# Patient Record
Sex: FEMALE | Race: WHITE | NOT HISPANIC OR LATINO | Employment: OTHER | URBAN - NONMETROPOLITAN AREA
[De-identification: names, ages, dates, MRNs, and addresses within clinical notes are randomized per-mention and may not be internally consistent; named-entity substitution may affect disease eponyms.]

---

## 2017-01-05 ENCOUNTER — COMMUNICATION - GICH (OUTPATIENT)
Dept: FAMILY MEDICINE | Facility: OTHER | Age: 62
End: 2017-01-05

## 2017-01-05 DIAGNOSIS — I25.810 ATHEROSCLEROSIS OF CORONARY ARTERY BYPASS GRAFT WITHOUT ANGINA PECTORIS: ICD-10-CM

## 2017-01-09 ENCOUNTER — COMMUNICATION - GICH (OUTPATIENT)
Dept: FAMILY MEDICINE | Facility: OTHER | Age: 62
End: 2017-01-09

## 2017-01-09 ENCOUNTER — HISTORY (OUTPATIENT)
Dept: FAMILY MEDICINE | Facility: OTHER | Age: 62
End: 2017-01-09

## 2017-01-09 ENCOUNTER — OFFICE VISIT - GICH (OUTPATIENT)
Dept: FAMILY MEDICINE | Facility: OTHER | Age: 62
End: 2017-01-09

## 2017-01-09 DIAGNOSIS — R10.84 GENERALIZED ABDOMINAL PAIN: ICD-10-CM

## 2017-01-09 DIAGNOSIS — Z87.440 PERSONAL HISTORY OF URINARY INFECTION: ICD-10-CM

## 2017-01-09 DIAGNOSIS — R41.3 OTHER AMNESIA: ICD-10-CM

## 2017-01-09 DIAGNOSIS — G43.909 MIGRAINE WITHOUT STATUS MIGRAINOSUS, NOT INTRACTABLE: ICD-10-CM

## 2017-01-09 DIAGNOSIS — Z87.820 PERSONAL HISTORY OF TRAUMATIC BRAIN INJURY: ICD-10-CM

## 2017-01-09 DIAGNOSIS — M54.50 LOW BACK PAIN: ICD-10-CM

## 2017-01-09 DIAGNOSIS — G89.29 OTHER CHRONIC PAIN: ICD-10-CM

## 2017-01-09 DIAGNOSIS — Z79.899 OTHER LONG TERM (CURRENT) DRUG THERAPY: ICD-10-CM

## 2017-01-09 LAB
ABSOLUTE BASOPHILS - HISTORICAL: 0.1 THOU/CU MM
ABSOLUTE EOSINOPHILS - HISTORICAL: 0.1 THOU/CU MM
ABSOLUTE LYMPHOCYTES - HISTORICAL: 2.1 THOU/CU MM (ref 0.9–2.9)
ABSOLUTE MONOCYTES - HISTORICAL: 0.6 THOU/CU MM
ABSOLUTE NEUTROPHILS - HISTORICAL: 5.2 THOU/CU MM (ref 1.7–7)
ANION GAP - HISTORICAL: 9 (ref 5–18)
BASOPHILS # BLD AUTO: 1.1 %
BILIRUB UR QL: NEGATIVE
BUN SERPL-MCNC: 11 MG/DL (ref 7–25)
BUN/CREAT RATIO - HISTORICAL: 12
C-REACTIVE PROTEIN - HISTORICAL: <1 MG/DL
CALCIUM SERPL-MCNC: 9.7 MG/DL (ref 8.6–10.3)
CHLORIDE SERPLBLD-SCNC: 102 MMOL/L (ref 98–107)
CLARITY, URINE: CLEAR CLARITY
CO2 SERPL-SCNC: 26 MMOL/L (ref 21–31)
COLOR UR: YELLOW COLOR
CREAT SERPL-MCNC: 0.92 MG/DL (ref 0.7–1.3)
EOSINOPHIL NFR BLD AUTO: 1.2 %
ERYTHROCYTE [DISTWIDTH] IN BLOOD BY AUTOMATED COUNT: 16.2 % (ref 11.5–15.5)
GFR IF NOT AFRICAN AMERICAN - HISTORICAL: >60 ML/MIN/1.73M2
GLUCOSE SERPL-MCNC: 103 MG/DL (ref 70–105)
GLUCOSE URINE: NEGATIVE MG/DL
HCT VFR BLD AUTO: 40.6 % (ref 33–51)
HEMOGLOBIN: 12.9 G/DL (ref 12–16)
KETONES UR QL: NEGATIVE MG/DL
LEUKOCYTE ESTERASE URINE: ABNORMAL
LYMPHOCYTES NFR BLD AUTO: 25.5 % (ref 20–44)
MCH RBC QN AUTO: 28.4 PG (ref 26–34)
MCHC RBC AUTO-ENTMCNC: 31.7 G/DL (ref 32–36)
MCV RBC AUTO: 89 FL (ref 80–100)
MONOCYTES NFR BLD AUTO: 7.5 %
NEUTROPHILS NFR BLD AUTO: 64.8 % (ref 42–72)
NITRITE UR QL STRIP: NEGATIVE
OCCULT BLOOD,URINE - HISTORICAL: ABNORMAL
PH UR: 6 [PH]
PLATELET # BLD AUTO: 283 THOU/CU MM (ref 140–440)
PMV BLD: 7.1 FL (ref 6.5–11)
POTASSIUM SERPL-SCNC: 4.2 MMOL/L (ref 3.5–5.1)
PROTEIN QUALITATIVE,URINE - HISTORICAL: NEGATIVE MG/DL
RED BLOOD COUNT - HISTORICAL: 4.54 MIL/CU MM (ref 4–5.2)
SODIUM SERPL-SCNC: 137 MMOL/L (ref 133–143)
SP GR UR STRIP: 1.02
UROBILINOGEN,QUALITATIVE - HISTORICAL: NORMAL EU/DL
WHITE BLOOD COUNT - HISTORICAL: 8.1 THOU/CU MM (ref 4.5–11)

## 2017-01-09 ASSESSMENT — PATIENT HEALTH QUESTIONNAIRE - PHQ9: SUM OF ALL RESPONSES TO PHQ QUESTIONS 1-9: 7

## 2017-01-11 LAB — CULTURE - HISTORICAL: NORMAL

## 2017-02-10 ENCOUNTER — HISTORY (OUTPATIENT)
Dept: FAMILY MEDICINE | Facility: OTHER | Age: 62
End: 2017-02-10

## 2017-02-10 ENCOUNTER — OFFICE VISIT - GICH (OUTPATIENT)
Dept: FAMILY MEDICINE | Facility: OTHER | Age: 62
End: 2017-02-10

## 2017-02-10 ENCOUNTER — COMMUNICATION - GICH (OUTPATIENT)
Dept: FAMILY MEDICINE | Facility: OTHER | Age: 62
End: 2017-02-10

## 2017-02-10 DIAGNOSIS — G43.909 MIGRAINE WITHOUT STATUS MIGRAINOSUS, NOT INTRACTABLE: ICD-10-CM

## 2017-02-10 DIAGNOSIS — Z79.899 OTHER LONG TERM (CURRENT) DRUG THERAPY: ICD-10-CM

## 2017-02-10 DIAGNOSIS — M54.50 LOW BACK PAIN: ICD-10-CM

## 2017-02-10 DIAGNOSIS — H92.02 OTALGIA OF LEFT EAR: ICD-10-CM

## 2017-02-10 DIAGNOSIS — G89.29 OTHER CHRONIC PAIN: ICD-10-CM

## 2017-02-13 ENCOUNTER — COMMUNICATION - GICH (OUTPATIENT)
Dept: FAMILY MEDICINE | Facility: OTHER | Age: 62
End: 2017-02-13

## 2017-02-13 DIAGNOSIS — I10 ESSENTIAL (PRIMARY) HYPERTENSION: ICD-10-CM

## 2017-02-14 ENCOUNTER — COMMUNICATION - GICH (OUTPATIENT)
Dept: FAMILY MEDICINE | Facility: OTHER | Age: 62
End: 2017-02-14

## 2017-02-14 ENCOUNTER — AMBULATORY - GICH (OUTPATIENT)
Dept: SCHEDULING | Facility: OTHER | Age: 62
End: 2017-02-14

## 2017-02-14 DIAGNOSIS — G89.29 OTHER CHRONIC PAIN: ICD-10-CM

## 2017-02-14 DIAGNOSIS — M54.50 LOW BACK PAIN: ICD-10-CM

## 2017-02-14 DIAGNOSIS — I10 ESSENTIAL (PRIMARY) HYPERTENSION: ICD-10-CM

## 2017-03-01 ENCOUNTER — HISTORY (OUTPATIENT)
Dept: EMERGENCY MEDICINE | Facility: OTHER | Age: 62
End: 2017-03-01

## 2017-03-05 ENCOUNTER — AMBULATORY - GICH (OUTPATIENT)
Dept: SCHEDULING | Facility: OTHER | Age: 62
End: 2017-03-05

## 2017-03-05 ENCOUNTER — HISTORY (OUTPATIENT)
Dept: EMERGENCY MEDICINE | Facility: OTHER | Age: 62
End: 2017-03-05

## 2017-03-06 ENCOUNTER — AMBULATORY - GICH (OUTPATIENT)
Dept: SCHEDULING | Facility: OTHER | Age: 62
End: 2017-03-06

## 2017-03-07 ENCOUNTER — AMBULATORY - GICH (OUTPATIENT)
Dept: SCHEDULING | Facility: OTHER | Age: 62
End: 2017-03-07

## 2017-03-08 ENCOUNTER — OFFICE VISIT - GICH (OUTPATIENT)
Dept: FAMILY MEDICINE | Facility: OTHER | Age: 62
End: 2017-03-08

## 2017-03-08 ENCOUNTER — HISTORY (OUTPATIENT)
Dept: FAMILY MEDICINE | Facility: OTHER | Age: 62
End: 2017-03-08

## 2017-03-08 DIAGNOSIS — G89.29 OTHER CHRONIC PAIN: ICD-10-CM

## 2017-03-08 DIAGNOSIS — G44.221 CHRONIC TENSION-TYPE HEADACHE, INTRACTABLE: ICD-10-CM

## 2017-03-08 DIAGNOSIS — M79.10 MYALGIA: ICD-10-CM

## 2017-03-08 DIAGNOSIS — Z02.89 ENCOUNTER FOR OTHER ADMINISTRATIVE EXAMINATIONS: ICD-10-CM

## 2017-03-08 DIAGNOSIS — M54.50 LOW BACK PAIN: ICD-10-CM

## 2017-03-08 DIAGNOSIS — G43.909 MIGRAINE WITHOUT STATUS MIGRAINOSUS, NOT INTRACTABLE: ICD-10-CM

## 2017-03-08 DIAGNOSIS — N18.30 CHRONIC KIDNEY DISEASE, STAGE III (MODERATE) (H): ICD-10-CM

## 2017-03-08 DIAGNOSIS — R07.89 OTHER CHEST PAIN: ICD-10-CM

## 2017-03-08 DIAGNOSIS — I10 ESSENTIAL (PRIMARY) HYPERTENSION: ICD-10-CM

## 2017-03-09 ENCOUNTER — HISTORIC RESULTS (OUTPATIENT)
Dept: ADMINISTRATIVE | Age: 62
End: 2017-03-09

## 2017-03-16 ENCOUNTER — HOSPITAL ENCOUNTER (OUTPATIENT)
Dept: PHYSICAL THERAPY | Facility: OTHER | Age: 62
Setting detail: THERAPIES SERIES
End: 2017-03-16
Attending: FAMILY MEDICINE

## 2017-03-16 DIAGNOSIS — G43.909 MIGRAINE WITHOUT STATUS MIGRAINOSUS, NOT INTRACTABLE: ICD-10-CM

## 2017-03-16 DIAGNOSIS — G44.221 CHRONIC TENSION-TYPE HEADACHE, INTRACTABLE: ICD-10-CM

## 2017-03-17 ENCOUNTER — COMMUNICATION - GICH (OUTPATIENT)
Dept: FAMILY MEDICINE | Facility: OTHER | Age: 62
End: 2017-03-17

## 2017-03-17 ENCOUNTER — HISTORY (OUTPATIENT)
Dept: EMERGENCY MEDICINE | Facility: OTHER | Age: 62
End: 2017-03-17

## 2017-03-21 ENCOUNTER — HOSPITAL ENCOUNTER (OUTPATIENT)
Dept: PHYSICAL THERAPY | Facility: OTHER | Age: 62
Setting detail: THERAPIES SERIES
End: 2017-03-21
Attending: FAMILY MEDICINE

## 2017-03-27 ENCOUNTER — HISTORY (OUTPATIENT)
Dept: FAMILY MEDICINE | Facility: OTHER | Age: 62
End: 2017-03-27

## 2017-03-27 ENCOUNTER — AMBULATORY - GICH (OUTPATIENT)
Dept: FAMILY MEDICINE | Facility: OTHER | Age: 62
End: 2017-03-27

## 2017-03-27 DIAGNOSIS — M54.50 LOW BACK PAIN: ICD-10-CM

## 2017-03-27 DIAGNOSIS — G89.29 OTHER CHRONIC PAIN: ICD-10-CM

## 2017-03-27 DIAGNOSIS — I10 ESSENTIAL (PRIMARY) HYPERTENSION: ICD-10-CM

## 2017-03-27 DIAGNOSIS — G43.909 MIGRAINE WITHOUT STATUS MIGRAINOSUS, NOT INTRACTABLE: ICD-10-CM

## 2017-03-27 DIAGNOSIS — I25.810 ATHEROSCLEROSIS OF CORONARY ARTERY BYPASS GRAFT WITHOUT ANGINA PECTORIS: ICD-10-CM

## 2017-03-27 DIAGNOSIS — Z01.810 ENCOUNTER FOR PREPROCEDURAL CARDIOVASCULAR EXAMINATION: ICD-10-CM

## 2017-03-27 DIAGNOSIS — Z02.89 ENCOUNTER FOR OTHER ADMINISTRATIVE EXAMINATIONS: ICD-10-CM

## 2017-03-27 DIAGNOSIS — N18.30 CHRONIC KIDNEY DISEASE, STAGE III (MODERATE) (H): ICD-10-CM

## 2017-03-29 ENCOUNTER — HOSPITAL ENCOUNTER (OUTPATIENT)
Dept: RADIOLOGY | Facility: OTHER | Age: 62
End: 2017-03-29
Attending: FAMILY MEDICINE

## 2017-03-29 DIAGNOSIS — I10 ESSENTIAL (PRIMARY) HYPERTENSION: ICD-10-CM

## 2017-03-29 DIAGNOSIS — N18.30 CHRONIC KIDNEY DISEASE, STAGE III (MODERATE) (H): ICD-10-CM

## 2017-04-05 ENCOUNTER — AMBULATORY - GICH (OUTPATIENT)
Dept: SCHEDULING | Facility: OTHER | Age: 62
End: 2017-04-05

## 2017-04-11 ENCOUNTER — OFFICE VISIT - GICH (OUTPATIENT)
Dept: FAMILY MEDICINE | Facility: OTHER | Age: 62
End: 2017-04-11

## 2017-04-11 ENCOUNTER — HISTORY (OUTPATIENT)
Dept: FAMILY MEDICINE | Facility: OTHER | Age: 62
End: 2017-04-11

## 2017-04-11 DIAGNOSIS — G43.909 MIGRAINE WITHOUT STATUS MIGRAINOSUS, NOT INTRACTABLE: ICD-10-CM

## 2017-04-11 DIAGNOSIS — Z02.89 ENCOUNTER FOR OTHER ADMINISTRATIVE EXAMINATIONS: ICD-10-CM

## 2017-04-11 DIAGNOSIS — G89.29 OTHER CHRONIC PAIN: ICD-10-CM

## 2017-04-11 DIAGNOSIS — M54.50 LOW BACK PAIN: ICD-10-CM

## 2017-04-24 ENCOUNTER — AMBULATORY - GICH (OUTPATIENT)
Dept: FAMILY MEDICINE | Facility: OTHER | Age: 62
End: 2017-04-24

## 2017-04-24 ENCOUNTER — HISTORY (OUTPATIENT)
Dept: FAMILY MEDICINE | Facility: OTHER | Age: 62
End: 2017-04-24

## 2017-04-24 DIAGNOSIS — Z02.89 ENCOUNTER FOR OTHER ADMINISTRATIVE EXAMINATIONS: ICD-10-CM

## 2017-04-24 DIAGNOSIS — G89.29 OTHER CHRONIC PAIN: ICD-10-CM

## 2017-04-24 DIAGNOSIS — I25.810 ATHEROSCLEROSIS OF CORONARY ARTERY BYPASS GRAFT WITHOUT ANGINA PECTORIS: ICD-10-CM

## 2017-04-24 DIAGNOSIS — M54.50 LOW BACK PAIN: ICD-10-CM

## 2017-04-24 DIAGNOSIS — G43.909 MIGRAINE WITHOUT STATUS MIGRAINOSUS, NOT INTRACTABLE: ICD-10-CM

## 2017-04-24 DIAGNOSIS — Z01.810 ENCOUNTER FOR PREPROCEDURAL CARDIOVASCULAR EXAMINATION: ICD-10-CM

## 2017-04-24 DIAGNOSIS — I10 ESSENTIAL (PRIMARY) HYPERTENSION: ICD-10-CM

## 2017-04-24 DIAGNOSIS — E78.5 HYPERLIPIDEMIA: ICD-10-CM

## 2017-04-24 LAB
ANION GAP - HISTORICAL: 8 (ref 5–18)
BUN SERPL-MCNC: 18 MG/DL (ref 7–25)
BUN/CREAT RATIO - HISTORICAL: 16
CALCIUM SERPL-MCNC: 9.9 MG/DL (ref 8.6–10.3)
CHLORIDE SERPLBLD-SCNC: 101 MMOL/L (ref 98–107)
CHOL/HDL RATIO - HISTORICAL: 2.59
CHOLESTEROL TOTAL: 225 MG/DL
CO2 SERPL-SCNC: 25 MMOL/L (ref 21–31)
CREAT SERPL-MCNC: 1.1 MG/DL (ref 0.7–1.3)
ERYTHROCYTE [DISTWIDTH] IN BLOOD BY AUTOMATED COUNT: 12.9 % (ref 11.5–15.5)
GFR IF NOT AFRICAN AMERICAN - HISTORICAL: 50 ML/MIN/1.73M2
GLUCOSE SERPL-MCNC: 89 MG/DL (ref 70–105)
HCT VFR BLD AUTO: 38.1 % (ref 33–51)
HDLC SERPL-MCNC: 87 MG/DL (ref 23–92)
HEMOGLOBIN: 12.8 G/DL (ref 12–16)
LDLC SERPL CALC-MCNC: 99 MG/DL
MCH RBC QN AUTO: 31 PG (ref 26–34)
MCHC RBC AUTO-ENTMCNC: 33.7 G/DL (ref 32–36)
MCV RBC AUTO: 92 FL (ref 80–100)
NON-HDL CHOLESTEROL - HISTORICAL: 138 MG/DL
PATIENT STATUS - HISTORICAL: ABNORMAL
PLATELET # BLD AUTO: 278 THOU/CU MM (ref 140–440)
PMV BLD: 7 FL (ref 6.5–11)
POTASSIUM SERPL-SCNC: 4.5 MMOL/L (ref 3.5–5.1)
RED BLOOD COUNT - HISTORICAL: 4.14 MIL/CU MM (ref 4–5.2)
SODIUM SERPL-SCNC: 134 MMOL/L (ref 133–143)
TRIGL SERPL-MCNC: 196 MG/DL
WHITE BLOOD COUNT - HISTORICAL: 7.5 THOU/CU MM (ref 4.5–11)

## 2017-04-24 ASSESSMENT — ANXIETY QUESTIONNAIRES
7. FEELING AFRAID AS IF SOMETHING AWFUL MIGHT HAPPEN: NOT AT ALL
1. FEELING NERVOUS, ANXIOUS, OR ON EDGE: SEVERAL DAYS
2. NOT BEING ABLE TO STOP OR CONTROL WORRYING: NOT AT ALL
6. BECOMING EASILY ANNOYED OR IRRITABLE: NOT AT ALL
GAD7 TOTAL SCORE: 1
3. WORRYING TOO MUCH ABOUT DIFFERENT THINGS: NOT AT ALL
4. TROUBLE RELAXING: NOT AT ALL
5. BEING SO RESTLESS THAT IT IS HARD TO SIT STILL: NOT AT ALL

## 2017-04-24 ASSESSMENT — PATIENT HEALTH QUESTIONNAIRE - PHQ9: SUM OF ALL RESPONSES TO PHQ QUESTIONS 1-9: 3

## 2017-05-08 ENCOUNTER — HISTORY (OUTPATIENT)
Dept: FAMILY MEDICINE | Facility: OTHER | Age: 62
End: 2017-05-08

## 2017-05-08 ENCOUNTER — OFFICE VISIT - GICH (OUTPATIENT)
Dept: FAMILY MEDICINE | Facility: OTHER | Age: 62
End: 2017-05-08

## 2017-05-08 DIAGNOSIS — G43.909 MIGRAINE WITHOUT STATUS MIGRAINOSUS, NOT INTRACTABLE: ICD-10-CM

## 2017-05-08 DIAGNOSIS — I77.1 STRICTURE OF ARTERY (H): ICD-10-CM

## 2017-05-08 DIAGNOSIS — Z02.89 ENCOUNTER FOR OTHER ADMINISTRATIVE EXAMINATIONS: ICD-10-CM

## 2017-05-08 DIAGNOSIS — G89.29 OTHER CHRONIC PAIN: ICD-10-CM

## 2017-05-08 DIAGNOSIS — M54.50 LOW BACK PAIN: ICD-10-CM

## 2017-05-09 ENCOUNTER — AMBULATORY - GICH (OUTPATIENT)
Dept: SCHEDULING | Facility: OTHER | Age: 62
End: 2017-05-09

## 2017-05-12 ENCOUNTER — AMBULATORY - GICH (OUTPATIENT)
Dept: SCHEDULING | Facility: OTHER | Age: 62
End: 2017-05-12

## 2017-05-23 ENCOUNTER — OFFICE VISIT - GICH (OUTPATIENT)
Dept: FAMILY MEDICINE | Facility: OTHER | Age: 62
End: 2017-05-23

## 2017-05-23 ENCOUNTER — HISTORY (OUTPATIENT)
Dept: FAMILY MEDICINE | Facility: OTHER | Age: 62
End: 2017-05-23

## 2017-05-23 ENCOUNTER — AMBULATORY - GICH (OUTPATIENT)
Dept: SCHEDULING | Facility: OTHER | Age: 62
End: 2017-05-23

## 2017-05-23 DIAGNOSIS — I25.810 ATHEROSCLEROSIS OF CORONARY ARTERY BYPASS GRAFT WITHOUT ANGINA PECTORIS: ICD-10-CM

## 2017-05-23 DIAGNOSIS — M79.10 MYALGIA: ICD-10-CM

## 2017-05-23 DIAGNOSIS — Z02.89 ENCOUNTER FOR OTHER ADMINISTRATIVE EXAMINATIONS: ICD-10-CM

## 2017-05-23 DIAGNOSIS — G44.221 CHRONIC TENSION-TYPE HEADACHE, INTRACTABLE: ICD-10-CM

## 2017-05-23 DIAGNOSIS — G89.29 OTHER CHRONIC PAIN: ICD-10-CM

## 2017-05-23 DIAGNOSIS — E55.9 VITAMIN D DEFICIENCY: ICD-10-CM

## 2017-05-23 DIAGNOSIS — G43.909 MIGRAINE WITHOUT STATUS MIGRAINOSUS, NOT INTRACTABLE: ICD-10-CM

## 2017-05-23 DIAGNOSIS — M54.50 LOW BACK PAIN: ICD-10-CM

## 2017-05-23 ASSESSMENT — PATIENT HEALTH QUESTIONNAIRE - PHQ9: SUM OF ALL RESPONSES TO PHQ QUESTIONS 1-9: 0

## 2017-06-02 ENCOUNTER — COMMUNICATION - GICH (OUTPATIENT)
Dept: FAMILY MEDICINE | Facility: OTHER | Age: 62
End: 2017-06-02

## 2017-06-02 DIAGNOSIS — R11.0 NAUSEA: ICD-10-CM

## 2017-06-20 ENCOUNTER — AMBULATORY - GICH (OUTPATIENT)
Dept: SCHEDULING | Facility: OTHER | Age: 62
End: 2017-06-20

## 2017-06-20 ENCOUNTER — HISTORY (OUTPATIENT)
Dept: FAMILY MEDICINE | Facility: OTHER | Age: 62
End: 2017-06-20

## 2017-06-20 ENCOUNTER — OFFICE VISIT - GICH (OUTPATIENT)
Dept: FAMILY MEDICINE | Facility: OTHER | Age: 62
End: 2017-06-20

## 2017-06-20 DIAGNOSIS — Z02.89 ENCOUNTER FOR OTHER ADMINISTRATIVE EXAMINATIONS: ICD-10-CM

## 2017-06-20 DIAGNOSIS — G89.29 OTHER CHRONIC PAIN: ICD-10-CM

## 2017-06-20 DIAGNOSIS — Z87.19 PERSONAL HISTORY OF OTHER DISEASES OF THE DIGESTIVE SYSTEM (CODE): ICD-10-CM

## 2017-06-20 DIAGNOSIS — N39.0 URINARY TRACT INFECTION: ICD-10-CM

## 2017-06-20 DIAGNOSIS — G43.909 MIGRAINE WITHOUT STATUS MIGRAINOSUS, NOT INTRACTABLE: ICD-10-CM

## 2017-06-20 DIAGNOSIS — M54.50 LOW BACK PAIN: ICD-10-CM

## 2017-06-20 DIAGNOSIS — R30.0 DYSURIA: ICD-10-CM

## 2017-06-20 DIAGNOSIS — I25.810 ATHEROSCLEROSIS OF CORONARY ARTERY BYPASS GRAFT WITHOUT ANGINA PECTORIS: ICD-10-CM

## 2017-06-20 LAB
BACTERIA URINE: ABNORMAL BACTERIA/HPF
BILIRUB UR QL: NEGATIVE
CLARITY, URINE: CLEAR CLARITY
COLOR UR: YELLOW COLOR
EPITHELIAL CELLS: ABNORMAL EPI/HPF
GLUCOSE URINE: NEGATIVE MG/DL
KETONES UR QL: NEGATIVE MG/DL
LEUKOCYTE ESTERASE URINE: ABNORMAL
NITRITE UR QL STRIP: POSITIVE
OCCULT BLOOD,URINE - HISTORICAL: NEGATIVE
PH UR: 5.5 [PH]
PROTEIN QUALITATIVE,URINE - HISTORICAL: NEGATIVE MG/DL
RBC - HISTORICAL: ABNORMAL /HPF
SP GR UR STRIP: 1.01
UROBILINOGEN,QUALITATIVE - HISTORICAL: NORMAL EU/DL
WBC - HISTORICAL: ABNORMAL /HPF

## 2017-06-22 LAB
CULTURE - HISTORICAL: ABNORMAL
CULTURE - HISTORICAL: ABNORMAL
SUSCEPTIBILITY RESULT - HISTORICAL: ABNORMAL

## 2017-07-03 ENCOUNTER — COMMUNICATION - GICH (OUTPATIENT)
Dept: FAMILY MEDICINE | Facility: OTHER | Age: 62
End: 2017-07-03

## 2017-07-03 DIAGNOSIS — I25.810 ATHEROSCLEROSIS OF CORONARY ARTERY BYPASS GRAFT WITHOUT ANGINA PECTORIS: ICD-10-CM

## 2017-07-18 ENCOUNTER — OFFICE VISIT - GICH (OUTPATIENT)
Dept: FAMILY MEDICINE | Facility: OTHER | Age: 62
End: 2017-07-18

## 2017-07-18 ENCOUNTER — AMBULATORY - GICH (OUTPATIENT)
Dept: SCHEDULING | Facility: OTHER | Age: 62
End: 2017-07-18

## 2017-07-18 DIAGNOSIS — G89.29 OTHER CHRONIC PAIN: ICD-10-CM

## 2017-07-18 DIAGNOSIS — M54.50 LOW BACK PAIN: ICD-10-CM

## 2017-07-18 DIAGNOSIS — G43.909 MIGRAINE WITHOUT STATUS MIGRAINOSUS, NOT INTRACTABLE: ICD-10-CM

## 2017-07-18 DIAGNOSIS — I10 ESSENTIAL (PRIMARY) HYPERTENSION: ICD-10-CM

## 2017-07-18 DIAGNOSIS — Z12.31 ENCOUNTER FOR SCREENING MAMMOGRAM FOR MALIGNANT NEOPLASM OF BREAST: ICD-10-CM

## 2017-07-18 DIAGNOSIS — I25.810 ATHEROSCLEROSIS OF CORONARY ARTERY BYPASS GRAFT WITHOUT ANGINA PECTORIS: ICD-10-CM

## 2017-07-18 DIAGNOSIS — Z02.89 ENCOUNTER FOR OTHER ADMINISTRATIVE EXAMINATIONS: ICD-10-CM

## 2017-07-18 ASSESSMENT — ANXIETY QUESTIONNAIRES
GAD7 TOTAL SCORE: 1
5. BEING SO RESTLESS THAT IT IS HARD TO SIT STILL: NOT AT ALL
3. WORRYING TOO MUCH ABOUT DIFFERENT THINGS: NOT AT ALL
1. FEELING NERVOUS, ANXIOUS, OR ON EDGE: NOT AT ALL
4. TROUBLE RELAXING: NOT AT ALL
7. FEELING AFRAID AS IF SOMETHING AWFUL MIGHT HAPPEN: NOT AT ALL
6. BECOMING EASILY ANNOYED OR IRRITABLE: SEVERAL DAYS
2. NOT BEING ABLE TO STOP OR CONTROL WORRYING: NOT AT ALL

## 2017-07-18 ASSESSMENT — PATIENT HEALTH QUESTIONNAIRE - PHQ9: SUM OF ALL RESPONSES TO PHQ QUESTIONS 1-9: 5

## 2017-07-24 ENCOUNTER — HOSPITAL ENCOUNTER (OUTPATIENT)
Dept: RADIOLOGY | Facility: OTHER | Age: 62
End: 2017-07-24
Attending: FAMILY MEDICINE

## 2017-07-24 ENCOUNTER — HISTORY (OUTPATIENT)
Dept: RADIOLOGY | Facility: OTHER | Age: 62
End: 2017-07-24

## 2017-07-24 DIAGNOSIS — Z12.31 ENCOUNTER FOR SCREENING MAMMOGRAM FOR MALIGNANT NEOPLASM OF BREAST: ICD-10-CM

## 2017-07-25 ENCOUNTER — AMBULATORY - GICH (OUTPATIENT)
Dept: RADIOLOGY | Facility: OTHER | Age: 62
End: 2017-07-25

## 2017-07-25 DIAGNOSIS — R92.8 OTHER ABNORMAL AND INCONCLUSIVE FINDINGS ON DIAGNOSTIC IMAGING OF BREAST: ICD-10-CM

## 2017-07-28 ENCOUNTER — HISTORY (OUTPATIENT)
Dept: RADIOLOGY | Facility: OTHER | Age: 62
End: 2017-07-28

## 2017-07-28 ENCOUNTER — COMMUNICATION - GICH (OUTPATIENT)
Dept: FAMILY MEDICINE | Facility: OTHER | Age: 62
End: 2017-07-28

## 2017-07-28 ENCOUNTER — HOSPITAL ENCOUNTER (OUTPATIENT)
Dept: RADIOLOGY | Facility: OTHER | Age: 62
End: 2017-07-28
Attending: FAMILY MEDICINE

## 2017-07-28 DIAGNOSIS — R92.8 OTHER ABNORMAL AND INCONCLUSIVE FINDINGS ON DIAGNOSTIC IMAGING OF BREAST: ICD-10-CM

## 2017-08-10 ENCOUNTER — COMMUNICATION - GICH (OUTPATIENT)
Dept: CARDIOLOGY | Facility: OTHER | Age: 62
End: 2017-08-10

## 2017-08-10 ENCOUNTER — TRANSFERRED RECORDS (OUTPATIENT)
Dept: HEALTH INFORMATION MANAGEMENT | Facility: CLINIC | Age: 62
End: 2017-08-10

## 2017-08-10 ENCOUNTER — HISTORY (OUTPATIENT)
Dept: CARDIOLOGY | Facility: OTHER | Age: 62
End: 2017-08-10

## 2017-08-10 ENCOUNTER — OFFICE VISIT - GICH (OUTPATIENT)
Dept: CARDIOLOGY | Facility: OTHER | Age: 62
End: 2017-08-10

## 2017-08-10 DIAGNOSIS — F41.9 ANXIETY DISORDER: ICD-10-CM

## 2017-08-10 DIAGNOSIS — Z95.1 PRESENCE OF AORTOCORONARY BYPASS GRAFT: ICD-10-CM

## 2017-08-10 DIAGNOSIS — Z95.5 PRESENCE OF CORONARY ANGIOPLASTY IMPLANT AND GRAFT: ICD-10-CM

## 2017-08-10 DIAGNOSIS — Z72.0 TOBACCO USE: ICD-10-CM

## 2017-08-10 DIAGNOSIS — R07.9 CHEST PAIN: ICD-10-CM

## 2017-08-10 DIAGNOSIS — F33.2 MAJOR DEPRESSIVE DISORDER, RECURRENT SEVERE WITHOUT PSYCHOTIC FEATURES (H): ICD-10-CM

## 2017-08-10 DIAGNOSIS — N18.30 CHRONIC KIDNEY DISEASE, STAGE III (MODERATE) (H): ICD-10-CM

## 2017-08-10 DIAGNOSIS — I77.1 STRICTURE OF ARTERY (H): ICD-10-CM

## 2017-08-10 DIAGNOSIS — I10 ESSENTIAL (PRIMARY) HYPERTENSION: ICD-10-CM

## 2017-08-10 DIAGNOSIS — J44.9 CHRONIC OBSTRUCTIVE PULMONARY DISEASE (H): ICD-10-CM

## 2017-08-10 DIAGNOSIS — E78.2 MIXED HYPERLIPIDEMIA: ICD-10-CM

## 2017-08-10 DIAGNOSIS — I25.10 ATHEROSCLEROTIC HEART DISEASE OF NATIVE CORONARY ARTERY WITHOUT ANGINA PECTORIS: ICD-10-CM

## 2017-08-10 ASSESSMENT — ANXIETY QUESTIONNAIRES
6. BECOMING EASILY ANNOYED OR IRRITABLE: SEVERAL DAYS
1. FEELING NERVOUS, ANXIOUS, OR ON EDGE: MORE THAN HALF THE DAYS
2. NOT BEING ABLE TO STOP OR CONTROL WORRYING: NOT AT ALL
5. BEING SO RESTLESS THAT IT IS HARD TO SIT STILL: NOT AT ALL
4. TROUBLE RELAXING: NOT AT ALL
3. WORRYING TOO MUCH ABOUT DIFFERENT THINGS: SEVERAL DAYS
7. FEELING AFRAID AS IF SOMETHING AWFUL MIGHT HAPPEN: NOT AT ALL
GAD7 TOTAL SCORE: 4

## 2017-08-10 ASSESSMENT — PATIENT HEALTH QUESTIONNAIRE - PHQ9: SUM OF ALL RESPONSES TO PHQ QUESTIONS 1-9: 3

## 2017-08-13 ENCOUNTER — COMMUNICATION - GICH (OUTPATIENT)
Dept: FAMILY MEDICINE | Facility: OTHER | Age: 62
End: 2017-08-13

## 2017-08-13 DIAGNOSIS — K27.9 PEPTIC ULCER WITHOUT HEMORRHAGE OR PERFORATION: ICD-10-CM

## 2017-08-16 ENCOUNTER — HISTORY (OUTPATIENT)
Dept: FAMILY MEDICINE | Facility: OTHER | Age: 62
End: 2017-08-16

## 2017-08-16 ENCOUNTER — TRANSFERRED RECORDS (OUTPATIENT)
Dept: HEALTH INFORMATION MANAGEMENT | Facility: CLINIC | Age: 62
End: 2017-08-16

## 2017-08-16 ENCOUNTER — OFFICE VISIT - GICH (OUTPATIENT)
Dept: FAMILY MEDICINE | Facility: OTHER | Age: 62
End: 2017-08-16

## 2017-08-16 DIAGNOSIS — I10 ESSENTIAL (PRIMARY) HYPERTENSION: ICD-10-CM

## 2017-08-16 DIAGNOSIS — G89.29 OTHER CHRONIC PAIN: ICD-10-CM

## 2017-08-16 DIAGNOSIS — Z02.89 ENCOUNTER FOR OTHER ADMINISTRATIVE EXAMINATIONS: ICD-10-CM

## 2017-08-16 DIAGNOSIS — K27.9 PEPTIC ULCER WITHOUT HEMORRHAGE OR PERFORATION: ICD-10-CM

## 2017-08-16 DIAGNOSIS — G43.909 MIGRAINE WITHOUT STATUS MIGRAINOSUS, NOT INTRACTABLE: ICD-10-CM

## 2017-08-16 DIAGNOSIS — I25.810 ATHEROSCLEROSIS OF CORONARY ARTERY BYPASS GRAFT WITHOUT ANGINA PECTORIS: ICD-10-CM

## 2017-08-16 DIAGNOSIS — M54.50 LOW BACK PAIN: ICD-10-CM

## 2017-08-16 ASSESSMENT — PATIENT HEALTH QUESTIONNAIRE - PHQ9: SUM OF ALL RESPONSES TO PHQ QUESTIONS 1-9: 0

## 2017-08-17 ENCOUNTER — COMMUNICATION - GICH (OUTPATIENT)
Dept: CARDIOLOGY | Facility: OTHER | Age: 62
End: 2017-08-17

## 2017-08-17 ENCOUNTER — AMBULATORY - GICH (OUTPATIENT)
Dept: SCHEDULING | Facility: OTHER | Age: 62
End: 2017-08-17

## 2017-08-22 ENCOUNTER — TRANSFERRED RECORDS (OUTPATIENT)
Dept: HEALTH INFORMATION MANAGEMENT | Facility: CLINIC | Age: 62
End: 2017-08-22

## 2017-08-22 ENCOUNTER — AMBULATORY - GICH (OUTPATIENT)
Dept: RADIOLOGY | Facility: OTHER | Age: 62
End: 2017-08-22

## 2017-08-22 DIAGNOSIS — R92.8 OTHER ABNORMAL AND INCONCLUSIVE FINDINGS ON DIAGNOSTIC IMAGING OF BREAST: ICD-10-CM

## 2017-08-29 ENCOUNTER — TRANSFERRED RECORDS (OUTPATIENT)
Dept: HEALTH INFORMATION MANAGEMENT | Facility: CLINIC | Age: 62
End: 2017-08-29

## 2017-09-12 ENCOUNTER — COMMUNICATION - GICH (OUTPATIENT)
Dept: FAMILY MEDICINE | Facility: OTHER | Age: 62
End: 2017-09-12

## 2017-09-12 ENCOUNTER — HISTORY (OUTPATIENT)
Dept: EMERGENCY MEDICINE | Facility: OTHER | Age: 62
End: 2017-09-12

## 2017-09-12 ENCOUNTER — COMMUNICATION - GICH (OUTPATIENT)
Dept: CARDIOLOGY | Facility: OTHER | Age: 62
End: 2017-09-12

## 2017-09-13 ENCOUNTER — OFFICE VISIT - GICH (OUTPATIENT)
Dept: FAMILY MEDICINE | Facility: OTHER | Age: 62
End: 2017-09-13

## 2017-09-13 ENCOUNTER — HISTORY (OUTPATIENT)
Dept: FAMILY MEDICINE | Facility: OTHER | Age: 62
End: 2017-09-13

## 2017-09-13 DIAGNOSIS — I10 ESSENTIAL (PRIMARY) HYPERTENSION: ICD-10-CM

## 2017-09-13 DIAGNOSIS — I25.10 ATHEROSCLEROTIC HEART DISEASE OF NATIVE CORONARY ARTERY WITHOUT ANGINA PECTORIS: ICD-10-CM

## 2017-09-13 DIAGNOSIS — G89.29 OTHER CHRONIC PAIN: ICD-10-CM

## 2017-09-13 DIAGNOSIS — M54.50 LOW BACK PAIN: ICD-10-CM

## 2017-09-13 DIAGNOSIS — G43.909 MIGRAINE WITHOUT STATUS MIGRAINOSUS, NOT INTRACTABLE: ICD-10-CM

## 2017-09-13 DIAGNOSIS — F41.9 ANXIETY DISORDER: ICD-10-CM

## 2017-09-13 DIAGNOSIS — Z02.89 ENCOUNTER FOR OTHER ADMINISTRATIVE EXAMINATIONS: ICD-10-CM

## 2017-09-13 ASSESSMENT — PATIENT HEALTH QUESTIONNAIRE - PHQ9: SUM OF ALL RESPONSES TO PHQ QUESTIONS 1-9: 3

## 2017-09-14 ENCOUNTER — COMMUNICATION - GICH (OUTPATIENT)
Dept: CARDIOLOGY | Facility: OTHER | Age: 62
End: 2017-09-14

## 2017-09-15 ENCOUNTER — APPOINTMENT (OUTPATIENT)
Dept: MEDSURG UNIT | Facility: CLINIC | Age: 62
End: 2017-09-15
Attending: INTERNAL MEDICINE
Payer: COMMERCIAL

## 2017-09-15 ENCOUNTER — HOSPITAL ENCOUNTER (OUTPATIENT)
Facility: CLINIC | Age: 62
Discharge: HOME | End: 2017-09-15
Attending: INTERNAL MEDICINE | Admitting: INTERNAL MEDICINE
Payer: COMMERCIAL

## 2017-09-15 ENCOUNTER — APPOINTMENT (OUTPATIENT)
Dept: CARDIOLOGY | Facility: CLINIC | Age: 62
End: 2017-09-15
Attending: INTERNAL MEDICINE
Payer: COMMERCIAL

## 2017-09-15 ENCOUNTER — AMBULATORY - GICH (OUTPATIENT)
Dept: SCHEDULING | Facility: OTHER | Age: 62
End: 2017-09-15

## 2017-09-15 VITALS
OXYGEN SATURATION: 98 % | SYSTOLIC BLOOD PRESSURE: 157 MMHG | BODY MASS INDEX: 25.71 KG/M2 | WEIGHT: 160 LBS | HEIGHT: 66 IN | TEMPERATURE: 97.7 F | RESPIRATION RATE: 16 BRPM | HEART RATE: 58 BPM | DIASTOLIC BLOOD PRESSURE: 83 MMHG

## 2017-09-15 DIAGNOSIS — I25.10 PRESENCE OF STENT IN CORONARY ARTERY IN PATIENT WITH CORONARY ARTERY DISEASE: ICD-10-CM

## 2017-09-15 DIAGNOSIS — Z95.5 PRESENCE OF STENT IN CORONARY ARTERY IN PATIENT WITH CORONARY ARTERY DISEASE: ICD-10-CM

## 2017-09-15 DIAGNOSIS — Z98.890 STATUS POST CORONARY ANGIOGRAM: Primary | ICD-10-CM

## 2017-09-15 DIAGNOSIS — R07.9 CHEST PAIN, UNSPECIFIED: ICD-10-CM

## 2017-09-15 DIAGNOSIS — I25.810: ICD-10-CM

## 2017-09-15 LAB
ANION GAP SERPL CALCULATED.3IONS-SCNC: 7 MMOL/L (ref 3–14)
BUN SERPL-MCNC: 25 MG/DL (ref 7–30)
CALCIUM SERPL-MCNC: 9 MG/DL (ref 8.5–10.1)
CHLORIDE SERPL-SCNC: 109 MMOL/L (ref 94–109)
CO2 SERPL-SCNC: 24 MMOL/L (ref 20–32)
CREAT SERPL-MCNC: 0.94 MG/DL (ref 0.52–1.04)
ERYTHROCYTE [DISTWIDTH] IN BLOOD BY AUTOMATED COUNT: 15.6 % (ref 10–15)
GFR SERPL CREATININE-BSD FRML MDRD: 60 ML/MIN/1.7M2
GLUCOSE SERPL-MCNC: 101 MG/DL (ref 70–99)
HCT VFR BLD AUTO: 33.5 % (ref 35–47)
HGB BLD-MCNC: 10.7 G/DL (ref 11.7–15.7)
MCH RBC QN AUTO: 28 PG (ref 26.5–33)
MCHC RBC AUTO-ENTMCNC: 31.9 G/DL (ref 31.5–36.5)
MCV RBC AUTO: 88 FL (ref 78–100)
PLATELET # BLD AUTO: 254 10E9/L (ref 150–450)
POTASSIUM SERPL-SCNC: 4 MMOL/L (ref 3.4–5.3)
RBC # BLD AUTO: 3.82 10E12/L (ref 3.8–5.2)
SODIUM SERPL-SCNC: 140 MMOL/L (ref 133–144)
WBC # BLD AUTO: 7.2 10E9/L (ref 4–11)

## 2017-09-15 PROCEDURE — B2151ZZ FLUOROSCOPY OF LEFT HEART USING LOW OSMOLAR CONTRAST: ICD-10-PCS | Performed by: INTERNAL MEDICINE

## 2017-09-15 PROCEDURE — B2111ZZ FLUOROSCOPY OF MULTIPLE CORONARY ARTERIES USING LOW OSMOLAR CONTRAST: ICD-10-PCS | Performed by: INTERNAL MEDICINE

## 2017-09-15 PROCEDURE — 25000132 ZZH RX MED GY IP 250 OP 250 PS 637: Performed by: INTERNAL MEDICINE

## 2017-09-15 PROCEDURE — 27211089 ZZH KIT ACIST INJECTOR CR3

## 2017-09-15 PROCEDURE — 93005 ELECTROCARDIOGRAM TRACING: CPT

## 2017-09-15 PROCEDURE — 99152 MOD SED SAME PHYS/QHP 5/>YRS: CPT | Mod: 59 | Performed by: INTERNAL MEDICINE

## 2017-09-15 PROCEDURE — 40000166 ZZH STATISTIC PP CARE STAGE 1

## 2017-09-15 PROCEDURE — C1769 GUIDE WIRE: HCPCS

## 2017-09-15 PROCEDURE — 27210742 ZZH CATH CR1

## 2017-09-15 PROCEDURE — B2181ZZ FLUOROSCOPY OF LEFT INTERNAL MAMMARY BYPASS GRAFT USING LOW OSMOLAR CONTRAST: ICD-10-PCS | Performed by: INTERNAL MEDICINE

## 2017-09-15 PROCEDURE — 25000128 H RX IP 250 OP 636: Performed by: INTERNAL MEDICINE

## 2017-09-15 PROCEDURE — B2131ZZ FLUOROSCOPY OF MULTIPLE CORONARY ARTERY BYPASS GRAFTS USING LOW OSMOLAR CONTRAST: ICD-10-PCS | Performed by: INTERNAL MEDICINE

## 2017-09-15 PROCEDURE — C1894 INTRO/SHEATH, NON-LASER: HCPCS

## 2017-09-15 PROCEDURE — 40000065 ZZH STATISTIC EKG NON-CHARGEABLE

## 2017-09-15 PROCEDURE — 27210787 ZZH MANIFOLD CR2

## 2017-09-15 PROCEDURE — 93010 ELECTROCARDIOGRAM REPORT: CPT | Performed by: INTERNAL MEDICINE

## 2017-09-15 PROCEDURE — 93459 L HRT ART/GRFT ANGIO: CPT

## 2017-09-15 PROCEDURE — 27210946 ZZH KIT HC TOTES DISP CR8

## 2017-09-15 PROCEDURE — 99152 MOD SED SAME PHYS/QHP 5/>YRS: CPT

## 2017-09-15 PROCEDURE — 80048 BASIC METABOLIC PNL TOTAL CA: CPT | Performed by: INTERNAL MEDICINE

## 2017-09-15 PROCEDURE — 4A023N7 MEASUREMENT OF CARDIAC SAMPLING AND PRESSURE, LEFT HEART, PERCUTANEOUS APPROACH: ICD-10-PCS | Performed by: INTERNAL MEDICINE

## 2017-09-15 PROCEDURE — 93459 L HRT ART/GRFT ANGIO: CPT | Mod: 26 | Performed by: INTERNAL MEDICINE

## 2017-09-15 PROCEDURE — 85027 COMPLETE CBC AUTOMATED: CPT | Performed by: INTERNAL MEDICINE

## 2017-09-15 PROCEDURE — 99153 MOD SED SAME PHYS/QHP EA: CPT

## 2017-09-15 RX ORDER — POTASSIUM CHLORIDE 7.45 MG/ML
10 INJECTION INTRAVENOUS
Status: DISCONTINUED | OUTPATIENT
Start: 2017-09-15 | End: 2017-09-15 | Stop reason: HOSPADM

## 2017-09-15 RX ORDER — OXYCODONE AND ACETAMINOPHEN 10; 325 MG/1; MG/1
2 TABLET ORAL 3 TIMES DAILY
COMMUNITY
End: 2018-02-13

## 2017-09-15 RX ORDER — CLOPIDOGREL BISULFATE 75 MG/1
300-600 TABLET ORAL
Status: DISCONTINUED | OUTPATIENT
Start: 2017-09-15 | End: 2017-09-15 | Stop reason: HOSPADM

## 2017-09-15 RX ORDER — METHYLPREDNISOLONE SODIUM SUCCINATE 125 MG/2ML
125 INJECTION, POWDER, LYOPHILIZED, FOR SOLUTION INTRAMUSCULAR; INTRAVENOUS
Status: DISCONTINUED | OUTPATIENT
Start: 2017-09-15 | End: 2017-09-15 | Stop reason: HOSPADM

## 2017-09-15 RX ORDER — NITROGLYCERIN 0.4 MG/1
0.4 TABLET SUBLINGUAL EVERY 5 MIN PRN
Status: DISCONTINUED | OUTPATIENT
Start: 2017-09-15 | End: 2017-09-15 | Stop reason: HOSPADM

## 2017-09-15 RX ORDER — PRASUGREL 10 MG/1
10-60 TABLET, FILM COATED ORAL
Status: DISCONTINUED | OUTPATIENT
Start: 2017-09-15 | End: 2017-09-15 | Stop reason: HOSPADM

## 2017-09-15 RX ORDER — HYDRALAZINE HYDROCHLORIDE 20 MG/ML
10-20 INJECTION INTRAMUSCULAR; INTRAVENOUS
Status: DISCONTINUED | OUTPATIENT
Start: 2017-09-15 | End: 2017-09-15 | Stop reason: HOSPADM

## 2017-09-15 RX ORDER — IOPAMIDOL 755 MG/ML
145 INJECTION, SOLUTION INTRAVASCULAR ONCE
Status: DISCONTINUED | OUTPATIENT
Start: 2017-09-15 | End: 2017-09-15 | Stop reason: HOSPADM

## 2017-09-15 RX ORDER — PROTAMINE SULFATE 10 MG/ML
25-100 INJECTION, SOLUTION INTRAVENOUS EVERY 5 MIN PRN
Status: DISCONTINUED | OUTPATIENT
Start: 2017-09-15 | End: 2017-09-15 | Stop reason: HOSPADM

## 2017-09-15 RX ORDER — LIDOCAINE 40 MG/G
CREAM TOPICAL
Status: DISCONTINUED | OUTPATIENT
Start: 2017-09-15 | End: 2017-09-15 | Stop reason: HOSPADM

## 2017-09-15 RX ORDER — ADENOSINE 3 MG/ML
12-12000 INJECTION, SOLUTION INTRAVENOUS
Status: DISCONTINUED | OUTPATIENT
Start: 2017-09-15 | End: 2017-09-15 | Stop reason: HOSPADM

## 2017-09-15 RX ORDER — NALOXONE HYDROCHLORIDE 0.4 MG/ML
0.4 INJECTION, SOLUTION INTRAMUSCULAR; INTRAVENOUS; SUBCUTANEOUS EVERY 5 MIN PRN
Status: DISCONTINUED | OUTPATIENT
Start: 2017-09-15 | End: 2017-09-15 | Stop reason: HOSPADM

## 2017-09-15 RX ORDER — CLOPIDOGREL BISULFATE 75 MG/1
75 TABLET ORAL
Status: DISCONTINUED | OUTPATIENT
Start: 2017-09-15 | End: 2017-09-15 | Stop reason: HOSPADM

## 2017-09-15 RX ORDER — SACCHAROMYCES BOULARDII 250 MG
250 CAPSULE ORAL 2 TIMES DAILY
COMMUNITY
End: 2020-06-03

## 2017-09-15 RX ORDER — SODIUM CHLORIDE 9 MG/ML
INJECTION, SOLUTION INTRAVENOUS CONTINUOUS
Status: DISCONTINUED | OUTPATIENT
Start: 2017-09-15 | End: 2017-09-15 | Stop reason: HOSPADM

## 2017-09-15 RX ORDER — DIPHENHYDRAMINE HYDROCHLORIDE 50 MG/ML
25-50 INJECTION INTRAMUSCULAR; INTRAVENOUS
Status: DISCONTINUED | OUTPATIENT
Start: 2017-09-15 | End: 2017-09-15 | Stop reason: HOSPADM

## 2017-09-15 RX ORDER — VERAPAMIL HYDROCHLORIDE 2.5 MG/ML
1-5 INJECTION, SOLUTION INTRAVENOUS
Status: DISCONTINUED | OUTPATIENT
Start: 2017-09-15 | End: 2017-09-15 | Stop reason: HOSPADM

## 2017-09-15 RX ORDER — NICARDIPINE HYDROCHLORIDE 2.5 MG/ML
100 INJECTION INTRAVENOUS
Status: DISCONTINUED | OUTPATIENT
Start: 2017-09-15 | End: 2017-09-15 | Stop reason: HOSPADM

## 2017-09-15 RX ORDER — NITROGLYCERIN 5 MG/ML
100-500 VIAL (ML) INTRAVENOUS
Status: DISCONTINUED | OUTPATIENT
Start: 2017-09-15 | End: 2017-09-15 | Stop reason: HOSPADM

## 2017-09-15 RX ORDER — ENALAPRILAT 1.25 MG/ML
1.25-2.5 INJECTION INTRAVENOUS
Status: DISCONTINUED | OUTPATIENT
Start: 2017-09-15 | End: 2017-09-15 | Stop reason: HOSPADM

## 2017-09-15 RX ORDER — MAGNESIUM HYDROXIDE 1200 MG/15ML
1000 LIQUID ORAL CONTINUOUS PRN
Status: DISCONTINUED | OUTPATIENT
Start: 2017-09-15 | End: 2017-09-15 | Stop reason: HOSPADM

## 2017-09-15 RX ORDER — ARGATROBAN 1 MG/ML
150 INJECTION, SOLUTION INTRAVENOUS
Status: DISCONTINUED | OUTPATIENT
Start: 2017-09-15 | End: 2017-09-15 | Stop reason: HOSPADM

## 2017-09-15 RX ORDER — FLUMAZENIL 0.1 MG/ML
0.2 INJECTION, SOLUTION INTRAVENOUS
Status: DISCONTINUED | OUTPATIENT
Start: 2017-09-15 | End: 2017-09-15 | Stop reason: HOSPADM

## 2017-09-15 RX ORDER — NALOXONE HYDROCHLORIDE 0.4 MG/ML
.1-.4 INJECTION, SOLUTION INTRAMUSCULAR; INTRAVENOUS; SUBCUTANEOUS
Status: DISCONTINUED | OUTPATIENT
Start: 2017-09-15 | End: 2017-09-15 | Stop reason: HOSPADM

## 2017-09-15 RX ORDER — FENTANYL CITRATE 50 UG/ML
25-50 INJECTION, SOLUTION INTRAMUSCULAR; INTRAVENOUS
Status: DISCONTINUED | OUTPATIENT
Start: 2017-09-15 | End: 2017-09-15 | Stop reason: HOSPADM

## 2017-09-15 RX ORDER — NITROGLYCERIN 5 MG/ML
100-200 VIAL (ML) INTRAVENOUS
Status: DISCONTINUED | OUTPATIENT
Start: 2017-09-15 | End: 2017-09-15 | Stop reason: HOSPADM

## 2017-09-15 RX ORDER — FLUTICASONE PROPIONATE 50 MCG
2 SPRAY, SUSPENSION (ML) NASAL DAILY
COMMUNITY
End: 2018-02-13

## 2017-09-15 RX ORDER — PROMETHAZINE HYDROCHLORIDE 25 MG/ML
6.25-25 INJECTION, SOLUTION INTRAMUSCULAR; INTRAVENOUS EVERY 4 HOURS PRN
Status: DISCONTINUED | OUTPATIENT
Start: 2017-09-15 | End: 2017-09-15 | Stop reason: HOSPADM

## 2017-09-15 RX ORDER — NIFEDIPINE 10 MG/1
10 CAPSULE ORAL
Status: DISCONTINUED | OUTPATIENT
Start: 2017-09-15 | End: 2017-09-15 | Stop reason: HOSPADM

## 2017-09-15 RX ORDER — POTASSIUM CHLORIDE 29.8 MG/ML
20 INJECTION INTRAVENOUS
Status: DISCONTINUED | OUTPATIENT
Start: 2017-09-15 | End: 2017-09-15 | Stop reason: HOSPADM

## 2017-09-15 RX ORDER — DOBUTAMINE HYDROCHLORIDE 200 MG/100ML
2-20 INJECTION INTRAVENOUS CONTINUOUS PRN
Status: DISCONTINUED | OUTPATIENT
Start: 2017-09-15 | End: 2017-09-15 | Stop reason: HOSPADM

## 2017-09-15 RX ORDER — ASPIRIN 81 MG/1
81-324 TABLET, CHEWABLE ORAL
Status: DISCONTINUED | OUTPATIENT
Start: 2017-09-15 | End: 2017-09-15 | Stop reason: HOSPADM

## 2017-09-15 RX ORDER — EPTIFIBATIDE 2 MG/ML
2 INJECTION, SOLUTION INTRAVENOUS CONTINUOUS PRN
Status: DISCONTINUED | OUTPATIENT
Start: 2017-09-15 | End: 2017-09-15 | Stop reason: HOSPADM

## 2017-09-15 RX ORDER — METOPROLOL TARTRATE 1 MG/ML
5 INJECTION, SOLUTION INTRAVENOUS EVERY 5 MIN PRN
Status: DISCONTINUED | OUTPATIENT
Start: 2017-09-15 | End: 2017-09-15 | Stop reason: HOSPADM

## 2017-09-15 RX ORDER — LORAZEPAM 2 MG/ML
.5-2 INJECTION INTRAMUSCULAR EVERY 4 HOURS PRN
Status: DISCONTINUED | OUTPATIENT
Start: 2017-09-15 | End: 2017-09-15 | Stop reason: HOSPADM

## 2017-09-15 RX ORDER — FUROSEMIDE 10 MG/ML
20-100 INJECTION INTRAMUSCULAR; INTRAVENOUS
Status: DISCONTINUED | OUTPATIENT
Start: 2017-09-15 | End: 2017-09-15 | Stop reason: HOSPADM

## 2017-09-15 RX ORDER — EPTIFIBATIDE 2 MG/ML
180 INJECTION, SOLUTION INTRAVENOUS EVERY 10 MIN PRN
Status: DISCONTINUED | OUTPATIENT
Start: 2017-09-15 | End: 2017-09-15 | Stop reason: HOSPADM

## 2017-09-15 RX ORDER — HEPARIN SODIUM 1000 [USP'U]/ML
1000-10000 INJECTION, SOLUTION INTRAVENOUS; SUBCUTANEOUS EVERY 5 MIN PRN
Status: DISCONTINUED | OUTPATIENT
Start: 2017-09-15 | End: 2017-09-15 | Stop reason: HOSPADM

## 2017-09-15 RX ORDER — PHENYLEPHRINE HCL IN 0.9% NACL 1 MG/10 ML
20-100 SYRINGE (ML) INTRAVENOUS
Status: DISCONTINUED | OUTPATIENT
Start: 2017-09-15 | End: 2017-09-15 | Stop reason: HOSPADM

## 2017-09-15 RX ORDER — NITROGLYCERIN 20 MG/100ML
.07-2 INJECTION INTRAVENOUS CONTINUOUS PRN
Status: DISCONTINUED | OUTPATIENT
Start: 2017-09-15 | End: 2017-09-15 | Stop reason: HOSPADM

## 2017-09-15 RX ORDER — DEXTROSE MONOHYDRATE 25 G/50ML
12.5-5 INJECTION, SOLUTION INTRAVENOUS EVERY 30 MIN PRN
Status: DISCONTINUED | OUTPATIENT
Start: 2017-09-15 | End: 2017-09-15 | Stop reason: HOSPADM

## 2017-09-15 RX ORDER — SODIUM NITROPRUSSIDE 25 MG/ML
100-200 INJECTION INTRAVENOUS
Status: DISCONTINUED | OUTPATIENT
Start: 2017-09-15 | End: 2017-09-15 | Stop reason: HOSPADM

## 2017-09-15 RX ORDER — DOPAMINE HYDROCHLORIDE 160 MG/100ML
2-20 INJECTION, SOLUTION INTRAVENOUS CONTINUOUS PRN
Status: DISCONTINUED | OUTPATIENT
Start: 2017-09-15 | End: 2017-09-15 | Stop reason: HOSPADM

## 2017-09-15 RX ORDER — LIDOCAINE HYDROCHLORIDE 10 MG/ML
30 INJECTION, SOLUTION EPIDURAL; INFILTRATION; INTRACAUDAL; PERINEURAL
Status: DISCONTINUED | OUTPATIENT
Start: 2017-09-15 | End: 2017-09-15 | Stop reason: HOSPADM

## 2017-09-15 RX ORDER — ACETAMINOPHEN 325 MG/1
325-650 TABLET ORAL EVERY 4 HOURS PRN
Status: DISCONTINUED | OUTPATIENT
Start: 2017-09-15 | End: 2017-09-15 | Stop reason: HOSPADM

## 2017-09-15 RX ORDER — PROTAMINE SULFATE 10 MG/ML
1-5 INJECTION, SOLUTION INTRAVENOUS
Status: DISCONTINUED | OUTPATIENT
Start: 2017-09-15 | End: 2017-09-15 | Stop reason: HOSPADM

## 2017-09-15 RX ORDER — ATROPINE SULFATE 0.1 MG/ML
.5-1 INJECTION INTRAVENOUS
Status: DISCONTINUED | OUTPATIENT
Start: 2017-09-15 | End: 2017-09-15 | Stop reason: HOSPADM

## 2017-09-15 RX ORDER — NALOXONE HYDROCHLORIDE 0.4 MG/ML
.2-.4 INJECTION, SOLUTION INTRAMUSCULAR; INTRAVENOUS; SUBCUTANEOUS
Status: DISCONTINUED | OUTPATIENT
Start: 2017-09-15 | End: 2017-09-15 | Stop reason: HOSPADM

## 2017-09-15 RX ORDER — ARGATROBAN 1 MG/ML
350 INJECTION, SOLUTION INTRAVENOUS
Status: DISCONTINUED | OUTPATIENT
Start: 2017-09-15 | End: 2017-09-15 | Stop reason: HOSPADM

## 2017-09-15 RX ORDER — ASPIRIN 325 MG
325 TABLET ORAL
Status: DISCONTINUED | OUTPATIENT
Start: 2017-09-15 | End: 2017-09-15 | Stop reason: HOSPADM

## 2017-09-15 RX ORDER — ATROPINE SULFATE 0.1 MG/ML
0.5 INJECTION INTRAVENOUS EVERY 5 MIN PRN
Status: DISCONTINUED | OUTPATIENT
Start: 2017-09-15 | End: 2017-09-15 | Stop reason: HOSPADM

## 2017-09-15 RX ORDER — ONDANSETRON 2 MG/ML
4 INJECTION INTRAMUSCULAR; INTRAVENOUS EVERY 4 HOURS PRN
Status: DISCONTINUED | OUTPATIENT
Start: 2017-09-15 | End: 2017-09-15 | Stop reason: HOSPADM

## 2017-09-15 RX ADMIN — SODIUM CHLORIDE: 9 INJECTION, SOLUTION INTRAVENOUS at 11:21

## 2017-09-15 RX ADMIN — ASPIRIN 325 MG ORAL TABLET 325 MG: 325 PILL ORAL at 11:20

## 2017-09-15 RX ADMIN — SODIUM CHLORIDE: 9 INJECTION, SOLUTION INTRAVENOUS at 15:11

## 2017-09-15 RX ADMIN — ACETAMINOPHEN 650 MG: 325 TABLET ORAL at 15:15

## 2017-09-15 NOTE — IP AVS SNAPSHOT
Unit [address] Observation [address] [address]    Phone:  [phone]    Fax:  [phone]                                       After Visit Summary   9/15/2017    Ankita Day    MRN: [id number]           After Visit Summary Signature Page     I have received my discharge instructions, and my questions have been answered. I have discussed any challenges I see with this plan with the nurse or doctor.    ..........................................................................................................................................  Patient/Patient Representative Signature      ..........................................................................................................................................  Patient Representative Print Name and Relationship to Patient    ..................................................               ................................................  Date                                            Time    ..........................................................................................................................................  Reviewed by Signature/Title    ...................................................              ..............................................  Date                                                            Time

## 2017-09-15 NOTE — PLAN OF CARE
"Problem: Discharge Planning  Goal: Discharge Planning (Adult, OB, Behavioral, Peds)  /83  Pulse 58  Temp 97.7  F (36.5  C) (Oral)  Resp 16  Ht 1.676 m (5' 6\")  Wt 72.6 kg (160 lb)  SpO2 98%  BMI 25.82 kg/m2  Pt off bed rest at 1715.  Right groin site CDI. CMS intact.  Pedal pulses normal.  Patient voided.  Is eating dinner.  Will ambulate once done eating.  Plan is to dc home once goals are met.  Cont with POC.      "

## 2017-09-15 NOTE — PROGRESS NOTES
Manual pressure held for 13 minutes to right groin site.  Sheath, size 4 FR,  pulled by CRISTO Holt.  Site CDI, no hematoma.  Stasis achieved at 1502. Off bedrest @ 1702.

## 2017-09-15 NOTE — PROGRESS NOTES
Patient is ready for the procedure. Here for Coronary angiogram.  with her. Await physician to review procedure and sign consent

## 2017-09-15 NOTE — IP AVS SNAPSHOT
MRN:[id number]                      After Visit Summary   9/15/2017    Ankita Day    MRN: [id number]           Thank you!     Thank you for choosing [facility] for your care. Our goal is always to provide you with excellent care. Hearing back from our patients is one way we can continue to improve our services. Please take a few minutes to complete the written survey that you may receive in the mail after you visit with us. Thank you!        Patient Information     Date Of Birth          1955        About your hospital stay     You were admitted on:  September 15, 2017 You last received care in the:  Unit 6D Observation [facility]    You were discharged on:  September 15, 2017       Who to Call     For medical emergencies, please call 911.  For non-urgent questions about your medical care, please call your primary care provider or clinic, [phone]          Attending Provider     Provider Specialty    Paul Gramajo, DO Cardiology    Ames, Cheryl Holliday MD Internal Medicine       Primary Care Provider Office Phone # Fax #    Ramirez Cano [phone]      After Care Instructions     Discharge Instructions - IF on Metformin (Glucophage or Glucovance) or Metformin containing medications       IF on Metformin (Glucophage or Glucovance) or Metformin containing medications , schedule a Basic Metabolic Panel at [facility] or Primary Clinic in 48 - 72 hours post procedure and PRIOR TO resuming the Metformin or Metformin containing medications.  Hold Metformin (Glucophage or Glucovance) or Metformin containing medications until after the Basic Metabolic Panel on the 2nd or 3rd day following the procedure.  May resume after blood draw is complete.                  Pending Results     Date and Time Order Name Status Description    9/15/2017 1038 EKG 12-lead, tracing only Preliminary             Admission Information     Date & Time Provider Department Dept. Phone  "   9/15/2017 Cheryl Ames MD Unit 6D Observation [facility] [place] [phone]      Your Vitals Were     Blood Pressure Pulse Temperature Respirations Height Weight    157/83 58 97.7  F (36.5  C) (Oral) 16 1.676 m (5' 6\") 72.6 kg (160 lb)    Pulse Oximetry BMI (Body Mass Index)                98% 25.82 kg/m2          [email or web address] Information     [employer] lets you send messages to your doctor, view your test results, renew your prescriptions, schedule appointments and more. To sign up, go to [email or web address] . Click on \"Log in\" on the left side of the screen, which will take you to the Welcome page. Then click on \"Sign up Now\" on the right side of the page.     You will be asked to enter the access code listed below, as well as some personal information. Please follow the directions to create your username and password.     Your access code is: [email or web address]  Expires: 2017  6:30 PM     Your access code will  in 90 days. If you need help or a new code, please call your [facility] clinic or [phone].        Care EveryWhere ID     This is your Care EveryWhere ID. This could be used by other organizations to access your [facility] medical records  [id number]        Equal Access to Services     JACKIE SIMPSON AH: Hadii donaldo mcdonaldo Soyuriy, waaxda luqadaha, qaybta kaalmada dunia, kacie fields. So [facility] [phone].    ATENCIÓN: Si habla español, tiene a siddiqui disposición servicios gratuitos de asistencia lingüística. Avel al [phone].    We comply with applicable federal civil rights laws and [place] laws. We do not discriminate on the basis of race, color, national origin, age, disability sex, sexual orientation or gender identity.               Review of your medicines      CONTINUE these medicines which have NOT CHANGED        Dose / Directions    ASPIRIN PO        Dose:  81 mg   Take 81 mg by mouth daily   Refills:  0       BUSPIRONE HCL PO        Dose:  10 mg "   Take 10 mg by mouth 2 times daily   Refills:  0       CLONAZEPAM PO        Dose:  1 mg   Take 1 mg by mouth 4 times daily   Refills:  0       CYCLOBENZAPRINE HCL PO        Dose:  10 mg   Take 10 mg by mouth 3 times daily as needed for muscle spasms   Refills:  0       docusate sodium 50 MG capsule   Commonly known as:  COLACE        Dose:  50 mg   Take 50 mg by mouth daily   Refills:  0       fluticasone 50 MCG/ACT spray   Commonly known as:  FLONASE        Dose:  2 spray   Spray 2 sprays into both nostrils daily   Refills:  0       LISINOPRIL PO        Dose:  40 mg   Take 40 mg by mouth daily   Refills:  0       METOPROLOL TARTRATE PO        Dose:  50 mg   Take 50 mg by mouth 2 times daily   Refills:  0       NITROGLYCERIN SL        Dose:  0.3 mg   Place 0.3 mg under the tongue every 5 minutes as needed for chest pain   Refills:  0       NORVASC PO        Dose:  10 mg   Take 10 mg by mouth daily   Refills:  0       ONDANSETRON PO        Dose:  4 mg   Take 4 mg by mouth every 6 hours as needed for nausea   Refills:  0       oxyCODONE-acetaminophen  MG per tablet   Commonly known as:  PERCOCET        Dose:  2 tablet   Take 2 tablets by mouth 3 times daily   Refills:  0       PANTOPRAZOLE SODIUM PO        Dose:  40 mg   Take 40 mg by mouth 2 times daily (before meals)   Refills:  0       PRAVASTATIN SODIUM PO        Dose:  40 mg   Take 40 mg by mouth every evening   Refills:  0       saccharomyces boulardii 250 MG capsule   Commonly known as:  FLORASTOR        Dose:  250 mg   Take 250 mg by mouth 2 times daily   Refills:  0       TRINTELLIX 5 MG tablet   Generic drug:  vortioxetine        Dose:  5 mg   Take 5 mg by mouth   Refills:  0       VITAMIN D (CHOLECALCIFEROL) PO        Dose:  5000 Units   Take 5,000 Units by mouth daily   Refills:  0                Protect others around you: Learn how to safely use, store and throw away your medicines at www.disposemymeds.org.             Medication List: This is a  list of all your medications and when to take them. Check marks below indicate your daily home schedule. Keep this list as a reference.      Medications           Morning Afternoon Evening Bedtime As Needed    ASPIRIN PO   Take 81 mg by mouth daily   Last time this was given:  325 mg on 9/15/2017 11:20 AM                                BUSPIRONE HCL PO   Take 10 mg by mouth 2 times daily                                CLONAZEPAM PO   Take 1 mg by mouth 4 times daily                                CYCLOBENZAPRINE HCL PO   Take 10 mg by mouth 3 times daily as needed for muscle spasms                                docusate sodium 50 MG capsule   Commonly known as:  COLACE   Take 50 mg by mouth daily                                fluticasone 50 MCG/ACT spray   Commonly known as:  FLONASE   Spray 2 sprays into both nostrils daily                                LISINOPRIL PO   Take 40 mg by mouth daily                                METOPROLOL TARTRATE PO   Take 50 mg by mouth 2 times daily                                NITROGLYCERIN SL   Place 0.3 mg under the tongue every 5 minutes as needed for chest pain                                NORVASC PO   Take 10 mg by mouth daily                                ONDANSETRON PO   Take 4 mg by mouth every 6 hours as needed for nausea                                oxyCODONE-acetaminophen  MG per tablet   Commonly known as:  PERCOCET   Take 2 tablets by mouth 3 times daily                                PANTOPRAZOLE SODIUM PO   Take 40 mg by mouth 2 times daily (before meals)                                PRAVASTATIN SODIUM PO   Take 40 mg by mouth every evening                                saccharomyces boulardii 250 MG capsule   Commonly known as:  FLORASTOR   Take 250 mg by mouth 2 times daily                                TRINTELLIX 5 MG tablet   Take 5 mg by mouth   Generic drug:  vortioxetine                                VITAMIN D (CHOLECALCIFEROL) PO   Take  5,000 Units by mouth daily

## 2017-09-15 NOTE — PROGRESS NOTES
"/83  Pulse 58  Temp 97.7  F (36.5  C) (Oral)  Resp 16  Ht 1.676 m (5' 6\")  Wt 72.6 kg (160 lb)  SpO2 98%  BMI 25.82 kg/m2  Pt right groin site CDI, ate dinner.  Voided.  Ambulated with SBA in hallway.  Patient discharged from unit 6D at 1845.  Went over discharge paperwork and discharge instructions with patient who verbalized understanding.  Patient's IV discontinued.  Patient discharged from unit via wheelchair transport to return home with spouse, to return home via car.    "

## 2017-09-15 NOTE — PROCEDURES
CARDIAC CATH REPORT:     PROCEDURES PERFORMED:   Coronary Angiography  Coronary Bypass Angiography  Left Heart Catheterization    PHYSICIANS:  Attending Physician: Cheryl Ames MD  Interventional Cardiology Fellow: Ashu Madrid MD  Cardiology Fellow: Zac Baxter MD    INDICATION:  Ankita Day is a 62 year old female with known CAD s/p 3v CABG (LIMA-LAD, RAFI-RCA, SVG-OM1), prior PCI/DELONTE (RCA, L subclavian), who has been referred to the cath lab for angiogram by her cardiologist Dr. Gramajo. Patient has been having chest pain and dyspnea, stress test negative for reversible ischemia, but because of her increased pretest probability, was decided to still pursue angiogram.    DESCRIPTION:  1. Consent obtained with discussion of risks.  All questions were answered.  2. Sterile prep and procedure.  3. Location with Sheaths:   Rt Femoral Arterial  4 Fr 25 cm [long]  4. Access: Local anesthetic with lidocaine.  A micropuncture 21 guage needle with ultrasound guidance was used to establish vascular access using a modified Seldinger technique.  5. Diagnostic Catheters:   4 Fr  JR4 and JL 4, 3 DRC, and LCB  6. Guiding Catheters:  None  6. Estimated blood loss: < 50 ml    MEDICATIONS:  The procedure was performed under conscious sedation for 60 minutes from 12:22 to 13:22.  The patient was assessed immediately before the first sedation medication was administered.  Fentanyl 100 mcg or The patient was assessed immediately before the first sedation medication was administered.  Benadryl  50 were administered.  Heart rate, BP, respiration, oxygen saturation and patient responses were monitored throughout the procedure with the assistance of the RN under my supervision.  >> IV UFH 5000 U was administered to achieve anticoagulation.  >> Antiplatelet Therapy:  mg    Procedures:    CORONARY ANGIOGRAM:   1. Both coronary arteries arise from their respective cusps.  2. Dominance: Right  3. The LM has prior stenting  with mild luminal irregularities.  A small ramus intermedius with proximal 40% stenosis is present.  4. LAD: Type 3 [LAD supplies the entire apex].. The LAD is a large vessel and gives rise to septal perforators, medium sized D1 and D2.  The pLAD has a previous stent with  30% in stent restenosis, mLAD has luminal irregularities, and dLAD is a small vessel.  LIMA graft attaches after D2 origin and competitive flow is seen.  D1 is jailed by proximal stent without angiographic evidence of disease.  5. LCX is medium caliber vessel and gives rise to medium sized OM1 and small sized OM2 vessels.  The pLCx has a prior stent with 30% instent restenosis. mLCx and dLCx are small vessels. OM1 has minor luminal irregularities and OM2 is a small vessel.  6. RCA is a large vessel and gives rise to PL branches and supplies PDA. The pRCA has a prior stent widely patent, though 40% stenosis is seen just distal to stent. MidRCA, distal RCA, and PDA have minor luminal irregularities.    CORONARY ARTERY BYPASS ANGIOGRAPHY:  Coronary bypass angiography was performed on LIMA, attached to LAD after D2.  There is brisk flow without angiographic evidence of disease.  Left subclavian artery stent was seen and is widely patent.  R subclavian artery angiography performed, 95% occluded RAFI seen.  R carotid angiography showed no angiographic evidence of disease.  The R subclavian is a tortuous artery.  Prior Saphenous Vein graft 100% occluded at ascending aorta.  Touchdown to OM not seen.    LEFT HEART CATHETERIZATION:  1. LVEDP 22 mmHg.  2. Left ventriculography showed normal anterior, apical and inferior wall motion, LVEF 79% and no evidence of mitral regurgitation.  Left ventriculography was performed with a 4 Fr pigtail catheter.    3. No gradient across the aortic valve on pull back.    Sheath Removal:  The right femoral sheath will be removed after the patient has been transferred to the unit.    Contrast: Isovue, 145 ml     Fluoroscopy  Time: 19 min    COMPLICATIONS:  1. None    SUMMARY:   Three vessel CAD RCA, LAD and LCX   Occluded RAFI and SVG.  Patent LIMA.  No new obstructive Lesions  Normal left heart catheterization    PLAN:   >> ASA 81 mg qd  >> Bedrest per protocol.  >> Continued medical management and lifestyle modification for cardiovascular risk factor optimization.   >> Discharge today per protocol    The attending interventional cardiologist was present and supervised all critical aspects the procedure.    Findings discussed with Dr. Ames and patients primary cardiologist Dr. Gramajo.    See CVIS report for final draft.    Zac Baxter   Cardiology Fellow    Ashu Larsen  Interventional    Cheryl Ames   Cardiology Staff

## 2017-09-18 LAB — INTERPRETATION ECG - MUSE: NORMAL

## 2017-09-28 ENCOUNTER — OFFICE VISIT - GICH (OUTPATIENT)
Dept: CARDIOLOGY | Facility: OTHER | Age: 62
End: 2017-09-28

## 2017-09-28 ENCOUNTER — TRANSFERRED RECORDS (OUTPATIENT)
Dept: HEALTH INFORMATION MANAGEMENT | Facility: CLINIC | Age: 62
End: 2017-09-28

## 2017-09-28 ENCOUNTER — HISTORY (OUTPATIENT)
Dept: CARDIOLOGY | Facility: OTHER | Age: 62
End: 2017-09-28

## 2017-09-28 ENCOUNTER — MEDICAL CORRESPONDENCE (OUTPATIENT)
Dept: CARDIOLOGY | Facility: CLINIC | Age: 62
End: 2017-09-28
Payer: COMMERCIAL

## 2017-09-28 DIAGNOSIS — I10 ESSENTIAL (PRIMARY) HYPERTENSION: ICD-10-CM

## 2017-09-28 DIAGNOSIS — F41.9 ANXIETY DISORDER: ICD-10-CM

## 2017-09-28 DIAGNOSIS — Z72.0 TOBACCO USE: ICD-10-CM

## 2017-09-28 DIAGNOSIS — Z95.1 PRESENCE OF AORTOCORONARY BYPASS GRAFT: ICD-10-CM

## 2017-09-28 DIAGNOSIS — E78.2 MIXED HYPERLIPIDEMIA: ICD-10-CM

## 2017-09-28 DIAGNOSIS — I77.1 STRICTURE OF ARTERY (H): ICD-10-CM

## 2017-09-28 DIAGNOSIS — E78.00 PURE HYPERCHOLESTEROLEMIA: ICD-10-CM

## 2017-09-28 DIAGNOSIS — Z71.6 TOBACCO ABUSE COUNSELING: ICD-10-CM

## 2017-09-28 DIAGNOSIS — G47.31 PRIMARY CENTRAL SLEEP APNEA: ICD-10-CM

## 2017-09-28 DIAGNOSIS — I25.10 ATHEROSCLEROTIC HEART DISEASE OF NATIVE CORONARY ARTERY WITHOUT ANGINA PECTORIS: ICD-10-CM

## 2017-09-28 DIAGNOSIS — R07.89 OTHER CHEST PAIN: ICD-10-CM

## 2017-09-28 DIAGNOSIS — G89.29 OTHER CHRONIC PAIN: ICD-10-CM

## 2017-09-28 PROCEDURE — 99204 OFFICE O/P NEW MOD 45 MIN: CPT | Mod: ZP | Performed by: INTERNAL MEDICINE

## 2017-09-28 ASSESSMENT — PATIENT HEALTH QUESTIONNAIRE - PHQ9: SUM OF ALL RESPONSES TO PHQ QUESTIONS 1-9: 9

## 2017-10-02 ENCOUNTER — AMBULATORY - GICH (OUTPATIENT)
Dept: RADIOLOGY | Facility: OTHER | Age: 62
End: 2017-10-02

## 2017-10-02 ENCOUNTER — HOSPITAL ENCOUNTER (OUTPATIENT)
Dept: RADIOLOGY | Facility: OTHER | Age: 62
End: 2017-10-02
Attending: FAMILY MEDICINE

## 2017-10-02 ENCOUNTER — HISTORY (OUTPATIENT)
Dept: RADIOLOGY | Facility: OTHER | Age: 62
End: 2017-10-02

## 2017-10-02 DIAGNOSIS — R92.8 OTHER ABNORMAL AND INCONCLUSIVE FINDINGS ON DIAGNOSTIC IMAGING OF BREAST: ICD-10-CM

## 2017-10-17 ENCOUNTER — HISTORY (OUTPATIENT)
Dept: FAMILY MEDICINE | Facility: OTHER | Age: 62
End: 2017-10-17

## 2017-10-17 ENCOUNTER — OFFICE VISIT - GICH (OUTPATIENT)
Dept: FAMILY MEDICINE | Facility: OTHER | Age: 62
End: 2017-10-17

## 2017-10-17 DIAGNOSIS — M54.50 LOW BACK PAIN: ICD-10-CM

## 2017-10-17 DIAGNOSIS — G89.29 OTHER CHRONIC PAIN: ICD-10-CM

## 2017-10-17 DIAGNOSIS — Z02.89 ENCOUNTER FOR OTHER ADMINISTRATIVE EXAMINATIONS: ICD-10-CM

## 2017-10-17 DIAGNOSIS — G43.909 MIGRAINE WITHOUT STATUS MIGRAINOSUS, NOT INTRACTABLE: ICD-10-CM

## 2017-10-17 DIAGNOSIS — Z23 ENCOUNTER FOR IMMUNIZATION: ICD-10-CM

## 2017-10-24 ENCOUNTER — HISTORY (OUTPATIENT)
Dept: FAMILY MEDICINE | Facility: OTHER | Age: 62
End: 2017-10-24

## 2017-10-24 ENCOUNTER — OFFICE VISIT - GICH (OUTPATIENT)
Dept: FAMILY MEDICINE | Facility: OTHER | Age: 62
End: 2017-10-24

## 2017-10-24 DIAGNOSIS — R11.0 NAUSEA: ICD-10-CM

## 2017-10-24 DIAGNOSIS — E78.00 PURE HYPERCHOLESTEROLEMIA: ICD-10-CM

## 2017-10-24 DIAGNOSIS — I10 ESSENTIAL (PRIMARY) HYPERTENSION: ICD-10-CM

## 2017-10-24 DIAGNOSIS — D50.9 IRON DEFICIENCY ANEMIA: ICD-10-CM

## 2017-10-24 DIAGNOSIS — R10.84 GENERALIZED ABDOMINAL PAIN: ICD-10-CM

## 2017-10-24 DIAGNOSIS — N18.2 CHRONIC KIDNEY DISEASE, STAGE II (MILD): ICD-10-CM

## 2017-10-24 DIAGNOSIS — E55.9 VITAMIN D DEFICIENCY: ICD-10-CM

## 2017-10-24 LAB
A/G RATIO - HISTORICAL: 1.2 (ref 1–2)
ABSOLUTE BASOPHILS - HISTORICAL: 0.1 THOU/CU MM
ABSOLUTE EOSINOPHILS - HISTORICAL: 0.1 THOU/CU MM
ABSOLUTE IMMATURE GRANULOCYTES(METAS,MYELOS,PROS) - HISTORICAL: 0 THOU/CU MM
ABSOLUTE LYMPHOCYTES - HISTORICAL: 1.8 THOU/CU MM (ref 0.9–2.9)
ABSOLUTE MONOCYTES - HISTORICAL: 0.6 THOU/CU MM
ABSOLUTE NEUTROPHILS - HISTORICAL: 4.3 THOU/CU MM (ref 1.7–7)
ALBUMIN SERPL-MCNC: 3.9 G/DL (ref 3.5–5.7)
ALP SERPL-CCNC: 59 IU/L (ref 34–104)
ALT (SGPT) - HISTORICAL: 12 IU/L (ref 7–52)
ANION GAP - HISTORICAL: 14 (ref 5–18)
AST SERPL-CCNC: 17 IU/L (ref 13–39)
BASOPHILS # BLD AUTO: 0.9 %
BILIRUB SERPL-MCNC: 0.4 MG/DL (ref 0.3–1)
BUN SERPL-MCNC: 14 MG/DL (ref 7–25)
BUN/CREAT RATIO - HISTORICAL: 14
C-REACTIVE PROTEIN - HISTORICAL: <1 MG/DL
CALCIUM SERPL-MCNC: 9.8 MG/DL (ref 8.6–10.3)
CHLORIDE SERPLBLD-SCNC: 102 MMOL/L (ref 98–107)
CHOL/HDL RATIO - HISTORICAL: 3.27
CHOLESTEROL TOTAL: 193 MG/DL
CO2 SERPL-SCNC: 25 MMOL/L (ref 21–31)
CREAT SERPL-MCNC: 1 MG/DL (ref 0.7–1.3)
EOSINOPHIL NFR BLD AUTO: 1.9 %
ERYTHROCYTE [DISTWIDTH] IN BLOOD BY AUTOMATED COUNT: 15.2 % (ref 11.5–15.5)
GFR IF NOT AFRICAN AMERICAN - HISTORICAL: 56 ML/MIN/1.73M2
GLOBULIN - HISTORICAL: 3.2 G/DL (ref 2–3.7)
GLUCOSE SERPL-MCNC: 138 MG/DL (ref 70–105)
HCT VFR BLD AUTO: 35.5 % (ref 33–51)
HDLC SERPL-MCNC: 59 MG/DL (ref 23–92)
HEMOGLOBIN: 11.5 G/DL (ref 12–16)
IMMATURE GRANULOCYTES(METAS,MYELOS,PROS) - HISTORICAL: 0.3 %
IRON BINDING CAP: 534.24 UG/DL (ref 245–400)
IRON SATURATION: 7 % (ref 20–55)
IRON: 35 UG/DL (ref 50–212)
LDLC SERPL CALC-MCNC: 83 MG/DL
LYMPHOCYTES NFR BLD AUTO: 26.2 % (ref 20–44)
MCH RBC QN AUTO: 27.6 PG (ref 26–34)
MCHC RBC AUTO-ENTMCNC: 32.4 G/DL (ref 32–36)
MCV RBC AUTO: 85 FL (ref 80–100)
MONOCYTES NFR BLD AUTO: 8.4 %
NEUTROPHILS NFR BLD AUTO: 62.3 % (ref 42–72)
NON-HDL CHOLESTEROL - HISTORICAL: 134 MG/DL
PLATELET # BLD AUTO: 249 THOU/CU MM (ref 140–440)
PMV BLD: 9.1 FL (ref 6.5–11)
POTASSIUM SERPL-SCNC: 4.4 MMOL/L (ref 3.5–5.1)
PROT SERPL-MCNC: 7.1 G/DL (ref 6.4–8.9)
PROVIDER ORDERDED STATUS - HISTORICAL: ABNORMAL
RED BLOOD COUNT - HISTORICAL: 4.17 MIL/CU MM (ref 4–5.2)
SODIUM SERPL-SCNC: 141 MMOL/L (ref 133–143)
TRIGL SERPL-MCNC: 255 MG/DL
UIBC (UNSATURATED) - HISTORICAL: 499.24 MG/DL
VITAMIN D TOTAL - HISTORICAL: 48.8 NG/ML
WHITE BLOOD COUNT - HISTORICAL: 6.9 THOU/CU MM (ref 4.5–11)

## 2017-10-31 ENCOUNTER — HISTORY (OUTPATIENT)
Dept: FAMILY MEDICINE | Facility: OTHER | Age: 62
End: 2017-10-31

## 2017-10-31 ENCOUNTER — OFFICE VISIT - GICH (OUTPATIENT)
Dept: FAMILY MEDICINE | Facility: OTHER | Age: 62
End: 2017-10-31

## 2017-10-31 DIAGNOSIS — D50.0 IRON DEFICIENCY ANEMIA DUE TO CHRONIC BLOOD LOSS: ICD-10-CM

## 2017-10-31 DIAGNOSIS — G43.909 MIGRAINE WITHOUT STATUS MIGRAINOSUS, NOT INTRACTABLE: ICD-10-CM

## 2017-10-31 DIAGNOSIS — R10.84 GENERALIZED ABDOMINAL PAIN: ICD-10-CM

## 2017-10-31 DIAGNOSIS — M54.50 LOW BACK PAIN: ICD-10-CM

## 2017-10-31 DIAGNOSIS — Z02.89 ENCOUNTER FOR OTHER ADMINISTRATIVE EXAMINATIONS: ICD-10-CM

## 2017-10-31 DIAGNOSIS — K27.9 PEPTIC ULCER WITHOUT HEMORRHAGE OR PERFORATION: ICD-10-CM

## 2017-10-31 DIAGNOSIS — G89.29 OTHER CHRONIC PAIN: ICD-10-CM

## 2017-10-31 DIAGNOSIS — R11.0 NAUSEA: ICD-10-CM

## 2017-10-31 LAB
BACTERIA URINE: NORMAL BACTERIA/HPF
BILIRUB UR QL: NEGATIVE
CLARITY, URINE: CLEAR CLARITY
COLOR UR: YELLOW COLOR
EPITHELIAL CELLS: NORMAL EPI/HPF
GLUCOSE URINE: NEGATIVE MG/DL
KETONES UR QL: NEGATIVE MG/DL
LEUKOCYTE ESTERASE URINE: NEGATIVE
NITRITE UR QL STRIP: NEGATIVE
OCCULT BLOOD,URINE - HISTORICAL: ABNORMAL
PH UR: 6 [PH]
PROTEIN QUALITATIVE,URINE - HISTORICAL: NEGATIVE MG/DL
RBC - HISTORICAL: NORMAL /HPF
SP GR UR STRIP: 1.01
UROBILINOGEN,QUALITATIVE - HISTORICAL: NORMAL EU/DL
WBC - HISTORICAL: NORMAL /HPF

## 2017-11-06 ENCOUNTER — COMMUNICATION - GICH (OUTPATIENT)
Dept: FAMILY MEDICINE | Facility: OTHER | Age: 62
End: 2017-11-06

## 2017-11-06 DIAGNOSIS — F41.1 GENERALIZED ANXIETY DISORDER: ICD-10-CM

## 2017-11-06 LAB
6-MONOACETYL MORPHINE - HISTORICAL: ABNORMAL NG/ML
AMPHETAMINE URINE - HISTORICAL: ABNORMAL NG/ML
BARBITURATE URINE - HISTORICAL: ABNORMAL NG/ML
BENZODIAZEPINE URINE - HISTORICAL: ABNORMAL NG/ML
BUPRENORPHRINE URINE - HISTORICAL: ABNORMAL NG/ML
COCAINE METAB URINE - HISTORICAL: ABNORMAL NG/ML
CREAT UR - HISTORICAL: 49 MG/DL
ETHYLGLUCURONIDE URINE - HISTORICAL: ABNORMAL NG/ML
FENTANYL URINE - HISTORICAL: ABNORMAL NG/ML
MASS SPECTROMETRY URINE - HISTORICAL: ABNORMAL
METHADONE URINE - HISTORICAL: ABNORMAL NG/ML
OPIATES URINE - HISTORICAL: ABNORMAL NG/ML
OXYCODONE URINE - HISTORICAL: ABNORMAL NG/ML
PH URINE - HISTORICAL: 6.5
PROPOXYPHENE URINE - HISTORICAL: ABNORMAL NG/ML
THC 50 URINE - HISTORICAL: ABNORMAL NG/ML
TRAMADOL - HISTORICAL: ABNORMAL NG/ML

## 2017-11-15 ENCOUNTER — HISTORY (OUTPATIENT)
Dept: FAMILY MEDICINE | Facility: OTHER | Age: 62
End: 2017-11-15

## 2017-11-15 ENCOUNTER — OFFICE VISIT - GICH (OUTPATIENT)
Dept: FAMILY MEDICINE | Facility: OTHER | Age: 62
End: 2017-11-15

## 2017-11-15 DIAGNOSIS — G43.909 MIGRAINE WITHOUT STATUS MIGRAINOSUS, NOT INTRACTABLE: ICD-10-CM

## 2017-11-15 DIAGNOSIS — N18.2 CHRONIC KIDNEY DISEASE, STAGE II (MILD): ICD-10-CM

## 2017-11-15 DIAGNOSIS — M54.50 LOW BACK PAIN: ICD-10-CM

## 2017-11-15 DIAGNOSIS — D50.9 IRON DEFICIENCY ANEMIA: ICD-10-CM

## 2017-11-15 DIAGNOSIS — R10.84 GENERALIZED ABDOMINAL PAIN: ICD-10-CM

## 2017-11-15 DIAGNOSIS — G89.29 OTHER CHRONIC PAIN: ICD-10-CM

## 2017-11-15 DIAGNOSIS — Z02.89 ENCOUNTER FOR OTHER ADMINISTRATIVE EXAMINATIONS: ICD-10-CM

## 2017-11-15 LAB
ANION GAP - HISTORICAL: 11 (ref 5–18)
BUN SERPL-MCNC: 25 MG/DL (ref 7–25)
BUN/CREAT RATIO - HISTORICAL: 25
CALCIUM SERPL-MCNC: 9.4 MG/DL (ref 8.6–10.3)
CHLORIDE SERPLBLD-SCNC: 107 MMOL/L (ref 98–107)
CO2 SERPL-SCNC: 22 MMOL/L (ref 21–31)
CREAT SERPL-MCNC: 1 MG/DL (ref 0.7–1.3)
ERYTHROCYTE [DISTWIDTH] IN BLOOD BY AUTOMATED COUNT: 15.3 % (ref 11.5–15.5)
FOLATE: >24.8 NG/ML
GFR IF NOT AFRICAN AMERICAN - HISTORICAL: 56 ML/MIN/1.73M2
GLUCOSE SERPL-MCNC: 85 MG/DL (ref 70–105)
HCT VFR BLD AUTO: 33.4 % (ref 33–51)
HEMOGLOBIN: 11 G/DL (ref 12–16)
IRON BINDING CAP: 512.12 UG/DL (ref 245–400)
IRON SATURATION: 32 % (ref 20–55)
IRON: 166 UG/DL (ref 50–212)
MCH RBC QN AUTO: 28.4 PG (ref 26–34)
MCHC RBC AUTO-ENTMCNC: 32.9 G/DL (ref 32–36)
MCV RBC AUTO: 86 FL (ref 80–100)
PLATELET # BLD AUTO: 233 THOU/CU MM (ref 140–440)
PMV BLD: 9.8 FL (ref 6.5–11)
POTASSIUM SERPL-SCNC: 3.9 MMOL/L (ref 3.5–5.1)
RED BLOOD COUNT - HISTORICAL: 3.87 MIL/CU MM (ref 4–5.2)
SODIUM SERPL-SCNC: 140 MMOL/L (ref 133–143)
UIBC (UNSATURATED) - HISTORICAL: 346.12 MG/DL
VIT B12 SERPL-MCNC: 300 PG/ML (ref 180–914)
WHITE BLOOD COUNT - HISTORICAL: 6.6 THOU/CU MM (ref 4.5–11)

## 2017-11-15 ASSESSMENT — PATIENT HEALTH QUESTIONNAIRE - PHQ9: SUM OF ALL RESPONSES TO PHQ QUESTIONS 1-9: 3

## 2017-11-27 ENCOUNTER — AMBULATORY - GICH (OUTPATIENT)
Dept: SCHEDULING | Facility: OTHER | Age: 62
End: 2017-11-27

## 2017-12-04 ENCOUNTER — COMMUNICATION - GICH (OUTPATIENT)
Dept: FAMILY MEDICINE | Facility: OTHER | Age: 62
End: 2017-12-04

## 2017-12-04 DIAGNOSIS — I10 ESSENTIAL (PRIMARY) HYPERTENSION: ICD-10-CM

## 2017-12-15 ENCOUNTER — COMMUNICATION - GICH (OUTPATIENT)
Dept: FAMILY MEDICINE | Facility: OTHER | Age: 62
End: 2017-12-15

## 2017-12-15 ENCOUNTER — OFFICE VISIT - GICH (OUTPATIENT)
Dept: FAMILY MEDICINE | Facility: OTHER | Age: 62
End: 2017-12-15

## 2017-12-15 ENCOUNTER — HISTORY (OUTPATIENT)
Dept: FAMILY MEDICINE | Facility: OTHER | Age: 62
End: 2017-12-15

## 2017-12-15 DIAGNOSIS — K25.0 ACUTE GASTRIC ULCER WITH HEMORRHAGE: ICD-10-CM

## 2017-12-15 DIAGNOSIS — G43.909 MIGRAINE WITHOUT STATUS MIGRAINOSUS, NOT INTRACTABLE: ICD-10-CM

## 2017-12-15 DIAGNOSIS — I25.10 ATHEROSCLEROTIC HEART DISEASE OF NATIVE CORONARY ARTERY WITHOUT ANGINA PECTORIS: ICD-10-CM

## 2017-12-15 DIAGNOSIS — M54.50 LOW BACK PAIN: ICD-10-CM

## 2017-12-15 DIAGNOSIS — G89.29 OTHER CHRONIC PAIN: ICD-10-CM

## 2017-12-15 DIAGNOSIS — M19.049 PRIMARY OSTEOARTHRITIS OF HAND: ICD-10-CM

## 2017-12-15 DIAGNOSIS — Z02.89 ENCOUNTER FOR OTHER ADMINISTRATIVE EXAMINATIONS: ICD-10-CM

## 2017-12-15 LAB
HEMOGLOBIN: 12.3 G/DL (ref 12–16)
MCV RBC AUTO: 89 FL (ref 80–100)

## 2017-12-16 ENCOUNTER — HEALTH MAINTENANCE LETTER (OUTPATIENT)
Age: 62
End: 2017-12-16

## 2017-12-19 ENCOUNTER — COMMUNICATION - GICH (OUTPATIENT)
Dept: FAMILY MEDICINE | Facility: OTHER | Age: 62
End: 2017-12-19

## 2017-12-19 ENCOUNTER — AMBULATORY - GICH (OUTPATIENT)
Dept: SCHEDULING | Facility: OTHER | Age: 62
End: 2017-12-19

## 2017-12-19 DIAGNOSIS — K27.9 PEPTIC ULCER WITHOUT HEMORRHAGE OR PERFORATION: ICD-10-CM

## 2017-12-19 DIAGNOSIS — K25.0 ACUTE GASTRIC ULCER WITH HEMORRHAGE: ICD-10-CM

## 2017-12-19 DIAGNOSIS — D50.0 IRON DEFICIENCY ANEMIA DUE TO CHRONIC BLOOD LOSS: ICD-10-CM

## 2017-12-19 DIAGNOSIS — R10.84 GENERALIZED ABDOMINAL PAIN: ICD-10-CM

## 2017-12-20 ENCOUNTER — COMMUNICATION - GICH (OUTPATIENT)
Dept: FAMILY MEDICINE | Facility: OTHER | Age: 62
End: 2017-12-20

## 2017-12-20 DIAGNOSIS — K25.0 ACUTE GASTRIC ULCER WITH HEMORRHAGE: ICD-10-CM

## 2017-12-26 ENCOUNTER — COMMUNICATION - GICH (OUTPATIENT)
Dept: FAMILY MEDICINE | Facility: OTHER | Age: 62
End: 2017-12-26

## 2017-12-26 DIAGNOSIS — M19.049 PRIMARY OSTEOARTHRITIS OF HAND: ICD-10-CM

## 2017-12-27 NOTE — PROGRESS NOTES
"Patient Information     Patient Name MRN Sex Ankita Bergeron [id number] Female 1955      Progress Notes by Ramirez Cano MD at 10/31/2017  9:45 AM     Author:  Ramirez Cano MD Service:  (none) Author Type:  Physician     Filed:  2017  2:02 PM Encounter Date:  10/31/2017 Status:  Signed     :  Ramirez Cano MD (Physician)            SUBJECTIVE:  62 y.o. female presents for follow up on abdominal pain, chest pain for a week. Follow up also on chronic chest wall and back pain, CAD.    Still nauseated, but no longer vomiting. Trintellix reduced, but that is not likely the cause for her symptoms. Bentyl seemed to help \"colon spasm\".  Sucralfate seems to be helping. She has a history of gastric ulcer and requires aspirin and Plavix for CAD.  Was taking omeprazole OTC PRN as well as scheduled Protonix BID.  No diarrhea. Noted darker stools, but was not concerned since it seemed different than when she had GI bleeding before.   Zofran helps, but can only get 9 at a time from pharmacy. I sent in Rx for 30.    It was a \"rough week\" due to death of a friend of similar age and then a nephew the next day.     Had more chest pain and thought about going to ED< but decided it must not be cardiac given recent negative testing.     Pleased with current oxycodone dosing of 40 mg daily. This is a reduction from 60 mg previously.    REVIEW OF SYSTEMS:    Constitutional: Negative  Respiratory: Negative  Cardiovascular: Negative  : Wonders if abdominal pain represents UTI. No dysuria, but hx of urinary sepsis.      Current Outpatient Prescriptions       Medication  Sig Dispense Refill     amLODIPine (NORVASC) 10 mg tablet Take 1 tablet by mouth once daily. 90 tablet 4     aspirin (ECOTRIN) 81 mg enteric coated tablet Take 81 mg by mouth once daily with a meal.       busPIRone (BUSPAR) 10 mg tablet TAKE 1 TABLET TWICE A  tablet 3     cholecalciferol (VITAMIN D3) 5,000 unit capsule Take 1 " capsule by mouth once daily.       clonazePAM (KLONOPIN) 1 mg tablet Take 1 tablet by mouth 4 times daily. Becky Olmstead CNP       clopidogrel (PLAVIX) 75 mg tablet TAKE 1 TABLET DAILY 90 tablet 5     cyclobenzaprine (FLEXERIL) 10 mg tablet TAKE 1 TABLET TWICE A DAY IF NEEDED FOR MUSCLE SPASM 60 tablet 0     dicyclomine (BENTYL) 10 mg capsule Take 1 capsule by mouth every 6 hours if needed for Other (Specify). 15 capsule 0     docusate (STOOL SOFTENER) 50 mg capsule Take 1 capsule by mouth once daily.  0     lisinopril (PRINIVIL; ZESTRIL) 40 mg tablet Take 1 tablet by mouth once daily. 90 tablet 4     metoprolol tartrate (LOPRESSOR) 50 mg tablet Take 1 tablet by mouth 2 times daily. 180 tablet 4     nitroglycerin (NITROSTAT) 0.3 mg sublingual tablet Place 1 tablet under the tongue every 5 minutes if needed for Chest Pain. 1 Bottle 2     ondansetron (ZOFRAN, AS HYDROCHLORIDE,) 4 mg tablet Take 1 tablet by mouth every 6 hours if needed for Nausea/Vomiting. 30 tablet 11     oxyCODONE-acetaminophen, 5-325 mg, (PERCOCET) 5-325 mg per tablet Take 2 tablets by mouth 4 times daily  Max acetaminophen dose: 4000mg in 24 hrs. 120 tablet 0     pantoprazole (PROTONIX) 40 mg delayed-release tablet TAKE 1 TABLET TWICE A  tablet 4     Potassium Gluconate 595 mg (99 mg) tablet Take  by mouth.  0     pravastatin (PRAVACHOL) 40 mg tablet Take 1 tablet by mouth at bedtime. 90 tablet 2     Saccharomyces boulardii (FLORASTOR) 250 mg capsule Take 1 capsule by mouth 2 times daily.  0     sucralfate (CARAFATE) 1 gram tablet TAKE 1 TABLET BY MOUTH FOUR TIMES DAILY BEFORE MEALS AND AT BEDTIME FOR 10 DAYS  0     sucralfate (CARAFATE) 1 gram tablet Take 1 tablet by mouth 4 times daily before meals and at bedtime for 10 days. 40 tablet 0     vortioxetine (TRINTELLIX) 10 mg tab Take  by mouth.  0     Allergies as of 10/31/2017 - Mikel as Reviewed 10/31/2017      Allergen  Reaction Noted     Atorvastatin Myalgia 04/30/2013     Tape [adhesive]  "Rash and Erythema 04/02/2013     Tiotropium bromide Rash 09/09/2014     Crestor [rosuvastatin] Myalgia 09/28/2017     Ezetimibe Myalgia 10/09/2009     Latex Rash 08/16/2014     Niacin Flushing 10/09/2009     Other [unlisted allergen (include detail in comments)] Rash and Itching 08/14/2013       OBJECTIVE:  /78  Pulse 72  Temp 97.5  F (36.4  C) (Tympanic)   Ht 1.676 m (5' 6\")  Wt 72.1 kg (159 lb)  BMI 25.66 kg/m2    General Appearance: Alert. No acute distress  Gastrointestinal Exam: Hypoactive BS, soft, tender most in epigastric area and RLQ. No abnormal masses or organomegaly.  Psychiatric: Normal affect    Results for orders placed or performed in visit on 10/31/17      URINALYSIS W REFLEX MICROSCOPIC IF POSITIVE      Result  Value Ref Range    COLOR                     Yellow Yellow Color    CLARITY                   Clear Clear Clarity    SPECIFIC GRAVITY,URINE    1.010 1.010, 1.015, 1.020, 1.025                    PH,URINE                  6.0 6.0, 7.0, 8.0, 5.5, 6.5, 7.5, 8.5                    UROBILINOGEN,QUALITATIVE  Normal Normal EU/dl    PROTEIN, URINE Negative Negative mg/dL    GLUCOSE, URINE Negative Negative mg/dL    KETONES,URINE             Negative Negative mg/dL    BILIRUBIN,URINE           Negative Negative                    OCCULT BLOOD,URINE        Trace (A) Negative                    NITRITE                   Negative Negative                    LEUKOCYTE ESTERASE        Negative Negative                   URINALYSIS MICROSCOPIC      Result  Value Ref Range    RBC None Seen 0-2, None Seen /HPF    WBC None Seen 0-2, 3-5, None Seen /HPF    BACTERIA                  None Seen None Seen, Rare, Occasional, Few Bacteria/HPF    EPITHELIAL CELLS          None Seen None Seen, Few Epi/HPF     *Note: Due to a large number of results and/or encounters for the requested time period, some results have not been displayed. A complete set of results can be found in Results Review.       "   ASSESSMENT/PLAN:    ICD-10-CM   1. Abdominal pain, diffuse R10.84   2. Iron deficiency anemia due to chronic blood loss D50.0   3. HX OF PEPTIC ULCER DISEASE K27.9   4. Chronic bilateral low back pain without sciatica M54.5     G89.29   5. Migraine syndrome G43.909   6. Pain medication agreement signed 2/10/17 Z02.89   7. Nausea R11.0     Pain seems likely from gastritis due to aspirin and Plavix use. Requires both due to CAD. Improved slightly with sucralfate. The Bentyl seems like a symptom relief agent, but nothing required as she should be improving on sucralfate. Given cardiac warnings, need to limit Bentyl use and given limited refill.   Continue with sucralfate for a month. Continue on BID PPI. Should have EGD to assess for ulcer as this will determine treatments and ability to remain on dual antiplatelet therapy. Referral to Dr Bowman.    She declines iron. Will try and eat more iron rich foods or try MTV with iron.    Refilled Zofran again, but suspect that quantity limit is due to insurance.    Wonders about UTI with abdominal discomfort. With hx of urinary sepsis, checked UA. Returned normal.    Refilled oxycodone 5 mg for 8 per day for another 2 weeks. Will complete new controlled substance agreement   Ordered urine drug monitoring, should only show oxycodone and no illicit meds.     F/U 2 weeks

## 2017-12-27 NOTE — PROGRESS NOTES
Patient Information     Patient Name MRN Sex Ankita Bergeron [id number] Female 1955      Progress Notes by Ramirez Cano MD at 2017  2:30 PM     Author:  Ramirez Cano MD Service:  (none) Author Type:  Physician     Filed:  2017  4:49 PM Encounter Date:  2017 Status:  Signed     :  Ramirez Cano MD (Physician)            SUBJECTIVE:  62 y.o. female presents for follow up on chronic chest wall and back pain, CAD, anxiety.    She became upset yesterday about missing angiogram due to a scheduling mistake. This then lead to her going to the ER for chest pain. She was discharged home and then developed further anxiety issues and was trying to hop out of the car her son was driving. She came in and was evaluated by [facility].    Recently working with Becky Olmstead, has reduced Cymbalta, started on Trintellix 5 mg daily. Patient would like to increase the Trintellix. She thinks the med changes played a part in her mood problem.    BP has been elevated. BP at home is around 130-140/70s.on current medications.     Remains on oxycodone 6 per day for chronic pain issues. Did not bring in leftover pills this time. Has been following up monthly given complex medical history and ongoing problems.     REVIEW OF SYSTEMS:    Constitutional: Negative  Respiratory: Negative  Cardiovascular: See above    Current Outpatient Prescriptions       Medication  Sig Dispense Refill     amLODIPine (NORVASC) 10 mg tablet Take 1 tablet by mouth once daily. 30 tablet 11     aspirin (ECOTRIN) 81 mg enteric coated tablet Take 81 mg by mouth once daily with a meal.       busPIRone (BUSPAR) 10 mg tablet TAKE 1 TABLET TWICE A  tablet 3     cholecalciferol (VITAMIN D3) 5,000 unit capsule Take 1 capsule by mouth once daily.       clonazePAM (KLONOPIN) 1 mg tablet Take 1 tablet by mouth 4 times daily. Becky Olmstead CNP       clopidogrel (PLAVIX) 75 mg tablet TAKE 1 TABLET DAILY 90 tablet 5     cyclobenzaprine  (FLEXERIL) 10 mg tablet TAKE 1 TABLET TWICE A DAY IF NEEDED FOR MUSCLE SPASM 60 tablet 0     docusate (STOOL SOFTENER) 50 mg capsule Take 1 capsule by mouth once daily.  0     fluticasone (50 mcg per actuation) nasal solution (FLONASE) Inhale 2 sprays into both nostrils once daily as needed for allergic rhinitis and nasal congestion.       lisinopril (PRINIVIL; ZESTRIL) 40 mg tablet TAKE 1 TABLET DAILY 90 tablet 4     metoprolol tartrate (LOPRESSOR) 50 mg tablet Take 1 tablet by mouth 2 times daily. 180 tablet 4     nitroglycerin (NITROSTAT) 0.3 mg sublingual tablet Place 1 tablet under the tongue every 5 minutes if needed for Chest Pain. 1 Bottle 2     ondansetron (ZOFRAN, AS HYDROCHLORIDE,) 4 mg tablet Take 1 tablet by mouth every 6 hours if needed for Nausea/Vomiting. 40 tablet 11     oxyCODONE-acetaminophen,  mg, (PERCOCET)  mg per tablet Take 2 tablets by mouth 3 times daily  Earliest Fill Date: 8/18/17 180 tablet 0     pantoprazole (PROTONIX) 40 mg delayed-release tablet TAKE 1 TABLET TWICE A  tablet 4     pravastatin (PRAVACHOL) 40 mg tablet Take 1 tablet by mouth at bedtime. 90 tablet 2     Saccharomyces boulardii (FLORASTOR) 250 mg capsule Take 1 capsule by mouth 2 times daily.  0     Allergies as of 09/13/2017 - Mikel as Reviewed 09/13/2017      Allergen  Reaction Noted     Atorvastatin Myalgia 04/30/2013     Tape [adhesive] Rash and Erythema 04/02/2013     Tiotropium bromide Rash 09/09/2014     Ezetimibe Myalgia 10/09/2009     Latex Rash 08/16/2014     Niacin Flushing 10/09/2009     Other [unlisted allergen (include detail in comments)] Rash and Itching 08/14/2013       OBJECTIVE:  /64  Pulse 64  Wt 73 kg (161 lb)  Breastfeeding? No  BMI 25.87 kg/m2    General Appearance: Alert. No acute distress  Psychiatric: Normal affect    ASSESSMENT/PLAN:    ICD-10-CM   1. Chronic anxiety F41.9   2. Atherosclerosis of native coronary artery of native heart, angina presence unspecified  I25.10   3. Pain medication agreement signed 2/10/17 Z02.89   4. Chronic bilateral low back pain without sciatica M54.5     G89.29   5. Migraine syndrome G43.909   6. Essential hypertension I10     Mental health worsened recently. Needs to work with psychiatry on options.     Discussed that rude behavior is a violation of controlled substance agreement and if she is going to act rude or belligerent with nurses in the future, then will no longer be a patient of GI.    Needs angiogram per Dr Gramajo, now scheduled in 2 days.    Refilled lisinopril,, BP better than recent readings. Reports better values at home. May need adjustment, but will defer until after angiogram.     Refilled oxycodone for today as when due for refill she will be in [place] for angiogram. Due on 9/17, which is a Sunday and [facility] is closed. Due on 10/17 for refill. Remains on oxycodone 60 mg daily = 90 morphine milligram equivalents. Reviewed . Last UTox appropriate 3/5/17.     F/U 1 month

## 2017-12-27 NOTE — PROGRESS NOTES
"Patient Information     Patient Name MRN Ankita Lee [id number] Female 1955      Progress Notes by Ramirez Cano MD at 10/17/2017 11:30 AM     Author:  Ramirez Cano MD Service:  (none) Author Type:  Physician     Filed:  10/21/2017  2:24 PM Encounter Date:  10/17/2017 Status:  Signed     :  Ramirez Cano MD (Physician)            SUBJECTIVE:  62 y.o. female presents for follow up on chronic chest wall and back pain, CAD, anxiety.    Had angiogram at [facility] for chest pain. According to visit with Dr Gramajo, it showed \"3 vessel disease involving RCA, LAD, and the circumflex. It was noted that her that her RAFI and saphenous vein graft was 100% occluded but the LIMA to LAD was open. There was no identifiable stentable vessel.\"  Patient feels like she was given a great report by the interventional cardiologist as he reportedly said, \"your heart is better than mine.\" She has no concerns about chest pain at this time. Continues to have chest pain, but believes it to be her chronic chest wall related pain.    Has a note from psychiatrist, Becky Olmstead, about opioid use disorder and wonders if I think she should be on suboxone. I asked her if she has misused her medications. She says, \"only twice.\" By this she meant twice in the past ever taking excessive amounts of medication. Last was when she was getting pegs in for her teeth. Because of concerns for opioids, she decided to stop taking oxycodone and reports flushing the remainder. Wanted to detox and be off the meds entirely rather than have me taper her down. I asked why and it is just how she envisioned a reduction, cannot give me a better reason.   I pointed out my concerns with her impulsivity. She disagrees that it was impulsive, that she talked to her  and told him that she would be crabbier for a couple weeks.     Believes that I want her off opioids. I explained that based on studies, there is no proven long term " benefit of opioids. We discussed how the Suboxone would work. Having more headaches and believes that the oxycodone helped. In the past when she used Tylox that provided the best results.     REVIEW OF SYSTEMS:    Constitutional: Negative  Respiratory: Negative  Cardiovascular: See above    Current Outpatient Prescriptions       Medication  Sig Dispense Refill     amLODIPine (NORVASC) 10 mg tablet Take 1 tablet by mouth once daily. 30 tablet 11     aspirin (ECOTRIN) 81 mg enteric coated tablet Take 81 mg by mouth once daily with a meal.       busPIRone (BUSPAR) 10 mg tablet TAKE 1 TABLET TWICE A  tablet 3     cholecalciferol (VITAMIN D3) 5,000 unit capsule Take 1 capsule by mouth once daily.       clonazePAM (KLONOPIN) 1 mg tablet Take 1 tablet by mouth 4 times daily. Becky Olmstead CNP       clopidogrel (PLAVIX) 75 mg tablet TAKE 1 TABLET DAILY 90 tablet 5     cyclobenzaprine (FLEXERIL) 10 mg tablet TAKE 1 TABLET TWICE A DAY IF NEEDED FOR MUSCLE SPASM 60 tablet 0     docusate (STOOL SOFTENER) 50 mg capsule Take 1 capsule by mouth once daily.  0     lisinopril (PRINIVIL; ZESTRIL) 40 mg tablet Take 1 tablet by mouth once daily. 90 tablet 4     metoprolol tartrate (LOPRESSOR) 50 mg tablet Take 1 tablet by mouth 2 times daily. 180 tablet 4     nitroglycerin (NITROSTAT) 0.3 mg sublingual tablet Place 1 tablet under the tongue every 5 minutes if needed for Chest Pain. 1 Bottle 2     ondansetron (ZOFRAN, AS HYDROCHLORIDE,) 4 mg tablet Take 1 tablet by mouth every 6 hours if needed for Nausea/Vomiting. 40 tablet 11     oxyCODONE-acetaminophen,  mg, (PERCOCET)  mg per tablet Take 2 tablets by mouth 3 times daily  Earliest Fill Date: 9/17/17 180 tablet 0     pantoprazole (PROTONIX) 40 mg delayed-release tablet TAKE 1 TABLET TWICE A  tablet 4     Potassium Gluconate 595 mg (99 mg) tablet Take  by mouth.  0     pravastatin (PRAVACHOL) 40 mg tablet Take 1 tablet by mouth at bedtime. 90 tablet 2      Saccharomyces boulardii (FLORASTOR) 250 mg capsule Take 1 capsule by mouth 2 times daily.  0     vortioxetine (TRINTELLIX) 5 mg tab Take  by mouth.  0     Allergies as of 10/17/2017 - Mikel as Reviewed 10/17/2017      Allergen  Reaction Noted     Atorvastatin Myalgia 04/30/2013     Tape [adhesive] Rash and Erythema 04/02/2013     Tiotropium bromide Rash 09/09/2014     Crestor [rosuvastatin] Myalgia 09/28/2017     Ezetimibe Myalgia 10/09/2009     Latex Rash 08/16/2014     Niacin Flushing 10/09/2009     Other [unlisted allergen (include detail in comments)] Rash and Itching 08/14/2013       OBJECTIVE:  /72  Pulse 60  Temp 98.2  F (36.8  C) (Tympanic)   Wt 71.7 kg (158 lb)  BMI 25.39 kg/m2    General Appearance: Alert. No acute distress  Psychiatric: Normal affect    ASSESSMENT/PLAN:    ICD-10-CM   1. Chronic bilateral low back pain without sciatica M54.5     G89.29   2. Migraine syndrome G43.909   3. Pain medication agreement signed 2/10/17 Z02.89   4. Needs flu shot Z23     We had a long discussion about her opioid use and if she should be considered to have opioid use disorder. She certainly is on the spectrum, but has willingly reduced opioids in the past and has taken herself off them multiple previous times. Function seems worse every time she is off. I am willing to provide, but at a lower dose than oxycodone at 60 mg daily. Discussed how Suboxone works and how much relief it could be expected to provide.    After some discussion we agreed to oxycodone 5/325 up to 8 per day, which would be 40 mg = 60 morphine milligram equivalents daily. If she needs higher quantities, then may benefit from discussion about Suboxone.     Given flu shot.    F/U in 2 weeks to re-evaluate  Greater than 50% of this 28 minute appointment spent on counseling.

## 2017-12-27 NOTE — PROGRESS NOTES
"Patient Information     Patient Name MRN Sex Ankita Bergeron [id number] Female 1955      Progress Notes by Ramirez Cano MD at 10/24/2017  8:15 AM     Author:  Ramirez Cano MD Service:  (none) Author Type:  Physician     Filed:  10/24/2017 10:26 AM Encounter Date:  10/24/2017 Status:  Signed     :  Ramirez Cano MD (Physician)            SUBJECTIVE:  62 y.o. female presents for abdominal pain, chest pain for a week. Was fine when seen on 10/17. The next day she then had some colon spasm. No diarrhea. Normal BM daily. Feels nauseated and vomiting starting yesterday. Despite vomiting is pushing fluids.    Remains on Protonix. Tried Zofran without benefit. Tried TUMS without benefit.  Increased Trintellix last week just before symptoms started.  Has a hx of stomach ulcers. Reports dark stool once, but \"not black or tarry\" like with bleeding in the past. I pointed out that her Hgb has dropped from 12.8 in April to 104 when checked last month in the ED. She says \"my colon spasms when I have bleeding.\"    She told me last week that she went off opioids for 2 weeks and now has been back on the oxycodone for a week. I verified that with her today as well and she confirms, so should not be in opioid withdrawal.    REVIEW OF SYSTEMS:    Constitutional: Negative  Respiratory: Negative  Cardiovascular: Negative      Current Outpatient Prescriptions       Medication  Sig Dispense Refill     amLODIPine (NORVASC) 10 mg tablet Take 1 tablet by mouth once daily. 30 tablet 11     aspirin (ECOTRIN) 81 mg enteric coated tablet Take 81 mg by mouth once daily with a meal.       busPIRone (BUSPAR) 10 mg tablet TAKE 1 TABLET TWICE A  tablet 3     cholecalciferol (VITAMIN D3) 5,000 unit capsule Take 1 capsule by mouth once daily.       clonazePAM (KLONOPIN) 1 mg tablet Take 1 tablet by mouth 4 times daily. Becky Olmstead CNP       clopidogrel (PLAVIX) 75 mg tablet TAKE 1 TABLET DAILY 90 tablet 5     " cyclobenzaprine (FLEXERIL) 10 mg tablet TAKE 1 TABLET TWICE A DAY IF NEEDED FOR MUSCLE SPASM 60 tablet 0     docusate (STOOL SOFTENER) 50 mg capsule Take 1 capsule by mouth once daily.  0     lisinopril (PRINIVIL; ZESTRIL) 40 mg tablet Take 1 tablet by mouth once daily. 90 tablet 4     metoprolol tartrate (LOPRESSOR) 50 mg tablet Take 1 tablet by mouth 2 times daily. 180 tablet 4     nitroglycerin (NITROSTAT) 0.3 mg sublingual tablet Place 1 tablet under the tongue every 5 minutes if needed for Chest Pain. 1 Bottle 2     ondansetron (ZOFRAN, AS HYDROCHLORIDE,) 4 mg tablet Take 1 tablet by mouth every 6 hours if needed for Nausea/Vomiting. 40 tablet 11     oxyCODONE-acetaminophen, 5-325 mg, (PERCOCET) 5-325 mg per tablet Take 2 tablets by mouth 4 times daily  Max acetaminophen dose: 4000mg in 24 hrs. 120 tablet 0     pantoprazole (PROTONIX) 40 mg delayed-release tablet TAKE 1 TABLET TWICE A  tablet 4     Potassium Gluconate 595 mg (99 mg) tablet Take  by mouth.  0     pravastatin (PRAVACHOL) 40 mg tablet Take 1 tablet by mouth at bedtime. 90 tablet 2     Saccharomyces boulardii (FLORASTOR) 250 mg capsule Take 1 capsule by mouth 2 times daily.  0     vortioxetine (TRINTELLIX) 5 mg tab Take  by mouth.  0     Allergies as of 10/24/2017 - Mikel as Reviewed 10/24/2017      Allergen  Reaction Noted     Atorvastatin Myalgia 04/30/2013     Tape [adhesive] Rash and Erythema 04/02/2013     Tiotropium bromide Rash 09/09/2014     Crestor [rosuvastatin] Myalgia 09/28/2017     Ezetimibe Myalgia 10/09/2009     Latex Rash 08/16/2014     Niacin Flushing 10/09/2009     Other [unlisted allergen (include detail in comments)] Rash and Itching 08/14/2013       OBJECTIVE:  /82  Pulse 68  Temp 98.2  F (36.8  C) (Oral)   Wt 72.1 kg (159 lb)  Breastfeeding? No  BMI 25.55 kg/m2    General Appearance: Alert. No acute distress  Gastrointestinal Exam: Hypoactive BS, soft, tender most in epigastric area and RLQ. No abnormal masses  or organomegaly.  Psychiatric: Normal affect    Results for orders placed or performed in visit on 10/24/17      LIPID PANEL      Result  Value Ref Range    CHOLESTEROL,TOTAL 193 <200 mg/dL    TRIGLYCERIDES 255 (H) <150 mg/dL    HDL CHOLESTEROL 59 23 - 92 mg/dL    NON-HDL CHOLESTEROL 134 <145 mg/dl    CHOL/HDL RATIO            3.27 <4.50                    LDL CHOLESTEROL 83 <100 mg/dL    PROVIDER ORDERED STATUS RANDOM    C-REACTIVE PROTEIN      Result  Value Ref Range    C-REACTIVE PROTEIN <1.000 <1.000 mg/dL   COMP METABOLIC PANEL      Result  Value Ref Range    SODIUM 141 133 - 143 mmol/L    POTASSIUM 4.4 3.5 - 5.1 mmol/L    CHLORIDE 102 98 - 107 mmol/L    CO2,TOTAL 25 21 - 31 mmol/L    ANION GAP 14 5 - 18                    GLUCOSE 138 (H) 70 - 105 mg/dL    CALCIUM 9.8 8.6 - 10.3 mg/dL    BUN 14 7 - 25 mg/dL    CREATININE 1.00 0.70 - 1.30 mg/dL    BUN/CREAT RATIO           14                    GFR if African American >60 >60 ml/min/1.73m2    GFR if not  56 (L) >60 ml/min/1.73m2    ALBUMIN 3.9 3.5 - 5.7 g/dL    PROTEIN,TOTAL 7.1 6.4 - 8.9 g/dL    GLOBULIN                  3.2 2.0 - 3.7 g/dL    A/G RATIO 1.2 1.0 - 2.0                    BILIRUBIN,TOTAL 0.4 0.3 - 1.0 mg/dL    ALK PHOSPHATASE 59 34 - 104 IU/L    ALT (SGPT) 12 7 - 52 IU/L    AST (SGOT) 17 13 - 39 IU/L   CBC WITH AUTO DIFFERENTIAL      Result  Value Ref Range    WHITE BLOOD COUNT         6.9 4.5 - 11.0 thou/cu mm    RED BLOOD COUNT           4.17 4.00 - 5.20 mil/cu mm    HEMOGLOBIN                11.5 (L) 12.0 - 16.0 g/dL    HEMATOCRIT                35.5 33.0 - 51.0 %    MCV                       85 80 - 100 fL    MCH                       27.6 26.0 - 34.0 pg    MCHC                      32.4 32.0 - 36.0 g/dL    RDW                       15.2 11.5 - 15.5 %    PLATELET COUNT            249 140 - 440 thou/cu mm    MPV                       9.1 6.5 - 11.0 fL    NEUTROPHILS               62.3 42.0 - 72.0 %    LYMPHOCYTES                26.2 20.0 - 44.0 %    MONOCYTES                 8.4 <12.0 %    EOSINOPHILS               1.9 <8.0 %    BASOPHILS                 0.9 <3.0 %    IMMATURE GRANULOCYTES(METAS,MYELOS,PROS) 0.3 %    ABSOLUTE NEUTROPHILS      4.3 1.7 - 7.0 thou/cu mm    ABSOLUTE LYMPHOCYTES      1.8 0.9 - 2.9 thou/cu mm    ABSOLUTE MONOCYTES        0.6 <0.9 thou/cu mm    ABSOLUTE EOSINOPHILS      0.1 <0.5 thou/cu mm    ABSOLUTE BASOPHILS        0.1 <0.3 thou/cu mm    ABSOLUTE IMMATURE GRANULOCYTES(METAS,MYELOS,PROS) 0.0 <=0.3 thou/cu mm     *Note: Due to a large number of results and/or encounters for the requested time period, some results have not been displayed. A complete set of results can be found in Results Review.         ASSESSMENT/PLAN:    ICD-10-CM   1. Abdominal pain, diffuse R10.84   2. Nausea R11.0   3. Iron deficiency anemia, unspecified iron deficiency anemia type D50.9   4. CKD (chronic kidney disease) stage 2, GFR 60-89 ml/min N18.2   5. Pure hypercholesterolemia E78.00   6. Essential hypertension I10   7. Vitamin D deficiency E55.9     Pain most likely either viral gastritis, some gastritis from aspirin use, or IBS related. Increase in Trentellix could have caused symptoms. Should not be opioid withdrawal if she is being truthful about her dosing.    Reduce Trentellix back to 10 mg daily. Given nausea and vomiting, will use Zofran more frequently. May try dicyclomine sparingly for what she believes are bowel spasms. Cautioned about cardiac effects and need to avoid use of dicyclomine if not helpful.    Hgb increased, but is iron deficient. Likely has some gastric irritation from aspirin use, but needs to remain on aspirin and Plavix due to high risk for cardiac event. Added sucralfate 1 g QID x 10.    Sent in refill of amlodipine. Due for recheck on vit D and returned normal now with supplementation.    F/U next week. Plan to start iron at that time

## 2017-12-27 NOTE — PROGRESS NOTES
Patient Information     Patient Name MRN Ankita Lee [id number] Female 1955      Progress Notes by Ramirez Cano MD at 2017 11:45 AM     Author:  Ramirez Cano MD Service:  (none) Author Type:  Physician     Filed:  2017 10:39 AM Encounter Date:  2017 Status:  Signed     :  Ramirez Cano MD (Physician)            SUBJECTIVE:  62 y.o. female presents for follow up on chronic chest wall and back pain, shoulder pain, CAD, breast lump.    Noted a lump a couple weeks ago. Seemed black and blue, but resolved. The lump is smaller. Has swollen axillary glands per her report, but none on exam and then she says her mother always told her they are swollen. No sweats or chills.     BP elevated. Was running low and so amlodipine reduced. Now has increased. She notes fluctuation in readings lately due to stress and pain. Denies fatigue, CP or SOB like previous cardiac related chest pain. Received injection in shoulder and knee and pain is better.    Becky Olmstead is going to change her from Cymbalta to another medication, so tapering the Cymbalta.    R shoulder arthroscopy with Dr Wilkinson on May 12. Dr Wilkinson provided her oxycodone at night to help with sleep. She says it did not help. Her  recently had surgery and she figured out he was taking her oxycodone at night, but he did not seem to recall doing so and never had issues with taking excess opioids in the past, so she is going to have Dr Wilkinson take him off oxycodone and use T#3 instead.  She has 4 pills of oxycodone left. Was under the impression she was here for follow up 2 days later than last month, but is 2 days early.    UTI symptoms resolved.     REVIEW OF SYSTEMS:    Constitutional: Negative  Respiratory: Negative  Cardiovascular: Negative      Current Outpatient Prescriptions       Medication  Sig Dispense Refill     amLODIPine (NORVASC) 5 mg tablet Take 0.5 tablets by mouth once daily.       aspirin  (ECOTRIN) 81 mg enteric coated tablet Take 81 mg by mouth once daily with a meal.       busPIRone (BUSPAR) 10 mg tablet TAKE 1 TABLET TWICE A  tablet 3     cefuroxime axetil (CEFTIN) 500 mg tablet Take 1 tablet by mouth 2 times daily. 14 tablet 0     cholecalciferol (VITAMIN D3) 5,000 unit capsule Take 1 capsule by mouth once daily.       clonazePAM (KLONOPIN) 1 mg tablet Take 1 tablet by mouth 4 times daily. Becky Olmstead CNP       clopidogrel (PLAVIX) 75 mg tablet TAKE 1 TABLET DAILY 90 tablet 5     cyclobenzaprine (FLEXERIL) 10 mg tablet TAKE 1 TABLET TWICE A DAY IF NEEDED FOR MUSCLE SPASM 60 tablet 0     docusate (STOOL SOFTENER) 50 mg capsule Take 1 capsule by mouth once daily.  0     DULoxetine (CYMBALTA) 30 mg Delayed-release capsule TAKE 1 CAPSULE BY MOUTH DAILY AS DIRECTED FOR TAPER  0     DULoxetine (CYMBALTA) 60 mg Delayed-release capsule TAKE 1 CAPSULE TWICE A  capsule 3     fluticasone (50 mcg per actuation) nasal solution (FLONASE) Inhale 2 sprays into both nostrils once daily as needed for allergic rhinitis and nasal congestion.       lisinopril (PRINIVIL; ZESTRIL) 40 mg tablet TAKE 1 TABLET DAILY 90 tablet 4     metoprolol tartrate (LOPRESSOR) 50 mg tablet Take 1 tablet by mouth 2 times daily. 180 tablet 4     Nitroglycerin (NITROMIST) 400 mcg/spray spra PLACE 1 SPRAY UNDER THE TONGUE EVERY 5 MINUTES IF NEEDED FOR CHEST PAIN 1 g 11     nitroglycerin (NITROSTAT) 0.3 mg sublingual tablet Place 1 tablet under the tongue every 5 minutes if needed for Chest Pain. 1 Bottle 2     ondansetron (ZOFRAN, AS HYDROCHLORIDE,) 4 mg tablet Take 1 tablet by mouth every 6 hours if needed for Nausea/Vomiting. 40 tablet 11     oxyCODONE (ROXICODONE) 5 mg immediate release tablet TAKE 1 TO 2 TABLETS BY MOUTH NIGHTLY AT BEDTIME AS NEEDED  0     oxyCODONE-acetaminophen,  mg, (PERCOCET)  mg per tablet Take 2 tablets by mouth 3 times daily 180 tablet 0     pantoprazole (PROTONIX) 40 mg delayed-release  tablet TAKE 1 TABLET TWICE A  tablet 3     pravastatin (PRAVACHOL) 40 mg tablet Take 1 tablet by mouth at bedtime. 90 tablet 2     Saccharomyces boulardii (FLORASTOR) 250 mg capsule Take 1 capsule by mouth 2 times daily.  0     Allergies as of 07/18/2017 - Mikel as Reviewed 07/18/2017      Allergen  Reaction Noted     Atorvastatin Myalgia 04/30/2013     Tape [adhesive] Rash and Erythema 04/02/2013     Tiotropium bromide Rash 09/09/2014     Ezetimibe Myalgia 10/09/2009     Latex Rash 08/16/2014     Niacin Flushing 10/09/2009     Other [unlisted allergen (include detail in comments)] Rash and Itching 08/14/2013       OBJECTIVE:  /100  Pulse 64  Wt 73 kg (161 lb) Comment: patient stated  BMI 25.87 kg/m2    General Appearance: Alert. No acute distress  Breast: R side 11 oclock, small subcutaneous lump 5 mm in size. Mobile without tenderness.   Lymph: No axillary adenopathy  Psychiatric: Normal affect    ASSESSMENT/PLAN:    ICD-10-CM   1. Chronic bilateral low back pain without sciatica M54.5     G89.29   2. Migraine syndrome G43.909   3. Pain medication agreement signed 2/10/17 Z02.89   4. Atherosclerosis of coronary artery bypass graft of native heart without angina pectoris I25.810   5. Essential hypertension I10   6. Screening mammogram, encounter for Z12.31     She is out of oxycodone early. Plus, received Rx from Dr Wilkinson 1 week ago. She informed me about the oxycodone, so it was not a surprise. Is already on oxycodone 60 mg daily = 90 morphine milligram equivalents. I am not supportive of a higher dose and we discussed this today. Can consider the early refill and receipt of meds from another provider a violation of controlled substance agreement. I will agree to continue oxycodone, but further issues will result in termination of our agreement. Again discussed how opioids alter pain perception and result in more disability. After change in Cymbalta, consider reduction in opioids.  Reviewed .  Last UTox appropriate 3/5/17. Refilled oxycodone for tomorrow. Due next Aug 18.    Watch for diarrhea, due to hx of c diff.     BP remains elevated. She saw cardiology and had elevated readings, but no change to meds. Is uncertain of her dose of amlodipine. Will check on dose and track blood pressure at home. If not under 140/90, then adjust meds.    Ordered screening mammogram, no concerns at this time with lump. Likely bruise, but due for screening mammo.    F/U 1 month   Greater than 50% of this 40 minute appointment spent on counseling.

## 2017-12-27 NOTE — PROGRESS NOTES
Patient Information     Patient Name MRN Sex Ankita Bergeron [id number] Female 1955      Progress Notes by Olamide Barroso R.T. (ARRT) at 2017  2:23 PM     Author:  Olamide Barroso R.T. (ARRT) Service:  (none) Author Type:  (none)     Filed:  2017  2:23 PM Date of Service:  2017  2:23 PM Status:  Signed     :  Olamide Barroso R.T. ([facility]) ([facility] hospital - Registered Radiologic Technologist)            Falls Risk Criteria:    Age 65 and older or under age 4        Sensory deficits    Poor vision    Use of ambulatory aides    Impaired judgment    Unable to walk independently    Meets High Risk criteria for falls:  no

## 2017-12-27 NOTE — PROGRESS NOTES
Patient Information     Patient Name MRN Sex Ankita Bergeron [id number] Female 1955      Progress Notes by Tatiana Cox at 2017 12:31 PM     Author:  Tatiana Cox Service:  (none) Author Type:  (none)     Filed:  2017 12:35 PM Date of Service:  2017 12:31 PM Status:  Signed     :  Tatiana Cox RN came to mammogaphy to do a blood pressure because pt has a hx of heart attack and high blood pressure  Pt wanted the BP taken because she was curious.    It was 170/89 which she said is higher than normal but she has not been taking her meds    She said she does not want to be seen today and she will be contacting her doctor on Monday to discuss the medication  Tatiana Cox ....................  2017   12:35 PM

## 2017-12-28 NOTE — TELEPHONE ENCOUNTER
Patient Information     Patient Name MRN Sex Ankita Bergeron [id number] Female 1955      Telephone Encounter by Cruzito Robbins RN at 2017  4:38 PM     Author:  Cruzito Robbins RN Service:  (none) Author Type:  NURS- Registered Nurse     Filed:  2017  4:42 PM Encounter Date:  2017 Status:  Signed     :  Cruzito Robbins RN (NURS- Registered Nurse)            This is a Refill request from: [facility]  Name of Medication: Buspar  Quantity requested: 180 tabs with 3 refills  Last fill date: 17 for a 90 day supply as per rx request  Due for refill: Yes, as per chart review and per rx request  Last visit with BLAISE GUILLEN was on: 10/31/2017 in [facility]  PCP:  Blaise Guillen MD  Controlled Substance Agreement: N/A   Diagnosis r/t this medication request: Anxiety state    Chart review shows that patient was seen by PCP for medication management on 10/31/17. Rx as requested was not addressed in specific at that office visit, however patient was to follow up in 2 weeks. 2 week follow up is noted to be scheduled with PCP for 11/15/17. Writer will route rx request to PCP for his consideration/approval.     Unable to complete prescription refill per RN Medication Refill Policy.................... Cruzito Robbins RN ....................  2017   4:39 PM

## 2017-12-28 NOTE — TELEPHONE ENCOUNTER
Patient Information     Patient Name MRN Ankita Lee [id number] Female 1955      Telephone Encounter by Kimberly Whyte at 2017  3:17 PM     Author:  Kimberly Whyte Service:  (none) Author Type:  (none)     Filed:  2017  3:21 PM Encounter Date:  2017 Status:  Signed     :  Kimberly Whyte MD discontinued Crestor because patient states Myalgia and started pravastatin.  Dr. Anguiano  Discontinued the simvastatin. Patient states understanding what medication she is to take.  Kimberly Whyte LPN 2017 3:21 PM

## 2017-12-28 NOTE — TELEPHONE ENCOUNTER
Patient Information     Patient Name MRN Ankita Lee [id number] Female 1955      Telephone Encounter by Kimberly Whyte at 2017 12:09 PM     Author:  Kimberly Whyte Service:  (none) Author Type:  (none)     Filed:  2017 12:09 PM Encounter Date:  2017 Status:  Signed     :  Kimberly Whyte            Patient appointment moved to 17.  Kimberly Whyte LPN 2017 12:09 PM

## 2017-12-28 NOTE — TELEPHONE ENCOUNTER
Patient Information     Patient Name MRN Sex Ankita Bergeron [id number] Female 1955      Telephone Encounter by Coty Caro at 8/10/2017  4:46 PM     Author:  Coty Caro Service:  (none) Author Type:  (none)     Filed:  8/10/2017  4:47 PM Encounter Date:  8/10/2017 Status:  Signed     :  Coty Caro            Patient has made the decision to have her angiogram done in [place] at [facility].  She does not want to handle the stress of going to the [facility].  Please arrange for this test to be scheduled at [facility] and contact patient with the details.   Thank you.

## 2017-12-28 NOTE — TELEPHONE ENCOUNTER
"Patient Information     Patient Name MRN Ankita Lee [id number] Female 1955      Telephone Encounter by Kathleen Lima RN at 2017 10:31 AM     Author:  Kathleen Lima RN Service:  (none) Author Type:  NURS- Registered Nurse     Filed:  2017 11:36 AM Encounter Date:  2017 Status:  Signed     :  Kathleen Lima RN (NURS- Registered Nurse)            Pt calling very upset about her appointment at the [facility] for her angiogram.  She stated the  called her this morning and asked why she wasn't there for her appointment she told them it was the  not today.  She had confirmed this with our schedulers the other day.  Pt was swearing at me and said \"thank you for ruining my life\".  She stated it was my fault for setting up the appointment wrong and \"why can't you just admit you're wrong\".  Informed the pt as I looked back in her chart that I made a confirmation call to Magda at the [facility] to confirm 17 at 0900 angiogram.  This call was placed on 10:02 AM on 17. She stated \"I knew it was the , but you all said the .\"  She also stated that the  was her appointment date with Dr. Gramajo and we were going to reschedule that so she could have her angio on that day instead. After checking future appointments her  appt with Dr. Gramajo was cancelled by her and rescheduled to the . Pt continued to yell and swear and stated that I belonged in [place] because \"they are nothing but a bunch of f___- ups, goodbye\" and then hung up.    Kathleen Lima RN 2017 10:52 AM    Called the [facility] and spoke with Eli, as Magda is out today.  She stated the pt was scheduled for today and the mix-up was \"probably just an honest mistake\".  She couldn't see where the pt had been rescheduled, but she is going to call the nurses and then reach out to the pt to reschedule her.  She stated she will call me back with the new date and time " provided the pt is willing to reschedule.   Kathleen Lima RN 9/12/2017 11:09 AM    Eli called back and stated the pt was rescheduled for 9/15/17 at 1030.  She stated the pt was emotional but for the most nice and wanted to be rescheduled for the soonest possible appointment.    Kathleen Lima RN 9/12/2017 11:29 AM

## 2017-12-28 NOTE — PROGRESS NOTES
Patient Information     Patient Name MRN Sex Ankita Bergeron [id number] Female 1955      Progress Notes by Paul Gramajo DO at 8/10/2017 12:45 PM     Author:  Paul Gramajo DO Service:  (none) Author Type:  PHYS- Osteopathic     Filed:  8/10/2017  5:40 PM Encounter Date:  8/10/2017 Status:  Signed     :  Paul Gramajo DO (PHYS- Osteopathic)            [facility]   CARDIOLOGY CONSULT    Ankita Day    Ramirez Cano MD    Chief Complaint     Patient presents with       Establish Care      cardiology     Consult          HPI:  Mrs. Day is a 62 -year-old female who is being seen by cardiology as she was previously seen by Dr. Anguiano for coronary artery disease with reported history of 17 stents placement and 3 heart attacks. In addition, she has a history of obstructive sleep apnea, anxiety, chronic ischemic heart disease, COPD, history of a gastric ulcer, ongoing tobacco abuse, hyperlipidemia, hypertension, history of CABG 2003×3, and history of left subclavian steal involving the LIMA.    She has a significant cardiac history in which she reports having had 17 stents placed previously and 3 myocardial infarctions. She had CABG in  with triple bypass with LIMA to LAD, RAFI to RCA, and saphenous vein graft to the circumflex. She's also been treated for left subclavian steal syndrome which involve the LIMA.     She is here complaining of chest discomfort. She has been having chest discomfort for the last year. She describes 2 different types of chest discomfort. One appears to be musculoskeletal/chest wall in nature. This discomfort is reproducible with deep breathing and palpation. She has a second chest discomfort which seems to be exacerbated by activity. She describes walking up a flight of stairs from her basement to do laundry and she will get a tightness in her chest lasting up to a couple hours. This will make her stop and rest and meditate. She  will take 1 sublingual nitroglycerin but this does not relieve her symptoms. With it, she becomes sweaty, nauseous, and dizzy. She also gets diaphoretic at random times. Her  has noticed her being wet/sweaty and has asked her if she just took a shower because she looked so wet. She previously had these symptoms when she had her left subclavian artery stented. She has a long history of smoking described as considerable tobacco usage. She had quit up until the spring of 2017. She currently smokes a third of a pack a day. She has a difficult time recalling her symptoms that she has had in the past which resulted in stenting but feels that her symptoms now are similar to the symptoms she's had been.     Her most recent catheterization was on 3/30/13. Her left main was okay, her LAD was 85-90% stenosed just before the LIMA insertion, the circumflex was diffusely diseased at 60-70% with what sounds like an ostial lesion at 70-75%, the second obtuse marginal 70% stenosis, the native RCA vessel was patent with open stents but the RAFI was 100% occluded, the LIMA was okay, but the saphenous vein graft to the OM was 100% occluded. The left subclavian artery had a 75-80% stenosis and was stented.    She had a lipid panel on 4/24/17 which showed total cholesterol of 225, triglycerides 196, HDL of 87, and a LDL of 99.    She had a Cardiolite stress test performed on 11/17/16 that showed a tiny fixed defect to the apex but there is no evidence for reversible ischemia. The wall motion was normal. Ejection fraction was 62%.    She is currently on Norvasc 5 mg daily, aspirin 81 mg daily, Plavix 75 mg daily, lisinopril 40 mg daily, sublingual nitroglycerin as needed chest pain, pravastatin 40 mg at bedtime and metoprolol 50 mg.    IMAGING RESULTS:  Study date: November 17, 2016     MYOCARDIAL PERFUSION IMAGING REPORT  REST/STRESS SINGLE ISOTOPE GATED SPECT IMAGING   USING MULTIPLE LINE ARRAY ATTENUATION CORRECTION WITH  GADOLINIUM     CLINICAL HISTORY:   61 y.o. female with chest pain and known coronary artery disease.  HEIGHT: 5 feet 6 inches       WEIGHT: 156 pounds  CARDIAC RISK FACTORS:  hypertension, hypercholesterolemia, remote tobacco use and family history of heart disease.  CARDIAC HISTORY: MI; CAB 2003; PCI 2002, 2007, 2008, 2012.  Caffeine/methylxanthine use within 12 hours of stress: No  Patient experiencing chest pain/discomfort at baseline? Yes       IMPRESSION:  1. Adequate pharmacologic stress test with regadenoson (Lexiscan) and low level exercise.  2. The pharmacological stress ECG was normal.  3. Myocardial perfusion was normal. There was a tiny focal fixed defect anterior apex with no reversibility. This likely is artifact as it moves well.  4. Overall left ventricular systolic function was normal without wall motion abnormalities.  The post-stress LVEF was calculated to be 62%.    5. Left ventricular cavity size was normal ( ml).  6. Compared to the prior study from 6/30/2015, the current study is unchanged.    Procedure: US VENOUS UPPER EXTREMITY LEFT     HISTORY:  ARM PAIN/PROBLEM; . Left lower arm pain     TECHNIQUE: Ultrasound of the left upper extremity was performed.     COMPARISON: None.     FINDINGS:     The internal jugular vein has normal flow and compressibility. There is normal flow in the subclavian vein. The axillary, brachial, cephalic, basilic, radial, and ulnar veins are all patent with normal antegrade flow and full compressibility. No DVT.     IMPRESSION: No DVT in the left upper extremity.       Electronically Signed By: Kathleen Ruiz M.D. on 3/17/2017 12:31 PM    PROCEDURE:  CT CHEST PE STUDY.     HISTORY:   Chest pain; .     TECHNIQUE: Contrast enhanced helical CT pulmonary angiography was performed. Sagittal and coronal reformatted images were reviewed.     COMPARISON:  Chest CT 03/10/2015     FINDINGS:       There is adequate opacification of the pulmonary arteries. No filling  defects to suggest pulmonary embolism.     The heart size is normal.  Coronary artery calcifications are present. No lymphadenopathy in the chest. The thoracic aorta is normal in size and course.      Small lung nodules are stable. The lungs are otherwise clear.     Images of the upper abdomen are unremarkable.     No suspicious osseous lesion is identified.     IMPRESSION:  No evidence of pulmonary embolism.         PAST MEDICAL HISTORY:  Past Medical History:     Diagnosis  Date     Acute coronary syndrome (HC) 06/15/2007    with PTCA and stenting of 90% osteo circumflex lesion and patent LAD graft, patent left main stent.      Acute MI, initial, chest pain with positive nz 3/30/2013     Anterior chest wall pain 10/13/2009    chronic      Back pain, chronic 05/31/2011     Carpal tunnel syndrome, bilateral      Central sleep apnea 10/14/2013     Chest pain 12/29/2014     Chronic anxiety      Chronic depression     Severe, hx of suicide attempt/hospitalization      Chronic ischemic heart disease, unspecified 06/27/2012     Chronic pain syndrome 12/01/2010    chest wall, back      Chronic right shoulder pain      Clostridium difficile colitis 8/19/2016     Colitis 06/15/2007    history of      COLITIS     Microcytic       Constipation 11/29/2011     COPD (chronic obstructive pulmonary disease) (HC) 06/15/2007    low DLCO, normal spirometry      Cor athrscl-uns vessel     -angio revealed 3 vessel dz  -4 stents placed; 2 overlapping stents placed in mLAD, 1 stent pRCA, 1 stent pCirc 1 s 9/02 -non-ST elevation MI 1/03  -angio revealed restenosis of Circ.    -PTCA and brachytherapy of pCirc -repeat angio 2/03 -no intervension -CABG x3 12/03 - Dr Sinclair  -LIMA-LAD, RAFI-RCA, SVG-OM -PCI 7/04 stent to L -CTangio 9/05   -patentent LIMA-LAD. RAFI-RCA occluded; RCA ostio/proximal stent widely patent. SVG-OM occluded; OM marginal branch is widely patent.  Non-STEMI--6/15/07--DELONTE to LCX 5/2008: Angiogram: No hemodynamically  significant lesions that were not bypassed by the LIMA -12 PCI of ostial LAD, Left Main, ostial Cx.  Patent NABILA to LAD, occluded RAFI to RCA and SVG to OM        E. coli sepsis (HC) 16    [facility]      Gastric ulcer with hemorrhage 10/2003     Gastric ulcer, chronic 10/03/2011    hx of GI bleed ()      History of tobacco abuse      Hx of coronary angioplasty 2002    with triple stenting      Hx of coronary angioplasty 01/10/2003    repeat angioplasty      Hx of diseases of blood and blood-forming organs      HX OF PEPTIC ULCER DISEASE 6/15/2007     Hyperlipemia      Hypertension      Intermittent claudication (HC)      Knee pain 2011     Left subclavian artery stenosis causing coronary steal through LIMA graft 3/31/2013    S/p prox left SCA stent 2013       Lumbar disc disease      Migraine syndrome      Myalgia and myositis 10/14/2013     Neck pain, chronic      Nodular degeneration of cornea 2011     Normal cardiac stress test 10/2004    Cardiolite (, ,  and )      Normal stress echocardiogram 2010    dobutamine, negative      Opioid abuse     history of      Osteoarthritis      Pain medication agreement signed 2012     Panic attack      Postsurgical aortocoronary bypass status 2003     Postsurgical percutaneous transluminal coronary angioplasty status      Vitamin D deficiency 2013       FAMILY HISTORY:  Family History       Problem   Relation Age of Onset     Heart Disease  Father      Heart condition/Significant for atherosclerotic cardiovascular disease, but non premature.       Cancer-colon  Father       of colon cancer        Cancer  Father      mets to liver, secondary to colon cancer       Heart Disease  Mother      Cancer  Sister      multiple myeloma       Other  Son      gallstones       Cancer  Other      Multiple Myeloma       Heart Disease  Other      Ischemic Heart Disease       Cancer-colon  Other      Malignant  neoplasms         PAST SURGICAL HISTORY:  Past Surgical History:      Procedure  Laterality Date     ANGIOPLASTY  9/12/02    with triple stenting        APPENDECTOMY       COLONOSCOPY  8/8/16    normal, Dr Bowman       COLONOSCOPY SCREENING  2011    Dr Bowman benign polyps       COLONOSCOPY SCREENING  2011    benign polyps, Dr. Bowman       CORONARY ARTERY BYPASS GRAFT  12/13/02    Triple bypass, left internal mammary  to LAD, right internal mammary to right coronary artery, saphenous to obtuse marginal of the left circumflex.       EMBOLECTOMY  04/02/2013    brachial artery pseudoaneurysm after stenting       ENDOSCOPY GI  2003    Upper GI endoscopy.        ESOPHAGOGASTRODUODENOSCOPY  2011    EGD Dr Bowman with pyloric ulcer       ESOPHAGOGASTRODUODENOSCOPY  2011    pyloric ulcer, Dr. Bowman       ESOPHAGOGASTRODUODENOSCOPY  8/8/16    mild gastritis, Dr Bowman       FEMUR KNEE SURGERY      x3, right knee       FEMUR KNEE SURGERY  2000    left knee  ligament surgery       HYSTERECTOMY  age 21     KNEE ARTHROSCOPY      left       LAP CHOLECYSTECTOMY  2006     PTCA  1/10/2003     PTCA  09/20/2012    DELONTE in LAD and left main       PTCA  4/1/2013    L subclavian stenosis       SALPINGO-OOPHORECTOMY  age 27    Bilateral salpingo-oophorectomy       SHOULDER ARTHROSCOPY      right       TONSIL AND ADENOIDECTOMY  childhood     UGI ENDOSCOP  2003    Upper GI endoscopy.        UPPER ARM/ELBOW SURGERY  baby    birth malachi removed from right arm         SOCIAL HISTORY:  Social History     Social History        Marital status:       Spouse name: N/A     Number of children:  N/A     Years of education:  N/A     Social History Main Topics          Smoking status:   Current Some Day Smoker      Packs/day:  0.25      Years:  35.00      Types:  Cigarettes      Last attempt to quit:  2/15/2017      Smokeless tobacco:   Never Used      Alcohol use   0.0 oz/week     0 Standard drinks or equivalent per week        Comment: rare        "Drug use:   No      Sexual activity:   Not Asked      Other Topics   Concern     Caffeine Concern  No     Exercise  Yes     gets on bike when she has time       Service  No     Blood Transfusions  No     Occupational Exposure  No     Hobby Hazards  No     Sleep Concern  No     Stress Concern  Yes     \"depressed due to [place], lost mom at [place]\"  12/8/15      Weight Concern  Yes     asking about diet pills 12/8/15      Special Diet  No     Back Care  No     Bike Helmet  No     Seat Belt  Yes     Social History Narrative     ,  Steven.  Currently not working outside the home. Lives three-mile self [place] met with . Tobacco abuse, quit 2001, restarted. Quit 2012 and has since quit, no alcohol.       CURRENT MEDICATIONS:  Current Outpatient Prescriptions on File Prior to Visit       Medication  Sig Dispense Refill     aspirin (ECOTRIN) 81 mg enteric coated tablet Take 81 mg by mouth once daily with a meal.       busPIRone (BUSPAR) 10 mg tablet TAKE 1 TABLET TWICE A  tablet 3     cefuroxime axetil (CEFTIN) 500 mg tablet Take 1 tablet by mouth 2 times daily. 14 tablet 0     cholecalciferol (VITAMIN D3) 5,000 unit capsule Take 1 capsule by mouth once daily.       clonazePAM (KLONOPIN) 1 mg tablet Take 1 tablet by mouth 4 times daily. Becky Olmstead, TRAY       clopidogrel (PLAVIX) 75 mg tablet TAKE 1 TABLET DAILY 90 tablet 5     cyclobenzaprine (FLEXERIL) 10 mg tablet TAKE 1 TABLET TWICE A DAY IF NEEDED FOR MUSCLE SPASM 60 tablet 0     docusate (STOOL SOFTENER) 50 mg capsule Take 1 capsule by mouth once daily.  0     fluticasone (50 mcg per actuation) nasal solution (FLONASE) Inhale 2 sprays into both nostrils once daily as needed for allergic rhinitis and nasal congestion.       lisinopril (PRINIVIL; ZESTRIL) 40 mg tablet TAKE 1 TABLET DAILY 90 tablet 4     metoprolol tartrate (LOPRESSOR) 50 mg tablet Take 1 tablet by mouth 2 times daily. 180 tablet 4     Nitroglycerin (NITROMIST) 400 " mcg/spray spra PLACE 1 SPRAY UNDER THE TONGUE EVERY 5 MINUTES IF NEEDED FOR CHEST PAIN 1 g 11     nitroglycerin (NITROSTAT) 0.3 mg sublingual tablet Place 1 tablet under the tongue every 5 minutes if needed for Chest Pain. 1 Bottle 2     ondansetron (ZOFRAN, AS HYDROCHLORIDE,) 4 mg tablet Take 1 tablet by mouth every 6 hours if needed for Nausea/Vomiting. 40 tablet 11     oxyCODONE-acetaminophen,  mg, (PERCOCET)  mg per tablet Take 2 tablets by mouth 3 times daily  Earliest Fill Date: 7/19/17 180 tablet 0     pantoprazole (PROTONIX) 40 mg delayed-release tablet TAKE 1 TABLET TWICE A  tablet 3     pravastatin (PRAVACHOL) 40 mg tablet Take 1 tablet by mouth at bedtime. 90 tablet 2     Saccharomyces boulardii (FLORASTOR) 250 mg capsule Take 1 capsule by mouth 2 times daily.  0     No current facility-administered medications on file prior to visit.        ALLERGIES:  Allergies      Allergen   Reactions     Atorvastatin  Myalgia     Tape [Adhesive]  Rash and Erythema     Silk and plastic gloves (long term attachment of adhesives)      Tiotropium Bromide  Rash     Ezetimibe  Myalgia     Latex  Rash     Niacin  Flushing             Other [Unlisted Allergen (Include Detail In Comments)]  Rash and Itching     Metals and plastic          ROS:  CONSTITUTIONAL:  No weight loss, fever, chills, weakness but she admits to fatigue.  HEENT:  Eyes:  No visual changes. Ears, Nose, Throat:  No hearing loss, congestion or difficulty swallowing.   CARDIOVASCULAR:  She has ongoing chest pain with chest pressure and chest discomfort. She describes 2 different types of pain. One that is constant and one that is exertional. No palpitations or lower extremity edema.  RESPIRATORY:  She has some shortness of breath, dyspnea upon exertion, cough or sputum production.  GASTROINTESTINAL: No abdominal pain. No anorexia, nausea, vomiting or diarrhea.   NEUROLOGICAL:  No headache, lightheadedness, dizziness, syncope, ataxia or  weakness.  HEMATOLOGIC:  No anemia, bleeding or bruising.  PSYCHIATRIC:  No history of depression or anxiety.  ENDOCRINOLOGIC:  No reports of sweating, cold or heat intolerance. No polyuria or polydipsia.  SKIN:  No abnormal rashes or itching.      PHYSICAL EXAM:  /82  Pulse 72  Wt 72.8 kg (160 lb 6.4 oz)  BMI 25.78 kg/m2  GENERAL: The patient is a well-developed, well-nourished, in no apparent distress. Alert and oriented x3.  HEENT: Head is normocephalic and atraumatic. Eyes are symmetrical with normal visual tracking. Nares appeared normal without nasal drainage. Mucous membranes are moist.   NECK: Supple.  HEART: Regular rate and rhythm, S1S2 present without murmur, rub or gallop.  LUNGS: Respirations regular and unlabored. Clear to auscultation.  GI: Abdomen is soft and nondistended.  Normoactive bowel sounds throughout.   EXTREMITIES: No peripheral edema present. Dorsalis pedis and posterior tibialis pulses present.   MUSCULOSKELETAL: No joint swelling.  NEUROLOGIC: Alert and oriented X3. No focal neurologic deficits.   SKIN: No jaundice. No rashes or visible skin lesions present.    LAB RESULTS:  Office Visit on 06/20/2017        Component  Date Value Ref Range Status     COLOR                     06/20/2017 Yellow  Yellow Color Final     CLARITY                   06/20/2017 Clear  Clear Clarity Final     SPECIFIC GRAVITY,URINE    06/20/2017 1.010  1.010, 1.015, 1.020, 1.025                 Final     PH,URINE                  06/20/2017 5.5  6.0, 7.0, 8.0, 5.5, 6.5, 7.5, 8.5                 Final     UROBILINOGEN,QUALITATIVE  06/20/2017 Normal  Normal EU/dl Final     PROTEIN, URINE 06/20/2017 Negative  Negative mg/dL Final     GLUCOSE, URINE 06/20/2017 Negative  Negative mg/dL Final     KETONES,URINE             06/20/2017 Negative  Negative mg/dL Final     BILIRUBIN,URINE           06/20/2017 Negative  Negative                 Final     OCCULT BLOOD,URINE        06/20/2017 Negative  Negative                  Final     NITRITE                   06/20/2017 Positive* Negative                 Final     LEUKOCYTE ESTERASE        06/20/2017 Moderate* Negative                 Final     RBC 06/20/2017 None Seen  0-2, None Seen /HPF Final     WBC 06/20/2017 6-10* 0-2, 3-5, None Seen /HPF Final     BACTERIA                  06/20/2017 Many* None Seen, Rare, Occasional, Few Bacteria/HPF Final     EPITHELIAL CELLS          06/20/2017 None Seen  None Seen, Few Epi/HPF Final     CULTURE 06/20/2017 RESULT*  Final     CULTURE 06/20/2017 >100,000 CFU/mL Klebsiella pneumoniae   Final   Preop Visit on 04/24/2017        Component  Date Value Ref Range Status     CHOLESTEROL,TOTAL 04/24/2017 225* <200 mg/dL Final     TRIGLYCERIDES 04/24/2017 196* <150 mg/dL Final     HDL CHOLESTEROL 04/24/2017 87  23 - 92 mg/dL Final     NON-HDL CHOLESTEROL 04/24/2017 138  <145 mg/dl Final     CHOL/HDL RATIO            04/24/2017 2.59  <4.50                 Final     LDL CHOLESTEROL 04/24/2017 99  <100 mg/dL Final     PATIENT STATUS            04/24/2017 FASTING                  Final     SODIUM 04/24/2017 134  133 - 143 mmol/L Final     POTASSIUM 04/24/2017 4.5  3.5 - 5.1 mmol/L Final     CHLORIDE 04/24/2017 101  98 - 107 mmol/L Final     CO2,TOTAL 04/24/2017 25  21 - 31 mmol/L Final     ANION GAP 04/24/2017 8  5 - 18                 Final     GLUCOSE 04/24/2017 89  70 - 105 mg/dL Final     CALCIUM 04/24/2017 9.9  8.6 - 10.3 mg/dL Final     BUN 04/24/2017 18  7 - 25 mg/dL Final     CREATININE 04/24/2017 1.10  0.70 - 1.30 mg/dL Final     BUN/CREAT RATIO           04/24/2017 16                  Final     GFR if  04/24/2017 >60  >60 ml/min/1.73m2 Final     GFR if not  04/24/2017 50* >60 ml/min/1.73m2 Final     WHITE BLOOD COUNT         04/24/2017 7.5  4.5 - 11.0 thou/cu mm Final     RED BLOOD COUNT           04/24/2017 4.14  4.00 - 5.20 mil/cu mm Final     HEMOGLOBIN                04/24/2017 12.8  12.0 - 16.0 g/dL  Final     HEMATOCRIT                04/24/2017 38.1  33.0 - 51.0 % Final     MCV                       04/24/2017 92  80 - 100 fL Final     MCH                       04/24/2017 31.0  26.0 - 34.0 pg Final     MCHC                      04/24/2017 33.7  32.0 - 36.0 g/dL Final     RDW                       04/24/2017 12.9  11.5 - 15.5 % Final     PLATELET COUNT            04/24/2017 278  140 - 440 thou/cu mm Final     MPV                       04/24/2017 7.0  6.5 - 11.0 fL Final   Admission on 03/09/2017, Discharged on 03/09/2017        Component  Date Value Ref Range Status     PRO-BNP RHH 03/09/2017 630* 5 - 125 pg/mL Final     SODIUM 03/09/2017 143  137 - 145 mmol/L Final     POTASSIUM 03/09/2017 4.2  3.5 - 5.1 mmol/L Final     CHLORIDE 03/09/2017 102  98 - 107 mmol/L Final     CO2,TOTAL 03/09/2017 29  22 - 30 mmol/L Final     ANION GAP 03/09/2017 11.4  8 - 16 meq/L Final     GLUCOSE 03/09/2017 101  74 - 106 mg/dL Final     CALCIUM 03/09/2017 9.4  8.4 - 10.2 mg/dL Final     BUN 03/09/2017 14  7 - 17 mg/dL Final     CREATININE 03/09/2017 0.86  0.52 - 1.04 mg/dL Final     BUN/CREAT RATIO 03/09/2017 16   Final     GFR if  03/09/2017 >60  >60 ml/min/1.73m2 Final     GFR if not  03/09/2017 >60  >60 ml/min/1.73m2 Final     ALBUMIN 03/09/2017 4.5  3.5 - 5.0 g/dL Final     PROTEIN,TOTAL 03/09/2017 7.0  6.3 - 8.2 g/dL Final     GLOBULIN                  03/09/2017 2.5  2.0 - 4.0 g/dL Final     A/G RATIO 03/09/2017 1.8                  Final     BILIRUBIN,TOTAL 03/09/2017 0.7  0.2 - 1.3 mg/dL Final     ALK PHOSPHATASE  03/09/2017 56  38 - 126 U/L Final     ALT (SGPT) 03/09/2017 23  9 - 52 U/L Final     AST (SGOT) 03/09/2017 23  14 - 36 U/L Final     C-REACTIVE PROTEIN 03/09/2017 0.6  <1.0 mg/dL Final     APTT 03/09/2017 27.1  23.7 - 39.1 sec Final     AMYLASE 03/09/2017 41  30 - 110 U/L Final     LIPASE 03/09/2017 54  <100 U/L Final     TROPONIN I 03/09/2017 <0.012  <=0.034 ng/mL Final     INR  03/09/2017 0.9* 1.0 - 1.5 Final     PROTIME 03/09/2017 12.5  12.2 - 14.7 sec Final     T4,FREE 03/09/2017 1.03  0.78 - 2.19 ng/dL Final     TSH 03/09/2017 2.29  0.47 - 4.68 uIU/mL Final     WHITE BLOOD COUNT         03/09/2017 6.0  4.5 - 11.0 thou/cu mm Final     RED BLOOD COUNT           03/09/2017 3.66* 4.00 - 5.20 mil/cu mm Final     HEMOGLOBIN                03/09/2017 11.0* 12.0 - 16.0 g/dL Final     HEMATOCRIT                03/09/2017 33.2  33.0 - 51.0 % Final     MCV                       03/09/2017 91  80 - 100 fL Final     MCH                       03/09/2017 30.1  26.0 - 34.0 pg Final     MCHC                      03/09/2017 33.1  32.0 - 36.0 g/dL Final     RDW                       03/09/2017 13.3  11.5 - 15.5 % Final     PLATELET COUNT            03/09/2017 260  140 - 440 thou/cu mm Final     NRBC                      03/09/2017 0.0  % Final     IMMATURE PLATELET FRACTION 03/09/2017 2.5  0.9 - 11.2 % Final     NEUTROPHILS               03/09/2017 44.2  42.0 - 72.0 % Final     LYMPHOCYTES               03/09/2017 41.1  20.0 - 44.0 % Final     MONOCYTES                 03/09/2017 10.3  <12.0 % Final     EOSINOPHILS               03/09/2017 3.0  <8.0 % Final     BASOPHILS                 03/09/2017 1.2  <3.0 % Final     IMMATURE GRANULOCYTES(METAS,MYELOS* 03/09/2017 0.2  % Final     ABSOLUTE NEUTROPHILS      03/09/2017 2.7  1.7 - 7.0 thou/cu mm Final     ABSOLUTE LYMPHOCYTES      03/09/2017 2.5  0.9 - 2.9 thou/cu mm Final     ABSOLUTE MONOCYTES        03/09/2017 0.6  <0.9 thou/cu mm Final     ABSOLUTE EOSINOPHILS      03/09/2017 0.2  <0.5 thou/cu mm Final     ABSOLUTE BASOPHILS        03/09/2017 0.1  <0.3 thou/cu mm Final     ABSOLUTE IMMATURE GRANULOCYTES(MET* 03/09/2017 0.0  <=0.3 thou/cu mm Final     ABS NRBC 03/09/2017 0.0  thou /cu mm Final   Admission on 03/05/2017, Discharged on 03/05/2017        Component  Date Value Ref Range Status     APTT 03/05/2017 30  26 - 39 sec Final     SODIUM 03/05/2017  139  133 - 143 mmol/L Final     POTASSIUM 03/05/2017 3.1* 3.5 - 5.1 mmol/L Final     CHLORIDE 03/05/2017 101  98 - 107 mmol/L Final     CO2,TOTAL 03/05/2017 25  21 - 31 mmol/L Final     ANION GAP 03/05/2017 13  5 - 18                 Final     GLUCOSE 03/05/2017 120* 70 - 105 mg/dL Final     CALCIUM 03/05/2017 9.7  8.6 - 10.3 mg/dL Final     BUN 03/05/2017 16  7 - 25 mg/dL Final     CREATININE 03/05/2017 1.28  0.70 - 1.30 mg/dL Final     BUN/CREAT RATIO           03/05/2017 13                  Final     GFR if  03/05/2017 51* >60 ml/min/1.73m2 Final     GFR if not  03/05/2017 42* >60 ml/min/1.73m2 Final     ALBUMIN 03/05/2017 4.3  3.5 - 5.7 g/dL Final     PROTEIN,TOTAL 03/05/2017 7.1  6.4 - 8.9 g/dL Final     GLOBULIN                  03/05/2017 2.8  2.0 - 3.7 g/dL Final     A/G RATIO 03/05/2017 1.5  1.0 - 2.0                 Final     BILIRUBIN,TOTAL 03/05/2017 0.4  0.3 - 1.0 mg/dL Final     ALK PHOSPHATASE 03/05/2017 53  34 - 104 IU/L Final     ALT (SGPT) 03/05/2017 12  7 - 52 IU/L Final     AST (SGOT) 03/05/2017 17  13 - 39 IU/L Final     INR 03/05/2017 1.0  <1.3 Final     PROTIME 03/05/2017 10.6* 11.9 - 15.2 sec Final     TROPONIN I 03/05/2017 <0.030  <0.034 ng/mL Final     WHITE BLOOD COUNT         03/05/2017 8.8  4.5 - 11.0 thou/cu mm Final     RED BLOOD COUNT           03/05/2017 4.08  4.00 - 5.20 mil/cu mm Final     HEMOGLOBIN                03/05/2017 12.6  12.0 - 16.0 g/dL Final     HEMATOCRIT                03/05/2017 36.0  33.0 - 51.0 % Final     MCV                       03/05/2017 88  80 - 100 fL Final     MCH                       03/05/2017 30.9  26.0 - 34.0 pg Final     MCHC                      03/05/2017 35.0  32.0 - 36.0 g/dL Final     RDW                       03/05/2017 13.3  11.5 - 15.5 % Final     PLATELET COUNT            03/05/2017 245  140 - 440 thou/cu mm Final     MPV                       03/05/2017 6.3* 6.5 - 11.0 fL Final     NEUTROPHILS                03/05/2017 61.6  42.0 - 72.0 % Final     LYMPHOCYTES               03/05/2017 29.3  20.0 - 44.0 % Final     MONOCYTES                 03/05/2017 6.2  <12.0 % Final     EOSINOPHILS               03/05/2017 1.8  <8.0 % Final     BASOPHILS                 03/05/2017 1.1  <3.0 % Final     ABSOLUTE NEUTROPHILS      03/05/2017 5.4  1.7 - 7.0 thou/cu mm Final     ABSOLUTE LYMPHOCYTES      03/05/2017 2.6  0.9 - 2.9 thou/cu mm Final     ABSOLUTE MONOCYTES        03/05/2017 0.5  <0.9 thou/cu mm Final     ABSOLUTE EOSINOPHILS      03/05/2017 0.2  <0.5 thou/cu mm Final     ABSOLUTE BASOPHILS        03/05/2017 0.1  <0.3 thou/cu mm Final     COLOR                     03/05/2017 Yellow  Yellow Color Final     CLARITY                   03/05/2017 Clear  Clear Clarity Final     SPECIFIC GRAVITY,URINE    03/05/2017 1.010  1.010, 1.015, 1.020, 1.025                 Final     PH,URINE                  03/05/2017 6.0  6.0, 7.0, 8.0, 5.5, 6.5, 7.5, 8.5                 Final     UROBILINOGEN,QUALITATIVE  03/05/2017 Normal  Normal EU/dl Final     PROTEIN, URINE 03/05/2017 Negative  Negative mg/dL Final     GLUCOSE, URINE 03/05/2017 Negative  Negative mg/dL Final     KETONES,URINE             03/05/2017 Negative  Negative mg/dL Final     BILIRUBIN,URINE           03/05/2017 Negative  Negative                 Final     OCCULT BLOOD,URINE        03/05/2017 Negative  Negative                 Final     NITRITE                   03/05/2017 Negative  Negative                 Final     LEUKOCYTE ESTERASE        03/05/2017 Negative  Negative                 Final     THC METABOLITES,QUAL      03/05/2017 Not Detected  Not Detected Final     PCP,QUAL                  03/05/2017 Not Detected  Not Detected Final     COCAINE,QUAL              03/05/2017 Not Detected  Not Detected Final     METHAMPHETAMINE, QUALITATIVE 03/05/2017 Not Detected  Not Detected Final     OPIATES,QUAL              03/05/2017 Presumptive Positive-Unconfirmed Result* Not  Detected Final     AMPHETAMINE, QUALITATIVE 03/05/2017 Not Detected  Not Detected Final     BENZODIAZEPINES,QUAL      03/05/2017 Not Detected  Not Detected Final     TRICYCLICS,QUAL           03/05/2017 Not Detected  Not Detected Final     METHADONE, QUALITATIVE 03/05/2017 Not Detected  Not Detected Final     BARBITURATES,QUAL         03/05/2017 Not Detected  Not Detected Final     OXYCODONE, QUALITATIVE 03/05/2017 Presumptive Positive-Unconfirmed Result* Not Detected Final     BUPRENORPHINE, QUALITATIVE 03/05/2017 Not Detected  Not Detected Final     PROPOXYPHENE,QUAL 03/05/2017 Not Detected  Not Detected Final     TROPONIN I 03/05/2017 <0.030  <0.034 ng/mL Final   Admission on 03/01/2017, Discharged on 03/01/2017        Component  Date Value Ref Range Status     APTT 03/01/2017 30  26 - 39 sec Final     SODIUM 03/01/2017 139  133 - 143 mmol/L Final     POTASSIUM 03/01/2017 3.5  3.5 - 5.1 mmol/L Final     CHLORIDE 03/01/2017 107  98 - 107 mmol/L Final     CO2,TOTAL 03/01/2017 24  21 - 31 mmol/L Final     ANION GAP 03/01/2017 8  5 - 18                 Final     GLUCOSE 03/01/2017 85  70 - 105 mg/dL Final     CALCIUM 03/01/2017 9.4  8.6 - 10.3 mg/dL Final     BUN 03/01/2017 19  7 - 25 mg/dL Final     CREATININE 03/01/2017 1.04  0.70 - 1.30 mg/dL Final     BUN/CREAT RATIO           03/01/2017 18                  Final     GFR if  03/01/2017 >60  >60 ml/min/1.73m2 Final     GFR if not  03/01/2017 54* >60 ml/min/1.73m2 Final     ALBUMIN 03/01/2017 4.1  3.5 - 5.7 g/dL Final     PROTEIN,TOTAL 03/01/2017 6.8  6.4 - 8.9 g/dL Final     GLOBULIN                  03/01/2017 2.7  2.0 - 3.7 g/dL Final     A/G RATIO 03/01/2017 1.5  1.0 - 2.0                 Final     BILIRUBIN,TOTAL 03/01/2017 0.4  0.3 - 1.0 mg/dL Final     ALK PHOSPHATASE 03/01/2017 47  34 - 104 IU/L Final     ALT (SGPT) 03/01/2017 10  7 - 52 IU/L Final     AST (SGOT) 03/01/2017 17  13 - 39 IU/L Final     INR 03/01/2017 1.0  <1.3  Final     PROTIME 03/01/2017 10.6* 11.9 - 15.2 sec Final     TROPONIN I 03/01/2017 <0.030  <0.034 ng/mL Final     WHITE BLOOD COUNT         03/01/2017 6.5  4.5 - 11.0 thou/cu mm Final     RED BLOOD COUNT           03/01/2017 3.75* 4.00 - 5.20 mil/cu mm Final     HEMOGLOBIN                03/01/2017 11.2* 12.0 - 16.0 g/dL Final     HEMATOCRIT                03/01/2017 33.5  33.0 - 51.0 % Final     MCV                       03/01/2017 89  80 - 100 fL Final     MCH                       03/01/2017 29.9  26.0 - 34.0 pg Final     MCHC                      03/01/2017 33.6  32.0 - 36.0 g/dL Final     RDW                       03/01/2017 13.8  11.5 - 15.5 % Final     PLATELET COUNT            03/01/2017 240  140 - 440 thou/cu mm Final     MPV                       03/01/2017 6.2* 6.5 - 11.0 fL Final     NEUTROPHILS               03/01/2017 49.3  42.0 - 72.0 % Final     LYMPHOCYTES               03/01/2017 39.2  20.0 - 44.0 % Final     MONOCYTES                 03/01/2017 7.0  <12.0 % Final     EOSINOPHILS               03/01/2017 2.8  <8.0 % Final     BASOPHILS                 03/01/2017 1.6  <3.0 % Final     ABSOLUTE NEUTROPHILS      03/01/2017 3.2  1.7 - 7.0 thou/cu mm Final     ABSOLUTE LYMPHOCYTES      03/01/2017 2.6  0.9 - 2.9 thou/cu mm Final     ABSOLUTE MONOCYTES        03/01/2017 0.5  <0.9 thou/cu mm Final     ABSOLUTE EOSINOPHILS      03/01/2017 0.2  <0.5 thou/cu mm Final     ABSOLUTE BASOPHILS        03/01/2017 0.1  <0.3 thou/cu mm Final     TROPONIN I 03/01/2017 <0.030  <0.034 ng/mL Final         ASSESSMENT:  Mrs. Day is a 62 -year-old female who is being seen by cardiology as she was previously seen by Dr. Anguiano for coronary artery disease with reported history of 17 stents placement and 3 heart attacks. In addition, she has a history of obstructive sleep apnea, anxiety, chronic ischemic heart disease, COPD, history of a gastric ulcer, ongoing tobacco abuse, hyperlipidemia, hypertension, history of CABG  2/12/2003×3, and history of left subclavian steal involving the LIMA.      PLAN:  1. Presence of stent in coronary artery in patient with coronary artery disease.  As noted, she has 2 different pains. One appears to be musculoskeletal and constant which is reproducible with palpation and deep breathing.She has an additional pain that is exertional related. She describes it as a pressure to her chest lasting up to a few hours. This pain will radiate to her jaw. She'll become sweaty, nauseous, and dizzy. She  will take one nitroglycerin without significant relief. She does have risk factors of hypertension, hyperlipidemia, and ongoing tobacco abuse. She has significant coronary artery disease. It is recommended she had an angiogram at the [facility]. However, she is uncertain if her insurance covers the [facility]. She was thinking of going to [facility] as she thinks her insurance covers there. She will check with her insurance to see which one is covered and get back to us to schedule angiogram.    2. S/P CABG x 3.  This was completed on 2/12/2003    3. Essential hypertension.  Her pressure is uncontrolled. Her blood pressure today is 154/82. She says her blood pressure at home ranges from 150s to 160s. She's currently on Norvasc 5 mg daily, lisinopril 40 mg daily, and metoprolol 50 mg. It was suggested she increase her Norvasc from 5 mg at 10 mg. She is to continue to check her blood pressure at home.      - amlodipine (NORVASC) 10 mg tablet; Take 1 tablet by mouth once daily.  Dispense: 30 tablet; Refill: 11    4. Left subclavian artery stenosis causing coronary steal through LIMA graft.  She will have an angiogram as noted above. She reports having similar symptoms now in which she feels are comparable to when she had stenting to her left subclavian.      - CVL CORONARY ANGIOGRAM POSS PCI; Standing    5. Mixed hyperlipidemia.  She's currently on pravastatin 40 mg. She is uncertain if she is  taking this. She was to go home and check and see if she is taking this medication. If she is not, it is recommended she be on it or on another statin. If she is on 40 mg of pravastatin, the dosage should be increased to 80 mg. She has had problems with statin intolerance in the past.    6. Chronic obstructive pulmonary disease, unspecified COPD type (HC).  To be cared for by her primary.    7. CKD (chronic kidney disease) stage 3, GFR 30-59 ml/min.  stable    8. Tobacco abuse.  She was encouraged to quit smoking. She was given information on how to quit smoking.    - CVL CORONARY ANGIOGRAM POSS PCI; Standing    9. Chronic anxiety.  He has significant anxiety at baseline. This will be cared for by her primary.    10. Severe episode of recurrent major depressive disorder, without psychotic features (HC).  To be cared for by her primary care physician.    11. Chest pain, unspecified type.  She has 2 types. One seems to be her anginal equivalent. She will have an angiogram as noted.    - CVL CORONARY ANGIOGRAM POSS PCI; Standing        Thank you for allowing me to participate in the care of your patient. Please do not hesitate to contact me if you have any questions.     Paul Gramajo, DO

## 2017-12-28 NOTE — TELEPHONE ENCOUNTER
Patient Information     Patient Name MRN Sex Ankita Bergeron [id number] Female 1955      Telephone Encounter by Татьяна Bonilla at 2017 10:27 AM     Author:  Татьяна Bonilla Service:  (none) Author Type:  (none)     Filed:  2017 10:32 AM Encounter Date:  2017 Status:  Signed     :  Татьяна Bonilla            Patient called to apologize  For her actions and words the other day towards Kathleen.  Message given to Kathleen.   Татьяна Bonilla LPN ....................  2017   10:31 AM

## 2017-12-28 NOTE — PROGRESS NOTES
Patient Information     Patient Name MRN Sex Ankita Bergeron [id number] Female 1955      Progress Notes by Tatiana Cox at 2017 12:20 PM     Author:  Tatiana Cox Service:  (none) Author Type:  (none)     Filed:  2017 12:20 PM Date of Service:  2017 12:20 PM Status:  Signed     :  Tatiana Cox            Falls Risk Criteria:    Age 65 and older or under age 4        Sensory deficits    Poor vision    Use of ambulatory aides    Impaired judgment    Unable to walk independently    Meets High Risk criteria for falls:  Yes                 1.  Do you have dizziness or vertigo?    no                    2.  Do you need help standing or walking?   no                 3.  Have you fallen within the last 6 months?    yes           4.  Has the patient been fasting?      no       If any risks are marked Yes, the following interventions are utilized:    Do not leave patient unattended     Assist patient in the dressing room and bathroom    Have ambulatory aides available throughout procedure    Involve patient s family if available

## 2017-12-28 NOTE — TELEPHONE ENCOUNTER
Patient Information     Patient Name MRN Sex Ankita Bergeron [id number] Female 1955      Telephone Encounter by Paul Gramajo DO at 8/10/2017  6:43 PM     Author:  Paul Gramajo DO Service:  (none) Author Type:  PHYS- Osteopathic     Filed:  8/10/2017  6:44 PM Encounter Date:  8/10/2017 Status:  Signed     :  Paul Gramajo DO (PHYS- Osteopathic)            Ok, thanks! Can you please forward this information to my nurse, Kathleen to get wet up? Thanks!    Lenny Gramajo

## 2017-12-28 NOTE — TELEPHONE ENCOUNTER
Patient Information     Patient Name MRN Ankita Lee [id number] Female 1955      Telephone Encounter by Angie Shane at 2017 11:50 AM     Author:  nAgie Shane Service:  (none) Author Type:  (none)     Filed:  2017 11:58 AM Encounter Date:  2017 Status:  Signed     :  Angie Shane            Patient stopped in and expressed her frustration when she was told by [facility] that [facility] had cancelled her Simvastatin and Rosuvastatin. Patient is requesting a call back to discuss if this is really the case. She stated that she will be stopping in Monday to speak with his nurse.

## 2017-12-28 NOTE — PROGRESS NOTES
Patient Information     Patient Name MRN Sex Ankita Bergeron [id number] Female 1955      Progress Notes by Paul Gramajo DO at 2017  9:45 AM     Author:  Paul Gramajo DO Service:  (none) Author Type:  PHYS- Osteopathic     Filed:  2017  1:58 PM Encounter Date:  2017 Status:  Signed     :  Paul Gramajo DO (PHYS- Osteopathic)            CARDIOLOGY PROGRESS NOTE   SUBJECTIVE:   Mrs. Day is a 62 -year-old female who is being seen by cardiology as she was previously seen by Dr. Anguiano for coronary artery disease with reported history of 17 stents placement and 3 heart attacks. In addition, she has a history of obstructive sleep apnea, anxiety, chronic ischemic heart disease, COPD, history of a gastric ulcer, ongoing tobacco abuse, hyperlipidemia, hypertension, history of CABG 2003×3, and history of left subclavian steal involving the LIMA. She is here largely to follow-up on an angiogram was completed on 17.    Her angiogram showed 3 vessel disease involving RCA, LAD, and the circumflex. It was noted that her that her RAFI and saphenous vein graft was 100% occluded but the LIMA to LAD was open. There was no identifiable stentable vessel.    She continues to complain of ongoing symptoms in which she originally presented on 8/10/17. She continues described musculoskeletal pain to her sternal region. However, she does describe shortness of breath, intrascapular pain, and jaw pain. She's been under considerable moderate anxiety and has been seeing a therapist with improvement. Her symptoms seemed to improve with ibuprofen. She has stop taking nitroglycerin secondary to headaches. She is taking her medications as prescribed. She has been on Crestor and Lipitor previously but has not been able tolerate this secondary to muscle aches. She has since quit smoking 17. She continues with memory loss but had a CT scan was normal. She was told through the  "[facility] that she may have \"shrinkage\" of her intracerebral arteries which may be causing memory decline.    She has a significant cardiac history in which she reports having had 17 stents placed previously and 3 myocardial infarctions. She had CABG in 2003 with triple bypass with LIMA to LAD, RAFI to RCA, and saphenous vein graft to the circumflex. She's also been treated for left subclavian steal syndrome which involved the LIMA.     She is here complaining of chest discomfort. She has been having chest discomfort for the last year. She describes 2 different types of chest discomfort. One appears to be musculoskeletal/chest wall in nature. This discomfort is reproducible with deep breathing and palpation. She has a second chest discomfort which seems to be exacerbated by activity. She describes walking up a flight of stairs from her basement to do laundry and she will get a tightness in her chest lasting up to a couple hours. This will make her stop and rest and meditate. She will take 1 sublingual nitroglycerin but this does not relieve her symptoms. With it, she becomes sweaty, nauseous, and dizzy. She also gets diaphoretic at random times. Her  has noticed her being wet/sweaty and has asked her if she just took a shower because she looked so wet. She previously had these symptoms when she had her left subclavian artery stented. She has a long history of smoking described as considerable tobacco usage. She had quit up until the spring of 2017. She currently smokes a third of a pack a day. She has a difficult time recalling her symptoms that she has had in the past which resulted in stenting but feels that her symptoms now are similar to the symptoms she's had been.      Her most recent catheterization was on 3/30/13. Her left main was okay, her LAD was 85-90% stenosed just before the LIMA insertion, the circumflex was diffusely diseased at 60-70% with what sounds like an ostial lesion at " 70-75%, the second obtuse marginal 70% stenosis, the native RCA vessel was patent with open stents but the RAFI was 100% occluded, the LIMA was okay, but the saphenous vein graft to the OM was 100% occluded. The left subclavian artery had a 75-80% stenosis and was stented.     She had a lipid panel on 4/24/17 which showed total cholesterol of 225, triglycerides 196, HDL of 87, and a LDL of 99.     She had a Cardiolite stress test performed on 11/17/16 that showed a tiny fixed defect to the apex but there is no evidence for reversible ischemia. The wall motion was normal. Ejection fraction was 62%.     She is currently on Norvasc 5 mg daily, aspirin 81 mg daily, Plavix 75 mg daily, lisinopril 40 mg daily, sublingual nitroglycerin as needed chest pain, pravastatin 40 mg at bedtime and metoprolol 50 mg.       OBJECTIVE:  /64  Pulse 64  Resp 20  GENERAL: Comfortable, no distress    RESPIRATORY: Clear to auscultation bilaterally   CARDIOVASCULAR: Heart: Regular rate and rhythm.  PMI non-displaced.  Normal S1 and S2.  No gallops or other extra sounds. No murmurs.    No other pertinent exam.  ADDITIONAL COMMENTS:  I reviewed the patient's medications (see below):    I reviewed the patient's pertinent clinical laboratory studies (see below):    I reviewed the patient's pertinent imaging studies:      ASSESSMENT:   1. Coronary artery disease with history of CABG ×3 here for follow-up on a cardiac catheterization completed on 9/11/17.  2. Obstructive sleep apnea.  3. History of tobacco abuse quitting 8/23/17.  4. Left-sided subclavian steal syndrome.  5. Claudication.  6. Hypertension.  7. Hyperlipidemia.  8. COPD.  9. Anxiety.  10. Chest wall pain.      PLAN:   1. She is here to follow-up on her catheter which was completed on 9/11/17. There was no identifiable lesion in which required a stent.  2. She will have a repeat lipid panel that is fasting since starting pravastatin. She has not been able tolerate  pravastatin 80 mg due to muscle aches and is currently on 40 mg daily.  3. She will be seen in 1 year follow-up.       Paul DOWNEYUNA Gramajo  Recent Labs       09/12/17   1710   SODIUM  140   POTASSIUM  3.2 L   BUN  22   CREATININE  1.01   GLUCOSE  85       Recent Labs       09/12/17   1710   HGB  10.4 L   WBC  6.7   PLT  206     No results for input(s): GLUCOSEMETER in the last 720 hours.    Current Outpatient Prescriptions       Medication  Sig Dispense Refill     amLODIPine (NORVASC) 10 mg tablet Take 1 tablet by mouth once daily. 30 tablet 11     aspirin (ECOTRIN) 81 mg enteric coated tablet Take 81 mg by mouth once daily with a meal.       biotin 5,000 mcg TbDi Take 1 tablet by mouth.  0     busPIRone (BUSPAR) 10 mg tablet TAKE 1 TABLET TWICE A  tablet 3     cholecalciferol (VITAMIN D3) 5,000 unit capsule Take 1 capsule by mouth once daily.       clonazePAM (KLONOPIN) 1 mg tablet Take 1 tablet by mouth 4 times daily. Becky Olmstead, TRAY       clopidogrel (PLAVIX) 75 mg tablet TAKE 1 TABLET DAILY 90 tablet 5     cyclobenzaprine (FLEXERIL) 10 mg tablet TAKE 1 TABLET TWICE A DAY IF NEEDED FOR MUSCLE SPASM 60 tablet 0     docusate (STOOL SOFTENER) 50 mg capsule Take 1 capsule by mouth once daily.  0     fluticasone (50 mcg per actuation) nasal solution (FLONASE) Inhale 2 sprays into both nostrils once daily as needed for allergic rhinitis and nasal congestion.       glucosam-chondroitin-diet cb25 116-100 mg cap Take  by mouth.  0     lisinopril (PRINIVIL; ZESTRIL) 40 mg tablet Take 1 tablet by mouth once daily. 90 tablet 4     metoprolol tartrate (LOPRESSOR) 50 mg tablet Take 1 tablet by mouth 2 times daily. 180 tablet 4     multivitamins-minerals-lutein (MULTIVITAMIN 50 PLUS) tab tablet Take 1 tablet by mouth once daily.  0     nitroglycerin (NITROSTAT) 0.3 mg sublingual tablet Place 1 tablet under the tongue every 5 minutes if needed for Chest Pain. 1 Bottle 2     ondansetron (ZOFRAN, AS HYDROCHLORIDE,) 4 mg  tablet Take 1 tablet by mouth every 6 hours if needed for Nausea/Vomiting. 40 tablet 11     oxyCODONE-acetaminophen,  mg, (PERCOCET)  mg per tablet Take 2 tablets by mouth 3 times daily  Earliest Fill Date: 9/17/17 180 tablet 0     pantoprazole (PROTONIX) 40 mg delayed-release tablet TAKE 1 TABLET TWICE A  tablet 4     Potassium Gluconate 595 mg (99 mg) tablet Take  by mouth.  0     pravastatin (PRAVACHOL) 40 mg tablet Take 1 tablet by mouth at bedtime. 90 tablet 2     Saccharomyces boulardii (FLORASTOR) 250 mg capsule Take 1 capsule by mouth 2 times daily.  0     vortioxetine (TRINTELLIX) 5 mg tab Take  by mouth.  0     No current facility-administered medications for this visit.      Medications have been reviewed by me and are current to the best of my knowledge and ability.

## 2017-12-28 NOTE — PROGRESS NOTES
"Patient Information     Patient Name MRN Sex Ankita Bergeron [id number] Female 1955      Progress Notes by Ramirez Cano MD at 2017 10:00 AM     Author:  Ramirez Cano MD Service:  (none) Author Type:  Physician     Filed:  2017  2:05 PM Encounter Date:  2017 Status:  Signed     :  Ramirez Cano MD (Physician)            SUBJECTIVE:  62 y.o. female presents for dysuria, back pain, shoulder pain.     Notes burning urination at the end of voiding, \"bladder pain\" and some low back pain for over 3 weeks.  Similar to past UTI. Had UTI with sepsis in 2016, transferred to [facility].   No nausea or confusion like when she was more severely ill. Back pain more in low lumbar region. Denies diffuse myalgias.  After treatment of sepsis,, developed c diff in 2016.. Was on budesonide at that time for possible lymphocytic colitis, but is no longer taking.    R shoulder arthroscopy with Dr Wilkinson on May 12. Had labral tear, rotator cuff tear and some subacromial decompression performed.   Injured shoulder a couple weeks ago trying to swat at a fly. She took more oxycodone than prescribed. Took 8 pills daily because she had to take some pills at night with shoulder injury.  Saw Dr Wilkinson today. He examined shoulder and feels that patient did not injure shoulder further. Recommends Flexeril at night. She can resume typical dosing of oxycodone for chronic pain issues of chronic low back pain and chest wall pain.    BP elevated. Was running low and so amlodipine reduced. She attributes elevation to pain. Denies CP.    REVIEW OF SYSTEMS:    Constitutional: Negative  Respiratory: Negative  Cardiovascular: Negative      Current Outpatient Prescriptions       Medication  Sig Dispense Refill     amLODIPine (NORVASC) 5 mg tablet Take 0.5 tablets by mouth once daily.       aspirin (ECOTRIN) 81 mg enteric coated tablet Take 81 mg by mouth once daily with a meal.       busPIRone " (BUSPAR) 10 mg tablet TAKE 1 TABLET TWICE A  tablet 3     cholecalciferol (VITAMIN D3) 5,000 unit capsule Take 1 capsule by mouth once daily.       clonazePAM (KLONOPIN) 1 mg tablet Take 1 tablet by mouth 4 times daily. Becky Olmstead CNP       clopidogrel (PLAVIX) 75 mg tablet TAKE 1 TABLET DAILY 90 tablet 5     cyclobenzaprine (FLEXERIL) 10 mg tablet TAKE 1 TABLET TWICE A DAY IF NEEDED FOR MUSCLE SPASM 60 tablet 0     docusate (STOOL SOFTENER) 50 mg capsule Take 1 capsule by mouth once daily.  0     DULoxetine (CYMBALTA) 60 mg Delayed-release capsule TAKE 1 CAPSULE TWICE A  capsule 3     fluticasone (50 mcg per actuation) nasal solution (FLONASE) Inhale 2 sprays into both nostrils once daily as needed for allergic rhinitis and nasal congestion.       lisinopril (PRINIVIL; ZESTRIL) 40 mg tablet TAKE 1 TABLET DAILY 90 tablet 4     metoprolol tartrate (LOPRESSOR) 50 mg tablet Take 1 tablet by mouth 2 times daily. 180 tablet 4     Nitroglycerin (NITROMIST) 400 mcg/spray spra PLACE 1 SPRAY UNDER THE TONGUE EVERY 5 MINUTES IF NEEDED FOR CHEST PAIN 1 g 11     nitroglycerin (NITROSTAT) 0.3 mg sublingual tablet Place 1 tablet under the tongue every 5 minutes if needed for Chest Pain. 1 Bottle 2     ondansetron (ZOFRAN, AS HYDROCHLORIDE,) 4 mg tablet Take 1 tablet by mouth every 6 hours if needed for Nausea/Vomiting. 40 tablet 11     oxyCODONE-acetaminophen,  mg, (PERCOCET)  mg per tablet Take 2 tablets by mouth 3 times daily 180 tablet 0     pantoprazole (PROTONIX) 40 mg delayed-release tablet TAKE 1 TABLET TWICE A  tablet 3     pravastatin (PRAVACHOL) 40 mg tablet Take 1 tablet by mouth at bedtime. 30 tablet 11     Saccharomyces boulardii (FLORASTOR) 250 mg capsule Take 1 capsule by mouth 2 times daily.  0     Allergies as of 06/20/2017 - Mikel as Reviewed 06/02/2017      Allergen  Reaction Noted     Atorvastatin Myalgia 04/30/2013     Tape [adhesive] Rash and Erythema 04/02/2013      Tiotropium bromide Rash 09/09/2014     Ezetimibe Myalgia 10/09/2009     Latex Rash 08/16/2014     Niacin Flushing 10/09/2009     Other [unlisted allergen (include detail in comments)] Rash and Itching 08/14/2013       OBJECTIVE:  /90  Pulse 56  Temp 98.3  F (36.8  C) (Tympanic)   Wt 74.8 kg (165 lb)  Breastfeeding? No  BMI 26.52 kg/m2    General Appearance: Alert. No acute distress  Chest/Respiratory Exam: Clear to auscultation bilaterally  Cardiovascular Exam: Regular rate and rhythm. S1, S2, no murmur, gallop, or rubs.  Musculoskeletal: Lower lumbar pain. No CVA tenderness  Abdomen: No epigastric tenderness. Mild lower abdominal tenderness.  Extremities: 2+ pedal pulses.  No lower extremity edema.      ASSESSMENT/PLAN:    ICD-10-CM   1. Acute UTI N39.0   2. Dysuria R30.0   3. Chronic bilateral low back pain without sciatica M54.5     G89.29   4. Migraine syndrome G43.909   5. Pain medication agreement signed 2/10/17 Z02.89     UA with positive nitrite. Previously culture with similar UA grew out Klebsiella. Had E coli resistant to Bactrim previously. Treat with cefuroxime BID x 7 d given duration of symptoms.     Watch for diarrhea, due to hx of c diff.     Agree to refill chronic oxycodone dose of 60 mg daily = 90 morphine milligram equivalents. Reviewed . Last fill 5/23. Agree to fill 2 d early today. Last UTox appropriate 3/5/17.    BP markedly elevated today. Previously that led to some anginal symptoms that improved with amlodipine. She believes pain is causing BP elevation, hopes getting home and taking oxycodone will help. If any concerning anginal symptoms, then needs to return for medical attention.  No change to amlodipine today given low BP recently on higher dose. Has cardiology appt next week.     F/U 1 month

## 2017-12-28 NOTE — TELEPHONE ENCOUNTER
Patient Information     Patient Name MRN Ankita Lee [id number] Female 1955      Telephone Encounter by Radha Urena at 2017  9:43 AM     Author:  Radha Urena Service:  (none) Author Type:  (none)     Filed:  2017  9:48 AM Encounter Date:  2017 Status:  Signed     :  Radha Urena            Patient called with concerns regarding the testing she has scheduled in [place] on 2017.  She has a stress test scheduled but is worried about getting it done.  She said that both AMINABB and PBI did not think any further testing needed to be done but she wants to know what's going on with her.  CARMEN discussed with her having an angiogram done and she would like to move forward with that at the [facility].  [place] refused to do it.  She also mentioned having an artery US that can be done here verses [place].  She is still symptomatic and would like to discuss what should be done next.  DWBB and SRINIVASA are gone until next week.  Patient is aware of this and is okay to wait until the  when SRINIVASA is back in clinic to discuss.  Radha Urena ....................  2017   9:48 AM

## 2017-12-28 NOTE — PATIENT INSTRUCTIONS
Patient Information     Patient Name MRN Sex Ankita Bergeron [id number] Female 1955      Patient Instructions by Ramirez Cano MD at 10/24/2017  8:15 AM     Author:  Ramirez Cano MD Service:  (none) Author Type:  Physician     Filed:  10/24/2017  9:31 AM Encounter Date:  10/24/2017 Status:  Signed     :  Ramirez Cano MD (Physician)            Reduce the Trentellix to 10 mg daily  Start sucralfate 10 mg four times daily  Avoid any anti-inflammatories, alcohol, spicy foods. Stay on aspirin  Use the dicyclomine sparingly for colon spasm  Zofran for nausea

## 2017-12-28 NOTE — TELEPHONE ENCOUNTER
Patient Information     Patient Name MRN Sex Ankita Bergeron [id number] Female 1955      Telephone Encounter by Cruzito Robbins RN at 2017 10:55 AM     Author:  Cruzito Robbins RN Service:  (none) Author Type:  NURS- Registered Nurse     Filed:  2017 10:56 AM Encounter Date:  2017 Status:  Signed     :  Cruzito Robbins RN (NURS- Registered Nurse)            After documentation as noted below, and prior to filling rx as noted in previous documentation, writer notes that PCP has filled rx as requested. Writer will close encounter at this time.    Prescribing Provider: Ramirez Guillen MD                Order Date: 2017  Ordered by: RAMIREZ GUILLEN  Medication:pantoprazole (PROTONIX) 40 mg delayed-release tablet  Prescription #:[id number]    Qty:180 tablet  Ref:4  Start:2017   End:              Route:Oral                  TOY:No   Class:eRx    Sig:TAKE 1 TABLET TWICE A DAY    Pharmacy:[facility] - [address]      Cruzito Robbins RN ....................  2017   10:55 AM

## 2017-12-28 NOTE — TELEPHONE ENCOUNTER
Patient Information     Patient Name MRN Sex Ankita Bergeron [id number] Female 1955      Telephone Encounter by Cruzito Robbins RN at 2017 11:22 AM     Author:  Cruzito Robbins RN Service:  (none) Author Type:  NURS- Registered Nurse     Filed:  2017 11:29 AM Encounter Date:  2017 Status:  Signed     :  Cruzito Robbins RN (NURS- Registered Nurse)            This is a Refill request from: [facility]  Name of Medication: Zofran  Quantity requested: 40 tabs with 11 refills  Last fill date: 16 as per rx request  Due for refill: Yes, as per chart review and per rx request  Last visit with BLAISE GUILLEN was on: 2017 in [facility]  PCP:  Blaise Guillen MD  Controlled Substance Agreement:  N/A to this rx  Diagnosis r/t this medication request: Nausea    Chart review shows that patient was most recently seen on 17 by PCP. Diagnosis associated with zofran is nausea, but patient also with diagnosis of migraines and was seen for that diagnosis on 17. Writer will route rx request to PCP for his consideration/approval as requested rx is not on rx refill protocol.      Unable to complete prescription refill per RN Medication Refill Policy.................... Cruzito Robbins RN ....................  2017   11:23 AM

## 2017-12-28 NOTE — TELEPHONE ENCOUNTER
Patient Information     Patient Name MRN Sex Ankita Bergeron [id number] Female 1955      Telephone Encounter by Kathleen Lima RN at 2017  9:35 AM     Author:  Kathleen Lima RN Service:  (none) Author Type:  NURS- Registered Nurse     Filed:  2017  9:37 AM Encounter Date:  8/10/2017 Status:  Signed     :  Kathleen Lima RN (NURS- Registered Nurse)            Called pt and let her know the order for her angio has been placed.  Everything will be faxed to [facility] and they will call her to set up that appointment.  Encouraged her to call back if she hasn't heard from them within a week.  She verbalized understanding.    Kathleen Lima RN 2017 9:37 AM

## 2017-12-28 NOTE — PATIENT INSTRUCTIONS
Patient Information     Patient Name MRN Sex Ankita Bergeron [id number] Female 1955      Patient Instructions by Kathleen Lima RN at 8/10/2017 12:45 PM     Author:  Kathleen Lima RN Service:  (none) Author Type:  NURS- Registered Nurse     Filed:  8/10/2017  1:55 PM Encounter Date:  8/10/2017 Status:  Signed     :  Kathleen Lima RN (NURS- Registered Nurse)            1.  Increase Norvasc from 5 mg to 10 mg    2.  Stop smoking!    3.  [facility] [place] [facility] [place].  Call us after speaking with insurance    4.  Please follow-up with cardiology in 1 month

## 2017-12-28 NOTE — PROGRESS NOTES
Patient Information     Patient Name MRN Ankita Lee [id number] Female 1955      Progress Notes by Ramirez Cano MD at 2017 10:00 AM     Author:  Ramirez Cano MD Service:  (none) Author Type:  Physician     Filed:  2017  3:51 PM Encounter Date:  2017 Status:  Signed     :  Ramirez Cano MD (Physician)            SUBJECTIVE:  62 y.o. female presents for follow up on chronic chest wall and back pain, CAD, breast lump.    Breast lump required further imaging and appears benign, but needs follow up in 3 months. Whole breast hurts. She does not appear concerned.    BP is 150-160s/80 at home. This is despite increasing amlodipine to 10 mg daily. Is happy with this BP. Thinks stress may be part of the problem. I had to spend some time convincing her that this elevated BP can cause another heart attack. She had anginal symptoms earlier this year and was transferred to [facility] for cardiology care. Amlodipine was started at that time and angiogram was avoided.    Thinks she had a heart attack last week, but did not want to drive in the hospital because her  just had ortho surgery. She just took nitro and rested and the pain resolved.  Does not sound like a heart attack since previous episodes have caused significant hypotension and fatigue.  Resumed smoking and then she quit smoking last week after seeing Dr Gramajo.    Tapered off Cymbalta. Feels better physically off Cymbalta, but worse mentally. Is going to start a new medication through Becky Olmstead NP.    Remains on oxycodone 6 per day for chronic pain issues. Brought in 16 leftover pills which will get her until refill due in 2 d.    REVIEW OF SYSTEMS:    Constitutional: Negative  Respiratory: Negative  Cardiovascular: See above    Current Outpatient Prescriptions       Medication  Sig Dispense Refill     amLODIPine (NORVASC) 10 mg tablet Take 1 tablet by mouth once daily. 30 tablet 11     aspirin (ECOTRIN)  81 mg enteric coated tablet Take 81 mg by mouth once daily with a meal.       busPIRone (BUSPAR) 10 mg tablet TAKE 1 TABLET TWICE A  tablet 3     cefuroxime axetil (CEFTIN) 500 mg tablet Take 1 tablet by mouth 2 times daily. 14 tablet 0     cholecalciferol (VITAMIN D3) 5,000 unit capsule Take 1 capsule by mouth once daily.       clonazePAM (KLONOPIN) 1 mg tablet Take 1 tablet by mouth 4 times daily. Becky Olmstead CNP       clopidogrel (PLAVIX) 75 mg tablet TAKE 1 TABLET DAILY 90 tablet 5     cyclobenzaprine (FLEXERIL) 10 mg tablet TAKE 1 TABLET TWICE A DAY IF NEEDED FOR MUSCLE SPASM 60 tablet 0     docusate (STOOL SOFTENER) 50 mg capsule Take 1 capsule by mouth once daily.  0     fluticasone (50 mcg per actuation) nasal solution (FLONASE) Inhale 2 sprays into both nostrils once daily as needed for allergic rhinitis and nasal congestion.       lisinopril (PRINIVIL; ZESTRIL) 40 mg tablet TAKE 1 TABLET DAILY 90 tablet 4     metoprolol tartrate (LOPRESSOR) 50 mg tablet Take 1 tablet by mouth 2 times daily. 180 tablet 4     Nitroglycerin (NITROMIST) 400 mcg/spray spra PLACE 1 SPRAY UNDER THE TONGUE EVERY 5 MINUTES IF NEEDED FOR CHEST PAIN 1 g 11     nitroglycerin (NITROSTAT) 0.3 mg sublingual tablet Place 1 tablet under the tongue every 5 minutes if needed for Chest Pain. 1 Bottle 2     ondansetron (ZOFRAN, AS HYDROCHLORIDE,) 4 mg tablet Take 1 tablet by mouth every 6 hours if needed for Nausea/Vomiting. 40 tablet 11     oxyCODONE-acetaminophen,  mg, (PERCOCET)  mg per tablet Take 2 tablets by mouth 3 times daily  Earliest Fill Date: 7/19/17 180 tablet 0     pantoprazole (PROTONIX) 40 mg delayed-release tablet TAKE 1 TABLET TWICE A  tablet 3     pravastatin (PRAVACHOL) 40 mg tablet Take 1 tablet by mouth at bedtime. 90 tablet 2     Saccharomyces boulardii (FLORASTOR) 250 mg capsule Take 1 capsule by mouth 2 times daily.  0     Allergies as of 08/16/2017 - Mikel as Reviewed 08/16/2017      Allergen   "Reaction Noted     Atorvastatin Myalgia 04/30/2013     Tape [adhesive] Rash and Erythema 04/02/2013     Tiotropium bromide Rash 09/09/2014     Ezetimibe Myalgia 10/09/2009     Latex Rash 08/16/2014     Niacin Flushing 10/09/2009     Other [unlisted allergen (include detail in comments)] Rash and Itching 08/14/2013       OBJECTIVE:  /88  Pulse 80  Ht 1.68 m (5' 6.14\")  Wt 71.6 kg (157 lb 12.8 oz)  Breastfeeding? No  BMI 25.36 kg/m2    General Appearance: Alert. No acute distress  Psychiatric: Normal affect    ASSESSMENT/PLAN:    ICD-10-CM   1. Atherosclerosis of coronary artery bypass graft of native heart without angina pectoris I25.810   2. Essential hypertension I10   3. Chronic bilateral low back pain without sciatica M54.5     G89.29   4. Migraine syndrome G43.909   5. Pain medication agreement signed 2/10/17 Z02.89   6. HX OF PEPTIC ULCER DISEASE K27.9     Long discussion about my concerns over her BP. She required hospitalization for elevated BP causing anginal symptoms earlier this year. I do not think the recent chest pain this weekend was truly a heart attack and reassured about that. She is going to have an angiogram tomorrow as arranged by Dr Gramajo.    Rechecked BP as she rushed here and BP is close to goal. She desires no changes and is going to look into meditation or yoga.     Refilled pantoprazole to [facility] and then to mail order    Refilled oxycodone for today, still has 2 d and so is due 9/17 for refill. Using oxycodone 60 mg daily = 90 morphine milligram equivalents. Reviewed . Last UTox appropriate 3/5/17.     F/U 1 month   Greater than 50% of this 32 minute appointment spent on counseling.         "

## 2017-12-28 NOTE — TELEPHONE ENCOUNTER
Patient Information     Patient Name MRN Sex Ankita Bergeron [id number] Female 1955      Telephone Encounter by Kathleen Lima RN at 2017 10:02 AM     Author:  Kathleen Lima RN Service:  (none) Author Type:  NURS- Registered Nurse     Filed:  2017 10:42 AM Encounter Date:  2017 Status:  Signed     :  Kathleen Lima RN (NURS- Registered Nurse)            Angiogram scheduled for  at 0900.  Information given over the phone, questions answered, and informational packet mailed out to her after verifying address.  She asked about stopping her Plavix.  Informed her I didn't think she needed to stop this medication but would double check for her.  Informed her I would put that information in the packet I am mailing to her.  Pt encouraged to call back with any other concerns or questions.  Magda at the  of  was called and appointment confirmed along with confirmation of not needing to stop Plavix.    Kathleen Lima RN 2017 10:11 AM           yes

## 2017-12-28 NOTE — TELEPHONE ENCOUNTER
Patient Information     Patient Name MRN Sex Ankita Bergeron [id number] Female 1955      Telephone Encounter by Estela Marroquin at 2017 11:51 AM     Author:  Estela Marroquin Service:  (none) Author Type:  (none)     Filed:  2017 11:52 AM Encounter Date:  2017 Status:  Signed     :  Estela Marroquin            Please call patient to reschedule current appointment (9/15/17.) They would like work in for medication management prior to 9/15/17. Thanks.

## 2017-12-28 NOTE — PROGRESS NOTES
Patient Information     Patient Name MRN Sex Ankita Bergeron [id number] Female 1955      Progress Notes by Ramirez Cano MD at 11/15/2017 10:00 AM     Author:  Ramirez Cano MD Service:  (none) Author Type:  Physician     Filed:  2017  4:25 PM Encounter Date:  11/15/2017 Status:  Signed     :  Ramirez Cano MD (Physician)            SUBJECTIVE:  62 y.o. female presents for follow up on abdominal pain likely from NSAID induced gastritis, CAD, and chronic chest wall and back pain.    Feels better mentally, but not physically.    Hgb dropped from 12.8 to 10.4 and since improved 11.5. Noted darker stools, but was not concerned since it seemed different than when she had GI bleeding before. Was taking omeprazole OTC PRN as well as scheduled Protonix BID. Sucralfate seems to have help. She has a history of gastric ulcer and requires aspirin and Plavix for CAD.  Started iron pills.  Still has attacks of pain like a colon spasm, but less frequently.  Having EGD with Dr Bowman on .    Satisfied with current dosing of oxycodone. She wonders about using Lyrica again. Tried this in  and seemed to cause sedation, but also had urosepsis and c diff that summer. She thinks now with a lower opioid dose that she could tolerate, but was on 45 mg of oxycodone then, now 40 mg. Was up to 60 mg of oxycodone several months ago, reduced in Sept.    REVIEW OF SYSTEMS:    Constitutional: Negative  Respiratory: Negative  Cardiovascular: Negative  Psychiatric: Following with Becky Olmstead for anxiety    Past Medical History:     Diagnosis  Date     Acute coronary syndrome (HC) 06/15/2007    with PTCA and stenting of 90% osteo circumflex lesion and patent LAD graft, patent left main stent.      Acute MI, initial, chest pain with positive nz 3/30/2013     Anterior chest wall pain 10/13/2009    chronic      Back pain, chronic 2011     Carpal tunnel syndrome, bilateral      Central sleep apnea  10/14/2013     Chest pain 12/29/2014     Chronic anxiety      Chronic depression     Severe, hx of suicide attempt/hospitalization      Chronic ischemic heart disease, unspecified 06/27/2012     Chronic pain syndrome 12/01/2010    chest wall, back      Chronic right shoulder pain      Clostridium difficile colitis 8/19/2016     Colitis 06/15/2007    history of      COLITIS     Microcytic       Constipation 11/29/2011     COPD (chronic obstructive pulmonary disease) (HC) 06/15/2007    low DLCO, normal spirometry      Cor athrscl-uns vessel     -angio revealed 3 vessel dz  -4 stents placed; 2 overlapping stents placed in mLAD, 1 stent pRCA, 1 stent pCirc 1 s 9/02 -non-ST elevation MI 1/03  -angio revealed restenosis of Circ.    -PTCA and brachytherapy of pCirc -repeat angio 2/03 -no intervension -CABG x3 12/03 - Dr Sinclair  -LIMA-LAD, RAFI-RCA, SVG-OM -PCI 7/04 stent to L -CTangio 9/05   -patentent LIMA-LAD. RAFI-RCA occluded; RCA ostio/proximal stent widely patent. SVG-OM occluded; OM marginal branch is widely patent.  Non-STEMI--6/15/07--DELONTE to LCX 5/2008: Angiogram: No hemodynamically significant lesions that were not bypassed by the LIMA -9/20/12 PCI of ostial LAD, Left Main, ostial Cx.  Patent NABILA to LAD, occluded RAFI to RCA and SVG to OM        E. coli sepsis (HC) 7/14/16    [facility]      Gastric ulcer with hemorrhage 10/2003     Gastric ulcer, chronic 10/03/2011    hx of GI bleed (2003)      History of tobacco abuse      Hx of coronary angioplasty 09/12/2002    with triple stenting      Hx of coronary angioplasty 01/10/2003    repeat angioplasty      Hx of diseases of blood and blood-forming organs      HX OF PEPTIC ULCER DISEASE 6/15/2007     Hyperlipemia      Hypertension      Intermittent claudication (HC)      Knee pain 05/31/2011     Left subclavian artery stenosis causing coronary steal through LIMA graft 3/31/2013    S/p prox left SCA stent 4/1/2013       Lumbar disc disease      Migraine syndrome       Myalgia and myositis 10/14/2013     Neck pain, chronic      Nodular degeneration of cornea 09/26/2011     Normal cardiac stress test 10/2004    Cardiolite (2004, 2005, 2006 and 2011)      Normal stress echocardiogram 03/2010    dobutamine, negative      Opioid abuse     history of      Osteoarthritis      Pain medication agreement signed 05/22/2012 1/28/2014     Panic attack      Postsurgical aortocoronary bypass status 2/12/2003     Postsurgical percutaneous transluminal coronary angioplasty status      Vitamin D deficiency 5/6/2013      Past Surgical History:      Procedure  Laterality Date     ANGIOPLASTY  9/12/02    with triple stenting        APPENDECTOMY       COLONOSCOPY  8/8/16    normal, Dr Bowman       COLONOSCOPY SCREENING  2011    Dr Bowman benign polyps       COLONOSCOPY SCREENING  2011    benign polyps, Dr. Bowman       CORONARY ARTERY BYPASS GRAFT  12/13/02    Triple bypass, left internal mammary  to LAD, right internal mammary to right coronary artery, saphenous to obtuse marginal of the left circumflex.       EMBOLECTOMY  04/02/2013    brachial artery pseudoaneurysm after stenting       ENDOSCOPY GI  2003    Upper GI endoscopy.        ESOPHAGOGASTRODUODENOSCOPY  2011    EGD Dr Bowman with pyloric ulcer       ESOPHAGOGASTRODUODENOSCOPY  2011    pyloric ulcer, Dr. Bowman       ESOPHAGOGASTRODUODENOSCOPY  8/8/16    mild gastritis, Dr Bowman       FEMUR KNEE SURGERY      x3, right knee       FEMUR KNEE SURGERY  2000    left knee  ligament surgery       HYSTERECTOMY  age 21     KNEE ARTHROSCOPY      left       LAP CHOLECYSTECTOMY  2006     PTCA  1/10/2003     PTCA  09/20/2012    DELONTE in LAD and left main       PTCA  4/1/2013    L subclavian stenosis       SALPINGO-OOPHORECTOMY  age 27    Bilateral salpingo-oophorectomy       SHOULDER ARTHROSCOPY      right       TONSIL AND ADENOIDECTOMY  childhood     UGI ENDOSCOP  2003    Upper GI endoscopy.        UPPER ARM/ELBOW SURGERY  baby    birth malachi removed from right  arm          Current Outpatient Prescriptions       Medication  Sig Dispense Refill     amLODIPine (NORVASC) 10 mg tablet Take 1 tablet by mouth once daily. 90 tablet 4     aspirin (ECOTRIN) 81 mg enteric coated tablet Take 81 mg by mouth once daily with a meal.       busPIRone (BUSPAR) 10 mg tablet TAKE 1 TABLET TWICE A  tablet 3     cholecalciferol (VITAMIN D3) 5,000 unit capsule Take 1 capsule by mouth once daily.       clonazePAM (KLONOPIN) 1 mg tablet Take 1 tablet by mouth 4 times daily. Becky Olmstead CNP       clopidogrel (PLAVIX) 75 mg tablet TAKE 1 TABLET DAILY 90 tablet 5     cyclobenzaprine (FLEXERIL) 10 mg tablet TAKE 1 TABLET TWICE A DAY IF NEEDED FOR MUSCLE SPASM 60 tablet 0     dicyclomine (BENTYL) 10 mg capsule Take 1 capsule by mouth every 6 hours if needed for Other (Specify). 10 capsule 0     docusate (STOOL SOFTENER) 50 mg capsule Take 1 capsule by mouth once daily.  0     lisinopril (PRINIVIL; ZESTRIL) 40 mg tablet Take 1 tablet by mouth once daily. 90 tablet 4     metoprolol tartrate (LOPRESSOR) 50 mg tablet Take 1 tablet by mouth 2 times daily. 180 tablet 4     nitroglycerin (NITROSTAT) 0.3 mg sublingual tablet Place 1 tablet under the tongue every 5 minutes if needed for Chest Pain. 1 Bottle 2     ondansetron (ZOFRAN, AS HYDROCHLORIDE,) 4 mg tablet Take 1 tablet by mouth every 6 hours if needed for Nausea/Vomiting. 30 tablet 11     oxyCODONE-acetaminophen, 5-325 mg, (PERCOCET) 5-325 mg per tablet Take 2 tablets by mouth 4 times daily  Max acetaminophen dose: 4000mg in 24 hrs. 120 tablet 0     pantoprazole (PROTONIX) 40 mg delayed-release tablet TAKE 1 TABLET TWICE A  tablet 4     Potassium Gluconate 595 mg (99 mg) tablet Take  by mouth.  0     pravastatin (PRAVACHOL) 40 mg tablet Take 1 tablet by mouth at bedtime. 90 tablet 2     Saccharomyces boulardii (FLORASTOR) 250 mg capsule Take 1 capsule by mouth 2 times daily.  0     sucralfate (CARAFATE) 1 gram tablet TAKE 1 TABLET BY  MOUTH FOUR TIMES DAILY BEFORE MEALS AND AT BEDTIME  0     sucralfate (CARAFATE) 1 gram tablet Take 1 tablet by mouth 4 times daily before meals and at bedtime. 120 tablet 0     vortioxetine (TRINTELLIX) 10 mg tab Take  by mouth.  0     Allergies as of 11/15/2017 - Mikel as Reviewed 11/15/2017      Allergen  Reaction Noted     Atorvastatin Myalgia 04/30/2013     Tape [adhesive] Rash and Erythema 04/02/2013     Tiotropium bromide Rash 09/09/2014     Crestor [rosuvastatin] Myalgia 09/28/2017     Ezetimibe Myalgia 10/09/2009     Latex Rash 08/16/2014     Niacin Flushing 10/09/2009     Other [unlisted allergen (include detail in comments)] Rash and Itching 08/14/2013       OBJECTIVE:  /84  Pulse 60  Wt 73.9 kg (163 lb) Comment: per patient   BMI 26.31 kg/m2    General Appearance: Alert. No acute distress  Psychiatric: Normal affect    Results for orders placed or performed in visit on 11/15/17      CBC W PLT NO DIFF      Result  Value Ref Range    WHITE BLOOD COUNT         6.6 4.5 - 11.0 thou/cu mm    RED BLOOD COUNT           3.87 (L) 4.00 - 5.20 mil/cu mm    HEMOGLOBIN                11.0 (L) 12.0 - 16.0 g/dL    HEMATOCRIT                33.4 33.0 - 51.0 %    MCV                       86 80 - 100 fL    MCH                       28.4 26.0 - 34.0 pg    MCHC                      32.9 32.0 - 36.0 g/dL    RDW                       15.3 11.5 - 15.5 %    PLATELET COUNT            233 140 - 440 thou/cu mm    MPV                       9.8 6.5 - 11.0 fL   IRON PLUS IRON BINDING CAP      Result  Value Ref Range    IRON 166 50 - 212 ug/dL    UIBC (UNSATURATED)  346.12 mg/dL    IRON BINDING CAPACITY, CALCULATED  512.12 (H) 245.00 - 400.00 ug/dL    IRON, % SATURATION  32 20 - 55 %   BASIC METABOLIC PANEL      Result  Value Ref Range    SODIUM 140 133 - 143 mmol/L    POTASSIUM 3.9 3.5 - 5.1 mmol/L    CHLORIDE 107 98 - 107 mmol/L    CO2,TOTAL 22 21 - 31 mmol/L    ANION GAP 11 5 - 18                    GLUCOSE 85 70 - 105 mg/dL     CALCIUM 9.4 8.6 - 10.3 mg/dL    BUN 25 7 - 25 mg/dL    CREATININE 1.00 0.70 - 1.30 mg/dL    BUN/CREAT RATIO           25                    GFR if African American >60 >60 ml/min/1.73m2    GFR if not  56 (L) >60 ml/min/1.73m2   FOLIC ACID      Result  Value Ref Range    FOLIC ACID >24.8 >=5.2 ng/mL   VITAMIN B12      Result  Value Ref Range    VITAMIN B12 300 180 - 914 pg/mL     *Note: Due to a large number of results and/or encounters for the requested time period, some results have not been displayed. A complete set of results can be found in Results Review.         ASSESSMENT/PLAN:    ICD-10-CM   1. Iron deficiency anemia, unspecified iron deficiency anemia type D50.9   2. Chronic bilateral low back pain without sciatica M54.5     G89.29   3. Migraine syndrome G43.909   4. Pain medication agreement signed 2/10/17 Z02.89   5. Abdominal pain, diffuse R10.84   6. CKD (chronic kidney disease) stage 2, GFR 60-89 ml/min N18.2     Hgb dropped despite treatment of presumed gastritis and despite adequate folate, B12, and iron stores. Will get peripheral smear and retic next draw, consider sending to heme/onc.    Most likely abdominal pain from gastritis due to aspirin and Plavix use. Requires both due to CAD. Continue on BID PPI and sucralfate. Should have EGD to assess for ulcer as this will determine treatments and ability to remain on dual antiplatelet therapy. This is scheduled with Dr Bowman. Refilled Bentyl for sparing use.      Refilled oxycodone for a month, 5 mg tabs at 8 per day = #240. Needs new controlled substance agreement. UDM appropriate 10/31/17.  She would like to try Lyrica again. Cautioned about sedation that seemed to ensue. Try just 25 mg QHS.    F/U in a few weeks - after EGD

## 2017-12-28 NOTE — TELEPHONE ENCOUNTER
Patient Information     Patient Name MRN Sex Ankita Bergeron [id number] Female 1955      Telephone Encounter by Ninoska Richards RN at 7/3/2017 12:02 PM     Author:  Ninoska Richards RN Service:  (none) Author Type:  NURS- Registered Nurse     Filed:  7/3/2017 12:05 PM Encounter Date:  7/3/2017 Status:  Signed     :  Ninoska Richards RN (NURS- Registered Nurse)            Statins    Office visit in the past 12 months.    Last visit with BLAISE GUILLEN was on: 2017 in [employer] [facility]  Next visit with BLAISE GUILLEN is on: 2017 in [place] [facility]  Next visit with Family Practice is on: 2017 in [facility]    Lab testing requirements:  Lipids annually.  Repeat lipids 6-8 weeks after dosage or drug change.    Last Lipids:  Chol: 225    2017  T    2017  HDL:   87    2017  LDL:  99    2017  LDL DIRECT:  No results found in past 5 years    .    Concommitant use of fibrates and statins-If it is an addition to the medication list, review note and/or discuss with provider.  If already on medication list, refill.    Max refills 12 months from last office visit.        Rx was sent to [facility] on 17 for #30 with 11 refills. Called patient and she states using [facility] now but initial RX to [facility] to make sure patient could tolerate dose. [facility] per her request. Prescription refilled per RN Medication Refill Policy.................... NINOSKA RICHARDS RN ....................  7/3/2017   12:04 PM

## 2017-12-28 NOTE — TELEPHONE ENCOUNTER
Patient Information     Patient Name MRN Sex Ankita Bergeron [id number] Female 1955      Telephone Encounter by Cruzito Robbins RN at 2017 10:48 AM     Author:  Cruzito Robbins RN Service:  (none) Author Type:  NURS- Registered Nurse     Filed:  2017 10:56 AM Encounter Date:  2017 Status:  Signed     :  Cruzito Robbins RN (NURS- Registered Nurse)            Proton Pump Inhibitors    Office visit in the past 12 months or per provider note.    Last visit with Ramirez Cano MD was on: 17  Next visit with Ramirez Cano MD is on: No future appointment listed with this provider  Next visit with Family Practice is on: No future appointment listed in this department    Max refill for 12 months from last office visit or per provider note.    Chart review shows that patient was seen for medication management today, 17 by PCP. Office visit note is pending at this time. However, patient has been seen on a frequent basis recently, almost every month for medication management with PCP. Protonix remains, and is included in office visit notes at most recent appointments with PCP, with no changes noted. Writer will refill rx as requested at this time for a year supply. Will route encounter to PCP as an FYI at this time in case changes are made from appointment with PCP today.    Prescription refilled per RN Medication Refill Policy.................... Cruzito Robbins RN ....................  2017   10:54 AM

## 2017-12-29 NOTE — PATIENT INSTRUCTIONS
Patient Information     Patient Name MRN Sex Ankita Bergeron [id number] Female 1955      Patient Instructions by Ramirez Cano MD at 2017 10:00 AM     Author:  Ramirez Cano MD  Service:  (none) Author Type:  Physician     Filed:  2017 10:55 AM  Encounter Date:  2017 Status:  Addendum     :  Ramirez Cano MD (Physician)        Related Notes: Original Note by Ramirez Cano MD (Physician) filed at 2017 10:38 AM            Meditation is a good idea or gentle yoga. Can check with [facility] or call the Center ([phone])  BP much improved with less stress    Refilled oxycodone, next due . Follow up by then.

## 2017-12-29 NOTE — PATIENT INSTRUCTIONS
Patient Information     Patient Name MRN Sex Ankita Bergeron [id number] Female 1955      Patient Instructions by Ramirez Cano MD at 11/15/2017 10:00 AM     Author:  Ramirez Cano MD Service:  (none) Author Type:  Physician     Filed:  11/15/2017 10:37 AM Encounter Date:  11/15/2017 Status:  Signed     :  Ramirez Cano MD (Physician)            Continue oxycodone same dosing of 8 pills per day  Try Lyrica 25 mg just in the evening or before bed    Keep with the stomach medications and iron  Follow up after the stomach scope

## 2017-12-29 NOTE — PATIENT INSTRUCTIONS
Patient Information     Patient Name MRN Sex Ankita Bergeron [id number] Female 1955      Patient Instructions by Ramirez Cano MD at 2017 11:45 AM     Author:  Ramirez Cano MD  Service:  (none) Author Type:  Physician     Filed:  2017 12:54 PM  Encounter Date:  2017 Status:  Addendum     :  Ramirez Cano MD (Physician)        Related Notes: Original Note by Ramirez Cano MD (Physician) filed at 2017 12:37 PM            Check blood pressure at home. Should be under 140/90, ideal is 130/80.   See what dose of amlodipine you are taking. I think you are taking 5 mg daily right now. If taking 2.5 mg, let me know.    Refilled oxycodone for tomorrow. Must last until . No early refill. Keep them safe  Bring in extra pills    Find out the medication from Becky Olmstead

## 2017-12-29 NOTE — PATIENT INSTRUCTIONS
Patient Information     Patient Name MRN Sex Ankita Bergeron [id number] Female 1955      Patient Instructions by Ramirez Cano MD at 10/17/2017 11:30 AM     Author:  Ramirez Cano MD Service:  (none) Author Type:  Physician     Filed:  10/17/2017 11:41 AM Encounter Date:  10/17/2017 Status:  Signed     :  Ramirez Cano MD (Physician)            Reduced oxycodone to 5 mg taking up to 8 per day  Follow up in 2 weeks

## 2017-12-29 NOTE — PATIENT INSTRUCTIONS
"Patient Information     Patient Name MRN Sex Ankita Bergeron [id number] Female 1955      Patient Instructions by Ramirez Cano MD at 10/31/2017  9:45 AM     Author:  Ramirez Cano MD  Service:  (none) Author Type:  Physician     Filed:  10/31/2017 10:38 AM  Encounter Date:  10/31/2017 Status:  Addendum     :  Ramirez Cano MD (Physician)        Related Notes: Original Note by Ramirez Cano MD (Physician) filed at 10/31/2017 10:37 AM            Should have an EGD (stomach scope) with Dr Bowman  Hold the Plavix for 3 days prior to the scope  Avoid the sucralfate for at least 1 day prior to the scope. Continue on the sucralfate.  Stay on aspirin even through the procedure  Stay on Protonix twice daily    May continue with oxycodone  Use the Bentyl really sparingly  Follow up in 2 weeks      Selected Food Sources of Iron        Food Milligrams  (mg) per  serving Percent  DV*   Breakfast cereals, fortified with 100% of the DV for iron, 1 serving 18 100   Oysters, eastern, cooked with moist heat, 3 ounces 8 44   White beans, canned, 1 cup 8 44   Chocolate, dark, 45%-69% cacao solids, 3 ounces 7 39   Beef liver, pan fried, 3 ounces 5 28   Lentils, boiled and drained,   cup 3 17   Spinach, boiled and drained,   cup 3 17   Tofu, firm,   cup 3 17   Kidney beans, canned,   cup 2 11   Sardines, Atlantic, canned in oil, drained solids with bone, 3 ounces 2 11   Chickpeas, boiled and drained,   cup 2 11   Tomatoes, canned, stewed,   cup 2 11   Beef, braised bottom round, trimmed to /8\" fat, 3 ounces 2 11   Potato, baked, flesh and skin, 1 medium potato 2 11   Cashew nuts, oil roasted, 1 ounce (18 nuts) 2 11               "

## 2017-12-29 NOTE — PATIENT INSTRUCTIONS
Patient Information     Patient Name MRN Sex Ankita Bergeron [id number] Female 1955      Patient Instructions by Ramirez Cano MD at 2017 10:00 AM     Author:  Ramirez Cano MD Service:  (none) Author Type:  Physician     Filed:  2017 11:10 AM Encounter Date:  2017 Status:  Signed     :  Ramirez Cano MD (Physician)            Refilled oxycodone  Take Ceftin twice daily for 1 week for bladder infection but may be close to a kidney infection. Return for fever, confusion, feeling worse  See cardiologist as scheduled  Follow up in a month

## 2017-12-30 NOTE — NURSING NOTE
Patient Information     Patient Name MRN Sex Ankita Bergeron [id number] Female 1955      Nursing Note by Татьяна Bonilla at 2017  9:45 AM     Author:  Татьяна Bonilla Service:  (none) Author Type:  (none)     Filed:  2017 10:11 AM Encounter Date:  2017 Status:  Signed     :  Татьяна Bonilla            Patient comes in for follow up after angiogram.  Татьяна Bonilla LPN ....................  2017   10:11 AM

## 2017-12-30 NOTE — NURSING NOTE
Patient Information     Patient Name MRN Anikta Lee [id number] Female 1955      Nursing Note by Lana Zheng at 11/15/2017 10:00 AM     Author:  Lana Zheng Service:  (none) Author Type:  (none)     Filed:  11/15/2017 10:16 AM Encounter Date:  11/15/2017 Status:  Signed     :  Lana Zheng            Patient presents in the clinic for a follow up appointment. Patient also is needing medication refills.  Lana Zheng LPN............................ 11/15/2017 10:05 AM

## 2017-12-30 NOTE — NURSING NOTE
Patient Information     Patient Name MRN Ankita Lee [id number] Female 1955      Nursing Note by Kimberly Whyte at 10/17/2017 11:30 AM     Author:  Kimberly Whyte Service:  (none) Author Type:  (none)     Filed:  10/17/2017 11:10 AM Encounter Date:  10/17/2017 Status:  Signed     :  Kimberly Whyte            Ankita Day is a 62 y.o. female presenting for follow up on her medications.  Kimberly Whyte LPN 10/17/2017 11:04 AM

## 2017-12-30 NOTE — NURSING NOTE
Patient Information     Patient Name MRN Sex Ankita Bergeron [id number] Female 1955      Nursing Note by Kristine Koroma at 2017 11:45 AM     Author:  Kristine Koroma Service:  (none) Author Type:  (none)     Filed:  2017 12:04 PM Encounter Date:  2017 Status:  Signed     :  Kristine Koroma            Patient is here today for a one month F/U. She also would like to have a breast lump checked in her right breast, patient is also c/o chest pain which she rates a 5/10, she is going to take a nitro now, patient stated her Oxycodone her  has been taking and he gets whacked out on it. She also stated when they leave here she is going to give the 's department the Oxycodone and her Cymbalta.  Kristine Koroma LPN......................2017 11:44 AM

## 2017-12-30 NOTE — NURSING NOTE
Patient Information     Patient Name MRN Ankita Lee [id number] Female 1955      Nursing Note by Kimberly Whyte at 2017  2:30 PM     Author:  Kimberly Whyte Service:  (none) Author Type:  (none)     Filed:  2017  2:57 PM Encounter Date:  2017 Status:  Signed     :  Kimberly Whyte            Ankita Day is a 62 y.o. female presenting for her medication managemenet.  Kimberly Whyte LPN 2017 2:23 PM

## 2017-12-30 NOTE — NURSING NOTE
Patient Information     Patient Name MRN Ankita Lee [id number] Female 1955      Nursing Note by Kely Ashraf at 10/31/2017  9:45 AM     Author:  Kely Ashraf Service:  (none) Author Type:  (none)     Filed:  10/31/2017 10:17 AM Encounter Date:  10/31/2017 Status:  Signed     :  Kely Ashraf            Patient presents to the clinic for a med check on oxycodone, bentyl and vortioxetine. She is requesting more ondansetron as she says she was not prescribed enough previously.  Kely MICHELE, LIEN.......10/31/2017..9:57 AM

## 2017-12-30 NOTE — NURSING NOTE
Patient Information     Patient Name MRN Ankita Lee [id number] Female 1955      Nursing Note by Kimberly Whyte at 2017 10:00 AM     Author:  Kimberly Whyte Service:  (none) Author Type:  (none)     Filed:  2017 10:22 AM Encounter Date:  2017 Status:  Signed     :  Kimberly Whyte            Ankita Day is a 62 y.o. female presenting for a follow up of her medications and blood pressure.  Kimberly Whyte LPN 2017 10:08 AM

## 2017-12-30 NOTE — NURSING NOTE
Patient Information     Patient Name MRN Ankita Lee [id number] Female 1955      Nursing Note by Kimberly Whyte at 2017 10:00 AM     Author:  Kimberly Whyte Service:  (none) Author Type:  (none)     Filed:  2017 10:49 AM Encounter Date:  2017 Status:  Signed     :  Kimberly Whyte            Ankita Day is a 62 y.o. female presenting with dysuria. Urine analysis initiated per verbal order that was read back and verified. Kimberly Whyte LPN 2017 10:30 AM   Kimberly Whyte LPN 2017 10:30 AM

## 2017-12-30 NOTE — NURSING NOTE
Patient Information     Patient Name MRN Sex Ankita Bergeron [id number] Female 1955      Nursing Note by Morelia Park at 8/10/2017 12:45 PM     Author:  Morelia Park Service:  (none) Author Type:  (none)     Filed:  8/10/2017  1:18 PM Encounter Date:  8/10/2017 Status:  Signed     :  Morelia Park            Patient is here to establish care with cardiology and consult for on going heart issues.  Morelia Park LPN .............8/10/2017  12:56 PM

## 2017-12-30 NOTE — NURSING NOTE
Patient Information     Patient Name MRN Ankita Lee [id number] Female 1955      Nursing Note by Kimberly Whyte at 10/24/2017  8:15 AM     Author:  Kimberly Whyte Service:  (none) Author Type:  (none)     Filed:  10/24/2017  8:30 AM Encounter Date:  10/24/2017 Status:  Signed     :  Kimberly Whyte            Ankita Day is a 62 y.o. female presenting with side effects of lowering her dose of her percocet.  Kimberly Whyte LPN 10/24/2017 8:20 AM

## 2018-01-02 ENCOUNTER — COMMUNICATION - GICH (OUTPATIENT)
Dept: FAMILY MEDICINE | Facility: OTHER | Age: 63
End: 2018-01-02

## 2018-01-02 DIAGNOSIS — I25.810 ATHEROSCLEROSIS OF CORONARY ARTERY BYPASS GRAFT WITHOUT ANGINA PECTORIS: ICD-10-CM

## 2018-01-02 NOTE — PATIENT INSTRUCTIONS
Patient Information     Patient Name MRN Sex Ankita Bergeron [id number] Female 1955      Patient Instructions by Ramirez Cano MD at 2017 10:30 AM     Author:  Ramirez Cano MD Service:  (none) Author Type:  Physician     Filed:  2017 11:16 AM Encounter Date:  2017 Status:  Signed     :  Ramirez Cano MD (Physician)            Refilled oxycodone  We can let you know about lab  If all normal, then will consider a medication for colon spasm  Referral to neuropsych in [place] for memory testing

## 2018-01-02 NOTE — TELEPHONE ENCOUNTER
Patient Information     Patient Name MRN Ankita Lee [id number] Female 1955      Telephone Encounter by July Johnston RN at 2017  9:39 AM     Author:  July Johnston RN Service:  (none) Author Type:  (none)     Filed:  2017  9:48 AM Encounter Date:  2017 Status:  Signed     :  July Johnston RN (NURS- Registered Nurse)            Statins    Office visit in the past 12 months.    Last visit with BLAISE GUILLEN was on: 2016 in [facility]  Next visit with BLAISE GUILLEN is on: 2017 in [facility]  Next visit with Family Practice is on: 2017 in [facility]      Last Lipids:  Chol: 171    2014  T    2014  HDL:   33    2014  LDL:  71    2014  LDL DIRECT:  No results found in past 5 years    .      Max refills 12 months from last office visit.      Medication last discussed with Dr Anguiano at appointment in 2016. Due for discussion of medication in 2017, if not sooner.    Prescription refilled per RN Medication Refill Policy.................... July Johnston ....................  2017   9:40 AM

## 2018-01-02 NOTE — NURSING NOTE
Patient Information     Patient Name MRN Ankita Lee [id number] Female 1955      Nursing Note by Kimberly Whyte at 2017 10:30 AM     Author:  Kimberly Whyte Service:  (none) Author Type:  (none)     Filed:  2017 10:55 AM Encounter Date:  2017 Status:  Signed     :  Kimberly Whyte            Ankita Day is a 62 y.o. female presenting with chest wall pain and stomach pain for the last 3 days.  Kimberly Whyte LPN 2017 10:47 AM

## 2018-01-03 ENCOUNTER — OFFICE VISIT - GICH (OUTPATIENT)
Dept: FAMILY MEDICINE | Facility: OTHER | Age: 63
End: 2018-01-03

## 2018-01-03 ENCOUNTER — AMBULATORY - GICH (OUTPATIENT)
Dept: SCHEDULING | Facility: OTHER | Age: 63
End: 2018-01-03

## 2018-01-03 ENCOUNTER — HISTORY (OUTPATIENT)
Dept: FAMILY MEDICINE | Facility: OTHER | Age: 63
End: 2018-01-03

## 2018-01-03 DIAGNOSIS — N18.2 CHRONIC KIDNEY DISEASE, STAGE II (MILD): ICD-10-CM

## 2018-01-03 DIAGNOSIS — I25.10 ATHEROSCLEROTIC HEART DISEASE OF NATIVE CORONARY ARTERY WITHOUT ANGINA PECTORIS: ICD-10-CM

## 2018-01-03 DIAGNOSIS — I77.1 STRICTURE OF ARTERY (H): ICD-10-CM

## 2018-01-03 DIAGNOSIS — K25.0 ACUTE GASTRIC ULCER WITH HEMORRHAGE: ICD-10-CM

## 2018-01-03 DIAGNOSIS — G47.31 PRIMARY CENTRAL SLEEP APNEA: ICD-10-CM

## 2018-01-03 DIAGNOSIS — D50.0 IRON DEFICIENCY ANEMIA DUE TO CHRONIC BLOOD LOSS: ICD-10-CM

## 2018-01-03 DIAGNOSIS — Z01.818 ENCOUNTER FOR OTHER PREPROCEDURAL EXAMINATION: ICD-10-CM

## 2018-01-03 NOTE — PROGRESS NOTES
"Patient Information     Patient Name MRN Sex Ankita Bergeron [id number] Female 1955      Progress Notes by Ramirez Cano MD at 2017 10:30 AM     Author:  Ramirez Cano MD Service:  (none) Author Type:  Physician     Filed:  2017 10:20 PM Encounter Date:  2017 Status:  Signed     :  Ramirez Cano MD (Physician)            SUBJECTIVE:  62 y.o. female here for follow up on chronic pain, chest wall and back along with headache and abdominal pain.    Feels nauseated and has abdominal pain for a few days. Does not feel well. Pain epigastric. \"Colon starts hurting immediately when I eat.\" No vomiting or diarrhea.  Taking docusate and having regular BM.  Had C diff in 2016. Resolved with vancomycin. Took some Florastor and Culturelle and stools formed back to normal. Not taking budesonide, which was given the past for microscopic colitis.    Developed urinary sepsis in 2016 and hospitalized at [facility].    Still has chronic headache. Insurance will not cover Botox for migraine.   Never saw sleep medicine as recommended. Neurology felt like central sleep apnea could explain headaches.    Insurance would not cover diclofenac gel.    Arthritis in R hand, R shoulder, hips, knees. Also pain in neck, lumbar region, chest pain and headache. Reports better pain relief with oxycodone at 60 mg daily. Has not tolerated Lyrica or gabapentin. Continues on Cymbalta.    REVIEW OF SYSTEMS:    Constitutional: malaise  Respiratory: Negative    Cardiac: Negative    Current Outpatient Prescriptions       Medication  Sig Dispense Refill     aspirin (ECOTRIN) 81 mg enteric coated tablet Take 81 mg by mouth once daily with a meal.       busPIRone (BUSPAR) 10 mg tablet TAKE 1 TABLET TWICE A  tablet 3     cholecalciferol (VITAMIN D) 1,000 unit capsule Take 1,000 Units by mouth 2 times daily.       clonazePAM (KLONOPIN) 1 mg tablet Take 1 tablet by mouth 4 times daily. Becky Olmstead, TRAY "       clopidogrel (PLAVIX) 75 mg tablet TAKE 1 TABLET DAILY 90 tablet 5     cyclobenzaprine (FLEXERIL) 10 mg tablet TAKE 1 TABLET TWICE A DAY IF NEEDED FOR MUSCLE SPASM 180 tablet 2     diclofenac 1 % topical (VOLTAREN) gel Apply 1 g topically to affected area(s) 2 times daily. 100 g 11     docusate (STOOL SOFTENER) 50 mg capsule Take 1 capsule by mouth once daily.  0     DULoxetine (CYMBALTA) 60 mg Delayed-release capsule TAKE 1 CAPSULE TWICE A  capsule 3     fluticasone (50 mcg per actuation) nasal solution (FLONASE) Inhale 2 sprays into both nostrils once daily as needed for allergic rhinitis and nasal congestion.       lisinopril (PRINIVIL; ZESTRIL) 40 mg tablet Take 1 tablet by mouth once daily. 30 tablet 1     metoprolol tartrate (LOPRESSOR) 50 mg tablet Take 1 tablet by mouth 2 times daily. 180 tablet 4     Nitroglycerin (NITROMIST) 400 mcg/spray spra PLACE 1 SPRAY UNDER THE TONGUE EVERY 5 MINUTES IF NEEDED FOR CHEST PAIN 1 g 11     ondansetron (ZOFRAN, AS HYDROCHLORIDE,) 4 mg tablet Take 1 tablet by mouth every 6 hours if needed for Nausea/Vomiting. 40 tablet 11     oxyCODONE-acetaminophen,  mg, (PERCOCET)  mg per tablet Take 2 tablets by mouth 3 times daily  Earliest Fill Date: 12/12/16 180 tablet 0     pantoprazole (PROTONIX) 40 mg delayed-release tablet TAKE 1 TABLET TWICE A  tablet 3     Saccharomyces boulardii (FLORASTOR) 250 mg capsule Take 1 capsule by mouth 2 times daily.  0     simvastatin (ZOCOR) 40 mg tablet TAKE 1 TABLET AT BEDTIME 90 tablet 1     Allergies as of 01/09/2017 - Mikel as Reviewed 01/09/2017      Allergen  Reaction Noted     Atorvastatin Myalgia 04/30/2013     Tape [adhesive] Rash and Erythema 04/02/2013     Tiotropium bromide Rash 09/09/2014     Ezetimibe Myalgia 10/09/2009     Latex Rash 08/16/2014     Niacin Flushing 10/09/2009     Other [unlisted allergen (include detail in comments)] Rash and Itching 08/14/2013       OBJECTIVE:  Visit Vitals       BP  144/77     Pulse 63     Wt 69.4 kg (153 lb)     Breastfeeding No     BMI 24.69 kg/m2       General Appearance: Alert. No acute distress  Chest/Respiratory Exam: Clear to auscultation bilaterally  Cardiovascular Exam: Regular rate and rhythm. S1, S2, no murmur, gallop, or rubs.  Gastrointestinal Exam: Soft, nontender, no abnormal masses or organomegaly.  Extremities: 2+ pedal pulses.  No lower extremity edema.    Results for orders placed or performed in visit on 01/09/17      C-REACTIVE PROTEIN      Result  Value Ref Range    C-REACTIVE PROTEIN <1.000 <1.000 mg/dL   BASIC METABOLIC PANEL      Result  Value Ref Range    SODIUM 137 133 - 143 mmol/L    POTASSIUM 4.2 3.5 - 5.1 mmol/L    CHLORIDE 102 98 - 107 mmol/L    CO2,TOTAL 26 21 - 31 mmol/L    ANION GAP 9 5 - 18                    GLUCOSE 103 70 - 105 mg/dL    CALCIUM 9.7 8.6 - 10.3 mg/dL    BUN 11 7 - 25 mg/dL    CREATININE 0.92 0.70 - 1.30 mg/dL    BUN/CREAT RATIO           12                    GFR if African American >60 >60 ml/min/1.73m2    GFR if not African American >60 >60 ml/min/1.73m2   CBC WITH AUTO DIFFERENTIAL      Result  Value Ref Range    WHITE BLOOD COUNT         8.1 4.5 - 11.0 thou/cu mm    RED BLOOD COUNT           4.54 4.00 - 5.20 mil/cu mm    HEMOGLOBIN                12.9 12.0 - 16.0 g/dL    HEMATOCRIT                40.6 33.0 - 51.0 %    MCV                       89 80 - 100 fL    MCH                       28.4 26.0 - 34.0 pg    MCHC                      31.7 (L) 32.0 - 36.0 g/dL    RDW                       16.2 (H) 11.5 - 15.5 %    PLATELET COUNT            283 140 - 440 thou/cu mm    MPV                       7.1 6.5 - 11.0 fL    NEUTROPHILS               64.8 42.0 - 72.0 %    LYMPHOCYTES               25.5 20.0 - 44.0 %    MONOCYTES                 7.5 <12.0 %    EOSINOPHILS               1.2 <8.0 %    BASOPHILS                 1.1 <3.0 %    ABSOLUTE NEUTROPHILS      5.2 1.7 - 7.0 thou/cu mm    ABSOLUTE LYMPHOCYTES      2.1 0.9 - 2.9  thou/cu mm    ABSOLUTE MONOCYTES        0.6 <0.9 thou/cu mm    ABSOLUTE EOSINOPHILS      0.1 <0.5 thou/cu mm    ABSOLUTE BASOPHILS        0.1 <0.3 thou/cu mm   UA DIP ONLY GRH GIH NON AUTOMATED      Result  Value Ref Range    COLOR                     Yellow Yellow Color    CLARITY                   Clear Clear Clarity    SPECIFIC GRAVITY,URINE    1.020 1.010, 1.015, 1.020, 1.025                    PH,URINE                  6.0 6.0, 7.0, 8.0, 5.5, 6.5, 7.5, 8.5                    UROBILINOGEN,QUALITATIVE  Normal Normal EU/dl    PROTEIN, URINE Negative Negative mg/dL    GLUCOSE, URINE Negative Negative mg/dL    KETONES,URINE             Negative Negative mg/dL    BILIRUBIN,URINE           Negative Negative                    OCCULT BLOOD,URINE        Trace (A) Negative                    NITRITE                   Negative Negative                    LEUKOCYTE ESTERASE        Moderate (A) Negative                     *Note: Due to a large number of results and/or encounters for the requested time period, some results have not been displayed. A complete set of results can be found in Results Review.           ASSESSMENT/PLAN:    ICD-10-CM   1. Generalized abdominal pain R10.84   2. History of recurrent UTIs Z87.440   3. Chronic bilateral low back pain without sciatica M54.5     G89.29   4. Pain medication agreement signed 1/18/16 Z79.899   5. Migraine syndrome G43.909   6. History of traumatic brain injury Z87.820   7. Memory difficulties R41.3     Notes significant abdominal pain, but does not want any workup for it. I pointed out that she had sepsis several months ago and she decided that lab would be a good idea. Obtained normal CBC, CRP. Unlikely to have sepsis or infection. UA appears normal. Just monitor symptoms. Can try dicyclomine for colon spasm.    Agree to refill on oxycodone at 60 mg daily = 90 morphine milligram equivalents. Eventually she will have surgery on shoulder and knee. Then plan to reduce  opioids back to previous level of 45 mg oxycodone daily. Given 1 mo of oxycodone, next due on 2/11. Continues on Cymbalta as adjunct.    Still worried about memory. Neurology considered neuropsych testing due to her hx of TBI. We discussed further and she would like testing to see what kind of issues are discovered. Opioid use may be causing problems or could be due to previous TBI or other etiology. Referral placed.     F/U 1 month

## 2018-01-03 NOTE — TELEPHONE ENCOUNTER
Patient Information     Patient Name MRN Ankita Lee [id number] Female 1955      Telephone Encounter by Kimberly Whyte at 2/10/2017  2:42 PM     Author:  Kimberly Whyte Service:  (none) Author Type:  (none)     Filed:  2/10/2017  2:49 PM Encounter Date:  2/10/2017 Status:  Signed     :  Kimberly Whyte            Patient states that she has had trouble on and off left with her left ear for the past 2 years. It lasts for 3 or 4 days. When she lays on her left side, she reports that the pain is unbearable. She was unable to get the ear drops at [facility]. They do not make them anymore.  She states that by the time she got to [facility] today, the ear pain was so bad, it shot from the left ear, down her neck and under arm pit.   Patient states that she really thinks that there is something wrong with her ear and that she should see Dr. Hartman again.  Kimberly Whyte LPN 2/10/2017 2:48 PM

## 2018-01-03 NOTE — NURSING NOTE
Patient Information     Patient Name MRN Sex Ankita Bergeron [id number] Female 1955      Nursing Note by Missy Bians at 2017 10:30 AM     Author:  Missy Bains Service:  (none) Author Type:  (none)     Filed:  2017 12:21 PM Encounter Date:  2017 Status:  Signed     :  Missy Bains            Per Dr Cano, [place] given verbal approval to fill the prescription early (today rather than 17), but it will remain on track for next month.  Missy Bains [facility])  ..................2017   12:20 PM

## 2018-01-03 NOTE — PATIENT INSTRUCTIONS
Patient Information     Patient Name MRN Sex Ankita Bergeron [id number] Female 1955      Patient Instructions by Ramirez Cano MD at 3/8/2017 10:45 AM     Author:  Ramirez Cano MD  Service:  (none) Author Type:  Physician     Filed:  3/8/2017 12:30 PM  Encounter Date:  3/8/2017 Status:  Addendum     :  Ramirez Cano MD (Physician)        Related Notes: Original Note by Ramirez Cano MD (Physician) filed at 3/8/2017 12:25 PM            Ordered an ultrasound to check kidney flow to make sure this is not a reason for having higher blood pressure     Need to discuss with Dr Anguiano three things: #1 is if you should take atorvastatin or rosuvastatin as these are stronger than the simvastatin for preventing heart blockages. #2 is need for a stress test again, just had one in November. #3 is about suitability for surgery. Do we just improve blood pressure and then you can have surgery or is checking back with cardiology necessary?    Given trigger injections to neck and upper back.   Referral to PT for headache

## 2018-01-03 NOTE — TELEPHONE ENCOUNTER
Patient Information     Patient Name MRN Sex Ankita Bergeron [id number] Female 1955      Telephone Encounter by Cruzito Robbins RN at 2017  9:19 AM     Author:  Cruzito Robbins RN Service:  (none) Author Type:  NURS- Registered Nurse     Filed:  2017 10:09 AM Encounter Date:  2017 Status:  Signed     :  Cruzito Robbins RN (NURS- Registered Nurse)            This is a Refill request from: Patient  Name of Medication: Lisinopril  Quantity requested: 30 tabs  Last fill date: Unknown, last fill date in chart of 10/28/16 for 30 tabs with 1 refill  Due for refill: Yes  Last visit with BLAISE GUILLEN was on: 02/10/2017 in [facility]  PCP:  Blaise Guillen MD  Controlled Substance Agreement:  N/A   Diagnosis r/t this medication request: HTN    Name of Medication: Flexeril  Quantity requested: 60 tabs  Last fill date: Unknown, last filled in chart on 2015  Due for refill: Unknown  Diagnosis r/t this medication request: Unknown    Last office visit with PCP to address HTN and use of lisinopril was on 10/28/2016. Lisinopril was increased at that time by PCP. Patient has been seen by PCP since that office visit, with recheck labs also completed as requested. However, writer unable to find that rx was to be continued in chart. Patient is now requesting additional refills be sent to [facility] for limited supply as unable to get rx from [facility] in time. Patient is also requesting a limited refill of her flexeril be sent to [facility]. Will pend rx requests to PCP for his approval.     Unable to complete prescription refill per RN Medication Refill Policy.................... Cruzito Robbins RN ....................  2017   9:57 AM

## 2018-01-03 NOTE — PATIENT INSTRUCTIONS
Patient Information     Patient Name MRN Sex Ankita Bergeron [id number] Female 1955      Patient Instructions by Ramirez Cano MD at 2/10/2017 11:00 AM     Author:  Ramirez Cano MD  Service:  (none) Author Type:  Physician     Filed:  2/10/2017 11:37 AM  Encounter Date:  2/10/2017 Status:  Addendum     :  Ramirez Cano MD (Physician)        Related Notes: Original Note by Ramirez Cano MD (Physician) filed at 2/10/2017 11:27 AM            Use Auralgan to the left ear a few times daily to help numb  Refilled oxycodone, good until 3/13  Follow up after neuropsych testing for likely preop in a month

## 2018-01-03 NOTE — NURSING NOTE
Patient Information     Patient Name MRN Ankita Lee [id number] Female 1955      Nursing Note by Kimberly Whyte at 2/10/2017 11:00 AM     Author:  Kimberly Whyte Service:  (none) Author Type:  (none)     Filed:  2/10/2017 11:10 AM Encounter Date:  2/10/2017 Status:  Signed     :  Kimberly Whyte            Ankita Day is a 62 y.o. female presenting for a follow up on her chest pains. Also complains of left ear pain.  Kimberly Whyte LPN 2/10/2017 11:01 AM

## 2018-01-03 NOTE — TELEPHONE ENCOUNTER
Patient Information     Patient Name MRN Sex Ankita Bergeron [id number] Female 1955      Telephone Encounter by Kimberly Whyte at 2/10/2017  3:05 PM     Author:  Kimberly Whyte Service:  (none) Author Type:  (none)     Filed:  2/10/2017  3:05 PM Encounter Date:  2/10/2017 Status:  Signed     :  Kimberly Whyte            Patient notified after name and date of birth were verified.  Kimberly Whyte LPN 2/10/2017 3:05 PM

## 2018-01-03 NOTE — NURSING NOTE
Patient Information     Patient Name MRN Ankita Lee [id number] Female 1955      Nursing Note by Kimberly Whyte at 3/8/2017 10:45 AM     Author:  Kimberly Whyte Service:  (none) Author Type:  (none)     Filed:  3/8/2017 11:35 AM Encounter Date:  3/8/2017 Status:  Signed     :  Kimberly Whyte            Ankita Day is a 62 y.o. female presenting for a follow up of her discharge form [facility] for hypertensive urgency and atypical chest pain.  Kimberly Whyte LPN 3/8/2017 11:24 AM

## 2018-01-03 NOTE — TELEPHONE ENCOUNTER
"Patient Information     Patient Name MRN Sex Ankita Bergeron [id number] Female 1955      Telephone Encounter by Kathleen Lima RN at 3/17/2017  9:11 AM     Author:  Kathleen Lima RN Service:  (none) Author Type:  NURS- Registered Nurse     Filed:  3/17/2017  9:28 AM Encounter Date:  3/17/2017 Status:  Signed     :  Kathleen Lima RN (NURS- Registered Nurse)            Pain is in the veins not the elbow. On the  she saw Dr. Anguiano in [place]. She recently got out of hospital for heart problems. Went to ER in [place], had IV put in left wrist - had pain for 3 days after that. Pain keeps moving up her arm.  Can feel vein where the pain goes up her arm, can also feel two small lumps in the vein.  Has significant heart history of 17 stents, triple bypass, and 7 heart attacks.  She also had a blood infection last summer, \"almost \"  Had diarhea last week when she was supposed to come in for an ultrasound on her kidneys for blood clots, and didn't reschedule.  No history of blood clots that she can remember.  Recommended she come in today to be evaluated.  PCP out of office today, but she is willing to see another provider.  Transferred to scheduling to make appointment.  Kathleen Lima RN 3/17/2017 9:22 AM            "

## 2018-01-03 NOTE — PROGRESS NOTES
Patient Information     Patient Name MRN Ankita Lee [id number] Female 1955      Progress Notes by Ramirez Cano MD at 2/10/2017 11:00 AM     Author:  Ramirez Cano MD Service:  (none) Author Type:  Physician     Filed:  2017  4:57 PM Encounter Date:  2/10/2017 Status:  Signed     :  Ramirez Cano MD (Physician)            SUBJECTIVE:  62 y.o. female here for follow up on chronic pain, chest wall and back along with memory difficulties, headache and abdominal pain.    More tinnitus bilaterally. She ordered a hearing aid off TV. Helped on R ear, but not on L. Hurts now in the R ear for a few days. Reports a lump in canal for a few days.    Has neuropsych testing in [place] scheduled for next week.     Still has chronic headache. Insurance will not cover Botox for migraine.   Never saw sleep medicine as recommended. Neurology felt like central sleep apnea could explain headaches.    Abdominal pain improved. Dicyclomine caused her difficulty in voiding.     Feels that her function is better on higher dose of oxycodone. We had a debate about reduction as I want to reduce her dose when some of her joint issues are addressed through ortho surgery, but she made a comment that her chest wall pain would not allow a reduction. She thought I wanted to reduce now. After some discussion we both agree to reduction after joint surgeries.    REVIEW OF SYSTEMS:    Constitutional: malaise  Respiratory: Negative    Cardiac: Negative    Current Outpatient Prescriptions       Medication  Sig Dispense Refill     aspirin (ECOTRIN) 81 mg enteric coated tablet Take 81 mg by mouth once daily with a meal.       busPIRone (BUSPAR) 10 mg tablet TAKE 1 TABLET TWICE A  tablet 3     cholecalciferol (VITAMIN D) 1,000 unit capsule Take 1,000 Units by mouth 2 times daily.       clonazePAM (KLONOPIN) 1 mg tablet Take 1 tablet by mouth 4 times daily. Becky Olmstead, TRAY       clopidogrel (PLAVIX) 75 mg  tablet TAKE 1 TABLET DAILY 90 tablet 5     cyclobenzaprine (FLEXERIL) 10 mg tablet TAKE 1 TABLET TWICE A DAY IF NEEDED FOR MUSCLE SPASM 180 tablet 2     diclofenac 1 % topical (VOLTAREN) gel Apply 1 g topically to affected area(s) 2 times daily. 100 g 11     dicyclomine (BENTYL) 10 mg capsule Take 1 capsule by mouth every 6 hours if needed for Other (Specify) (colon spasms). 30 capsule 0     docusate (STOOL SOFTENER) 50 mg capsule Take 1 capsule by mouth once daily.  0     DULoxetine (CYMBALTA) 60 mg Delayed-release capsule TAKE 1 CAPSULE TWICE A  capsule 3     fluticasone (50 mcg per actuation) nasal solution (FLONASE) Inhale 2 sprays into both nostrils once daily as needed for allergic rhinitis and nasal congestion.       lisinopril (PRINIVIL; ZESTRIL) 40 mg tablet Take 1 tablet by mouth once daily. 30 tablet 1     metoprolol tartrate (LOPRESSOR) 50 mg tablet Take 1 tablet by mouth 2 times daily. 180 tablet 4     Nitroglycerin (NITROMIST) 400 mcg/spray spra PLACE 1 SPRAY UNDER THE TONGUE EVERY 5 MINUTES IF NEEDED FOR CHEST PAIN 1 g 11     ondansetron (ZOFRAN, AS HYDROCHLORIDE,) 4 mg tablet Take 1 tablet by mouth every 6 hours if needed for Nausea/Vomiting. 40 tablet 11     oxyCODONE-acetaminophen,  mg, (PERCOCET)  mg per tablet Take 2 tablets by mouth 3 times daily  Earliest Fill Date: 1/11/17 180 tablet 0     pantoprazole (PROTONIX) 40 mg delayed-release tablet TAKE 1 TABLET TWICE A  tablet 3     Saccharomyces boulardii (FLORASTOR) 250 mg capsule Take 1 capsule by mouth 2 times daily.  0     simvastatin (ZOCOR) 40 mg tablet TAKE 1 TABLET AT BEDTIME 90 tablet 1     Allergies as of 02/10/2017 - Mikel as Reviewed 02/10/2017      Allergen  Reaction Noted     Atorvastatin Myalgia 04/30/2013     Tape [adhesive] Rash and Erythema 04/02/2013     Tiotropium bromide Rash 09/09/2014     Ezetimibe Myalgia 10/09/2009     Latex Rash 08/16/2014     Niacin Flushing 10/09/2009     Other [unlisted allergen  (include detail in comments)] Rash and Itching 08/14/2013       OBJECTIVE:  Visit Vitals       /88     Pulse 60     Wt 72.1 kg (159 lb)     Breastfeeding No     BMI 25.66 kg/m2       General Appearance: Alert. No acute distress  Ears: Normal TM and canal on R. L canal with a small lump in mid canal without erythema. She is tender to palpation of outer ear. Nothing really appears to be wrong.  Psychiatric: Normal affect.      ASSESSMENT/PLAN:    ICD-10-CM   1. Chronic bilateral low back pain without sciatica M54.5     G89.29   2. Migraine syndrome G43.909   3. Pain medication agreement signed 2/10/17 Z79.899   4. Left ear pain H92.02       Arthritis in R hand, R shoulder, hips, knees. Also pain in neck, lumbar region, chest pain and headache. Reports better pain relief with oxycodone at 60 mg daily. Has not tolerated Lyrica or gabapentin. Continues on Cymbalta.  DIRE 15. Continue oxycodone 60 mg daily x 1 mo. Due for refill no 2/11, may obtain today. Next refill due 3/13. Completed new controlled substance agreement     Ear appears normal except for small lump in canal that typically is benign. Try Auralgan for ear. If not helping, see ENT.    F/U in 1 mo  Greater than 50% of this 25 minute appointment spent on counseling.
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
## 2018-01-03 NOTE — TELEPHONE ENCOUNTER
Patient Information     Patient Name MRN Sex Ankita Bergeron [id number] Female 1955      Telephone Encounter by Ramirez Cano MD at 2/10/2017  2:56 PM     Author:  Ramirez Cano MD Service:  (none) Author Type:  Physician     Filed:  2/10/2017  2:56 PM Encounter Date:  2/10/2017 Status:  Signed     :  Ramirez Cano MD (Physician)            OK, I put in a referral

## 2018-01-03 NOTE — TELEPHONE ENCOUNTER
Patient Information     Patient Name MRN Sex Ankita Bergeron [id number] Female 1955      Telephone Encounter by Cruzito Robbins RN at 2017 10:10 AM     Author:  Cruzito Robbins RN Service:  (none) Author Type:  NURS- Registered Nurse     Filed:  2017 10:19 AM Encounter Date:  2017 Status:  Signed     :  Cruzito Robbins RN (NURS- Registered Nurse)            This is a Refill request from: [facility]  Name of Medication: Lisinopril  Quantity requested: 90 tabs with 2 refills  Last fill date: 10/28/2016  Due for refill: Yes, as per rx request  Last visit with BLAISE GUILLEN was on: 02/10/2017 in [facility]  PCP:  Blaise Guillen MD  Controlled Substance Agreement:  N/A   Diagnosis r/t this medication request: HTN    Writer pended temporary rx request to PCP today with regards to lisinopril. [facility] is looking for a continuation of medication. Will pend rx request to PCP for his consideration.     Unable to complete prescription refill per RN Medication Refill Policy.................... Cruzito Robbins RN ....................  2017   10:14 AM

## 2018-01-03 NOTE — PROGRESS NOTES
Patient Information     Patient Name MRN Sex Ankita Bergeron [id number] Female 1955      Progress Notes by Ramirez Cano MD at 3/8/2017 10:45 AM     Author:  Ramirez Cano MD Service:  (none) Author Type:  Physician     Filed:  3/12/2017  3:00 PM Encounter Date:  3/8/2017 Status:  Signed     :  Ramirez Cano MD (Physician)            SUBJECTIVE:  62 y.o. female with CAD s/p CABG and multiple stents here for follow up on chest pain.   Presented to ED on 3/1 and had neg troponin, normal EKG, and high BP. Improved on nitro drip and discharged home since trop neg.  Presented a second time to ED on 3/5 with EKG changes. BP again elevated. Started on nitro drip.   Transferred to [facility]. Ruled out for MI with negative troponin. Sent to ICU for labetalol drip, but did not require as BP improved.  ECHO repeated with normal LV function and some mild diastolic dysfunction, unchanged from last year.   Amlodipine 5 mg daily started. She is tolerating.  [facility] cardiology recommended follow up with Dr Anguiano to discuss stress testing. She has an appointment tomorrow. Just had a recent stress test in November that was normal.    Still has dull central chest pressure to L shoulder. Worse with exertion. L arm is numb. No further tingling in her fingers.  This pain is different than chest wall pain.  Left lateral neck pain. R neck pain from R shoulder pain.    Also having more headaches. Similar in character, just feel worse than usual. Saw neurology. Botox offered. Did not qualify for Botox through insurance.    Chronic low back pain and chest wall pain. Arthritis in R hand, R shoulder, hips, knees. Reporting better relief with oxycodone at 60 mg daily than lower doses. Has not tolerated Lyrica or gabapentin. Continues on Cymbalta.    REVIEW OF SYSTEMS:    Constitutional: malaise  GI: Negative        Current Outpatient Prescriptions       Medication  Sig Dispense Refill     amLODIPine (NORVASC)  5 mg tablet Take 1 tablet by mouth once daily.       aspirin (ECOTRIN) 81 mg enteric coated tablet Take 81 mg by mouth once daily with a meal.       busPIRone (BUSPAR) 10 mg tablet TAKE 1 TABLET TWICE A  tablet 3     cholecalciferol (VITAMIN D) 1,000 unit capsule Take 1,000 Units by mouth 2 times daily.       clonazePAM (KLONOPIN) 1 mg tablet Take 1 tablet by mouth 4 times daily. Becky Olmstead CNP       clopidogrel (PLAVIX) 75 mg tablet TAKE 1 TABLET DAILY 90 tablet 5     cyclobenzaprine (FLEXERIL) 10 mg tablet TAKE 1 TABLET TWICE A DAY IF NEEDED FOR MUSCLE SPASM 60 tablet 0     diclofenac 1 % topical (VOLTAREN) gel Apply 1 g topically to affected area(s) 2 times daily. 100 g 11     docusate (STOOL SOFTENER) 50 mg capsule Take 1 capsule by mouth once daily.  0     DULoxetine (CYMBALTA) 60 mg Delayed-release capsule TAKE 1 CAPSULE TWICE A  capsule 3     fluticasone (50 mcg per actuation) nasal solution (FLONASE) Inhale 2 sprays into both nostrils once daily as needed for allergic rhinitis and nasal congestion.       lisinopril (PRINIVIL; ZESTRIL) 40 mg tablet TAKE 1 TABLET DAILY 90 tablet 4     metoprolol tartrate (LOPRESSOR) 50 mg tablet Take 1 tablet by mouth 2 times daily. 180 tablet 4     Nitroglycerin (NITROMIST) 400 mcg/spray spra PLACE 1 SPRAY UNDER THE TONGUE EVERY 5 MINUTES IF NEEDED FOR CHEST PAIN 1 g 11     ondansetron (ZOFRAN, AS HYDROCHLORIDE,) 4 mg tablet Take 1 tablet by mouth every 6 hours if needed for Nausea/Vomiting. 40 tablet 11     oxyCODONE-acetaminophen,  mg, (PERCOCET)  mg per tablet Take 2 tablets by mouth 3 times daily  Earliest Fill Date: 2/10/17 180 tablet 0     pantoprazole (PROTONIX) 40 mg delayed-release tablet TAKE 1 TABLET TWICE A  tablet 3     Saccharomyces boulardii (FLORASTOR) 250 mg capsule Take 1 capsule by mouth 2 times daily.  0     simvastatin (ZOCOR) 40 mg tablet TAKE 1 TABLET AT BEDTIME 90 tablet 1     Allergies as of 03/08/2017 - Mikel as  Reviewed 03/05/2017      Allergen  Reaction Noted     Atorvastatin Myalgia 04/30/2013     Tape [adhesive] Rash and Erythema 04/02/2013     Tiotropium bromide Rash 09/09/2014     Ezetimibe Myalgia 10/09/2009     Latex Rash 08/16/2014     Niacin Flushing 10/09/2009     Other [unlisted allergen (include detail in comments)] Rash and Itching 08/14/2013       OBJECTIVE:  Visit Vitals       /84     Pulse 60     Temp 97.6  F (36.4  C) (Tympanic)     Wt 72.6 kg (160 lb)     Breastfeeding No     BMI 25.82 kg/m2       General Appearance: uncomfortable  Head Exam: Tender at bilateral base of occiput  Neck Exam: Tender along posterior neck muscles  Chest/Respiratory Exam: Tender to palpation over anterior chest wall, which does not reproduce the pain she notes above, but is consistent with chronic chest wall pain. Clear to auscultation.  Cardiovascular Exam: Regular rate and rhythm. S1, S2, no murmur, click, gallop, or rubs.  Musculoskeletal Exam: Tender in upper back over levator scapula, trapezius regions      ASSESSMENT/PLAN:    ICD-10-CM   1. Atypical chest pain R07.89   2. Essential hypertension I10   3. CKD (chronic kidney disease) stage 3, GFR 30-59 ml/min N18.3   4. Pain medication agreement signed 2/10/17 Z79.899   5. Chronic bilateral low back pain without sciatica M54.5     G89.29   6. Chronic tension-type headache, intractable G44.221   7. Migraine syndrome G43.909   8. Myalgia M79.1     Chest pain with exertion is concerning, but other features are not concerning. Has real disease, but also chest wall pain and anxiety, so difficult to determine when further workup needed. She will see Dr Anguiano as scheduled tomorrow. See patient instructions for questions to be addressed.    BP elevated, this may certainly be causing some angina. Has only been on amlodipine a few days. Continue same dose. Has CAD and perhaps atherosclerosis of renal arteries is leading to new worsening of BP. Ordered renal doppler ultrasound  to assess for stenosis.    Refilled oxycodone 60 mg daily x 1 mo. Many areas of pain. Reviewed . Next refill due 3/13, but may refill today on 3/8 since she is in town. Should not be due until 4/12.    Tenderness in occiput, neck, upper back contributing to headache.   Discussed risks and benefits of trigger injection. Patient elected to proceed and signed consent.  PROCEDURE: Sites of maximal tenderness were cleansed with alcohol.  Trigger point injection(s) with 1 mL of 1% plain lidocaine performed at 5 sites - bilateral occiput, midline at C4-5 and in bilateral trapezius at levator scapula. This was well tolerated, and followed by pain improvement  Recommend PT for chronic headaches that appear to be both tension and migraine.    F/U 1 month(s)  11:37 AM

## 2018-01-04 NOTE — NURSING NOTE
Patient Information     Patient Name MRN Ankita Lee [id number] Female 1955      Nursing Note by Kimberly Whyte at 2017 11:45 AM     Author:  Kimberly Whyte Service:  (none) Author Type:  (none)     Filed:  2017 12:43 PM Encounter Date:  2017 Status:  Signed     :  Kimberly Whyte            Ankita Day is a 62 y.o. female presenting for follow up. She did not have her shoulder surgery at [facility] because she was too high risk. She has it rescheduled for 17 at [facility].  Kimberly Whyte LPN 2017 12:01 PM

## 2018-01-04 NOTE — PATIENT INSTRUCTIONS
Patient Information     Patient Name MRN Sex nAkita Bergeron [id number] Female 1955      Patient Instructions by Ramirez Cano MD at 2017 10:30 AM     Author:  Ramirez Cano MD Service:  (none) Author Type:  Physician     Filed:  2017 11:22 AM Encounter Date:  2017 Status:  Signed     :  Ramirez Cano MD (Physician)            Plan oxycodone 8 per day after surgery  Follow up on

## 2018-01-04 NOTE — H&P
Patient Information     Patient Name MRN Sex Ankita Bergeron [id number] Female 1955      H&P by Ramirez Cano MD at 3/27/2017 10:30 AM     Author:  Ramirez Cano MD Service:  (none) Author Type:  Physician     Filed:  3/28/2017 10:13 AM Encounter Date:  3/27/2017 Status:  Signed     :  Ramirez Cano MD (Physician)            PREOPERATIVE CLEARANCE  Date of Exam: 3/27/2017    Nursing Notes:   Kimberly Whyte  3/27/2017 10:42 AM  Signed  This patient presents today for a Preoperative exam for this procedure: Right shoulder arthroscopy, rotator cuff repair, and decompression.   Date of Surgery:    Surgeon:  Minh  Facility:  [facility]  Fax:    Kimberly MAULIK Whyte 3/27/2017 10:29 AM       HPI:  62 y.o. female with multiple medical problems including CAD s/p CABG and multiple stents, htn, chronic pain, and central sleep apnea here for preoperative optimization at request of Dr Wilkinson prior to shoulder arthroscopy.    She has been in repetitively for chest pain. Had some EKG changes and was sent to [facility]. Ruled out for MI with negative troponin. Sent to ICU for labetalol drip, but did not require as BP improved. ECHO repeated with normal LV function and some mild diastolic dysfunction, unchanged from last year. Amlodipine 5 mg daily started.   Improved with less chest pain since lowering blood pressure.    She saw cardiology and Dr Anguiano felt that the the elevated BP could be the contributing factor to minor ischemia and EKG changes. Wanted to give blood pressure time to improve before any further testing. She had a stress test in 2016 without evidence of inducible ischemia. Dr Anguiano feels she is able to have surgery.     Is not using any treatment for sleep apnea, none of the masks could be tolerated.    She is not actively counseling for anxiety. Seeing psychiatry and on chronic benzos.    Had neuropsych testing for memory issues. Due for results on  April 5.    Problem List:   Patient Active Problem List     Diagnosis  Code     Postsurgical aortocoronary bypass status Z95.1     Osteoarthrosis, unspecified whether generalized or localized, unspecified site M19.90     COPD (chronic obstructive pulmonary disease) (HC) J44.9     HX OF PEPTIC ULCER DISEASE K27.9     Chronic pain syndrome G89.4     HYPERLIPIDEMIA E78.5     Essential hypertension I10     Migraine syndrome G43.909     DEPRESSION, CHRONIC, SEVERE F33.2     Bilateral low back pain without sciatica M54.5     NEOPLASM, MALIGNANT, COLON, FAMILY HX Z80.0     NODULAR DEGENERATION OF CORNEA H18.459     Slow transit constipation K59.01     PULMONARY FUNCTION TESTS, ABNORMAL R94.2     GASTRIC ULCER, CHRONIC K25.4     Coronary atherosclerosis I25.10     Chronic anxiety F41.9     PANIC ATTACK F41.0     Collagenous colitis / Microcytic Colitis K52.831     DISC DISEASE, LUMBAR M51.37     DEGENERATIVE JOINT DISEASE, CERVICAL SPINE M47.9     Pain in joint of right shoulder M25.511     Facet arthropathy (HC) M12.88     Left subclavian artery stenosis causing coronary steal through LIMA graft I77.1     Vitamin D deficiency E55.9     Myofascial pain M79.1     Central sleep apnea G47.31     Pain medication agreement signed 2/10/17 Z79.899     Subclavian artery stenosis, left- S/P 7 x 200 mm stent 4/1/2013 for treatment of angina (LIMA graft) I77.1     Chest wall pain, chronic R07.89, G89.29     CKD (chronic kidney disease) stage 3, GFR 30-59 ml/min N18.3     Chest pain of uncertain etiology R07.89     Iron deficiency anemia D50.9     Pyuria N39.0     Clostridium difficile colitis A04.7     Peripheral polyneuropathy (HC) G62.9      Histories:  Past Medical History      Diagnosis   Date     Acute coronary syndrome (HC)  06/15/2007     with PTCA and stenting of 90% osteo circumflex lesion and patent LAD graft, patent left main stent.      Acute MI, initial, chest pain with positive nz  3/30/2013     Anterior chest wall pain   10/13/2009     chronic      Back pain, chronic  05/31/2011     Carpal tunnel syndrome, bilateral       Central sleep apnea  10/14/2013     Chest pain  12/29/2014     Chronic anxiety       Chronic depression       Severe, hx of suicide attempt/hospitalization      Chronic ischemic heart disease, unspecified  06/27/2012     Chronic pain syndrome  12/01/2010     chest wall, back      Chronic right shoulder pain       Clostridium difficile colitis  8/19/2016     Colitis  06/15/2007     history of      COLITIS       Microcytic       Constipation  11/29/2011     COPD (chronic obstructive pulmonary disease) (HC)  06/15/2007     low DLCO, normal spirometry      Cor athrscl-uns vessel       -angio revealed 3 vessel dz  -4 stents placed; 2 overlapping stents placed in mLAD, 1 stent pRCA, 1 stent pCirc 1 s 9/02 -non-ST elevation MI 1/03  -angio revealed restenosis of Circ.    -PTCA and brachytherapy of pCirc -repeat angio 2/03 -no intervension -CABG x3 12/03 - Dr Sinclair  -LIMA-LAD, RAFI-RCA, SVG-OM -PCI 7/04 stent to L -CTangio 9/05   -patentent LIMA-LAD. RAFI-RCA occluded; RCA ostio/proximal stent widely patent. SVG-OM occluded; OM marginal branch is widely patent.  Non-STEMI--6/15/07--DELONTE to LCX 5/2008: Angiogram: No hemodynamically significant lesions that were not bypassed by the LIMA -9/20/12 PCI of ostial LAD, Left Main, ostial Cx.  Patent NABILA to LAD, occluded RAFI to RCA and SVG to OM        E. coli sepsis (HC)  7/14/16     [facility]      Gastric ulcer with hemorrhage  10/2003     Gastric ulcer, chronic  10/03/2011     hx of GI bleed (2003)      History of tobacco abuse       Hx of coronary angioplasty  09/12/2002     with triple stenting      Hx of coronary angioplasty  01/10/2003     repeat angioplasty      Hx of diseases of blood and blood-forming organs       HX OF PEPTIC ULCER DISEASE  6/15/2007     Hyperlipemia       Hypertension       Intermittent claudication (HC)       Knee pain  05/31/2011     Left subclavian  artery stenosis causing coronary steal through LIMA graft  3/31/2013     S/p prox left SCA stent 4/1/2013       Lumbar disc disease       Migraine syndrome       Myalgia and myositis  10/14/2013     Neck pain, chronic       Nodular degeneration of cornea  09/26/2011     Normal cardiac stress test  10/2004     Cardiolite (2004, 2005, 2006 and 2011)      Normal stress echocardiogram  03/2010     dobutamine, negative      Opioid abuse       history of      Osteoarthritis       Pain medication agreement signed 05/22/2012 1/28/2014     Panic attack       Postsurgical aortocoronary bypass status  2/12/2003     Postsurgical percutaneous transluminal coronary angioplasty status       Vitamin D deficiency  5/6/2013      Past Surgical History       Procedure   Laterality Date     Coronary artery bypass graft   12/13/02     Triple bypass, left internal mammary  to LAD, right internal mammary to right coronary artery, saphenous to obtuse marginal of the left circumflex.       Angioplasty   9/12/02     with triple stenting        Ptca   1/10/2003     Appendectomy        Shoulder arthroscopy        right       Knee arthroscopy        left       Ugi endoscop   2003     Upper GI endoscopy.        Esophagogastroduodenoscopy   2011     EGD Dr Bowman with pyloric ulcer       Colonoscopy screening   2011     Dr Bowman benign polyps       Lap cholecystectomy   2006     Tonsil and adenoidectomy   childhood     Upper arm/elbow surgery   baby     birth malachi removed from right arm       Hysterectomy   age 21     Salpingo-oophorectomy   age 27     Bilateral salpingo-oophorectomy       Femur knee surgery        x3, right knee       Femur knee surgery   2000     left knee  ligament surgery       Ptca   09/20/2012     DELONTE in LAD and left main       Embolectomy   04/02/2013     brachial artery pseudoaneurysm after stenting       Endoscopy gi   2003     Upper GI endoscopy.        Esophagogastroduodenoscopy   2011     pyloric ulcer, Dr. Bowman        "Colonoscopy screening        benign polyps, Dr. Bowman       Ptca   2013     L subclavian stenosis       Esophagogastroduodenoscopy   15     mild gastritis, Dr Bowman       Colonoscopy   16     normal, Dr Bowman       Social History     Social History        Marital status:       Spouse name: N/A     Number of children:  N/A     Years of education:  N/A     Occupational History      Not on file.     Social History Main Topics         Smoking status:   Former Smoker     Years:  35.00     Types:  Cigarettes     Quit date:  2/15/2017     Smokeless tobacco:   Never Used     Alcohol use   No      Comment: beer on occasion      Drug use:   No     Sexual activity:   Not on file     Other Topics   Concern     Caffeine Concern  No     Exercise  Yes     gets on bike when she has time       Service  No     Blood Transfusions  No     Occupational Exposure  No     Hobby Hazards  No     Sleep Concern  No     Stress Concern  Yes     \"depressed due to Erin, lost mom at [place]\"  12/8/15      Weight Concern  Yes     asking about diet pills 12/8/15      Special Diet  No     Back Care  No     Bike Helmet  No     Seat Belt  Yes     Social History Narrative     ,  Steven.  Currently not working outside the home. Lives three-mile self [place] met with . Tobacco abuse, quit , restarted. Quit  and has since quit, no alcohol.     Family History       Problem   Relation Age of Onset     Heart Disease  Father      Heart condition/Significant for atherosclerotic cardiovascular disease, but non premature.       Cancer-colon  Father       of colon cancer        Cancer  Father      mets to liver, secondary to colon cancer       Heart Disease  Mother      Cancer  Sister      multiple myeloma       Other  Son      gallstones       Cancer  Other      Multiple Myeloma       Heart Disease  Other      Ischemic Heart Disease       Cancer-colon  Other      Malignant neoplasms      "   Allergies: Atorvastatin; Tape [adhesive]; Tiotropium bromide; Ezetimibe; Latex; Niacin; and Other [unlisted allergen (include detail in comments)]   Latex Allergy: yes    Current Medications:  Current Outpatient Rx       Medication  Sig Dispense Refill     amLODIPine (NORVASC) 5 mg tablet Take 1 tablet by mouth once daily.       aspirin (ECOTRIN) 81 mg enteric coated tablet Take 81 mg by mouth once daily with a meal.       busPIRone (BUSPAR) 10 mg tablet TAKE 1 TABLET TWICE A  tablet 3     cholecalciferol (VITAMIN D) 1,000 unit capsule Take 1,000 Units by mouth 2 times daily.       clonazePAM (KLONOPIN) 1 mg tablet Take 1 tablet by mouth 4 times daily. Becky Olmstead CNP       clopidogrel (PLAVIX) 75 mg tablet TAKE 1 TABLET DAILY 90 tablet 5     cyclobenzaprine (FLEXERIL) 10 mg tablet TAKE 1 TABLET TWICE A DAY IF NEEDED FOR MUSCLE SPASM 60 tablet 0     diclofenac 1 % topical (VOLTAREN) gel Apply 1 g topically to affected area(s) 2 times daily. 100 g 11     docusate (STOOL SOFTENER) 50 mg capsule Take 1 capsule by mouth once daily.  0     DULoxetine (CYMBALTA) 60 mg Delayed-release capsule TAKE 1 CAPSULE TWICE A  capsule 3     fluticasone (50 mcg per actuation) nasal solution (FLONASE) Inhale 2 sprays into both nostrils once daily as needed for allergic rhinitis and nasal congestion.       lisinopril (PRINIVIL; ZESTRIL) 40 mg tablet TAKE 1 TABLET DAILY 90 tablet 4     metoprolol tartrate (LOPRESSOR) 50 mg tablet Take 1 tablet by mouth 2 times daily. 180 tablet 4     Nitroglycerin (NITROMIST) 400 mcg/spray spra PLACE 1 SPRAY UNDER THE TONGUE EVERY 5 MINUTES IF NEEDED FOR CHEST PAIN 1 g 11     nitroglycerin (NITROSTAT) 0.3 mg sublingual tablet Place 1 tablet under the tongue every 5 minutes if needed for Chest Pain. 1 Bottle 2     ondansetron (ZOFRAN, AS HYDROCHLORIDE,) 4 mg tablet Take 1 tablet by mouth every 6 hours if needed for Nausea/Vomiting. 40 tablet 11     oxyCODONE-acetaminophen,  mg,  "(PERCOCET)  mg per tablet Take 2 tablets by mouth 3 times daily  Earliest Fill Date: 3/8/17 180 tablet 0     pantoprazole (PROTONIX) 40 mg delayed-release tablet TAKE 1 TABLET TWICE A  tablet 3     rosuvastatin (CRESTOR) 20 mg tablet Take 1 tablet by mouth at bedtime. 30 tablet 11     Saccharomyces boulardii (FLORASTOR) 250 mg capsule Take 1 capsule by mouth 2 times daily.  0     Medications have been reviewed by me and are current to the best of my knowledge and ability.    Recent use of: aspirin and benzodiazepines    HABITS:   Social History       Substance Use Topics         Smoking status:   Former Smoker     Years:  35.00     Types:  Cigarettes     Quit date:  2/15/2017     Smokeless tobacco:   Never Used     Alcohol use   No      Comment: beer on occasion    Tetanus up to date yes, 2016    Proposed anesthesia: General  Anesthesia Complications: None  History of abnormal bleeding : None  History of Blood Transfusions: No    Health Care Directive or Living Will: yes    REVIEW OF SYSTEMS:  General: Denies general constitutional problems  Eyes: Denies problems  Ears/Nose/Throat: Denies problems  Cardiovascular: see above  Respiratory: Denies problems  Gastrointestinal: Denies problems  Genitourinary: Denies problems  Neurologic: Denies problems  Psychiatric: stable anxiety      EXAM:   Visit Vitals       /79     Pulse 56     Ht 1.676 m (5' 6\")     Wt 71.2 kg (157 lb)     Breastfeeding No     BMI 25.34 kg/m2    Body mass index is 25.34 kg/(m^2).    General Appearance: Pleasant, alert, appropriate appearance for age. No acute distress  Head: Sinuses tender frontal, nontender maxillary.   Ear Exam: Normal TM's bilaterally.   OroPharynx Exam: Dental hygiene adequate. Normal buccal mucosa. Normal pharynx. Mallampati 3/4.  Neck Exam: Supple, no masses or nodes.  Chest/Respiratory Exam: Normal chest wall and respirations. Clear to auscultation.  Cardiovascular Exam: Regular rate and rhythm. S1, S2, no " murmur, click, gallop, or rubs.  Extremities: 2 + pedal pulses.  No lower extremity edema.  Neurologic Exam: Nonfocal; symmetric DTRs, normal gross motor movement, tone, and coordination. No tremor.      DIAGNOSTICS:   1. EKG: EKG FINDINGS - appears normal, NSR on 3/9/17 in [place]  2. CXR: not indicated  3. Labs: from recent ED visit  Results for orders placed or performed during the hospital encounter of 03/09/17      COMP METABOLIC PANEL      Result  Value Ref Range    SODIUM 143 137 - 145 mmol/L    POTASSIUM 4.2 3.5 - 5.1 mmol/L    CHLORIDE 102 98 - 107 mmol/L    CO2,TOTAL 29 22 - 30 mmol/L    ANION GAP 11.4 8 - 16 meq/L    GLUCOSE 101 74 - 106 mg/dL    CALCIUM 9.4 8.4 - 10.2 mg/dL    BUN 14 7 - 17 mg/dL    CREATININE 0.86 0.52 - 1.04 mg/dL    BUN/CREAT RATIO 16     GFR if African American >60 >60 ml/min/1.73m2    GFR if not African American >60 >60 ml/min/1.73m2    ALBUMIN 4.5 3.5 - 5.0 g/dL    PROTEIN,TOTAL 7.0 6.3 - 8.2 g/dL    GLOBULIN                  2.5 2.0 - 4.0 g/dL    A/G RATIO 1.8                    BILIRUBIN,TOTAL 0.7 0.2 - 1.3 mg/dL    ALK PHOSPHATASE  56 38 - 126 U/L    ALT (SGPT) 23 9 - 52 U/L    AST (SGOT) 23 14 - 36 U/L   C-REACTIVE PROTEIN      Result  Value Ref Range    C-REACTIVE PROTEIN 0.6 <1.0 mg/dL   CBC WITH AUTO DIFFERENTIAL      Result  Value Ref Range    WHITE BLOOD COUNT         6.0 4.5 - 11.0 thou/cu mm    RED BLOOD COUNT           3.66 (L) 4.00 - 5.20 mil/cu mm    HEMOGLOBIN                11.0 (L) 12.0 - 16.0 g/dL    HEMATOCRIT                33.2 33.0 - 51.0 %    MCV                       91 80 - 100 fL    MCH                       30.1 26.0 - 34.0 pg    MCHC                      33.1 32.0 - 36.0 g/dL    RDW                       13.3 11.5 - 15.5 %    PLATELET COUNT            260 140 - 440 thou/cu mm    NRBC                      0.0 %    IMMATURE PLATELET FRACTION 2.5 0.9 - 11.2 %    NEUTROPHILS               44.2 42.0 - 72.0 %    LYMPHOCYTES               41.1 20.0 - 44.0 %     MONOCYTES                 10.3 <12.0 %    EOSINOPHILS               3.0 <8.0 %    BASOPHILS                 1.2 <3.0 %    IMMATURE GRANULOCYTES(METAS,MYELOS,PROS) 0.2 %    ABSOLUTE NEUTROPHILS      2.7 1.7 - 7.0 thou/cu mm    ABSOLUTE LYMPHOCYTES      2.5 0.9 - 2.9 thou/cu mm    ABSOLUTE MONOCYTES        0.6 <0.9 thou/cu mm    ABSOLUTE EOSINOPHILS      0.2 <0.5 thou/cu mm    ABSOLUTE BASOPHILS        0.1 <0.3 thou/cu mm    ABSOLUTE IMMATURE GRANULOCYTES(METAS,MYELOS,PROS) 0.0 <=0.3 thou/cu mm    ABS NRBC 0.0 thou /cu mm     *Note: Due to a large number of results and/or encounters for the requested time period, some results have not been displayed. A complete set of results can be found in Results Review.           IMPRESSION:   (Z01.810) Preop cardiovascular exam  (primary encounter diagnosis)  (I10) Essential hypertension  (I25.810) Atherosclerosis of coronary artery bypass graft of native heart without angina pectoris  (M54.5,  G89.29) Chronic bilateral low back pain without sciatica  (G43.909) Migraine syndrome  (Z79.899) Pain medication agreement signed 2/10/17  (N18.3) CKD (chronic kidney disease) stage 3, GFR 30-59 ml/min     For above listed surgery and anesthesia:   Patient is moderate  risk for perioperative complications with CAD history and difficulty in assessment for when she truly requires attention for chest pain.     RECOMMENDATIONS: proceed without further diagnostic evaluation. Continue aspirin daily even through procedure.   Stop Plavix 5 d prior and resume after    We discussed her frequent ED visits with normal troponin. However, with elevated BP, she had EKG changes. Dr Anguiano notes she is a difficult case with real disease, but anxiety and chest wall pain with frequent false concerning chest discomfort. I recommend she consider counseling to reduce stress, blood pressure and frequency of evaluation. She will consider.    Still recommend renal ultrasound to assess for stenosis with recent  increase in BP. Take BP medications AM of surgery.    Has oxycodone until surgery date, but often post-op medications from ortho due not account for chronic pain requirements. I provided a refill on oxycodone for 4/4 that she can use. Currently on 60 mg oxycodone daily. May take up to 80 mg daily after procedure. Follow up with me the next week to address pain management.     Greater than 50% of this 42 minute appointment spent on counseling.   Electronically Signed by: Ramirez Cano MD  3/27/2017

## 2018-01-04 NOTE — NURSING NOTE
Patient Information     Patient Name MRN Sex Ankita Bergeron [id number] Female 1955      Nursing Note by Kimberly Whyte at 2017 10:30 AM     Author:  Kimberly Whyte Service:  (none) Author Type:  (none)     Filed:  2017 10:39 AM Encounter Date:  2017 Status:  Signed     :  Kimberly Whyte            This patient presents today for a Preoperative exam for this procedure: right shoulder repair   Date of Surgery: 17   Surgeon:  Minh  Facility:  [facility]  Fax:    Kimberly Whyte LPN 2017 10:31 AM

## 2018-01-04 NOTE — PATIENT INSTRUCTIONS
Patient Information     Patient Name MRN Sex Ankita Bergeron [id number] Female 1955      Patient Instructions by Ramirez Cano MD at 3/27/2017 10:30 AM     Author:  Ramirez Cano MD  Service:  (none) Author Type:  Physician     Filed:  3/27/2017 11:21 AM  Encounter Date:  3/27/2017 Status:  Addendum     :  Ramirez Cano MD (Physician)        Related Notes: Original Note by Ramirez Cano MD (Physician) filed at 3/27/2017 11:19 AM            Reschedule kidney ultrasound   Given oxycodone for pain after surgery. May use up to 8 per day.   Follow up  or     Continue on aspirin even through surgery  Stop Plavix 5 days prior  OK to take blood pressure medication morning of surgery with a sip of water

## 2018-01-04 NOTE — PROGRESS NOTES
Patient Information     Patient Name MRN Sex Ankita Bergeron [id number] Female 1955      Progress Notes by Ramirez Cano MD at 2017 11:45 AM     Author:  Ramirez Cano MD Service:  (none) Author Type:  Physician     Filed:  2017  2:24 PM Encounter Date:  2017 Status:  Signed     :  Ramirez Cano MD (Physician)            SUBJECTIVE:  62 y.o. female with chronic low back pain and chest wall pain here for refill on oxycodone. She had shoulder arthroscopy on  cancelled, will have to have performed in a hospital. This is planned on . Has 8 pills left and denies any at home or in another container. Should have enough meds until . At first she denies taking more medication, says that she has been keeping on the correct dosing, but seems uncertain. I pointed out that typically she has not been refilling early, but in review of  received Rx , 2/10, 3/8 and . So escalating in dosing.     Reviewed renal ultrasound showing no renal artery stenosis.    Reviewed neuropsych eval showing no cognitive deficits. She reports poor memory, but this is more likely anxiety or depression, chronic pain, or untreated L[place]ON.    No other change in status.       Current Outpatient Prescriptions       Medication  Sig Dispense Refill     amLODIPine (NORVASC) 5 mg tablet Take 1 tablet by mouth once daily. 90 tablet 4     aspirin (ECOTRIN) 81 mg enteric coated tablet Take 81 mg by mouth once daily with a meal.       busPIRone (BUSPAR) 10 mg tablet TAKE 1 TABLET TWICE A  tablet 3     cholecalciferol (VITAMIN D) 1,000 unit capsule Take 1,000 Units by mouth 2 times daily.       clonazePAM (KLONOPIN) 1 mg tablet Take 1 tablet by mouth 4 times daily. Becky Olmstead, TRAY       clopidogrel (PLAVIX) 75 mg tablet TAKE 1 TABLET DAILY 90 tablet 5     cyclobenzaprine (FLEXERIL) 10 mg tablet TAKE 1 TABLET TWICE A DAY IF NEEDED FOR MUSCLE SPASM 60 tablet 0     diclofenac 1 % topical  (VOLTAREN) gel Apply 1 g topically to affected area(s) 2 times daily. 100 g 11     docusate (STOOL SOFTENER) 50 mg capsule Take 1 capsule by mouth once daily.  0     DULoxetine (CYMBALTA) 60 mg Delayed-release capsule TAKE 1 CAPSULE TWICE A  capsule 3     fluticasone (50 mcg per actuation) nasal solution (FLONASE) Inhale 2 sprays into both nostrils once daily as needed for allergic rhinitis and nasal congestion.       lisinopril (PRINIVIL; ZESTRIL) 40 mg tablet TAKE 1 TABLET DAILY 90 tablet 4     metoprolol tartrate (LOPRESSOR) 50 mg tablet Take 1 tablet by mouth 2 times daily. 180 tablet 4     Nitroglycerin (NITROMIST) 400 mcg/spray spra PLACE 1 SPRAY UNDER THE TONGUE EVERY 5 MINUTES IF NEEDED FOR CHEST PAIN 1 g 11     nitroglycerin (NITROSTAT) 0.3 mg sublingual tablet Place 1 tablet under the tongue every 5 minutes if needed for Chest Pain. 1 Bottle 2     ondansetron (ZOFRAN, AS HYDROCHLORIDE,) 4 mg tablet Take 1 tablet by mouth every 6 hours if needed for Nausea/Vomiting. 40 tablet 11     oxyCODONE-acetaminophen,  mg, (PERCOCET)  mg per tablet Take 2 tablets by mouth 3 times daily  Earliest Fill Date: 4/11/17 90 tablet 0     pantoprazole (PROTONIX) 40 mg delayed-release tablet TAKE 1 TABLET TWICE A  tablet 3     rosuvastatin (CRESTOR) 20 mg tablet Take 1 tablet by mouth at bedtime. 30 tablet 11     Saccharomyces boulardii (FLORASTOR) 250 mg capsule Take 1 capsule by mouth 2 times daily.  0     Allergies as of 04/11/2017 - Mikel as Reviewed 04/11/2017      Allergen  Reaction Noted     Atorvastatin Myalgia 04/30/2013     Tape [adhesive] Rash and Erythema 04/02/2013     Tiotropium bromide Rash 09/09/2014     Ezetimibe Myalgia 10/09/2009     Latex Rash 08/16/2014     Niacin Flushing 10/09/2009     Other [unlisted allergen (include detail in comments)] Rash and Itching 08/14/2013       OBJECTIVE:  BP 90/70  Pulse 60  Wt 71.2 kg (157 lb)  Breastfeeding? No  BMI 25.34 kg/m2    Psychiatric  Exam: Alert and oriented, appropriate affect.      ASSESSMENT/PLAN:    ICD-10-CM   1. Chronic bilateral low back pain without sciatica M54.5     G89.29   2. Migraine syndrome G43.909   3. Pain medication agreement signed 2/10/17 Z79.899     Refilled oxycodone. Agree to refill today, but has to keep on track. Historically she has not taken excess, but self-escalating recently. Follow up after surgery to discuss further dosing. Given #90 for 2 weeks. Will have enough until after surgery time.     Still clear for surgery.    Recommend psych cares for memory issues. Discussed this before and she wants to wait until after shoulder surgery.     F/U 2 week(s)  Greater than 50% of this 25 minute appointment spent on counseling.

## 2018-01-04 NOTE — H&P
Patient Information     Patient Name MRN Sex Ankita Bergeron [id number] Female 1955      H&P by Ramirez Cano MD at 2017 10:30 AM     Author:  Ramirez Cano MD Service:  (none) Author Type:  Physician     Filed:  2017  7:36 AM Encounter Date:  2017 Status:  Signed     :  Ramirez Cano MD (Physician)            PREOPERATIVE CLEARANCE  Date of Exam: 2017    Nursing Notes:   Pato Kimberly  2017 10:39 AM  Signed  This patient presents today for a Preoperative exam for this procedure: right shoulder repair   Date of Surgery: 17   Surgeon:  Minh  Facility:  [facility]  Fax:    Kimberlyflorian Whyte LPN 2017 10:31 AM         HPI:  62 y.o. female with multiple medical problems including CAD s/p CABG and multiple stents, htn, chronic pain, and central sleep apnea here for preoperative optimization at request of Dr Wilkinson prior to shoulder arthroscopy.    Was hospitalized at [facility] 3/5 to 3/7 for chest pain and EKG changes. BP was high, started on amlodipine 5 mg and BP improved. Less chest pain now.   She saw cardiology and Dr Anguiano felt that the the elevated BP could be the contributing factor to minor ischemia and EKG changes. Wanted to give blood pressure time to improve before any further testing. She had a stress test in 2016 without evidence of inducible ischemia. Dr Anguiano feels she is able to have surgery.     Feels dizzy and nauseated on standing now and will notice a low BP at times.    Started on Crestor and having leg pain.     I provided increased oxycodone for surgery that was to occur in April, but then she had to reschedule surgery and rescheduled again because she forgot to take Plavix out of pill boxes. Is back on baseline oxycodone at 6 per day.     Is not using any treatment for sleep apnea, none of the masks could be tolerated.    She is not actively counseling for anxiety. Seeing psychiatry and on chronic benzos.    Problem List:    Patient Active Problem List     Diagnosis  Code     Postsurgical aortocoronary bypass status Z95.1     Osteoarthrosis, unspecified whether generalized or localized, unspecified site M19.90     COPD (chronic obstructive pulmonary disease) (HC) J44.9     HX OF PEPTIC ULCER DISEASE K27.9     Chronic pain syndrome G89.4     Mixed hyperlipidemia E78.2     Essential hypertension I10     Migraine syndrome G43.909     DEPRESSION, CHRONIC, SEVERE F33.2     Bilateral low back pain without sciatica M54.5     NEOPLASM, MALIGNANT, COLON, FAMILY HX Z80.0     NODULAR DEGENERATION OF CORNEA H18.459     Slow transit constipation K59.01     PULMONARY FUNCTION TESTS, ABNORMAL R94.2     GASTRIC ULCER, CHRONIC K25.4     Coronary atherosclerosis I25.10     Chronic anxiety F41.9     PANIC ATTACK F41.0     Collagenous colitis / Microcytic Colitis K52.831     DISC DISEASE, LUMBAR M51.37     DEGENERATIVE JOINT DISEASE, CERVICAL SPINE M47.9     Pain in joint of right shoulder M25.511     Facet arthropathy M12.88     Left subclavian artery stenosis causing coronary steal through LIMA graft I77.1     Vitamin D deficiency E55.9     Myofascial pain M79.1     Central sleep apnea G47.31     Pain medication agreement signed 2/10/17 Z02.89     Subclavian artery stenosis, left- S/P 7 x 200 mm stent 4/1/2013 for treatment of angina (LIMA graft) I77.1     Chest wall pain, chronic R07.89, G89.29     CKD (chronic kidney disease) stage 3, GFR 30-59 ml/min N18.3     Chest pain of uncertain etiology R07.89     Iron deficiency anemia D50.9     Pyuria N39.0     Clostridium difficile colitis A04.7     Peripheral polyneuropathy (HC) G62.9      Histories:  Past Medical History:     Diagnosis  Date     Acute coronary syndrome (HC) 06/15/2007    with PTCA and stenting of 90% osteo circumflex lesion and patent LAD graft, patent left main stent.      Acute MI, initial, chest pain with positive nz 3/30/2013     Anterior chest wall pain 10/13/2009    chronic       Back pain, chronic 05/31/2011     Carpal tunnel syndrome, bilateral      Central sleep apnea 10/14/2013     Chest pain 12/29/2014     Chronic anxiety      Chronic depression     Severe, hx of suicide attempt/hospitalization      Chronic ischemic heart disease, unspecified 06/27/2012     Chronic pain syndrome 12/01/2010    chest wall, back      Chronic right shoulder pain      Clostridium difficile colitis 8/19/2016     Colitis 06/15/2007    history of      COLITIS     Microcytic       Constipation 11/29/2011     COPD (chronic obstructive pulmonary disease) (HC) 06/15/2007    low DLCO, normal spirometry      Cor athrscl-uns vessel     -angio revealed 3 vessel dz  -4 stents placed; 2 overlapping stents placed in mLAD, 1 stent pRCA, 1 stent pCirc 1 s 9/02 -non-ST elevation MI 1/03  -angio revealed restenosis of Circ.    -PTCA and brachytherapy of pCirc -repeat angio 2/03 -no intervension -CABG x3 12/03 - Dr Sinclair  -LIMA-LAD, RAFI-RCA, SVG-OM -PCI 7/04 stent to L -CTangio 9/05   -patentent LIMA-LAD. RAFI-RCA occluded; RCA ostio/proximal stent widely patent. SVG-OM occluded; OM marginal branch is widely patent.  Non-STEMI--6/15/07--DELONTE to LCX 5/2008: Angiogram: No hemodynamically significant lesions that were not bypassed by the LIMA -9/20/12 PCI of ostial LAD, Left Main, ostial Cx.  Patent NABILA to LAD, occluded RAFI to RCA and SVG to OM        E. coli sepsis (HC) 7/14/16    [facility]      Gastric ulcer with hemorrhage 10/2003     Gastric ulcer, chronic 10/03/2011    hx of GI bleed (2003)      History of tobacco abuse      Hx of coronary angioplasty 09/12/2002    with triple stenting      Hx of coronary angioplasty 01/10/2003    repeat angioplasty      Hx of diseases of blood and blood-forming organs      HX OF PEPTIC ULCER DISEASE 6/15/2007     Hyperlipemia      Hypertension      Intermittent claudication (HC)      Knee pain 05/31/2011     Left subclavian artery stenosis causing coronary steal through LIMA graft  3/31/2013    S/p prox left SCA stent 4/1/2013       Lumbar disc disease      Migraine syndrome      Myalgia and myositis 10/14/2013     Neck pain, chronic      Nodular degeneration of cornea 09/26/2011     Normal cardiac stress test 10/2004    Cardiolite (2004, 2005, 2006 and 2011)      Normal stress echocardiogram 03/2010    dobutamine, negative      Opioid abuse     history of      Osteoarthritis      Pain medication agreement signed 05/22/2012 1/28/2014     Panic attack      Postsurgical aortocoronary bypass status 2/12/2003     Postsurgical percutaneous transluminal coronary angioplasty status      Vitamin D deficiency 5/6/2013      Past Surgical History:      Procedure  Laterality Date     ANGIOPLASTY  9/12/02    with triple stenting        APPENDECTOMY       COLONOSCOPY  8/8/16    normal, Dr Bowman       COLONOSCOPY SCREENING  2011    Dr Bowman benign polyps       COLONOSCOPY SCREENING  2011    benign polyps, Dr. Bowman       CORONARY ARTERY BYPASS GRAFT  12/13/02    Triple bypass, left internal mammary  to LAD, right internal mammary to right coronary artery, saphenous to obtuse marginal of the left circumflex.       EMBOLECTOMY  04/02/2013    brachial artery pseudoaneurysm after stenting       ENDOSCOPY GI  2003    Upper GI endoscopy.        ESOPHAGOGASTRODUODENOSCOPY  2011    EGD Dr Bowman with pyloric ulcer       ESOPHAGOGASTRODUODENOSCOPY  2011    pyloric ulcer, Dr. Bowman       ESOPHAGOGASTRODUODENOSCOPY  8/8/16    mild gastritis, Dr Bowman       FEMUR KNEE SURGERY      x3, right knee       FEMUR KNEE SURGERY  2000    left knee  ligament surgery       HYSTERECTOMY  age 21     KNEE ARTHROSCOPY      left       LAP CHOLECYSTECTOMY  2006     PTCA  1/10/2003     PTCA  09/20/2012    DELONTE in LAD and left main       PTCA  4/1/2013    L subclavian stenosis       SALPINGO-OOPHORECTOMY  age 27    Bilateral salpingo-oophorectomy       SHOULDER ARTHROSCOPY      right       TONSIL AND ADENOIDECTOMY  childhood     UGI  "ENDOSCOP      Upper GI endoscopy.        UPPER ARM/ELBOW SURGERY  baby    birth malachi removed from right arm       Social History     Social History        Marital status:       Spouse name: N/A     Number of children:  N/A     Years of education:  N/A     Occupational History      Not on file.     Social History Main Topics         Smoking status:   Former Smoker     Years:  35.00     Types:  Cigarettes     Quit date:  2/15/2017     Smokeless tobacco:   Never Used     Alcohol use   No      Comment: beer on occasion      Drug use:   No     Sexual activity:   Not on file     Other Topics   Concern     Caffeine Concern  No     Exercise  Yes     gets on bike when she has time       Service  No     Blood Transfusions  No     Occupational Exposure  No     Hobby Hazards  No     Sleep Concern  No     Stress Concern  Yes     \"depressed due to Erin, lost mom at [place]\"  12/8/15      Weight Concern  Yes     asking about diet pills 12/8/15      Special Diet  No     Back Care  No     Bike Helmet  No     Seat Belt  Yes     Social History Narrative     ,  Steven.  Currently not working outside the home. Lives three-mile self [place]i met with . Tobacco abuse, quit , restarted. Quit  and has since quit, no alcohol.     Family History       Problem   Relation Age of Onset     Heart Disease  Father      Heart condition/Significant for atherosclerotic cardiovascular disease, but non premature.       Cancer-colon  Father       of colon cancer        Cancer  Father      mets to liver, secondary to colon cancer       Heart Disease  Mother      Cancer  Sister      multiple myeloma       Other  Son      gallstones       Cancer  Other      Multiple Myeloma       Heart Disease  Other      Ischemic Heart Disease       Cancer-colon  Other      Malignant neoplasms        Allergies: Atorvastatin; Tape [adhesive]; Tiotropium bromide; Ezetimibe; Latex; Niacin; and Other [unlisted allergen " (include detail in comments)]   Latex Allergy: yes    Current Medications:  Current Outpatient Rx       Medication  Sig Dispense Refill     amLODIPine (NORVASC) 5 mg tablet Take 1 tablet by mouth once daily.       aspirin (ECOTRIN) 81 mg enteric coated tablet Take 81 mg by mouth once daily with a meal.       busPIRone (BUSPAR) 10 mg tablet TAKE 1 TABLET TWICE A  tablet 3     cholecalciferol (VITAMIN D) 1,000 unit capsule Take 1,000 Units by mouth 2 times daily.       clonazePAM (KLONOPIN) 1 mg tablet Take 1 tablet by mouth 4 times daily. Becky Olmstead CNP       clopidogrel (PLAVIX) 75 mg tablet TAKE 1 TABLET DAILY 90 tablet 5     cyclobenzaprine (FLEXERIL) 10 mg tablet TAKE 1 TABLET TWICE A DAY IF NEEDED FOR MUSCLE SPASM 60 tablet 0     diclofenac 1 % topical (VOLTAREN) gel Apply 1 g topically to affected area(s) 2 times daily. 100 g 11     docusate (STOOL SOFTENER) 50 mg capsule Take 1 capsule by mouth once daily.  0     DULoxetine (CYMBALTA) 60 mg Delayed-release capsule TAKE 1 CAPSULE TWICE A  capsule 3     fluticasone (50 mcg per actuation) nasal solution (FLONASE) Inhale 2 sprays into both nostrils once daily as needed for allergic rhinitis and nasal congestion.       lisinopril (PRINIVIL; ZESTRIL) 40 mg tablet TAKE 1 TABLET DAILY 90 tablet 4     metoprolol tartrate (LOPRESSOR) 50 mg tablet Take 1 tablet by mouth 2 times daily. 180 tablet 4     Nitroglycerin (NITROMIST) 400 mcg/spray spra PLACE 1 SPRAY UNDER THE TONGUE EVERY 5 MINUTES IF NEEDED FOR CHEST PAIN 1 g 11     nitroglycerin (NITROSTAT) 0.3 mg sublingual tablet Place 1 tablet under the tongue every 5 minutes if needed for Chest Pain. 1 Bottle 2     ondansetron (ZOFRAN, AS HYDROCHLORIDE,) 4 mg tablet Take 1 tablet by mouth every 6 hours if needed for Nausea/Vomiting. 40 tablet 11     oxyCODONE-acetaminophen,  mg, (PERCOCET)  mg per tablet Take 2 tablets by mouth 3 times daily  Earliest Fill Date: 3/8/17 180 tablet 0      "pantoprazole (PROTONIX) 40 mg delayed-release tablet TAKE 1 TABLET TWICE A  tablet 3     rosuvastatin (CRESTOR) 20 mg tablet Take 1 tablet by mouth at bedtime. 30 tablet 11     Saccharomyces boulardii (FLORASTOR) 250 mg capsule Take 1 capsule by mouth 2 times daily.  0     Medications have been reviewed by me and are current to the best of my knowledge and ability.    Recent use of: aspirin and benzodiazepines    HABITS:   Social History       Substance Use Topics         Smoking status:   Former Smoker     Years:  35.00     Types:  Cigarettes     Quit date:  2/15/2017     Smokeless tobacco:   Never Used     Alcohol use   No      Comment: beer on occasion    Tetanus up to date yes, 2016    Proposed anesthesia: General  Anesthesia Complications: None  History of abnormal bleeding : None  History of Blood Transfusions: No    Health Care Directive or Living Will: yes    REVIEW OF SYSTEMS:  General: Denies general constitutional problems  Eyes: Denies problems  Ears/Nose/Throat: Denies problems  Cardiovascular: see above  Respiratory: Denies problems  Gastrointestinal: Denies problems  Genitourinary: Denies problems  Neurologic: Denies problems  Psychiatric: stable anxiety      EXAM:   /67  Pulse 58  Ht 1.68 m (5' 6.14\")  Wt 73.9 kg (163 lb)  Breastfeeding? No  BMI 26.2 kg/m2 Body mass index is 26.2 kg/(m^2).    General Appearance: Pleasant, alert, appropriate appearance for age. No acute distress  Head: Sinuses tender frontal, nontender maxillary.   Ear Exam: Normal TM's bilaterally.   OroPharynx Exam: Dental hygiene adequate. Normal buccal mucosa. Normal pharynx. Mallampati 3/4.  Neck Exam: Supple, no masses or nodes.  Chest/Respiratory Exam: Normal chest wall and respirations. Clear to auscultation.  Cardiovascular Exam: Regular rate and rhythm. S1, S2, no murmur, click, gallop, or rubs.  Extremities: 2 + pedal pulses.  No lower extremity edema.  Neurologic Exam: Nonfocal; symmetric DTRs, normal " gross motor movement, tone, and coordination. No tremor.      DIAGNOSTICS:   1. EKG: EKG FINDINGS - appears normal, NSR on 3/9/17 in [place]  2. CXR: not indicated  3. Labs:   Results for orders placed or performed in visit on 04/24/17      LIPID PANEL      Result  Value Ref Range    CHOLESTEROL,TOTAL 225 (H) <200 mg/dL    TRIGLYCERIDES 196 (H) <150 mg/dL    HDL CHOLESTEROL 87 23 - 92 mg/dL    NON-HDL CHOLESTEROL 138 <145 mg/dl    CHOL/HDL RATIO            2.59 <4.50                    LDL CHOLESTEROL 99 <100 mg/dL    PATIENT STATUS            FASTING                   BASIC METABOLIC PANEL      Result  Value Ref Range    SODIUM 134 133 - 143 mmol/L    POTASSIUM 4.5 3.5 - 5.1 mmol/L    CHLORIDE 101 98 - 107 mmol/L    CO2,TOTAL 25 21 - 31 mmol/L    ANION GAP 8 5 - 18                    GLUCOSE 89 70 - 105 mg/dL    CALCIUM 9.9 8.6 - 10.3 mg/dL    BUN 18 7 - 25 mg/dL    CREATININE 1.10 0.70 - 1.30 mg/dL    BUN/CREAT RATIO           16                    GFR if African American >60 >60 ml/min/1.73m2    GFR if not African American 50 (L) >60 ml/min/1.73m2   CBC W PLT NO DIFF      Result  Value Ref Range    WHITE BLOOD COUNT         7.5 4.5 - 11.0 thou/cu mm    RED BLOOD COUNT           4.14 4.00 - 5.20 mil/cu mm    HEMOGLOBIN                12.8 12.0 - 16.0 g/dL    HEMATOCRIT                38.1 33.0 - 51.0 %    MCV                       92 80 - 100 fL    MCH                       31.0 26.0 - 34.0 pg    MCHC                      33.7 32.0 - 36.0 g/dL    RDW                       12.9 11.5 - 15.5 %    PLATELET COUNT            278 140 - 440 thou/cu mm    MPV                       7.0 6.5 - 11.0 fL     *Note: Due to a large number of results and/or encounters for the requested time period, some results have not been displayed. A complete set of results can be found in Results Review.          IMPRESSION:   (Z01.810) Preop cardiovascular exam  (primary encounter diagnosis)  (I25.810) Atherosclerosis of coronary artery bypass  graft of native heart without angina pectoris  (I10) Essential hypertension  (E78.5) Hyperlipidemia, unspecified hyperlipidemia type  (M54.5,  G89.29) Chronic bilateral low back pain without sciatica  (G43.909) Migraine syndrome  (Z02.89) Pain medication agreement signed 2/10/17     For above listed surgery and anesthesia:   Patient is moderate  risk for perioperative complications with CAD history and difficulty in assessment for when she truly requires attention for chest pain.     RECOMMENDATIONS:   proceed without further diagnostic evaluation. Cardiology feels she is cleared for surgery given repeatedly normal stress tests.   Continue aspirin daily even through procedure.   Stop Plavix 5 d prior and resume after    Unable to tolerate Crestor at 20 mg. Unable to tolerate Lipitor in the past. She decided that she could tolerate the leg pain, but prefers to lower dose to 10 mg to see if that causes less myalgias. Will reduce Crestor to 10 mg daily.    Low BP on amlodipine. Off amlodipine had marked elevations to over 160. Reduce amlodipine to 2.5 mg daily to see if this is a good balance. She should be on lisinopril, beta blocker likely has value as well. May need cardiology consult again to help determine to proper balance.     Refilled oxycodone for 6 per day through surgery. She may increase to 80 mg daily after surgery. Will follow up May 15 to address pain meds post-op.    Greater than 50% of this 40 minute appointment spent on counseling.   Ramirez Cano MD
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
## 2018-01-04 NOTE — NURSING NOTE
Patient Information     Patient Name MRN Sex Ankita Bergeron [id number] Female 1955      Nursing Note by Kimberly Whyte at 3/27/2017 10:30 AM     Author:  Kimberly Whyte Service:  (none) Author Type:  (none)     Filed:  3/27/2017 10:42 AM Encounter Date:  3/27/2017 Status:  Signed     :  Kimberly Whyte            This patient presents today for a Preoperative exam for this procedure: Right shoulder arthroscopy, rotator cuff repair, and decompression.   Date of Surgery:    Surgeon:  Minh  Facility:  [facility]  Fax:    Kimberly Whyte LPN 3/27/2017 10:29 AM

## 2018-01-04 NOTE — NURSING NOTE
Patient Information     Patient Name MRN Ankita Lee [id number] Female 1955      Nursing Note by Kimberly Whyte at 2017 10:30 AM     Author:  Kimberly Whyte Service:  (none) Author Type:  (none)     Filed:  2017 11:07 AM Encounter Date:  2017 Status:  Signed     :  Kimberly Whyte            Ankita Day is a 62 y.o. female presenting for follow up on her medication.  Kimberly Whyte LPN 2017 10:58 AM

## 2018-01-04 NOTE — PATIENT INSTRUCTIONS
Patient Information     Patient Name MRN Sex Ankita Bergeron [id number] Female 1955      Patient Instructions by Ramirez Cano MD at 2017 10:30 AM     Author:  Ramirez Cano MD Service:  (none) Author Type:  Physician     Filed:  2017 11:17 AM Encounter Date:  2017 Status:  Signed     :  Ramirez Cano MD (Physician)            Continue on aspirin even through surgery  Stop Plavix 5 days prior  OK to take blood pressure medication morning of surgery with a sip of water    Reduce amlodipine to 2.5 mg daily (1/2 pill).   Reduce Crestor (rousuvatatin) to 10 mg daily (1/2 pill)    Refilled oxycodone for 6 per day until surgery  Follow up on May 15

## 2018-01-05 NOTE — PATIENT INSTRUCTIONS
Patient Information     Patient Name MRN Sex Ankita Bergeron [id number] Female 1955      Patient Instructions by Ramirez Cano MD at 2017 10:45 AM     Author:  Ramirez Cano MD  Service:  (none) Author Type:  Physician     Filed:  2017 11:25 AM  Encounter Date:  2017 Status:  Addendum     :  Ramirez Cano MD (Physician)        Related Notes: Original Note by Ramirez Cano MD (Physician) filed at 2017 11:21 AM            Stay off Crestor  Start pravastatin instead for cholesterol and heart attack prevention  Given trigger injections to help muscle pain  Refilled oxycodone for 1 month  Hold metoprolol tonight to help raise blood pressure, otherwise stay on the same pills

## 2018-01-05 NOTE — PROGRESS NOTES
Patient Information     Patient Name MRN Sex Ankita Bergeron [id number] Female 1955      Progress Notes by Ramirez Cano MD at 2017 10:45 AM     Author:  Ramirez Cano MD Service:  (none) Author Type:  Physician     Filed:  2017  9:36 AM Encounter Date:  2017 Status:  Signed     :  Ramirez Cano MD (Physician)            SUBJECTIVE:  62 y.o. female here for follow up on R shoulder arthroscopy with Dr Wilkinson on May 12. Had labral tear, rotator cuff tear and some subacromial decompression performed. Took oxycodone 20 mg every 4 hours. Then ran out of pills from Dr Wilkinson and starting prescription that I provided and was taking 2 pills QID = 8 per day. Now able to go back to 6 per day, which is her recent baseline. Has #20 pills left.     Feels fatigued and weak today. Blames it on low BP. She started on amlodipine for elevated BP leading to hospitalization at [facility] for possible ACS. ECHO stable, started on amlodipine and chest pain improved. Her BP was still elevated, then came down prior to surgery.    Not tolerating Crestor. Wants to stop. Reduced from 20 to 10 mg dose and still has too many muscle aches. Is on vitamin D 5000 units daily, hx of deficiency.    Requesting trigger injections in upper back. Last performed in March. Has pain in muscles leading to more headaches. After last injections she did not have to take pain meds that day.     REVIEW OF SYSTEMS:    No numbness or tingling in upper or lower extremities.      Current Outpatient Prescriptions       Medication  Sig Dispense Refill     amLODIPine (NORVASC) 5 mg tablet Take 0.5 tablets by mouth once daily.       aspirin (ECOTRIN) 81 mg enteric coated tablet Take 81 mg by mouth once daily with a meal.       busPIRone (BUSPAR) 10 mg tablet TAKE 1 TABLET TWICE A  tablet 3     cholecalciferol (VITAMIN D) 1,000 unit capsule Take 1,000 Units by mouth 2 times daily.       clonazePAM (KLONOPIN) 1 mg  tablet Take 1 tablet by mouth 4 times daily. Becky Olmstead CNP       clopidogrel (PLAVIX) 75 mg tablet TAKE 1 TABLET DAILY 90 tablet 5     cyclobenzaprine (FLEXERIL) 10 mg tablet TAKE 1 TABLET TWICE A DAY IF NEEDED FOR MUSCLE SPASM 60 tablet 0     diclofenac 1 % topical (VOLTAREN) gel Apply 1 g topically to affected area(s) 2 times daily. 100 g 11     docusate (STOOL SOFTENER) 50 mg capsule Take 1 capsule by mouth once daily.  0     DULoxetine (CYMBALTA) 60 mg Delayed-release capsule TAKE 1 CAPSULE TWICE A  capsule 3     fluticasone (50 mcg per actuation) nasal solution (FLONASE) Inhale 2 sprays into both nostrils once daily as needed for allergic rhinitis and nasal congestion.       lisinopril (PRINIVIL; ZESTRIL) 40 mg tablet TAKE 1 TABLET DAILY 90 tablet 4     metoprolol tartrate (LOPRESSOR) 50 mg tablet Take 1 tablet by mouth 2 times daily. 180 tablet 4     Nitroglycerin (NITROMIST) 400 mcg/spray spra PLACE 1 SPRAY UNDER THE TONGUE EVERY 5 MINUTES IF NEEDED FOR CHEST PAIN 1 g 11     nitroglycerin (NITROSTAT) 0.3 mg sublingual tablet Place 1 tablet under the tongue every 5 minutes if needed for Chest Pain. 1 Bottle 2     ondansetron (ZOFRAN, AS HYDROCHLORIDE,) 4 mg tablet Take 1 tablet by mouth every 6 hours if needed for Nausea/Vomiting. 40 tablet 11     oxyCODONE-acetaminophen,  mg, (PERCOCET)  mg per tablet Take 2 tablets by mouth 4 times daily  Earliest Fill Date: 5/13/17 80 tablet 0     pantoprazole (PROTONIX) 40 mg delayed-release tablet TAKE 1 TABLET TWICE A  tablet 3     rosuvastatin (CRESTOR) 10 mg tablet Take 1 tablet by mouth at bedtime. 90 tablet 4     Saccharomyces boulardii (FLORASTOR) 250 mg capsule Take 1 capsule by mouth 2 times daily.  0     Allergies as of 05/23/2017 - Mikel as Reviewed 05/23/2017      Allergen  Reaction Noted     Atorvastatin Myalgia 04/30/2013     Tape [adhesive] Rash and Erythema 04/02/2013     Tiotropium bromide Rash 09/09/2014     Ezetimibe Myalgia  10/09/2009     Latex Rash 08/16/2014     Niacin Flushing 10/09/2009     Other [unlisted allergen (include detail in comments)] Rash and Itching 08/14/2013       OBJECTIVE:  /64  Pulse 64  Wt 74.4 kg (164 lb)  Breastfeeding? No  BMI 26.36 kg/m2    General Appearance: Pleasant, alert, appropriate appearance for age. No acute distress  Musculoskeletal Exam: Tender in occiput, neck, and upper back. Most tender in bilateral occiput and over levator scapulae.  Psychiatric Exam: Alert and oriented, appropriate affect.      ASSESSMENT/PLAN:    ICD-10-CM   1. Chronic bilateral low back pain without sciatica M54.5     G89.29   2. Migraine syndrome G43.909   3. Pain medication agreement signed 2/10/17 Z02.89   4. Myalgia M79.1   5. Chronic tension-type headache, intractable G44.221   6. Atherosclerosis of coronary artery bypass graft of native heart without angina pectoris I25.810   7. Vitamin D deficiency E55.9     Agree to refill back on chronic oxycodone dose of 60 mg daily = 90 morphine milligram equivalents. Reviewed  and received meds from Dr Wilkinson as noted. Last UTox appropriate 3/5/17.    Tenderness in occiput, neck, upper back contributing to headache. Discussed risks and benefits of trigger injection. Patient elected to proceed and signed consent.  PROCEDURE: Sites of maximal tenderness were cleansed with alcohol.  Mixed 20 mg triamcinolone and 3 ml of 1% plain lidocaine Trigger point injection(s) with 1 mL of the mix performed at 4 sites - bilateral occiput and  bilateral trapezius at levator scapula. This was well tolerated, and followed by pain improvement  Consider PT, she declines at this time.    Stopped Crestor, not tolerating. Simvastatin contraindicated with amlodipine. Use pravastatin instead, 40 mg daily. If tolerating next month, then increase to 80 mg.     May need to decrease BP meds. Amlodipine helped with possible vasospasm. Should decrease metoprolol if pressures remain low.    F/U 1 mo

## 2018-01-05 NOTE — PROGRESS NOTES
Patient Information     Patient Name MRN Sex Ankita Bergeron [id number] Female 1955      Progress Notes by Ramirez Cano MD at 2017 10:30 AM     Author:  Ramirez Cano MD Service:  (none) Author Type:  Physician     Filed:  2017  2:03 PM Encounter Date:  2017 Status:  Signed     :  Ramirez Cano MD (Physician)            SUBJECTIVE:  62 y.o. female presents for concerns prior to surgery    Wondering about a-fib. Her brother has this. He now required a pacemaker.   She feels fatigued and sweaty sometimes when she cleans house.   Again, has normal stress test with similar symptoms.  I reviewed her EKGs and none with a-fib. She was reassured.    Needs refill on oxycodone since I am out of clinic when follow up was scheduled.      Current Outpatient Prescriptions       Medication  Sig Dispense Refill     amLODIPine (NORVASC) 5 mg tablet Take 0.5 tablets by mouth once daily.       aspirin (ECOTRIN) 81 mg enteric coated tablet Take 81 mg by mouth once daily with a meal.       busPIRone (BUSPAR) 10 mg tablet TAKE 1 TABLET TWICE A  tablet 3     cholecalciferol (VITAMIN D) 1,000 unit capsule Take 1,000 Units by mouth 2 times daily.       clonazePAM (KLONOPIN) 1 mg tablet Take 1 tablet by mouth 4 times daily. Becky Olmstead CNP       clopidogrel (PLAVIX) 75 mg tablet TAKE 1 TABLET DAILY 90 tablet 5     cyclobenzaprine (FLEXERIL) 10 mg tablet TAKE 1 TABLET TWICE A DAY IF NEEDED FOR MUSCLE SPASM 60 tablet 0     diclofenac 1 % topical (VOLTAREN) gel Apply 1 g topically to affected area(s) 2 times daily. 100 g 11     docusate (STOOL SOFTENER) 50 mg capsule Take 1 capsule by mouth once daily.  0     DULoxetine (CYMBALTA) 60 mg Delayed-release capsule TAKE 1 CAPSULE TWICE A  capsule 3     fluticasone (50 mcg per actuation) nasal solution (FLONASE) Inhale 2 sprays into both nostrils once daily as needed for allergic rhinitis and nasal congestion.       lisinopril (PRINIVIL;  ZESTRIL) 40 mg tablet TAKE 1 TABLET DAILY 90 tablet 4     metoprolol tartrate (LOPRESSOR) 50 mg tablet Take 1 tablet by mouth 2 times daily. 180 tablet 4     Nitroglycerin (NITROMIST) 400 mcg/spray spra PLACE 1 SPRAY UNDER THE TONGUE EVERY 5 MINUTES IF NEEDED FOR CHEST PAIN 1 g 11     nitroglycerin (NITROSTAT) 0.3 mg sublingual tablet Place 1 tablet under the tongue every 5 minutes if needed for Chest Pain. 1 Bottle 2     ondansetron (ZOFRAN, AS HYDROCHLORIDE,) 4 mg tablet Take 1 tablet by mouth every 6 hours if needed for Nausea/Vomiting. 40 tablet 11     oxyCODONE-acetaminophen,  mg, (PERCOCET)  mg per tablet Take 2 tablets by mouth 3 times daily  Earliest Fill Date: 4/26/17 120 tablet 0     pantoprazole (PROTONIX) 40 mg delayed-release tablet TAKE 1 TABLET TWICE A  tablet 3     rosuvastatin (CRESTOR) 10 mg tablet Take 1 tablet by mouth at bedtime. 90 tablet 4     Saccharomyces boulardii (FLORASTOR) 250 mg capsule Take 1 capsule by mouth 2 times daily.  0     Allergies as of 05/08/2017 - Mikel as Reviewed 05/08/2017      Allergen  Reaction Noted     Atorvastatin Myalgia 04/30/2013     Tape [adhesive] Rash and Erythema 04/02/2013     Tiotropium bromide Rash 09/09/2014     Ezetimibe Myalgia 10/09/2009     Latex Rash 08/16/2014     Niacin Flushing 10/09/2009     Other [unlisted allergen (include detail in comments)] Rash and Itching 08/14/2013       OBJECTIVE:  /69  Pulse 50  Wt 73.9 kg (163 lb)  Breastfeeding? No  BMI 26.2 kg/m2  2  Psychiatric Exam: Alert and oriented, appropriate affect.    ASSESSMENT/PLAN:    ICD-10-CM   1. Left subclavian artery stenosis causing coronary steal through LIMA graft I77.1   2. Chronic bilateral low back pain without sciatica M54.5     G89.29   3. Migraine syndrome G43.909   4. Pain medication agreement signed 2/10/17 Z02.89     Reassurance about pre-surgical concerns. No change to clearance from preop 4/24. Refilled oxycodone for 5/13 and may use 8 per  day until follow up on 5/23

## 2018-01-05 NOTE — NURSING NOTE
Patient Information     Patient Name MRN Ankita Lee [id number] Female 1955      Nursing Note by Kimberly Whyte at 2017 10:45 AM     Author:  Kimberly Whyte Service:  (none) Author Type:  (none)     Filed:  2017 10:57 AM Encounter Date:  2017 Status:  Signed     :  Kimberly Whyte            Ankita Day is a 62 y.o. female presenting for follow up after her right shoulder surgery.  Kimberly Whyte LPN 2017 10:42 AM

## 2018-01-15 ENCOUNTER — OFFICE VISIT - GICH (OUTPATIENT)
Dept: FAMILY MEDICINE | Facility: OTHER | Age: 63
End: 2018-01-15

## 2018-01-15 ENCOUNTER — HISTORY (OUTPATIENT)
Dept: FAMILY MEDICINE | Facility: OTHER | Age: 63
End: 2018-01-15

## 2018-01-15 DIAGNOSIS — M54.50 LOW BACK PAIN: ICD-10-CM

## 2018-01-15 DIAGNOSIS — Z02.89 ENCOUNTER FOR OTHER ADMINISTRATIVE EXAMINATIONS: ICD-10-CM

## 2018-01-15 DIAGNOSIS — K25.0 ACUTE GASTRIC ULCER WITH HEMORRHAGE: ICD-10-CM

## 2018-01-15 DIAGNOSIS — I25.10 ATHEROSCLEROTIC HEART DISEASE OF NATIVE CORONARY ARTERY WITHOUT ANGINA PECTORIS: ICD-10-CM

## 2018-01-15 DIAGNOSIS — G89.29 OTHER CHRONIC PAIN: ICD-10-CM

## 2018-01-15 DIAGNOSIS — G43.909 MIGRAINE WITHOUT STATUS MIGRAINOSUS, NOT INTRACTABLE: ICD-10-CM

## 2018-01-15 ASSESSMENT — ANXIETY QUESTIONNAIRES
5. BEING SO RESTLESS THAT IT IS HARD TO SIT STILL: NOT AT ALL
4. TROUBLE RELAXING: NOT AT ALL
3. WORRYING TOO MUCH ABOUT DIFFERENT THINGS: NOT AT ALL
1. FEELING NERVOUS, ANXIOUS, OR ON EDGE: NOT AT ALL
6. BECOMING EASILY ANNOYED OR IRRITABLE: NOT AT ALL
GAD7 TOTAL SCORE: 0
7. FEELING AFRAID AS IF SOMETHING AWFUL MIGHT HAPPEN: NOT AT ALL
2. NOT BEING ABLE TO STOP OR CONTROL WORRYING: NOT AT ALL

## 2018-01-15 ASSESSMENT — PATIENT HEALTH QUESTIONNAIRE - PHQ9: SUM OF ALL RESPONSES TO PHQ QUESTIONS 1-9: 2

## 2018-01-22 ENCOUNTER — DOCUMENTATION ONLY (OUTPATIENT)
Dept: FAMILY MEDICINE | Facility: OTHER | Age: 63
End: 2018-01-22

## 2018-01-22 PROBLEM — M47.9 OSTEOARTHRITIS OF SPINE: Status: ACTIVE | Noted: 2018-01-22

## 2018-01-22 PROBLEM — F41.9 CHRONIC ANXIETY: Status: ACTIVE | Noted: 2018-01-22

## 2018-01-22 PROBLEM — I25.10 PRESENCE OF STENT IN CORONARY ARTERY IN PATIENT WITH CORONARY ARTERY DISEASE: Status: ACTIVE | Noted: 2017-08-10

## 2018-01-22 PROBLEM — F41.0 PANIC ATTACK: Status: ACTIVE | Noted: 2018-01-22

## 2018-01-22 PROBLEM — I10 ESSENTIAL HYPERTENSION: Status: ACTIVE | Noted: 2018-01-22

## 2018-01-22 PROBLEM — G43.909 MIGRAINE SYNDROME: Status: ACTIVE | Noted: 2018-01-22

## 2018-01-22 PROBLEM — M51.379 DEGENERATION OF LUMBAR OR LUMBOSACRAL INTERVERTEBRAL DISC: Status: ACTIVE | Noted: 2018-01-22

## 2018-01-22 PROBLEM — E78.2 MIXED HYPERLIPIDEMIA: Status: ACTIVE | Noted: 2018-01-22

## 2018-01-22 PROBLEM — K52.831 COLLAGENOUS COLITIS: Status: ACTIVE | Noted: 2018-01-22

## 2018-01-22 PROBLEM — Z71.6 TOBACCO ABUSE COUNSELING: Status: ACTIVE | Noted: 2017-09-28

## 2018-01-22 PROBLEM — K25.0 ACUTE GASTRIC ULCER WITH HEMORRHAGE: Status: ACTIVE | Noted: 2018-01-03

## 2018-01-22 PROBLEM — Z95.5 PRESENCE OF STENT IN CORONARY ARTERY IN PATIENT WITH CORONARY ARTERY DISEASE: Status: ACTIVE | Noted: 2017-08-10

## 2018-01-22 PROBLEM — M19.90 OSTEOARTHRITIS: Status: ACTIVE | Noted: 2018-01-22

## 2018-01-22 PROBLEM — M25.511 PAIN IN JOINT OF RIGHT SHOULDER: Status: ACTIVE | Noted: 2018-01-22

## 2018-01-22 PROBLEM — E78.00 PURE HYPERCHOLESTEROLEMIA: Status: ACTIVE | Noted: 2017-09-28

## 2018-01-22 PROBLEM — F33.2 MAJOR DEPRESSIVE DISORDER, RECURRENT EPISODE, SEVERE (H): Status: ACTIVE | Noted: 2018-01-22

## 2018-01-22 RX ORDER — ONDANSETRON 4 MG/1
4 TABLET, FILM COATED ORAL EVERY 6 HOURS PRN
COMMUNITY
Start: 2017-10-31 | End: 2019-12-11

## 2018-01-22 RX ORDER — LISINOPRIL 40 MG/1
40 TABLET ORAL DAILY
COMMUNITY
Start: 2017-09-13 | End: 2018-04-02

## 2018-01-22 RX ORDER — PREGABALIN 25 MG/1
1 CAPSULE ORAL 3 TIMES DAILY
COMMUNITY
Start: 2017-12-15 | End: 2018-02-13 | Stop reason: DRUGHIGH

## 2018-01-22 RX ORDER — POTASSIUM GLUCONATE 2 MEQ
TABLET ORAL
COMMUNITY
Start: 2017-09-28 | End: 2019-01-23

## 2018-01-22 RX ORDER — CLOPIDOGREL BISULFATE 75 MG/1
75 TABLET ORAL DAILY
COMMUNITY
Start: 2018-01-04 | End: 2018-11-21

## 2018-01-22 RX ORDER — AMLODIPINE BESYLATE 10 MG/1
10 TABLET ORAL DAILY
COMMUNITY
Start: 2017-10-24 | End: 2018-11-21

## 2018-01-22 RX ORDER — FERROUS SULFATE 325(65) MG
1 TABLET ORAL
COMMUNITY
Start: 2017-11-15 | End: 2018-02-13

## 2018-01-22 RX ORDER — BUSPIRONE HYDROCHLORIDE 10 MG/1
1 TABLET ORAL 2 TIMES DAILY
COMMUNITY
Start: 2017-11-09 | End: 2018-11-05

## 2018-01-22 RX ORDER — PANTOPRAZOLE SODIUM 40 MG/1
40 TABLET, DELAYED RELEASE ORAL 2 TIMES DAILY
COMMUNITY
Start: 2017-08-16 | End: 2018-10-24

## 2018-01-22 RX ORDER — ASPIRIN 81 MG/1
81 TABLET ORAL
COMMUNITY
End: 2020-01-22

## 2018-01-22 RX ORDER — PRAVASTATIN SODIUM 40 MG
40 TABLET ORAL AT BEDTIME
COMMUNITY
Start: 2017-07-03 | End: 2018-02-13

## 2018-01-22 RX ORDER — NITROGLYCERIN 0.3 MG/1
1 TABLET SUBLINGUAL EVERY 5 MIN PRN
COMMUNITY
Start: 2017-03-09 | End: 2018-04-07

## 2018-01-22 RX ORDER — CYCLOBENZAPRINE HCL 10 MG
1 TABLET ORAL 2 TIMES DAILY PRN
COMMUNITY
Start: 2017-12-15 | End: 2018-11-21

## 2018-01-22 RX ORDER — METOPROLOL TARTRATE 50 MG
1 TABLET ORAL 2 TIMES DAILY
COMMUNITY
Start: 2017-12-05 | End: 2018-11-21

## 2018-01-22 RX ORDER — CLONAZEPAM 1 MG/1
1 TABLET ORAL 4 TIMES DAILY
COMMUNITY
Start: 2016-07-11 | End: 2019-02-20

## 2018-01-22 RX ORDER — OXYCODONE AND ACETAMINOPHEN 5; 325 MG/1; MG/1
2 TABLET ORAL 4 TIMES DAILY
COMMUNITY
Start: 2017-12-15 | End: 2018-02-13

## 2018-01-22 RX ORDER — SUCRALFATE 1 G/1
1 TABLET ORAL 4 TIMES DAILY
COMMUNITY
Start: 2017-12-20 | End: 2018-03-29

## 2018-01-26 VITALS
HEIGHT: 66 IN | SYSTOLIC BLOOD PRESSURE: 130 MMHG | TEMPERATURE: 97.5 F | HEART RATE: 72 BPM | WEIGHT: 159 LBS | SYSTOLIC BLOOD PRESSURE: 130 MMHG | HEART RATE: 60 BPM | DIASTOLIC BLOOD PRESSURE: 78 MMHG | WEIGHT: 163 LBS | BODY MASS INDEX: 26.31 KG/M2 | BODY MASS INDEX: 25.55 KG/M2 | DIASTOLIC BLOOD PRESSURE: 84 MMHG

## 2018-01-26 VITALS
WEIGHT: 159 LBS | DIASTOLIC BLOOD PRESSURE: 88 MMHG | SYSTOLIC BLOOD PRESSURE: 162 MMHG | BODY MASS INDEX: 24.69 KG/M2 | BODY MASS INDEX: 25.66 KG/M2 | DIASTOLIC BLOOD PRESSURE: 77 MMHG | WEIGHT: 153 LBS | SYSTOLIC BLOOD PRESSURE: 144 MMHG | HEART RATE: 60 BPM | HEART RATE: 63 BPM

## 2018-01-26 VITALS
SYSTOLIC BLOOD PRESSURE: 100 MMHG | DIASTOLIC BLOOD PRESSURE: 64 MMHG | HEART RATE: 64 BPM | BODY MASS INDEX: 26.36 KG/M2 | SYSTOLIC BLOOD PRESSURE: 144 MMHG | WEIGHT: 161 LBS | WEIGHT: 164 LBS | HEART RATE: 64 BPM | DIASTOLIC BLOOD PRESSURE: 64 MMHG | BODY MASS INDEX: 25.87 KG/M2

## 2018-01-26 VITALS
WEIGHT: 157 LBS | DIASTOLIC BLOOD PRESSURE: 100 MMHG | SYSTOLIC BLOOD PRESSURE: 148 MMHG | BODY MASS INDEX: 25.34 KG/M2 | HEART RATE: 64 BPM | HEART RATE: 60 BPM | BODY MASS INDEX: 25.87 KG/M2 | SYSTOLIC BLOOD PRESSURE: 90 MMHG | WEIGHT: 161 LBS | DIASTOLIC BLOOD PRESSURE: 70 MMHG

## 2018-01-26 VITALS
TEMPERATURE: 98.2 F | HEART RATE: 56 BPM | BODY MASS INDEX: 26.63 KG/M2 | DIASTOLIC BLOOD PRESSURE: 90 MMHG | SYSTOLIC BLOOD PRESSURE: 142 MMHG | DIASTOLIC BLOOD PRESSURE: 82 MMHG | SYSTOLIC BLOOD PRESSURE: 172 MMHG | BODY MASS INDEX: 25.66 KG/M2 | WEIGHT: 159 LBS | TEMPERATURE: 98.3 F | HEART RATE: 68 BPM | WEIGHT: 165 LBS

## 2018-01-26 VITALS
HEIGHT: 66 IN | BODY MASS INDEX: 25.23 KG/M2 | WEIGHT: 163 LBS | WEIGHT: 157 LBS | SYSTOLIC BLOOD PRESSURE: 126 MMHG | BODY MASS INDEX: 26.2 KG/M2 | HEART RATE: 50 BPM | DIASTOLIC BLOOD PRESSURE: 79 MMHG | SYSTOLIC BLOOD PRESSURE: 121 MMHG | HEART RATE: 56 BPM | DIASTOLIC BLOOD PRESSURE: 69 MMHG

## 2018-01-26 VITALS
DIASTOLIC BLOOD PRESSURE: 67 MMHG | HEART RATE: 58 BPM | WEIGHT: 163 LBS | BODY MASS INDEX: 26.2 KG/M2 | SYSTOLIC BLOOD PRESSURE: 109 MMHG | HEIGHT: 66 IN

## 2018-01-26 VITALS
HEART RATE: 60 BPM | WEIGHT: 158 LBS | TEMPERATURE: 97.6 F | BODY MASS INDEX: 25.82 KG/M2 | SYSTOLIC BLOOD PRESSURE: 150 MMHG | HEART RATE: 60 BPM | WEIGHT: 160 LBS | BODY MASS INDEX: 25.5 KG/M2 | DIASTOLIC BLOOD PRESSURE: 72 MMHG | SYSTOLIC BLOOD PRESSURE: 142 MMHG | DIASTOLIC BLOOD PRESSURE: 84 MMHG | TEMPERATURE: 98.2 F

## 2018-01-26 VITALS — RESPIRATION RATE: 20 BRPM | SYSTOLIC BLOOD PRESSURE: 110 MMHG | HEART RATE: 64 BPM | DIASTOLIC BLOOD PRESSURE: 64 MMHG

## 2018-01-26 VITALS
SYSTOLIC BLOOD PRESSURE: 154 MMHG | WEIGHT: 160.4 LBS | BODY MASS INDEX: 25.78 KG/M2 | HEART RATE: 72 BPM | DIASTOLIC BLOOD PRESSURE: 82 MMHG

## 2018-01-26 VITALS
BODY MASS INDEX: 25.36 KG/M2 | WEIGHT: 157.8 LBS | SYSTOLIC BLOOD PRESSURE: 132 MMHG | HEART RATE: 80 BPM | DIASTOLIC BLOOD PRESSURE: 88 MMHG | HEIGHT: 66 IN

## 2018-01-27 ASSESSMENT — PATIENT HEALTH QUESTIONNAIRE - PHQ9: SUM OF ALL RESPONSES TO PHQ QUESTIONS 1-9: 3

## 2018-01-27 ASSESSMENT — ANXIETY QUESTIONNAIRES: GAD7 TOTAL SCORE: 1

## 2018-01-28 ASSESSMENT — PATIENT HEALTH QUESTIONNAIRE - PHQ9
SUM OF ALL RESPONSES TO PHQ QUESTIONS 1-9: 5
SUM OF ALL RESPONSES TO PHQ QUESTIONS 1-9: 3
SUM OF ALL RESPONSES TO PHQ QUESTIONS 1-9: 3
SUM OF ALL RESPONSES TO PHQ QUESTIONS 1-9: 0
SUM OF ALL RESPONSES TO PHQ QUESTIONS 1-9: 0
SUM OF ALL RESPONSES TO PHQ QUESTIONS 1-9: 3
SUM OF ALL RESPONSES TO PHQ QUESTIONS 1-9: 7
SUM OF ALL RESPONSES TO PHQ QUESTIONS 1-9: 9

## 2018-01-28 ASSESSMENT — ANXIETY QUESTIONNAIRES
GAD7 TOTAL SCORE: 1
GAD7 TOTAL SCORE: 4

## 2018-02-02 ENCOUNTER — TRANSFERRED RECORDS (OUTPATIENT)
Dept: HEALTH INFORMATION MANAGEMENT | Facility: OTHER | Age: 63
End: 2018-02-02

## 2018-02-09 ENCOUNTER — DOCUMENTATION ONLY (OUTPATIENT)
Dept: FAMILY MEDICINE | Facility: OTHER | Age: 63
End: 2018-02-09

## 2018-02-09 VITALS
DIASTOLIC BLOOD PRESSURE: 70 MMHG | HEART RATE: 64 BPM | SYSTOLIC BLOOD PRESSURE: 118 MMHG | BODY MASS INDEX: 26.47 KG/M2 | WEIGHT: 164 LBS

## 2018-02-09 VITALS
HEART RATE: 61 BPM | DIASTOLIC BLOOD PRESSURE: 67 MMHG | WEIGHT: 168 LBS | HEIGHT: 66 IN | SYSTOLIC BLOOD PRESSURE: 125 MMHG | BODY MASS INDEX: 27 KG/M2

## 2018-02-09 VITALS
SYSTOLIC BLOOD PRESSURE: 127 MMHG | WEIGHT: 168 LBS | DIASTOLIC BLOOD PRESSURE: 74 MMHG | BODY MASS INDEX: 27.12 KG/M2 | HEART RATE: 73 BPM

## 2018-02-09 RX ORDER — SACCHAROMYCES BOULARDII 250 MG
1 CAPSULE ORAL 2 TIMES DAILY
COMMUNITY
Start: 2016-08-22 | End: 2018-02-13

## 2018-02-10 ASSESSMENT — ANXIETY QUESTIONNAIRES: GAD7 TOTAL SCORE: 0

## 2018-02-10 ASSESSMENT — PATIENT HEALTH QUESTIONNAIRE - PHQ9: SUM OF ALL RESPONSES TO PHQ QUESTIONS 1-9: 2

## 2018-02-12 ENCOUNTER — DOCUMENTATION ONLY (OUTPATIENT)
Dept: FAMILY MEDICINE | Facility: OTHER | Age: 63
End: 2018-02-12

## 2018-02-12 NOTE — TELEPHONE ENCOUNTER
Patient Information     Patient Name MRN Ankita Lee [id number] Female 1955      Telephone Encounter by Kimberly Whyte at 2017 11:28 AM     Author:  Kimberly Whyte Service:  (none) Author Type:  (none)     Filed:  2017 11:28 AM Encounter Date:  2017 Status:  Signed     :  Kimberly Whyte            Patient would like a script for the pills sent over to [facility].  Kimberly Whyte LPN 2017 11:28 AM

## 2018-02-12 NOTE — TELEPHONE ENCOUNTER
Patient Information     Patient Name MRN Sex Ankita Bergeron [id number] Female 1955      Telephone Encounter by Ramirez Cano MD at 2017 10:01 PM     Author:  Ramirez Cano MD Service:  (none) Author Type:  Physician     Filed:  2017 10:01 PM Encounter Date:  2017 Status:  Signed     :  Ramirez Cano MD (Physician)            [facility] pharmacy has sucralfate pills. I can send Rx there if she wants.

## 2018-02-12 NOTE — PROGRESS NOTES
Patient Information     Patient Name MRN Ankita Lee [id number] Female 1955      Progress Notes by Ramirez Cano MD at 12/15/2017 10:30 AM     Author:  Ramirez Cano MD Service:  (none) Author Type:  Physician     Filed:  2017  3:16 PM Encounter Date:  12/15/2017 Status:  Signed     :  Ramirez Cano MD (Physician)            SUBJECTIVE:  62 y.o. female presents for follow up on NSAID induced gastric ulcer, CAD, and chronic chest wall and back pain.    EGD on  with Dr Bowman showed an antral ulcer. He recommended cessation of aspirin and to provide maximal PPI therapy.  Still using Bentyl periodically for colon spasm.  Tells me Mexican food made it worse this weekend, but also notes she had 2 margaritas.    Hgb dropped from 12.8 to 10.4 and was 11.0 last month. She is taking some iron at low dose.    Tried Lyrica and is tolerating. Currently on 25 mg QHS. Wondering about increase in dose.   Oxycodone working at current dosing. She was on 60 mg daily earlier this year.    Notes more pain in her hands. Wondering if she can take anything for it.    REVIEW OF SYSTEMS:    Constitutional: Negative  Respiratory: Negative  Cardiovascular: Negative  Psychiatric: Following with Becky Olmstead for anxiety    Past Medical History:     Diagnosis  Date     Acute coronary syndrome (HC) 06/15/2007    with PTCA and stenting of 90% osteo circumflex lesion and patent LAD graft, patent left main stent.      Acute MI, initial, chest pain with positive nz 3/30/2013     Anterior chest wall pain 10/13/2009    chronic      Back pain, chronic 2011     Carpal tunnel syndrome, bilateral      Central sleep apnea 10/14/2013     Chest pain 2014     Chronic anxiety      Chronic depression     Severe, hx of suicide attempt/hospitalization      Chronic ischemic heart disease, unspecified 2012     Chronic pain syndrome 2010    chest wall, back      Chronic right shoulder pain       Clostridium difficile colitis 8/19/2016     Colitis 06/15/2007    history of      COLITIS     Microcytic       Constipation 11/29/2011     COPD (chronic obstructive pulmonary disease) (HC) 06/15/2007    low DLCO, normal spirometry      Cor athrscl-uns vessel     -angio revealed 3 vessel dz  -4 stents placed; 2 overlapping stents placed in mLAD, 1 stent pRCA, 1 stent pCirc 1 s 9/02 -non-ST elevation MI 1/03  -angio revealed restenosis of Circ.    -PTCA and brachytherapy of pCirc -repeat angio 2/03 -no intervension -CABG x3 12/03 - Dr Sinclair  -LIMA-LAD, RAFI-RCA, SVG-OM -PCI 7/04 stent to L -CTangio 9/05   -patentent LIMA-LAD. RAFI-RCA occluded; RCA ostio/proximal stent widely patent. SVG-OM occluded; OM marginal branch is widely patent.  Non-STEMI--6/15/07--DELONTE to LCX 5/2008: Angiogram: No hemodynamically significant lesions that were not bypassed by the LIMA -9/20/12 PCI of ostial LAD, Left Main, ostial Cx.  Patent NABILA to LAD, occluded RAFI to RCA and SVG to OM        E. coli sepsis (HC) 7/14/16    [facility]      Gastric ulcer with hemorrhage 10/2003     Gastric ulcer, chronic 10/03/2011    hx of GI bleed (2003)      History of tobacco abuse      Hx of coronary angioplasty 09/12/2002    with triple stenting      Hx of coronary angioplasty 01/10/2003    repeat angioplasty      Hx of diseases of blood and blood-forming organs      HX OF PEPTIC ULCER DISEASE 6/15/2007     Hyperlipemia      Hypertension      Intermittent claudication (HC)      Knee pain 05/31/2011     Left subclavian artery stenosis causing coronary steal through LIMA graft 3/31/2013    S/p prox left SCA stent 4/1/2013       Lumbar disc disease      Migraine syndrome      Myalgia and myositis 10/14/2013     Neck pain, chronic      Nodular degeneration of cornea 09/26/2011     Normal cardiac stress test 10/2004    Cardiolite (2004, 2005, 2006 and 2011)      Normal stress echocardiogram 03/2010    dobutamine, negative      Opioid abuse     history of       Osteoarthritis      Pain medication agreement signed 05/22/2012 1/28/2014     Panic attack      Postsurgical aortocoronary bypass status 2/12/2003     Postsurgical percutaneous transluminal coronary angioplasty status      Vitamin D deficiency 5/6/2013      Past Surgical History:      Procedure  Laterality Date     ANGIOPLASTY  9/12/02    with triple stenting        APPENDECTOMY       COLONOSCOPY  8/8/16    normal, Dr Bowman       COLONOSCOPY SCREENING  2011    Dr Bowman benign polyps       COLONOSCOPY SCREENING  2011    benign polyps, Dr. Bowman       CORONARY ARTERY BYPASS GRAFT  12/13/02    Triple bypass, left internal mammary  to LAD, right internal mammary to right coronary artery, saphenous to obtuse marginal of the left circumflex.       EMBOLECTOMY  04/02/2013    brachial artery pseudoaneurysm after stenting       ENDOSCOPY GI  2003    Upper GI endoscopy.        ESOPHAGOGASTRODUODENOSCOPY  2011    EGD Dr Bowman with pyloric ulcer       ESOPHAGOGASTRODUODENOSCOPY  2011    pyloric ulcer, Dr. Bowman       ESOPHAGOGASTRODUODENOSCOPY  8/8/16    mild gastritis, Dr Bowman       FEMUR KNEE SURGERY      x3, right knee       FEMUR KNEE SURGERY  2000    left knee  ligament surgery       HYSTERECTOMY  age 21     KNEE ARTHROSCOPY      left       LAP CHOLECYSTECTOMY  2006     PTCA  1/10/2003     PTCA  09/20/2012    DELONTE in LAD and left main       PTCA  4/1/2013    L subclavian stenosis       SALPINGO-OOPHORECTOMY  age 27    Bilateral salpingo-oophorectomy       SHOULDER ARTHROSCOPY      right       TONSIL AND ADENOIDECTOMY  childhood     UGI ENDOSCOP  2003    Upper GI endoscopy.        UPPER ARM/ELBOW SURGERY  baby    birth malachi removed from right arm          Current Outpatient Prescriptions       Medication  Sig Dispense Refill     amLODIPine (NORVASC) 10 mg tablet Take 1 tablet by mouth once daily. 90 tablet 4     aspirin (ECOTRIN) 81 mg enteric coated tablet Take 81 mg by mouth once daily with a meal.       busPIRone  (BUSPAR) 10 mg tablet TAKE 1 TABLET TWICE A  tablet 3     cholecalciferol (VITAMIN D3) 5,000 unit capsule Take 1 capsule by mouth once daily.       clonazePAM (KLONOPIN) 1 mg tablet Take 1 tablet by mouth 4 times daily. Becky Olmstead CNP       clopidogrel (PLAVIX) 75 mg tablet TAKE 1 TABLET DAILY 90 tablet 5     cyclobenzaprine (FLEXERIL) 10 mg tablet TAKE 1 TABLET TWICE A DAY IF NEEDED FOR MUSCLE SPASM 60 tablet 0     dicyclomine (BENTYL) 10 mg capsule May take once daily PRN for severe abdominal pain 10 capsule 0     docusate (STOOL SOFTENER) 50 mg capsule Take 1 capsule by mouth once daily.  0     ferrous sulfate, 65 mg elemental, (IRON, FERROUS SULFATE,) tablet Take 1 tablet by mouth once daily with a meal.  0     lisinopril (PRINIVIL; ZESTRIL) 40 mg tablet Take 1 tablet by mouth once daily. 90 tablet 4     metoprolol tartrate (LOPRESSOR) 50 mg tablet Take 1 tablet by mouth 2 times daily. 180 tablet 3     nitroglycerin (NITROSTAT) 0.3 mg sublingual tablet Place 1 tablet under the tongue every 5 minutes if needed for Chest Pain. 1 Bottle 2     ondansetron (ZOFRAN, AS HYDROCHLORIDE,) 4 mg tablet Take 1 tablet by mouth every 6 hours if needed for Nausea/Vomiting. 30 tablet 11     oxyCODONE-acetaminophen, 5-325 mg, (PERCOCET) 5-325 mg per tablet Take 2 tablets by mouth 4 times daily  Earliest Fill Date: 11/15/17 Max acetaminophen dose: 4000mg in 24 hrs. 240 tablet 0     pantoprazole (PROTONIX) 40 mg delayed-release tablet TAKE 1 TABLET TWICE A  tablet 4     Potassium Gluconate 595 mg (99 mg) tablet Take  by mouth.  0     pravastatin (PRAVACHOL) 40 mg tablet Take 1 tablet by mouth at bedtime. 90 tablet 2     pregabalin (LYRICA) 25 mg capsule Take 1 capsule by mouth at bedtime. 30 capsule 0     Saccharomyces boulardii (FLORASTOR) 250 mg capsule Take 1 capsule by mouth 2 times daily.  0     sucralfate (CARAFATE) 1 gram tablet TAKE 1 TABLET BY MOUTH FOUR TIMES DAILY BEFORE MEALS AND AT BEDTIME  0      sucralfate (CARAFATE) 1 gram tablet Take 1 tablet by mouth 4 times daily before meals and at bedtime. 120 tablet 0     vortioxetine (TRINTELLIX) 10 mg tab Take  by mouth.  0     Allergies as of 12/15/2017 - Mikel as Reviewed 12/15/2017      Allergen  Reaction Noted     Atorvastatin Myalgia 04/30/2013     Tape [adhesive] Rash and Erythema 04/02/2013     Tiotropium bromide Rash 09/09/2014     Crestor [rosuvastatin] Myalgia 09/28/2017     Ezetimibe Myalgia 10/09/2009     Latex Rash 08/16/2014     Niacin Flushing 10/09/2009     Other [unlisted allergen (include detail in comments)] Rash and Itching 08/14/2013       OBJECTIVE:  /70 (Cuff Site: Right Arm, Cuff Size: Adult Regular)  Pulse 64  Wt 74.4 kg (164 lb)  BMI 26.47 kg/m2    General Appearance: Alert. No acute distress  Psychiatric: Normal affect    Results for orders placed or performed in visit on 12/15/17      HEMOGLOBIN      Result  Value Ref Range    HEMOGLOBIN                12.3 12.0 - 16.0 g/dL    MCV                       89 80 - 100 fL     *Note: Due to a large number of results and/or encounters for the requested time period, some results have not been displayed. A complete set of results can be found in Results Review.         ASSESSMENT/PLAN:    ICD-10-CM   1. Acute gastric ulcer with hemorrhage K25.0   2. Atherosclerosis of native coronary artery of native heart, angina presence unspecified I25.10   3. Chronic bilateral low back pain without sciatica M54.5     G89.29   4. Migraine syndrome G43.909   5. Pain medication agreement signed 2/10/17 Z02.89   6. Hand arthritis M19.049     EGD shows gastric ulcer as expected. Given blood loss and findings, will have her stop aspirin for a month. Continue Plavix given complex CAD history. Continue on sucralfate and BID PPI. Hgb returned normal and iron normal last month, so may stop iron supplement. Avoid alcohol, I was not aware she was drinking any alcohol.    She declines to see cardiology about the  aspirin and Plavix and just agrees with my recommendation to hold for a month. Absolutely no smoking. Avoid Bentyl since it shows risks with CAD    Refilled oxycodone at same dosing of 10 mg QID for 40 mg daily. Used 60 mg previously. Currently on 60 morphine milligram equivalents daily. Stable in use. Refilled 1 mo. Needs new controlled substance agreement. UDM appropriate 10/31/17.    May increase Lyrica gradually to TID use to help with pain.    Try diclofenac for hand arthritis. Unable to use oral NSAIDS due to CAD risk and stomach ulcer.    F/U 1 mo

## 2018-02-12 NOTE — ADDENDUM NOTE
Patient Information     Patient Name MRN Ankita Lee [id number] Female 1955      Addendum Note by Blaise Cano MD at 2017 11:42 AM     Author:  Blaise Cano MD Service:  (none) Author Type:  Physician     Filed:  2017 11:42 AM Encounter Date:  2017 Status:  Signed     :  Blaise Cano MD (Physician)       Addended by: BLAISE CANO on: 2017 11:42 AM        Modules accepted: Orders

## 2018-02-12 NOTE — ADDENDUM NOTE
Patient Information     Patient Name MRN Sex Ankita Bergeron [id number] Female 1955      Addendum Note by Nicolasa Yee at 2017 11:30 AM     Author:  Nicolasa Yee Service:  (none) Author Type:  (none)     Filed:  2017 11:30 AM Encounter Date:  2017 Status:  Signed     :  Nicolasa Yee       Addended by: NICOLASA YEE on: 2017 11:30 AM        Modules accepted: Orders

## 2018-02-12 NOTE — NURSING NOTE
"Patient Information     Patient Name MRN Sex Ankita Bergeron [id number] Female 1955      Nursing Note by Kimberly Whyte at 1/3/2018 12:00 PM     Author:  Kimberly Whyte Service:  (none) Author Type:  (none)     Filed:  1/3/2018 12:14 PM Encounter Date:  1/3/2018 Status:  Signed     :  Kimberly Whyte            Date of Surgery: 1/10/18  Type of Surgery: Upper GI  Surgeon: [facility]:  [place]  Fax:     Fever/Chills or other infectious symptoms in past month: NO  >10lb weight loss in past two months: NO    Health Care Directive/Code status:  YES  Hx of blood transfusions:   (NO)   Td up to date:  16  History of VRE/MRSA:  (NO) Date:     Preoperative Evaluation: Obstructive Sleep Apnea screening    S: Snore -  Do you snore loudly? (louder than talking or loud enough to be heard through closed doors)(NO)  T: Tired - Do you often feel tired, fatigued, or sleepy during the daytime?(YES)  O: Observed - Has anyone ever observed you stop breathing during your sleep?(NO)  P: Pressure - Do you have or are you being treated for high blood pressure?(YES)  B: BMI - BMI greater than 35kg/m2?(NO)  A: Age - Age over 50 years old?(YES)  N: Neck - Neck circumference greater than 40 cm?(NO)  G: Gender - Gender: Male?(NO)    Total number of \"YES\" responses:  3    Scoring: Low risk of LONDON 0-2  At Risk of LONDON: >3 High Risk of LONDON: 5-8    Kimberly Contreras....................  1/3/2018   12:00 PM            "

## 2018-02-12 NOTE — TELEPHONE ENCOUNTER
Patient Information     Patient Name MRN Sex Ankita Bergeron [id number] Female 1955      Telephone Encounter by Kimberly Whyet at 2017 12:33 PM     Author:  Kimberly Whyte Service:  (none) Author Type:  (none)     Filed:  2017 12:34 PM Encounter Date:  2017 Status:  Signed     :  Kimberly Whyte            Patient notified and is happy with this.  Kimberly Whyte LPN 2017 12:33 PM

## 2018-02-12 NOTE — TELEPHONE ENCOUNTER
Patient Information     Patient Name MRN Ankita Lee [id number] Female 1955      Telephone Encounter by Kimberly Whyte at 2017 10:46 AM     Author:  Kimberly Whyte Service:  (none) Author Type:  (none)     Filed:  2017 10:50 AM Encounter Date:  2017 Status:  Signed     :  Kimberly Whyte            States that she can't get the Carafate liquid down and her pharmacy can't get it in pill form. She wants to know if there is any other suggestions on a different medication that would help her ulcer that would not come in a liquid form.  Please advise.  Kimberly Whyte LPN 2017 10:49 AM

## 2018-02-12 NOTE — TELEPHONE ENCOUNTER
Patient Information     Patient Name MRN Ankita Lee [id number] Female 1955      Telephone Encounter by Ramirez Cano MD at 2017 12:25 PM     Author:  Ramirez Cano MD Service:  (none) Author Type:  Physician     Filed:  2017 12:29 PM Encounter Date:  2017 Status:  Signed     :  Ramirez Cano MD (Physician)            Insurance will not approve diclofenac gel, but will cover diclofenac solution. Sent in solution instead, please let her know this should be covered. I'm not sure what it will cost.

## 2018-02-12 NOTE — TELEPHONE ENCOUNTER
Patient Information     Patient Name MRN Sex Ankita Bergeron [id number] Female 1955      Telephone Encounter by Kimberly Whyte at 2017  9:20 AM     Author:  Kimberly Whyte Service:  (none) Author Type:  (none)     Filed:  2017  9:23 AM Encounter Date:  2017 Status:  Signed     :  Kimberly Whyte            Patient notified after name and date of birth were verified.. Patient states that she will have  will come in and  prescription.  Kimberly Whyte LPN 2017 9:23 AM

## 2018-02-12 NOTE — PATIENT INSTRUCTIONS
Patient Information     Patient Name MRN Sex Ankita Bergeron [id number] Female 1955      Patient Instructions by Ramirez Cano MD at 12/15/2017 10:30 AM     Author:  Ramirez Cano MD  Service:  (none) Author Type:  Physician     Filed:  12/15/2017 11:18 AM  Encounter Date:  12/15/2017 Status:  Addendum     :  Ramirez Cano MD (Physician)        Related Notes: Original Note by Ramirez Cano MD (Physician) filed at 12/15/2017 11:04 AM            No alcohol!  Stop aspirin  Stay on Plavix  Continue on sucralfate  Take Protonix twice daily  May increase Lyrica to three times daily  Avoid Bentyl while off the aspirin  Follow up in a month

## 2018-02-12 NOTE — NURSING NOTE
Patient Information     Patient Name MRN Sex Ankita Bergeron [id number] Female 1955      Nursing Note by Angy Garza at 12/15/2017 10:30 AM     Author:  Angy Garza Service:  (none) Author Type:  (none)     Filed:  12/15/2017 10:40 AM Encounter Date:  12/15/2017 Status:  Signed     :  Angy Garza            Patient presents to clinic for 1 month follow up from upper GI. She also needs medication refill  Angy Escobar ....................  12/15/2017   10:35 AM

## 2018-02-12 NOTE — TELEPHONE ENCOUNTER
Patient Information     Patient Name MRN Sex Ankita Bergeron [id number] Female 1955      Telephone Encounter by Cruzito Robbins RN at 2017  2:43 PM     Author:  Cruzito Robbins RN Service:  (none) Author Type:  NURS- Registered Nurse     Filed:  2017  2:55 PM Encounter Date:  2017 Status:  Signed     :  Cruzito Robbins RN (NURS- Registered Nurse)            Beta Blockers     Office visit in the past 12 months or per provider note.    Last visit with BLAISE GUILLEN was on: 11/15/2017 in [facility]  Next visit with BLAISE GUILLEN is on: 12/15/2017 in [facility]  Next visit with Family Practice is on: 12/15/2017 in [facility]    Max refill for 12 months from last office visit or per provider note.    Chart review shows that patient last saw PCP on 11/15/17. Rx as requested was noted in office visit notes on that date. Patient is seeing PCP again on 12/15/17. Writer will refill rx as requested at this time. No changes noted to rx as requested in office visit notes on 11/15/17.     Prescription refilled per RN Medication Refill Policy.................... Cruzito Robbins RN ....................  2017   2:53 PM

## 2018-02-12 NOTE — TELEPHONE ENCOUNTER
Patient Information     Patient Name MRN Sex Ankita Bergeron [id number] Female 1955      Telephone Encounter by Cruzito Robbins RN at 2017 11:29 AM     Author:  Cruzito Robbins RN Service:  (none) Author Type:  NURS- Registered Nurse     Filed:  2017 11:31 AM Encounter Date:  2017 Status:  Signed     :  Cruzito Robbins RN (NURS- Registered Nurse)            Chart review shows that rx as requested in this encounter was filled previously by PCP on 12/15/17 as noted below:    Prescribing Provider: Ramirez Guillen MD                Order Date: 12/15/2017  Ordered by: RAMIREZ GUILLEN  Medication:sucralfate (CARAFATE) 1 gram tablet    Qty:120 tablet  Ref:0  Start:12/15/2017  End:              Route:Oral                  TOY:No   Class:eRx    Sig:Take 1 tablet by mouth 4 times daily before meals and at bedtime.    Pharmacy:[facility] - [place] [id number]    Rx is no longer pending in this encounter. Writer will close this encounter at this time.    Unable to complete prescription refill per RN Medication Refill Policy.................... Cruzito Robbins RN ....................  2017   11:31 AM

## 2018-02-12 NOTE — PROGRESS NOTES
"Patient Information     Patient Name MRN Sex Ankita Bergeron [id number] Female 1955      Progress Notes by Ramirez Cano MD at 1/3/2018 12:00 PM     Author:  Ramirez Cano MD Service:  (none) Author Type:  Physician     Filed:  2018  2:32 PM Encounter Date:  1/3/2018 Status:  Signed     :  Ramirez Cano MD (Physician)            PREOPERATIVE CLEARANCE  Date of Exam: 1/3/2018    Nursing Notes:   Kimberly Whyte  1/3/2018 12:14 PM  Signed  Date of Surgery: 1/10/18  Type of Surgery: Upper GI  Surgeon: [facility]:  [place]  Fax:     Fever/Chills or other infectious symptoms in past month: NO  >10lb weight loss in past two months: NO    Health Care Directive/Code status:  YES  Hx of blood transfusions:   (NO)   Td up to date:  16  History of VRE/MRSA:  (NO) Date:     Preoperative Evaluation: Obstructive Sleep Apnea screening    S: Snore -  Do you snore loudly? (louder than talking or loud enough to be heard through closed doors)(NO)  T: Tired - Do you often feel tired, fatigued, or sleepy during the daytime?(YES)  O: Observed - Has anyone ever observed you stop breathing during your sleep?(NO)  P: Pressure - Do you have or are you being treated for high blood pressure?(YES)  B: BMI - BMI greater than 35kg/m2?(NO)  A: Age - Age over 50 years old?(YES)  N: Neck - Neck circumference greater than 40 cm?(NO)  G: Gender - Gender: Male?(NO)    Total number of \"YES\" responses:  3    Scoring: Low risk of LONDON 0-2  At Risk of LONDON: >3 High Risk of LONDON: 5-8    Kimberly Contreras....................  1/3/2018   12:00 PM          HPI:  63 y.o. Female with CAD here for preoperative optimization prior to follow up EGD for gastric ulcer with some abnormal results on biopsies.    EGD performed 17 with findings of antral ulcer. Overall she is better. Stopped aspirin about Dec 15. Stomach pain has resolved on sucralfate. Has been on sucralfate since 10/24. Continues BID Protonix, that " dose has not changed.  Remains on Plavix.    Last week and last night had some heart racing and she checked BP, was up to 190 systolic. Came down with time. She takes Klonopin QID. Episode last night happened around 8 pm. Took Klonopin at 5 and 11 pm. She does not feel more anxious. Has been able to bring down blood pressure by focusing on relaxing. No chest pain with the episodes. No sweating. Other issues stable. Due for follow up on chronic pain in a couple weeks.     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     Slow transit constipation K59.01     PULMONARY FUNCTION TESTS, ABNORMAL R94.2     GASTRIC ULCER, CHRONIC K25.4     Coronary atherosclerosis I25.10     Chronic anxiety F41.9     PANIC ATTACK F41.0     Collagenous colitis / Microcytic Colitis K52.831     DISC DISEASE, LUMBAR M51.37     DEGENERATIVE JOINT DISEASE, CERVICAL SPINE M47.9     Pain in joint of right shoulder M25.511     Facet arthropathy M12.88     Left subclavian artery stenosis causing coronary steal through LIMA graft I77.1     Vitamin D deficiency E55.9     Myofascial pain M79.1     Central sleep apnea G47.31     Pain medication agreement signed 2/10/17 Z02.89     Subclavian artery stenosis, left- S/P 7 x 200 mm stent 4/1/2013 for treatment of angina (LIMA graft) I77.1     Chest wall pain, chronic R07.89, G89.29     CKD (chronic kidney disease) stage 2, GFR 60-89 ml/min N18.2     Chest  pain of uncertain etiology R07.89     Iron deficiency anemia D50.9     Pyuria N39.0     History of Clostridium difficile [id number]     Peripheral polyneuropathy (HC) G62.9     Presence of stent in coronary artery in patient with coronary artery disease I25.10, Z95.5     S/P CABG x 3 Z95.1     History of tobacco abuse Z87.891     Tobacco abuse counseling Z71.6     Pure hypercholesterolemia E78.00     Acute gastric ulcer with hemorrhage K25.0      Histories:  Past Medical History:     Diagnosis  Date     Acute coronary syndrome (HC) 06/15/2007    with PTCA and stenting of 90% osteo circumflex lesion and patent LAD graft, patent left main stent.      Acute MI, initial, chest pain with positive nz 3/30/2013     Anterior chest wall pain 10/13/2009    chronic      Back pain, chronic 05/31/2011     Carpal tunnel syndrome, bilateral      Central sleep apnea 10/14/2013     Chest pain 12/29/2014     Chronic anxiety      Chronic depression     Severe, hx of suicide attempt/hospitalization      Chronic ischemic heart disease, unspecified 06/27/2012     Chronic pain syndrome 12/01/2010    chest wall, back      Chronic right shoulder pain      Clostridium difficile colitis 8/19/2016     Colitis 06/15/2007    history of      COLITIS     Microcytic       Constipation 11/29/2011     COPD (chronic obstructive pulmonary disease) (HC) 06/15/2007    low DLCO, normal spirometry      Cor athrscl-uns vessel     -angio revealed 3 vessel dz  -4 stents placed; 2 overlapping stents placed in mLAD, 1 stent pRCA, 1 stent pCirc 1 s 9/02 -non-ST elevation MI 1/03  -angio revealed restenosis of Circ.    -PTCA and brachytherapy of pCirc -repeat angio 2/03 -no intervension -CABG x3 12/03 - Dr Sinclair  -LIMA-LAD, RAFI-RCA, SVG-OM -PCI 7/04 stent to L -CTangio 9/05   -patentent LIMA-LAD. RAFI-RCA occluded; RCA ostio/proximal stent widely patent. SVG-OM occluded; OM marginal branch is widely patent.  Non-STEMI--6/15/07--DELONTE to LCX 5/2008: Angiogram: No  hemodynamically significant lesions that were not bypassed by the LIMA -9/20/12 PCI of ostial LAD, Left Main, ostial Cx.  Patent NABILA to LAD, occluded RAFI to RCA and SVG to OM        E. coli sepsis (HC) 7/14/16    [facility]      Gastric ulcer with hemorrhage 10/2003     Gastric ulcer, chronic 10/03/2011    hx of GI bleed (2003)      History of tobacco abuse      Hx of coronary angioplasty 09/12/2002    with triple stenting      Hx of coronary angioplasty 01/10/2003    repeat angioplasty      Hx of diseases of blood and blood-forming organs      HX OF PEPTIC ULCER DISEASE 6/15/2007     Hyperlipemia      Hypertension      Intermittent claudication (HC)      Knee pain 05/31/2011     Left subclavian artery stenosis causing coronary steal through LIMA graft 3/31/2013    S/p prox left SCA stent 4/1/2013       Lumbar disc disease      Migraine syndrome      Myalgia and myositis 10/14/2013     Neck pain, chronic      Nodular degeneration of cornea 09/26/2011     Normal cardiac stress test 10/2004    Cardiolite (2004, 2005, 2006 and 2011)      Normal stress echocardiogram 03/2010    dobutamine, negative      Opioid abuse     history of      Osteoarthritis      Pain medication agreement signed 05/22/2012 1/28/2014     Panic attack      Postsurgical aortocoronary bypass status 2/12/2003     Postsurgical percutaneous transluminal coronary angioplasty status      Vitamin D deficiency 5/6/2013      Past Surgical History:      Procedure  Laterality Date     ANGIOPLASTY  9/12/02    with triple stenting        APPENDECTOMY       COLONOSCOPY  8/8/16    normal, Dr Bowman       COLONOSCOPY SCREENING  2011    Dr Bowman benign polyps       COLONOSCOPY SCREENING  2011    benign polyps, Dr. Bowman       CORONARY ARTERY BYPASS GRAFT  12/13/02    Triple bypass, left internal mammary  to LAD, right internal mammary to right coronary artery, saphenous to obtuse marginal of the left circumflex.       EMBOLECTOMY  04/02/2013    brachial artery  "pseudoaneurysm after stenting       ENDOSCOPY GI  2003    Upper GI endoscopy.        ESOPHAGOGASTRODUODENOSCOPY  2011    EGD Dr Bowman with pyloric ulcer       ESOPHAGOGASTRODUODENOSCOPY  2011    pyloric ulcer, Dr. Bwoman       ESOPHAGOGASTRODUODENOSCOPY  8/8/16    mild gastritis, Dr Bowman       ESOPHAGOGASTRODUODENOSCOPY  11/27/2017    Dr Bowman. Antral ulcer       FEMUR KNEE SURGERY      x3, right knee       FEMUR KNEE SURGERY  2000    left knee  ligament surgery       HYSTERECTOMY  age 21     KNEE ARTHROSCOPY      left       LAP CHOLECYSTECTOMY  2006     PTCA  1/10/2003     PTCA  09/20/2012    DELONTE in LAD and left main       PTCA  4/1/2013    L subclavian stenosis       SALPINGO-OOPHORECTOMY  age 27    Bilateral salpingo-oophorectomy       SHOULDER ARTHROSCOPY      right       TONSIL AND ADENOIDECTOMY  childhood     UGI ENDOSCOP  2003    Upper GI endoscopy.        UPPER ARM/ELBOW SURGERY  baby    birth malachi removed from right arm       Social History     Social History        Marital status:       Spouse name: N/A     Number of children:  N/A     Years of education:  N/A     Occupational History      Not on file.     Social History Main Topics          Smoking status:   Former Smoker      Packs/day:  0.25      Years:  35.00      Types:  Cigarettes      Quit date:  2/15/2017      Smokeless tobacco:   Never Used      Alcohol use   0.0 oz/week     0 Standard drinks or equivalent per week        Comment: rare       Drug use:   No      Sexual activity:   Not on file      Other Topics   Concern     Caffeine Concern  No     Exercise  Yes     gets on bike when she has time       Service  No     Blood Transfusions  No     Occupational Exposure  No     Hobby Hazards  No     Sleep Concern  No     Stress Concern  Yes     \"depressed due to [place], lost mom at [place]\"  12/8/15      Weight Concern  Yes     asking about diet pills 12/8/15      Special Diet  No     Back Care  No     Bike Helmet  No     Seat Belt  " Yes     Social History Narrative     ,  [place].  Currently not working outside the home. Lives [place]mile self [place] met with . Tobacco abuse, quit , restarted. Quit  and has since quit, no alcohol.     Family History       Problem   Relation Age of Onset     Heart Disease  Father      Heart condition/Significant for atherosclerotic cardiovascular disease, but non premature.       Cancer-colon  Father       of colon cancer        Cancer  Father      mets to liver, secondary to colon cancer       Heart Disease  Mother      Cancer  Sister      multiple myeloma       Other  Son      gallstones       Cancer  Other      Multiple Myeloma       Heart Disease  Other      Ischemic Heart Disease       Cancer-colon  Other      Malignant neoplasms        Allergies: Atorvastatin; Tape [adhesive]; Tiotropium bromide; Crestor [rosuvastatin]; Ezetimibe; Latex; Niacin; and Other [unlisted allergen (include detail in comments)]   Latex Allergy: yes    Current Medications:  Current Outpatient Rx       Medication  Sig Dispense Refill     amLODIPine (NORVASC) 10 mg tablet Take 1 tablet by mouth once daily. 90 tablet 4     aspirin (ECOTRIN) 81 mg enteric coated tablet Take 81 mg by mouth once daily with a meal.       busPIRone (BUSPAR) 10 mg tablet TAKE 1 TABLET TWICE A  tablet 3     cholecalciferol (VITAMIN D3) 5,000 unit capsule Take 1 capsule by mouth once daily.       clonazePAM (KLONOPIN) 1 mg tablet Take 1 tablet by mouth 4 times daily. Becky Olmstead CNP       clopidogrel (PLAVIX) 75 mg tablet TAKE 1 TABLET DAILY 90 tablet 5     cyclobenzaprine (FLEXERIL) 10 mg tablet TAKE 1 TABLET TWICE A DAY IF NEEDED FOR MUSCLE SPASM 60 tablet 5     Diclofenac Sodium 1.5 % drop Apply 20 drops to each hand 4 times daily 2 Bottle 11     docusate (STOOL SOFTENER) 50 mg capsule Take 1 capsule by mouth once daily.  0     ferrous sulfate, 65 mg elemental, (IRON, FERROUS SULFATE,) tablet Take 1 tablet by mouth once  daily with a meal.  0     lisinopril (PRINIVIL; ZESTRIL) 40 mg tablet Take 1 tablet by mouth once daily. 90 tablet 4     metoprolol tartrate (LOPRESSOR) 50 mg tablet Take 1 tablet by mouth 2 times daily. 180 tablet 3     nitroglycerin (NITROSTAT) 0.3 mg sublingual tablet Place 1 tablet under the tongue every 5 minutes if needed for Chest Pain. 1 Bottle 2     ondansetron (ZOFRAN, AS HYDROCHLORIDE,) 4 mg tablet Take 1 tablet by mouth every 6 hours if needed for Nausea/Vomiting. 30 tablet 11     oxyCODONE-acetaminophen, 5-325 mg, (PERCOCET) 5-325 mg per tablet Take 2 tablets by mouth 4 times daily  Earliest Fill Date: 12/15/17 Max acetaminophen dose: 4000mg in 24 hrs. 240 tablet 0     pantoprazole (PROTONIX) 40 mg delayed-release tablet TAKE 1 TABLET TWICE A  tablet 4     Potassium Gluconate 595 mg (99 mg) tablet Take  by mouth.  0     pravastatin (PRAVACHOL) 40 mg tablet Take 1 tablet by mouth at bedtime. 90 tablet 2     pregabalin (LYRICA) 25 mg capsule Take 1 capsule by mouth 3 times daily. 90 capsule 1     Saccharomyces boulardii (FLORASTOR) 250 mg capsule Take 1 capsule by mouth 2 times daily.  0     sucralfate (CARAFATE) 1 gram tablet Take 1 tablet by mouth 4 times daily before meals and at bedtime. 120 tablet 0     vortioxetine (TRINTELLIX) 10 mg tab Take  by mouth.  0     Medications have been reviewed by me and are current to the best of my knowledge and ability.    Recent use of: benzodiazepines and Plavix    HABITS:   Social History        Substance Use Topics          Smoking status:   Former Smoker      Packs/day:  0.25      Years:  35.00      Types:  Cigarettes      Quit date:  2/15/2017      Smokeless tobacco:   Never Used      Alcohol use   0.0 oz/week     0 Standard drinks or equivalent per week        Comment: rare       Proposed anesthesia: MAC  Anesthesia Complications: None  History of abnormal bleeding : None  History of Blood Transfusions: No    Health Care Directive or Living Will:  "no    REVIEW OF SYSTEMS:  General: Denies general constitutional problems  Eyes: Denies problems  Ears/Nose/Throat: Denies problems  Cardiovascular: see above  Respiratory: Denies problems  Psychiatric: chronic anxiety      EXAM:   /67 (Cuff Site: Right Arm, Position: Sitting, Cuff Size: Adult Large)  Pulse 61  Ht 1.68 m (5' 6.14\")  Wt 76.2 kg (168 lb)  Breastfeeding? No  BMI 27 kg/m2 Body mass index is 27 kg/(m^2).  General Appearance: Pleasant, alert, appropriate appearance for age. No acute distress  OroPharynx Exam: Dentures. Normal pharynx. Mallampati 2/4.  Neck Exam: Supple, no masses or nodes.  Chest/Respiratory Exam: Normal chest wall and respirations. Clear to auscultation.  Cardiovascular Exam: Regular rate and rhythm. S1, S2, no murmur, click, gallop, or rubs.  Extremities: 2 + pedal pulses.  No lower extremity edema.      DIAGNOSTICS:   1. EKG: EKG FINDINGS - Notes Recorded by Kyler Pozo MD on 9/13/2017 at 10:25 AM  Sinus rhythm with a rate of 82. Frequent PACs. Axis is normal. AK interval normal. QRS shows an incomplete right bundle branch block pattern. Nonspecific ST and T wave abnormalities are present. Compared to a previous tracing dated August 22, 2017, the PACs are new otherwise no significant change.  2. CXR: not indicated  3. Labs:   Results for orders placed or performed in visit on 12/15/17      HEMOGLOBIN      Result  Value Ref Range    HEMOGLOBIN                12.3 12.0 - 16.0 g/dL    MCV                       89 80 - 100 fL     *Note: Due to a large number of results and/or encounters for the requested time period, some results have not been displayed. A complete set of results can be found in Results Review.           IMPRESSION:   (Z01.818) Preop examination  (primary encounter diagnosis)  (K25.0) Acute gastric ulcer with hemorrhage  (D50.0) Iron deficiency anemia due to chronic blood loss  (I25.10) Atherosclerosis of native coronary artery of native heart, angina " presence unspecified  (I77.1) Subclavian artery stenosis, left- S/P 7 x 200 mm stent 4/1/2013 for treatment of angina (LIMA graft)  (G47.31) Central sleep apnea  (N18.2) CKD (chronic kidney disease) stage 2, GFR 60-89 ml/min     For above listed surgery and anesthesia:   Patient is moderate risk for perioperative complications.    RECOMMENDATIONS:   Remain off aspirin until follow up. Stop Plavix 3 d prior to EGD to prevent bleeding with biopsy. High risk for cardiac event off both anti-platelet agents, but also has known bleeding. Resume Plavix right after EGD    Continue sucralfate until follow up. Avoid taking 24 hrs prior to EGD. Remain on BID PPI    Other issues stable. F/U 2 weeks    Electronically Signed by: Ramirez Cano MD  1/3/2018

## 2018-02-13 ENCOUNTER — OFFICE VISIT (OUTPATIENT)
Dept: FAMILY MEDICINE | Facility: OTHER | Age: 63
End: 2018-02-13
Attending: FAMILY MEDICINE
Payer: COMMERCIAL

## 2018-02-13 VITALS
HEART RATE: 72 BPM | DIASTOLIC BLOOD PRESSURE: 78 MMHG | SYSTOLIC BLOOD PRESSURE: 132 MMHG | WEIGHT: 165 LBS | BODY MASS INDEX: 26.52 KG/M2

## 2018-02-13 DIAGNOSIS — R07.89 CHEST WALL PAIN, CHRONIC: ICD-10-CM

## 2018-02-13 DIAGNOSIS — G89.18 PAIN ASSOCIATED WITH SURGICAL PROCEDURE: ICD-10-CM

## 2018-02-13 DIAGNOSIS — G89.4 CHRONIC PAIN DISORDER: Primary | ICD-10-CM

## 2018-02-13 DIAGNOSIS — M54.50 CHRONIC BILATERAL LOW BACK PAIN WITHOUT SCIATICA: ICD-10-CM

## 2018-02-13 DIAGNOSIS — G43.909 MIGRAINE SYNDROME: ICD-10-CM

## 2018-02-13 DIAGNOSIS — Z02.89 PAIN MEDICATION AGREEMENT: ICD-10-CM

## 2018-02-13 DIAGNOSIS — G89.29 CHEST WALL PAIN, CHRONIC: ICD-10-CM

## 2018-02-13 DIAGNOSIS — G89.29 CHRONIC BILATERAL LOW BACK PAIN WITHOUT SCIATICA: ICD-10-CM

## 2018-02-13 PROBLEM — Z71.6 TOBACCO ABUSE COUNSELING: Status: RESOLVED | Noted: 2017-09-28 | Resolved: 2018-02-13

## 2018-02-13 PROBLEM — K25.0 ACUTE GASTRIC ULCER WITH HEMORRHAGE: Status: RESOLVED | Noted: 2018-01-03 | Resolved: 2018-02-13

## 2018-02-13 PROBLEM — M25.511 PAIN IN JOINT OF RIGHT SHOULDER: Status: RESOLVED | Noted: 2018-01-22 | Resolved: 2018-02-13

## 2018-02-13 PROBLEM — M51.379 DEGENERATION OF LUMBAR OR LUMBOSACRAL INTERVERTEBRAL DISC: Status: RESOLVED | Noted: 2018-01-22 | Resolved: 2018-02-13

## 2018-02-13 PROBLEM — E78.00 PURE HYPERCHOLESTEROLEMIA: Status: RESOLVED | Noted: 2017-09-28 | Resolved: 2018-02-13

## 2018-02-13 PROCEDURE — 99214 OFFICE O/P EST MOD 30 MIN: CPT | Performed by: FAMILY MEDICINE

## 2018-02-13 RX ORDER — PREGABALIN 25 MG/1
25 CAPSULE ORAL 2 TIMES DAILY
Qty: 180 CAPSULE | Refills: 1 | Status: SHIPPED | OUTPATIENT
Start: 2018-02-13 | End: 2018-04-24

## 2018-02-13 RX ORDER — OXYCODONE AND ACETAMINOPHEN 5; 325 MG/1; MG/1
2 TABLET ORAL 4 TIMES DAILY
Qty: 18 TABLET | Status: CANCELLED | OUTPATIENT
Start: 2018-02-13

## 2018-02-13 RX ORDER — OXYCODONE AND ACETAMINOPHEN 5; 325 MG/1; MG/1
2 TABLET ORAL 4 TIMES DAILY
Qty: 120 TABLET | Refills: 0 | Status: SHIPPED | OUTPATIENT
Start: 2018-02-28 | End: 2018-03-15

## 2018-02-13 RX ORDER — OXYCODONE AND ACETAMINOPHEN 10; 325 MG/1; MG/1
1-2 TABLET ORAL EVERY 6 HOURS PRN
Qty: 120 TABLET | Refills: 0 | Status: SHIPPED | OUTPATIENT
Start: 2018-02-13 | End: 2019-02-20

## 2018-02-13 RX ORDER — PREGABALIN 25 MG/1
25 CAPSULE ORAL 2 TIMES DAILY
COMMUNITY
Start: 2018-01-15 | End: 2018-02-13

## 2018-02-13 NOTE — PATIENT INSTRUCTIONS
May use 10 mg Percocet for 2 weeks after dental surgery  Then go back to the 5 mg Percocet  Any problems then need an appointment    After dental surgery, may start aspirin enteric coated (EC) 81 mg daily    Follow up in a month

## 2018-02-13 NOTE — PROGRESS NOTES
SUBJECTIVE:  63 year old female presents for follow up on NSAID induced gastric ulcer, CAD, and chronic chest wall and back pain.    EGD on Feb 2 showed a healed ulcer. It sounds like she can resume the aspirin. Remains on Plavix.    Has dental surgery tomorrow and would like to increase the oxycodone dose for the procedure and then decrease when her pain subsides.    Tolerating Lyrica at BID. No side effects. She thought is might cause tremor, but does not seem to do so. Sleeping better on the Lyrica. Not sure if it helps pain.    BP elevated rushing in today. Believes it is controlled at home.        REVIEW OF SYSTEMS:    General: negative  Resp: No shortness of breath, dyspnea on exertion, cough, or hemoptysis  CV: negative    Past Medical History:   Diagnosis Date     Acute ischemic heart disease (H)     06/15/2007,with PTCA and stenting of 90% osteo circumflex lesion and patent LAD graft, patent left main stent.     Acute myocardial infarction     3/30/2013     Anxiety disorder     No Comments Provided     Atherosclerotic heart disease of native coronary artery without angina pectoris     -angio revealed 3 vessel dz  -4 stents placed; 2 overlapping stents placed in mLAD, 1 stent pRCA, 1 stent pCirc 1 s 9/02 -non-ST elevation MI 1/03  -angio revealed restenosis of Circ.    -PTCA and brachytherapy of pCirc -repeat angio 2/03 -no intervension -CABG x3 12/03 - Dr Sinclair  -LIMA-LAD, RAFI-RCA, SVG-OM -PCI 7/04 stent to L -CTangio 9/05   -patentent LIMA-LAD. RAFI-RCA occluded; RCA ostio/p*     Bilateral carpal tunnel syndrome     No Comments Provided     Cervicalgia     No Comments Provided     Chest pain     12/29/2014     Chronic gastric ulcer without hemorrhage or perforation     10/03/2011,hx of GI bleed (2003)     Chronic ischemic heart disease     06/27/2012     Chronic obstructive pulmonary disease (H)     06/15/2007,low DLCO, normal spirometry     Chronic or unspecified gastric ulcer with hemorrhage     10/2003      Chronic pain syndrome     12/01/2010,chest wall, back     Constipation     11/29/2011     Coronary angioplasty status     09/12/2002,with triple stenting     Coronary angioplasty status     01/10/2003,repeat angioplasty     Coronary angioplasty status     No Comments Provided     Dorsalgia     05/31/2011     Encounter for other administrative examinations     1/28/2014     Encounter for screening for cardiovascular disorders     10/2004,Cardiol[facility] (2004, 2005, 2006 and 2011)     Enterocolitis due to Clostridium difficile     8/19/2016     Essential (primary) hypertension     No Comments Provided     Hyperlipidemia     No Comments Provided     Major depressive disorder, single episode     Severe, hx of suicide attempt/hospitalization     Migraine without status migrainosus, not intractable     No Comments Provided     Nodular corneal degeneration     09/26/2011     Noninfective gastroenteritis and colitis     06/15/2007,history of     Noninfective gastroenteritis and colitis     Microcytic     Osteoarthritis     No Comments Provided     Other chest pain     10/13/2009,chronic     Pain in knee     05/31/2011     Pain in right shoulder     No Comments Provided     Panic disorder without agoraphobia     No Comments Provided     Peptic ulcer without hemorrhage or perforation     6/15/2007     Peripheral vascular disease (H)     No Comments Provided     Personal history of diseases of the blood and blood-forming organs and certain disorders involving the immune mechanism (CODE)     No Comments Provided     Personal history of nicotine dependence     No Comments Provided     Personal history of other medical treatment (CODE)     10/14/2013     Presence of aortocoronary bypass graft     2/12/2003     Primary central sleep apnea     10/14/2013     Sepsis due to Escherichia coli (E. coli) (H)     7/14/16,[facility]     Stricture of artery (H)     3/31/2013,S/p prox left SCA stent 4/1/2013     Thoracic, thoracolumbar and  lumbosacral intervertebral disc disorder     No Comments Provided     Uncomplicated opioid abuse     history of     Vitamin D deficiency     5/6/2013       Past Surgical History:   Procedure Laterality Date     ANGIOPLASTY      9/12/02,with triple stenting     APPENDECTOMY OPEN      No Comments Provided     ARTHROSCOPY KNEE      left     ARTHROSCOPY SHOULDER      right     BYPASS GRAFT ARTERY CORONARY      12/13/02,Triple bypass, left internal mammary  to LAD, right internal mammary to right coronary artery, saphenous to obtuse marginal of the left circumflex.     COLONOSCOPY      2011,Dr Bowman benign polyps     COLONOSCOPY      2011,benign polyps, Dr. Bowman     COLONOSCOPY      8/8/16,normal, Dr Bowman     ELBOW SURGERY      baby,birth malachi removed from right arm     EMBOLECTOMY UPPER EXTREMITY  04/02/2013    brachial artery pseudoaneurysm after stenting     ESOPHAGOSCOPY, GASTROSCOPY, DUODENOSCOPY (EGD), COMBINED      2011,EGD Dr Bowman with pyloric ulcer     ESOPHAGOSCOPY, GASTROSCOPY, DUODENOSCOPY (EGD), COMBINED      2011,pyloric ulcer, Dr. Bowman     ESOPHAGOSCOPY, GASTROSCOPY, DUODENOSCOPY (EGD), COMBINED      8/8/16,mild gastritis, Dr Bowman     ESOPHAGOSCOPY, GASTROSCOPY, DUODENOSCOPY (EGD), COMBINED      11/27/2017,Dr Bowman. Antral ulcer     HYSTERECTOMY TOTAL ABDOMINAL      age 21     LAPAROSCOPIC CHOLECYSTECTOMY      2006     OSTEOTOMY FEMUR DISTAL      x3, right knee     OSTEOTOMY FEMUR DISTAL      2000,left knee  ligament surgery     OTHER SURGICAL HISTORY      1/10/2003,,PTCA     OTHER SURGICAL HISTORY      09/20/2012,,PTCA,DELONTE in LAD and left main     OTHER SURGICAL HISTORY      4/1/2013,,PTCA,L subclavian stenosis     SALPINGO-OOPHORECTOMY BILATERAL      age 27,Bilateral salpingo-oophorectomy     TONSILLECTOMY, ADENOIDECTOMY, COMBINED      childhood        Current Outpatient Prescriptions   Medication Sig Dispense Refill     pregabalin (LYRICA) 25 MG capsule Take 25 mg by mouth 2 times  daily       clopidogrel (PLAVIX) 75 MG tablet Take 75 mg by mouth daily       Diclofenac Sodium 1.5 % SOLN Apply 20 drops to each hand 4 times daily       ondansetron (ZOFRAN) 4 MG tablet Take 4 mg by mouth every 6 hours as needed for nausea       Potassium Gluconate (CVS POTASSIUM GLUCONATE) 2 MEQ TABS        sucralfate (CARAFATE) 1 GM tablet Take 1 g by mouth 4 times daily Before meals & at bedtime       amLODIPine (NORVASC) 10 MG tablet Take 10 mg by mouth daily       aspirin EC 81 MG EC tablet Take 81 mg by mouth daily with food       busPIRone (BUSPAR) 10 MG tablet Take 1 tablet by mouth 2 times daily       clonazePAM (KLONOPIN) 1 MG tablet Take 1 tablet by mouth 4 times daily Becky Olmstead CNP       cyclobenzaprine (FLEXERIL) 10 MG tablet 1 tablet 2 times daily as needed for muscle spasms       lisinopril (PRINIVIL/ZESTRIL) 40 MG tablet Take 40 mg by mouth daily       metoprolol tartrate (LOPRESSOR) 50 MG tablet Take 1 tablet by mouth 2 times daily       nitroGLYcerin (NITROSTAT) 0.3 MG sublingual tablet Place 1 tablet under the tongue every 5 minutes as needed for chest pain       oxyCODONE-acetaminophen (PERCOCET) 5-325 MG per tablet Take 2 tablets by mouth 4 times daily Earliest Fill Date: 12/15/17 Max acetaminophen dose: 4000mg in 24 hrs       pantoprazole (PROTONIX) 40 MG EC tablet Take 40 mg by mouth 2 times daily       vortioxetine (TRINTELLIX/BRINTELLIX) 10 MG tablet        PRAVASTATIN SODIUM PO Take 40 mg by mouth every evening       saccharomyces boulardii (FLORASTOR) 250 MG capsule Take 250 mg by mouth 2 times daily       docusate sodium (COLACE) 50 MG capsule Take 50 mg by mouth daily       VITAMIN D, CHOLECALCIFEROL, PO Take 5,000 Units by mouth daily       [DISCONTINUED] pregabalin (LYRICA) 25 MG capsule Take 1 capsule by mouth 3 times daily       [DISCONTINUED] pravastatin (PRAVACHOL) 40 MG tablet Take 40 mg by mouth At Bedtime       [DISCONTINUED] AmLODIPine Besylate (NORVASC PO) Take 10 mg by  mouth daily       [DISCONTINUED] CLONAZEPAM PO Take 1 mg by mouth 4 times daily       [DISCONTINUED] LISINOPRIL PO Take 40 mg by mouth daily       [DISCONTINUED] METOPROLOL TARTRATE PO Take 50 mg by mouth 2 times daily       [DISCONTINUED] NITROGLYCERIN SL Place 0.3 mg under the tongue every 5 minutes as needed for chest pain       Allergies   Allergen Reactions     Atorvastatin Muscle Pain (Myalgia)     Liquid Adhesive Rash     Other reaction(s): Erythema  Silk and plastic gloves (long term attachment of adhesives)     Tiotropium Bromide [Tiotropium] Rash     Ezetimibe Muscle Pain (Myalgia)     Niacin      Other reaction(s): Flushing  flushing     Rosuvastatin      Other reaction(s): Myalgia     Latex Rash     No Clinical Screening - See Comments Rash, Blisters and Itching     Metals and plastic  Metals and plastics       Tape [Adhesive Tape] Rash       OBJECTIVE:  /80 (BP Location: Right leg, Patient Position: Sitting, Cuff Size: Adult Regular)  Pulse 72  Wt 165 lb (74.8 kg)  Breastfeeding? No  BMI 26.52 kg/m2    EXAM:  Psychiatric: mentation appears normal and affect normal/bright      ASSESSMENT/PLAN:    ICD-10-CM    1. Chronic pain disorder G89.4 oxyCODONE-acetaminophen (PERCOCET) 5-325 MG per tablet     pregabalin (LYRICA) 25 MG capsule   2. Chronic bilateral low back pain without sciatica M54.5 oxyCODONE-acetaminophen (PERCOCET) 5-325 MG per tablet    G89.29    3. Chest wall pain, chronic R07.89 oxyCODONE-acetaminophen (PERCOCET) 5-325 MG per tablet    G89.29    4. Migraine syndrome G43.909    5. Pain medication agreement Z02.89    6. Pain associated with surgical procedure G89.18 oxyCODONE-acetaminophen (PERCOCET)  MG per tablet     Having surgery tomorrow. We discussed adjusting opioid dose for post-surgical pain and then dropping back to usual dosing. When she had shoulder surgery used oxycodone at double her usual dose. The dental pegs were similarly painful. Increased oxycodone at 20 mg  QID x 15 days, then needs to return to previous dosing of 10 mg QID.  UDM appropriate 10/31/17. Due for new controlled substance agreement when on stable quantity.    After dental surgery, resume aspirin at 81 mg EC daily. Monitor Hgb and will need to stop ASA if Hgb drops. Avoid irritants like alcohol for life.  F/U 1 month(s)    Greater than 50% of this 25 minute appointment spent on counseling

## 2018-02-13 NOTE — MR AVS SNAPSHOT
"              After Visit Summary   2/13/2018    Ankita Day    MRN: [id number]           Patient Information     Date Of Birth          1955        Visit Information        Provider Department      2/13/2018 11:30 AM Ramirez Cano MD [facility]        Today's Diagnoses     Chronic pain disorder    -  1    Chronic bilateral low back pain without sciatica        Chest wall pain, chronic        Migraine syndrome        Pain medication agreement        Pain associated with surgical procedure          Care Instructions    May use 10 mg Percocet for 2 weeks after dental surgery  Then go back to the 5 mg Percocet  Any problems then need an appointment    After dental surgery, may start aspirin enteric coated (EC) 81 mg daily    Follow up in a month          Follow-ups after your visit        Who to contact     If you have questions or need follow up information about today's clinic visit or your schedule please contact [facility] directly at [phone].  Normal or non-critical lab and imaging results will be communicated to you by MyChart, letter or phone within 4 business days after the clinic has received the results. If you do not hear from us within 7 days, please contact the clinic through [employer] or phone. If you have a critical or abnormal lab result, we will notify you by phone as soon as possible.  Submit refill requests through [employer] or call your pharmacy and they will forward the refill request to us. Please allow 3 business days for your refill to be completed.          Additional Information About Your Visit        MyChart Information     [employer] lets you send messages to your doctor, view your test results, renew your prescriptions, schedule appointments and more. To sign up, go to [email or web address] . Click on \"Log in\" on the left side of the screen, which will take you to the Welcome page. Then click on \"Sign up Now\" on the right side of " the page.     You will be asked to enter the access code listed below, as well as some personal information. Please follow the directions to create your username and password.     Your access code is: [email or web address]  Expires: 2018 12:16 PM     Your access code will  in 90 days. If you need help or a new code, please call your [facility] clinic or [phone].        Care EveryWhere ID     This is your Care EveryWhere ID. This could be used by other organizations to access your [facility] medical records  [id number]        Your Vitals Were     Pulse Breastfeeding? BMI (Body Mass Index)             72 No 26.52 kg/m2          Blood Pressure from Last 3 Encounters:   18 132/78   01/15/18 127/74   18 125/67    Weight from Last 3 Encounters:   18 165 lb (74.8 kg)   01/15/18 168 lb (76.2 kg)   18 168 lb (76.2 kg)              Today, you had the following     No orders found for display         Today's Medication Changes          These changes are accurate as of 18 12:16 PM.  If you have any questions, ask your nurse or doctor.               These medicines have changed or have updated prescriptions.        Dose/Directions    amLODIPine 10 MG tablet   Commonly known as:  NORVASC   This may have changed:  Another medication with the same name was removed. Continue taking this medication, and follow the directions you see here.   Changed by:  Ramirez Cano MD        Dose:  10 mg   Take 10 mg by mouth daily   Refills:  0       busPIRone 10 MG tablet   Commonly known as:  BUSPAR   This may have changed:  Another medication with the same name was removed. Continue taking this medication, and follow the directions you see here.   Changed by:  Ramirez Cano MD        Dose:  1 tablet   Take 1 tablet by mouth 2 times daily   Refills:  0       clonazePAM 1 MG tablet   Commonly known as:  klonoPIN   This may have changed:  Another medication with the same name was removed. Continue taking  this medication, and follow the directions you see here.   Changed by:  Ramirez Cano MD        Dose:  1 tablet   Take 1 tablet by mouth 4 times daily Becky Olmstead CNP   Refills:  0       cyclobenzaprine 10 MG tablet   Commonly known as:  FLEXERIL   This may have changed:  Another medication with the same name was removed. Continue taking this medication, and follow the directions you see here.   Changed by:  Ramirez Cano MD        Dose:  1 tablet   1 tablet 2 times daily as needed for muscle spasms   Refills:  0       docusate sodium 50 MG capsule   Commonly known as:  COLACE   This may have changed:  Another medication with the same name was removed. Continue taking this medication, and follow the directions you see here.   Changed by:  Ramirez Cano MD        Dose:  50 mg   Take 50 mg by mouth daily   Refills:  0       lisinopril 40 MG tablet   Commonly known as:  PRINIVIL/ZESTRIL   This may have changed:  Another medication with the same name was removed. Continue taking this medication, and follow the directions you see here.   Changed by:  Ramirez Cano MD        Dose:  40 mg   Take 40 mg by mouth daily   Refills:  0       metoprolol tartrate 50 MG tablet   Commonly known as:  LOPRESSOR   This may have changed:  Another medication with the same name was removed. Continue taking this medication, and follow the directions you see here.   Changed by:  Ramirez Cano MD        Dose:  1 tablet   Take 1 tablet by mouth 2 times daily   Refills:  0       nitroGLYcerin 0.3 MG sublingual tablet   Commonly known as:  NITROSTAT   This may have changed:  Another medication with the same name was removed. Continue taking this medication, and follow the directions you see here.   Changed by:  Ramirez Cano MD        Dose:  1 tablet   Place 1 tablet under the tongue every 5 minutes as needed for chest pain   Refills:  0       * oxyCODONE-acetaminophen  MG per tablet   Commonly known as:   PERCOCET   This may have changed:    - how much to take  - when to take this  - reasons to take this  - additional instructions   Used for:  Pain associated with surgical procedure   Changed by:  Ramirez Cano MD        Dose:  1-2 tablet   Take 1-2 tablets by mouth every 6 hours as needed for moderate to severe pain maximum 8 tablet(s) per day   Quantity:  120 tablet   Refills:  0       * oxyCODONE-acetaminophen 5-325 MG per tablet   Commonly known as:  PERCOCET   This may have changed:  additional instructions   Used for:  Chronic pain disorder, Chronic bilateral low back pain without sciatica, Chest wall pain, chronic   Changed by:  Ramirez Cano MD        Dose:  2 tablet   Start taking on:  2/28/2018   Take 2 tablets by mouth 4 times daily Max acetaminophen dose: 4000mg in 24 hrs   Quantity:  120 tablet   Refills:  0       pantoprazole 40 MG EC tablet   Commonly known as:  PROTONIX   This may have changed:  Another medication with the same name was removed. Continue taking this medication, and follow the directions you see here.   Changed by:  Ramirez Cano MD        Dose:  40 mg   Take 40 mg by mouth 2 times daily   Refills:  0       PRAVASTATIN SODIUM PO   This may have changed:  Another medication with the same name was removed. Continue taking this medication, and follow the directions you see here.   Changed by:  Ramirez Cano MD        Dose:  40 mg   Take 40 mg by mouth every evening   Refills:  0       pregabalin 25 MG capsule   Commonly known as:  LYRICA   This may have changed:  Another medication with the same name was removed. Continue taking this medication, and follow the directions you see here.   Changed by:  Ramirez Cano MD        Dose:  25 mg   Take 25 mg by mouth 2 times daily   Refills:  0       saccharomyces boulardii 250 MG capsule   Commonly known as:  FLORASTOR   This may have changed:  Another medication with the same name was removed. Continue taking this  medication, and follow the directions you see here.   Changed by:  Ramirez Cano MD        Dose:  250 mg   Take 250 mg by mouth 2 times daily   Refills:  0       VITAMIN D (CHOLECALCIFEROL) PO   This may have changed:  Another medication with the same name was removed. Continue taking this medication, and follow the directions you see here.   Changed by:  Ramirez Cano MD        Dose:  5000 Units   Take 5,000 Units by mouth daily   Refills:  0       vortioxetine 10 MG tablet   Commonly known as:  TRINTELLIX/BRINTELLIX   This may have changed:  Another medication with the same name was removed. Continue taking this medication, and follow the directions you see here.   Changed by:  Ramirez Cano MD        Refills:  0       * Notice:  This list has 2 medication(s) that are the same as other medications prescribed for you. Read the directions carefully, and ask your doctor or other care provider to review them with you.      Stop taking these medicines if you haven't already. Please contact your care team if you have questions.     ASPIRIN PO   Stopped by:  Ramirez Cano MD           ferrous sulfate 325 (65 FE) MG tablet   Commonly known as:  IRON   Stopped by:  Ramirez Cano MD           fluticasone 50 MCG/ACT spray   Commonly known as:  FLONASE   Stopped by:  Ramirez Cano MD           ONDANSETRON PO   Stopped by:  Ramirez Cano MD                Where to get your medicines      Some of these will need a paper prescription and others can be bought over the counter.  Ask your nurse if you have questions.     Bring a paper prescription for each of these medications     oxyCODONE-acetaminophen  MG per tablet    oxyCODONE-acetaminophen 5-325 MG per tablet                Primary Care Provider Office Phone # Fax #    Ramirez Cano [phone] [address] [facility] [address]        Equal Access to Services     JACKIE SIMPSON AH: Rashad lugo  Sowillyali, waaxda luqadaha, qaybta kaalmada dunia, kacie riverc[facility] diamond. So [facility] [phone].    ATENCIÓN: Si akil hubbard, tiene a siddiqui disposición servicios gratuitos de asistencia lingüística. Avel al [phone].    We comply with applicable federal civil rights laws and [place] laws. We do not discriminate on the basis of race, color, national origin, age, disability, sex, sexual orientation, or gender identity.            Thank you!     Thank you for choosing [facility] [place] [facility]  for your care. Our goal is always to provide you with excellent care. Hearing back from our patients is one way we can continue to improve our services. Please take a few minutes to complete the written survey that you may receive in the mail after your visit with us. Thank you!             Your Updated Medication List - Protect others around you: Learn how to safely use, store and throw away your medicines at www.disposemymeds.org.          This list is accurate as of 2/13/18 12:16 PM.  Always use your most recent med list.                   Brand Name Dispense Instructions for use Diagnosis    amLODIPine 10 MG tablet    NORVASC     Take 10 mg by mouth daily        aspirin EC 81 MG EC tablet      Take 81 mg by mouth daily with food        busPIRone 10 MG tablet    BUSPAR     Take 1 tablet by mouth 2 times daily        clonazePAM 1 MG tablet    klonoPIN     Take 1 tablet by mouth 4 times daily Becky Olmstead CNP        clopidogrel 75 MG tablet    PLAVIX     Take 75 mg by mouth daily        CVS POTASSIUM GLUCONATE 2 MEQ Tabs   Generic drug:  Potassium Gluconate           cyclobenzaprine 10 MG tablet    FLEXERIL     1 tablet 2 times daily as needed for muscle spasms        Diclofenac Sodium 1.5 % Soln      Apply 20 drops to each hand 4 times daily        docusate sodium 50 MG capsule    COLACE     Take 50 mg by mouth daily        lisinopril 40 MG tablet    PRINIVIL/ZESTRIL     Take 40 mg by mouth  daily        metoprolol tartrate 50 MG tablet    LOPRESSOR     Take 1 tablet by mouth 2 times daily        nitroGLYcerin 0.3 MG sublingual tablet    NITROSTAT     Place 1 tablet under the tongue every 5 minutes as needed for chest pain        ondansetron 4 MG tablet    ZOFRAN     Take 4 mg by mouth every 6 hours as needed for nausea        * oxyCODONE-acetaminophen  MG per tablet    PERCOCET    120 tablet    Take 1-2 tablets by mouth every 6 hours as needed for moderate to severe pain maximum 8 tablet(s) per day    Pain associated with surgical procedure       * oxyCODONE-acetaminophen 5-325 MG per tablet   Start taking on:  2/28/2018    PERCOCET    120 tablet    Take 2 tablets by mouth 4 times daily Max acetaminophen dose: 4000mg in 24 hrs    Chronic pain disorder, Chronic bilateral low back pain without sciatica, Chest wall pain, chronic       pantoprazole 40 MG EC tablet    PROTONIX     Take 40 mg by mouth 2 times daily        PRAVASTATIN SODIUM PO      Take 40 mg by mouth every evening        pregabalin 25 MG capsule    LYRICA     Take 25 mg by mouth 2 times daily        saccharomyces boulardii 250 MG capsule    FLORASTOR     Take 250 mg by mouth 2 times daily        sucralfate 1 GM tablet    CARAFATE     Take 1 g by mouth 4 times daily Before meals & at bedtime        VITAMIN D (CHOLECALCIFEROL) PO      Take 5,000 Units by mouth daily        vortioxetine 10 MG tablet    TRINTELLIX/BRINTELLIX          * Notice:  This list has 2 medication(s) that are the same as other medications prescribed for you. Read the directions carefully, and ask your doctor or other care provider to review them with you.

## 2018-02-13 NOTE — PROGRESS NOTES
"Patient Information     Patient Name MRN Ankita Lee [id number] Female 1955      Progress Notes by Ramirez Cano MD at 1/15/2018 10:30 AM     Author:  Ramirez Cano MD Service:  (none) Author Type:  Physician     Filed:  1/15/2018 11:27 AM Encounter Date:  1/15/2018 Status:  Signed     :  Ramirez Cano MD (Physician)            SUBJECTIVE:  63 y.o. female presents for follow up on NSAID induced gastric ulcer, CAD, and chronic chest wall and back pain.    She did not do follow up EGD with Dr Bowman due to a death in the family.  Needs follow up due to an EGD on  with Dr Bowman that showed an antral ulcer. She has been off aspirin and remains on Plavix. Using BID PPI.  Avoiding alcohol. Scheduled now for .    Planning dental surgery in a month to repair pegs in her lower jaw.     Diclofenac drops are helping hand pain.    BP controlled. Reports chest pain during [employer] game, but otherwise none. Did not take nitro.     Stopped Lyrica yesterday due to a \"tremor\" feeling or a shaking inside. She was noting benefit with Lyrica 25 mg each night helping sleep and requested dose increase.     REVIEW OF SYSTEMS:    Constitutional: Negative  Respiratory: Negative  Cardiovascular: see above  Psychiatric: Following with Becky Olmstead for anxiety    Past Medical History:     Diagnosis  Date     Acute coronary syndrome (HC) 06/15/2007    with PTCA and stenting of 90% osteo circumflex lesion and patent LAD graft, patent left main stent.      Acute MI, initial, chest pain with positive nz 3/30/2013     Anterior chest wall pain 10/13/2009    chronic      Back pain, chronic 2011     Carpal tunnel syndrome, bilateral      Central sleep apnea 10/14/2013     Chest pain 2014     Chronic anxiety      Chronic depression     Severe, hx of suicide attempt/hospitalization      Chronic ischemic heart disease, unspecified 2012     Chronic pain syndrome 2010    chest wall, " back      Chronic right shoulder pain      Clostridium difficile colitis 8/19/2016     Colitis 06/15/2007    history of      COLITIS     Microcytic       Constipation 11/29/2011     COPD (chronic obstructive pulmonary disease) (HC) 06/15/2007    low DLCO, normal spirometry      Cor athrscl-uns vessel     -angio revealed 3 vessel dz  -4 stents placed; 2 overlapping stents placed in mLAD, 1 stent pRCA, 1 stent pCirc 1 s 9/02 -non-ST elevation MI 1/03  -angio revealed restenosis of Circ.    -PTCA and brachytherapy of pCirc -repeat angio 2/03 -no intervension -CABG x3 12/03 - Dr Sinclair  -LIMA-LAD, RAFI-RCA, SVG-OM -PCI 7/04 stent to L -CTangio 9/05   -patentent LIMA-LAD. RAFI-RCA occluded; RCA ostio/proximal stent widely patent. SVG-OM occluded; OM marginal branch is widely patent.  Non-STEMI--6/15/07--DELONTE to LCX 5/2008: Angiogram: No hemodynamically significant lesions that were not bypassed by the LIMA -9/20/12 PCI of ostial LAD, Left Main, ostial Cx.  Patent NABILA to LAD, occluded RAFI to RCA and SVG to OM        E. coli sepsis (HC) 7/14/16    [facility]      Gastric ulcer with hemorrhage 10/2003     Gastric ulcer, chronic 10/03/2011    hx of GI bleed (2003)      History of tobacco abuse      Hx of coronary angioplasty 09/12/2002    with triple stenting      Hx of coronary angioplasty 01/10/2003    repeat angioplasty      Hx of diseases of blood and blood-forming organs      HX OF PEPTIC ULCER DISEASE 6/15/2007     Hyperlipemia      Hypertension      Intermittent claudication (HC)      Knee pain 05/31/2011     Left subclavian artery stenosis causing coronary steal through LIMA graft 3/31/2013    S/p prox left SCA stent 4/1/2013       Lumbar disc disease      Migraine syndrome      Myalgia and myositis 10/14/2013     Neck pain, chronic      Nodular degeneration of cornea 09/26/2011     Normal cardiac stress test 10/2004    Cardiolite (2004, 2005, 2006 and 2011)      Normal stress echocardiogram 03/2010    dobutamine,  negative      Opioid abuse     history of      Osteoarthritis      Pain medication agreement signed 05/22/2012 1/28/2014     Panic attack      Postsurgical aortocoronary bypass status 2/12/2003     Postsurgical percutaneous transluminal coronary angioplasty status      Vitamin D deficiency 5/6/2013      Past Surgical History:      Procedure  Laterality Date     ANGIOPLASTY  9/12/02    with triple stenting        APPENDECTOMY       COLONOSCOPY  8/8/16    normal, Dr Bowman       COLONOSCOPY SCREENING  2011    Dr Bowman benign polyps       COLONOSCOPY SCREENING  2011    benign polyps, Dr. Bowman       CORONARY ARTERY BYPASS GRAFT  12/13/02    Triple bypass, left internal mammary  to LAD, right internal mammary to right coronary artery, saphenous to obtuse marginal of the left circumflex.       EMBOLECTOMY  04/02/2013    brachial artery pseudoaneurysm after stenting       ENDOSCOPY GI  2003    Upper GI endoscopy.        ESOPHAGOGASTRODUODENOSCOPY  2011    EGD Dr Bowman with pyloric ulcer       ESOPHAGOGASTRODUODENOSCOPY  2011    pyloric ulcer, Dr. Bowman       ESOPHAGOGASTRODUODENOSCOPY  8/8/16    mild gastritis, Dr Bowman       ESOPHAGOGASTRODUODENOSCOPY  11/27/2017    Dr Bowman. Antral ulcer       FEMUR KNEE SURGERY      x3, right knee       FEMUR KNEE SURGERY  2000    left knee  ligament surgery       HYSTERECTOMY  age 21     KNEE ARTHROSCOPY      left       LAP CHOLECYSTECTOMY  2006     PTCA  1/10/2003     PTCA  09/20/2012    DELONTE in LAD and left main       PTCA  4/1/2013    L subclavian stenosis       SALPINGO-OOPHORECTOMY  age 27    Bilateral salpingo-oophorectomy       SHOULDER ARTHROSCOPY      right       TONSIL AND ADENOIDECTOMY  childhood     UGI ENDOSCOP  2003    Upper GI endoscopy.        UPPER ARM/ELBOW SURGERY  baby    birth malachi removed from right arm          Current Outpatient Prescriptions       Medication  Sig Dispense Refill     amLODIPine (NORVASC) 10 mg tablet Take 1 tablet by mouth once daily. 90 tablet 4      aspirin (ECOTRIN) 81 mg enteric coated tablet Take 81 mg by mouth once daily with a meal.       busPIRone (BUSPAR) 10 mg tablet TAKE 1 TABLET TWICE A  tablet 3     cholecalciferol (VITAMIN D3) 5,000 unit capsule Take 1 capsule by mouth once daily.       clonazePAM (KLONOPIN) 1 mg tablet Take 1 tablet by mouth 4 times daily. Becky Olmstead CNP       clopidogrel (PLAVIX) 75 mg tablet Take 1 tablet by mouth once daily. 90 tablet 3     cyclobenzaprine (FLEXERIL) 10 mg tablet TAKE 1 TABLET TWICE A DAY IF NEEDED FOR MUSCLE SPASM 60 tablet 5     Diclofenac Sodium 1.5 % drop Apply 20 drops to each hand 4 times daily 2 Bottle 11     docusate (STOOL SOFTENER) 50 mg capsule Take 1 capsule by mouth once daily.  0     ferrous sulfate, 65 mg elemental, (IRON, FERROUS SULFATE,) tablet Take 1 tablet by mouth once daily with a meal.  0     lisinopril (PRINIVIL; ZESTRIL) 40 mg tablet Take 1 tablet by mouth once daily. 90 tablet 4     metoprolol tartrate (LOPRESSOR) 50 mg tablet Take 1 tablet by mouth 2 times daily. 180 tablet 3     nitroglycerin (NITROSTAT) 0.3 mg sublingual tablet Place 1 tablet under the tongue every 5 minutes if needed for Chest Pain. 1 Bottle 2     ondansetron (ZOFRAN, AS HYDROCHLORIDE,) 4 mg tablet Take 1 tablet by mouth every 6 hours if needed for Nausea/Vomiting. 30 tablet 11     oxyCODONE-acetaminophen, 5-325 mg, (PERCOCET) 5-325 mg per tablet Take 2 tablets by mouth 4 times daily  Earliest Fill Date: 12/15/17 Max acetaminophen dose: 4000mg in 24 hrs. 240 tablet 0     pantoprazole (PROTONIX) 40 mg delayed-release tablet TAKE 1 TABLET TWICE A  tablet 4     Potassium Gluconate 595 mg (99 mg) tablet Take  by mouth.  0     pravastatin (PRAVACHOL) 40 mg tablet Take 1 tablet by mouth at bedtime. 90 tablet 2     Saccharomyces boulardii (FLORASTOR) 250 mg capsule Take 1 capsule by mouth 2 times daily.  0     sucralfate (CARAFATE) 1 gram tablet Take 1 tablet by mouth 4 times daily before meals and at  bedtime. 120 tablet 0     vortioxetine (TRINTELLIX) 10 mg tab Take  by mouth.  0     Allergies as of 01/15/2018 - Mikel as Reviewed 01/15/2018      Allergen  Reaction Noted     Atorvastatin Myalgia 04/30/2013     Tape [adhesive] Rash and Erythema 04/02/2013     Tiotropium bromide Rash 09/09/2014     Crestor [rosuvastatin] Myalgia 09/28/2017     Ezetimibe Myalgia 10/09/2009     Latex Rash 08/16/2014     Niacin Flushing 10/09/2009     Other [unlisted allergen (include detail in comments)] Rash and Itching 08/14/2013       OBJECTIVE:  /74 (Cuff Site: Right Arm, Position: Sitting, Cuff Size: Adult Regular)  Pulse 73  Wt 76.2 kg (168 lb)  Breastfeeding? No  BMI 27 kg/m2    General Appearance: Alert. No acute distress  Psychiatric: Normal affect       ASSESSMENT/PLAN:    ICD-10-CM   1. Chronic bilateral low back pain without sciatica M54.5     G89.29   2. Migraine syndrome G43.909   3. Pain medication agreement signed 2/10/17 Z02.89   4. Acute gastric ulcer with hemorrhage K25.0   5. Atherosclerosis of native coronary artery of native heart, angina presence unspecified I25.10       Refilled oxycodone at same dosing of 10 mg QID for 40 mg daily. Used 60 mg previously. Currently on 60 morphine milligram equivalents daily. Stable in use. Refilled 1 mo. UDM appropriate 10/31/17. Due for new controlled substance agreement.    I believe she can tolerate Lyrica 25 mg BID. Wait for tremor symptoms to subside. Then start 25 mg QHS. Increase to BID if able after 1 week.    EGD with gastric ulcer. Due for follow up EGD. Stay off aspirin. Continue Plavix given complex CAD history. Continue on sucralfate and BID PPI.     F/U 1 mo

## 2018-02-13 NOTE — TELEPHONE ENCOUNTER
Patient Information     Patient Name MRN Sex Ankita Bergeron [id number] Female 1955      Telephone Encounter by Cruzito Robbins RN at 2018  4:17 PM     Author:  Cruzito Robbins RN Service:  (none) Author Type:  NURS- Registered Nurse     Filed:  2018  4:27 PM Encounter Date:  2018 Status:  Signed     :  Cruzito Robbins RN (NURS- Registered Nurse)            Antiplatelet    Office visit in the past 12 months.    Last visit with BLAISE GUILLEN was on: 12/15/2017 in [facility]  Next visit with BLAISE GUILLEN is on: 01/15/2018 in [facility]  Next visit with Family Practice is on: 01/15/2018 in [facility]    Max refills 12 months from last office visit.  Refer to provider if any questions regarding stop/start date.    Chart review shows that patient was seen by PCP for a preop on 1/3/18. Use of plavix was noted in office visit notes on that date. Patient was to resume plavix after EGD completed. Writer will refill rx as requested at this time.    Prescription refilled per RN Medication Refill Policy.................... Cruzito Robbins RN ....................  2018   4:22 PM

## 2018-02-13 NOTE — PATIENT INSTRUCTIONS
Patient Information     Patient Name MRN Sex Ankita Bergeron [id number] Female 1955      Patient Instructions by Ramirez Cano MD at 1/3/2018 12:00 PM     Author:  Ramirez Cano MD  Service:  (none) Author Type:  Physician     Filed:  1/3/2018 12:38 PM  Encounter Date:  1/3/2018 Status:  Addendum     :  Ramirez Cano MD (Physician)        Related Notes: Original Note by Ramirez Cano MD (Physician) filed at 1/3/2018 12:32 PM            Remain off aspirin until follow up appointment  Stop Plavix 3 d prior to scope and resume after the scope  Continue on sucralfate, but avoid 24 hours prior to the scope

## 2018-02-13 NOTE — PATIENT INSTRUCTIONS
Patient Information     Patient Name MRN Sex Ankita Bergeron [id number] Female 1955      Patient Instructions by Ramirez Cano MD at 1/15/2018 10:30 AM     Author:  Ramirez Cano MD Service:  (none) Author Type:  Physician     Filed:  1/15/2018 11:03 AM Encounter Date:  1/15/2018 Status:  Signed     :  Ramirez Cano MD (Physician)            Wait for the tremor feeling to go away and then can start Lyrica 25 mg each night for a week. If tolerating, go to 1 pill twice daily  Follow up in a month

## 2018-02-13 NOTE — NURSING NOTE
Patient Information     Patient Name MRN Ankita Lee [id number] Female 1955      Nursing Note by Kimberly Whyte at 1/15/2018 10:30 AM     Author:  Kimberly Whyte Service:  (none) Author Type:  (none)     Filed:  1/15/2018 10:47 AM Encounter Date:  1/15/2018 Status:  Signed     :  Kimberly Whyte            Ankita Day is a 63 y.o. female presenting for a follow up of  presenting today for medication management.  Kimberly Whyte LPN 1/15/2018 10:28 AM

## 2018-03-11 DIAGNOSIS — I25.810 ATHEROSCLEROSIS OF CORONARY ARTERY BYPASS GRAFT OF NATIVE HEART WITHOUT ANGINA PECTORIS: Primary | ICD-10-CM

## 2018-03-12 NOTE — TELEPHONE ENCOUNTER
This is a[facility]ill request from: [facility]  Name of Medication: Pravastatin  Quantity requested: 90  Last fill date: 12/13/17  Last office visit: 2/13/18  PCP: Ramirez Cano MD  Controlled Substance Agreement: YES  Associated Diagnosis: Atherosclerosis of coronary artery bypass graft of native heart without angina pectoris       Kimberly Whyte LPN  3/12/2018  5:53 PM

## 2018-03-13 RX ORDER — PRAVASTATIN SODIUM 40 MG
TABLET ORAL
Qty: 90 TABLET | Refills: 2 | Status: SHIPPED | OUTPATIENT
Start: 2018-03-13 | End: 2018-08-23

## 2018-03-15 ENCOUNTER — HOSPITAL ENCOUNTER (EMERGENCY)
Facility: OTHER | Age: 63
Discharge: HOME | End: 2018-03-16
Attending: FAMILY MEDICINE | Admitting: FAMILY MEDICINE
Payer: COMMERCIAL

## 2018-03-15 ENCOUNTER — OFFICE VISIT (OUTPATIENT)
Dept: FAMILY MEDICINE | Facility: OTHER | Age: 63
End: 2018-03-15
Attending: PHYSICIAN ASSISTANT
Payer: COMMERCIAL

## 2018-03-15 VITALS
WEIGHT: 164.3 LBS | SYSTOLIC BLOOD PRESSURE: 136 MMHG | HEART RATE: 68 BPM | TEMPERATURE: 96.5 F | BODY MASS INDEX: 26.4 KG/M2 | DIASTOLIC BLOOD PRESSURE: 72 MMHG

## 2018-03-15 DIAGNOSIS — G89.29 CHEST WALL PAIN, CHRONIC: ICD-10-CM

## 2018-03-15 DIAGNOSIS — S16.1XXA STRAIN OF NECK MUSCLE, INITIAL ENCOUNTER: ICD-10-CM
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
## 2018-03-15 DIAGNOSIS — M54.50 CHRONIC BILATERAL LOW BACK PAIN WITHOUT SCIATICA: ICD-10-CM

## 2018-03-15 DIAGNOSIS — R07.89 CHEST WALL PAIN, CHRONIC: ICD-10-CM

## 2018-03-15 DIAGNOSIS — G89.4 CHRONIC PAIN DISORDER: Primary | ICD-10-CM

## 2018-03-15 DIAGNOSIS — G89.29 CHRONIC BILATERAL LOW BACK PAIN WITHOUT SCIATICA: ICD-10-CM

## 2018-03-15 PROCEDURE — 99213 OFFICE O/P EST LOW 20 MIN: CPT | Performed by: PHYSICIAN ASSISTANT

## 2018-03-15 PROCEDURE — 85025 COMPLETE CBC W/AUTO DIFF WBC: CPT | Performed by: FAMILY MEDICINE

## 2018-03-15 PROCEDURE — 36415 COLL VENOUS BLD VENIPUNCTURE: CPT | Performed by: FAMILY MEDICINE

## 2018-03-15 PROCEDURE — 80053 COMPREHEN METABOLIC PANEL: CPT | Performed by: FAMILY MEDICINE

## 2018-03-15 PROCEDURE — 99283 EMERGENCY DEPT VISIT LOW MDM: CPT | Mod: Z6 | Performed by: FAMILY MEDICINE

## 2018-03-15 PROCEDURE — 85610 PROTHROMBIN TIME: CPT | Performed by: FAMILY MEDICINE

## 2018-03-15 PROCEDURE — 99284 EMERGENCY DEPT VISIT MOD MDM: CPT | Mod: 25 | Performed by: FAMILY MEDICINE

## 2018-03-15 RX ORDER — OXYCODONE AND ACETAMINOPHEN 5; 325 MG/1; MG/1
2 TABLET ORAL 4 TIMES DAILY
Qty: 120 TABLET | Refills: 0 | Status: SHIPPED | OUTPATIENT
Start: 2018-03-15 | End: 2018-03-29

## 2018-03-15 NOTE — MR AVS SNAPSHOT
"              After Visit Summary   3/15/2018    Ankita Day    MRN: [id number]           Patient Information     Date Of Birth          1955        Visit Information        Provider Department      3/15/2018 8:00 AM Inga Smith PA-C [facility]        Today's Diagnoses     Chronic pain disorder    -  1    Chronic bilateral low back pain without sciatica        Chest wall pain, chronic          Care Instructions    Refilled percocet quantity #120.   Further refills need to come from Dr. Cano.           Follow-ups after your visit        Who to contact     If you have questions or need follow up information about today's clinic visit or your schedule please contact [facility] directly at [phone].  Normal or non-critical lab and imaging results will be communicated to you by [employer], letter or phone within 4 business days after the clinic has received the results. If you do not hear from us within 7 days, please contact the clinic through [employer] or phone. If you have a critical or abnormal lab result, we will notify you by phone as soon as possible.  Submit refill requests through [employer] or call your pharmacy and they will forward the refill request to us. Please allow 3 business days for your refill to be completed.          Additional Information About Your Visit        MyChart Information     [employer] lets you send messages to your doctor, view your test results, renew your prescriptions, schedule appointments and more. To sign up, go to [email or web address] . Click on \"Log in\" on the left side of the screen, which will take you to the Welcome page. Then click on \"Sign up Now\" on the right side of the page.     You will be asked to enter the access code listed below, as well as some personal information. Please follow the directions to create your username and password.     Your access code is: [email or web address]  Expires: 5/14/2018  1:16 PM     Your " access code will  in 90 days. If you need help or a new code, please call your [place] clinic or [phone].        Care EveryWhere ID     This is your Care EveryWhere ID. This could be used by other organizations to access your [place] medical records  [id number]        Your Vitals Were     Pulse Temperature BMI (Body Mass Index)             68 96.5  F (35.8  C) (Tympanic) 26.4 kg/m2          Blood Pressure from Last 3 Encounters:   03/15/18 136/72   18 132/78   01/15/18 127/74    Weight from Last 3 Encounters:   03/15/18 164 lb 4.8 oz (74.5 kg)   18 165 lb (74.8 kg)   01/15/18 168 lb (76.2 kg)              Today, you had the following     No orders found for display         Where to get your medicines      Some of these will need a paper prescription and others can be bought over the counter.  Ask your nurse if you have questions.     Bring a paper prescription for each of these medications     oxyCODONE-acetaminophen 5-325 MG per tablet          Primary Care Provider Office Phone # Fax #    Ramirez Cano [phone] [address] [facility] [address]        Equal Access to Services     JACKIE SIMPSON : Hadii donaldo mcdonaldo Sowillyali, waaxda luqadaha, qaybta kaalmada adeegyada, kacie fields. So [facility] [phone].    ATENCIÓN: Si habla español, tiene a siddiqui disposición servicios gratuitos de asistencia lingüística. Llame al [phone].    We comply with applicable federal civil rights laws and [place] laws. We do not discriminate on the basis of race, color, national origin, age, disability, sex, sexual orientation, or gender identity.            Thank you!     Thank you for choosing [facility]  for your care. Our goal is always to provide you with excellent care. Hearing back from our patients is one way we can continue to improve our services. Please take a few minutes to complete the written survey that  you may receive in the mail after your visit with us. Thank you!             Your Updated Medication List - Protect others around you: Learn how to safely use, store and throw away your medicines at www.disposemymeds.org.          This list is accurate as of 3/15/18  8:43 AM.  Always use your most recent med list.                   Brand Name Dispense Instructions for use Diagnosis    amLODIPine 10 MG tablet    NORVASC     Take 10 mg by mouth daily        aspirin EC 81 MG EC tablet      Take 81 mg by mouth daily with food        busPIRone 10 MG tablet    BUSPAR     Take 1 tablet by mouth 2 times daily        clonazePAM 1 MG tablet    klonoPIN     Take 1 tablet by mouth 4 times daily Becky Olmstead, CNP        clopidogrel 75 MG tablet    PLAVIX     Take 75 mg by mouth daily        [employer] POTASSIUM GLUCONATE 2 MEQ Tabs   Generic drug:  Potassium Gluconate           cyclobenzaprine 10 MG tablet    FLEXERIL     1 tablet 2 times daily as needed for muscle spasms        Diclofenac Sodium 1.5 % Soln      Apply 20 drops to each hand 4 times daily        docusate sodium 50 MG capsule    COLACE     Take 50 mg by mouth daily        lisinopril 40 MG tablet    PRINIVIL/ZESTRIL     Take 40 mg by mouth daily        metoprolol tartrate 50 MG tablet    LOPRESSOR     Take 1 tablet by mouth 2 times daily        nitroGLYcerin 0.3 MG sublingual tablet    NITROSTAT     Place 1 tablet under the tongue every 5 minutes as needed for chest pain        ondansetron 4 MG tablet    ZOFRAN     Take 4 mg by mouth every 6 hours as needed for nausea        oxyCODONE-acetaminophen 5-325 MG per tablet    PERCOCET    120 tablet    Take 2 tablets by mouth 4 times daily Max acetaminophen dose: 4000mg in 24 hrs    Chronic pain disorder, Chronic bilateral low back pain without sciatica, Chest wall pain, chronic       pantoprazole 40 MG EC tablet    PROTONIX     Take 40 mg by mouth 2 times daily        pravastatin 40 MG tablet    PRAVACHOL    90 tablet    TAKE 1  TABLET AT BEDTIME    Atherosclerosis of coronary artery bypass graft of native heart without angina pectoris       pregabalin 25 MG capsule    LYRICA    180 capsule    Take 1 capsule (25 mg) by mouth 2 times daily    Chronic pain disorder       saccharomyces boulardii 250 MG capsule    FLORASTOR     Take 250 mg by mouth 2 times daily        sucralfate 1 GM tablet    CARAFATE     Take 1 g by mouth 4 times daily Before meals & at bedtime        VITAMIN D (CHOLECALCIFEROL) PO      Take 5,000 Units by mouth daily        vortioxetine 10 MG tablet    TRINTELLIX/BRINTELLIX

## 2018-03-15 NOTE — NURSING NOTE
Patient presents to the clinic for medication management and refills of oxycodone and lyrica.  MAULIK Mendes........................3/15/2018  8:13 AM

## 2018-03-15 NOTE — ED AVS SNAPSHOT
[facility]    [address]    [place] [address]    Phone:  [phone]    Fax:  [phone]                                       Ankita Day   MRN: [id number]    Department:  [facility]   Date of Visit:  3/15/2018           Patient Information     Date Of Birth          1955        Your diagnoses for this visit were:     Fall, initial encounter     Strain of neck muscle, initial encounter        You were seen by Corey Rodriguez MD.      Follow-up Information     Follow up with Ramirez Cano MD.    Specialty:  Family Practice    Why:  As needed    Contact information:    [address]  [place] [address] [phone]          Follow up with [facility].    Specialty:  EMERGENCY MEDICINE    Why:  If symptoms worsen    Contact information:    [address]  [place] [address] [phone]      Future Appointments        Provider Department Dept Phone Center    3/29/2018 11:15 AM Ramirez Cano MD [facility] [phone] [facility]      24 Hour Appointment Hotline       To make an appointment at any [facility], call [phone]). If you don't have a family doctor or clinic, we will help you find one. [place] clinics are conveniently located to serve the needs of you and your family.             Review of your medicines      Our records show that you are taking the medicines listed below. If these are incorrect, please call your family doctor or clinic.        Dose / Directions Last dose taken    ADVIL PO   Dose:  600 mg        Take 600 mg by mouth every 6 hours as needed for moderate pain   Refills:  0        amLODIPine 10 MG tablet   Commonly known as:  NORVASC   Dose:  10 mg        Take 10 mg by mouth daily   Refills:  0        aspirin EC 81 MG EC tablet   Dose:  81 mg        Take 81 mg by mouth daily with food   Refills:  0         busPIRone 10 MG tablet   Commonly known as:  BUSPAR   Dose:  1 tablet        Take 1 tablet by mouth 2 times daily   Refills:  0        clonazePAM 1 MG tablet   Commonly known as:  klonoPIN   Dose:  1 tablet        Take 1 tablet by mouth 4 times daily Becky Olmstead CNP   Refills:  0        clopidogrel 75 MG tablet   Commonly known as:  PLAVIX   Dose:  75 mg        Take 75 mg by mouth daily   Refills:  0        [employer] POTASSIUM GLUCONATE 2 MEQ Tabs   Generic drug:  Potassium Gluconate        Refills:  0        cyclobenzaprine 10 MG tablet   Commonly known as:  FLEXERIL   Dose:  1 tablet        1 tablet 2 times daily as needed for muscle spasms   Refills:  0        Diclofenac Sodium 1.5 % Soln        Apply 20 drops to each hand 4 times daily   Refills:  0        docusate sodium 50 MG capsule   Commonly known as:  COLACE   Dose:  50 mg        Take 50 mg by mouth daily   Refills:  0        lisinopril 40 MG tablet   Commonly known as:  PRINIVIL/ZESTRIL   Dose:  40 mg        Take 40 mg by mouth daily   Refills:  0        metoprolol tartrate 50 MG tablet   Commonly known as:  LOPRESSOR   Dose:  1 tablet        Take 1 tablet by mouth 2 times daily   Refills:  0        nitroGLYcerin 0.3 MG sublingual tablet   Commonly known as:  NITROSTAT   Dose:  1 tablet        Place 1 tablet under the tongue every 5 minutes as needed for chest pain   Refills:  0        ondansetron 4 MG tablet   Commonly known as:  ZOFRAN   Dose:  4 mg        Take 4 mg by mouth every 6 hours as needed for nausea   Refills:  0        oxyCODONE-acetaminophen 5-325 MG per tablet   Commonly known as:  PERCOCET   Dose:  2 tablet   Quantity:  120 tablet        Take 2 tablets by mouth 4 times daily Max acetaminophen dose: 4000mg in 24 hrs   Refills:  0        pantoprazole 40 MG EC tablet   Commonly known as:  PROTONIX   Dose:  40 mg        Take 40 mg by mouth 2 times daily   Refills:  0        pravastatin 40 MG tablet   Commonly known as:  PRAVACHOL   Quantity:  90  "tablet        TAKE 1 TABLET AT BEDTIME   Refills:  2        pregabalin 25 MG capsule   Commonly known as:  LYRICA   Dose:  25 mg   Quantity:  180 capsule        Take 1 capsule (25 mg) by mouth 2 times daily   Refills:  1        saccharomyces boulardii 250 MG capsule   Commonly known as:  FLORASTOR   Dose:  250 mg        Take 250 mg by mouth 2 times daily   Refills:  0        sucralfate 1 GM tablet   Commonly known as:  CARAFATE   Dose:  1 g        Take 1 g by mouth 4 times daily Before meals & at bedtime   Refills:  0        VITAMIN D (CHOLECALCIFEROL) PO   Dose:  5000 Units        Take 5,000 Units by mouth daily   Refills:  0        vortioxetine 10 MG tablet   Commonly known as:  TRINTELLIX/BRINTELLIX        Refills:  0                Procedures and tests performed during your visit     CBC with platelets differential    CT Head w/o Contrast    Comprehensive metabolic panel    INR      Orders Needing Specimen Collection     None      Pending Results     No orders found for last 3 day(s).            Pending Culture Results     No orders found for last 3 day(s).            Thank you for choosing [facility]       Thank you for choosing [facility] for your care. Our goal is always to provide you with excellent care. Hearing back from our patients is one way we can continue to improve our services. Please take a few minutes to complete the written survey that you may receive in the mail after you visit with us. Thank you!        U[employer]ake Information     U[employer]ake lets you send messages to your doctor, view your test results, renew your prescriptions, schedule appointments and more. To sign up, go to [email or web address] . Click on \"Log in\" on the left side of the screen, which will take you to the Welcome page. Then click on \"Sign up Now\" on the right side of the page.     You will be asked to enter the access code listed below, as well as some personal information. Please follow the directions to create your username and " password.     Your access code is: [email or web address]  Expires: 2018  1:16 PM     Your access code will  in 90 days. If you need help or a new code, please call your [facility] clinic or [phone].        Care EveryWhere ID     This is your Care EveryWhere ID. This could be used by other organizations to access your [facility] medical records  [id number]        Equal Access to Services     JACKIE SIMPSON : Rashad mcdonaldo Soyuriy, waaxda luqadaha, qaybta kaalmada adeegyada, kacie regalado . So [facility] [place] [facility] [phone].    ATENCIÓN: Si habla español, tiene a siddiqui disposición servicios gratuitos de asistencia lingüística. Llame al [phone].    We comply with applicable federal civil rights laws and [place] laws. We do not discriminate on the basis of race, color, national origin, age, disability, sex, sexual orientation, or gender identity.            After Visit Summary       This is your record. Keep this with you and show to your community pharmacist(s) and doctor(s) at your next visit.

## 2018-03-15 NOTE — ED AVS SNAPSHOT
[facility]    [address]    [place] [address]    Phone:  [phone]    Fax:  [phone]                                       Ankita Day   MRN: [id number]    Department:  [facility] [place] [facility]   Date of Visit:  3/15/2018           After Visit Summary Signature Page     I have received my discharge instructions, and my questions have been answered. I have discussed any challenges I see with this plan with the nurse or doctor.    ..........................................................................................................................................  Patient/Patient Representative Signature      ..........................................................................................................................................  Patient Representative Print Name and Relationship to Patient    ..................................................               ................................................  Date                                            Time    ..........................................................................................................................................  Reviewed by Signature/Title    ...................................................              ..............................................  Date                                                            Time

## 2018-03-15 NOTE — PROGRESS NOTES
Nursing Notes:   Josie Lloyd LPN  3/15/2018  8:29 AM  Signed  Patient presents to the clinic for medication management and refills of oxycodone and lyrica.  MAULIK Mendes........................3/15/2018  8:13 AM      HPI:    Ankita Day is a 63 year old female who presents for medication management and refills of oxycodone and lyrica.  Patient has history of chronic pain disorder along with chronic bilateral lower back pain without sciatica. History of chronic chest wall pain. Patient currently sees Dr. Cano for pain contract. She is on Percocet (120) and Lyrica (60).  Hx neck pain, back pain, knee pain, colon pain, chest wall pain, arthritis in her hands. Have appt with Dr. Wilkinson - would like arthritis in knees fixed. Appt with Dr. Cano on 3/19/18.   Had mouth surgery. Concerned about pills. Hide pills in sock drawer. Missing 4 pills. Had higher dose after mouth surgery, then reduced down to normal dose. Not sure if accidentally took a double dose after surgery once. Diarrhea and nauseas this morning. Last took at 12 pm yesterday.  Fell yesterday out of the bedroom.  Knee pain. Knees gave out with turning.  Patient [place] spoke with Pola Fowler about her concerns with the fall.  No x-rays completed.  Declines x-rays today.  Abrasion on left knee.      Past Medical History:   Diagnosis Date     Acute ischemic heart disease (H)     06/15/2007,with PTCA and stenting of 90% osteo circumflex lesion and patent LAD graft, patent left main stent.     Acute myocardial infarction     3/30/2013     Anxiety disorder     No Comments Provided     Atherosclerotic heart disease of native coronary artery without angina pectoris     -angio revealed 3 vessel dz  -4 stents placed; 2 overlapping stents placed in mLAD, 1 stent pRCA, 1 stent pCirc 1 s 9/02 -non-ST elevation MI 1/03  -angio revealed restenosis of Circ.    -PTCA and brachytherapy of pCirc -repeat angio 2/03 -no intervension -CABG x3 12/03 - Dr Sinclair   -LIMA-LAD, RAFI-RCA, SVG-OM -PCI 7/04 stent to L -CTangio 9/05   -patentent LIMA-LAD. RAFI-RCA occluded; RCA ostio/p*     Bilateral carpal tunnel syndrome     No Comments Provided     Cervicalgia     No Comments Provided     Chest pain     12/29/2014     Chronic gastric ulcer without hemorrhage or perforation     10/03/2011,hx of GI bleed (2003)     Chronic ischemic heart disease     06/27/2012     Chronic obstructive pulmonary disease (H)     06/15/2007,low DLCO, normal spirometry     Chronic or unspecified gastric ulcer with hemorrhage     10/2003     Chronic pain syndrome     12/01/2010,chest wall, back     Constipation     11/29/2011     Coronary angioplasty status     09/12/2002,with triple stenting     Coronary angioplasty status     01/10/2003,repeat angioplasty     Coronary angioplasty status     No Comments Provided     Dorsalgia     05/31/2011     Encounter for other administrative examinations     1/28/2014     Encounter for screening for cardiovascular disorders     10/2004,[facility] (2004, 2005, 2006 and 2011)     Enterocolitis due to Clostridium difficile     8/19/2016     Essential (primary) hypertension     No Comments Provided     Hyperlipidemia     No Comments Provided     Major depressive disorder, single episode     Severe, hx of suicide attempt/hospitalization     Migraine without status migrainosus, not intractable     No Comments Provided     Nodular corneal degeneration     09/26/2011     Noninfective gastroenteritis and colitis     06/15/2007,history of     Noninfective gastroenteritis and colitis     Microcytic     Osteoarthritis     No Comments Provided     Other chest pain     10/13/2009,chronic     Pain in knee     05/31/2011     Pain in right shoulder     No Comments Provided     Panic disorder without agoraphobia     No Comments Provided     Peptic ulcer without hemorrhage or perforation     6/15/2007     Peripheral vascular disease (H)     No Comments Provided     Personal history of  diseases of the blood and blood-forming organs and certain disorders involving the immune mechanism (CODE)     No Comments Provided     Personal history of nicotine dependence     No Comments Provided     Personal history of other medical treatment (CODE)     10/14/2013     Presence of aortocoronary bypass graft     2/12/2003     Primary central sleep apnea     10/14/2013     Sepsis due to Escherichia coli (E. coli) (H)     7/14/16,[facility]     Stricture of artery (H)     3/31/2013,S/p prox left SCA stent 4/1/2013     Thoracic, thoracolumbar and lumbosacral intervertebral disc disorder     No Comments Provided     Uncomplicated opioid abuse     history of     Vitamin D deficiency     5/6/2013       Past Surgical History:   Procedure Laterality Date     ANGIOPLASTY      9/12/02,with triple stenting     APPENDECTOMY OPEN      No Comments Provided     ARTHROSCOPY KNEE      left     ARTHROSCOPY SHOULDER      right     BYPASS GRAFT ARTERY CORONARY      12/13/02,Triple bypass, left internal mammary  to LAD, right internal mammary to right coronary artery, saphenous to obtuse marginal of the left circumflex.     COLONOSCOPY      2011,Dr Bowman benign polyps     COLONOSCOPY      2011,benign polyps, Dr. Bowman     COLONOSCOPY      8/8/16,normal, Dr Bowman     ELBOW SURGERY      baby,birth malachi removed from right arm     EMBOLECTOMY UPPER EXTREMITY  04/02/2013    brachial artery pseudoaneurysm after stenting     ESOPHAGOSCOPY, GASTROSCOPY, DUODENOSCOPY (EGD), COMBINED      2011,EGD Dr Bowman with pyloric ulcer     ESOPHAGOSCOPY, GASTROSCOPY, DUODENOSCOPY (EGD), COMBINED      2011,pyloric ulcer, Dr. Bowman     ESOPHAGOSCOPY, GASTROSCOPY, DUODENOSCOPY (EGD), COMBINED      8/8/16,mild gastritis, Dr Bowman     ESOPHAGOSCOPY, GASTROSCOPY, DUODENOSCOPY (EGD), COMBINED      11/27/2017,Dr Bowman. Antral ulcer     ESOPHAGOSCOPY, GASTROSCOPY, DUODENOSCOPY (EGD), COMBINED  02/02/2018    Dr Bowman, healed ulcer     HYSTERECTOMY TOTAL ABDOMINAL       age 21     LAPAROSCOPIC CHOLECYSTECTOMY      2006     OSTEOTOMY FEMUR DISTAL      x3, right knee     OSTEOTOMY FEMUR DISTAL      2000,left knee  ligament surgery     OTHER SURGICAL HISTORY      1/10/2003,,PTCA     OTHER SURGICAL HISTORY      2012,,PTCA,DELONTE in LAD and left main     OTHER SURGICAL HISTORY      2013,,PTCA,L subclavian stenosis     SALPINGO-OOPHORECTOMY BILATERAL      age 27,Bilateral salpingo-oophorectomy     TONSILLECTOMY, ADENOIDECTOMY, COMBINED      childhood       Family History   Problem Relation Age of Onset     HEART DISEASE Father      Heart Disease,Heart condition/Significant for atherosclerotic cardiovascular disease, but non premature.     Colon Cancer Father      Cancer-colon, of colon cancer     CANCER Father      Cancer,mets to liver, secondary to colon cancer     HEART DISEASE Mother      Heart Disease     CANCER Other      Cancer,Multiple Myeloma     HEART DISEASE Other      Heart Disease,Ischemic Heart Disease     Colon Cancer Other      Cancer-colon,Malignant neoplasms     CANCER Sister      Cancer,multiple myeloma     Other - See Comments Son      gallstones       Social History     Social History     Marital status:      Spouse name: N/A     Number of children: N/A     Years of education: N/A     Occupational History     Not on file.     Social History Main Topics     Smoking status: Former Smoker     Packs/day: 0.25     Years: 35.00     Types: Cigarettes     Quit date: 2/15/2017     Smokeless tobacco: Never Used     Alcohol use 0.0 oz/week      Comment: Alcoholic Drinks/day: rare     Drug use: No      Comment: Drug use: No     Sexual activity: Not on file     Other Topics Concern     Not on file     Social History Narrative    ,  Steven.  Currently not working outside the home. Lives [place] met with . Tobacco abuse, quit , restarted. Quit  and has since quit, no alcohol.       Current Outpatient  Prescriptions   Medication Sig Dispense Refill     oxyCODONE-acetaminophen (PERCOCET) 5-325 MG per tablet Take 2 tablets by mouth 4 times daily Max acetaminophen dose: 4000mg in 24 hrs 120 tablet 0     pravastatin (PRAVACHOL) 40 MG tablet TAKE 1 TABLET AT BEDTIME 90 tablet 2     pregabalin (LYRICA) 25 MG capsule Take 1 capsule (25 mg) by mouth 2 times daily 180 capsule 1     clopidogrel (PLAVIX) 75 MG tablet Take 75 mg by mouth daily       Diclofenac Sodium 1.5 % SOLN Apply 20 drops to each hand 4 times daily       ondansetron (ZOFRAN) 4 MG tablet Take 4 mg by mouth every 6 hours as needed for nausea       Potassium Gluconate (CVS POTASSIUM GLUCONATE) 2 MEQ TABS        sucralfate (CARAFATE) 1 GM tablet Take 1 g by mouth 4 times daily Before meals & at bedtime       amLODIPine (NORVASC) 10 MG tablet Take 10 mg by mouth daily       aspirin EC 81 MG EC tablet Take 81 mg by mouth daily with food       busPIRone (BUSPAR) 10 MG tablet Take 1 tablet by mouth 2 times daily       clonazePAM (KLONOPIN) 1 MG tablet Take 1 tablet by mouth 4 times daily Becky Olmstead CNP       cyclobenzaprine (FLEXERIL) 10 MG tablet 1 tablet 2 times daily as needed for muscle spasms       lisinopril (PRINIVIL/ZESTRIL) 40 MG tablet Take 40 mg by mouth daily       metoprolol tartrate (LOPRESSOR) 50 MG tablet Take 1 tablet by mouth 2 times daily       nitroGLYcerin (NITROSTAT) 0.3 MG sublingual tablet Place 1 tablet under the tongue every 5 minutes as needed for chest pain       pantoprazole (PROTONIX) 40 MG EC tablet Take 40 mg by mouth 2 times daily       vortioxetine (TRINTELLIX/BRINTELLIX) 10 MG tablet        saccharomyces boulardii (FLORASTOR) 250 MG capsule Take 250 mg by mouth 2 times daily       docusate sodium (COLACE) 50 MG capsule Take 50 mg by mouth daily       VITAMIN D, CHOLECALCIFEROL, PO Take 5,000 Units by mouth daily         Allergies   Allergen Reactions     Atorvastatin Muscle Pain (Myalgia)     Liquid Adhesive Rash     Other  reaction(s): Erythema  Silk and plastic gloves (long term attachment of adhesives)     Tiotropium Bromide [Tiotropium] Rash     Ezetimibe Muscle Pain (Myalgia)     Niacin      Other reaction(s): Flushing  flushing     Rosuvastatin      Other reaction(s): Myalgia     Latex Rash     No Clinical Screening - See Comments Rash, Blisters and Itching     Metals and plastic  Metals and plastics       Tape [Adhesive Tape] Rash       REVIEW OF SYSTEMS:  Refer to HPI.    EXAM:   Vitals:    /72 (BP Location: Right arm, Patient Position: Sitting, Cuff Size: Adult Regular)  Pulse 68  Temp 96.5  F (35.8  C) (Tympanic)  Wt 164 lb 4.8 oz (74.5 kg)  BMI 26.4 kg/m2    GeneralAppearance: Pleasant, alert, appropriate appearance for age. No acute distress  Chest/Respiratory Exam: Normal chest wall and respirations. Clear to auscultation.  Cardiovascular Exam: Regular rate and rhythm. S1, S2, no murmur, click, gallop, or rubs.  Musculoskeletal Exam: Back is straight and non-tender, full ROM of upper and lower extremities.  Bilateral anterior knee pain with palpation.  Knee pain with flexion and extension.  Skin: no rash or abnormalities  Neurologic Exam: Nonfocal, normal gross motor, tone coordination and no tremor.  Psychiatric Exam: Alert and oriented -appropriate affect.    PHQ Depression Screen  PHQ-9 SCORE 9/28/2017 11/15/2017 1/15/2018   Total Score 9 3 2       ASSESSMENT AND PLAN:    1. Chronic pain disorder    2. Chronic bilateral low back pain without sciatica    3. Chest wall pain, chronic        Patient is already up-to-date on refills with her Lyrica.  Gave patient oxycodone/acetaminophen 5/325 mg quantity 120.  Patient was given a one time refill of the narcotic medication to use prior to having a medication management appointment with the primary care provider.   Patient was given the side effect profile and the safety concerns with using a narcotic prescription.   Patient should not share the narcotic medication  with other people.   UTD on urine drug test.     website was reviewed and printed.   Narcotic contract last signed 2/10/2017. Needs updated contract with PCP.       Inga mSith..................3/15/2018 8:20 AM

## 2018-03-16 ENCOUNTER — APPOINTMENT (OUTPATIENT)
Dept: CT IMAGING | Facility: OTHER | Age: 63
End: 2018-03-16
Attending: FAMILY MEDICINE
Payer: COMMERCIAL

## 2018-03-16 VITALS
DIASTOLIC BLOOD PRESSURE: 66 MMHG | HEART RATE: 65 BPM | RESPIRATION RATE: 18 BRPM | OXYGEN SATURATION: 95 % | WEIGHT: 163 LBS | HEIGHT: 66 IN | SYSTOLIC BLOOD PRESSURE: 127 MMHG | BODY MASS INDEX: 26.2 KG/M2

## 2018-03-16 LAB
ALBUMIN SERPL-MCNC: 4.2 G/DL (ref 3.5–5.7)
ALP SERPL-CCNC: 75 U/L (ref 34–104)
ALT SERPL W P-5'-P-CCNC: 12 U/L (ref 7–52)
ANION GAP SERPL CALCULATED.3IONS-SCNC: 11 MMOL/L (ref 3–14)
AST SERPL W P-5'-P-CCNC: 21 U/L (ref 13–39)
BASOPHILS # BLD AUTO: 0.1 10E9/L (ref 0–0.2)
BASOPHILS NFR BLD AUTO: 0.9 %
BILIRUB SERPL-MCNC: 0.3 MG/DL (ref 0.3–1)
BUN SERPL-MCNC: 21 MG/DL (ref 7–25)
CALCIUM SERPL-MCNC: 9.3 MG/DL (ref 8.6–10.3)
CHLORIDE SERPL-SCNC: 106 MMOL/L (ref 98–107)
CO2 SERPL-SCNC: 23 MMOL/L (ref 21–31)
CREAT SERPL-MCNC: 1.53 MG/DL (ref 0.6–1.2)
DIFFERENTIAL METHOD BLD: ABNORMAL
EOSINOPHIL # BLD AUTO: 0.3 10E9/L (ref 0–0.7)
EOSINOPHIL NFR BLD AUTO: 3.6 %
ERYTHROCYTE [DISTWIDTH] IN BLOOD BY AUTOMATED COUNT: 14.1 % (ref 10–15)
GFR SERPL CREATININE-BSD FRML MDRD: 34 ML/MIN/1.7M2
GLUCOSE SERPL-MCNC: 113 MG/DL (ref 70–105)
HCT VFR BLD AUTO: 34.2 % (ref 35–47)
HGB BLD-MCNC: 11.6 G/DL (ref 11.7–15.7)
IMM GRANULOCYTES # BLD: 0 10E9/L (ref 0–0.4)
IMM GRANULOCYTES NFR BLD: 0.1 %
INR PPP: 0.94 (ref 0–1.3)
LYMPHOCYTES # BLD AUTO: 2.6 10E9/L (ref 0.8–5.3)
LYMPHOCYTES NFR BLD AUTO: 34.4 %
MCH RBC QN AUTO: 29.3 PG (ref 26.5–33)
MCHC RBC AUTO-ENTMCNC: 33.9 G/DL (ref 31.5–36.5)
MCV RBC AUTO: 86 FL (ref 78–100)
MONOCYTES # BLD AUTO: 0.6 10E9/L (ref 0–1.3)
MONOCYTES NFR BLD AUTO: 8.4 %
NEUTROPHILS # BLD AUTO: 4 10E9/L (ref 1.6–8.3)
NEUTROPHILS NFR BLD AUTO: 52.6 %
PLATELET # BLD AUTO: 233 10E9/L (ref 150–450)
POTASSIUM SERPL-SCNC: 3.5 MMOL/L (ref 3.5–5.1)
PROT SERPL-MCNC: 6.8 G/DL (ref 6.4–8.9)
RBC # BLD AUTO: 3.96 10E12/L (ref 3.8–5.2)
SODIUM SERPL-SCNC: 140 MMOL/L (ref 134–144)
WBC # BLD AUTO: 7.5 10E9/L (ref 4–11)

## 2018-03-16 PROCEDURE — 70450 CT HEAD/BRAIN W/O DYE: CPT | Mod: TC

## 2018-03-16 ASSESSMENT — ENCOUNTER SYMPTOMS
POLYPHAGIA: 0
HEADACHES: 0
ADENOPATHY: 0
CHILLS: 0
FEVER: 0
DIFFICULTY URINATING: 0
LIGHT-HEADEDNESS: 0
CHEST TIGHTNESS: 0
DYSURIA: 0
PHOTOPHOBIA: 0
POLYDIPSIA: 0

## 2018-03-16 NOTE — ED NOTES
Pt reports she fell Wednesday after her left knee gave out, pt fell and she beileves she hit her head because she has a bump on her forehead, denies LOC. Pt was seen in clinic this am but was not x-ray'd because pt declined. Pt is on Plavix, reports headache, ringing in ears, blurry vision in left eye.

## 2018-03-16 NOTE — ED PROVIDER NOTES
History   No chief complaint on file.    HPI  Ankita Day is a 63 year old female who resents the emergency department a day after a fall.  She fell the other day ambulating in her bedroom, she felt her knees first and then she fell and hit her head on the bed frame.  She was seen in clinic today.  She is out of her usual pain meds.  She has history of multiple concussions.  She is on blood thinners.  She has neck and headache now.  The pain in her neck is not the midline it is more on the sides, her neck feels a little stiff.    Problem List:    Patient Active Problem List    Diagnosis Date Noted     Chronic anxiety 01/22/2018     Priority: Medium     Collagenous colitis 01/22/2018     Priority: Medium     Overview:   Possible Dx 2007       Osteoarthritis of spine 01/22/2018     Priority: Medium     Major depressive disorder, recurrent episode, severe (H) 01/22/2018     Priority: Medium     Essential hypertension 01/22/2018     Priority: Medium     Mixed hyperlipidemia 01/22/2018     Priority: Medium     Osteoarthritis 01/22/2018     Priority: Medium     Panic attack 01/22/2018     Priority: Medium     Status post coronary angiogram 09/15/2017     Priority: Medium     History of tobacco abuse 08/10/2017     Priority: Medium     Presence of stent in coronary artery in patient with coronary artery disease 08/10/2017     Priority: Medium     S/P CABG x 3 08/10/2017     Priority: Medium     Noncompliance with CPAP treatment 10/21/2016     Priority: Medium     Intractable chronic common migraine without aura 10/21/2016     Priority: Medium     H/O multiple concussions 10/21/2016     Priority: Medium     History of Clostridium difficile 08/19/2016     Priority: Medium     Iron deficiency anemia 07/26/2016     Priority: Medium     CKD (chronic kidney disease) stage 2, GFR 60-89 ml/min 12/09/2015     Priority: Medium     Chest wall pain, chronic 11/04/2015     Priority: Medium     Pain medication agreement  01/28/2014     Priority: Medium     Overview:        Central sleep apnea 10/14/2013     Priority: Medium     Myofascial pain 10/14/2013     Priority: Medium     Vitamin D deficiency 05/06/2013     Priority: Medium     Subclavian artery stenosis, left (H) 03/31/2013     Priority: Medium     Overview:   S/p prox left SCA stent 4/1/2013       Facet arthropathy 01/02/2013     Priority: Medium     Nonspecific abnormal results of pulmonary system function study 12/21/2011     Priority: Medium     Slow transit constipation 11/29/2011     Priority: Medium     Gastric ulcer with hemorrhage 10/03/2011     Priority: Medium     Family history of malignant neoplasm of gastrointestinal tract 09/26/2011     Priority: Medium     Nodular degeneration of cornea 09/26/2011     Priority: Medium     Bilateral low back pain without sciatica 05/31/2011     Priority: Medium     Chronic pain disorder 12/01/2010     Priority: Medium     COPD (chronic obstructive pulmonary disease) (H) 06/15/2007     Priority: Medium     Overview:   Low DLCO, normal spirometry on 12/15/11       Peptic ulcer 06/15/2007     Priority: Medium     Postsurgical aortocoronary bypass status 02/12/2003     Priority: Medium     Coronary atherosclerosis 09/12/2002     Priority: Medium     Overview:   Multiple Angigrams prior to 2007. Also:  6/5/2007: Angiogram: TAXUS DELONTE to ostial circumflux, instent restenosis  5/23/2008: Left Main 30% diseased, LAD stents patent, Left circ stent patent, Chronic occluded right internal mammary artery graft to distal RCA, native RCA has 30% stenosis; NO intevention  9/19/2012 CT Angiogram: Patent LIMA to LAD, patent LAD stents, moderate proximal circumflex disease, patent RCA , occluded right internal mammary artery graft to distal RCA.  9/20/2012: Angiogram: Stent to Left Main, ostial LAD, and PTCA of ostial left circumflex          Past Medical History:    Past Medical History:   Diagnosis Date     Acute ischemic heart disease (H)       Acute myocardial infarction      Anxiety disorder      Atherosclerotic heart disease of native coronary artery without angina pectoris      Bilateral carpal tunnel syndrome      Cervicalgia      Chest pain      Chronic gastric ulcer without hemorrhage or perforation      Chronic ischemic heart disease      Chronic obstructive pulmonary disease (H)      Chronic or unspecified gastric ulcer with hemorrhage      Chronic pain syndrome      Constipation      Coronary angioplasty status      Coronary angioplasty status      Coronary angioplasty status      Dorsalgia      Encounter for other administrative examinations      Encounter for screening for cardiovascular disorders      Enterocolitis due to Clostridium difficile      Essential (primary) hypertension      Hyperlipidemia      Major depressive disorder, single episode      Migraine without status migrainosus, not intractable      Nodular corneal degeneration      Noninfective gastroenteritis and colitis      Noninfective gastroenteritis and colitis      Osteoarthritis      Other chest pain      Pain in knee      Pain in right shoulder      Panic disorder without agoraphobia      Peptic ulcer without hemorrhage or perforation      Peripheral vascular disease (H)      Personal history of diseases of the blood and blood-forming organs and certain disorders involving the immune mechanism (CODE)      Personal history of nicotine dependence      Personal history of other medical treatment (CODE)      Presence of aortocoronary bypass graft      Primary central sleep apnea      Sepsis due to Escherichia coli (E. coli) (H)      Stricture of artery (H)      Thoracic, thoracolumbar and lumbosacral intervertebral disc disorder      Uncomplicated opioid abuse      Vitamin D deficiency        Past Surgical History:    Past Surgical History:   Procedure Laterality Date     ANGIOPLASTY      9/12/02,with triple stenting     APPENDECTOMY OPEN      No Comments Provided      ARTHROSCOPY KNEE      left     ARTHROSCOPY SHOULDER      right     BYPASS GRAFT ARTERY CORONARY      02,Triple bypass, left internal mammary  to LAD, right internal mammary to right coronary artery, saphenous to obtuse marginal of the left circumflex.     COLONOSCOPY      ,Dr Bowman benign polyps     COLONOSCOPY      ,benign polyps, Dr. Bowman     COLONOSCOPY      16,normal, Dr Bowman     ELBOW SURGERY      baby,birth malachi removed from right arm     EMBOLECTOMY UPPER EXTREMITY  2013    brachial artery pseudoaneurysm after stenting     ESOPHAGOSCOPY, GASTROSCOPY, DUODENOSCOPY (EGD), COMBINED      ,EGD Dr Bowman with pyloric ulcer     ESOPHAGOSCOPY, GASTROSCOPY, DUODENOSCOPY (EGD), COMBINED      ,pyloric ulcer, Dr. Bowman     ESOPHAGOSCOPY, GASTROSCOPY, DUODENOSCOPY (EGD), COMBINED      16,mild gastritis, Dr Bowman     ESOPHAGOSCOPY, GASTROSCOPY, DUODENOSCOPY (EGD), COMBINED      2017,Dr Bowman. Antral ulcer     ESOPHAGOSCOPY, GASTROSCOPY, DUODENOSCOPY (EGD), COMBINED  2018    Dr Bowman, healed ulcer     HYSTERECTOMY TOTAL ABDOMINAL      age 21     LAPAROSCOPIC CHOLECYSTECTOMY      2006     OSTEOTOMY FEMUR DISTAL      x3, right knee     OSTEOTOMY FEMUR DISTAL      2000,left knee  ligament surgery     OTHER SURGICAL HISTORY      1/10/2003,,PTCA     OTHER SURGICAL HISTORY      2012,,PTCA,DELONTE in LAD and left main     OTHER SURGICAL HISTORY      2013,,PTCA,L subclavian stenosis     SALPINGO-OOPHORECTOMY BILATERAL      age 27,Bilateral salpingo-oophorectomy     TONSILLECTOMY, ADENOIDECTOMY, COMBINED      childhood       Family History:    Family History   Problem Relation Age of Onset     HEART DISEASE Father      Heart Disease,Heart condition/Significant for atherosclerotic cardiovascular disease, but non premature.     Colon Cancer Father      Cancer-colon, of colon cancer     CANCER Father      Cancer,mets to liver, secondary to colon cancer     HEART  DISEASE Mother      Heart Disease     CANCER Other      Cancer,Multiple Myeloma     HEART DISEASE Other      Heart Disease,Ischemic Heart Disease     Colon Cancer Other      Cancer-colon,Malignant neoplasms     CANCER Sister      Cancer,multiple myeloma     Other - See Comments Son      gallstones       Social History:  Marital Status:   [2]  Social History   Substance Use Topics     Smoking status: Former Smoker     Packs/day: 0.25     Years: 35.00     Types: Cigarettes     Quit date: 2/15/2017     Smokeless tobacco: Never Used     Alcohol use 0.0 oz/week      Comment: Alcoholic Drinks/day: rare        Medications:      oxyCODONE-acetaminophen (PERCOCET) 5-325 MG per tablet   Ibuprofen (ADVIL PO)   pravastatin (PRAVACHOL) 40 MG tablet   pregabalin (LYRICA) 25 MG capsule   clopidogrel (PLAVIX) 75 MG tablet   amLODIPine (NORVASC) 10 MG tablet   aspirin EC 81 MG EC tablet   busPIRone (BUSPAR) 10 MG tablet   clonazePAM (KLONOPIN) 1 MG tablet   cyclobenzaprine (FLEXERIL) 10 MG tablet   metoprolol tartrate (LOPRESSOR) 50 MG tablet   nitroGLYcerin (NITROSTAT) 0.3 MG sublingual tablet   pantoprazole (PROTONIX) 40 MG EC tablet   vortioxetine (TRINTELLIX/BRINTELLIX) 10 MG tablet   docusate sodium (COLACE) 50 MG capsule   VITAMIN D, CHOLECALCIFEROL, PO   Diclofenac Sodium 1.5 % SOLN   ondansetron (ZOFRAN) 4 MG tablet   Potassium Gluconate ([employer]S POTASSIUM GLUCONATE) 2 MEQ TABS   sucralfate (CARAFATE) 1 GM tablet   lisinopril (PRINIVIL/ZESTRIL) 40 MG tablet   saccharomyces boulardii (FLORASTOR) 250 MG capsule         Review of Systems   Constitutional: Negative for chills and fever.   HENT: Negative for congestion.    Eyes: Negative for photophobia.   Respiratory: Negative for chest tightness.    Cardiovascular: Negative for chest pain.   Endocrine: Negative for polydipsia, polyphagia and polyuria.   Genitourinary: Negative for difficulty urinating and dysuria.   Neurological: Negative for light-headedness and headaches.  "  Hematological: Negative for adenopathy.       Physical Exam   BP: 150/79  Pulse: 65  Resp: 18  Height: 167.6 cm (5' 6\")  Weight: 73.9 kg (163 lb)  SpO2: 99 %      Physical Exam   Neck: Muscular tenderness present. No spinous process tenderness present. Normal range of motion present.   Cardiovascular: Normal rate.    No murmur heard.  Pulmonary/Chest: Effort normal. No respiratory distress. She has no wheezes. She has no rales.   Abdominal: Soft. She exhibits no distension. There is no tenderness.   Nursing note and vitals reviewed.      ED Course     ED Course     Procedures            Results for orders placed or performed during the hospital encounter of 03/15/18 (from the past 24 hour(s))   CBC with platelets differential   Result Value Ref Range    WBC 7.5 4.0 - 11.0 10e9/L    RBC Count 3.96 3.8 - 5.2 10e12/L    Hemoglobin 11.6 (L) 11.7 - 15.7 g/dL    Hematocrit 34.2 (L) 35.0 - 47.0 %    MCV 86 78 - 100 fl    MCH 29.3 26.5 - 33.0 pg    MCHC 33.9 31.5 - 36.5 g/dL    RDW 14.1 10.0 - 15.0 %    Platelet Count 233 150 - 450 10e9/L    Diff Method Automated Method     % Neutrophils 52.6 %    % Lymphocytes 34.4 %    % Monocytes 8.4 %    % Eosinophils 3.6 %    % Basophils 0.9 %    % Immature Granulocytes 0.1 %    Absolute Neutrophil 4.0 1.6 - 8.3 10e9/L    Absolute Lymphocytes 2.6 0.8 - 5.3 10e9/L    Absolute Monocytes 0.6 0.0 - 1.3 10e9/L    Absolute Eosinophils 0.3 0.0 - 0.7 10e9/L    Absolute Basophils 0.1 0.0 - 0.2 10e9/L    Abs Immature Granulocytes 0.0 0 - 0.4 10e9/L   INR   Result Value Ref Range    INR 0.94 0 - 1.3   Comprehensive metabolic panel   Result Value Ref Range    Sodium 140 134 - 144 mmol/L    Potassium 3.5 3.5 - 5.1 mmol/L    Chloride 106 98 - 107 mmol/L    Carbon Dioxide 23 21 - 31 mmol/L    Anion Gap 11 3 - 14 mmol/L    Glucose 113 (H) 70 - 105 mg/dL    Urea Nitrogen 21 7 - 25 mg/dL    Creatinine 1.53 (H) 0.60 - 1.20 mg/dL    GFR Estimate 34 (L) >60 mL/min/1.7m2    GFR Estimate If Black 41 (L) " >60 mL/min/1.7m2    Calcium 9.3 8.6 - 10.3 mg/dL    Bilirubin Total 0.3 0.3 - 1.0 mg/dL    Albumin 4.2 3.5 - 5.7 g/dL    Protein Total 6.8 6.4 - 8.9 g/dL    Alkaline Phosphatase 75 34 - 104 U/L    ALT 12 7 - 52 U/L    AST 21 13 - 39 U/L   CT Head w/o Contrast    Narrative    EXAM:    CT Head Without Intravenous Contrast    EXAM DATE/TIME:    3/16/2018 12:35 AM    CLINICAL HISTORY:    63 years old, female; Injury or trauma; Fall; Initial encounter; Blunt trauma   (contusions or hematomas); Without loss of consciousness; Injury date: 3-15-18;   Additional info: Fall HA    TECHNIQUE:    Axial computed tomography images of the head/brain without intravenous   contrast.  All CT scans at this facility use one or more dose reduction   techniques, viz.: automated exposure control; ma/kV adjustment per patient size   (including targeted exams where dose is matched to indication; i.e. head); or   iterative reconstruction technique.    Coronal and sagittal reformatted images were created and reviewed.    COMPARISON:    No relevant prior studies available.    FINDINGS:    Brain:  Mild volume loss and white matter disease are identified. There is no   acute infarct or edema.  No hemorrhage.    Ventricles:  Unremarkable.  No ventriculomegaly.    Bones/joints:  Unremarkable.  No acute fracture.    Soft tissues:  Unremarkable.    Sinuses:  Unremarkable as visualized.  No acute sinusitis.    Mastoid air cells:  Unremarkable as visualized.  No mastoid effusion.      Impression    IMPRESSION:         No acute findings.    THIS DOCUMENT HAS BEEN ELECTRONICALLY SIGNED BY FLORIAN SALINAS MD       Medications - No data to display    Assessments & Plan (with Medical Decision Making)       New Prescriptions    No medications on file       Final diagnoses:   Fall, initial encounter   Strain of neck muscle, initial encounter     Reassuring head CT, recommend Motrin and Tylenol and rest for home.  Patient verbalized understanding of plan.  Return  to the emergency department worsening symptoms.  3/15/2018   [facility] [place]     Corey Rodriguez MD  03/16/18 0628

## 2018-03-29 ENCOUNTER — TELEPHONE (OUTPATIENT)
Dept: FAMILY MEDICINE | Facility: OTHER | Age: 63
End: 2018-03-29

## 2018-03-29 ENCOUNTER — OFFICE VISIT (OUTPATIENT)
Dept: FAMILY MEDICINE | Facility: OTHER | Age: 63
End: 2018-03-29
Attending: FAMILY MEDICINE
Payer: COMMERCIAL

## 2018-03-29 VITALS — DIASTOLIC BLOOD PRESSURE: 80 MMHG | SYSTOLIC BLOOD PRESSURE: 110 MMHG | HEART RATE: 72 BPM

## 2018-03-29 DIAGNOSIS — G89.4 CHRONIC PAIN DISORDER: Primary | ICD-10-CM

## 2018-03-29 DIAGNOSIS — J34.89 SINUS PRESSURE: ICD-10-CM

## 2018-03-29 DIAGNOSIS — D50.0 IRON DEFICIENCY ANEMIA DUE TO CHRONIC BLOOD LOSS: ICD-10-CM

## 2018-03-29 DIAGNOSIS — M54.50 CHRONIC BILATERAL LOW BACK PAIN WITHOUT SCIATICA: ICD-10-CM

## 2018-03-29 DIAGNOSIS — R07.89 CHEST WALL PAIN, CHRONIC: ICD-10-CM

## 2018-03-29 DIAGNOSIS — G89.29 CHRONIC BILATERAL LOW BACK PAIN WITHOUT SCIATICA: ICD-10-CM

## 2018-03-29 DIAGNOSIS — J01.00 ACUTE NON-RECURRENT MAXILLARY SINUSITIS: ICD-10-CM

## 2018-03-29 DIAGNOSIS — G89.29 CHEST WALL PAIN, CHRONIC: ICD-10-CM

## 2018-03-29 LAB
ERYTHROCYTE [DISTWIDTH] IN BLOOD BY AUTOMATED COUNT: 13.8 % (ref 10–15)
HCT VFR BLD AUTO: 37 % (ref 35–47)
HGB BLD-MCNC: 12.8 G/DL (ref 11.7–15.7)
MCH RBC QN AUTO: 29.5 PG (ref 26.5–33)
MCHC RBC AUTO-ENTMCNC: 34.6 G/DL (ref 31.5–36.5)
MCV RBC AUTO: 85 FL (ref 78–100)
PLATELET # BLD AUTO: 247 10E9/L (ref 150–450)
RBC # BLD AUTO: 4.34 10E12/L (ref 3.8–5.2)
WBC # BLD AUTO: 7.5 10E9/L (ref 4–11)

## 2018-03-29 PROCEDURE — 85027 COMPLETE CBC AUTOMATED: CPT | Performed by: FAMILY MEDICINE

## 2018-03-29 PROCEDURE — 36415 COLL VENOUS BLD VENIPUNCTURE: CPT | Performed by: FAMILY MEDICINE

## 2018-03-29 PROCEDURE — 99215 OFFICE O/P EST HI 40 MIN: CPT | Performed by: FAMILY MEDICINE

## 2018-03-29 RX ORDER — OXYCODONE AND ACETAMINOPHEN 5; 325 MG/1; MG/1
2 TABLET ORAL 4 TIMES DAILY
Qty: 240 TABLET | Refills: 0 | Status: SHIPPED | OUTPATIENT
Start: 2018-03-29 | End: 2018-04-24

## 2018-03-29 RX ORDER — FLUTICASONE PROPIONATE 50 MCG
2 SPRAY, SUSPENSION (ML) NASAL DAILY
Qty: 1 BOTTLE | Refills: 0 | Status: SHIPPED | OUTPATIENT
Start: 2018-03-29 | End: 2018-04-24

## 2018-03-29 RX ORDER — PREGABALIN 25 MG/1
25 CAPSULE ORAL 2 TIMES DAILY
Qty: 180 CAPSULE | Refills: 1 | Status: CANCELLED | OUTPATIENT
Start: 2018-03-29

## 2018-03-29 ASSESSMENT — PAIN SCALES - GENERAL: PAINLEVEL: MODERATE PAIN (5)

## 2018-03-29 NOTE — PATIENT INSTRUCTIONS
Wean off Lyrica by taking nightly for a week, then stop  Continue same oxycodone for now    Checking blood count and we can let you know results    Fluticasone spray to nostrils daily for a couple weeks to clear sinus infection    Follow up in a month

## 2018-03-29 NOTE — MR AVS SNAPSHOT
"              After Visit Summary   3/29/2018    Ankita Day    MRN: [id number]           Patient Information     Date Of Birth          1955        Visit Information        Provider Department      3/29/2018 11:15 AM Ramirez Cano MD [facility]        Today's Diagnoses     Chronic pain disorder    -  1    Chronic bilateral low back pain without sciatica        Chest wall pain, chronic        Iron deficiency anemia due to chronic blood loss        Acute non-recurrent maxillary sinusitis          Care Instructions    Wean off Lyrica by taking nightly for a week, then stop  Continue same oxycodone for now    Checking blood count and we can let you know results    Fluticasone spray to nostrils daily for a couple weeks to clear sinus infection    Follow up in a month          Follow-ups after your visit        Who to contact     If you have questions or need follow up information about today's clinic visit or your schedule please contact [facility] directly at [phone].  Normal or non-critical lab and imaging results will be communicated to you by [employer], letter or phone within 4 business days after the clinic has received the results. If you do not hear from us within 7 days, please contact the clinic through [email or web address] or phone. If you have a critical or abnormal lab result, we will notify you by phone as soon as possible.  Submit refill requests through [employer] or call your pharmacy and they will forward the refill request to us. Please allow 3 business days for your refill to be completed.          Additional Information About Your Visit        [employer] Information     [employer] lets you send messages to your doctor, view your test results, renew your prescriptions, schedule appointments and more. To sign up, go to [email or web address] . Click on \"Log in\" on the left side of the screen, which will take you to the Welcome page. Then click on \"Sign up Now\" " on the right side of the page.     You will be asked to enter the access code listed below, as well as some personal information. Please follow the directions to create your username and password.     Your access code is: [email or web address]  Expires: 2018  1:16 PM     Your access code will  in 90 days. If you need help or a new code, please call your [facility] clinic or [phone].        Care EveryWhere ID     This is your Care EveryWhere ID. This could be used by other organizations to access your [facility] medical records  [id number]        Your Vitals Were     Pulse Breastfeeding?                72 No           Blood Pressure from Last 3 Encounters:   18 110/80   18 127/66   03/15/18 136/72    Weight from Last 3 Encounters:   03/15/18 163 lb (73.9 kg)   03/15/18 164 lb 4.8 oz (74.5 kg)   18 165 lb (74.8 kg)              We Performed the Following     CBC with platelets          Today's Medication Changes          These changes are accurate as of 3/29/18 12:03 PM.  If you have any questions, ask your nurse or doctor.               Start taking these medicines.        Dose/Directions    fluticasone 50 MCG/ACT spray   Commonly known as:  FLONASE   Used for:  Acute non-recurrent maxillary sinusitis        Dose:  2 spray   Spray 2 sprays into both nostrils daily   Quantity:  1 Bottle   Refills:  0            Where to get your medicines      These medications were sent to [facility] - [place], [place] - [address] [place], [place]  [address], [facility] [address]     Phone:  [phone]     fluticasone 50 MCG/ACT spray         Some of these will need a paper prescription and others can be bought over the counter.  Ask your nurse if you have questions.     Bring a paper prescription for each of these medications     oxyCODONE-acetaminophen 5-325 MG per tablet                Primary Care Provider Office Phone # Fax #    Ramirez Cano [phone]        [address] [facility] [address]        Equal Access to Services     JACKIE TATIANA : Hadii donaldo [place] Sowillyali, wasocoda luqadaha, qaybta kamartyda jackierufinofelix, kacie davisbradnadeem fields. So [facility] [phone].    ATENCIÓN: Si habla español, tiene a siddiqui disposición servicios gratuitos de asistencia lingüística. [facility] [phone].    We comply with applicable federal civil rights laws and [place] laws. We do not discriminate on the basis of race, color, national origin, age, disability, sex, sexual orientation, or gender identity.            Thank you!     Thank you for choosing [facility]  for your care. Our goal is always to provide you with excellent care. Hearing back from our patients is one way we can continue to improve our services. Please take a few minutes to complete the written survey that you may receive in the mail after your visit with us. Thank you!             Your Updated Medication List - Protect others around you: Learn how to safely use, store and throw away your medicines at www.disposemymeds.org.          This list is accurate as of 3/29/18 12:03 PM.  Always use your most recent med list.                   Brand Name Dispense Instructions for use Diagnosis    ADVIL PO      Take 600 mg by mouth every 6 hours as needed for moderate pain        amLODIPine 10 MG tablet    NORVASC     Take 10 mg by mouth daily        aspirin EC 81 MG EC tablet      Take 81 mg by mouth daily with food        busPIRone 10 MG tablet    BUSPAR     Take 1 tablet by mouth 2 times daily        clonazePAM 1 MG tablet    klonoPIN     Take 1 tablet by mouth 4 times daily Becky Olmstead CNP        clopidogrel 75 MG tablet    PLAVIX     Take 75 mg by mouth daily        CVS POTASSIUM GLUCONATE 2 MEQ Tabs   Generic drug:  Potassium Gluconate           cyclobenzaprine 10 MG tablet    FLEXERIL     1 tablet 2 times daily as needed for muscle spasms        Diclofenac Sodium 1.5 % Soln       Apply 20 drops to each hand 4 times daily        docusate sodium 50 MG capsule    COLACE     Take 50 mg by mouth daily        fluticasone 50 MCG/ACT spray    FLONASE    1 Bottle    Spray 2 sprays into both nostrils daily    Acute non-recurrent maxillary sinusitis       lisinopril 40 MG tablet    PRINIVIL/ZESTRIL     Take 40 mg by mouth daily        metoprolol tartrate 50 MG tablet    LOPRESSOR     Take 1 tablet by mouth 2 times daily        nitroGLYcerin 0.3 MG sublingual tablet    NITROSTAT     Place 1 tablet under the tongue every 5 minutes as needed for chest pain        ondansetron 4 MG tablet    ZOFRAN     Take 4 mg by mouth every 6 hours as needed for nausea        oxyCODONE-acetaminophen 5-325 MG per tablet    PERCOCET    240 tablet    Take 2 tablets by mouth 4 times daily Max acetaminophen dose: 4000mg in 24 hrs    Chronic pain disorder, Chronic bilateral low back pain without sciatica, Chest wall pain, chronic       pantoprazole 40 MG EC tablet    PROTONIX     Take 40 mg by mouth 2 times daily        pravastatin 40 MG tablet    PRAVACHOL    90 tablet    TAKE 1 TABLET AT BEDTIME    Atherosclerosis of coronary artery bypass graft of native heart without angina pectoris       pregabalin 25 MG capsule    LYRICA    180 capsule    Take 1 capsule (25 mg) by mouth 2 times daily    Chronic pain disorder       saccharomyces boulardii 250 MG capsule    FLORASTOR     Take 250 mg by mouth 2 times daily        VITAMIN D (CHOLECALCIFEROL) PO      Take 5,000 Units by mouth daily        vortioxetine 10 MG tablet    TRINTELLIX/BRINTELLIX

## 2018-03-29 NOTE — PROGRESS NOTES
"SUBJECTIVE:  63 year old female presents to follow up on chronic pain in chest wall and back and follow up on NSAID induced gastric ulcer.    She was a late to appointment with me on March 13.  According to her side of the story she was given an appointment at 11:45, but in our system it shows 11:15.  When she showed up she was asked to reschedule since she was 30 minutes late.  At that point according to the  she became upset and sounded very frustrated.  She made a statement along the lines of, \"I will kill him.\".  It sounded like she meant me because she cannot be seen.  We did get her an appointment 2 days later with nausea who refilled her medications for 2 weeks.  Patient denies making any kind of threatening statements.  She has been very frustrated on the phone in the past and was very rude to a cardiology nurse.  She admits to this and we discussed it in the past.  She did call back to apologize to the nurse.  At the time we discussed that any further rude behavior would lead to cessation of opioid therapy.  Today she denies any inappropriate behavior and says she just waited in the lobby in order to find out when she could be seen that she is going to run out of her opioids.    In February she had planned dental work and was given an increase oxycodone.  Since that time she was able to decrease her dosage as we discussed and is currently back to her chronic baseline use.  Today she states that she discussed medical marijuana with her psychiatrist, Becky Olmstead CNP.  They are hoping that medical marijuana will help with her PTSD and anxiety along with chronic pain.  Patient is wondering if I am willing to continue providing oxycodone while she pursues medical marijuana treatment.  She does not intend to remain on clonazepam and oxycodone along with medical marijuana.    Taking Lyrica twice daily. Not sure of any benefit or side effects.  Previously she felt it caused jitteriness, but she believes " this was due to another cause.    Resumed aspirin, enteric coated. EGD on Feb 2 showed a healed ulcer. Notes some nausea for a while.  While in the ER on March 15 after a fall, her hemoglobin was down to 11.6.  Previously when she was anemic she had some abdominal cramping, which is not currently present.    She also believes that she has a sinus infection for the past couple weeks.  Reports pressure in the area of both maxillary sinuses.  Denies much nasal discharge, but does have nasal congestion.  No fever.    REVIEW OF SYSTEMS:    Constitutional: negative  Respiratory: negative  Cardiovascular: negative    Current Outpatient Prescriptions   Medication Sig Dispense Refill     oxyCODONE-acetaminophen (PERCOCET) 5-325 MG per tablet Take 2 tablets by mouth 4 times daily Max acetaminophen dose: 4000mg in 24 hrs 120 tablet 0     Ibuprofen (ADVIL PO) Take 600 mg by mouth every 6 hours as needed for moderate pain       pravastatin (PRAVACHOL) 40 MG tablet TAKE 1 TABLET AT BEDTIME 90 tablet 2     pregabalin (LYRICA) 25 MG capsule Take 1 capsule (25 mg) by mouth 2 times daily 180 capsule 1     clopidogrel (PLAVIX) 75 MG tablet Take 75 mg by mouth daily       Diclofenac Sodium 1.5 % SOLN Apply 20 drops to each hand 4 times daily       ondansetron (ZOFRAN) 4 MG tablet Take 4 mg by mouth every 6 hours as needed for nausea       Potassium Gluconate ([employer] POTASSIUM GLUCONATE) 2 MEQ TABS        amLODIPine (NORVASC) 10 MG tablet Take 10 mg by mouth daily       aspirin EC 81 MG EC tablet Take 81 mg by mouth daily with food       busPIRone (BUSPAR) 10 MG tablet Take 1 tablet by mouth 2 times daily       clonazePAM (KLONOPIN) 1 MG tablet Take 1 tablet by mouth 4 times daily Becky Olmstead CNP       cyclobenzaprine (FLEXERIL) 10 MG tablet 1 tablet 2 times daily as needed for muscle spasms       lisinopril (PRINIVIL/ZESTRIL) 40 MG tablet Take 40 mg by mouth daily       metoprolol tartrate (LOPRESSOR) 50 MG tablet Take 1 tablet by mouth 2  times daily       nitroGLYcerin (NITROSTAT) 0.3 MG sublingual tablet Place 1 tablet under the tongue every 5 minutes as needed for chest pain       pantoprazole (PROTONIX) 40 MG EC tablet Take 40 mg by mouth 2 times daily       vortioxetine (TRINTELLIX/BRINTELLIX) 10 MG tablet        saccharomyces boulardii (FLORASTOR) 250 MG capsule Take 250 mg by mouth 2 times daily       docusate sodium (COLACE) 50 MG capsule Take 50 mg by mouth daily       VITAMIN D, CHOLECALCIFEROL, PO Take 5,000 Units by mouth daily       Allergies   Allergen Reactions     Atorvastatin Muscle Pain (Myalgia)     Liquid Adhesive Rash     Other reaction(s): Erythema  Silk and plastic gloves (long term attachment of adhesives)     Tiotropium Bromide [Tiotropium] Rash     Ezetimibe Muscle Pain (Myalgia)     Niacin      Other reaction(s): Flushing  flushing     Rosuvastatin      Other reaction(s): Myalgia     Latex Rash     No Clinical Screening - See Comments Rash, Blisters and Itching     Metals and plastic  Metals and plastics       Tape [Adhesive Tape] Rash       OBJECTIVE:  /80 (BP Location: Right arm, Patient Position: Sitting, Cuff Size: Adult Large)  Pulse 72  Breastfeeding? No    EXAM:  General Appearance: Pleasant, alert, appropriate appearance for age. No acute distress  Head: minimally tender to palpation over maxillary sinuses.  Ear Exam: Normal TM's bilaterally. Normal auditory canals and external ears.  Nose Exam: Nasal mucosa: clear rhinorrhea. Turbinates: inflamed  OroPharynx Exam:  Normal buccal mucosa. Normal pharynx.  Neck Exam: Supple, no masses or nodes.  Chest/Respiratory Exam: Normal chest wall and respirations. Clear to auscultation.  PSYCH: mentation appears normal and affect normal    ASSESSMENT/PLAN:    ICD-10-CM    1. Chronic pain disorder G89.4 oxyCODONE-acetaminophen (PERCOCET) 5-325 MG per tablet   2. Chronic bilateral low back pain without sciatica M54.5 oxyCODONE-acetaminophen (PERCOCET) 5-325 MG per tablet     G89.29    3. Chest wall pain, chronic R07.89 oxyCODONE-acetaminophen (PERCOCET) 5-325 MG per tablet    G89.29    4. Iron deficiency anemia due to chronic blood loss D50.0 CBC with platelets   5. Acute non-recurrent maxillary sinusitis J01.00 fluticasone (FLONASE) 50 MCG/ACT spray   6. Sinus pressure J34.89      Made a long conversation about appropriate behavior.  She is denying making any threatening statements.  Told her that I could see her making the statements if she is quite frustrated.  She still denies saying what the schedule are reported to me.  She has no history of threatening behavior.  At this point I do not feel she warrants being fired from the clinic.  We did discuss that if she is frustrated in the future and becomes rude with any support staff, then likely we would terminate her relationship with [place]. She was appropriate when seeing IVANNA Pinto.    Refilled oxycodone at same dosing of 10 mg QID for 40 mg daily. Used 60 mg previously. Currently on 60 morphine milligram equivalents daily. Stable in use. Refilled 1 mo. UDM appropriate 10/31/17. Due for new controlled substance agreement depending on if she has any benefit with medical marijuana.  She is uncertain how long it will take to get certified and obtain the marijuana.  Therefore we elected to do follow-up in 1 month.    Lyrica does not seem to have any benefit, but does not have side effects at this time either.  We discussed maintaining dose or increasing.  At this point she would prefer to stop and so she can taper off by taking one a night for 1 week and then stop.    As for her sinuses, we discussed how antibiotics could lead to a return of C. difficile.  She prefers to avoid antibiotics.  Try nasal steroid to each nostril daily and see if this helps with sinus pressure.    F/U 1 month    Greater than 50% of this 40 minute appointment spent on counseling and coordination of care.    This document was prepared using voice  generated software.  While every attempt was made for accuracy, grammatical errors may exist.

## 2018-04-02 DIAGNOSIS — K25.4 CHRONIC GASTRIC ULCER WITH HEMORRHAGE: ICD-10-CM

## 2018-04-02 DIAGNOSIS — I10 ESSENTIAL HYPERTENSION: Primary | ICD-10-CM

## 2018-04-04 RX ORDER — LISINOPRIL 40 MG/1
TABLET ORAL
Qty: 90 TABLET | Refills: 4 | Status: SHIPPED | OUTPATIENT
Start: 2018-04-04 | End: 2019-04-03

## 2018-04-04 RX ORDER — SUCRALFATE 1 G/1
1 TABLET ORAL 4 TIMES DAILY
Qty: 120 TABLET | Refills: 0 | Status: SHIPPED | OUTPATIENT
Start: 2018-04-04 | End: 2018-06-18

## 2018-04-04 RX ORDER — SUCRALFATE 1 G/1
1 TABLET ORAL 4 TIMES DAILY
Qty: 120 TABLET | Refills: 0 | Status: CANCELLED | OUTPATIENT
Start: 2018-04-04

## 2018-04-05 RX ORDER — SUCRALFATE 1 G/1
TABLET ORAL
Qty: 120 TABLET | OUTPATIENT
Start: 2018-04-05

## 2018-04-05 NOTE — TELEPHONE ENCOUNTER
Redundant Refill Request for Carafate refused;    Medication Detail      Disp Refills Start End TOY   sucralfate (CARAFATE) 1 GM tablet 120 tablet 0 4/4/2018  No   Sig: Take 1 tablet (1 g) by mouth 4 times daily Before meals & at bedtime   Class: E-Prescribe   Route: Oral   Order: [id number]   E-Prescribing Status: Receipt confirmed by pharmacy (4/4/2018  4:51 PM CDT)   Printout Tracking   External Result Report   Medication Administration Instructions   Before meals & at bedtime   Pharmacy   [facility], - [place], [place] - [address]     Unable to complete prescription refill per RN Medication Refill Policy. Cruzito Robbins 4/5/2018 9:00 AM

## 2018-04-07 DIAGNOSIS — R07.9 CHEST PAIN OF UNCERTAIN ETIOLOGY: Primary | ICD-10-CM

## 2018-04-09 RX ORDER — NITROGLYCERIN 0.3 MG/1
TABLET SUBLINGUAL
Qty: 25 TABLET | Refills: 2 | Status: SHIPPED | OUTPATIENT
Start: 2018-04-09 | End: 2019-08-28

## 2018-04-09 NOTE — TELEPHONE ENCOUNTER
Chart review shows that patient was seen by PCP most recently on 3/29/18. Rx as requested and as noted below was noted in office visit notes on that date:    nitroGLYcerin (NITROSTAT) 0.3 MG sublingual tablet Place 1 tablet under the tongue every 5 minutes as needed for chest pain     No changes noted to rx as requested in office visit notes on that date. Patient is to follow up in one months time. Writer will refill rx as requested at this time.    Prescription refilled per RN Medication Refill Policy..................Cruzito Robbins 4/9/2018 10:37 AM

## 2018-04-19 ENCOUNTER — OFFICE VISIT (OUTPATIENT)
Dept: FAMILY MEDICINE | Facility: OTHER | Age: 63
End: 2018-04-19
Attending: PHYSICIAN ASSISTANT
Payer: COMMERCIAL

## 2018-04-19 VITALS
TEMPERATURE: 97.4 F | HEIGHT: 66 IN | HEART RATE: 64 BPM | BODY MASS INDEX: 26.52 KG/M2 | DIASTOLIC BLOOD PRESSURE: 82 MMHG | SYSTOLIC BLOOD PRESSURE: 124 MMHG | WEIGHT: 165 LBS

## 2018-04-19 DIAGNOSIS — B37.31 YEAST INFECTION OF THE VAGINA: ICD-10-CM

## 2018-04-19 DIAGNOSIS — J01.00 ACUTE NON-RECURRENT MAXILLARY SINUSITIS: Primary | ICD-10-CM

## 2018-04-19 PROCEDURE — 99213 OFFICE O/P EST LOW 20 MIN: CPT | Performed by: PHYSICIAN ASSISTANT

## 2018-04-19 RX ORDER — AMOXICILLIN 875 MG
875 TABLET ORAL 2 TIMES DAILY
Qty: 20 TABLET | Refills: 0 | Status: SHIPPED | OUTPATIENT
Start: 2018-04-19 | End: 2019-02-20

## 2018-04-19 RX ORDER — FLUCONAZOLE 150 MG/1
150 TABLET ORAL ONCE
Qty: 3 TABLET | Refills: 0 | Status: SHIPPED | OUTPATIENT
Start: 2018-04-19 | End: 2018-04-19

## 2018-04-19 ASSESSMENT — ANXIETY QUESTIONNAIRES
1. FEELING NERVOUS, ANXIOUS, OR ON EDGE: NOT AT ALL
2. NOT BEING ABLE TO STOP OR CONTROL WORRYING: NOT AT ALL
3. WORRYING TOO MUCH ABOUT DIFFERENT THINGS: NOT AT ALL
GAD7 TOTAL SCORE: 0
IF YOU CHECKED OFF ANY PROBLEMS ON THIS QUESTIONNAIRE, HOW DIFFICULT HAVE THESE PROBLEMS MADE IT FOR YOU TO DO YOUR WORK, TAKE CARE OF THINGS AT HOME, OR GET ALONG WITH OTHER PEOPLE: NOT DIFFICULT AT ALL
5. BEING SO RESTLESS THAT IT IS HARD TO SIT STILL: NOT AT ALL
7. FEELING AFRAID AS IF SOMETHING AWFUL MIGHT HAPPEN: NOT AT ALL
6. BECOMING EASILY ANNOYED OR IRRITABLE: NOT AT ALL

## 2018-04-19 ASSESSMENT — PATIENT HEALTH QUESTIONNAIRE - PHQ9: 5. POOR APPETITE OR OVEREATING: NOT AT ALL

## 2018-04-19 ASSESSMENT — PAIN SCALES - GENERAL: PAINLEVEL: MODERATE PAIN (4)

## 2018-04-19 NOTE — PATIENT INSTRUCTIONS
Sinus infection - Antibiotic has been sent to pharmacy. Please take full course of antibiotic even if symptoms have completely resolved. This helps prevent against antibiotic resistance.     May use symptomatic care with tylenol or ibuprofen. May use cough syrup or cough drops. Using a humidifier works well to break up the congestion. You can also sleep propped up on a couple pillows to decrease symptoms at night.     Use a Neti Pot/sinusflush (Alexander Med Sinus Rinse) 3 times daily to irrigate sinuses/mucosal tissue.     Sudafed or mucinex work well for congestion.   If you choose pseudoephedrine, use for only 5-7 days AS DIRECTED. Speak to your pharmacist if you have any concerns about your medications. Mayalso use decongestant nasal spray, but only for 3 days MAXIMUM.    Please take tylenol as needed up to 4 times daily.  Treat symptomatically with warm salt water gargles.  Lozenges, Tylenol, Advil or Aleve as needed. Frequent swallows of cool liquid.  Oatmeal coats the throat and some patients find it soothes the pain.     Monitor for any fevers or chills. Return in 7-10 days if not feeling better. Please call clinic with any questions or concerns. Please take in a lot of fluids and get rest.     You will need to be evaluated if you start to experience:  Fever higher than 102.5 F (39.2 C)   Sudden and severe pain in the face and head   Trouble seeing or seeing double   Trouble thinking clearly   Swelling or redness around 1 or both eyes   Trouble breathing or a stiff neck    * If you are a smoker, try to quit *    Call 9-1-1 or go to the emergency room if you:  Have trouble breathing   Are drooling because you cannot swallow your saliva   Have swelling of the neck or tongue   Cannot move your neck or have trouble opening your mouth      Yeast infection - Patient given fluconazole 150mg.     If you have a vaginal yeast infection, follow these guidelines:   Follow the full treatment prescribed by your healthcare  provider.   After urinating, wipe gently to avoid irritation.   Use unscented soaps.   Avoid using douches and other chemicals, such as bubble bath or hygiene spray in the vaginal area unless recommended by your healthcare provider.   Take a shower instead of a bath. Pat the genital area dry.   Wear cotton underwear to allow ventilation and to keep the area drier.   Avoid sexual intercourse until the infection is gone.     Here are some things you can do to help prevent yeast infection:   Keep naturally moist areas of the body as cool and dry as possible.   Avoid wearing a wet bathing suit or damp clothing for long periods of time.   Avoid using douches, perfumed soaps, feminine sprays, feminine hygiene deodorants, or scented pads or tampons, and don't take bubble baths.   Wear underwear that is all cotton or has a cotton crotch. Pantyhose should also have a cotton crotch. Cotton allows better air circulation than nylon. Change underwear and pantyhose every day.   Avoid wearing tight pantyhose or tight pants.   Eat yogurt every day.   Avoid frequent or prolonged use of oral antibiotics, if possible. Understand that antibiotics are often not needed and their use should be decided by your healthcare provider. When you are given antibiotics by your provider, take them as directed. Do not share antibiotics with others, and do not save antibiotics for another time.   Lose weight if you are overweight. (Yeast tends to grow in the skin folds of overweight people   especially the breasts, belly, and groin.)   If you have diabetes, keep your blood sugar under control.

## 2018-04-19 NOTE — PROGRESS NOTES
Nursing Notes:   Mirta Kelly LPN  4/19/2018 10:58 AM  Signed  Patient presents to clinic for sinus infection with swollen glands for over a month.  Also has a yeast infection that she has been treating for three days with monistat and is starting to help a little bit.  Mirta Kelly LPN ....................  4/19/2018   10:47 AM      HPI:    Ankita Day is a 63 year old female who presents for  sinus infection with swollen glands for over a month.  Nasal spray not helping.  1 month. Wake up with film on eyes. Hx dry eyes. Eye drainage. No runny nose. Pain around eyes with sinuses.  No red eyes.  No ear pain, ST. Swollen glands. Minimal coughing.  Chills. No fevers. Still eating, drinking. No dehydration concerns.     Also has a yeast infection that she has been treating for three days with monistat and is starting to help a little bit. Now using monistat 7. Used 2 other generic meds which did not help. Hx yeast infections. Having burning sensation. No discharge.  No bladder infection symptoms. No dysuria, frequency, hematuria.         Past Medical History:   Diagnosis Date     Acute ischemic heart disease (H)     06/15/2007,with PTCA and stenting of 90% osteo circumflex lesion and patent LAD graft, patent left main stent.     Acute myocardial infarction     3/30/2013     Anxiety disorder     No Comments Provided     Atherosclerotic heart disease of native coronary artery without angina pectoris     -angio revealed 3 vessel dz  -4 stents placed; 2 overlapping stents placed in mLAD, 1 stent pRCA, 1 stent pCirc 1 s 9/02 -non-ST elevation MI 1/03  -angio revealed restenosis of Circ.    -PTCA and brachytherapy of pCirc -repeat angio 2/03 -no intervension -CABG x3 12/03 - Dr Sinclair  -LIMA-LAD, RAFI-RCA, SVG-OM -PCI 7/04 stent to L -CTangio 9/05   -patentent LIMA-LAD. RAFI-RCA occluded; RCA ostio/p*     Bilateral carpal tunnel syndrome     No Comments Provided     Cervicalgia     No Comments Provided      Chest pain     12/29/2014     Chronic gastric ulcer without hemorrhage or perforation     10/03/2011,hx of GI bleed (2003)     Chronic ischemic heart disease     06/27/2012     Chronic obstructive pulmonary disease (H)     06/15/2007,low DLCO, normal spirometry     Chronic or unspecified gastric ulcer with hemorrhage     10/2003     Chronic pain syndrome     12/01/2010,chest wall, back     Constipation     11/29/2011     Coronary angioplasty status     09/12/2002,with triple stenting     Coronary angioplasty status     01/10/2003,repeat angioplasty     Coronary angioplasty status     No Comments Provided     Dorsalgia     05/31/2011     Encounter for other administrative examinations     1/28/2014     Encounter for screening for cardiovascular disorders     10/2004,[facility] (2004, 2005, 2006 and 2011)     Enterocolitis due to Clostridium difficile     8/19/2016     Essential (primary) hypertension     No Comments Provided     Hyperlipidemia     No Comments Provided     Major depressive disorder, single episode     Severe, hx of suicide attempt/hospitalization     Migraine without status migrainosus, not intractable     No Comments Provided     Nodular corneal degeneration     09/26/2011     Noninfective gastroenteritis and colitis     06/15/2007,history of     Noninfective gastroenteritis and colitis     Microcytic     Osteoarthritis     No Comments Provided     Other chest pain     10/13/2009,chronic     Pain in knee     05/31/2011     Pain in right shoulder     No Comments Provided     Panic disorder without agoraphobia     No Comments Provided     Peptic ulcer without hemorrhage or perforation     6/15/2007     Peripheral vascular disease (H)     No Comments Provided     Personal history of diseases of the blood and blood-forming organs and certain disorders involving the immune mechanism (CODE)     No Comments Provided     Personal history of nicotine dependence     No Comments Provided     Personal history of  other medical treatment (CODE)     10/14/2013     Presence of aortocoronary bypass graft     2/12/2003     Primary central sleep apnea     10/14/2013     Sepsis due to Escherichia coli (E. coli) (H)     7/14/16,[facility]     Stricture of artery (H)     3/31/2013,S/p prox left SCA stent 4/1/2013     Thoracic, thoracolumbar and lumbosacral intervertebral disc disorder     No Comments Provided     Uncomplicated opioid abuse     history of     Vitamin D deficiency     5/6/2013       Past Surgical History:   Procedure Laterality Date     ANGIOPLASTY      9/12/02,with triple stenting     APPENDECTOMY OPEN      No Comments Provided     ARTHROSCOPY KNEE      left     ARTHROSCOPY SHOULDER      right     BYPASS GRAFT ARTERY CORONARY      12/13/02,Triple bypass, left internal mammary  to LAD, right internal mammary to right coronary artery, saphenous to obtuse marginal of the left circumflex.     COLONOSCOPY      2011,Dr Bowman benign polyps     COLONOSCOPY      2011,benign polyps, Dr. Bowman     COLONOSCOPY      8/8/16,normal, Dr Bowman     ELBOW SURGERY      baby,birth malachi removed from right arm     EMBOLECTOMY UPPER EXTREMITY  04/02/2013    brachial artery pseudoaneurysm after stenting     ESOPHAGOSCOPY, GASTROSCOPY, DUODENOSCOPY (EGD), COMBINED      2011,EGD Dr Bowman with pyloric ulcer     ESOPHAGOSCOPY, GASTROSCOPY, DUODENOSCOPY (EGD), COMBINED      2011,pyloric ulcer, Dr. Bowman     ESOPHAGOSCOPY, GASTROSCOPY, DUODENOSCOPY (EGD), COMBINED      8/8/16,mild gastritis, Dr Bowman     ESOPHAGOSCOPY, GASTROSCOPY, DUODENOSCOPY (EGD), COMBINED      11/27/2017,Dr Bowman. Antral ulcer     ESOPHAGOSCOPY, GASTROSCOPY, DUODENOSCOPY (EGD), COMBINED  02/02/2018    Dr Bowman, healed ulcer     HYSTERECTOMY TOTAL ABDOMINAL      age 21     LAPAROSCOPIC CHOLECYSTECTOMY      2006     OSTEOTOMY FEMUR DISTAL      x3, right knee     OSTEOTOMY FEMUR DISTAL      2000,left knee  ligament surgery     OTHER SURGICAL HISTORY      1/10/2003,,PTCA      OTHER SURGICAL HISTORY      2012,,PTCA,DELONTE in LAD and left main     OTHER SURGICAL HISTORY      2013,,PTCA,L subclavian stenosis     SALPINGO-OOPHORECTOMY BILATERAL      age 27,Bilateral salpingo-oophorectomy     TONSILLECTOMY, ADENOIDECTOMY, COMBINED      childhood       Family History   Problem Relation Age of Onset     HEART DISEASE Father      Heart Disease,Heart condition/Significant for atherosclerotic cardiovascular disease, but non premature.     Colon Cancer Father      Cancer-colon, of colon cancer     CANCER Father      Cancer,mets to liver, secondary to colon cancer     HEART DISEASE Mother      Heart Disease     CANCER Other      Cancer,Multiple Myeloma     HEART DISEASE Other      Heart Disease,Ischemic Heart Disease     Colon Cancer Other      Cancer-colon,Malignant neoplasms     CANCER Sister      Cancer,multiple myeloma     Other - See Comments Son      gallstones       Social History     Social History     Marital status:      Spouse name: N/A     Number of children: N/A     Years of education: N/A     Occupational History     Not on file.     Social History Main Topics     Smoking status: Former Smoker     Packs/day: 0.25     Years: 35.00     Types: Cigarettes     Quit date: 2/15/2017     Smokeless tobacco: Never Used     Alcohol use 0.0 oz/week      Comment: Alcoholic Drinks/day: rare     Drug use: No      Comment: Drug use: No     Sexual activity: Not on file     Other Topics Concern     Not on file     Social History Narrative    ,  Steven.  Currently not working outside the home. Lives [place] met with . Tobacco abuse, quit , restarted. Quit  and has since quit, no alcohol.       Current Outpatient Prescriptions   Medication Sig Dispense Refill     amLODIPine (NORVASC) 10 MG tablet Take 10 mg by mouth daily       amoxicillin (AMOXIL) 875 MG tablet Take 1 tablet (875 mg) by mouth 2 times daily for 10 days 20 tablet 0      aspirin EC 81 MG EC tablet Take 81 mg by mouth daily with food       busPIRone (BUSPAR) 10 MG tablet Take 1 tablet by mouth 2 times daily       clonazePAM (KLONOPIN) 1 MG tablet Take 1 tablet by mouth 4 times daily Becky Olmstead CNP       clopidogrel (PLAVIX) 75 MG tablet Take 75 mg by mouth daily       cyclobenzaprine (FLEXERIL) 10 MG tablet 1 tablet 2 times daily as needed for muscle spasms       Diclofenac Sodium 1.5 % SOLN Apply 20 drops to each hand 4 times daily       docusate sodium (COLACE) 50 MG capsule Take 50 mg by mouth daily       fluconazole (DIFLUCAN) 150 MG tablet Take 1 tablet (150 mg) by mouth once for 1 dose . Repeat in 1 week as needed 3 tablet 0     fluticasone (FLONASE) 50 MCG/ACT spray Spray 2 sprays into both nostrils daily 1 Bottle 0     Ibuprofen (ADVIL PO) Take 600 mg by mouth every 6 hours as needed for moderate pain       lisinopril (PRINIVIL/ZESTRIL) 40 MG tablet TAKE 1 TABLET DAILY 90 tablet 4     metoprolol tartrate (LOPRESSOR) 50 MG tablet Take 1 tablet by mouth 2 times daily       NITROSTAT 0.3 MG sublingual tablet PLACE 1 TABLET UNDER THE TONGUE EVERY 5 MINUTES AS NEEDED FOR CHEST PAIN 25 tablet 2     ondansetron (ZOFRAN) 4 MG tablet Take 4 mg by mouth every 6 hours as needed for nausea       oxyCODONE-acetaminophen (PERCOCET) 5-325 MG per tablet Take 2 tablets by mouth 4 times daily Max acetaminophen dose: 4000mg in 24 hrs 240 tablet 0     pantoprazole (PROTONIX) 40 MG EC tablet Take 40 mg by mouth 2 times daily       Potassium Gluconate ([employer] POTASSIUM GLUCONATE) 2 MEQ TABS        pravastatin (PRAVACHOL) 40 MG tablet TAKE 1 TABLET AT BEDTIME 90 tablet 2     pregabalin (LYRICA) 25 MG capsule Take 1 capsule (25 mg) by mouth 2 times daily 180 capsule 1     saccharomyces boulardii (FLORASTOR) 250 MG capsule Take 250 mg by mouth 2 times daily       sucralfate (CARAFATE) 1 GM tablet Take 1 tablet (1 g) by mouth 4 times daily Before meals & at bedtime 120 tablet 0     VITAMIN D,  "CHOLECALCIFEROL, PO Take 5,000 Units by mouth daily       vortioxetine (TRINTELLIX/BRINTELLIX) 10 MG tablet          Allergies   Allergen Reactions     Atorvastatin Muscle Pain (Myalgia)     Liquid Adhesive Rash     Other reaction(s): Erythema  Silk and plastic gloves (long term attachment of adhesives)     Tiotropium Bromide [Tiotropium] Rash     Ezetimibe Muscle Pain (Myalgia)     Niacin      Other reaction(s): Flushing  flushing     Rosuvastatin      Other reaction(s): Myalgia     Latex Rash     No Clinical Screening - See Comments Rash, Blisters and Itching     Metals and plastic  Metals and plastics       Tape [Adhesive Tape] Rash       REVIEW OF SYSTEMS:  Refer to HPI.    EXAM:   Vitals:    /82 (BP Location: Right arm, Patient Position: Sitting, Cuff Size: Adult Large)  Pulse 64  Temp 97.4  F (36.3  C) (Tympanic)  Ht 5' 6\" (1.676 m)  Wt 165 lb (74.8 kg)  BMI 26.63 kg/m2    General Appearance: Pleasant, alert, appropriate appearance for age. No acute distress  Ear Exam: Normal TM's bilaterally, grey, translucent, bony landmarks appreciated.   Left/Right TM: Effusion is not present. TM is not bulging. There is no pus appreciated.    Normal auditory canals and external ears. Non-tender.  OroPharynx Exam:  Erythematous posterior pharynx with no exudates. Sinus pain upon palpation of maxillary sinuses.  Chest/Respiratory Exam: Normal chest wall and respirations. Clear to auscultation. No retractions appreciated.  Cardiovascular Exam: Regular rate and rhythm. S1, S2, no murmur, click, gallop, or rubs.  Lymphatic Exam: ACLN.  Skin: no rash or abnormalities  Psychiatric Exam: Alert and oriented - appropriate affect.    PHQ Depression Screen  PHQ-9 SCORE 11/15/2017 1/15/2018 4/19/2018   Total Score 3 2 4         ASSESSMENT AND PLAN:    1. Acute non-recurrent maxillary sinusitis    2. Yeast infection of the vagina        Acute on recurrent maxillary sinus infection: Patient was started on amoxicillin for " treatment.  Return to clinic with change/worsening of symptoms.     Yeast infection of the vagina: Patient will complete the full course of the Monistat 7 over-the-counter.  I also sent a prescription for Diflucan to the pharmacy to be used if symptoms do not fully resolve with the over-the-counter Monistat.  She can also take 1 after antibiotic completion if the antibiotic worsens her symptoms.  Return to clinic with change/worsening of symptoms.     Patient Instructions   Sinus infection - Antibiotic has been sent to pharmacy. Please take full course of antibiotic even if symptoms have completely resolved. This helps prevent against antibiotic resistance.     May use symptomatic care with tylenol or ibuprofen. May use cough syrup or cough drops. Using a humidifier works well to break up the congestion. You can also sleep propped up on a couple pillows to decrease symptoms at night.     Use a Neti Pot/sinusflush (Alexander Med Sinus Rinse) 3 times daily to irrigate sinuses/mucosal tissue.     Sudafed or mucinex work well for congestion.   If you choose pseudoephedrine, use for only 5-7 days AS DIRECTED. Speak to your pharmacist if you have any concerns about your medications. Mayalso use decongestant nasal spray, but only for 3 days MAXIMUM.    Please take tylenol as needed up to 4 times daily.  Treat symptomatically with warm salt water gargles.  Lozenges, Tylenol, Advil or Aleve as needed. Frequent swallows of cool liquid.  Oatmeal coats the throat and some patients find it soothes the pain.     Monitor for any fevers or chills. Return in 7-10 days if not feeling better. Please call clinic with any questions or concerns. Please take in a lot of fluids and get rest.     You will need to be evaluated if you start to experience:  Fever higher than 102.5 F (39.2 C)   Sudden and severe pain in the face and head   Trouble seeing or seeing double   Trouble thinking clearly   Swelling or redness around 1 or both eyes    Trouble breathing or a stiff neck    * If you are a smoker, try to quit *    Call 9-1-1 or go to the emergency room if you:  Have trouble breathing   Are drooling because you cannot swallow your saliva   Have swelling of the neck or tongue   Cannot move your neck or have trouble opening your mouth      Yeast infection - Patient given fluconazole 150mg.     If you have a vaginal yeast infection, follow these guidelines:   Follow the full treatment prescribed by your healthcare provider.   After urinating, wipe gently to avoid irritation.   Use unscented soaps.   Avoid using douches and other chemicals, such as bubble bath or hygiene spray in the vaginal area unless recommended by your healthcare provider.   Take a shower instead of a bath. Pat the genital area dry.   Wear cotton underwear to allow ventilation and to keep the area drier.   Avoid sexual intercourse until the infection is gone.     Here are some things you can do to help prevent yeast infection:   Keep naturally moist areas of the body as cool and dry as possible.   Avoid wearing a wet bathing suit or damp clothing for long periods of time.   Avoid using douches, perfumed soaps, feminine sprays, feminine hygiene deodorants, or scented pads or tampons, and don't take bubble baths.   Wear underwear that is all cotton or has a cotton crotch. Pantyhose should also have a cotton crotch. Cotton allows better air circulation than nylon. Change underwear and pantyhose every day.   Avoid wearing tight pantyhose or tight pants.   Eat yogurt every day.   Avoid frequent or prolonged use of oral antibiotics, if possible. Understand that antibiotics are often not needed and their use should be decided by your healthcare provider. When you are given antibiotics by your provider, take them as directed. Do not share antibiotics with others, and do not save antibiotics for another time.   Lose weight if you are overweight. (Yeast tends to grow in the skin folds of  overweight people - especially the breasts, belly, and groin.)   If you have diabetes, keep your blood sugar under control.          Inga Smith..................4/19/2018 10:57 AM

## 2018-04-19 NOTE — MR AVS SNAPSHOT
After Visit Summary   4/19/2018    Ankita Day    MRN: [id number]           Patient Information     Date Of Birth          1955        Visit Information        Provider Department      4/19/2018 10:45 AM Inga Smith PA-C [facility]        Today's Diagnoses     Acute non-recurrent maxillary sinusitis    -  1    Yeast infection of the vagina          Care Instructions    Sinus infection - Antibiotic has been sent to pharmacy. Please take full course of antibiotic even if symptoms have completely resolved. This helps prevent against antibiotic resistance.     May use symptomatic care with tylenol or ibuprofen. May use cough syrup or cough drops. Using a humidifier works well to break up the congestion. You can also sleep propped up on a couple pillows to decrease symptoms at night.     Use a Neti Pot/sinusflush (Alexander Med Sinus Rinse) 3 times daily to irrigate sinuses/mucosal tissue.     Sudafed or mucinex work well for congestion.   If you choose pseudoephedrine, use for only 5-7 days AS DIRECTED. Speak to your pharmacist if you have any concerns about your medications. Mayalso use decongestant nasal spray, but only for 3 days MAXIMUM.    Please take tylenol as needed up to 4 times daily.  Treat symptomatically with warm salt water gargles.  Lozenges, Tylenol, Advil or Aleve as needed. Frequent swallows of cool liquid.  Oatmeal coats the throat and some patients find it soothes the pain.     Monitor for any fevers or chills. Return in 7-10 days if not feeling better. Please call clinic with any questions or concerns. Please take in a lot of fluids and get rest.     You will need to be evaluated if you start to experience:  Fever higher than 102.5 F (39.2 C)   Sudden and severe pain in the face and head   Trouble seeing or seeing double   Trouble thinking clearly   Swelling or redness around 1 or both eyes   Trouble breathing or a stiff neck    * If you are a smoker,  try to quit *    Call 9-1-1 or go to the emergency room if you:  Have trouble breathing   Are drooling because you cannot swallow your saliva   Have swelling of the neck or tongue   Cannot move your neck or have trouble opening your mouth      Yeast infection - Patient given fluconazole 150mg.     If you have a vaginal yeast infection, follow these guidelines:   Follow the full treatment prescribed by your healthcare provider.   After urinating, wipe gently to avoid irritation.   Use unscented soaps.   Avoid using douches and other chemicals, such as bubble bath or hygiene spray in the vaginal area unless recommended by your healthcare provider.   Take a shower instead of a bath. Pat the genital area dry.   Wear cotton underwear to allow ventilation and to keep the area drier.   Avoid sexual intercourse until the infection is gone.     Here are some things you can do to help prevent yeast infection:   Keep naturally moist areas of the body as cool and dry as possible.   Avoid wearing a wet bathing suit or damp clothing for long periods of time.   Avoid using douches, perfumed soaps, feminine sprays, feminine hygiene deodorants, or scented pads or tampons, and don't take bubble baths.   Wear underwear that is all cotton or has a cotton crotch. Pantyhose should also have a cotton crotch. Cotton allows better air circulation than nylon. Change underwear and pantyhose every day.   Avoid wearing tight pantyhose or tight pants.   Eat yogurt every day.   Avoid frequent or prolonged use of oral antibiotics, if possible. Understand that antibiotics are often not needed and their use should be decided by your healthcare provider. When you are given antibiotics by your provider, take them as directed. Do not share antibiotics with others, and do not save antibiotics for another time.   Lose weight if you are overweight. (Yeast tends to grow in the skin folds of overweight people - especially the breasts, belly, and groin.)    If you have diabetes, keep your blood sugar under control.             Follow-ups after your visit        Your next 10 appointments already scheduled     Apr 24, 2018 11:45 AM CDT   Office Visit with Ramirez Cano MD   [facility] ([facility])    [address]  [place] [id number] [phone]           Bring a current list of meds and any records pertaining to this visit. For Physicals, please bring immunization records and any forms needing to be filled out. Please arrive 10 minutes early to complete paperwork.            May 03, 2018 11:15 AM CDT   Return Visit with Paul Gramajo DO   [facility] ([facility])    [address]  [place] [facility] [place] [id number] [phone]              Who to contact     If you have questions or need follow up information about today's clinic visit or your schedule please contact [facility] directly at [phone].  Normal or non-critical lab and imaging results will be communicated to you by [employer], letter or phone within 4 business days after the clinic has received the results. If you do not hear from us within 7 days, please contact the clinic through [employer] or phone. If you have a critical or abnormal lab result, we will notify you by phone as soon as possible.  Submit refill requests through [employer] or call your pharmacy and they will forward the refill request to us. Please allow 3 business days for your refill to be completed.          Additional Information About Your Visit        [employer] Information     [employer] gives you secure access to your electronic health record. If you see a primary care provider, you can also send messages to your care team and make appointments. If you have questions, please call your primary care clinic.  If you do not have a primary care provider, please call [phone] and they will assist you.       "  Care EveryWhere ID     This is your Care EveryWhere ID. This could be used by other organizations to access your [employer] medical records  [id number]        Your Vitals Were     Pulse Temperature Height BMI (Body Mass Index)          64 97.4  F (36.3  C) (Tympanic) 5' 6\" (1.676 m) 26.63 kg/m2         Blood Pressure from Last 3 Encounters:   04/19/18 124/82   03/29/18 110/80   03/16/18 127/66    Weight from Last 3 Encounters:   04/19/18 165 lb (74.8 kg)   03/15/18 163 lb (73.9 kg)   03/15/18 164 lb 4.8 oz (74.5 kg)              Today, you had the following     No orders found for display         Today's Medication Changes          These changes are accurate as of 4/19/18 11:08 AM.  If you have any questions, ask your nurse or doctor.               Start taking these medicines.        Dose/Directions    amoxicillin 875 MG tablet   Commonly known as:  AMOXIL   Used for:  Acute non-recurrent maxillary sinusitis   Started by:  Inga Smith PA-C        Dose:  875 mg   Take 1 tablet (875 mg) by mouth 2 times daily for 10 days   Quantity:  20 tablet   Refills:  0       fluconazole 150 MG tablet   Commonly known as:  DIFLUCAN   Used for:  Yeast infection of the vagina   Started by:  Inga Smith PA-C        Dose:  150 mg   Take 1 tablet (150 mg) by mouth once for 1 dose . Repeat in 1 week as needed   Quantity:  3 tablet   Refills:  0            Where to get your medicines      These medications were sent to [facility] - [place], [place] - [address]  [address] [address]     Phone:  [phone]     amoxicillin 875 MG tablet    fluconazole 150 MG tablet                Primary Care Provider Office Phone # Fax #    Ramirez Cano [phone] [address] [facility] [address]        Equal Access to Services     [facility] TATIANA AH: Hadii donaldo horton hadasho Soomaali, waaxda luqadaha, qaybta kaalmada raúlegyada, kacie alamo " raúlmuna susanla laShannaaacarson ah. So [facility] [phone].    ATENCIÓN: Si akil hubbard, tiene a siddiqui disposición servicios gratuitos de asistencia lingüística. Avel milan [phone].    We comply with applicable federal civil rights laws and [place] laws. We do not discriminate on the basis of race, color, national origin, age, disability, sex, sexual orientation, or gender identity.            Thank you!     Thank you for choosing [facility]  for your care. Our goal is always to provide you with excellent care. Hearing back from our patients is one way we can continue to improve our services. Please take a few minutes to complete the written survey that you may receive in the mail after your visit with us. Thank you!             Your Updated Medication List - Protect others around you: Learn how to safely use, store and throw away your medicines at www.disposemymeds.org.          This list is accurate as of 4/19/18 11:08 AM.  Always use your most recent med list.                   Brand Name Dispense Instructions for use Diagnosis    ADVIL PO      Take 600 mg by mouth every 6 hours as needed for moderate pain        amLODIPine 10 MG tablet    NORVASC     Take 10 mg by mouth daily        amoxicillin 875 MG tablet    AMOXIL    20 tablet    Take 1 tablet (875 mg) by mouth 2 times daily for 10 days    Acute non-recurrent maxillary sinusitis       aspirin EC 81 MG EC tablet      Take 81 mg by mouth daily with food        busPIRone 10 MG tablet    BUSPAR     Take 1 tablet by mouth 2 times daily        clonazePAM 1 MG tablet    klonoPIN     Take 1 tablet by mouth 4 times daily Becky Olmstead CNP        clopidogrel 75 MG tablet    PLAVIX     Take 75 mg by mouth daily        [employer] POTASSIUM GLUCONATE 2 MEQ Tabs   Generic drug:  Potassium Gluconate           cyclobenzaprine 10 MG tablet    FLEXERIL     1 tablet 2 times daily as needed for muscle spasms        Diclofenac Sodium 1.5 % Soln      Apply 20 drops to each  hand 4 times daily        docusate sodium 50 MG capsule    COLACE     Take 50 mg by mouth daily        fluconazole 150 MG tablet    DIFLUCAN    3 tablet    Take 1 tablet (150 mg) by mouth once for 1 dose . Repeat in 1 week as needed    Yeast infection of the vagina       fluticasone 50 MCG/ACT spray    FLONASE    1 Bottle    Spray 2 sprays into both nostrils daily    Acute non-recurrent maxillary sinusitis       lisinopril 40 MG tablet    PRINIVIL/ZESTRIL    90 tablet    TAKE 1 TABLET DAILY    Essential hypertension       metoprolol tartrate 50 MG tablet    LOPRESSOR     Take 1 tablet by mouth 2 times daily        NITROSTAT 0.3 MG sublingual tablet   Generic drug:  nitroGLYcerin     25 tablet    PLACE 1 TABLET UNDER THE TONGUE EVERY 5 MINUTES AS NEEDED FOR CHEST PAIN    Chest pain of uncertain etiology       ondansetron 4 MG tablet    ZOFRAN     Take 4 mg by mouth every 6 hours as needed for nausea        oxyCODONE-acetaminophen 5-325 MG per tablet    PERCOCET    240 tablet    Take 2 tablets by mouth 4 times daily Max acetaminophen dose: 4000mg in 24 hrs    Chronic pain disorder, Chronic bilateral low back pain without sciatica, Chest wall pain, chronic       pantoprazole 40 MG EC tablet    PROTONIX     Take 40 mg by mouth 2 times daily        pravastatin 40 MG tablet    PRAVACHOL    90 tablet    TAKE 1 TABLET AT BEDTIME    Atherosclerosis 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

## 2018-04-19 NOTE — NURSING NOTE
Patient presents to clinic for sinus infection with swollen glands for over a month.  Also has a yeast infection that she has been treating for three days with monistat and is starting to help a little bit.  Mirta Kelly LPN ....................  4/19/2018   10:47 AM

## 2018-04-20 ASSESSMENT — PATIENT HEALTH QUESTIONNAIRE - PHQ9: SUM OF ALL RESPONSES TO PHQ QUESTIONS 1-9: 4

## 2018-04-20 ASSESSMENT — ANXIETY QUESTIONNAIRES: GAD7 TOTAL SCORE: 0

## 2018-04-24 ENCOUNTER — OFFICE VISIT (OUTPATIENT)
Dept: FAMILY MEDICINE | Facility: OTHER | Age: 63
End: 2018-04-24
Attending: FAMILY MEDICINE
Payer: COMMERCIAL

## 2018-04-24 VITALS — SYSTOLIC BLOOD PRESSURE: 124 MMHG | HEART RATE: 64 BPM | DIASTOLIC BLOOD PRESSURE: 68 MMHG

## 2018-04-24 DIAGNOSIS — G89.29 CHRONIC BILATERAL LOW BACK PAIN WITHOUT SCIATICA: ICD-10-CM

## 2018-04-24 DIAGNOSIS — M54.50 CHRONIC BILATERAL LOW BACK PAIN WITHOUT SCIATICA: ICD-10-CM

## 2018-04-24 DIAGNOSIS — R07.89 CHEST WALL PAIN, CHRONIC: ICD-10-CM

## 2018-04-24 DIAGNOSIS — G89.4 CHRONIC PAIN DISORDER: ICD-10-CM

## 2018-04-24 DIAGNOSIS — G89.29 CHEST WALL PAIN, CHRONIC: ICD-10-CM

## 2018-04-24 PROCEDURE — 99214 OFFICE O/P EST MOD 30 MIN: CPT | Performed by: FAMILY MEDICINE

## 2018-04-24 RX ORDER — OXYCODONE AND ACETAMINOPHEN 5; 325 MG/1; MG/1
2 TABLET ORAL 4 TIMES DAILY
Qty: 240 TABLET | Refills: 0 | Status: SHIPPED | OUTPATIENT
Start: 2018-04-24 | End: 2018-04-24

## 2018-04-24 RX ORDER — OXYCODONE AND ACETAMINOPHEN 5; 325 MG/1; MG/1
2 TABLET ORAL 4 TIMES DAILY
Qty: 240 TABLET | Refills: 0 | Status: SHIPPED | OUTPATIENT
Start: 2018-05-26 | End: 2018-06-25

## 2018-04-24 ASSESSMENT — PAIN SCALES - GENERAL: PAINLEVEL: MODERATE PAIN (4)

## 2018-04-24 NOTE — PROGRESS NOTES
SUBJECTIVE:  63 year old female presents to follow up on chronic pain in chest wall and back, headaches, and follow up on NSAID induced gastric ulcer.    Becky Olmstead CNP certified her for medical cannabis for PTSD. She is waiting to hear about approval.     Believes that anxiety and pain will be helped by the medical cannabis.     Ongoing headaches, uses oxycodone for them. She is worried that headache will not be adequately treated.     Stopped Lyrica and no change in pain.     Received amoxicillin for sinusitis last week and miconazole and fluconazole for yeast infection.     Using diclofenac drops on the knees with mild benefit on knees.    No chest pain or concerning cardiac issues.  She does want to meet with Dr. Gramajo to get his views on medical marijuana.    Tolerating aspirin.  She did have a gastric ulcer on EGD, but it appeared healed in February.  Hemoglobin had returned to normal on last check 1 month ago.    REVIEW OF SYSTEMS:    Constitutional: negative  Respiratory: negative  Cardiovascular: negative    Current Outpatient Prescriptions   Medication Sig Dispense Refill     amLODIPine (NORVASC) 10 MG tablet Take 10 mg by mouth daily       amoxicillin (AMOXIL) 875 MG tablet Take 1 tablet (875 mg) by mouth 2 times daily for 10 days 20 tablet 0     aspirin EC 81 MG EC tablet Take 81 mg by mouth daily with food       busPIRone (BUSPAR) 10 MG tablet Take 1 tablet by mouth 2 times daily       clonazePAM (KLONOPIN) 1 MG tablet Take 1 tablet by mouth 4 times daily Becky Olmstead CNP       clopidogrel (PLAVIX) 75 MG tablet Take 75 mg by mouth daily       cyclobenzaprine (FLEXERIL) 10 MG tablet 1 tablet 2 times daily as needed for muscle spasms       Diclofenac Sodium 1.5 % SOLN Apply 20 drops to each hand 4 times daily       docusate sodium (COLACE) 50 MG capsule Take 50 mg by mouth daily       Ibuprofen (ADVIL PO) Take 600 mg by mouth every 6 hours as needed for moderate pain       lisinopril (PRINIVIL/ZESTRIL) 40  MG tablet TAKE 1 TABLET DAILY 90 tablet 4     metoprolol tartrate (LOPRESSOR) 50 MG tablet Take 1 tablet by mouth 2 times daily       NITROSTAT 0.3 MG sublingual tablet PLACE 1 TABLET UNDER THE TONGUE EVERY 5 MINUTES AS NEEDED FOR CHEST PAIN 25 tablet 2     ondansetron (ZOFRAN) 4 MG tablet Take 4 mg by mouth every 6 hours as needed for nausea       oxyCODONE-acetaminophen (PERCOCET) 5-325 MG per tablet Take 2 tablets by mouth 4 times daily Max acetaminophen dose: 4000mg in 24 hrs 240 tablet 0     pantoprazole (PROTONIX) 40 MG EC tablet Take 40 mg by mouth 2 times daily       Potassium Gluconate ([employer]S POTASSIUM GLUCONATE) 2 MEQ TABS        pravastatin (PRAVACHOL) 40 MG tablet TAKE 1 TABLET AT BEDTIME 90 tablet 2     pregabalin (LYRICA) 25 MG capsule Take 1 capsule (25 mg) by mouth 2 times daily 180 capsule 1     saccharomyces boulardii (FLORASTOR) 250 MG capsule Take 250 mg by mouth 2 times daily       sucralfate (CARAFATE) 1 GM tablet Take 1 tablet (1 g) by mouth 4 times daily Before meals & at bedtime 120 tablet 0     VITAMIN D, CHOLECALCIFEROL, PO Take 5,000 Units by mouth daily       vortioxetine (TRINTELLIX/BRINTELLIX) 10 MG tablet        Allergies   Allergen Reactions     Atorvastatin Muscle Pain (Myalgia)     Liquid Adhesive Rash     Other reaction(s): Erythema  Silk and plastic gloves (long term attachment of adhesives)     Tiotropium Bromide [Tiotropium] Rash     Ezetimibe Muscle Pain (Myalgia)     Niacin      Other reaction(s): Flushing  flushing     Rosuvastatin      Other reaction(s): Myalgia     Latex Rash     No Clinical Screening - See Comments Rash, Blisters and Itching     Metals and plastic  Metals and plastics       Tape [Adhesive Tape] Rash       OBJECTIVE:  /68 (BP Location: Right arm, Patient Position: Sitting, Cuff Size: Adult Large)  Pulse 64  Breastfeeding? No    EXAM:  General Appearance: Pleasant, alert, appropriate appearance for age. No acute distress  PSYCH: mentation appears  normal and affect normal    ASSESSMENT/PLAN:    ICD-10-CM    1. Chronic pain disorder G89.4 oxyCODONE-acetaminophen (PERCOCET) 5-325 MG per tablet   2. Chronic bilateral low back pain without sciatica M54.5 oxyCODONE-acetaminophen (PERCOCET) 5-325 MG per tablet    G89.29    3. Chest wall pain, chronic R07.89 oxyCODONE-acetaminophen (PERCOCET) 5-325 MG per tablet    G89.29      She is pursuing medical cannabis.  This is certainly reasonable.  Please see details from last note dated March 29.  Ideally medical cannabis could take the place of hydrocodone and clonazepam.  She would be much better off if this occurs.    Refilled oxycodone at same dosing of 10 mg QID for 40 mg daily. Used 60 mg previously. Currently on 60 morphine milligram equivalents daily. Stable in use.  UDM appropriate 10/31/17. Due for new controlled substance agreement depending on if she has any benefit with medical marijuana.      Other issues are stable now, so no immediate follow up needed. If worse, she can return sooner.     F/U in 2 months, should have been evaluated for medical cannabis by that time. Should recheck Hgb at that time.     Ramirez Cano M.D.     This document was prepared using voice generated software.  While every attempt was made for accuracy, grammatical errors may exist.

## 2018-04-24 NOTE — MR AVS SNAPSHOT
After Visit Summary   4/24/2018    Ankita Day    MRN: [id number]           Patient Information     Date Of Birth          1955        Visit Information        Provider Department      4/24/2018 11:45 AM Ramirez Cano MD [facility]        Today's Diagnoses     Chronic pain disorder        Chronic bilateral low back pain without sciatica        Chest wall pain, chronic          Care Instructions    Refilled oxycodone for 2 months  Follow up at that time          Follow-ups after your visit        Your next 10 appointments already scheduled     May 03, 2018 11:15 AM CDT   Return Visit with Paul Gramajo DO   [facility] ([facility])    [address]  [facility] [address] [phone]              Who to contact     If you have questions or need follow up information about today's clinic visit or your schedule please contact [facility] directly at [phone].  Normal or non-critical lab and imaging results will be communicated to you by Online-ORhart, letter or phone within 4 business days after the clinic has received the results. If you do not hear from us within 7 days, please contact the clinic through Emu Solution[employer] or phone. If you have a critical or abnormal lab result, we will notify you by phone as soon as possible.  Submit refill requests through ClrTouch or call your pharmacy and they will forward the refill request to us. Please allow 3 business days for your refill to be completed.          Additional Information About Your Visit        Online-ORhart Information     ClrTouch gives you secure access to your electronic health record. If you see a primary care provider, you can also send messages to your care team and make appointments. If you have questions, please call your primary care clinic.  If you do not have a primary care provider, please call [phone] and they will assist you.         Care EveryWhere ID     This is your Care EveryWhere ID. This could be used by other organizations to access your [place] medical records  [id number]        Your Vitals Were     Pulse Breastfeeding?                64 No           Blood Pressure from Last 3 Encounters:   04/24/18 124/68   04/19/18 124/82   03/29/18 110/80    Weight from Last 3 Encounters:   04/19/18 165 lb (74.8 kg)   03/15/18 163 lb (73.9 kg)   03/15/18 164 lb 4.8 oz (74.5 kg)              Today, you had the following     No orders found for display         Today's Medication Changes          These changes are accurate as of 4/24/18  1:48 PM.  If you have any questions, ask your nurse or doctor.               Start taking these medicines.        Dose/Directions    oxyCODONE-acetaminophen 5-325 MG per tablet   Commonly known as:  PERCOCET   Used for:  Chronic pain disorder, Chronic bilateral low back pain without sciatica, Chest wall pain, chronic   Started by:  Ramirez Cano MD        Dose:  2 tablet   Start taking on:  5/26/2018   Take 2 tablets by mouth 4 times daily Max acetaminophen dose: 4000mg in 24 hrs   Quantity:  240 tablet   Refills:  0            Where to get your medicines      Some of these will need a paper prescription and others can be bought over the counter.  Ask your nurse if you have questions.     Bring a paper prescription for each of these medications     oxyCODONE-acetaminophen 5-325 MG per tablet               Information about OPIOIDS     PRESCRIPTION OPIOIDS: WHAT YOU NEED TO KNOW   You have a prescription for an opioid (narcotic) pain medicine. Opioids can cause addiction. If you have a history of chemical dependency of any type, you are at a higher risk of becoming addicted to opioids. Only take this medicine after all other options have been tried. Take it for as short a time and as few doses as possible.     Do not:    Drive. If you drive while taking these medicines, you could be arrested for driving under  the influence (DUI).    Operate heavy machinery    Do any other dangerous activities while taking these medicines.     Drink any alcohol while taking these medicines.      Take with any other medicines that contain acetaminophen. Read all labels carefully. Look for the word  acetaminophen  or  Tylenol.  Ask your pharmacist if you have questions or are unsure.    Store your pills in a secure place, locked if possible. We will not replace any lost or stolen medicine. If you don t finish your medicine, please throw away (dispose) as directed by your pharmacist. The [place] [employer]ution [employer] has more information about safe disposal: https://www.pca.[facility].[place].us/living-green/managing-unwanted-medications    All opioids tend to cause constipation. Drink plenty of water and eat foods that have a lot of fiber, such as fruits, vegetables, prune juice, apple juice and high-fiber cereal. Take a laxative (Miralax, milk of magnesia, Colace, Senna) if you don t move your bowels at least every other day.          Primary Care Provider Office Phone # Fax #    Ramirez Cano [phone] [address] [facility] [address]        Equal Access to Services     [facility]: Hadii aad ku hadasho Soyuriy, waaxda luqadaha, qaybta kaalmada dunia, kacie regalado . So [facility] [phone].    ATENCIÓN: Si habla español, tiene a siddiqui disposición servicios gratuitos de asistencia lingüística. Llame al [phone].    We comply with applicable federal civil rights laws and [place] laws. We do not discriminate on the basis of race, color, national origin, age, disability, sex, sexual orientation, or gender identity.            Thank you!     Thank you for choosing [facility] AND \[facility]\""  for your care. Our goal is always to provide you with excellent care. Hearing back from our patients is one way we can continue to improve our services. Please take a few minutes  to complete the written survey that you may receive in the mail after your visit with us. Thank you!             Your Updated Medication List - Protect others around you: Learn how to safely use, store and throw away your medicines at www.disposemymeds.org.          This list is accurate as of 4/24/18  1:48 PM.  Always use your most recent med list.                   Brand Name Dispense Instructions for use Diagnosis    ADVIL PO      Take 600 mg by mouth every 6 hours as needed for moderate pain        amLODIPine 10 MG tablet    NORVASC     Take 10 mg by mouth daily        amoxicillin 875 MG tablet    AMOXIL    20 tablet    Take 1 tablet (875 mg) by mouth 2 times daily for 10 days    Acute non-recurrent maxillary sinusitis       aspirin EC 81 MG EC tablet      Take 81 mg by mouth daily with food        busPIRone 10 MG tablet    BUSPAR     Take 1 tablet by mouth 2 times daily        clonazePAM 1 MG tablet    klonoPIN     Take 1 tablet by mouth 4 times daily Becky Olmstead CNP        clopidogrel 75 MG tablet    PLAVIX     Take 75 mg by mouth daily        CVS POTASSIUM GLUCONATE 2 MEQ Tabs   Generic drug:  Potassium Gluconate           cyclobenzaprine 10 MG tablet    FLEXERIL     1 tablet 2 times daily as needed for muscle spasms        Diclofenac Sodium 1.5 % Soln      Apply 20 drops to each hand 4 times daily        docusate sodium 50 MG capsule    COLACE     Take 50 mg by mouth daily        lisinopril 40 MG tablet    PRINIVIL/ZESTRIL    90 tablet    TAKE 1 TABLET DAILY    Essential hypertension       metoprolol tartrate 50 MG tablet    LOPRESSOR     Take 1 tablet by mouth 2 times daily        NITROSTAT 0.3 MG sublingual tablet   Generic drug:  nitroGLYcerin     25 tablet    PLACE 1 TABLET UNDER THE TONGUE EVERY 5 MINUTES AS NEEDED FOR CHEST PAIN    Chest pain of uncertain etiology       ondansetron 4 MG tablet    ZOFRAN     Take 4 mg by mouth every 6 hours as needed for nausea        oxyCODONE-acetaminophen 5-325 MG  per tablet   Start taking on:  5/26/2018    PERCOCET    240 tablet    Take 2 tablets by mouth 4 times daily Max acetaminophen dose: 4000mg in 24 hrs    Chronic pain disorder, Chronic bilateral low back pain without sciatica, Chest wall pain, chronic       pantoprazole 40 MG EC tablet    PROTONIX     Take 40 mg by mouth 2 times daily        pravastatin 40 MG tablet    PRAVACHOL    90 tablet    TAKE 1 TABLET AT BEDTIME    Atherosclerosis of coronary artery bypass graft of native heart without angina pectoris       saccharomyces boulardii 250 MG capsule    FLORASTOR     Take 250 mg by mouth 2 times daily        sucralfate 1 GM tablet    CARAFATE    120 tablet    Take 1 tablet (1 g) by mouth 4 times daily Before meals & at bedtime    Chronic gastric ulcer with hemorrhage       VITAMIN D (CHOLECALCIFEROL) PO      Take 5,000 Units by mouth daily        vortioxetine 10 MG tablet    TRINTELLIX/BRINTELLIX

## 2018-04-25 ASSESSMENT — PATIENT HEALTH QUESTIONNAIRE - PHQ9: SUM OF ALL RESPONSES TO PHQ QUESTIONS 1-9: 4

## 2018-05-03 ENCOUNTER — OFFICE VISIT (OUTPATIENT)
Dept: CARDIOLOGY | Facility: OTHER | Age: 63
End: 2018-05-03
Attending: INTERNAL MEDICINE
Payer: COMMERCIAL

## 2018-05-03 VITALS
WEIGHT: 152 LBS | HEIGHT: 66 IN | DIASTOLIC BLOOD PRESSURE: 80 MMHG | HEART RATE: 64 BPM | BODY MASS INDEX: 24.43 KG/M2 | SYSTOLIC BLOOD PRESSURE: 162 MMHG

## 2018-05-03 DIAGNOSIS — M79.18 MYOFASCIAL PAIN: ICD-10-CM

## 2018-05-03 DIAGNOSIS — G47.31 CENTRAL SLEEP APNEA: ICD-10-CM

## 2018-05-03 DIAGNOSIS — Z95.1 S/P CABG X 3: ICD-10-CM

## 2018-05-03 DIAGNOSIS — I77.1 SUBCLAVIAN ARTERY STENOSIS, LEFT (H): ICD-10-CM

## 2018-05-03 DIAGNOSIS — G89.29 CHEST WALL PAIN, CHRONIC: Primary | ICD-10-CM

## 2018-05-03 DIAGNOSIS — G89.29 CHRONIC BILATERAL LOW BACK PAIN WITHOUT SCIATICA: ICD-10-CM

## 2018-05-03 DIAGNOSIS — Z71.6 TOBACCO ABUSE COUNSELING: ICD-10-CM

## 2018-05-03 DIAGNOSIS — R07.89 CHEST WALL PAIN, CHRONIC: Primary | ICD-10-CM

## 2018-05-03 DIAGNOSIS — J44.9 CHRONIC OBSTRUCTIVE PULMONARY DISEASE, UNSPECIFIED COPD TYPE (H): ICD-10-CM

## 2018-05-03 DIAGNOSIS — N18.2 CKD (CHRONIC KIDNEY DISEASE) STAGE 2, GFR 60-89 ML/MIN: ICD-10-CM

## 2018-05-03 DIAGNOSIS — I25.10 PRESENCE OF STENT IN CORONARY ARTERY IN PATIENT WITH CORONARY ARTERY DISEASE: ICD-10-CM

## 2018-05-03 DIAGNOSIS — G43.719 INTRACTABLE CHRONIC COMMON MIGRAINE WITHOUT AURA: ICD-10-CM

## 2018-05-03 DIAGNOSIS — Z95.5 PRESENCE OF STENT IN CORONARY ARTERY IN PATIENT WITH CORONARY ARTERY DISEASE: ICD-10-CM

## 2018-05-03 DIAGNOSIS — I10 ESSENTIAL HYPERTENSION: ICD-10-CM

## 2018-05-03 DIAGNOSIS — Z95.1 POSTSURGICAL AORTOCORONARY BYPASS STATUS: ICD-10-CM

## 2018-05-03 DIAGNOSIS — F33.2 SEVERE EPISODE OF RECURRENT MAJOR DEPRESSIVE DISORDER, WITHOUT PSYCHOTIC FEATURES (H): ICD-10-CM

## 2018-05-03 DIAGNOSIS — Z91.199 NONCOMPLIANCE WITH CPAP TREATMENT: ICD-10-CM

## 2018-05-03 DIAGNOSIS — Z87.820 H/O MULTIPLE CONCUSSIONS: ICD-10-CM

## 2018-05-03 DIAGNOSIS — E78.2 MIXED HYPERLIPIDEMIA: ICD-10-CM

## 2018-05-03 DIAGNOSIS — M54.50 CHRONIC BILATERAL LOW BACK PAIN WITHOUT SCIATICA: ICD-10-CM

## 2018-05-03 DIAGNOSIS — F41.9 CHRONIC ANXIETY: ICD-10-CM

## 2018-05-03 DIAGNOSIS — G89.4 CHRONIC PAIN DISORDER: ICD-10-CM

## 2018-05-03 DIAGNOSIS — Z87.891 HISTORY OF TOBACCO ABUSE: ICD-10-CM

## 2018-05-03 PROCEDURE — 99215 OFFICE O/P EST HI 40 MIN: CPT | Performed by: INTERNAL MEDICINE

## 2018-05-03 ASSESSMENT — ANXIETY QUESTIONNAIRES
2. NOT BEING ABLE TO STOP OR CONTROL WORRYING: NOT AT ALL
7. FEELING AFRAID AS IF SOMETHING AWFUL MIGHT HAPPEN: NOT AT ALL
5. BEING SO RESTLESS THAT IT IS HARD TO SIT STILL: NOT AT ALL
GAD7 TOTAL SCORE: 0
6. BECOMING EASILY ANNOYED OR IRRITABLE: NOT AT ALL
3. WORRYING TOO MUCH ABOUT DIFFERENT THINGS: NOT AT ALL
1. FEELING NERVOUS, ANXIOUS, OR ON EDGE: NOT AT ALL
IF YOU CHECKED OFF ANY PROBLEMS ON THIS QUESTIONNAIRE, HOW DIFFICULT HAVE THESE PROBLEMS MADE IT FOR YOU TO DO YOUR WORK, TAKE CARE OF THINGS AT HOME, OR GET ALONG WITH OTHER PEOPLE: NOT DIFFICULT AT ALL

## 2018-05-03 ASSESSMENT — PAIN SCALES - GENERAL: PAINLEVEL: NO PAIN (0)

## 2018-05-03 ASSESSMENT — PATIENT HEALTH QUESTIONNAIRE - PHQ9: 5. POOR APPETITE OR OVEREATING: NOT AT ALL

## 2018-05-03 NOTE — NURSING NOTE
Pt presents today for follow up.  She would like to discuss causes of stress, heart attacks, and medications.    Kathleen Lima RN............................ 5/3/2018 10:59 AM

## 2018-05-03 NOTE — MR AVS SNAPSHOT
After Visit Summary   5/3/2018    Ankita Day    MRN: [id number]           Patient Information     Date Of Birth          1955        Visit Information        Provider Department      5/3/2018 11:15 AM Paul Gramajo,  [facility]        Today's Diagnoses     Chest wall pain, chronic    -  1    Chronic bilateral low back pain without sciatica        Myofascial pain        Intractable chronic common migraine without aura        Chronic obstructive pulmonary disease, unspecified COPD type (H)        Mixed hyperlipidemia        Essential hypertension        Subclavian artery stenosis, left (H)        Postsurgical aortocoronary bypass status        Presence of stent in coronary artery in patient with coronary artery disease        CKD (chronic kidney disease) stage 2, GFR 60-89 ml/min        Severe episode of recurrent major depressive disorder, without psychotic features (H)        Noncompliance with CPAP treatment        Central sleep apnea        Chronic anxiety        Chronic pain disorder        History of tobacco abuse        S/P CABG x 3        H/O multiple concussions        Tobacco abuse counseling           Follow-ups after your visit        Follow-up notes from your care team     Return in about 1 year (around 5/3/2019) for Routine Visit.      Who to contact     If you have questions or need follow up information about today's clinic visit or your schedule please contact [facility] [place] AND \[facility]\"" directly at [phone].  Normal or non-critical lab and imaging results will be communicated to you by MyChart, letter or phone within 4 business days after the clinic has received the results. If you do not hear from us within 7 days, please contact the clinic through MyChart or phone. If you have a critical or abnormal lab result, we will notify you by phone as soon as possible.  Submit refill requests through Ciafo or call your pharmacy and they will  "forward the refill request to us. Please allow 3 business days for your refill to be completed.          Additional Information About Your Visit        Videon Centralhart Information     [employer] gives you secure access to your electronic health record. If you see a primary care provider, you can also send messages to your care team and make appointments. If you have questions, please call your primary care clinic.  If you do not have a primary care provider, please call [phone] and they will assist you.        Care EveryWhere ID     This is your Care EveryWhere ID. This could be used by other organizations to access your [place] medical records  [id number]        Your Vitals Were     Pulse Height BMI (Body Mass Index)             64 1.676 m (5' 6\") 24.53 kg/m2          Blood Pressure from Last 3 Encounters:   05/03/18 162/80   04/24/18 124/68   04/19/18 124/82    Weight from Last 3 Encounters:   05/03/18 68.9 kg (152 lb)   04/19/18 74.8 kg (165 lb)   03/15/18 73.9 kg (163 lb)              Today, you had the following     No orders found for display       Primary Care Provider Office Phone # Fax #    Ramirez Cano [phone] [address] [facility] [address]        Equal Access to Services     [facility]: Hadii aad ku hadasho Soomaali, waaxda luqadaha, qaybta kaalmada adeegyada, kacie albertoin hayagatan jackie regalado . So [facility] [phone].    ATENCIÓN: Si habla español, tiene a siddiqui disposición servicios gratuitos de asistencia lingüística. Llame al [phone].    We comply with applicable federal civil rights laws and [place] laws. We do not discriminate on the basis of race, color, national origin, age, disability, sex, sexual orientation, or gender identity.            Thank you!     Thank you for choosing [facility] AND [facility]  for your care. Our goal is always to provide you with excellent care. Hearing back from our patients is one way we can continue to " improve our services. Please take a few minutes to complete the written survey that you may receive in the mail after your visit with us. Thank you!             Your Updated Medication List - Protect others around you: Learn how to safely use, store and throw away your medicines at www.disposemymeds.org.          This list is accurate as of 5/3/18 12:47 PM.  Always use your most recent med list.                   Brand Name Dispense Instructions for use Diagnosis    ADVIL PO      Take 600 mg by mouth every 6 hours as needed for moderate pain        amLODIPine 10 MG tablet    NORVASC     Take 10 mg by mouth daily        aspirin EC 81 MG EC tablet      Take 81 mg by mouth daily with food        busPIRone 10 MG tablet    BUSPAR     Take 1 tablet by mouth 2 times daily        clonazePAM 1 MG tablet    klonoPIN     Take 1 tablet by mouth 4 times daily Becky TRAY Olmstead        clopidogrel 75 MG tablet    PLAVIX     Take 75 mg by mouth daily        CVS POTASSIUM GLUCONATE 2 MEQ Tabs   Generic drug:  Potassium Gluconate           cyclobenzaprine 10 MG tablet    FLEXERIL     1 tablet 2 times daily as needed for muscle spasms        Diclofenac Sodium 1.5 % Soln      Apply 20 drops to each hand 4 times daily        docusate sodium 50 MG capsule    COLACE     Take 50 mg by mouth daily        lisinopril 40 MG tablet    PRINIVIL/ZESTRIL    90 tablet    TAKE 1 TABLET DAILY    Essential hypertension       metoprolol tartrate 50 MG tablet    LOPRESSOR     Take 1 tablet by mouth 2 times daily        NITROSTAT 0.3 MG sublingual tablet   Generic drug:  nitroGLYcerin     25 tablet    PLACE 1 TABLET UNDER THE TONGUE EVERY 5 MINUTES AS NEEDED FOR CHEST PAIN    Chest pain of uncertain etiology       ondansetron 4 MG tablet    ZOFRAN     Take 4 mg by mouth every 6 hours as needed for nausea        oxyCODONE-acetaminophen 5-325 MG per tablet   Start taking on:  5/26/2018    PERCOCET    240 tablet    Take 2 tablets by mouth 4 times daily Max  acetaminophen dose: 4000mg in 24 hrs    Chronic pain disorder, Chronic bilateral low back pain without sciatica, Chest wall pain, chronic       pantoprazole 40 MG EC tablet    PROTONIX     Take 40 mg by mouth 2 times daily        pravastatin 40 MG tablet    PRAVACHOL    90 tablet    TAKE 1 TABLET AT BEDTIME    Atherosclerosis of coronary artery bypass graft of native heart without angina pectoris       saccharomyces boulardii 250 MG capsule    FLORASTOR     Take 250 mg by mouth 2 times daily        sucralfate 1 GM tablet    CARAFATE    120 tablet    Take 1 tablet (1 g) by mouth 4 times daily Before meals & at bedtime    Chronic gastric ulcer with hemorrhage       VITAMIN D (CHOLECALCIFEROL) PO      Take 5,000 Units by mouth daily        vortioxetine 10 MG tablet    TRINTELLIX/BRINTELLIX

## 2018-05-03 NOTE — PROGRESS NOTES
Cardiology Progress Note     Assessment & Plan   Ankita Day is a 63 year old female who is being seen in follow-up to visit from 9/20/17.  She was having chest pain and ultimately went on to have a cardiac catheterization 9/15/17 which was described as having stable disease.  She is here as she has chronic pain issues with chest wall pain, back pain, headaches, and has been on narcotics and NSAIDs with a gastric ulcer.  She has been seeing a therapist who diagnosed her with PTSD and has suggested cannabis in place of NSAIDs and narcotics.  She is also under a lot of stress and anxiety with family dynamics.  She is looking for insight into usage of cannabis medically for multiple ailments.    Impression:   1. Coronary artery disease with history of CABG ×3 here for follow-up on a cardiac catheterization completed on 9/11/17 with findings of stable disease.  2. Obstructive sleep apnea.  3. History of tobacco abuse quitting 8/23/17.  4. Left-sided subclavian steal syndrome status post stenting with patency as noted on 9/15/17.  5. Claudication.  6. Hypertension.  7. Hyperlipidemia.  8. COPD.  9. Anxiety.  10. Chest wall pain.  11.  Chronic kidney disease stage III.  12.  Depression.  13.  History of alcohol abuse.  14.  History of inconsistent marijuana usage in her teen years with rare usage presently.    Plan:  1.  She was requesting my opinion on medical marijuana.  As explained below, at this point I think its usage would be in select situations where its usage may be beneficial.  I cannot advocate its usage at this point until it is legalized federally.  2.  There is an increased risk for heart disease, stroke, A. fib, and vasculitis with its usage in which I would not be comfortable with its usage as she does have a history of heart disease.  3.  We did review some medical literature about risks with marijuana usage.  4.  She will be seen in the future as planned.        Paul Gramajo    Interval  History   Kacy is a 63-year-old female who has been requested to be seen as she is considering using medical marijuana.    She was previously a patient of Dr. Singh and reportedly has a significant history of coronary artery disease with CABG on 2/12/2003 ×3, multiple stent placement report at 16, and 3 previous heart attacks.  She has a significant history of tobacco abuse, sleep apnea, substantial anxiety/depression, chronic ischemic heart disease, COPD, hypokalemia, hypertension and a history of a left subclavian steal syndrome involving the LIMA status post stent placement.    She describes considerable stress.  She apparently has a good relationship with her  but not her 's family.  They live just a short distance from his parents.  However, the do not approve of her and most of his family has cut communication with her.  She feels that they rejecting of her and have cut communication.  Frequently, this has hurt her.  As of now, she has pulled away from his family and is refusing to communicate or interact with him.  She has significant anxiety/depression.      She describes various amounts of pain.  She is requesting to get off narcotics and NSAIDs. She is slowly tapering off of these medications.  She does have a history of alcohol abuse in which she self medicates as her dad and her mother had a very cyndi relationship growing up with abuse.  She started drinking at age 11 and drank into her 20s.  She has used marijuana in the past.    She states she has been diagnosed with PTSD.  Her therapist has recommended medical marijuana.  I believe she has applied to use it.  However, she is getting multiple opinions regarding its usage.  Most of the response has been positive.    Today, we reviewed up-to-date which describes many different studies on marijuana usage.  They describe cardiac concerns with marijuana usage.  To summarize briefly, the risk of myocardial infarction goes up, stroke is  increased, atrial fibrillation risk increases, and arthritis involving the limbs also increases.  Often, this is consumed by younger age groups which can result in increased risk of bipolar, schizophrenia, and anti-social personality, OCD, and ADHD.   Those that consume marijuana are at a higher risk for failing grades, nonparticipation, dislikes school use tobacco/cigarettes, and alcohol.  Those at risk for abuse are of lower income, tend to be unmarried, higher risk for legal issues, and of color.  And it goes on from there.    Based on her substantial heart history, and potential side effects to her heart, I cannot advocate is usage.  But the ultimate decision was left with her.    Physical Exam       BP: 162/80 Pulse: 64            Vitals:    05/03/18 1100   Weight: 68.9 kg (152 lb)     Vital Signs with Ranges  Pulse:  [64] 64  BP: (162)/(80) 162/80  ROS is negative except that which was note in the HPI.    Arterial Sheath  (Active)   Number of days:230       Right Groin Interventional Procedure Access (Active)   Number of days:230       Constitutional: awake, alert, cooperative, no apparent distress, and appears stated age  Eyes: Lids and lashes normal, pupils equal, sclera clear, conjunctiva normal  ENT: Normocephalic, without obvious abnormality, atraumatic.  Respiratory: No increased work of breathing, good air exchange, clear to auscultation bilaterally, no crackles or wheezing  Cardiovascular: Normal apical impulse, regular rate and rhythm, normal S1 and S2, no S3 or S4, and no murmur noted  GI: No scars, normal bowel sounds, soft.  Musculoskeletal: no lower extremity pitting edema present  Neurologic: Awake, alert, oriented to name, place and time.   Neuropsychiatric: General: normal, calm and normal eye contact    Medications         Data   No results found for this or any previous visit (from the past 24 hour(s)).

## 2018-05-04 ASSESSMENT — ANXIETY QUESTIONNAIRES: GAD7 TOTAL SCORE: 0

## 2018-05-04 ASSESSMENT — PATIENT HEALTH QUESTIONNAIRE - PHQ9: SUM OF ALL RESPONSES TO PHQ QUESTIONS 1-9: 5

## 2018-05-14 ENCOUNTER — TELEPHONE (OUTPATIENT)
Dept: FAMILY MEDICINE | Facility: OTHER | Age: 63
End: 2018-05-14

## 2018-05-14 DIAGNOSIS — H04.123 DRY EYES: Primary | ICD-10-CM

## 2018-05-14 NOTE — TELEPHONE ENCOUNTER
Received pa request for restatsis multiuse eye drops. I dop not show anything under medication list. Please advise.   Nisha Martel on 5/14/2018 at 1:57 PM

## 2018-06-04 RX ORDER — CYCLOSPORINE 0.5 MG/ML
1 EMULSION OPHTHALMIC 2 TIMES DAILY
Qty: 5.5 ML | Refills: 6 | Status: SHIPPED | OUTPATIENT
Start: 2018-06-04 | End: 2018-06-14

## 2018-06-04 NOTE — TELEPHONE ENCOUNTER
Writer received a fax from [facility] in relation to patient's restasis. Per fax:    Patient needs MD to send this instead of under eye doctor in order for her insurance to cover it. *HIGH PRIORITY*    PCP is out of the office the remainder of the week, and patient needs a refill of Rx as requested. Writer will loren up and route Rx request to PCP's teamlet for his consideration/approval in the absence of PCP.    Unable to complete prescription refill per RN Medication Refill Policy. Cruzito Robbins 6/4/2018 8:43 AM

## 2018-06-14 ENCOUNTER — OFFICE VISIT (OUTPATIENT)
Dept: FAMILY MEDICINE | Facility: OTHER | Age: 63
End: 2018-06-14
Attending: PHYSICIAN ASSISTANT
Payer: COMMERCIAL

## 2018-06-14 VITALS
HEART RATE: 68 BPM | WEIGHT: 165 LBS | TEMPERATURE: 98.3 F | SYSTOLIC BLOOD PRESSURE: 130 MMHG | DIASTOLIC BLOOD PRESSURE: 92 MMHG | BODY MASS INDEX: 26.63 KG/M2

## 2018-06-14 DIAGNOSIS — R10.84 ABDOMINAL PAIN, GENERALIZED: ICD-10-CM

## 2018-06-14 DIAGNOSIS — H04.123 DRY EYES: ICD-10-CM

## 2018-06-14 DIAGNOSIS — K59.01 SLOW TRANSIT CONSTIPATION: Primary | ICD-10-CM

## 2018-06-14 DIAGNOSIS — I25.810 ATHEROSCLEROSIS OF CORONARY ARTERY BYPASS GRAFT OF NATIVE HEART WITHOUT ANGINA PECTORIS: ICD-10-CM

## 2018-06-14 DIAGNOSIS — R07.9 CHEST PAIN, UNSPECIFIED TYPE: ICD-10-CM

## 2018-06-14 LAB
ALBUMIN UR-MCNC: NEGATIVE MG/DL
APPEARANCE UR: CLEAR
BILIRUB UR QL STRIP: NEGATIVE
COLOR UR AUTO: YELLOW
GLUCOSE UR STRIP-MCNC: NEGATIVE MG/DL
HGB UR QL STRIP: NEGATIVE
KETONES UR STRIP-MCNC: NEGATIVE MG/DL
LEUKOCYTE ESTERASE UR QL STRIP: ABNORMAL
NITRATE UR QL: NEGATIVE
NON-SQ EPI CELLS #/AREA URNS LPF: NORMAL /LPF
PH UR STRIP: 5.5 PH (ref 5–7)
RBC #/AREA URNS AUTO: NORMAL /HPF
SOURCE: ABNORMAL
SP GR UR STRIP: <1.005 (ref 1–1.03)
UROBILINOGEN UR STRIP-ACNC: 0.2 EU/DL (ref 0.2–1)
WBC #/AREA URNS AUTO: NORMAL /HPF

## 2018-06-14 PROCEDURE — 87086 URINE CULTURE/COLONY COUNT: CPT | Performed by: PHYSICIAN ASSISTANT

## 2018-06-14 PROCEDURE — 81001 URINALYSIS AUTO W/SCOPE: CPT | Performed by: PHYSICIAN ASSISTANT

## 2018-06-14 PROCEDURE — 99214 OFFICE O/P EST MOD 30 MIN: CPT | Performed by: PHYSICIAN ASSISTANT

## 2018-06-14 RX ORDER — CYCLOSPORINE 0.5 MG/ML
1 EMULSION OPHTHALMIC 2 TIMES DAILY
Qty: 5.5 ML | Refills: 6 | Status: SHIPPED | OUTPATIENT
Start: 2018-06-14 | End: 2018-09-25

## 2018-06-14 NOTE — PROGRESS NOTES
Nursing Notes:   Jesi Ma LPN  6/14/2018  1:29 PM  Signed  Patient presents to clinic with back pain and lower abdominal pain.  Leanne Francesca MENCHACAN ...... 6/14/2018 1:11 PM        HPI:    Ankita Day is a 63 year old female who presents for back pain and lower abdominal pain.  Patient has history of chronic pain disorder.  Concerned that she has a bladder infection that has spread into the kidneys.  Hurts after emptying the bladder, in the middle of the night with urination, and in the morning.  Had an accident a few weeks ago when bending in the bathroom - caused back pain.  She has history of having bladder infection that spread to her bloodstream a few years ago.  No hematuria. Pain after emptying bladder.  She would like to rule out bladder infection concerns.  No fevers, chills, nausea, vomiting, pain of her kidneys.  No cough or cold symptoms.  No vaginal symptoms.  No STD concerns.  No abnormal vaginal bleeding.  History of constipation in the past.  Had a bowel movement in the last day.    She needs to have her Restasis eyedrops resent to the pharmacy.  This was sent previously however pharmacy did not receive the prescription.    Having chest pain where she has had to take nitro in the past.  She feels like her heart is skipping a beat.  Racing heart at times.  No pain now.  Chest pressure for a second a few days ago. Last saw Dr. Gramajo on 5/3/2018. Then started medical marijuana a week after seeing him.  Chest pain symptoms started a week after starting medical marijuana. Stopped marijuana use for 5 days.  Last had symptoms 3 days ago. Would like to meet with cardiology. Declines testing today.  Currently not having chest pain, palpitations, problems breathing.  No wheezing or rattling.  Not short of breath with exertion.  No arm pain or jaw pain.  No headaches.  She previously met with Dr. Gramajo in regards to the effects of marijuana on her heart.  At this time she feels like the  marijuana was giving her more side effects so she would not like to take in the future.  The marijuana caused her to feel tired.  She felt like she did not care to do activities of daily living while on the marijuana.  Patient has a large cardiac history in the past.    Past Medical History:   Diagnosis Date     Acute ischemic heart disease (H)     06/15/2007,with PTCA and stenting of 90% osteo circumflex lesion and patent LAD graft, patent left main stent.     Acute myocardial infarction     3/30/2013     Anxiety disorder     No Comments Provided     Atherosclerotic heart disease of native coronary artery without angina pectoris     -angio revealed 3 vessel dz  -4 stents placed; 2 overlapping stents placed in mLAD, 1 stent pRCA, 1 stent pCirc 1 s 9/02 -non-ST elevation MI 1/03  -angio revealed restenosis of Circ.    -PTCA and brachytherapy of pCirc -repeat angio 2/03 -no intervension -CABG x3 12/03 - Dr Sinclair  -LIMA-LAD, RAFI-RCA, SVG-OM -PCI 7/04 stent to L -CTangio 9/05   -patentent LIMA-LAD. RAFI-RCA occluded; RCA ostio/p*     Bilateral carpal tunnel syndrome     No Comments Provided     Cervicalgia     No Comments Provided     Chest pain     12/29/2014     Chronic gastric ulcer without hemorrhage or perforation     10/03/2011,hx of GI bleed (2003)     Chronic ischemic heart disease     06/27/2012     Chronic obstructive pulmonary disease (H)     06/15/2007,low DLCO, normal spirometry     Chronic or unspecified gastric ulcer with hemorrhage     10/2003     Chronic pain syndrome     12/01/2010,chest wall, back     Constipation     11/29/2011     Coronary angioplasty status     09/12/2002,with triple stenting     Coronary angioplasty status     01/10/2003,repeat angioplasty     Coronary angioplasty status     No Comments Provided     Dorsalgia     05/31/2011     Encounter for other administrative examinations     1/28/2014     Encounter for screening for cardiovascular disorders     10/2004,[facility] (2004, 2005,  2006 and 2011)     Enterocolitis due to Clostridium difficile     8/19/2016     Essential (primary) hypertension     No Comments Provided     Hyperlipidemia     No Comments Provided     Major depressive disorder, single episode     Severe, hx of suicide attempt/hospitalization     Migraine without status migrainosus, not intractable     No Comments Provided     Nodular corneal degeneration     09/26/2011     Noninfective gastroenteritis and colitis     06/15/2007,history of     Noninfective gastroenteritis and colitis     Microcytic     Osteoarthritis     No Comments Provided     Other chest pain     10/13/2009,chronic     Pain in knee     05/31/2011     Pain in right shoulder     No Comments Provided     Panic disorder without agoraphobia     No Comments Provided     Peptic ulcer without hemorrhage or perforation     6/15/2007     Peripheral vascular disease (H)     No Comments Provided     Personal history of diseases of the blood and blood-forming organs and certain disorders involving the immune mechanism (CODE)     No Comments Provided     Personal history of nicotine dependence     No Comments Provided     Personal history of other medical treatment (CODE)     10/14/2013     Presence of aortocoronary bypass graft     2/12/2003     Primary central sleep apnea     10/14/2013     Sepsis due to Escherichia coli (E. coli) (H)     7/14/16,[facility]     Stricture of artery (H)     3/31/2013,S/p prox left SCA stent 4/1/2013     Thoracic, thoracolumbar and lumbosacral intervertebral disc disorder     No Comments Provided     Uncomplicated opioid abuse     history of     Vitamin D deficiency     5/6/2013       Past Surgical History:   Procedure Laterality Date     ANGIOPLASTY      9/12/02,with triple stenting     APPENDECTOMY OPEN      No Comments Provided     ARTHROSCOPY KNEE      left     ARTHROSCOPY SHOULDER      right     BYPASS GRAFT ARTERY CORONARY      12/13/02,Triple bypass, left internal mammary  to LAD, right  internal mammary to right coronary artery, saphenous to obtuse marginal of the left circumflex.     COLONOSCOPY      ,Dr Bowman benign polyps     COLONOSCOPY      ,benign polyps, Dr. Bowman     COLONOSCOPY      16,normal, Dr Bowman     ELBOW SURGERY      baby,birth malachi removed from right arm     EMBOLECTOMY UPPER EXTREMITY  2013    brachial artery pseudoaneurysm after stenting     ESOPHAGOSCOPY, GASTROSCOPY, DUODENOSCOPY (EGD), COMBINED      ,EGD Dr Bowman with pyloric ulcer     ESOPHAGOSCOPY, GASTROSCOPY, DUODENOSCOPY (EGD), COMBINED      ,pyloric ulcer, Dr. Bowman     ESOPHAGOSCOPY, GASTROSCOPY, DUODENOSCOPY (EGD), COMBINED      16,mild gastritis, Dr Bowman     ESOPHAGOSCOPY, GASTROSCOPY, DUODENOSCOPY (EGD), COMBINED      2017,Dr Bowman. Antral ulcer     ESOPHAGOSCOPY, GASTROSCOPY, DUODENOSCOPY (EGD), COMBINED  2018    Dr Bowman, healed ulcer     HYSTERECTOMY TOTAL ABDOMINAL      age 21     LAPAROSCOPIC CHOLECYSTECTOMY           OSTEOTOMY FEMUR DISTAL      x3, right knee     OSTEOTOMY FEMUR DISTAL      ,left knee  ligament surgery     OTHER SURGICAL HISTORY      1/10/2003,,PTCA     OTHER SURGICAL HISTORY      2012,,PTCA,DELONTE in LAD and left main     OTHER SURGICAL HISTORY      2013,,PTCA,L subclavian stenosis     SALPINGO-OOPHORECTOMY BILATERAL      age 27,Bilateral salpingo-oophorectomy     TONSILLECTOMY, ADENOIDECTOMY, COMBINED      childhood       Family History   Problem Relation Age of Onset     HEART DISEASE Father      Heart Disease,Heart condition/Significant for atherosclerotic cardiovascular disease, but non premature.     Colon Cancer Father      Cancer-colon, of colon cancer     CANCER Father      Cancer,mets to liver, secondary to colon cancer     HEART DISEASE Mother      Heart Disease     CANCER Other      Cancer,Multiple Myeloma     HEART DISEASE Other      Heart Disease,Ischemic Heart Disease     Colon Cancer Other       Cancer-colon,Malignant neoplasms     CANCER Sister      Cancer,multiple myeloma     Other - See Comments Son      gallstones       Social History     Social History     Marital status:      Spouse name: N/A     Number of children: N/A     Years of education: N/A     Occupational History     Not on file.     Social History Main Topics     Smoking status: Former Smoker     Packs/day: 0.25     Years: 35.00     Types: Cigarettes     Quit date: 2/15/2017     Smokeless tobacco: Never Used     Alcohol use 0.0 oz/week      Comment: Alcoholic Drinks/day: rare     Drug use: No      Comment: Drug use: No     Sexual activity: Not on file     Other Topics Concern     Not on file     Social History Narrative    ,  Steven.  Currently not working outside the home. Lives [place] self [place] met with . Tobacco abuse, quit 2001, restarted. Quit 2012 and has since quit, no alcohol.       Current Outpatient Prescriptions   Medication Sig Dispense Refill     amLODIPine (NORVASC) 10 MG tablet Take 10 mg by mouth daily       aspirin EC 81 MG EC tablet Take 81 mg by mouth daily with food       busPIRone (BUSPAR) 10 MG tablet Take 1 tablet by mouth 2 times daily       clonazePAM (KLONOPIN) 1 MG tablet Take 1 tablet by mouth 4 times daily Becky Olmstead CNP       clopidogrel (PLAVIX) 75 MG tablet Take 75 mg by mouth daily       cyclobenzaprine (FLEXERIL) 10 MG tablet 1 tablet 2 times daily as needed for muscle spasms       cycloSPORINE (RESTASIS MULTIDOSE) 0.05 % ophthalmic emulsion Place 1 drop into both eyes 2 times daily 5.5 mL 6     Diclofenac Sodium 1.5 % SOLN Apply 20 drops to each hand 4 times daily       docusate sodium (COLACE) 50 MG capsule Take 50 mg by mouth daily       Ibuprofen (ADVIL PO) Take 600 mg by mouth every 6 hours as needed for moderate pain       lisinopril (PRINIVIL/ZESTRIL) 40 MG tablet TAKE 1 TABLET DAILY 90 tablet 4     metoprolol tartrate (LOPRESSOR) 50 MG tablet Take 1 tablet by mouth 2  times daily       NITROSTAT 0.3 MG sublingual tablet PLACE 1 TABLET UNDER THE TONGUE EVERY 5 MINUTES AS NEEDED FOR CHEST PAIN 25 tablet 2     ondansetron (ZOFRAN) 4 MG tablet Take 4 mg by mouth every 6 hours as needed for nausea       oxyCODONE-acetaminophen (PERCOCET) 5-325 MG per tablet Take 2 tablets by mouth 4 times daily Max acetaminophen dose: 4000mg in 24 hrs 240 tablet 0     pantoprazole (PROTONIX) 40 MG EC tablet Take 40 mg by mouth 2 times daily       Potassium Gluconate ([employer] POTASSIUM GLUCONATE) 2 MEQ TABS        pravastatin (PRAVACHOL) 40 MG tablet TAKE 1 TABLET AT BEDTIME 90 tablet 2     saccharomyces boulardii (FLORASTOR) 250 MG capsule Take 250 mg by mouth 2 times daily       sucralfate (CARAFATE) 1 GM tablet Take 1 tablet (1 g) by mouth 4 times daily Before meals & at bedtime 120 tablet 0     VITAMIN D, CHOLECALCIFEROL, PO Take 5,000 Units by mouth daily       vortioxetine (TRINTELLIX/BRINTELLIX) 10 MG tablet          Allergies   Allergen Reactions     Atorvastatin Muscle Pain (Myalgia)     Liquid Adhesive Rash     Other reaction(s): Erythema  Silk and plastic gloves (long term attachment of adhesives)     Tiotropium Bromide [Tiotropium] Rash     Ezetimibe Muscle Pain (Myalgia)     Niacin      Other reaction(s): Flushing  flushing     Rosuvastatin Muscle Pain (Myalgia)     Other reaction(s): Myalgia     Latex Rash     No Clinical Screening - See Comments Rash, Blisters and Itching     Metals and plastic  Metals and plastics       Tape [Adhesive Tape] Rash       REVIEW OF SYSTEMS:  Refer to HPI.    EXAM:   Vitals:    BP (!) 130/92 (BP Location: Right arm, Patient Position: Sitting, Cuff Size: Adult Regular)  Pulse 68  Temp 98.3  F (36.8  C)  Wt 165 lb (74.8 kg)  BMI 26.63 kg/m2    General Appearance: Pleasant, alert, appropriate appearance for age. No acute distress  Chest/Respiratory Exam: Normal chest wall and respirations. Clear to auscultation.  Cardiovascular Exam: Regular rate and rhythm.  S1, S2, no murmur, click, gallop, or rubs.  Gastrointestinal Exam: Soft, no masses or organomegaly. Normal BS x 4.  Mild lower abdominal pain with palpation.  No rebound tenderness or guarding appreciated.  No CVA tenderness to palpation.  Skin: no rash or abnormalities  Psychiatric Exam: Alert and oriented - appropriate affect.    PHQ Depression Screen  PHQ-9 SCORE 4/19/2018 4/24/2018 5/3/2018   Total Score 4 4 5       Labs:  Results for orders placed or performed in visit on 06/14/18   Urinalysis w Reflex Microscopic If Positive   Result Value Ref Range    Color Urine Yellow     Appearance Urine Clear     Glucose Urine Negative NEG^Negative mg/dL    Bilirubin Urine Negative NEG^Negative    Ketones Urine Negative NEG^Negative mg/dL    Specific Gravity Urine <1.005 1.003 - 1.035    Blood Urine Negative NEG^Negative    pH Urine 5.5 5.0 - 7.0 pH    Protein Albumin Urine Negative NEG^Negative mg/dL    Urobilinogen Urine 0.2 0.2 - 1.0 EU/dL    Nitrite Urine Negative NEG^Negative    Leukocyte Esterase Urine Trace (A) NEG^Negative    Source Midstream Urine    Urine Microscopic   Result Value Ref Range    WBC Urine 0 - 5 OTO5^0 - 5 /HPF    RBC Urine O - 2 OTO2^O - 2 /HPF    Squamous Epithelial /LPF Urine Few FEW^Few /LPF       ASSESSMENT AND PLAN:    1. Abdominal pain, generalized    2. Dry eyes    3. Chest pain, unspecified type    4. Atherosclerosis of coronary artery bypass graft of native heart without angina pectoris        Abdominal pain:  Patient had an unremarkable urinalysis.  Urine culture is pending.  No acute urinary tract infection concerns at this time.  Symptoms likely due to acute constipation.  Continue taking her stool softeners.  Take twice daily in combination with MiraLAX daily as needed.  Constipation - Encouraged increase of water and apple, pear and prune juice to decrease symptoms and discomfort.  Use age appropriate over the counter medications such as stool softeners or miralax for constipation  relief.  Encouraged soft stools. Return to clinic with change/worsening of symptoms.   Gave warning signs and symptoms.    Sent a prescription for Restasis to the pharmacy.    Chest pain:  Patient declined cardiac evaluation and testing today.  Gave warning signs and symptoms.  Patient has already discontinued the marijuana as this may have been exacerbating her symptoms.  Encouraged to hold off on further use until she meets with cardiology.  Patient would prefer to follow-up with cardiology.  Referred to be seen as soon as possible by cardiology.  Gave warning signs and symptoms.  Return to clinic or emergency room if she develops persistent chest pain, palpitations, problems breathing, chest pressure, headaches, arm pain, jaw pain, nausea, vomiting, new or worsening symptoms.  Patient already has a prescription for nitroglycerin.  Encouraged to use as needed.    Greater than 25 minutes were spent in counseling and coordination of care.     Patient Instructions   Constipation - Encouraged increase of water and apple, pear and prune juice to decrease symptoms and discomfort.  Use age appropriate over the counter medications such as stool softeners or miralax for constipation relief.  Encouraged soft stools. Return to clinic with change/worsening of symptoms.       Constipation (Adult)  Constipation means that you have bowel movements that are less frequent than usual. Stools often become very hard and difficult to pass.  Constipation is very common. At some point in life it affects almost everyone. Since everyone's bowel habits are different, what is constipation to one person may not be to another. Your healthcare provider may do tests to diagnose constipation. It depends on what he or she finds when evaluating you.    Symptoms of constipation include:    Abdominal pain    Bloating    Vomiting    Painful bowel movements    Itching, swelling, bleeding, or pain around the anus  Causes  Constipation can have many  causes. These include:    Diet low in fiber    Too much dairy    Not drinking enough liquids    Lack of exercise or physical activity. This is especially true for older adults.    Changes in lifestyle or daily routine, including pregnancy, aging, work, and travel    Frequent use or misuse of laxatives    Ignoring the urge to have a bowel movement or delaying it until later    Medicines, such as certain prescription pain medicines, iron supplements, antacids, certain antidepressants, and calcium supplements    Diseases like irritable bowel syndrome, bowel obstructions, stroke, diabetes, thyroid disease, Parkinson disease, hemorrhoids, and colon cancer  Complications  Potential complications of constipation can include:    Hemorrhoids    Rectal bleeding from hemorrhoids or anal fissures (skin tears)    Hernias    Dependency on laxatives    Chronic constipation    Fecal impaction    Bowel obstruction or perforation  Home care  All treatment should be done after talking with your healthcare provider. This is especially true if you have another medical problems, are taking prescription medicines, or are an older adult. Treatment most often involves lifestyle changes. You may also need medicines. Your healthcare provider will tell you which will work best for you. Follow the advice below to help avoid this problem in the future.  Lifestyle changes  These lifestyle changes can help prevent constipation:    Diet. Eat a high-fiber diet, with fresh fruit and vegetables, and reduce dairy intake, meats, and processed foods    Fluids. It's important to get enough fluids each day. Drink plenty of water when you eat more fiber. If you are on diet that limits the amount of fluid you can have, talk about this with your healthcare provider.    Regular exercise. Check with your healthcare provider first.  Medicines  Take any medicines as directed. Some laxatives are safe to use only every now and then. Others can be taken on a regular  basis. Talk with your doctor or pharmacist if you have questions.  Prescription pain medicines can cause constipation. If you are taking this kind of medicine, ask your healthcare provider if you should also take a stool softener.  Medicines you may take to treat constipation include:    Fiber supplements    Stool softeners    Laxatives    Enemas    Rectal suppositories  Follow-up care  Follow up with your healthcare provider if symptoms don't get better in the next few days. You may need to have more tests or see a specialist.  Call 911  Call 911 if any of these occur:    Trouble breathing    Stiff, rigid abdomen that is severely painful to touch    Confusion    Fainting or loss of consciousness    Rapid heart rate    Chest pain  When to seek medical advice  Call your healthcare provider right away if any of these occur:    Fever of 100.4 F (38 C) or higher, or as directed by your healthcare provider    Failure to resume normal bowel movements    Pain in your abdomen or back gets worse    Nausea or vomiting    Swelling in your abdomen    Blood in the stool    Black, tarry stool    Involuntary weight loss    Weakness  Date Last Reviewed: 12/30/2015 2000-2017 [employer]. [address] [place] [address]. All rights reserved. This information is not intended as a substitute for professional medical care. Always follow your healthcare professional's instructions.        Treating Constipation    Constipation is a common and often uncomfortable problem. Constipation means you have bowel movements fewer than 3 times per week, or strain to pass hard, dry stool. It can last a short time. Or it can be a problem that never seems to go away. The good news is that it can often be treated and controlled.  Eat more fiber  One of the best ways to help treat constipation is to increase your fiber intake. You can do this either through diet or by using fiber supplements. Fiber (in whole grains, fruits, and  vegetables) adds bulk and absorbs water to soften the stool. This helps the stool pass through the colon more easily. When you increase your fiber intake, do it slowly to avoid side effects such as bloating. Also increase the amount of water that you drink. Eating more of the following foods can add fiber to your diet.    High-fiber cereals    Whole grains, bran, and brown rice    Vegetables such as carrots, broccoli, and greens    Fresh fruits (especially apples, pears, and dried fruits like raisins and apricots)    Nuts and legumes (especially beans such as lentils, kidney beans, and lima beans)  Get physically active  Exercise helps improve the working of your colon which helps ease constipation. Try to get some physical activity every day. If you haven t been active for a while, talk to your healthcare provider before starting again.  Laxatives  Your healthcare provider may suggest an over-the-counter product to help ease your constipation. He or she may suggest the use of bulk-forming agents or laxatives. The use of laxatives, if used as directed, is common and safe. Follow directions carefully when using them. See your healthcare provider for new-onset constipation, or long-term constipation, to rule out other causes such as medicines or thyroid disease.  Date Last Reviewed: 7/1/2016 2000-2017 [employer]. [address] [place] [address], [place] [address]. All rights reserved. This information is not intended as a substitute for professional medical care. Always follow your healthcare professional's instructions.           Inga Smith PA-C..................6/14/2018 1:24 PM

## 2018-06-14 NOTE — NURSING NOTE
Patient presents to clinic with back pain and lower abdominal pain.  Leanne Ma LPN ...... 6/14/2018 1:11 PM

## 2018-06-14 NOTE — MR AVS SNAPSHOT
After Visit Summary   6/14/2018    Ankita Day    MRN: [id number]           Patient Information     Date Of Birth          1955        Visit Information        Provider Department      6/14/2018 1:00 PM Inga Smith PA-C [facility]        Today's Diagnoses     Abdominal pain, generalized    -  1    Dry eyes        Chest pain, unspecified type        Atherosclerosis of coronary artery bypass graft of native heart without angina pectoris          Care Instructions    Constipation - Encouraged increase of water and apple, pear and prune juice to decrease symptoms and discomfort.  Use age appropriate over the counter medications such as stool softeners or miralax for constipation relief.  Encouraged soft stools. Return to clinic with change/worsening of symptoms.       Constipation (Adult)  Constipation means that you have bowel movements that are less frequent than usual. Stools often become very hard and difficult to pass.  Constipation is very common. At some point in life it affects almost everyone. Since everyone's bowel habits are different, what is constipation to one person may not be to another. Your healthcare provider may do tests to diagnose constipation. It depends on what he or she finds when evaluating you.    Symptoms of constipation include:    Abdominal pain    Bloating    Vomiting    Painful bowel movements    Itching, swelling, bleeding, or pain around the anus  Causes  Constipation can have many causes. These include:    Diet low in fiber    Too much dairy    Not drinking enough liquids    Lack of exercise or physical activity. This is especially true for older adults.    Changes in lifestyle or daily routine, including pregnancy, aging, work, and travel    Frequent use or misuse of laxatives    Ignoring the urge to have a bowel movement or delaying it until later    Medicines, such as certain prescription pain medicines, iron supplements,  antacids, certain antidepressants, and calcium supplements    Diseases like irritable bowel syndrome, bowel obstructions, stroke, diabetes, thyroid disease, Parkinson disease, hemorrhoids, and colon cancer  Complications  Potential complications of constipation can include:    Hemorrhoids    Rectal bleeding from hemorrhoids or anal fissures (skin tears)    Hernias    Dependency on laxatives    Chronic constipation    Fecal impaction    Bowel obstruction or perforation  Home care  All treatment should be done after talking with your healthcare provider. This is especially true if you have another medical problems, are taking prescription medicines, or are an older adult. Treatment most often involves lifestyle changes. You may also need medicines. Your healthcare provider will tell you which will work best for you. Follow the advice below to help avoid this problem in the future.  Lifestyle changes  These lifestyle changes can help prevent constipation:    Diet. Eat a high-fiber diet, with fresh fruit and vegetables, and reduce dairy intake, meats, and processed foods    Fluids. It's important to get enough fluids each day. Drink plenty of water when you eat more fiber. If you are on diet that limits the amount of fluid you can have, talk about this with your healthcare provider.    Regular exercise. Check with your healthcare provider first.  Medicines  Take any medicines as directed. Some laxatives are safe to use only every now and then. Others can be taken on a regular basis. Talk with your doctor or pharmacist if you have questions.  Prescription pain medicines can cause constipation. If you are taking this kind of medicine, ask your healthcare provider if you should also take a stool softener.  Medicines you may take to treat constipation include:    Fiber supplements    Stool softeners    Laxatives    Enemas    Rectal suppositories  Follow-up care  Follow up with your healthcare provider if symptoms don't get  better in the next few days. You may need to have more tests or see a specialist.  Call 911  Call 911 if any of these occur:    Trouble breathing    Stiff, rigid abdomen that is severely painful to touch    Confusion    Fainting or loss of consciousness    Rapid heart rate    Chest pain  When to seek medical advice  Call your healthcare provider right away if any of these occur:    Fever of 100.4 F (38 C) or higher, or as directed by your healthcare provider    Failure to resume normal bowel movements    Pain in your abdomen or back gets worse    Nausea or vomiting    Swelling in your abdomen    Blood in the stool    Black, tarry stool    Involuntary weight loss    Weakness  Date Last Reviewed: 12/30/2015 2000-2017 [employer]. [address] [place] [address], [place] [address]. All rights reserved. This information is not intended as a substitute for professional medical care. Always follow your healthcare professional's instructions.        Treating Constipation    Constipation is a common and often uncomfortable problem. Constipation means you have bowel movements fewer than 3 times per week, or strain to pass hard, dry stool. It can last a short time. Or it can be a problem that never seems to go away. The good news is that it can often be treated and controlled.  Eat more fiber  One of the best ways to help treat constipation is to increase your fiber intake. You can do this either through diet or by using fiber supplements. Fiber (in whole grains, fruits, and vegetables) adds bulk and absorbs water to soften the stool. This helps the stool pass through the colon more easily. When you increase your fiber intake, do it slowly to avoid side effects such as bloating. Also increase the amount of water that you drink. Eating more of the following foods can add fiber to your diet.    High-fiber cereals    Whole grains, bran, and brown rice    Vegetables such as carrots, broccoli, and greens    Fresh fruits  (especially apples, pears, and dried fruits like raisins and apricots)    Nuts and legumes (especially beans such as lentils, kidney beans, and lima beans)  Get physically active  Exercise helps improve the working of your colon which helps ease constipation. Try to get some physical activity every day. If you haven t been active for a while, talk to your healthcare provider before starting again.  Laxatives  Your healthcare provider may suggest an over-the-counter product to help ease your constipation. He or she may suggest the use of bulk-forming agents or laxatives. The use of laxatives, if used as directed, is common and safe. Follow directions carefully when using them. See your healthcare provider for new-onset constipation, or long-term constipation, to rule out other causes such as medicines or thyroid disease.  Date Last Reviewed: 7/1/2016 2000-2017 [employer]. [address] [place] [address]. All rights reserved. This information is not intended as a substitute for professional medical care. Always follow your healthcare professional's instructions.                Follow-ups after your visit        Additional Services     CARDIOLOGY EVAL ADULT REFERRAL       Preferred location:  [facility]  [phone] [email or web address]    Juanita    Please be aware that coverage of these services is subject to the terms and limitations of your health insurance plan.  Call member services at your health plan with any benefit or coverage questions.      Please bring the following to your appointment:  Any x-rays, CTs or MRIs which have been performed. Contact the facility where they were done to arrange for  prior to your scheduled appointment.    List of current medications  This referral request   Any documents/labs given to you for this referral                  Follow-up notes from your care team     Return if symptoms worsen  or fail to improve.      Your next 10 appointments already scheduled     Jun 25, 2018 10:30 AM CDT   Office Visit with Ramirez Cano MD   [facility]    [address]  [place] [address] [phone]           Bring a current list of meds and any records pertaining to this visit. For Physicals, please bring immunization records and any forms needing to be filled out. Please arrive 10 minutes early to complete paperwork.              Who to contact     If you have questions or need follow up information about today's clinic visit or your schedule please contact [facility] directly at [phone].  Normal or non-critical lab and imaging results will be communicated to you by [employer], letter or phone within 4 business days after the clinic has received the results. If you do not hear from us within 7 days, please contact the clinic through Socialbombt or phone. If you have a critical or abnormal lab result, we will notify you by phone as soon as possible.  Submit refill requests through Hotelicopter or call your pharmacy and they will forward the refill request to us. Please allow 3 business days for your refill to be completed.          Additional Information About Your Visit        [employer] Information     [employer] gives you secure access to your electronic health record. If you see a primary care provider, you can also send messages to your care team and make appointments. If you have questions, please call your primary care clinic.  If you do not have a primary care provider, please call [phone] and they will assist you.        Care EveryWhere ID     This is your Care EveryWhere ID. This could be used by other organizations to access your [place] medical records  [id number]        Your Vitals Were     Pulse Temperature BMI (Body Mass Index)             68 98.3  F (36.8  C) 26.63 kg/m2          Blood Pressure from Last 3 Encounters:   06/14/18  (!) 130/92   05/03/18 162/80   04/24/18 124/68    Weight from Last 3 Encounters:   06/14/18 165 lb (74.8 kg)   05/03/18 152 lb (68.9 kg)   04/19/18 165 lb (74.8 kg)              We Performed the Following     CARDIOLOGY EVAL ADULT REFERRAL     Urinalysis w Reflex Microscopic If Positive     Urine Microscopic          Where to get your medicines      These medications were sent to [facility] - [place], [place] - [address]  [address], [facility] [address]     Phone:  [phone]     cycloSPORINE 0.05 % ophthalmic emulsion          Primary Care Provider Office Phone # Fax #    Ramirez Cano [phone] [address]  [facility] [address]        Equal Access to Services     [facility] FRANCISCO : Hadii aad osl hadasho Soomaali, waaxda luqadaha, qaybta kaalmada dunia, kacie regalado . So [facility] [phone].    ATENCIÓN: Si habla español, tiene a siddiqui disposición servicios gratuitos de asistencia lingüística. Jose Robertoame al [phone].    We comply with applicable federal civil rights laws and [place] laws. We do not discriminate on the basis of race, color, national origin, age, disability, sex, sexual orientation, or gender identity.            Thank you!     Thank you for choosing [facility]  for your care. Our goal is always to provide you with excellent care. Hearing back from our patients is one way we can continue to improve our services. Please take a few minutes to complete the written survey that you may receive in the mail after your visit with us. Thank you!             Your Updated Medication List - Protect others around you: Learn how to safely use, store and throw away your medicines at www.disposemymeds.org.          This list is accurate as of 6/14/18  1:46 PM.  Always use your most recent med list.                   Brand Name Dispense Instructions for use Diagnosis    ADVIL PO      Take  600 mg by mouth every 6 hours as needed for moderate pain        amLODIPine 10 MG tablet    NORVASC     Take 10 mg by mouth daily        aspirin 81 MG EC tablet      Take 81 mg by mouth daily with food        busPIRone 10 MG tablet    BUSPAR     Take 1 tablet by mouth 2 times daily        clonazePAM 1 MG tablet    klonoPIN     Take 1 tablet by mouth 4 times daily Becky Olmstead CNP        clopidogrel 75 MG tablet    PLAVIX     Take 75 mg by mouth daily        CVS POTASSIUM GLUCONATE 2 MEQ Tabs   Generic drug:  Potassium Gluconate           cyclobenzaprine 10 MG tablet    FLEXERIL     1 tablet 2 times daily as needed for muscle spasms        cycloSPORINE 0.05 % ophthalmic emulsion    RESTASIS MULTIDOSE    5.5 mL    Place 1 drop into both eyes 2 times daily    Dry eyes       Diclofenac Sodium 1.5 % Soln      Apply 20 drops to each hand 4 times daily        docusate sodium 50 MG capsule    COLACE     Take 50 mg by mouth daily        lisinopril 40 MG tablet    PRINIVIL/ZESTRIL    90 tablet    TAKE 1 TABLET DAILY    Essential hypertension       metoprolol tartrate 50 MG tablet    LOPRESSOR     Take 1 tablet by mouth 2 times daily        NITROSTAT 0.3 MG sublingual tablet   Generic drug:  nitroGLYcerin     25 tablet    PLACE 1 TABLET UNDER THE TONGUE EVERY 5 MINUTES AS NEEDED FOR CHEST PAIN    Chest pain of uncertain etiology       ondansetron 4 MG tablet    ZOFRAN     Take 4 mg by mouth every 6 hours as needed for nausea        oxyCODONE-acetaminophen 5-325 MG per tablet    PERCOCET    240 tablet    Take 2 tablets by mouth 4 times daily Max acetaminophen dose: 4000mg in 24 hrs    Chronic pain disorder, Chronic bilateral low back pain without sciatica, Chest wall pain, chronic       pantoprazole 40 MG EC tablet    PROTONIX     Take 40 mg by mouth 2 times daily        pravastatin 40 MG tablet    PRAVACHOL    90 tablet    TAKE 1 TABLET AT BEDTIME    Atherosclerosis of coronary artery bypass graft of native heart without angina  pectoris       saccharomyces boulardii 250 MG capsule    FLORASTOR     Take 250 mg by mouth 2 times daily        sucralfate 1 GM tablet    CARAFATE    120 tablet    Take 1 tablet (1 g) by mouth 4 times daily Before meals & at bedtime    Chronic gastric ulcer with hemorrhage       VITAMIN D (CHOLECALCIFEROL) PO      Take 5,000 Units by mouth daily        vortioxetine 10 MG tablet    TRINTELLIX/BRINTELLIX

## 2018-06-16 LAB
BACTERIA SPEC CULT: NO GROWTH
SPECIMEN SOURCE: NORMAL

## 2018-06-18 ENCOUNTER — OFFICE VISIT (OUTPATIENT)
Dept: CARDIOLOGY | Facility: OTHER | Age: 63
End: 2018-06-18
Attending: PHYSICIAN ASSISTANT
Payer: COMMERCIAL

## 2018-06-18 VITALS
WEIGHT: 165 LBS | SYSTOLIC BLOOD PRESSURE: 142 MMHG | BODY MASS INDEX: 26.52 KG/M2 | HEART RATE: 80 BPM | DIASTOLIC BLOOD PRESSURE: 68 MMHG | HEIGHT: 66 IN

## 2018-06-18 DIAGNOSIS — I77.1 SUBCLAVIAN ARTERY STENOSIS, LEFT (H): ICD-10-CM

## 2018-06-18 DIAGNOSIS — I25.9 CHRONIC ISCHEMIC HEART DISEASE: ICD-10-CM

## 2018-06-18 DIAGNOSIS — R42 EPISODIC LIGHTHEADEDNESS: ICD-10-CM

## 2018-06-18 DIAGNOSIS — Z95.1 S/P CABG X 3: ICD-10-CM

## 2018-06-18 DIAGNOSIS — I10 ESSENTIAL HYPERTENSION: ICD-10-CM

## 2018-06-18 DIAGNOSIS — R00.2 INTERMITTENT PALPITATIONS: Primary | ICD-10-CM

## 2018-06-18 DIAGNOSIS — E78.2 MIXED HYPERLIPIDEMIA: ICD-10-CM

## 2018-06-18 DIAGNOSIS — I25.810 ATHEROSCLEROSIS OF CORONARY ARTERY BYPASS GRAFT OF NATIVE HEART WITHOUT ANGINA PECTORIS: ICD-10-CM

## 2018-06-18 LAB
ANION GAP SERPL CALCULATED.3IONS-SCNC: 9 MMOL/L (ref 3–14)
BUN SERPL-MCNC: 16 MG/DL (ref 7–25)
CALCIUM SERPL-MCNC: 9.9 MG/DL (ref 8.6–10.3)
CHLORIDE SERPL-SCNC: 107 MMOL/L (ref 98–107)
CO2 SERPL-SCNC: 25 MMOL/L (ref 21–31)
CREAT SERPL-MCNC: 0.96 MG/DL (ref 0.6–1.2)
ERYTHROCYTE [DISTWIDTH] IN BLOOD BY AUTOMATED COUNT: 13.9 % (ref 10–15)
GFR SERPL CREATININE-BSD FRML MDRD: 59 ML/MIN/1.7M2
GLUCOSE SERPL-MCNC: 90 MG/DL (ref 70–105)
HCT VFR BLD AUTO: 37.8 % (ref 35–47)
HGB BLD-MCNC: 12.9 G/DL (ref 11.7–15.7)
MAGNESIUM SERPL-MCNC: 2.1 MG/DL (ref 1.9–2.7)
MCH RBC QN AUTO: 29.7 PG (ref 26.5–33)
MCHC RBC AUTO-ENTMCNC: 34.1 G/DL (ref 31.5–36.5)
MCV RBC AUTO: 87 FL (ref 78–100)
PLATELET # BLD AUTO: 268 10E9/L (ref 150–450)
POTASSIUM SERPL-SCNC: 3.9 MMOL/L (ref 3.5–5.1)
RBC # BLD AUTO: 4.35 10E12/L (ref 3.8–5.2)
SODIUM SERPL-SCNC: 141 MMOL/L (ref 134–144)
TSH SERPL DL<=0.05 MIU/L-ACNC: 1.16 IU/ML (ref 0.34–5.6)
WBC # BLD AUTO: 7 10E9/L (ref 4–11)

## 2018-06-18 PROCEDURE — 84443 ASSAY THYROID STIM HORMONE: CPT | Performed by: NURSE PRACTITIONER

## 2018-06-18 PROCEDURE — 99213 OFFICE O/P EST LOW 20 MIN: CPT | Performed by: NURSE PRACTITIONER

## 2018-06-18 PROCEDURE — 36415 COLL VENOUS BLD VENIPUNCTURE: CPT | Performed by: NURSE PRACTITIONER

## 2018-06-18 PROCEDURE — 83735 ASSAY OF MAGNESIUM: CPT | Performed by: NURSE PRACTITIONER

## 2018-06-18 PROCEDURE — 85027 COMPLETE CBC AUTOMATED: CPT | Performed by: NURSE PRACTITIONER

## 2018-06-18 PROCEDURE — 80048 BASIC METABOLIC PNL TOTAL CA: CPT | Performed by: NURSE PRACTITIONER

## 2018-06-18 ASSESSMENT — PAIN SCALES - GENERAL: PAINLEVEL: SEVERE PAIN (7)

## 2018-06-18 NOTE — NURSING NOTE
Pt presents today for follow up for unspecified chest pain.  She states this pain is in the middle of her chest that comes and goes.  Her races as well - this comes and goes as well, but the episodes are getting longer.  She states she feels it is stress.  She is under a lot of family stress right now.  She states her BP and pulse have been elevated.  She also has shortness of breath with chest pain.  Has had 1 episode where she needed to take 3 Nitro.  The pain went away.  She didn't seek medical attention at this time.  Kathleen Lima RN............................ 6/18/2018 9:42 AM

## 2018-06-18 NOTE — MR AVS SNAPSHOT
After Visit Summary   6/18/2018    Ankita Day    MRN: [id number]           Patient Information     Date Of Birth          1955        Visit Information        Provider Department      6/18/2018 9:45 AM Juanita Wang APRN CNP [facility]        Today's Diagnoses     Intermittent palpitations    -  1    Atherosclerosis of coronary artery bypass graft of native heart without angina pectoris        Mixed hyperlipidemia        Subclavian artery stenosis, left (H)        Essential hypertension        S/P CABG x 3        Episodic lightheadedness        Chronic ischemic heart disease           Follow-ups after your visit        Follow-up notes from your care team     Return in about 2 weeks (around 7/2/2018).      Your next 10 appointments already scheduled     Jun 25, 2018 10:30 AM CDT   Office Visit with Ramirez Cano MD   [facility]    [address] [facility] [id number] [phone]           Bring a current list of meds and any records pertaining to this visit. For Physicals, please bring immunization records and any forms needing to be filled out. Please arrive 10 minutes early to complete paperwork.              Future tests that were ordered for you today     Open Future Orders        Priority Expected Expires Ordered    Echocardiogram Complete Routine 6/19/2018 6/18/2019 6/18/2018    Holter Monitor 48 hour - Adult Routine 6/19/2018 8/2/2018 6/18/2018            Who to contact     If you have questions or need follow up information about today's clinic visit or your schedule please contact [facility] directly at [phone].  Normal or non-critical lab and imaging results will be communicated to you by MyChart, letter or phone within 4 business days after the clinic has received the results. If you do not hear from us within 7 days, please contact the clinic through MyChart or phone. If you  "have a critical or abnormal lab result, we will notify you by phone as soon as possible.  Submit refill requests through X[employer]ion or call your pharmacy and they will forward the refill request to us. Please allow 3 business days for your refill to be completed.          Additional Information About Your Visit        "[employer]hart Information     [employer] gives you secure access to your electronic health record. If you see a primary care provider, you can also send messages to your care team and make appointments. If you have questions, please call your primary care clinic.  If you do not have a primary care provider, please call [phone] and they will assist you.        Care EveryWhere ID     This is your Care EveryWhere ID. This could be used by other organizations to access your [place] medical records  [id number]        Your Vitals Were     Pulse Height BMI (Body Mass Index)             80 1.676 m (5' 6\") 26.63 kg/m2          Blood Pressure from Last 3 Encounters:   06/18/18 142/68   06/14/18 (!) 130/92   05/03/18 162/80    Weight from Last 3 Encounters:   06/18/18 74.8 kg (165 lb)   06/14/18 74.8 kg (165 lb)   05/03/18 68.9 kg (152 lb)              We Performed the Following     Basic metabolic panel     CBC with platelets     Magnesium     Thyrotropin (TSH) GH          Today's Medication Changes          These changes are accurate as of 6/18/18 11:01 AM.  If you have any questions, ask your nurse or doctor.               Stop taking these medicines if you haven't already. Please contact your care team if you have questions.     sucralfate 1 GM tablet   Commonly known as:  CARAFATE   Stopped by:  Juanita Wang APRN CNP                    Primary Care Provider Office Phone # Fax #    Ramirez Cano [phone] [address] [facility] [address]        Equal Access to Services     [facility] AH: Rashad Pierce, christina hair, korita jose roberto duque, " kacie causeycarson thompson'aan ah. So [facility] [phone].    ATENCIÓN: Si akil hubbard, tiene a siddiqui disposición servicios gratuitos de asistencia lingüística. Aevl milan [phone].    We comply with applicable federal civil rights laws and [place] laws. We do not discriminate on the basis of race, color, national origin, age, disability, sex, sexual orientation, or gender identity.            Thank you!     Thank you for choosing [facility]  for your care. Our goal is always to provide you with excellent care. Hearing back from our patients is one way we can continue to improve our services. Please take a few minutes to complete the written survey that you may receive in the mail after your visit with us. Thank you!             Your Updated Medication List - Protect others around you: Learn how to safely use, store and throw away your medicines at www.disposemymeds.org.          This list is accurate as of 6/18/18 11:01 AM.  Always use your most recent med list.                   Brand Name Dispense Instructions for use Diagnosis    ADVIL PO      Take 600 mg by mouth every 6 hours as needed for moderate pain        amLODIPine 10 MG tablet    NORVASC     Take 10 mg by mouth daily        aspirin 81 MG EC tablet      Take 81 mg by mouth daily with food        busPIRone 10 MG tablet    BUSPAR     Take 1 tablet by mouth 2 times daily        clonazePAM 1 MG tablet    klonoPIN     Take 1 tablet by mouth 4 times daily Becky Olmstead CNP        clopidogrel 75 MG tablet    PLAVIX     Take 75 mg by mouth daily        CVS POTASSIUM GLUCONATE 2 MEQ Tabs   Generic drug:  Potassium Gluconate           cyclobenzaprine 10 MG tablet    FLEXERIL     1 tablet 2 times daily as needed for muscle spasms        cycloSPORINE 0.05 % ophthalmic emulsion    RESTASIS MULTIDOSE    5.5 mL    Place 1 drop into both eyes 2 times daily    Dry eyes       Diclofenac Sodium 1.5 % Soln      Apply 20 drops to each hand 4  times daily        docusate sodium 50 MG capsule    COLACE     Take 50 mg by mouth daily        lisinopril 40 MG tablet    PRINIVIL/ZESTRIL    90 tablet    TAKE 1 TABLET DAILY    Essential hypertension       metoprolol tartrate 50 MG tablet    LOPRESSOR     Take 1 tablet by mouth 2 times daily        NITROSTAT 0.3 MG sublingual tablet   Generic drug:  nitroGLYcerin     25 tablet    PLACE 1 TABLET UNDER THE TONGUE EVERY 5 MINUTES AS NEEDED FOR CHEST PAIN    Chest pain of uncertain etiology       ondansetron 4 MG tablet    ZOFRAN     Take 4 mg by mouth every 6 hours as needed for nausea        oxyCODONE-acetaminophen 5-325 MG per tablet    PERCOCET    240 tablet    Take 2 tablets by mouth 4 times daily Max acetaminophen dose: 4000mg in 24 hrs    Chronic pain disorder, Chronic bilateral low back pain without sciatica, Chest wall pain, chronic       pantoprazole 40 MG EC tablet    PROTONIX     Take 40 mg by mouth 2 times daily        pravastatin 40 MG tablet    PRAVACHOL    90 tablet    TAKE 1 TABLET AT BEDTIME    Atherosclerosis of coronary artery bypass graft of native heart without angina pectoris       saccharomyces boulardii 250 MG capsule    FLORASTOR     Take 250 mg by mouth 2 times daily        VITAMIN D (CHOLECALCIFEROL) PO      Take 5,000 Units by mouth daily        vortioxetine 10 MG tablet    TRINTELLIX/BRINTELLIX

## 2018-06-18 NOTE — PROGRESS NOTES
"[facility] HEART [facility]   CARDIOLOGY PROGRESS NOTE    Ankita Day   [id number] [address] [address]      Ramirez Cano     Chief Complaint   Patient presents with     Follow Up For     unspecified chest pain      Assessment & Plan     Ankita Day is a 63 year old female who is being seen in follow-up with complaints of heavy palpitations \"like my heart is going to beat out of my chest\", episodic lightheadedness and occasion chest pressure not related to exertional activity. Symptoms have been present for past 4-6 week. Patient admits that her BP has also be elevated with this. Initially felt symptoms related to increased family stressors over the past few weeks and chronic pain. She was last seen by cardiology on 5/3/18 to review results of angiogram. She was having chest pain and ultimately went on to have a cardiac catheterization 9/15/17 which was described as having stable disease.      Impression:   1. Coronary artery disease with history of CABG ×3, cardiac catheterization completed on 9/11/17 with findings of stable disease.  2. Obstructive sleep apnea.  3. History of tobacco abuse quitting 8/23/17, admits to occasional smoking now.  4. Left-sided subclavian steal syndrome status post stenting with patency as noted on 9/15/17.  5. Claudication.  6. Hypertension.  7. Hyperlipidemia.  8. COPD.  9. Anxiety.  10. Intermittent palpitations  11.  Chronic kidney disease stage III.  12. episodic lightheadedness     Plan:  1.  With reported new palpitations along with chest pressure CORTEZ and episodic lightheadedness, further evaluation of heart function with TTE recommended along with Holter monitor. Extensive ischemic heart disease history.  2.  Tobacco cessation  3. Follow-up to review results of TTE and cardiac monitoring  4. Again reviewed results of recent angiogram with findings of stable disease, no new lesions or obstructing disease.  5. Symptoms likely multifactorial with increased stressors " "and chronic pain.     Interval History      Kacy is a 63-year-old female who has been referred for follow-up with complaints of heavy palpitations \"like my heart is going to beat out of my chest\", episodic lightheadedness and occasion chest pressure not related to exertional activity. Symptoms have been present for past 4-6 week. Patient admits that her BP has also be elevated with this. Initially felt symptoms related to increased family stressors over the past few weeks and chronic pain. She was last seen by cardiology on 5/3/18 to review results of angiogram. She was having chest pain and ultimately went on to have a cardiac catheterization 9/15/17 which was described as having stable disease.      She was previously a patient of Dr. Anguiano's and reportedly has a significant history of coronary artery disease with CABG on 2/12/2003 ×3, multiple stent placement report at 16, and 3 previous heart attacks.  She has a significant history of tobacco abuse, sleep apnea, substantial anxiety/depression, chronic ischemic heart disease, COPD, hypokalemia, hypertension and a history of a left subclavian steal syndrome involving the LIMA status post stent placement.     She describes considerable stress.  She apparently has a good relationship with her  but not her 's family.  They live just a short distance from his parents.  However, the do not approve of her and most of his family has cut communication with her.  She feels that they rejecting of her and have cut communication.  Frequently, this has hurt her.  As of now, she has pulled away from his family and is refusing to communicate or interact with him.  She has significant anxiety/depression.               PAST MEDICAL HISTORY:   Past Medical History:   Diagnosis Date     Acute ischemic heart disease (H)     06/15/2007,with PTCA and stenting of 90% osteo circumflex lesion and patent LAD graft, patent left main stent.     Acute myocardial infarction  "    3/30/2013     Anxiety disorder     No Comments Provided     Atherosclerotic heart disease of native coronary artery without angina pectoris     -angio revealed 3 vessel dz  -4 stents placed; 2 overlapping stents placed in mLAD, 1 stent pRCA, 1 stent pCirc 1 s 9/02 -non-ST elevation MI 1/03  -angio revealed restenosis of Circ.    -PTCA and brachytherapy of pCirc -repeat angio 2/03 -no intervension -CABG x3 12/03 - Dr Sinclair  -LIMA-LAD, RAFI-RCA, SVG-OM -PCI 7/04 stent to L -CTangio 9/05   -patentent LIMA-LAD. RAFI-RCA occluded; RCA ostio/p*     Bilateral carpal tunnel syndrome     No Comments Provided     Cervicalgia     No Comments Provided     Chest pain     12/29/2014     Chronic gastric ulcer without hemorrhage or perforation     10/03/2011,hx of GI bleed (2003)     Chronic ischemic heart disease     06/27/2012     Chronic obstructive pulmonary disease (H)     06/15/2007,low DLCO, normal spirometry     Chronic or unspecified gastric ulcer with hemorrhage     10/2003     Chronic pain syndrome     12/01/2010,chest wall, back     Constipation     11/29/2011     Coronary angioplasty status     09/12/2002,with triple stenting     Coronary angioplasty status     01/10/2003,repeat angioplasty     Coronary angioplasty status     No Comments Provided     Dorsalgia     05/31/2011     Encounter for other administrative examinations     1/28/2014     Encounter for screening for cardiovascular disorders     10/2004,Cardiolite (2004, 2005, 2006 and 2011)     Enterocolitis due to Clostridium difficile     8/19/2016     Essential (primary) hypertension     No Comments Provided     Hyperlipidemia     No Comments Provided     Major depressive disorder, single episode     Severe, hx of suicide attempt/hospitalization     Migraine without status migrainosus, not intractable     No Comments Provided     Nodular corneal degeneration     09/26/2011     Noninfective gastroenteritis and colitis     06/15/2007,history of      Noninfective gastroenteritis and colitis     Microcytic     Osteoarthritis     No Comments Provided     Other chest pain     10/13/2009,chronic     Pain in knee     2011     Pain in right shoulder     No Comments Provided     Panic disorder without agoraphobia     No Comments Provided     Peptic ulcer without hemorrhage or perforation     6/15/2007     Peripheral vascular disease (H)     No Comments Provided     Personal history of diseases of the blood and blood-forming organs and certain disorders involving the immune mechanism (CODE)     No Comments Provided     Personal history of nicotine dependence     No Comments Provided     Personal history of other medical treatment (CODE)     10/14/2013     Presence of aortocoronary bypass graft     2003     Primary central sleep apnea     10/14/2013     Sepsis due to Escherichia coli (E. coli) (H)     16,[facility]     Stricture of artery (H)     3/31/2013,S/p prox left SCA stent 2013     Thoracic, thoracolumbar and lumbosacral intervertebral disc disorder     No Comments Provided     Uncomplicated opioid abuse     history of     Vitamin D deficiency     2013          FAMILY HISTORY:   Family History   Problem Relation Age of Onset     HEART DISEASE Father      Heart Disease,Heart condition/Significant for atherosclerotic cardiovascular disease, but non premature.     Colon Cancer Father      Cancer-colon, of colon cancer     CANCER Father      Cancer,mets to liver, secondary to colon cancer     HEART DISEASE Mother      Heart Disease     CANCER Other      Cancer,Multiple Myeloma     HEART DISEASE Other      Heart Disease,Ischemic Heart Disease     Colon Cancer Other      Cancer-colon,Malignant neoplasms     CANCER Sister      Cancer,multiple myeloma     Other - See Comments Son      gallstones          PAST SURGICAL HISTORY:   Past Surgical History:   Procedure Laterality Date     ANGIOPLASTY      02,with triple stenting     APPENDECTOMY  OPEN      No Comments Provided     ARTHROSCOPY KNEE      left     ARTHROSCOPY SHOULDER      right     BYPASS GRAFT ARTERY CORONARY      12/13/02,Triple bypass, left internal mammary  to LAD, right internal mammary to right coronary artery, saphenous to obtuse marginal of the left circumflex.     COLONOSCOPY      2011,Dr Bowman benign polyps     COLONOSCOPY      2011,benign polyps, Dr. Bowman     COLONOSCOPY      8/8/16,normal, Dr Bowman     ELBOW SURGERY      baby,birth malachi removed from right arm     EMBOLECTOMY UPPER EXTREMITY  04/02/2013    brachial artery pseudoaneurysm after stenting     ESOPHAGOSCOPY, GASTROSCOPY, DUODENOSCOPY (EGD), COMBINED      2011,EGD Dr Bowman with pyloric ulcer     ESOPHAGOSCOPY, GASTROSCOPY, DUODENOSCOPY (EGD), COMBINED      2011,pyloric ulcer, Dr. Bowman     ESOPHAGOSCOPY, GASTROSCOPY, DUODENOSCOPY (EGD), COMBINED      8/8/16,mild gastritis, Dr Bowman     ESOPHAGOSCOPY, GASTROSCOPY, DUODENOSCOPY (EGD), COMBINED      11/27/2017,Dr Bowman. Antral ulcer     ESOPHAGOSCOPY, GASTROSCOPY, DUODENOSCOPY (EGD), COMBINED  02/02/2018    Dr Bowman, healed ulcer     HYSTERECTOMY TOTAL ABDOMINAL      age 21     LAPAROSCOPIC CHOLECYSTECTOMY      2006     OSTEOTOMY FEMUR DISTAL      x3, right knee     OSTEOTOMY FEMUR DISTAL      2000,left knee  ligament surgery     OTHER SURGICAL HISTORY      1/10/2003,,PTCA     OTHER SURGICAL HISTORY      09/20/2012,,PTCA,DELONTE in LAD and left main     OTHER SURGICAL HISTORY      4/1/2013,,PTCA,L subclavian stenosis     SALPINGO-OOPHORECTOMY BILATERAL      age 27,Bilateral salpingo-oophorectomy     TONSILLECTOMY, ADENOIDECTOMY, COMBINED      childhood          SOCIAL HISTORY:   Social History     Social History     Marital status:      Spouse name: N/A     Number of children: N/A     Years of education: N/A     Social History Main Topics     Smoking status: Former Smoker     Packs/day: 0.25     Years: 35.00     Types: Cigarettes     Quit date: 2/15/2017      Smokeless tobacco: Never Used     Alcohol use 0.0 oz/week      Comment: Alcoholic Drinks/day: rare     Drug use: No      Comment: Drug use: No     Sexual activity: Not Asked     Other Topics Concern     None     Social History Narrative    ,  Steven.  Currently not working outside the home. Lives [place] met with . Tobacco abuse, quit 2001, restarted. Quit 2012 and has since quit, no alcohol.          CURRENT MEDICATIONS:   Current Outpatient Prescriptions   Medication     amLODIPine (NORVASC) 10 MG tablet     aspirin EC 81 MG EC tablet     busPIRone (BUSPAR) 10 MG tablet     clonazePAM (KLONOPIN) 1 MG tablet     clopidogrel (PLAVIX) 75 MG tablet     cyclobenzaprine (FLEXERIL) 10 MG tablet     Diclofenac Sodium 1.5 % SOLN     docusate sodium (COLACE) 50 MG capsule     Ibuprofen (ADVIL PO)     lisinopril (PRINIVIL/ZESTRIL) 40 MG tablet     metoprolol tartrate (LOPRESSOR) 50 MG tablet     NITROSTAT 0.3 MG sublingual tablet     ondansetron (ZOFRAN) 4 MG tablet     oxyCODONE-acetaminophen (PERCOCET) 5-325 MG per tablet     pantoprazole (PROTONIX) 40 MG EC tablet     Potassium Gluconate ([employer] POTASSIUM GLUCONATE) 2 MEQ TABS     pravastatin (PRAVACHOL) 40 MG tablet     saccharomyces boulardii (FLORASTOR) 250 MG capsule     VITAMIN D, CHOLECALCIFEROL, PO     vortioxetine (TRINTELLIX/BRINTELLIX) 10 MG tablet     cycloSPORINE (RESTASIS MULTIDOSE) 0.05 % ophthalmic emulsion     No current facility-administered medications for this visit.           ALLERGIES:   Allergies   Allergen Reactions     Atorvastatin Muscle Pain (Myalgia)     Liquid Adhesive Rash     Other reaction(s): Erythema  Silk and plastic gloves (long term attachment of adhesives)     Tiotropium Bromide [Tiotropium] Rash     Ezetimibe Muscle Pain (Myalgia)     Niacin      Other reaction(s): Flushing  flushing     Rosuvastatin Muscle Pain (Myalgia)     Other reaction(s): Myalgia     Latex Rash     No Clinical Screening - See  "Comments Rash, Blisters and Itching     Metals and plastic  Metals and plastics       Tape [Adhesive Tape] Rash          ROS:   CONSTITUTIONAL: No weight changes, fever, chills, weakness or fatigue.   CARDIOVASCULAR: Positive for intermittent chest pressure associated to palpitations, not related to exertional activity. Positive for intermittent palpitations. No lower extremity edema.   RESPIRATORY: Mild dyspnea upon exertion, no reported cough or sputum production  NEUROLOGICAL: Positive for episodic lightheadedness without syncope, ataxia or weakness.   HEMATOLOGIC: No anemia, bleeding or bruising.   PSYCHIATRIC: Positive for history of depression or anxiety.   ENDOCRINOLOGIC: No reports of sweating, cold or heat intolerance.   SKIN: No reported abnormal rashes or itching.       PHYSICAL EXAM:   /68 (BP Location: Right arm, Patient Position: Sitting, Cuff Size: Adult Large)  Pulse 80  Ht 1.676 m (5' 6\")  Wt 74.8 kg (165 lb)  BMI 26.63 kg/m2  GENERAL: The patient is a well-developed, well-nourished, in no apparent distress. Alert and oriented x3.   HEENT: Head is normocephalic and atraumatic. Eyes are symmetrical with normal visual tracking. Nares appeared normal without nasal drainage. Mucous membranes are moist.   NECK: Supple. No visible JVD.  HEART: Regular rate and rhythm, S1S2 present without murmur, rub or gallop.   LUNGS: Respirations regular and unlabored. Clear to auscultation.   GI: Abdomen is nondistended.   EXTREMITIES: No peripheral edema present.    NEUROLOGIC: Alert and oriented X3. No focal neurologic deficits.   SKIN: No jaundice. No rashes or visible skin lesions present.     LAB RESULTS:   Office Visit on 06/14/2018   Component Date Value Ref Range Status     Color Urine 06/14/2018 Yellow   Final     Appearance Urine 06/14/2018 Clear   Final     Glucose Urine 06/14/2018 Negative  NEG^Negative mg/dL Final     Bilirubin Urine 06/14/2018 Negative  NEG^Negative Final     Ketones Urine " 06/14/2018 Negative  NEG^Negative mg/dL Final     Specific Gravity Urine 06/14/2018 <1.005  1.003 - 1.035 Final     Blood Urine 06/14/2018 Negative  NEG^Negative Final     pH Urine 06/14/2018 5.5  5.0 - 7.0 pH Final     Protein Albumin Urine 06/14/2018 Negative  NEG^Negative mg/dL Final     Urobilinogen Urine 06/14/2018 0.2  0.2 - 1.0 EU/dL Final     Nitrite Urine 06/14/2018 Negative  NEG^Negative Final     Leukocyte Esterase Urine 06/14/2018 Trace* NEG^Negative Final     Source 06/14/2018 Midstream Urine   Final     WBC Urine 06/14/2018 0 - 5  OTO5^0 - 5 /HPF Final     RBC Urine 06/14/2018 O - 2  OTO2^O - 2 /HPF Final     Squamous Epithelial /LPF Urine 06/14/2018 Few  FEW^Few /LPF Final     Specimen Description 06/14/2018 Unspecified Urine   Final     Culture Micro 06/14/2018 No growth   Final   Office Visit on 03/29/2018   Component Date Value Ref Range Status     WBC 03/29/2018 7.5  4.0 - 11.0 10e9/L Final     RBC Count 03/29/2018 4.34  3.8 - 5.2 10e12/L Final     Hemoglobin 03/29/2018 12.8  11.7 - 15.7 g/dL Final     Hematocrit 03/29/2018 37.0  35.0 - 47.0 % Final     MCV 03/29/2018 85  78 - 100 fl Final     MCH 03/29/2018 29.5  26.5 - 33.0 pg Final     MCHC 03/29/2018 34.6  31.5 - 36.5 g/dL Final     RDW 03/29/2018 13.8  10.0 - 15.0 % Final     Platelet Count 03/29/2018 247  150 - 450 10e9/L Final        Juanita Wang

## 2018-06-19 ENCOUNTER — TELEPHONE (OUTPATIENT)
Dept: CARDIOLOGY | Facility: OTHER | Age: 63
End: 2018-06-19

## 2018-06-19 NOTE — TELEPHONE ENCOUNTER
See result note for labs from 6/18/18  Kathleen Lima RN............................ 6/19/2018 9:51 AM

## 2018-06-25 ENCOUNTER — OFFICE VISIT (OUTPATIENT)
Dept: FAMILY MEDICINE | Facility: OTHER | Age: 63
End: 2018-06-25
Attending: FAMILY MEDICINE
Payer: COMMERCIAL

## 2018-06-25 VITALS
BODY MASS INDEX: 26.63 KG/M2 | HEART RATE: 74 BPM | DIASTOLIC BLOOD PRESSURE: 85 MMHG | WEIGHT: 165 LBS | RESPIRATION RATE: 18 BRPM | SYSTOLIC BLOOD PRESSURE: 145 MMHG | OXYGEN SATURATION: 98 %

## 2018-06-25 DIAGNOSIS — G89.29 CHRONIC BILATERAL LOW BACK PAIN WITHOUT SCIATICA: ICD-10-CM

## 2018-06-25 DIAGNOSIS — G89.4 CHRONIC PAIN DISORDER: ICD-10-CM

## 2018-06-25 DIAGNOSIS — R07.89 CHEST WALL PAIN, CHRONIC: ICD-10-CM

## 2018-06-25 DIAGNOSIS — G89.29 CHEST WALL PAIN, CHRONIC: ICD-10-CM

## 2018-06-25 DIAGNOSIS — M54.50 CHRONIC BILATERAL LOW BACK PAIN WITHOUT SCIATICA: ICD-10-CM

## 2018-06-25 PROCEDURE — 99214 OFFICE O/P EST MOD 30 MIN: CPT | Performed by: FAMILY MEDICINE

## 2018-06-25 RX ORDER — GABAPENTIN 400 MG/1
400 CAPSULE ORAL 3 TIMES DAILY
Qty: 90 CAPSULE | Refills: 1 | Status: SHIPPED | OUTPATIENT
Start: 2018-06-25 | End: 2018-07-25

## 2018-06-25 RX ORDER — OXYCODONE AND ACETAMINOPHEN 5; 325 MG/1; MG/1
2 TABLET ORAL 4 TIMES DAILY
Qty: 240 TABLET | Refills: 0 | Status: SHIPPED | OUTPATIENT
Start: 2018-06-25 | End: 2018-07-25

## 2018-06-25 ASSESSMENT — ANXIETY QUESTIONNAIRES
6. BECOMING EASILY ANNOYED OR IRRITABLE: SEVERAL DAYS
2. NOT BEING ABLE TO STOP OR CONTROL WORRYING: SEVERAL DAYS
7. FEELING AFRAID AS IF SOMETHING AWFUL MIGHT HAPPEN: NOT AT ALL
1. FEELING NERVOUS, ANXIOUS, OR ON EDGE: SEVERAL DAYS
5. BEING SO RESTLESS THAT IT IS HARD TO SIT STILL: SEVERAL DAYS
IF YOU CHECKED OFF ANY PROBLEMS ON THIS QUESTIONNAIRE, HOW DIFFICULT HAVE THESE PROBLEMS MADE IT FOR YOU TO DO YOUR WORK, TAKE CARE OF THINGS AT HOME, OR GET ALONG WITH OTHER PEOPLE: SOMEWHAT DIFFICULT
3. WORRYING TOO MUCH ABOUT DIFFERENT THINGS: SEVERAL DAYS
GAD7 TOTAL SCORE: 6

## 2018-06-25 ASSESSMENT — PAIN SCALES - GENERAL: PAINLEVEL: EXTREME PAIN (8)

## 2018-06-25 ASSESSMENT — PATIENT HEALTH QUESTIONNAIRE - PHQ9: 5. POOR APPETITE OR OVEREATING: SEVERAL DAYS

## 2018-06-25 NOTE — MR AVS SNAPSHOT
After Visit Summary   6/25/2018    Ankita Day    MRN: [id number]           Patient Information     Date Of Birth          1955        Visit Information        Provider Department      6/25/2018 10:30 AM Ramirez Cano MD [facility]        Today's Diagnoses     Chronic pain disorder        Chronic bilateral low back pain without sciatica        Chest wall pain, chronic          Care Instructions    Start gabapentin 400 mg nightly for 3 days, then 400 mg twice daily for 3 days, then 400 mg three times daily until follow up appointment   Refilled oxycodone  Follow up in a month           Follow-ups after your visit        Your next 10 appointments already scheduled     Jul 09, 2018  1:30 PM CDT   Ech Complete with [address] ([facility])    [address]  [place] [address] [phone]           1. Please bring or wear a comfortable two-piece outfit. 2. You may eat, drink and take your normal medicines. 3. For any questions that cannot be answered, please contact the ordering physician            Jul 09, 2018  2:30 PM CDT   Holter Monitor with  HOLTER MONITORS,  RESPIRATORY THERAPY [facility] ([facility])    [address]  [place] [address] [phone]              Who to contact     If you have questions or need follow up information about today's clinic visit or your schedule please contact [facility] directly at [phone].  Normal or non-critical lab and imaging results will be communicated to you by MyChart, letter or phone within 4 business days after the clinic has received the results. If you do not hear from us within 7 days, please contact the clinic through [employer] or phone. If you have a critical or abnormal lab result, we will notify you by phone as soon as possible.  Submit refill requests through [employer] or  call your pharmacy and they will forward the refill request to us. Please allow 3 business days for your refill to be completed.          Additional Information About Your Visit        rubberithart Information     [employer] gives you secure access to your electronic health record. If you see a primary care provider, you can also send messages to your care team and make appointments. If you have questions, please call your primary care clinic.  If you do not have a primary care provider, please call [phone] and they will assist you.        Care EveryWhere ID     This is your Care EveryWhere ID. This could be used by other organizations to access your [place] medical records  [id number]        Your Vitals Were     Pulse Respirations Pulse Oximetry Breastfeeding? BMI (Body Mass Index)       74 18 98% No 26.63 kg/m2        Blood Pressure from Last 3 Encounters:   06/25/18 145/85   06/18/18 142/68   06/14/18 (!) 130/92    Weight from Last 3 Encounters:   06/25/18 165 lb (74.8 kg)   06/18/18 165 lb (74.8 kg)   06/14/18 165 lb (74.8 kg)              Today, you had the following     No orders found for display         Today's Medication Changes          These changes are accurate as of 6/25/18 11:21 AM.  If you have any questions, ask your nurse or doctor.               Start taking these medicines.        Dose/Directions    gabapentin 400 MG capsule   Commonly known as:  NEURONTIN   Used for:  Chest wall pain, chronic, Chronic bilateral low back pain without sciatica, Chronic pain disorder   Started by:  Ramirez Cano MD        Dose:  400 mg   Take 1 capsule (400 mg) by mouth 3 times daily   Quantity:  90 capsule   Refills:  1            Where to get your medicines      These medications were sent to [facility] - [place], [place] - [address]  [address] [facility] [address]     Phone:  [phone]     gabapentin 400 MG capsule         Some of these will need a paper  prescription and others can be bought over the counter.  Ask your nurse if you have questions.     Bring a paper prescription for each of these medications     oxyCODONE-acetaminophen 5-325 MG per tablet               Information about OPIOIDS     PRESCRIPTION OPIOIDS: WHAT YOU NEED TO KNOW   We gave you an opioid (narcotic) pain medicine. It is important to manage your pain, but opioids are not always the best choice. You should first try all the other options your care team gave you. Take this medicine for as short a time (and as few doses) as possible.     These medicines have risks:    DO NOT drive when on new or higher doses of pain medicine. These medicines can affect your alertness and reaction times, and you could be arrested for driving under the influence (DUI). If you need to use opioids long-term, talk to your care team about driving.    DO NOT operate heave machinery    DO NOT do any other dangerous activities while taking these medicines.     DO NOT drink any alcohol while taking these medicines.      If the opioid prescribed includes acetaminophen, DO NOT take with any other medicines that contain acetaminophen. Read all labels carefully. Look for the word  acetaminophen  or  Tylenol.  Ask your pharmacist if you have questions or are unsure.    You can get addicted to pain medicines, especially if you have a history of addiction (chemical, alcohol or substance dependence). Talk to your care team about ways to reduce this risk.    Store your pills in a secure place, locked if possible. We will not replace any lost or stolen medicine. If you don t finish your medicine, please throw away (dispose) as directed by your pharmacist. The [place] Pollution Control Agency has more information about safe disposal: https://www.pca.[facility].[place]/living-green/managing-unwanted-medications.     All opioids tend to cause constipation. Drink plenty of water and eat foods that have a lot of fiber, such as fruits,  vegetables, prune juice, apple juice and high-fiber cereal. Take a laxative (Miralax, milk of magnesia, Colace, Senna) if you don t move your bowels at least every other day.          Primary Care Provider Office Phone # Fax #    Ramirez Cano [phone] [address]  [place] [address]        Equal Access to Services     [facility]: Hadii aad ku hadasho Soomaali, waaxda luqadaha, qaybta kaalmada adeegyada, waxay idiin hayaan adeeg kharash la'aan ah. So [facility] [phone].    ATENCIÓN: Si akil hubbard, tiene a siddiqui disposición servicios gratuitos de asistencia lingüística. Llame al [phone].    We comply with applicable federal civil rights laws and [place] laws. We do not discriminate on the basis of race, color, national origin, age, disability, sex, sexual orientation, or gender identity.            Thank you!     Thank you for choosing [facility]  for your care. Our goal is always to provide you with excellent care. Hearing back from our patients is one way we can continue to improve our services. Please take a few minutes to complete the written survey that you may receive in the mail after your visit with us. Thank you!             Your Updated Medication List - Protect others around you: Learn how to safely use, store and throw away your medicines at www.disposemymeds.org.          This list is accurate as of 6/25/18 11:21 AM.  Always use your most recent med list.                   Brand Name Dispense Instructions for use Diagnosis    ADVIL PO      Take 600 mg by mouth every 6 hours as needed for moderate pain        amLODIPine 10 MG tablet    NORVASC     Take 10 mg by mouth daily        aspirin 81 MG EC tablet      Take 81 mg by mouth daily with food        busPIRone 10 MG tablet    BUSPAR     Take 1 tablet by mouth 2 times daily        clonazePAM 1 MG tablet    klonoPIN     Take 1 tablet by mouth 4 times daily Becky Olmstead CNP        clopidogrel 75  MG tablet    PLAVIX     Take 75 mg by mouth daily        CVS POTASSIUM GLUCONATE 2 MEQ Tabs   Generic drug:  Potassium Gluconate           cyclobenzaprine 10 MG tablet    FLEXERIL     1 tablet 2 times daily as needed for muscle spasms        cycloSPORINE 0.05 % ophthalmic emulsion    RESTASIS MULTIDOSE    5.5 mL    Place 1 drop into both eyes 2 times daily    Dry eyes       Diclofenac Sodium 1.5 % Soln      Apply 20 drops to each hand 4 times daily        docusate sodium 50 MG capsule    COLACE     Take 50 mg by mouth daily        gabapentin 400 MG capsule    NEURONTIN    90 capsule    Take 1 capsule (400 mg) by mouth 3 times daily    Chest wall pain, chronic, Chronic bilateral low back pain without sciatica, Chronic pain disorder       lisinopril 40 MG tablet    PRINIVIL/ZESTRIL    90 tablet    TAKE 1 TABLET DAILY    Essential hypertension       metoprolol tartrate 50 MG tablet    LOPRESSOR     Take 1 tablet by mouth 2 times daily        NITROSTAT 0.3 MG sublingual tablet   Generic drug:  nitroGLYcerin     25 tablet    PLACE 1 TABLET UNDER THE TONGUE EVERY 5 MINUTES AS NEEDED FOR CHEST PAIN    Chest pain of uncertain etiology       ondansetron 4 MG tablet    ZOFRAN     Take 4 mg by mouth every 6 hours as needed for nausea        oxyCODONE-acetaminophen 5-325 MG per tablet    PERCOCET    240 tablet    Take 2 tablets by mouth 4 times daily Max acetaminophen dose: 4000mg in 24 hrs    Chronic pain disorder, Chronic bilateral low back pain without sciatica, Chest wall pain, chronic       pantoprazole 40 MG EC tablet    PROTONIX     Take 40 mg by mouth 2 times daily        pravastatin 40 MG tablet    PRAVACHOL    90 tablet    TAKE 1 TABLET AT BEDTIME    Atherosclerosis of coronary artery bypass graft of native heart without angina pectoris       saccharomyces boulardii 250 MG capsule    FLORASTOR     Take 250 mg by mouth 2 times daily        VITAMIN D (CHOLECALCIFEROL) PO      Take 5,000 Units by mouth daily         vortioxetine 10 MG tablet    TRINTELLIX/BRINTELLIX

## 2018-06-25 NOTE — NURSING NOTE
Pt presents to clinic today for low back pain x 3 weeks and ongoing SOB and tightness in chest x 3 months. Pt denies any chest pain.  Jamilah Berger

## 2018-06-25 NOTE — PATIENT INSTRUCTIONS
Start gabapentin 400 mg nightly for 3 days, then 400 mg twice daily for 3 days, then 400 mg three times daily until follow up appointment   Refilled oxycodone  Follow up in a month

## 2018-06-25 NOTE — PROGRESS NOTES
"SUBJECTIVE:  63 year old female presents to follow up on chronic pain in chest wall and back, headaches    Becky Olmstead CNP certified her for medical cannabis for PTSD. She tried it and had too many side effects. Either felt \"hyper or down.\" It did not help pain.  Has tried Lyrica without benefit and it may have contributed to some side effects. Gabapentin caused \"spacy sensation.\" Used to take Cymbalta, but Trintellix is better.    Still has daily headaches. Tried Topamax previously and does not recall benefit.  Did lose weight.  She saw neurology who proposed Botox injections, but her insurance would not cover.    She had NSAID induced gastritis, which improved. Back on aspirin and clopidogrel. Was going to check hemoglobin at this follow up appointment, but had checked last week with cardiology and is normal.    REVIEW OF SYSTEMS:    Constitutional: negative  Respiratory: negative  Cardiovascular: negative    Current Outpatient Prescriptions   Medication Sig Dispense Refill     amLODIPine (NORVASC) 10 MG tablet Take 10 mg by mouth daily       aspirin EC 81 MG EC tablet Take 81 mg by mouth daily with food       busPIRone (BUSPAR) 10 MG tablet Take 1 tablet by mouth 2 times daily       clonazePAM (KLONOPIN) 1 MG tablet Take 1 tablet by mouth 4 times daily Becky Olmstead CNP       clopidogrel (PLAVIX) 75 MG tablet Take 75 mg by mouth daily       cyclobenzaprine (FLEXERIL) 10 MG tablet 1 tablet 2 times daily as needed for muscle spasms       Diclofenac Sodium 1.5 % SOLN Apply 20 drops to each hand 4 times daily       docusate sodium (COLACE) 50 MG capsule Take 50 mg by mouth daily       Ibuprofen (ADVIL PO) Take 600 mg by mouth every 6 hours as needed for moderate pain       lisinopril (PRINIVIL/ZESTRIL) 40 MG tablet TAKE 1 TABLET DAILY 90 tablet 4     metoprolol tartrate (LOPRESSOR) 50 MG tablet Take 1 tablet by mouth 2 times daily       NITROSTAT 0.3 MG sublingual tablet PLACE 1 TABLET UNDER THE TONGUE EVERY 5 MINUTES " AS NEEDED FOR CHEST PAIN 25 tablet 2     ondansetron (ZOFRAN) 4 MG tablet Take 4 mg by mouth every 6 hours as needed for nausea       oxyCODONE-acetaminophen (PERCOCET) 5-325 MG per tablet Take 2 tablets by mouth 4 times daily Max acetaminophen dose: 4000mg in 24 hrs 240 tablet 0     pantoprazole (PROTONIX) 40 MG EC tablet Take 40 mg by mouth 2 times daily       Potassium Gluconate (CVS POTASSIUM GLUCONATE) 2 MEQ TABS        pravastatin (PRAVACHOL) 40 MG tablet TAKE 1 TABLET AT BEDTIME 90 tablet 2     saccharomyces boulardii (FLORASTOR) 250 MG capsule Take 250 mg by mouth 2 times daily       VITAMIN D, CHOLECALCIFEROL, PO Take 5,000 Units by mouth daily       vortioxetine (TRINTELLIX/BRINTELLIX) 10 MG tablet        cycloSPORINE (RESTASIS MULTIDOSE) 0.05 % ophthalmic emulsion Place 1 drop into both eyes 2 times daily (Patient not taking: Reported on 6/25/2018) 5.5 mL 6     Allergies   Allergen Reactions     Atorvastatin Muscle Pain (Myalgia)     Liquid Adhesive Rash     Other reaction(s): Erythema  Silk and plastic gloves (long term attachment of adhesives)     Tiotropium Bromide [Tiotropium] Rash     Ezetimibe Muscle Pain (Myalgia)     Niacin      Other reaction(s): Flushing  flushing     Rosuvastatin Muscle Pain (Myalgia)     Other reaction(s): Myalgia     Latex Rash     No Clinical Screening - See Comments Rash, Blisters and Itching     Metals and plastic  Metals and plastics       Tape [Adhesive Tape] Rash       OBJECTIVE:  /85 (BP Location: Right arm, Patient Position: Chair, Cuff Size: Adult Regular)  Pulse 74  Resp 18  Wt 165 lb (74.8 kg)  SpO2 98%  Breastfeeding? No  BMI 26.63 kg/m2    EXAM:  General Appearance: Pleasant, alert, appropriate appearance for age. No acute distress  PSYCH: mentation appears normal and affect normal    ASSESSMENT/PLAN:    ICD-10-CM    1. Chronic pain disorder G89.4 oxyCODONE-acetaminophen (PERCOCET) 5-325 MG per tablet   2. Chronic bilateral low back pain without  sciatica M54.5 oxyCODONE-acetaminophen (PERCOCET) 5-325 MG per tablet    G89.29    3. Chest wall pain, chronic R07.89 oxyCODONE-acetaminophen (PERCOCET) 5-325 MG per tablet    G89.29      She has tried cessation of opioids, but had reduced function and could not even perform housework.  She has been on higher doses of opioids and had sedative and confused side effects.  Unfortunately medical cannabis did not help her pain and caused side effects.  It would appear that she should continue on the same dosing of oxycodone.  We discussed trying Lyrica or gabapentin again.  Looking back, she was able to tolerate gabapentin until the dose was increased to 1200 mg 3 times daily.  She should be able to take a lower dose with some benefit and hopefully no side effects.  Start on gabapentin working up from 400 mg nightly up to 3 times daily.    Refilled oxycodone at same dosing of 10 mg QID for 40 mg daily. 60 morphine milligram equivalents daily. UDM appropriate 10/31/17.    Follow-up in 1 month.    Greater than 50% of this 25 minute appointment spent on counseling     Ramirez Cano M.D.     This document was prepared using voice generated software.  While every attempt was made for accuracy, grammatical errors may exist.

## 2018-06-26 ASSESSMENT — PATIENT HEALTH QUESTIONNAIRE - PHQ9: SUM OF ALL RESPONSES TO PHQ QUESTIONS 1-9: 9

## 2018-06-26 ASSESSMENT — ANXIETY QUESTIONNAIRES: GAD7 TOTAL SCORE: 6

## 2018-07-09 ENCOUNTER — HOSPITAL ENCOUNTER (OUTPATIENT)
Dept: CARDIOLOGY | Facility: OTHER | Age: 63
Discharge: HOME | End: 2018-07-09
Attending: NURSE PRACTITIONER | Admitting: NURSE PRACTITIONER
Payer: COMMERCIAL

## 2018-07-09 DIAGNOSIS — I25.9 CHRONIC ISCHEMIC HEART DISEASE: ICD-10-CM

## 2018-07-09 DIAGNOSIS — R00.2 INTERMITTENT PALPITATIONS: ICD-10-CM

## 2018-07-09 DIAGNOSIS — I10 ESSENTIAL HYPERTENSION: ICD-10-CM

## 2018-07-09 PROCEDURE — 93306 TTE W/DOPPLER COMPLETE: CPT

## 2018-07-09 PROCEDURE — 93306 TTE W/DOPPLER COMPLETE: CPT | Mod: 26 | Performed by: INTERNAL MEDICINE

## 2018-07-23 NOTE — PROGRESS NOTES
Patient Information     Patient Name  Ankita Day MRN  [id number] Sex  Female   1955      Letter by Ramirez Cano MD at      Author:  Ramirez Cano MD Service:  (none) Author Type:  (none)    Filed:   Encounter Date:  11/15/2017 Status:  (Other)           Ankita Day  [address]  [facility] [address]          November 15, 2017    Dear Ms. Day:    Your labs are included below. Unfortunately the hemoglobin dropped a little bit. It does not seem like low iron or B12 are the cause. You may not be making new blood very efficiently. Let's see what the stomach scope shows and decide what to do next. Keep with the plan developed today and follow up after the stomach scope.    Please contact me if you have any questions.    Sincerely,      Ramirez Cano MD  Reviewed and electronically signed by provider.    Results for orders placed or performed in visit on 11/15/17      CBC W PLT NO DIFF      Result  Value Ref Range    WHITE BLOOD COUNT         6.6 4.5 - 11.0 thou/cu mm    RED BLOOD COUNT           3.87 (L) 4.00 - 5.20 mil/cu mm    HEMOGLOBIN                11.0 (L) 12.0 - 16.0 g/dL    HEMATOCRIT                33.4 33.0 - 51.0 %    MCV                       86 80 - 100 fL    MCH                       28.4 26.0 - 34.0 pg    MCHC                      32.9 32.0 - 36.0 g/dL    RDW                       15.3 11.5 - 15.5 %    PLATELET COUNT            233 140 - 440 thou/cu mm    MPV                       9.8 6.5 - 11.0 fL   IRON PLUS IRON BINDING CAP      Result  Value Ref Range    IRON 166 50 - 212 ug/dL    UIBC (UNSATURATED)  346.12 mg/dL    IRON BINDING CAPACITY, CALCULATED  512.12 (H) 245.00 - 400.00 ug/dL    IRON, % SATURATION  32 20 - 55 %   BASIC METABOLIC PANEL      Result  Value Ref Range    SODIUM 140 133 - 143 mmol/L    POTASSIUM 3.9 3.5 - 5.1 mmol/L    CHLORIDE 107 98 - 107 mmol/L    CO2,TOTAL 22 21 - 31 mmol/L    ANION GAP 11 5 - 18                    GLUCOSE 85 70 - 105 mg/dL     CALCIUM 9.4 8.6 - 10.3 mg/dL    BUN 25 7 - 25 mg/dL    CREATININE 1.00 0.70 - 1.30 mg/dL    BUN/CREAT RATIO           25                    GFR if African American >60 >60 ml/min/1.73m2    GFR if not  56 (L) >60 ml/min/1.73m2   FOLIC ACID      Result  Value Ref Range    FOLIC ACID >24.8 >=5.2 ng/mL   VITAMIN B12      Result  Value Ref Range    VITAMIN B12 300 180 - 914 pg/mL     *Note: Due to a large number of results and/or encounters for the requested time period, some results have not been displayed. A complete set of results can be found in Results Review.

## 2018-07-23 NOTE — PROGRESS NOTES
Patient Information     Patient Name  Ankita Day MRN  [id number] Sex  Female   1955      Letter by Lyssa Saldana MD at      Author:  Lyssa Saldana MD Service:  (none) Author Type:  (none)    Filed:   Date of Service:   Status:  (Other)       [facility]  [address]  [place] [address]  [phone]         Ankita Day   [address]  [facility] [address]      October 3, 2017  Date of Breast Imaging: 10/02/2017 10:29 AM    Dear Ms. Day:    Your recent breast imaging examination showed a finding that requires additional imaging studies for a complete evaluation. Most findings are benign (not cancer). Please call [phone] to schedule an appointment for these tests if you have not already done so.    A report of your results was sent to your health care provider(s).    Your images will become part of your medical file here at [facility] and will be available for your continuing care. You are responsible for informing any new health care provider or breast imaging facility of the date and location of this examination.    Although mammography is the most accurate method for early detection, not all cancers are found through mammography. If you notice any new changes in your breast(s) please inform your health care provider without delay.    Thank you for choosing [facility] to participate in your healthcare needs.       [facility] Recommendations for Early Breast Cancer Detection   in Women without Symptoms  When to start having mammograms to screen for breast cancer, and how often to have them, is a personal decision. It should be based on your preferences, your values and your risk for developing breast cancer. [facility] recommends that you and your health care provider together determine when mammograms are right for you.    [facility] recommends the following  guidelines for women who have an average risk for breast cancer, based on American Cancer Society guidelines:    Age 40 to 44: Mammograms are optional.     Age 45 to 54: Have a mammogram every year.    Age 55 and older: Have a mammogram every year, or transition to having one every 2 years. Continue to have mammograms as long as your health is good.    If you have a higher than average risk for breast cancer, your health care provider may recommend a different schedule.

## 2018-07-23 NOTE — PROGRESS NOTES
Patient Information     Patient Name  Ankita Day MRN  [id number] Sex  Female   1955      Letter by Ramirez Cano MD at      Author:  Ramirez Cano MD Service:  (none) Author Type:  (none)    Filed:   Encounter Date:  2017 Status:  (Other)           Ankita Day  [address]  [facility] [address]          2017    To Whom It May Concern:    Ankita Day was seen today. She has chronic pain and would not do well sitting in court for jury duty. Also, I have concerns about her memory and she will undergo memory testing later this year when it can be scheduled. Please excuse her from jury duty for all of 2017. The ability to serve can be reconsidered for 2018.    Please contact me if you have any questions.    Sincerely,      Ramirez Cano MD

## 2018-07-23 NOTE — PROGRESS NOTES
Patient Information     Patient Name  Ankita Day MRN  [id number] Sex  Female   1955      Letter by Ben Whyte at      Author:  Ben Whyte Service:  (none) Author Type:  (none)    Filed:   Date of Service:   Status:  (Other)       [facility]  [address]  [place] [address]  [phone]         Ankita Day   [address]  [facility] [address]      2017  Date of Breast Imagin2017 12:18 PM    Dear Ms. Day:    Your recent breast imaging examination showed a finding that requires additional imaging studies for a complete evaluation. Most findings are benign (not cancer). Please call [phone] to schedule an appointment for these tests if you have not already done so.    A report of your results was sent to your health care provider(s).    Your images will become part of your medical file here at [facility] and will be available for your continuing care. You are responsible for informing any new health care provider or breast imaging facility of the date and location of this examination.    Although mammography is the most accurate method for early detection, not all cancers are found through mammography. If you notice any new changes in your breast(s) please inform your health care provider without delay.    Thank you for choosing [facility] to participate in your healthcare needs.       [facility] Recommendations for Early Breast Cancer Detection   in Women without Symptoms  When to start having mammograms to screen for breast cancer, and how often to have them, is a personal decision. It should be based on your preferences, your values and your risk for developing breast cancer. [facility] recommends that you and your health care provider together determine when mammograms are right for you.    [facility] recommends the following guidelines for  women who have an average risk for breast cancer, based on American Cancer Society guidelines:    Age 40 to 44: Mammograms are optional.     Age 45 to 54: Have a mammogram every year.    Age 55 and older: Have a mammogram every year, or transition to having one every 2 years. Continue to have mammograms as long as your health is good.    If you have a higher than average risk for breast cancer, your health care provider may recommend a different schedule.

## 2018-07-23 NOTE — PROGRESS NOTES
Patient Information     Patient Name  Ankita Day MRN  [id number] Sex  Female   1955      Letter by Ben Whyte at      Author:  Ben Whyte Service:  (none) Author Type:  (none)    Filed:   Date of Service:   Status:  (Other)       [facility]  [address]  [place] [address]  [phone]         Ankita Day   [address]  [facility] [address]      2017  Date of Breast Imagin2017  2:39 PM    Dear Ms. Day:    Your recent breast imaging examination showed a finding that requires additional imaging studies for a complete evaluation. Most findings are benign (not cancer). Please call [phone] to schedule an appointment for these tests if you have not already done so.    A report of your results was sent to your health care provider(s).    Your images will become part of your medical file here at [facility] and will be available for your continuing care. You are responsible for informing any new health care provider or breast imaging facility of the date and location of this examination.    Although mammography is the most accurate method for early detection, not all cancers are found through mammography. If you notice any new changes in your breast(s) please inform your health care provider without delay.    Thank you for choosing [facility] to participate in your healthcare needs.       [facility] Recommendations for Early Breast Cancer Detection   in Women without Symptoms  When to start having mammograms to screen for breast cancer, and how often to have them, is a personal decision. It should be based on your preferences, your values and your risk for developing breast cancer. [facility] recommends that you and your health care provider together determine when mammograms are right for you.    [facility] recommends the following guidelines for  women who have an average risk for breast cancer, based on American Cancer Society guidelines:    Age 40 to 44: Mammograms are optional.     Age 45 to 54: Have a mammogram every year.    Age 55 and older: Have a mammogram every year, or transition to having one every 2 years. Continue to have mammograms as long as your health is good.    If you have a higher than average risk for breast cancer, your health care provider may recommend a different schedule.

## 2018-07-24 NOTE — PROGRESS NOTES
Patient Information     Patient Name  Ankita Day MRN  [id number] Sex  Female   1955      Letter by Ramirez Cano MD at      Author:  Ramirez Cano MD Service:  (none) Author Type:  (none)    Filed:   Encounter Date:  2017 Status:  (Other)           Ankita Day  [address]  [facility] [address]          2017    Dear Ms. Day:    Your labs are included below. Everything looks good. Normal blood counts, electrolytes, and kidney tests. The cholesterol values are fine, not necessarily all that better than in 2014. Your HDL or good cholesterol is a lot higher, which is protective for your heart. I think it is fine to reduce the Crestor down to 10 mg to see what happens with the muscle aches.     Please contact me if you have any questions.    Sincerely,      Ramirez Cano MD  Reviewed and electronically signed by provider.    Results for orders placed or performed in visit on 17      LIPID PANEL      Result  Value Ref Range    CHOLESTEROL,TOTAL 225 (H) <200 mg/dL    TRIGLYCERIDES 196 (H) <150 mg/dL    HDL CHOLESTEROL 87 23 - 92 mg/dL    NON-HDL CHOLESTEROL 138 <145 mg/dl    CHOL/HDL RATIO            2.59 <4.50                    LDL CHOLESTEROL 99 <100 mg/dL    PATIENT STATUS            FASTING                   BASIC METABOLIC PANEL      Result  Value Ref Range    SODIUM 134 133 - 143 mmol/L    POTASSIUM 4.5 3.5 - 5.1 mmol/L    CHLORIDE 101 98 - 107 mmol/L    CO2,TOTAL 25 21 - 31 mmol/L    ANION GAP 8 5 - 18                    GLUCOSE 89 70 - 105 mg/dL    CALCIUM 9.9 8.6 - 10.3 mg/dL    BUN 18 7 - 25 mg/dL    CREATININE 1.10 0.70 - 1.30 mg/dL    BUN/CREAT RATIO           16                    GFR if African American >60 >60 ml/min/1.73m2    GFR if not African American 50 (L) >60 ml/min/1.73m2   CBC W PLT NO DIFF      Result  Value Ref Range    WHITE BLOOD COUNT         7.5 4.5 - 11.0 thou/cu mm    RED BLOOD COUNT           4.14 4.00 - 5.20 mil/cu mm    HEMOGLOBIN                 12.8 12.0 - 16.0 g/dL    HEMATOCRIT                38.1 33.0 - 51.0 %    MCV                       92 80 - 100 fL    MCH                       31.0 26.0 - 34.0 pg    MCHC                      33.7 32.0 - 36.0 g/dL    RDW                       12.9 11.5 - 15.5 %    PLATELET COUNT            278 140 - 440 thou/cu mm    MPV                       7.0 6.5 - 11.0 fL     *Note: Due to a large number of results and/or encounters for the requested time period, some results have not been displayed. A complete set of results can be found in Results Review.

## 2018-07-25 ENCOUNTER — OFFICE VISIT (OUTPATIENT)
Dept: FAMILY MEDICINE | Facility: OTHER | Age: 63
End: 2018-07-25
Attending: FAMILY MEDICINE
Payer: COMMERCIAL

## 2018-07-25 VITALS
HEART RATE: 78 BPM | WEIGHT: 167 LBS | SYSTOLIC BLOOD PRESSURE: 130 MMHG | DIASTOLIC BLOOD PRESSURE: 72 MMHG | BODY MASS INDEX: 26.95 KG/M2 | TEMPERATURE: 98.3 F

## 2018-07-25 DIAGNOSIS — G89.29 CHEST WALL PAIN, CHRONIC: ICD-10-CM

## 2018-07-25 DIAGNOSIS — R07.89 CHEST WALL PAIN, CHRONIC: ICD-10-CM

## 2018-07-25 DIAGNOSIS — F33.2 SEVERE EPISODE OF RECURRENT MAJOR DEPRESSIVE DISORDER, WITHOUT PSYCHOTIC FEATURES (H): ICD-10-CM

## 2018-07-25 DIAGNOSIS — M54.50 CHRONIC BILATERAL LOW BACK PAIN WITHOUT SCIATICA: ICD-10-CM

## 2018-07-25 DIAGNOSIS — G89.29 CHRONIC BILATERAL LOW BACK PAIN WITHOUT SCIATICA: ICD-10-CM

## 2018-07-25 DIAGNOSIS — G89.4 CHRONIC PAIN DISORDER: Primary | ICD-10-CM

## 2018-07-25 PROCEDURE — 99214 OFFICE O/P EST MOD 30 MIN: CPT | Performed by: FAMILY MEDICINE

## 2018-07-25 RX ORDER — OXYCODONE AND ACETAMINOPHEN 5; 325 MG/1; MG/1
2 TABLET ORAL 4 TIMES DAILY
Qty: 240 TABLET | Refills: 0 | Status: SHIPPED | OUTPATIENT
Start: 2018-07-25 | End: 2018-08-23

## 2018-07-25 RX ORDER — DULOXETIN HYDROCHLORIDE 60 MG/1
60 CAPSULE, DELAYED RELEASE ORAL DAILY
Qty: 30 CAPSULE | Refills: 1 | Status: SHIPPED | OUTPATIENT
Start: 2018-07-25 | End: 2018-08-23

## 2018-07-25 RX ORDER — GABAPENTIN 400 MG/1
800 CAPSULE ORAL 3 TIMES DAILY
Qty: 180 CAPSULE | Refills: 1 | Status: SHIPPED | OUTPATIENT
Start: 2018-07-25 | End: 2018-08-23

## 2018-07-25 NOTE — MR AVS SNAPSHOT
After Visit Summary   7/25/2018    Ankita Day    MRN: [id number]           Patient Information     Date Of Birth          1955        Visit Information        Provider Department      7/25/2018 10:00 AM Ramirez Cano MD [facility]        Today's Diagnoses     Chronic pain disorder    -  1    Chronic bilateral low back pain without sciatica        Chest wall pain, chronic        Severe episode of recurrent major depressive disorder, without psychotic features (H)          Care Instructions    Increase gabapentin by 1 pill every few days up to 2 pills three times a day. So for instance take 400 mg, 400 mg and 800 mg at night; then 800, 400, 800; then 800 mg three times daily.    Stop Trintellix and start Cymbalta 60 mg daily    Wait on Topamax    Follow up in a month          Follow-ups after your visit        Who to contact     If you have questions or need follow up information about today's clinic visit or your schedule please contact [facility] directly at [phone].  Normal or non-critical lab and imaging results will be communicated to you by Simplet, letter or phone within 4 business days after the clinic has received the results. If you do not hear from us within 7 days, please contact the clinic through Deep Domain or phone. If you have a critical or abnormal lab result, we will notify you by phone as soon as possible.  Submit refill requests through Deep Domain or call your pharmacy and they will forward the refill request to us. Please allow 3 business days for your refill to be completed.          Additional Information About Your Visit        PageStitchhart Information     [employer] gives you secure access to your electronic health record. If you see a primary care provider, you can also send messages to your care team and make appointments. If you have questions, please call your primary care clinic.  If you do not have a primary care provider,  please call [phone] and they will assist you.        Care EveryWhere ID     This is your Care EveryWhere ID. This could be used by other organizations to access your [facility] medical records  [id number]        Your Vitals Were     Pulse Temperature BMI (Body Mass Index)             78 98.3  F (36.8  C) (Tympanic) 26.95 kg/m2          Blood Pressure from Last 3 Encounters:   07/25/18 130/72   06/25/18 145/85   06/18/18 142/68    Weight from Last 3 Encounters:   07/25/18 167 lb (75.8 kg)   06/25/18 165 lb (74.8 kg)   06/18/18 165 lb (74.8 kg)              Today, you had the following     No orders found for display         Today's Medication Changes          These changes are accurate as of 7/25/18 10:46 AM.  If you have any questions, ask your nurse or doctor.               Start taking these medicines.        Dose/Directions    DULoxetine 60 MG EC capsule   Commonly known as:  CYMBALTA   Used for:  Chronic bilateral low back pain without sciatica, Chest wall pain, chronic, Chronic pain disorder, Severe episode of recurrent major depressive disorder, without psychotic features (H)   Started by:  Ramirez Cano MD        Dose:  60 mg   Take 1 capsule (60 mg) by mouth daily   Quantity:  30 capsule   Refills:  1         These medicines have changed or have updated prescriptions.        Dose/Directions    gabapentin 400 MG capsule   Commonly known as:  NEURONTIN   This may have changed:  how much to take   Used for:  Chest wall pain, chronic, Chronic bilateral low back pain without sciatica, Chronic pain disorder   Changed by:  Ramirez Cano MD        Dose:  800 mg   Take 2 capsules (800 mg) by mouth 3 times daily   Quantity:  180 capsule   Refills:  1         Stop taking these medicines if you haven't already. Please contact your care team if you have questions.     vortioxetine 10 MG tablet   Commonly known as:  TRINTELLIX/BRINTELLIX   Stopped by:  Ramirez Cano MD                Where to get your  medicines      These medications were sent to [facility] - [place], [place] - [address]  [address], [place] [address]     Phone:  [phone]     DULoxetine 60 MG EC capsule    gabapentin 400 MG capsule         Some of these will need a paper prescription and others can be bought over the counter.  Ask your nurse if you have questions.     Bring a paper prescription for each of these medications     oxyCODONE-acetaminophen 5-325 MG per tablet               Information about OPIOIDS     PRESCRIPTION OPIOIDS: WHAT YOU NEED TO KNOW   We gave you an opioid (narcotic) pain medicine. It is important to manage your pain, but opioids are not always the best choice. You should first try all the other options your care team gave you. Take this medicine for as short a time (and as few doses) as possible.     These medicines have risks:    DO NOT drive when on new or higher doses of pain medicine. These medicines can affect your alertness and reaction times, and you could be arrested for driving under the influence (DUI). If you need to use opioids long-term, talk to your care team about driving.    DO NOT operate heave machinery    DO NOT do any other dangerous activities while taking these medicines.     DO NOT drink any alcohol while taking these medicines.      If the opioid prescribed includes acetaminophen, DO NOT take with any other medicines that contain acetaminophen. Read all labels carefully. Look for the word  acetaminophen  or  Tylenol.  Ask your pharmacist if you have questions or are unsure.    You can get addicted to pain medicines, especially if you have a history of addiction (chemical, alcohol or substance dependence). Talk to your care team about ways to reduce this risk.    Store your pills in a secure place, locked if possible. We will not replace any lost or stolen medicine. If you don t finish your medicine, please throw away (dispose) as directed by your  pharmacist. The [place] Pollution Control Agency has more information about safe disposal: https://www.pca.[facility].[place]/living-green/managing-unwanted-medications.     All opioids tend to cause constipation. Drink plenty of water and eat foods that have a lot of fiber, such as fruits, vegetables, prune juice, apple juice and high-fiber cereal. Take a laxative (Miralax, milk of magnesia, Colace, Senna) if you don t move your bowels at least every other day.          Primary Care Provider Office Phone # Fax #    Ramirez Cano [phone] [address] [facility] [address]        Equal Access to Services     JACKIE SIMPSON : Rashad Pierce, christina hair, yuri duque, kacie fields. So [facility] [phone].    ATENCIÓN: Si habla español, tiene a siddiqui disposición servicios gratuitos de asistencia lingüística. Llame al [phone].    We comply with applicable federal civil rights laws and [place] laws. We do not discriminate on the basis of race, color, national origin, age, disability, sex, sexual orientation, or gender identity.            Thank you!     Thank you for choosing [facility]  for your care. Our goal is always to provide you with excellent care. Hearing back from our patients is one way we can continue to improve our services. Please take a few minutes to complete the written survey that you may receive in the mail after your visit with us. Thank you!             Your Updated Medication List - Protect others around you: Learn how to safely use, store and throw away your medicines at www.disposemymeds.org.          This list is accurate as of 7/25/18 10:46 AM.  Always use your most recent med list.                   Brand Name Dispense Instructions for use Diagnosis    ADVIL PO      Take 600 mg by mouth every 6 hours as needed for moderate pain        amLODIPine 10 MG tablet    NORVASC     Take  10 mg by mouth daily        aspirin 81 MG EC tablet      Take 81 mg by mouth daily with food        busPIRone 10 MG tablet    BUSPAR     Take 1 tablet by mouth 2 times daily        clonazePAM 1 MG tablet    klonoPIN     Take 1 tablet by mouth 4 times daily Becky Olmstead CNP        clopidogrel 75 MG tablet    PLAVIX     Take 75 mg by mouth daily        CVS POTASSIUM GLUCONATE 2 MEQ Tabs   Generic drug:  Potassium Gluconate           cyclobenzaprine 10 MG tablet    FLEXERIL     1 tablet 2 times daily as needed for muscle spasms        cycloSPORINE 0.05 % ophthalmic emulsion    RESTASIS MULTIDOSE    5.5 mL    Place 1 drop into both eyes 2 times daily    Dry eyes       Diclofenac Sodium 1.5 % Soln      Apply 20 drops to each hand 4 times daily        docusate sodium 50 MG capsule    COLACE     Take 50 mg by mouth daily        DULoxetine 60 MG EC capsule    CYMBALTA    30 capsule    Take 1 capsule (60 mg) by mouth daily    Chronic bilateral low back pain without sciatica, Chest wall pain, chronic, Chronic pain disorder, Severe episode of recurrent major depressive disorder, without psychotic features (H)       gabapentin 400 MG capsule    NEURONTIN    180 capsule    Take 2 capsules (800 mg) by mouth 3 times daily    Chest wall pain, chronic, Chronic bilateral low back pain without sciatica, Chronic pain disorder       lisinopril 40 MG tablet    PRINIVIL/ZESTRIL    90 tablet    TAKE 1 TABLET DAILY    Essential hypertension       metoprolol tartrate 50 MG tablet    LOPRESSOR     Take 1 tablet by mouth 2 times daily        NITROSTAT 0.3 MG sublingual tablet   Generic drug:  nitroGLYcerin     25 tablet    PLACE 1 TABLET UNDER THE TONGUE EVERY 5 MINUTES AS NEEDED FOR CHEST PAIN    Chest pain of uncertain etiology       ondansetron 4 MG tablet    ZOFRAN     Take 4 mg by mouth every 6 hours as needed for nausea        oxyCODONE-acetaminophen 5-325 MG per tablet    PERCOCET    240 tablet    Take 2 tablets by mouth 4 times daily  Max acetaminophen dose: 4000mg in 24 hrs    Chronic pain disorder, Chronic bilateral low back pain without sciatica, Chest wall pain, chronic       pantoprazole 40 MG EC tablet    PROTONIX     Take 40 mg by mouth 2 times daily        pravastatin 40 MG tablet    PRAVACHOL    90 tablet    TAKE 1 TABLET AT BEDTIME    Atherosclerosis of coronary artery bypass graft of native heart without angina pectoris       saccharomyces boulardii 250 MG capsule    FLORASTOR     Take 250 mg by mouth 2 times daily        VITAMIN D (CHOLECALCIFEROL) PO      Take 5,000 Units by mouth daily

## 2018-07-25 NOTE — PATIENT INSTRUCTIONS
Increase gabapentin by 1 pill every few days up to 2 pills three times a day. So for instance take 400 mg, 400 mg and 800 mg at night; then 800, 400, 800; then 800 mg three times daily.    Stop Trintellix and start Cymbalta 60 mg daily    Wait on Topamax    Follow up in a month

## 2018-07-25 NOTE — NURSING NOTE
Pt presents to clinic today for medication management. Pt also states concerns about headache, and nausea.  Jamilah Berger

## 2018-07-25 NOTE — PROGRESS NOTES
"SUBJECTIVE:  63 year old female presents to follow up on chronic pain in chest wall and back, headaches, depression/anxiety    Becky Olmstead CNP certified her for medical cannabis for PTSD. She tried it and had too many side effects. Either felt \"hyper or down.\" It did not help pain.  Has tried Lyrica without benefit and it may have contributed to some side effects. Gabapentin caused \"spacy sensation.\" Used to take Cymbalta, but Trintellix is better    She is concerned about vision problems from Trintellix. Diagnosed with dry eye at eye doctor. Given Restasis, but cannot afford. Would like to return to Cymbalta to see if that helps her eyes.     Having daily headaches. May relate to her eye problems. She is wondering about using Topamax again. It may have helped previously. Neurology recommended Botox, but insurance would not cover.    Due for refill on oxycodone for chronic chest wall and back pain. Has poor function when she stopped opioids and high doses have caused adverse effects. Doing well at current dosing.    Started gabapentin and is tolerating 400 mg TID currently without benefit or side effects.      REVIEW OF SYSTEMS:    Constitutional: negative  Respiratory: negative  Cardiovascular: negative    Current Outpatient Prescriptions   Medication Sig Dispense Refill     amLODIPine (NORVASC) 10 MG tablet Take 10 mg by mouth daily       aspirin EC 81 MG EC tablet Take 81 mg by mouth daily with food       busPIRone (BUSPAR) 10 MG tablet Take 1 tablet by mouth 2 times daily       clonazePAM (KLONOPIN) 1 MG tablet Take 1 tablet by mouth 4 times daily Becky Olmstead CNP       clopidogrel (PLAVIX) 75 MG tablet Take 75 mg by mouth daily       cyclobenzaprine (FLEXERIL) 10 MG tablet 1 tablet 2 times daily as needed for muscle spasms       cycloSPORINE (RESTASIS MULTIDOSE) 0.05 % ophthalmic emulsion Place 1 drop into both eyes 2 times daily 5.5 mL 6     Diclofenac Sodium 1.5 % SOLN Apply 20 drops to each hand 4 times daily "       docusate sodium (COLACE) 50 MG capsule Take 50 mg by mouth daily       gabapentin (NEURONTIN) 400 MG capsule Take 1 capsule (400 mg) by mouth 3 times daily 90 capsule 1     Ibuprofen (ADVIL PO) Take 600 mg by mouth every 6 hours as needed for moderate pain       lisinopril (PRINIVIL/ZESTRIL) 40 MG tablet TAKE 1 TABLET DAILY 90 tablet 4     metoprolol tartrate (LOPRESSOR) 50 MG tablet Take 1 tablet by mouth 2 times daily       NITROSTAT 0.3 MG sublingual tablet PLACE 1 TABLET UNDER THE TONGUE EVERY 5 MINUTES AS NEEDED FOR CHEST PAIN 25 tablet 2     ondansetron (ZOFRAN) 4 MG tablet Take 4 mg by mouth every 6 hours as needed for nausea       oxyCODONE-acetaminophen (PERCOCET) 5-325 MG per tablet Take 2 tablets by mouth 4 times daily Max acetaminophen dose: 4000mg in 24 hrs 240 tablet 0     pantoprazole (PROTONIX) 40 MG EC tablet Take 40 mg by mouth 2 times daily       Potassium Gluconate (CVS POTASSIUM GLUCONATE) 2 MEQ TABS        pravastatin (PRAVACHOL) 40 MG tablet TAKE 1 TABLET AT BEDTIME 90 tablet 2     saccharomyces boulardii (FLORASTOR) 250 MG capsule Take 250 mg by mouth 2 times daily       VITAMIN D, CHOLECALCIFEROL, PO Take 5,000 Units by mouth daily       vortioxetine (TRINTELLIX/BRINTELLIX) 10 MG tablet        Allergies   Allergen Reactions     Atorvastatin Muscle Pain (Myalgia)     Liquid Adhesive Rash     Other reaction(s): Erythema  Silk and plastic gloves (long term attachment of adhesives)     Tiotropium Bromide [Tiotropium] Rash     Ezetimibe Muscle Pain (Myalgia)     Niacin      Other reaction(s): Flushing  flushing     Rosuvastatin Muscle Pain (Myalgia)     Other reaction(s): Myalgia     Latex Rash     No Clinical Screening - See Comments Rash, Blisters and Itching     Metals and plastic  Metals and plastics       Tape [Adhesive Tape] Rash       OBJECTIVE:  /72 (BP Location: Right arm, Patient Position: Chair, Cuff Size: Adult Regular)  Pulse 78  Temp 98.3  F (36.8  C) (Tympanic)  Wt  167 lb (75.8 kg)  BMI 26.95 kg/m2    EXAM:  General Appearance: Pleasant, alert, appropriate appearance for age. No acute distress  PSYCH: mentation appears normal and affect normal    ASSESSMENT/PLAN:    ICD-10-CM    1. Chronic pain disorder G89.4 oxyCODONE-acetaminophen (PERCOCET) 5-325 MG per tablet   2. Chronic bilateral low back pain without sciatica M54.5 oxyCODONE-acetaminophen (PERCOCET) 5-325 MG per tablet    G89.29    3. Chest wall pain, chronic R07.89 oxyCODONE-acetaminophen (PERCOCET) 5-325 MG per tablet    G89.29      Increase gabapentin from 400 mg  mg TID by gradual increase per patient instructions. Sshe was able to tolerate gabapentin until the dose was increased to 1200 mg 3 times daily in the past.     Refilled oxycodone at same dosing of 10 mg QID for 40 mg daily. 60 morphine milligram equivalents daily. UDM appropriate 10/31/17.    Takes clonazepam for anxiety and has with opioids for years. Aware of risk for overdose with both medications.    Would like to change from Trintellix to Cymbalta. Stop Trintellix and start Cymbalta 60 mg daily, which was her previous Cymbalta dose.    Avoid Topamax for now to since she is changing 2 medications    Follow-up in 1 month.      Ramirez Cano M.D.     This document was prepared using voice generated software.  While every attempt was made for accuracy, grammatical errors may exist.

## 2018-08-23 ENCOUNTER — OFFICE VISIT (OUTPATIENT)
Dept: FAMILY MEDICINE | Facility: OTHER | Age: 63
End: 2018-08-23
Attending: FAMILY MEDICINE
Payer: COMMERCIAL

## 2018-08-23 VITALS
SYSTOLIC BLOOD PRESSURE: 130 MMHG | HEART RATE: 76 BPM | WEIGHT: 166.6 LBS | BODY MASS INDEX: 26.89 KG/M2 | DIASTOLIC BLOOD PRESSURE: 78 MMHG

## 2018-08-23 DIAGNOSIS — R07.89 CHEST WALL PAIN, CHRONIC: Primary | ICD-10-CM

## 2018-08-23 DIAGNOSIS — G89.4 CHRONIC PAIN DISORDER: ICD-10-CM

## 2018-08-23 DIAGNOSIS — M54.50 CHRONIC BILATERAL LOW BACK PAIN WITHOUT SCIATICA: ICD-10-CM

## 2018-08-23 DIAGNOSIS — G89.29 CHRONIC BILATERAL LOW BACK PAIN WITHOUT SCIATICA: ICD-10-CM

## 2018-08-23 DIAGNOSIS — I25.810 ATHEROSCLEROSIS OF CORONARY ARTERY BYPASS GRAFT OF NATIVE HEART WITHOUT ANGINA PECTORIS: ICD-10-CM

## 2018-08-23 DIAGNOSIS — G89.29 CHEST WALL PAIN, CHRONIC: Primary | ICD-10-CM

## 2018-08-23 DIAGNOSIS — K25.4 CHRONIC GASTRIC ULCER WITH HEMORRHAGE: ICD-10-CM

## 2018-08-23 DIAGNOSIS — F33.2 SEVERE EPISODE OF RECURRENT MAJOR DEPRESSIVE DISORDER, WITHOUT PSYCHOTIC FEATURES (H): ICD-10-CM

## 2018-08-23 LAB
ERYTHROCYTE [DISTWIDTH] IN BLOOD BY AUTOMATED COUNT: 12.9 % (ref 10–15)
HCT VFR BLD AUTO: 37.6 % (ref 35–47)
HGB BLD-MCNC: 13 G/DL (ref 11.7–15.7)
MCH RBC QN AUTO: 31 PG (ref 26.5–33)
MCHC RBC AUTO-ENTMCNC: 34.6 G/DL (ref 31.5–36.5)
MCV RBC AUTO: 90 FL (ref 78–100)
PLATELET # BLD AUTO: 208 10E9/L (ref 150–450)
RBC # BLD AUTO: 4.19 10E12/L (ref 3.8–5.2)
WBC # BLD AUTO: 7.1 10E9/L (ref 4–11)

## 2018-08-23 PROCEDURE — 85027 COMPLETE CBC AUTOMATED: CPT | Performed by: FAMILY MEDICINE

## 2018-08-23 PROCEDURE — 99214 OFFICE O/P EST MOD 30 MIN: CPT | Performed by: FAMILY MEDICINE

## 2018-08-23 PROCEDURE — 36415 COLL VENOUS BLD VENIPUNCTURE: CPT | Performed by: FAMILY MEDICINE

## 2018-08-23 RX ORDER — OXYCODONE AND ACETAMINOPHEN 5; 325 MG/1; MG/1
2 TABLET ORAL 4 TIMES DAILY
Qty: 240 TABLET | Refills: 0 | Status: SHIPPED | OUTPATIENT
Start: 2018-08-23 | End: 2018-09-25

## 2018-08-23 RX ORDER — GABAPENTIN 400 MG/1
800 CAPSULE ORAL 3 TIMES DAILY
Qty: 180 CAPSULE | Refills: 1 | Status: CANCELLED | OUTPATIENT
Start: 2018-08-23

## 2018-08-23 RX ORDER — PRAVASTATIN SODIUM 40 MG
40 TABLET ORAL AT BEDTIME
Qty: 30 TABLET | Refills: 1 | Status: SHIPPED | OUTPATIENT
Start: 2018-08-23 | End: 2018-09-25

## 2018-08-23 RX ORDER — DULOXETIN HYDROCHLORIDE 60 MG/1
60 CAPSULE, DELAYED RELEASE ORAL DAILY
Qty: 90 CAPSULE | Refills: 3 | Status: SHIPPED | OUTPATIENT
Start: 2018-08-23 | End: 2019-08-18

## 2018-08-23 RX ORDER — SUCRALFATE 1 G/1
1 TABLET ORAL 4 TIMES DAILY
Qty: 120 TABLET | Refills: 2 | Status: SHIPPED | OUTPATIENT
Start: 2018-08-23 | End: 2019-07-17

## 2018-08-23 RX ORDER — GABAPENTIN 800 MG/1
800 TABLET ORAL 3 TIMES DAILY
Qty: 90 TABLET | Refills: 3 | Status: SHIPPED | OUTPATIENT
Start: 2018-08-23 | End: 2018-11-21

## 2018-08-23 ASSESSMENT — PATIENT HEALTH QUESTIONNAIRE - PHQ9: 5. POOR APPETITE OR OVEREATING: NOT AT ALL

## 2018-08-23 ASSESSMENT — ANXIETY QUESTIONNAIRES
GAD7 TOTAL SCORE: 1
5. BEING SO RESTLESS THAT IT IS HARD TO SIT STILL: NOT AT ALL
2. NOT BEING ABLE TO STOP OR CONTROL WORRYING: NOT AT ALL
7. FEELING AFRAID AS IF SOMETHING AWFUL MIGHT HAPPEN: NOT AT ALL
IF YOU CHECKED OFF ANY PROBLEMS ON THIS QUESTIONNAIRE, HOW DIFFICULT HAVE THESE PROBLEMS MADE IT FOR YOU TO DO YOUR WORK, TAKE CARE OF THINGS AT HOME, OR GET ALONG WITH OTHER PEOPLE: NOT DIFFICULT AT ALL
1. FEELING NERVOUS, ANXIOUS, OR ON EDGE: SEVERAL DAYS
6. BECOMING EASILY ANNOYED OR IRRITABLE: NOT AT ALL
3. WORRYING TOO MUCH ABOUT DIFFERENT THINGS: NOT AT ALL

## 2018-08-23 NOTE — PATIENT INSTRUCTIONS
Stop Excedrin and stop daily aspirin. Basically stop all anti-inflammatories  Start sucralfate 1 g four times daily  Stay on Plavix    Changed gabapentin to 800 mg pills, keep taking 1 three times daily    Blood count results to [facility]

## 2018-08-23 NOTE — PROGRESS NOTES
SUBJECTIVE:  63 year old female presents to follow up on chronic pain in chest wall and back, headaches, depression/anxiety    She stopped taking Advil and is using Excedrin migraine instead. Using 2 pills three times daily.     Has posterior neck pain for 3 weeks. That may be triggering the headaches. Tried Topamax in the past with some possible benefit, but not enough and so saw neurology. Neurology recommend Botox, but insurance would not cover.    Now has stomach pain. Previous gastric ulcer in November 2017. It was healed on EGD with Dr Bowman in Feb 2018 after prolonged treatment with PPI and sucralfate.     Changed from Trentillix back to Cymbalta. It did not change her dry eyes. No change noted in mood or pain. She prefers to stay on Cymbalta.    Taking gabapentin 800 mg three times daily. Cannot tell if it helped pain, but after some thought believes it does help some and would like to stay on it.     Continues on oxycodone for chronic chest wall and back pain. Has poor function when she stopped opioids and high doses have caused adverse effects. Doing well at current dosing.      REVIEW OF SYSTEMS:    Constitutional: negative  Respiratory: negative  Cardiovascular: negative    Current Outpatient Prescriptions   Medication Sig Dispense Refill     amLODIPine (NORVASC) 10 MG tablet Take 10 mg by mouth daily       aspirin EC 81 MG EC tablet Take 81 mg by mouth daily with food       busPIRone (BUSPAR) 10 MG tablet Take 1 tablet by mouth 2 times daily       clonazePAM (KLONOPIN) 1 MG tablet Take 1 tablet by mouth 4 times daily Becky Olmstead CNP       clopidogrel (PLAVIX) 75 MG tablet Take 75 mg by mouth daily       cyclobenzaprine (FLEXERIL) 10 MG tablet 1 tablet 2 times daily as needed for muscle spasms       cycloSPORINE (RESTASIS MULTIDOSE) 0.05 % ophthalmic emulsion Place 1 drop into both eyes 2 times daily 5.5 mL 6     Diclofenac Sodium 1.5 % SOLN Apply 20 drops to each hand 4 times daily       docusate sodium  (COLACE) 50 MG capsule Take 50 mg by mouth daily       DULoxetine (CYMBALTA) 60 MG EC capsule Take 1 capsule (60 mg) by mouth daily 30 capsule 1     gabapentin (NEURONTIN) 400 MG capsule Take 2 capsules (800 mg) by mouth 3 times daily 180 capsule 1     Ibuprofen (ADVIL PO) Take 600 mg by mouth every 6 hours as needed for moderate pain       lisinopril (PRINIVIL/ZESTRIL) 40 MG tablet TAKE 1 TABLET DAILY 90 tablet 4     metoprolol tartrate (LOPRESSOR) 50 MG tablet Take 1 tablet by mouth 2 times daily       NITROSTAT 0.3 MG sublingual tablet PLACE 1 TABLET UNDER THE TONGUE EVERY 5 MINUTES AS NEEDED FOR CHEST PAIN 25 tablet 2     ondansetron (ZOFRAN) 4 MG tablet Take 4 mg by mouth every 6 hours as needed for nausea       oxyCODONE-acetaminophen (PERCOCET) 5-325 MG per tablet Take 2 tablets by mouth 4 times daily Max acetaminophen dose: 4000mg in 24 hrs 240 tablet 0     pantoprazole (PROTONIX) 40 MG EC tablet Take 40 mg by mouth 2 times daily       Potassium Gluconate (CVS POTASSIUM GLUCONATE) 2 MEQ TABS        pravastatin (PRAVACHOL) 40 MG tablet TAKE 1 TABLET AT BEDTIME 90 tablet 2     saccharomyces boulardii (FLORASTOR) 250 MG capsule Take 250 mg by mouth 2 times daily       VITAMIN D, CHOLECALCIFEROL, PO Take 5,000 Units by mouth daily       Allergies   Allergen Reactions     Atorvastatin Muscle Pain (Myalgia)     Liquid Adhesive Rash     Other reaction(s): Erythema  Silk and plastic gloves (long term attachment of adhesives)     Tiotropium Bromide [Tiotropium] Rash     Ezetimibe Muscle Pain (Myalgia)     Niacin      Other reaction(s): Flushing  flushing     Rosuvastatin Muscle Pain (Myalgia)     Other reaction(s): Myalgia     Latex Rash     No Clinical Screening - See Comments Rash, Blisters and Itching     Metals and plastic  Metals and plastics       Tape [Adhesive Tape] Rash       OBJECTIVE:  /78 (BP Location: Right arm, Patient Position: Chair, Cuff Size: Adult Regular)  Pulse 76  Wt 166 lb 9.6 oz (75.6  kg)  BMI 26.89 kg/m2    EXAM:  General Appearance: Pleasant, alert, appropriate appearance for age. No acute distress  Neck: Tender in posterior neck muscles   PSYCH: mentation appears normal and affect normal    ASSESSMENT/PLAN:    ICD-10-CM    1. Chest wall pain, chronic R07.89 oxyCODONE-acetaminophen (PERCOCET) 5-325 MG per tablet    G89.29 DULoxetine (CYMBALTA) 60 MG EC capsule     gabapentin (NEURONTIN) 800 MG tablet   2. Chronic bilateral low back pain without sciatica M54.5 oxyCODONE-acetaminophen (PERCOCET) 5-325 MG per tablet    G89.29 DULoxetine (CYMBALTA) 60 MG EC capsule     gabapentin (NEURONTIN) 800 MG tablet   3. Chronic pain disorder G89.4 oxyCODONE-acetaminophen (PERCOCET) 5-325 MG per tablet     DULoxetine (CYMBALTA) 60 MG EC capsule     gabapentin (NEURONTIN) 800 MG tablet   4. Chronic gastric ulcer with hemorrhage K25.4 CBC W PLT No Diff     sucralfate (CARAFATE) 1 GM tablet     CBC W PLT No Diff   5. 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 Severe episode of recurrent major depressive disorder, without psychotic features (H) F33.2 DULoxetine (CYMBALTA) 60 MG EC capsule     Refilled gabapentin for 800 mg TID. She was not able to tolerate 1200 mg TID in the past, so no increase    Refilled oxycodone at same dosing of 10 mg QID for 40 mg daily. 60 morphine milligram equivalents daily. UDM appropriate 10/31/17.    Takes clonazepam for anxiety and has with opioids for years. Aware of risk for overdose with both medications.    Refilled Cymbalta. It is less expensive than Trintellix and works the same.     Stop all NSAIDS. Taking Excedrin with aspirin and ibuprofen in the past contributed to a gastric ulcer. On Plavix and aspirin for CAD. She can stay on Plavix, but stop daily 81 mg aspirin, stop Excedrin and avoid ibuprofen. Start sucralfate 1 g QID. Obtained hemoglobin to check for anemia from blood  loss. Hemoglobin returned normal. Likely will take sulcralfate 1 to 3 months and then stop if pain resolves.    Follow-up in 1 month.    Ramirez Cano M.D.     This document was prepared using voice generated software.  While every attempt was made for accuracy, grammatical errors may exist.

## 2018-08-23 NOTE — MR AVS SNAPSHOT
After Visit Summary   8/23/2018    Ankita Day    MRN: [id number]           Patient Information     Date Of Birth          1955        Visit Information        Provider Department      8/23/2018 11:15 AM Ramirez Cano MD [facility]        Today's Diagnoses     Chest wall pain, chronic    -  1    Chronic bilateral low back pain without sciatica        Chronic pain disorder        Chronic gastric ulcer with hemorrhage        Atherosclerosis of coronary artery bypass graft of native heart without angina pectoris        Severe episode of recurrent major depressive disorder, without psychotic features (H)          Care Instructions    Stop Excedrin and stop daily aspirin. Basically stop all anti-inflammatories  Start sucralfate 1 g four times daily  Stay on Plavix    Changed gabapentin to 800 mg pills, keep taking 1 three times daily    Blood count results to [place]          Follow-ups after your visit        Your next 10 appointments already scheduled     Sep 25, 2018 11:15 AM CDT   Office Visit with Ramirez Cano MD   [facility]    [address]  [place] [id number] [phone]           Bring a current list of meds and any records pertaining to this visit. For Physicals, please bring immunization records and any forms needing to be filled out. Please arrive 10 minutes early to complete paperwork.              Who to contact     If you have questions or need follow up information about today's clinic visit or your schedule please contact [facility] directly at [phone].  Normal or non-critical lab and imaging results will be communicated to you by MyChart, letter or phone within 4 business days after the clinic has received the results. If you do not hear from us within 7 days, please contact the clinic through [employer] or phone. If you have a critical or abnormal lab  result, we will notify you by phone as soon as possible.  Submit refill requests through [employer] or call your pharmacy and they will forward the refill request to us. Please allow 3 business days for your refill to be completed.          Additional Information About Your Visit        Ac[employer]aint[employer] Information     [employer] gives you secure access to your electronic health record. If you see a primary care provider, you can also send messages to your care team and make appointments. If you have questions, please call your primary care clinic.  If you do not have a primary care provider, please call [phone] and they will assist you.        Care EveryWhere ID     This is your Care EveryWhere ID. This could be used by other organizations to access your [facility] medical records  [id number]        Your Vitals Were     Pulse BMI (Body Mass Index)                76 26.89 kg/m2           Blood Pressure from Last 3 Encounters:   08/23/18 130/78   07/25/18 130/72   06/25/18 145/85    Weight from Last 3 Encounters:   08/23/18 166 lb 9.6 oz (75.6 kg)   07/25/18 167 lb (75.8 kg)   06/25/18 165 lb (74.8 kg)              We Performed the Following     CBC W PLT No Diff          Today's Medication Changes          These changes are accurate as of 8/23/18 11:59 PM.  If you have any questions, ask your nurse or doctor.               Start taking these medicines.        Dose/Directions    gabapentin 800 MG tablet   Commonly known as:  NEURONTIN   Used for:  Chest wall pain, chronic, Chronic bilateral low back pain without sciatica, Chronic pain disorder   Replaces:  gabapentin 400 MG capsule   Started by:  Ramirez Cano MD        Dose:  800 mg   Take 1 tablet (800 mg) by mouth 3 times daily   Quantity:  90 tablet   Refills:  3       sucralfate 1 GM tablet   Commonly known as:  CARAFATE   Used for:  Chronic gastric ulcer with hemorrhage   Started by:  Ramirez Cano MD        Dose:  1 g   Take 1 tablet (1 g) by mouth 4 times  daily Before meals & at bedtime   Quantity:  120 tablet   Refills:  2         These medicines have changed or have updated prescriptions.        Dose/Directions    pravastatin 40 MG tablet   Commonly known as:  PRAVACHOL   This may have changed:  See the new instructions.   Used for:  Atherosclerosis of coronary artery bypass graft of native heart without angina pectoris   Changed by:  Ramirez Cano MD        Dose:  40 mg   Take 1 tablet (40 mg) by mouth At Bedtime   Quantity:  30 tablet   Refills:  1         Stop taking these medicines if you haven't already. Please contact your care team if you have questions.     gabapentin 400 MG capsule   Commonly known as:  NEURONTIN   Replaced by:  gabapentin 800 MG tablet   Stopped by:  Ramirez Cano MD                Where to get your medicines      These medications were sent to [facility] [address] [address]     Phone:  [phone]     DULoxetine 60 MG EC capsule    gabapentin 800 MG tablet         These medications were sent to [id number] - [place], [place] - [address]  [address] [facility] [address]     Phone:  [phone]     pravastatin 40 MG tablet    sucralfate 1 GM tablet         Some of these will need a paper prescription and others can be bought over the counter.  Ask your nurse if you have questions.     Bring a paper prescription for each of these medications     oxyCODONE-acetaminophen 5-325 MG per tablet               Information about OPIOIDS     PRESCRIPTION OPIOIDS: WHAT YOU NEED TO KNOW   We gave you an opioid (narcotic) pain medicine. It is important to manage your pain, but opioids are not always the best choice. You should first try all the other options your care team gave you. Take this medicine for as short a time (and as few doses) as possible.    Some activities can increase your pain, such as bandage changes  or therapy sessions. It may help to take your pain medicine 30 to 60 minutes before these activities. Reduce your stress by getting enough sleep, working on hobbies you enjoy and practicing relaxation or meditation. Talk to your care team about ways to manage your pain beyond prescription opioids.    These medicines have risks:    DO NOT drive when on new or higher doses of pain medicine. These medicines can affect your alertness and reaction times, and you could be arrested for driving under the influence (DUI). If you need to use opioids long-term, talk to your care team about driving.    DO NOT operate heavy machinery    DO NOT do any other dangerous activities while taking these medicines.    DO NOT drink any alcohol while taking these medicines.     If the opioid prescribed includes acetaminophen, DO NOT take with any other medicines that contain acetaminophen. Read all labels carefully. Look for the word  acetaminophen  or  Tylenol.  Ask your pharmacist if you have questions or are unsure.    You can get addicted to pain medicines, especially if you have a history of addiction (chemical, alcohol or substance dependence). Talk to your care team about ways to reduce this risk.    All opioids tend to cause constipation. Drink plenty of water and eat foods that have a lot of fiber, such as fruits, vegetables, prune juice, apple juice and high-fiber cereal. Take a laxative (Miralax, milk of magnesia, Colace, Senna) if you don t move your bowels at least every other day. Other side effects include upset stomach, sleepiness, dizziness, throwing up, tolerance (needing more of the medicine to have the same effect), physical dependence and slowed breathing.    Store your pills in a secure place, locked if possible. We will not replace any lost or stolen medicine. If you don t finish your medicine, please throw away (dispose) as directed by your pharmacist. The [place] [employer] has more information  about safe disposal: [email or web address]         Primary Care Provider Office Phone # Fax #    Ramirez Cano [phone] [address]  [place] [address]        Equal Access to Services     REINALDOURIEL TATIANA : Hadii aad ku hadasho Soomaali, waaxda luqadaha, qaybta kaalmada adeegyada, waxemir yen hayagatan ademuna davisbradnadeem regalado . So [facility] [phone].    ATENCIÓN: Si habla español, tiene a siddiqui disposición servicios gratuitos de asistencia lingüística. Llame al [phone].    We comply with applicable federal civil rights laws and [place] laws. We do not discriminate on the basis of race, color, national origin, age, disability, sex, sexual orientation, or gender identity.            Thank you!     Thank you for choosing [facility] AND [facility]  for your care. Our goal is always to provide you with excellent care. Hearing back from our patients is one way we can continue to improve our services. Please take a few minutes to complete the written survey that you may receive in the mail after your visit with us. Thank you!             Your Updated Medication List - Protect others around you: Learn how to safely use, store and throw away your medicines at www.disposemymeds.org.          This list is accurate as of 8/23/18 11:59 PM.  Always use your most recent med list.                   Brand Name Dispense Instructions for use Diagnosis    ADVIL PO      Take 600 mg by mouth every 6 hours as needed for moderate pain        amLODIPine 10 MG tablet    NORVASC     Take 10 mg by mouth daily        aspirin 81 MG EC tablet      Take 81 mg by mouth daily with food        busPIRone 10 MG tablet    BUSPAR     Take 1 tablet by mouth 2 times daily        clonazePAM 1 MG tablet    klonoPIN     Take 1 tablet by mouth 4 times daily Becky Olmstead CNP        clopidogrel 75 MG tablet    PLAVIX     Take 75 mg by mouth daily        CVS POTASSIUM  GLUCONATE 2 MEQ Tabs   Generic drug:  Potassium Gluconate           cyclobenzaprine 10 MG tablet    FLEXERIL     1 tablet 2 times daily as needed for muscle spasms        cycloSPORINE 0.05 % ophthalmic emulsion    RESTASIS MULTIDOSE    5.5 mL    Place 1 drop into both eyes 2 times daily    Dry eyes       Diclofenac Sodium 1.5 % Soln      Apply 20 drops to each hand 4 times daily        docusate sodium 50 MG capsule    COLACE     Take 50 mg by mouth daily        DULoxetine 60 MG EC capsule    CYMBALTA    90 capsule    Take 1 capsule (60 mg) by mouth daily    Chronic bilateral low back pain without sciatica, Chest wall pain, chronic, Chronic pain disorder, Severe episode of recurrent major depressive disorder, without psychotic features (H)       gabapentin 800 MG tablet    NEURONTIN    90 tablet    Take 1 tablet (800 mg) by mouth 3 times daily    Chest wall pain, chronic, Chronic bilateral low back pain without sciatica, Chronic pain disorder       lisinopril 40 MG tablet    PRINIVIL/ZESTRIL    90 tablet    TAKE 1 TABLET DAILY    Essential hypertension       metoprolol tartrate 50 MG tablet    LOPRESSOR     Take 1 tablet by mouth 2 times daily        NITROSTAT 0.3 MG sublingual tablet   Generic drug:  nitroGLYcerin     25 tablet    PLACE 1 TABLET UNDER THE TONGUE EVERY 5 MINUTES AS NEEDED FOR CHEST PAIN    Chest pain of uncertain etiology       ondansetron 4 MG tablet    ZOFRAN     Take 4 mg by mouth every 6 hours as needed for nausea        oxyCODONE-acetaminophen 5-325 MG per tablet    PERCOCET    240 tablet    Take 2 tablets by mouth 4 times daily Max acetaminophen dose: 4000mg in 24 hrs    Chronic pain disorder, Chronic bilateral low back pain without sciatica, Chest wall pain, chronic       pantoprazole 40 MG EC tablet    PROTONIX     Take 40 mg by mouth 2 times daily        pravastatin 40 MG tablet    PRAVACHOL    30 tablet    Take 1 tablet (40 mg) by mouth At Bedtime    Atherosclerosis of coronary artery  bypass graft of native heart without angina pectoris       saccharomyces boulardii 250 MG capsule    FLORASTOR     Take 250 mg by mouth 2 times daily        sucralfate 1 GM tablet    CARAFATE    120 tablet    Take 1 tablet (1 g) by mouth 4 times daily Before meals & at bedtime    Chronic gastric ulcer with hemorrhage       VITAMIN D (CHOLECALCIFEROL) PO      Take 5,000 Units by mouth daily

## 2018-08-23 NOTE — NURSING NOTE
"Chief Complaint   Patient presents with     Pain       Initial /78 (BP Location: Right arm, Patient Position: Chair, Cuff Size: Adult Regular)  Pulse 76  Wt 166 lb 9.6 oz (75.6 kg)  BMI 26.89 kg/m2 Estimated body mass index is 26.89 kg/(m^2) as calculated from the following:    Height as of 6/18/18: 5' 6\" (1.676 m).    Weight as of this encounter: 166 lb 9.6 oz (75.6 kg).  Medication Reconciliation: complete    Jamilah Berger LPN  "

## 2018-08-24 ASSESSMENT — ANXIETY QUESTIONNAIRES: GAD7 TOTAL SCORE: 1

## 2018-08-24 ASSESSMENT — PATIENT HEALTH QUESTIONNAIRE - PHQ9: SUM OF ALL RESPONSES TO PHQ QUESTIONS 1-9: 3

## 2018-09-25 ENCOUNTER — OFFICE VISIT (OUTPATIENT)
Dept: FAMILY MEDICINE | Facility: OTHER | Age: 63
End: 2018-09-25
Attending: FAMILY MEDICINE
Payer: COMMERCIAL

## 2018-09-25 VITALS
TEMPERATURE: 98 F | SYSTOLIC BLOOD PRESSURE: 132 MMHG | HEART RATE: 78 BPM | DIASTOLIC BLOOD PRESSURE: 78 MMHG | RESPIRATION RATE: 16 BRPM

## 2018-09-25 DIAGNOSIS — Z23 FLU VACCINE NEED: ICD-10-CM

## 2018-09-25 DIAGNOSIS — M54.50 CHRONIC BILATERAL LOW BACK PAIN WITHOUT SCIATICA: ICD-10-CM

## 2018-09-25 DIAGNOSIS — G89.29 CHEST WALL PAIN, CHRONIC: ICD-10-CM

## 2018-09-25 DIAGNOSIS — G89.4 CHRONIC PAIN DISORDER: Primary | ICD-10-CM

## 2018-09-25 DIAGNOSIS — R07.89 CHEST WALL PAIN, CHRONIC: ICD-10-CM

## 2018-09-25 DIAGNOSIS — G89.29 CHRONIC BILATERAL LOW BACK PAIN WITHOUT SCIATICA: ICD-10-CM

## 2018-09-25 DIAGNOSIS — I25.810 ATHEROSCLEROSIS OF CORONARY ARTERY BYPASS GRAFT OF NATIVE HEART WITHOUT ANGINA PECTORIS: ICD-10-CM

## 2018-09-25 PROCEDURE — 90471 IMMUNIZATION ADMIN: CPT | Performed by: FAMILY MEDICINE

## 2018-09-25 PROCEDURE — 90686 IIV4 VACC NO PRSV 0.5 ML IM: CPT | Performed by: FAMILY MEDICINE

## 2018-09-25 PROCEDURE — 99214 OFFICE O/P EST MOD 30 MIN: CPT | Mod: 25 | Performed by: FAMILY MEDICINE

## 2018-09-25 RX ORDER — PRAVASTATIN SODIUM 40 MG
40 TABLET ORAL AT BEDTIME
Qty: 90 TABLET | Refills: 3 | Status: SHIPPED | OUTPATIENT
Start: 2018-09-25 | End: 2019-01-23

## 2018-09-25 RX ORDER — OXYCODONE AND ACETAMINOPHEN 5; 325 MG/1; MG/1
2 TABLET ORAL 4 TIMES DAILY
Qty: 240 TABLET | Refills: 0 | Status: SHIPPED | OUTPATIENT
Start: 2018-10-25 | End: 2018-11-21

## 2018-09-25 RX ORDER — OXYCODONE AND ACETAMINOPHEN 5; 325 MG/1; MG/1
2 TABLET ORAL 4 TIMES DAILY
Qty: 240 TABLET | Refills: 0 | Status: SHIPPED | OUTPATIENT
Start: 2018-09-25 | End: 2018-09-25

## 2018-09-25 RX ORDER — PRAVASTATIN SODIUM 40 MG
40 TABLET ORAL AT BEDTIME
Qty: 90 TABLET | Refills: 3 | Status: SHIPPED | OUTPATIENT
Start: 2018-09-25 | End: 2018-09-25

## 2018-09-25 ASSESSMENT — ANXIETY QUESTIONNAIRES
6. BECOMING EASILY ANNOYED OR IRRITABLE: NOT AT ALL
7. FEELING AFRAID AS IF SOMETHING AWFUL MIGHT HAPPEN: NOT AT ALL
2. NOT BEING ABLE TO STOP OR CONTROL WORRYING: NOT AT ALL
5. BEING SO RESTLESS THAT IT IS HARD TO SIT STILL: NOT AT ALL
GAD7 TOTAL SCORE: 2
3. WORRYING TOO MUCH ABOUT DIFFERENT THINGS: NOT AT ALL
1. FEELING NERVOUS, ANXIOUS, OR ON EDGE: SEVERAL DAYS
IF YOU CHECKED OFF ANY PROBLEMS ON THIS QUESTIONNAIRE, HOW DIFFICULT HAVE THESE PROBLEMS MADE IT FOR YOU TO DO YOUR WORK, TAKE CARE OF THINGS AT HOME, OR GET ALONG WITH OTHER PEOPLE: SOMEWHAT DIFFICULT

## 2018-09-25 ASSESSMENT — PATIENT HEALTH QUESTIONNAIRE - PHQ9: 5. POOR APPETITE OR OVEREATING: SEVERAL DAYS

## 2018-09-25 ASSESSMENT — PAIN SCALES - GENERAL: PAINLEVEL: SEVERE PAIN (7)

## 2018-09-25 NOTE — NURSING NOTE
"Chief Complaint   Patient presents with     Medication Management       Initial /78 (BP Location: Right arm, Patient Position: Chair, Cuff Size: Adult Large)  Pulse 78  Temp 98  F (36.7  C) (Tympanic)  Resp 16 Estimated body mass index is 26.89 kg/(m^2) as calculated from the following:    Height as of 6/18/18: 5' 6\" (1.676 m).    Weight as of 8/23/18: 166 lb 9.6 oz (75.6 kg).  Medication Reconciliation: complete    Jamilah Berger LPN  "

## 2018-09-25 NOTE — PATIENT INSTRUCTIONS
Resume aspirin  Continue Plavix  Continue sucralfate for a month, then stop  Follow-up in a couple months

## 2018-09-25 NOTE — PROGRESS NOTES
SUBJECTIVE:  63 year old female presents to follow up on chronic pain in chest wall and back, headaches, history of gastric ulcer and recent stomach pain, previous anemia.    Is not taking any NSAIDS. Will drink alcohol twice yearly and was cautioned that no alcohol ever would be best. Stomach pain resolved on sucralfate. Still on PPI. Previous gastric ulcer in November 2017. It was healed on EGD with Dr Bowman in Feb 2018 after prolonged treatment with PPI and sucralfate.     Known CAD with history of CABG and stenting of bypass graft. Aspirin stopped when gastritis suspected. Still on Plavix. Doing well overall. Tolerating current medications.    Headaches are under a little better control. Still more frequently than desired. Consulted with neurology and Botox recommended, but insurance would not cover.    Continues on oxycodone for chronic chest wall and back pain. Has poor function when she stopped opioids and high doses have caused adverse effects. Doing well at current dosing.      REVIEW OF SYSTEMS:    Constitutional: negative  Respiratory: negative  Cardiovascular: negative    Current Outpatient Prescriptions   Medication Sig Dispense Refill     amLODIPine (NORVASC) 10 MG tablet Take 10 mg by mouth daily       aspirin EC 81 MG EC tablet Take 81 mg by mouth daily with food       busPIRone (BUSPAR) 10 MG tablet Take 1 tablet by mouth 2 times daily       clonazePAM (KLONOPIN) 1 MG tablet Take 1 tablet by mouth 4 times daily Becky Olmstead CNP       clopidogrel (PLAVIX) 75 MG tablet Take 75 mg by mouth daily       cyclobenzaprine (FLEXERIL) 10 MG tablet 1 tablet 2 times daily as needed for muscle spasms       Diclofenac Sodium 1.5 % SOLN Apply 20 drops to each hand 4 times daily       docusate sodium (COLACE) 50 MG capsule Take 50 mg by mouth daily       DULoxetine (CYMBALTA) 60 MG EC capsule Take 1 capsule (60 mg) by mouth daily 90 capsule 3     gabapentin (NEURONTIN) 800 MG tablet Take 1 tablet (800 mg) by mouth 3  times daily 90 tablet 3     Ibuprofen (ADVIL PO) Take 600 mg by mouth every 6 hours as needed for moderate pain       lisinopril (PRINIVIL/ZESTRIL) 40 MG tablet TAKE 1 TABLET DAILY 90 tablet 4     metoprolol tartrate (LOPRESSOR) 50 MG tablet Take 1 tablet by mouth 2 times daily       NITROSTAT 0.3 MG sublingual tablet PLACE 1 TABLET UNDER THE TONGUE EVERY 5 MINUTES AS NEEDED FOR CHEST PAIN 25 tablet 2     ondansetron (ZOFRAN) 4 MG tablet Take 4 mg by mouth every 6 hours as needed for nausea       oxyCODONE-acetaminophen (PERCOCET) 5-325 MG per tablet Take 2 tablets by mouth 4 times daily Max acetaminophen dose: 4000mg in 24 hrs 240 tablet 0     pantoprazole (PROTONIX) 40 MG EC tablet Take 40 mg by mouth 2 times daily       Potassium Gluconate (CVS POTASSIUM GLUCONATE) 2 MEQ TABS        pravastatin (PRAVACHOL) 40 MG tablet Take 1 tablet (40 mg) by mouth At Bedtime 30 tablet 1     saccharomyces boulardii (FLORASTOR) 250 MG capsule Take 250 mg by mouth 2 times daily       sucralfate (CARAFATE) 1 GM tablet Take 1 tablet (1 g) by mouth 4 times daily Before meals & at bedtime 120 tablet 2     VITAMIN D, CHOLECALCIFEROL, PO Take 5,000 Units by mouth daily       Allergies   Allergen Reactions     Atorvastatin Muscle Pain (Myalgia)     Liquid Adhesive Rash     Other reaction(s): Erythema  Silk and plastic gloves (long term attachment of adhesives)     Tiotropium Bromide [Tiotropium] Rash     Ezetimibe Muscle Pain (Myalgia)     Niacin      Other reaction(s): Flushing  flushing     Rosuvastatin Muscle Pain (Myalgia)     Other reaction(s): Myalgia     Latex Rash     No Clinical Screening - See Comments Rash, Blisters and Itching     Metals and plastic  Metals and plastics       Tape [Adhesive Tape] Rash       OBJECTIVE:  /78 (BP Location: Right arm, Patient Position: Chair, Cuff Size: Adult Large)  Pulse 78  Temp 98  F (36.7  C) (Tympanic)  Resp 16    EXAM:  General Appearance: Pleasant, alert, appropriate appearance  for age. No acute distress  PSYCH: mentation appears normal and affect normal    ASSESSMENT/PLAN:    ICD-10-CM    1. Chronic pain disorder G89.4 oxyCODONE-acetaminophen (PERCOCET) 5-325 MG per tablet     DISCONTINUED: oxyCODONE-acetaminophen (PERCOCET) 5-325 MG per tablet   2. Chronic bilateral low back pain without sciatica M54.5 oxyCODONE-acetaminophen (PERCOCET) 5-325 MG per tablet    G89.29 DISCONTINUED: oxyCODONE-acetaminophen (PERCOCET) 5-325 MG per tablet   3. Chest wall pain, chronic R07.89 oxyCODONE-acetaminophen (PERCOCET) 5-325 MG per tablet    G89.29 DISCONTINUED: oxyCODONE-acetaminophen (PERCOCET) 5-325 MG per tablet   4. Atherosclerosis of coronary artery bypass graft of native heart without angina pectoris I25.810 pravastatin (PRAVACHOL) 40 MG tablet     DISCONTINUED: pravastatin (PRAVACHOL) 40 MG tablet   5. Flu vaccine need Z23 HC FLU VAC PRESRV FREE QUAD SPLIT VIR 3+YRS IM     Gastritis seems to be improving with sucralfate. Take another 1 month of sucralfate. Stay on omeprazole. No hemoglobin check today since it was normal last appointment. Can resume aspirin and keep on Plavix. Plan to recheck on status and hemoglobin in 2 months.    Refilled oxycodone at same dosing of 10 mg QID for 40 mg daily. 60 morphine milligram equivalents daily. UDM appropriate 10/31/17. Given 2 month supply. Due for updated urine drug monitoring.    Takes clonazepam from psychiatry for anxiety and has with opioids for years. Aware of risk for overdose with both medications.    Given flu vaccine     Follow-up in 2 months.    Ramirez Cano M.D.     This document was prepared using voice generated software.  While every attempt was made for accuracy, grammatical errors may exist.

## 2018-09-25 NOTE — MR AVS SNAPSHOT
After Visit Summary   9/25/2018    Ankita Day    MRN: [id number]           Patient Information     Date Of Birth          1955        Visit Information        Provider Department      9/25/2018 11:15 AM Ramirez Cano MD [facility]        Today's Diagnoses     Chronic pain disorder    -  1    Chronic bilateral low back pain without sciatica        Chest wall pain, chronic        Atherosclerosis of coronary artery bypass graft of native heart without angina pectoris        Flu vaccine need          Care Instructions    Resume aspirin  Continue Plavix  Continue sucralfate for a month, then stop  Follow-up in a couple months            Follow-ups after your visit        Who to contact     If you have questions or need follow up information about today's clinic visit or your schedule please contact [facility] directly at [phone].  Normal or non-critical lab and imaging results will be communicated to you by "[employer]hart, letter or phone within 4 business days after the clinic has received the results. If you do not hear from us within 7 days, please contact the clinic through "[employer]hart or phone. If you have a critical or abnormal lab result, we will notify you by phone as soon as possible.  Submit refill requests through [employer] or call your pharmacy and they will forward the refill request to us. Please allow 3 business days for your refill to be completed.          Additional Information About Your Visit        MyChart Information     [employer] gives you secure access to your electronic health record. If you see a primary care provider, you can also send messages to your care team and make appointments. If you have questions, please call your primary care clinic.  If you do not have a primary care provider, please call [phone] and they will assist you.        Care EveryWhere ID     This is your Care EveryWhere ID. This could be used by other  organizations to access your [place] medical records  [id number]        Your Vitals Were     Pulse Temperature Respirations             78 98  F (36.7  C) (Tympanic) 16          Blood Pressure from Last 3 Encounters:   09/25/18 132/78   08/23/18 130/78   07/25/18 130/72    Weight from Last 3 Encounters:   08/23/18 166 lb 9.6 oz (75.6 kg)   07/25/18 167 lb (75.8 kg)   06/25/18 165 lb (74.8 kg)              We Performed the Following     HC FLU VAC PRESRV FREE QUAD SPLIT VIR 3+YRS IM          Today's Medication Changes          These changes are accurate as of 9/25/18 11:48 AM.  If you have any questions, ask your nurse or doctor.               Start taking these medicines.        Dose/Directions    oxyCODONE-acetaminophen 5-325 MG per tablet   Commonly known as:  PERCOCET   Used for:  Chronic pain disorder, Chronic bilateral low back pain without sciatica, Chest wall pain, chronic   Started by:  Ramirez Cano MD        Dose:  2 tablet   Start taking on:  10/25/2018   Take 2 tablets by mouth 4 times daily Max acetaminophen dose: 4000mg in 24 hrs   Quantity:  240 tablet   Refills:  0         Stop taking these medicines if you haven't already. Please contact your care team if you have questions.     ADVIL PO   Stopped by:  Ramirez Cano MD           cycloSPORINE 0.05 % ophthalmic emulsion   Commonly known as:  RESTASIS MULTIDOSE   Stopped by:  Ramirez Cano MD                Where to get your medicines      These medications were sent to [id number] - [place], [place] - [address]  [address], [facility] [address]     Phone:  [phone]     pravastatin 40 MG tablet         Some of these will need a paper prescription and others can be bought over the counter.  Ask your nurse if you have questions.     Bring a paper prescription for each of these medications     oxyCODONE-acetaminophen 5-325 MG per tablet               Information about OPIOIDS     PRESCRIPTION  OPIOIDS: WHAT YOU NEED TO KNOW   We gave you an opioid (narcotic) pain medicine. It is important to manage your pain, but opioids are not always the best choice. You should first try all the other options your care team gave you. Take this medicine for as short a time (and as few doses) as possible.    Some activities can increase your pain, such as bandage changes or therapy sessions. It may help to take your pain medicine 30 to 60 minutes before these activities. Reduce your stress by getting enough sleep, working on hobbies you enjoy and practicing relaxation or meditation. Talk to your care team about ways to manage your pain beyond prescription opioids.    These medicines have risks:    DO NOT drive when on new or higher doses of pain medicine. These medicines can affect your alertness and reaction times, and you could be arrested for driving under the influence (DUI). If you need to use opioids long-term, talk to your care team about driving.    DO NOT operate heavy machinery    DO NOT do any other dangerous activities while taking these medicines.    DO NOT drink any alcohol while taking these medicines.     If the opioid prescribed includes acetaminophen, DO NOT take with any other medicines that contain acetaminophen. Read all labels carefully. Look for the word  acetaminophen  or  Tylenol.  Ask your pharmacist if you have questions or are unsure.    You can get addicted to pain medicines, especially if you have a history of addiction (chemical, alcohol or substance dependence). Talk to your care team about ways to reduce this risk.    All opioids tend to cause constipation. Drink plenty of water and eat foods that have a lot of fiber, such as fruits, vegetables, prune juice, apple juice and high-fiber cereal. Take a laxative (Miralax, milk of magnesia, Colace, Senna) if you don t move your bowels at least every other day. Other side effects include upset stomach, sleepiness, dizziness, throwing up,  tolerance (needing more of the medicine to have the same effect), physical dependence and slowed breathing.    Store your pills in a secure place, locked if possible. We will not replace any lost or stolen medicine. If you don t finish your medicine, please throw away (dispose) as directed by your pharmacist. The [place] [employer]ution [employer] has more information about safe disposal: [email or web address]://[email or web address].[facility].[place].us/living-green/[email or web address]-unwanted-medications         Primary Care Provider Office Phone # Fax #    Ramirez Cano [phone]       [address] [facility] [address]        Equal Access to Services     [facility]: Hadii donaldo horton hadamelia Soyuriy, waaxda luquinn, qaybta kaalmada dunia, kacie regalado . So [facility] [phone].    ATENCIÓN: Si habla español, tiene a siddiqui disposición servicios gratuitos de asistencia lingüística. Llame al [phone].    We comply with applicable federal civil rights laws and [place] laws. We do not discriminate on the basis of race, color, national origin, age, disability, sex, sexual orientation, or gender identity.            Thank you!     Thank you for choosing [facility] AND [facility] [place]  for your care. Our goal is always to provide you with excellent care. Hearing back from our patients is one way we can continue to improve our services. Please take a few minutes to complete the written survey that you may receive in the mail after your visit with us. Thank you!             Your Updated Medication List - Protect others around you: Learn how to safely use, store and throw away your medicines at www.disposemymeds.org.          This list is accurate as of 9/25/18 11:48 AM.  Always use your most recent med list.                   Brand Name Dispense Instructions for use Diagnosis    amLODIPine 10 MG tablet    NORVASC     Take 10 mg by mouth daily        aspirin 81 MG EC tablet      Take 81 mg by mouth daily  with food        busPIRone 10 MG tablet    BUSPAR     Take 1 tablet by mouth 2 times daily        clonazePAM 1 MG tablet    klonoPIN     Take 1 tablet by mouth 4 times daily Becky Olmstead CNP        clopidogrel 75 MG tablet    PLAVIX     Take 75 mg by mouth daily        CVS POTASSIUM GLUCONATE 2 MEQ Tabs   Generic drug:  Potassium Gluconate           cyclobenzaprine 10 MG tablet    FLEXERIL     1 tablet 2 times daily as needed for muscle spasms        Diclofenac Sodium 1.5 % Soln      Apply 20 drops to each hand 4 times daily        docusate sodium 50 MG capsule    COLACE     Take 50 mg by mouth daily        DULoxetine 60 MG EC capsule    CYMBALTA    90 capsule    Take 1 capsule (60 mg) by mouth daily    Chronic bilateral low back pain without sciatica, Chest wall pain, chronic, Chronic pain disorder, Severe episode of recurrent major depressive disorder, without psychotic features (H)       gabapentin 800 MG tablet    NEURONTIN    90 tablet    Take 1 tablet (800 mg) by mouth 3 times daily    Chest wall pain, chronic, Chronic bilateral low back pain without sciatica, Chronic pain disorder       lisinopril 40 MG tablet    PRINIVIL/ZESTRIL    90 tablet    TAKE 1 TABLET DAILY    Essential hypertension       metoprolol tartrate 50 MG tablet    LOPRESSOR     Take 1 tablet by mouth 2 times daily        NITROSTAT 0.3 MG sublingual tablet   Generic drug:  nitroGLYcerin     25 tablet    PLACE 1 TABLET UNDER THE TONGUE EVERY 5 MINUTES AS NEEDED FOR CHEST PAIN    Chest pain of uncertain etiology       ondansetron 4 MG tablet    ZOFRAN     Take 4 mg by mouth every 6 hours as needed for nausea        oxyCODONE-acetaminophen 5-325 MG per tablet   Start taking on:  10/25/2018    PERCOCET    240 tablet    Take 2 tablets by mouth 4 times daily Max acetaminophen dose: 4000mg in 24 hrs    Chronic pain disorder, Chronic bilateral low back pain without sciatica, Chest wall pain, chronic       pantoprazole 40 MG EC tablet    PROTONIX      Take 40 mg by mouth 2 times daily        pravastatin 40 MG tablet    PRAVACHOL    90 tablet    Take 1 tablet (40 mg) by mouth At Bedtime    Atherosclerosis of coronary artery bypass graft of native heart without angina pectoris       saccharomyces boulardii 250 MG capsule    FLORASTOR     Take 250 mg by mouth 2 times daily        sucralfate 1 GM tablet    CARAFATE    120 tablet    Take 1 tablet (1 g) by mouth 4 times daily Before meals & at bedtime    Chronic gastric ulcer with hemorrhage       VITAMIN D (CHOLECALCIFEROL) PO      Take 5,000 Units by mouth daily

## 2018-09-26 ASSESSMENT — PATIENT HEALTH QUESTIONNAIRE - PHQ9: SUM OF ALL RESPONSES TO PHQ QUESTIONS 1-9: 0

## 2018-09-26 ASSESSMENT — ANXIETY QUESTIONNAIRES: GAD7 TOTAL SCORE: 2

## 2018-10-24 DIAGNOSIS — K27.9 PEPTIC ULCER: Primary | ICD-10-CM

## 2018-10-26 RX ORDER — PANTOPRAZOLE SODIUM 40 MG/1
TABLET, DELAYED RELEASE ORAL
Qty: 180 TABLET | Refills: 0 | Status: SHIPPED | OUTPATIENT
Start: 2018-10-26 | End: 2019-01-21

## 2018-10-26 NOTE — TELEPHONE ENCOUNTER
LOV with PCP was on 9/25/18. Use of Rx as requested is noted in office visit notes on that date with no changes and patient is to follow up in 2 months. Patient is doing so on 11/21/18. Writer will refill Rx as requested.    Prescription refilled per RN Medication Refill Policy..................Cruzito Robbins 10/26/2018 9:06 AM

## 2018-11-05 DIAGNOSIS — F41.1 ANXIETY STATE: Primary | ICD-10-CM

## 2018-11-07 RX ORDER — BUSPIRONE HYDROCHLORIDE 10 MG/1
TABLET ORAL
Qty: 180 TABLET | Refills: 2 | Status: SHIPPED | OUTPATIENT
Start: 2018-11-07 | End: 2019-08-04

## 2018-11-07 NOTE — TELEPHONE ENCOUNTER
LOV with PCP was on 9/25/18. Rx as requested was noted in office visit notes on that date with no changes and patient is to follow up in 2 months time. Is following up with PCP on 11/21/18. Writer will refill Rx as requested at this time.    Prescription refilled per RN Medication Refill Policy..................Cruzito Robbins 11/7/2018 4:03 PM

## 2018-11-21 ENCOUNTER — OFFICE VISIT (OUTPATIENT)
Dept: FAMILY MEDICINE | Facility: OTHER | Age: 63
End: 2018-11-21
Attending: FAMILY MEDICINE
Payer: COMMERCIAL

## 2018-11-21 VITALS
BODY MASS INDEX: 27.28 KG/M2 | DIASTOLIC BLOOD PRESSURE: 66 MMHG | WEIGHT: 169 LBS | SYSTOLIC BLOOD PRESSURE: 122 MMHG | RESPIRATION RATE: 18 BRPM | HEART RATE: 78 BPM

## 2018-11-21 DIAGNOSIS — R07.89 CHEST WALL PAIN, CHRONIC: Primary | ICD-10-CM

## 2018-11-21 DIAGNOSIS — G89.29 CHRONIC BILATERAL LOW BACK PAIN WITHOUT SCIATICA: ICD-10-CM

## 2018-11-21 DIAGNOSIS — I25.10 PRESENCE OF STENT IN CORONARY ARTERY IN PATIENT WITH CORONARY ARTERY DISEASE: ICD-10-CM

## 2018-11-21 DIAGNOSIS — G89.4 CHRONIC PAIN DISORDER: ICD-10-CM

## 2018-11-21 DIAGNOSIS — G89.29 CHEST WALL PAIN, CHRONIC: Primary | ICD-10-CM

## 2018-11-21 DIAGNOSIS — M15.0 PRIMARY OSTEOARTHRITIS INVOLVING MULTIPLE JOINTS: ICD-10-CM

## 2018-11-21 DIAGNOSIS — Z95.5 PRESENCE OF STENT IN CORONARY ARTERY IN PATIENT WITH CORONARY ARTERY DISEASE: ICD-10-CM

## 2018-11-21 DIAGNOSIS — D50.0 IRON DEFICIENCY ANEMIA DUE TO CHRONIC BLOOD LOSS: ICD-10-CM

## 2018-11-21 DIAGNOSIS — I10 ESSENTIAL HYPERTENSION: ICD-10-CM

## 2018-11-21 DIAGNOSIS — M54.50 CHRONIC BILATERAL LOW BACK PAIN WITHOUT SCIATICA: ICD-10-CM

## 2018-11-21 DIAGNOSIS — F41.9 CHRONIC ANXIETY: ICD-10-CM

## 2018-11-21 LAB
ANION GAP SERPL CALCULATED.3IONS-SCNC: 8 MMOL/L (ref 3–14)
BUN SERPL-MCNC: 22 MG/DL (ref 7–25)
CALCIUM SERPL-MCNC: 9.4 MG/DL (ref 8.6–10.3)
CHLORIDE SERPL-SCNC: 107 MMOL/L (ref 98–107)
CHOLEST SERPL-MCNC: 209 MG/DL
CO2 SERPL-SCNC: 25 MMOL/L (ref 21–31)
CREAT SERPL-MCNC: 1.07 MG/DL (ref 0.6–1.2)
ERYTHROCYTE [DISTWIDTH] IN BLOOD BY AUTOMATED COUNT: 13.5 % (ref 10–15)
GFR SERPL CREATININE-BSD FRML MDRD: 52 ML/MIN/1.7M2
GLUCOSE SERPL-MCNC: 87 MG/DL (ref 70–105)
HCT VFR BLD AUTO: 35.6 % (ref 35–47)
HDLC SERPL-MCNC: 64 MG/DL (ref 23–92)
HGB BLD-MCNC: 12.2 G/DL (ref 11.7–15.7)
LDLC SERPL CALC-MCNC: 121 MG/DL
MCH RBC QN AUTO: 31.2 PG (ref 26.5–33)
MCHC RBC AUTO-ENTMCNC: 34.3 G/DL (ref 31.5–36.5)
MCV RBC AUTO: 91 FL (ref 78–100)
NONHDLC SERPL-MCNC: 145 MG/DL
PLATELET # BLD AUTO: 263 10E9/L (ref 150–450)
POTASSIUM SERPL-SCNC: 4.4 MMOL/L (ref 3.5–5.1)
RBC # BLD AUTO: 3.91 10E12/L (ref 3.8–5.2)
SODIUM SERPL-SCNC: 140 MMOL/L (ref 134–144)
TRIGL SERPL-MCNC: 118 MG/DL
WBC # BLD AUTO: 6.9 10E9/L (ref 4–11)

## 2018-11-21 PROCEDURE — 93000 ELECTROCARDIOGRAM COMPLETE: CPT | Performed by: INTERNAL MEDICINE

## 2018-11-21 PROCEDURE — 80061 LIPID PANEL: CPT | Performed by: FAMILY MEDICINE

## 2018-11-21 PROCEDURE — 85027 COMPLETE CBC AUTOMATED: CPT | Performed by: FAMILY MEDICINE

## 2018-11-21 PROCEDURE — 99214 OFFICE O/P EST MOD 30 MIN: CPT | Mod: 25 | Performed by: FAMILY MEDICINE

## 2018-11-21 PROCEDURE — 80048 BASIC METABOLIC PNL TOTAL CA: CPT | Performed by: FAMILY MEDICINE

## 2018-11-21 PROCEDURE — 36415 COLL VENOUS BLD VENIPUNCTURE: CPT | Performed by: FAMILY MEDICINE

## 2018-11-21 RX ORDER — METOPROLOL TARTRATE 50 MG
50 TABLET ORAL 2 TIMES DAILY
Qty: 180 TABLET | Refills: 3 | Status: SHIPPED | OUTPATIENT
Start: 2018-11-21 | End: 2019-11-13

## 2018-11-21 RX ORDER — GABAPENTIN 600 MG/1
600 TABLET ORAL 3 TIMES DAILY
Qty: 90 TABLET | Refills: 2 | Status: SHIPPED | OUTPATIENT
Start: 2018-11-21 | End: 2019-01-23

## 2018-11-21 RX ORDER — CYCLOBENZAPRINE HCL 10 MG
10 TABLET ORAL 2 TIMES DAILY PRN
Qty: 42 TABLET | Refills: 3 | Status: SHIPPED | OUTPATIENT
Start: 2018-11-21 | End: 2019-11-13

## 2018-11-21 RX ORDER — AMLODIPINE BESYLATE 10 MG/1
10 TABLET ORAL DAILY
Qty: 90 TABLET | Refills: 3 | Status: SHIPPED | OUTPATIENT
Start: 2018-11-21 | End: 2019-11-13

## 2018-11-21 RX ORDER — OXYCODONE AND ACETAMINOPHEN 5; 325 MG/1; MG/1
2 TABLET ORAL 4 TIMES DAILY
Qty: 240 TABLET | Refills: 0 | Status: SHIPPED | OUTPATIENT
Start: 2018-12-23 | End: 2019-01-23

## 2018-11-21 RX ORDER — OXYCODONE AND ACETAMINOPHEN 5; 325 MG/1; MG/1
2 TABLET ORAL 4 TIMES DAILY
Qty: 240 TABLET | Refills: 0 | Status: SHIPPED | OUTPATIENT
Start: 2018-11-23 | End: 2018-11-21

## 2018-11-21 RX ORDER — CLOPIDOGREL BISULFATE 75 MG/1
75 TABLET ORAL DAILY
Qty: 90 TABLET | Refills: 3 | Status: SHIPPED | OUTPATIENT
Start: 2018-11-21 | End: 2019-11-13

## 2018-11-21 ASSESSMENT — PAIN SCALES - GENERAL: PAINLEVEL: MODERATE PAIN (5)

## 2018-11-21 ASSESSMENT — ANXIETY QUESTIONNAIRES
5. BEING SO RESTLESS THAT IT IS HARD TO SIT STILL: NOT AT ALL
2. NOT BEING ABLE TO STOP OR CONTROL WORRYING: NOT AT ALL
3. WORRYING TOO MUCH ABOUT DIFFERENT THINGS: NOT AT ALL
7. FEELING AFRAID AS IF SOMETHING AWFUL MIGHT HAPPEN: NOT AT ALL
GAD7 TOTAL SCORE: 3
1. FEELING NERVOUS, ANXIOUS, OR ON EDGE: NEARLY EVERY DAY
IF YOU CHECKED OFF ANY PROBLEMS ON THIS QUESTIONNAIRE, HOW DIFFICULT HAVE THESE PROBLEMS MADE IT FOR YOU TO DO YOUR WORK, TAKE CARE OF THINGS AT HOME, OR GET ALONG WITH OTHER PEOPLE: VERY DIFFICULT
6. BECOMING EASILY ANNOYED OR IRRITABLE: NOT AT ALL

## 2018-11-21 ASSESSMENT — PATIENT HEALTH QUESTIONNAIRE - PHQ9
SUM OF ALL RESPONSES TO PHQ QUESTIONS 1-9: 4
5. POOR APPETITE OR OVEREATING: NOT AT ALL

## 2018-11-21 NOTE — PROGRESS NOTES
SUBJECTIVE:  63 year old female presents to follow up on chronic pain in chest wall and back, headaches, history of gastric ulcer and recent stomach pain, previous anemia.    A few days ago noted chest pain. Improved with nitroglycerin. Keeps having some intermittent chest pain.   Known CAD with history of CABG and stenting of bypass graft. She believes that stress about seeing in-laws for Thanksgiving is the problem. Currently on Plavix, but not on aspirin due to concerns about a gastric ulcer.    Still worried about ulcer. Previous gastric ulcer in November 2017. It was healed on EGD with Dr Bowman in Feb 2018 after prolonged treatment with PPI and sucralfate.  Remains on sucralfate.     Seeing psychiatry for anxiety. Has been seeing Becky Olmstead NP for cares. Patient tried Trentillix and it caused blurry vision. Currently on clonazepam TID. She used to take QID. Anxiety worse, but tolerable on the lower clonazepam. She wonders if I will prescribe the clonazepam instead of psychiatry.     Gabapentin seems to cause some forgetfulness. She has not noticed benefit on gabapentin.     Continues on oxycodone for chronic chest wall and back pain. Has poor function when she stopped opioids and high doses have caused adverse effects. Doing well at current dosing.      REVIEW OF SYSTEMS:    Constitutional: negative  Respiratory: negative  Cardiovascular: negative    Current Outpatient Prescriptions   Medication Sig Dispense Refill     amLODIPine (NORVASC) 10 MG tablet Take 10 mg by mouth daily       aspirin EC 81 MG EC tablet Take 81 mg by mouth daily with food       busPIRone (BUSPAR) 10 MG tablet TAKE 1 TABLET TWICE A  tablet 2     clonazePAM (KLONOPIN) 1 MG tablet Take 1 tablet by mouth 4 times daily Becky Olmstead CNP       clopidogrel (PLAVIX) 75 MG tablet Take 75 mg by mouth daily       cyclobenzaprine (FLEXERIL) 10 MG tablet 1 tablet 2 times daily as needed for muscle spasms       Diclofenac Sodium 1.5 % SOLN Apply  20 drops to each hand 4 times daily       docusate sodium (COLACE) 50 MG capsule Take 50 mg by mouth daily       DULoxetine (CYMBALTA) 60 MG EC capsule Take 1 capsule (60 mg) by mouth daily 90 capsule 3     gabapentin (NEURONTIN) 800 MG tablet Take 1 tablet (800 mg) by mouth 3 times daily 90 tablet 3     lisinopril (PRINIVIL/ZESTRIL) 40 MG tablet TAKE 1 TABLET DAILY 90 tablet 4     metoprolol tartrate (LOPRESSOR) 50 MG tablet Take 1 tablet by mouth 2 times daily       NITROSTAT 0.3 MG sublingual tablet PLACE 1 TABLET UNDER THE TONGUE EVERY 5 MINUTES AS NEEDED FOR CHEST PAIN 25 tablet 2     ondansetron (ZOFRAN) 4 MG tablet Take 4 mg by mouth every 6 hours as needed for nausea       oxyCODONE-acetaminophen (PERCOCET) 5-325 MG per tablet Take 2 tablets by mouth 4 times daily Max acetaminophen dose: 4000mg in 24 hrs 240 tablet 0     pantoprazole (PROTONIX) 40 MG EC tablet TAKE 1 TABLET TWICE A  tablet 0     Potassium Gluconate (CVS POTASSIUM GLUCONATE) 2 MEQ TABS        pravastatin (PRAVACHOL) 40 MG tablet Take 1 tablet (40 mg) by mouth At Bedtime 90 tablet 3     saccharomyces boulardii (FLORASTOR) 250 MG capsule Take 250 mg by mouth 2 times daily       sucralfate (CARAFATE) 1 GM tablet Take 1 tablet (1 g) by mouth 4 times daily Before meals & at bedtime 120 tablet 2     VITAMIN D, CHOLECALCIFEROL, PO Take 5,000 Units by mouth daily       Allergies   Allergen Reactions     Atorvastatin Muscle Pain (Myalgia)     Liquid Adhesive Rash     Other reaction(s): Erythema  Silk and plastic gloves (long term attachment of adhesives)     Tiotropium Bromide [Tiotropium] Rash     Ezetimibe Muscle Pain (Myalgia)     Niacin      Other reaction(s): Flushing  flushing     Rosuvastatin Muscle Pain (Myalgia)     Other reaction(s): Myalgia     Latex Rash     No Clinical Screening - See Comments Rash, Blisters and Itching     Metals and plastic  Metals and plastics       Tape [Adhesive Tape] Rash       OBJECTIVE:  /66 (BP  Location: Right arm, Patient Position: Chair, Cuff Size: Adult Regular)  Pulse 78  Resp 18  Wt 169 lb (76.7 kg)  BMI 27.28 kg/m2    EXAM:  General Appearance: Pleasant, alert, appropriate appearance for age. No acute distress  Chest/Respiratory Exam: Clear to auscultation bilaterally  Cardiovascular Exam: Regular rate and rhythm. S1, S2, no murmur, gallop, or rubs.  Extremities: 2+ pedal pulses.  No lower extremity edema.  PSYCH: mentation appears normal and affect normal    EKG shows sinus bradycardia    ASSESSMENT/PLAN:    ICD-10-CM    1. Chest wall pain, chronic R07.89 gabapentin (NEURONTIN) 600 MG tablet    G89.29 oxyCODONE-acetaminophen (PERCOCET) 5-325 MG tablet     EKG [facility] READ     DISCONTINUED: oxyCODONE-acetaminophen (PERCOCET) 5-325 MG tablet   2. Chronic bilateral low back pain without sciatica M54.5 gabapentin (NEURONTIN) 600 MG tablet    G89.29 cyclobenzaprine (FLEXERIL) 10 MG tablet     oxyCODONE-acetaminophen (PERCOCET) 5-325 MG tablet     DISCONTINUED: oxyCODONE-acetaminophen (PERCOCET) 5-325 MG tablet   3. Chronic pain disorder G89.4 gabapentin (NEURONTIN) 600 MG tablet     oxyCODONE-acetaminophen (PERCOCET) 5-325 MG tablet     DISCONTINUED: oxyCODONE-acetaminophen (PERCOCET) 5-325 MG tablet   4. Iron deficiency anemia due to chronic blood loss D50.0 CBC W PLT No Diff     CBC W PLT No Diff   5. Essential hypertension I10 metoprolol tartrate (LOPRESSOR) 50 MG tablet     amLODIPine (NORVASC) 10 MG tablet     CBC W PLT No Diff     Basic Metabolic Panel     CBC W PLT No Diff     Basic Metabolic Panel   6. Chronic anxiety F41.9    7. Primary osteoarthritis involving multiple joints M15.0 Diclofenac Sodium 1.5 % SOLN   8. Presence of stent in coronary artery in patient with coronary artery disease I25.10 Lipid Panel    Z95.5 clopidogrel (PLAVIX) 75 MG tablet     Lipid Panel     EKG [facility] READ       Refilled oxycodone at same dosing of 10 mg QID for 40 mg daily. 60 morphine  milligram equivalents daily. UDM appropriate 10/31/17. Given 2 month supply. Due for updated urine drug monitoring.    Takes clonazepam from psychiatry for anxiety and has with opioids for years. Aware of risk for overdose with both medications. She has now reduced clonazepam and opioids from historic levels.  Discussed with aging that she will need to slowly reduce opioids and benzos with time as her body will not metabolize them properly.    She is not seen any benefit with the gabapentin.  It could potentially contribute to side effects like difficulties with memory or confusion.  Therefore reduce gabapentin from 800 mg 3 times daily down to 600 mg 3 times daily.  Will consider further reduction at next appointment.    Continue on pantoprazole for previous stomach ulcer.  Continue avoiding NSAIDs.    Refilled chronic medications including diclofenac solution, amlodipine, metoprolol, Flexeril    Follow-up in 2 months.    Ramirez Cano M.D.     This document was prepared using voice generated software.  While every attempt was made for accuracy, grammatical errors may exist.

## 2018-11-21 NOTE — MR AVS SNAPSHOT
After Visit Summary   11/21/2018    Ankita Day    MRN: [id number]           Patient Information     Date Of Birth          1955        Visit Information        Provider Department      11/21/2018 11:15 AM Ramirez Cano MD [facility]        Today's Diagnoses     Chest wall pain, chronic    -  1    Chronic bilateral low back pain without sciatica        Chronic pain disorder        Iron deficiency anemia due to chronic blood loss        Essential hypertension        Chronic anxiety        Primary osteoarthritis involving multiple joints        Presence of stent in coronary artery in patient with coronary artery disease          Care Instructions    Refilled oxycodone  In the long run we should gradually reduce clonazepam, stick with three times daily for now  Reduce gabapentin from 800 mg to 600 mg three times daily  Continue pantoprazole  Follow-up in a couple months            Follow-ups after your visit        Future tests that were ordered for you today     Open Future Orders        Priority Expected Expires Ordered    CBC W PLT No Diff Routine  11/21/2019 11/21/2018    Basic Metabolic Panel Routine  11/21/2019 11/21/2018    Lipid Panel Routine  11/21/2019 11/21/2018            Who to contact     If you have questions or need follow up information about today's clinic visit or your schedule please contact [facility] directly at [phone].  Normal or non-critical lab and imaging results will be communicated to you by MyChart, letter or phone within 4 business days after the clinic has received the results. If you do not hear from us within 7 days, please contact the clinic through MyChart or phone. If you have a critical or abnormal lab result, we will notify you by phone as soon as possible.  Submit refill requests through [employer] or call your pharmacy and they will forward the refill request to us. Please allow 3 business days for your  refill to be completed.          Additional Information About Your Visit        Hi[employer] Information     payever gives you secure access to your electronic health record. If you see a primary care provider, you can also send messages to your care team and make appointments. If you have questions, please call your primary care clinic.  If you do not have a primary care provider, please call [phone] and they will assist you.        Care EveryWhere ID     This is your Care EveryWhere ID. This could be used by other organizations to access your [facility] medical records  [id number]        Your Vitals Were     Pulse Respirations BMI (Body Mass Index)             78 18 27.28 kg/m2          Blood Pressure from Last 3 Encounters:   11/21/18 122/66   09/25/18 132/78   08/23/18 130/78    Weight from Last 3 Encounters:   11/21/18 169 lb (76.7 kg)   08/23/18 166 lb 9.6 oz (75.6 kg)   07/25/18 167 lb (75.8 kg)                 Today's Medication Changes          These changes are accurate as of 11/21/18 11:48 AM.  If you have any questions, ask your nurse or doctor.               Start taking these medicines.        Dose/Directions    oxyCODONE-acetaminophen 5-325 MG tablet   Commonly known as:  PERCOCET   Used for:  Chronic pain disorder, Chronic bilateral low back pain without sciatica, Chest wall pain, chronic   Started by:  Ramirez Cano MD        Dose:  2 tablet   Start taking on:  12/23/2018   Take 2 tablets by mouth 4 times daily Max acetaminophen dose: 4000mg in 24 hrs   Quantity:  240 tablet   Refills:  0         These medicines have changed or have updated prescriptions.        Dose/Directions    cyclobenzaprine 10 MG tablet   Commonly known as:  FLEXERIL   This may have changed:  how to take this   Used for:  Chronic bilateral low back pain without sciatica   Changed by:  Ramirez Cano MD        Dose:  10 mg   Take 1 tablet (10 mg) by mouth 2 times daily as needed for muscle spasms   Quantity:  42 tablet    Refills:  3       Diclofenac Sodium 1.5 % Soln   This may have changed:  See the new instructions.   Used for:  Primary osteoarthritis involving multiple joints   Changed by:  Ramirez Cano MD        Apply 20 drops to each hand or 40 drops to each knee up to 4 times daily as needed for arthritis pain   Quantity:  450 mL   Refills:  3       gabapentin 600 MG tablet   Commonly known as:  NEURONTIN   This may have changed:    - medication strength  - how much to take   Used for:  Chest wall pain, chronic, Chronic bilateral low back pain without sciatica, Chronic pain disorder   Changed by:  Ramirez Cano MD        Dose:  600 mg   Take 1 tablet (600 mg) by mouth 3 times daily   Quantity:  90 tablet   Refills:  2            Where to get your medicines      These medications were sent to [facility] [address] [address]     Phone:  [phone]     amLODIPine 10 MG tablet    clopidogrel 75 MG tablet    metoprolol tartrate 50 MG tablet         These medications were sent to [id number] - [place], [place] - [address]  [address] [address]     Phone:  [phone]     cyclobenzaprine 10 MG tablet    Diclofenac Sodium 1.5 % Soln    gabapentin 600 MG tablet         Some of these will need a paper prescription and others can be bought over the counter.  Ask your nurse if you have questions.     Bring a paper prescription for each of these medications     oxyCODONE-acetaminophen 5-325 MG tablet               Information about OPIOIDS     PRESCRIPTION OPIOIDS: WHAT YOU NEED TO KNOW   We gave you an opioid (narcotic) pain medicine. It is important to manage your pain, but opioids are not always the best choice. You should first try all the other options your care team gave you. Take this medicine for as short a time (and as few doses) as possible.    Some activities can increase your  pain, such as bandage changes or therapy sessions. It may help to take your pain medicine 30 to 60 minutes before these activities. Reduce your stress by getting enough sleep, working on hobbies you enjoy and practicing relaxation or meditation. Talk to your care team about ways to manage your pain beyond prescription opioids.    These medicines have risks:    DO NOT drive when on new or higher doses of pain medicine. These medicines can affect your alertness and reaction times, and you could be arrested for driving under the influence (DUI). If you need to use opioids long-term, talk to your care team about driving.    DO NOT operate heavy machinery    DO NOT do any other dangerous activities while taking these medicines.    DO NOT drink any alcohol while taking these medicines.     If the opioid prescribed includes acetaminophen, DO NOT take with any other medicines that contain acetaminophen. Read all labels carefully. Look for the word  acetaminophen  or  Tylenol.  Ask your pharmacist if you have questions or are unsure.    You can get addicted to pain medicines, especially if you have a history of addiction (chemical, alcohol or substance dependence). Talk to your care team about ways to reduce this risk.    All opioids tend to cause constipation. Drink plenty of water and eat foods that have a lot of fiber, such as fruits, vegetables, prune juice, apple juice and high-fiber cereal. Take a laxative (Miralax, milk of magnesia, Colace, Senna) if you don t move your bowels at least every other day. Other side effects include upset stomach, sleepiness, dizziness, throwing up, tolerance (needing more of the medicine to have the same effect), physical dependence and slowed breathing.    Store your pills in a secure place, locked if possible. We will not replace any lost or stolen medicine. If you don t finish your medicine, please throw away (dispose) as directed by your pharmacist. The [place] [employer] has more information about safe disposal: [email or web address]         Primary Care Provider Office Phone # Fax #    Ramirez Cano [phone] [address]  [place] [address]        Equal Access to Services     REINALDOURIEL TATIANA : Hadii aad ku hadasho Soomaali, waaxda luqadaha, qaybta kaalmada adeegyada, waxay idiin hayaan adeeg khbradsh laShannaaan . So [facility] [phone].    ATENCIÓN: Si habla español, tiene a siddiqui disposición servicios gratuitos de asistencia lingüística. Llame al [phone].    We comply with applicable federal civil rights laws and [place] laws. We do not discriminate on the basis of race, color, national origin, age, disability, sex, sexual orientation, or gender identity.            Thank you!     Thank you for choosing [facility] AND [facility]  for your care. Our goal is always to provide you with excellent care. Hearing back from our patients is one way we can continue to improve our services. Please take a few minutes to complete the written survey that you may receive in the mail after your visit with us. Thank you!             Your Updated Medication List - Protect others around you: Learn how to safely use, store and throw away your medicines at www.disposemymeds.org.          This list is accurate as of 11/21/18 11:48 AM.  Always use your most recent med list.                   Brand Name Dispense Instructions for use Diagnosis    amLODIPine 10 MG tablet    NORVASC    90 tablet    Take 1 tablet (10 mg) by mouth daily    Essential hypertension       aspirin 81 MG EC tablet      Take 81 mg by mouth daily with food        busPIRone 10 MG tablet    BUSPAR    180 tablet    TAKE 1 TABLET TWICE A DAY    Anxiety state       clonazePAM 1 MG tablet    klonoPIN     Take 1 tablet by mouth 4 times daily Becky Olmstead CNP        clopidogrel 75 MG tablet    PLAVIX    90 tablet    Take 1 tablet (75 mg) by  mouth daily    Presence of stent in coronary artery in patient with coronary artery disease       [employer] POTASSIUM GLUCONATE 2 MEQ Tabs   Generic drug:  Potassium Gluconate           cyclobenzaprine 10 MG tablet    FLEXERIL    42 tablet    Take 1 tablet (10 mg) by mouth 2 times daily as needed for muscle spasms    Chronic bilateral low back pain without sciatica       Diclofenac Sodium 1.5 % Soln     450 mL    Apply 20 drops to each hand or 40 drops to each knee up to 4 times daily as needed for arthritis pain    Primary osteoarthritis involving multiple joints       docusate sodium 50 MG capsule    COLACE     Take 50 mg by mouth daily        DULoxetine 60 MG EC capsule    CYMBALTA    90 capsule    Take 1 capsule (60 mg) by mouth daily    Chronic bilateral low back pain without sciatica, Chest wall pain, chronic, Chronic pain disorder, Severe episode of recurrent major depressive disorder, without psychotic features (H)       gabapentin 600 MG tablet    NEURONTIN    90 tablet    Take 1 tablet (600 mg) by mouth 3 times daily    Chest wall pain, chronic, Chronic bilateral low back pain without sciatica, Chronic pain disorder       lisinopril 40 MG tablet    PRINIVIL/ZESTRIL    90 tablet    TAKE 1 TABLET DAILY    Essential hypertension       metoprolol tartrate 50 MG tablet    LOPRESSOR    180 tablet    Take 1 tablet (50 mg) by mouth 2 times daily    Essential hypertension       NITROSTAT 0.3 MG sublingual tablet   Generic drug:  nitroGLYcerin     25 tablet    PLACE 1 TABLET UNDER THE TONGUE EVERY 5 MINUTES AS NEEDED FOR CHEST PAIN    Chest pain of uncertain etiology       ondansetron 4 MG tablet    ZOFRAN     Take 4 mg by mouth every 6 hours as needed for nausea        oxyCODONE-acetaminophen 5-325 MG tablet   Start taking on:  12/23/2018    PERCOCET    240 tablet    Take 2 tablets by mouth 4 times daily Max acetaminophen dose: 4000mg in 24 hrs    Chronic pain disorder, Chronic bilateral low back pain without sciatica,  Chest wall pain, chronic       pantoprazole 40 MG EC tablet    PROTONIX    180 tablet    TAKE 1 TABLET TWICE A DAY    Peptic ulcer       pravastatin 40 MG tablet    PRAVACHOL    90 tablet    Take 1 tablet (40 mg) by mouth At Bedtime    Atherosclerosis of coronary artery bypass graft of native heart without angina pectoris       saccharomyces boulardii 250 MG capsule    FLORASTOR     Take 250 mg by mouth 2 times daily        sucralfate 1 GM tablet    CARAFATE    120 tablet    Take 1 tablet (1 g) by mouth 4 times daily Before meals & at bedtime    Chronic gastric ulcer with hemorrhage       VITAMIN D (CHOLECALCIFEROL) PO      Take 5,000 Units by mouth daily

## 2018-11-21 NOTE — NURSING NOTE
Pt presents to clinic today for medication management and chest wall pain x 5 days. Pt states she took a nitroglycerin last on 11/19.  Jamilah Berger

## 2018-11-21 NOTE — PATIENT INSTRUCTIONS
Refilled oxycodone  In the long run we should gradually reduce clonazepam, stick with three times daily for now  Reduce gabapentin from 800 mg to 600 mg three times daily  Continue pantoprazole  Follow-up in a couple months

## 2018-11-22 ASSESSMENT — ANXIETY QUESTIONNAIRES: GAD7 TOTAL SCORE: 3

## 2019-01-04 ENCOUNTER — OFFICE VISIT (OUTPATIENT)
Dept: FAMILY MEDICINE | Facility: OTHER | Age: 64
End: 2019-01-04
Attending: FAMILY MEDICINE
Payer: MEDICARE

## 2019-01-04 VITALS
DIASTOLIC BLOOD PRESSURE: 62 MMHG | TEMPERATURE: 97.6 F | HEART RATE: 80 BPM | SYSTOLIC BLOOD PRESSURE: 114 MMHG | RESPIRATION RATE: 18 BRPM

## 2019-01-04 DIAGNOSIS — G89.29 CHRONIC BILATERAL LOW BACK PAIN WITHOUT SCIATICA: Primary | ICD-10-CM

## 2019-01-04 DIAGNOSIS — M54.50 CHRONIC BILATERAL LOW BACK PAIN WITHOUT SCIATICA: Primary | ICD-10-CM

## 2019-01-04 DIAGNOSIS — M54.10 ACUTE LOW BACK PAIN WITH RADICULAR SYMPTOMS, DURATION LESS THAN 6 WEEKS: ICD-10-CM

## 2019-01-04 PROCEDURE — G0463 HOSPITAL OUTPT CLINIC VISIT: HCPCS | Performed by: FAMILY MEDICINE

## 2019-01-04 PROCEDURE — 99213 OFFICE O/P EST LOW 20 MIN: CPT | Performed by: FAMILY MEDICINE

## 2019-01-04 ASSESSMENT — PAIN SCALES - GENERAL: PAINLEVEL: EXTREME PAIN (8)

## 2019-01-04 ASSESSMENT — PATIENT HEALTH QUESTIONNAIRE - PHQ9: SUM OF ALL RESPONSES TO PHQ QUESTIONS 1-9: 6

## 2019-01-04 NOTE — PATIENT INSTRUCTIONS
Try diclofenac patch on back twice daily  Referral to PT at [facility]  If not improving, then we will get and MRI  Let me know if you want to try something different than Flexeril if it does not help

## 2019-01-04 NOTE — NURSING NOTE
Pt presents to clinic today for ongoing back pain x 1 month. Pt states this episode started when she was helping her friend move.      Medication Reconciliation: complete  Jamilah Berger LPN

## 2019-01-04 NOTE — PROGRESS NOTES
"Nursing Notes:   Jamilah Berger, LPN  1/4/2019 10:39 AM  Signed  Pt presents to clinic today for ongoing back pain x 1 month. Pt states this episode started when she was helping her friend move.      Medication Reconciliation: complete  Jamilah Berger LPN      SUBJECTIVE:  64 year old female with chronic pain on opioids presents for worse low back pain for a couple weeks. Radiates to hips. \"Both legs feel heavy.\" Does \"not seem like sciatica\" to her. Entire legs are sore. Tingling intermittently in toes. Sitting or laying helps. Moving around makes her worse.   Started after helping move a shelf before Christmas. Had aggravated back in the summer, but was dealing with it.    Using Flexeril, but not really helping.     REVIEW OF SYSTEMS:    Pertinent items are noted in HPI.    Current Outpatient Medications   Medication Sig Dispense Refill     amLODIPine (NORVASC) 10 MG tablet Take 1 tablet (10 mg) by mouth daily 90 tablet 3     aspirin EC 81 MG EC tablet Take 81 mg by mouth daily with food       busPIRone (BUSPAR) 10 MG tablet TAKE 1 TABLET TWICE A  tablet 2     clonazePAM (KLONOPIN) 1 MG tablet Take 1 tablet by mouth 4 times daily Becky Olmstead, CNP       clopidogrel (PLAVIX) 75 MG tablet Take 1 tablet (75 mg) by mouth daily 90 tablet 3     cyclobenzaprine (FLEXERIL) 10 MG tablet Take 1 tablet (10 mg) by mouth 2 times daily as needed for muscle spasms 42 tablet 3     Diclofenac Sodium 1.5 % SOLN Apply 20 drops to each hand or 40 drops to each knee up to 4 times daily as needed for arthritis pain 450 mL 3     docusate sodium (COLACE) 50 MG capsule Take 50 mg by mouth daily       DULoxetine (CYMBALTA) 60 MG EC capsule Take 1 capsule (60 mg) by mouth daily 90 capsule 3     gabapentin (NEURONTIN) 600 MG tablet Take 1 tablet (600 mg) by mouth 3 times daily 90 tablet 2     lisinopril (PRINIVIL/ZESTRIL) 40 MG tablet TAKE 1 TABLET DAILY 90 tablet 4     metoprolol tartrate (LOPRESSOR) 50 MG tablet Take 1 tablet (50 " mg) by mouth 2 times daily 180 tablet 3     NITROSTAT 0.3 MG sublingual tablet PLACE 1 TABLET UNDER THE TONGUE EVERY 5 MINUTES AS NEEDED FOR CHEST PAIN 25 tablet 2     ondansetron (ZOFRAN) 4 MG tablet Take 4 mg by mouth every 6 hours as needed for nausea       oxyCODONE-acetaminophen (PERCOCET) 5-325 MG tablet Take 2 tablets by mouth 4 times daily Max acetaminophen dose: 4000mg in 24 hrs 240 tablet 0     pantoprazole (PROTONIX) 40 MG EC tablet TAKE 1 TABLET TWICE A  tablet 0     Potassium Gluconate ([employer]S POTASSIUM GLUCONATE) 2 MEQ TABS        pravastatin (PRAVACHOL) 40 MG tablet Take 1 tablet (40 mg) by mouth At Bedtime 90 tablet 3     saccharomyces boulardii (FLORASTOR) 250 MG capsule Take 250 mg by mouth 2 times daily       sucralfate (CARAFATE) 1 GM tablet Take 1 tablet (1 g) by mouth 4 times daily Before meals & at bedtime 120 tablet 2     VITAMIN D, CHOLECALCIFEROL, PO Take 5,000 Units by mouth daily       Allergies   Allergen Reactions     Atorvastatin Muscle Pain (Myalgia)     Liquid Adhesive Rash     Other reaction(s): Erythema  Silk and plastic gloves (long term attachment of adhesives)     Tiotropium Bromide [Tiotropium] Rash     Ezetimibe Muscle Pain (Myalgia)     Niacin      Other reaction(s): Flushing  flushing     Rosuvastatin Muscle Pain (Myalgia)     Other reaction(s): Myalgia     Latex Rash     No Clinical Screening - See Comments Rash, Blisters and Itching     Metals and plastic  Metals and plastics       Tape [Adhesive Tape] Rash       OBJECTIVE:  /62 (BP Location: Right arm, Patient Position: Chair, Cuff Size: Adult Large)   Pulse 80   Temp 97.6  F (36.4  C) (Tympanic)   Resp 18     EXAM:  General Appearance: Pleasant, alert, appropriate appearance for age. No acute distress  Musculoskeletal Exam: Lumbar Spine: tenderness over lumbar paraspinous muscles  Neurologic Exam: reflexes 2+, strength symmetric lower extremities; SLR negative bilaterally  Psychiatric: Normal affect and  mentation      ASSESSMENT/PLAN:    ICD-10-CM    1. Chronic bilateral low back pain without sciatica M54.5 PHYSICAL THERAPY REFERRAL    G89.29    2. Acute low back pain with radicular symptoms, duration less than 6 weeks M54.10 PHYSICAL THERAPY REFERRAL     diclofenac (FLECTOR) 1.3 % patch       She is requesting an MRI. We discussed how MRI changes after treatment like PT. Recommend PT and if not improving, then obtain MRI. She agrees with this plan. Would entertain epidural injection if not able to tolerate PT. Referral to [facility].   Continues on baseline opioids. Discussed trying alternate muscle relaxer, but she would like to stick with Flexeril for now. Could use tizanidine instead if she desires.   Try diclofenac patch on back while waiting to start PT  F/U in 3 weeks    Ramirez Cano MD    This document was prepared using a combination of typing and voice generated software.  While every attempt was made for accuracy, spelling and grammatical errors may exist.

## 2019-01-09 ENCOUNTER — TRANSFERRED RECORDS (OUTPATIENT)
Dept: HEALTH INFORMATION MANAGEMENT | Facility: OTHER | Age: 64
End: 2019-01-09

## 2019-01-23 ENCOUNTER — OFFICE VISIT (OUTPATIENT)
Dept: FAMILY MEDICINE | Facility: OTHER | Age: 64
End: 2019-01-23
Attending: FAMILY MEDICINE
Payer: MEDICARE

## 2019-01-23 VITALS — HEART RATE: 76 BPM | SYSTOLIC BLOOD PRESSURE: 114 MMHG | RESPIRATION RATE: 16 BRPM | DIASTOLIC BLOOD PRESSURE: 62 MMHG

## 2019-01-23 DIAGNOSIS — G89.29 CHEST WALL PAIN, CHRONIC: ICD-10-CM

## 2019-01-23 DIAGNOSIS — M54.41 ACUTE BILATERAL LOW BACK PAIN WITH RIGHT-SIDED SCIATICA: Primary | ICD-10-CM

## 2019-01-23 DIAGNOSIS — J44.9 CHRONIC OBSTRUCTIVE PULMONARY DISEASE, UNSPECIFIED COPD TYPE (H): ICD-10-CM

## 2019-01-23 DIAGNOSIS — G89.29 CHRONIC BILATERAL LOW BACK PAIN WITHOUT SCIATICA: ICD-10-CM

## 2019-01-23 DIAGNOSIS — I25.810 ATHEROSCLEROSIS OF CORONARY ARTERY BYPASS GRAFT OF NATIVE HEART WITHOUT ANGINA PECTORIS: ICD-10-CM

## 2019-01-23 DIAGNOSIS — G89.4 CHRONIC PAIN DISORDER: ICD-10-CM

## 2019-01-23 DIAGNOSIS — R07.89 CHEST WALL PAIN, CHRONIC: ICD-10-CM

## 2019-01-23 DIAGNOSIS — M54.50 CHRONIC BILATERAL LOW BACK PAIN WITHOUT SCIATICA: ICD-10-CM

## 2019-01-23 PROCEDURE — G0463 HOSPITAL OUTPT CLINIC VISIT: HCPCS

## 2019-01-23 PROCEDURE — 99213 OFFICE O/P EST LOW 20 MIN: CPT | Performed by: FAMILY MEDICINE

## 2019-01-23 RX ORDER — OXYCODONE AND ACETAMINOPHEN 5; 325 MG/1; MG/1
2 TABLET ORAL 4 TIMES DAILY
Qty: 240 TABLET | Refills: 0 | Status: SHIPPED | OUTPATIENT
Start: 2019-01-23 | End: 2019-02-20

## 2019-01-23 RX ORDER — ALBUTEROL SULFATE 90 UG/1
2 AEROSOL, METERED RESPIRATORY (INHALATION) EVERY 6 HOURS
Qty: 2 INHALER | Refills: 3 | Status: SHIPPED | OUTPATIENT
Start: 2019-01-23 | End: 2020-01-14

## 2019-01-23 RX ORDER — GABAPENTIN 600 MG/1
600 TABLET ORAL 3 TIMES DAILY
Qty: 270 TABLET | Refills: 2 | Status: SHIPPED | OUTPATIENT
Start: 2019-01-23 | End: 2019-11-13

## 2019-01-23 RX ORDER — PRAVASTATIN SODIUM 40 MG
40 TABLET ORAL AT BEDTIME
Qty: 90 TABLET | Refills: 3 | Status: SHIPPED | OUTPATIENT
Start: 2019-01-23 | End: 2019-12-23

## 2019-01-23 ASSESSMENT — PAIN SCALES - GENERAL: PAINLEVEL: SEVERE PAIN (6)

## 2019-01-23 NOTE — PATIENT INSTRUCTIONS
Refilled oxycodone  Ordered MRI of the low back  Continue PT  Follow-up by 1 month    Refilled other medications to [facility]

## 2019-01-23 NOTE — NURSING NOTE
Pt presents to clinic today for medication management.      Medication Reconciliation: complete  Jamilah Berger LPN

## 2019-01-23 NOTE — PROGRESS NOTES
Nursing Notes:   Jamilah Berger LPN  1/23/2019 10:23 AM  Signed  Pt presents to clinic today for medication management.      Medication Reconciliation: complete  Jamilah Berger LPN      SUBJECTIVE:  64 year old female with chronic pain on opioids presents for worse low back pain for over a month.     Referred to physical therapy. Doing exercises helps, but they do not last. Despite PT has some progression in symptoms. Back pain radiates to right leg. Tingling in right toes.    She is frustrated because she likes cleaning and the pain is limiting her ability to clean.    Gabapentin at 800 mg caused forgetfulness. Now on 600 mg and memory is no longer really an issue.  Feels that the gabapentin does provide some benefit.     Remains on chronic oxycodone for chronic low back pain, chronic chest wall pain.  When she has stopped opioids, her function has decreased.  On higher amounts, she has side effects.  Current dosing has helped maintain function until recent back injury.    Diclofenac patches were not covered.  Needs refills on some other medications.    REVIEW OF SYSTEMS:    Pertinent items are noted in HPI.    Current Outpatient Medications   Medication Sig Dispense Refill     amLODIPine (NORVASC) 10 MG tablet Take 1 tablet (10 mg) by mouth daily 90 tablet 3     aspirin EC 81 MG EC tablet Take 81 mg by mouth daily with food       busPIRone (BUSPAR) 10 MG tablet TAKE 1 TABLET TWICE A  tablet 2     clonazePAM (KLONOPIN) 1 MG tablet Take 1 tablet by mouth 4 times daily Becky Olmstead CNP       clopidogrel (PLAVIX) 75 MG tablet Take 1 tablet (75 mg) by mouth daily 90 tablet 3     cyclobenzaprine (FLEXERIL) 10 MG tablet Take 1 tablet (10 mg) by mouth 2 times daily as needed for muscle spasms 42 tablet 3     diclofenac (FLECTOR) 1.3 % patch Place 1 patch onto the skin 2 times daily 20 patch 3     Diclofenac Sodium 1.5 % SOLN Apply 20 drops to each hand or 40 drops to each knee up to 4 times daily as needed for  arthritis pain 450 mL 3     docusate sodium (COLACE) 50 MG capsule Take 50 mg by mouth daily       DULoxetine (CYMBALTA) 60 MG EC capsule Take 1 capsule (60 mg) by mouth daily 90 capsule 3     gabapentin (NEURONTIN) 600 MG tablet Take 1 tablet (600 mg) by mouth 3 times daily 90 tablet 2     lisinopril (PRINIVIL/ZESTRIL) 40 MG tablet TAKE 1 TABLET DAILY 90 tablet 4     metoprolol tartrate (LOPRESSOR) 50 MG tablet Take 1 tablet (50 mg) by mouth 2 times daily 180 tablet 3     NITROSTAT 0.3 MG sublingual tablet PLACE 1 TABLET UNDER THE TONGUE EVERY 5 MINUTES AS NEEDED FOR CHEST PAIN 25 tablet 2     ondansetron (ZOFRAN) 4 MG tablet Take 4 mg by mouth every 6 hours as needed for nausea       oxyCODONE-acetaminophen (PERCOCET) 5-325 MG tablet Take 2 tablets by mouth 4 times daily Max acetaminophen dose: 4000mg in 24 hrs 240 tablet 0     pantoprazole (PROTONIX) 40 MG EC tablet TAKE 1 TABLET TWICE A  tablet 3     pravastatin (PRAVACHOL) 40 MG tablet Take 1 tablet (40 mg) by mouth At Bedtime 90 tablet 3     saccharomyces boulardii (FLORASTOR) 250 MG capsule Take 250 mg by mouth 2 times daily       sucralfate (CARAFATE) 1 GM tablet Take 1 tablet (1 g) by mouth 4 times daily Before meals & at bedtime 120 tablet 2     VITAMIN D, CHOLECALCIFEROL, PO Take 5,000 Units by mouth daily       Allergies   Allergen Reactions     Atorvastatin Muscle Pain (Myalgia)     Liquid Adhesive Rash     Other reaction(s): Erythema  Silk and plastic gloves (long term attachment of adhesives)     Tiotropium Bromide [Tiotropium] Rash     Ezetimibe Muscle Pain (Myalgia)     Niacin      Other reaction(s): Flushing  flushing     Rosuvastatin Muscle Pain (Myalgia)     Other reaction(s): Myalgia     Latex Rash     No Clinical Screening - See Comments Rash, Blisters and Itching     Metals and plastic  Metals and plastics       Tape [Adhesive Tape] Rash       OBJECTIVE:  /62 (BP Location: Right arm, Patient Position: Chair, Cuff Size: Adult  Large)   Pulse 76   Resp 16     EXAM:  General Appearance: Pleasant, alert, appropriate appearance for age. No acute distress  Musculoskeletal Exam: Lumbar Spine: tenderness over lumbar paraspinous muscles, More tenderness over bilateral SI joints.  Neurologic Exam: reflexes 2+, strength symmetric lower extremities; SLR negative bilaterally  Psychiatric: Normal affect and mentation      ASSESSMENT/PLAN:    ICD-10-CM    1. Acute bilateral low back pain with right-sided sciatica M54.41 MR Lumbar Spine w/o Contrast   2. Chronic pain disorder G89.4 oxyCODONE-acetaminophen (PERCOCET) 5-325 MG tablet     gabapentin (NEURONTIN) 600 MG tablet   3. Chronic bilateral low back pain without sciatica M54.5 oxyCODONE-acetaminophen (PERCOCET) 5-325 MG tablet    G89.29 gabapentin (NEURONTIN) 600 MG tablet   4. Chest wall pain, chronic R07.89 oxyCODONE-acetaminophen (PERCOCET) 5-325 MG tablet    G89.29 gabapentin (NEURONTIN) 600 MG tablet   5. 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 Chronic obstructive pulmonary disease, unspecified COPD type (H) J44.9 albuterol (PROAIR HFA/PROVENTIL HFA/VENTOLIN HFA) 108 (90 Base) MCG/ACT inhaler     Has back pain with sciatica despite starting PT.  Ordered MRI for further assessment.  She will continue with physical therapy and we can discuss results.  Potentially she may benefit from a back injection.    Refilled oxycodone at same dosing.  See above for details.  Refilled gabapentin as she feels current dose is helping pain, but not causing side effects.    Refilled pravastatin and albuterol    F/U by 1 month    Ramirez Cano MD    This document was prepared using a combination of typing and voice generated software.  While every attempt was made for accuracy, spelling and grammatical errors may exist.

## 2019-01-25 ENCOUNTER — HOSPITAL ENCOUNTER (OUTPATIENT)
Dept: MRI IMAGING | Facility: OTHER | Age: 64
Discharge: HOME | End: 2019-01-25
Attending: FAMILY MEDICINE | Admitting: FAMILY MEDICINE
Payer: MEDICARE

## 2019-01-25 DIAGNOSIS — M54.41 ACUTE BILATERAL LOW BACK PAIN WITH RIGHT-SIDED SCIATICA: ICD-10-CM

## 2019-01-25 PROCEDURE — 72148 MRI LUMBAR SPINE W/O DYE: CPT

## 2019-02-17 ENCOUNTER — TRANSFERRED RECORDS (OUTPATIENT)
Dept: HEALTH INFORMATION MANAGEMENT | Facility: OTHER | Age: 64
End: 2019-02-17

## 2019-02-20 ENCOUNTER — OFFICE VISIT (OUTPATIENT)
Dept: FAMILY MEDICINE | Facility: OTHER | Age: 64
End: 2019-02-20
Attending: FAMILY MEDICINE
Payer: MEDICARE

## 2019-02-20 VITALS
TEMPERATURE: 97.2 F | SYSTOLIC BLOOD PRESSURE: 102 MMHG | HEART RATE: 58 BPM | DIASTOLIC BLOOD PRESSURE: 63 MMHG | WEIGHT: 179 LBS | BODY MASS INDEX: 28.89 KG/M2 | RESPIRATION RATE: 16 BRPM

## 2019-02-20 DIAGNOSIS — F41.9 CHRONIC ANXIETY: ICD-10-CM

## 2019-02-20 DIAGNOSIS — G89.29 CHEST WALL PAIN, CHRONIC: ICD-10-CM

## 2019-02-20 DIAGNOSIS — M54.50 CHRONIC BILATERAL LOW BACK PAIN WITHOUT SCIATICA: ICD-10-CM

## 2019-02-20 DIAGNOSIS — M54.16 LUMBAR BACK PAIN WITH RADICULOPATHY AFFECTING RIGHT LOWER EXTREMITY: ICD-10-CM

## 2019-02-20 DIAGNOSIS — R07.89 CHEST WALL PAIN, CHRONIC: ICD-10-CM

## 2019-02-20 DIAGNOSIS — G89.4 CHRONIC PAIN DISORDER: Primary | ICD-10-CM

## 2019-02-20 DIAGNOSIS — M47.816 LUMBAR FACET ARTHROPATHY: ICD-10-CM

## 2019-02-20 DIAGNOSIS — G89.29 CHRONIC BILATERAL LOW BACK PAIN WITHOUT SCIATICA: ICD-10-CM

## 2019-02-20 PROBLEM — M47.9 OSTEOARTHRITIS OF SPINE: Status: RESOLVED | Noted: 2018-01-22 | Resolved: 2019-02-20

## 2019-02-20 PROCEDURE — 99214 OFFICE O/P EST MOD 30 MIN: CPT | Performed by: FAMILY MEDICINE

## 2019-02-20 PROCEDURE — G0463 HOSPITAL OUTPT CLINIC VISIT: HCPCS

## 2019-02-20 RX ORDER — OXYCODONE AND ACETAMINOPHEN 5; 325 MG/1; MG/1
2 TABLET ORAL 4 TIMES DAILY
Qty: 240 TABLET | Refills: 0 | Status: SHIPPED | OUTPATIENT
Start: 2019-03-24 | End: 2019-04-03

## 2019-02-20 RX ORDER — OXYCODONE AND ACETAMINOPHEN 5; 325 MG/1; MG/1
2 TABLET ORAL 4 TIMES DAILY
Qty: 240 TABLET | Refills: 0 | Status: SHIPPED | OUTPATIENT
Start: 2019-02-20 | End: 2019-02-20

## 2019-02-20 RX ORDER — CLONAZEPAM 1 MG/1
1 TABLET ORAL 3 TIMES DAILY PRN
Qty: 90 TABLET | Refills: 1 | Status: SHIPPED | OUTPATIENT
Start: 2019-02-20 | End: 2019-04-03

## 2019-02-20 ASSESSMENT — PATIENT HEALTH QUESTIONNAIRE - PHQ9: SUM OF ALL RESPONSES TO PHQ QUESTIONS 1-9: 4

## 2019-02-20 ASSESSMENT — PAIN SCALES - GENERAL: PAINLEVEL: SEVERE PAIN (6)

## 2019-02-20 NOTE — PROGRESS NOTES
Nursing Notes:   Jamilah Berger LPN  2/20/2019 11:43 AM  Signed  Pt presents to clinic today for medication management.      Medication Reconciliation: complete  Jamilah Berger LPN      SUBJECTIVE:  64 year old female with chronic pain on opioids presents for worse low back pain for a couple months.     Tried PT, but has not seen benefit. MRI showed facet degeneration at L4-5 and L5-S1. She is interested in injections.   Back pain radiates to right leg. Tingling in right toes intermittently.  Remains on oxycodone 10 mg QID for chronic pain. Used to be on methadone, but stopped as she often seemed excessively drugged. Has tried stopping opioids, but her function decreased.    Was seeing Becky Olmstead NP for psychiatric cares, but now has to do so via telemedicine. Malina used to take clonazepam 1 mg QID when seeing Dr Ochoa, but has lowered dose to TID. She believes that an even lower dose is possible. We have discussed harms with concurrent benzodiazepine and opioid use numerous times.     REVIEW OF SYSTEMS:    Pertinent items are noted in HPI.    Current Outpatient Medications   Medication Sig Dispense Refill     albuterol (PROAIR HFA/PROVENTIL HFA/VENTOLIN HFA) 108 (90 Base) MCG/ACT inhaler Inhale 2 puffs into the lungs every 6 hours 2 Inhaler 3     amLODIPine (NORVASC) 10 MG tablet Take 1 tablet (10 mg) by mouth daily 90 tablet 3     aspirin EC 81 MG EC tablet Take 81 mg by mouth daily with food       busPIRone (BUSPAR) 10 MG tablet TAKE 1 TABLET TWICE A  tablet 2     clonazePAM (KLONOPIN) 1 MG tablet Take 1 tablet by mouth 4 times daily Becky Olmstead CNP       clopidogrel (PLAVIX) 75 MG tablet Take 1 tablet (75 mg) by mouth daily 90 tablet 3     cyclobenzaprine (FLEXERIL) 10 MG tablet Take 1 tablet (10 mg) by mouth 2 times daily as needed for muscle spasms 42 tablet 3     diclofenac (FLECTOR) 1.3 % patch Place 1 patch onto the skin 2 times daily 20 patch 3     Diclofenac Sodium 1.5 % SOLN Apply 20 drops  to each hand or 40 drops to each knee up to 4 times daily as needed for arthritis pain 450 mL 3     docusate sodium (COLACE) 50 MG capsule Take 50 mg by mouth daily       DULoxetine (CYMBALTA) 60 MG EC capsule Take 1 capsule (60 mg) by mouth daily 90 capsule 3     gabapentin (NEURONTIN) 600 MG tablet Take 1 tablet (600 mg) by mouth 3 times daily 270 tablet 2     lisinopril (PRINIVIL/ZESTRIL) 40 MG tablet TAKE 1 TABLET DAILY 90 tablet 4     metoprolol tartrate (LOPRESSOR) 50 MG tablet Take 1 tablet (50 mg) by mouth 2 times daily 180 tablet 3     NITROSTAT 0.3 MG sublingual tablet PLACE 1 TABLET UNDER THE TONGUE EVERY 5 MINUTES AS NEEDED FOR CHEST PAIN 25 tablet 2     ondansetron (ZOFRAN) 4 MG tablet Take 4 mg by mouth every 6 hours as needed for nausea       oxyCODONE-acetaminophen (PERCOCET) 5-325 MG tablet Take 2 tablets by mouth 4 times daily Max acetaminophen dose: 4000mg in 24 hrs 240 tablet 0     pantoprazole (PROTONIX) 40 MG EC tablet TAKE 1 TABLET TWICE A  tablet 3     pravastatin (PRAVACHOL) 40 MG tablet Take 1 tablet (40 mg) by mouth At Bedtime 90 tablet 3     saccharomyces boulardii (FLORASTOR) 250 MG capsule Take 250 mg by mouth 2 times daily       sucralfate (CARAFATE) 1 GM tablet Take 1 tablet (1 g) by mouth 4 times daily Before meals & at bedtime 120 tablet 2     VITAMIN D, CHOLECALCIFEROL, PO Take 5,000 Units by mouth daily       Allergies   Allergen Reactions     Atorvastatin Muscle Pain (Myalgia)     Liquid Adhesive Rash     Other reaction(s): Erythema  Silk and plastic gloves (long term attachment of adhesives)     Tiotropium Bromide [Tiotropium] Rash     Ezetimibe Muscle Pain (Myalgia)     Niacin      Other reaction(s): Flushing  flushing     Rosuvastatin Muscle Pain (Myalgia)     Other reaction(s): Myalgia     Latex Rash     No Clinical Screening - See Comments Rash, Blisters and Itching     Metals and plastic  Metals and plastics       Tape [Adhesive Tape] Rash       OBJECTIVE:  /63  (BP Location: Right arm, Patient Position: Chair, Cuff Size: Adult Large)   Pulse 58   Temp 97.2  F (36.2  C) (Tympanic)   Resp 16   Wt 81.2 kg (179 lb)   BMI 28.89 kg/m      EXAM:  General Appearance: Pleasant, alert, appropriate appearance for age. No acute distress  Musculoskeletal Exam: Lumbar Spine: tenderness over lumbar paraspinous muscles more than SI Joints.  Neurologic Exam: reflexes 2+, strength symmetric lower extremities; SLR negative bilaterally  Psychiatric: Normal affect and mentation    Results for orders placed or performed during the hospital encounter of 01/25/19   MR Lumbar Spine w/o Contrast    Narrative    MR LUMBAR SPINE W/O CONTRAST    HISTORY: Back pain, > 6wks conservative tx, persistent sx;  Radiculopathy, > 6wks conservative tx, persistent sx; Acute bilateral  low back pain with right-sided sciatica     TECHNIQUE: Sagittal T1, T2 and STIR as well as axial T2 images of the  lumbar spine were obtained.    COMPARISON: 12/11/2012.    FINDINGS:      Trace degenerative anterolisthesis of L4 on L5, basis of facet  degeneration is redemonstrated.  The sagittal lordosis is otherwise  preserved. The vertebral body heights are preserved.  The marrow  signal is unremarkable. Minimal diffuse disc desiccation and height  loss is again present.    The distal cord and conus medullaris have a normal caliber and  morphology.  The conus terminates at L1-2.  No abnormal cord signal is  seen in the distal cord or conus.  There are no masses in the spinal  canal or paravertebral soft tissues.    At L1-2 and L2-3, the central spinal canal and neural foramina are  patent.    At L3-4, there is a minimal symmetric disc bulge with mild facet and  ligamentum flavum hypertrophy. Spinal and foraminal narrowing is  minimal.    At L4-5, there is slight anterolisthesis with uncovering of the disc,  a mild symmetric disc bulge and moderate to severe facet degenerative  hypertrophy. Spinal stenosis is mild to  moderate. Foraminal stenoses  are mild.    At L5-S1, there is a minimal symmetric disc bulge with moderate facet  hypertrophy. The central spinal canal and neural foramina are patent.      Impression    IMPRESSION:    Focal facet degenerative changes at L4-5 and L5-S1, similar to the  prior study from 2012.    CARRIE OLVERA MD        ASSESSMENT/PLAN:    ICD-10-CM    1. Chronic pain disorder G89.4 oxyCODONE-acetaminophen (PERCOCET) 5-325 MG tablet     DISCONTINUED: oxyCODONE-acetaminophen (PERCOCET) 5-325 MG tablet   2. Chronic bilateral low back pain without sciatica M54.5 oxyCODONE-acetaminophen (PERCOCET) 5-325 MG tablet    G89.29 DISCONTINUED: oxyCODONE-acetaminophen (PERCOCET) 5-325 MG tablet   3. Chest wall pain, chronic R07.89 oxyCODONE-acetaminophen (PERCOCET) 5-325 MG tablet    G89.29 DISCONTINUED: oxyCODONE-acetaminophen (PERCOCET) 5-325 MG tablet   4. Lumbar facet arthropathy M47.816 XR Lumbar Epidural Injection Incl Imaging   5. Lumbar back pain with radiculopathy affecting right lower extremity M54.16 XR Lumbar Epidural Injection Incl Imaging   6. Chronic anxiety F41.9 clonazePAM (KLONOPIN) 1 MG tablet     Has back pain with sciatica despite starting PT.  MRI unchanged, but has some facet arthritis that could be impacting nerves causing radicular symptoms. Potentially a steroid injection will reduce inflammation sufficiently to help improve pain and radicular symptoms. Ordered lumbar epidural. If this does not help, then consider facet injections. May stop Plavix prior to injection, but stay on aspirin given cardiac stents.    Unable to use NSAIDS to help pain or inflammation due to history of GI bleed from ulceration with aspirin plus NSAIDS as well as significant cardiac history precluding NSAID use.     Refilled oxycodone for 2 months.  reviewed. She is early for refill this month, but would like to  in town now and then will refill when due in March back on schedule.     Agree to  provide clonazepam at this time. If she is not able to slowly taper then she may need to see psychiatry again. With aging and risks of concurrent benozs and opioids, she is safer with a reduction in either medication. She is willing to taper clonazepam. Reduce from current 1 mg TID to about 2.5 pills on average daily for a couple months, then consider further reduction to 2 mg total daily.    Follow up in 2 months    Greater than 50% of this 25 minute appointment spent on counseling     Ramirez Cano MD    This document was prepared using a combination of typing and voice generated software.  While every attempt was made for accuracy, spelling and grammatical errors may exist.

## 2019-02-20 NOTE — PATIENT INSTRUCTIONS
Ordered back injection. Radiology will call to schedule  Likely stop Plavix for 5 days prior to injection and take aspirin 81 mg daily instead. Then switch back to Plavix after the shot    Refilled clonazepam. Try and use 2 1/2 pills daily. Cut in half and take less in daytime. Even taking 1/2 a pill most of the time and 3 pills some days is okay.     Follow-up in 2 months

## 2019-02-26 ENCOUNTER — HOSPITAL ENCOUNTER (OUTPATIENT)
Dept: GENERAL RADIOLOGY | Facility: OTHER | Age: 64
Discharge: HOME | End: 2019-02-26
Attending: FAMILY MEDICINE | Admitting: FAMILY MEDICINE
Payer: MEDICARE

## 2019-02-26 DIAGNOSIS — M47.816 LUMBAR FACET ARTHROPATHY: ICD-10-CM

## 2019-02-26 DIAGNOSIS — M54.16 LUMBAR BACK PAIN WITH RADICULOPATHY AFFECTING RIGHT LOWER EXTREMITY: ICD-10-CM

## 2019-02-26 PROCEDURE — 62323 NJX INTERLAMINAR LMBR/SAC: CPT

## 2019-02-26 PROCEDURE — 25500064 ZZH RX 255 OP 636: Performed by: RADIOLOGY

## 2019-02-26 PROCEDURE — 25000125 ZZHC RX 250: Performed by: RADIOLOGY

## 2019-02-26 RX ORDER — METHYLPREDNISOLONE ACETATE 80 MG/ML
80 INJECTION, SUSPENSION INTRA-ARTICULAR; INTRALESIONAL; INTRAMUSCULAR; SOFT TISSUE ONCE
Status: COMPLETED | OUTPATIENT
Start: 2019-02-26 | End: 2019-02-26

## 2019-02-26 RX ORDER — LIDOCAINE HYDROCHLORIDE 10 MG/ML
2 INJECTION, SOLUTION EPIDURAL; INFILTRATION; INTRACAUDAL; PERINEURAL ONCE
Status: COMPLETED | OUTPATIENT
Start: 2019-02-26 | End: 2019-02-26

## 2019-02-26 RX ADMIN — LIDOCAINE HYDROCHLORIDE 2 ML: 10 INJECTION, SOLUTION EPIDURAL; INFILTRATION; INTRACAUDAL; PERINEURAL at 09:06

## 2019-02-26 RX ADMIN — METHYLPREDNISOLONE ACETATE 80 MG: 80 INJECTION, SUSPENSION INTRA-ARTICULAR; INTRALESIONAL; INTRAMUSCULAR; SOFT TISSUE at 09:06

## 2019-02-26 RX ADMIN — IOHEXOL 2 ML: 240 INJECTION, SOLUTION INTRATHECAL; INTRAVASCULAR; INTRAVENOUS; ORAL at 09:06

## 2019-02-26 RX ADMIN — LIDOCAINE HYDROCHLORIDE 2 ML: 10 INJECTION, SOLUTION INFILTRATION; PERINEURAL at 09:06

## 2019-03-21 DIAGNOSIS — G89.29 CHRONIC BILATERAL LOW BACK PAIN WITHOUT SCIATICA: ICD-10-CM

## 2019-03-21 DIAGNOSIS — G89.4 CHRONIC PAIN DISORDER: ICD-10-CM

## 2019-03-21 DIAGNOSIS — M54.50 CHRONIC BILATERAL LOW BACK PAIN WITHOUT SCIATICA: ICD-10-CM

## 2019-03-21 DIAGNOSIS — R07.89 CHEST WALL PAIN, CHRONIC: ICD-10-CM

## 2019-03-21 DIAGNOSIS — G89.29 CHEST WALL PAIN, CHRONIC: ICD-10-CM

## 2019-03-22 RX ORDER — OXYCODONE AND ACETAMINOPHEN 5; 325 MG/1; MG/1
2 TABLET ORAL 4 TIMES DAILY
Qty: 240 TABLET | Refills: 0 | OUTPATIENT
Start: 2019-03-24

## 2019-03-22 NOTE — TELEPHONE ENCOUNTER
Chart review shows that Rx as requested was last filled as follows:    Outpatient Medication Detail      Disp Refills Start End TOY   oxyCODONE-acetaminophen (PERCOCET) 5-325 MG tablet 240 tablet 0 3/24/2019  No   Sig - Route: Take 2 tablets by mouth 4 times daily Max acetaminophen dose: 4000mg in 24 hrs - Oral   Class: Local Print   Earliest Fill Date: 3/24/2019   Order: [id number]   Prior authorization: Closed - Prior Authorization not required for patient/medication   Outpatient Morphine Equivalent Daily Dose (MEDD)     3/24/19 and after   60 mg MEDD   Order Name Dose Route Frequency Maximum MEDD    oxyCODONE-acetaminophen (PERCOCET) 5-325 MG tablet 2 tablet Oral 4 TIMES DAILY 60 mg MEDD   Total Potential Daily Morphine Equivalence 60 mg MEDD   Calculation Information                Printout Tracking     External Result Report   Medication Administration Instructions     Max acetaminophen dose: 4000mg in 24 hrs   Pharmacy     [id number] - [place], [place] - [address]     And is due for a refill as of 3/24/19. LOV with PCP was on 2/20/19. Per office visit notes on that date, Rx as requested was filled for a 2 month supply at that time and patient is to follow up prior to running out. Writer will refuse Rx request at this time and will make note of above in Rx refusal to pharmacy.    Unable to complete prescription refill per RN Medication Refill Policy. Cruzito Robbins 3/22/2019 2:21 PM

## 2019-03-28 ENCOUNTER — DOCUMENTATION ONLY (OUTPATIENT)
Dept: OTHER | Facility: CLINIC | Age: 64
End: 2019-03-28

## 2019-04-03 ENCOUNTER — OFFICE VISIT (OUTPATIENT)
Dept: FAMILY MEDICINE | Facility: OTHER | Age: 64
End: 2019-04-03
Attending: FAMILY MEDICINE
Payer: MEDICARE

## 2019-04-03 VITALS
SYSTOLIC BLOOD PRESSURE: 113 MMHG | TEMPERATURE: 98.8 F | RESPIRATION RATE: 18 BRPM | DIASTOLIC BLOOD PRESSURE: 59 MMHG | HEART RATE: 76 BPM

## 2019-04-03 DIAGNOSIS — G89.29 CHRONIC BILATERAL LOW BACK PAIN WITHOUT SCIATICA: ICD-10-CM

## 2019-04-03 DIAGNOSIS — G89.4 CHRONIC PAIN DISORDER: ICD-10-CM

## 2019-04-03 DIAGNOSIS — I10 ESSENTIAL HYPERTENSION: ICD-10-CM

## 2019-04-03 DIAGNOSIS — G89.29 CHEST WALL PAIN, CHRONIC: ICD-10-CM

## 2019-04-03 DIAGNOSIS — F41.9 CHRONIC ANXIETY: Primary | ICD-10-CM

## 2019-04-03 DIAGNOSIS — M54.50 CHRONIC BILATERAL LOW BACK PAIN WITHOUT SCIATICA: ICD-10-CM

## 2019-04-03 DIAGNOSIS — R07.89 CHEST WALL PAIN, CHRONIC: ICD-10-CM

## 2019-04-03 PROCEDURE — 99214 OFFICE O/P EST MOD 30 MIN: CPT | Performed by: FAMILY MEDICINE

## 2019-04-03 PROCEDURE — G0463 HOSPITAL OUTPT CLINIC VISIT: HCPCS

## 2019-04-03 RX ORDER — OXYCODONE AND ACETAMINOPHEN 5; 325 MG/1; MG/1
2 TABLET ORAL 4 TIMES DAILY
Qty: 240 TABLET | Refills: 0 | Status: SHIPPED | OUTPATIENT
Start: 2019-04-23 | End: 2019-04-03

## 2019-04-03 RX ORDER — CLONAZEPAM 1 MG/1
1 TABLET ORAL 3 TIMES DAILY PRN
Qty: 225 TABLET | Refills: 0 | Status: SHIPPED | OUTPATIENT
Start: 2019-04-03 | End: 2019-06-26

## 2019-04-03 RX ORDER — OXYCODONE AND ACETAMINOPHEN 5; 325 MG/1; MG/1
2 TABLET ORAL 4 TIMES DAILY
Qty: 240 TABLET | Refills: 0 | Status: SHIPPED | OUTPATIENT
Start: 2019-05-23 | End: 2019-06-19

## 2019-04-03 RX ORDER — LISINOPRIL 40 MG/1
40 TABLET ORAL DAILY
Qty: 90 TABLET | Refills: 4 | Status: SHIPPED | OUTPATIENT
Start: 2019-04-03 | End: 2019-04-23

## 2019-04-03 RX ORDER — CLONAZEPAM 1 MG/1
1 TABLET ORAL 3 TIMES DAILY PRN
Qty: 90 TABLET | Refills: 1 | Status: CANCELLED | OUTPATIENT
Start: 2019-04-03

## 2019-04-03 ASSESSMENT — PAIN SCALES - GENERAL: PAINLEVEL: NO PAIN (0)

## 2019-04-03 NOTE — PROGRESS NOTES
Nursing Notes:   Jamilah Berger, LPN  4/3/2019  4:10 PM  Signed  Pt presents to clinic today for medication management.      Medication Reconciliation: complete  Jamilah Berger LPN      SUBJECTIVE:  64 year old female with chronic pain on opioids presents for follow up on back pain and anxiety.     Worse low back pain for a couple months. Tried PT, but no improvement. MRI showed facet degeneration at L4-5 and L5-S1. She had a lumbar PARISH with good relief. 85% improvement. That is still helping.    Remains on oxycodone 10 mg QID for chronic pain. Used to be on methadone, but stopped as she often seemed excessively drugged. Has tried stopping opioids, but her function decreased.    Was seeing Becky Olmstead NP for psychiatric cares, but now has to do so via telemedicine. Patient does not want to continues cares by telemedicine. I provided her last prescription as she requested I assume the prescription. We agreed to work on a gradual taper. Malina used to take clonazepam 1 mg QID when seeing Dr Ochoa, but had lowered dose to TID. We discussed using clonazepam at 2.5 total pills in a day. Insurance restricted clonazepam to #45 pills per 23 days. She is now only taking 1.5 pills per day since she is going to run out. Starting to feel shaky and much more anxious.     REVIEW OF SYSTEMS:    Pertinent items are noted in HPI.  General: Malaise  Cardiac: No chest pain  Respiratory: No SOB    Current Outpatient Medications   Medication Sig Dispense Refill     albuterol (PROAIR HFA/PROVENTIL HFA/VENTOLIN HFA) 108 (90 Base) MCG/ACT inhaler Inhale 2 puffs into the lungs every 6 hours 2 Inhaler 3     amLODIPine (NORVASC) 10 MG tablet Take 1 tablet (10 mg) by mouth daily 90 tablet 3     aspirin EC 81 MG EC tablet Take 81 mg by mouth daily with food       busPIRone (BUSPAR) 10 MG tablet TAKE 1 TABLET TWICE A  tablet 2     clonazePAM (KLONOPIN) 1 MG tablet Take 1 tablet (1 mg) by mouth 3 times daily as needed for anxiety 90  tablet 1     clopidogrel (PLAVIX) 75 MG tablet Take 1 tablet (75 mg) by mouth daily 90 tablet 3     cyclobenzaprine (FLEXERIL) 10 MG tablet Take 1 tablet (10 mg) by mouth 2 times daily as needed for muscle spasms 42 tablet 3     Diclofenac Sodium 1.5 % SOLN Apply 20 drops to each hand or 40 drops to each knee up to 4 times daily as needed for arthritis pain 450 mL 3     docusate sodium (COLACE) 50 MG capsule Take 50 mg by mouth daily       DULoxetine (CYMBALTA) 60 MG EC capsule Take 1 capsule (60 mg) by mouth daily 90 capsule 3     gabapentin (NEURONTIN) 600 MG tablet Take 1 tablet (600 mg) by mouth 3 times daily 270 tablet 2     lisinopril (PRINIVIL/ZESTRIL) 40 MG tablet TAKE 1 TABLET DAILY 90 tablet 4     metoprolol tartrate (LOPRESSOR) 50 MG tablet Take 1 tablet (50 mg) by mouth 2 times daily 180 tablet 3     NITROSTAT 0.3 MG sublingual tablet PLACE 1 TABLET UNDER THE TONGUE EVERY 5 MINUTES AS NEEDED FOR CHEST PAIN 25 tablet 2     ondansetron (ZOFRAN) 4 MG tablet Take 4 mg by mouth every 6 hours as needed for nausea       oxyCODONE-acetaminophen (PERCOCET) 5-325 MG tablet Take 2 tablets by mouth 4 times daily Max acetaminophen dose: 4000mg in 24 hrs 240 tablet 0     pantoprazole (PROTONIX) 40 MG EC tablet TAKE 1 TABLET TWICE A  tablet 3     pravastatin (PRAVACHOL) 40 MG tablet Take 1 tablet (40 mg) by mouth At Bedtime 90 tablet 3     saccharomyces boulardii (FLORASTOR) 250 MG capsule Take 250 mg by mouth 2 times daily       sucralfate (CARAFATE) 1 GM tablet Take 1 tablet (1 g) by mouth 4 times daily Before meals & at bedtime 120 tablet 2     VITAMIN D, CHOLECALCIFEROL, PO Take 5,000 Units by mouth daily       Allergies   Allergen Reactions     Atorvastatin Muscle Pain (Myalgia)     Liquid Adhesive Rash     Other reaction(s): Erythema  Silk and plastic gloves (long term attachment of adhesives)     Tiotropium Bromide [Tiotropium] Rash     Ezetimibe Muscle Pain (Myalgia)     Niacin      Other reaction(s):  Flushing  flushing     Rosuvastatin Muscle Pain (Myalgia)     Other reaction(s): Myalgia     Latex Rash     No Clinical Screening - See Comments Rash, Blisters and Itching     Metals and plastic  Metals and plastics       Tape [Adhesive Tape] Rash       OBJECTIVE:  /59   Pulse 76   Temp 98.8  F (37.1  C) (Tympanic)   Resp 18     EXAM:  General Appearance: Pleasant, alert, appropriate appearance for age. No acute distress  Neurological: Slightly tremulous  Psychiatric: Anxious affect    Results for orders placed or performed during the hospital encounter of 02/26/19   XR Lumbar Epidural Injection Incl Imaging    Narrative    XR LUMBAR EPIDURAL INJECTION INCL IMAGING    HISTORY: . facet arthritis, right radicular symptoms, on Plavix for  CAD; Lumbar facet arthropathy; Lumbar back pain with radiculopathy  affecting right lower extremity.    COMPARISON: 1/25/2019.    TECHNIQUE:    The risks and benefits of lumbar intralaminar epidural steroid  injection were discussed with the patient, including infection,  bleeding / epidural hematoma, inadvertent intrathecal puncture, spinal  headache, nerve root injury and reaction to contrast agents. The  patient expressed understanding and a willingness to proceed. Written  informed consent was obtained.    The patient was placed prone on the fluoroscopy table. The L4-5  interlaminar space was identified. The overlying skin was marked,  prepped and draped in the standard sterile fashion. Lidocaine was used  to anesthetize the skin entry site. A 20-gauge Toughy needle was  advanced under fluoroscopic observation into a sublaminar position. An  air-release syringe was attached. The needle was advanced under  intermittent fluoroscopic observation until there was loss of  resistance. Subsequent contrast demonstrated free-flowing epidural  contrast. Subsequent administration of anesthetic and steroid resulted  in dispersion of the previously seen epidural contrast. 1  mL  depomedrol 80mg, 2 mL lidocaine 1% mpf, 2 mL lidocaine 1% local, 2 mL  omnipaque 240,The patient tolerated the procedure well. .08 minutes of  fluoro time were used.    Prior to the procedure, the patient had pain 8 out of 10 in severity.  Shortly after, 80% improved in severity.       Impression    IMPRESSION:     Successful fluoroscopic guided interlaminar epidural steroid injection  at L4-5. R1.    CARRIE OLVERA MD        ASSESSMENT/PLAN:    ICD-10-CM    1. Chronic anxiety F41.9 clonazePAM (KLONOPIN) 1 MG tablet   2. Chronic pain disorder G89.4 oxyCODONE-acetaminophen (PERCOCET) 5-325 MG tablet     DISCONTINUED: oxyCODONE-acetaminophen (PERCOCET) 5-325 MG tablet   3. Chronic bilateral low back pain without sciatica M54.5 oxyCODONE-acetaminophen (PERCOCET) 5-325 MG tablet    G89.29 DISCONTINUED: oxyCODONE-acetaminophen (PERCOCET) 5-325 MG tablet   4. Chest wall pain, chronic R07.89 oxyCODONE-acetaminophen (PERCOCET) 5-325 MG tablet    G89.29 DISCONTINUED: oxyCODONE-acetaminophen (PERCOCET) 5-325 MG tablet   5. Essential hypertension I10 lisinopril (PRINIVIL/ZESTRIL) 40 MG tablet     Her main problem today is insufficient clonazepam.  We had agreed to work on a gradual taper down from 3 mg total daily to 2.5 mg daily.  Potentially her insurance authorization  or she only had an authorization through Becky Olmstead and when I provided the prescription, her insurance did not allow the intended amount.  She needs to find out if quantity limit exception is required.  She should not rapidly taper off clonazepam, could have a seizure.  We both agree that she should be taking 2.5 mg of clonazepam total in a day.  She would get 75 pills every 30 days and 225 pills every 90 days.  Sent in a prescription for 225 pills to [facility].  She will let me know if it is not covered.    Reviewed .  She is on schedule with her medication refills.  Refill oxycodone at this time for refill due on .  Also  given a prescription for refill due on May 23.  Should follow-up in 2-1/2 months.    We will continue working down on clonazepam dosing at her follow-up appointment.    Refilled lisinopril      Greater than 50% of this 25 minute appointment spent on counseling     Ramirez Cano MD    This document was prepared using a combination of typing and voice generated software.  While every attempt was made for accuracy, spelling and grammatical errors may exist.

## 2019-04-03 NOTE — PATIENT INSTRUCTIONS
Continue same oxycodone  Clonazepam should be 2.5 pills per day on average for next 3 months, then we may reduce to 2 pills daily

## 2019-04-08 DIAGNOSIS — I10 ESSENTIAL HYPERTENSION: ICD-10-CM

## 2019-04-09 RX ORDER — LISINOPRIL 40 MG/1
40 TABLET ORAL DAILY
Qty: 90 TABLET | OUTPATIENT
Start: 2019-04-09

## 2019-04-09 NOTE — TELEPHONE ENCOUNTER
Redundant Refill Request for Lisinopril refused;    Outpatient Medication Detail      Disp Refills Start End TOY   lisinopril (PRINIVIL/ZESTRIL) 40 MG tablet 90 tablet 4 4/3/2019  No   Sig - Route: Take 1 tablet (40 mg) by mouth daily - Oral   Sent to pharmacy as: lisinopril (PRINIVIL/ZESTRIL) 40 MG tablet   Class: E-Prescribe   Order: [id number]   E-Prescribing Status: Receipt confirmed by pharmacy (4/3/2019  4:24 PM CDT)   Printout Tracking     External Result Report   Pharmacy     [facility] - [place], [place] - [address] [place] [address]     Unable to complete prescription refill per RN Medication Refill Policy. Cruzito Robbins 4/9/2019 3:55 PM

## 2019-04-10 ENCOUNTER — TELEPHONE (OUTPATIENT)
Dept: FAMILY MEDICINE | Facility: OTHER | Age: 64
End: 2019-04-10

## 2019-04-10 NOTE — TELEPHONE ENCOUNTER
Writer returned call to [facility] as requested. Spoke to Adriana CHAIREZ and then [facility] pharmacy tech. Per pharmacy tech, they did not receive Rx as noted below:    Outpatient Medication Detail      Disp Refills Start End TOY   lisinopril (PRINIVIL/ZESTRIL) 40 MG tablet 90 tablet 4 4/3/2019  No   Sig - Route: Take 1 tablet (40 mg) by mouth daily - Oral   Sent to pharmacy as: lisinopril (PRINIVIL/ZESTRIL) 40 MG tablet   Class: E-Prescribe   Order: [id number]   E-Prescribing Status: Receipt confirmed by pharmacy (4/3/2019  4:24 PM CDT)   Printout Tracking     External Result Report   Pharmacy     [facility] - [address]     Writer advised pharmacy tech of Rx as noted above and receipt confirmed by pharmacy. Upon further investigation, pharmacy tech confirms receipt. She will process Rx request at this time. No additional refill is needed. Writer will close this encounter at this time.    Cruzito Robbins RN on 4/10/2019 at 9:19 AM

## 2019-04-22 ENCOUNTER — TELEPHONE (OUTPATIENT)
Dept: FAMILY MEDICINE | Facility: OTHER | Age: 64
End: 2019-04-22

## 2019-04-22 DIAGNOSIS — I10 ESSENTIAL HYPERTENSION: ICD-10-CM

## 2019-04-22 NOTE — TELEPHONE ENCOUNTER
After verifying pts name and date of birth with pt, pt is having her back injection on 4/29 pt is wondering when she should stop her Plavix prior to procedure. [facility] states there was a drug interaction with pts Lisinopril so pharmacy cancelled her RX.  Jamilah Berger

## 2019-04-23 RX ORDER — LISINOPRIL 40 MG/1
40 TABLET ORAL DAILY
Qty: 90 TABLET | Refills: 4 | Status: SHIPPED | OUTPATIENT
Start: 2019-04-23 | End: 2019-12-23

## 2019-04-23 NOTE — TELEPHONE ENCOUNTER
Called and verified patients full name and . Notified patient of the below.     Breana Tracey LPN on 2019 at 3:47 PM

## 2019-04-29 ENCOUNTER — HOSPITAL ENCOUNTER (OUTPATIENT)
Dept: GENERAL RADIOLOGY | Facility: OTHER | Age: 64
Discharge: HOME | End: 2019-04-29
Attending: FAMILY MEDICINE | Admitting: FAMILY MEDICINE
Payer: MEDICARE

## 2019-04-29 DIAGNOSIS — M47.816 LUMBAR FACET ARTHROPATHY: ICD-10-CM

## 2019-04-29 DIAGNOSIS — M54.16 LUMBAR BACK PAIN WITH RADICULOPATHY AFFECTING RIGHT LOWER EXTREMITY: ICD-10-CM

## 2019-04-29 PROCEDURE — 25500064 ZZH RX 255 OP 636: Performed by: RADIOLOGY

## 2019-04-29 PROCEDURE — 25000125 ZZHC RX 250: Performed by: RADIOLOGY

## 2019-04-29 PROCEDURE — 62323 NJX INTERLAMINAR LMBR/SAC: CPT

## 2019-04-29 RX ORDER — METHYLPREDNISOLONE ACETATE 80 MG/ML
80 INJECTION, SUSPENSION INTRA-ARTICULAR; INTRALESIONAL; INTRAMUSCULAR; SOFT TISSUE ONCE
Status: COMPLETED | OUTPATIENT
Start: 2019-04-29 | End: 2019-04-29

## 2019-04-29 RX ORDER — LIDOCAINE HYDROCHLORIDE 10 MG/ML
2 INJECTION, SOLUTION EPIDURAL; INFILTRATION; INTRACAUDAL; PERINEURAL ONCE
Status: COMPLETED | OUTPATIENT
Start: 2019-04-29 | End: 2019-04-29

## 2019-04-29 RX ADMIN — LIDOCAINE HYDROCHLORIDE 2 ML: 10 INJECTION, SOLUTION INFILTRATION; PERINEURAL at 13:16

## 2019-04-29 RX ADMIN — METHYLPREDNISOLONE ACETATE 80 MG: 80 INJECTION, SUSPENSION INTRA-ARTICULAR; INTRALESIONAL; INTRAMUSCULAR; SOFT TISSUE at 13:16

## 2019-04-29 RX ADMIN — Medication 2 ML: at 13:15

## 2019-04-29 RX ADMIN — LIDOCAINE HYDROCHLORIDE 2 ML: 10 INJECTION, SOLUTION EPIDURAL; INFILTRATION; INTRACAUDAL; PERINEURAL at 13:15

## 2019-05-13 ENCOUNTER — HOSPITAL ENCOUNTER (EMERGENCY)
Facility: OTHER | Age: 64
Discharge: HOME | End: 2019-05-13
Attending: PHYSICIAN ASSISTANT | Admitting: PHYSICIAN ASSISTANT
Payer: MEDICARE

## 2019-05-13 ENCOUNTER — HOSPITAL ENCOUNTER (OUTPATIENT)
Dept: GENERAL RADIOLOGY | Facility: OTHER | Age: 64
Discharge: HOME | End: 2019-05-13
Attending: FAMILY MEDICINE | Admitting: FAMILY MEDICINE
Payer: MEDICARE

## 2019-05-13 ENCOUNTER — APPOINTMENT (OUTPATIENT)
Dept: CT IMAGING | Facility: OTHER | Age: 64
End: 2019-05-13
Attending: PHYSICIAN ASSISTANT
Payer: MEDICARE

## 2019-05-13 ENCOUNTER — APPOINTMENT (OUTPATIENT)
Dept: CARDIOLOGY | Facility: OTHER | Age: 64
End: 2019-05-13
Attending: PHYSICIAN ASSISTANT
Payer: MEDICARE

## 2019-05-13 ENCOUNTER — OFFICE VISIT (OUTPATIENT)
Dept: FAMILY MEDICINE | Facility: OTHER | Age: 64
End: 2019-05-13
Attending: FAMILY MEDICINE
Payer: MEDICARE

## 2019-05-13 ENCOUNTER — APPOINTMENT (OUTPATIENT)
Dept: ULTRASOUND IMAGING | Facility: OTHER | Age: 64
End: 2019-05-13
Attending: PHYSICIAN ASSISTANT
Payer: MEDICARE

## 2019-05-13 ENCOUNTER — TELEPHONE (OUTPATIENT)
Dept: FAMILY MEDICINE | Facility: OTHER | Age: 64
End: 2019-05-13

## 2019-05-13 VITALS
WEIGHT: 175 LBS | BODY MASS INDEX: 28.12 KG/M2 | HEART RATE: 92 BPM | HEIGHT: 66 IN | DIASTOLIC BLOOD PRESSURE: 70 MMHG | SYSTOLIC BLOOD PRESSURE: 110 MMHG | RESPIRATION RATE: 25 BRPM | OXYGEN SATURATION: 93 % | TEMPERATURE: 99.1 F

## 2019-05-13 VITALS
HEART RATE: 116 BPM | OXYGEN SATURATION: 97 % | DIASTOLIC BLOOD PRESSURE: 60 MMHG | TEMPERATURE: 102 F | SYSTOLIC BLOOD PRESSURE: 160 MMHG | RESPIRATION RATE: 20 BRPM

## 2019-05-13 DIAGNOSIS — M79.10 MYALGIA: ICD-10-CM

## 2019-05-13 DIAGNOSIS — Z91.89 AT HIGH RISK FOR TICK BORNE ILLNESS: ICD-10-CM

## 2019-05-13 DIAGNOSIS — I25.10 PRESENCE OF STENT IN CORONARY ARTERY IN PATIENT WITH CORONARY ARTERY DISEASE: ICD-10-CM

## 2019-05-13 DIAGNOSIS — R50.81 FEVER IN OTHER DISEASES: ICD-10-CM

## 2019-05-13 DIAGNOSIS — R94.31 ST SEGMENT CHANGES ON ELECTROCARDIOGRAM: ICD-10-CM

## 2019-05-13 DIAGNOSIS — R50.81 FEVER IN OTHER DISEASES: Primary | ICD-10-CM

## 2019-05-13 DIAGNOSIS — R50.9 FEVER: ICD-10-CM

## 2019-05-13 DIAGNOSIS — J98.4 PNEUMONITIS: ICD-10-CM

## 2019-05-13 DIAGNOSIS — B88.2 TICK-BORNE DISEASE: ICD-10-CM

## 2019-05-13 DIAGNOSIS — Z95.5 PRESENCE OF STENT IN CORONARY ARTERY IN PATIENT WITH CORONARY ARTERY DISEASE: ICD-10-CM

## 2019-05-13 LAB
ALBUMIN SERPL-MCNC: 4.3 G/DL (ref 3.5–5.7)
ALBUMIN UR-MCNC: NEGATIVE MG/DL
ALP SERPL-CCNC: 107 U/L (ref 34–104)
ALT SERPL W P-5'-P-CCNC: 37 U/L (ref 7–52)
ANION GAP SERPL CALCULATED.3IONS-SCNC: 11 MMOL/L (ref 3–14)
APPEARANCE UR: CLEAR
AST SERPL W P-5'-P-CCNC: 39 U/L (ref 13–39)
BASOPHILS # BLD AUTO: 0 10E9/L (ref 0–0.2)
BASOPHILS NFR BLD AUTO: 0.2 %
BILIRUB SERPL-MCNC: 1.1 MG/DL (ref 0.3–1)
BILIRUB UR QL STRIP: NEGATIVE
BUN SERPL-MCNC: 23 MG/DL (ref 7–25)
CALCIUM SERPL-MCNC: 9.3 MG/DL (ref 8.6–10.3)
CHLORIDE SERPL-SCNC: 103 MMOL/L (ref 98–107)
CO2 SERPL-SCNC: 23 MMOL/L (ref 21–31)
COLOR UR AUTO: YELLOW
CREAT SERPL-MCNC: 1.07 MG/DL (ref 0.6–1.2)
D DIMER PPP DDU-MCNC: 970 NG/ML D-DU (ref 0–230)
DIFFERENTIAL METHOD BLD: NORMAL
EOSINOPHIL # BLD AUTO: 0 10E9/L (ref 0–0.7)
EOSINOPHIL NFR BLD AUTO: 0.5 %
ERYTHROCYTE [DISTWIDTH] IN BLOOD BY AUTOMATED COUNT: 14 % (ref 10–15)
GFR SERPL CREATININE-BSD FRML MDRD: 51 ML/MIN/{1.73_M2}
GLUCOSE SERPL-MCNC: 117 MG/DL (ref 70–105)
GLUCOSE UR STRIP-MCNC: NEGATIVE MG/DL
HCT VFR BLD AUTO: 35 % (ref 35–47)
HGB BLD-MCNC: 11.8 G/DL (ref 11.7–15.7)
HGB UR QL STRIP: NEGATIVE
IMM GRANULOCYTES # BLD: 0.1 10E9/L (ref 0–0.4)
IMM GRANULOCYTES NFR BLD: 0.6 %
INR PPP: 1.05 (ref 0–1.3)
KETONES UR STRIP-MCNC: NEGATIVE MG/DL
LACTATE SERPL-SCNC: 1.6 MMOL/L (ref 0.5–2.2)
LEUKOCYTE ESTERASE UR QL STRIP: NEGATIVE
LYMPHOCYTES # BLD AUTO: 1.7 10E9/L (ref 0.8–5.3)
LYMPHOCYTES NFR BLD AUTO: 20.9 %
MCH RBC QN AUTO: 30.2 PG (ref 26.5–33)
MCHC RBC AUTO-ENTMCNC: 33.7 G/DL (ref 31.5–36.5)
MCV RBC AUTO: 90 FL (ref 78–100)
MONOCYTES # BLD AUTO: 0.5 10E9/L (ref 0–1.3)
MONOCYTES NFR BLD AUTO: 6.4 %
NEUTROPHILS # BLD AUTO: 5.8 10E9/L (ref 1.6–8.3)
NEUTROPHILS NFR BLD AUTO: 71.4 %
NITRATE UR QL: NEGATIVE
PH UR STRIP: 5.5 PH (ref 5–9)
PLATELET # BLD AUTO: 190 10E9/L (ref 150–450)
PLATELET # BLD EST: NORMAL 10*3/UL
POTASSIUM SERPL-SCNC: 3.4 MMOL/L (ref 3.5–5.1)
PROT SERPL-MCNC: 7.3 G/DL (ref 6.4–8.9)
RBC # BLD AUTO: 3.91 10E12/L (ref 3.8–5.2)
RBC MORPH BLD: NORMAL
SODIUM SERPL-SCNC: 137 MMOL/L (ref 134–144)
SOURCE: NORMAL
SP GR UR STRIP: 1.02 (ref 1–1.03)
TROPONIN I SERPL-MCNC: <0.03 UG/L (ref 0–0.03)
UROBILINOGEN UR STRIP-ACNC: 1 EU/DL (ref 0.2–1)
WBC # BLD AUTO: 8.2 10E9/L (ref 4–11)

## 2019-05-13 PROCEDURE — 99214 OFFICE O/P EST MOD 30 MIN: CPT | Performed by: FAMILY MEDICINE

## 2019-05-13 PROCEDURE — 87798 DETECT AGENT NOS DNA AMP: CPT | Mod: 91 | Performed by: PHYSICIAN ASSISTANT

## 2019-05-13 PROCEDURE — 93306 TTE W/DOPPLER COMPLETE: CPT

## 2019-05-13 PROCEDURE — 96375 TX/PRO/DX INJ NEW DRUG ADDON: CPT | Performed by: PHYSICIAN ASSISTANT

## 2019-05-13 PROCEDURE — 86618 LYME DISEASE ANTIBODY: CPT | Mod: ZL | Performed by: FAMILY MEDICINE

## 2019-05-13 PROCEDURE — 93005 ELECTROCARDIOGRAM TRACING: CPT

## 2019-05-13 PROCEDURE — 86666 EHRLICHIA ANTIBODY: CPT | Mod: ZL | Performed by: FAMILY MEDICINE

## 2019-05-13 PROCEDURE — 85610 PROTHROMBIN TIME: CPT | Performed by: PHYSICIAN ASSISTANT

## 2019-05-13 PROCEDURE — 85025 COMPLETE CBC W/AUTO DIFF WBC: CPT | Mod: ZL | Performed by: FAMILY MEDICINE

## 2019-05-13 PROCEDURE — 85379 FIBRIN DEGRADATION QUANT: CPT | Performed by: PHYSICIAN ASSISTANT

## 2019-05-13 PROCEDURE — G0463 HOSPITAL OUTPT CLINIC VISIT: HCPCS | Mod: 25

## 2019-05-13 PROCEDURE — 80053 COMPREHEN METABOLIC PANEL: CPT | Mod: ZL | Performed by: FAMILY MEDICINE

## 2019-05-13 PROCEDURE — 93306 TTE W/DOPPLER COMPLETE: CPT | Mod: 26 | Performed by: INTERNAL MEDICINE

## 2019-05-13 PROCEDURE — 93010 ELECTROCARDIOGRAM REPORT: CPT | Performed by: INTERNAL MEDICINE

## 2019-05-13 PROCEDURE — 84484 ASSAY OF TROPONIN QUANT: CPT | Mod: ZL | Performed by: FAMILY MEDICINE

## 2019-05-13 PROCEDURE — 96374 THER/PROPH/DIAG INJ IV PUSH: CPT | Mod: XU | Performed by: PHYSICIAN ASSISTANT

## 2019-05-13 PROCEDURE — 81003 URINALYSIS AUTO W/O SCOPE: CPT | Performed by: PHYSICIAN ASSISTANT

## 2019-05-13 PROCEDURE — 25500064 ZZH RX 255 OP 636: Performed by: PHYSICIAN ASSISTANT

## 2019-05-13 PROCEDURE — 25000128 H RX IP 250 OP 636: Performed by: PHYSICIAN ASSISTANT

## 2019-05-13 PROCEDURE — 87040 BLOOD CULTURE FOR BACTERIA: CPT | Mod: 91 | Performed by: PHYSICIAN ASSISTANT

## 2019-05-13 PROCEDURE — 93005 ELECTROCARDIOGRAM TRACING: CPT | Performed by: PHYSICIAN ASSISTANT

## 2019-05-13 PROCEDURE — 83605 ASSAY OF LACTIC ACID: CPT | Performed by: PHYSICIAN ASSISTANT

## 2019-05-13 PROCEDURE — 36415 COLL VENOUS BLD VENIPUNCTURE: CPT | Mod: 91 | Performed by: PHYSICIAN ASSISTANT

## 2019-05-13 PROCEDURE — 84484 ASSAY OF TROPONIN QUANT: CPT | Mod: 91 | Performed by: PHYSICIAN ASSISTANT

## 2019-05-13 PROCEDURE — 25800030 ZZH RX IP 258 OP 636: Performed by: PHYSICIAN ASSISTANT

## 2019-05-13 PROCEDURE — 71046 X-RAY EXAM CHEST 2 VIEWS: CPT

## 2019-05-13 PROCEDURE — 36415 COLL VENOUS BLD VENIPUNCTURE: CPT | Performed by: PHYSICIAN ASSISTANT

## 2019-05-13 PROCEDURE — 25000132 ZZH RX MED GY IP 250 OP 250 PS 637: Mod: GY | Performed by: PHYSICIAN ASSISTANT

## 2019-05-13 PROCEDURE — 93970 EXTREMITY STUDY: CPT

## 2019-05-13 PROCEDURE — 71260 CT THORAX DX C+: CPT

## 2019-05-13 PROCEDURE — 87798 DETECT AGENT NOS DNA AMP: CPT | Performed by: PHYSICIAN ASSISTANT

## 2019-05-13 PROCEDURE — 87040 BLOOD CULTURE FOR BACTERIA: CPT | Mod: ZL | Performed by: FAMILY MEDICINE

## 2019-05-13 PROCEDURE — 99285 EMERGENCY DEPT VISIT HI MDM: CPT | Mod: 25,27 | Performed by: PHYSICIAN ASSISTANT

## 2019-05-13 PROCEDURE — 84484 ASSAY OF TROPONIN QUANT: CPT | Performed by: PHYSICIAN ASSISTANT

## 2019-05-13 PROCEDURE — 99284 EMERGENCY DEPT VISIT MOD MDM: CPT | Mod: Z6 | Performed by: PHYSICIAN ASSISTANT

## 2019-05-13 PROCEDURE — A9270 NON-COVERED ITEM OR SERVICE: HCPCS | Mod: GY | Performed by: PHYSICIAN ASSISTANT

## 2019-05-13 RX ORDER — METHYLPREDNISOLONE SODIUM SUCCINATE 125 MG/2ML
80 INJECTION, POWDER, LYOPHILIZED, FOR SOLUTION INTRAMUSCULAR; INTRAVENOUS ONCE
Status: COMPLETED | OUTPATIENT
Start: 2019-05-13 | End: 2019-05-13

## 2019-05-13 RX ORDER — DOXYCYCLINE 100 MG/1
100 CAPSULE ORAL 2 TIMES DAILY
Qty: 28 CAPSULE | Refills: 0 | Status: SHIPPED | OUTPATIENT
Start: 2019-05-13 | End: 2019-06-19

## 2019-05-13 RX ORDER — DOXYCYCLINE 100 MG/1
100 CAPSULE ORAL ONCE
Status: COMPLETED | OUTPATIENT
Start: 2019-05-13 | End: 2019-05-13

## 2019-05-13 RX ORDER — DIPHENHYDRAMINE HYDROCHLORIDE 50 MG/ML
25 INJECTION INTRAMUSCULAR; INTRAVENOUS ONCE
Status: COMPLETED | OUTPATIENT
Start: 2019-05-13 | End: 2019-05-13

## 2019-05-13 RX ORDER — ACETAMINOPHEN 500 MG
1000 TABLET ORAL ONCE
Status: COMPLETED | OUTPATIENT
Start: 2019-05-13 | End: 2019-05-13

## 2019-05-13 RX ORDER — IODIXANOL 320 MG/ML
100 INJECTION, SOLUTION INTRAVASCULAR ONCE
Status: COMPLETED | OUTPATIENT
Start: 2019-05-13 | End: 2019-05-13

## 2019-05-13 RX ORDER — FENTANYL CITRATE 50 UG/ML
50 INJECTION, SOLUTION INTRAMUSCULAR; INTRAVENOUS EVERY 30 MIN PRN
Status: DISCONTINUED | OUTPATIENT
Start: 2019-05-13 | End: 2019-05-13 | Stop reason: HOSPADM

## 2019-05-13 RX ORDER — RIZATRIPTAN BENZOATE 10 MG/1
10 TABLET, ORALLY DISINTEGRATING ORAL
Status: COMPLETED | OUTPATIENT
Start: 2019-05-13 | End: 2019-05-13

## 2019-05-13 RX ORDER — SODIUM CHLORIDE 9 MG/ML
1000 INJECTION, SOLUTION INTRAVENOUS CONTINUOUS
Status: DISCONTINUED | OUTPATIENT
Start: 2019-05-13 | End: 2019-05-13 | Stop reason: HOSPADM

## 2019-05-13 RX ORDER — LEVOFLOXACIN 750 MG/1
750 TABLET, FILM COATED ORAL ONCE
Status: DISCONTINUED | OUTPATIENT
Start: 2019-05-13 | End: 2019-05-13 | Stop reason: ALTCHOICE

## 2019-05-13 RX ADMIN — SODIUM CHLORIDE 1000 ML: 9 INJECTION, SOLUTION INTRAVENOUS at 14:20

## 2019-05-13 RX ADMIN — ACETAMINOPHEN 1000 MG: 500 TABLET, FILM COATED ORAL at 11:30

## 2019-05-13 RX ADMIN — RIZATRIPTAN BENZOATE 10 MG: 10 TABLET, ORALLY DISINTEGRATING ORAL at 18:09

## 2019-05-13 RX ADMIN — DOXYCYCLINE HYCLATE 100 MG: 100 CAPSULE, GELATIN COATED ORAL at 19:13

## 2019-05-13 RX ADMIN — METHYLPREDNISOLONE SODIUM SUCCINATE 81.25 MG: 125 INJECTION, POWDER, FOR SOLUTION INTRAMUSCULAR; INTRAVENOUS at 18:09

## 2019-05-13 RX ADMIN — FENTANYL CITRATE 50 MCG: 50 INJECTION, SOLUTION INTRAMUSCULAR; INTRAVENOUS at 18:08

## 2019-05-13 RX ADMIN — DIPHENHYDRAMINE HYDROCHLORIDE 25 MG: 50 INJECTION INTRAMUSCULAR; INTRAVENOUS at 18:08

## 2019-05-13 RX ADMIN — SODIUM CHLORIDE 1000 ML: 9 INJECTION, SOLUTION INTRAVENOUS at 12:16

## 2019-05-13 RX ADMIN — IODIXANOL 100 ML: 320 INJECTION, SOLUTION INTRAVASCULAR at 12:25

## 2019-05-13 ASSESSMENT — ENCOUNTER SYMPTOMS
COLOR CHANGE: 0
PALPITATIONS: 0
ABDOMINAL PAIN: 0
SEIZURES: 0
WOUND: 0
TROUBLE SWALLOWING: 0
DIZZINESS: 0
BRUISES/BLEEDS EASILY: 0
PHOTOPHOBIA: 0
DIARRHEA: 0
NECK STIFFNESS: 0
EYE REDNESS: 0
VOMITING: 0
LIGHT-HEADEDNESS: 0
SINUS PRESSURE: 0
SORE THROAT: 0
WEAKNESS: 1
CHEST TIGHTNESS: 0
HEMATURIA: 0
BACK PAIN: 0
EYE PAIN: 0
CONFUSION: 0
ADENOPATHY: 0
DIFFICULTY URINATING: 0
FATIGUE: 1
NAUSEA: 0
ARTHRALGIAS: 0
CHILLS: 1
SHORTNESS OF BREATH: 0
HEADACHES: 1
FEVER: 1
CONSTIPATION: 0
COUGH: 1

## 2019-05-13 ASSESSMENT — PAIN SCALES - GENERAL: PAINLEVEL: EXTREME PAIN (8)

## 2019-05-13 ASSESSMENT — MIFFLIN-ST. JEOR: SCORE: 1355.54

## 2019-05-13 NOTE — PROGRESS NOTES
Patient was brought back to their room, rails were up and call light was within reach.     Handoff procedure information verbally given to Megan GUTIERREZ

## 2019-05-13 NOTE — ED NOTES
Attempted to collect urine sample, patient unable to provide specimen at this time, will use call light when she is able.

## 2019-05-13 NOTE — PROGRESS NOTES
Nursing Notes:   Jamilah Berger LPN  5/13/2019  9:35 AM  Signed  Pt presents to clinic today for headache, fever and joint pain. Pt states she had a tick bite to lower left hip area on 4/28/19.      Medication Reconciliation: complete  Jamilah Berger LPN     SUBJECTIVE:  64 year old female with chronic pain and CAD presents for fever, joint aches. She was bit by a black legged tick on 4/28 on the left hip. Removed easily. She began feeling worse, but thought it was side effects from epidural injection on 4/29.  Has felt poorly for 2 weeks.  She has diffuse myalgias and arthralgias.  She is nauseated, but has still been eating.  Is constipated, no diarrhea.  Today she is shaking, but still drove herself here.    Has a complicated cardiac history with CAD history of CABG x3 with cardiac catheterization 9/11/2017 with findings of stable disease.  She had a left-sided subclavian steal syndrome with stenting and patency noted in September 2017.  Reporting heavy chest pressure over the past couple weeks.  It is much worse in the past couple days.  She finally took some nitro today.    Acknowledges some difficulty breathing. Coughing while eating, but no productive cough.     Has a history of urinary sepsis a couple years ago.  She did not really noticed any urinary symptoms prior to sepsis.  She denies any urinary symptoms today.      REVIEW OF SYSTEMS:    Pertinent items are noted in HPI.    Current Outpatient Medications   Medication Sig Dispense Refill     albuterol (PROAIR HFA/PROVENTIL HFA/VENTOLIN HFA) 108 (90 Base) MCG/ACT inhaler Inhale 2 puffs into the lungs every 6 hours 2 Inhaler 3     amLODIPine (NORVASC) 10 MG tablet Take 1 tablet (10 mg) by mouth daily 90 tablet 3     aspirin EC 81 MG EC tablet Take 81 mg by mouth daily with food       busPIRone (BUSPAR) 10 MG tablet TAKE 1 TABLET TWICE A  tablet 2     clonazePAM (KLONOPIN) 1 MG tablet Take 1 tablet (1 mg) by mouth 3 times daily as needed for  anxiety #75 per 30 days, #225 pills per 90 days 225 tablet 0     clopidogrel (PLAVIX) 75 MG tablet Take 1 tablet (75 mg) by mouth daily 90 tablet 3     cyclobenzaprine (FLEXERIL) 10 MG tablet Take 1 tablet (10 mg) by mouth 2 times daily as needed for muscle spasms 42 tablet 3     Diclofenac Sodium 1.5 % SOLN Apply 20 drops to each hand or 40 drops to each knee up to 4 times daily as needed for arthritis pain 450 mL 3     docusate sodium (COLACE) 50 MG capsule Take 50 mg by mouth daily       DULoxetine (CYMBALTA) 60 MG EC capsule Take 1 capsule (60 mg) by mouth daily 90 capsule 3     gabapentin (NEURONTIN) 600 MG tablet Take 1 tablet (600 mg) by mouth 3 times daily 270 tablet 2     lisinopril (PRINIVIL/ZESTRIL) 40 MG tablet Take 1 tablet (40 mg) by mouth daily 90 tablet 4     metoprolol tartrate (LOPRESSOR) 50 MG tablet Take 1 tablet (50 mg) by mouth 2 times daily 180 tablet 3     NITROSTAT 0.3 MG sublingual tablet PLACE 1 TABLET UNDER THE TONGUE EVERY 5 MINUTES AS NEEDED FOR CHEST PAIN 25 tablet 2     ondansetron (ZOFRAN) 4 MG tablet Take 4 mg by mouth every 6 hours as needed for nausea       [START ON 5/23/2019] oxyCODONE-acetaminophen (PERCOCET) 5-325 MG tablet Take 2 tablets by mouth 4 times daily Max acetaminophen dose: 4000mg in 24 hrs 240 tablet 0     pantoprazole (PROTONIX) 40 MG EC tablet TAKE 1 TABLET TWICE A  tablet 3     pravastatin (PRAVACHOL) 40 MG tablet Take 1 tablet (40 mg) by mouth At Bedtime 90 tablet 3     saccharomyces boulardii (FLORASTOR) 250 MG capsule Take 250 mg by mouth 2 times daily       sucralfate (CARAFATE) 1 GM tablet Take 1 tablet (1 g) by mouth 4 times daily Before meals & at bedtime 120 tablet 2     VITAMIN D, CHOLECALCIFEROL, PO Take 5,000 Units by mouth daily       Allergies   Allergen Reactions     Atorvastatin Muscle Pain (Myalgia)     Liquid Adhesive Rash     Other reaction(s): Erythema  Silk and plastic gloves (long term attachment of adhesives)     Tiotropium Bromide  [Tiotropium] Rash     Ezetimibe Muscle Pain (Myalgia)     Niacin      Other reaction(s): Flushing  flushing     Rosuvastatin Muscle Pain (Myalgia)     Other reaction(s): Myalgia     Latex Rash     No Clinical Screening - See Comments Rash, Blisters and Itching     Metals and plastic  Metals and plastics       Tape [Adhesive Tape] Rash       OBJECTIVE:  /60   Pulse 116   Temp 102  F (38.9  C) (Tympanic)   Resp 20   SpO2 97%     EXAM:  General Appearance: Alert. Appears pale. Shaking.  Chest/Respiratory Exam: Clear to auscultation bilaterally  Cardiovascular Exam: Regular rate and rhythm. S1, S2, no murmur, gallop, or rubs.  Gastrointestinal Exam: Soft, nontender, no abnormal masses or organomegaly.  Extremities: No lower extremity edema.  Psychiatric: Normal affect and mentation      ASSESSMENT/PLAN:    ICD-10-CM    1. Fever in other diseases R50.81 CBC and Differential     Comprehensive Metabolic Panel     Lyme Disease Ab with reflex to WB Serum     Anaplasma phagocytoph antibody IgG IgM     XR Chest 2 Views     Blood Culture     Anaplasma phagocytoph antibody IgG IgM     Lyme Disease Ab with reflex to WB Serum     Comprehensive Metabolic Panel     CBC and Differential     Blood Culture     CANCELED: UA reflex to Microscopic and Culture   2. Tick-borne disease B88.2 CBC and Differential     Comprehensive Metabolic Panel     Lyme Disease Ab with reflex to WB Serum     Anaplasma phagocytoph antibody IgG IgM     Anaplasma phagocytoph antibody IgG IgM     Lyme Disease Ab with reflex to WB Serum     Comprehensive Metabolic Panel     CBC and Differential   3. Presence of stent in coronary artery in patient with coronary artery disease I25.10 Troponin I    Z95.5 EKG 12-LEAD CLINIC READ     Troponin I       She does not look good.  We discussed going to the ER, but since she was already roomed and we could begin work-up, she was kept in clinic.  Sent for labs and x-ray.  EKG then obtained.    EKG shows sinus  tachycardia with numerous ST changes specifically ST depressions V2 to V6 concerning for anterolateral ischemia.  This is changed from her last EKG in November 2018.  With her condition she does not appear safe to return home.  She needs further evaluation, potentially repeat troponins and IV fluids.  Therefore she will be brought over to the ER.  I discussed with Dr. Amaya, who graciously accepted patient for further evaluation.    Ramirez Cano MD    This document was prepared using a combination of typing and voice generated software.  While every attempt was made for accuracy, spelling and grammatical errors may exist.

## 2019-05-13 NOTE — ED PROVIDER NOTES
History   No chief complaint on file.    This is a 64-year-old female who was seen initially in clinic by Dr. Cano.  She apparently had a wood tick bite on 428 and has been feeling miserable ever since.  She denies a classic rash she removed the tick immediately.  Now she is having fevers and body aches as well as joint pains.  Furthermore while she was in clinic she had some noticeable EKG changes..  Has a complicated cardiac history with CAD history of CABG x3 with cardiac catheterization 9/11/2017 with findings of stable disease.  She had a left-sided subclavian steal syndrome with stenting and patency noted in September 2017.  Reporting heavy chest pressure over the past couple weeks..  She reports the worst pain she had was on Friday (2 days ago)  it has improved somewhat with her taking nitro..    EKG shows sinus tachycardia with numerous ST changes specifically ST depressions V2 to V6 concerning for anterolateral ischemia.  This is changed from her last EKG in November 2018.            Allergies:  Allergies   Allergen Reactions     Atorvastatin Muscle Pain (Myalgia)     Liquid Adhesive Rash     Other reaction(s): Erythema  Silk and plastic gloves (long term attachment of adhesives)     Tiotropium Bromide [Tiotropium] Rash     Ezetimibe Muscle Pain (Myalgia)     Niacin      Other reaction(s): Flushing  flushing     Rosuvastatin Muscle Pain (Myalgia)     Other reaction(s): Myalgia     Latex Rash     No Clinical Screening - See Comments Rash, Blisters and Itching     Metals and plastic  Metals and plastics       Tape [Adhesive Tape] Rash       Problem List:    Patient Active Problem List    Diagnosis Date Noted     Chronic anxiety 01/22/2018     Priority: Medium     Collagenous colitis 01/22/2018     Priority: Medium     Overview:   Possible Dx 2007       Major depressive disorder, recurrent episode, severe (H) 01/22/2018     Priority: Medium     Essential hypertension 01/22/2018     Priority: Medium      Mixed hyperlipidemia 01/22/2018     Priority: Medium     Osteoarthritis 01/22/2018     Priority: Medium     Panic attack 01/22/2018     Priority: Medium     Status post coronary angiogram 09/15/2017     Priority: Medium     History of tobacco abuse 08/10/2017     Priority: Medium     Presence of stent in coronary artery in patient with coronary artery disease 08/10/2017     Priority: Medium     S/P CABG x 3 08/10/2017     Priority: Medium     Noncompliance with CPAP treatment 10/21/2016     Priority: Medium     Intractable chronic common migraine without aura 10/21/2016     Priority: Medium     H/O multiple concussions 10/21/2016     Priority: Medium     History of Clostridium difficile 08/19/2016     Priority: Medium     Iron deficiency anemia 07/26/2016     Priority: Medium     CKD (chronic kidney disease) stage 2, GFR 60-89 ml/min 12/09/2015     Priority: Medium     Chest wall pain, chronic 11/04/2015     Priority: Medium     Pain medication agreement 01/28/2014     Priority: Medium     Overview:        Central sleep apnea 10/14/2013     Priority: Medium     Myofascial pain 10/14/2013     Priority: Medium     Vitamin D deficiency 05/06/2013     Priority: Medium     Subclavian artery stenosis, left (H) 03/31/2013     Priority: Medium     Overview:   S/p prox left SCA stent 4/1/2013       Lumbar facet arthropathy 01/02/2013     Priority: Medium     Nonspecific abnormal results of pulmonary system function study 12/21/2011     Priority: Medium     Slow transit constipation 11/29/2011     Priority: Medium     Gastric ulcer with hemorrhage 10/03/2011     Priority: Medium     Family history of malignant neoplasm of gastrointestinal tract 09/26/2011     Priority: Medium     Nodular degeneration of cornea 09/26/2011     Priority: Medium     Bilateral low back pain without sciatica 05/31/2011     Priority: Medium     Chronic pain disorder 12/01/2010     Priority: Medium     COPD (chronic obstructive pulmonary disease) (H)  06/15/2007     Priority: Medium     Overview:   Low DLCO, normal spirometry on 12/15/11       Peptic ulcer 06/15/2007     Priority: Medium     Postsurgical aortocoronary bypass status 02/12/2003     Priority: Medium     Coronary atherosclerosis 09/12/2002     Priority: Medium     Overview:   Multiple Angigrams prior to 2007. Also:  6/5/2007: Angiogram: TAXUS DELONTE to ostial circumflux, instent restenosis  5/23/2008: Left Main 30% diseased, LAD stents patent, Left circ stent patent, Chronic occluded right internal mammary artery graft to distal RCA, native RCA has 30% stenosis; NO intevention  9/19/2012 CT Angiogram: Patent LIMA to LAD, patent LAD stents, moderate proximal circumflex disease, patent RCA , occluded right internal mammary artery graft to distal RCA.  9/20/2012: Angiogram: Stent to Left Main, ostial LAD, and PTCA of ostial left circumflex          Past Medical History:    Past Medical History:   Diagnosis Date     Acute ischemic heart disease (H)      Acute myocardial infarction (H)      Anxiety disorder      Atherosclerotic heart disease of native coronary artery without angina pectoris      Bilateral carpal tunnel syndrome      Cervicalgia      Chest pain      Chronic gastric ulcer without hemorrhage or perforation      Chronic ischemic heart disease      Chronic obstructive pulmonary disease (H)      Chronic or unspecified gastric ulcer with hemorrhage      Chronic pain syndrome      Constipation      Coronary angioplasty status      Coronary angioplasty status      Coronary angioplasty status      Dorsalgia      Encounter for other administrative examinations      Encounter for screening for cardiovascular disorders      Enterocolitis due to Clostridium difficile      Essential (primary) hypertension      Hyperlipidemia      Major depressive disorder, single episode      Migraine without status migrainosus, not intractable      Nodular corneal degeneration      Noninfective gastroenteritis and colitis       Noninfective gastroenteritis and colitis      Osteoarthritis      Other chest pain      Pain in knee      Pain in right shoulder      Panic disorder without agoraphobia      Peptic ulcer without hemorrhage or perforation      Peripheral vascular disease (H)      Personal history of diseases of the blood and blood-forming organs and certain disorders involving the immune mechanism (CODE)      Personal history of nicotine dependence      Personal history of other medical treatment (CODE)      Presence of aortocoronary bypass graft      Primary central sleep apnea      Sepsis due to Escherichia coli (E. coli) (H)      Stricture of artery (H)      Thoracic, thoracolumbar and lumbosacral intervertebral disc disorder      Uncomplicated opioid abuse (H)      Vitamin D deficiency        Past Surgical History:    Past Surgical History:   Procedure Laterality Date     ANGIOPLASTY      9/12/02,with triple stenting     APPENDECTOMY OPEN      No Comments Provided     ARTHROSCOPY KNEE      left     ARTHROSCOPY SHOULDER      right     BYPASS GRAFT ARTERY CORONARY      12/13/02,Triple bypass, left internal mammary  to LAD, right internal mammary to right coronary artery, saphenous to obtuse marginal of the left circumflex.     COLONOSCOPY      2011,Dr Bowman benign polyps     COLONOSCOPY  10/03/2011    2011,benign polyps, Dr. Bowman     COLONOSCOPY  08/08/2016 8/8/16,normal, Dr Bowman     ELBOW SURGERY      baby,birth malachi removed from right arm     EMBOLECTOMY UPPER EXTREMITY  04/02/2013    brachial artery pseudoaneurysm after stenting     ESOPHAGOSCOPY, GASTROSCOPY, DUODENOSCOPY (EGD), COMBINED      2011,EGD Dr Bowman with pyloric ulcer     ESOPHAGOSCOPY, GASTROSCOPY, DUODENOSCOPY (EGD), COMBINED      2011,pyloric ulcer, Dr. Bowman     ESOPHAGOSCOPY, GASTROSCOPY, DUODENOSCOPY (EGD), COMBINED      8/8/16,mild gastritis, Dr Bowman     ESOPHAGOSCOPY, GASTROSCOPY, DUODENOSCOPY (EGD), COMBINED      11/27/2017,Dr Bowman. Antral ulcer      ESOPHAGOSCOPY, GASTROSCOPY, DUODENOSCOPY (EGD), COMBINED  2018    Dr Bowman, healed ulcer     HYSTERECTOMY TOTAL ABDOMINAL      age 21     LAPAROSCOPIC CHOLECYSTECTOMY      2006     OSTEOTOMY FEMUR DISTAL      x3, right knee     OSTEOTOMY FEMUR DISTAL      2000,left knee  ligament surgery     OTHER SURGICAL HISTORY      1/10/2003,,PTCA     OTHER SURGICAL HISTORY      2012,,PTCA,DELONTE in LAD and left main     OTHER SURGICAL HISTORY      2013,,PTCA,L subclavian stenosis     SALPINGO-OOPHORECTOMY BILATERAL      age 27,Bilateral salpingo-oophorectomy     TONSILLECTOMY, ADENOIDECTOMY, COMBINED      childhood       Family History:    Family History   Problem Relation Age of Onset     Heart Disease Father         Heart Disease,Heart condition/Significant for atherosclerotic cardiovascular disease, but non premature.     Colon Cancer Father         Cancer-colon, of colon cancer     Cancer Father         Cancer,mets to liver, secondary to colon cancer     Heart Disease Mother         Heart Disease     Cancer Other         Cancer,Multiple Myeloma     Heart Disease Other         Heart Disease,Ischemic Heart Disease     Colon Cancer Other         Cancer-colon,Malignant neoplasms     Cancer Sister         Cancer,multiple myeloma     Other - See Comments Son         gallstones       Social History:  Marital Status:   [2]  Social History     Tobacco Use     Smoking status: Former Smoker     Packs/day: 0.25     Years: 35.00     Pack years: 8.75     Types: Cigarettes     Last attempt to quit: 2/15/2017     Years since quittin.2     Smokeless tobacco: Never Used   Substance Use Topics     Alcohol use: Yes     Alcohol/week: 0.0 oz     Comment: Alcoholic Drinks/day: rare     Drug use: No     Comment: Drug use: No        Medications:      albuterol (PROAIR HFA/PROVENTIL HFA/VENTOLIN HFA) 108 (90 Base) MCG/ACT inhaler   amLODIPine (NORVASC) 10 MG tablet   aspirin EC 81 MG EC tablet  "  busPIRone (BUSPAR) 10 MG tablet   clonazePAM (KLONOPIN) 1 MG tablet   clopidogrel (PLAVIX) 75 MG tablet   cyclobenzaprine (FLEXERIL) 10 MG tablet   docusate sodium (COLACE) 50 MG capsule   DULoxetine (CYMBALTA) 60 MG EC capsule   gabapentin (NEURONTIN) 600 MG tablet   lisinopril (PRINIVIL/ZESTRIL) 40 MG tablet   metoprolol tartrate (LOPRESSOR) 50 MG tablet   NITROSTAT 0.3 MG sublingual tablet   ondansetron (ZOFRAN) 4 MG tablet   [START ON 5/23/2019] oxyCODONE-acetaminophen (PERCOCET) 5-325 MG tablet   pantoprazole (PROTONIX) 40 MG EC tablet   saccharomyces boulardii (FLORASTOR) 250 MG capsule   VITAMIN D, CHOLECALCIFEROL, PO   Diclofenac Sodium 1.5 % SOLN   pravastatin (PRAVACHOL) 40 MG tablet   sucralfate (CARAFATE) 1 GM tablet         Review of Systems   Constitutional: Positive for chills, fatigue and fever.   HENT: Negative for congestion, sinus pressure, sore throat and trouble swallowing.    Eyes: Negative for photophobia, pain, redness and visual disturbance.   Respiratory: Positive for cough. Negative for chest tightness and shortness of breath.    Cardiovascular: Positive for chest pain. Negative for palpitations and leg swelling.   Gastrointestinal: Negative for abdominal pain, constipation, diarrhea, nausea and vomiting.   Genitourinary: Negative for difficulty urinating and hematuria.   Musculoskeletal: Negative for arthralgias, back pain and neck stiffness.        Bilateral lower leg pain   Skin: Negative for color change, rash and wound.   Neurological: Positive for weakness and headaches. Negative for dizziness, seizures, syncope and light-headedness.        Reports chronic heandaches   Hematological: Negative for adenopathy. Does not bruise/bleed easily.   Psychiatric/Behavioral: Negative for confusion.       Physical Exam   BP: 122/78  Pulse: 117  Temp: 100.4  F (38  C)  Resp: 18  Height: 167.6 cm (5' 6\")  Weight: 79.4 kg (175 lb)  SpO2: 96 %  Vitals:    05/13/19 1515 05/13/19 1530 05/13/19 1809 " 05/13/19 1905   BP:    110/70   Pulse:    92   Resp: 19 25     Temp:   100.5  F (38.1  C) 99.1  F (37.3  C)   TempSrc:   Tympanic Tympanic   SpO2:    93%   Weight:       Height:             Physical Exam   Constitutional: She is oriented to person, place, and time. She appears well-developed and well-nourished. No distress.   HENT:   Head: Normocephalic and atraumatic.   Eyes: Pupils are equal, round, and reactive to light. Conjunctivae are normal. No scleral icterus.   Neck: Neck supple.   Cardiovascular: Normal rate and regular rhythm.   Pulmonary/Chest: Effort normal. No stridor. No respiratory distress. She exhibits no tenderness.   Lung sounds clear but decreased with some chest congestion.  SaO2 is 96% on room air.  She does not appear to be in respiratory distress.   Abdominal: Soft. She exhibits no distension. There is no tenderness.   Musculoskeletal: Normal range of motion. She exhibits tenderness. She exhibits no deformity.   Bilateral lower leg pain on palpation to her calves but negative homans. CMSx4   Lymphadenopathy:     She has no cervical adenopathy.   Neurological: She is alert and oriented to person, place, and time.   Skin: Skin is warm and dry. Capillary refill takes less than 2 seconds. No rash noted. She is not diaphoretic.   Psychiatric: She has a normal mood and affect.       ED Course        Procedures          EKG shows sinus tachycardia with numerous ST changes specifically ST depressions V2 to V6 concerning for anterolateral ischemia.  This is changed from her last EKG in November 2018      PROCEDURE: XR CHEST 2 VW 5/13/2019 10:08 AM     HISTORY: Fever in other diseases     COMPARISONS: 9/12/2017.     TECHNIQUE: 2 views.     FINDINGS: There has been a median sternotomy and CABG. Heart and  pulmonary vasculature are normal. No new infiltrate or effusion is  seen.     No significant bone abnormality is seen. Fixation anchor is seen in  the proximal humerus on the right.                                                                         IMPRESSION: No acute disease.     ENE WALKER MD  Results for DARRIAN JURADO (MRN [id number]) as of 5/13/2019 11:32   Ref. Range 1/25/2019 11:26 2/26/2019 09:05 4/29/2019 13:14 5/13/2019 09:49 5/13/2019 10:08   Sodium Latest Ref Range: 134 - 144 mmol/L    137    Potassium Latest Ref Range: 3.5 - 5.1 mmol/L    3.4 (L)    Chloride Latest Ref Range: 98 - 107 mmol/L    103    Carbon Dioxide Latest Ref Range: 21 - 31 mmol/L    23    Urea Nitrogen Latest Ref Range: 7 - 25 mg/dL    23    Creatinine Latest Ref Range: 0.60 - 1.20 mg/dL    1.07    GFR Estimate Latest Ref Range: >60 mL/min/1.73_m2    51 (L)    GFR Estimate If Black Latest Ref Range: >60 mL/min/1.73_m2    62    Calcium Latest Ref Range: 8.6 - 10.3 mg/dL    9.3    Anion Gap Latest Ref Range: 3 - 14 mmol/L    11    Albumin Latest Ref Range: 3.5 - 5.7 g/dL    4.3    Protein Total Latest Ref Range: 6.4 - 8.9 g/dL    7.3    Bilirubin Total Latest Ref Range: 0.3 - 1.0 mg/dL    1.1 (H)    Alkaline Phosphatase Latest Ref Range: 34 - 104 U/L    107 (H)    ALT Latest Ref Range: 7 - 52 U/L    37    AST Latest Ref Range: 13 - 39 U/L    39      Results for orders placed or performed during the hospital encounter of 05/13/19 (from the past 24 hour(s))   Lactic acid   Result Value Ref Range    Lactic Acid 1.6 0.5 - 2.2 mmol/L   INR   Result Value Ref Range    INR 1.05 0 - 1.3   Troponin I   Result Value Ref Range    Troponin I ES <0.030 0.000 - 0.034 ug/L   D-Dimer (HI,GH)   Result Value Ref Range    D-Dimer ng/mL 970 (H) 0 - 230 ng/ml D-DU   CT Chest Pulmonary Embolism w Contrast    Narrative    CT CHEST PULMONARY EMBOLISM W CONTRAST    HISTORY: 64 years Female Shortness of breath. Patient has history of  coronary artery disease and prior CABG x3. Patient has heavy chest  pressure over the past couple of weeks.    TECHNIQUE: Axial CT imaging of the chest was performed With  intravenous contrast. Coronal and  sagittal reconstructions were  obtained.    COMPARISON: 3/1/2017    FINDINGS:  There is no evidence of pulmonary embolism. There is no evidence of  thoracic aortic aneurysm or dissection.    Patient is status post median sternotomy and CABG. There is a metallic  stent in the proximal left subclavian. The lumen is patent.  There is no mediastinal or hilar or axillary lymphadenopathy.    The lungs are clear. No consolidating airspace opacities are present.  No concerning pulmonary nodules or masses are present         No concerning osseous lesions are identified      Impression    IMPRESSION: No evidence of pulmonary embolism.    CLEMENTE RODRIGUEZ MD   Troponin I   Result Value Ref Range    Troponin I ES <0.030 0.000 - 0.034 ug/L   *UA reflex to Microscopic   Result Value Ref Range    Color Urine Yellow     Appearance Urine Clear     Glucose Urine Negative NEG^Negative mg/dL    Bilirubin Urine Negative NEG^Negative    Ketones Urine Negative NEG^Negative mg/dL    Specific Gravity Urine 1.020 1.000 - 1.030    Blood Urine Negative NEG^Negative    pH Urine 5.5 5.0 - 9.0 pH    Protein Albumin Urine Negative NEG^Negative mg/dL    Urobilinogen Urine 1.0 0.2 - 1.0 EU/dL    Nitrite Urine Negative NEG^Negative    Leukocyte Esterase Urine Negative NEG^Negative    Source Midstream Urine    Echocardiogram Complete    Narrative    [id number]  [id number]  [id number]  [id number]^LUIS A^SURY^A        [facility]  [address] [place] [address]     Name: DARRIAN JURADO  MRN: [id number]  : 1955  Study Date: 2019 03:50 PM  Age: 64 yrs  Gender: Female  Patient Location: [facility]  Reason For Study: Tachycardia  Ordering Physician: SURY GUNN  Performed By: Ryann Solorzano RDCS, RVT     BSA: 1.9 m2  Height: 66 in  Weight: 175 lb  HR: 100  BP: 131/72 mmHg  _____________________________________________________________________________  __        Procedure  Complete Portable Echo Adult. Echocardiogram  with two-dimensional, color and  spectral Doppler performed.  _____________________________________________________________________________  __        Interpretation Summary  Global and regional left ventricular function is normal with an EF of 60-65%.  Mild concentric wall thickening consistent with left ventricular hypertrophy  is present.  Right ventricular function, chamber size, wall motion, and thickness are  normal.  No significant valvular abnormalities were noted.  Previous study not available for comparison.  _____________________________________________________________________________  __        Left Ventricle  Global and regional left ventricular function is normal with an EF of 60-65%.  Left ventricular size is normal. Mild concentric wall thickening consistent  with left ventricular hypertrophy is present. Left ventricular diastolic  function is normal.     Right Ventricle  Right ventricular function, chamber size, wall motion, and thickness are  normal.     Atria  Both atria appear normal. The atrial septum is intact as assessed by color  Doppler .     Mitral Valve  The mitral valve is normal. Trace mitral insufficiency is present.     Aortic Valve  Aortic valve is normal in structure and function. The aortic valve is  tricuspid.        Tricuspid Valve  The tricuspid valve is normal. Trace tricuspid insufficiency is present. The  right ventricular systolic pressure is approximated at 24.5 mmHg plus the  right atrial pressure.     Pulmonic Valve  The pulmonic valve is normal.     Vessels  The aorta root is normal. The thoracic aorta is normal. The pulmonary artery  cannot be assessed. The inferior vena cava was normal in size with preserved  respiratory variability. IVC diameter <2.1 cm collapsing >50% with sniff  suggests a normal RA pressure of 3 mmHg.     Pericardium  No pericardial effusion is present.     Compared to Previous Study  Previous study not available for comparison.      _____________________________________________________________________________  __  MMode/2D Measurements & Calculations  IVSd: 1.3 cm  LVIDd: 4.6 cm  LVIDs: 3.2 cm  LVPWd: 1.1 cm  FS: 29.9 %     LV mass(C)d: 196.4 grams  LV mass(C)dI: 103.9 grams/m2  asc Aorta Diam: 3.4 cm  LVOT diam: 1.8 cm  LVOT area: 2.5 cm2  LA Volume (BP): 32.9 ml  LA Volume Index (BP): 17.4 ml/m2  RWT: 0.47        Doppler Measurements & Calculations  MV E max darin: 57.4 cm/sec  MV A max darin: 73.8 cm/sec  MV E/A: 0.78  MV dec time: 0.19 sec     Ao V2 max: 155.0 cm/sec  Ao max PG: 10.0 mmHg  SARAHY(V,D): 2.3 cm2  LV V1 max P.8 mmHg  LV V1 max: 140.0 cm/sec  TR max darin: 247.3 cm/sec  TR max P.5 mmHg  AV Darin Ratio (DI): 0.90  E/E' av.8  Lateral E/e': 4.5  Medial E/e': 7.0           _____________________________________________________________________________  __           Report approved by: eRd Roche 2019 04:32 PM      US Lower Extremity Venous Duplex Bilateral    Narrative    Exam:US LOWER EXTREMITY VENOUS DUPLEX BILATERAL    History: Bilateral leg pain elevated d-dimer    Comparisons:none    Technique: Venous duplex ultrasonography of the bilateral lower  extremities was performed.     Findings: The common femoral veins, superficial femoral veins and  popliteal veins are fully compressible with spontaneous and  augmentable venous flow. There is some fluid seen in the left knee  consistent with a knee effusion. Note is made that the images were  marked right knee in error           Impression    Impression: No evidence of deep venous thrombosis within the lower  extremities.    KHALIF SWIFT MD       Medications   0.9% sodium chloride BOLUS (0 mLs Intravenous Stopped 19 1352)     Followed by   sodium chloride 0.9% infusion (0 mLs Intravenous Stopped 19 191)   fentaNYL (PF) (SUBLIMAZE) injection 50 mcg (50 mcg Intravenous Given 19 180)   acetaminophen (TYLENOL) tablet 1,000 mg (1,000 mg Oral Given  5/13/19 1130)   iodixanol (VISIPAQUE 320) injection 100 mL (100 mLs Intravenous Given 5/13/19 1225)   diphenhydrAMINE (BENADRYL) injection 25 mg (25 mg Intravenous Given 5/13/19 1808)   methylPREDNISolone sodium succinate (solu-MEDROL) injection 81.25 mg (81.25 mg Intravenous Given 5/13/19 1809)   rizatriptan (MAXALT-MLT) ODT tab 10 mg (10 mg Oral Given 5/13/19 1809)   doxycycline hyclate (VIBRAMYCIN) capsule 100 mg (100 mg Oral Given 5/13/19 1913)       Assessments & Plan (with Medical Decision Making)     I have reviewed the nursing notes.    I have reviewed the findings, diagnosis, plan and need for follow up with the patient.         Medication List      Started    doxycycline hyclate 100 MG capsule  Commonly known as:  VIBRAMYCIN  100 mg, Oral, 2 TIMES DAILY            Final diagnoses:   Pneumonitis   At high risk for tick borne illness   ST segment changes on electrocardiogram   Fever   Myalgia     Febrile temp 100.4.  She was given Tylenol for fever reduction vital signs stable.  Patient with recent tick bite and now some flulike symptoms.  Cough and fever as well as myalgias.  IV established and she was given fluids and fentanyl for pain.  EKG shows sinus tachycardia with numerous ST changes specifically ST depressions V2 to V6 concerning for anterolateral ischemia.  This is changed from her last EKG in November 2018.  Troponin is normal.  D-dimer returns elevated at 970.  INR is 1.05.  She is currently on Plavix.  She reports prior to that she was on aspirin as well.  CBC from lab shows normal white blood cells no left shift.  Blood cultures are pending.  Lactate is normal.  Babesia, Ehrlichia, Anaplasma, and Lyme's tests are pending.  UA is unremarkable.  Her headache continued and she was given Solu-Medrol, Benadryl, and Maxalt.  CT PE study shows There is no evidence of pulmonary embolism. There is no evidence of thoracic aortic aneurysm or dissection.  Patient is status post median sternotomy and CABG.  There is a metallic  stent in the proximal left subclavian. The lumen is patent.  There is no mediastinal or hilar or axillary lymphadenopathy.  The lungs are clear. No consolidating airspace opacities are present.  No concerning pulmonary nodules or masses are present       No concerning osseous lesions are identified.  No acute findings is reassuring given her bilateral leg pain and elevated d-dimer ultrasound bilateral lower extremity shows  5/13/2019.  90-minute d-dimer is normal as well which is reassuring.  However given her EKG changes a cardiac echo was performed which shows Global and regional left ventricular function is normal with an EF of 60-65%.  Mild concentric wall thickening consistent with left ventricular hypertrophy is present.  Right ventricular function, chamber size, wall motion, and thickness are  Normal.  No significant valvular abnormalities were noted.  Previous study not available for comparison.  This is reassuring.  The patient reports she is feeling much better after the above treatment and would like to return home.  This seems reasonable given her recent findings.  It is possible she suffered a silent MI with the above EKG changes.  She remains chest pain-free at this point and serial troponins are negative.  I discussed with patient close follow-up with her primary care provider for further evaluation such as stress test and she will arrange this.  She remains febrile with a cough no obvious signs of consolidation however I do feel she has pneumonitis.  Furthermore recent exposure to tick with high risk for borne illnesses the studies are pending.  We will treat empirically to cover both with doxycycline and she was given her first dose here in the ER.  She will need to follow-up with her primary care provider within 5 days.  Follow-up sooner if there is any other concerns problems or questions  _____________________________________________________________________________  [facility]als[facility] sh[facility] PA-C  05/13/19 [id number]

## 2019-05-13 NOTE — TELEPHONE ENCOUNTER
After verifying pts name and date of birth with pt, pt was put in schedule today @ 3842.  Jamilah Berger

## 2019-05-13 NOTE — PROGRESS NOTES
1.  Has the patient had a previous reaction to IV contrast? n    2.  Does the patient have kidney disease? Yes stage 2, GFR clears for contrast    3.  Is the patient on dialysis? n    If YES to any of these questions, exam will be reviewed with a Radiologist before administering contrast.

## 2019-05-13 NOTE — ED TRIAGE NOTES
Pt here by herself from clinic, pt reports a wood tick bite on 4/28, pt reports that 4 days later pt developed fevers, joint pain and overall not feeling well, pt brought back into ER to be evaluated

## 2019-05-13 NOTE — TELEPHONE ENCOUNTER
Patient would like a work in today for multiple issues.    Virgie Dumont on 5/13/2019 at 7:43 AM

## 2019-05-13 NOTE — ED AVS SNAPSHOT
[facility]  [address]  [place] [address]  Phone:  [phone]  Fax:  [phone]                                    Ankita Day   MRN: [id number]    Department:  [facility]   Date of Visit:  5/13/2019           After Visit Summary Signature Page    I have received my discharge instructions, and my questions have been answered. I have discussed any challenges I see with this plan with the nurse or doctor.    ..........................................................................................................................................  Patient/Patient Representative Signature      ..........................................................................................................................................  Patient Representative Print Name and Relationship to Patient    ..................................................               ................................................  Date                                   Time    ..........................................................................................................................................  Reviewed by Signature/Title    ...................................................              ..............................................  Date                                               Time          22EPIC Rev 08/18

## 2019-05-13 NOTE — NURSING NOTE
Pt presents to clinic today for headache, fever and joint pain. Pt states she had a tick bite to lower left hip area on 4/28/19.      Medication Reconciliation: complete  Jamilah Berger LPN

## 2019-05-14 LAB — B BURGDOR IGG+IGM SER QL: 0.1 (ref 0–0.89)

## 2019-05-17 ENCOUNTER — OFFICE VISIT (OUTPATIENT)
Dept: FAMILY MEDICINE | Facility: OTHER | Age: 64
End: 2019-05-17
Attending: PHYSICIAN ASSISTANT
Payer: MEDICARE

## 2019-05-17 VITALS
OXYGEN SATURATION: 96 % | SYSTOLIC BLOOD PRESSURE: 142 MMHG | DIASTOLIC BLOOD PRESSURE: 70 MMHG | TEMPERATURE: 96.6 F | RESPIRATION RATE: 20 BRPM | HEART RATE: 59 BPM | WEIGHT: 175 LBS | BODY MASS INDEX: 28.25 KG/M2

## 2019-05-17 DIAGNOSIS — A77.49 ANAPLASMOSIS: Primary | ICD-10-CM

## 2019-05-17 DIAGNOSIS — R07.9 CHEST PAIN, UNSPECIFIED TYPE: ICD-10-CM

## 2019-05-17 DIAGNOSIS — R94.31 ACUTE ELECTROCARDIOGRAM CHANGES: ICD-10-CM

## 2019-05-17 DIAGNOSIS — I25.10 PRESENCE OF STENT IN CORONARY ARTERY IN PATIENT WITH CORONARY ARTERY DISEASE: ICD-10-CM

## 2019-05-17 DIAGNOSIS — Z95.5 PRESENCE OF STENT IN CORONARY ARTERY IN PATIENT WITH CORONARY ARTERY DISEASE: ICD-10-CM

## 2019-05-17 LAB
A PHAGOCYTOPH DNA BLD QL NAA+PROBE: DETECTED
A PHAGOCYTOPH IGG TITR SER IF: ABNORMAL {TITER}
A PHAGOCYTOPH IGM TITR SER IF: ABNORMAL {TITER}
B MICROTI DNA SPEC QL NAA+PROBE: NOT DETECTED
BABESIA DNA SPEC QL NAA+PROBE: NOT DETECTED
E CHAFFEENSIS DNA BLD QL NAA+PROBE: NOT DETECTED
E EWINGII DNA SPEC QL NAA+PROBE: NOT DETECTED
EHRLICHIA DNA SPEC QL NAA+PROBE: NOT DETECTED

## 2019-05-17 PROCEDURE — G0463 HOSPITAL OUTPT CLINIC VISIT: HCPCS | Performed by: PHYSICIAN ASSISTANT

## 2019-05-17 PROCEDURE — 99214 OFFICE O/P EST MOD 30 MIN: CPT | Performed by: PHYSICIAN ASSISTANT

## 2019-05-17 ASSESSMENT — PAIN SCALES - GENERAL: PAINLEVEL: EXTREME PAIN (8)

## 2019-05-17 NOTE — PATIENT INSTRUCTIONS
"Continue fluids and rest.  Increase fluids and food with electrolytes - gatorade, chicken broth, chicken noodle soup, saltine crackers.     Encouraged to take tylenol for relief up to 4 times per day.  Encouraged rest.      Monitor for increasing chest pain, palpitations, problems breathing, not keeping food or fluids down, lightheadedness or dizziness.   Return to clinic or ER with change/worsening of symptoms.     Referred to cardiology to be seen in the next week.       Patient Education     Tick Bites  Ticks are small arachnids that feed on the blood of rodents, rabbits, birds, deer, dogs, and people. A tick bite may cause a reaction like a spider bite. You may have redness, itching, and slight swelling at the site. Sometimes you may have no reaction where the tick bit you.  Ticks may gorge themselves for days before you find and remove them. The bites themselves aren't cause for concern. But ticks can carry and pass on illnesses such as Lyme disease and [place] spotted fever. Both diseases begin with a rash and symptoms similar to the flu. In advanced stages, these diseases can be quite serious.     A \"bull's eye\" rash is a common symptom of Lyme disease.   When to go to the emergency room (ER)  Not all ticks carry disease. And a tick must stay attached for at least 24 hours to infect you. If you find a tick, don't panic. Try to carefully remove it with tweezers. Grasp the tick near its head and pull without twisting. If you can't easily dislodge the tick or if you leave the head in your skin, get medical care right away.  What to expect in the ER    The tick or any parts of the tick will be removed and the bite will be cleaned.    To prevent disease, you may be given antibiotics. Both Lyme disease and [place] spotted fever respond quickly to these medicines.    You may be asked to see your healthcare provider for a blood test to check for Lyme disease.  Follow-up care  Some [facility] [place] and [facility] " have services that test ticks for Lyme disease and other diseases. Check with your local officials to see if this service is available in your area.  If you remove a tick yourself, watch for signs of a tick-borne illness. Symptoms may show up within a few days or weeks after a bite. Call your healthcare provider if you notice any of the following:    Rash. The rash may spread outward in a ring from a hard white lump. Or it may move up your arms and legs to your chest.    Chills and fever    Body aches and joint pain    Severe headache  Date Last Reviewed: 12/1/2016 2000-2018 [employer]. [address] [place], [place] [address]. All rights reserved. This information is not intended as a substitute for professional medical care. Always follow your healthcare professional's instructions.           Patient Education     Tick Bite, Antibiotic Treatment    Ticks are small arachnids that feed on the blood of rodents, rabbits, birds, deer, dogs and humans. The bite may cause a local reaction like that of a spider, with a small amount of local redness, itching and slight swelling. Sometimes there is no local reaction.  Most tick bites are harmless. But some ticks carry diseases, such as Lyme disease or [place] spotted fever. These can be passed to people at the time of the bite. Lyme disease is of greatest concern. Right now you have no symptoms of Lyme disease or other serious reaction to the bite. It is important to watch for the warning signs, which could appear weeks to months after the tick bite.  Home care  The following guidelines can help you care for your bite at home:    If itching is a problem, avoid tight clothing and anything that heats up your skin. This includes hot showers or baths and direct sunlight. This often makes the itching worse.    An ice pack will reduce local areas of redness and itching. Make your own ice pack by putting ice cubes in a zip-top plastic bag and wrapping it  in a thin towel. Over-the-counter creams containing benzocaine may help with itching.    You can use an antihistamine with diphenhydramine if your doctor did not give you another antihistamine. This medicine may be used to reduce itching if large areas of the skin are involved. It is available at drugstores and grocery stores. If symptoms continue, talk with your doctor or pharmacist about other over-the counter medicines that may be helpful.    Your doctor may prescribe antibiotics to reduce your risk of getting Lyme disease. It is very important that you take them exactly as directed until they are completely finished.  Follow-up care  Follow up with your healthcare provider, or as advised.  Call 911  Call 911 if any of these occur:    Irregular or rapid heartbeat    Numbness, tingling, or weakness in the arms or legs  When to seek medical advice  Call your healthcare provider right away if any of these occur:  Signs of local infection. Watch for these during the next few days:    Increasing redness around the bite site    Increased pain or swelling    Fever over 100.4 F (38 C), or as directed by your healthcare provider    Fluid draining from the bite area  Signs of tick-related disease. Watch for these over the next few weeks to months:    Circular, red, ring-like rash appears at the bite area within 1 to 3 weeks    Tiredness, fever or chills, nausea or vomiting    Neck pain or stiffness, headache, or confusion    Muscle or bone aches    Joint pain or swelling, especially in the knee    Weakness on one side of the face  Date Last Reviewed: 10/1/2016    3086-9496 [employer] [address] [place] [address], [place] [address]. All rights reserved. This information is not intended as a substitute for professional medical care. Always follow your healthcare professional's instructions.

## 2019-05-17 NOTE — PROGRESS NOTES
Nursing Notes:   Jesi Ma LPN  5/17/2019 11:10 AM  Signed  Chief Complaint   Patient presents with     ER F/U         Medication Reconciliation: complete    Jesi Ma LPN      HPI:    Ankita Day is a 64 year old female who presents for ER follow up.  Patient was diagnosed with anaplasmosis.  Started on doxycycline 100 mg twice daily for 14 days.  Has a complicated cardiac history with CAD history of CABG x3 with cardiac catheterization 9/11/2017 with findings of stable disease.  She had a left-sided subclavian steal syndrome with stenting and patency noted in September 2017.    Initially presented to the emergency room on 5/13/2019.  Patient had a wood tick bite on 4/28.  Feeling miserable since then.  Patient had been having fevers, body aches.  Chest pressure the few weeks prior.  Improved with nitro.  A large work-up was completed in the emergency room.  EKG shows sinus tachycardia with numerous ST changes specifically ST depressions V2 to V6 concerning for anterolateral ischemia.  This is changed from her last EKG in November 2018.  Patient had unremarkable blood work including CBC, BMP, lactic acid, blood cultures, Babesia, INR, troponin, urinalysis.  D-dimer was elevated.  Patient had an unremarkable lower extremity ultrasound, chest x-ray and chest CT.  Patient was initially diagnosed with pneumonitis, at risk for tickborne illness, ST changes on EKG, fever and myalgias.    Patient states that since the emergency room visit she has had a continuing stiff neck, leg aches, chest pain, stomach pain.  Symptoms have been stable.  They are not getting worse.  She has been tired.  No fevers.  Keeping fluids down.  Eating minimally.  Lungs are irritated. Coughing.  No phlegm.  SOB.  Whole head hurts. Cold sore on right lower lip. Earache previously with ST - resolved. Nausea.  No vomiting.  Little constipation however she had a BM this morning.    Taking over-the-counter  medications for pain relief.  Patient is not lightheaded or dizzy.  No ear pain, sore throat, sinus pain or pressure, wheezing, rattling, blood in stool or urine, dysuria, frequency, urgency.  No joint swelling or redness.  No rashes.  Patient has not had to take nitro since leaving the emergency room.  Patient was initially treated in the emergency room with doxycycline to cover for pneumonitis and tickborne illnesses.    Past Medical History:   Diagnosis Date     Acute ischemic heart disease (H)     06/15/2007,with PTCA and stenting of 90% osteo circumflex lesion and patent LAD graft, patent left main stent.     Acute myocardial infarction (H)     3/30/2013     Anxiety disorder     No Comments Provided     Atherosclerotic heart disease of native coronary artery without angina pectoris     -angio revealed 3 vessel dz  -4 stents placed; 2 overlapping stents placed in mLAD, 1 stent pRCA, 1 stent pCirc 1 s 9/02 -non-ST elevation MI 1/03  -angio revealed restenosis of Circ.    -PTCA and brachytherapy of pCirc -repeat angio 2/03 -no intervension -CABG x3 12/03 - Dr Sinclair  -LIMA-LAD, RAFI-RCA, SVG-OM -PCI 7/04 stent to L -CTangio 9/05   -patentent LIMA-LAD. RAFI-RCA occluded; RCA ostio/p*     Bilateral carpal tunnel syndrome     No Comments Provided     Cervicalgia     No Comments Provided     Chest pain     12/29/2014     Chronic gastric ulcer without hemorrhage or perforation     10/03/2011,hx of GI bleed (2003)     Chronic ischemic heart disease     06/27/2012     Chronic obstructive pulmonary disease (H)     06/15/2007,low DLCO, normal spirometry     Chronic or unspecified gastric ulcer with hemorrhage     10/2003     Chronic pain syndrome     12/01/2010,chest wall, back     Constipation     11/29/2011     Coronary angioplasty status     09/12/2002,with triple stenting     Coronary angioplasty status     01/10/2003,repeat angioplasty     Coronary angioplasty status     No Comments Provided     Dorsalgia      05/31/2011     Encounter for other administrative examinations     1/28/2014     Encounter for screening for cardiovascular disorders     10/2004,Cardiol[facility] (2004, 2005, 2006 and 2011)     Enterocolitis due to Clostridium difficile     8/19/2016     Essential (primary) hypertension     No Comments Provided     Hyperlipidemia     No Comments Provided     Major depressive disorder, single episode     Severe, hx of suicide attempt/hospitalization     Migraine without status migrainosus, not intractable     No Comments Provided     Nodular corneal degeneration     09/26/2011     Noninfective gastroenteritis and colitis     06/15/2007,history of     Noninfective gastroenteritis and colitis     Microcytic     Osteoarthritis     No Comments Provided     Other chest pain     10/13/2009,chronic     Pain in knee     05/31/2011     Pain in right shoulder     No Comments Provided     Panic disorder without agoraphobia     No Comments Provided     Peptic ulcer without hemorrhage or perforation     6/15/2007     Peripheral vascular disease (H)     No Comments Provided     Personal history of diseases of the blood and blood-forming organs and certain disorders involving the immune mechanism (CODE)     No Comments Provided     Personal history of nicotine dependence     No Comments Provided     Personal history of other medical treatment (CODE)     10/14/2013     Presence of aortocoronary bypass graft     2/12/2003     Primary central sleep apnea     10/14/2013     Sepsis due to Escherichia coli (E. coli) (H)     7/14/16,[facility]     Stricture of artery (H)     3/31/2013,S/p prox left SCA stent 4/1/2013     Thoracic, thoracolumbar and lumbosacral intervertebral disc disorder     No Comments Provided     Uncomplicated opioid abuse (H)     history of     Vitamin D deficiency     5/6/2013       Past Surgical History:   Procedure Laterality Date     ANGIOPLASTY      9/12/02,with triple stenting     APPENDECTOMY OPEN      No Comments  Provided     ARTHROSCOPY KNEE      left     ARTHROSCOPY SHOULDER      right     BYPASS GRAFT ARTERY CORONARY      02,Triple bypass, left internal mammary  to LAD, right internal mammary to right coronary artery, saphenous to obtuse marginal of the left circumflex.     COLONOSCOPY      ,Dr Bowman benign polyps     COLONOSCOPY  10/03/2011    2011,benign polyps, Dr. Bowman     COLONOSCOPY  2016,normal, Dr Bowman     ELBOW SURGERY      baby,birth malachi removed from right arm     EMBOLECTOMY UPPER EXTREMITY  2013    brachial artery pseudoaneurysm after stenting     ESOPHAGOSCOPY, GASTROSCOPY, DUODENOSCOPY (EGD), COMBINED      ,EGD Dr Bowman with pyloric ulcer     ESOPHAGOSCOPY, GASTROSCOPY, DUODENOSCOPY (EGD), COMBINED      ,pyloric ulcer, Dr. Bowman     ESOPHAGOSCOPY, GASTROSCOPY, DUODENOSCOPY (EGD), COMBINED      16,mild gastritis, Dr Bowman     ESOPHAGOSCOPY, GASTROSCOPY, DUODENOSCOPY (EGD), COMBINED      2017,Dr Bowman. Antral ulcer     ESOPHAGOSCOPY, GASTROSCOPY, DUODENOSCOPY (EGD), COMBINED  2018    Dr Bowman, healed ulcer     HYSTERECTOMY TOTAL ABDOMINAL      age 21     LAPAROSCOPIC CHOLECYSTECTOMY      2006     OSTEOTOMY FEMUR DISTAL      x3, right knee     OSTEOTOMY FEMUR DISTAL      2000,left knee  ligament surgery     OTHER SURGICAL HISTORY      1/10/2003,,PTCA     OTHER SURGICAL HISTORY      2012,,PTCA,DELONTE in LAD and left main     OTHER SURGICAL HISTORY      2013,,PTCA,L subclavian stenosis     SALPINGO-OOPHORECTOMY BILATERAL      age 27,Bilateral salpingo-oophorectomy     TONSILLECTOMY, ADENOIDECTOMY, COMBINED      childhood       Family History   Problem Relation Age of Onset     Heart Disease Father         Heart Disease,Heart condition/Significant for atherosclerotic cardiovascular disease, but non premature.     Colon Cancer Father         Cancer-colon, of colon cancer     Cancer Father         Cancer,mets to liver, secondary to  colon cancer     Heart Disease Mother         Heart Disease     Cancer Other         Cancer,Multiple Myeloma     Heart Disease Other         Heart Disease,Ischemic Heart Disease     Colon Cancer Other         Cancer-colon,Malignant neoplasms     Cancer Sister         Cancer,multiple myeloma     Other - See Comments Son         gallstones       Social History     Tobacco Use     Smoking status: Former Smoker     Packs/day: 0.25     Years: 35.00     Pack years: 8.75     Types: Cigarettes     Last attempt to quit: 2/15/2017     Years since quittin.2     Smokeless tobacco: Never Used   Substance Use Topics     Alcohol use: Yes     Alcohol/week: 0.0 oz     Comment: Alcoholic Drinks/day: rare       Current Outpatient Medications   Medication Sig Dispense Refill     albuterol (PROAIR HFA/PROVENTIL HFA/VENTOLIN HFA) 108 (90 Base) MCG/ACT inhaler Inhale 2 puffs into the lungs every 6 hours 2 Inhaler 3     amLODIPine (NORVASC) 10 MG tablet Take 1 tablet (10 mg) by mouth daily 90 tablet 3     aspirin EC 81 MG EC tablet Take 81 mg by mouth daily with food       busPIRone (BUSPAR) 10 MG tablet TAKE 1 TABLET TWICE A  tablet 2     clonazePAM (KLONOPIN) 1 MG tablet Take 1 tablet (1 mg) by mouth 3 times daily as needed for anxiety #75 per 30 days, #225 pills per 90 days 225 tablet 0     clopidogrel (PLAVIX) 75 MG tablet Take 1 tablet (75 mg) by mouth daily 90 tablet 3     cyclobenzaprine (FLEXERIL) 10 MG tablet Take 1 tablet (10 mg) by mouth 2 times daily as needed for muscle spasms 42 tablet 3     Diclofenac Sodium 1.5 % SOLN Apply 20 drops to each hand or 40 drops to each knee up to 4 times daily as needed for arthritis pain 450 mL 3     docusate sodium (COLACE) 50 MG capsule Take 50 mg by mouth daily       doxycycline hyclate (VIBRAMYCIN) 100 MG capsule Take 1 capsule (100 mg) by mouth 2 times daily for 14 days 28 capsule 0     DULoxetine (CYMBALTA) 60 MG EC capsule Take 1 capsule (60 mg) by mouth daily 90 capsule 3      gabapentin (NEURONTIN) 600 MG tablet Take 1 tablet (600 mg) by mouth 3 times daily 270 tablet 2     lisinopril (PRINIVIL/ZESTRIL) 40 MG tablet Take 1 tablet (40 mg) by mouth daily 90 tablet 4     metoprolol tartrate (LOPRESSOR) 50 MG tablet Take 1 tablet (50 mg) by mouth 2 times daily 180 tablet 3     NITROSTAT 0.3 MG sublingual tablet PLACE 1 TABLET UNDER THE TONGUE EVERY 5 MINUTES AS NEEDED FOR CHEST PAIN 25 tablet 2     ondansetron (ZOFRAN) 4 MG tablet Take 4 mg by mouth every 6 hours as needed for nausea       [START ON 5/23/2019] oxyCODONE-acetaminophen (PERCOCET) 5-325 MG tablet Take 2 tablets by mouth 4 times daily Max acetaminophen dose: 4000mg in 24 hrs 240 tablet 0     pantoprazole (PROTONIX) 40 MG EC tablet TAKE 1 TABLET TWICE A  tablet 3     pravastatin (PRAVACHOL) 40 MG tablet Take 1 tablet (40 mg) by mouth At Bedtime 90 tablet 3     saccharomyces boulardii (FLORASTOR) 250 MG capsule Take 250 mg by mouth 2 times daily       sucralfate (CARAFATE) 1 GM tablet Take 1 tablet (1 g) by mouth 4 times daily Before meals & at bedtime 120 tablet 2     VITAMIN D, CHOLECALCIFEROL, PO Take 5,000 Units by mouth daily         Allergies   Allergen Reactions     Atorvastatin Muscle Pain (Myalgia)     Liquid Adhesive Rash     Other reaction(s): Erythema  Silk and plastic gloves (long term attachment of adhesives)     Tiotropium Bromide [Tiotropium] Rash     Ezetimibe Muscle Pain (Myalgia)     Niacin      Other reaction(s): Flushing  flushing     Rosuvastatin Muscle Pain (Myalgia)     Other reaction(s): Myalgia     Latex Rash     No Clinical Screening - See Comments Rash, Blisters and Itching     Metals and plastic  Metals and plastics       Tape [Adhesive Tape] Rash       REVIEW OF SYSTEMS:  Refer to HPI.    EXAM:   Vitals:    /70 (BP Location: Right arm, Patient Position: Sitting, Cuff Size: Adult Regular)   Pulse 59   Temp 96.6  F (35.9  C)   Resp 20   Wt 79.4 kg (175 lb)   SpO2 96%   BMI 28.25  kg/m      General Appearance: Pleasant, alert, appropriate appearance for age. No acute distress  Head Exam: Normal. Normocephalic, atraumatic.  Eye Exam:  Normal external eye, conjunctiva, lids,cornea. MAYRA.  Ear Exam: Normal TM's bilaterally, normal grey, and translucent. Normal auditory canals and external ears. Non-tender.   Nose Exam: Normal external nose.  OroPharynx Exam: Dental hygiene adequate. Normal buccal mucosa. Normal pharynx.  Neck Exam:  Supple, no masses or nodes.  Chest/Respiratory Exam: Normal chest wall and respirations. Clear to auscultation.  Cardiovascular Exam: Regular rate and rhythm. S1, S2, no murmur, click, gallop, or rubs.  Gastrointestinal Exam: Soft, no masses or organomegaly. Normal BS x 4.  Mildly tender with palpation throughout the lower abdomen.  No rebound tenderness or guarding appreciated.  No CVA tenderness to palpation.  Skin: no rash or abnormalities  Neurologic Exam: Nonfocal, normal gross motor, tone coordination and no tremor.  Psychiatric Exam: Alert and oriented - appropriate affect.    PHQ Depression Screen  PHQ-9 SCORE 11/21/2018 1/4/2019 2/20/2019   PHQ-9 Total Score 4 6 4       ASSESSMENT AND PLAN:      ICD-10-CM    1. Anaplasmosis A77.49 CARDIOLOGY EVAL ADULT REFERRAL   2. Chest pain, unspecified type R07.9 CARDIOLOGY EVAL ADULT REFERRAL   3. Acute electrocardiogram changes R94.31 CARDIOLOGY EVAL ADULT REFERRAL   4. Presence of stent in coronary artery in patient with coronary artery disease I25.10 CARDIOLOGY EVAL ADULT REFERRAL    Z95.5        Anaplasmosis: Continue the full course of antibiotic for treatment.  Encouraged to call or return to clinic if patient develops worsening symptoms after antibiotic completion as she may need a longer course for treatment.    Continue fluids and rest.  Stressed need to increase dietary intake.  Increase fluids and food with electrolytes - gatorade, chicken broth, chicken noodle soup, saltine crackers.   No acute concerns are  "appreciated at this time.  No labs or imaging are warranted at this time.    Encouraged to take tylenol for relief up to 4 times per day.  Encouraged rest.     Monitor for increasing chest pain, palpitations, problems breathing, not keeping food or fluids down, lightheadedness or dizziness.   Return to clinic or ER with change/worsening of symptoms.     Referred to cardiology to be seen in the next week.     Consulted with Dr. Cano.     Greater than 25 minutes were spent in counseling and coordination of care.     Patient Instructions     Continue fluids and rest.  Increase fluids and food with electrolytes - gatorade, chicken broth, chicken noodle soup, saltine crackers.     Encouraged to take tylenol for relief up to 4 times per day.  Encouraged rest.      Monitor for increasing chest pain, palpitations, problems breathing, not keeping food or fluids down, lightheadedness or dizziness.   Return to clinic or ER with change/worsening of symptoms.     Referred to cardiology to be seen in the next week.       Patient Education     Tick Bites  Ticks are small arachnids that feed on the blood of rodents, rabbits, birds, deer, dogs, and people. A tick bite may cause a reaction like a spider bite. You may have redness, itching, and slight swelling at the site. Sometimes you may have no reaction where the tick bit you.  Ticks may gorge themselves for days before you find and remove them. The bites themselves aren't cause for concern. But ticks can carry and pass on illnesses such as Lyme disease and [place] spotted fever. Both diseases begin with a rash and symptoms similar to the flu. In advanced stages, these diseases can be quite serious.     A \"bull's eye\" rash is a common symptom of Lyme disease.   When to go to the emergency room (ER)  Not all ticks carry disease. And a tick must stay attached for at least 24 hours to infect you. If you find a tick, don't panic. Try to carefully remove it with tweezers. " Grasp the tick near its head and pull without twisting. If you can't easily dislodge the tick or if you leave the head in your skin, get medical care right away.  What to expect in the ER    The tick or any parts of the tick will be removed and the bite will be cleaned.    To prevent disease, you may be given antibiotics. Both Lyme disease and [place] spotted fever respond quickly to these medicines.    You may be asked to see your healthcare provider for a blood test to check for Lyme disease.  Follow-up care  Some \[facility] [place] [facility]\"" and [facility] have services that test ticks for Lyme disease and other diseases. Check with your local officials to see if this service is available in your area.  If you remove a tick yourself, watch for signs of a tick-borne illness. Symptoms may show up within a few days or weeks after a bite. Call your healthcare provider if you notice any of the following:    Rash. The rash may spread outward in a ring from a hard white lump. Or it may move up your arms and legs to your chest.    Chills and fever    Body aches and joint pain    Severe headache  Date Last Reviewed: 12/1/2016 2000-2018 [employer]. [address] [place] [address]. All rights reserved. This information is not intended as a substitute for professional medical care. Always follow your healthcare professional's instructions.           Patient Education     Tick Bite, Antibiotic Treatment    Ticks are small arachnids that feed on the blood of rodents, rabbits, birds, deer, dogs and humans. The bite may cause a local reaction like that of a spider, with a small amount of local redness, itching and slight swelling. Sometimes there is no local reaction.  Most tick bites are harmless. But some ticks carry diseases, such as Lyme disease or [place] spotted fever. These can be passed to people at the time of the bite. Lyme disease is of greatest concern. Right now you have no symptoms of Lyme disease  or other serious reaction to the bite. It is important to watch for the warning signs, which could appear weeks to months after the tick bite.  Home care  The following guidelines can help you care for your bite at home:    If itching is a problem, avoid tight clothing and anything that heats up your skin. This includes hot showers or baths and direct sunlight. This often makes the itching worse.    An ice pack will reduce local areas of redness and itching. Make your own ice pack by putting ice cubes in a zip-top plastic bag and wrapping it in a thin towel. Over-the-counter creams containing benzocaine may help with itching.    You can use an antihistamine with diphenhydramine if your doctor did not give you another antihistamine. This medicine may be used to reduce itching if large areas of the skin are involved. It is available at drugstores and grocery stores. If symptoms continue, talk with your doctor or pharmacist about other over-the counter medicines that may be helpful.    Your doctor may prescribe antibiotics to reduce your risk of getting Lyme disease. It is very important that you take them exactly as directed until they are completely finished.  Follow-up care  Follow up with your healthcare provider, or as advised.  Call 911  Call 911 if any of these occur:    Irregular or rapid heartbeat    Numbness, tingling, or weakness in the arms or legs  When to seek medical advice  Call your healthcare provider right away if any of these occur:  Signs of local infection. Watch for these during the next few days:    Increasing redness around the bite site    Increased pain or swelling    Fever over 100.4 F (38 C), or as directed by your healthcare provider    Fluid draining from the bite area  Signs of tick-related disease. Watch for these over the next few weeks to months:    Circular, red, ring-like rash appears at the bite area within 1 to 3 weeks    Tiredness, fever or chills, nausea or vomiting    Neck pain  or stiffness, headache, or confusion    Muscle or bone aches    Joint pain or swelling, especially in the knee    Weakness on one side of the face  Date Last Reviewed: 10/1/2016    2450-8140 [employer]. [address] [place] [address], [place] [address]. All rights reserved. This information is not intended as a substitute for professional medical care. Always follow your healthcare professional's instructions.              Inga Smith PA-C..................5/17/2019 9:17 AM

## 2019-05-17 NOTE — NURSING NOTE
Chief Complaint   Patient presents with     ER F/U         Medication Reconciliation: complete    Jesi Ma LPN

## 2019-05-19 LAB
BACTERIA SPEC CULT: NORMAL
BACTERIA SPEC CULT: NORMAL
SPECIMEN SOURCE: NORMAL
SPECIMEN SOURCE: NORMAL

## 2019-05-20 ENCOUNTER — TELEPHONE (OUTPATIENT)
Dept: FAMILY MEDICINE | Facility: OTHER | Age: 64
End: 2019-05-20

## 2019-05-20 NOTE — TELEPHONE ENCOUNTER
After verifying pts name and date of birth with staff at [facility], Staff is wondering pt has an allergy to the Pravastatin. NOtified pharmacy that pt has been on this medication since January of 2019.  Jamilah Berger

## 2019-05-22 ENCOUNTER — OFFICE VISIT (OUTPATIENT)
Dept: CARDIOLOGY | Facility: OTHER | Age: 64
End: 2019-05-22
Attending: PHYSICIAN ASSISTANT
Payer: MEDICARE

## 2019-05-22 VITALS
BODY MASS INDEX: 27.32 KG/M2 | DIASTOLIC BLOOD PRESSURE: 70 MMHG | HEIGHT: 66 IN | WEIGHT: 170 LBS | RESPIRATION RATE: 16 BRPM | SYSTOLIC BLOOD PRESSURE: 118 MMHG | OXYGEN SATURATION: 96 % | TEMPERATURE: 97.6 F | HEART RATE: 68 BPM

## 2019-05-22 DIAGNOSIS — G89.29 CHEST WALL PAIN, CHRONIC: ICD-10-CM

## 2019-05-22 DIAGNOSIS — I77.1 SUBCLAVIAN ARTERY STENOSIS, LEFT (H): ICD-10-CM

## 2019-05-22 DIAGNOSIS — I25.10 PRESENCE OF STENT IN CORONARY ARTERY IN PATIENT WITH CORONARY ARTERY DISEASE: ICD-10-CM

## 2019-05-22 DIAGNOSIS — Z95.1 HISTORY OF CORONARY ARTERY BYPASS GRAFT X 3: ICD-10-CM

## 2019-05-22 DIAGNOSIS — G47.31 CENTRAL SLEEP APNEA: ICD-10-CM

## 2019-05-22 DIAGNOSIS — R07.89 CHEST WALL PAIN, CHRONIC: ICD-10-CM

## 2019-05-22 DIAGNOSIS — A77.49 ANAPLASMOSIS: ICD-10-CM

## 2019-05-22 DIAGNOSIS — Z95.5 PRESENCE OF STENT IN CORONARY ARTERY IN PATIENT WITH CORONARY ARTERY DISEASE: ICD-10-CM

## 2019-05-22 DIAGNOSIS — E78.2 MIXED HYPERLIPIDEMIA: ICD-10-CM

## 2019-05-22 DIAGNOSIS — J44.9 CHRONIC OBSTRUCTIVE PULMONARY DISEASE, UNSPECIFIED COPD TYPE (H): ICD-10-CM

## 2019-05-22 DIAGNOSIS — I25.810 ATHEROSCLEROSIS OF CORONARY ARTERY BYPASS GRAFT OF NATIVE HEART WITHOUT ANGINA PECTORIS: Primary | ICD-10-CM

## 2019-05-22 PROCEDURE — G0463 HOSPITAL OUTPT CLINIC VISIT: HCPCS | Mod: 25

## 2019-05-22 PROCEDURE — 93010 ELECTROCARDIOGRAM REPORT: CPT | Performed by: INTERNAL MEDICINE

## 2019-05-22 PROCEDURE — 93005 ELECTROCARDIOGRAM TRACING: CPT | Performed by: NURSE PRACTITIONER

## 2019-05-22 PROCEDURE — 99214 OFFICE O/P EST MOD 30 MIN: CPT | Performed by: NURSE PRACTITIONER

## 2019-05-22 PROCEDURE — G0463 HOSPITAL OUTPT CLINIC VISIT: HCPCS

## 2019-05-22 ASSESSMENT — ANXIETY QUESTIONNAIRES
1. FEELING NERVOUS, ANXIOUS, OR ON EDGE: NEARLY EVERY DAY
IF YOU CHECKED OFF ANY PROBLEMS ON THIS QUESTIONNAIRE, HOW DIFFICULT HAVE THESE PROBLEMS MADE IT FOR YOU TO DO YOUR WORK, TAKE CARE OF THINGS AT HOME, OR GET ALONG WITH OTHER PEOPLE: VERY DIFFICULT
GAD7 TOTAL SCORE: 4
7. FEELING AFRAID AS IF SOMETHING AWFUL MIGHT HAPPEN: NOT AT ALL
3. WORRYING TOO MUCH ABOUT DIFFERENT THINGS: NOT AT ALL
6. BECOMING EASILY ANNOYED OR IRRITABLE: SEVERAL DAYS
5. BEING SO RESTLESS THAT IT IS HARD TO SIT STILL: NOT AT ALL
2. NOT BEING ABLE TO STOP OR CONTROL WORRYING: NOT AT ALL

## 2019-05-22 ASSESSMENT — PAIN SCALES - GENERAL: PAINLEVEL: SEVERE PAIN (6)

## 2019-05-22 ASSESSMENT — PATIENT HEALTH QUESTIONNAIRE - PHQ9
5. POOR APPETITE OR OVEREATING: NOT AT ALL
SUM OF ALL RESPONSES TO PHQ QUESTIONS 1-9: 3

## 2019-05-22 ASSESSMENT — MIFFLIN-ST. JEOR: SCORE: 1332.86

## 2019-05-22 NOTE — NURSING NOTE
"Chief Complaint   Patient presents with     Follow Up     chest, weakness, nausea       Initial /70 (BP Location: Right arm, Patient Position: Sitting, Cuff Size: Adult Large)   Pulse 68   Temp 97.6  F (36.4  C) (Tympanic)   Resp 16   Ht 1.676 m (5' 6\")   Wt 77.1 kg (170 lb)   SpO2 96%   BMI 27.44 kg/m   Estimated body mass index is 27.44 kg/m  as calculated from the following:    Height as of this encounter: 1.676 m (5' 6\").    Weight as of this encounter: 77.1 kg (170 lb).  Meds Reconciled: complete  Pt is on Aspirin  Pt is on a Statin  PHQ and/or YAYA reviewed. Pt referred to PCP/MH Provider as appropriate.    Taylor Owusu LPN      "

## 2019-05-22 NOTE — PROGRESS NOTES
[facility]   CARDIOLOGY PROGRESS NOTE    Ankita Day   [address] [address] [id number]      Ramirez Cano     Chief Complaint   Patient presents with     Follow Up     chest, weakness, nausea        HPI:   Ms. Day is a 64 year old patient who presents for cardiology follow-up with recently reported chest wall pain and generalized weakness associated to anaplasmosis.     Patient was last seen by cardiology on 6/18/2018.  She had a coronary angiogram and bypass angiogram on 9/15/2017 which was described as having stable disease. Mild to moderate 3 vessel CAD involving RCA, LAD and LCX with <50% stenosis at the greatest.  Occluded RAFI and SVG.  Patent LIMA.  No new obstructive lesions were identified and normal left heart cath.      She has a history of CABG on 2/12/03 x 3, history of multiple stent placements, patient reported 17 total.  Additionally, she has a history of tobacco abuse, sleep apnea, anxiety/depression, COPD, hypertension and history of left subclavian steal syndrome involving the LIMA status post stent placement.    Recently patient was evaluated in the ED with high fever up to 104  F, body aches, headache, generalized muscle weakness and chest wall aching/discomfort.  She also reported shortness of breath at this time.  She recently noted an attached deer tick approximately 1 week prior to this illness. Her EKG in the ED showed sinus tachycardia with febrile illness and nonspecific ST changes.  She had a negative troponin x 2 over protracted ED stay.  She was noted to have an elevated d-dimer and therefore went on to have a CT chest PE study.  There is no evidence of pulmonary embolism and no evidence of thoracic aortic aneurysm or dissection.  Identified proximal left subclavian stent with lumen patent.  The lungs were clear.  Lower extremity ultrasound did not reveal any DVT within the lower extremities.  Echocardiogram was also performed in the ED with global and  regional LV function normal, LVEF 60 to 65% and normal diastolic function.  Mild LVH.  The RV was normal in size and function.  Both atria appeared normal with atrial septum intact.  No concerning valvular abnormalities and no pericardial effusion.  Following ED discharge, labs resulted showing she was positive for anaplasmosis.  She has been treated with doxycycline and is completing this regimen.    Today patient reports that her chest wall discomfort is nearly resolved.  She reports that this chest wall discomfort felt more muscular with her fever and body aches associated to the anaplasmosis, she did not describe this feeling like an angina pectoris.  She reports her energy is improving, still mild muscle weakness.  She has chronic shortness of breath which had worsened with this illness and now improving to baseline.  She has not reported any lightheadedness or syncope.  No edema.  No palpitations.  She has no other concerns today.    IMAGING RESULTS:   [facility]  [address] [place] [address]     Name: DARRIAN JURADO  MRN: [id number]  : 1955  Study Date: 2019 03:50 PM  Age: 64 yrs  Gender: Female  Patient Location: [facility]  Reason For Study: Tachycardia  Ordering Physician: SURY GUNN  Performed By: Ryann Solorzano RDCS, RVT     BSA: 1.9 m2  Height: 66 in  Weight: 175 lb  HR: 100  BP: 131/72 mmHg  _____________________________________________________________________________  __        Procedure  Complete Portable Echo Adult. Echocardiogram with two-dimensional, color and  spectral Doppler performed.  _____________________________________________________________________________  __        Interpretation Summary  Global and regional left ventricular function is normal with an EF of 60-65%.  Mild concentric wall thickening consistent with left ventricular hypertrophy  is present.  Right ventricular function, chamber size, wall motion, and thickness  are  normal.  No significant valvular abnormalities were noted.  Previous study not available for comparison.  _____________________________________________________________________________  __        Left Ventricle  Global and regional left ventricular function is normal with an EF of 60-65%.  Left ventricular size is normal. Mild concentric wall thickening consistent  with left ventricular hypertrophy is present. Left ventricular diastolic  function is normal.     Right Ventricle  Right ventricular function, chamber size, wall motion, and thickness are  normal.     Atria  Both atria appear normal. The atrial septum is intact as assessed by color  Doppler .     Mitral Valve  The mitral valve is normal. Trace mitral insufficiency is present.     Aortic Valve  Aortic valve is normal in structure and function. The aortic valve is  tricuspid.        Tricuspid Valve  The tricuspid valve is normal. Trace tricuspid insufficiency is present. The  right ventricular systolic pressure is approximated at 24.5 mmHg plus the  right atrial pressure.     Pulmonic Valve  The pulmonic valve is normal.     Vessels  The aorta root is normal. The thoracic aorta is normal. The pulmonary artery  cannot be assessed. The inferior vena cava was normal in size with preserved  respiratory variability. IVC diameter <2.1 cm collapsing >50% with sniff  suggests a normal RA pressure of 3 mmHg.     Pericardium  No pericardial effusion is present.     Compared to Previous Study  Previous study not available for comparison.     _____________________________________________________________________________  __  MMode/2D Measurements & Calculations  IVSd: 1.3 cm  LVIDd: 4.6 cm  LVIDs: 3.2 cm  LVPWd: 1.1 cm  FS: 29.9 %     LV mass(C)d: 196.4 grams  LV mass(C)dI: 103.9 grams/m2  asc Aorta Diam: 3.4 cm  LVOT diam: 1.8 cm  LVOT area: 2.5 cm2  LA Volume (BP): 32.9 ml  LA Volume Index (BP): 17.4 ml/m2  RWT: 0.47        Doppler Measurements & Calculations  MV  E max darin: 57.4 cm/sec  MV A max darin: 73.8 cm/sec  MV E/A: 0.78  MV dec time: 0.19 sec     Ao V2 max: 155.0 cm/sec  Ao max PG: 10.0 mmHg  SARAHY(V,D): 2.3 cm2  LV V1 max P.8 mmHg  LV V1 max: 140.0 cm/sec  TR max darin: 247.3 cm/sec  TR max P.5 mmHg  AV Darin Ratio (DI): 0.90  E/E' av.8  Lateral E/e': 4.5  Medial E/e': 7.0           _____________________________________________________________________________  __           Report approved by: Red Roche 2019 04:32 PM      PAST MEDICAL HISTORY:   Past Medical History:   Diagnosis Date     Acute ischemic heart disease (H)     06/15/2007,with PTCA and stenting of 90% osteo circumflex lesion and patent LAD graft, patent left main stent.     Acute myocardial infarction (H)     3/30/2013     Anxiety disorder     No Comments Provided     Atherosclerotic heart disease of native coronary artery without angina pectoris     -angio revealed 3 vessel dz  -4 stents placed; 2 overlapping stents placed in mLAD, 1 stent pRCA, 1 stent pCirc 1 s  -non-ST elevation MI   -angio revealed restenosis of Circ.    -PTCA and brachytherapy of pCirc -repeat angio  -no intervension -CABG x3  - Dr Sinclair  -LIMA-LAD, RAFI-RCA, SVG-OM -PCI  stent to L -CTangio    -patentent LIMA-LAD. RAFI-RCA occluded; RCA ostio/p*     Bilateral carpal tunnel syndrome     No Comments Provided     Cervicalgia     No Comments Provided     Chest pain     2014     Chronic gastric ulcer without hemorrhage or perforation     10/03/2011,hx of GI bleed ()     Chronic ischemic heart disease     2012     Chronic obstructive pulmonary disease (H)     06/15/2007,low DLCO, normal spirometry     Chronic or unspecified gastric ulcer with hemorrhage     10/2003     Chronic pain syndrome     2010,chest wall, back     Constipation     2011     Coronary angioplasty status     2002,with triple stenting     Coronary angioplasty status     01/10/2003,repeat  angioplasty     Coronary angioplasty status     No Comments Provided     Dorsalgia     05/31/2011     Encounter for other administrative examinations     1/28/2014     Encounter for screening for cardiovascular disorders     10/2004,Cardiolite (2004, 2005, 2006 and 2011)     Enterocolitis due to Clostridium difficile     8/19/2016     Essential (primary) hypertension     No Comments Provided     Hyperlipidemia     No Comments Provided     Major depressive disorder, single episode     Severe, hx of suicide attempt/hospitalization     Migraine without status migrainosus, not intractable     No Comments Provided     Nodular corneal degeneration     09/26/2011     Noninfective gastroenteritis and colitis     06/15/2007,history of     Noninfective gastroenteritis and colitis     Microcytic     Osteoarthritis     No Comments Provided     Other chest pain     10/13/2009,chronic     Pain in knee     05/31/2011     Pain in right shoulder     No Comments Provided     Panic disorder without agoraphobia     No Comments Provided     Peptic ulcer without hemorrhage or perforation     6/15/2007     Peripheral vascular disease (H)     No Comments Provided     Personal history of diseases of the blood and blood-forming organs and certain disorders involving the immune mechanism (CODE)     No Comments Provided     Personal history of nicotine dependence     No Comments Provided     Personal history of other medical treatment (CODE)     10/14/2013     Presence of aortocoronary bypass graft     2/12/2003     Primary central sleep apnea     10/14/2013     Sepsis due to Escherichia coli (E. coli) (H)     7/14/16,[facility]     Stricture of artery (H)     3/31/2013,S/p prox left SCA stent 4/1/2013     Thoracic, thoracolumbar and lumbosacral intervertebral disc disorder     No Comments Provided     Uncomplicated opioid abuse (H)     history of     Vitamin D deficiency     5/6/2013          FAMILY HISTORY:   Family History   Problem Relation  Age of Onset     Heart Disease Father         Heart Disease,Heart condition/Significant for atherosclerotic cardiovascular disease, but non premature.     Colon Cancer Father         Cancer-colon, of colon cancer     Cancer Father         Cancer,mets to liver, secondary to colon cancer     Heart Disease Mother         Heart Disease     Cancer Other         Cancer,Multiple Myeloma     Heart Disease Other         Heart Disease,Ischemic Heart Disease     Colon Cancer Other         Cancer-colon,Malignant neoplasms     Cancer Sister         Cancer,multiple myeloma     Other - See Comments Son         gallstones          PAST SURGICAL HISTORY:   Past Surgical History:   Procedure Laterality Date     ANGIOPLASTY      02,with triple stenting     APPENDECTOMY OPEN      No Comments Provided     ARTHROSCOPY KNEE      left     ARTHROSCOPY SHOULDER      right     BYPASS GRAFT ARTERY CORONARY      02,Triple bypass, left internal mammary  to LAD, right internal mammary to right coronary artery, saphenous to obtuse marginal of the left circumflex.     COLONOSCOPY      ,Dr Bowman benign polyps     COLONOSCOPY  10/03/2011    2011,benign polyps, Dr. Bowman     COLONOSCOPY  2016,normal, Dr Bowman     ELBOW SURGERY      baby,birth malachi removed from right arm     EMBOLECTOMY UPPER EXTREMITY  2013    brachial artery pseudoaneurysm after stenting     ESOPHAGOSCOPY, GASTROSCOPY, DUODENOSCOPY (EGD), COMBINED      ,EGD Dr Bowman with pyloric ulcer     ESOPHAGOSCOPY, GASTROSCOPY, DUODENOSCOPY (EGD), COMBINED      ,pyloric ulcer, Dr. Bowman     ESOPHAGOSCOPY, GASTROSCOPY, DUODENOSCOPY (EGD), COMBINED      16,mild gastritis, Dr Bowman     ESOPHAGOSCOPY, GASTROSCOPY, DUODENOSCOPY (EGD), COMBINED      2017,Dr Bowman. Antral ulcer     ESOPHAGOSCOPY, GASTROSCOPY, DUODENOSCOPY (EGD), COMBINED  2018    Dr Bowman, healed ulcer     HYSTERECTOMY TOTAL ABDOMINAL      age 21     LAPAROSCOPIC  CHOLECYSTECTOMY      2006     OSTEOTOMY FEMUR DISTAL      x3, right knee     OSTEOTOMY FEMUR DISTAL      2000,left knee  ligament surgery     OTHER SURGICAL HISTORY      1/10/2003,,PTCA     OTHER SURGICAL HISTORY      2012,,PTCA,DELONTE in LAD and left main     OTHER SURGICAL HISTORY      2013,,PTCA,L subclavian stenosis     SALPINGO-OOPHORECTOMY BILATERAL      age 27,Bilateral salpingo-oophorectomy     TONSILLECTOMY, ADENOIDECTOMY, COMBINED      childhood          SOCIAL HISTORY:   Social History     Socioeconomic History     Marital status:      Spouse name: None     Number of children: None     Years of education: None     Highest education level: None   Occupational History     None   Social Needs     Financial resource strain: None     Food insecurity:     Worry: None     Inability: None     Transportation needs:     Medical: None     Non-medical: None   Tobacco Use     Smoking status: Former Smoker     Packs/day: 0.25     Years: 35.00     Pack years: 8.75     Types: Cigarettes     Last attempt to quit: 2/15/2017     Years since quittin.2     Smokeless tobacco: Never Used   Substance and Sexual Activity     Alcohol use: Yes     Alcohol/week: 0.0 oz     Comment: Alcoholic Drinks/day: rare     Drug use: No     Comment: Drug use: No     Sexual activity: Never     Partners: Male   Lifestyle     Physical activity:     Days per week: None     Minutes per session: None     Stress: None   Relationships     Social connections:     Talks on phone: None     Gets together: None     Attends Tenriism service: None     Active member of club or organization: None     Attends meetings of clubs or organizations: None     Relationship status: None     Intimate partner violence:     Fear of current or ex partner: None     Emotionally abused: None     Physically abused: None     Forced sexual activity: None   Other Topics Concern     Parent/sibling w/ CABG, MI or angioplasty before 65F 55M? Not  Asked   Social History Narrative    ,  Steven.  Currently not working outside the home. Lives three-mile self [place] met with . Tobacco abuse, quit 2001, restarted. Quit 2012 and has since quit, no alcohol.          CURRENT MEDICATIONS:   Prior to Admission medications    Medication Sig Start Date End Date Taking? Authorizing Provider   albuterol (PROAIR HFA/PROVENTIL HFA/VENTOLIN HFA) 108 (90 Base) MCG/ACT inhaler Inhale 2 puffs into the lungs every 6 hours 1/23/19 1/23/20 Yes Ramirez Cano MD   amLODIPine (NORVASC) 10 MG tablet Take 1 tablet (10 mg) by mouth daily 11/21/18  Yes Ramirez Cano MD   aspirin EC 81 MG EC tablet Take 81 mg by mouth daily with food   Yes Reported, Patient   busPIRone (BUSPAR) 10 MG tablet TAKE 1 TABLET TWICE A DAY 11/7/18  Yes Ramirez Cano MD   clonazePAM (KLONOPIN) 1 MG tablet Take 1 tablet (1 mg) by mouth 3 times daily as needed for anxiety #75 per 30 days, #225 pills per 90 days 4/3/19  Yes Ramirez Cano MD   clopidogrel (PLAVIX) 75 MG tablet Take 1 tablet (75 mg) by mouth daily 11/21/18  Yes Ramirez Cano MD   cyclobenzaprine (FLEXERIL) 10 MG tablet Take 1 tablet (10 mg) by mouth 2 times daily as needed for muscle spasms 11/21/18  Yes Ramirez Cano MD   Diclofenac Sodium 1.5 % SOLN Apply 20 drops to each hand or 40 drops to each knee up to 4 times daily as needed for arthritis pain 11/21/18  Yes Ramirez Cano MD   docusate sodium (COLACE) 50 MG capsule Take 50 mg by mouth daily   Yes Reported, Patient   doxycycline hyclate (VIBRAMYCIN) 100 MG capsule Take 1 capsule (100 mg) by mouth 2 times daily for 14 days 5/13/19 5/27/19 Yes Mikel Ashraf PA-C   DULoxetine (CYMBALTA) 60 MG EC capsule Take 1 capsule (60 mg) by mouth daily 8/23/18  Yes Ramirez Cano MD   gabapentin (NEURONTIN) 600 MG tablet Take 1 tablet (600 mg) by mouth 3 times daily 1/23/19  Yes Ramirez Cano MD   lisinopril (PRINIVIL/ZESTRIL) 40 MG tablet Take  1 tablet (40 mg) by mouth daily 4/23/19  Yes Ramirez Cano MD   metoprolol tartrate (LOPRESSOR) 50 MG tablet Take 1 tablet (50 mg) by mouth 2 times daily 11/21/18  Yes Ramirez Cano MD   NITROSTAT 0.3 MG sublingual tablet PLACE 1 TABLET UNDER THE TONGUE EVERY 5 MINUTES AS NEEDED FOR CHEST PAIN 4/9/18  Yes Ramirez Cano MD   ondansetron (ZOFRAN) 4 MG tablet Take 4 mg by mouth every 6 hours as needed for nausea 10/31/17  Yes Reported, Patient   oxyCODONE-acetaminophen (PERCOCET) 5-325 MG tablet Take 2 tablets by mouth 4 times daily Max acetaminophen dose: 4000mg in 24 hrs 5/23/19  Yes Ramirez Cano MD   pantoprazole (PROTONIX) 40 MG EC tablet TAKE 1 TABLET TWICE A DAY 1/22/19  Yes Ramirez Cano MD   pravastatin (PRAVACHOL) 40 MG tablet Take 1 tablet (40 mg) by mouth At Bedtime 1/23/19  Yes Ramirez Cano MD   saccharomyces boulardii (FLORASTOR) 250 MG capsule Take 250 mg by mouth 2 times daily   Yes Reported, Patient   sucralfate (CARAFATE) 1 GM tablet Take 1 tablet (1 g) by mouth 4 times daily Before meals & at bedtime 8/23/18  Yes Ramirez Cano MD   VITAMIN D, CHOLECALCIFEROL, PO Take 5,000 Units by mouth daily   Yes Reported, Patient          ALLERGIES:   Allergies   Allergen Reactions     Atorvastatin Muscle Pain (Myalgia)     Liquid Adhesive Rash     Other reaction(s): Erythema  Silk and plastic gloves (long term attachment of adhesives)     Tiotropium Bromide [Tiotropium] Rash     Ezetimibe Muscle Pain (Myalgia)     Niacin      Other reaction(s): Flushing  flushing     Rosuvastatin Muscle Pain (Myalgia)     Other reaction(s): Myalgia     Latex Rash     No Clinical Screening - See Comments Rash, Blisters and Itching     Metals and plastic  Metals and plastics       Tape [Adhesive Tape] Rash          ROS:   CONSTITUTIONAL: Positive for recent fever and chills related to anaplasmosis infection, now resolved.  No changes in weight.  ENT: No visual disturbance, ear ache, epistaxis or  "sore throat.   CARDIOVASCULAR:  chest discomfort nearly resolved.  Describes this as a chest wall pain. No palpitations or lower extremity edema.   RESPIRATORY: Chronic shortness of breath which is improved to baseline.  No cough, wheezing or hemoptysis.   GI: No reported abdominal pain.   : No reported hematuria or dysuria.   NEUROLOGICAL: No headache, lightheadedness, dizziness, syncope, ataxia, paresthesias or weakness.   HEMATOLOGIC: No history of anemia. No bleeding or excessive bruising. No history of blood clots.   MUSCULOSKELETAL: No reported joint pain or swelling, myalgias resolved.    ENDOCRINOLOGIC: No temperature intolerance. No hair or skin changes.  SKIN: No abnormal rashes or sores, no unusual itching.  PSYCHIATRIC: Positive for history of anxiety and depression.      PHYSICAL EXAM:   /70 (BP Location: Right arm, Patient Position: Sitting, Cuff Size: Adult Large)   Pulse 68   Temp 97.6  F (36.4  C) (Tympanic)   Resp 16   Ht 1.676 m (5' 6\")   Wt 77.1 kg (170 lb)   SpO2 96%   BMI 27.44 kg/m    GENERAL: The patient is a well-developed, well-nourished, in no apparent distress.  HEENT: Head is normocephalic and atraumatic. Eyes are symmetrical with normal visual tracking. No icterus, no xanthelasmas. Nares appeared normal without nasal drainage. Mucous membranes are moist, no cyanosis.  NECK: Supple.  CHEST/ LUNGS: Lungs clear to auscultation, no rales, rhonchi or wheezes, no use of accessory muscles, no retractions, respirations unlabored and normal respiratory rate.   CARDIO: Regular rate and rhythm normal with S1 and S2, no S3 or S4 and no murmur, click or rub.   ABD: Abdomen is nondistended.   EXTREMITIES: No edema present.   MUSCULOSKELETAL: No visible joint swelling.   NEUROLOGIC: Alert and oriented X3. Normal speech, gait and affect. No focal neurologic deficits.   SKIN: No jaundice. No rashes or visible skin lesions present. No ecchymosis.       EKG:    Sinus bradycardia, rate 58 " bpm.  Nonspecific T wave abnormality.  EKG has returned back to baseline.    LAB RESULTS:   Office Visit on 05/13/2019   Component Date Value Ref Range Status     Troponin I ES 05/13/2019 <0.030  0.000 - 0.034 ug/L Final     A Phagocytophil IgG 05/13/2019 >1:1,280* <1:80 Final     A Phagocytophil IgM 05/13/2019 > 1:256  <1:16 Final     Lyme Disease Antibodies Serum 05/13/2019 0.10  0.00 - 0.89 Final     Sodium 05/13/2019 137  134 - 144 mmol/L Final     Potassium 05/13/2019 3.4* 3.5 - 5.1 mmol/L Final     Chloride 05/13/2019 103  98 - 107 mmol/L Final     Carbon Dioxide 05/13/2019 23  21 - 31 mmol/L Final     Anion Gap 05/13/2019 11  3 - 14 mmol/L Final     Glucose 05/13/2019 117* 70 - 105 mg/dL Final     Urea Nitrogen 05/13/2019 23  7 - 25 mg/dL Final     Creatinine 05/13/2019 1.07  0.60 - 1.20 mg/dL Final     GFR Estimate 05/13/2019 51* >60 mL/min/[1.73_m2] Final     GFR Estimate If Black 05/13/2019 62  >60 mL/min/[1.73_m2] Final     Calcium 05/13/2019 9.3  8.6 - 10.3 mg/dL Final     Bilirubin Total 05/13/2019 1.1* 0.3 - 1.0 mg/dL Final     Albumin 05/13/2019 4.3  3.5 - 5.7 g/dL Final     Protein Total 05/13/2019 7.3  6.4 - 8.9 g/dL Final     Alkaline Phosphatase 05/13/2019 107* 34 - 104 U/L Final     ALT 05/13/2019 37  7 - 52 U/L Final     AST 05/13/2019 39  13 - 39 U/L Final     WBC 05/13/2019 8.2  4.0 - 11.0 10e9/L Final     RBC Count 05/13/2019 3.91  3.8 - 5.2 10e12/L Final     Hemoglobin 05/13/2019 11.8  11.7 - 15.7 g/dL Final     Hematocrit 05/13/2019 35.0  35.0 - 47.0 % Final     MCV 05/13/2019 90  78 - 100 fl Final     MCH 05/13/2019 30.2  26.5 - 33.0 pg Final     MCHC 05/13/2019 33.7  31.5 - 36.5 g/dL Final     RDW 05/13/2019 14.0  10.0 - 15.0 % Final     Platelet Count 05/13/2019 190  150 - 450 10e9/L Final     Diff Method 05/13/2019 Automated Method   Final     % Neutrophils 05/13/2019 71.4  % Final     % Lymphocytes 05/13/2019 20.9  % Final     % Monocytes 05/13/2019 6.4  % Final     % Eosinophils  05/13/2019 0.5  % Final     % Basophils 05/13/2019 0.2  % Final     % Immature Granulocytes 05/13/2019 0.6  % Final     Absolute Neutrophil 05/13/2019 5.8  1.6 - 8.3 10e9/L Final     Absolute Lymphocytes 05/13/2019 1.7  0.8 - 5.3 10e9/L Final     Absolute Monocytes 05/13/2019 0.5  0.0 - 1.3 10e9/L Final     Absolute Eosinophils 05/13/2019 0.0  0.0 - 0.7 10e9/L Final     Absolute Basophils 05/13/2019 0.0  0.0 - 0.2 10e9/L Final     Abs Immature Granulocytes 05/13/2019 0.1  0 - 0.4 10e9/L Final     Platelet Estimate 05/13/2019 Automated count confirmed.  Platelet morphology is normal.   Final     RBC Morphology 05/13/2019 Consistent with reported results   Final     Specimen Description 05/13/2019 Blood   Final     Culture Micro 05/13/2019 No growth after 6 days   Final          ASSESSMENT:   Ankita Day presents for cardiology follow-up with recently reported chest wall pain and generalized weakness associated to anaplasmosis.   Patient was last seen by cardiology on 6/18/2018.  She had a coronary angiogram and bypass angiogram on 9/15/2017 which was described as having stable disease. Mild to moderate 3 vessel CAD involving RCA, LAD and LCX with <50% stenosis at the greatest.  Occluded RAFI and SVG.  Patent LIMA.  No new obstructive lesions were identified and normal left heart cath.    She has a history of CABG on 2/12/03 x 3, history of multiple stent placements, patient reported 17 total.  Additionally, she has a history of tobacco abuse, sleep apnea, anxiety/depression, COPD, hypertension and history of left subclavian steal syndrome involving the LIMA status post stent placement.    PLAN:  1. Anaplasmosis  Complete doxycycline as ordered.   Reviewed EKG from ED, sinus tachycardia related to illness with nonspecific ST changes and no troponin elevation.   EKG returned to her baseline today.    2. Presence of stent in coronary artery in patient with coronary artery disease  No anginal symptoms today. Chest  wall pain resolved, she relates this to recent anaplasmosis.   She will continue with ASA life long, beta blocker and Pravastatin. She has not tolerated high intensity statin therapy secondary to myalgias.     3. Chest wall pain, chronic  No anginal symptoms today. Chest wall pain resolved, she relates this to recent anaplasmosis.   EKG returned to her baseline today.  As symptoms resolved ans stable EKG, there is no indication for Lexiscan stress testing.  Review coronary angio from 8/2017 with stable mild- moderate 3 vessel disease with <40 % stenosis. LIMA patent.   Reviewed echo in ED, normal heart function with no RWMA. No pericardial effusion or signs of pericarditis with anaplasmosis.     4. Chronic obstructive pulmonary disease, unspecified COPD type (H)  Stable.    5. Mixed hyperlipidemia  Continue on Pravastatin. She has not tolerated high intensity statin therapy secondary to myalgias.     6. Atherosclerosis of coronary artery bypass graft of native heart without angina pectoris  See above.    7. Subclavian artery stenosis, left (H)  See above.    8. Central sleep apnea  Not compliant with CPAP.    9. History of coronary artery bypass graft x 3  See above.     Follow-up with cardiology in 6 months, certainly sooner if needed.     Thank you for allowing me to participate in the care of your patient. Please do not hesitate to contact me if you have any questions.     Juanita Wang

## 2019-05-23 ASSESSMENT — ANXIETY QUESTIONNAIRES: GAD7 TOTAL SCORE: 4

## 2019-05-31 ENCOUNTER — TELEPHONE (OUTPATIENT)
Dept: FAMILY MEDICINE | Facility: OTHER | Age: 64
End: 2019-05-31

## 2019-05-31 NOTE — TELEPHONE ENCOUNTER
After verifying pts name and date of birth with Anthony at [place] [facility]. Anthony was wondering what antibiotic and how long pt was prescribed on 5/13/19. Anthony was provided this information.  Jamilah Berger

## 2019-06-19 ENCOUNTER — HOSPITAL ENCOUNTER (OUTPATIENT)
Dept: GENERAL RADIOLOGY | Facility: OTHER | Age: 64
Discharge: HOME | End: 2019-06-19
Attending: FAMILY MEDICINE | Admitting: FAMILY MEDICINE
Payer: MEDICARE

## 2019-06-19 ENCOUNTER — OFFICE VISIT (OUTPATIENT)
Dept: FAMILY MEDICINE | Facility: OTHER | Age: 64
End: 2019-06-19
Attending: FAMILY MEDICINE
Payer: MEDICARE

## 2019-06-19 VITALS
OXYGEN SATURATION: 97 % | SYSTOLIC BLOOD PRESSURE: 110 MMHG | TEMPERATURE: 97.5 F | RESPIRATION RATE: 20 BRPM | DIASTOLIC BLOOD PRESSURE: 64 MMHG | HEART RATE: 64 BPM

## 2019-06-19 DIAGNOSIS — M25.562 CHRONIC PAIN OF BOTH KNEES: ICD-10-CM

## 2019-06-19 DIAGNOSIS — N18.30 CKD (CHRONIC KIDNEY DISEASE) STAGE 3, GFR 30-59 ML/MIN (H): ICD-10-CM

## 2019-06-19 DIAGNOSIS — R07.89 CHEST WALL PAIN, CHRONIC: ICD-10-CM

## 2019-06-19 DIAGNOSIS — G89.29 CHRONIC BILATERAL LOW BACK PAIN WITHOUT SCIATICA: ICD-10-CM

## 2019-06-19 DIAGNOSIS — G89.29 CHEST WALL PAIN, CHRONIC: ICD-10-CM

## 2019-06-19 DIAGNOSIS — M25.561 CHRONIC PAIN OF BOTH KNEES: ICD-10-CM

## 2019-06-19 DIAGNOSIS — K27.9 PEPTIC ULCER: Primary | ICD-10-CM

## 2019-06-19 DIAGNOSIS — M54.50 CHRONIC BILATERAL LOW BACK PAIN WITHOUT SCIATICA: ICD-10-CM

## 2019-06-19 DIAGNOSIS — G89.29 CHRONIC PAIN OF BOTH KNEES: ICD-10-CM

## 2019-06-19 DIAGNOSIS — G89.4 CHRONIC PAIN DISORDER: ICD-10-CM

## 2019-06-19 PROCEDURE — 99214 OFFICE O/P EST MOD 30 MIN: CPT | Performed by: FAMILY MEDICINE

## 2019-06-19 PROCEDURE — 73564 X-RAY EXAM KNEE 4 OR MORE: CPT | Mod: 50

## 2019-06-19 PROCEDURE — G0463 HOSPITAL OUTPT CLINIC VISIT: HCPCS | Mod: 25

## 2019-06-19 PROCEDURE — G0463 HOSPITAL OUTPT CLINIC VISIT: HCPCS

## 2019-06-19 RX ORDER — OXYCODONE AND ACETAMINOPHEN 5; 325 MG/1; MG/1
2 TABLET ORAL 4 TIMES DAILY
Qty: 240 TABLET | Refills: 0 | Status: SHIPPED | OUTPATIENT
Start: 2019-06-22 | End: 2019-07-17

## 2019-06-19 ASSESSMENT — PATIENT HEALTH QUESTIONNAIRE - PHQ9
5. POOR APPETITE OR OVEREATING: NOT AT ALL
SUM OF ALL RESPONSES TO PHQ QUESTIONS 1-9: 0

## 2019-06-19 ASSESSMENT — ANXIETY QUESTIONNAIRES
7. FEELING AFRAID AS IF SOMETHING AWFUL MIGHT HAPPEN: NOT AT ALL
6. BECOMING EASILY ANNOYED OR IRRITABLE: NOT AT ALL
2. NOT BEING ABLE TO STOP OR CONTROL WORRYING: NOT AT ALL
GAD7 TOTAL SCORE: 2
5. BEING SO RESTLESS THAT IT IS HARD TO SIT STILL: NOT AT ALL
IF YOU CHECKED OFF ANY PROBLEMS ON THIS QUESTIONNAIRE, HOW DIFFICULT HAVE THESE PROBLEMS MADE IT FOR YOU TO DO YOUR WORK, TAKE CARE OF THINGS AT HOME, OR GET ALONG WITH OTHER PEOPLE: NOT DIFFICULT AT ALL
3. WORRYING TOO MUCH ABOUT DIFFERENT THINGS: SEVERAL DAYS
1. FEELING NERVOUS, ANXIOUS, OR ON EDGE: SEVERAL DAYS

## 2019-06-19 NOTE — PATIENT INSTRUCTIONS
Continue trying to limit clonazepam. Taking 1 mg twice daily and taking an extra only as needed sounds good  Refilled oxycodone, but need follow up in a month  Ordered x-ray on knees

## 2019-06-19 NOTE — PROGRESS NOTES
Nursing Notes:   Jamilah Berger LPN  6/19/2019 10:27 AM  Signed  Pt presents to clinic today for medication management.      Medication Reconciliation: complete  Jamilah Berger LPN     SUBJECTIVE:  64 year old female with chronic pain and CAD presents for follow up on multiple issues.    Had fever, joint aches and was positive for anaplasma. ST changes present on initial EKG, so sent to ED, but troponin remained negative. She still does not feel back to her normal self, but significantly better.    Some more chest discomfort lately. Not stabbing, heavy. Lasts 3-4 minutes. Is not sure if it is chronic chest wall pain for which she takes opioids or relates to heart. Just saw cardiology and further testing not deemed necessary. Has a complicated cardiac history with CAD history of CABG x3 with cardiac catheterization 9/11/2017 with findings of stable disease.  She had a left-sided subclavian steal syndrome with stenting and patency noted in September 2017    Tried 's omeprazole and it works better than her pantoprazole. History of GI bleed. Requires long-term PPI.    Would like injections in knees. Has been offered a joint replacement by Dr Wilkinson to her recollection but had deferred surgery as her  was supposed to have ortho surgery first. She does not want surgery. We have no x-rays on file here, so she agrees to start with x-rays.    Used to see psychiatry, but psychiatrist left, so I assumed clonazepam prescription. We agreed to reduce clonazepam slowly. Used to take clonazepam 1 mg QID, but down to TID recently. Since early April, on 2.5 per day. Has even reduced to 1 mg BID and 0.5 mg as needed, so doing better than expected. She wants to see how many extra pills she has when prescription runs out in the next month.       REVIEW OF SYSTEMS:    Pertinent items are noted in HPI.    Current Outpatient Medications   Medication Sig Dispense Refill     albuterol (PROAIR HFA/PROVENTIL HFA/VENTOLIN HFA)  108 (90 Base) MCG/ACT inhaler Inhale 2 puffs into the lungs every 6 hours 2 Inhaler 3     amLODIPine (NORVASC) 10 MG tablet Take 1 tablet (10 mg) by mouth daily 90 tablet 3     aspirin EC 81 MG EC tablet Take 81 mg by mouth daily with food       busPIRone (BUSPAR) 10 MG tablet TAKE 1 TABLET TWICE A  tablet 2     clonazePAM (KLONOPIN) 1 MG tablet Take 1 tablet (1 mg) by mouth 3 times daily as needed for anxiety #75 per 30 days, #225 pills per 90 days 225 tablet 0     clopidogrel (PLAVIX) 75 MG tablet Take 1 tablet (75 mg) by mouth daily 90 tablet 3     cyclobenzaprine (FLEXERIL) 10 MG tablet Take 1 tablet (10 mg) by mouth 2 times daily as needed for muscle spasms 42 tablet 3     Diclofenac Sodium 1.5 % SOLN Apply 20 drops to each hand or 40 drops to each knee up to 4 times daily as needed for arthritis pain 450 mL 3     docusate sodium (COLACE) 50 MG capsule Take 50 mg by mouth daily       DULoxetine (CYMBALTA) 60 MG EC capsule Take 1 capsule (60 mg) by mouth daily 90 capsule 3     gabapentin (NEURONTIN) 600 MG tablet Take 1 tablet (600 mg) by mouth 3 times daily 270 tablet 2     lisinopril (PRINIVIL/ZESTRIL) 40 MG tablet Take 1 tablet (40 mg) by mouth daily 90 tablet 4     metoprolol tartrate (LOPRESSOR) 50 MG tablet Take 1 tablet (50 mg) by mouth 2 times daily 180 tablet 3     NITROSTAT 0.3 MG sublingual tablet PLACE 1 TABLET UNDER THE TONGUE EVERY 5 MINUTES AS NEEDED FOR CHEST PAIN 25 tablet 2     ondansetron (ZOFRAN) 4 MG tablet Take 4 mg by mouth every 6 hours as needed for nausea       oxyCODONE-acetaminophen (PERCOCET) 5-325 MG tablet Take 2 tablets by mouth 4 times daily Max acetaminophen dose: 4000mg in 24 hrs 240 tablet 0     pantoprazole (PROTONIX) 40 MG EC tablet TAKE 1 TABLET TWICE A  tablet 3     pravastatin (PRAVACHOL) 40 MG tablet Take 1 tablet (40 mg) by mouth At Bedtime 90 tablet 3     saccharomyces boulardii (FLORASTOR) 250 MG capsule Take 250 mg by mouth 2 times daily        sucralfate (CARAFATE) 1 GM tablet Take 1 tablet (1 g) by mouth 4 times daily Before meals & at bedtime 120 tablet 2     VITAMIN D, CHOLECALCIFEROL, PO Take 5,000 Units by mouth daily       Allergies   Allergen Reactions     Atorvastatin Muscle Pain (Myalgia)     Liquid Adhesive Rash     Other reaction(s): Erythema  Silk and plastic gloves (long term attachment of adhesives)     Tiotropium Bromide [Tiotropium] Rash     Ezetimibe Muscle Pain (Myalgia)     Niacin      Other reaction(s): Flushing  flushing     Rosuvastatin Muscle Pain (Myalgia)     Other reaction(s): Myalgia     Latex Rash     No Clinical Screening - See Comments Rash, Blisters and Itching     Metals and plastic  Metals and plastics       Tape [Adhesive Tape] Rash       OBJECTIVE:  /64   Pulse 64   Temp 97.5  F (36.4  C) (Tympanic)   Resp 20   SpO2 97%     EXAM:  General Appearance: Alert. Appears pale. Shaking.  Chest/Respiratory Exam: Clear to auscultation bilaterally  Cardiovascular Exam: Regular rate and rhythm. S1, S2, no murmur, gallop, or rubs.  Extremities: No lower extremity edema.  Psychiatric: Normal affect and mentation      ASSESSMENT/PLAN:    ICD-10-CM    1. Peptic ulcer K27.9 omeprazole (PRILOSEC) 20 MG DR capsule   2. Chronic pain disorder G89.4 oxyCODONE-acetaminophen (PERCOCET) 5-325 MG tablet   3. Chronic bilateral low back pain without sciatica M54.5 oxyCODONE-acetaminophen (PERCOCET) 5-325 MG tablet    G89.29    4. Chest wall pain, chronic R07.89 oxyCODONE-acetaminophen (PERCOCET) 5-325 MG tablet    G89.29    5. CKD (chronic kidney disease) stage 3, GFR 30-59 ml/min (H) N18.3    6. Chronic pain of both knees M25.561 XR Knee Standing 2v Bilateral & 2v Bilateral    M25.562     G89.29      Reviewed . Refilled chronic oxycodone dose. Has limited function on lower doses and side effects on higher doses of opioids. Reliable in use. Working to minimize risks by reducing clonazepam at this time before reducing opioids. She can  continue clonazepam 1 mg BID and 0.5 mg as needed.    Given omeprazole 20 mg daily to use instead of pantoprazole. On lifelong PPI due ot history of GI bleed more than once because of aspirin use required for CAD.    Sent for x-rays of knees. She has mild to moderate DJD. I do not see any note recommending knee replacement. Injections are a reasonable consideration. We discussed how some evidence has changed and so injections are not as recommended. PT and knee brace would be good options as well. No NSAIDS due to GI bleed history. Has tried topical diclofenac.    Follow up in a month    Greater than 50% of this 25 minute appointment spent on counseling   Ramirez Cano MD    This document was prepared using a combination of typing and voice generated software.  While every attempt was made for accuracy, spelling and grammatical errors may exist.

## 2019-06-20 ASSESSMENT — ANXIETY QUESTIONNAIRES: GAD7 TOTAL SCORE: 2

## 2019-06-26 ENCOUNTER — TELEPHONE (OUTPATIENT)
Dept: FAMILY MEDICINE | Facility: OTHER | Age: 64
End: 2019-06-26

## 2019-06-26 DIAGNOSIS — F41.9 CHRONIC ANXIETY: ICD-10-CM

## 2019-06-26 RX ORDER — CLONAZEPAM 1 MG/1
1 TABLET ORAL 3 TIMES DAILY PRN
Qty: 225 TABLET | Refills: 1 | Status: SHIPPED | OUTPATIENT
Start: 2019-06-26 | End: 2019-09-18

## 2019-06-26 NOTE — TELEPHONE ENCOUNTER
After verifying pts name and date of birth with pt, pt notified RX was sent to [facility].  Jamilah Berger

## 2019-07-17 ENCOUNTER — OFFICE VISIT (OUTPATIENT)
Dept: FAMILY MEDICINE | Facility: OTHER | Age: 64
End: 2019-07-17
Attending: FAMILY MEDICINE
Payer: MEDICARE

## 2019-07-17 VITALS
RESPIRATION RATE: 16 BRPM | TEMPERATURE: 97.7 F | OXYGEN SATURATION: 96 % | DIASTOLIC BLOOD PRESSURE: 68 MMHG | HEART RATE: 68 BPM | SYSTOLIC BLOOD PRESSURE: 126 MMHG

## 2019-07-17 DIAGNOSIS — R07.89 CHEST WALL PAIN, CHRONIC: ICD-10-CM

## 2019-07-17 DIAGNOSIS — F11.90 CHRONIC, CONTINUOUS USE OF OPIOIDS: ICD-10-CM

## 2019-07-17 DIAGNOSIS — G89.4 CHRONIC PAIN DISORDER: ICD-10-CM

## 2019-07-17 DIAGNOSIS — G89.29 CHRONIC BILATERAL LOW BACK PAIN WITHOUT SCIATICA: ICD-10-CM

## 2019-07-17 DIAGNOSIS — G89.29 CHEST WALL PAIN, CHRONIC: ICD-10-CM

## 2019-07-17 DIAGNOSIS — Z79.891 LONG TERM CURRENT USE OF OPIATE ANALGESIC: ICD-10-CM

## 2019-07-17 DIAGNOSIS — M47.816 LUMBAR FACET ARTHROPATHY: Primary | ICD-10-CM

## 2019-07-17 DIAGNOSIS — M54.50 CHRONIC BILATERAL LOW BACK PAIN WITHOUT SCIATICA: ICD-10-CM

## 2019-07-17 PROCEDURE — 80307 DRUG TEST PRSMV CHEM ANLYZR: CPT | Mod: ZL | Performed by: FAMILY MEDICINE

## 2019-07-17 PROCEDURE — G0463 HOSPITAL OUTPT CLINIC VISIT: HCPCS

## 2019-07-17 PROCEDURE — 99214 OFFICE O/P EST MOD 30 MIN: CPT | Performed by: FAMILY MEDICINE

## 2019-07-17 RX ORDER — OXYCODONE AND ACETAMINOPHEN 5; 325 MG/1; MG/1
2 TABLET ORAL 4 TIMES DAILY
Qty: 240 TABLET | Refills: 0 | Status: SHIPPED | OUTPATIENT
Start: 2019-07-19 | End: 2019-08-19

## 2019-07-17 ASSESSMENT — PAIN SCALES - GENERAL: PAINLEVEL: EXTREME PAIN (8)

## 2019-07-17 NOTE — NURSING NOTE
"Chief Complaint   Patient presents with     Recheck Medication     oxycodone       Initial /68 (BP Location: Right arm, Patient Position: Sitting, Cuff Size: Adult Regular)   Pulse 68   Temp 97.7  F (36.5  C) (Tympanic)   Resp 16   Breastfeeding? No  Estimated body mass index is 27.44 kg/m  as calculated from the following:    Height as of 5/22/19: 1.676 m (5' 6\").    Weight as of 5/22/19: 77.1 kg (170 lb).  Medication Reconciliation: Completed     Josie Lloyd LPN  "

## 2019-07-17 NOTE — PROGRESS NOTES
"Nursing Notes:   Josie Lloyd LPN  7/17/2019 10:21 AM  Signed  Chief Complaint   Patient presents with     Recheck Medication     oxycodone       Initial /68 (BP Location: Right arm, Patient Position: Sitting, Cuff Size: Adult Regular)   Pulse 68   Temp 97.7  F (36.5  C) (Tympanic)   Resp 16   Breastfeeding? No  Estimated body mass index is 27.44 kg/m  as calculated from the following:    Height as of 5/22/19: 1.676 m (5' 6\").    Weight as of 5/22/19: 77.1 kg (170 lb).  Medication Reconciliation: Completed     Josie Lloyd LPN     SUBJECTIVE:  64 year old female with chronic pain and CAD presents for follow up on multiple issues.    Ongoing back pain.  This is been worse for the past several months.  Has right thigh pain to knee. Occurs intermittently. Sharp pain. Some tingling in toes, but no leg paresthesias.  Worse standing, walking, bending. PT did not help much when completed January 2019.  An initial epidural in February 2019 was beneficial, but did not last.  Second epidural in April 2019 was not very helpful.  She feels that a second injection did not last because she ended up having Anaplasma.    Used to see psychiatry, but psychiatrist left, so I assumed clonazepam prescription.  Reduced clonazepam from 3 per day to 2.5 per day. Had extra 14 pills left when due for refill after 3 months. Now taking 1 pill twice daily and an extra 1 pill about weekly as needed.    Now on omeprazole instead of pantoprazole for heartburn.  It is working better. History of GI bleed. Requires long-term PPI.    REVIEW OF SYSTEMS:    Pertinent items are noted in HPI.    Current Outpatient Medications   Medication Sig Dispense Refill     albuterol (PROAIR HFA/PROVENTIL HFA/VENTOLIN HFA) 108 (90 Base) MCG/ACT inhaler Inhale 2 puffs into the lungs every 6 hours 2 Inhaler 3     amLODIPine (NORVASC) 10 MG tablet Take 1 tablet (10 mg) by mouth daily 90 tablet 3     aspirin EC 81 MG EC tablet Take 81 mg by mouth " daily with food       busPIRone (BUSPAR) 10 MG tablet TAKE 1 TABLET TWICE A  tablet 2     clonazePAM (KLONOPIN) 1 MG tablet Take 1 tablet (1 mg) by mouth 3 times daily as needed for anxiety #75 per 30 days, #225 pills per 90 days 225 tablet 1     clopidogrel (PLAVIX) 75 MG tablet Take 1 tablet (75 mg) by mouth daily 90 tablet 3     cyclobenzaprine (FLEXERIL) 10 MG tablet Take 1 tablet (10 mg) by mouth 2 times daily as needed for muscle spasms 42 tablet 3     Diclofenac Sodium 1.5 % SOLN Apply 20 drops to each hand or 40 drops to each knee up to 4 times daily as needed for arthritis pain 450 mL 3     docusate sodium (COLACE) 50 MG capsule Take 50 mg by mouth daily       DULoxetine (CYMBALTA) 60 MG EC capsule Take 1 capsule (60 mg) by mouth daily 90 capsule 3     gabapentin (NEURONTIN) 600 MG tablet Take 1 tablet (600 mg) by mouth 3 times daily 270 tablet 2     lisinopril (PRINIVIL/ZESTRIL) 40 MG tablet Take 1 tablet (40 mg) by mouth daily 90 tablet 4     metoprolol tartrate (LOPRESSOR) 50 MG tablet Take 1 tablet (50 mg) by mouth 2 times daily 180 tablet 3     NITROSTAT 0.3 MG sublingual tablet PLACE 1 TABLET UNDER THE TONGUE EVERY 5 MINUTES AS NEEDED FOR CHEST PAIN 25 tablet 2     omeprazole (PRILOSEC) 20 MG DR capsule Take 1 capsule (20 mg) by mouth daily 90 capsule 3     ondansetron (ZOFRAN) 4 MG tablet Take 4 mg by mouth every 6 hours as needed for nausea       oxyCODONE-acetaminophen (PERCOCET) 5-325 MG tablet Take 2 tablets by mouth 4 times daily Max acetaminophen dose: 4000mg in 24 hrs 240 tablet 0     pravastatin (PRAVACHOL) 40 MG tablet Take 1 tablet (40 mg) by mouth At Bedtime 90 tablet 3     saccharomyces boulardii (FLORASTOR) 250 MG capsule Take 250 mg by mouth 2 times daily       sucralfate (CARAFATE) 1 GM tablet Take 1 tablet (1 g) by mouth 4 times daily Before meals & at bedtime 120 tablet 2     VITAMIN D, CHOLECALCIFEROL, PO Take 5,000 Units by mouth daily       Allergies   Allergen Reactions      Atorvastatin Muscle Pain (Myalgia)     Liquid Adhesive Rash     Other reaction(s): Erythema  Silk and plastic gloves (long term attachment of adhesives)     Tiotropium Bromide [Tiotropium] Rash     Ezetimibe Muscle Pain (Myalgia)     Niacin      Other reaction(s): Flushing  flushing     Rosuvastatin Muscle Pain (Myalgia)     Other reaction(s): Myalgia     Latex Rash     No Clinical Screening - See Comments Rash, Blisters and Itching     Metals and plastic  Metals and plastics       Tape [Adhesive Tape] Rash       OBJECTIVE:  /68 (BP Location: Right arm, Patient Position: Sitting, Cuff Size: Adult Regular)   Pulse 68   Temp 97.7  F (36.5  C) (Tympanic)   Resp 16   SpO2 96%   Breastfeeding? No     EXAM:  General Appearance: Alert. Appears pale. Shaking.  Musculoskeletal Exam: Lumbar Spine: tenderness over lumbar paraspinous muscles  Neurologic Exam: reflexes 2+, strength symmetric lower extremities; SLR negative bilaterally  Extremities: No lower extremity edema.  Psychiatric: Normal affect and mentation      ASSESSMENT/PLAN:    ICD-10-CM    1. Lumbar facet arthropathy M47.816 XR Lumbar Sacral Facet Inj Bilateral   2. Chronic pain disorder G89.4 oxyCODONE-acetaminophen (PERCOCET) 5-325 MG tablet   3. Chronic bilateral low back pain without sciatica M54.5 oxyCODONE-acetaminophen (PERCOCET) 5-325 MG tablet    G89.29    4. Chest wall pain, chronic R07.89 oxyCODONE-acetaminophen (PERCOCET) 5-325 MG tablet    G89.29    5. Chronic, continuous use of opioids F11.90 naloxone (NARCAN) 4 MG/0.1ML nasal spray     Drug  Screen Comprehensive , Urine with Reported Meds ([facility]) (Pain Care Package)   6. Long term current use of opiate analgesic  Z79.891 Drug  Screen Comprehensive , Urine with Reported Meds ([facility]) (Pain Care Package)       We discussed options for the low back pain.  She had some benefit with an epidural, but it did not last.  Based on MRI, most likely her pain is related to facet arthropathy.  This  could cause some radiation of pain, but should not have any paresthesias below the knees.  She mostly has upper leg symptoms and so her symptoms are fitting first facet arthropathy more than radiculopathy.  Recommend trying a facet injection.  If this helps, but does not last then she may want to consider rhizotomy.  Can stop Plavix 5 days prior to injection.    Reviewed . Refilled chronic oxycodone dose. Has limited function on lower doses and side effects on higher doses of opioids. Reliable in use. Working to minimize risks by reducing clonazepam at this time before reducing opioids.  Long-term, she would benefit from reductions in both opioids and benzodiazepines.  We discussed again today how metabolism slows with aging.     But should be using as neededContinue clonazepam 1 mg BID and 0.5 mg as needed, even less frequently.  When due for next clonazepam refill, plan to  provide only at twice daily.    Given Narcan for potential overdose.  She denies any need to use, but it is prescribed regardless.    Refilled same chronic oxycodone 5mg taking 2 pills 4 times daily.  Given 240.  At next appointment, we will get on cycle for 28-day refills.    Follow up in a little over a month    Greater than 50% of this 29 minute appointment spent on counseling   Ramirez Cano MD    This document was prepared using a combination of typing and voice generated software.  While every attempt was made for accuracy, spelling and grammatical errors may exist.

## 2019-07-17 NOTE — PATIENT INSTRUCTIONS
Ordered facet injection  Stop Plavix 5 days prior to injection    Narcan to use for potential overdose with opioids and benzodiazepines    Follow up August 21st if you can slightly stretch the oxycodone

## 2019-07-22 LAB — PAIN DRUG SCR UR W RPTD MEDS: NORMAL

## 2019-07-25 ENCOUNTER — HOSPITAL ENCOUNTER (OUTPATIENT)
Dept: GENERAL RADIOLOGY | Facility: OTHER | Age: 64
Discharge: HOME | End: 2019-07-25
Attending: FAMILY MEDICINE | Admitting: FAMILY MEDICINE
Payer: MEDICARE

## 2019-07-25 DIAGNOSIS — M47.816 LUMBAR FACET ARTHROPATHY: ICD-10-CM

## 2019-07-25 PROCEDURE — 25000128 H RX IP 250 OP 636: Performed by: RADIOLOGY

## 2019-07-25 PROCEDURE — 25000125 ZZHC RX 250: Performed by: RADIOLOGY

## 2019-07-25 PROCEDURE — 64493 INJ PARAVERT F JNT L/S 1 LEV: CPT | Mod: 50

## 2019-07-25 PROCEDURE — 25500064 ZZH RX 255 OP 636: Performed by: RADIOLOGY

## 2019-07-25 RX ORDER — LIDOCAINE HYDROCHLORIDE 10 MG/ML
2 INJECTION, SOLUTION INFILTRATION; PERINEURAL ONCE
Status: COMPLETED | OUTPATIENT
Start: 2019-07-25 | End: 2019-07-25

## 2019-07-25 RX ORDER — TRIAMCINOLONE ACETONIDE 40 MG/ML
80 INJECTION, SUSPENSION INTRA-ARTICULAR; INTRAMUSCULAR ONCE
Status: DISCONTINUED | OUTPATIENT
Start: 2019-07-25 | End: 2019-07-26 | Stop reason: HOSPADM

## 2019-07-25 RX ORDER — BUPIVACAINE HYDROCHLORIDE 5 MG/ML
4 INJECTION, SOLUTION EPIDURAL; INTRACAUDAL ONCE
Status: COMPLETED | OUTPATIENT
Start: 2019-07-25 | End: 2019-07-25

## 2019-07-25 RX ADMIN — IOHEXOL 6 ML: 240 INJECTION, SOLUTION INTRATHECAL; INTRAVASCULAR; INTRAVENOUS; ORAL at 12:39

## 2019-07-25 RX ADMIN — LIDOCAINE HYDROCHLORIDE 6 ML: 10 INJECTION, SOLUTION INFILTRATION; PERINEURAL at 12:39

## 2019-07-25 RX ADMIN — BUPIVACAINE HYDROCHLORIDE 6 ML: 5 INJECTION, SOLUTION EPIDURAL; INTRACAUDAL; PERINEURAL at 12:39

## 2019-08-04 DIAGNOSIS — F41.1 ANXIETY STATE: ICD-10-CM

## 2019-08-05 RX ORDER — BUSPIRONE HYDROCHLORIDE 10 MG/1
TABLET ORAL
Qty: 180 TABLET | Refills: 1 | Status: SHIPPED | OUTPATIENT
Start: 2019-08-05 | End: 2020-01-14

## 2019-08-05 NOTE — TELEPHONE ENCOUNTER
"Requested Prescriptions   Pending Prescriptions Disp Refills     busPIRone (BUSPAR) 10 MG tablet [Pharmacy Med Name: BUSPIRONE HCL TABS 10MG] 180 tablet 2     Sig: TAKE 1 TABLET TWICE A DAY       Atypical Antidepressants Protocol Passed - 8/4/2019 11:24 PM        Passed - Recent (12 mo) or future (30 days) visit within the authorizing provider's specialty     Patient had office visit in the last 12 months or has a visit in the next 30 days with authorizing provider or within the authorizing provider's specialty.  See \"Patient Info\" tab in inbasket, or \"Choose Columns\" in Meds & Orders section of the refill encounter.              Passed - Medication active on med list        Passed - Patient is age 18 or older        Passed - No active pregnancy on record        Passed - No positive pregnancy test in past 12 mos        LOV 7/17/19    Prescription approved per [facility] Ref[facility] Protocol.    "

## 2019-08-07 ENCOUNTER — OFFICE VISIT (OUTPATIENT)
Dept: FAMILY MEDICINE | Facility: OTHER | Age: 64
End: 2019-08-07
Attending: FAMILY MEDICINE
Payer: MEDICARE

## 2019-08-07 VITALS
WEIGHT: 180 LBS | HEIGHT: 66 IN | TEMPERATURE: 98.9 F | HEART RATE: 80 BPM | SYSTOLIC BLOOD PRESSURE: 90 MMHG | DIASTOLIC BLOOD PRESSURE: 59 MMHG | BODY MASS INDEX: 28.93 KG/M2 | RESPIRATION RATE: 20 BRPM | OXYGEN SATURATION: 95 %

## 2019-08-07 DIAGNOSIS — Z01.818 PREOP GENERAL PHYSICAL EXAM: ICD-10-CM

## 2019-08-07 DIAGNOSIS — F41.9 CHRONIC ANXIETY: ICD-10-CM

## 2019-08-07 DIAGNOSIS — J44.9 CHRONIC OBSTRUCTIVE PULMONARY DISEASE, UNSPECIFIED COPD TYPE (H): ICD-10-CM

## 2019-08-07 DIAGNOSIS — N18.2 CKD (CHRONIC KIDNEY DISEASE) STAGE 2, GFR 60-89 ML/MIN: ICD-10-CM

## 2019-08-07 DIAGNOSIS — G47.31 CENTRAL SLEEP APNEA: ICD-10-CM

## 2019-08-07 DIAGNOSIS — Z01.818 PREOPERATIVE TESTING: Primary | ICD-10-CM

## 2019-08-07 DIAGNOSIS — I25.810 ATHEROSCLEROSIS OF CORONARY ARTERY BYPASS GRAFT OF NATIVE HEART WITHOUT ANGINA PECTORIS: ICD-10-CM

## 2019-08-07 PROBLEM — N18.30 CKD (CHRONIC KIDNEY DISEASE) STAGE 3, GFR 30-59 ML/MIN (H): Status: RESOLVED | Noted: 2019-06-19 | Resolved: 2019-08-07

## 2019-08-07 LAB
ALBUMIN SERPL-MCNC: 4.7 G/DL (ref 3.5–5.7)
ALP SERPL-CCNC: 73 U/L (ref 34–104)
ALT SERPL W P-5'-P-CCNC: 13 U/L (ref 7–52)
ANION GAP SERPL CALCULATED.3IONS-SCNC: 9 MMOL/L (ref 3–14)
AST SERPL W P-5'-P-CCNC: 23 U/L (ref 13–39)
BASOPHILS # BLD AUTO: 0.1 10E9/L (ref 0–0.2)
BASOPHILS NFR BLD AUTO: 1.1 %
BILIRUB SERPL-MCNC: 0.3 MG/DL (ref 0.3–1)
BUN SERPL-MCNC: 25 MG/DL (ref 7–25)
CALCIUM SERPL-MCNC: 9.7 MG/DL (ref 8.6–10.3)
CHLORIDE SERPL-SCNC: 107 MMOL/L (ref 98–107)
CO2 SERPL-SCNC: 22 MMOL/L (ref 21–31)
CREAT SERPL-MCNC: 2 MG/DL (ref 0.6–1.2)
DIFFERENTIAL METHOD BLD: NORMAL
EOSINOPHIL # BLD AUTO: 0.2 10E9/L (ref 0–0.7)
EOSINOPHIL NFR BLD AUTO: 2.2 %
ERYTHROCYTE [DISTWIDTH] IN BLOOD BY AUTOMATED COUNT: 13.5 % (ref 10–15)
GFR SERPL CREATININE-BSD FRML MDRD: 25 ML/MIN/{1.73_M2}
GLUCOSE SERPL-MCNC: 117 MG/DL (ref 70–105)
HCT VFR BLD AUTO: 38.5 % (ref 35–47)
HGB BLD-MCNC: 12.7 G/DL (ref 11.7–15.7)
IMM GRANULOCYTES # BLD: 0 10E9/L (ref 0–0.4)
IMM GRANULOCYTES NFR BLD: 0.4 %
INR PPP: 1.03 (ref 0–1.3)
LYMPHOCYTES # BLD AUTO: 3.3 10E9/L (ref 0.8–5.3)
LYMPHOCYTES NFR BLD AUTO: 40 %
MCH RBC QN AUTO: 29.7 PG (ref 26.5–33)
MCHC RBC AUTO-ENTMCNC: 33 G/DL (ref 31.5–36.5)
MCV RBC AUTO: 90 FL (ref 78–100)
MONOCYTES # BLD AUTO: 0.7 10E9/L (ref 0–1.3)
MONOCYTES NFR BLD AUTO: 8.7 %
NEUTROPHILS # BLD AUTO: 3.9 10E9/L (ref 1.6–8.3)
NEUTROPHILS NFR BLD AUTO: 47.6 %
PLATELET # BLD AUTO: 383 10E9/L (ref 150–450)
POTASSIUM SERPL-SCNC: 4.9 MMOL/L (ref 3.5–5.1)
PROT SERPL-MCNC: 7.3 G/DL (ref 6.4–8.9)
RBC # BLD AUTO: 4.28 10E12/L (ref 3.8–5.2)
SODIUM SERPL-SCNC: 138 MMOL/L (ref 134–144)
WBC # BLD AUTO: 8.1 10E9/L (ref 4–11)

## 2019-08-07 PROCEDURE — 80053 COMPREHEN METABOLIC PANEL: CPT | Mod: ZL | Performed by: FAMILY MEDICINE

## 2019-08-07 PROCEDURE — 85610 PROTHROMBIN TIME: CPT | Mod: ZL | Performed by: FAMILY MEDICINE

## 2019-08-07 PROCEDURE — 36415 COLL VENOUS BLD VENIPUNCTURE: CPT | Mod: ZL | Performed by: FAMILY MEDICINE

## 2019-08-07 PROCEDURE — 85025 COMPLETE CBC W/AUTO DIFF WBC: CPT | Mod: ZL | Performed by: FAMILY MEDICINE

## 2019-08-07 PROCEDURE — 99214 OFFICE O/P EST MOD 30 MIN: CPT | Performed by: FAMILY MEDICINE

## 2019-08-07 PROCEDURE — G0463 HOSPITAL OUTPT CLINIC VISIT: HCPCS

## 2019-08-07 ASSESSMENT — ANXIETY QUESTIONNAIRES
GAD7 TOTAL SCORE: 1
1. FEELING NERVOUS, ANXIOUS, OR ON EDGE: NOT AT ALL
IF YOU CHECKED OFF ANY PROBLEMS ON THIS QUESTIONNAIRE, HOW DIFFICULT HAVE THESE PROBLEMS MADE IT FOR YOU TO DO YOUR WORK, TAKE CARE OF THINGS AT HOME, OR GET ALONG WITH OTHER PEOPLE: NOT DIFFICULT AT ALL
2. NOT BEING ABLE TO STOP OR CONTROL WORRYING: NOT AT ALL
5. BEING SO RESTLESS THAT IT IS HARD TO SIT STILL: NOT AT ALL
3. WORRYING TOO MUCH ABOUT DIFFERENT THINGS: NOT AT ALL
6. BECOMING EASILY ANNOYED OR IRRITABLE: SEVERAL DAYS
7. FEELING AFRAID AS IF SOMETHING AWFUL MIGHT HAPPEN: NOT AT ALL

## 2019-08-07 ASSESSMENT — MIFFLIN-ST. JEOR: SCORE: 1378.22

## 2019-08-07 ASSESSMENT — PATIENT HEALTH QUESTIONNAIRE - PHQ9
SUM OF ALL RESPONSES TO PHQ QUESTIONS 1-9: 0
5. POOR APPETITE OR OVEREATING: NOT AT ALL

## 2019-08-07 ASSESSMENT — PAIN SCALES - GENERAL: PAINLEVEL: SEVERE PAIN (7)

## 2019-08-07 NOTE — PROGRESS NOTES
[facility]  [address] [facility] [place]  [facility] [id number] [phone]  Dept: [phone]    PRE-OP EVALUATION:  Today's date: 2019    Ankita Day (: 1955) presents for pre-operative evaluation assessment as requested by Dr. Merida.  She requires evaluation and anesthesia risk assessment prior to rhizotomy.    Proposed Surgery/ Procedure: lumbar rhizotomy  Date of Surgery/ Procedure: 19  Hospital/Surgical Facility: [facility]  Primary Physician: Ramirez Cano    Patient has a Health Care Directive or Living Will:  NO    1. YES - Do you have a history of heart attack, stroke, stent, bypass or surgery on an artery in the head, neck, heart or legs?  2. YES - Do you ever have any pain or discomfort in your chest? Chronic chest wall pain. No recent angina.  3. NO - Do you have a history of  Heart Failure?  4. NO - Are you troubled by shortness of breath when: walking on the level, up a slight hill or at night?  5. NO - Do you currently have a cold, bronchitis or other respiratory infection?  6. NO - Do you have a cough, shortness of breath or wheezing?  7. NO - Do you sometimes get pains in the calves of your legs when you walk?  8. NO - Do you or anyone in your family have previous history of blood clots?  9. NO - Do you or does anyone in your family have a serious bleeding problem such as prolonged bleeding following surgeries or cuts?  10. YES - Have you ever had problems with anemia or been told to take iron pills?  History of anemia  11. NO - Have you had any abnormal blood loss such as black, tarry or bloody stools, or abnormal vaginal bleeding?  12. NO - Have you ever had a blood transfusion?  13. NO - Have you or any of your relatives ever had problems with anesthesia?  14. YES - Do you have sleep apnea, excessive snoring or daytime drowsiness? Known LONDON  15. NO - Do you have any prosthetic heart valves?  16. NO - Do you have prosthetic joints?  17. NO - Is there any chance that you  may be pregnant?      HPI:     HPI related to upcoming procedure: 64 year old female with CAD, mild CKD, central sleep apnea, chronic pain on chronic opioids, chronic anxiety on chronic benzodiazepines here for preoperative evaluation prior to rhizotomy.    Had worse back pain since last fall, so for several months. PT did not help in January 2019. Epidural injections provided good relief, but temporary. Medial branch blocks were successful.     Has a complex heart disease history and has been stable for the past couple years. Did have abnormal EKG with anaplasma in May, but troponin negative and EKG normalized after illness improved.    She has been more active lately.  Feels this is actually helping her pain and conditioning.    Had sleep studies previously showing  central sleep apnea.  She has not been compliant with CPAP treatment.  Seems to be allergic to all the mask options.  The testing for sleep apnea was done when she was on higher doses of opioids.  No repeat testing has been done on her current oxycodone dose.    Maintain on chronic clonazepam at 1 mg 4 times daily for many years.  Has been slowly reducing over the past couple years.  Now down to about 2.5 mg/day.    MEDICAL HISTORY:     Patient Active Problem List    Diagnosis Date Noted     CKD (chronic kidney disease) stage 3, GFR 30-59 ml/min (H) 06/19/2019     Priority: Medium     Chronic anxiety 01/22/2018     Priority: Medium     Collagenous colitis 01/22/2018     Priority: Medium     Overview:   Possible Dx 2007       Major depressive disorder, recurrent episode, severe (H) 01/22/2018     Priority: Medium     Essential hypertension 01/22/2018     Priority: Medium     Mixed hyperlipidemia 01/22/2018     Priority: Medium     Osteoarthritis 01/22/2018     Priority: Medium     Panic attack 01/22/2018     Priority: Medium     Status post coronary angiogram 09/15/2017     Priority: Medium     History of tobacco abuse 08/10/2017     Priority: Medium      Presence of stent in coronary artery in patient with coronary artery disease 08/10/2017     Priority: Medium     History of coronary artery bypass graft x 3 08/10/2017     Priority: Medium     Noncompliance with CPAP treatment 10/21/2016     Priority: Medium     Intractable chronic common migraine without aura 10/21/2016     Priority: Medium     H/O multiple concussions 10/21/2016     Priority: Medium     History of Clostridium difficile 08/19/2016     Priority: Medium     Iron deficiency anemia 07/26/2016     Priority: Medium     CKD (chronic kidney disease) stage 2, GFR 60-89 ml/min 12/09/2015     Priority: Medium     Chest wall pain, chronic 11/04/2015     Priority: Medium     Pain medication agreement 01/28/2014     Priority: Medium     Overview:        Central sleep apnea 10/14/2013     Priority: Medium     Myofascial pain 10/14/2013     Priority: Medium     Vitamin D deficiency 05/06/2013     Priority: Medium     Subclavian artery stenosis, left (H) 03/31/2013     Priority: Medium     Overview:   S/p prox left SCA stent 4/1/2013       Lumbar facet arthropathy 01/02/2013     Priority: Medium     Nonspecific abnormal results of pulmonary system function study 12/21/2011     Priority: Medium     Slow transit constipation 11/29/2011     Priority: Medium     Gastric ulcer with hemorrhage 10/03/2011     Priority: Medium     Family history of malignant neoplasm of gastrointestinal tract 09/26/2011     Priority: Medium     Nodular degeneration of cornea 09/26/2011     Priority: Medium     Bilateral low back pain without sciatica 05/31/2011     Priority: Medium     Chronic pain disorder 12/01/2010     Priority: Medium     COPD (chronic obstructive pulmonary disease) (H) 06/15/2007     Priority: Medium     Overview:   Low DLCO, normal spirometry on 12/15/11       Peptic ulcer 06/15/2007     Priority: Medium     Postsurgical aortocoronary bypass status 02/12/2003     Priority: Medium     Coronary atherosclerosis  09/12/2002     Priority: Medium     Overview:   Multiple Angigrams prior to 2007. Also:  6/5/2007: Angiogram: TAXUS DELONTE to ostial circumflux, instent restenosis  5/23/2008: Left Main 30% diseased, LAD stents patent, Left circ stent patent, Chronic occluded right internal mammary artery graft to distal RCA, native RCA has 30% stenosis; NO intevention  9/19/2012 CT Angiogram: Patent LIMA to LAD, patent LAD stents, moderate proximal circumflex disease, patent RCA , occluded right internal mammary artery graft to distal RCA.  9/20/2012: Angiogram: Stent to Left Main, ostial LAD, and PTCA of ostial left circumflex        Past Medical History:   Diagnosis Date     Acute ischemic heart disease (H)     06/15/2007,with PTCA and stenting of 90% osteo circumflex lesion and patent LAD graft, patent left main stent.     Acute myocardial infarction (H)     3/30/2013     Anxiety disorder     No Comments Provided     Atherosclerotic heart disease of native coronary artery without angina pectoris     -angio revealed 3 vessel dz  -4 stents placed; 2 overlapping stents placed in mLAD, 1 stent pRCA, 1 stent pCirc 1 s 9/02 -non-ST elevation MI 1/03  -angio revealed restenosis of Circ.    -PTCA and brachytherapy of pCirc -repeat angio 2/03 -no intervension -CABG x3 12/03 - Dr Sinclair  -LIMA-LAD, RAFI-RCA, SVG-OM -PCI 7/04 stent to L -CTangio 9/05   -patentent LIMA-LAD. RAFI-RCA occluded; RCA ostio/p*     Bilateral carpal tunnel syndrome     No Comments Provided     Cervicalgia     No Comments Provided     Chest pain     12/29/2014     Chronic gastric ulcer without hemorrhage or perforation     10/03/2011,hx of GI bleed (2003)     Chronic ischemic heart disease     06/27/2012     Chronic obstructive pulmonary disease (H)     06/15/2007,low DLCO, normal spirometry     Chronic or unspecified gastric ulcer with hemorrhage     10/2003     Chronic pain syndrome     12/01/2010,chest wall, back     Constipation     11/29/2011     Coronary  angioplasty status     09/12/2002,with triple stenting     Coronary angioplasty status     01/10/2003,repeat angioplasty     Coronary angioplasty status     No Comments Provided     Dorsalgia     05/31/2011     Encounter for other administrative examinations     1/28/2014     Encounter for screening for cardiovascular disorders     10/2004,Cardiolite (2004, 2005, 2006 and 2011)     Enterocolitis due to Clostridium difficile     8/19/2016     Essential (primary) hypertension     No Comments Provided     Hyperlipidemia     No Comments Provided     Major depressive disorder, single episode     Severe, hx of suicide attempt/hospitalization     Migraine without status migrainosus, not intractable     No Comments Provided     Nodular corneal degeneration     09/26/2011     Noninfective gastroenteritis and colitis     06/15/2007,history of     Noninfective gastroenteritis and colitis     Microcytic     Osteoarthritis     No Comments Provided     Other chest pain     10/13/2009,chronic     Pain in knee     05/31/2011     Pain in right shoulder     No Comments Provided     Panic disorder without agoraphobia     No Comments Provided     Peptic ulcer without hemorrhage or perforation     6/15/2007     Peripheral vascular disease (H)     No Comments Provided     Personal history of diseases of the blood and blood-forming organs and certain disorders involving the immune mechanism (CODE)     No Comments Provided     Personal history of nicotine dependence     No Comments Provided     Personal history of other medical treatment (CODE)     10/14/2013     Presence of aortocoronary bypass graft     2/12/2003     Primary central sleep apnea     10/14/2013     Sepsis due to Escherichia coli (E. coli) (H)     7/14/16,[facility]     Stricture of artery (H)     3/31/2013,S/p prox left SCA stent 4/1/2013     Thoracic, thoracolumbar and lumbosacral intervertebral disc disorder     No Comments Provided     Uncomplicated opioid abuse (H)      history of     Vitamin D deficiency     5/6/2013     Past Surgical History:   Procedure Laterality Date     ANGIOPLASTY      9/12/02,with triple stenting     APPENDECTOMY OPEN      No Comments Provided     ARTHROSCOPY KNEE      left     ARTHROSCOPY SHOULDER      right     BYPASS GRAFT ARTERY CORONARY      12/13/02,Triple bypass, left internal mammary  to LAD, right internal mammary to right coronary artery, saphenous to obtuse marginal of the left circumflex.     COLONOSCOPY      2011,Dr Bowman benign polyps     COLONOSCOPY  10/03/2011    2011,benign polyps, Dr. Bowman     COLONOSCOPY  08/08/2016 8/8/16,normal, Dr Bowman     ELBOW SURGERY      baby,birth malachi removed from right arm     EMBOLECTOMY UPPER EXTREMITY  04/02/2013    brachial artery pseudoaneurysm after stenting     ESOPHAGOSCOPY, GASTROSCOPY, DUODENOSCOPY (EGD), COMBINED      2011,EGD Dr Bowman with pyloric ulcer     ESOPHAGOSCOPY, GASTROSCOPY, DUODENOSCOPY (EGD), COMBINED      2011,pyloric ulcer, Dr. Bowman     ESOPHAGOSCOPY, GASTROSCOPY, DUODENOSCOPY (EGD), COMBINED      8/8/16,mild gastritis, Dr Bowman     ESOPHAGOSCOPY, GASTROSCOPY, DUODENOSCOPY (EGD), COMBINED      11/27/2017,Dr Bowman. Antral ulcer     ESOPHAGOSCOPY, GASTROSCOPY, DUODENOSCOPY (EGD), COMBINED  02/02/2018    Dr Bowman, healed ulcer     HYSTERECTOMY TOTAL ABDOMINAL      age 21     LAPAROSCOPIC CHOLECYSTECTOMY      2006     OSTEOTOMY FEMUR DISTAL      x3, right knee     OSTEOTOMY FEMUR DISTAL      2000,left knee  ligament surgery     OTHER SURGICAL HISTORY      1/10/2003,,PTCA     OTHER SURGICAL HISTORY      09/20/2012,,PTCA,DELONTE in LAD and left main     OTHER SURGICAL HISTORY      4/1/2013,,PTCA,L subclavian stenosis     SALPINGO-OOPHORECTOMY BILATERAL      age 27,Bilateral salpingo-oophorectomy     TONSILLECTOMY, ADENOIDECTOMY, COMBINED      childhood     Current Outpatient Medications   Medication Sig Dispense Refill     albuterol (PROAIR HFA/PROVENTIL HFA/VENTOLIN HFA) 108  (90 Base) MCG/ACT inhaler Inhale 2 puffs into the lungs every 6 hours 2 Inhaler 3     amLODIPine (NORVASC) 10 MG tablet Take 1 tablet (10 mg) by mouth daily 90 tablet 3     aspirin EC 81 MG EC tablet Take 81 mg by mouth daily with food       busPIRone (BUSPAR) 10 MG tablet TAKE 1 TABLET TWICE A  tablet 1     clonazePAM (KLONOPIN) 1 MG tablet Take 1 tablet (1 mg) by mouth 3 times daily as needed for anxiety #75 per 30 days, #225 pills per 90 days 225 tablet 1     clopidogrel (PLAVIX) 75 MG tablet Take 1 tablet (75 mg) by mouth daily 90 tablet 3     cyclobenzaprine (FLEXERIL) 10 MG tablet Take 1 tablet (10 mg) by mouth 2 times daily as needed for muscle spasms 42 tablet 3     Diclofenac Sodium 1.5 % SOLN Apply 20 drops to each hand or 40 drops to each knee up to 4 times daily as needed for arthritis pain 450 mL 3     docusate sodium (COLACE) 50 MG capsule Take 50 mg by mouth daily       DULoxetine (CYMBALTA) 60 MG EC capsule Take 1 capsule (60 mg) by mouth daily 90 capsule 3     gabapentin (NEURONTIN) 600 MG tablet Take 1 tablet (600 mg) by mouth 3 times daily 270 tablet 2     lisinopril (PRINIVIL/ZESTRIL) 40 MG tablet Take 1 tablet (40 mg) by mouth daily 90 tablet 4     metoprolol tartrate (LOPRESSOR) 50 MG tablet Take 1 tablet (50 mg) by mouth 2 times daily 180 tablet 3     naloxone (NARCAN) 4 MG/0.1ML nasal spray Spray into one nostril for opioid reversal if unresponsive. May repeat every 2-3 minutes until patient responsive or EMS arrives 1 each 1     NITROSTAT 0.3 MG sublingual tablet PLACE 1 TABLET UNDER THE TONGUE EVERY 5 MINUTES AS NEEDED FOR CHEST PAIN 25 tablet 2     omeprazole (PRILOSEC) 20 MG DR capsule Take 1 capsule (20 mg) by mouth daily 90 capsule 3     ondansetron (ZOFRAN) 4 MG tablet Take 4 mg by mouth every 6 hours as needed for nausea       oxyCODONE-acetaminophen (PERCOCET) 5-325 MG tablet Take 2 tablets by mouth 4 times daily Max acetaminophen dose: 4000mg in 24 hrs 240 tablet 0      "pravastatin (PRAVACHOL) 40 MG tablet Take 1 tablet (40 mg) by mouth At Bedtime 90 tablet 3     saccharomyces boulardii (FLORASTOR) 250 MG capsule Take 250 mg by mouth 2 times daily       VITAMIN D, CHOLECALCIFEROL, PO Take 5,000 Units by mouth daily       OTC products: None, except as noted above    Allergies   Allergen Reactions     Atorvastatin Muscle Pain (Myalgia)     Liquid Adhesive Rash     Other reaction(s): Erythema  Silk and plastic gloves (long term attachment of adhesives)     Tiotropium Bromide [Tiotropium] Rash     Ezetimibe Muscle Pain (Myalgia)     Niacin      Other reaction(s): Flushing  flushing     Rosuvastatin Muscle Pain (Myalgia)     Other reaction(s): Myalgia     Latex Rash     No Clinical Screening - See Comments Rash, Blisters and Itching     Metals and plastic  Metals and plastics       Tape [Adhesive Tape] Rash      Latex Allergy: YES: Precautions to take: rash to Latex    Social History     Tobacco Use     Smoking status: Former Smoker     Packs/day: 0.25     Years: 35.00     Pack years: 8.75     Types: Cigarettes     Last attempt to quit: 2/15/2017     Years since quittin.4     Smokeless tobacco: Never Used   Substance Use Topics     Alcohol use: Yes     Alcohol/week: 0.0 oz     Comment: Alcoholic Drinks/day: rare     History   Drug Use No     Comment: Drug use: No       REVIEW OF SYSTEMS:   General: Denies general constitutional problems  Eyes: Denies problems  Ears/Nose/Throat: Denies problems  Cardiovascular: Denies problems  Respiratory: Denies problems  Gastrointestinal: GERD controlled  Genitourinary: Denies problems  Skin: Denies problems  Musculoskeletal: chronic chest wall and LBP  Neurologic: chronic migraines  Psychiatric: chronic anxiety      EXAM:   BP 90/59 (BP Location: Right arm, Patient Position: Sitting, Cuff Size: Adult Large)   Pulse 80   Temp 98.9  F (37.2  C) (Oral)   Resp 20   Ht 1.676 m (5' 6\")   Wt 81.6 kg (180 lb)   SpO2 95%   Breastfeeding? No   BMI " 29.05 kg/m    General Appearance: Pleasant, alert, appropriate appearance for age. No acute distress  Ear Exam: Normal TM's bilaterally.   OroPharynx Exam: Dental hygiene adequate. Normal buccal mucosa. Normal pharynx. Mallampati 2/4.  Neck Exam: Supple, no masses or nodes.  Chest/Respiratory Exam: Normal chest wall and respirations. Clear to auscultation.  Cardiovascular Exam: Regular rate and rhythm. S1, S2, no murmur, click, gallop, or rubs.  Extremities: 2 + pedal pulses.  No lower extremity edema.  Neurologic Exam: Nonfocal; symmetric DTRs, normal gross motor movement, tone, and coordination. No tremor.   Psychiatric: Normal affect and mentation      DIAGNOSTICS:   EKG 5/22/19:   Sinus bradycardia with a rate of 58.  Axis is normal.  RI and QRS intervals normal.  Incomplete right bundle branch block pattern.  Minimal nonspecific T wave abnormalities.  Compared to a previous tracing dated May 13, 2019, the previously seen ST and T wave changes have improved, the tachycardia is no longer present.  Kyler Pozo MD    Recent Labs   Lab Test 05/13/19  1125 05/13/19  0949 11/21/18  1151  03/15/18  2350   HGB  --  11.8 12.2   < > 11.6*   PLT  --  190 263   < > 233   INR 1.05  --   --   --  0.94   NA  --  137 140   < > 140   POTASSIUM  --  3.4* 4.4   < > 3.5   CR  --  1.07 1.07   < > 1.53*    < > = values in this interval not displayed.      Results for orders placed or performed in visit on 08/07/19   Comprehensive Metabolic Panel   Result Value Ref Range    Sodium 138 134 - 144 mmol/L    Potassium 4.9 3.5 - 5.1 mmol/L    Chloride 107 98 - 107 mmol/L    Carbon Dioxide 22 21 - 31 mmol/L    Anion Gap 9 3 - 14 mmol/L    Glucose 117 (H) 70 - 105 mg/dL    Urea Nitrogen 25 7 - 25 mg/dL    Creatinine 2.00 (H) 0.60 - 1.20 mg/dL    GFR Estimate 25 (L) >60 mL/min/[1.73_m2]    GFR Estimate If Black 30 (L) >60 mL/min/[1.73_m2]    Calcium 9.7 8.6 - 10.3 mg/dL    Bilirubin Total 0.3 0.3 - 1.0 mg/dL    Albumin 4.7 3.5 - 5.7 g/dL     Protein Total 7.3 6.4 - 8.9 g/dL    Alkaline Phosphatase 73 34 - 104 U/L    ALT 13 7 - 52 U/L    AST 23 13 - 39 U/L   INR   Result Value Ref Range    INR 1.03 0 - 1.3   CBC and Differential   Result Value Ref Range    WBC 8.1 4.0 - 11.0 10e9/L    RBC Count 4.28 3.8 - 5.2 10e12/L    Hemoglobin 12.7 11.7 - 15.7 g/dL    Hematocrit 38.5 35.0 - 47.0 %    MCV 90 78 - 100 fl    MCH 29.7 26.5 - 33.0 pg    MCHC 33.0 31.5 - 36.5 g/dL    RDW 13.5 10.0 - 15.0 %    Platelet Count 383 150 - 450 10e9/L    Diff Method Automated Method     % Neutrophils 47.6 %    % Lymphocytes 40.0 %    % Monocytes 8.7 %    % Eosinophils 2.2 %    % Basophils 1.1 %    % Immature Granulocytes 0.4 %    Absolute Neutrophil 3.9 1.6 - 8.3 10e9/L    Absolute Lymphocytes 3.3 0.8 - 5.3 10e9/L    Absolute Monocytes 0.7 0.0 - 1.3 10e9/L    Absolute Eosinophils 0.2 0.0 - 0.7 10e9/L    Absolute Basophils 0.1 0.0 - 0.2 10e9/L    Abs Immature Granulocytes 0.0 0 - 0.4 10e9/L        IMPRESSION:       ICD-10-CM    1. Preoperative testing Z01.818 CBC and Differential     INR     Comprehensive Metabolic Panel     Comprehensive Metabolic Panel     INR     CBC and Differential   2. Preop general physical exam Z01.818 CBC and Differential     INR     Comprehensive Metabolic Panel     Comprehensive Metabolic Panel     INR     CBC and Differential   3. Chronic obstructive pulmonary disease, unspecified COPD type (H) J44.9    4. Atherosclerosis of coronary artery bypass graft of native heart without angina pectoris I25.810 CBC and Differential     INR     Comprehensive Metabolic Panel     Comprehensive Metabolic Panel     INR     CBC and Differential   5. CKD (chronic kidney disease) stage 2, GFR 60-89 ml/min N18.2 CBC and Differential     INR     Comprehensive Metabolic Panel     Comprehensive Metabolic Panel     INR     CBC and Differential   6. Chronic anxiety F41.9    7. Central sleep apnea G47.31          The proposed surgical procedure is considered LOW  risk.    REVISED CARDIAC RISK INDEX  The patient has the following serious cardiovascular risks for perioperative complications such as (MI, PE, VFib and 3  AV Block):  Coronary Artery Disease (MI, positive stress test, angina, Qs on EKG)  INTERPRETATION: 1 risks: Class II (low risk - 0.9% complication rate)    The patient has the following additional risks for perioperative complications:  No identified additional risks  High tolerance to opioid analgesics due to chronic oxycodone use  Chronic benzodiazepine use        RECOMMENDATIONS:       Cardiovascular Risk  Performs 4 METs exercise without symptoms (Light housework (dusting, washing dishes)) .       Pulmonary Risk  Stable with low DLCO on PFTS in 2014 and no recent breathing issues      Obstructive Sleep Apnea (or suspected sleep apnea)  Hospital staff are advised to monitor for sleep related oxygen desaturations due to suspicion of LONDON      Stop Plavix 5 days prior to procedure, resume after  Continue on aspirin 81 mg daily through procedure given CAD with previous stenting    Blood pressure is on the low side today.  She has not been checking at home.  Recent readings have decreased compared to a few months prior when they are running slightly high.  She should check her blood pressure at home and if often below 100/60 then reduce amlodipine to 5 mg daily.  If blood pressure is low leading to surgery, then avoid amlodipine that morning, otherwise should take.    Take usual medications in the morning with sips of water.    Has follow-up in August 19 for refill on oxycodone.    APPROVAL GIVEN to proceed with proposed procedure, without further diagnostic evaluation       Signed Electronically by: Ramirez Cano MD    Copy of this evaluation report is provided to requesting physician.    [facility] Preop Guidelines    Revised Cardiac Risk Index

## 2019-08-07 NOTE — NURSING NOTE
"Chief Complaint   Patient presents with     Pre-Op Exam     Pt present to clinic today for a pre op exam.  Date of Surgery: 8/14  Type of Surgery: Rhizotomy  Surgeon: Dr. Merida  [facility]:  [facility]  Fax:     Fever/Chills or other infectious symptoms in pastmonth: No  >10lb weight loss in past two months: No    Health Care Directive/Code status:  Yes  Hx of blood transfusions:   No   History of VRE/MRSA:  No     Preoperative Evaluation: Obstructive Sleep Apnea screening    S:Snore -  Do you snore loudly? (louder than talking or loud enough to be heard through closed doors)No  T: Tired - Do you often feel tired, fatigued, or sleepy during the daytime?No  O: Observed - Has anyone ever observed you stop breathing during your sleep?No  P: Pressure - Do you have or are you being treated for high blood pressure?Yes  B: BMI - BMI greater than 35kg/m2?NO  A: Age - Age over 50 years old?Yes  N: Neck - Neck circumference greater than 40 cm?No  G: Gender - Gender: Male?No    Total number of \"YES\" responses:  1    Scoring: Low risk of LONDON 0-2  At Risk of LONDON: >3 High Risk of LONDON: 5-8    Sheri Preston LPN........................8/7/2019  1:14 PM            Initial BP 90/59 (BP Location: Right arm, Patient Position: Sitting, Cuff Size: Adult Large)   Pulse 80   Temp 98.9  F (37.2  C) (Oral)   Resp 20   Ht 1.676 m (5' 6\")   Wt 81.6 kg (180 lb)   SpO2 95%   Breastfeeding? No   BMI 29.05 kg/m   Estimated body mass index is 29.05 kg/m  as calculated from the following:    Height as of this encounter: 1.676 m (5' 6\").    Weight as of this encounter: 81.6 kg (180 lb).  Medication Reconciliation: complete    Sheri Preston LPN  "

## 2019-08-07 NOTE — PATIENT INSTRUCTIONS
Stop Plavix 5 days prior to procedure  Remain on aspirin 81 mg daily    Track blood pressure at home. If often below 100/60, then reduce amlodipine to 5 mg daily (1/2 of current pill)  If blood pressure is kind of low, like below 110/65 leading up to surgery, then skip amlodipine that morning    Follow-up August 19

## 2019-08-08 ENCOUNTER — TELEPHONE (OUTPATIENT)
Dept: GENERAL RADIOLOGY | Facility: OTHER | Age: 64
End: 2019-08-08

## 2019-08-08 ASSESSMENT — ANXIETY QUESTIONNAIRES: GAD7 TOTAL SCORE: 1

## 2019-08-08 NOTE — TELEPHONE ENCOUNTER
Contact made Yes.   Which attempt is this? #1.  Call date: 08/08/2019  Call time: 1042 AM  Procedure verified: Yes  Procedure time verified: Yes  Arrival time: 0830 AM   Facility location verified: Yes  : No  Language: N/A  Previous sedation/analgesia: Yes  Complications of sedation/analgesia?  No  CPAP machine use: No (If yes, bring CPAP to procedure)  Reviewed NPO instructions: Yes  No solids after 1200 AM.  No clear liquids after 0730 AM.  Indication for procedure (name of procedure): Rhizotomy  Instructions given: expectations for procedure, expectations for recovery, NPO, designated .  Patient states an understanding of pre-procedure instructions.  Any anticoagulants or blood thinners? Yes, Aspirin and plavix  If yes, patient was instructed to follow MD orders for stopping anticoagulants or blood thinners.  Recent exposure to any communicable/infectious diseases: no  Are you now or have you ever been in a relationship where you have been abused physically, emotionally, or sexually? No  Preprocedure teaching completed: Yes  Method: verbal per phone.  People present for teaching: Patient  Response to teaching: patient demonstrates understanding of procedure, conscious sedation and plan of care, as well as post-procedure instructions.  Transportation from procedure: Yes:   Any questions or patient concerns? No

## 2019-08-14 ENCOUNTER — HOSPITAL ENCOUNTER (OUTPATIENT)
Dept: GENERAL RADIOLOGY | Facility: OTHER | Age: 64
Discharge: HOME | End: 2019-08-14
Attending: FAMILY MEDICINE | Admitting: FAMILY MEDICINE
Payer: MEDICARE

## 2019-08-14 VITALS
DIASTOLIC BLOOD PRESSURE: 85 MMHG | OXYGEN SATURATION: 97 % | SYSTOLIC BLOOD PRESSURE: 102 MMHG | HEART RATE: 57 BPM | RESPIRATION RATE: 16 BRPM | TEMPERATURE: 98.6 F

## 2019-08-14 DIAGNOSIS — M47.816 LUMBAR FACET ARTHROPATHY: ICD-10-CM

## 2019-08-14 PROCEDURE — 27211425 XR LUMBAR RADIOFREQ ABLATION BILATERAL

## 2019-08-14 PROCEDURE — 25000125 ZZHC RX 250: Performed by: RADIOLOGY

## 2019-08-14 PROCEDURE — 25000128 H RX IP 250 OP 636: Performed by: RADIOLOGY

## 2019-08-14 PROCEDURE — 25800030 ZZH RX IP 258 OP 636: Performed by: RADIOLOGY

## 2019-08-14 RX ORDER — BUPIVACAINE HYDROCHLORIDE 5 MG/ML
9 INJECTION, SOLUTION EPIDURAL; INTRACAUDAL ONCE
Status: COMPLETED | OUTPATIENT
Start: 2019-08-14 | End: 2019-08-14

## 2019-08-14 RX ORDER — DEXTROSE MONOHYDRATE 25 G/50ML
25-50 INJECTION, SOLUTION INTRAVENOUS
Status: CANCELLED | OUTPATIENT
Start: 2019-08-14

## 2019-08-14 RX ORDER — LIDOCAINE 40 MG/G
CREAM TOPICAL
Status: CANCELLED | OUTPATIENT
Start: 2019-08-14

## 2019-08-14 RX ORDER — SODIUM CHLORIDE 9 MG/ML
INJECTION, SOLUTION INTRAVENOUS CONTINUOUS
Status: CANCELLED | OUTPATIENT
Start: 2019-08-14

## 2019-08-14 RX ORDER — KETOROLAC TROMETHAMINE 30 MG/ML
15 INJECTION, SOLUTION INTRAMUSCULAR; INTRAVENOUS ONCE
Status: CANCELLED | OUTPATIENT
Start: 2019-08-14 | End: 2019-08-14

## 2019-08-14 RX ORDER — NICOTINE POLACRILEX 4 MG
15-30 LOZENGE BUCCAL
Status: CANCELLED | OUTPATIENT
Start: 2019-08-14

## 2019-08-14 RX ORDER — FENTANYL CITRATE 50 UG/ML
25-50 INJECTION, SOLUTION INTRAMUSCULAR; INTRAVENOUS EVERY 5 MIN PRN
Status: DISCONTINUED | OUTPATIENT
Start: 2019-08-14 | End: 2019-08-15 | Stop reason: HOSPADM

## 2019-08-14 RX ORDER — NALOXONE HYDROCHLORIDE 0.4 MG/ML
.1-.4 INJECTION, SOLUTION INTRAMUSCULAR; INTRAVENOUS; SUBCUTANEOUS
Status: DISCONTINUED | OUTPATIENT
Start: 2019-08-14 | End: 2019-08-15 | Stop reason: HOSPADM

## 2019-08-14 RX ORDER — KETOROLAC TROMETHAMINE 30 MG/ML
15 INJECTION, SOLUTION INTRAMUSCULAR; INTRAVENOUS
Status: CANCELLED | OUTPATIENT
Start: 2019-08-14

## 2019-08-14 RX ORDER — ONDANSETRON 2 MG/ML
4 INJECTION INTRAMUSCULAR; INTRAVENOUS
Status: CANCELLED | OUTPATIENT
Start: 2019-08-14

## 2019-08-14 RX ORDER — LIDOCAINE HYDROCHLORIDE 10 MG/ML
6 INJECTION, SOLUTION INFILTRATION; PERINEURAL ONCE
Status: COMPLETED | OUTPATIENT
Start: 2019-08-14 | End: 2019-08-14

## 2019-08-14 RX ORDER — SODIUM CHLORIDE 9 MG/ML
INJECTION, SOLUTION INTRAVENOUS ONCE
Status: COMPLETED | OUTPATIENT
Start: 2019-08-14 | End: 2019-08-14

## 2019-08-14 RX ORDER — FLUMAZENIL 0.1 MG/ML
0.2 INJECTION, SOLUTION INTRAVENOUS
Status: DISCONTINUED | OUTPATIENT
Start: 2019-08-14 | End: 2019-08-15 | Stop reason: HOSPADM

## 2019-08-14 RX ORDER — METHYLPREDNISOLONE ACETATE 80 MG/ML
80 INJECTION, SUSPENSION INTRA-ARTICULAR; INTRALESIONAL; INTRAMUSCULAR; SOFT TISSUE ONCE
Status: COMPLETED | OUTPATIENT
Start: 2019-08-14 | End: 2019-08-14

## 2019-08-14 RX ADMIN — LIDOCAINE HYDROCHLORIDE 6 ML: 10 INJECTION, SOLUTION INFILTRATION; PERINEURAL at 10:34

## 2019-08-14 RX ADMIN — MIDAZOLAM 0.5 MG: 1 INJECTION INTRAMUSCULAR; INTRAVENOUS at 09:41

## 2019-08-14 RX ADMIN — MIDAZOLAM 0.5 MG: 1 INJECTION INTRAMUSCULAR; INTRAVENOUS at 09:44

## 2019-08-14 RX ADMIN — BUPIVACAINE HYDROCHLORIDE 9 ML: 5 INJECTION, SOLUTION EPIDURAL; INTRACAUDAL; PERINEURAL at 10:34

## 2019-08-14 RX ADMIN — MIDAZOLAM 0.5 MG: 1 INJECTION INTRAMUSCULAR; INTRAVENOUS at 09:48

## 2019-08-14 RX ADMIN — FENTANYL CITRATE 50 MCG: 50 INJECTION, SOLUTION INTRAMUSCULAR; INTRAVENOUS at 09:48

## 2019-08-14 RX ADMIN — METHYLPREDNISOLONE ACETATE 80 MG: 80 INJECTION, SUSPENSION INTRA-ARTICULAR; INTRALESIONAL; INTRAMUSCULAR; SOFT TISSUE at 10:34

## 2019-08-14 RX ADMIN — SODIUM CHLORIDE: 9 INJECTION, SOLUTION INTRAVENOUS at 08:42

## 2019-08-14 RX ADMIN — FENTANYL CITRATE 50 MCG: 50 INJECTION, SOLUTION INTRAMUSCULAR; INTRAVENOUS at 09:41

## 2019-08-14 RX ADMIN — FENTANYL CITRATE 50 MCG: 50 INJECTION, SOLUTION INTRAMUSCULAR; INTRAVENOUS at 09:44

## 2019-08-14 NOTE — IP AVS SNAPSHOT
[facility] SPECIAL[facility] XRAY  [address]  [place] [address]  Phone:  [phone]                                    After Visit Summary   8/14/2019    Ankita Day    MRN: [id number]           After Visit Summary Signature Page    I have received my discharge instructions, and my questions have been answered. I have discussed any challenges I see with this plan with the nurse or doctor.    ..........................................................................................................................................  Patient/Patient Representative Signature      ..........................................................................................................................................  Patient Representative Print Name and Relationship to Patient    ..................................................               ................................................  Date                                   Time    ..........................................................................................................................................  Reviewed by Signature/Title    ...................................................              ..............................................  Date                                               Time          22EPIC Rev 08/18

## 2019-08-14 NOTE — DISCHARGE INSTRUCTIONS
[facility]   Adult Discharge Orders & Instructions      For 24 hours after surgery:  1. Get plenty of rest.  A responsible adult must stay with you for at least 24 hours after you leave the hospital.   2. You may feel lightheaded.  IF so, sit for a few minutes before standing.  Have someone help you get up.   3. You may have a slight fever. Call the doctor if your fever is over 101 F (38.3 C) (taken under the tongue) or lasts longer than 24 hours.  4. You may have a dry mouth, a sore throat, muscle aches or trouble sleeping.  These should go away after 24 hours.  5. Do not make important or legal decisions.  6.   Do not drive or use heavy equipment.  If you have weakness or tingling, don't drive or use heavy equipment until this feeling goes away.                                                                                                                                                                         To contact a doctor, call    [phone]______________

## 2019-08-14 NOTE — PRE-PROCEDURE
GENERAL PRE-PROCEDURE:   Date/Time:  8/14/2019 9:06 AM    Verbal consent obtained?: Yes    Written consent obtained?: Yes    Risks and benefits: Risks, benefits and alternatives were discussed    Consent given by:  Patient  Patient states understanding of procedure being performed: Yes    Patient's understanding of procedure matches consent: Yes    Procedure consent matches procedure scheduled: Yes    Expected level of sedation:  Moderate  Appropriately NPO:  Yes  ASA Class:  Class 2- mild systemic disease, no acute problems, no functional limitations  Mallampati  :  Grade 1- soft palate, uvula, tonsillar pillars, and posterior pharyngeal wall visible  Lungs:  Lungs clear with good breath sounds bilaterally  Heart:  Normal heart sounds and rate  History & Physical reviewed:  History and physical reviewed and no updates needed  Statement of review:  I have reviewed the lab findings, diagnostic data, medications, and the plan for sedation

## 2019-08-14 NOTE — IP AVS SNAPSHOT
MRN:[id number]                      After Visit Summary   8/14/2019    Ankita Day    MRN: [id number]           Visit Information        Provider Department      8/14/2019  8:30 AM JOSUÉRAD2;  IMAGING NURSE; [facility]           Review of your medicines      UNREVIEWED medicines. Ask your doctor about these medicines       Dose / Directions   albuterol 108 (90 Base) MCG/ACT inhaler  Commonly known as:  PROAIR HFA/PROVENTIL HFA/VENTOLIN HFA  Used for:  Chronic obstructive pulmonary disease, unspecified COPD type (H)      Dose:  2 puff  Inhale 2 puffs into the lungs every 6 hours  Quantity:  2 Inhaler  Refills:  3     amLODIPine 10 MG tablet  Commonly known as:  NORVASC  Used for:  Essential hypertension      Dose:  10 mg  Take 1 tablet (10 mg) by mouth daily  Quantity:  90 tablet  Refills:  3     aspirin 81 MG EC tablet      Dose:  81 mg  Take 81 mg by mouth daily with food  Refills:  0     busPIRone 10 MG tablet  Commonly known as:  BUSPAR  Used for:  Anxiety state      TAKE 1 TABLET TWICE A DAY  Quantity:  180 tablet  Refills:  1     clonazePAM 1 MG tablet  Commonly known as:  klonoPIN  Used for:  Chronic anxiety      Dose:  1 mg  Take 1 tablet (1 mg) by mouth 3 times daily as needed for anxiety #75 per 30 days, #225 pills per 90 days  Quantity:  225 tablet  Refills:  1     clopidogrel 75 MG tablet  Commonly known as:  PLAVIX  Used for:  Presence of stent in coronary artery in patient with coronary artery disease      Dose:  75 mg  Take 1 tablet (75 mg) by mouth daily  Quantity:  90 tablet  Refills:  3     cyclobenzaprine 10 MG tablet  Commonly known as:  FLEXERIL  Used for:  Chronic bilateral low back pain without sciatica      Dose:  10 mg  Take 1 tablet (10 mg) by mouth 2 times daily as needed for muscle spasms  Quantity:  42 tablet  Refills:  3     Diclofenac Sodium 1.5 % Soln  Used for:  Primary osteoarthritis involving multiple joints      Apply 20 drops to each  hand or 40 drops to each knee up to 4 times daily as needed for arthritis pain  Quantity:  450 mL  Refills:  3     docusate sodium 50 MG capsule  Commonly known as:  COLACE      Dose:  50 mg  Take 50 mg by mouth daily  Refills:  0     DULoxetine 60 MG capsule  Commonly known as:  CYMBALTA  Used for:  Chronic bilateral low back pain without sciatica, Chest wall pain, chronic, Chronic pain disorder, Severe episode of recurrent major depressive disorder, without psychotic features (H)      Dose:  60 mg  Take 1 capsule (60 mg) by mouth daily  Quantity:  90 capsule  Refills:  3     gabapentin 600 MG tablet  Commonly known as:  NEURONTIN  Used for:  Chest wall pain, chronic, Chronic bilateral low back pain without sciatica, Chronic pain disorder      Dose:  600 mg  Take 1 tablet (600 mg) by mouth 3 times daily  Quantity:  270 tablet  Refills:  2     lisinopril 40 MG tablet  Commonly known as:  PRINIVIL/ZESTRIL  Used for:  Essential hypertension      Dose:  40 mg  Take 1 tablet (40 mg) by mouth daily  Quantity:  90 tablet  Refills:  4     metoprolol tartrate 50 MG tablet  Commonly known as:  LOPRESSOR  Used for:  Essential hypertension      Dose:  50 mg  Take 1 tablet (50 mg) by mouth 2 times daily  Quantity:  180 tablet  Refills:  3     naloxone 4 MG/0.1ML nasal spray  Commonly known as:  NARCAN  Used for:  Chronic, continuous use of opioids      Spray into one nostril for opioid reversal if unresponsive. May repeat every 2-3 minutes until patient responsive or EMS arrives  Quantity:  1 each  Refills:  1     NITROSTAT 0.3 MG sublingual tablet  Used for:  Chest pain of uncertain etiology  Generic drug:  nitroGLYcerin      PLACE 1 TABLET UNDER THE TONGUE EVERY 5 MINUTES AS NEEDED FOR CHEST PAIN  Quantity:  25 tablet  Refills:  2     omeprazole 20 MG DR capsule  Commonly known as:  priLOSEC  Used for:  Peptic ulcer      Dose:  20 mg  Take 1 capsule (20 mg) by mouth daily  Quantity:  90 capsule  Refills:  3     ondansetron 4  MG tablet  Commonly known as:  ZOFRAN      Dose:  4 mg  Take 4 mg by mouth every 6 hours as needed for nausea  Refills:  0     oxyCODONE-acetaminophen 5-325 MG tablet  Commonly known as:  PERCOCET  Used for:  Chronic pain disorder, Chronic bilateral low back pain without sciatica, Chest wall pain, chronic      Dose:  2 tablet  Take 2 tablets by mouth 4 times daily Max acetaminophen dose: 4000mg in 24 hrs  Quantity:  240 tablet  Refills:  0     pravastatin 40 MG tablet  Commonly known as:  PRAVACHOL  Used for:  Atherosclerosis of coronary artery bypass graft of native heart without angina pectoris      Dose:  40 mg  Take 1 tablet (40 mg) by mouth At Bedtime  Quantity:  90 tablet  Refills:  3     saccharomyces boulardii 250 MG capsule  Commonly known as:  FLORASTOR      Dose:  250 mg  Take 250 mg by mouth 2 times daily  Refills:  0     VITAMIN D (CHOLECALCIFEROL) PO      Dose:  5000 Units  Take 5,000 Units by mouth daily  Refills:  0              Protect others around you: Learn how to safely use, store and throw away your medicines at www.disposemymeds.org.       Follow-ups after your visit       Your next 10 appointments already scheduled    Aug 19, 2019  9:30 AM CDT  [facility] [address] [facility] [phone]   Bring a current list of meds and any records pertaining to this visit.  For Physicals, please bring immunization records and any forms needing to be filled out. Please arrive 10 minutes early to complete paperwork.     Nov 25, 2019 10:15 AM CST  Return Visit with JOSELYN Heller [facility] [place] [facility] ([facility]) [address]  [place] [id number] [phone]         Care Instructions       After Care Instructions     Discharge Instructions      Notify procedure physician:    ~Please contact the office: If you experience signs or symptoms of an  infection: redness, swelling, drainage, fever (over 100.5*F) or if you have any change in your bowel or bladder control, have increased numbness, tingling or weakness in your feet, severe leg pain or arm symptoms. If you cannot reach the office, please proceed to the nearest Emergency Department (ER).    Medication:  ~You may resume taking your pre-injection medications immediately.  If you are taking a blood thinner, you may restart the medication 12 hours after your procedure.    Sedation:   ~If you received sedation during your procedure, you must have a responsible adult drive you home.  ~Avoid driving, operating heavy machinery, or making any major decisions for 24 hours after the procedure.     Diet:  ~Resume your previous diet    Activity  ~You may experience increased discomfort for 24-72 hours after the injection due to the irritation from the placement of the needle.  Limited activity and rest are recommended for this time period.  Avoid heavy lifting, unusual positions, vigorous exercise or other maneuvers which can exacerbate your pain. You maygradually resume regular activities as your discomfort subsides.  Maximum results may not be achieved until 2-6 weeks post procedure.    Care of injection site  ~Shower only (do not submerge body in water today).  It is common to have some pain and discomfort at the procedure site.  Use ice only for the first 24 hours. Apply ice to the area 20 minutes on then 20 minutes off. You may use moist heat or ice after 24 hours (whichever you prefer.)           Further instructions from your care team       [facility] Procedure   Adult Discharge Orders & Instructions      For 24 hours after surgery:  1. Get plenty of rest.  A responsible adult must stay with you for at least 24 hours after you leave the hospital.   2. You may feel lightheaded.  IF so, sit for a few minutes before standing.  Have someone help you get up.   3. You may have a slight fever. Call the  doctor if your fever is over 101 F (38.3 C) (taken under the tongue) or lasts longer than 24 hours.  4. You may have a dry mouth, a sore throat, muscle aches or trouble sleeping.  These should go away after 24 hours.  5. Do not make important or legal decisions.  6.   Do not drive or use heavy equipment.  If you have weakness or tingling, don't drive or use heavy equipment until this feeling goes away.                                                                                                                                                                         To contact a doctor, call    [phone]______________      Additional Information About Your Visit       MyChart Information    [employer] gives you secure access to your electronic health record. If you see a primary care provider, you can also send messages to your care team and make appointments. If you have questions, please call your primary care clinic.  If you do not have a primary care provider, please call [phone] and they will assist you.       Care EveryWhere ID    This is your Care EveryWhere ID. This could be used by other organizations to access your [facility] medical records  [id number]       Your Vitals Were     Blood Pressure   110/52 (BP Location: Left arm)    Pulse   52    Temperature   98.6  F (37  C) (Tympanic)    Respirations   18    Pulse Oximetry   95%          Primary Care Provider Office Phone # Fax #    Ramirez Cano [phone]      Equal Access to Services    [facility]: Hadii donaldo horton hadasho Sowillyali, waaxda luqadaha, qaybta kaalmada dunia, kacie fields. So [facility] [phone].    ATENCIÓN: Si habla español, tiene a siddiqui disposición servicios gratuitos de asistencia lingüística. Llame al [phone].    We comply with applicable federal civil rights laws and [place] laws. We do not discriminate on the basis of race, color, national origin, age, disability, sex,  sexual orientation, or gender identity.           Thank you!    Thank you for choosing [facility] for your care. Our goal is always to provide you with excellent care. Hearing back from our patients is one way we can continue to improve our services. Please take a few minutes to complete the written survey that you may receive in the mail after you visit with us. Thank you!            Medication List      ASK your doctor about these medications          Morning Afternoon Evening Bedtime As Needed    albuterol 108 (90 Base) MCG/ACT inhaler  Also known as:  PROAIR HFA/PROVENTIL HFA/VENTOLIN HFA  INSTRUCTIONS:  Inhale 2 puffs into the lungs every 6 hours                     amLODIPine 10 MG tablet  Also known as:  NORVASC  INSTRUCTIONS:  Take 1 tablet (10 mg) by mouth daily                     aspirin 81 MG EC tablet  INSTRUCTIONS:  Take 81 mg by mouth daily with food                     busPIRone 10 MG tablet  Also known as:  BUSPAR  INSTRUCTIONS:  TAKE 1 TABLET TWICE A DAY                     clonazePAM 1 MG tablet  Also known as:  klonoPIN  INSTRUCTIONS:  Take 1 tablet (1 mg) by mouth 3 times daily as needed for anxiety #75 per 30 days, #225 pills per 90 days                     clopidogrel 75 MG tablet  Also known as:  PLAVIX  INSTRUCTIONS:  Take 1 tablet (75 mg) by mouth daily                     cyclobenzaprine 10 MG tablet  Also known as:  FLEXERIL  INSTRUCTIONS:  Take 1 tablet (10 mg) by mouth 2 times daily as needed for muscle spasms                     Diclofenac Sodium 1.5 % Soln  INSTRUCTIONS:  Apply 20 drops to each hand or 40 drops to each knee up to 4 times daily as needed for arthritis pain                     docusate sodium 50 MG capsule  Also known as:  COLACE  INSTRUCTIONS:  Take 50 mg by mouth daily                     DULoxetine 60 MG capsule  Also known as:  CYMBALTA  INSTRUCTIONS:  Take 1 capsule (60 mg) by mouth daily                     gabapentin 600 MG tablet  Also known as:   NEURONTIN  INSTRUCTIONS:  Take 1 tablet (600 mg) by mouth 3 times daily                     lisinopril 40 MG tablet  Also known as:  PRINIVIL/ZESTRIL  INSTRUCTIONS:  Take 1 tablet (40 mg) by mouth daily                     metoprolol tartrate 50 MG tablet  Also known as:  LOPRESSOR  INSTRUCTIONS:  Take 1 tablet (50 mg) by mouth 2 times daily                     naloxone 4 MG/0.1ML nasal spray  Also known as:  NARCAN  INSTRUCTIONS:  Spray into one nostril for opioid reversal if unresponsive. May repeat every 2-3 minutes until patient responsive or EMS arrives                     NITROSTAT 0.3 MG sublingual tablet  INSTRUCTIONS:  PLACE 1 TABLET UNDER THE TONGUE EVERY 5 MINUTES AS NEEDED FOR CHEST PAIN  Generic drug:  nitroGLYcerin                     omeprazole 20 MG DR capsule  Also known as:  priLOSEC  INSTRUCTIONS:  Take 1 capsule (20 mg) by mouth daily                     ondansetron 4 MG tablet  Also known as:  ZOFRAN  INSTRUCTIONS:  Take 4 mg by mouth every 6 hours as needed for nausea                     oxyCODONE-acetaminophen 5-325 MG tablet  Also known as:  PERCOCET  INSTRUCTIONS:  Take 2 tablets by mouth 4 times daily Max acetaminophen dose: 4000mg in 24 hrs                     pravastatin 40 MG tablet  Also known as:  PRAVACHOL  INSTRUCTIONS:  Take 1 tablet (40 mg) by mouth At Bedtime                     saccharomyces boulardii 250 MG capsule  Also known as:  FLORASTOR  INSTRUCTIONS:  Take 250 mg by mouth 2 times daily                     VITAMIN D (CHOLECALCIFEROL) PO  INSTRUCTIONS:  Take 5,000 Units by mouth daily

## 2019-08-18 DIAGNOSIS — G89.29 CHRONIC BILATERAL LOW BACK PAIN WITHOUT SCIATICA: ICD-10-CM

## 2019-08-18 DIAGNOSIS — G89.4 CHRONIC PAIN DISORDER: ICD-10-CM

## 2019-08-18 DIAGNOSIS — F33.2 SEVERE EPISODE OF RECURRENT MAJOR DEPRESSIVE DISORDER, WITHOUT PSYCHOTIC FEATURES (H): ICD-10-CM

## 2019-08-18 DIAGNOSIS — M54.50 CHRONIC BILATERAL LOW BACK PAIN WITHOUT SCIATICA: ICD-10-CM

## 2019-08-18 DIAGNOSIS — R07.89 CHEST WALL PAIN, CHRONIC: Primary | ICD-10-CM

## 2019-08-18 DIAGNOSIS — G89.29 CHEST WALL PAIN, CHRONIC: Primary | ICD-10-CM

## 2019-08-19 ENCOUNTER — OFFICE VISIT (OUTPATIENT)
Dept: FAMILY MEDICINE | Facility: OTHER | Age: 64
End: 2019-08-19
Attending: FAMILY MEDICINE
Payer: MEDICARE

## 2019-08-19 VITALS
HEART RATE: 64 BPM | OXYGEN SATURATION: 98 % | DIASTOLIC BLOOD PRESSURE: 87 MMHG | SYSTOLIC BLOOD PRESSURE: 133 MMHG | TEMPERATURE: 98.4 F | RESPIRATION RATE: 20 BRPM

## 2019-08-19 DIAGNOSIS — G89.29 CHRONIC BILATERAL LOW BACK PAIN WITHOUT SCIATICA: ICD-10-CM

## 2019-08-19 DIAGNOSIS — G89.4 CHRONIC PAIN DISORDER: ICD-10-CM

## 2019-08-19 DIAGNOSIS — M54.50 CHRONIC BILATERAL LOW BACK PAIN WITHOUT SCIATICA: ICD-10-CM

## 2019-08-19 DIAGNOSIS — N18.2 CKD (CHRONIC KIDNEY DISEASE) STAGE 2, GFR 60-89 ML/MIN: Primary | ICD-10-CM

## 2019-08-19 DIAGNOSIS — R07.89 CHEST WALL PAIN, CHRONIC: ICD-10-CM

## 2019-08-19 DIAGNOSIS — G89.29 CHEST WALL PAIN, CHRONIC: ICD-10-CM

## 2019-08-19 LAB
ANION GAP SERPL CALCULATED.3IONS-SCNC: 11 MMOL/L (ref 3–14)
BUN SERPL-MCNC: 16 MG/DL (ref 7–25)
CALCIUM SERPL-MCNC: 9.9 MG/DL (ref 8.6–10.3)
CHLORIDE SERPL-SCNC: 106 MMOL/L (ref 98–107)
CO2 SERPL-SCNC: 23 MMOL/L (ref 21–31)
CREAT SERPL-MCNC: 1.06 MG/DL (ref 0.6–1.2)
GFR SERPL CREATININE-BSD FRML MDRD: ABNORMAL ML/MIN/{1.73_M2}
GLUCOSE SERPL-MCNC: 108 MG/DL (ref 70–105)
POTASSIUM SERPL-SCNC: 4 MMOL/L (ref 3.5–5.1)
SODIUM SERPL-SCNC: 140 MMOL/L (ref 134–144)

## 2019-08-19 PROCEDURE — G0463 HOSPITAL OUTPT CLINIC VISIT: HCPCS | Performed by: FAMILY MEDICINE

## 2019-08-19 PROCEDURE — 80048 BASIC METABOLIC PNL TOTAL CA: CPT | Mod: ZL | Performed by: FAMILY MEDICINE

## 2019-08-19 PROCEDURE — 99214 OFFICE O/P EST MOD 30 MIN: CPT | Performed by: FAMILY MEDICINE

## 2019-08-19 PROCEDURE — 36415 COLL VENOUS BLD VENIPUNCTURE: CPT | Mod: ZL | Performed by: FAMILY MEDICINE

## 2019-08-19 RX ORDER — OXYCODONE AND ACETAMINOPHEN 5; 325 MG/1; MG/1
2 TABLET ORAL 4 TIMES DAILY
Qty: 224 TABLET | Refills: 0 | Status: SHIPPED | OUTPATIENT
Start: 2019-08-19 | End: 2019-08-19

## 2019-08-19 RX ORDER — OXYCODONE AND ACETAMINOPHEN 5; 325 MG/1; MG/1
2 TABLET ORAL 4 TIMES DAILY
Qty: 240 TABLET | Refills: 0 | Status: SHIPPED | OUTPATIENT
Start: 2019-08-19 | End: 2019-09-18

## 2019-08-19 ASSESSMENT — PAIN SCALES - GENERAL: PAINLEVEL: SEVERE PAIN (6)

## 2019-08-19 NOTE — LETTER
My Depression Action Plan  Name: Ankita Day   Date of Birth 1955  Date: 8/19/2019    My doctor: Ramirez Cano   My clinic: [facility]  [address]  [facility] [address] [phone]          GREEN    ZONE   Good Control    What it looks like:     Things are going generally well. You have normal up s and down s. You may even feel depressed from time to time, but bad moods usually last less than a day.   What you need to do:  1. Continue to care for yourself (see self care plan)  2. Check your depression survival kit and update it as needed  3. Follow your physician s recommendations including any medication.  4. Do not stop taking medication unless you consult with your physician first.           YELLOW         ZONE Getting Worse    What it looks like:     Depression is starting to interfere with your life.     It may be hard to get out of bed; you may be starting to isolate yourself from others.    Symptoms of depression are starting to last most all day and this has happened for several days.     You may have suicidal thoughts but they are not constant.   What you need to do:     1. Call your care team, your response to treatment will improve if you keep your care team informed of your progress. Yellow periods are signs an adjustment may need to be made.     2. Continue your self-care, even if you have to fake it!    3. Talk to someone in your support network    4. Open up your depression survival kit           RED    ZONE Medical Alert - Get Help    What it looks like:     Depression is seriously interfering with your life.     You may experience these or other symptoms: You can t get out of bed most days, can t work or engage in other necessary activities, you have trouble taking care of basic hygiene, or basic responsibilities, thoughts of suicide or death that will not go away, self-injurious behavior.     What you need to do:  1. Call your care team and request  a same-day appointment. If they are not available (weekends or after hours) call your local crisis line, emergency room or 911.            Depression Self Care Plan / Survival Kit    Self-Care for Depression  Here s the deal. Your body and mind are really not as separate as most people think.  What you do and think affects how you feel and how you feel influences what you do and think. This means if you do things that people who feel good do, it will help you feel better.  Sometimes this is all it takes.  There is also a place for medication and therapy depending on how severe your depression is, so be sure to consult with your medical provider and/ or Behavioral Health Consultant if your symptoms are worsening or not improving.     In order to better manage my stress, I will:    Exercise  Get some form of exercise, every day. This will help reduce pain and release endorphins, the  feel good  chemicals in your brain. This is almost as good as taking antidepressants!  This is not the same as joining a gym and then never going! (they count on that by the way ) It can be as simple as just going for a walk or doing some gardening, anything that will get you moving.      Hygiene   Maintain good hygiene (Get out of bed in the morning, Make your bed, Brush your teeth, Take a shower, and Get dressed like you were going to work, even if you are unemployed).  If your clothes don't fit try to get ones that do.    Diet  I will strive to eat foods that are good for me, drink plenty of water, and avoid excessive sugar, caffeine, alcohol, and other mood-altering substances.  Some foods that are helpful in depression are: complex carbohydrates, B vitamins, flaxseed, fish or fish oil, fresh fruits and vegetables.    Psychotherapy  I agree to participate in Individual Therapy (if recommended).    Medication  If prescribed medications, I agree to take them.  Missing doses can result in serious side effects.  I understand that drinking  alcohol, or other illicit drug use, may cause potential side effects.  I will not stop my medication abruptly without first discussing it with my provider.    Staying Connected With Others  I will stay in touch with my friends, family members, and my primary care provider/team.    Use your imagination  Be creative.  We all have a creative side; it doesn t matter if it s oil painting, sand castles, or mud pies! This will also kick up the endorphins.    Witness Beauty  (AKA stop and smell the roses) Take a look outside, even in mid-winter. Notice colors, textures. Watch the squirrels and birds.     Service to others  Be of service to others.  There is always someone else in need.  By helping others we can  get out of ourselves  and remember the really important things.  This also provides opportunities for practicing all the other parts of the program.    Humor  Laugh and be silly!  Adjust your TV habits for less news and crime-drama and more comedy.    Control your stress  Try breathing deep, massage therapy, biofeedback, and meditation. Find time to relax each day.     My support system    Clinic Contact:  Phone number:    Contact 1:  Phone number:    Contact 2:  Phone number:    Anabaptism/:  Phone number:    Therapist:  Phone number:    Local crisis center:    Phone number:    Other community support:  Phone number:

## 2019-08-19 NOTE — PROGRESS NOTES
Nursing Notes:   Jamilah Berger, LPN  8/19/2019  9:12 AM  Signed  Pt presents to clinic today for medication management.      Medication Reconciliation: complete  Jamilah Berger LPN     SUBJECTIVE:  64 year old female with chronic pain and CAD presents for follow up on multiple issues.    Completed rhizotomy August 14. It seems effective thus far.    A good friend passed away recently.  He was a retired doctors who took good care of her when she was younger.  He helped her out when her parents were absentee in her life as they were alcoholics.  With all the stress is not feeling same as usual.  Has noted some chest discomfort, but not like her typical cardiac symptoms.  She felt dizzy this weekend and took several nitroglycerin pills.  Today she feels good.    Used to see psychiatry, but psychiatrist left, so I assumed clonazepam prescription.  Reduced clonazepam from 3 per day to 2.5 per day. Now mainly on clonazepam BID. Took TID this weekend with increase in stress.    Creatinine baseline is around 1.1, but was elevated to 2.0 at her preop a couple weeks ago.  She may have been drinking less fluids.  Otherwise she feels well hydrated.  Blood pressure has been low at times and was low the day of the preop.  She then track blood pressures at home and they have been around 130/80, so she is still taking the same dose of amlodipine.    Struggles to void at times. Not constipated.   Ultrasound a few years ago after elevated creatinine showed medical renal disease.  No hydronephrosis.    REVIEW OF SYSTEMS:    Pertinent items are noted in HPI.    Current Outpatient Medications   Medication Sig Dispense Refill     albuterol (PROAIR HFA/PROVENTIL HFA/VENTOLIN HFA) 108 (90 Base) MCG/ACT inhaler Inhale 2 puffs into the lungs every 6 hours 2 Inhaler 3     amLODIPine (NORVASC) 10 MG tablet Take 1 tablet (10 mg) by mouth daily 90 tablet 3     aspirin EC 81 MG EC tablet Take 81 mg by mouth daily with food       busPIRone  (BUSPAR) 10 MG tablet TAKE 1 TABLET TWICE A  tablet 1     clonazePAM (KLONOPIN) 1 MG tablet Take 1 tablet (1 mg) by mouth 3 times daily as needed for anxiety #75 per 30 days, #225 pills per 90 days 225 tablet 1     clopidogrel (PLAVIX) 75 MG tablet Take 1 tablet (75 mg) by mouth daily 90 tablet 3     cyclobenzaprine (FLEXERIL) 10 MG tablet Take 1 tablet (10 mg) by mouth 2 times daily as needed for muscle spasms 42 tablet 3     Diclofenac Sodium 1.5 % SOLN Apply 20 drops to each hand or 40 drops to each knee up to 4 times daily as needed for arthritis pain 450 mL 3     docusate sodium (COLACE) 50 MG capsule Take 50 mg by mouth daily       DULoxetine (CYMBALTA) 60 MG EC capsule Take 1 capsule (60 mg) by mouth daily 90 capsule 3     gabapentin (NEURONTIN) 600 MG tablet Take 1 tablet (600 mg) by mouth 3 times daily 270 tablet 2     lisinopril (PRINIVIL/ZESTRIL) 40 MG tablet Take 1 tablet (40 mg) by mouth daily 90 tablet 4     metoprolol tartrate (LOPRESSOR) 50 MG tablet Take 1 tablet (50 mg) by mouth 2 times daily 180 tablet 3     naloxone (NARCAN) 4 MG/0.1ML nasal spray Spray into one nostril for opioid reversal if unresponsive. May repeat every 2-3 minutes until patient responsive or EMS arrives 1 each 1     NITROSTAT 0.3 MG sublingual tablet PLACE 1 TABLET UNDER THE TONGUE EVERY 5 MINUTES AS NEEDED FOR CHEST PAIN 25 tablet 2     omeprazole (PRILOSEC) 20 MG DR capsule Take 1 capsule (20 mg) by mouth daily 90 capsule 3     ondansetron (ZOFRAN) 4 MG tablet Take 4 mg by mouth every 6 hours as needed for nausea       oxyCODONE-acetaminophen (PERCOCET) 5-325 MG tablet Take 2 tablets by mouth 4 times daily Max acetaminophen dose: 4000mg in 24 hrs 240 tablet 0     pravastatin (PRAVACHOL) 40 MG tablet Take 1 tablet (40 mg) by mouth At Bedtime 90 tablet 3     saccharomyces boulardii (FLORASTOR) 250 MG capsule Take 250 mg by mouth 2 times daily       VITAMIN D, CHOLECALCIFEROL, PO Take 5,000 Units by mouth daily        Allergies   Allergen Reactions     Atorvastatin Muscle Pain (Myalgia)     Liquid Adhesive Rash     Other reaction(s): Erythema  Silk and plastic gloves (long term attachment of adhesives)     Tiotropium Bromide [Tiotropium] Rash     Ezetimibe Muscle Pain (Myalgia)     Niacin      Other reaction(s): Flushing  flushing     Rosuvastatin Muscle Pain (Myalgia)     Other reaction(s): Myalgia     Latex Rash     No Clinical Screening - See Comments Rash, Blisters and Itching     Metals and plastic  Metals and plastics       Tape [Adhesive Tape] Rash       OBJECTIVE:  /87   Pulse 64   Temp 98.4  F (36.9  C) (Tympanic)   Resp 20   SpO2 98%     EXAM:  General Appearance: Alert. No acute distress  Chest/Respiratory Exam: Clear to auscultation bilaterally  Cardiovascular Exam: Regular rate and rhythm. S1, S2, no murmur, gallop, or rubs.  Extremities: No lower extremity edema.  Psychiatric: Normal affect and mentation    Results for orders placed or performed in visit on 08/19/19   Basic Metabolic Panel   Result Value Ref Range    Sodium 140 134 - 144 mmol/L    Potassium 4.0 3.5 - 5.1 mmol/L    Chloride 106 98 - 107 mmol/L    Carbon Dioxide 23 21 - 31 mmol/L    Anion Gap 11 3 - 14 mmol/L    Glucose 108 (H) 70 - 105 mg/dL    Urea Nitrogen 16 7 - 25 mg/dL    Creatinine 1.06 0.60 - 1.20 mg/dL    GFR Estimate Not Calculated >60 mL/min/[1.73_m2]    GFR Estimate If Black Not Calculated >60 mL/min/[1.73_m2]    Calcium 9.9 8.6 - 10.3 mg/dL        ASSESSMENT/PLAN:    ICD-10-CM    1. CKD (chronic kidney disease) stage 2, GFR 60-89 ml/min N18.2 Basic Metabolic Panel     Basic Metabolic Panel   2. Chronic pain disorder G89.4 oxyCODONE-acetaminophen (PERCOCET) 5-325 MG tablet     DISCONTINUED: oxyCODONE-acetaminophen (PERCOCET) 5-325 MG tablet   3. Chronic bilateral low back pain without sciatica M54.5 oxyCODONE-acetaminophen (PERCOCET) 5-325 MG tablet    G89.29 DISCONTINUED: oxyCODONE-acetaminophen (PERCOCET) 5-325 MG tablet    4. Chest wall pain, chronic R07.89 oxyCODONE-acetaminophen (PERCOCET) 5-325 MG tablet    G89.29 DISCONTINUED: oxyCODONE-acetaminophen (PERCOCET) 5-325 MG tablet     Rechecked BMP and creatinine has improved.  Continue same medications.    Early results with rhizotomy are positive.  Maintain current opioids, but we have discussed numerous times in the past indications for reduction.  However, at this time she is working on reducing clonazepam, which I feel is a greater risk to her than current opioid dosing.    Reviewed . Refilled chronic oxycodone dose of 10 mg four times daily with 5 mg pills, 240 pills per 30 days. Has limited function on lower doses and side effects on higher doses of opioids. Reliable in use.   Plan to change to 28 day refill plan next appt    Clonazepam due for refill next month, will try and reduce quantity. Has narcan with concurrent benzo and opioid use.    Greater than 50% of this 25 minute appointment spent on counseling   Ramirez Cano MD    This document was prepared using a combination of typing and voice generated software.  While every attempt was made for accuracy, spelling and grammatical errors may exist.

## 2019-08-19 NOTE — PATIENT INSTRUCTIONS
Refilled oxycodone, follow up in a month  Checking kidney test. If abnormal, then we will get another kidney ultrasound. If normal, no testing

## 2019-08-21 RX ORDER — DULOXETIN HYDROCHLORIDE 60 MG/1
CAPSULE, DELAYED RELEASE ORAL
Qty: 30 CAPSULE | Refills: 0 | Status: SHIPPED | OUTPATIENT
Start: 2019-08-21 | End: 2019-09-18

## 2019-08-21 NOTE — TELEPHONE ENCOUNTER
[facility] sent Rx request for the following:      DULOXETINE HCL DR CAPS 60MG  Sig: TAKE 1 CAPSULE DAILY  Last Prescription Date:   8/23/18  Last Fill Qty/Refills:         90, R-3    Last Office Visit:              8/7/19   Future Office visit:             Next 5 appointments (look out 90 days)    Sep 18, 2019 10:00 AM CDT  [facility] [address] [facility] [address] [phone]        Writer noted previous prescription was for Duloxetine EC caps. Request received for Duloxetine  Called and spoke with Arnel at [facility] retail pharmacy. He confirmed that these are the same medication.    Noted upcoming appointment. Prescription approved per [facility] Refill Protocol for 30 day limited refill, with note to pharmacy. Kathleen Puckett RN .............. 8/21/2019  3:22 PM

## 2019-08-28 DIAGNOSIS — R07.9 CHEST PAIN OF UNCERTAIN ETIOLOGY: Primary | ICD-10-CM

## 2019-08-28 DIAGNOSIS — K27.9 PEPTIC ULCER: ICD-10-CM

## 2019-09-03 NOTE — TELEPHONE ENCOUNTER
[id number] sent Rx request for the following:     omeprazole (PRILOSEC) 20 MG DR capsule  Sig: Take 1 capsule (20 mg) by mouth daily  Last Prescription:  omeprazole (PRILOSEC) 20 MG DR capsule 90 capsule 3 6/19/2019  --   Sig - Route: Take 1 capsule (20 mg) by mouth daily - Oral     [facility] - [place], [place] - [address] [place] [address]     Called and spoke to Patient after verifying last name and date of birth. Pt confirmed she wants the omeprazole to remain at mail order pharmacy. Refill request refused, with note to pharmacy. Unable to complete prescription refill per RN Medication Refill Policy. Kathleen Puckett RN .............. 9/3/2019  4:33 PM

## 2019-09-03 NOTE — TELEPHONE ENCOUNTER
[id number] sent Rx request for the following:     nitroGLYcerin (NITROSTAT) 0.3 MG sublingual tablet  Sig: PLACE 1 TABLET UNDER THE TONGUE EVERY 5 MINUTES AS NEEDED FOR CHEST PAIN  Last Prescription Date:   4/9/18  Last Fill Qty/Refills:         25, R-2 (at [facility])    Last Office Visit:              8/19/19  Future Office visit:             Next 5 appointments (look out 90 days)    Sep 18, 2019 10:00 AM CDT  [facility] Visit with [facility] MD  [facility] [address] [facility] [address]  [phone]   Nov 25, 2019 10:15 AM CST  Return Visit with JOSELYN Heller CNP  [facility]) [address]  [place] [address]  [phone]      Routing refill request to provider for review/approval because:  Nitrates Failed9/3 4:26 PM   Sublingual nitro order needs reviewIf refill exceeds 1 bottle per month, please forward request to provider.      Called and spoke to Patient after verifying last name and date of birth. Pt confirmed that she would like this sent to [facility].    Unable to complete prescription refill per RN Medication Refill Policy. Kathleen Puckett RN .............. 9/3/2019  4:35 PM

## 2019-09-04 RX ORDER — NITROGLYCERIN 0.3 MG/1
TABLET SUBLINGUAL
Qty: 25 TABLET | Refills: 11 | Status: SHIPPED | OUTPATIENT
Start: 2019-09-04 | End: 2020-01-14

## 2019-09-09 DIAGNOSIS — R07.9 CHEST PAIN OF UNCERTAIN ETIOLOGY: ICD-10-CM

## 2019-09-10 RX ORDER — NITROGLYCERIN 0.3 MG/1
TABLET SUBLINGUAL
Qty: 25 TABLET | Refills: 11 | OUTPATIENT
Start: 2019-09-10

## 2019-09-10 NOTE — TELEPHONE ENCOUNTER
[facility] sent Rx request for the following:      nitroGLYcerin (NITROSTAT) 0.3 MG sublingual tablet  Sig: PLACE 1 TABLET UNDER THE TONGUE EVERY 5 MINUTES AS NEEDED FOR CHEST PAIN    Last Prescription:  nitroGLYcerin (NITROSTAT) 0.3 MG sublingual tablet 25 tablet 11 9/4/2019  No   Sig: PLACE 1 TABLET UNDER THE TONGUE EVERY 5 MINUTES AS NEEDED FOR CHEST PAIN   Sent to pharmacy as: nitroGLYcerin (NITROSTAT) 0.3 MG sublingual tablet   Class: E-Prescribe   Order: [id number]   E-Prescribing Status: Receipt confirmed by pharmacy (9/4/2019 12:49 PM CDT)     [id number] - [place], [place] - [address]     Called and spoke to Patient after verifying last name and date of birth. Pt states she prefers that the nitroglycerin go to [facility] (local pharmacy), instead of [facility]. Refill request refused, with note to pharmacy. Unable to complete prescription refill per RN Medication Refill Policy. Kathleen Puckett RN .............. 9/10/2019  2:56 PM

## 2019-09-14 NOTE — TELEPHONE ENCOUNTER
Pt reported that he is leaving upon wife arrival. Writer paged KELI and discharge order was placed prior to call. Writer notified Pt about order. discharge order printed and reviewed with Pt . He verbalized  understanding. Sitting up in chair awaiting wife.   Stop Plavix 5 days prior to injection. Keep on aspirin 81 mg daily.   She should be on lisinopril, I do not see any interactions between her medications using our computer .

## 2019-09-18 ENCOUNTER — OFFICE VISIT (OUTPATIENT)
Dept: FAMILY MEDICINE | Facility: OTHER | Age: 64
End: 2019-09-18
Attending: FAMILY MEDICINE
Payer: MEDICARE

## 2019-09-18 VITALS
TEMPERATURE: 97.7 F | HEART RATE: 70 BPM | RESPIRATION RATE: 20 BRPM | DIASTOLIC BLOOD PRESSURE: 76 MMHG | SYSTOLIC BLOOD PRESSURE: 118 MMHG | OXYGEN SATURATION: 97 %

## 2019-09-18 DIAGNOSIS — G89.29 CHEST WALL PAIN, CHRONIC: ICD-10-CM

## 2019-09-18 DIAGNOSIS — F41.9 CHRONIC ANXIETY: ICD-10-CM

## 2019-09-18 DIAGNOSIS — F33.2 SEVERE EPISODE OF RECURRENT MAJOR DEPRESSIVE DISORDER, WITHOUT PSYCHOTIC FEATURES (H): ICD-10-CM

## 2019-09-18 DIAGNOSIS — Z79.899 CONTROLLED SUBSTANCE AGREEMENT SIGNED: ICD-10-CM

## 2019-09-18 DIAGNOSIS — M54.50 CHRONIC BILATERAL LOW BACK PAIN WITHOUT SCIATICA: ICD-10-CM

## 2019-09-18 DIAGNOSIS — G89.29 CHRONIC BILATERAL LOW BACK PAIN WITHOUT SCIATICA: ICD-10-CM

## 2019-09-18 DIAGNOSIS — R07.89 CHEST WALL PAIN, CHRONIC: ICD-10-CM

## 2019-09-18 DIAGNOSIS — G89.4 CHRONIC PAIN DISORDER: Primary | ICD-10-CM

## 2019-09-18 PROCEDURE — G0463 HOSPITAL OUTPT CLINIC VISIT: HCPCS

## 2019-09-18 PROCEDURE — 99214 OFFICE O/P EST MOD 30 MIN: CPT | Performed by: FAMILY MEDICINE

## 2019-09-18 RX ORDER — DULOXETIN HYDROCHLORIDE 60 MG/1
60 CAPSULE, DELAYED RELEASE ORAL 2 TIMES DAILY
Qty: 180 CAPSULE | Refills: 3 | Status: SHIPPED | OUTPATIENT
Start: 2019-09-18 | End: 2020-01-14

## 2019-09-18 RX ORDER — DULOXETIN HYDROCHLORIDE 60 MG/1
60 CAPSULE, DELAYED RELEASE ORAL 2 TIMES DAILY
Qty: 180 CAPSULE | Refills: 3 | Status: SHIPPED | OUTPATIENT
Start: 2019-09-18 | End: 2019-09-18

## 2019-09-18 RX ORDER — OXYCODONE AND ACETAMINOPHEN 5; 325 MG/1; MG/1
2 TABLET ORAL 4 TIMES DAILY
Qty: 224 TABLET | Refills: 0 | Status: SHIPPED | OUTPATIENT
Start: 2019-09-18 | End: 2019-10-16

## 2019-09-18 RX ORDER — CLONAZEPAM 1 MG/1
1 TABLET ORAL 3 TIMES DAILY PRN
Qty: 210 TABLET | Refills: 1 | Status: SHIPPED | OUTPATIENT
Start: 2019-09-25 | End: 2019-12-11

## 2019-09-18 ASSESSMENT — PATIENT HEALTH QUESTIONNAIRE - PHQ9
5. POOR APPETITE OR OVEREATING: NOT AT ALL
SUM OF ALL RESPONSES TO PHQ QUESTIONS 1-9: 1

## 2019-09-18 ASSESSMENT — ANXIETY QUESTIONNAIRES
5. BEING SO RESTLESS THAT IT IS HARD TO SIT STILL: NOT AT ALL
GAD7 TOTAL SCORE: 0
1. FEELING NERVOUS, ANXIOUS, OR ON EDGE: NOT AT ALL
2. NOT BEING ABLE TO STOP OR CONTROL WORRYING: NOT AT ALL
IF YOU CHECKED OFF ANY PROBLEMS ON THIS QUESTIONNAIRE, HOW DIFFICULT HAVE THESE PROBLEMS MADE IT FOR YOU TO DO YOUR WORK, TAKE CARE OF THINGS AT HOME, OR GET ALONG WITH OTHER PEOPLE: NOT DIFFICULT AT ALL
7. FEELING AFRAID AS IF SOMETHING AWFUL MIGHT HAPPEN: NOT AT ALL
3. WORRYING TOO MUCH ABOUT DIFFERENT THINGS: NOT AT ALL
6. BECOMING EASILY ANNOYED OR IRRITABLE: NOT AT ALL

## 2019-09-18 ASSESSMENT — PAIN SCALES - GENERAL: PAINLEVEL: MODERATE PAIN (5)

## 2019-09-18 NOTE — PATIENT INSTRUCTIONS
Increase Cymbalta to 60 mg twice daily  Reducing clonazepam to 70 max per month, 210 per 3 months  Refilled oxycodone for 28 days  Follow-up in 4 weeks

## 2019-09-18 NOTE — NURSING NOTE
Pt presents to clinic today for medication management.      Medication Reconciliation: complete  Jamliah Berger LPN

## 2019-09-18 NOTE — PROGRESS NOTES
Nursing Notes:   Jamilah Berger LPN  9/18/2019  9:51 AM  Signed  Pt presents to clinic today for medication management.      Medication Reconciliation: complete  Jamilah Berger LPN     SUBJECTIVE:  64 year old female with chronic pain and CAD presents for follow up on multiple issues.    Completed rhizotomy August 14. It seems effective thus far.    Used to see psychiatry, but psychiatrist left, so I assumed clonazepam prescription.  Reduced clonazepam from 3 per day to 2.5 per day. Now mainly on clonazepam BID. She did not count pills prior to appointment, so not sure how many she has left. Due for refill soon.    Still has daily headaches.  Has tried multiple medications including Topamax.  Saw neurology and Botox was recommended, but not approved by her insurance.  Uses chronic oxycodone to treat headaches, which is not ideal.    REVIEW OF SYSTEMS:    General: Denies general constitutional problems  Cardiovascular: Denies problems  Respiratory: Denies problems      Current Outpatient Medications   Medication Sig Dispense Refill     albuterol (PROAIR HFA/PROVENTIL HFA/VENTOLIN HFA) 108 (90 Base) MCG/ACT inhaler Inhale 2 puffs into the lungs every 6 hours 2 Inhaler 3     amLODIPine (NORVASC) 10 MG tablet Take 1 tablet (10 mg) by mouth daily 90 tablet 3     aspirin EC 81 MG EC tablet Take 81 mg by mouth daily with food       busPIRone (BUSPAR) 10 MG tablet TAKE 1 TABLET TWICE A  tablet 1     clonazePAM (KLONOPIN) 1 MG tablet Take 1 tablet (1 mg) by mouth 3 times daily as needed for anxiety #75 per 30 days, #225 pills per 90 days 225 tablet 1     clopidogrel (PLAVIX) 75 MG tablet Take 1 tablet (75 mg) by mouth daily 90 tablet 3     cyclobenzaprine (FLEXERIL) 10 MG tablet Take 1 tablet (10 mg) by mouth 2 times daily as needed for muscle spasms 42 tablet 3     Diclofenac Sodium 1.5 % SOLN Apply 20 drops to each hand or 40 drops to each knee up to 4 times daily as needed for arthritis pain 450 mL 3      docusate sodium (COLACE) 50 MG capsule Take 50 mg by mouth daily       DULoxetine (CYMBALTA) 60 MG capsule TAKE 1 CAPSULE DAILY 30 capsule 0     gabapentin (NEURONTIN) 600 MG tablet Take 1 tablet (600 mg) by mouth 3 times daily 270 tablet 2     lisinopril (PRINIVIL/ZESTRIL) 40 MG tablet Take 1 tablet (40 mg) by mouth daily 90 tablet 4     metoprolol tartrate (LOPRESSOR) 50 MG tablet Take 1 tablet (50 mg) by mouth 2 times daily 180 tablet 3     naloxone (NARCAN) 4 MG/0.1ML nasal spray Spray into one nostril for opioid reversal if unresponsive. May repeat every 2-3 minutes until patient responsive or EMS arrives 1 each 1     nitroGLYcerin (NITROSTAT) 0.3 MG sublingual tablet PLACE 1 TABLET UNDER THE TONGUE EVERY 5 MINUTES AS NEEDED FOR CHEST PAIN 25 tablet 11     omeprazole (PRILOSEC) 20 MG DR capsule Take 1 capsule (20 mg) by mouth daily 90 capsule 3     ondansetron (ZOFRAN) 4 MG tablet Take 4 mg by mouth every 6 hours as needed for nausea       oxyCODONE-acetaminophen (PERCOCET) 5-325 MG tablet Take 2 tablets by mouth 4 times daily Max acetaminophen dose: 4000mg in 24 hrs 240 tablet 0     pravastatin (PRAVACHOL) 40 MG tablet Take 1 tablet (40 mg) by mouth At Bedtime 90 tablet 3     saccharomyces boulardii (FLORASTOR) 250 MG capsule Take 250 mg by mouth 2 times daily       VITAMIN D, CHOLECALCIFEROL, PO Take 5,000 Units by mouth daily       Allergies   Allergen Reactions     Atorvastatin Muscle Pain (Myalgia)     Liquid Adhesive Rash     Other reaction(s): Erythema  Silk and plastic gloves (long term attachment of adhesives)     Tiotropium Bromide [Tiotropium] Rash     Ezetimibe Muscle Pain (Myalgia)     Niacin      Other reaction(s): Flushing  flushing     Rosuvastatin Muscle Pain (Myalgia)     Other reaction(s): Myalgia     Latex Rash     No Clinical Screening - See Comments Rash, Blisters and Itching     Metals and plastic  Metals and plastics       Tape [Adhesive Tape] Rash       OBJECTIVE:  /76   Pulse 70    Temp 97.7  F (36.5  C) (Tympanic)   Resp 20   SpO2 97%     EXAM:  General Appearance: Alert. No acute distress  Chest/Respiratory Exam: Clear to auscultation bilaterally  Cardiovascular Exam: Regular rate and rhythm. S1, S2, no murmur, gallop, or rubs.  Extremities: No lower extremity edema.  Psychiatric: Normal affect and mentation        ASSESSMENT/PLAN:    ICD-10-CM    1. Chronic pain disorder G89.4 oxyCODONE-acetaminophen (PERCOCET) 5-325 MG tablet     DULoxetine (CYMBALTA) 60 MG capsule     DISCONTINUED: DULoxetine (CYMBALTA) 60 MG capsule   2. Chronic bilateral low back pain without sciatica M54.5 oxyCODONE-acetaminophen (PERCOCET) 5-325 MG tablet    G89.29 DULoxetine (CYMBALTA) 60 MG capsule     DISCONTINUED: DULoxetine (CYMBALTA) 60 MG capsule   3. Chest wall pain, chronic R07.89 oxyCODONE-acetaminophen (PERCOCET) 5-325 MG tablet    G89.29 DULoxetine (CYMBALTA) 60 MG capsule     DISCONTINUED: DULoxetine (CYMBALTA) 60 MG capsule   4. Chronic anxiety F41.9 clonazePAM (KLONOPIN) 1 MG tablet     DULoxetine (CYMBALTA) 60 MG capsule     DISCONTINUED: DULoxetine (CYMBALTA) 60 MG capsule   5. Severe episode of recurrent major depressive disorder, without psychotic features (H) F33.2 DULoxetine (CYMBALTA) 60 MG capsule     DISCONTINUED: DULoxetine (CYMBALTA) 60 MG capsule   6. Controlled substance agreement signed 9/18/19 Z79.899 oxyCODONE-acetaminophen (PERCOCET) 5-325 MG tablet     clonazePAM (KLONOPIN) 1 MG tablet     Due for a new controlled substance agreement, which is completed today.  Urine drug monitoring as expected July 17, 2019.   reviewed.  She is due for refills.  Refilled chronic oxycodone dose of 10 mg 4 times daily for 60 morphine milliequivalents daily. Uses 8 of 5 mg pills for 224 over 28 days.  Follow up in 28 days    We discussed potential options for pain as well as her headaches.  She is to be on a higher dose of Cymbalta.  However her psychiatrist changed her to Trintellix.  We changed  back either due to cost or because of side effects.  She does not recall problems with the higher dose of Cymbalta.  Is willing to try increasing Cymbalta to 60 mg twice daily.  Ideally this will help with her anxiety as well as pain.    Refilled clonazepam.  Plan is to reduce from 75 pills/month to 70/month.  She would then be down to 210 every 3 months.  Next time she is due we would reduce by at least 15 pills over the next 3 months.    Has narcan with concurrent benzo and opioid use.    Greater than 50% of this 26 minute appointment spent on counseling   Ramirez Cano MD    This document was prepared using a combination of typing and voice generated software.  While every attempt was made for accuracy, spelling and grammatical errors may exist.

## 2019-09-19 ASSESSMENT — ANXIETY QUESTIONNAIRES: GAD7 TOTAL SCORE: 0

## 2019-10-02 ENCOUNTER — HOSPITAL ENCOUNTER (EMERGENCY)
Facility: OTHER | Age: 64
Discharge: ANOTHER HOSPITAL | End: 2019-10-02
Attending: PHYSICIAN ASSISTANT | Admitting: FAMILY MEDICINE
Payer: MEDICARE

## 2019-10-02 ENCOUNTER — APPOINTMENT (OUTPATIENT)
Dept: GENERAL RADIOLOGY | Facility: OTHER | Age: 64
End: 2019-10-02
Attending: FAMILY MEDICINE
Payer: MEDICARE

## 2019-10-02 VITALS
HEIGHT: 66 IN | RESPIRATION RATE: 13 BRPM | DIASTOLIC BLOOD PRESSURE: 72 MMHG | OXYGEN SATURATION: 94 % | HEART RATE: 70 BPM | TEMPERATURE: 98.1 F | WEIGHT: 180 LBS | SYSTOLIC BLOOD PRESSURE: 108 MMHG | BODY MASS INDEX: 28.93 KG/M2

## 2019-10-02 DIAGNOSIS — R07.9 CHEST PAIN, UNSPECIFIED TYPE: ICD-10-CM

## 2019-10-02 DIAGNOSIS — I20.0 UNSTABLE ANGINA (H): ICD-10-CM

## 2019-10-02 LAB
ALBUMIN SERPL-MCNC: 4.3 G/DL (ref 3.5–5.7)
ALP SERPL-CCNC: 63 U/L (ref 34–104)
ALT SERPL W P-5'-P-CCNC: 7 U/L (ref 7–52)
ANION GAP SERPL CALCULATED.3IONS-SCNC: 13 MMOL/L (ref 3–14)
APTT PPP: 29 SEC (ref 22–37)
AST SERPL W P-5'-P-CCNC: 15 U/L (ref 13–39)
BASOPHILS # BLD AUTO: 0.1 10E9/L (ref 0–0.2)
BASOPHILS NFR BLD AUTO: 1.3 %
BILIRUB SERPL-MCNC: 0.2 MG/DL (ref 0.3–1)
BUN SERPL-MCNC: 11 MG/DL (ref 7–25)
CALCIUM SERPL-MCNC: 9 MG/DL (ref 8.6–10.3)
CHLORIDE SERPL-SCNC: 107 MMOL/L (ref 98–107)
CO2 SERPL-SCNC: 21 MMOL/L (ref 21–31)
CREAT SERPL-MCNC: 0.86 MG/DL (ref 0.6–1.2)
DIFFERENTIAL METHOD BLD: NORMAL
EOSINOPHIL # BLD AUTO: 0.2 10E9/L (ref 0–0.7)
EOSINOPHIL NFR BLD AUTO: 3.9 %
ERYTHROCYTE [DISTWIDTH] IN BLOOD BY AUTOMATED COUNT: 13.9 % (ref 10–15)
GFR SERPL CREATININE-BSD FRML MDRD: 66 ML/MIN/{1.73_M2}
GLUCOSE SERPL-MCNC: 146 MG/DL (ref 70–105)
HCT VFR BLD AUTO: 36.1 % (ref 35–47)
HGB BLD-MCNC: 11.9 G/DL (ref 11.7–15.7)
IMM GRANULOCYTES # BLD: 0 10E9/L (ref 0–0.4)
IMM GRANULOCYTES NFR BLD: 0.2 %
INR PPP: 0.85 (ref 0–1.3)
LYMPHOCYTES # BLD AUTO: 2.5 10E9/L (ref 0.8–5.3)
LYMPHOCYTES NFR BLD AUTO: 44.7 %
MAGNESIUM SERPL-MCNC: 2.1 MG/DL (ref 1.9–2.7)
MCH RBC QN AUTO: 30.1 PG (ref 26.5–33)
MCHC RBC AUTO-ENTMCNC: 33 G/DL (ref 31.5–36.5)
MCV RBC AUTO: 91 FL (ref 78–100)
MONOCYTES # BLD AUTO: 0.5 10E9/L (ref 0–1.3)
MONOCYTES NFR BLD AUTO: 8.2 %
NEUTROPHILS # BLD AUTO: 2.3 10E9/L (ref 1.6–8.3)
NEUTROPHILS NFR BLD AUTO: 41.7 %
NT-PROBNP SERPL-MCNC: 151 PG/ML (ref 0–100)
PLATELET # BLD AUTO: 250 10E9/L (ref 150–450)
POTASSIUM SERPL-SCNC: 3.3 MMOL/L (ref 3.5–5.1)
PROT SERPL-MCNC: 7.1 G/DL (ref 6.4–8.9)
RBC # BLD AUTO: 3.96 10E12/L (ref 3.8–5.2)
SODIUM SERPL-SCNC: 141 MMOL/L (ref 134–144)
TROPONIN I SERPL-MCNC: 3 PG/ML
WBC # BLD AUTO: 5.6 10E9/L (ref 4–11)

## 2019-10-02 PROCEDURE — 85730 THROMBOPLASTIN TIME PARTIAL: CPT | Performed by: FAMILY MEDICINE

## 2019-10-02 PROCEDURE — 96368 THER/DIAG CONCURRENT INF: CPT | Performed by: FAMILY MEDICINE

## 2019-10-02 PROCEDURE — 93010 ELECTROCARDIOGRAM REPORT: CPT | Performed by: INTERNAL MEDICINE

## 2019-10-02 PROCEDURE — 96375 TX/PRO/DX INJ NEW DRUG ADDON: CPT | Performed by: FAMILY MEDICINE

## 2019-10-02 PROCEDURE — 85025 COMPLETE CBC W/AUTO DIFF WBC: CPT | Performed by: FAMILY MEDICINE

## 2019-10-02 PROCEDURE — 96365 THER/PROPH/DIAG IV INF INIT: CPT | Performed by: FAMILY MEDICINE

## 2019-10-02 PROCEDURE — 36415 COLL VENOUS BLD VENIPUNCTURE: CPT | Performed by: FAMILY MEDICINE

## 2019-10-02 PROCEDURE — 83880 ASSAY OF NATRIURETIC PEPTIDE: CPT | Performed by: FAMILY MEDICINE

## 2019-10-02 PROCEDURE — 80053 COMPREHEN METABOLIC PANEL: CPT | Performed by: FAMILY MEDICINE

## 2019-10-02 PROCEDURE — 99291 CRITICAL CARE FIRST HOUR: CPT | Mod: Z6 | Performed by: FAMILY MEDICINE

## 2019-10-02 PROCEDURE — 96366 THER/PROPH/DIAG IV INF ADDON: CPT | Performed by: FAMILY MEDICINE

## 2019-10-02 PROCEDURE — 71045 X-RAY EXAM CHEST 1 VIEW: CPT

## 2019-10-02 PROCEDURE — 85610 PROTHROMBIN TIME: CPT | Performed by: FAMILY MEDICINE

## 2019-10-02 PROCEDURE — 25800030 ZZH RX IP 258 OP 636: Performed by: FAMILY MEDICINE

## 2019-10-02 PROCEDURE — 25000128 H RX IP 250 OP 636: Performed by: FAMILY MEDICINE

## 2019-10-02 PROCEDURE — 84484 ASSAY OF TROPONIN QUANT: CPT | Performed by: FAMILY MEDICINE

## 2019-10-02 PROCEDURE — 99291 CRITICAL CARE FIRST HOUR: CPT | Mod: 25 | Performed by: FAMILY MEDICINE

## 2019-10-02 PROCEDURE — 83735 ASSAY OF MAGNESIUM: CPT | Performed by: FAMILY MEDICINE

## 2019-10-02 PROCEDURE — 25000128 H RX IP 250 OP 636: Performed by: PHYSICIAN ASSISTANT

## 2019-10-02 PROCEDURE — 93005 ELECTROCARDIOGRAM TRACING: CPT | Performed by: FAMILY MEDICINE

## 2019-10-02 RX ORDER — MORPHINE SULFATE 2 MG/ML
2 INJECTION, SOLUTION INTRAMUSCULAR; INTRAVENOUS
Status: COMPLETED | OUTPATIENT
Start: 2019-10-02 | End: 2019-10-02

## 2019-10-02 RX ORDER — ASPIRIN 81 MG/1
324 TABLET, CHEWABLE ORAL ONCE
Status: DISCONTINUED | OUTPATIENT
Start: 2019-10-02 | End: 2019-10-03 | Stop reason: HOSPADM

## 2019-10-02 RX ORDER — HEPARIN SODIUM 5000 [USP'U]/.5ML
60 INJECTION, SOLUTION INTRAVENOUS; SUBCUTANEOUS ONCE
Status: COMPLETED | OUTPATIENT
Start: 2019-10-02 | End: 2019-10-02

## 2019-10-02 RX ORDER — ONDANSETRON 2 MG/ML
4 INJECTION INTRAMUSCULAR; INTRAVENOUS EVERY 30 MIN PRN
Status: DISCONTINUED | OUTPATIENT
Start: 2019-10-02 | End: 2019-10-03 | Stop reason: HOSPADM

## 2019-10-02 RX ORDER — MORPHINE SULFATE 2 MG/ML
2 INJECTION, SOLUTION INTRAMUSCULAR; INTRAVENOUS
Status: DISCONTINUED | OUTPATIENT
Start: 2019-10-02 | End: 2019-10-03 | Stop reason: HOSPADM

## 2019-10-02 RX ORDER — NITROGLYCERIN 10 MG/100ML
.07-2 INJECTION INTRAVENOUS CONTINUOUS
Status: DISCONTINUED | OUTPATIENT
Start: 2019-10-02 | End: 2019-10-03 | Stop reason: HOSPADM

## 2019-10-02 RX ORDER — SODIUM CHLORIDE 9 MG/ML
INJECTION, SOLUTION INTRAVENOUS CONTINUOUS
Status: DISCONTINUED | OUTPATIENT
Start: 2019-10-02 | End: 2019-10-03 | Stop reason: HOSPADM

## 2019-10-02 RX ORDER — ONDANSETRON 2 MG/ML
4 INJECTION INTRAMUSCULAR; INTRAVENOUS ONCE
Status: COMPLETED | OUTPATIENT
Start: 2019-10-02 | End: 2019-10-02

## 2019-10-02 RX ORDER — HEPARIN SODIUM 10000 [USP'U]/100ML
0-3500 INJECTION, SOLUTION INTRAVENOUS CONTINUOUS
Status: DISCONTINUED | OUTPATIENT
Start: 2019-10-02 | End: 2019-10-03 | Stop reason: HOSPADM

## 2019-10-02 RX ORDER — SODIUM CHLORIDE 9 MG/ML
1000 INJECTION, SOLUTION INTRAVENOUS CONTINUOUS
Status: DISCONTINUED | OUTPATIENT
Start: 2019-10-02 | End: 2019-10-03 | Stop reason: HOSPADM

## 2019-10-02 RX ADMIN — SODIUM CHLORIDE 1000 ML: 9 INJECTION, SOLUTION INTRAVENOUS at 20:37

## 2019-10-02 RX ADMIN — MORPHINE SULFATE 2 MG: 2 INJECTION, SOLUTION INTRAMUSCULAR; INTRAVENOUS at 21:32

## 2019-10-02 RX ADMIN — ONDANSETRON HYDROCHLORIDE 4 MG: 2 SOLUTION INTRAMUSCULAR; INTRAVENOUS at 21:56

## 2019-10-02 RX ADMIN — HEPARIN SODIUM 1000 UNITS/HR: 10000 INJECTION, SOLUTION INTRAVENOUS at 21:39

## 2019-10-02 RX ADMIN — NITROGLYCERIN 0.07 MCG/KG/MIN: 10 INJECTION INTRAVENOUS at 20:38

## 2019-10-02 RX ADMIN — HEPARIN SODIUM 4900 UNITS: 10000 INJECTION, SOLUTION INTRAVENOUS; SUBCUTANEOUS at 21:45

## 2019-10-02 RX ADMIN — SODIUM CHLORIDE: 9 INJECTION, SOLUTION INTRAVENOUS at 21:55

## 2019-10-02 ASSESSMENT — ENCOUNTER SYMPTOMS
WEAKNESS: 0
VOMITING: 0
PALPITATIONS: 0
CHILLS: 0
COUGH: 0
LIGHT-HEADEDNESS: 0
NAUSEA: 0
EYE REDNESS: 0
CONFUSION: 0
TROUBLE SWALLOWING: 0
SORE THROAT: 0
SHORTNESS OF BREATH: 1
DIARRHEA: 0
BRUISES/BLEEDS EASILY: 1
NECK PAIN: 0
FATIGUE: 1
NUMBNESS: 1
NECK STIFFNESS: 0
FEVER: 0
ABDOMINAL PAIN: 0
BACK PAIN: 0
HEADACHES: 0
DYSURIA: 0

## 2019-10-02 ASSESSMENT — MIFFLIN-ST. JEOR: SCORE: 1378.22

## 2019-10-03 ENCOUNTER — TRANSFERRED RECORDS (OUTPATIENT)
Dept: HEALTH INFORMATION MANAGEMENT | Facility: OTHER | Age: 64
End: 2019-10-03

## 2019-10-03 NOTE — ED TRIAGE NOTES
"Patient states she is having mid-sternal chest pain 7/10 that has been steady for the past 30-45 minutes.  She has been SOB and the pain feels like \"I'm going out\".  She states she also has left arm numbness and numbness in her feet.  She states it has been intermittent for the past 3 days and relieves on its own.  She took 4 nitroglycerin prior to EMS arriving.  EMS did give her 324mg aspirin.    Patient has significant heart history with 17 stents and a 3xCABG.  "

## 2019-10-03 NOTE — ED NOTES
"Titrating nitroglycerin drip as tolerated.  Patient is still at 6/10, but does state that the \"stabbing pain is gone, now it is just steady 6/10\".  No c/o nausea, but does c/o SOB.  She was dipping to 88-89%, placed on O2 as needed.  She does c/o general headache (which she states she normally gets from the nitroglycerin) rating 4/10.  BP's are low 100's systolically.  Will continue to titrate nitroglycerin as tolerated.   "

## 2019-10-03 NOTE — ED PROVIDER NOTES
History     Chief Complaint   Patient presents with     Chest Pain     HPI  Ankita Day is a 64 year old female who is to the emergency room complaining of Chest pain. Patient reports the pain began 3 days ago and has been occurring intermittently however tonight approximately 45 minutes prior to arrival it became quite severe. The patient's chest pain has been central substernal area. Pain described as sharp, 10 out of 10 for severity at worst and presently 7 out of 10 for severity. The chest pain does not radiate but she does complain of numbness and tingling in the left arm as well as numbness and tingling in both feet. Last episode of pain occurred 45 minutes ago and lasted present. Patient has associated mild shortness of breath. Denies associated fever, chills, URI type symptoms, sore throat, difficulty swallowing, cough, lower extremity pain or swelling, palpitations, abdominal pain, nausea, vomiting, diarrhea or dysuria. Pain is exacerbated by nothing and relieved by nitroglycerin. Patient took nitroglycerin x4 of her own supply and then was given aspirin 325 mg by EMS prior to arrival.  She has a history of similar pain and does have a history of coronary artery disease, status post MI, status post stent placement x7. Patient denies other complaints.    Cardiologist:  At [facility]    Allergies:  Allergies   Allergen Reactions     Atorvastatin Muscle Pain (Myalgia)     Liquid Adhesive Rash     Other reaction(s): Erythema  Silk and plastic gloves (long term attachment of adhesives)     Tiotropium Bromide [Tiotropium] Rash     Ezetimibe Muscle Pain (Myalgia)     Niacin      Other reaction(s): Flushing  flushing     Rosuvastatin Muscle Pain (Myalgia)     Other reaction(s): Myalgia     Latex Rash     No Clinical Screening - See Comments Rash, Blisters and Itching     Metals and plastic  Metals and plastics       Tape [Adhesive Tape] Rash       Problem List:    Patient Active Problem List    Diagnosis  Date Noted     Chronic anxiety 01/22/2018     Priority: Medium     Collagenous colitis 01/22/2018     Priority: Medium     Overview:   Possible Dx 2007       Major depressive disorder, recurrent episode, severe (H) 01/22/2018     Priority: Medium     Essential hypertension 01/22/2018     Priority: Medium     Mixed hyperlipidemia 01/22/2018     Priority: Medium     Osteoarthritis 01/22/2018     Priority: Medium     Panic attack 01/22/2018     Priority: Medium     Status post coronary angiogram 09/15/2017     Priority: Medium     History of tobacco abuse 08/10/2017     Priority: Medium     Presence of stent in coronary artery in patient with coronary artery disease 08/10/2017     Priority: Medium     History of coronary artery bypass graft x 3 08/10/2017     Priority: Medium     Noncompliance with CPAP treatment 10/21/2016     Priority: Medium     Intractable chronic common migraine without aura 10/21/2016     Priority: Medium     H/O multiple concussions 10/21/2016     Priority: Medium     History of Clostridium difficile 08/19/2016     Priority: Medium     Iron deficiency anemia 07/26/2016     Priority: Medium     CKD (chronic kidney disease) stage 2, GFR 60-89 ml/min 12/09/2015     Priority: Medium     Chest wall pain, chronic 11/04/2015     Priority: Medium     Controlled substance agreement signed 9/18/19 01/28/2014     Priority: Medium     Overview:        Central sleep apnea 10/14/2013     Priority: Medium     Myofascial pain 10/14/2013     Priority: Medium     Vitamin D deficiency 05/06/2013     Priority: Medium     Subclavian artery stenosis, left (H) 03/31/2013     Priority: Medium     Overview:   S/p prox left SCA stent 4/1/2013       Lumbar facet arthropathy 01/02/2013     Priority: Medium     Nonspecific abnormal results of pulmonary system function study 12/21/2011     Priority: Medium     Slow transit constipation 11/29/2011     Priority: Medium     Gastric ulcer with hemorrhage 10/03/2011     Priority:  Medium     Family history of malignant neoplasm of gastrointestinal tract 09/26/2011     Priority: Medium     Nodular degeneration of cornea 09/26/2011     Priority: Medium     Bilateral low back pain without sciatica 05/31/2011     Priority: Medium     Chronic pain disorder 12/01/2010     Priority: Medium     COPD (chronic obstructive pulmonary disease) (H) 06/15/2007     Priority: Medium     Overview:   Low DLCO, normal spirometry on 12/15/11       Peptic ulcer 06/15/2007     Priority: Medium     Postsurgical aortocoronary bypass status 02/12/2003     Priority: Medium     Coronary atherosclerosis 09/12/2002     Priority: Medium     Overview:   Multiple Angigrams prior to 2007. Also:  6/5/2007: Angiogram: TAXUS DELONTE to ostial circumflux, instent restenosis  5/23/2008: Left Main 30% diseased, LAD stents patent, Left circ stent patent, Chronic occluded right internal mammary artery graft to distal RCA, native RCA has 30% stenosis; NO intevention  9/19/2012 CT Angiogram: Patent LIMA to LAD, patent LAD stents, moderate proximal circumflex disease, patent RCA , occluded right internal mammary artery graft to distal RCA.  9/20/2012: Angiogram: Stent to Left Main, ostial LAD, and PTCA of ostial left circumflex          Past Medical History:    Past Medical History:   Diagnosis Date     Acute ischemic heart disease (H)      Acute myocardial infarction (H)      Anxiety disorder      Atherosclerotic heart disease of native coronary artery without angina pectoris      Bilateral carpal tunnel syndrome      Cervicalgia      Chest pain      Chronic gastric ulcer without hemorrhage or perforation      Chronic ischemic heart disease      Chronic obstructive pulmonary disease (H)      Chronic or unspecified gastric ulcer with hemorrhage      Chronic pain syndrome      Constipation      Coronary angioplasty status      Coronary angioplasty status      Coronary angioplasty status      Dorsalgia      Encounter for other administrative  examinations      Encounter for screening for cardiovascular disorders      Enterocolitis due to Clostridium difficile      Essential (primary) hypertension      Hyperlipidemia      Major depressive disorder, single episode      Migraine without status migrainosus, not intractable      Nodular corneal degeneration      Noninfective gastroenteritis and colitis      Noninfective gastroenteritis and colitis      Osteoarthritis      Other chest pain      Pain in knee      Pain in right shoulder      Panic disorder without agoraphobia      Peptic ulcer without hemorrhage or perforation      Peripheral vascular disease (H)      Personal history of diseases of the blood and blood-forming organs and certain disorders involving the immune mechanism (CODE)      Personal history of nicotine dependence      Personal history of other medical treatment (CODE)      Presence of aortocoronary bypass graft      Primary central sleep apnea      Sepsis due to Escherichia coli (E. coli) (H)      Stricture of artery (H)      Thoracic, thoracolumbar and lumbosacral intervertebral disc disorder      Uncomplicated opioid abuse (H)      Vitamin D deficiency        Past Surgical History:    Past Surgical History:   Procedure Laterality Date     ANGIOPLASTY      9/12/02,with triple stenting     APPENDECTOMY OPEN      No Comments Provided     ARTHROSCOPY KNEE      left     ARTHROSCOPY SHOULDER      right     BYPASS GRAFT ARTERY CORONARY      12/13/02,Triple bypass, left internal mammary  to LAD, right internal mammary to right coronary artery, saphenous to obtuse marginal of the left circumflex.     COLONOSCOPY      2011,Dr Bowman benign polyps     COLONOSCOPY  10/03/2011    2011,benign polyps, Dr. Bowman     COLONOSCOPY  08/08/2016 8/8/16,normal, Dr Bowman     ELBOW SURGERY      baby,birth malachi removed from right arm     EMBOLECTOMY UPPER EXTREMITY  04/02/2013    brachial artery pseudoaneurysm after stenting     ESOPHAGOSCOPY, GASTROSCOPY,  DUODENOSCOPY (EGD), COMBINED      ,EGD Dr Bowman with pyloric ulcer     ESOPHAGOSCOPY, GASTROSCOPY, DUODENOSCOPY (EGD), COMBINED      ,pyloric ulcer, Dr. Bowman     ESOPHAGOSCOPY, GASTROSCOPY, DUODENOSCOPY (EGD), COMBINED      16,mild gastritis, Dr Bowman     ESOPHAGOSCOPY, GASTROSCOPY, DUODENOSCOPY (EGD), COMBINED      2017,Dr Bowman. Antral ulcer     ESOPHAGOSCOPY, GASTROSCOPY, DUODENOSCOPY (EGD), COMBINED  2018    Dr Bowman, healed ulcer     HYSTERECTOMY TOTAL ABDOMINAL      age 21     LAPAROSCOPIC CHOLECYSTECTOMY      2006     OSTEOTOMY FEMUR DISTAL      x3, right knee     OSTEOTOMY FEMUR DISTAL      2000,left knee  ligament surgery     OTHER SURGICAL HISTORY      1/10/2003,,PTCA     OTHER SURGICAL HISTORY      2012,,PTCA,DELONTE in LAD and left main     OTHER SURGICAL HISTORY      2013,,PTCA,L subclavian stenosis     SALPINGO-OOPHORECTOMY BILATERAL      age 27,Bilateral salpingo-oophorectomy     TONSILLECTOMY, ADENOIDECTOMY, COMBINED      childhood       Family History:    Family History   Problem Relation Age of Onset     Heart Disease Father         Heart Disease,Heart condition/Significant for atherosclerotic cardiovascular disease, but non premature.     Colon Cancer Father         Cancer-colon, of colon cancer     Cancer Father         Cancer,mets to liver, secondary to colon cancer     Heart Disease Mother         Heart Disease     Cancer Other         Cancer,Multiple Myeloma     Heart Disease Other         Heart Disease,Ischemic Heart Disease     Colon Cancer Other         Cancer-colon,Malignant neoplasms     Cancer Sister         Cancer,multiple myeloma     Other - See Comments Son         gallstones       Social History:  Marital Status:   [2]  Social History     Tobacco Use     Smoking status: Former Smoker     Packs/day: 0.25     Years: 35.00     Pack years: 8.75     Types: Cigarettes     Last attempt to quit: 2/15/2017     Years since quittin.6      Smokeless tobacco: Never Used   Substance Use Topics     Alcohol use: Yes     Alcohol/week: 0.0 standard drinks     Comment: Alcoholic Drinks/day: rare     Drug use: No     Comment: Drug use: No        Medications:    albuterol (PROAIR HFA/PROVENTIL HFA/VENTOLIN HFA) 108 (90 Base) MCG/ACT inhaler  amLODIPine (NORVASC) 10 MG tablet  aspirin EC 81 MG EC tablet  busPIRone (BUSPAR) 10 MG tablet  clonazePAM (KLONOPIN) 1 MG tablet  clopidogrel (PLAVIX) 75 MG tablet  cyclobenzaprine (FLEXERIL) 10 MG tablet  docusate sodium (COLACE) 50 MG capsule  DULoxetine (CYMBALTA) 60 MG capsule  gabapentin (NEURONTIN) 600 MG tablet  lisinopril (PRINIVIL/ZESTRIL) 40 MG tablet  metoprolol tartrate (LOPRESSOR) 50 MG tablet  nitroGLYcerin (NITROSTAT) 0.3 MG sublingual tablet  omeprazole (PRILOSEC) 20 MG DR capsule  oxyCODONE-acetaminophen (PERCOCET) 5-325 MG tablet  pravastatin (PRAVACHOL) 40 MG tablet  VITAMIN D, CHOLECALCIFEROL, PO  Diclofenac Sodium 1.5 % SOLN  naloxone (NARCAN) 4 MG/0.1ML nasal spray  ondansetron (ZOFRAN) 4 MG tablet  saccharomyces boulardii (FLORASTOR) 250 MG capsule          Review of Systems   Constitutional: Positive for fatigue. Negative for chills and fever.   HENT: Negative for congestion, sore throat and trouble swallowing.    Eyes: Negative for redness.   Respiratory: Positive for shortness of breath. Negative for cough.    Cardiovascular: Positive for chest pain. Negative for palpitations and leg swelling.   Gastrointestinal: Negative for abdominal pain, diarrhea, nausea and vomiting.   Genitourinary: Negative for dysuria.   Musculoskeletal: Negative for back pain, neck pain and neck stiffness.   Skin: Negative for pallor.   Neurological: Positive for numbness. Negative for weakness, light-headedness and headaches.   Hematological: Bruises/bleeds easily.   Psychiatric/Behavioral: Negative for confusion.       Physical Exam   BP: 133/77  Pulse: 63  Heart Rate: 71  Temp: 98.1  F (36.7  C)  Resp: 20  Height:  "167.6 cm (5' 6\")  Weight: 81.6 kg (180 lb)  SpO2: 95 %      Physical Exam  Vitals signs and nursing note reviewed.   Constitutional:       General: She is not in acute distress.     Appearance: She is well-developed. She is not diaphoretic.   HENT:      Head: Normocephalic and atraumatic.      Right Ear: External ear normal.      Left Ear: External ear normal.      Nose: Nose normal.      Mouth/Throat:      Lips: Pink.      Mouth: Mucous membranes are moist.      Pharynx: Oropharynx is clear. Uvula midline.   Eyes:      General: No scleral icterus.     Conjunctiva/sclera: Conjunctivae normal.      Pupils: Pupils are equal, round, and reactive to light.   Neck:      Musculoskeletal: Normal range of motion and neck supple.      Trachea: Trachea and phonation normal.   Cardiovascular:      Rate and Rhythm: Normal rate and regular rhythm.      Heart sounds: Normal heart sounds. No murmur.   Pulmonary:      Effort: Pulmonary effort is normal.      Breath sounds: Normal breath sounds. No decreased breath sounds, wheezing, rhonchi or rales.   Abdominal:      General: Bowel sounds are normal.      Palpations: Abdomen is soft.      Tenderness: There is no tenderness.   Musculoskeletal: Normal range of motion.         General: No tenderness.      Right lower leg: No edema.      Left lower leg: No edema.   Lymphadenopathy:      Cervical: No cervical adenopathy.   Skin:     General: Skin is warm.      Capillary Refill: Capillary refill takes less than 2 seconds.      Coloration: Skin is not pale.   Neurological:      Mental Status: She is alert and oriented to person, place, and time.   Psychiatric:         Mood and Affect: Mood normal.         Behavior: Behavior normal. Behavior is cooperative.         ED Course          EKG Interpretation:      Interpreted by Edmond Gamez MD, MD  Time reviewed: 2017  Symptoms at time of EKG: chest pain   Rhythm: normal sinus   Rate: normal  Axis: normal  Ectopy: none  Conduction: " normal  ST Segments/ T Waves: No ST-T wave changes  Q Waves: none  Comparison to prior: Unchanged    Clinical Impression: normal EKG    Critical Care time:  was 48 minutes for this patient excluding procedures.  Results for orders placed or performed during the hospital encounter of 10/02/19 (from the past 24 hour(s))   CBC with platelets differential   Result Value Ref Range    WBC 5.6 4.0 - 11.0 10e9/L    RBC Count 3.96 3.8 - 5.2 10e12/L    Hemoglobin 11.9 11.7 - 15.7 g/dL    Hematocrit 36.1 35.0 - 47.0 %    MCV 91 78 - 100 fl    MCH 30.1 26.5 - 33.0 pg    MCHC 33.0 31.5 - 36.5 g/dL    RDW 13.9 10.0 - 15.0 %    Platelet Count 250 150 - 450 10e9/L    Diff Method Automated Method     % Neutrophils 41.7 %    % Lymphocytes 44.7 %    % Monocytes 8.2 %    % Eosinophils 3.9 %    % Basophils 1.3 %    % Immature Granulocytes 0.2 %    Absolute Neutrophil 2.3 1.6 - 8.3 10e9/L    Absolute Lymphocytes 2.5 0.8 - 5.3 10e9/L    Absolute Monocytes 0.5 0.0 - 1.3 10e9/L    Absolute Eosinophils 0.2 0.0 - 0.7 10e9/L    Absolute Basophils 0.1 0.0 - 0.2 10e9/L    Abs Immature Granulocytes 0.0 0 - 0.4 10e9/L   Troponin GH   Result Value Ref Range    Troponin 3.0 <18.0 pg/mL   INR   Result Value Ref Range    INR 0.85 0 - 1.3   Partial thromboplastin time   Result Value Ref Range    PTT 29 22 - 37 sec   Comprehensive metabolic panel   Result Value Ref Range    Sodium 141 134 - 144 mmol/L    Potassium 3.3 (L) 3.5 - 5.1 mmol/L    Chloride 107 98 - 107 mmol/L    Carbon Dioxide 21 21 - 31 mmol/L    Anion Gap 13 3 - 14 mmol/L    Glucose 146 (H) 70 - 105 mg/dL    Urea Nitrogen 11 7 - 25 mg/dL    Creatinine 0.86 0.60 - 1.20 mg/dL    GFR Estimate 66 >60 mL/min/[1.73_m2]    GFR Estimate If Black 80 >60 mL/min/[1.73_m2]    Calcium 9.0 8.6 - 10.3 mg/dL    Bilirubin Total 0.2 (L) 0.3 - 1.0 mg/dL    Albumin 4.3 3.5 - 5.7 g/dL    Protein Total 7.1 6.4 - 8.9 g/dL    Alkaline Phosphatase 63 34 - 104 U/L    ALT 7 7 - 52 U/L    AST 15 13 - 39 U/L    Magnesium   Result Value Ref Range    Magnesium 2.1 1.9 - 2.7 mg/dL   Nt probnp inpatient (BNP)   Result Value Ref Range    N-Terminal Pro BNP Inpatient 151 (H) 0 - 100 pg/mL   XR Chest Port 1 View    Narrative    Procedure:XR CHEST PORT 1 VW    Clinical history:Female, 64 years, pain    Technique: Single view was obtained.    Comparison: 5/13/2019    Findings: The cardiac silhouette is normal. The pulmonary vasculature  is normal.    The lungs demonstrate persistent elevation of the right hemidiaphragm.  There is poor penetration at the left lung base. Cannot exclude a left  basilar/lingular pneumonia. Bony structures are unremarkable.      Impression    Impression:   Limited by poor penetration and soft tissue attenuation. Increased  density at the left lung base silhouettes the left heart border and  suggests a lingular pneumonia.    FLOR MACIEL MD       Medications   sodium chloride 0.9% infusion (has no administration in time range)   nitroGLYcerin 25 mg in D5W 250 mL infusion (0.67 mcg/kg/min × 81.6 kg Intravenous Rate/Dose Change 10/2/19 2134)   aspirin (ASA) chewable tablet 324 mg (has no administration in time range)   0.9% sodium chloride BOLUS (1,000 mLs Intravenous New Bag 10/2/19 2037)     Followed by   sodium chloride 0.9% infusion (has no administration in time range)   morphine (PF) injection 2 mg (has no administration in time range)   ondansetron (ZOFRAN) injection 4 mg (has no administration in time range)   heparin loading dose for LOW INTENSITY TREATMENT * Give BEFORE starting heparin infusion (has no administration in time range)   heparin infusion 25,000 units in D5W 250 mL (has no administration in time range)   ondansetron (ZOFRAN) injection 4 mg (has no administration in time range)   morphine (PF) injection 2 mg (2 mg Intravenous Given 10/2/19 2132)       Assessments & Plan (with Medical Decision Making)     I have reviewed the nursing notes.    EKG was obtained. There is no  evidence of acute ischemia. Troponin was negative.    Chest x-ray was performed. There is no evidence pneumonia or pneumothorax.    WELLS Score is 0. HEART Score is 6.    Lab tests and X-rays were reviewed as above. Results were consistent with chest pain that is consistent with unstable angina.    Patient received nitroglycerin drip. Pain was improved but not completely relieved and she was therefore given 2 mg of morphine IV.  Zofran for nausea prophylaxis.  Her chest pain was resolved.    2135 - the patient is discussed with and very kindly accepted by Dr Hartman at [facility] in [place].    I had a discussion with the patient and the family regarding the symptoms, exam, laboratory, EKG, X ray results, diagnosis, and plan.    I answered all questions to the best of my ability.    The patient voiced understanding of the plan and was agreeable.    New Prescriptions    No medications on file       Final diagnoses:   Chest pain, unspecified type   Unstable angina (H)       10/2/2019   [facility] AND \[facility] [place] [facility]\""     Edmond Gamez MD  10/02/19 8882

## 2019-10-16 ENCOUNTER — OFFICE VISIT (OUTPATIENT)
Dept: FAMILY MEDICINE | Facility: OTHER | Age: 64
End: 2019-10-16
Attending: FAMILY MEDICINE
Payer: MEDICARE

## 2019-10-16 ENCOUNTER — TELEPHONE (OUTPATIENT)
Dept: FAMILY MEDICINE | Facility: OTHER | Age: 64
End: 2019-10-16

## 2019-10-16 VITALS
HEART RATE: 64 BPM | OXYGEN SATURATION: 96 % | TEMPERATURE: 97 F | DIASTOLIC BLOOD PRESSURE: 68 MMHG | RESPIRATION RATE: 18 BRPM | SYSTOLIC BLOOD PRESSURE: 108 MMHG

## 2019-10-16 DIAGNOSIS — G89.4 CHRONIC PAIN DISORDER: Primary | ICD-10-CM

## 2019-10-16 DIAGNOSIS — G89.29 CHRONIC BILATERAL LOW BACK PAIN WITHOUT SCIATICA: ICD-10-CM

## 2019-10-16 DIAGNOSIS — R07.89 CHEST WALL PAIN, CHRONIC: ICD-10-CM

## 2019-10-16 DIAGNOSIS — J18.9 COMMUNITY ACQUIRED PNEUMONIA OF LEFT LOWER LOBE OF LUNG: ICD-10-CM

## 2019-10-16 DIAGNOSIS — Z79.899 CONTROLLED SUBSTANCE AGREEMENT SIGNED: ICD-10-CM

## 2019-10-16 DIAGNOSIS — G89.29 CHEST WALL PAIN, CHRONIC: ICD-10-CM

## 2019-10-16 DIAGNOSIS — I25.810 ATHEROSCLEROSIS OF CORONARY ARTERY BYPASS GRAFT OF NATIVE HEART WITHOUT ANGINA PECTORIS: ICD-10-CM

## 2019-10-16 DIAGNOSIS — M54.50 CHRONIC BILATERAL LOW BACK PAIN WITHOUT SCIATICA: ICD-10-CM

## 2019-10-16 DIAGNOSIS — F41.9 CHRONIC ANXIETY: ICD-10-CM

## 2019-10-16 DIAGNOSIS — F33.2 SEVERE EPISODE OF RECURRENT MAJOR DEPRESSIVE DISORDER, WITHOUT PSYCHOTIC FEATURES (H): ICD-10-CM

## 2019-10-16 PROCEDURE — G0463 HOSPITAL OUTPT CLINIC VISIT: HCPCS

## 2019-10-16 PROCEDURE — 99214 OFFICE O/P EST MOD 30 MIN: CPT | Performed by: FAMILY MEDICINE

## 2019-10-16 RX ORDER — OXYCODONE AND ACETAMINOPHEN 5; 325 MG/1; MG/1
2 TABLET ORAL 4 TIMES DAILY
Qty: 224 TABLET | Refills: 0 | Status: CANCELLED | OUTPATIENT
Start: 2019-10-16

## 2019-10-16 RX ORDER — OXYCODONE AND ACETAMINOPHEN 5; 325 MG/1; MG/1
2 TABLET ORAL 4 TIMES DAILY
Qty: 224 TABLET | Refills: 0 | Status: SHIPPED | OUTPATIENT
Start: 2019-10-16 | End: 2019-11-13

## 2019-10-16 NOTE — NURSING NOTE
Pt presents to clinic today for medication management and back pain x 3 weeks.      Medication Reconciliation: complete  Jamilah Beregr LPN

## 2019-10-16 NOTE — TELEPHONE ENCOUNTER
PBI Pola Castillo from [facility] called and stated that he needs a more specific diagnosis for the back brace. He said we could fax the MRI and x-ray results to [phone] or send a prescription with a more specific diagnosis. Any questions please contact Pola at [facility].   Thanks,   Racheal Saravia on 10/16/2019 at 3:34 PM

## 2019-10-16 NOTE — PROGRESS NOTES
Nursing Notes:   Jamilah Berger LPN  10/16/2019  1:46 PM  Signed  Pt presents to clinic today for medication management and back pain x 3 weeks.      Medication Reconciliation: complete  Jamilah Berger LPN     SUBJECTIVE:  64 year old female with chronic pain and CAD presents for follow up on hospitalization at [facility] Oct 2-3.     No discharge summary available.    She presented with chest pain for a few days. Troponin was negative, but with her heart history was sent to [facility] in case of cardiology need.     CXR showed potential pneumonia. Treated with ceftriaxone and azithromycin. Had an ECHO and it apparently was normal or unchanged. No angiogram performed.     More back pain now. Worse with walking. Better with rest. She thinks it might have hurt it while cleaning.     Increased Cymbalta. Seems to help with anxiety with increase. With a little less stress, seems to have less headaches. Still occurring daily, present when working.   In the past tried Topamax which may have helped some, but stopped. She has been off and on this a couple times.    Used to see psychiatry, but psychiatrist left, so I assumed clonazepam prescription.  Reduced clonazepam from 3 per day to generally 2 per day.    REVIEW OF SYSTEMS:    General: Denies general constitutional problems  Cardiovascular: Denies problems  Respiratory: Denies problems      Current Outpatient Medications   Medication Sig Dispense Refill     albuterol (PROAIR HFA/PROVENTIL HFA/VENTOLIN HFA) 108 (90 Base) MCG/ACT inhaler Inhale 2 puffs into the lungs every 6 hours 2 Inhaler 3     amLODIPine (NORVASC) 10 MG tablet Take 1 tablet (10 mg) by mouth daily 90 tablet 3     aspirin EC 81 MG EC tablet Take 81 mg by mouth daily with food       busPIRone (BUSPAR) 10 MG tablet TAKE 1 TABLET TWICE A  tablet 1     clonazePAM (KLONOPIN) 1 MG tablet Take 1 tablet (1 mg) by mouth 3 times daily as needed for anxiety #70 per 30 days, #210 pills per 90 days 210 tablet 1      clopidogrel (PLAVIX) 75 MG tablet Take 1 tablet (75 mg) by mouth daily 90 tablet 3     cyclobenzaprine (FLEXERIL) 10 MG tablet Take 1 tablet (10 mg) by mouth 2 times daily as needed for muscle spasms 42 tablet 3     Diclofenac Sodium 1.5 % SOLN Apply 20 drops to each hand or 40 drops to each knee up to 4 times daily as needed for arthritis pain 450 mL 3     docusate sodium (COLACE) 50 MG capsule Take 50 mg by mouth daily       DULoxetine (CYMBALTA) 60 MG capsule Take 1 capsule (60 mg) by mouth 2 times daily 180 capsule 3     gabapentin (NEURONTIN) 600 MG tablet Take 1 tablet (600 mg) by mouth 3 times daily 270 tablet 2     lisinopril (PRINIVIL/ZESTRIL) 40 MG tablet Take 1 tablet (40 mg) by mouth daily 90 tablet 4     metoprolol tartrate (LOPRESSOR) 50 MG tablet Take 1 tablet (50 mg) by mouth 2 times daily 180 tablet 3     naloxone (NARCAN) 4 MG/0.1ML nasal spray Spray into one nostril for opioid reversal if unresponsive. May repeat every 2-3 minutes until patient responsive or EMS arrives 1 each 1     nitroGLYcerin (NITROSTAT) 0.3 MG sublingual tablet PLACE 1 TABLET UNDER THE TONGUE EVERY 5 MINUTES AS NEEDED FOR CHEST PAIN 25 tablet 11     omeprazole (PRILOSEC) 20 MG DR capsule Take 1 capsule (20 mg) by mouth daily 90 capsule 3     ondansetron (ZOFRAN) 4 MG tablet Take 4 mg by mouth every 6 hours as needed for nausea       oxyCODONE-acetaminophen (PERCOCET) 5-325 MG tablet Take 2 tablets by mouth 4 times daily #224 for 28 days 224 tablet 0     pravastatin (PRAVACHOL) 40 MG tablet Take 1 tablet (40 mg) by mouth At Bedtime 90 tablet 3     saccharomyces boulardii (FLORASTOR) 250 MG capsule Take 250 mg by mouth 2 times daily       VITAMIN D, CHOLECALCIFEROL, PO Take 5,000 Units by mouth daily       Allergies   Allergen Reactions     Atorvastatin Muscle Pain (Myalgia)     Liquid Adhesive Rash     Other reaction(s): Erythema  Silk and plastic gloves (long term attachment of adhesives)     Tiotropium Bromide  [Tiotropium] Rash     Ezetimibe Muscle Pain (Myalgia)     Niacin      Other reaction(s): Flushing  flushing     Rosuvastatin Muscle Pain (Myalgia)     Other reaction(s): Myalgia     Latex Rash     No Clinical Screening - See Comments Rash, Blisters and Itching     Metals and plastic  Metals and plastics       Tape [Adhesive Tape] Rash       OBJECTIVE:  /68   Pulse 64   Temp 97  F (36.1  C) (Tympanic)   Resp 18   SpO2 96%     EXAM:  General Appearance: Alert. No acute distress  Chest/Respiratory Exam: Clear to auscultation bilaterally  Cardiovascular Exam: Regular rate and rhythm. S1, S2, no murmur, gallop, or rubs.  Extremities: No lower extremity edema.  Psychiatric: Normal affect and mentation    Results for orders placed or performed during the hospital encounter of 10/02/19   XR Chest Port 1 View    Narrative    Procedure:XR CHEST PORT 1 VW    Clinical history:Female, 64 years, pain    Technique: Single view was obtained.    Comparison: 5/13/2019    Findings: The cardiac silhouette is normal. The pulmonary vasculature  is normal.    The lungs demonstrate persistent elevation of the right hemidiaphragm.  There is poor penetration at the left lung base. Cannot exclude a left  basilar/lingular pneumonia. Bony structures are unremarkable.      Impression    Impression:   Limited by poor penetration and soft tissue attenuation. Increased  density at the left lung base silhouettes the left heart border and  suggests a lingular pneumonia.    FLOR MACIEL MD   CBC with platelets differential   Result Value Ref Range    WBC 5.6 4.0 - 11.0 10e9/L    RBC Count 3.96 3.8 - 5.2 10e12/L    Hemoglobin 11.9 11.7 - 15.7 g/dL    Hematocrit 36.1 35.0 - 47.0 %    MCV 91 78 - 100 fl    MCH 30.1 26.5 - 33.0 pg    MCHC 33.0 31.5 - 36.5 g/dL    RDW 13.9 10.0 - 15.0 %    Platelet Count 250 150 - 450 10e9/L    Diff Method Automated Method     % Neutrophils 41.7 %    % Lymphocytes 44.7 %    % Monocytes 8.2 %    % Eosinophils  3.9 %    % Basophils 1.3 %    % Immature Granulocytes 0.2 %    Absolute Neutrophil 2.3 1.6 - 8.3 10e9/L    Absolute Lymphocytes 2.5 0.8 - 5.3 10e9/L    Absolute Monocytes 0.5 0.0 - 1.3 10e9/L    Absolute Eosinophils 0.2 0.0 - 0.7 10e9/L    Absolute Basophils 0.1 0.0 - 0.2 10e9/L    Abs Immature Granulocytes 0.0 0 - 0.4 10e9/L   Troponin GH   Result Value Ref Range    Troponin 3.0 <18.0 pg/mL   INR   Result Value Ref Range    INR 0.85 0 - 1.3   Partial thromboplastin time   Result Value Ref Range    PTT 29 22 - 37 sec   Comprehensive metabolic panel   Result Value Ref Range    Sodium 141 134 - 144 mmol/L    Potassium 3.3 (L) 3.5 - 5.1 mmol/L    Chloride 107 98 - 107 mmol/L    Carbon Dioxide 21 21 - 31 mmol/L    Anion Gap 13 3 - 14 mmol/L    Glucose 146 (H) 70 - 105 mg/dL    Urea Nitrogen 11 7 - 25 mg/dL    Creatinine 0.86 0.60 - 1.20 mg/dL    GFR Estimate 66 >60 mL/min/[1.73_m2]    GFR Estimate If Black 80 >60 mL/min/[1.73_m2]    Calcium 9.0 8.6 - 10.3 mg/dL    Bilirubin Total 0.2 (L) 0.3 - 1.0 mg/dL    Albumin 4.3 3.5 - 5.7 g/dL    Protein Total 7.1 6.4 - 8.9 g/dL    Alkaline Phosphatase 63 34 - 104 U/L    ALT 7 7 - 52 U/L    AST 15 13 - 39 U/L   Magnesium   Result Value Ref Range    Magnesium 2.1 1.9 - 2.7 mg/dL   Nt probnp inpatient (BNP)   Result Value Ref Range    N-Terminal Pro BNP Inpatient 151 (H) 0 - 100 pg/mL   EKG 12-lead, tracing only    Impression    Normal EKG with a rate of 65.  Compared to previous tracing dated May 22, 2019, bradycardia no longer noted.  Nonspecific ST changes have normalized.  Mello Bliss MD        ASSESSMENT/PLAN:    ICD-10-CM    1. Chronic pain disorder G89.4 oxyCODONE-acetaminophen (PERCOCET) 5-325 MG tablet   2. Chronic bilateral low back pain without sciatica M54.5 order for DME    G89.29 oxyCODONE-acetaminophen (PERCOCET) 5-325 MG tablet   3. Chest wall pain, chronic R07.89 oxyCODONE-acetaminophen (PERCOCET) 5-325 MG tablet    G89.29    4. Controlled substance agreement  signed 9/18/19 Z79.899 oxyCODONE-acetaminophen (PERCOCET) 5-325 MG tablet   5. Community acquired pneumonia of left lower lobe of lung (H) J18.1    6. Atherosclerosis of coronary artery bypass graft of native heart without angina pectoris I25.810    7. Severe episode of recurrent major depressive disorder, without psychotic features (H) F33.2    8. Chronic anxiety F41.9      Current controlled substance agreement.  Urine drug monitoring as expected July 17, 2019.   reviewed.  She is due for refills.  Refilled chronic oxycodone dose of 10 mg 4 times daily for 60 morphine milliequivalents daily. Uses 8 of 5 mg pills for 224 over 28 days.  Follow up in 28 days    Used to take methadone and was on excessive opioids. Taking less opioids than current dosing has reduced function.    Working to reduce clonazepam. Down to 2 per day from 3 previously.    Normal lung exam. No symptoms. She wonders about a follow up x-ray as this was arranged for 2 days from now. She had a small lingular pneumonia, no follow up recommended by radiology. Planned to order for a few weeks from now. She declines. Will get x-ray if worse.    Cymbalta working well for anxiety, seemed to help headache some. If still having frequent headaches, then consider Topamax again.    Ramirez Cano MD    This document was prepared using a combination of typing and voice generated software.  While every attempt was made for accuracy, spelling and grammatical errors may exist.

## 2019-10-25 ENCOUNTER — APPOINTMENT (OUTPATIENT)
Dept: GENERAL RADIOLOGY | Facility: OTHER | Age: 64
End: 2019-10-25
Attending: PHYSICIAN ASSISTANT
Payer: MEDICARE

## 2019-10-25 ENCOUNTER — TELEPHONE (OUTPATIENT)
Dept: CARDIOLOGY | Facility: OTHER | Age: 64
End: 2019-10-25

## 2019-10-25 ENCOUNTER — HOSPITAL ENCOUNTER (EMERGENCY)
Facility: OTHER | Age: 64
Discharge: HOME | End: 2019-10-25
Attending: PHYSICIAN ASSISTANT | Admitting: PHYSICIAN ASSISTANT
Payer: MEDICARE

## 2019-10-25 VITALS
DIASTOLIC BLOOD PRESSURE: 90 MMHG | BODY MASS INDEX: 28.93 KG/M2 | HEIGHT: 66 IN | OXYGEN SATURATION: 97 % | WEIGHT: 180 LBS | RESPIRATION RATE: 10 BRPM | SYSTOLIC BLOOD PRESSURE: 133 MMHG | TEMPERATURE: 98 F | HEART RATE: 55 BPM

## 2019-10-25 DIAGNOSIS — K22.4 DIFFUSE ESOPHAGEAL SPASM: ICD-10-CM

## 2019-10-25 DIAGNOSIS — R07.89 ATYPICAL CHEST PAIN: ICD-10-CM

## 2019-10-25 DIAGNOSIS — K21.9 GASTROESOPHAGEAL REFLUX DISEASE: ICD-10-CM

## 2019-10-25 LAB
ALBUMIN SERPL-MCNC: 4.8 G/DL (ref 3.5–5.7)
ALP SERPL-CCNC: 68 U/L (ref 34–104)
ALT SERPL W P-5'-P-CCNC: 9 U/L (ref 7–52)
ANION GAP SERPL CALCULATED.3IONS-SCNC: 10 MMOL/L (ref 3–14)
AST SERPL W P-5'-P-CCNC: 15 U/L (ref 13–39)
BASOPHILS # BLD AUTO: 0.1 10E9/L (ref 0–0.2)
BASOPHILS NFR BLD AUTO: 1 %
BILIRUB SERPL-MCNC: 0.3 MG/DL (ref 0.3–1)
BUN SERPL-MCNC: 18 MG/DL (ref 7–25)
CALCIUM SERPL-MCNC: 9.4 MG/DL (ref 8.6–10.3)
CHLORIDE SERPL-SCNC: 103 MMOL/L (ref 98–107)
CO2 SERPL-SCNC: 25 MMOL/L (ref 21–31)
CREAT SERPL-MCNC: 1.07 MG/DL (ref 0.6–1.2)
D DIMER PPP DDU-MCNC: <200 NG/ML D-DU (ref 0–230)
DIFFERENTIAL METHOD BLD: NORMAL
EOSINOPHIL # BLD AUTO: 0.2 10E9/L (ref 0–0.7)
EOSINOPHIL NFR BLD AUTO: 3.4 %
ERYTHROCYTE [DISTWIDTH] IN BLOOD BY AUTOMATED COUNT: 13.1 % (ref 10–15)
GFR SERPL CREATININE-BSD FRML MDRD: 51 ML/MIN/{1.73_M2}
GLUCOSE SERPL-MCNC: 106 MG/DL (ref 70–105)
HCT VFR BLD AUTO: 35.7 % (ref 35–47)
HGB BLD-MCNC: 11.8 G/DL (ref 11.7–15.7)
IMM GRANULOCYTES # BLD: 0 10E9/L (ref 0–0.4)
IMM GRANULOCYTES NFR BLD: 0.1 %
INR PPP: 0.92 (ref 0–1.3)
LYMPHOCYTES # BLD AUTO: 3 10E9/L (ref 0.8–5.3)
LYMPHOCYTES NFR BLD AUTO: 44.7 %
MAGNESIUM SERPL-MCNC: 2 MG/DL (ref 1.9–2.7)
MCH RBC QN AUTO: 29.6 PG (ref 26.5–33)
MCHC RBC AUTO-ENTMCNC: 33.1 G/DL (ref 31.5–36.5)
MCV RBC AUTO: 90 FL (ref 78–100)
MONOCYTES # BLD AUTO: 0.6 10E9/L (ref 0–1.3)
MONOCYTES NFR BLD AUTO: 8.2 %
NEUTROPHILS # BLD AUTO: 2.9 10E9/L (ref 1.6–8.3)
NEUTROPHILS NFR BLD AUTO: 42.6 %
NT-PROBNP SERPL-MCNC: 133 PG/ML (ref 0–100)
PLATELET # BLD AUTO: 269 10E9/L (ref 150–450)
POTASSIUM SERPL-SCNC: 4 MMOL/L (ref 3.5–5.1)
PROT SERPL-MCNC: 7.7 G/DL (ref 6.4–8.9)
RBC # BLD AUTO: 3.98 10E12/L (ref 3.8–5.2)
SODIUM SERPL-SCNC: 138 MMOL/L (ref 134–144)
TROPONIN I SERPL-MCNC: 2.4 PG/ML
TROPONIN I SERPL-MCNC: <2.3 PG/ML
WBC # BLD AUTO: 6.7 10E9/L (ref 4–11)

## 2019-10-25 PROCEDURE — 84484 ASSAY OF TROPONIN QUANT: CPT | Mod: 91 | Performed by: PHYSICIAN ASSISTANT

## 2019-10-25 PROCEDURE — 25000132 ZZH RX MED GY IP 250 OP 250 PS 637: Mod: GY | Performed by: PHYSICIAN ASSISTANT

## 2019-10-25 PROCEDURE — 25000125 ZZHC RX 250: Performed by: PHYSICIAN ASSISTANT

## 2019-10-25 PROCEDURE — 71045 X-RAY EXAM CHEST 1 VIEW: CPT

## 2019-10-25 PROCEDURE — 96375 TX/PRO/DX INJ NEW DRUG ADDON: CPT | Performed by: PHYSICIAN ASSISTANT

## 2019-10-25 PROCEDURE — 99284 EMERGENCY DEPT VISIT MOD MDM: CPT | Mod: Z6 | Performed by: PHYSICIAN ASSISTANT

## 2019-10-25 PROCEDURE — 36415 COLL VENOUS BLD VENIPUNCTURE: CPT | Performed by: PHYSICIAN ASSISTANT

## 2019-10-25 PROCEDURE — 93010 ELECTROCARDIOGRAM REPORT: CPT | Performed by: INTERNAL MEDICINE

## 2019-10-25 PROCEDURE — 85610 PROTHROMBIN TIME: CPT | Performed by: PHYSICIAN ASSISTANT

## 2019-10-25 PROCEDURE — C9113 INJ PANTOPRAZOLE SODIUM, VIA: HCPCS | Performed by: PHYSICIAN ASSISTANT

## 2019-10-25 PROCEDURE — 25800030 ZZH RX IP 258 OP 636: Performed by: PHYSICIAN ASSISTANT

## 2019-10-25 PROCEDURE — 96374 THER/PROPH/DIAG INJ IV PUSH: CPT | Performed by: PHYSICIAN ASSISTANT

## 2019-10-25 PROCEDURE — 83735 ASSAY OF MAGNESIUM: CPT | Performed by: PHYSICIAN ASSISTANT

## 2019-10-25 PROCEDURE — 25000128 H RX IP 250 OP 636: Performed by: PHYSICIAN ASSISTANT

## 2019-10-25 PROCEDURE — 83880 ASSAY OF NATRIURETIC PEPTIDE: CPT | Mod: GZ | Performed by: PHYSICIAN ASSISTANT

## 2019-10-25 PROCEDURE — 96361 HYDRATE IV INFUSION ADD-ON: CPT | Performed by: PHYSICIAN ASSISTANT

## 2019-10-25 PROCEDURE — 80053 COMPREHEN METABOLIC PANEL: CPT | Performed by: PHYSICIAN ASSISTANT

## 2019-10-25 PROCEDURE — 96376 TX/PRO/DX INJ SAME DRUG ADON: CPT | Performed by: PHYSICIAN ASSISTANT

## 2019-10-25 PROCEDURE — 85379 FIBRIN DEGRADATION QUANT: CPT | Performed by: PHYSICIAN ASSISTANT

## 2019-10-25 PROCEDURE — 93005 ELECTROCARDIOGRAM TRACING: CPT | Performed by: PHYSICIAN ASSISTANT

## 2019-10-25 PROCEDURE — 99285 EMERGENCY DEPT VISIT HI MDM: CPT | Mod: 25 | Performed by: PHYSICIAN ASSISTANT

## 2019-10-25 PROCEDURE — 85025 COMPLETE CBC W/AUTO DIFF WBC: CPT | Performed by: PHYSICIAN ASSISTANT

## 2019-10-25 RX ORDER — ONDANSETRON 2 MG/ML
4 INJECTION INTRAMUSCULAR; INTRAVENOUS EVERY 30 MIN PRN
Status: DISCONTINUED | OUTPATIENT
Start: 2019-10-25 | End: 2019-10-25 | Stop reason: HOSPADM

## 2019-10-25 RX ORDER — ALUMINA, MAGNESIA, AND SIMETHICONE 2400; 2400; 240 MG/30ML; MG/30ML; MG/30ML
15 SUSPENSION ORAL ONCE
Status: COMPLETED | OUTPATIENT
Start: 2019-10-25 | End: 2019-10-25

## 2019-10-25 RX ORDER — MORPHINE SULFATE 2 MG/ML
1-2 INJECTION, SOLUTION INTRAMUSCULAR; INTRAVENOUS
Status: DISCONTINUED | OUTPATIENT
Start: 2019-10-25 | End: 2019-10-25 | Stop reason: HOSPADM

## 2019-10-25 RX ORDER — LIDOCAINE HYDROCHLORIDE 20 MG/ML
15 SOLUTION OROPHARYNGEAL ONCE
Status: COMPLETED | OUTPATIENT
Start: 2019-10-25 | End: 2019-10-25

## 2019-10-25 RX ORDER — SODIUM CHLORIDE, SODIUM LACTATE, POTASSIUM CHLORIDE, CALCIUM CHLORIDE 600; 310; 30; 20 MG/100ML; MG/100ML; MG/100ML; MG/100ML
1000 INJECTION, SOLUTION INTRAVENOUS CONTINUOUS
Status: DISCONTINUED | OUTPATIENT
Start: 2019-10-25 | End: 2019-10-25 | Stop reason: HOSPADM

## 2019-10-25 RX ORDER — ASPIRIN 81 MG/1
324 TABLET, CHEWABLE ORAL ONCE
Status: COMPLETED | OUTPATIENT
Start: 2019-10-25 | End: 2019-10-25

## 2019-10-25 RX ORDER — SUCRALFATE 1 G/1
1 TABLET ORAL ONCE
Status: COMPLETED | OUTPATIENT
Start: 2019-10-25 | End: 2019-10-25

## 2019-10-25 RX ADMIN — MORPHINE SULFATE 2 MG: 2 INJECTION, SOLUTION INTRAMUSCULAR; INTRAVENOUS at 12:16

## 2019-10-25 RX ADMIN — MORPHINE SULFATE 2 MG: 2 INJECTION, SOLUTION INTRAMUSCULAR; INTRAVENOUS at 13:35

## 2019-10-25 RX ADMIN — ALUMINUM HYDROXIDE, MAGNESIUM HYDROXIDE, AND DIMETHICONE 15 ML: 400; 400; 40 SUSPENSION ORAL at 13:34

## 2019-10-25 RX ADMIN — LIDOCAINE HYDROCHLORIDE 15 ML: 20 SOLUTION ORAL; TOPICAL at 13:34

## 2019-10-25 RX ADMIN — ASPIRIN 81 MG 324 MG: 81 TABLET ORAL at 12:16

## 2019-10-25 RX ADMIN — PANTOPRAZOLE SODIUM 40 MG: 40 INJECTION, POWDER, FOR SOLUTION INTRAVENOUS at 13:34

## 2019-10-25 RX ADMIN — SODIUM CHLORIDE, POTASSIUM CHLORIDE, SODIUM LACTATE AND CALCIUM CHLORIDE 1000 ML: 600; 310; 30; 20 INJECTION, SOLUTION INTRAVENOUS at 12:17

## 2019-10-25 RX ADMIN — SODIUM CHLORIDE, POTASSIUM CHLORIDE, SODIUM LACTATE AND CALCIUM CHLORIDE 1000 ML: 600; 310; 30; 20 INJECTION, SOLUTION INTRAVENOUS at 13:34

## 2019-10-25 RX ADMIN — SUCRALFATE 1 G: 1 TABLET ORAL at 13:34

## 2019-10-25 ASSESSMENT — ENCOUNTER SYMPTOMS
CHILLS: 0
BRUISES/BLEEDS EASILY: 0
VOMITING: 0
WOUND: 0
NAUSEA: 0
FEVER: 0
NECK PAIN: 1
CONSTIPATION: 0
ABDOMINAL PAIN: 0
BACK PAIN: 0
CHEST TIGHTNESS: 0
DIARRHEA: 0
COUGH: 0
SHORTNESS OF BREATH: 0
SORE THROAT: 0
CONFUSION: 0
HEMATURIA: 0
ADENOPATHY: 0
HEADACHES: 1

## 2019-10-25 ASSESSMENT — MIFFLIN-ST. JEOR: SCORE: 1378.22

## 2019-10-25 NOTE — ED TRIAGE NOTES
Pt here with family with c/o chest pain, HA, lt arm pain, pt reports that nitroglycerin helps relieve her pain, pt states that this has been going on for a month, pt reports that she was sent down to [facility] last month for the same thing and her heart was fine and was diagnosed with pneumonia, VSS, no acute distress, pt brought back into ER to be evaluated

## 2019-10-25 NOTE — ED AVS SNAPSHOT
[facility]  [address]  [place] [address]  Phone:  [phone]  Fax:  [phone]                                    Ankita Day   MRN: [id number]    Department:  [facility]   Date of Visit:  10/25/2019           After Visit Summary Signature Page    I have received my discharge instructions, and my questions have been answered. I have discussed any challenges I see with this plan with the nurse or doctor.    ..........................................................................................................................................  Patient/Patient Representative Signature      ..........................................................................................................................................  Patient Representative Print Name and Relationship to Patient    ..................................................               ................................................  Date                                   Time    ..........................................................................................................................................  Reviewed by Signature/Title    ...................................................              ..............................................  Date                                               Time          22EPIC Rev 08/18

## 2019-10-25 NOTE — ED PROVIDER NOTES
History     Chief Complaint   Patient presents with     Chest Pain     Headache     This is a 64-year-old female who was seen on 10/2/2019 in the ER due to atypical chest pain.  She was actually transferred to [facility] for further evaluation and at that time she was diagnosed with left lingular pneumonia.  Since then she has continued to have some chest pain on and off.  This seems to be relieved with nitroglycerin.  She seemed to be doing fine however this morning woke up with chest pain and she is taking a total of 6 nitroglycerin, she reports the nitroglycerin helps with the pain however it is temporary and she currently rates her pain as a 6/10.  Denies any nausea.  Denies any cough, sore throat, lightheadedness or dizziness.  No shortness of breath.  She does report she feels a little anxious.  She is here for further evaluation.  She does report this does seem to radiate into her left arm and neck area.  She reports a severe headache which she attributes to the nitro she has taken.             Allergies:  Allergies   Allergen Reactions     Atorvastatin Muscle Pain (Myalgia)     Liquid Adhesive Rash     Other reaction(s): Erythema  Silk and plastic gloves (long term attachment of adhesives)     Tiotropium Bromide [Tiotropium] Rash     Ezetimibe Muscle Pain (Myalgia)     Niacin      Other reaction(s): Flushing  flushing     Rosuvastatin Muscle Pain (Myalgia)     Other reaction(s): Myalgia     Latex Rash     No Clinical Screening - See Comments Rash, Blisters and Itching     Metals and plastic  Metals and plastics       Tape [Adhesive Tape] Rash       Problem List:    Patient Active Problem List    Diagnosis Date Noted     Chronic anxiety 01/22/2018     Priority: Medium     Collagenous colitis 01/22/2018     Priority: Medium     Overview:   Possible Dx 2007       Major depressive disorder, recurrent episode, severe (H) 01/22/2018     Priority: Medium     Essential hypertension 01/22/2018     Priority: Medium      Mixed hyperlipidemia 01/22/2018     Priority: Medium     Osteoarthritis 01/22/2018     Priority: Medium     Panic attack 01/22/2018     Priority: Medium     Status post coronary angiogram 09/15/2017     Priority: Medium     History of tobacco abuse 08/10/2017     Priority: Medium     Presence of stent in coronary artery in patient with coronary artery disease 08/10/2017     Priority: Medium     History of coronary artery bypass graft x 3 08/10/2017     Priority: Medium     Noncompliance with CPAP treatment 10/21/2016     Priority: Medium     Intractable chronic common migraine without aura 10/21/2016     Priority: Medium     H/O multiple concussions 10/21/2016     Priority: Medium     History of Clostridium difficile 08/19/2016     Priority: Medium     Iron deficiency anemia 07/26/2016     Priority: Medium     CKD (chronic kidney disease) stage 2, GFR 60-89 ml/min 12/09/2015     Priority: Medium     Chest wall pain, chronic 11/04/2015     Priority: Medium     Controlled substance agreement signed 9/18/19 01/28/2014     Priority: Medium     Overview:        Central sleep apnea 10/14/2013     Priority: Medium     Myofascial pain 10/14/2013     Priority: Medium     Vitamin D deficiency 05/06/2013     Priority: Medium     Subclavian artery stenosis, left (H) 03/31/2013     Priority: Medium     Overview:   S/p prox left SCA stent 4/1/2013       Lumbar facet arthropathy 01/02/2013     Priority: Medium     Nonspecific abnormal results of pulmonary system function study 12/21/2011     Priority: Medium     Slow transit constipation 11/29/2011     Priority: Medium     Gastric ulcer with hemorrhage 10/03/2011     Priority: Medium     Family history of malignant neoplasm of gastrointestinal tract 09/26/2011     Priority: Medium     Nodular degeneration of cornea 09/26/2011     Priority: Medium     Bilateral low back pain without sciatica 05/31/2011     Priority: Medium     Chronic pain disorder 12/01/2010     Priority:  Medium     COPD (chronic obstructive pulmonary disease) (H) 06/15/2007     Priority: Medium     Overview:   Low DLCO, normal spirometry on 12/15/11       Peptic ulcer 06/15/2007     Priority: Medium     Postsurgical aortocoronary bypass status 02/12/2003     Priority: Medium     Coronary atherosclerosis 09/12/2002     Priority: Medium     Overview:   Multiple Angigrams prior to 2007. Also:  6/5/2007: Angiogram: TAXUS DELONTE to ostial circumflux, instent restenosis  5/23/2008: Left Main 30% diseased, LAD stents patent, Left circ stent patent, Chronic occluded right internal mammary artery graft to distal RCA, native RCA has 30% stenosis; NO intevention  9/19/2012 CT Angiogram: Patent LIMA to LAD, patent LAD stents, moderate proximal circumflex disease, patent RCA , occluded right internal mammary artery graft to distal RCA.  9/20/2012: Angiogram: Stent to Left Main, ostial LAD, and PTCA of ostial left circumflex          Past Medical History:    Past Medical History:   Diagnosis Date     Acute ischemic heart disease (H)      Acute myocardial infarction (H)      Anxiety disorder      Atherosclerotic heart disease of native coronary artery without angina pectoris      Bilateral carpal tunnel syndrome      Cervicalgia      Chest pain      Chronic gastric ulcer without hemorrhage or perforation      Chronic ischemic heart disease      Chronic obstructive pulmonary disease (H)      Chronic or unspecified gastric ulcer with hemorrhage      Chronic pain syndrome      Constipation      Coronary angioplasty status      Coronary angioplasty status      Coronary angioplasty status      Dorsalgia      Encounter for other administrative examinations      Encounter for screening for cardiovascular disorders      Enterocolitis due to Clostridium difficile      Essential (primary) hypertension      Hyperlipidemia      Major depressive disorder, single episode      Migraine without status migrainosus, not intractable      Nodular  corneal degeneration      Noninfective gastroenteritis and colitis      Noninfective gastroenteritis and colitis      Osteoarthritis      Other chest pain      Pain in knee      Pain in right shoulder      Panic disorder without agoraphobia      Peptic ulcer without hemorrhage or perforation      Peripheral vascular disease (H)      Personal history of diseases of the blood and blood-forming organs and certain disorders involving the immune mechanism (CODE)      Personal history of nicotine dependence      Personal history of other medical treatment (CODE)      Presence of aortocoronary bypass graft      Primary central sleep apnea      Sepsis due to Escherichia coli (E. coli) (H)      Stricture of artery (H)      Thoracic, thoracolumbar and lumbosacral intervertebral disc disorder      Uncomplicated opioid abuse (H)      Vitamin D deficiency        Past Surgical History:    Past Surgical History:   Procedure Laterality Date     ANGIOPLASTY      9/12/02,with triple stenting     APPENDECTOMY OPEN      No Comments Provided     ARTHROSCOPY KNEE      left     ARTHROSCOPY SHOULDER      right     BYPASS GRAFT ARTERY CORONARY      12/13/02,Triple bypass, left internal mammary  to LAD, right internal mammary to right coronary artery, saphenous to obtuse marginal of the left circumflex.     COLONOSCOPY      2011,Dr Bowman benign polyps     COLONOSCOPY  10/03/2011    2011,benign polyps, Dr. Bowman     COLONOSCOPY  08/08/2016 8/8/16,normal, Dr Bowman     ELBOW SURGERY      baby,birth malachi removed from right arm     EMBOLECTOMY UPPER EXTREMITY  04/02/2013    brachial artery pseudoaneurysm after stenting     ESOPHAGOSCOPY, GASTROSCOPY, DUODENOSCOPY (EGD), COMBINED      2011,EGD Dr Bowman with pyloric ulcer     ESOPHAGOSCOPY, GASTROSCOPY, DUODENOSCOPY (EGD), COMBINED      2011,pyloric ulcer, Dr. Bowman     ESOPHAGOSCOPY, GASTROSCOPY, DUODENOSCOPY (EGD), COMBINED      8/8/16,mild gastritis, Dr Bowman     ESOPHAGOSCOPY, GASTROSCOPY,  DUODENOSCOPY (EGD), COMBINED      2017,Dr Bowman. Antral ulcer     ESOPHAGOSCOPY, GASTROSCOPY, DUODENOSCOPY (EGD), COMBINED  2018    Dr Bowman, healed ulcer     HYSTERECTOMY TOTAL ABDOMINAL      age 21     LAPAROSCOPIC CHOLECYSTECTOMY      2006     OSTEOTOMY FEMUR DISTAL      x3, right knee     OSTEOTOMY FEMUR DISTAL      2000,left knee  ligament surgery     OTHER SURGICAL HISTORY      1/10/2003,,PTCA     OTHER SURGICAL HISTORY      2012,,PTCA,DELONTE in LAD and left main     OTHER SURGICAL HISTORY      2013,,PTCA,L subclavian stenosis     SALPINGO-OOPHORECTOMY BILATERAL      age 27,Bilateral salpingo-oophorectomy     TONSILLECTOMY, ADENOIDECTOMY, COMBINED      childhood       Family History:    Family History   Problem Relation Age of Onset     Heart Disease Father         Heart Disease,Heart condition/Significant for atherosclerotic cardiovascular disease, but non premature.     Colon Cancer Father         Cancer-colon, of colon cancer     Cancer Father         Cancer,mets to liver, secondary to colon cancer     Heart Disease Mother         Heart Disease     Cancer Other         Cancer,Multiple Myeloma     Heart Disease Other         Heart Disease,Ischemic Heart Disease     Colon Cancer Other         Cancer-colon,Malignant neoplasms     Cancer Sister         Cancer,multiple myeloma     Other - See Comments Son         gallstones       Social History:  Marital Status:   [2]  Social History     Tobacco Use     Smoking status: Former Smoker     Packs/day: 0.25     Years: 35.00     Pack years: 8.75     Types: Cigarettes     Last attempt to quit: 2/15/2017     Years since quittin.6     Smokeless tobacco: Never Used   Substance Use Topics     Alcohol use: Yes     Alcohol/week: 0.0 standard drinks     Comment: Alcoholic Drinks/day: rare     Drug use: No     Comment: Drug use: No        Medications:    albuterol (PROAIR HFA/PROVENTIL HFA/VENTOLIN HFA) 108 (90 Base) MCG/ACT  "inhaler  amLODIPine (NORVASC) 10 MG tablet  aspirin EC 81 MG EC tablet  busPIRone (BUSPAR) 10 MG tablet  clonazePAM (KLONOPIN) 1 MG tablet  clopidogrel (PLAVIX) 75 MG tablet  cyclobenzaprine (FLEXERIL) 10 MG tablet  Diclofenac Sodium 1.5 % SOLN  docusate sodium (COLACE) 50 MG capsule  DULoxetine (CYMBALTA) 60 MG capsule  gabapentin (NEURONTIN) 600 MG tablet  lisinopril (PRINIVIL/ZESTRIL) 40 MG tablet  metoprolol tartrate (LOPRESSOR) 50 MG tablet  naloxone (NARCAN) 4 MG/0.1ML nasal spray  nitroGLYcerin (NITROSTAT) 0.3 MG sublingual tablet  omeprazole (PRILOSEC) 20 MG DR capsule  ondansetron (ZOFRAN) 4 MG tablet  order for DME  oxyCODONE-acetaminophen (PERCOCET) 5-325 MG tablet  pravastatin (PRAVACHOL) 40 MG tablet  saccharomyces boulardii (FLORASTOR) 250 MG capsule  VITAMIN D, CHOLECALCIFEROL, PO          Review of Systems   Constitutional: Negative for chills and fever.   HENT: Negative for congestion and sore throat.    Eyes: Negative for visual disturbance.   Respiratory: Negative for cough, chest tightness and shortness of breath.    Cardiovascular: Positive for chest pain.   Gastrointestinal: Negative for abdominal pain, constipation, diarrhea, nausea and vomiting.   Genitourinary: Negative for hematuria.   Musculoskeletal: Positive for neck pain. Negative for back pain.   Skin: Negative for rash and wound.   Neurological: Positive for headaches. Negative for syncope.   Hematological: Negative for adenopathy. Does not bruise/bleed easily.   Psychiatric/Behavioral: Negative for confusion.       Physical Exam   BP: (!) 146/76  Pulse: 55  Heart Rate: 50  Temp: 98  F (36.7  C)  Resp: 16  Height: 167.6 cm (5' 6\")  Weight: 81.6 kg (180 lb)  SpO2: 97 %      Physical Exam  Constitutional:       General: She is not in acute distress.     Appearance: She is well-developed. She is not diaphoretic.   HENT:      Head: Normocephalic and atraumatic.   Eyes:      General: No scleral icterus.     Conjunctiva/sclera: Conjunctivae " normal.   Neck:      Musculoskeletal: Neck supple.   Cardiovascular:      Rate and Rhythm: Normal rate and regular rhythm.   Pulmonary:      Effort: Pulmonary effort is normal.      Breath sounds: Normal breath sounds.      Comments: Lung sounds are clear.  SaO2 is 97% on room air.  Abdominal:      Palpations: Abdomen is soft.      Tenderness: There is no tenderness.   Musculoskeletal:         General: No deformity.   Lymphadenopathy:      Cervical: No cervical adenopathy.   Skin:     General: Skin is warm and dry.      Findings: No rash.   Neurological:      General: No focal deficit present.      Mental Status: She is alert.   Psychiatric:         Mood and Affect: Mood normal.         Behavior: Behavior normal.         ED Course        Procedures          EKG shows sinus bradycardia with a heart rate of 52.             Results for orders placed or performed during the hospital encounter of 10/25/19 (from the past 24 hour(s))   CBC with platelets differential   Result Value Ref Range    WBC 6.7 4.0 - 11.0 10e9/L    RBC Count 3.98 3.8 - 5.2 10e12/L    Hemoglobin 11.8 11.7 - 15.7 g/dL    Hematocrit 35.7 35.0 - 47.0 %    MCV 90 78 - 100 fl    MCH 29.6 26.5 - 33.0 pg    MCHC 33.1 31.5 - 36.5 g/dL    RDW 13.1 10.0 - 15.0 %    Platelet Count 269 150 - 450 10e9/L    Diff Method Automated Method     % Neutrophils 42.6 %    % Lymphocytes 44.7 %    % Monocytes 8.2 %    % Eosinophils 3.4 %    % Basophils 1.0 %    % Immature Granulocytes 0.1 %    Absolute Neutrophil 2.9 1.6 - 8.3 10e9/L    Absolute Lymphocytes 3.0 0.8 - 5.3 10e9/L    Absolute Monocytes 0.6 0.0 - 1.3 10e9/L    Absolute Eosinophils 0.2 0.0 - 0.7 10e9/L    Absolute Basophils 0.1 0.0 - 0.2 10e9/L    Abs Immature Granulocytes 0.0 0 - 0.4 10e9/L   Troponin GH   Result Value Ref Range    Troponin 2.4 <34.0 pg/mL   INR   Result Value Ref Range    INR 0.92 0 - 1.3   Comprehensive metabolic panel   Result Value Ref Range    Sodium 138 134 - 144 mmol/L    Potassium 4.0  3.5 - 5.1 mmol/L    Chloride 103 98 - 107 mmol/L    Carbon Dioxide 25 21 - 31 mmol/L    Anion Gap 10 3 - 14 mmol/L    Glucose 106 (H) 70 - 105 mg/dL    Urea Nitrogen 18 7 - 25 mg/dL    Creatinine 1.07 0.60 - 1.20 mg/dL    GFR Estimate 51 (L) >60 mL/min/[1.73_m2]    GFR Estimate If Black 62 >60 mL/min/[1.73_m2]    Calcium 9.4 8.6 - 10.3 mg/dL    Bilirubin Total 0.3 0.3 - 1.0 mg/dL    Albumin 4.8 3.5 - 5.7 g/dL    Protein Total 7.7 6.4 - 8.9 g/dL    Alkaline Phosphatase 68 34 - 104 U/L    ALT 9 7 - 52 U/L    AST 15 13 - 39 U/L   Magnesium   Result Value Ref Range    Magnesium 2.0 1.9 - 2.7 mg/dL   D-Dimer (HI,GH)   Result Value Ref Range    D-Dimer ng/mL <200 0 - 230 ng/ml D-DU   Nt probnp inpatient (BNP)   Result Value Ref Range    N-Terminal Pro BNP Inpatient 133 (H) 0 - 100 pg/mL   XR Chest Port 1 View    Narrative    PROCEDURE:  XR CHEST PORT 1 VW    HISTORY:  chest pain.     COMPARISON:  10/2/2019    FINDINGS:   The cardiac silhouette is normal in size. The pulmonary vasculature is  normal.  Scarring in the right midlung is unchanged. No pleural  effusion or pneumothorax. There are postoperative changes from median  sternotomy.      Impression    IMPRESSION:  No acute cardiopulmonary disease.      MAYO MCCABE MD   Troponin GH   Result Value Ref Range    Troponin <2.3 <34.0 pg/mL       Medications   lactated ringers BOLUS 1,000 mL (0 mLs Intravenous Stopped 10/25/19 1337)     Followed by   lactated ringers infusion (0 mLs Intravenous Stopped 10/25/19 1525)   morphine (PF) injection 1-2 mg (2 mg Intravenous Given 10/25/19 1335)   ondansetron (ZOFRAN) injection 4 mg (has no administration in time range)   aspirin (ASA) chewable tablet 324 mg (324 mg Oral Given 10/25/19 1216)   pantoprazole (PROTONIX) 40 mg IV push injection (40 mg Intravenous Given 10/25/19 1334)   alum & mag hydroxide-simethicone (MYLANTA ES/MAALOX  ES) suspension 15 mL (15 mLs Oral Given 10/25/19 0780)     And   lidocaine (XYLOCAINE) 2 %  solution 15 mL (15 mLs Mouth/Throat Given 10/25/19 1334)   sucralfate (CARAFATE) tablet 1 g (1 g Oral Given 10/25/19 1334)       Assessments & Plan (with Medical Decision Making)     I have reviewed the nursing notes.    I have reviewed the findings, diagnosis, plan and need for follow up with the patient.      Discharge Medication List as of 10/25/2019  3:25 PM          Final diagnoses:   Atypical chest pain   Diffuse esophageal spasm   Gastroesophageal reflux disease   Afebrile.  Vital signs stable.  Patient with a strong history of coronary artery disease.  Was seen approximately a month ago for chest pain which showed serial normal troponins and evaluation at [facility] and diagnosed with left lingular pneumonia.  Since that time she has had continued waxing and waning of chest pain.  Today it seemed to be persistent and started this morning.  She had taken 6 nitro without improvement.  IV established she was given fluids aspirin and Zofran as well as morphine initially.  EKG shows sinus bradycardia with a heart rate of 52.  Initial troponin is normal.  D-dimer is normal.  Her BNP is slightly elevated at 133 but well within her normal range.  CBC shows normal white blood cells no left shift.  CMP is unremarkable.  Her magnesium is 2.0.  INR 0.92.  Her chest x-ray shows no signs of continued pneumonia and no acute findings.  Her pain continued and she was given Protonix, Carafate, and a GI cocktail.  This helped with her atypical chest pain and she currently rates her pain as 0 on a 1-10 scale.  90-minute troponin returns normal as well.  This is reassuring.  I feel given the presentation that the most likely this is GERD with some esophageal spasming.  Patient does report she has prescriptions for Carafate and Prilosec but she has not been taking these.  She was encouraged to continue to take these over the weekend.  Close follow-up discussed with the patient given her coronary artery disease.  Close follow-up  was arranged with Inga Smith PA-C on Monday, 10/28/2019 at 8 AM.  Follow-up sooner if there is any other concerns problems or questions.  10/25/2019   [facility]     DaveSanta Fe Indian HospitalMikel shah PA-C  10/25/19 0587

## 2019-10-25 NOTE — TELEPHONE ENCOUNTER
Patient called requesting an appointment with Juanita Wang or Dr. Gramajo.  Neither of them were available.  When asked what she needed the appointment for she stated that she has a history of heart attacks and stent placement.  She was experiencing numbness in her left arm that was gradually getting worse.  Attempted to transfer her to cardiology nurse to discuss but patient refused and stated that she was going to get a hold of a friend and come to the ER.  Radha Urena on 10/25/2019 at 8:55 AM

## 2019-11-01 ENCOUNTER — TELEPHONE (OUTPATIENT)
Dept: FAMILY MEDICINE | Facility: OTHER | Age: 64
End: 2019-11-01

## 2019-11-01 ENCOUNTER — TRANSFERRED RECORDS (OUTPATIENT)
Dept: HEALTH INFORMATION MANAGEMENT | Facility: OTHER | Age: 64
End: 2019-11-01

## 2019-11-01 NOTE — TELEPHONE ENCOUNTER
States he did not receive all pages and would like to have imaging report faxed again.  Fax # [phone]

## 2019-11-03 ENCOUNTER — HEALTH MAINTENANCE LETTER (OUTPATIENT)
Age: 64
End: 2019-11-03

## 2019-11-06 NOTE — TELEPHONE ENCOUNTER
Needs MRI refaxed in order to proceed with the back brace prescription. He got page one but not page two. Refaxed.  Missy Bains CMA([facility])..................11/6/2019   3:20 PM

## 2019-11-13 ENCOUNTER — OFFICE VISIT (OUTPATIENT)
Dept: FAMILY MEDICINE | Facility: OTHER | Age: 64
End: 2019-11-13
Attending: FAMILY MEDICINE
Payer: MEDICARE

## 2019-11-13 ENCOUNTER — TELEPHONE (OUTPATIENT)
Dept: FAMILY MEDICINE | Facility: OTHER | Age: 64
End: 2019-11-13

## 2019-11-13 VITALS
HEART RATE: 60 BPM | OXYGEN SATURATION: 96 % | RESPIRATION RATE: 16 BRPM | TEMPERATURE: 96.7 F | DIASTOLIC BLOOD PRESSURE: 70 MMHG | SYSTOLIC BLOOD PRESSURE: 120 MMHG

## 2019-11-13 DIAGNOSIS — I25.10 PRESENCE OF STENT IN CORONARY ARTERY IN PATIENT WITH CORONARY ARTERY DISEASE: ICD-10-CM

## 2019-11-13 DIAGNOSIS — Z95.5 PRESENCE OF STENT IN CORONARY ARTERY IN PATIENT WITH CORONARY ARTERY DISEASE: ICD-10-CM

## 2019-11-13 DIAGNOSIS — I10 ESSENTIAL HYPERTENSION: ICD-10-CM

## 2019-11-13 DIAGNOSIS — M75.42 ROTATOR CUFF IMPINGEMENT SYNDROME OF LEFT SHOULDER: ICD-10-CM

## 2019-11-13 DIAGNOSIS — G89.29 CHRONIC BILATERAL LOW BACK PAIN WITHOUT SCIATICA: ICD-10-CM

## 2019-11-13 DIAGNOSIS — Z79.899 CONTROLLED SUBSTANCE AGREEMENT SIGNED: ICD-10-CM

## 2019-11-13 DIAGNOSIS — G89.29 CHEST WALL PAIN, CHRONIC: ICD-10-CM

## 2019-11-13 DIAGNOSIS — J44.9 CHRONIC OBSTRUCTIVE PULMONARY DISEASE, UNSPECIFIED COPD TYPE (H): ICD-10-CM

## 2019-11-13 DIAGNOSIS — M54.50 CHRONIC BILATERAL LOW BACK PAIN WITHOUT SCIATICA: ICD-10-CM

## 2019-11-13 DIAGNOSIS — G89.4 CHRONIC PAIN DISORDER: Primary | ICD-10-CM

## 2019-11-13 DIAGNOSIS — R07.89 CHEST WALL PAIN, CHRONIC: ICD-10-CM

## 2019-11-13 DIAGNOSIS — F41.9 CHRONIC ANXIETY: ICD-10-CM

## 2019-11-13 DIAGNOSIS — M15.0 PRIMARY OSTEOARTHRITIS INVOLVING MULTIPLE JOINTS: ICD-10-CM

## 2019-11-13 DIAGNOSIS — F41.1 ANXIETY STATE: ICD-10-CM

## 2019-11-13 PROCEDURE — 99214 OFFICE O/P EST MOD 30 MIN: CPT | Performed by: FAMILY MEDICINE

## 2019-11-13 PROCEDURE — G0463 HOSPITAL OUTPT CLINIC VISIT: HCPCS

## 2019-11-13 RX ORDER — METOPROLOL TARTRATE 50 MG
50 TABLET ORAL 2 TIMES DAILY
Qty: 180 TABLET | Refills: 3 | Status: SHIPPED | OUTPATIENT
Start: 2019-11-13 | End: 2020-01-14

## 2019-11-13 RX ORDER — AMLODIPINE BESYLATE 10 MG/1
10 TABLET ORAL DAILY
Qty: 90 TABLET | Refills: 3 | Status: SHIPPED | OUTPATIENT
Start: 2019-11-13 | End: 2020-01-14

## 2019-11-13 RX ORDER — METOPROLOL TARTRATE 50 MG
50 TABLET ORAL 2 TIMES DAILY
Qty: 180 TABLET | Refills: 3 | Status: SHIPPED | OUTPATIENT
Start: 2019-11-13 | End: 2019-11-13

## 2019-11-13 RX ORDER — GABAPENTIN 600 MG/1
600 TABLET ORAL 3 TIMES DAILY
Qty: 270 TABLET | Refills: 2 | Status: SHIPPED | OUTPATIENT
Start: 2019-11-13 | End: 2019-12-11

## 2019-11-13 RX ORDER — CYCLOBENZAPRINE HCL 10 MG
10 TABLET ORAL 2 TIMES DAILY PRN
Qty: 42 TABLET | Refills: 3 | Status: CANCELLED | OUTPATIENT
Start: 2019-11-13

## 2019-11-13 RX ORDER — CLOPIDOGREL BISULFATE 75 MG/1
75 TABLET ORAL DAILY
Qty: 90 TABLET | Refills: 3 | Status: SHIPPED | OUTPATIENT
Start: 2019-11-13 | End: 2019-11-13

## 2019-11-13 RX ORDER — CLOPIDOGREL BISULFATE 75 MG/1
75 TABLET ORAL DAILY
Qty: 90 TABLET | Refills: 3 | Status: SHIPPED | OUTPATIENT
Start: 2019-11-13 | End: 2020-01-14

## 2019-11-13 RX ORDER — AMLODIPINE BESYLATE 10 MG/1
10 TABLET ORAL DAILY
Qty: 90 TABLET | Refills: 3 | Status: SHIPPED | OUTPATIENT
Start: 2019-11-13 | End: 2019-11-13

## 2019-11-13 RX ORDER — SUCRALFATE 1 G/1
1 TABLET ORAL 4 TIMES DAILY
Status: ON HOLD | COMMUNITY
End: 2020-01-02

## 2019-11-13 RX ORDER — OXYCODONE AND ACETAMINOPHEN 5; 325 MG/1; MG/1
2 TABLET ORAL 4 TIMES DAILY
Qty: 224 TABLET | Refills: 0 | Status: SHIPPED | OUTPATIENT
Start: 2019-11-13 | End: 2019-12-11

## 2019-11-13 RX ORDER — GABAPENTIN 600 MG/1
600 TABLET ORAL 3 TIMES DAILY
Qty: 270 TABLET | Refills: 2 | Status: SHIPPED | OUTPATIENT
Start: 2019-11-13 | End: 2019-11-13

## 2019-11-13 ASSESSMENT — ANXIETY QUESTIONNAIRES
5. BEING SO RESTLESS THAT IT IS HARD TO SIT STILL: NOT AT ALL
1. FEELING NERVOUS, ANXIOUS, OR ON EDGE: NOT AT ALL
GAD7 TOTAL SCORE: 0
3. WORRYING TOO MUCH ABOUT DIFFERENT THINGS: NOT AT ALL
6. BECOMING EASILY ANNOYED OR IRRITABLE: NOT AT ALL
7. FEELING AFRAID AS IF SOMETHING AWFUL MIGHT HAPPEN: NOT AT ALL
2. NOT BEING ABLE TO STOP OR CONTROL WORRYING: NOT AT ALL

## 2019-11-13 ASSESSMENT — PATIENT HEALTH QUESTIONNAIRE - PHQ9
5. POOR APPETITE OR OVEREATING: NOT AT ALL
SUM OF ALL RESPONSES TO PHQ QUESTIONS 1-9: 0

## 2019-11-13 NOTE — PATIENT INSTRUCTIONS
Keep working to slowly reduce clonazepam. Once you can just use 1 mg twice daily, then work to take 1/2 pill in the morning as next reduction.    No other changes at this time    Ordered MRI of the left shoulder with injection    Continue sucralfate and omeprazole. Consider omeprazole increase if needed next month

## 2019-11-13 NOTE — TELEPHONE ENCOUNTER
"States that \"ALL\" of her medications, except her oxycodone, are [facility]. She said that [facility] needs [facility]  "

## 2019-11-14 ASSESSMENT — ANXIETY QUESTIONNAIRES: GAD7 TOTAL SCORE: 0

## 2019-11-25 ENCOUNTER — OFFICE VISIT (OUTPATIENT)
Dept: CARDIOLOGY | Facility: OTHER | Age: 64
End: 2019-11-25
Attending: NURSE PRACTITIONER
Payer: MEDICARE

## 2019-11-25 ENCOUNTER — HOSPITAL ENCOUNTER (OUTPATIENT)
Dept: GENERAL RADIOLOGY | Facility: OTHER | Age: 64
Discharge: HOME | End: 2019-11-25
Attending: NURSE PRACTITIONER | Admitting: NURSE PRACTITIONER
Payer: MEDICARE

## 2019-11-25 VITALS
TEMPERATURE: 98.2 F | BODY MASS INDEX: 29.07 KG/M2 | OXYGEN SATURATION: 94 % | HEART RATE: 56 BPM | SYSTOLIC BLOOD PRESSURE: 120 MMHG | DIASTOLIC BLOOD PRESSURE: 70 MMHG | HEIGHT: 66 IN | RESPIRATION RATE: 24 BRPM

## 2019-11-25 DIAGNOSIS — G89.29 CHRONIC NONINTRACTABLE HEADACHE, UNSPECIFIED HEADACHE TYPE: ICD-10-CM

## 2019-11-25 DIAGNOSIS — J44.9 CHRONIC OBSTRUCTIVE PULMONARY DISEASE, UNSPECIFIED COPD TYPE (H): ICD-10-CM

## 2019-11-25 DIAGNOSIS — I25.10 ASCVD (ARTERIOSCLEROTIC CARDIOVASCULAR DISEASE): Primary | ICD-10-CM

## 2019-11-25 DIAGNOSIS — J06.9 VIRAL UPPER RESPIRATORY TRACT INFECTION: ICD-10-CM

## 2019-11-25 DIAGNOSIS — Z87.891 HISTORY OF TOBACCO ABUSE: ICD-10-CM

## 2019-11-25 DIAGNOSIS — Z87.01 HISTORY OF PNEUMONIA: ICD-10-CM

## 2019-11-25 DIAGNOSIS — Z95.1 HISTORY OF CORONARY ARTERY BYPASS GRAFT X 3: ICD-10-CM

## 2019-11-25 DIAGNOSIS — Z95.5 HISTORY OF CORONARY ARTERY STENT PLACEMENT: ICD-10-CM

## 2019-11-25 DIAGNOSIS — R06.09 DOE (DYSPNEA ON EXERTION): ICD-10-CM

## 2019-11-25 DIAGNOSIS — N18.2 CKD (CHRONIC KIDNEY DISEASE) STAGE 2, GFR 60-89 ML/MIN: ICD-10-CM

## 2019-11-25 DIAGNOSIS — R68.89 ACTIVITY INTOLERANCE: ICD-10-CM

## 2019-11-25 DIAGNOSIS — R41.3 MEMORY CHANGES: ICD-10-CM

## 2019-11-25 DIAGNOSIS — G47.31 CENTRAL SLEEP APNEA: ICD-10-CM

## 2019-11-25 DIAGNOSIS — R07.9 RECURRENT CHEST PAIN: ICD-10-CM

## 2019-11-25 DIAGNOSIS — Z48.812 ENCOUNTER FOR SURGICAL AFTERCARE FOLLOWING SURGERY ON THE CIRCULATORY SYSTEM: ICD-10-CM

## 2019-11-25 DIAGNOSIS — R51.9 CHRONIC NONINTRACTABLE HEADACHE, UNSPECIFIED HEADACHE TYPE: ICD-10-CM

## 2019-11-25 DIAGNOSIS — E78.2 MIXED HYPERLIPIDEMIA: ICD-10-CM

## 2019-11-25 PROCEDURE — 99215 OFFICE O/P EST HI 40 MIN: CPT | Performed by: NURSE PRACTITIONER

## 2019-11-25 PROCEDURE — G0463 HOSPITAL OUTPT CLINIC VISIT: HCPCS

## 2019-11-25 PROCEDURE — G0463 HOSPITAL OUTPT CLINIC VISIT: HCPCS | Mod: 25

## 2019-11-25 PROCEDURE — 71046 X-RAY EXAM CHEST 2 VIEWS: CPT

## 2019-11-25 ASSESSMENT — ANXIETY QUESTIONNAIRES
5. BEING SO RESTLESS THAT IT IS HARD TO SIT STILL: NOT AT ALL
GAD7 TOTAL SCORE: 0
IF YOU CHECKED OFF ANY PROBLEMS ON THIS QUESTIONNAIRE, HOW DIFFICULT HAVE THESE PROBLEMS MADE IT FOR YOU TO DO YOUR WORK, TAKE CARE OF THINGS AT HOME, OR GET ALONG WITH OTHER PEOPLE: NOT DIFFICULT AT ALL
2. NOT BEING ABLE TO STOP OR CONTROL WORRYING: NOT AT ALL
7. FEELING AFRAID AS IF SOMETHING AWFUL MIGHT HAPPEN: NOT AT ALL
3. WORRYING TOO MUCH ABOUT DIFFERENT THINGS: NOT AT ALL
1. FEELING NERVOUS, ANXIOUS, OR ON EDGE: NOT AT ALL
6. BECOMING EASILY ANNOYED OR IRRITABLE: NOT AT ALL

## 2019-11-25 ASSESSMENT — PATIENT HEALTH QUESTIONNAIRE - PHQ9
5. POOR APPETITE OR OVEREATING: NOT AT ALL
SUM OF ALL RESPONSES TO PHQ QUESTIONS 1-9: 0

## 2019-11-25 ASSESSMENT — PAIN SCALES - GENERAL: PAINLEVEL: SEVERE PAIN (6)

## 2019-11-25 NOTE — PROGRESS NOTES
[facility] HEART CARE   CARDIOLOGY PROGRESS NOTE    Ankita Day   [id number] [address] [id number]      Ramirez Cano     Chief Complaint   Patient presents with     Follow Up     6 month follow up        HPI:   Ms. Day is a 64 year old patient who presents for cardiology follow-up to visit on 5/22/19 with recently reported chest wall pain and generalized weakness associated to anaplasmosis.     Since her last visit, patient was seen in the ED on two occasions (10/2/19, 10/25/19). At visit on 10/2/19 she was transferred to [facility] with concern for unstable angina. EKG reviewed with no ischemic changes and no elevation in troponin, it was my understanding that she was transferred to [facility] with concern for ACS given her chest pain and extensive cardiac history.  She was admitted to [facility] with serial trop's negative. EKG stable with no ischemic changes and chest XR did reveal an infiltrate in the left lung base and lingula. She was treated for community acquired pneumonia. Repeat TTE with normal LVEF and no RWMA, no significant valvular disease. Patient returned to ED on 10/25/19 with reports of chest pain, again serial trop's negative and EKG stable with no ischemic changes. Symptoms where improved with GI cocktail and it was suspected symptoms where more appropriately representing gastrointestinal/ GERD.     Patient underwent  coronary angiogram and bypass angiogram on 9/15/2017 which was described as having stable disease. Mild to moderate 3 vessel CAD involving RCA, LAD and LCX with <50% stenosis at the greatest.  Occluded RAFI and SVG.  Patent LIMA.  No new obstructive lesions were identified and normal left heart cath.      She has a history of CABG on 2/12/03 x 3, history of multiple stent placements, patient reported 17 total.  Additionally, she has a history of tobacco abuse, sleep apnea, anxiety/depression, COPD, hypertension and history of left subclavian steal syndrome  "involving the LIMA status post stent placement.    Today patient reports occasional recurrence of chest pain, not as severe as last ED visit on 10/25. She reports \"my breathing has been bad\", describes worsening CORTEZ. She describes activity intolerance and little to no energy. Describes not having energy to make the bed or make dinner. She has not reported any lightheadedness or syncope. No palpitations. No edema. Additionally, reports concern for recurrent pneumonia. Cold symptoms returned this past Thursday, with productive cough and now same similar symptoms to when she was recently diagnosed with pneumonia. Denies any fever or chills with current illness.     PAST MEDICAL HISTORY:   Past Medical History:   Diagnosis Date     Acute ischemic heart disease (H)     06/15/2007,with PTCA and stenting of 90% osteo circumflex lesion and patent LAD graft, patent left main stent.     Acute myocardial infarction (H)     3/30/2013     Anxiety disorder     No Comments Provided     Atherosclerotic heart disease of native coronary artery without angina pectoris     -angio revealed 3 vessel dz  -4 stents placed; 2 overlapping stents placed in mLAD, 1 stent pRCA, 1 stent pCirc 1 s 9/02 -non-ST elevation MI 1/03  -angio revealed restenosis of Circ.    -PTCA and brachytherapy of pCirc -repeat angio 2/03 -no intervension -CABG x3 12/03 - Dr Sinclair  -LIMA-LAD, RAFI-RCA, SVG-OM -PCI 7/04 stent to L -CTangio 9/05   -patentent LIMA-LAD. RAFI-RCA occluded; RCA ostio/p*     Bilateral carpal tunnel syndrome     No Comments Provided     Cervicalgia     No Comments Provided     Chest pain     12/29/2014     Chronic gastric ulcer without hemorrhage or perforation     10/03/2011,hx of GI bleed (2003)     Chronic ischemic heart disease     06/27/2012     Chronic obstructive pulmonary disease (H)     06/15/2007,low DLCO, normal spirometry     Chronic or unspecified gastric ulcer with hemorrhage     10/2003     Chronic pain syndrome     " 12/01/2010,chest wall, back     Constipation     11/29/2011     Coronary angioplasty status     09/12/2002,with triple stenting     Coronary angioplasty status     01/10/2003,repeat angioplasty     Coronary angioplasty status     No Comments Provided     Dorsalgia     05/31/2011     Encounter for other administrative examinations     1/28/2014     Encounter for screening for cardiovascular disorders     10/2004,[facility] (2004, 2005, 2006 and 2011)     Enterocolitis due to Clostridium difficile     8/19/2016     Essential (primary) hypertension     No Comments Provided     Hyperlipidemia     No Comments Provided     Major depressive disorder, single episode     Severe, hx of suicide attempt/hospitalization     Migraine without status migrainosus, not intractable     No Comments Provided     Nodular corneal degeneration     09/26/2011     Noninfective gastroenteritis and colitis     06/15/2007,history of     Noninfective gastroenteritis and colitis     Microcytic     Osteoarthritis     No Comments Provided     Other chest pain     10/13/2009,chronic     Pain in knee     05/31/2011     Pain in right shoulder     No Comments Provided     Panic disorder without agoraphobia     No Comments Provided     Peptic ulcer without hemorrhage or perforation     6/15/2007     Peripheral vascular disease (H)     No Comments Provided     Personal history of diseases of the blood and blood-forming organs and certain disorders involving the immune mechanism (CODE)     No Comments Provided     Personal history of nicotine dependence     No Comments Provided     Personal history of other medical treatment (CODE)     10/14/2013     Presence of aortocoronary bypass graft     2/12/2003     Primary central sleep apnea     10/14/2013     Sepsis due to Escherichia coli (E. coli) (H)     7/14/16,[facility]     Stricture of artery (H)     3/31/2013,S/p prox left SCA stent 4/1/2013     Thoracic, thoracolumbar and lumbosacral intervertebral disc  disorder     No Comments Provided     Uncomplicated opioid abuse (H)     history of     Vitamin D deficiency     2013          FAMILY HISTORY:   Family History   Problem Relation Age of Onset     Heart Disease Father         Heart Disease,Heart condition/Significant for atherosclerotic cardiovascular disease, but non premature.     Colon Cancer Father         Cancer-colon, of colon cancer     Cancer Father         Cancer,mets to liver, secondary to colon cancer     Heart Disease Mother         Heart Disease     Cancer Other         Cancer,Multiple Myeloma     Heart Disease Other         Heart Disease,Ischemic Heart Disease     Colon Cancer Other         Cancer-colon,Malignant neoplasms     Cancer Sister         Cancer,multiple myeloma     Other - See Comments Son         gallstones          PAST SURGICAL HISTORY:   Past Surgical History:   Procedure Laterality Date     ANGIOPLASTY      02,with triple stenting     APPENDECTOMY OPEN      No Comments Provided     ARTHROSCOPY KNEE      left     ARTHROSCOPY SHOULDER Right 2017    labral tear, rotator cuff tear and some subacromial decompression      BYPASS GRAFT ARTERY CORONARY      02,Triple bypass, left internal mammary  to LAD, right internal mammary to right coronary artery, saphenous to obtuse marginal of the left circumflex.     COLONOSCOPY      ,Dr Bowman benign polyps     COLONOSCOPY  10/03/2011    2011,benign polyps, Dr. Bowman     COLONOSCOPY  2016,normal, Dr Bowman     ELBOW SURGERY      baby,birth malachi removed from right arm     EMBOLECTOMY UPPER EXTREMITY  2013    brachial artery pseudoaneurysm after stenting     ESOPHAGOSCOPY, GASTROSCOPY, DUODENOSCOPY (EGD), COMBINED      ,EGD Dr Bowman with pyloric ulcer     ESOPHAGOSCOPY, GASTROSCOPY, DUODENOSCOPY (EGD), COMBINED      ,pyloric ulcer, Dr. Bowman     ESOPHAGOSCOPY, GASTROSCOPY, DUODENOSCOPY (EGD), COMBINED      16,mild gastritis, Dr Bowman      ESOPHAGOSCOPY, GASTROSCOPY, DUODENOSCOPY (EGD), COMBINED      2017,Dr Bowman. Antral ulcer     ESOPHAGOSCOPY, GASTROSCOPY, DUODENOSCOPY (EGD), COMBINED  2018    Dr Bowman, healed ulcer     HYSTERECTOMY TOTAL ABDOMINAL      age 21     LAPAROSCOPIC CHOLECYSTECTOMY      2006     OSTEOTOMY FEMUR DISTAL      x3, right knee     OSTEOTOMY FEMUR DISTAL      2000,left knee  ligament surgery     OTHER SURGICAL HISTORY      1/10/2003,,PTCA     OTHER SURGICAL HISTORY      2012,,PTCA,DELONTE in LAD and left main     OTHER SURGICAL HISTORY      2013,,PTCA,L subclavian stenosis     SALPINGO-OOPHORECTOMY BILATERAL      age 27,Bilateral salpingo-oophorectomy     TONSILLECTOMY, ADENOIDECTOMY, COMBINED      childhood          SOCIAL HISTORY:   Social History     Socioeconomic History     Marital status:      Spouse name: None     Number of children: None     Years of education: None     Highest education level: None   Occupational History     None   Social Needs     Financial resource strain: None     Food insecurity:     Worry: None     Inability: None     Transportation needs:     Medical: None     Non-medical: None   Tobacco Use     Smoking status: Former Smoker     Packs/day: 0.25     Years: 35.00     Pack years: 8.75     Types: Cigarettes     Last attempt to quit: 2/15/2017     Years since quittin.2     Smokeless tobacco: Never Used   Substance and Sexual Activity     Alcohol use: Yes     Alcohol/week: 0.0 oz     Comment: Alcoholic Drinks/day: rare     Drug use: No     Comment: Drug use: No     Sexual activity: Never     Partners: Male   Lifestyle     Physical activity:     Days per week: None     Minutes per session: None     Stress: None   Relationships     Social connections:     Talks on phone: None     Gets together: None     Attends Restorationist service: None     Active member of club or organization: None     Attends meetings of clubs or organizations: None     Relationship status:  None     Intimate partner violence:     Fear of current or ex partner: None     Emotionally abused: None     Physically abused: None     Forced sexual activity: None   Other Topics Concern     Parent/sibling w/ CABG, MI or angioplasty before 65F 55M? Not Asked   Social History Narrative    ,  Steven.  Currently not working outside the home. Lives three-mile self [place] met with . Tobacco abuse, quit 2001, restarted. Quit 2012 and has since quit, no alcohol.          CURRENT MEDICATIONS:   Prior to Admission medications    Medication Sig Start Date End Date Taking? Authorizing Provider   albuterol (PROAIR HFA/PROVENTIL HFA/VENTOLIN HFA) 108 (90 Base) MCG/ACT inhaler Inhale 2 puffs into the lungs every 6 hours 1/23/19 1/23/20 Yes Ramirez Cano MD   amLODIPine (NORVASC) 10 MG tablet Take 1 tablet (10 mg) by mouth daily 11/21/18  Yes Ramirez Cano MD   aspirin EC 81 MG EC tablet Take 81 mg by mouth daily with food   Yes Reported, Patient   busPIRone (BUSPAR) 10 MG tablet TAKE 1 TABLET TWICE A DAY 11/7/18  Yes Ramirez Cano MD   clonazePAM (KLONOPIN) 1 MG tablet Take 1 tablet (1 mg) by mouth 3 times daily as needed for anxiety #75 per 30 days, #225 pills per 90 days 4/3/19  Yes Ramirez Cano MD   clopidogrel (PLAVIX) 75 MG tablet Take 1 tablet (75 mg) by mouth daily 11/21/18  Yes Ramirez Cano MD   cyclobenzaprine (FLEXERIL) 10 MG tablet Take 1 tablet (10 mg) by mouth 2 times daily as needed for muscle spasms 11/21/18  Yes Ramirez Cano MD   Diclofenac Sodium 1.5 % SOLN Apply 20 drops to each hand or 40 drops to each knee up to 4 times daily as needed for arthritis pain 11/21/18  Yes Ramirez Cano MD   docusate sodium (COLACE) 50 MG capsule Take 50 mg by mouth daily   Yes Reported, Patient   doxycycline hyclate (VIBRAMYCIN) 100 MG capsule Take 1 capsule (100 mg) by mouth 2 times daily for 14 days 5/13/19 5/27/19 Yes Mikel Ashraf PA-C   DULoxetine (CYMBALTA) 60  MG EC capsule Take 1 capsule (60 mg) by mouth daily 8/23/18  Yes Ramirez Cano MD   gabapentin (NEURONTIN) 600 MG tablet Take 1 tablet (600 mg) by mouth 3 times daily 1/23/19  Yes Ramirez Cano MD   lisinopril (PRINIVIL/ZESTRIL) 40 MG tablet Take 1 tablet (40 mg) by mouth daily 4/23/19  Yes Ramirez Cano MD   metoprolol tartrate (LOPRESSOR) 50 MG tablet Take 1 tablet (50 mg) by mouth 2 times daily 11/21/18  Yes Ramirez Cano MD   NITROSTAT 0.3 MG sublingual tablet PLACE 1 TABLET UNDER THE TONGUE EVERY 5 MINUTES AS NEEDED FOR CHEST PAIN 4/9/18  Yes Ramirez Cano MD   ondansetron (ZOFRAN) 4 MG tablet Take 4 mg by mouth every 6 hours as needed for nausea 10/31/17  Yes Reported, Patient   oxyCODONE-acetaminophen (PERCOCET) 5-325 MG tablet Take 2 tablets by mouth 4 times daily Max acetaminophen dose: 4000mg in 24 hrs 5/23/19  Yes Ramirez Cano MD   pantoprazole (PROTONIX) 40 MG EC tablet TAKE 1 TABLET TWICE A DAY 1/22/19  Yes Ramirez Cano MD   pravastatin (PRAVACHOL) 40 MG tablet Take 1 tablet (40 mg) by mouth At Bedtime 1/23/19  Yes Ramirez Cano MD   saccharomyces boulardii (FLORASTOR) 250 MG capsule Take 250 mg by mouth 2 times daily   Yes Reported, Patient   sucralfate (CARAFATE) 1 GM tablet Take 1 tablet (1 g) by mouth 4 times daily Before meals & at bedtime 8/23/18  Yes Ramirez Cano MD   VITAMIN D, CHOLECALCIFEROL, PO Take 5,000 Units by mouth daily   Yes Reported, Patient          ALLERGIES:   Allergies   Allergen Reactions     Atorvastatin Muscle Pain (Myalgia)     Liquid Adhesive Rash     Other reaction(s): Erythema  Silk and plastic gloves (long term attachment of adhesives)     Tiotropium Bromide [Tiotropium] Rash     Ezetimibe Muscle Pain (Myalgia)     Niacin      Other reaction(s): Flushing  flushing     Rosuvastatin Muscle Pain (Myalgia)     Other reaction(s): Myalgia     Latex Rash     No Clinical Screening - See Comments Rash, Blisters and Itching     Metals  "and plastic  Metals and plastics       Tape [Adhesive Tape] Rash          ROS:   CONSTITUTIONAL: Positive for recent fever and chills. No changes in weight. Positive for energy loss and activity intolerance.   ENT: No visual disturbance, ear ache, epistaxis or sore throat.   CARDIOVASCULAR: Describes recurrent chest pains. No palpitations or lower extremity edema.   RESPIRATORY: Chronic shortness of breath with worsening exertional dyspnea. Positive for productive cough, no wheezing or hemoptysis.   GI: No reported abdominal pain.   : No reported hematuria or dysuria.   NEUROLOGICAL: Positive for chronic headaches. No lightheadedness, dizziness, syncope, ataxia, paresthesias or weakness.   HEMATOLOGIC: No history of anemia. No bleeding or excessive bruising. No history of blood clots.   MUSCULOSKELETAL: No reported joint pain or swelling, myalgias resolved.    ENDOCRINOLOGIC: No temperature intolerance. No hair or skin changes.  SKIN: No abnormal rashes or sores, no unusual itching.  PSYCHIATRIC: Positive for history of anxiety and depression.      PHYSICAL EXAM:   /70 (BP Location: Right arm, Patient Position: Sitting, Cuff Size: Adult Regular)   Pulse 56   Temp 98.2  F (36.8  C) (Tympanic)   Resp 24   Ht 1.676 m (5' 5.98\")   SpO2 94%   BMI 29.07 kg/m    GENERAL: The patient is a well-developed, well-nourished, in no apparent distress.  HEENT: Head is normocephalic and atraumatic. Eyes are symmetrical with normal visual tracking. No icterus, no xanthelasmas. Nares appeared normal without nasal drainage. Mucous membranes are moist, no cyanosis.  NECK: Supple. Mild carotid bruit on left.  CHEST/ LUNGS: Lungs diminished over bases. No rales, rhonchi or wheezes, no use of accessory muscles, no retractions, respirations unlabored and normal respiratory rate.   CARDIO: Regular rate and rhythm normal with S1 and S2, no S3 or S4 and no murmur, click or rub.   ABD: Abdomen is nondistended.   EXTREMITIES: No LE " edema present.   MUSCULOSKELETAL: No visible joint swelling.   NEUROLOGIC: Alert and oriented X3. Normal speech, gait and affect. No focal neurologic deficits.   SKIN: No jaundice. No rashes or visible skin lesions present. No ecchymosis.       LAB RESULTS:   Office Visit on 05/13/2019   Component Date Value Ref Range Status     Troponin I ES 05/13/2019 <0.030  0.000 - 0.034 ug/L Final     A Phagocytophil IgG 05/13/2019 >1:1,280* <1:80 Final     A Phagocytophil IgM 05/13/2019 > 1:256  <1:16 Final     Lyme Disease Antibodies Serum 05/13/2019 0.10  0.00 - 0.89 Final     Sodium 05/13/2019 137  134 - 144 mmol/L Final     Potassium 05/13/2019 3.4* 3.5 - 5.1 mmol/L Final     Chloride 05/13/2019 103  98 - 107 mmol/L Final     Carbon Dioxide 05/13/2019 23  21 - 31 mmol/L Final     Anion Gap 05/13/2019 11  3 - 14 mmol/L Final     Glucose 05/13/2019 117* 70 - 105 mg/dL Final     Urea Nitrogen 05/13/2019 23  7 - 25 mg/dL Final     Creatinine 05/13/2019 1.07  0.60 - 1.20 mg/dL Final     GFR Estimate 05/13/2019 51* >60 mL/min/[1.73_m2] Final     GFR Estimate If Black 05/13/2019 62  >60 mL/min/[1.73_m2] Final     Calcium 05/13/2019 9.3  8.6 - 10.3 mg/dL Final     Bilirubin Total 05/13/2019 1.1* 0.3 - 1.0 mg/dL Final     Albumin 05/13/2019 4.3  3.5 - 5.7 g/dL Final     Protein Total 05/13/2019 7.3  6.4 - 8.9 g/dL Final     Alkaline Phosphatase 05/13/2019 107* 34 - 104 U/L Final     ALT 05/13/2019 37  7 - 52 U/L Final     AST 05/13/2019 39  13 - 39 U/L Final     WBC 05/13/2019 8.2  4.0 - 11.0 10e9/L Final     RBC Count 05/13/2019 3.91  3.8 - 5.2 10e12/L Final     Hemoglobin 05/13/2019 11.8  11.7 - 15.7 g/dL Final     Hematocrit 05/13/2019 35.0  35.0 - 47.0 % Final     MCV 05/13/2019 90  78 - 100 fl Final     MCH 05/13/2019 30.2  26.5 - 33.0 pg Final     MCHC 05/13/2019 33.7  31.5 - 36.5 g/dL Final     RDW 05/13/2019 14.0  10.0 - 15.0 % Final     Platelet Count 05/13/2019 190  150 - 450 10e9/L Final     Diff Method 05/13/2019  Automated Method   Final     % Neutrophils 05/13/2019 71.4  % Final     % Lymphocytes 05/13/2019 20.9  % Final     % Monocytes 05/13/2019 6.4  % Final     % Eosinophils 05/13/2019 0.5  % Final     % Basophils 05/13/2019 0.2  % Final     % Immature Granulocytes 05/13/2019 0.6  % Final     Absolute Neutrophil 05/13/2019 5.8  1.6 - 8.3 10e9/L Final     Absolute Lymphocytes 05/13/2019 1.7  0.8 - 5.3 10e9/L Final     Absolute Monocytes 05/13/2019 0.5  0.0 - 1.3 10e9/L Final     Absolute Eosinophils 05/13/2019 0.0  0.0 - 0.7 10e9/L Final     Absolute Basophils 05/13/2019 0.0  0.0 - 0.2 10e9/L Final     Abs Immature Granulocytes 05/13/2019 0.1  0 - 0.4 10e9/L Final     Platelet Estimate 05/13/2019 Automated count confirmed.  Platelet morphology is normal.   Final     RBC Morphology 05/13/2019 Consistent with reported results   Final     Specimen Description 05/13/2019 Blood   Final     Culture Micro 05/13/2019 No growth after 6 days   Final          ASSESSMENT:   Ankita Day presents for cardiology follow-up to visit on 5/22/19 with recently reported chest wall pain and generalized weakness associated to anaplasmosis. Since her last visit, patient was seen in the ED on two occasions with recurrent chest pain (10/2/19, 10/25/19), she was found to have left sided pneumonia and suspected GERD on second ED visit, no evidence of ACS.     PLAN:  1. ASCVD (arteriosclerotic cardiovascular disease)  History of 3V CABG (2003) and history of coronary stenting.   Recurrent chest pains, see notes. Also describes progressive CORTEZ and activity intolerance.   Recommended Lexiscan stress test to assess for any myocardial ischemia.  Additional orders for carotid US and abdominal aorta scan given her history of ASCVD.   If results of lexiscan stress test stable with no evidence of reversible ischemia, consider starting ranexa for chronic stable angina. If abnormal myocardial perfusion/ reversible perfusion deficit suggestive of ischemia-  "patient will require referral for repeat coronary angiography.       - NM Lexiscan stress test; Future  - US Carotid Bilateral; Future  - US Aorta Medicare AAA Screening; Future    2. Activity intolerance  See above.   - NM Lexiscan stress test; Future    3. CORTEZ (dyspnea on exertion)  See above.   Likely mulifactorial with chronic COPD.    - NM Lexiscan stress test; Future    4. History of coronary artery stent placement  Continue on ASA and Plavix.  She has been on moderate intensity statin. Consider adjusting to high intensity statin therapy for graft and stent protection.     - NM Lexiscan stress test; Future    5. Recurrent chest pain  - NM Lexiscan stress test; Future    6. Chronic obstructive pulmonary disease, unspecified COPD type (H)  Continued management by PCP.     7. Viral upper respiratory tract infection  Recent left lower lobe pneumonia, given return of symptoms, recommended repeat chest XR.   Suspect likely viral.   No fever or chills.     - X-ray Chest 2 vws*; Future    8. Mixed hyperlipidemia  She has been on moderate intensity statin. Consider adjusting to high intensity statin therapy for graft and stent protection.     9. History of coronary artery bypass graft x 3  See above.   - NM Lexiscan stress test; Future    10. Memory changes  She will follow up with PCP in regards to possible early onset dementia, reports sister with similar symptoms.     - US Carotid Bilateral; Future    11. CKD (chronic kidney disease) stage 2, GFR 60-89 ml/min  Stable.     12. History of tobacco abuse      13. Central sleep apnea  Not complaint with CPAP therapy. Chronic headaches likely secondary to her sleep apnea which has been untreated.   Patient reports \"allergic to CPAP\".    14. Chronic nonintractable headache, unspecified headache type  Follow-up with PCP in regards to this.     15. History of pneumonia  - X-ray Chest 2 vws*; Future    Follow-up with cardiology in 4 weeks to review results of testing, " certainly sooner if needed.     Thank you for allowing me to participate in the care of your patient. Please do not hesitate to contact me if you have any questions.     Juanita Wang

## 2019-11-25 NOTE — NURSING NOTE
"Chief Complaint   Patient presents with     Follow Up     6 month follow up       Initial /70 (BP Location: Right arm, Patient Position: Sitting, Cuff Size: Adult Regular)   Pulse 56   Temp 98.2  F (36.8  C) (Tympanic)   Resp 24   Ht 1.676 m (5' 5.98\")   SpO2 94%   BMI 29.07 kg/m   Estimated body mass index is 29.07 kg/m  as calculated from the following:    Height as of this encounter: 1.676 m (5' 5.98\").    Weight as of 10/25/19: 81.6 kg (180 lb).  Meds Reconciled: complete  Pt is on Aspirin  Pt is on a Statin  PHQ and/or YAYA reviewed. Pt referred to PCP/MH Provider as appropriate.    Taylor Owusu LPN      "

## 2019-11-25 NOTE — PATIENT INSTRUCTIONS
You were seen by  JOSELYN Kilpatrcik CNP      1. Chest x-ray today     2. lexsican stress test - they will call you to set up    3. Carotid ultrasound - they will call you to set up    4. Abdominal Aorta ultrasound - they will call you to set up      You will follow up with [facility] in 4 weeks, sooner if needed.       Please call the cardiology office with problems, questions, or concerns at [phone].    If you experience chest pain, chest pressure, chest tightness, shortness of breath, fainting, lightheadedness, nausea, vomiting, or other concerning symptoms, please report to the Emergency Department or call 911. These symptoms may be emergent, and best treated in the Emergency Department.     Cardiology Nurses  VIOLET Starks, MAULIK WRAY, MAULIK  [facility] ([address] [facility])  [phone]

## 2019-11-26 ASSESSMENT — ANXIETY QUESTIONNAIRES: GAD7 TOTAL SCORE: 0

## 2019-12-04 ENCOUNTER — OFFICE VISIT (OUTPATIENT)
Dept: ORTHOPEDICS | Facility: OTHER | Age: 64
End: 2019-12-04
Attending: ORTHOPAEDIC SURGERY
Payer: MEDICARE

## 2019-12-04 ENCOUNTER — HOSPITAL ENCOUNTER (OUTPATIENT)
Dept: MRI IMAGING | Facility: OTHER | Age: 64
Discharge: HOME | End: 2019-12-04
Attending: FAMILY MEDICINE | Admitting: FAMILY MEDICINE
Payer: MEDICARE

## 2019-12-04 ENCOUNTER — HOSPITAL ENCOUNTER (OUTPATIENT)
Dept: GENERAL RADIOLOGY | Facility: OTHER | Age: 64
End: 2019-12-04
Attending: FAMILY MEDICINE
Payer: MEDICARE

## 2019-12-04 DIAGNOSIS — M75.42 ROTATOR CUFF IMPINGEMENT SYNDROME OF LEFT SHOULDER: ICD-10-CM

## 2019-12-04 DIAGNOSIS — Z00.00 ROUTINE GENERAL MEDICAL EXAMINATION AT A HEALTH CARE FACILITY: Primary | ICD-10-CM

## 2019-12-04 PROCEDURE — 25500064 ZZH RX 255 OP 636: Performed by: RADIOLOGY

## 2019-12-04 PROCEDURE — A9575 INJ GADOTERATE MEGLUMI 0.1ML: HCPCS | Performed by: RADIOLOGY

## 2019-12-04 PROCEDURE — 23350 INJECTION FOR SHOULDER X-RAY: CPT

## 2019-12-04 PROCEDURE — 25000125 ZZHC RX 250: Performed by: RADIOLOGY

## 2019-12-04 PROCEDURE — G0463 HOSPITAL OUTPT CLINIC VISIT: HCPCS | Mod: 25

## 2019-12-04 PROCEDURE — G0463 HOSPITAL OUTPT CLINIC VISIT: HCPCS

## 2019-12-04 PROCEDURE — 73222 MRI JOINT UPR EXTREM W/DYE: CPT | Mod: LT

## 2019-12-04 RX ORDER — LIDOCAINE HYDROCHLORIDE 10 MG/ML
2 INJECTION, SOLUTION EPIDURAL; INFILTRATION; INTRACAUDAL; PERINEURAL ONCE
Status: COMPLETED | OUTPATIENT
Start: 2019-12-04 | End: 2019-12-04

## 2019-12-04 RX ADMIN — LIDOCAINE HYDROCHLORIDE 2 ML: 10 INJECTION, SOLUTION INFILTRATION; PERINEURAL at 10:25

## 2019-12-04 RX ADMIN — IOHEXOL 5 ML: 240 INJECTION, SOLUTION INTRATHECAL; INTRAVASCULAR; INTRAVENOUS; ORAL at 10:24

## 2019-12-04 RX ADMIN — GADOTERATE MEGLUMINE 0.1 ML: 376.9 INJECTION INTRAVENOUS at 12:38

## 2019-12-09 RX ORDER — GADOTERATE MEGLUMINE 376.9 MG/ML
10 INJECTION INTRAVENOUS ONCE
Status: COMPLETED | OUTPATIENT
Start: 2019-12-04 | End: 2019-12-04

## 2019-12-10 NOTE — PROGRESS NOTES
VITALS:   Height:   66  Weight:   180    CHIEF COMPLAINT: Left Shoulder Pain     PROBLEMS:   Patient has no noted problems.    PATIENT REPORTED MEDICATIONS:  SUCRALFATE 1 GM ORAL TABLET (SUCRALFATE) ; Route: ORAL  FLORASTOR 250 MG ORAL CAPSULE (SACCHAROMYCES BOULARDII) ; Route: ORAL  PRAVASTATIN SODIUM 40 MG ORAL TABLET (PRAVASTATIN SODIUM) ; Route: ORAL  OXYCODONE-ACETAMINOPHEN 5-325 MG ORAL TABLET (OXYCODONE-ACETAMINOPHEN) ; Route: ORAL  ONDANSETRON HCL 4 MG ORAL TABLET (ONDANSETRON HCL) ; Route: ORAL  OMEPRAZOLE 20 MG ORAL TABLET DELAYED RELEASE (OMEPRAZOLE) ; Route: ORAL  NITROGLYCERIN 0.3 MG SUBLINGUAL TABLET SUBLINGUAL (NITROGLYCERIN) ; Route: SUBLINGUAL  NALOXONE HCL 4 MG/10ML INJECTION SOLUTION (NALOXONE HCL) ; Route: INJECTION  METOPROLOL TARTRATE 50 MG ORAL TABLET (METOPROLOL TARTRATE) ; Route: ORAL  LISINOPRIL 40 MG ORAL TABLET (LISINOPRIL) ; Route: ORAL  GABAPENTIN 600 MG ORAL TABLET (GABAPENTIN) ; Route: ORAL  DULOXETINE HCL 60 MG ORAL CAPSULE DELAYED RELEASE PARTICLES (DULOXETINE HCL) ; Route: ORAL  DOCUSATE SODIUM 50 MG/5ML ORAL LIQUID (DOCUSATE SODIUM) ; Route: ORAL  DICLOFENAC SODIUM 1.5 % TRANSDERMAL SOLUTION (DICLOFENAC SODIUM) ; Route: TRANSDERMAL  CLOPIDOGREL BISULFATE 75 MG ORAL TABLET (CLOPIDOGREL BISULFATE) ; Route: ORAL  CLONAZEPAM 1 MG ORAL TABLET (CLONAZEPAM) ; Route: ORAL  * CHOLECALCIFEROL 5000 UNITS DAILY   BUSPIRONE HCL 10 MG ORAL TABLET (BUSPIRONE HCL) ; Route: ORAL  ASPIRIN 81 MG ORAL TABLET DELAYED RELEASE (ASPIRIN) ; Route: ORAL  AMLODIPINE BESYLATE 10 MG ORAL TABLET (AMLODIPINE BESYLATE) ; Route: ORAL  ALBUTEROL SULFATE  (90 BASE) MCG/ACT INHALATION AEROSOL SOLUTION (ALBUTEROL SULFATE) ; Route: INHALATION    Medications were reviewed with patient this visit.    PATIENT REPORTED ALLERGIES:  ATORVASTATIN CALCIUM (ATORVASTATIN CALCIUM) (SevereReaction: Myalgia)  * EZETIMIBE (SevereReaction: Myalgia)  NIACIN (NIACIN) (SevereReaction: Flushing)  * TIOTROPIUM BROMIDE  (SevereReaction: Rash)  * ROSUVASTATIN (SevereReaction: Muscle Pain (Myalgia))  * PLASTIC (SevereReaction: Rash, Itching)  * METALS (SevereReaction: Rash, Itching)  ADHESIVE TAPE (SevereReaction: Rash)  * LATEX (SevereReaction: Rash)    Allergies were reviewed during this visit.    RISK FACTORS:  Tobacco use:   former smoker  Alcohol Use:   No    HISTORY OF PRESENT ILLNESS:    REASON FOR EVALUATION:  Left shoulder pain.    HISTORY OF PRESENT ILLNESS:  Malina comes in with regard to her left shoulder.  She has had discomfort here for about the last four to five months.  Had an injury when she ran into a doorframe about that length of time back.  She has had difficulty with overhead activity, reaching out, reaching across.  Pain is with elevation.  Nighttime discomfort that is worse, it is a 9.  She has had prior cuff repairs on the contralateral side in the past, as well.  The patient denies any other concerns at this point in time.  Did have MR arthrogram done today, shows deep partial thickness tear with a small full thickness breach at this time supraspinatus.  No significant glenohumeral joint arthrosis, type II acromion and some AC arthritis otherwise present.      PAST MEDICAL HISTORY:  The patient's health history form dated 12/04/19 was reviewed and signed.  Past medical history, medications, allergies, surgical history, social history, family history, and review of systems noted and scanned into EMR.      PAST MEDICAL HISTORY:    Bleeding Tendency  Hypertension   Heart Attack x5  Arthritis    PAST ORTHOPEDIC SURGICAL HISTORY:    Right Shoulder Arthroscopic Cuff Repair, Decompression 05/12/17    PAST SURGICAL HISTORY:    Triple Bypass  Multiple Stents (17+)    FAMILY HISTORY:    Father:  Heart, Cancer   Mother:  Heart Attack   Brother:  Heart  Sister:  Cancer     SOCIAL HISTORY:     Alcohol Use:  No  Tobacco Use:  Former  Secondhand Smoke:  No  Blood Transfusion:  No  HIV/Hepatitis:  No  IV Drug Use:   "Yes/Long, Long Time Ago    REVIEW OF SYSTEMS:  Joint or Muscle pain: Yes    PHYSICAL EXAMINATION:    On physical exam, the patient's height 5'6\", weight 180 pounds, pulse and blood pressure were not recorded.  She is alert and oriented x3, cooperative and pleasant on my exam, in no acute distress.  Does ambulate with a satisfactory gait pattern.  Affect is appropriate.  Examination of the left shoulder shows full range of motion.  Pain and weakness with abduction, as well as elevation beyond 90.  Weakness with external rotation and strength testing.  Neurovascular examination is otherwise intact.  No instability is otherwise seen.  Spurling testing is negative here, as well.  Radial pulse otherwise 2+ upper extremity.      MRI:  MRI scan arthrogram reviewed.  Does show deep partial thickness cuff tearing with small full thickness breach.      ASSESSMENT:    IMPRESSION:  Left shoulder rotator cuff tearing with underlying impingement.      PLAN:   At this point the best course of action is left shoulder arthroscopy, rotator cuff repair, subacromial decompression.  We will look at outpatient surgical intervention at [facility].  She will get a preop exam with her primary care physician.  Does have an upcoming stress test for the heart here at this time.  We will look at a timeframe in January or February for intervention.  She is in agreement with that plan and is well-acquainted with the postop protocols and the healing process as it pertains to shoulder arthroscopy moving forward.      Dictated by:  Cristhian Wilkinson MD  Copy to:  Ramirez Cano MD    D:  12/04/19  T:  12/10/19    Typed and/or reviewed and corrected by signing  below, and sent to the Physician for final review and signature.      This report was created using voice recording software and computer-generated templates. Although every effort has been made to review for and eliminate errors, some errors may still occur. "         Electronically signed by Jinny PARISI  on 12/10/2019 at 11:27 AM    ________________________________________________________________________

## 2019-12-11 ENCOUNTER — OFFICE VISIT (OUTPATIENT)
Dept: FAMILY MEDICINE | Facility: OTHER | Age: 64
End: 2019-12-11
Attending: FAMILY MEDICINE
Payer: MEDICARE

## 2019-12-11 VITALS
TEMPERATURE: 98.1 F | SYSTOLIC BLOOD PRESSURE: 126 MMHG | DIASTOLIC BLOOD PRESSURE: 70 MMHG | HEART RATE: 70 BPM | RESPIRATION RATE: 22 BRPM

## 2019-12-11 DIAGNOSIS — Z79.899 CONTROLLED SUBSTANCE AGREEMENT SIGNED: ICD-10-CM

## 2019-12-11 DIAGNOSIS — G89.29 CHRONIC BILATERAL LOW BACK PAIN WITHOUT SCIATICA: ICD-10-CM

## 2019-12-11 DIAGNOSIS — R07.89 CHEST WALL PAIN, CHRONIC: ICD-10-CM

## 2019-12-11 DIAGNOSIS — G89.4 CHRONIC PAIN DISORDER: ICD-10-CM

## 2019-12-11 DIAGNOSIS — R51.9 LEFT-SIDED HEADACHE: Primary | ICD-10-CM

## 2019-12-11 DIAGNOSIS — M54.50 CHRONIC BILATERAL LOW BACK PAIN WITHOUT SCIATICA: ICD-10-CM

## 2019-12-11 DIAGNOSIS — F41.9 CHRONIC ANXIETY: ICD-10-CM

## 2019-12-11 DIAGNOSIS — G89.29 CHEST WALL PAIN, CHRONIC: ICD-10-CM

## 2019-12-11 PROCEDURE — 99214 OFFICE O/P EST MOD 30 MIN: CPT | Performed by: FAMILY MEDICINE

## 2019-12-11 PROCEDURE — G0463 HOSPITAL OUTPT CLINIC VISIT: HCPCS

## 2019-12-11 RX ORDER — OXYCODONE AND ACETAMINOPHEN 5; 325 MG/1; MG/1
2 TABLET ORAL 4 TIMES DAILY
Qty: 224 TABLET | Refills: 0 | Status: SHIPPED | OUTPATIENT
Start: 2019-12-11 | End: 2020-01-08 | Stop reason: DRUGHIGH

## 2019-12-11 RX ORDER — METOCLOPRAMIDE 10 MG/1
10 TABLET ORAL EVERY 6 HOURS PRN
Qty: 30 TABLET | Refills: 0 | Status: SHIPPED | OUTPATIENT
Start: 2019-12-11 | End: 2020-01-14

## 2019-12-11 RX ORDER — GABAPENTIN 600 MG/1
600 TABLET ORAL 2 TIMES DAILY
COMMUNITY
Start: 2019-12-11 | End: 2020-01-14

## 2019-12-11 RX ORDER — CLONAZEPAM 1 MG/1
1 TABLET ORAL 2 TIMES DAILY
Qty: 180 TABLET | Refills: 1 | Status: SHIPPED | OUTPATIENT
Start: 2019-12-11 | End: 2020-02-19

## 2019-12-11 ASSESSMENT — PAIN SCALES - GENERAL: PAINLEVEL: SEVERE PAIN (7)

## 2019-12-11 NOTE — PATIENT INSTRUCTIONS
Get ordered heart studies    Reduced clonazepam quantity in the bottle. Should be consistently taking 1 mg twice daily at most. If any extra used, need to come from leftover quantity to keep track    Refilled oxycodone    Try metoclopramide 10 mg pill to help headache. Ideally just once daily    Follow-up in a month

## 2019-12-11 NOTE — PROGRESS NOTES
Nursing Notes:   Jamilah Berger, LPN  12/11/2019  9:42 AM  Signed  Pt presents to clinic today for medication management.      Medication Reconciliation: complete  Jamilah Berger LPN     SUBJECTIVE:  64 year old female with chronic pain and CAD presents for follow up on pain and CAD.    Saw Juanita Wang, HUMBERTO of cardiology and has carotid ultrasound, aortic ultrasound and stress test ordered due to exhaustion, CORTEZ, chest pain. Ranexa may be started if she has chronic stable angina. Continues on aspirin and Plavix.    Dr Wilkinson recommends left shoulder arthroscopy, rotator cuff repair and subacromial decompression for partial supraspinatus tear and AC DJD and hypertrophy causing impingement.    Wearing back brace from [employer]. It offers support to ensure proper posture by pulling shoulders back.     After lumbar rhizotomy in August she was doing much better. Now re-injured back. Both legs now feel weak.  MRI shows facet arthritis L4-5, L5-S1 and mild spinal stenosis.    Has 22 clonazepam left from #210 supply for the past 3 months.    Still having a lot of frequent headaches.  Requesting an increase in oxycodone to help with the headaches.  She would like to take 10 mg pills for the headaches, taking 20 mg in the morning, 20 mg midday, and then 5 mg dosing later in the day.  In total this would be an increase of at least 20 mg of oxycodone for the day.  I pointed out that this would be an increase and we are not going to increase the medication.  There should be other ways to manage the headaches.  Potentially the headaches are worse because of her fatigue and cardiac issues.    Has been on higher quantities of oxycodone in the past as well as methadone.  Higher doses of opioids have led to side effects.  She is also gone off opioids, but then had reduced function.  Current opioid dosing has helped her maintain function.      REVIEW OF SYSTEMS:    General: malaise  Cardiovascular: see  above  Respiratory: see above      Current Outpatient Medications   Medication Sig Dispense Refill     albuterol (PROAIR HFA/PROVENTIL HFA/VENTOLIN HFA) 108 (90 Base) MCG/ACT inhaler Inhale 2 puffs into the lungs every 6 hours 2 Inhaler 3     amLODIPine (NORVASC) 10 MG tablet Take 1 tablet (10 mg) by mouth daily 90 tablet 3     aspirin EC 81 MG EC tablet Take 81 mg by mouth daily with food       busPIRone (BUSPAR) 10 MG tablet TAKE 1 TABLET TWICE A  tablet 1     clonazePAM (KLONOPIN) 1 MG tablet Take 1 tablet (1 mg) by mouth 3 times daily as needed for anxiety #70 per 30 days, #210 pills per 90 days 210 tablet 1     clopidogrel (PLAVIX) 75 MG tablet Take 1 tablet (75 mg) by mouth daily 90 tablet 3     Diclofenac Sodium 1.5 % SOLN Apply 20 drops to each hand or 40 drops to each knee up to 4 times daily as needed for arthritis pain 450 mL 3     docusate sodium (COLACE) 50 MG capsule Take 50 mg by mouth daily       DULoxetine (CYMBALTA) 60 MG capsule Take 1 capsule (60 mg) by mouth 2 times daily 180 capsule 3     gabapentin (NEURONTIN) 600 MG tablet Take 1 tablet (600 mg) by mouth 3 times daily 270 tablet 2     lisinopril (PRINIVIL/ZESTRIL) 40 MG tablet Take 1 tablet (40 mg) by mouth daily 90 tablet 4     metoprolol tartrate (LOPRESSOR) 50 MG tablet Take 1 tablet (50 mg) by mouth 2 times daily 180 tablet 3     naloxone (NARCAN) 4 MG/0.1ML nasal spray Spray into one nostril for opioid reversal if unresponsive. May repeat every 2-3 minutes until patient responsive or EMS arrives 1 each 1     nitroGLYcerin (NITROSTAT) 0.3 MG sublingual tablet PLACE 1 TABLET UNDER THE TONGUE EVERY 5 MINUTES AS NEEDED FOR CHEST PAIN 25 tablet 11     omeprazole (PRILOSEC) 20 MG DR capsule Take 1 capsule (20 mg) by mouth daily 90 capsule 3     ondansetron (ZOFRAN) 4 MG tablet Take 4 mg by mouth every 6 hours as needed for nausea       order for DME Equipment being ordered:  Back brace 1 each 0     oxyCODONE-acetaminophen (PERCOCET)  5-325 MG tablet Take 2 tablets by mouth 4 times daily #224 for 28 days 224 tablet 0     pravastatin (PRAVACHOL) 40 MG tablet Take 1 tablet (40 mg) by mouth At Bedtime 90 tablet 3     saccharomyces boulardii (FLORASTOR) 250 MG capsule Take 250 mg by mouth 2 times daily       sucralfate (CARAFATE) 1 GM tablet Take 1 g by mouth 4 times daily       VITAMIN D, CHOLECALCIFEROL, PO Take 5,000 Units by mouth daily       Allergies   Allergen Reactions     Atorvastatin Muscle Pain (Myalgia)     Liquid Adhesive Rash     Other reaction(s): Erythema  Silk and plastic gloves (long term attachment of adhesives)     Tiotropium Bromide [Tiotropium] Rash     Ezetimibe Muscle Pain (Myalgia)     Niacin      Other reaction(s): Flushing  flushing     Rosuvastatin Muscle Pain (Myalgia)     Other reaction(s): Myalgia     Latex Rash     No Clinical Screening - See Comments Rash, Blisters and Itching     Metals and plastic  Metals and plastics       Tape [Adhesive Tape] Rash       OBJECTIVE:  /70   Pulse 70   Temp 98.1  F (36.7  C) (Oral)   Resp 22     EXAM:  General Appearance: Alert. No acute distress  Chest/Respiratory Exam: Clear to auscultation bilaterally  Cardiovascular Exam: Regular rate and rhythm. S1, S2, no murmur, gallop, or rubs.  Extremities: No lower extremity edema.  Musculoskeletal: Wearing back brace  Psychiatric: Normal affect and mentation      ASSESSMENT/PLAN:    ICD-10-CM    1. Left-sided headache R51 metoclopramide (REGLAN) 10 MG tablet   2. Chronic pain disorder G89.4 oxyCODONE-acetaminophen (PERCOCET) 5-325 MG tablet     gabapentin (NEURONTIN) 600 MG tablet   3. Chronic bilateral low back pain without sciatica M54.5 oxyCODONE-acetaminophen (PERCOCET) 5-325 MG tablet    G89.29 gabapentin (NEURONTIN) 600 MG tablet   4. Chest wall pain, chronic R07.89 oxyCODONE-acetaminophen (PERCOCET) 5-325 MG tablet    G89.29 gabapentin (NEURONTIN) 600 MG tablet   5. Controlled substance agreement signed 9/18/19 Z79.899  oxyCODONE-acetaminophen (PERCOCET) 5-325 MG tablet     clonazePAM (KLONOPIN) 1 MG tablet   6. Chronic anxiety F41.9 clonazePAM (KLONOPIN) 1 MG tablet     No increase planned in oxycodone, need to manage pain with different treatments, not increase in opioids. Refilled chronic oxycodone dose of 5/325 mg taking 2 pills 4 times daily for 60 morphine milliequivalents daily. Uses 224 pills over 28 days. Current opioids balance side effects vs reduced function. Current controlled substance agreement.  Urine drug monitoring as expected July 17, 2019.   reviewed.    Working to reduce clonazepam. Has used more than twice daily, but only about 2.2 pills per day on average.  Refilled clonazepam at 180 pills, which is 1 mg twice daily.  If she requires any extra beyond this will need to use from her existing supply of 22 pills.  We can then more accurately determine her exact dosing.  Continue working to reduce clonazepam preferentially prior to opioid reduction.    We discussed that many of her symptoms could relate to cardiac ischemia.  She needs to complete this testing before looking into other conditions.    Lumbar pain is worse, but at this point no injection should be performed as she would need to stop aspirin and Plavix.  Given potential cardiac issues, this may be enough to contribute to another MI if she were to stop 1 of her antiplatelet agents.  Continue with back brace.    As for headaches, rather than increase opioids another treatment should be tried.  Triptans or other typical migraine treatments should be avoided giving current concerns for myocardial ischemia.  Therefore she can try Reglan 10 mg as needed to see if this helps potential migraine.    Follow-up in a month    Greater than 50% of this 27 minute appointment spent on counseling   Ramirez Cano MD    This document was prepared using a combination of typing and voice generated software.  While every attempt was made for accuracy, spelling and  grammatical errors may exist.

## 2019-12-12 ENCOUNTER — HOSPITAL ENCOUNTER (OUTPATIENT)
Dept: ULTRASOUND IMAGING | Facility: OTHER | Age: 64
End: 2019-12-12
Attending: NURSE PRACTITIONER
Payer: MEDICARE

## 2019-12-12 ENCOUNTER — HOSPITAL ENCOUNTER (OUTPATIENT)
Dept: ULTRASOUND IMAGING | Facility: OTHER | Age: 64
Discharge: HOME | End: 2019-12-12
Attending: NURSE PRACTITIONER | Admitting: NURSE PRACTITIONER
Payer: MEDICARE

## 2019-12-12 DIAGNOSIS — Z48.812 ENCOUNTER FOR SURGICAL AFTERCARE FOLLOWING SURGERY ON THE CIRCULATORY SYSTEM: ICD-10-CM

## 2019-12-12 DIAGNOSIS — R41.3 MEMORY CHANGES: ICD-10-CM

## 2019-12-12 DIAGNOSIS — I25.10 ASCVD (ARTERIOSCLEROTIC CARDIOVASCULAR DISEASE): ICD-10-CM

## 2019-12-12 DIAGNOSIS — Z87.891 HISTORY OF TOBACCO ABUSE: ICD-10-CM

## 2019-12-12 DIAGNOSIS — E78.2 MIXED HYPERLIPIDEMIA: ICD-10-CM

## 2019-12-12 PROCEDURE — 93880 EXTRACRANIAL BILAT STUDY: CPT

## 2019-12-12 PROCEDURE — 76706 US ABDL AORTA SCREEN AAA: CPT

## 2019-12-13 ENCOUNTER — TELEPHONE (OUTPATIENT)
Dept: CARDIAC REHAB | Facility: OTHER | Age: 64
End: 2019-12-13

## 2019-12-13 NOTE — TELEPHONE ENCOUNTER
Called to remind patient of stress test and review instructions. Verified . Instructed in no caffiene, smoking or eating, but to take pills with water. Check in at diagnostics. Patient verbalized understanding.

## 2019-12-16 ENCOUNTER — HOSPITAL ENCOUNTER (OUTPATIENT)
Dept: NUCLEAR MEDICINE | Facility: OTHER | Age: 64
Discharge: HOME | End: 2019-12-16
Attending: NURSE PRACTITIONER | Admitting: NURSE PRACTITIONER
Payer: MEDICARE

## 2019-12-16 ENCOUNTER — HOSPITAL ENCOUNTER (OUTPATIENT)
Dept: NUCLEAR MEDICINE | Facility: OTHER | Age: 64
End: 2019-12-16
Attending: NURSE PRACTITIONER
Payer: MEDICARE

## 2019-12-16 VITALS — SYSTOLIC BLOOD PRESSURE: 162 MMHG | HEART RATE: 60 BPM | DIASTOLIC BLOOD PRESSURE: 80 MMHG

## 2019-12-16 DIAGNOSIS — R07.9 RECURRENT CHEST PAIN: ICD-10-CM

## 2019-12-16 DIAGNOSIS — Z95.1 HISTORY OF CORONARY ARTERY BYPASS GRAFT X 3: ICD-10-CM

## 2019-12-16 DIAGNOSIS — R68.89 ACTIVITY INTOLERANCE: ICD-10-CM

## 2019-12-16 DIAGNOSIS — I25.10 ASCVD (ARTERIOSCLEROTIC CARDIOVASCULAR DISEASE): ICD-10-CM

## 2019-12-16 DIAGNOSIS — E78.2 MIXED HYPERLIPIDEMIA: ICD-10-CM

## 2019-12-16 DIAGNOSIS — Z87.891 HISTORY OF TOBACCO ABUSE: ICD-10-CM

## 2019-12-16 DIAGNOSIS — R06.09 DOE (DYSPNEA ON EXERTION): ICD-10-CM

## 2019-12-16 DIAGNOSIS — Z95.5 HISTORY OF CORONARY ARTERY STENT PLACEMENT: ICD-10-CM

## 2019-12-16 LAB
CV BLOOD PRESSURE: 79 %
CV STRESS MAX HR HE: 64
NUC STRESS EJECTION FRACTION: 72 %
RATE PRESSURE PRODUCT: 8832
STRESS ECHO BASELINE DIASTOLIC HE: 80
STRESS ECHO BASELINE HR: 64
STRESS ECHO BASELINE SYSTOLIC BP: 168
STRESS ECHO CALCULATED PERCENT HR: 41 %
STRESS ECHO LAST STRESS DIASTOLIC BP: 78
STRESS ECHO LAST STRESS SYSTOLIC BP: 138
STRESS ECHO POST ESTIMATED WORKLOAD: 1 METS
STRESS ECHO POST EXERCISE DUR SEC: 55 SEC
STRESS ECHO TARGET HR: 155

## 2019-12-16 PROCEDURE — 25000128 H RX IP 250 OP 636: Performed by: INTERNAL MEDICINE

## 2019-12-16 PROCEDURE — 78452 HT MUSCLE IMAGE SPECT MULT: CPT

## 2019-12-16 PROCEDURE — 93016 CV STRESS TEST SUPVJ ONLY: CPT | Performed by: INTERNAL MEDICINE

## 2019-12-16 PROCEDURE — 34300033 ZZH RX 343: Performed by: NURSE PRACTITIONER

## 2019-12-16 PROCEDURE — A9500 TC99M SESTAMIBI: HCPCS | Performed by: NURSE PRACTITIONER

## 2019-12-16 PROCEDURE — 93018 CV STRESS TEST I&R ONLY: CPT | Performed by: INTERNAL MEDICINE

## 2019-12-16 PROCEDURE — 93017 CV STRESS TEST TRACING ONLY: CPT

## 2019-12-16 RX ORDER — REGADENOSON 0.08 MG/ML
0.4 INJECTION, SOLUTION INTRAVENOUS ONCE
Status: COMPLETED | OUTPATIENT
Start: 2019-12-16 | End: 2019-12-16

## 2019-12-16 RX ORDER — AMINOPHYLLINE 25 MG/ML
50 INJECTION, SOLUTION INTRAVENOUS
Status: DISCONTINUED | OUTPATIENT
Start: 2019-12-16 | End: 2019-12-17 | Stop reason: HOSPADM

## 2019-12-16 RX ADMIN — KIT FOR THE PREPARATION OF TECHNETIUM TC99M SESTAMIBI 7.78 MILLICURIE: 1 INJECTION, POWDER, LYOPHILIZED, FOR SOLUTION PARENTERAL at 08:10

## 2019-12-16 RX ADMIN — KIT FOR THE PREPARATION OF TECHNETIUM TC99M SESTAMIBI 33.2 MILLICURIE: 1 INJECTION, POWDER, LYOPHILIZED, FOR SOLUTION PARENTERAL at 08:10

## 2019-12-16 RX ADMIN — REGADENOSON 0.4 MG: 0.08 INJECTION, SOLUTION INTRAVENOUS at 09:56

## 2019-12-16 NOTE — PROGRESS NOTES
0800 The patient arrived for a Lexiscan Cardiolite stress test.  The procedure, risks, and benefits were discussed with the patient and her ,and the consent was signed.  A saline lock was started,and the Cardiolite was injected by x-ray.  The patient was taken to the waiting area, to await resting images at 0850.  0940The patient returned from x-ray and was prepped for the stress test.   Dr. Hooper arrived, and the patient was administered the Lexiscan per procedure.  The patient felt slight headache and slight dizziness with the injection. These resolved quickly.  She was given coffee and a snack and taken to x-ray in stable condition for stress images.  The saline lock will be removed by x-ray for proper disposal.  The patient was instructed that the ordering MD will call with results in one to two days. She has a follow up appointment with Juanita Wang NP on 12/23/19 at 10:15. Please see the chart for the complete test results.

## 2019-12-23 ENCOUNTER — OFFICE VISIT (OUTPATIENT)
Dept: CARDIOLOGY | Facility: OTHER | Age: 64
End: 2019-12-23
Attending: NURSE PRACTITIONER
Payer: MEDICARE

## 2019-12-23 VITALS
HEIGHT: 66 IN | BODY MASS INDEX: 29.73 KG/M2 | OXYGEN SATURATION: 95 % | SYSTOLIC BLOOD PRESSURE: 100 MMHG | HEART RATE: 68 BPM | TEMPERATURE: 98 F | RESPIRATION RATE: 22 BRPM | DIASTOLIC BLOOD PRESSURE: 62 MMHG | WEIGHT: 185 LBS

## 2019-12-23 DIAGNOSIS — I20.89 CHRONIC STABLE ANGINA (H): ICD-10-CM

## 2019-12-23 DIAGNOSIS — R51.9 RECURRENT HEADACHE: ICD-10-CM

## 2019-12-23 DIAGNOSIS — J44.9 CHRONIC OBSTRUCTIVE PULMONARY DISEASE, UNSPECIFIED COPD TYPE (H): ICD-10-CM

## 2019-12-23 DIAGNOSIS — Z95.5 HISTORY OF CORONARY ARTERY STENT PLACEMENT: ICD-10-CM

## 2019-12-23 DIAGNOSIS — I10 ESSENTIAL HYPERTENSION: ICD-10-CM

## 2019-12-23 DIAGNOSIS — R07.9 RECURRENT CHEST PAIN: ICD-10-CM

## 2019-12-23 DIAGNOSIS — R07.9 CHEST PAIN, UNSPECIFIED TYPE: ICD-10-CM

## 2019-12-23 DIAGNOSIS — E78.2 MIXED HYPERLIPIDEMIA: ICD-10-CM

## 2019-12-23 DIAGNOSIS — R06.09 DOE (DYSPNEA ON EXERTION): ICD-10-CM

## 2019-12-23 DIAGNOSIS — G47.31 CENTRAL SLEEP APNEA: ICD-10-CM

## 2019-12-23 DIAGNOSIS — I25.10 ASCVD (ARTERIOSCLEROTIC CARDIOVASCULAR DISEASE): Primary | ICD-10-CM

## 2019-12-23 DIAGNOSIS — F33.1 MODERATE EPISODE OF RECURRENT MAJOR DEPRESSIVE DISORDER (H): ICD-10-CM

## 2019-12-23 PROCEDURE — G0463 HOSPITAL OUTPT CLINIC VISIT: HCPCS

## 2019-12-23 PROCEDURE — 99214 OFFICE O/P EST MOD 30 MIN: CPT | Performed by: NURSE PRACTITIONER

## 2019-12-23 RX ORDER — ROSUVASTATIN CALCIUM 5 MG/1
5 TABLET, COATED ORAL AT BEDTIME
Qty: 60 TABLET | Refills: 3 | Status: SHIPPED | OUTPATIENT
Start: 2019-12-23 | End: 2020-01-14

## 2019-12-23 RX ORDER — LISINOPRIL 20 MG/1
20 TABLET ORAL DAILY
Qty: 90 TABLET | Refills: 3 | Status: SHIPPED | OUTPATIENT
Start: 2019-12-23 | End: 2020-01-14

## 2019-12-23 RX ORDER — RANOLAZINE 500 MG/1
500 TABLET, EXTENDED RELEASE ORAL 2 TIMES DAILY
Qty: 60 TABLET | Refills: 3 | Status: SHIPPED | OUTPATIENT
Start: 2019-12-23 | End: 2019-12-23

## 2019-12-23 RX ORDER — RANOLAZINE 500 MG/1
500 TABLET, EXTENDED RELEASE ORAL 2 TIMES DAILY
Qty: 60 TABLET | Refills: 3 | Status: SHIPPED | OUTPATIENT
Start: 2019-12-23 | End: 2020-01-14

## 2019-12-23 ASSESSMENT — PATIENT HEALTH QUESTIONNAIRE - PHQ9
5. POOR APPETITE OR OVEREATING: NOT AT ALL
SUM OF ALL RESPONSES TO PHQ QUESTIONS 1-9: 6

## 2019-12-23 ASSESSMENT — ANXIETY QUESTIONNAIRES
7. FEELING AFRAID AS IF SOMETHING AWFUL MIGHT HAPPEN: NOT AT ALL
3. WORRYING TOO MUCH ABOUT DIFFERENT THINGS: NOT AT ALL
5. BEING SO RESTLESS THAT IT IS HARD TO SIT STILL: NOT AT ALL
6. BECOMING EASILY ANNOYED OR IRRITABLE: NOT AT ALL
GAD7 TOTAL SCORE: 1
2. NOT BEING ABLE TO STOP OR CONTROL WORRYING: NOT AT ALL
IF YOU CHECKED OFF ANY PROBLEMS ON THIS QUESTIONNAIRE, HOW DIFFICULT HAVE THESE PROBLEMS MADE IT FOR YOU TO DO YOUR WORK, TAKE CARE OF THINGS AT HOME, OR GET ALONG WITH OTHER PEOPLE: NOT DIFFICULT AT ALL
1. FEELING NERVOUS, ANXIOUS, OR ON EDGE: SEVERAL DAYS

## 2019-12-23 ASSESSMENT — MIFFLIN-ST. JEOR: SCORE: 1400.65

## 2019-12-23 ASSESSMENT — PAIN SCALES - GENERAL: PAINLEVEL: MODERATE PAIN (5)

## 2019-12-23 NOTE — PATIENT INSTRUCTIONS
You were seen by  JOSELYN Kilpatrick CNP       1. Start Ranexa 500mg take twice daily.     2. Will decrease lisinopril from 40mg daily to 20mg daily.    3. Stop Pravachol start Crestor 5mg 1 tablet at bedtime.    4. No other changes.      You will follow up with [facility] in 2 weeks, sooner if needed.       Please call the cardiology office with problems, questions, or concerns at [phone].    If you experience chest pain, chest pressure, chest tightness, shortness of breath, fainting, lightheadedness, nausea, vomiting, or other concerning symptoms, please report to the Emergency Department or call 911. These symptoms may be emergent, and best treated in the Emergency Department.     Cardiology Nurses  VIOLET Starks, MAULIK WRAY, MAULIK  [facility] ([address])  [phone]    l

## 2019-12-23 NOTE — PROGRESS NOTES
[facility] HEART CARE   CARDIOLOGY PROGRESS NOTE    Ankita Day   [id number] [address] [address]      Ramirez Cano     Chief Complaint   Patient presents with     Follow Up     4 week follow up        HPI:   Ms. Day is a 64 year old patient who presents for cardiology follow-up to visit on 5/22/19 with recently reported chest wall pain and generalized weakness associated to anaplasmosis.     Since her last visit, patient was seen in the ED on two occasions (10/2/19, 10/25/19). At visit on 10/2/19 she was transferred to [facility] with concern for unstable angina. EKG reviewed with no ischemic changes and no elevation in troponin, it was my understanding that she was transferred to [facility] with concern for ACS given her chest pain and extensive cardiac history.  She was admitted to [facility] with serial trop's negative. EKG stable with no ischemic changes and chest XR did reveal an infiltrate in the left lung base and lingula. She was treated for community acquired pneumonia. Repeat TTE with normal LVEF and no RWMA, no significant valvular disease. Patient returned to ED on 10/25/19 with reports of chest pain, again serial trop's negative and EKG stable with no ischemic changes. Symptoms where improved with GI cocktail and it was suspected symptoms where more appropriately representing gastrointestinal/ GERD.     Patient underwent  coronary angiogram and bypass angiogram on 9/15/2017 which was described as having stable disease. Mild to moderate 3 vessel CAD involving RCA, LAD and LCX with <50% stenosis at the greatest.  Occluded RAFI and SVG.  Patent LIMA.  No new obstructive lesions were identified and normal left heart cath.      She has a history of CABG on 2/12/03 x 3, history of multiple stent placements, patient reported 17 total.  Additionally, she has a history of tobacco abuse, sleep apnea, anxiety/depression, COPD, hypertension and history of left subclavian steal syndrome  "involving the LIMA status post stent placement.    Today patient reports occasional recurrence of chest pain, not as severe as last ED visit on 10/25. She reports \"my breathing has been bad\", describes worsening CORTEZ. She describes activity intolerance and little to no energy. Describes not having energy to make the bed or make dinner. She has not reported any lightheadedness or syncope. No palpitations. No edema. Additionally, reports concern for recurrent pneumonia. Cold symptoms returned this past Thursday, with productive cough and now same similar symptoms to when she was recently diagnosed with pneumonia. Denies any fever or chills with current illness.     PAST MEDICAL HISTORY:   Past Medical History:   Diagnosis Date     Acute ischemic heart disease (H)     06/15/2007,with PTCA and stenting of 90% osteo circumflex lesion and patent LAD graft, patent left main stent.     Acute myocardial infarction (H)     3/30/2013     Anxiety disorder     No Comments Provided     Atherosclerotic heart disease of native coronary artery without angina pectoris     -angio revealed 3 vessel dz  -4 stents placed; 2 overlapping stents placed in mLAD, 1 stent pRCA, 1 stent pCirc 1 s 9/02 -non-ST elevation MI 1/03  -angio revealed restenosis of Circ.    -PTCA and brachytherapy of pCirc -repeat angio 2/03 -no intervension -CABG x3 12/03 - Dr Sinclair  -LIMA-LAD, RAFI-RCA, SVG-OM -PCI 7/04 stent to L -CTangio 9/05   -patentent LIMA-LAD. RAFI-RCA occluded; RCA ostio/p*     Bilateral carpal tunnel syndrome     No Comments Provided     Cervicalgia     No Comments Provided     Chest pain     12/29/2014     Chronic gastric ulcer without hemorrhage or perforation     10/03/2011,hx of GI bleed (2003)     Chronic ischemic heart disease     06/27/2012     Chronic obstructive pulmonary disease (H)     06/15/2007,low DLCO, normal spirometry     Chronic or unspecified gastric ulcer with hemorrhage     10/2003     Chronic pain syndrome     " 12/01/2010,chest wall, back     Constipation     11/29/2011     Coronary angioplasty status     09/12/2002,with triple stenting     Coronary angioplasty status     01/10/2003,repeat angioplasty     Coronary angioplasty status     No Comments Provided     Dorsalgia     05/31/2011     Encounter for other administrative examinations     1/28/2014     Encounter for screening for cardiovascular disorders     10/2004,[facility]iol[facility] (2004, 2005, 2006 and 2011)     Enterocolitis due to Clostridium difficile     8/19/2016     Essential (primary) hypertension     No Comments Provided     Hyperlipidemia     No Comments Provided     Major depressive disorder, single episode     Severe, hx of suicide attempt/hospitalization     Migraine without status migrainosus, not intractable     No Comments Provided     Nodular corneal degeneration     09/26/2011     Noninfective gastroenteritis and colitis     06/15/2007,history of     Noninfective gastroenteritis and colitis     Microcytic     Osteoarthritis     No Comments Provided     Other chest pain     10/13/2009,chronic     Pain in knee     05/31/2011     Pain in right shoulder     No Comments Provided     Panic disorder without agoraphobia     No Comments Provided     Peptic ulcer without hemorrhage or perforation     6/15/2007     Peripheral vascular disease (H)     No Comments Provided     Personal history of diseases of the blood and blood-forming organs and certain disorders involving the immune mechanism (CODE)     No Comments Provided     Personal history of nicotine dependence     No Comments Provided     Personal history of other medical treatment (CODE)     10/14/2013     Presence of aortocoronary bypass graft     2/12/2003     Primary central sleep apnea     10/14/2013     Sepsis due to Escherichia coli (E. coli) (H)     7/14/16,[facility]     Stricture of artery (H)     3/31/2013,S/p prox left SCA stent 4/1/2013     Thoracic, thoracolumbar and lumbosacral intervertebral disc  disorder     No Comments Provided     Uncomplicated opioid abuse (H)     history of     Vitamin D deficiency     2013          FAMILY HISTORY:   Family History   Problem Relation Age of Onset     Heart Disease Father         Heart Disease,Heart condition/Significant for atherosclerotic cardiovascular disease, but non premature.     Colon Cancer Father         Cancer-colon, of colon cancer     Cancer Father         Cancer,mets to liver, secondary to colon cancer     Heart Disease Mother         Heart Disease     Cancer Other         Cancer,Multiple Myeloma     Heart Disease Other         Heart Disease,Ischemic Heart Disease     Colon Cancer Other         Cancer-colon,Malignant neoplasms     Cancer Sister         Cancer,multiple myeloma     Other - See Comments Son         gallstones          PAST SURGICAL HISTORY:   Past Surgical History:   Procedure Laterality Date     ANGIOPLASTY      02,with triple stenting     APPENDECTOMY OPEN      No Comments Provided     ARTHROSCOPY KNEE      left     ARTHROSCOPY SHOULDER Right 2017    labral tear, rotator cuff tear and some subacromial decompression      BYPASS GRAFT ARTERY CORONARY      02,Triple bypass, left internal mammary  to LAD, right internal mammary to right coronary artery, saphenous to obtuse marginal of the left circumflex.     COLONOSCOPY      ,Dr Bowman benign polyps     COLONOSCOPY  10/03/2011    2011,benign polyps, Dr. Bowman     COLONOSCOPY  2016,normal, Dr Bowman     ELBOW SURGERY      baby,birth malachi removed from right arm     EMBOLECTOMY UPPER EXTREMITY  2013    brachial artery pseudoaneurysm after stenting     ESOPHAGOSCOPY, GASTROSCOPY, DUODENOSCOPY (EGD), COMBINED      ,EGD Dr Bowman with pyloric ulcer     ESOPHAGOSCOPY, GASTROSCOPY, DUODENOSCOPY (EGD), COMBINED      ,pyloric ulcer, Dr. Bowman     ESOPHAGOSCOPY, GASTROSCOPY, DUODENOSCOPY (EGD), COMBINED      16,mild gastritis, Dr Bowman      ESOPHAGOSCOPY, GASTROSCOPY, DUODENOSCOPY (EGD), COMBINED      2017,Dr Bowman. Antral ulcer     ESOPHAGOSCOPY, GASTROSCOPY, DUODENOSCOPY (EGD), COMBINED  2018    Dr Bowman, healed ulcer     HYSTERECTOMY TOTAL ABDOMINAL      age 21     LAPAROSCOPIC CHOLECYSTECTOMY      2006     OSTEOTOMY FEMUR DISTAL      x3, right knee     OSTEOTOMY FEMUR DISTAL      2000,left knee  ligament surgery     OTHER SURGICAL HISTORY      1/10/2003,,PTCA     OTHER SURGICAL HISTORY      2012,,PTCA,DELONTE in LAD and left main     OTHER SURGICAL HISTORY      2013,,PTCA,L subclavian stenosis     SALPINGO-OOPHORECTOMY BILATERAL      age 27,Bilateral salpingo-oophorectomy     TONSILLECTOMY, ADENOIDECTOMY, COMBINED      childhood          SOCIAL HISTORY:   Social History     Socioeconomic History     Marital status:      Spouse name: None     Number of children: None     Years of education: None     Highest education level: None   Occupational History     None   Social Needs     Financial resource strain: None     Food insecurity:     Worry: None     Inability: None     Transportation needs:     Medical: None     Non-medical: None   Tobacco Use     Smoking status: Former Smoker     Packs/day: 0.25     Years: 35.00     Pack years: 8.75     Types: Cigarettes     Last attempt to quit: 2/15/2017     Years since quittin.2     Smokeless tobacco: Never Used   Substance and Sexual Activity     Alcohol use: Yes     Alcohol/week: 0.0 oz     Comment: Alcoholic Drinks/day: rare     Drug use: No     Comment: Drug use: No     Sexual activity: Never     Partners: Male   Lifestyle     Physical activity:     Days per week: None     Minutes per session: None     Stress: None   Relationships     Social connections:     Talks on phone: None     Gets together: None     Attends [employer] service: None     Active member of club or organization: None     Attends meetings of clubs or organizations: None     Relationship status:  None     Intimate partner violence:     Fear of current or ex partner: None     Emotionally abused: None     Physically abused: None     Forced sexual activity: None   Other Topics Concern     Parent/sibling w/ CABG, MI or angioplasty before 65F 55M? Not Asked   Social History Narrative    ,  Steven.  Currently not working outside the home. Lives three-mile self [place] met with . Tobacco abuse, quit 2001, restarted. Quit 2012 and has since quit, no alcohol.          CURRENT MEDICATIONS:   Prior to Admission medications    Medication Sig Start Date End Date Taking? Authorizing Provider   albuterol (PROAIR HFA/PROVENTIL HFA/VENTOLIN HFA) 108 (90 Base) MCG/ACT inhaler Inhale 2 puffs into the lungs every 6 hours 1/23/19 1/23/20 Yes Ramirez Cano MD   amLODIPine (NORVASC) 10 MG tablet Take 1 tablet (10 mg) by mouth daily 11/21/18  Yes Ramirez Cano MD   aspirin EC 81 MG EC tablet Take 81 mg by mouth daily with food   Yes Reported, Patient   busPIRone (BUSPAR) 10 MG tablet TAKE 1 TABLET TWICE A DAY 11/7/18  Yes Ramirez Cano MD   clonazePAM (KLONOPIN) 1 MG tablet Take 1 tablet (1 mg) by mouth 3 times daily as needed for anxiety #75 per 30 days, #225 pills per 90 days 4/3/19  Yes Ramirez Cano MD   clopidogrel (PLAVIX) 75 MG tablet Take 1 tablet (75 mg) by mouth daily 11/21/18  Yes Ramirez Cano MD   cyclobenzaprine (FLEXERIL) 10 MG tablet Take 1 tablet (10 mg) by mouth 2 times daily as needed for muscle spasms 11/21/18  Yes Ramirez Cano MD   Diclofenac Sodium 1.5 % SOLN Apply 20 drops to each hand or 40 drops to each knee up to 4 times daily as needed for arthritis pain 11/21/18  Yes Ramirez Cano MD   docusate sodium (COLACE) 50 MG capsule Take 50 mg by mouth daily   Yes Reported, Patient   doxycycline hyclate (VIBRAMYCIN) 100 MG capsule Take 1 capsule (100 mg) by mouth 2 times daily for 14 days 5/13/19 5/27/19 Yes Mikel Ashraf PA-C   DULoxetine (CYMBALTA) 60  MG EC capsule Take 1 capsule (60 mg) by mouth daily 8/23/18  Yes Ramirez Cano MD   gabapentin (NEURONTIN) 600 MG tablet Take 1 tablet (600 mg) by mouth 3 times daily 1/23/19  Yes Ramirez Cano MD   lisinopril (PRINIVIL/ZESTRIL) 40 MG tablet Take 1 tablet (40 mg) by mouth daily 4/23/19  Yes Ramirez Cano MD   metoprolol tartrate (LOPRESSOR) 50 MG tablet Take 1 tablet (50 mg) by mouth 2 times daily 11/21/18  Yes Ramirez Cano MD   NITROSTAT 0.3 MG sublingual tablet PLACE 1 TABLET UNDER THE TONGUE EVERY 5 MINUTES AS NEEDED FOR CHEST PAIN 4/9/18  Yes Ramirez Cano MD   ondansetron (ZOFRAN) 4 MG tablet Take 4 mg by mouth every 6 hours as needed for nausea 10/31/17  Yes Reported, Patient   oxyCODONE-acetaminophen (PERCOCET) 5-325 MG tablet Take 2 tablets by mouth 4 times daily Max acetaminophen dose: 4000mg in 24 hrs 5/23/19  Yes Ramirez Cano MD   pantoprazole (PROTONIX) 40 MG EC tablet TAKE 1 TABLET TWICE A DAY 1/22/19  Yes Ramirez Cano MD   pravastatin (PRAVACHOL) 40 MG tablet Take 1 tablet (40 mg) by mouth At Bedtime 1/23/19  Yes Ramirez Cano MD   saccharomyces boulardii (FLORASTOR) 250 MG capsule Take 250 mg by mouth 2 times daily   Yes Reported, Patient   sucralfate (CARAFATE) 1 GM tablet Take 1 tablet (1 g) by mouth 4 times daily Before meals & at bedtime 8/23/18  Yes Ramirez Cano MD   VITAMIN D, CHOLECALCIFEROL, PO Take 5,000 Units by mouth daily   Yes Reported, Patient          ALLERGIES:   Allergies   Allergen Reactions     Atorvastatin Muscle Pain (Myalgia)     Liquid Adhesive Rash     Other reaction(s): Erythema  Silk and plastic gloves (long term attachment of adhesives)     Tiotropium Bromide [Tiotropium] Rash     Ezetimibe Muscle Pain (Myalgia)     Niacin      Other reaction(s): Flushing  flushing     Rosuvastatin Muscle Pain (Myalgia)     Other reaction(s): Myalgia     Latex Rash     No Clinical Screening - See Comments Rash, Blisters and Itching     Metals  "and plastic  Metals and plastics       Tape [Adhesive Tape] Rash          ROS:   CONSTITUTIONAL: Positive for recent fever and chills. No changes in weight. Positive for energy loss and activity intolerance.   ENT: No visual disturbance, ear ache, epistaxis or sore throat.   CARDIOVASCULAR: Describes recurrent chest pains. No palpitations or lower extremity edema.   RESPIRATORY: Chronic shortness of breath with worsening exertional dyspnea. Positive for productive cough, no wheezing or hemoptysis.   GI: No reported abdominal pain.   : No reported hematuria or dysuria.   NEUROLOGICAL: Positive for chronic headaches. No lightheadedness, dizziness, syncope, ataxia, paresthesias or weakness.   HEMATOLOGIC: No history of anemia. No bleeding or excessive bruising. No history of blood clots.   MUSCULOSKELETAL: No reported joint pain or swelling, myalgias resolved.    ENDOCRINOLOGIC: No temperature intolerance. No hair or skin changes.  SKIN: No abnormal rashes or sores, no unusual itching.  PSYCHIATRIC: Positive for history of anxiety and depression.      PHYSICAL EXAM:   /62 (BP Location: Right arm, Patient Position: Sitting, Cuff Size: Adult Large)   Pulse 68   Temp 98  F (36.7  C) (Tympanic)   Resp 22   Ht 1.676 m (5' 5.98\")   Wt 83.9 kg (185 lb)   SpO2 95%   BMI 29.87 kg/m    GENERAL: The patient is a well-developed, well-nourished, in no apparent distress.  HEENT: Head is normocephalic and atraumatic. Eyes are symmetrical with normal visual tracking. No icterus, no xanthelasmas. Nares appeared normal without nasal drainage. Mucous membranes are moist, no cyanosis.  NECK: Supple. Mild carotid bruit on left.  CHEST/ LUNGS: Lungs diminished over bases. No rales, rhonchi or wheezes, no use of accessory muscles, no retractions, respirations unlabored and normal respiratory rate.   CARDIO: Regular rate and rhythm normal with S1 and S2, no S3 or S4 and no murmur, click or rub.   ABD: Abdomen is nondistended. "   EXTREMITIES: No LE edema present.   MUSCULOSKELETAL: No visible joint swelling.   NEUROLOGIC: Alert and oriented X3. Normal speech, gait and affect. No focal neurologic deficits.   SKIN: No jaundice. No rashes or visible skin lesions present. No ecchymosis.       LAB RESULTS:   Office Visit on 05/13/2019   Component Date Value Ref Range Status     Troponin I ES 05/13/2019 <0.030  0.000 - 0.034 ug/L Final     A Phagocytophil IgG 05/13/2019 >1:1,280* <1:80 Final     A Phagocytophil IgM 05/13/2019 > 1:256  <1:16 Final     Lyme Disease Antibodies Serum 05/13/2019 0.10  0.00 - 0.89 Final     Sodium 05/13/2019 137  134 - 144 mmol/L Final     Potassium 05/13/2019 3.4* 3.5 - 5.1 mmol/L Final     Chloride 05/13/2019 103  98 - 107 mmol/L Final     Carbon Dioxide 05/13/2019 23  21 - 31 mmol/L Final     Anion Gap 05/13/2019 11  3 - 14 mmol/L Final     Glucose 05/13/2019 117* 70 - 105 mg/dL Final     Urea Nitrogen 05/13/2019 23  7 - 25 mg/dL Final     Creatinine 05/13/2019 1.07  0.60 - 1.20 mg/dL Final     GFR Estimate 05/13/2019 51* >60 mL/min/[1.73_m2] Final     GFR Estimate If Black 05/13/2019 62  >60 mL/min/[1.73_m2] Final     Calcium 05/13/2019 9.3  8.6 - 10.3 mg/dL Final     Bilirubin Total 05/13/2019 1.1* 0.3 - 1.0 mg/dL Final     Albumin 05/13/2019 4.3  3.5 - 5.7 g/dL Final     Protein Total 05/13/2019 7.3  6.4 - 8.9 g/dL Final     Alkaline Phosphatase 05/13/2019 107* 34 - 104 U/L Final     ALT 05/13/2019 37  7 - 52 U/L Final     AST 05/13/2019 39  13 - 39 U/L Final     WBC 05/13/2019 8.2  4.0 - 11.0 10e9/L Final     RBC Count 05/13/2019 3.91  3.8 - 5.2 10e12/L Final     Hemoglobin 05/13/2019 11.8  11.7 - 15.7 g/dL Final     Hematocrit 05/13/2019 35.0  35.0 - 47.0 % Final     MCV 05/13/2019 90  78 - 100 fl Final     MCH 05/13/2019 30.2  26.5 - 33.0 pg Final     MCHC 05/13/2019 33.7  31.5 - 36.5 g/dL Final     RDW 05/13/2019 14.0  10.0 - 15.0 % Final     Platelet Count 05/13/2019 190  150 - 450 10e9/L Final     Diff  Method 05/13/2019 Automated Method   Final     % Neutrophils 05/13/2019 71.4  % Final     % Lymphocytes 05/13/2019 20.9  % Final     % Monocytes 05/13/2019 6.4  % Final     % Eosinophils 05/13/2019 0.5  % Final     % Basophils 05/13/2019 0.2  % Final     % Immature Granulocytes 05/13/2019 0.6  % Final     Absolute Neutrophil 05/13/2019 5.8  1.6 - 8.3 10e9/L Final     Absolute Lymphocytes 05/13/2019 1.7  0.8 - 5.3 10e9/L Final     Absolute Monocytes 05/13/2019 0.5  0.0 - 1.3 10e9/L Final     Absolute Eosinophils 05/13/2019 0.0  0.0 - 0.7 10e9/L Final     Absolute Basophils 05/13/2019 0.0  0.0 - 0.2 10e9/L Final     Abs Immature Granulocytes 05/13/2019 0.1  0 - 0.4 10e9/L Final     Platelet Estimate 05/13/2019 Automated count confirmed.  Platelet morphology is normal.   Final     RBC Morphology 05/13/2019 Consistent with reported results   Final     Specimen Description 05/13/2019 Blood   Final     Culture Micro 05/13/2019 No growth after 6 days   Final          ASSESSMENT:   Ankita Day presents for cardiology follow-up to visit on 5/22/19 with recently reported chest wall pain and generalized weakness associated to anaplasmosis. Since her last visit, patient was seen in the ED on two occasions with recurrent chest pain (10/2/19, 10/25/19), she was found to have left sided pneumonia and suspected GERD on second ED visit, no evidence of ACS.     PLAN:  1. ASCVD (arteriosclerotic cardiovascular disease)  History of 3V CABG (2003) and history of coronary stenting.   Recurrent chest pains, see notes. Also describes progressive CORTEZ and activity intolerance.   Recommended Lexiscan stress test to assess for any myocardial ischemia.  Additional orders for carotid US and abdominal aorta scan given her history of ASCVD.   If results of lexiscan stress test stable with no evidence of reversible ischemia, consider starting ranexa for chronic stable angina. If abnormal myocardial perfusion/ reversible perfusion deficit  "suggestive of ischemia- patient will require referral for repeat coronary angiography.       - NM Lexiscan stress test; Future  - US Carotid Bilateral; Future  - US Aorta Medicare AAA Screening; Future    2. Activity intolerance  See above.   - NM Lexiscan stress test; Future    3. CORTEZ (dyspnea on exertion)  See above.   Likely mulifactorial with chronic COPD.    - NM Lexiscan stress test; Future    4. History of coronary artery stent placement  Continue on ASA and Plavix.  She has been on moderate intensity statin. Consider adjusting to high intensity statin therapy for graft and stent protection.     - NM Lexiscan stress test; Future    5. Recurrent chest pain  - NM Lexiscan stress test; Future    6. Chronic obstructive pulmonary disease, unspecified COPD type (H)  Continued management by PCP.     7. Viral upper respiratory tract infection  Recent left lower lobe pneumonia, given return of symptoms, recommended repeat chest XR.   Suspect likely viral.   No fever or chills.     - X-ray Chest 2 vws*; Future    8. Mixed hyperlipidemia  She has been on moderate intensity statin. Consider adjusting to high intensity statin therapy for graft and stent protection.     9. History of coronary artery bypass graft x 3  See above.   - NM Lexiscan stress test; Future    10. Memory changes  She will follow up with PCP in regards to possible early onset dementia, reports sister with similar symptoms.     - US Carotid Bilateral; Future    11. CKD (chronic kidney disease) stage 2, GFR 60-89 ml/min  Stable.     12. History of tobacco abuse      13. Central sleep apnea  Not complaint with CPAP therapy. Chronic headaches likely secondary to her sleep apnea which has been untreated.   Patient reports \"allergic to CPAP\".    14. Chronic nonintractable headache, unspecified headache type  Follow-up with PCP in regards to this.     15. History of pneumonia  - X-ray Chest 2 vws*; Future    Follow-up with cardiology in 4 weeks to review " results of testing, certainly sooner if needed.     Thank you for allowing me to participate in the care of your patient. Please do not hesitate to contact me if you have any questions.     Juanita Wang

## 2019-12-23 NOTE — NURSING NOTE
"Chief Complaint   Patient presents with     Follow Up     4 week follow up       Initial /62 (BP Location: Right arm, Patient Position: Sitting, Cuff Size: Adult Large)   Pulse 68   Temp 98  F (36.7  C) (Tympanic)   Resp 22   Ht 1.676 m (5' 5.98\")   Wt 83.9 kg (185 lb)   SpO2 95%   BMI 29.87 kg/m   Estimated body mass index is 29.87 kg/m  as calculated from the following:    Height as of this encounter: 1.676 m (5' 5.98\").    Weight as of this encounter: 83.9 kg (185 lb).  Meds Reconciled: complete  Pt is on Aspirin  Pt is on a Statin  PHQ and/or YAYA reviewed. Pt referred to PCP/MH Provider as appropriate.    Taylor Owusu LPN      "

## 2019-12-24 ASSESSMENT — ANXIETY QUESTIONNAIRES: GAD7 TOTAL SCORE: 1

## 2020-01-02 ENCOUNTER — HOSPITAL ENCOUNTER (OUTPATIENT)
Facility: OTHER | Age: 65
Setting detail: OBSERVATION
Discharge: HOME | End: 2020-01-03
Attending: PHYSICIAN ASSISTANT | Admitting: INTERNAL MEDICINE
Payer: MEDICARE

## 2020-01-02 ENCOUNTER — APPOINTMENT (OUTPATIENT)
Dept: GENERAL RADIOLOGY | Facility: OTHER | Age: 65
End: 2020-01-02
Attending: PHYSICIAN ASSISTANT
Payer: MEDICARE

## 2020-01-02 ENCOUNTER — APPOINTMENT (OUTPATIENT)
Dept: CT IMAGING | Facility: OTHER | Age: 65
End: 2020-01-02
Attending: PHYSICIAN ASSISTANT
Payer: MEDICARE

## 2020-01-02 ENCOUNTER — APPOINTMENT (OUTPATIENT)
Dept: ULTRASOUND IMAGING | Facility: OTHER | Age: 65
End: 2020-01-02
Attending: INTERNAL MEDICINE
Payer: MEDICARE

## 2020-01-02 DIAGNOSIS — Z95.5 STENTED CORONARY ARTERY: ICD-10-CM

## 2020-01-02 DIAGNOSIS — Z79.51 LONG TERM CURRENT USE OF INHALED STEROID: ICD-10-CM

## 2020-01-02 DIAGNOSIS — R07.89 ATYPICAL CHEST PAIN: ICD-10-CM

## 2020-01-02 DIAGNOSIS — Z95.1 POSTSURGICAL AORTOCORONARY BYPASS STATUS: ICD-10-CM

## 2020-01-02 DIAGNOSIS — I26.99 ACUTE PULMONARY EMBOLISM WITHOUT ACUTE COR PULMONALE, UNSPECIFIED PULMONARY EMBOLISM TYPE (H): Primary | ICD-10-CM

## 2020-01-02 DIAGNOSIS — I26.99 PULMONARY EMBOLISM (H): ICD-10-CM

## 2020-01-02 DIAGNOSIS — I12.9 HYPERTENSIVE CHRONIC KIDNEY DISEASE WITH STAGE 1 THROUGH STAGE 4 CHRONIC KIDNEY DISEASE, OR UNSPECIFIED CHRONIC KIDNEY DISEASE: ICD-10-CM

## 2020-01-02 DIAGNOSIS — I26.99 PULMONARY EMBOLISM, UNSPECIFIED CHRONICITY, UNSPECIFIED PULMONARY EMBOLISM TYPE, UNSPECIFIED WHETHER ACUTE COR PULMONALE PRESENT (H): ICD-10-CM

## 2020-01-02 DIAGNOSIS — Z87.891 PERSONAL HISTORY OF TOBACCO USE, PRESENTING HAZARDS TO HEALTH: ICD-10-CM

## 2020-01-02 DIAGNOSIS — R06.02 SOB (SHORTNESS OF BREATH): ICD-10-CM

## 2020-01-02 DIAGNOSIS — I25.10 ATHEROSCLEROSIS OF NATIVE CORONARY ARTERY OF NATIVE HEART WITHOUT ANGINA PECTORIS: ICD-10-CM

## 2020-01-02 DIAGNOSIS — N18.2 CHRONIC KIDNEY DISEASE, STAGE II (MILD): ICD-10-CM

## 2020-01-02 DIAGNOSIS — J44.9 CHRONIC OBSTRUCTIVE PULMONARY DISEASE, UNSPECIFIED COPD TYPE (H): ICD-10-CM

## 2020-01-02 DIAGNOSIS — Z79.891 ADMISSION FOR LONG-TERM OPIATE ANALGESIC USE: ICD-10-CM

## 2020-01-02 LAB
ALBUMIN SERPL-MCNC: 4.4 G/DL (ref 3.5–5.7)
ALBUMIN UR-MCNC: NEGATIVE MG/DL
ALP SERPL-CCNC: 56 U/L (ref 34–104)
ALT SERPL W P-5'-P-CCNC: 13 U/L (ref 7–52)
ANION GAP SERPL CALCULATED.3IONS-SCNC: 9 MMOL/L (ref 3–14)
APPEARANCE UR: CLEAR
AST SERPL W P-5'-P-CCNC: 21 U/L (ref 13–39)
BASOPHILS # BLD AUTO: 0.1 10E9/L (ref 0–0.2)
BASOPHILS NFR BLD AUTO: 0.9 %
BILIRUB SERPL-MCNC: 0.3 MG/DL (ref 0.3–1)
BILIRUB UR QL STRIP: NEGATIVE
BUN SERPL-MCNC: 13 MG/DL (ref 7–25)
CALCIUM SERPL-MCNC: 9.4 MG/DL (ref 8.6–10.3)
CHLORIDE SERPL-SCNC: 106 MMOL/L (ref 98–107)
CO2 SERPL-SCNC: 24 MMOL/L (ref 21–31)
COLOR UR AUTO: YELLOW
CREAT SERPL-MCNC: 1.06 MG/DL (ref 0.6–1.2)
D DIMER PPP DDU-MCNC: 730 NG/ML D-DU (ref 0–230)
DIFFERENTIAL METHOD BLD: ABNORMAL
EOSINOPHIL # BLD AUTO: 0.2 10E9/L (ref 0–0.7)
EOSINOPHIL NFR BLD AUTO: 3.2 %
ERYTHROCYTE [DISTWIDTH] IN BLOOD BY AUTOMATED COUNT: 13.4 % (ref 10–15)
GFR SERPL CREATININE-BSD FRML MDRD: 52 ML/MIN/{1.73_M2}
GLUCOSE SERPL-MCNC: 103 MG/DL (ref 70–105)
GLUCOSE UR STRIP-MCNC: NEGATIVE MG/DL
HCT VFR BLD AUTO: 35.1 % (ref 35–47)
HGB BLD-MCNC: 11.6 G/DL (ref 11.7–15.7)
HGB UR QL STRIP: NEGATIVE
IMM GRANULOCYTES # BLD: 0 10E9/L (ref 0–0.4)
IMM GRANULOCYTES NFR BLD: 0.2 %
INR PPP: 0.91 (ref 0–1.3)
KETONES UR STRIP-MCNC: NEGATIVE MG/DL
LACTATE SERPL-SCNC: 0.9 MMOL/L (ref 0.5–2.2)
LEUKOCYTE ESTERASE UR QL STRIP: NEGATIVE
LIPASE SERPL-CCNC: 23 U/L (ref 11–82)
LYMPHOCYTES # BLD AUTO: 2.3 10E9/L (ref 0.8–5.3)
LYMPHOCYTES NFR BLD AUTO: 34.5 %
MCH RBC QN AUTO: 29.6 PG (ref 26.5–33)
MCHC RBC AUTO-ENTMCNC: 33 G/DL (ref 31.5–36.5)
MCV RBC AUTO: 90 FL (ref 78–100)
MONOCYTES # BLD AUTO: 0.6 10E9/L (ref 0–1.3)
MONOCYTES NFR BLD AUTO: 8.7 %
NEUTROPHILS # BLD AUTO: 3.5 10E9/L (ref 1.6–8.3)
NEUTROPHILS NFR BLD AUTO: 52.5 %
NITRATE UR QL: NEGATIVE
NT-PROBNP SERPL-MCNC: 91 PG/ML (ref 0–100)
PH UR STRIP: 6 PH (ref 5–9)
PLATELET # BLD AUTO: 274 10E9/L (ref 150–450)
POTASSIUM SERPL-SCNC: 3.8 MMOL/L (ref 3.5–5.1)
PROT SERPL-MCNC: 6.9 G/DL (ref 6.4–8.9)
RADIOLOGIST FLAGS: ABNORMAL
RBC # BLD AUTO: 3.92 10E12/L (ref 3.8–5.2)
SODIUM SERPL-SCNC: 139 MMOL/L (ref 134–144)
SOURCE: NORMAL
SP GR UR STRIP: 1.01 (ref 1–1.03)
TROPONIN I SERPL-MCNC: 4.8 PG/ML
TROPONIN I SERPL-MCNC: 5.5 PG/ML
UROBILINOGEN UR STRIP-ACNC: 0.2 EU/DL (ref 0.2–1)
WBC # BLD AUTO: 6.6 10E9/L (ref 4–11)

## 2020-01-02 PROCEDURE — 96375 TX/PRO/DX INJ NEW DRUG ADDON: CPT | Mod: XU

## 2020-01-02 PROCEDURE — 93005 ELECTROCARDIOGRAM TRACING: CPT | Performed by: PHYSICIAN ASSISTANT

## 2020-01-02 PROCEDURE — 99285 EMERGENCY DEPT VISIT HI MDM: CPT | Mod: 25 | Performed by: PHYSICIAN ASSISTANT

## 2020-01-02 PROCEDURE — 93010 ELECTROCARDIOGRAM REPORT: CPT | Performed by: INTERNAL MEDICINE

## 2020-01-02 PROCEDURE — 84484 ASSAY OF TROPONIN QUANT: CPT | Performed by: PHYSICIAN ASSISTANT

## 2020-01-02 PROCEDURE — G0378 HOSPITAL OBSERVATION PER HR: HCPCS

## 2020-01-02 PROCEDURE — 83605 ASSAY OF LACTIC ACID: CPT | Performed by: PHYSICIAN ASSISTANT

## 2020-01-02 PROCEDURE — 96374 THER/PROPH/DIAG INJ IV PUSH: CPT | Mod: XU | Performed by: PHYSICIAN ASSISTANT

## 2020-01-02 PROCEDURE — 81003 URINALYSIS AUTO W/O SCOPE: CPT | Performed by: PHYSICIAN ASSISTANT

## 2020-01-02 PROCEDURE — 25000132 ZZH RX MED GY IP 250 OP 250 PS 637: Mod: GY | Performed by: PHYSICIAN ASSISTANT

## 2020-01-02 PROCEDURE — 71275 CT ANGIOGRAPHY CHEST: CPT

## 2020-01-02 PROCEDURE — 96372 THER/PROPH/DIAG INJ SC/IM: CPT

## 2020-01-02 PROCEDURE — 80053 COMPREHEN METABOLIC PANEL: CPT | Performed by: PHYSICIAN ASSISTANT

## 2020-01-02 PROCEDURE — 25000132 ZZH RX MED GY IP 250 OP 250 PS 637: Mod: GY | Performed by: INTERNAL MEDICINE

## 2020-01-02 PROCEDURE — 85025 COMPLETE CBC W/AUTO DIFF WBC: CPT | Performed by: PHYSICIAN ASSISTANT

## 2020-01-02 PROCEDURE — 85379 FIBRIN DEGRADATION QUANT: CPT | Performed by: PHYSICIAN ASSISTANT

## 2020-01-02 PROCEDURE — 96361 HYDRATE IV INFUSION ADD-ON: CPT | Performed by: PHYSICIAN ASSISTANT

## 2020-01-02 PROCEDURE — 99220 ZZC INITIAL OBSERVATION CARE,LEVL III: CPT | Performed by: INTERNAL MEDICINE

## 2020-01-02 PROCEDURE — 71046 X-RAY EXAM CHEST 2 VIEWS: CPT

## 2020-01-02 PROCEDURE — 83880 ASSAY OF NATRIURETIC PEPTIDE: CPT | Performed by: PHYSICIAN ASSISTANT

## 2020-01-02 PROCEDURE — 83690 ASSAY OF LIPASE: CPT | Performed by: PHYSICIAN ASSISTANT

## 2020-01-02 PROCEDURE — 25500064 ZZH RX 255 OP 636: Performed by: PHYSICIAN ASSISTANT

## 2020-01-02 PROCEDURE — 99285 EMERGENCY DEPT VISIT HI MDM: CPT | Mod: Z6 | Performed by: PHYSICIAN ASSISTANT

## 2020-01-02 PROCEDURE — 25800030 ZZH RX IP 258 OP 636: Performed by: PHYSICIAN ASSISTANT

## 2020-01-02 PROCEDURE — 25000128 H RX IP 250 OP 636: Performed by: PHYSICIAN ASSISTANT

## 2020-01-02 PROCEDURE — 93970 EXTREMITY STUDY: CPT

## 2020-01-02 PROCEDURE — 85610 PROTHROMBIN TIME: CPT | Performed by: PHYSICIAN ASSISTANT

## 2020-01-02 PROCEDURE — 36415 COLL VENOUS BLD VENIPUNCTURE: CPT | Performed by: PHYSICIAN ASSISTANT

## 2020-01-02 RX ORDER — METOPROLOL TARTRATE 50 MG
50 TABLET ORAL 2 TIMES DAILY
Status: DISCONTINUED | OUTPATIENT
Start: 2020-01-02 | End: 2020-01-03 | Stop reason: HOSPADM

## 2020-01-02 RX ORDER — OXYCODONE AND ACETAMINOPHEN 5; 325 MG/1; MG/1
1-2 TABLET ORAL EVERY 4 HOURS PRN
Status: DISCONTINUED | OUTPATIENT
Start: 2020-01-02 | End: 2020-01-03 | Stop reason: HOSPADM

## 2020-01-02 RX ORDER — SODIUM CHLORIDE 9 MG/ML
1000 INJECTION, SOLUTION INTRAVENOUS CONTINUOUS
Status: DISCONTINUED | OUTPATIENT
Start: 2020-01-02 | End: 2020-01-03

## 2020-01-02 RX ORDER — RANOLAZINE 500 MG/1
500 TABLET, EXTENDED RELEASE ORAL 2 TIMES DAILY
Status: DISCONTINUED | OUTPATIENT
Start: 2020-01-02 | End: 2020-01-03 | Stop reason: HOSPADM

## 2020-01-02 RX ORDER — LISINOPRIL 20 MG/1
20 TABLET ORAL DAILY
Status: DISCONTINUED | OUTPATIENT
Start: 2020-01-03 | End: 2020-01-03 | Stop reason: HOSPADM

## 2020-01-02 RX ORDER — HYDROMORPHONE HYDROCHLORIDE 1 MG/ML
0.5 INJECTION, SOLUTION INTRAMUSCULAR; INTRAVENOUS; SUBCUTANEOUS ONCE
Status: COMPLETED | OUTPATIENT
Start: 2020-01-02 | End: 2020-01-02

## 2020-01-02 RX ORDER — ONDANSETRON 4 MG/1
4 TABLET, ORALLY DISINTEGRATING ORAL EVERY 6 HOURS PRN
Status: DISCONTINUED | OUTPATIENT
Start: 2020-01-02 | End: 2020-01-03 | Stop reason: HOSPADM

## 2020-01-02 RX ORDER — ASPIRIN 81 MG/1
81 TABLET, CHEWABLE ORAL AT BEDTIME
Status: DISCONTINUED | OUTPATIENT
Start: 2020-01-02 | End: 2020-01-03 | Stop reason: HOSPADM

## 2020-01-02 RX ORDER — IODIXANOL 320 MG/ML
100 INJECTION, SOLUTION INTRAVASCULAR ONCE
Status: COMPLETED | OUTPATIENT
Start: 2020-01-02 | End: 2020-01-02

## 2020-01-02 RX ORDER — DULOXETIN HYDROCHLORIDE 30 MG/1
60 CAPSULE, DELAYED RELEASE ORAL 2 TIMES DAILY
Status: DISCONTINUED | OUTPATIENT
Start: 2020-01-02 | End: 2020-01-03 | Stop reason: HOSPADM

## 2020-01-02 RX ORDER — ASPIRIN 81 MG/1
324 TABLET, CHEWABLE ORAL ONCE
Status: COMPLETED | OUTPATIENT
Start: 2020-01-02 | End: 2020-01-02

## 2020-01-02 RX ORDER — ACETAMINOPHEN 650 MG/1
650 SUPPOSITORY RECTAL EVERY 4 HOURS PRN
Status: DISCONTINUED | OUTPATIENT
Start: 2020-01-02 | End: 2020-01-03 | Stop reason: HOSPADM

## 2020-01-02 RX ORDER — SUCRALFATE 1 G/1
1 TABLET ORAL 4 TIMES DAILY
Status: DISCONTINUED | OUTPATIENT
Start: 2020-01-02 | End: 2020-01-03 | Stop reason: HOSPADM

## 2020-01-02 RX ORDER — NITROGLYCERIN 0.4 MG/1
0.4 TABLET SUBLINGUAL EVERY 5 MIN PRN
Status: DISCONTINUED | OUTPATIENT
Start: 2020-01-02 | End: 2020-01-03 | Stop reason: HOSPADM

## 2020-01-02 RX ORDER — BUSPIRONE HYDROCHLORIDE 10 MG/1
10 TABLET ORAL 2 TIMES DAILY
Status: DISCONTINUED | OUTPATIENT
Start: 2020-01-02 | End: 2020-01-03 | Stop reason: HOSPADM

## 2020-01-02 RX ORDER — NITROGLYCERIN 0.4 MG/1
0.4 TABLET SUBLINGUAL EVERY 5 MIN PRN
Status: DISCONTINUED | OUTPATIENT
Start: 2020-01-02 | End: 2020-01-02

## 2020-01-02 RX ORDER — SUCRALFATE 1 G/1
1 TABLET ORAL 4 TIMES DAILY PRN
COMMUNITY
End: 2020-02-19

## 2020-01-02 RX ORDER — PANTOPRAZOLE SODIUM 40 MG/1
40 TABLET, DELAYED RELEASE ORAL
Status: DISCONTINUED | OUTPATIENT
Start: 2020-01-03 | End: 2020-01-03 | Stop reason: HOSPADM

## 2020-01-02 RX ORDER — CLOPIDOGREL BISULFATE 75 MG/1
75 TABLET ORAL DAILY
Status: DISCONTINUED | OUTPATIENT
Start: 2020-01-03 | End: 2020-01-03 | Stop reason: HOSPADM

## 2020-01-02 RX ORDER — AMLODIPINE BESYLATE 10 MG/1
10 TABLET ORAL DAILY
Status: DISCONTINUED | OUTPATIENT
Start: 2020-01-03 | End: 2020-01-03 | Stop reason: HOSPADM

## 2020-01-02 RX ORDER — ACETAMINOPHEN 325 MG/1
650 TABLET ORAL EVERY 4 HOURS PRN
Status: DISCONTINUED | OUTPATIENT
Start: 2020-01-02 | End: 2020-01-03 | Stop reason: HOSPADM

## 2020-01-02 RX ORDER — NALOXONE HYDROCHLORIDE 0.4 MG/ML
.1-.4 INJECTION, SOLUTION INTRAMUSCULAR; INTRAVENOUS; SUBCUTANEOUS
Status: DISCONTINUED | OUTPATIENT
Start: 2020-01-02 | End: 2020-01-03 | Stop reason: HOSPADM

## 2020-01-02 RX ORDER — GABAPENTIN 600 MG/1
600 TABLET ORAL 2 TIMES DAILY
Status: DISCONTINUED | OUTPATIENT
Start: 2020-01-02 | End: 2020-01-03 | Stop reason: HOSPADM

## 2020-01-02 RX ORDER — METOCLOPRAMIDE 10 MG/1
10 TABLET ORAL EVERY 6 HOURS PRN
Status: DISCONTINUED | OUTPATIENT
Start: 2020-01-02 | End: 2020-01-03 | Stop reason: HOSPADM

## 2020-01-02 RX ORDER — DOCUSATE SODIUM 100 MG/1
100 CAPSULE, LIQUID FILLED ORAL DAILY
Status: DISCONTINUED | OUTPATIENT
Start: 2020-01-02 | End: 2020-01-03 | Stop reason: HOSPADM

## 2020-01-02 RX ORDER — ONDANSETRON 2 MG/ML
4 INJECTION INTRAMUSCULAR; INTRAVENOUS EVERY 6 HOURS PRN
Status: DISCONTINUED | OUTPATIENT
Start: 2020-01-02 | End: 2020-01-03 | Stop reason: HOSPADM

## 2020-01-02 RX ORDER — CLONAZEPAM 1 MG/1
1 TABLET ORAL 2 TIMES DAILY
Status: DISCONTINUED | OUTPATIENT
Start: 2020-01-02 | End: 2020-01-03 | Stop reason: HOSPADM

## 2020-01-02 RX ADMIN — ASPIRIN 81 MG 81 MG: 81 TABLET ORAL at 21:10

## 2020-01-02 RX ADMIN — CLONAZEPAM 1 MG: 1 TABLET ORAL at 21:10

## 2020-01-02 RX ADMIN — ASPIRIN 81 MG 324 MG: 81 TABLET ORAL at 12:18

## 2020-01-02 RX ADMIN — BUSPIRONE HYDROCHLORIDE 10 MG: 10 TABLET ORAL at 21:10

## 2020-01-02 RX ADMIN — IODIXANOL 100 ML: 320 INJECTION, SOLUTION INTRAVASCULAR at 13:13

## 2020-01-02 RX ADMIN — SODIUM CHLORIDE 1000 ML: 9 INJECTION, SOLUTION INTRAVENOUS at 22:11

## 2020-01-02 RX ADMIN — DULOXETINE HYDROCHLORIDE 60 MG: 30 CAPSULE, DELAYED RELEASE ORAL at 21:10

## 2020-01-02 RX ADMIN — ENOXAPARIN SODIUM 80 MG: 80 INJECTION SUBCUTANEOUS at 13:51

## 2020-01-02 RX ADMIN — OXYCODONE AND ACETAMINOPHEN 2 TABLET: 5; 325 TABLET ORAL at 17:15

## 2020-01-02 RX ADMIN — HYDROMORPHONE HYDROCHLORIDE 0.5 MG: 1 INJECTION, SOLUTION INTRAMUSCULAR; INTRAVENOUS; SUBCUTANEOUS at 14:23

## 2020-01-02 RX ADMIN — METOPROLOL TARTRATE 50 MG: 50 TABLET, FILM COATED ORAL at 21:10

## 2020-01-02 RX ADMIN — SODIUM CHLORIDE 1000 ML: 9 INJECTION, SOLUTION INTRAVENOUS at 12:18

## 2020-01-02 RX ADMIN — RANOLAZINE 500 MG: 500 TABLET, FILM COATED, EXTENDED RELEASE ORAL at 21:09

## 2020-01-02 RX ADMIN — SODIUM CHLORIDE 1000 ML: 9 INJECTION, SOLUTION INTRAVENOUS at 13:42

## 2020-01-02 RX ADMIN — GABAPENTIN 600 MG: 600 TABLET, FILM COATED ORAL at 21:10

## 2020-01-02 RX ADMIN — RIVAROXABAN 15 MG: 15 TABLET, FILM COATED ORAL at 21:10

## 2020-01-02 ASSESSMENT — ENCOUNTER SYMPTOMS
NECK PAIN: 0
COLOR CHANGE: 0
TREMORS: 0
SHORTNESS OF BREATH: 1
SPEECH DIFFICULTY: 0
STRIDOR: 0
DIZZINESS: 0
NECK STIFFNESS: 0
TROUBLE SWALLOWING: 0
VOMITING: 0
COUGH: 1
LIGHT-HEADEDNESS: 0
FACIAL SWELLING: 0
CHEST TIGHTNESS: 1
WHEEZING: 0
FATIGUE: 0
SORE THROAT: 0
FREQUENCY: 0
BACK PAIN: 0
ACTIVITY CHANGE: 0
ABDOMINAL PAIN: 0
DYSURIA: 0
DIARRHEA: 0
CONSTIPATION: 0
NAUSEA: 0
HEADACHES: 0
FEVER: 0
FACIAL ASYMMETRY: 0
SEIZURES: 0
RHINORRHEA: 0
WEAKNESS: 0
EYE PAIN: 0
APPETITE CHANGE: 0

## 2020-01-02 ASSESSMENT — MIFFLIN-ST. JEOR
SCORE: 1438.1
SCORE: 1435.38

## 2020-01-02 NOTE — PROGRESS NOTES
Pt VS: Temp: 98.1  F (36.7  C) Temp src: Tympanic BP: (!) 168/88 Pulse: 63 Heart Rate: 66 Resp: 22 SpO2: 97 % O2 Device: None (Room air)    Complaints of 8/10 constant headache pain- was given PRN Percocet, see MAR. Lung sounds clear and heart rhythm regular. Pt verbalized some discomfort with breathing. Bowel sounds active. Dribbling with voiding at times. IV fluids running at 125 mL/hr. SBA in room. Bed alarm active. Breana Banda RN on 1/2/2020 at 5:34 PM

## 2020-01-02 NOTE — ED TRIAGE NOTES
Patient complaining of chest pressure and shortness of breath.  Patient stated that this started last night.  Patient took nitroglycerin Last night and had some pain relief.  Patient stated she has had MI in the past and this feels similar.

## 2020-01-02 NOTE — ED PROVIDER NOTES
History     Chief Complaint   Patient presents with     Chest Pain     Shortness of Breath     This is a 64-year-old female who has a history of some coronary artery disease for which she is currently on Plavix.  She started having some chest pressure and shortness of breath that started last night.  She actually took 1 of her nitros last night and had some pain relief.  Her symptoms persisted today and she is concerned that she may have recurrent pneumonia.  She denies any fever but has had some chills.  Denies any other issues at this time.  She does not appear to be in any respiratory distress.  Denies any lightheadedness or dizziness.  No nausea or vomiting.  No flulike symptoms.            Allergies:  Allergies   Allergen Reactions     Atorvastatin Muscle Pain (Myalgia)     Liquid Adhesive Rash     Other reaction(s): Erythema  Silk and plastic gloves (long term attachment of adhesives)     Tiotropium Bromide [Tiotropium] Rash     Ezetimibe Muscle Pain (Myalgia)     Niacin      Other reaction(s): Flushing  flushing     Rosuvastatin Muscle Pain (Myalgia)     Other reaction(s): Myalgia     Latex Rash     No Clinical Screening - See Comments Rash, Blisters and Itching     Metals and plastic  Metals and plastics       Tape [Adhesive Tape] Rash       Problem List:    Patient Active Problem List    Diagnosis Date Noted     Chronic anxiety 01/22/2018     Priority: Medium     Collagenous colitis 01/22/2018     Priority: Medium     Overview:   Possible Dx 2007       Major depressive disorder, recurrent episode, severe (H) 01/22/2018     Priority: Medium     Essential hypertension 01/22/2018     Priority: Medium     Mixed hyperlipidemia 01/22/2018     Priority: Medium     Osteoarthritis 01/22/2018     Priority: Medium     Panic attack 01/22/2018     Priority: Medium     Status post coronary angiogram 09/15/2017     Priority: Medium     History of tobacco abuse 08/10/2017     Priority: Medium     Presence of stent in  coronary artery in patient with coronary artery disease 08/10/2017     Priority: Medium     History of coronary artery bypass graft x 3 08/10/2017     Priority: Medium     Noncompliance with CPAP treatment 10/21/2016     Priority: Medium     Intractable chronic common migraine without aura 10/21/2016     Priority: Medium     H/O multiple concussions 10/21/2016     Priority: Medium     History of Clostridium difficile 08/19/2016     Priority: Medium     Iron deficiency anemia 07/26/2016     Priority: Medium     CKD (chronic kidney disease) stage 2, GFR 60-89 ml/min 12/09/2015     Priority: Medium     Chest wall pain, chronic 11/04/2015     Priority: Medium     Controlled substance agreement signed 9/18/19 01/28/2014     Priority: Medium     Overview:        Central sleep apnea 10/14/2013     Priority: Medium     Myofascial pain 10/14/2013     Priority: Medium     Vitamin D deficiency 05/06/2013     Priority: Medium     Subclavian artery stenosis, left (H) 03/31/2013     Priority: Medium     Overview:   S/p prox left SCA stent 4/1/2013       Lumbar facet arthropathy 01/02/2013     Priority: Medium     Nonspecific abnormal results of pulmonary system function study 12/21/2011     Priority: Medium     Slow transit constipation 11/29/2011     Priority: Medium     Gastric ulcer with hemorrhage 10/03/2011     Priority: Medium     Family history of malignant neoplasm of gastrointestinal tract 09/26/2011     Priority: Medium     Nodular degeneration of cornea 09/26/2011     Priority: Medium     Bilateral low back pain without sciatica 05/31/2011     Priority: Medium     Chronic pain disorder 12/01/2010     Priority: Medium     COPD (chronic obstructive pulmonary disease) (H) 06/15/2007     Priority: Medium     Overview:   Low DLCO, normal spirometry on 12/15/11       Peptic ulcer 06/15/2007     Priority: Medium     Postsurgical aortocoronary bypass status 02/12/2003     Priority: Medium     Coronary atherosclerosis  09/12/2002     Priority: Medium     Overview:   Multiple Angigrams prior to 2007. Also:  6/5/2007: Angiogram: TAXUS DELONTE to ostial circumflux, instent restenosis  5/23/2008: Left Main 30% diseased, LAD stents patent, Left circ stent patent, Chronic occluded right internal mammary artery graft to distal RCA, native RCA has 30% stenosis; NO intevention  9/19/2012 CT Angiogram: Patent LIMA to LAD, patent LAD stents, moderate proximal circumflex disease, patent RCA , occluded right internal mammary artery graft to distal RCA.  9/20/2012: Angiogram: Stent to Left Main, ostial LAD, and PTCA of ostial left circumflex          Past Medical History:    Past Medical History:   Diagnosis Date     Acute ischemic heart disease (H)      Acute myocardial infarction (H)      Anxiety disorder      Atherosclerotic heart disease of native coronary artery without angina pectoris      Bilateral carpal tunnel syndrome      Cervicalgia      Chest pain      Chronic gastric ulcer without hemorrhage or perforation      Chronic ischemic heart disease      Chronic obstructive pulmonary disease (H)      Chronic or unspecified gastric ulcer with hemorrhage      Chronic pain syndrome      Constipation      Coronary angioplasty status      Coronary angioplasty status      Coronary angioplasty status      Dorsalgia      Encounter for other administrative examinations      Encounter for screening for cardiovascular disorders      Enterocolitis due to Clostridium difficile      Essential (primary) hypertension      Hyperlipidemia      Major depressive disorder, single episode      Migraine without status migrainosus, not intractable      Nodular corneal degeneration      Noninfective gastroenteritis and colitis      Noninfective gastroenteritis and colitis      Osteoarthritis      Other chest pain      Pain in knee      Pain in right shoulder      Panic disorder without agoraphobia      Peptic ulcer without hemorrhage or perforation      Peripheral  vascular disease (H)      Personal history of diseases of the blood and blood-forming organs and certain disorders involving the immune mechanism (CODE)      Personal history of nicotine dependence      Personal history of other medical treatment (CODE)      Presence of aortocoronary bypass graft      Primary central sleep apnea      Sepsis due to Escherichia coli (E. coli) (H)      Stricture of artery (H)      Thoracic, thoracolumbar and lumbosacral intervertebral disc disorder      Uncomplicated opioid abuse (H)      Vitamin D deficiency        Past Surgical History:    Past Surgical History:   Procedure Laterality Date     ANGIOPLASTY      9/12/02,with triple stenting     APPENDECTOMY OPEN      No Comments Provided     ARTHROSCOPY KNEE      left     ARTHROSCOPY SHOULDER Right 05/12/2017    labral tear, rotator cuff tear and some subacromial decompression      BYPASS GRAFT ARTERY CORONARY      12/13/02,Triple bypass, left internal mammary  to LAD, right internal mammary to right coronary artery, saphenous to obtuse marginal of the left circumflex.     COLONOSCOPY      2011,Dr Bowman benign polyps     COLONOSCOPY  10/03/2011    2011,benign polyps, Dr. Bowman     COLONOSCOPY  08/08/2016 8/8/16,normal, Dr Bowman     ELBOW SURGERY      baby,birth malachi removed from right arm     EMBOLECTOMY UPPER EXTREMITY  04/02/2013    brachial artery pseudoaneurysm after stenting     ESOPHAGOSCOPY, GASTROSCOPY, DUODENOSCOPY (EGD), COMBINED      2011,EGD Dr Bowman with pyloric ulcer     ESOPHAGOSCOPY, GASTROSCOPY, DUODENOSCOPY (EGD), COMBINED      2011,pyloric ulcer, Dr. Bowman     ESOPHAGOSCOPY, GASTROSCOPY, DUODENOSCOPY (EGD), COMBINED      8/8/16,mild gastritis, Dr Bowman     ESOPHAGOSCOPY, GASTROSCOPY, DUODENOSCOPY (EGD), COMBINED      11/27/2017,Dr Bowman. Antral ulcer     ESOPHAGOSCOPY, GASTROSCOPY, DUODENOSCOPY (EGD), COMBINED  02/02/2018    Dr Bowman, healed ulcer     HYSTERECTOMY TOTAL ABDOMINAL      age 21     LAPAROSCOPIC  CHOLECYSTECTOMY      2006     OSTEOTOMY FEMUR DISTAL      x3, right knee     OSTEOTOMY FEMUR DISTAL      2000,left knee  ligament surgery     OTHER SURGICAL HISTORY      1/10/2003,,PTCA     OTHER SURGICAL HISTORY      2012,,PTCA,DELONTE in LAD and left main     OTHER SURGICAL HISTORY      2013,,PTCA,L subclavian stenosis     SALPINGO-OOPHORECTOMY BILATERAL      age 27,Bilateral salpingo-oophorectomy     TONSILLECTOMY, ADENOIDECTOMY, COMBINED      childhood       Family History:    Family History   Problem Relation Age of Onset     Heart Disease Father         Heart Disease,Heart condition/Significant for atherosclerotic cardiovascular disease, but non premature.     Colon Cancer Father         Cancer-colon, of colon cancer     Cancer Father         Cancer,mets to liver, secondary to colon cancer     Heart Disease Mother         Heart Disease     Cancer Other         Cancer,Multiple Myeloma     Heart Disease Other         Heart Disease,Ischemic Heart Disease     Colon Cancer Other         Cancer-colon,Malignant neoplasms     Cancer Sister         Cancer,multiple myeloma     Other - See Comments Son         gallstones       Social History:  Marital Status:   [2]  Social History     Tobacco Use     Smoking status: Former Smoker     Packs/day: 0.25     Years: 35.00     Pack years: 8.75     Types: Cigarettes     Last attempt to quit: 2/15/2017     Years since quittin.8     Smokeless tobacco: Never Used   Substance Use Topics     Alcohol use: Yes     Alcohol/week: 0.0 standard drinks     Comment: Alcoholic Drinks/day: rare     Drug use: No     Comment: Drug use: No        Medications:    albuterol (PROAIR HFA/PROVENTIL HFA/VENTOLIN HFA) 108 (90 Base) MCG/ACT inhaler  amLODIPine (NORVASC) 10 MG tablet  aspirin EC 81 MG EC tablet  busPIRone (BUSPAR) 10 MG tablet  clonazePAM (KLONOPIN) 1 MG tablet  clopidogrel (PLAVIX) 75 MG tablet  docusate sodium (COLACE) 50 MG capsule  DULoxetine  "(CYMBALTA) 60 MG capsule  gabapentin (NEURONTIN) 600 MG tablet  lisinopril (PRINIVIL/ZESTRIL) 20 MG tablet  metoprolol tartrate (LOPRESSOR) 50 MG tablet  nitroGLYcerin (NITROSTAT) 0.3 MG sublingual tablet  omeprazole (PRILOSEC) 20 MG DR capsule  ranolazine (RANEXA) 500 MG 12 hr tablet  rosuvastatin (CRESTOR) 5 MG tablet  saccharomyces boulardii (FLORASTOR) 250 MG capsule  sucralfate (CARAFATE) 1 GM tablet  VITAMIN D, CHOLECALCIFEROL, PO  Diclofenac Sodium 1.5 % SOLN  metoclopramide (REGLAN) 10 MG tablet  naloxone (NARCAN) 4 MG/0.1ML nasal spray  order for DME  oxyCODONE-acetaminophen (PERCOCET) 5-325 MG tablet  vortioxetine (TRINTELLIX/BRINTELLIX) 5 MG tablet          Review of Systems   Constitutional: Negative for activity change, appetite change, fatigue and fever.   HENT: Negative for drooling, facial swelling, rhinorrhea, sore throat and trouble swallowing.    Eyes: Negative for pain and visual disturbance.   Respiratory: Positive for cough, chest tightness and shortness of breath. Negative for wheezing and stridor.    Cardiovascular: Positive for chest pain. Negative for leg swelling.   Gastrointestinal: Negative for abdominal pain, constipation, diarrhea, nausea and vomiting.   Genitourinary: Negative for dysuria, frequency and urgency.   Musculoskeletal: Negative for back pain, neck pain and neck stiffness.   Skin: Negative for color change.   Neurological: Negative for dizziness, tremors, seizures, facial asymmetry, speech difficulty, weakness, light-headedness and headaches.       Physical Exam   BP: (!) 149/80  Heart Rate: 67  Temp: 98  F (36.7  C)  Resp: 16  Height: 167.6 cm (5' 6\")  Weight: 87.4 kg (192 lb 9.6 oz)  SpO2: 98 %      Physical Exam  Constitutional:       General: She is not in acute distress.     Appearance: She is not ill-appearing, toxic-appearing or diaphoretic.   HENT:      Head: No raccoon eyes or Jackson's sign.      Jaw: No trismus.      Right Ear: No drainage or tenderness.      Left " Ear: No drainage or tenderness.      Nose: Nose normal.   Eyes:      General: No scleral icterus.     Extraocular Movements: Extraocular movements intact.      Right eye: Normal extraocular motion and no nystagmus.      Left eye: Normal extraocular motion and no nystagmus.      Pupils: Pupils are equal, round, and reactive to light.      Right eye: Pupil is reactive and not sluggish.      Left eye: Pupil is reactive and not sluggish.      Funduscopic exam:     Right eye: No AV nicking, arteriolar narrowing or papilledema. Red reflex present.         Left eye: No AV nicking, arteriolar narrowing or papilledema. Red reflex present.  Neck:      Musculoskeletal: Normal range of motion. Normal range of motion. No neck rigidity, pain with movement, spinous process tenderness or muscular tenderness.      Vascular: No JVD.      Trachea: No tracheal deviation.   Cardiovascular:      Rate and Rhythm: Normal rate and regular rhythm.   Pulmonary:      Effort: Pulmonary effort is normal. No respiratory distress.      Breath sounds: Normal breath sounds. No stridor. No wheezing.      Comments: Lung sounds are clear but decreased.  Her SaO2 is about 91-94% on room air.  She does not appear to be in any respiratory distress.  Abdominal:      General: There is no distension.      Palpations: There is no mass.      Tenderness: There is no abdominal tenderness. There is no right CVA tenderness, left CVA tenderness, guarding or rebound.   Musculoskeletal: Normal range of motion.         General: No tenderness or deformity.   Lymphadenopathy:      Cervical: No cervical adenopathy.      Right cervical: No superficial cervical adenopathy.     Left cervical: No superficial cervical adenopathy.   Skin:     General: Skin is warm and dry.      Capillary Refill: Capillary refill takes less than 2 seconds.   Neurological:      Mental Status: She is alert and oriented to person, place, and time.      GCS: GCS eye subscore is 4. GCS verbal  subscore is 5. GCS motor subscore is 6.      Motor: No tremor or seizure activity.      Coordination: Coordination normal.      Gait: Gait normal.         ED Course     EKG shows normal sinus rhythm with heart rate 62.  Results for orders placed or performed during the hospital encounter of 01/02/20 (from the past 24 hour(s))   CBC with platelets differential   Result Value Ref Range    WBC 6.6 4.0 - 11.0 10e9/L    RBC Count 3.92 3.8 - 5.2 10e12/L    Hemoglobin 11.6 (L) 11.7 - 15.7 g/dL    Hematocrit 35.1 35.0 - 47.0 %    MCV 90 78 - 100 fl    MCH 29.6 26.5 - 33.0 pg    MCHC 33.0 31.5 - 36.5 g/dL    RDW 13.4 10.0 - 15.0 %    Platelet Count 274 150 - 450 10e9/L    Diff Method Automated Method     % Neutrophils 52.5 %    % Lymphocytes 34.5 %    % Monocytes 8.7 %    % Eosinophils 3.2 %    % Basophils 0.9 %    % Immature Granulocytes 0.2 %    Absolute Neutrophil 3.5 1.6 - 8.3 10e9/L    Absolute Lymphocytes 2.3 0.8 - 5.3 10e9/L    Absolute Monocytes 0.6 0.0 - 1.3 10e9/L    Absolute Eosinophils 0.2 0.0 - 0.7 10e9/L    Absolute Basophils 0.1 0.0 - 0.2 10e9/L    Abs Immature Granulocytes 0.0 0 - 0.4 10e9/L   D-Dimer GH   Result Value Ref Range    D-Dimer ng/mL 730 (H) 0 - 230 ng/ml D-DU   INR   Result Value Ref Range    INR 0.91 0 - 1.3   Comprehensive metabolic panel   Result Value Ref Range    Sodium 139 134 - 144 mmol/L    Potassium 3.8 3.5 - 5.1 mmol/L    Chloride 106 98 - 107 mmol/L    Carbon Dioxide 24 21 - 31 mmol/L    Anion Gap 9 3 - 14 mmol/L    Glucose 103 70 - 105 mg/dL    Urea Nitrogen 13 7 - 25 mg/dL    Creatinine 1.06 0.60 - 1.20 mg/dL    GFR Estimate 52 (L) >60 mL/min/[1.73_m2]    GFR Estimate If Black 63 >60 mL/min/[1.73_m2]    Calcium 9.4 8.6 - 10.3 mg/dL    Bilirubin Total 0.3 0.3 - 1.0 mg/dL    Albumin 4.4 3.5 - 5.7 g/dL    Protein Total 6.9 6.4 - 8.9 g/dL    Alkaline Phosphatase 56 34 - 104 U/L    ALT 13 7 - 52 U/L    AST 21 13 - 39 U/L   Lipase   Result Value Ref Range    Lipase 23 11 - 82 U/L    Troponin GH   Result Value Ref Range    Troponin 4.8 <34.0 pg/mL   Lactic acid   Result Value Ref Range    Lactic Acid 0.9 0.5 - 2.2 mmol/L   Nt probnp inpatient (BNP)   Result Value Ref Range    N-Terminal Pro BNP Inpatient 91 0 - 100 pg/mL   XR Chest 2 Views    Narrative    PROCEDURE:  XR CHEST 2 VW    HISTORY: pain, .    COMPARISON:  11/25/19    FINDINGS:  The cardiomediastinal contours are unchanged.  The trachea is midline.  No focal consolidation, effusion or pneumothorax.  Lung volumes are  upper normal.  No suspicious osseous lesion or subdiaphragmatic free air.      Impression    IMPRESSION:      No focal consolidation. Stable chest.    CARRIE OLVERA MD   CT Chest Pulmonary Embolism w Contrast   Result Value Ref Range    Radiologist flags Pulmonary embolism (AA)     Narrative    CT CHEST PULMONARY EMBOLISM W CONTRAST    HISTORY: 64 years Female Shortness of breath    TECHNIQUE: Axial CT imaging of the chest was performed With  intravenous contrast. Coronal and sagittal reconstructions were  obtained.    COMPARISON: 5/13/2019    FINDINGS:  The exam is positive for pulmonary embolism. Segmental and  subsegmental emboli are seen in the right lower lobe right upper lobe  left upper lobe and left lower lobe. No large central emboli are  present at this time.    There is no evidence of thoracic aortic aneurysm or dissection.    Patient status post median sternotomy.  There is no mediastinal or hilar or axillary lymphadenopathy.    The lungs are clear. No consolidating airspace opacities are present.  No concerning pulmonary nodules or masses are present         No concerning osseous lesions are identified      Impression    IMPRESSION: Exam is positive for pulmonary embolism. Small segmental  and subsegmental emboli are present bilaterally. No large central  emboli are present.      [Critical Result: Pulmonary embolism]    Finding was identified on 1/2/2020 1:23 PM.     Dr. Ashraf was contacted by me  on 1/2/2020 1:30 PM and verbalized  understanding of the critical result.     CLEMENTE RODRIGUEZ MD       Medications   0.9% sodium chloride BOLUS (has no administration in time range)     Followed by   sodium chloride 0.9% infusion (has no administration in time range)   aspirin (ASA) chewable tablet 324 mg (has no administration in time range)   nitroGLYcerin (NITROSTAT) sublingual tablet 0.4 mg (has no administration in time range)       Assessments & Plan (with Medical Decision Making)     I have reviewed the nursing notes.    I have reviewed the findings, diagnosis, plan and need for follow up with the patient.       ED to Inpatient Handoff:    Discuss[facility] Dr[facility] Perez at [facility]  Patient accepted for Observation Stay  Pending studies include none  Code Status: Not Addressed           New Prescriptions    No medications on file       Final diagnoses:   Pulmonary embolism (H)   Atypical chest pain   SOB (shortness of breath)     Afebrile.  Vital signs stable.  Patient with increasing chest pain as well as shortness of breath.  Patient feels she may be coming down with pneumonia.  Patient has a strong coronary artery disease history and is currently on Plavix.  She currently rates her pain a 7 on a 1-10 scale.  IV established and she was given fluids aspirin and nitroglycerin initially.  EKG shows normal sinus rhythm.  Troponin is normal.  BNP is normal.  INR is 0.91.  CBC shows normal white blood cells and no left shift.  Lactic acid is normal.  CMP is unremarkable.  Lipase is normal.  Her d-dimer returns elevated at 730.  This is a change from previous in which it was normal.  Chest x-ray shows no acute findings.  Her pain continued and she was given 0.5 mg of Dilaudid.  Given her elevated d-dimer CT PE study was performed and this shows The exam is positive for pulmonary embolism. Segmental and subsegmental emboli are seen in the right lower lobe right upper lobe left upper lobe and left lower lobe. No large  central emboli are  present at this time.  The patient was given 80 mg of Lovenox subcutaneously.  I discussed this case with Dr. Perez since the patient does not feel safe going home.  The patient will be admitted at this time for observation and further medical management.    1/2/2020   [facility] Falls Hospital and ClinicMikel PA-C  01/02/20 142

## 2020-01-02 NOTE — PROGRESS NOTES
"[facility] ADMISSION NOTE    Patient admitted to room 336 at approximately 1431 via wheel chair from emergency room. Patient was accompanied by spouse.     Verbal SBAR report received from VIOLET Toussaint prior to patient arrival.     Patient ambulated to bed with stand-by assist. Patient alert and oriented X 3. Pain is controlled with use of Dilaudid from ER. 0-10 Pain Scale: 7. Admission vital signs: Blood pressure (!) 149/72, pulse 63, temperature 96.9  F (36.1  C), temperature source Tympanic, resp. rate 20, height 1.676 m (5' 6\"), weight 87.6 kg (193 lb 3.2 oz), SpO2 95 %, not currently breastfeeding. Patient and spouse were oriented to plan of care, call light, bed controls, tv, telephone, bathroom and visiting hours.     Risk Assessment    The following safety risks were identified during admission: fall. Yellow risk band applied: YES.     Skin Initial Assessment    This writer admitted this patient and completed a full skin assessment and Doe score in the Adult PCS flowsheet. Appropriate interventions initiated as needed.    Doe Risk Assessment  Sensory Perception: 4-->no impairment  Moisture: 3-->occasionally moist  Activity: 3-->walks occasionally  Mobility: 3-->slightly limited  Nutrition: 3-->adequate  Friction and Shear: 3-->no apparent problem  Doe Score: 19    Education    Patient has a [place] to Observation order: Yes  Observation education completed and documented: Yes      Breana Banda RN    "

## 2020-01-02 NOTE — H&P
[facility]    History and Physical  Hospitalist       Date of Admission:  1/2/2020    Assessment & Plan   Ankita Day is a 64 year old female who presents with shortness of breath and chest pressure.     Active Problems:    Pulmonary embolism (H)    Assessment: Patient presented with SOB x 5 days and chest pressure. CXR negative and CT showed pulmonary embolism bilaterally. No family hx of clots. No hx of PE, but multiple heart attacks and surgical hx of CABG. Troponin was normal at 4.8. D-dimer was elevated at 730. Etiology of PE is either hypercoagulable state, decreased mobility, or obesity and heavy smoking. Patient is a current smoker and is obese, making this the most likely cause. Due to the unprovoked nature, most likely will need lifelong anticoagulation.     Plan: Start Xarelto 15mg BID x 21 days. Then Xarelto 20mg once daily for 6 months. Bilateral lower extremity US today.    Ronnie Perez M.D. 1/2/2020  3:37 PM  [phone]      DVT Prophylaxis: Enoxaparin (Lovenox) SQ  Code Status: Full  Kendy Marie    Primary Care Physician   Ramirez Cano    Chief Complaint   Shortness of breath and chest pressure     History is obtained from the patient and chart review.    History of Present Illness   Ankita Day is a 64 year old female who presents with shortness of breath and chest pressure. The shortness of breath started five days ago and was worse with going up the stairs and walking. She thought it could be a pneumonia but then started having chest pain last night. She tried taking nitroglycerin last night with no improvement. She does have a PMH of multiple heart attacks. She notes her chest pain is a 5/10 currently but was a 7-8/10 last night,     In the ED: patient refused nitroglycerin due to getting a severe HA from it. ED obtained a CXR which was negative and a CT that showed a small segmental and subsegmental emboli bilaterally.   Patient also requested  something for her severe HA, notes tylenol, NSAIDs, and morphine do not work.     Ronnie Perez M.D. 1/2/2020  3:37 PM  [phone]      Past Medical History    I have reviewed this patient's medical history and updated it with pertinent information if needed.   Past Medical History:   Diagnosis Date     Acute ischemic heart disease (H)     06/15/2007,with PTCA and stenting of 90% osteo circumflex lesion and patent LAD graft, patent left main stent.     Acute myocardial infarction (H)     3/30/2013     Anxiety disorder     No Comments Provided     Atherosclerotic heart disease of native coronary artery without angina pectoris     -angio revealed 3 vessel dz  -4 stents placed; 2 overlapping stents placed in mLAD, 1 stent pRCA, 1 stent pCirc 1 s 9/02 -non-ST elevation MI 1/03  -angio revealed restenosis of Circ.    -PTCA and brachytherapy of pCirc -repeat angio 2/03 -no intervension -CABG x3 12/03 - Dr Sinclair  -LIMA-LAD, RAFI-RCA, SVG-OM -PCI 7/04 stent to L -CTangio 9/05   -patentent LIMA-LAD. RAFI-RCA occluded; RCA ostio/p*     Bilateral carpal tunnel syndrome     No Comments Provided     Cervicalgia     No Comments Provided     Chest pain     12/29/2014     Chronic gastric ulcer without hemorrhage or perforation     10/03/2011,hx of GI bleed (2003)     Chronic ischemic heart disease     06/27/2012     Chronic obstructive pulmonary disease (H)     06/15/2007,low DLCO, normal spirometry     Chronic or unspecified gastric ulcer with hemorrhage     10/2003     Chronic pain syndrome     12/01/2010,chest wall, back     Constipation     11/29/2011     Coronary angioplasty status     09/12/2002,with triple stenting     Coronary angioplasty status     01/10/2003,repeat angioplasty     Coronary angioplasty status     No Comments Provided     Dorsalgia     05/31/2011     Encounter for other administrative examinations     1/28/2014     Encounter for screening for cardiovascular disorders     10/2004,Cardiolite (2004,  2005, 2006 and 2011)     Enterocolitis due to Clostridium difficile     8/19/2016     Essential (primary) hypertension     No Comments Provided     Hyperlipidemia     No Comments Provided     Major depressive disorder, single episode     Severe, hx of suicide attempt/hospitalization     Migraine without status migrainosus, not intractable     No Comments Provided     Nodular corneal degeneration     09/26/2011     Noninfective gastroenteritis and colitis     06/15/2007,history of     Noninfective gastroenteritis and colitis     Microcytic     Osteoarthritis     No Comments Provided     Other chest pain     10/13/2009,chronic     Pain in knee     05/31/2011     Pain in right shoulder     No Comments Provided     Panic disorder without agoraphobia     No Comments Provided     Peptic ulcer without hemorrhage or perforation     6/15/2007     Peripheral vascular disease (H)     No Comments Provided     Personal history of diseases of the blood and blood-forming organs and certain disorders involving the immune mechanism (CODE)     No Comments Provided     Personal history of nicotine dependence     No Comments Provided     Personal history of other medical treatment (CODE)     10/14/2013     Presence of aortocoronary bypass graft     2/12/2003     Primary central sleep apnea     10/14/2013     Sepsis due to Escherichia coli (E. coli) (H)     7/14/16,[facility]     Stricture of artery (H)     3/31/2013,S/p prox left SCA stent 4/1/2013     Thoracic, thoracolumbar and lumbosacral intervertebral disc disorder     No Comments Provided     Uncomplicated opioid abuse (H)     history of     Vitamin D deficiency     5/6/2013       Past Surgical History   I have reviewed this patient's surgical history and updated it with pertinent information if needed.  Past Surgical History:   Procedure Laterality Date     ANGIOPLASTY      9/12/02,with triple stenting     APPENDECTOMY OPEN      No Comments Provided     ARTHROSCOPY KNEE      left      ARTHROSCOPY SHOULDER Right 05/12/2017    labral tear, rotator cuff tear and some subacromial decompression      BYPASS GRAFT ARTERY CORONARY      12/13/02,Triple bypass, left internal mammary  to LAD, right internal mammary to right coronary artery, saphenous to obtuse marginal of the left circumflex.     COLONOSCOPY      2011,Dr Bowman benign polyps     COLONOSCOPY  10/03/2011    2011,benign polyps, Dr. Bowman     COLONOSCOPY  08/08/2016 8/8/16,normal, Dr Bowman     ELBOW SURGERY      baby,birth malachi removed from right arm     EMBOLECTOMY UPPER EXTREMITY  04/02/2013    brachial artery pseudoaneurysm after stenting     ESOPHAGOSCOPY, GASTROSCOPY, DUODENOSCOPY (EGD), COMBINED      2011,EGD Dr Bowman with pyloric ulcer     ESOPHAGOSCOPY, GASTROSCOPY, DUODENOSCOPY (EGD), COMBINED      2011,pyloric ulcer, Dr. Bowman     ESOPHAGOSCOPY, GASTROSCOPY, DUODENOSCOPY (EGD), COMBINED      8/8/16,mild gastritis, Dr Bowman     ESOPHAGOSCOPY, GASTROSCOPY, DUODENOSCOPY (EGD), COMBINED      11/27/2017,Dr Bowman. Antral ulcer     ESOPHAGOSCOPY, GASTROSCOPY, DUODENOSCOPY (EGD), COMBINED  02/02/2018    Dr Bowman, healed ulcer     HYSTERECTOMY TOTAL ABDOMINAL      age 21     LAPAROSCOPIC CHOLECYSTECTOMY      2006     OSTEOTOMY FEMUR DISTAL      x3, right knee     OSTEOTOMY FEMUR DISTAL      2000,left knee  ligament surgery     OTHER SURGICAL HISTORY      1/10/2003,,PTCA     OTHER SURGICAL HISTORY      09/20/2012,,PTCA,DELONTE in LAD and left main     OTHER SURGICAL HISTORY      4/1/2013,,PTCA,L subclavian stenosis     SALPINGO-OOPHORECTOMY BILATERAL      age 27,Bilateral salpingo-oophorectomy     TONSILLECTOMY, ADENOIDECTOMY, COMBINED      childhood       Prior to Admission Medications   Prior to Admission Medications   Prescriptions Last Dose Informant Patient Reported? Taking?   DULoxetine (CYMBALTA) 60 MG capsule 1/2/2020 at Unknown time  No Yes   Sig: Take 1 capsule (60 mg) by mouth 2 times daily   Diclofenac Sodium 1.5 %  SOLN Unknown at Unknown time  No No   Sig: Apply 20 drops to each hand or 40 drops to each knee up to 4 times daily as needed for arthritis pain   VITAMIN D, CHOLECALCIFEROL, PO 1/2/2020 at Unknown time Self Yes Yes   Sig: Take 5,000 Units by mouth daily   albuterol (PROAIR HFA/PROVENTIL HFA/VENTOLIN HFA) 108 (90 Base) MCG/ACT inhaler 1/1/2020 at Unknown time  No Yes   Sig: Inhale 2 puffs into the lungs every 6 hours   amLODIPine (NORVASC) 10 MG tablet 1/1/2020 at Unknown time  No Yes   Sig: Take 1 tablet (10 mg) by mouth daily   aspirin EC 81 MG EC tablet 1/1/2020 at Unknown time  Yes Yes   Sig: Take 81 mg by mouth daily with food   busPIRone (BUSPAR) 10 MG tablet 1/2/2020 at Unknown time  No Yes   Sig: TAKE 1 TABLET TWICE A DAY   clonazePAM (KLONOPIN) 1 MG tablet 1/2/2020 at Unknown time  No Yes   Sig: Take 1 tablet (1 mg) by mouth 2 times daily   clopidogrel (PLAVIX) 75 MG tablet 1/2/2020 at Unknown time  No Yes   Sig: Take 1 tablet (75 mg) by mouth daily   docusate sodium (COLACE) 50 MG capsule 1/1/2020 at Unknown time Self Yes Yes   Sig: Take 50 mg by mouth daily   gabapentin (NEURONTIN) 600 MG tablet 1/2/2020 at Unknown time  Yes Yes   Sig: Take 1 tablet (600 mg) by mouth 2 times daily   lisinopril (PRINIVIL/ZESTRIL) 20 MG tablet 1/2/2020 at Unknown time  No Yes   Sig: Take 1 tablet (20 mg) by mouth daily   metoclopramide (REGLAN) 10 MG tablet Unknown at Unknown time  No No   Sig: Take 1 tablet (10 mg) by mouth every 6 hours as needed (headache)   metoprolol tartrate (LOPRESSOR) 50 MG tablet 1/2/2020 at Unknown time  No Yes   Sig: Take 1 tablet (50 mg) by mouth 2 times daily   naloxone (NARCAN) 4 MG/0.1ML nasal spray Unknown at Unknown time  No No   Sig: Spray into one nostril for opioid reversal if unresponsive. May repeat every 2-3 minutes until patient responsive or EMS arrives   nitroGLYcerin (NITROSTAT) 0.3 MG sublingual tablet 1/1/2020 at Unknown time  No Yes   Sig: PLACE 1 TABLET UNDER THE TONGUE EVERY  5 MINUTES AS NEEDED FOR CHEST PAIN   omeprazole (PRILOSEC) 20 MG DR capsule 1/2/2020 at Unknown time  No Yes   Sig: Take 1 capsule (20 mg) by mouth daily   order for DME Unknown at Unknown time  No No   Sig: Equipment being ordered:  Back brace   oxyCODONE-acetaminophen (PERCOCET) 5-325 MG tablet   No No   Sig: Take 2 tablets by mouth 4 times daily #224 for 28 days   ranolazine (RANEXA) 500 MG 12 hr tablet 1/2/2020 at Unknown time  No Yes   Sig: Take 1 tablet (500 mg) by mouth 2 times daily   rosuvastatin (CRESTOR) 5 MG tablet 1/1/2020 at Unknown time  No Yes   Sig: Take 1 tablet (5 mg) by mouth At Bedtime   saccharomyces boulardii (FLORASTOR) 250 MG capsule 1/2/2020 at Unknown time Self Yes Yes   Sig: Take 250 mg by mouth 2 times daily   sucralfate (CARAFATE) 1 GM tablet 1/2/2020 at Unknown time  Yes Yes   Sig: Take 1 g by mouth 4 times daily   vortioxetine (TRINTELLIX/BRINTELLIX) 5 MG tablet   No No   Sig: Take 1 tablet (5 mg) by mouth daily      Facility-Administered Medications: None     Allergies   Allergies   Allergen Reactions     Atorvastatin Muscle Pain (Myalgia)     Liquid Adhesive Rash     Other reaction(s): Erythema  Silk and plastic gloves (long term attachment of adhesives)     Tiotropium Bromide [Tiotropium] Rash     Ezetimibe Muscle Pain (Myalgia)     Niacin      Other reaction(s): Flushing  flushing     Rosuvastatin Muscle Pain (Myalgia)     Other reaction(s): Myalgia     Latex Rash     No Clinical Screening - See Comments Rash, Blisters and Itching     Metals and plastic  Metals and plastics       Tape [Adhesive Tape] Rash       Social History   I have reviewed this patient's social history and updated it with pertinent information if needed. Ankita Day  reports that she has been smoking cigarettes. She has a 8.75 pack-year smoking history. She has never used smokeless tobacco. She reports current alcohol use. She reports that she does not use drugs.    Family History   I have reviewed this  patient's family history and updated it with pertinent information if needed.   Family History   Problem Relation Age of Onset     Heart Disease Father         Heart Disease,Heart condition/Significant for atherosclerotic cardiovascular disease, but non premature.     Colon Cancer Father         Cancer-colon, of colon cancer     Cancer Father         Cancer,mets to liver, secondary to colon cancer     Heart Disease Mother         Heart Disease     Cancer Other         Cancer,Multiple Myeloma     Heart Disease Other         Heart Disease,Ischemic Heart Disease     Colon Cancer Other         Cancer-colon,Malignant neoplasms     Cancer Sister         Cancer,multiple myeloma     Other - See Comments Son         gallstones       Review of Systems     REVIEW OF SYSTEMS:    Constitutional: normal energy and appetite, no recent sick contacts  Eyes: notes recent visual changes.   Ears, nose, mouth, throat, and face: Has chronic headaches, no mouth sores, dysphagia, or odynophagia  Respiratory: shortness of breath and cough. Had pneumonia last month.   Cardiovascular: Chest pain (see HPI), CABG surgery, multiple stents and heart attacks in the past.   Gastrointestinal: no constipation, diarrhea, nausea, vomiting or abdominal pain. Last colonoscopy was four years ago.   Genitourinary: has difficulty urinating, notes chronic kidney problems  Hematologic/lymphatic: no unintentional weight loss or night sweats.  Musculoskeletal: has chronic knee pain and left shoulder pain.    Neurological: no new weakness, tingling, numbness.   Psychiatric: has chronic depression   Endocrine:  not a known diabetic      Physical Exam   Temp: 96.9  F (36.1  C) Temp src: Tympanic BP: (!) 149/72 Pulse: 63 Heart Rate: 67 Resp: 20 SpO2: 95 % O2 Device: None (Room air)    Vital Signs with Ranges  Temp:  [96.9  F (36.1  C)-98  F (36.7  C)] 96.9  F (36.1  C)  Pulse:  [60-68] 63  Heart Rate:  [60-75] 67  Resp:  [12-25] 20  BP: (120-149)/()  149/72  SpO2:  [91 %-98 %] 95 %  193 lbs 3.2 oz    Constitutional: alert, no acute distress  Eyes: PERRLA, ocular muscles intact   HEENT: moist mucous membranes, non-erythematous, normal hearing  Respiratory: clear to ausculation bilaterally, no wheezes, crackles, or rhonchi  Cardiovascular: regular rate and rhythm, no murmurs heard  GI: bowel sounds normal, non-tender, non-distended   Lymph/Hematologic: no peripheral edema  Skin: dry and warm, no rashes   Musculoskeletal: ROM normal, strength 5/5 bilaterally   Neurologic: orientated x3, cranial nerves intact   Psychiatric: mood normal     Ronnie Perez M.D. 1/2/2020  3:38 PM  [phone]      Data   Data reviewed today:  I personally reviewed the chest x-ray image(s) showing no focal consolidation, stable chest and the chest CT image(s) showing  Exam is positive for pulmonary embolism. Small segmental  and subsegmental emboli are present bilaterally. No large central  emboli are present.     Recent Labs   Lab 01/02/20  1224   WBC 6.6   HGB 11.6*   MCV 90      INR 0.91      POTASSIUM 3.8   CHLORIDE 106   CO2 24   BUN 13   CR 1.06   ANIONGAP 9   GM 9.4      ALBUMIN 4.4   PROTTOTAL 6.9   BILITOTAL 0.3   ALKPHOS 56   ALT 13   AST 21   LIPASE 23       Recent Results (from the past 24 hour(s))   XR Chest 2 Views    Narrative    PROCEDURE:  XR CHEST 2 VW    HISTORY: pain, .    COMPARISON:  11/25/19    FINDINGS:  The cardiomediastinal contours are unchanged.  The trachea is midline.  No focal consolidation, effusion or pneumothorax.  Lung volumes are  upper normal.  No suspicious osseous lesion or subdiaphragmatic free air.      Impression    IMPRESSION:      No focal consolidation. Stable chest.    CARRIE OLVERA MD   CT Chest Pulmonary Embolism w Contrast   Result Value    Radiologist flags Pulmonary embolism (AA)    Narrative    CT CHEST PULMONARY EMBOLISM W CONTRAST    HISTORY: 64 years Female Shortness of breath    TECHNIQUE: Axial CT  imaging of the chest was performed With  intravenous contrast. Coronal and sagittal reconstructions were  obtained.    COMPARISON: 5/13/2019    FINDINGS:  The exam is positive for pulmonary embolism. Segmental and  subsegmental emboli are seen in the right lower lobe right upper lobe  left upper lobe and left lower lobe. No large central emboli are  present at this time.    There is no evidence of thoracic aortic aneurysm or dissection.    Patient status post median sternotomy.  There is no mediastinal or hilar or axillary lymphadenopathy.    The lungs are clear. No consolidating airspace opacities are present.  No concerning pulmonary nodules or masses are present         No concerning osseous lesions are identified      Impression    IMPRESSION: Exam is positive for pulmonary embolism. Small segmental  and subsegmental emboli are present bilaterally. No large central  emboli are present.      [Critical Result: Pulmonary embolism]    Finding was identified on 1/2/2020 1:23 PM.     Dr. Ashraf was contacted by me on 1/2/2020 1:30 PM and verbalized  understanding of the critical result.     CLEMENTE RODRIGUEZ MD     Physician Attestation     I, Ronnie Perez, saw and evaluated this patient as part of a shared visit.  I have reviewed and discussed with the med student their history, physical and plan.     I personally reviewed the vital signs, medications, labs and imaging.     My key history or physical exam findings: as noted per addendum above     Key management decisions made by me and noted in the Assessment and Plan.    Ronnie Perez M.D. 1/2/2020  3:38 PM  [phone]

## 2020-01-02 NOTE — ED NOTES
Pt wanting something for her headache, states that tylenol, ibuprofen and MS do NOT work, PA informed.

## 2020-01-03 VITALS
OXYGEN SATURATION: 95 % | HEIGHT: 66 IN | TEMPERATURE: 97.8 F | HEART RATE: 66 BPM | BODY MASS INDEX: 31.31 KG/M2 | RESPIRATION RATE: 18 BRPM | SYSTOLIC BLOOD PRESSURE: 157 MMHG | WEIGHT: 194.8 LBS | DIASTOLIC BLOOD PRESSURE: 74 MMHG

## 2020-01-03 LAB
CREAT SERPL-MCNC: 0.94 MG/DL (ref 0.6–1.2)
GFR SERPL CREATININE-BSD FRML MDRD: 60 ML/MIN/{1.73_M2}

## 2020-01-03 PROCEDURE — 93005 ELECTROCARDIOGRAM TRACING: CPT

## 2020-01-03 PROCEDURE — 93010 ELECTROCARDIOGRAM REPORT: CPT | Performed by: INTERNAL MEDICINE

## 2020-01-03 PROCEDURE — 40000275 ZZH STATISTIC RCP TIME EA 10 MIN

## 2020-01-03 PROCEDURE — 82565 ASSAY OF CREATININE: CPT | Performed by: INTERNAL MEDICINE

## 2020-01-03 PROCEDURE — 36415 COLL VENOUS BLD VENIPUNCTURE: CPT | Performed by: INTERNAL MEDICINE

## 2020-01-03 PROCEDURE — 25000128 H RX IP 250 OP 636: Performed by: INTERNAL MEDICINE

## 2020-01-03 PROCEDURE — 99217 ZZC OBSERVATION CARE DISCHARGE: CPT | Performed by: INTERNAL MEDICINE

## 2020-01-03 PROCEDURE — G0378 HOSPITAL OBSERVATION PER HR: HCPCS

## 2020-01-03 PROCEDURE — 96375 TX/PRO/DX INJ NEW DRUG ADDON: CPT

## 2020-01-03 PROCEDURE — 25000132 ZZH RX MED GY IP 250 OP 250 PS 637: Mod: GY | Performed by: INTERNAL MEDICINE

## 2020-01-03 RX ORDER — IBUPROFEN 200 MG
600 TABLET ORAL EVERY 12 HOURS PRN
COMMUNITY
End: 2020-01-08

## 2020-01-03 RX ORDER — DOCUSATE SODIUM 100 MG/1
100 CAPSULE, LIQUID FILLED ORAL
COMMUNITY
End: 2020-06-03

## 2020-01-03 RX ADMIN — AMLODIPINE BESYLATE 10 MG: 10 TABLET ORAL at 10:58

## 2020-01-03 RX ADMIN — LISINOPRIL 20 MG: 20 TABLET ORAL at 10:58

## 2020-01-03 RX ADMIN — RANOLAZINE 500 MG: 500 TABLET, FILM COATED, EXTENDED RELEASE ORAL at 10:57

## 2020-01-03 RX ADMIN — METOPROLOL TARTRATE 50 MG: 50 TABLET, FILM COATED ORAL at 10:58

## 2020-01-03 RX ADMIN — SUCRALFATE 1 G: 1 TABLET ORAL at 12:02

## 2020-01-03 RX ADMIN — OXYCODONE AND ACETAMINOPHEN 2 TABLET: 5; 325 TABLET ORAL at 05:45

## 2020-01-03 RX ADMIN — BUSPIRONE HYDROCHLORIDE 10 MG: 10 TABLET ORAL at 10:58

## 2020-01-03 RX ADMIN — RIVAROXABAN 15 MG: 15 TABLET, FILM COATED ORAL at 07:45

## 2020-01-03 RX ADMIN — CLOPIDOGREL BISULFATE 75 MG: 75 TABLET, FILM COATED ORAL at 10:58

## 2020-01-03 RX ADMIN — SUCRALFATE 1 G: 1 TABLET ORAL at 03:20

## 2020-01-03 RX ADMIN — DOCUSATE SODIUM 100 MG: 100 CAPSULE, LIQUID FILLED ORAL at 10:59

## 2020-01-03 RX ADMIN — DULOXETINE HYDROCHLORIDE 60 MG: 30 CAPSULE, DELAYED RELEASE ORAL at 10:57

## 2020-01-03 RX ADMIN — ONDANSETRON HYDROCHLORIDE 4 MG: 2 INJECTION, SOLUTION INTRAMUSCULAR; INTRAVENOUS at 05:04

## 2020-01-03 RX ADMIN — OXYCODONE AND ACETAMINOPHEN 2 TABLET: 5; 325 TABLET ORAL at 11:09

## 2020-01-03 RX ADMIN — SUCRALFATE 1 G: 1 TABLET ORAL at 07:43

## 2020-01-03 RX ADMIN — CLONAZEPAM 1 MG: 1 TABLET ORAL at 10:58

## 2020-01-03 RX ADMIN — OXYCODONE AND ACETAMINOPHEN 2 TABLET: 5; 325 TABLET ORAL at 00:17

## 2020-01-03 RX ADMIN — GABAPENTIN 600 MG: 600 TABLET, FILM COATED ORAL at 10:58

## 2020-01-03 RX ADMIN — PANTOPRAZOLE SODIUM 40 MG: 40 TABLET, DELAYED RELEASE ORAL at 07:43

## 2020-01-03 ASSESSMENT — MIFFLIN-ST. JEOR: SCORE: 1440.36

## 2020-01-03 NOTE — DISCHARGE SUMMARY
"[facility]    Discharge Summary  Hospitalist    Date of Admission:  1/2/2020  Date of Discharge:  1/3/2020  Discharging Provider: Ronnie Perez  Date of Service (when I saw the patient): 01/03/20    Discharge Diagnoses   Principal Problem:    Acute pulmonary embolism without acute cor pulmonale (H) (1/2/2020)      History of Present Illness   Ankita Day is an 65 year old female who presented with chest pain.  Per the H&P:  \"Ankita Day is a 64 year old female who presents with shortness of breath and chest pressure. The shortness of breath started five days ago and was worse with going up the stairs and walking. She thought it could be a pneumonia but then started having chest pain last night. She tried taking nitroglycerin last night with no improvement. She does have a PMH of multiple heart attacks. She notes her chest pain is a 5/10 currently but was a 7-8/10 last night,      In the ED: patient refused nitroglycerin due to getting a severe HA from it. ED obtained a CXR which was negative and a CT that showed a small segmental and subsegmental emboli bilaterally.   Patient also requested something for her severe HA, notes tylenol, NSAIDs, and morphine do not work.\"    Hospital Course   Ankita Day was admitted on 1/2/2020.  The following problems were addressed during her hospitalization:    Pulmonary embolism   The patient was given Lovenox in the emergency department and transitioned to Xarelto.  She remained hemodynamically stable throughout her hospitalization.  She was ambulatory in the hallway.  Continued to have intermittent chest pain.  EKG showed no evidence of ischemia.  She was counseled to stop smoking.  She will follow-up with Dr. Cano next week.    Ronnie Perez MD      Code Status   Full Code       Primary Care Physician   Ramirez Cano    Physical Exam   Temp: 97.8  F (36.6  C) Temp src: Tympanic BP: (!) 157/74 Pulse: 66 Heart Rate: 73 Resp: 18 " SpO2: 95 % O2 Device: None (Room air)    Vitals:    01/02/20 1202 01/02/20 1436 01/03/20 0324   Weight: 87.4 kg (192 lb 9.6 oz) 87.6 kg (193 lb 3.2 oz) 88.4 kg (194 lb 12.8 oz)     Vital Signs with Ranges  Temp:  [96.9  F (36.1  C)-98.1  F (36.7  C)] 97.8  F (36.6  C)  Pulse:  [60-68] 66  Heart Rate:  [60-76] 73  Resp:  [12-25] 18  BP: (112-168)/() 157/74  SpO2:  [91 %-97 %] 95 %  I/O last 3 completed shifts:  In: 2136 [P.O.:240; I.V.:1896]  Out: 3350 [Urine:3350]    GENERAL: Comfortable, no apparent distress.  CARDIOVASCULAR: regular rate and rhythm, no murmur. No lower extremity edema   RESPIRATORY: Clear to auscultation bilaterally, no wheezes or crackles.  GI: non-tender, non-distended, normal bowel sounds.   SKIN: warm periphery, no rashes      Discharge Disposition   Discharged to home  Condition at discharge: Stable    Consultations This Hospital Stay   None    Time Spent on this Encounter   I, Ronnie Perez MD, personally saw the patient today and spent greater than 30 minutes discharging this patient.    Discharge Orders      Reason for your hospital stay    Pulmonary embolism     Follow-up and recommended labs and tests    Follow up with Dr. Cano on Wednesday, January 8th at 9:30 am.     Activity    Your activity upon discharge: activity as tolerated     Full Code     Diet    Follow this diet upon discharge: Orders Placed This Encounter      Regular Diet Adult       Discharge Medications   Current Discharge Medication List      START taking these medications    Details   !! rivaroxaban ANTICOAGULANT (XARELTO) 15 MG TABS tablet Take 1 tablet (15 mg) by mouth 2 times daily (with meals)  Qty: 40 tablet, Refills: 0    Comments: Community care  Associated Diagnoses: Acute pulmonary embolism without acute cor pulmonale, unspecified pulmonary embolism type (H)      !! rivaroxaban ANTICOAGULANT (XARELTO) 20 MG TABS tablet Take 1 tablet (20 mg) by mouth daily (with dinner)  Qty: 30 tablet, Refills: 11     Comments: Community care  Associated Diagnoses: Acute pulmonary embolism without acute cor pulmonale, unspecified pulmonary embolism type (H)       !! - Potential duplicate medications found. Please discuss with provider.      CONTINUE these medications which have NOT CHANGED    Details   albuterol (PROAIR HFA/PROVENTIL HFA/VENTOLIN HFA) 108 (90 Base) MCG/ACT inhaler Inhale 2 puffs into the lungs every 6 hours  Qty: 2 Inhaler, Refills: 3    Associated Diagnoses: Chronic obstructive pulmonary disease, unspecified COPD type (H)      amLODIPine (NORVASC) 10 MG tablet Take 1 tablet (10 mg) by mouth daily  Qty: 90 tablet, Refills: 3    Associated Diagnoses: Essential hypertension      aspirin EC 81 MG EC tablet Take 81 mg by mouth daily with food      busPIRone (BUSPAR) 10 MG tablet TAKE 1 TABLET TWICE A DAY  Qty: 180 tablet, Refills: 1    Associated Diagnoses: Anxiety state      clonazePAM (KLONOPIN) 1 MG tablet Take 1 tablet (1 mg) by mouth 2 times daily  Qty: 180 tablet, Refills: 1    Associated Diagnoses: Chronic anxiety; Controlled substance agreement signed      clopidogrel (PLAVIX) 75 MG tablet Take 1 tablet (75 mg) by mouth daily  Qty: 90 tablet, Refills: 3    Associated Diagnoses: Presence of stent in coronary artery in patient with coronary artery disease      Diclofenac Sodium 1.5 % SOLN Apply 20 drops to each hand or 40 drops to each knee up to 4 times daily as needed for arthritis pain  Qty: 450 mL, Refills: 3    Associated Diagnoses: Primary osteoarthritis involving multiple joints      docusate sodium (COLACE) 100 MG capsule Take 100 mg by mouth twice a week      DULoxetine (CYMBALTA) 60 MG capsule Take 1 capsule (60 mg) by mouth 2 times daily  Qty: 180 capsule, Refills: 3    Associated Diagnoses: Chronic bilateral low back pain without sciatica; Chest wall pain, chronic; Chronic pain disorder; Severe episode of recurrent major depressive disorder, without psychotic features (H); Chronic anxiety       gabapentin (NEURONTIN) 600 MG tablet Take 1 tablet (600 mg) by mouth 2 times daily    Associated Diagnoses: Chest wall pain, chronic; Chronic bilateral low back pain without sciatica; Chronic pain disorder      ibuprofen (ADVIL/MOTRIN) 200 MG tablet Take 600 mg by mouth every 12 hours as needed for other (HEADACHE)      lisinopril (PRINIVIL/ZESTRIL) 20 MG tablet Take 1 tablet (20 mg) by mouth daily  Qty: 90 tablet, Refills: 3    Associated Diagnoses: Essential hypertension      Magnesium Salicylate (DOANS PILLS) 325 MG TABS Take 2 tablets by mouth 3 times daily as needed (BACK PAIN) ROBERT'S      metoclopramide (REGLAN) 10 MG tablet Take 1 tablet (10 mg) by mouth every 6 hours as needed (headache)  Qty: 30 tablet, Refills: 0    Associated Diagnoses: Left-sided headache      metoprolol tartrate (LOPRESSOR) 50 MG tablet Take 1 tablet (50 mg) by mouth 2 times daily  Qty: 180 tablet, Refills: 3    Associated Diagnoses: Essential hypertension      nitroGLYcerin (NITROSTAT) 0.3 MG sublingual tablet PLACE 1 TABLET UNDER THE TONGUE EVERY 5 MINUTES AS NEEDED FOR CHEST PAIN  Qty: 25 tablet, Refills: 11    Associated Diagnoses: Chest pain of uncertain etiology      omeprazole (PRILOSEC) 20 MG DR capsule Take 1 capsule (20 mg) by mouth daily  Qty: 90 capsule, Refills: 3    Associated Diagnoses: Peptic ulcer      oxyCODONE-acetaminophen (PERCOCET) 5-325 MG tablet Take 2 tablets by mouth 4 times daily #224 for 28 days  Qty: 224 tablet, Refills: 0    Associated Diagnoses: Chronic pain disorder; Chronic bilateral low back pain without sciatica; Chest wall pain, chronic; Controlled substance agreement signed      ranolazine (RANEXA) 500 MG 12 hr tablet Take 1 tablet (500 mg) by mouth 2 times daily  Qty: 60 tablet, Refills: 3    Associated Diagnoses: Chest pain, unspecified type; Chronic stable angina (H); Recurrent chest pain      rosuvastatin (CRESTOR) 5 MG tablet Take 1 tablet (5 mg) by mouth At Bedtime  Qty: 60 tablet, Refills: 3     Associated Diagnoses: ASCVD (arteriosclerotic cardiovascular disease); History of coronary artery stent placement; Mixed hyperlipidemia      saccharomyces boulardii (FLORASTOR) 250 MG capsule Take 250 mg by mouth 2 times daily      sucralfate (CARAFATE) 1 GM tablet Take 1 g by mouth 4 times daily as needed      VITAMIN D, CHOLECALCIFEROL, PO Take 5,000 Units by mouth daily      vortioxetine (TRINTELLIX/BRINTELLIX) 5 MG tablet Take 1 tablet (5 mg) by mouth daily  Qty: 30 tablet, Refills: 0    Associated Diagnoses: Moderate episode of recurrent major depressive disorder (H)      naloxone (NARCAN) 4 MG/0.1ML nasal spray Spray into one nostril for opioid reversal if unresponsive. May repeat every 2-3 minutes until patient responsive or EMS arrives  Qty: 1 each, Refills: 1    Associated Diagnoses: Chronic, continuous use of opioids      order for DME Equipment being ordered:  Back brace  Qty: 1 each, Refills: 0    Associated Diagnoses: Chronic bilateral low back pain without sciatica           Allergies   Allergies   Allergen Reactions     Atorvastatin Muscle Pain (Myalgia)     Liquid Adhesive Rash     Other reaction(s): Erythema  Silk and plastic gloves (long term attachment of adhesives)     Tiotropium Bromide [Tiotropium] Rash     Ezetimibe Muscle Pain (Myalgia)     Latex Rash     Niacin      Other reaction(s): Flushing     No Clinical Screening - See Comments Itching, Rash and Blisters     Metals and plastics       Rosuvastatin Muscle Pain (Myalgia)     Other reaction(s): Myalgia  Currently taking 5 mg daily 1/2/20 - retrial     Tape [Adhesive Tape] Rash     Data   Most Recent 3 CBC's:  Recent Labs   Lab Test 01/02/20  1224 10/25/19  1216 10/02/19  2031   WBC 6.6 6.7 5.6   HGB 11.6* 11.8 11.9   MCV 90 90 91    269 250      Most Recent 3 BMP's:  Recent Labs   Lab Test 01/03/20  0452 01/02/20  1224 10/25/19  1216 10/02/19  2031   NA  --  139 138 141   POTASSIUM  --  3.8 4.0 3.3*   CHLORIDE  --  106 103 107    CO2  --  24 25 21   BUN  --  13 18 11   CR 0.94 1.06 1.07 0.86   ANIONGAP  --  9 10 13   GM  --  9.4 9.4 9.0   GLC  --  103 106* 146*     Most Recent 2 LFT's:  Recent Labs   Lab Test 01/02/20  1224 10/25/19  1216   AST 21 15   ALT 13 9   ALKPHOS 56 68   BILITOTAL 0.3 0.3     Most Recent INR's and Anticoagulation Dosing History:  Anticoagulation Dose History     Recent Dosing and Labs Latest Ref Rng & Units 3/15/2018 5/13/2019 8/7/2019 10/2/2019 10/25/2019 1/2/2020    INR 0 - 1.3 0.94 1.05 1.03 0.85 0.92 0.91        Most Recent 3 Troponin's:  Recent Labs   Lab Test 05/13/19  1301 05/13/19  1125 05/13/19  0949   TROPI <0.030 <0.030 <0.030     Most Recent Cholesterol Panel:  Recent Labs   Lab Test 11/21/18  1151   CHOL 209*   *   HDL 64   TRIG 118     Most Recent 6 Bacteria Isolates From Any Culture (See EPIC Reports for Culture Details):  Recent Labs   Lab Test 05/13/19  1125 05/13/19  0949 06/14/18  1314   CULT No growth after 6 days No growth after 6 days No growth     Most Recent TSH, T4 and A1c Labs:No lab results found.  Results for orders placed or performed during the hospital encounter of 01/02/20   XR Chest 2 Views    Narrative    PROCEDURE:  XR CHEST 2 VW    HISTORY: pain, .    COMPARISON:  11/25/19    FINDINGS:  The cardiomediastinal contours are unchanged.  The trachea is midline.  No focal consolidation, effusion or pneumothorax.  Lung volumes are  upper normal.  No suspicious osseous lesion or subdiaphragmatic free air.      Impression    IMPRESSION:      No focal consolidation. Stable chest.    CARRIE OLVERA MD   CT Chest Pulmonary Embolism w Contrast     Value    Radiologist flags Pulmonary embolism (AA)    Narrative    CT CHEST PULMONARY EMBOLISM W CONTRAST    HISTORY: 64 years Female Shortness of breath    TECHNIQUE: Axial CT imaging of the chest was performed With  intravenous contrast. Coronal and sagittal reconstructions were  obtained.    COMPARISON: 5/13/2019    FINDINGS:  The  exam is positive for pulmonary embolism. Segmental and  subsegmental emboli are seen in the right lower lobe right upper lobe  left upper lobe and left lower lobe. No large central emboli are  present at this time.    There is no evidence of thoracic aortic aneurysm or dissection.    Patient status post median sternotomy.  There is no mediastinal or hilar or axillary lymphadenopathy.    The lungs are clear. No consolidating airspace opacities are present.  No concerning pulmonary nodules or masses are present         No concerning osseous lesions are identified      Impression    IMPRESSION: Exam is positive for pulmonary embolism. Small segmental  and subsegmental emboli are present bilaterally. No large central  emboli are present.      [Critical Result: Pulmonary embolism]    Finding was identified on 1/2/2020 1:23 PM.     Dr. Ashraf was contacted by me on 1/2/2020 1:30 PM and verbalized  understanding of the critical result.     CLEMENTE RODRIGUEZ MD   US Lower Extremity Venous Duplex Bilateral    Narrative    Exam:US LOWER EXTREMITY VENOUS DUPLEX BILATERAL    History:  64 years Female   : Shortness of breath and diagnosis of  pulmonary embolism.    Comparisons: 5/13/2019    Technique: Venous duplex ultrasonography of the bilateral lower  extremities was performed.     Findings: The common femoral veins, superficial femoral veins and  popliteal veins are fully compressible with spontaneous and  augmentable venous flow.           Impression    Impression: No evidence of deep venous thrombosis within the lower  extremities.    CLEMENTE RODRIGUEZ MD

## 2020-01-03 NOTE — PHARMACY-ADMISSION MEDICATION HISTORY
Pharmacy -- Admission Medication Reconciliation    Prior to admission (PTA) medications were reviewed and the patient's PTA medication list was updated.    Sources Consulted: patient, sure scripts, [place]     The reliability of this Medication Reconciliation is: Reliability: Reliable    The following significant changes were made:  Alirio's back pain tablets ADDED - patient uses up to TID  Advil ADDED PRN headache - uses 600 mg at a time - states has frequent headaches  Sucralfate CHANGED to PRN  Docusate CHANGED to twice weekly on Tuesdays and Fridays per patient    Note: patient has had a few recent medication changes - was just started on Trintellix and has only taken 1 dose prior to admission.  Patient states she has been on this in the past with good results.  Patient states she plans to stop taking her Cymbalta now that she has received the Trintellix.  We discussed possibly needing to taper her duloxetine since she is on such a high dose.  She plans to discuss this with her primary MD at her follow up appointment next week.  Patient also states she has only taken 3 doses of her Ranexa as this was recently prescribed due to ongoing chest pain.  Crestor recently restarted.  It is noted that this is on her allergy list but patient is re-trialing this at a lower dose.  Patient's lisinopril was also decreased from 40 mg to 20 mg at the 12/23 cardiology visit due to low blood pressures (per patient).     In addition, the patient's allergies were reviewed with the patient and updated as follows:   Allergies: Atorvastatin; Liquid adhesive; Tiotropium bromide [tiotropium]; Ezetimibe; Latex; Niacin; No clinical screening - see comments; Rosuvastatin; and Tape [adhesive tape]    The pharmacist has reviewed with the patient that all personal medications should be removed from the building or locked in the belongings safe.  Patient shall only take medications ordered by the physician and administered by the nursing  staff.       Medication barriers identified: sees multiple providers   Medication adherence concerns: none   Understanding of emergency medications: has nitroglycerin, albuterol, naloxone     Marie Mills [facility], 1/3/2020,  8:06 AM

## 2020-01-03 NOTE — PLAN OF CARE
"Patient A&O times 4, LS clear HR Reg BS active. Ambulating to bathroom SBA, voiding well. No complaints of pain. VSS. Will cont to monitor.     /59   Pulse 63   Temp 97.2  F (36.2  C) (Tympanic)   Resp 20   Ht 1.676 m (5' 6\")   Wt 87.6 kg (193 lb 3.2 oz)   SpO2 95%   BMI 31.18 kg/m      Marilin Gibson RN on 1/2/2020 at 9:58 PM    "

## 2020-01-03 NOTE — PROGRESS NOTES
Pt reports pain 7ish/10 - given percocet. Ambulating the halls - reports feeling tired afterwards.

## 2020-01-03 NOTE — PROGRESS NOTES
[facility] DISCHARGE NOTE    Patient discharged to home at 1:26 PM via ambulation. Accompanied by spouse and staff. Discharge instructions reviewed with patient, opportunity offered to ask questions. Prescriptions sent to patients preferred pharmacy. All belongings sent with patient.  All questions answered by nurse.   Brendan Perez RN

## 2020-01-03 NOTE — PLAN OF CARE
"Patient complaining of headache, PRN medication given per mar. Refused 10pm Carafate, is now requesting it this morning, verified with pharmacy ok to give medication at this time. No chest pain this shift, reports breathing is feeling better.     BP (!) 145/77   Pulse 63   Temp 97.3  F (36.3  C) (Tympanic)   Resp 18   Ht 1.676 m (5' 6\")   Wt 88.4 kg (194 lb 12.8 oz)   SpO2 94%   BMI 31.44 kg/m      "

## 2020-01-03 NOTE — PLAN OF CARE
"Pt reports chest discomfort in lower and left side of chest, and jaw discomfort that is intermittent and happens at home at times - preceded by anxiety as reported.  Dr. Perez updated.     LS clear, BS active. A&O x 4.    BP (!) 163/75   Pulse 60   Temp 97.3  F (36.3  C) (Tympanic)   Resp 18   Ht 1.676 m (5' 6\")   Wt 88.4 kg (194 lb 12.8 oz)   SpO2 94%   BMI 31.44 kg/m      "

## 2020-01-03 NOTE — PROGRESS NOTES
SAFETY CHECKLIST  ID Bands and Risk clasps correct and in place (DNR, Fall risk, Allergy, Latex, Limb):  Yes  All Lines Reconciled and labeled correctly: Yes  Whiteboard updated:Yes  Environmental interventions (bed/chair alarm on, call light, side rails, restraints, sitter....): Yes    Marilin Gibson RN on 1/2/2020 at 7:11 PM

## 2020-01-03 NOTE — PROGRESS NOTES
SAFETY CHECKLIST  ID Bands and Risk clasps correct and in place (DNR, Fall risk, Allergy, Latex, Limb):  Yes  All Lines Reconciled and labeled correctly: Yes  Whiteboard updated:Yes  Environmental interventions (bed/chair alarm on, call light, side rails, restraints, sitter....): Yes    Bedside Handoff complete, safety checks verified by writer and are correct.    Report from Schuyler Gaston RN on 1/3/2020 at 7:03 AM

## 2020-01-03 NOTE — PHARMACY - DISCHARGE MEDICATION RECONCILIATION AND EDUCATION
"Pharmacy:  Discharge Counseling and Medication Reconciliation    Ankita Day  [address] [address] [address]  [phone] (home)   65 year old female  PCP: Ramirez Cano    Allergies: Atorvastatin; Liquid adhesive; Tiotropium bromide [tiotropium]; Ezetimibe; Latex; Niacin; No clinical screening - see comments; Rosuvastatin; and Tape [adhesive tape]    Discharge Counseling:    Pharmacist met with patient (and/or family) today to review the medication portion of the After Visit Summary (with an emphasis on NEW medications) and to address patient's questions/concerns.    Summary of Education: met with patient regarding xarelto.  Discussed in length dosing--patient is getting both strengths today, emphasized starting with only 15 mg twice a day for 3 weeks, then starting the other strength.  Patient states she wants a pill box to help keep track.  Talked in length about using this in conjunction with plavix and aspirin  (per md, benefit outweighs risk to continue them) and signs and symptoms of excess bleeding.  Patient states she can tell how thin her blood is by \"what it does when her cat scratches her\".  Patient had no further questions.    Materials Provided:  MedCounselor sheets printed from Clinical Pharmacology on: xarelto    Discharge Medication Reconciliation:    It has been determined that the patient has an adequate supply of medications available or which can be obtained from the patient's preferred pharmacy, which HE/SHE has confirmed as: [facility] for discharge on [facility] care.  Patient states she is not sure of her situation with the new year as far as mail order or retail pharmacy.  This past year, it was mail order.    Thank you for the consult.    Sirisha Ledesma RPH........January 3, 2020 12:30 PM      "

## 2020-01-03 NOTE — PROGRESS NOTES
"Patients IV fluids complete, refused nurse to hang a new bag because she \"was sick of all the beeping.\" Will update MD.   "

## 2020-01-06 ENCOUNTER — HOSPITAL ENCOUNTER (EMERGENCY)
Facility: OTHER | Age: 65
Discharge: HOME | End: 2020-01-06
Attending: FAMILY MEDICINE | Admitting: FAMILY MEDICINE
Payer: MEDICARE

## 2020-01-06 ENCOUNTER — APPOINTMENT (OUTPATIENT)
Dept: GENERAL RADIOLOGY | Facility: OTHER | Age: 65
End: 2020-01-06
Attending: FAMILY MEDICINE
Payer: MEDICARE

## 2020-01-06 ENCOUNTER — NURSE TRIAGE (OUTPATIENT)
Dept: FAMILY MEDICINE | Facility: OTHER | Age: 65
End: 2020-01-06

## 2020-01-06 ENCOUNTER — APPOINTMENT (OUTPATIENT)
Dept: CT IMAGING | Facility: OTHER | Age: 65
End: 2020-01-06
Attending: FAMILY MEDICINE
Payer: MEDICARE

## 2020-01-06 VITALS
DIASTOLIC BLOOD PRESSURE: 92 MMHG | BODY MASS INDEX: 31.18 KG/M2 | WEIGHT: 194 LBS | OXYGEN SATURATION: 97 % | SYSTOLIC BLOOD PRESSURE: 151 MMHG | TEMPERATURE: 98.7 F | RESPIRATION RATE: 14 BRPM | HEIGHT: 66 IN | HEART RATE: 69 BPM

## 2020-01-06 DIAGNOSIS — I26.99 ACUTE PULMONARY EMBOLISM WITHOUT ACUTE COR PULMONALE, UNSPECIFIED PULMONARY EMBOLISM TYPE (H): ICD-10-CM

## 2020-01-06 DIAGNOSIS — R06.02 SHORTNESS OF BREATH: ICD-10-CM

## 2020-01-06 LAB
ANION GAP SERPL CALCULATED.3IONS-SCNC: 10 MMOL/L (ref 3–14)
BASOPHILS # BLD AUTO: 0.1 10E9/L (ref 0–0.2)
BASOPHILS NFR BLD AUTO: 1 %
BUN SERPL-MCNC: 13 MG/DL (ref 7–25)
CALCIUM SERPL-MCNC: 9.3 MG/DL (ref 8.6–10.3)
CHLORIDE SERPL-SCNC: 104 MMOL/L (ref 98–107)
CO2 SERPL-SCNC: 26 MMOL/L (ref 21–31)
CREAT SERPL-MCNC: 1.01 MG/DL (ref 0.6–1.2)
DIFFERENTIAL METHOD BLD: ABNORMAL
EOSINOPHIL # BLD AUTO: 0.2 10E9/L (ref 0–0.7)
EOSINOPHIL NFR BLD AUTO: 2.5 %
ERYTHROCYTE [DISTWIDTH] IN BLOOD BY AUTOMATED COUNT: 13.3 % (ref 10–15)
GFR SERPL CREATININE-BSD FRML MDRD: 55 ML/MIN/{1.73_M2}
GLUCOSE SERPL-MCNC: 155 MG/DL (ref 70–105)
HCO3 BLD-SCNC: 24 MMOL/L (ref 22–28)
HCT VFR BLD AUTO: 34.1 % (ref 35–47)
HGB BLD-MCNC: 11.1 G/DL (ref 11.7–15.7)
IMM GRANULOCYTES # BLD: 0 10E9/L (ref 0–0.4)
IMM GRANULOCYTES NFR BLD: 0.2 %
LACTATE SERPL-SCNC: 1.1 MMOL/L (ref 0.5–2.2)
LYMPHOCYTES # BLD AUTO: 2.2 10E9/L (ref 0.8–5.3)
LYMPHOCYTES NFR BLD AUTO: 34.6 %
MAGNESIUM SERPL-MCNC: 1.8 MG/DL (ref 1.9–2.7)
MCH RBC QN AUTO: 29.2 PG (ref 26.5–33)
MCHC RBC AUTO-ENTMCNC: 32.6 G/DL (ref 31.5–36.5)
MCV RBC AUTO: 90 FL (ref 78–100)
MONOCYTES # BLD AUTO: 0.6 10E9/L (ref 0–1.3)
MONOCYTES NFR BLD AUTO: 9.1 %
NEUTROPHILS # BLD AUTO: 3.3 10E9/L (ref 1.6–8.3)
NEUTROPHILS NFR BLD AUTO: 52.6 %
NT-PROBNP SERPL-MCNC: 141 PG/ML (ref 0–100)
O2/TOTAL GAS SETTING VFR VENT: 0 %
OXYHGB MFR BLD: 94 %
PCO2 BLD: 36 MM HG (ref 35–45)
PH BLD: 7.44 PH (ref 7.35–7.45)
PLATELET # BLD AUTO: 304 10E9/L (ref 150–450)
PO2 BLD: 80 MM HG (ref 83–108)
POTASSIUM SERPL-SCNC: 3.5 MMOL/L (ref 3.5–5.1)
PROCALCITONIN SERPL-MCNC: <0.5 NG/ML
RBC # BLD AUTO: 3.8 10E12/L (ref 3.8–5.2)
SODIUM SERPL-SCNC: 140 MMOL/L (ref 134–144)
TROPONIN I SERPL-MCNC: 2.6 PG/ML
TROPONIN I SERPL-MCNC: 3 PG/ML
WBC # BLD AUTO: 6.3 10E9/L (ref 4–11)

## 2020-01-06 PROCEDURE — 94640 AIRWAY INHALATION TREATMENT: CPT

## 2020-01-06 PROCEDURE — 84484 ASSAY OF TROPONIN QUANT: CPT | Performed by: FAMILY MEDICINE

## 2020-01-06 PROCEDURE — 36600 WITHDRAWAL OF ARTERIAL BLOOD: CPT | Performed by: FAMILY MEDICINE

## 2020-01-06 PROCEDURE — 83735 ASSAY OF MAGNESIUM: CPT | Performed by: FAMILY MEDICINE

## 2020-01-06 PROCEDURE — 93010 ELECTROCARDIOGRAM REPORT: CPT | Performed by: INTERNAL MEDICINE

## 2020-01-06 PROCEDURE — 80048 BASIC METABOLIC PNL TOTAL CA: CPT | Performed by: FAMILY MEDICINE

## 2020-01-06 PROCEDURE — 25500064 ZZH RX 255 OP 636: Performed by: EMERGENCY MEDICINE

## 2020-01-06 PROCEDURE — 83605 ASSAY OF LACTIC ACID: CPT | Performed by: FAMILY MEDICINE

## 2020-01-06 PROCEDURE — 25000125 ZZHC RX 250: Performed by: FAMILY MEDICINE

## 2020-01-06 PROCEDURE — 40000275 ZZH STATISTIC RCP TIME EA 10 MIN

## 2020-01-06 PROCEDURE — 87040 BLOOD CULTURE FOR BACTERIA: CPT | Performed by: FAMILY MEDICINE

## 2020-01-06 PROCEDURE — 83880 ASSAY OF NATRIURETIC PEPTIDE: CPT | Performed by: FAMILY MEDICINE

## 2020-01-06 PROCEDURE — 71045 X-RAY EXAM CHEST 1 VIEW: CPT

## 2020-01-06 PROCEDURE — 93005 ELECTROCARDIOGRAM TRACING: CPT | Performed by: FAMILY MEDICINE

## 2020-01-06 PROCEDURE — 25800030 ZZH RX IP 258 OP 636: Performed by: FAMILY MEDICINE

## 2020-01-06 PROCEDURE — 84145 PROCALCITONIN (PCT): CPT | Performed by: FAMILY MEDICINE

## 2020-01-06 PROCEDURE — 82805 BLOOD GASES W/O2 SATURATION: CPT | Performed by: FAMILY MEDICINE

## 2020-01-06 PROCEDURE — 99284 EMERGENCY DEPT VISIT MOD MDM: CPT | Mod: Z6 | Performed by: FAMILY MEDICINE

## 2020-01-06 PROCEDURE — 25000128 H RX IP 250 OP 636: Performed by: EMERGENCY MEDICINE

## 2020-01-06 PROCEDURE — 99285 EMERGENCY DEPT VISIT HI MDM: CPT | Mod: 25 | Performed by: FAMILY MEDICINE

## 2020-01-06 PROCEDURE — 85025 COMPLETE CBC W/AUTO DIFF WBC: CPT | Performed by: FAMILY MEDICINE

## 2020-01-06 PROCEDURE — 96376 TX/PRO/DX INJ SAME DRUG ADON: CPT | Performed by: FAMILY MEDICINE

## 2020-01-06 PROCEDURE — 36415 COLL VENOUS BLD VENIPUNCTURE: CPT | Performed by: FAMILY MEDICINE

## 2020-01-06 PROCEDURE — 71275 CT ANGIOGRAPHY CHEST: CPT

## 2020-01-06 PROCEDURE — 96374 THER/PROPH/DIAG INJ IV PUSH: CPT | Mod: XU | Performed by: FAMILY MEDICINE

## 2020-01-06 RX ORDER — FENTANYL CITRATE 50 UG/ML
50 INJECTION, SOLUTION INTRAMUSCULAR; INTRAVENOUS ONCE
Status: COMPLETED | OUTPATIENT
Start: 2020-01-06 | End: 2020-01-06

## 2020-01-06 RX ORDER — ALBUTEROL SULFATE 0.83 MG/ML
2.5 SOLUTION RESPIRATORY (INHALATION) ONCE
Status: COMPLETED | OUTPATIENT
Start: 2020-01-06 | End: 2020-01-06

## 2020-01-06 RX ORDER — ALBUTEROL SULFATE 5 MG/ML
2.5 SOLUTION RESPIRATORY (INHALATION) ONCE
Status: DISCONTINUED | OUTPATIENT
Start: 2020-01-06 | End: 2020-01-06 | Stop reason: CLARIF

## 2020-01-06 RX ORDER — IODIXANOL 320 MG/ML
100 INJECTION, SOLUTION INTRAVASCULAR ONCE
Status: COMPLETED | OUTPATIENT
Start: 2020-01-06 | End: 2020-01-06

## 2020-01-06 RX ORDER — SODIUM CHLORIDE 9 MG/ML
INJECTION, SOLUTION INTRAVENOUS CONTINUOUS
Status: DISCONTINUED | OUTPATIENT
Start: 2020-01-06 | End: 2020-01-06 | Stop reason: HOSPADM

## 2020-01-06 RX ADMIN — FENTANYL CITRATE 50 MCG: 50 INJECTION, SOLUTION INTRAMUSCULAR; INTRAVENOUS at 20:42

## 2020-01-06 RX ADMIN — IODIXANOL 100 ML: 320 INJECTION, SOLUTION INTRAVASCULAR at 19:40

## 2020-01-06 RX ADMIN — ALBUTEROL SULFATE 2.5 MG: 2.5 SOLUTION RESPIRATORY (INHALATION) at 18:21

## 2020-01-06 RX ADMIN — SODIUM CHLORIDE: 9 INJECTION, SOLUTION INTRAVENOUS at 19:28

## 2020-01-06 RX ADMIN — FENTANYL CITRATE 50 MCG: 50 INJECTION, SOLUTION INTRAMUSCULAR; INTRAVENOUS at 19:33

## 2020-01-06 ASSESSMENT — ENCOUNTER SYMPTOMS
FEVER: 0
PALPITATIONS: 0
TREMORS: 0
JOINT SWELLING: 0
DIZZINESS: 1
LIGHT-HEADEDNESS: 0
COUGH: 1
ABDOMINAL DISTENTION: 0
HEADACHES: 0
HEMATOLOGIC/LYMPHATIC NEGATIVE: 1
ABDOMINAL PAIN: 1
APNEA: 0
ACTIVITY CHANGE: 1
WEAKNESS: 1
CONSTIPATION: 0
FATIGUE: 1
APPETITE CHANGE: 0
NAUSEA: 0
DIAPHORESIS: 0
SHORTNESS OF BREATH: 1
PSYCHIATRIC NEGATIVE: 1
CHOKING: 0
DIARRHEA: 1
SEIZURES: 0
STRIDOR: 0
CHEST TIGHTNESS: 1
WHEEZING: 0
NUMBNESS: 0
VOMITING: 0
FACIAL ASYMMETRY: 0
SPEECH DIFFICULTY: 0
ANAL BLEEDING: 0
CHILLS: 0
BLOOD IN STOOL: 0
UNEXPECTED WEIGHT CHANGE: 0
RECTAL PAIN: 0

## 2020-01-06 ASSESSMENT — MIFFLIN-ST. JEOR: SCORE: 1436.73

## 2020-01-06 NOTE — ED AVS SNAPSHOT
[facility]  [address]  [place] [address]  Phone:  [phone]  Fax:  [phone]                                    Ankita Day   MRN: [id number]    Department:  [facility]   Date of Visit:  1/6/2020           After Visit Summary Signature Page    I have received my discharge instructions, and my questions have been answered. I have discussed any challenges I see with this plan with the nurse or doctor.    ..........................................................................................................................................  Patient/Patient Representative Signature      ..........................................................................................................................................  Patient Representative Print Name and Relationship to Patient    ..................................................               ................................................  Date                                   Time    ..........................................................................................................................................  Reviewed by Signature/Title    ...................................................              ..............................................  Date                                               Time          22EPIC Rev 08/18

## 2020-01-06 NOTE — TELEPHONE ENCOUNTER
"S-(situation): Cough, Chest pain, SOB    B-(background): Was hospitalized for acute pulmonary embolism without acute cor pulmonale and discharged on 1/3/2020.    A-(assessment): No fever. SOB (was not SOB when discharged from hospital), hurts to breath and did not hurt upon discharge. Noted patient takes extra breaths while trying to complete a sentence.  Patient sounds anxious on the phone.     R-(recommendations): Recommended the patient to go to the ED for re-evaluation. She verbalized understanding and agreeable. Patient reported she is going to wake up her  and have him bring her back in. Amaya Tompkins RN  ....................  1/6/2020   4:44 PM        Reason for Disposition    Any history of prior \"blood clot\" in leg or lungs    Additional Information    Commented on: MODERATE difficulty breathing (e.g., speaks in phrases, SOB even at rest, pulse 100-120) of new onset or worse than normal     Takes a few breaths while trying to complete a sentience.    Protocols used: BREATHING DIFFICULTY-A-OH      "

## 2020-01-06 NOTE — ED TRIAGE NOTES
Patient presents to the ER for SOB.  She just was discharged from the hospital for 4 blood clots bilaterally.  She felt better on discharge but is worstened yesterday.  She is having heaviness in the chest along with a headache.

## 2020-01-07 ENCOUNTER — TELEPHONE (OUTPATIENT)
Dept: CARE COORDINATION | Facility: CLINIC | Age: 65
End: 2020-01-07

## 2020-01-07 NOTE — ED PROVIDER NOTES
"  History     Chief Complaint   Patient presents with     Shortness of Breath     HPI  Ankita Day is a 65 year old female who presents for increasing shortness of breath after recent discharge from hospital after admission for multiple PEs. Patient states felt well initially on hospital discharge but since yesterday has had progressive increase in symptoms again . Patient with increased cough . Not coughing up blood . Has had diarrhea since left the hospital . Low grade temp to 98 per patient who states that her normal temp is 97. Dry cough. Feeling sob all the time  . Having what she says is \" chest wall pain \" daily since her last heart surgery . Has nebulizer at home but did not think to try .  Using inhaler at least twice per day without improvement     Allergies:  Allergies   Allergen Reactions     Atorvastatin Muscle Pain (Myalgia)     Liquid Adhesive Rash     Other reaction(s): Erythema  Silk and plastic gloves (long term attachment of adhesives)     Tiotropium Bromide [Tiotropium] Rash     Ezetimibe Muscle Pain (Myalgia)     Latex Rash     Niacin      Other reaction(s): Flushing     No Clinical Screening - See Comments Itching, Rash and Blisters     Metals and plastics       Rosuvastatin Muscle Pain (Myalgia)     Other reaction(s): Myalgia  Currently taking 5 mg daily 1/2/20 - retrial     Tape [Adhesive Tape] Rash       Problem List:    Patient Active Problem List    Diagnosis Date Noted     Acute pulmonary embolism without acute cor pulmonale (H) 01/02/2020     Priority: Medium     Chronic anxiety 01/22/2018     Priority: Medium     Collagenous colitis 01/22/2018     Priority: Medium     Overview:   Possible Dx 2007       Major depressive disorder, recurrent episode, severe (H) 01/22/2018     Priority: Medium     Essential hypertension 01/22/2018     Priority: Medium     Mixed hyperlipidemia 01/22/2018     Priority: Medium     Osteoarthritis 01/22/2018     Priority: Medium     Panic attack 01/22/2018 "     Priority: Medium     Status post coronary angiogram 09/15/2017     Priority: Medium     History of tobacco abuse 08/10/2017     Priority: Medium     Presence of stent in coronary artery in patient with coronary artery disease 08/10/2017     Priority: Medium     History of coronary artery bypass graft x 3 08/10/2017     Priority: Medium     Noncompliance with CPAP treatment 10/21/2016     Priority: Medium     Intractable chronic common migraine without aura 10/21/2016     Priority: Medium     H/O multiple concussions 10/21/2016     Priority: Medium     History of Clostridium difficile 08/19/2016     Priority: Medium     Iron deficiency anemia 07/26/2016     Priority: Medium     CKD (chronic kidney disease) stage 2, GFR 60-89 ml/min 12/09/2015     Priority: Medium     Chest wall pain, chronic 11/04/2015     Priority: Medium     Controlled substance agreement signed 9/18/19 01/28/2014     Priority: Medium     Overview:        Central sleep apnea 10/14/2013     Priority: Medium     Myofascial pain 10/14/2013     Priority: Medium     Vitamin D deficiency 05/06/2013     Priority: Medium     Subclavian artery stenosis, left (H) 03/31/2013     Priority: Medium     Overview:   S/p prox left SCA stent 4/1/2013       Lumbar facet arthropathy 01/02/2013     Priority: Medium     Nonspecific abnormal results of pulmonary system function study 12/21/2011     Priority: Medium     Slow transit constipation 11/29/2011     Priority: Medium     Gastric ulcer with hemorrhage 10/03/2011     Priority: Medium     Family history of malignant neoplasm of gastrointestinal tract 09/26/2011     Priority: Medium     Nodular degeneration of cornea 09/26/2011     Priority: Medium     Bilateral low back pain without sciatica 05/31/2011     Priority: Medium     Chronic pain disorder 12/01/2010     Priority: Medium     COPD (chronic obstructive pulmonary disease) (H) 06/15/2007     Priority: Medium     Overview:   Low DLCO, normal spirometry on  12/15/11       Peptic ulcer 06/15/2007     Priority: Medium     Postsurgical aortocoronary bypass status 02/12/2003     Priority: Medium     Coronary atherosclerosis 09/12/2002     Priority: Medium     Overview:   Multiple Angigrams prior to 2007. Also:  6/5/2007: Angiogram: TAXUS DELONTE to ostial circumflux, instent restenosis  5/23/2008: Left Main 30% diseased, LAD stents patent, Left circ stent patent, Chronic occluded right internal mammary artery graft to distal RCA, native RCA has 30% stenosis; NO intevention  9/19/2012 CT Angiogram: Patent LIMA to LAD, patent LAD stents, moderate proximal circumflex disease, patent RCA , occluded right internal mammary artery graft to distal RCA.  9/20/2012: Angiogram: Stent to Left Main, ostial LAD, and PTCA of ostial left circumflex          Past Medical History:    Past Medical History:   Diagnosis Date     Acute ischemic heart disease (H)      Acute myocardial infarction (H)      Anxiety disorder      Atherosclerotic heart disease of native coronary artery without angina pectoris      Bilateral carpal tunnel syndrome      Cervicalgia      Chest pain      Chronic gastric ulcer without hemorrhage or perforation      Chronic ischemic heart disease      Chronic obstructive pulmonary disease (H)      Chronic or unspecified gastric ulcer with hemorrhage      Chronic pain syndrome      Constipation      Coronary angioplasty status      Coronary angioplasty status      Coronary angioplasty status      Dorsalgia      Encounter for other administrative examinations      Encounter for screening for cardiovascular disorders      Enterocolitis due to Clostridium difficile      Essential (primary) hypertension      Hyperlipidemia      Major depressive disorder, single episode      Migraine without status migrainosus, not intractable      Nodular corneal degeneration      Noninfective gastroenteritis and colitis      Noninfective gastroenteritis and colitis      Osteoarthritis      Other  chest pain      Pain in knee      Pain in right shoulder      Panic disorder without agoraphobia      Peptic ulcer without hemorrhage or perforation      Peripheral vascular disease (H)      Personal history of diseases of the blood and blood-forming organs and certain disorders involving the immune mechanism (CODE)      Personal history of nicotine dependence      Personal history of other medical treatment (CODE)      Presence of aortocoronary bypass graft      Primary central sleep apnea      Sepsis due to Escherichia coli (E. coli) (H)      Stricture of artery (H)      Thoracic, thoracolumbar and lumbosacral intervertebral disc disorder      Uncomplicated opioid abuse (H)      Vitamin D deficiency        Past Surgical History:    Past Surgical History:   Procedure Laterality Date     ANGIOPLASTY      9/12/02,with triple stenting     APPENDECTOMY OPEN      No Comments Provided     ARTHROSCOPY KNEE      left     ARTHROSCOPY SHOULDER Right 05/12/2017    labral tear, rotator cuff tear and some subacromial decompression      BYPASS GRAFT ARTERY CORONARY      12/13/02,Triple bypass, left internal mammary  to LAD, right internal mammary to right coronary artery, saphenous to obtuse marginal of the left circumflex.     COLONOSCOPY      2011,Dr Bowman benign polyps     COLONOSCOPY  10/03/2011    2011,benign polyps, Dr. Bowman     COLONOSCOPY  08/08/2016 8/8/16,normal, Dr Bowman     ELBOW SURGERY      baby,birth malachi removed from right arm     EMBOLECTOMY UPPER EXTREMITY  04/02/2013    brachial artery pseudoaneurysm after stenting     ESOPHAGOSCOPY, GASTROSCOPY, DUODENOSCOPY (EGD), COMBINED      2011,EGD Dr Bowman with pyloric ulcer     ESOPHAGOSCOPY, GASTROSCOPY, DUODENOSCOPY (EGD), COMBINED      2011,pyloric ulcer, Dr. Bowman     ESOPHAGOSCOPY, GASTROSCOPY, DUODENOSCOPY (EGD), COMBINED      8/8/16,mild gastritis, Dr Bowman     ESOPHAGOSCOPY, GASTROSCOPY, DUODENOSCOPY (EGD), COMBINED      11/27/2017,Dr Bowman. Antral ulcer      ESOPHAGOSCOPY, GASTROSCOPY, DUODENOSCOPY (EGD), COMBINED  2018    Dr Bowman, healed ulcer     HYSTERECTOMY TOTAL ABDOMINAL      age 21     LAPAROSCOPIC CHOLECYSTECTOMY      2006     OSTEOTOMY FEMUR DISTAL      x3, right knee     OSTEOTOMY FEMUR DISTAL      2000,left knee  ligament surgery     OTHER SURGICAL HISTORY      1/10/2003,,PTCA     OTHER SURGICAL HISTORY      2012,,PTCA,DELONTE in LAD and left main     OTHER SURGICAL HISTORY      2013,,PTCA,L subclavian stenosis     SALPINGO-OOPHORECTOMY BILATERAL      age 27,Bilateral salpingo-oophorectomy     TONSILLECTOMY, ADENOIDECTOMY, COMBINED      childhood       Family History:    Family History   Problem Relation Age of Onset     Heart Disease Father         Heart Disease,Heart condition/Significant for atherosclerotic cardiovascular disease, but non premature.     Colon Cancer Father         Cancer-colon, of colon cancer     Cancer Father         Cancer,mets to liver, secondary to colon cancer     Heart Disease Mother         Heart Disease     Cancer Other         Cancer,Multiple Myeloma     Heart Disease Other         Heart Disease,Ischemic Heart Disease     Colon Cancer Other         Cancer-colon,Malignant neoplasms     Cancer Sister         Cancer,multiple myeloma     Other - See Comments Son         gallstones       Social History:  Marital Status:   [2]  Social History     Tobacco Use     Smoking status: Former Smoker     Packs/day: 0.25     Years: 35.00     Pack years: 8.75     Types: Cigarettes     Last attempt to quit: 2/15/2017     Years since quittin.8     Smokeless tobacco: Never Used     Tobacco comment: Smokes 1-2 packs every 6 months for the past 16 years. Used to smoke 1 pack/day before that.    Substance Use Topics     Alcohol use: Yes     Alcohol/week: 0.0 standard drinks     Comment: Alcoholic Drinks/day: rare     Drug use: No     Comment: Drug use: No        Medications:    albuterol (PROAIR  HFA/PROVENTIL HFA/VENTOLIN HFA) 108 (90 Base) MCG/ACT inhaler  amLODIPine (NORVASC) 10 MG tablet  aspirin EC 81 MG EC tablet  busPIRone (BUSPAR) 10 MG tablet  clonazePAM (KLONOPIN) 1 MG tablet  clopidogrel (PLAVIX) 75 MG tablet  DULoxetine (CYMBALTA) 60 MG capsule  gabapentin (NEURONTIN) 600 MG tablet  lisinopril (PRINIVIL/ZESTRIL) 20 MG tablet  Magnesium Salicylate (DOANS PILLS) 325 MG TABS  metoclopramide (REGLAN) 10 MG tablet  metoprolol tartrate (LOPRESSOR) 50 MG tablet  omeprazole (PRILOSEC) 20 MG DR capsule  oxyCODONE-acetaminophen (PERCOCET) 5-325 MG tablet  ranolazine (RANEXA) 500 MG 12 hr tablet  rivaroxaban ANTICOAGULANT (XARELTO) 15 MG TABS tablet  rosuvastatin (CRESTOR) 5 MG tablet  saccharomyces boulardii (FLORASTOR) 250 MG capsule  sucralfate (CARAFATE) 1 GM tablet  vortioxetine (TRINTELLIX/BRINTELLIX) 5 MG tablet  Diclofenac Sodium 1.5 % SOLN  docusate sodium (COLACE) 100 MG capsule  ibuprofen (ADVIL/MOTRIN) 200 MG tablet  naloxone (NARCAN) 4 MG/0.1ML nasal spray  nitroGLYcerin (NITROSTAT) 0.3 MG sublingual tablet  order for DME  [START ON 1/24/2020] rivaroxaban ANTICOAGULANT (XARELTO) 20 MG TABS tablet  VITAMIN D, CHOLECALCIFEROL, PO          Review of Systems   Constitutional: Positive for activity change and fatigue. Negative for appetite change, chills, diaphoresis, fever and unexpected weight change.   HENT: Negative.    Respiratory: Positive for cough, chest tightness and shortness of breath. Negative for apnea, choking, wheezing and stridor.    Cardiovascular: Positive for chest pain. Negative for palpitations and leg swelling.   Gastrointestinal: Positive for abdominal pain and diarrhea. Negative for abdominal distention, anal bleeding, blood in stool, constipation, nausea, rectal pain and vomiting.   Genitourinary: Negative.    Musculoskeletal: Negative for joint swelling.   Neurological: Positive for dizziness and weakness. Negative for tremors, seizures, syncope, facial asymmetry, speech  "difficulty, light-headedness, numbness and headaches.   Hematological: Negative.    Psychiatric/Behavioral: Negative.        Physical Exam   BP: (!) 142/78  Heart Rate: 71  Temp: 98.7  F (37.1  C)  Resp: 20  Height: 167.6 cm (5' 6\")  Weight: 88 kg (194 lb)  SpO2: 94 %      Physical Exam  Vitals signs and nursing note reviewed.   Constitutional:       Appearance: She is ill-appearing.      Interventions: She is not intubated.  HENT:      Head: Normocephalic and atraumatic.   Neck:      Vascular: JVD present.   Cardiovascular:      Rate and Rhythm: Normal rate and regular rhythm.   Pulmonary:      Effort: Pulmonary effort is normal. No tachypnea, bradypnea, accessory muscle usage or respiratory distress. She is not intubated.      Breath sounds: Normal breath sounds. No stridor. No wheezing, rhonchi or rales.   Abdominal:      Palpations: Abdomen is soft. There is no hepatomegaly, splenomegaly or mass.      Tenderness: There is no abdominal tenderness. There is no guarding or rebound.   Musculoskeletal:      Right lower leg: No edema.      Left lower leg: No edema.   Lymphadenopathy:      Cervical: No cervical adenopathy.   Skin:     General: Skin is warm.   Neurological:      Mental Status: She is alert.   Psychiatric:         Mood and Affect: Mood is anxious.         ED Course        Procedures              Patient presents to ER for evaluation of increasing sob following recent hospital discharge for multiple PEs. Patient triaged to exam room. Cardiac monitors placed. EKG obtained EKG showing no acute ST /T changes . Labs and diagnostics ordered. Initial troponin normal . Chest xray and labs not suggestive of pneumonia or significant fluid overload. Patient given albuterol neb times 1 without improvement . Given increased symptoms CTA ordered to assess for new clot or increased clot burden .          Results for orders placed or performed during the hospital encounter of 01/06/20 (from the past 24 hour(s))   CBC " with platelets differential   Result Value Ref Range    WBC 6.3 4.0 - 11.0 10e9/L    RBC Count 3.80 3.8 - 5.2 10e12/L    Hemoglobin 11.1 (L) 11.7 - 15.7 g/dL    Hematocrit 34.1 (L) 35.0 - 47.0 %    MCV 90 78 - 100 fl    MCH 29.2 26.5 - 33.0 pg    MCHC 32.6 31.5 - 36.5 g/dL    RDW 13.3 10.0 - 15.0 %    Platelet Count 304 150 - 450 10e9/L    Diff Method Automated Method     % Neutrophils 52.6 %    % Lymphocytes 34.6 %    % Monocytes 9.1 %    % Eosinophils 2.5 %    % Basophils 1.0 %    % Immature Granulocytes 0.2 %    Absolute Neutrophil 3.3 1.6 - 8.3 10e9/L    Absolute Lymphocytes 2.2 0.8 - 5.3 10e9/L    Absolute Monocytes 0.6 0.0 - 1.3 10e9/L    Absolute Eosinophils 0.2 0.0 - 0.7 10e9/L    Absolute Basophils 0.1 0.0 - 0.2 10e9/L    Abs Immature Granulocytes 0.0 0 - 0.4 10e9/L   Basic metabolic panel   Result Value Ref Range    Sodium 140 134 - 144 mmol/L    Potassium 3.5 3.5 - 5.1 mmol/L    Chloride 104 98 - 107 mmol/L    Carbon Dioxide 26 21 - 31 mmol/L    Anion Gap 10 3 - 14 mmol/L    Glucose 155 (H) 70 - 105 mg/dL    Urea Nitrogen 13 7 - 25 mg/dL    Creatinine 1.01 0.60 - 1.20 mg/dL    GFR Estimate 55 (L) >60 mL/min/[1.73_m2]    GFR Estimate If Black 66 >60 mL/min/[1.73_m2]    Calcium 9.3 8.6 - 10.3 mg/dL   Troponin GH   Result Value Ref Range    Troponin 3.0 <34.0 pg/mL   Blood gas arterial and oxyhgb   Result Value Ref Range    pH Arterial 7.44 7.35 - 7.45 pH    pCO2 Arterial 36 35 - 45 mm Hg    pO2 Arterial 80 (L) 83 - 108 mm Hg    Bicarbonate Arterial 24 22 - 28 mmol/L    FIO2 0     Oxyhemoglobin Arterial 94 (L) >95 %   NT pro BNP   Result Value Ref Range    N-Terminal Pro BNP Inpatient 141 (H) 0 - 100 pg/mL   Magnesium   Result Value Ref Range    Magnesium 1.8 (L) 1.9 - 2.7 mg/dL   Lactic acid   Result Value Ref Range    Lactic Acid 1.1 0.5 - 2.2 mmol/L   Procalcitonin   Result Value Ref Range    Procalcitonin <0.50 ng/ml   XR Chest Port 1 View    Narrative    PROCEDURE:  XR CHEST PORT 1 VW    HISTORY: chest  pain. .    COMPARISON:  1/2/20.    FINDINGS:    The cardiomediastinal contours are stable.  No focal consolidation, effusion or pneumothorax.      Impression    IMPRESSION:  No focal consolidation or infarct.    CARRIE OLVERA MD   CT Chest Pulmonary Embolism w Contrast    Narrative    PROCEDURE:  CT CHEST PULMONARY EMBOLISM W CONTRAST.    HISTORY:  Evaluate for pulmonary embolism.  increased sob , recent PEs    TECHNIQUE:  Initial pre-contrast  and localizer images were  obtained.  Contrast enhanced helical CT pulmonary angiography was then  performed.  Routine transaxial and post-processed (multiplanar and/or  MIP) reformations were obtained.    COMPARISON:  1/2/20    MEDS/CONTRAST: 100 mL visipaque 320    PULMONARY CTA FINDINGS:  This is a diagnostic quality helical CT  pulmonary angiogram. Most of the previously seen emboli are not  identified. A single embolus is still present in a right upper lobe  segmental branch.    OTHER FINDINGS:      Heart size is unchanged. Moderate coronary calcifications are present.  The main pulmonary artery remains borderline enlarged. There is no  pericardial thickening or effusions. Borderline hilar and right  paratracheal lymph nodes are unchanged and nonspecific. The thoracic  aorta and main pulmonary artery are within normal limits in size.     Limited views of the upper abdomen reveal no adrenal mass or acute  process.     The pleura is without thickening or effusions. The central airways are  unremarkable. No focal consolidation or intrapulmonary mass is  identified. A small amount of scarring is present along the minor  fissure. Trace dependent atelectasis is present.    No suspicious osseous lesion is identified.      Impression    IMPRESSION:    Decreasing volume of pulmonary emboli when compared to the recent  prior dated 1/2/2020.     CARRIE OLVERA MD   Troponin GH   Result Value Ref Range    Troponin 2.6 <34.0 pg/mL       Medications   sodium chloride  0.9% infusion ( Intravenous New Bag 1/6/20 1928)   albuterol (PROVENTIL) neb solution 2.5 mg (2.5 mg Nebulization Given 1/6/20 1821)   iodixanol (VISIPAQUE 320) injection 100 mL (100 mLs Intravenous Given 1/6/20 1940)   fentaNYL (PF) (SUBLIMAZE) injection 50 mcg (50 mcg Intravenous Given 1/6/20 1933)       Assessments & Plan (with Medical Decision Making)     I have reviewed the nursing notes.    I have reviewed the findings, diagnosis, plan and need for follow up with the patient.      New Prescriptions    No medications on file       Final diagnoses:   Shortness of breath   Acute pulmonary embolism without acute cor pulmonale, unspecified pulmonary embolism type (H) - resolving on CT       1/6/2020   [facility] Rita Cho MD  01/06/20 1936      Care patient turned over myself at change of shift pending CT scan with results as above.  Size of the pulmonary emboli are decreasing.  I went in to recheck on the patient and she is doing better.   she is reassured that there are no new blood clots and they are getting smaller.  I think she is safe to be discharged home at this time.  Return if worse.     Timothy Siu MD  01/06/20 2028

## 2020-01-07 NOTE — PROGRESS NOTES
1.  Is patient currently taking metformin? no     If NO: Technologist will give the patient normal post CT instructions.     If YES: Technologist will obtain GFR from Lab.    2.  Is GFR is greater than 60? no     If YES: Technologist will give the patient normal post CT instructions.     If NO: Technologist will give the patient METFORMIN post CT instructions.

## 2020-01-07 NOTE — PROGRESS NOTES
Creatinine   Date Value Ref Range Status   01/06/2020 1.01 0.60 - 1.20 mg/dL Final     GFR 55  1/06/2019

## 2020-01-07 NOTE — TELEPHONE ENCOUNTER
"Transitional Care Management Phone Call    Summary of hospitalization:  [facility] discharge summary reviewed    DISCHARGE DIAGNOSIS: Acute pulmonary embolism    DATE OF DISCHARGE: 1-3-20    Diagnostic Tests/Treatments reviewed.  Follow up needed: cardiology    Post Discharge Medication Reconciliation: unable to reconcile discharge medications due to Patient kind of foggy, able to verify recent changes/additions. Patient stated understanding of changes in medications including anticoagulant and heart medications. Not able to review complete list on this call. Recommended she bring pill bottles into office visit.     Medications reviewed by: by myself    Problems taking medications regularly:  Denies problems taking medications, but reports that she took too many oxycodone (\"like 3 instead of 2 at a time\") over [employer]va when she wanted to get cookies made. She has been out for 2 days now, will request refill at follow-up visit tomorrow. Also stated she needs a med cup to take them as her  has found pills on floor later.     Problems adhering to non-medication therapy:  None    Other Healthcare Providers Involved in Patient s Care:         Specialist appointment - cardiologyABDI     Update since discharge: stable. Still feels shortness of breath but improved since returning to ED last night. Given shot of fentanyl in ED. Says pain is not so bad right now, rates pain at 6/10 in chest wall, headache, low back and knees. No diarrhea or nausea currently. Says \"I wouldn't take an oxycodone prescription in ED, did not want to upset my doctor.\"     Plan of care communicated with patient    Just a friendly reminder that you appointment is   Next 5 appointments (look out 90 days)    Jan 08, 2020  9:30 AM CST  Office Visit with Ramirez Cano MD  [facility] [address] [facility] [address]  [phone]   Reymundo 15, 2020  9:15 AM CST  Return " Visit with JOSELYN Heller CNP  [facility] ([facility]) [address]  [place] [address] [phone]      .  We encourage you to keep this appointment.    Please remember to bring all of your pills in their bottles (including any vitamins or over the counter pills) with you to your appointment.   The patient indicates understanding of these issues and agrees with the plan of care.   Yes, plans to follow plan of care and keep the scheduled appointment.    Was the patient contacted within the 2 business days or other approved timeframe?  Yes    Was the Medication reconciliation and management done since the patient was discharged? Yes, needs to be completed at office visit when she brings pill bottles as agreed. Able to cover recent changes only by phone.     Kathleen Mehta RN  1/7/2020 10:06 AM

## 2020-01-07 NOTE — PROGRESS NOTES
1.  Has the patient had a previous reaction to IV contrast? no    2.  Does the patient have kidney disease? yes    3.  Is the patient on dialysis? no    If YES to any of these questions, exam will be reviewed with a Radiologist before administering contrast.

## 2020-01-08 ENCOUNTER — OFFICE VISIT (OUTPATIENT)
Dept: FAMILY MEDICINE | Facility: OTHER | Age: 65
End: 2020-01-08
Attending: FAMILY MEDICINE
Payer: MEDICARE

## 2020-01-08 VITALS
HEART RATE: 74 BPM | OXYGEN SATURATION: 95 % | RESPIRATION RATE: 18 BRPM | TEMPERATURE: 97.6 F | SYSTOLIC BLOOD PRESSURE: 136 MMHG | DIASTOLIC BLOOD PRESSURE: 74 MMHG

## 2020-01-08 DIAGNOSIS — G89.29 CHRONIC BILATERAL LOW BACK PAIN WITHOUT SCIATICA: ICD-10-CM

## 2020-01-08 DIAGNOSIS — I25.810 ATHEROSCLEROSIS OF CORONARY ARTERY BYPASS GRAFT OF NATIVE HEART WITHOUT ANGINA PECTORIS: ICD-10-CM

## 2020-01-08 DIAGNOSIS — R07.89 CHEST WALL PAIN, CHRONIC: ICD-10-CM

## 2020-01-08 DIAGNOSIS — I26.99 BILATERAL PULMONARY EMBOLISM (H): Primary | ICD-10-CM

## 2020-01-08 DIAGNOSIS — F33.1 MODERATE EPISODE OF RECURRENT MAJOR DEPRESSIVE DISORDER (H): ICD-10-CM

## 2020-01-08 DIAGNOSIS — Z79.899 CONTROLLED SUBSTANCE AGREEMENT SIGNED: ICD-10-CM

## 2020-01-08 DIAGNOSIS — M54.50 CHRONIC BILATERAL LOW BACK PAIN WITHOUT SCIATICA: ICD-10-CM

## 2020-01-08 DIAGNOSIS — G89.4 CHRONIC PAIN DISORDER: ICD-10-CM

## 2020-01-08 DIAGNOSIS — G89.29 CHEST WALL PAIN, CHRONIC: ICD-10-CM

## 2020-01-08 LAB
ANION GAP SERPL CALCULATED.3IONS-SCNC: 11 MMOL/L (ref 3–14)
BUN SERPL-MCNC: 10 MG/DL (ref 7–25)
CALCIUM SERPL-MCNC: 9.9 MG/DL (ref 8.6–10.3)
CHLORIDE SERPL-SCNC: 104 MMOL/L (ref 98–107)
CO2 SERPL-SCNC: 25 MMOL/L (ref 21–31)
CREAT SERPL-MCNC: 1.01 MG/DL (ref 0.6–1.2)
ERYTHROCYTE [DISTWIDTH] IN BLOOD BY AUTOMATED COUNT: 13.4 % (ref 10–15)
GFR SERPL CREATININE-BSD FRML MDRD: 55 ML/MIN/{1.73_M2}
GLUCOSE SERPL-MCNC: 142 MG/DL (ref 70–105)
HCT VFR BLD AUTO: 37.8 % (ref 35–47)
HGB BLD-MCNC: 12.1 G/DL (ref 11.7–15.7)
MCH RBC QN AUTO: 29.2 PG (ref 26.5–33)
MCHC RBC AUTO-ENTMCNC: 32 G/DL (ref 31.5–36.5)
MCV RBC AUTO: 91 FL (ref 78–100)
PLATELET # BLD AUTO: 342 10E9/L (ref 150–450)
POTASSIUM SERPL-SCNC: 3.9 MMOL/L (ref 3.5–5.1)
RBC # BLD AUTO: 4.15 10E12/L (ref 3.8–5.2)
SODIUM SERPL-SCNC: 140 MMOL/L (ref 134–144)
WBC # BLD AUTO: 8.3 10E9/L (ref 4–11)

## 2020-01-08 PROCEDURE — G0463 HOSPITAL OUTPT CLINIC VISIT: HCPCS

## 2020-01-08 PROCEDURE — 99496 TRANSJ CARE MGMT HIGH F2F 7D: CPT | Performed by: FAMILY MEDICINE

## 2020-01-08 PROCEDURE — 85027 COMPLETE CBC AUTOMATED: CPT | Mod: ZL | Performed by: FAMILY MEDICINE

## 2020-01-08 PROCEDURE — 80048 BASIC METABOLIC PNL TOTAL CA: CPT | Mod: ZL | Performed by: FAMILY MEDICINE

## 2020-01-08 PROCEDURE — 36415 COLL VENOUS BLD VENIPUNCTURE: CPT | Mod: ZL | Performed by: FAMILY MEDICINE

## 2020-01-08 RX ORDER — OXYCODONE AND ACETAMINOPHEN 10; 325 MG/1; MG/1
1 TABLET ORAL EVERY 4 HOURS PRN
Qty: 90 TABLET | Refills: 0 | Status: SHIPPED | OUTPATIENT
Start: 2020-01-08 | End: 2020-01-22

## 2020-01-08 ASSESSMENT — PAIN SCALES - GENERAL: PAINLEVEL: EXTREME PAIN (8)

## 2020-01-08 NOTE — PATIENT INSTRUCTIONS
Increase Trentillix to 10 mg daily  Stay off Cymbalta    No ibuprofen or naproxen    May use oxycodone 10 mg pills, max 6 per day  Follow-up in 2 weeks

## 2020-01-08 NOTE — PROGRESS NOTES
"Hospitalization Follow-up Visit         [facility]       Hospital Follow-up Visit:    Hospital:  [facility]   Date of Admission: 1/2/2020  Date of Discharge: 1/3/2020  Reason(s) for Admission: Acute PE            Problems taking medications regularly:  None       Post Discharge Medication Reconciliation: discharge medications reconciled and changed, per note/orders (see AVS).       Problems adhering to non-medication therapy:  Smokes \"every 6 months\"       Medications reviewed by: by myself..    Summary of hospitalization:  [facility] discharge summary reviewed  Diagnostic Tests/Treatments reviewed.  Follow up needed: none  Other Healthcare Providers Involved in Patient s Care:         Specialist appointment - cardiology  Update since discharge: stable.   Plan of care communicated with patient     Transitional care call January 7       She presented with shortness of breath and chest pressure for 5 days. CT for PE showed  small segmental and subsegmental emboli bilaterally. Started on Lovenox in ED, then transitioned to Xarelto. No DVT seen on lower extremity ultrasound.     Went back to the ED on 1/6 for chest pain. CT at that time showed decreased clot burden.     Continues on Xarelto along with aspirin and clopidogrel due to complex CAD history.    Reporting more pain in chest and headache.  Has been using more oxycodone to help treat the pain.  She has had chronic chest wall pain for years.  In order to help reduce the amount of falls ED visits for chest wall pain versus  cardiac pain, she has been maintained on opioids.  Previously took methadone, but weaned off.  Most recently on oxycodone 5/325 taking 2 pills 4 times daily for total of 40 mg oxycodone daily.  She is requesting to take oxycodone 10/325 2 pills 4 times daily today.  Informed her that is not an option.    I last appointment with cardiology started Ranexa on 12/23. Also given Trentillix 5 mg daily to take by Juanita Connolly NP.  Patient previously " took Trintellix, but was switched to Cymbalta as she felt the Trintellix may have been affecting her vision.  She did not notice any improvement in the vision and reports that the irritation she had was due to dry eyes.  However, she seems to be doing equally well on the Cymbalta and has been maintained on that since.  For some reason Trintellix was sent in, but not documented in the last note.  Malina has now stopped Cymbalta and has been taking Trintellix instead.             Review of Systems:   As noted above            Physical Exam:     Vitals:    01/08/20 0907   BP: 136/74   Pulse: 74   Resp: 18   Temp: 97.6  F (36.4  C)   TempSrc: Tympanic   SpO2: 95%     There is no height or weight on file to calculate BMI.    General Appearance: Alert. No acute distress  Chest/Respiratory Exam: Clear to auscultation bilaterally  Cardiovascular Exam: Regular rate and rhythm. S1, S2, no murmur, gallop, or rubs.  Extremities: 2+ pedal pulses.  No lower extremity edema.  Psychiatric: Normal affect and mentation             Results:   No labs    Assessment and Plan      Ankita was seen today for hospital f/u and recheck medication.    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  -     CBC W PLT No Diff; Future  -     Basic Metabolic Panel; Future  -     Basic Metabolic Panel  -     CBC W PLT No Diff    Chronic pain disorder  -     oxyCODONE-acetaminophen (PERCOCET)  MG per tablet; Take 1 tablet by mouth every 4 hours as needed for severe pain    Chronic bilateral low back pain without sciatica  -     oxyCODONE-acetaminophen (PERCOCET)  MG per tablet; Take 1 tablet by mouth every 4 hours as needed for severe pain    Chest wall pain, chronic  -     oxyCODONE-acetaminophen  (PERCOCET)  MG per tablet; Take 1 tablet by mouth every 4 hours as needed for severe pain    Controlled substance agreement signed 9/18/19  -     oxyCODONE-acetaminophen (PERCOCET)  MG per tablet; Take 1 tablet by mouth every 4 hours as needed for severe pain    Moderate episode of recurrent major depressive disorder (H)  -     vortioxetine (TRINTELLIX/BRINTELLIX) 10 MG tablet; Take 1 tablet (10 mg) by mouth daily        1.  She is stable from the acute PE standpoint.  Continue with Xarelto  2.  However, is now on Xarelto, aspirin and clopidogrel.  Need cardiology opinion to determine if she could potentially stop either the aspirin or clopidogrel while on Xarelto to reduce bleeding risk.  Has upcoming appointment next week  3.  Using NSAIDs to help with headaches.  Strongly recommend avoiding NSAIDs given the combination of Xarelto, aspirin, Plavix as well as her underlying CAD.  4.  In order to attempt to balance risks of alternate therapies versus harms of opioids, I did agree to a sliding increase in her oxycodone.  She was requesting to double the dose and I explained that this was not advisable and would also place her above max opioid recommendations from the CDC.  She is currently at 40 mg of oxycodone daily.  We will increase to 60 mg daily by taking 10/325 up to 6 times daily.  Given a prescription for 2 weeks.  Over time, plan to wean back to current dosing of 40 mg total daily.  reviewed.  5.  Spent some time discussing benefits of Trintellix versus Cymbalta.  Since she felt the Trintellix may have worked better in the past and was not the cause of her eye irritation, stop Cymbalta and moved to Trintellix.  Has been taking 5 mg daily, may increase to 10 mg daily.  It could potentially be upped to 20 mg total over time.  6. She is on concurrent clonazepam along with opioids.  Is aware of high risk for overdose and adverse effects.  Has been weaning down the clonazepam having previously  taken 4 mg total daily and is down to 2 mg total daily.  We will continue a slow wean.    E&M code to be billed if TCM cannot be: 97560    Type of decision making: High complexity (60294)    Options for treatment and follow-up care were reviewed with the patient  Ankita L Day   engaged in the decision making process and verbalized understanding of the options discussed and agreed with the final plan.    Greater than 50% of this 40 minute appointment spent on counseling     Ramirez Cano MD

## 2020-01-08 NOTE — NURSING NOTE
Pt presents to clinic today for hospital follow up and medication management.      Medication Reconciliation: complete  Jamilah Berger LPN

## 2020-01-14 ENCOUNTER — TELEPHONE (OUTPATIENT)
Dept: FAMILY MEDICINE | Facility: OTHER | Age: 65
End: 2020-01-14

## 2020-01-14 DIAGNOSIS — F41.9 CHRONIC ANXIETY: ICD-10-CM

## 2020-01-14 DIAGNOSIS — I20.89 CHRONIC STABLE ANGINA (H): ICD-10-CM

## 2020-01-14 DIAGNOSIS — G89.29 CHEST WALL PAIN, CHRONIC: ICD-10-CM

## 2020-01-14 DIAGNOSIS — G89.4 CHRONIC PAIN DISORDER: ICD-10-CM

## 2020-01-14 DIAGNOSIS — I25.10 PRESENCE OF STENT IN CORONARY ARTERY IN PATIENT WITH CORONARY ARTERY DISEASE: ICD-10-CM

## 2020-01-14 DIAGNOSIS — R07.9 RECURRENT CHEST PAIN: ICD-10-CM

## 2020-01-14 DIAGNOSIS — E78.2 MIXED HYPERLIPIDEMIA: ICD-10-CM

## 2020-01-14 DIAGNOSIS — F11.90 CHRONIC, CONTINUOUS USE OF OPIOIDS: ICD-10-CM

## 2020-01-14 DIAGNOSIS — I25.10 ASCVD (ARTERIOSCLEROTIC CARDIOVASCULAR DISEASE): ICD-10-CM

## 2020-01-14 DIAGNOSIS — R07.9 CHEST PAIN OF UNCERTAIN ETIOLOGY: ICD-10-CM

## 2020-01-14 DIAGNOSIS — F33.2 SEVERE EPISODE OF RECURRENT MAJOR DEPRESSIVE DISORDER, WITHOUT PSYCHOTIC FEATURES (H): ICD-10-CM

## 2020-01-14 DIAGNOSIS — F41.1 ANXIETY STATE: ICD-10-CM

## 2020-01-14 DIAGNOSIS — G89.29 CHRONIC BILATERAL LOW BACK PAIN WITHOUT SCIATICA: ICD-10-CM

## 2020-01-14 DIAGNOSIS — R07.89 CHEST WALL PAIN, CHRONIC: ICD-10-CM

## 2020-01-14 DIAGNOSIS — F33.1 MODERATE EPISODE OF RECURRENT MAJOR DEPRESSIVE DISORDER (H): ICD-10-CM

## 2020-01-14 DIAGNOSIS — M15.0 PRIMARY OSTEOARTHRITIS INVOLVING MULTIPLE JOINTS: ICD-10-CM

## 2020-01-14 DIAGNOSIS — I10 ESSENTIAL HYPERTENSION: ICD-10-CM

## 2020-01-14 DIAGNOSIS — R07.9 CHEST PAIN, UNSPECIFIED TYPE: ICD-10-CM

## 2020-01-14 DIAGNOSIS — Z79.899 CONTROLLED SUBSTANCE AGREEMENT SIGNED: ICD-10-CM

## 2020-01-14 DIAGNOSIS — M54.50 CHRONIC BILATERAL LOW BACK PAIN WITHOUT SCIATICA: ICD-10-CM

## 2020-01-14 DIAGNOSIS — K27.9 PEPTIC ULCER: ICD-10-CM

## 2020-01-14 DIAGNOSIS — Z95.5 HISTORY OF CORONARY ARTERY STENT PLACEMENT: ICD-10-CM

## 2020-01-14 DIAGNOSIS — I26.99 ACUTE PULMONARY EMBOLISM WITHOUT ACUTE COR PULMONALE, UNSPECIFIED PULMONARY EMBOLISM TYPE (H): ICD-10-CM

## 2020-01-14 DIAGNOSIS — J44.9 CHRONIC OBSTRUCTIVE PULMONARY DISEASE, UNSPECIFIED COPD TYPE (H): ICD-10-CM

## 2020-01-14 DIAGNOSIS — R51.9 LEFT-SIDED HEADACHE: ICD-10-CM

## 2020-01-14 DIAGNOSIS — Z95.5 PRESENCE OF STENT IN CORONARY ARTERY IN PATIENT WITH CORONARY ARTERY DISEASE: ICD-10-CM

## 2020-01-14 RX ORDER — DULOXETIN HYDROCHLORIDE 60 MG/1
60 CAPSULE, DELAYED RELEASE ORAL 2 TIMES DAILY
Qty: 180 CAPSULE | Refills: 3 | Status: SHIPPED | OUTPATIENT
Start: 2020-01-14 | End: 2020-01-22

## 2020-01-14 RX ORDER — RANOLAZINE 500 MG/1
500 TABLET, EXTENDED RELEASE ORAL 2 TIMES DAILY
Qty: 60 TABLET | Refills: 3 | Status: SHIPPED | OUTPATIENT
Start: 2020-01-14 | End: 2020-02-19

## 2020-01-14 RX ORDER — GABAPENTIN 600 MG/1
600 TABLET ORAL 2 TIMES DAILY
Qty: 180 TABLET | Refills: 1 | Status: SHIPPED | OUTPATIENT
Start: 2020-01-14 | End: 2020-01-22

## 2020-01-14 RX ORDER — CLOPIDOGREL BISULFATE 75 MG/1
75 TABLET ORAL DAILY
Qty: 90 TABLET | Refills: 3 | Status: SHIPPED | OUTPATIENT
Start: 2020-01-14 | End: 2021-01-25

## 2020-01-14 RX ORDER — AMLODIPINE BESYLATE 10 MG/1
10 TABLET ORAL DAILY
Qty: 90 TABLET | Refills: 3 | Status: SHIPPED | OUTPATIENT
Start: 2020-01-14 | End: 2021-01-25

## 2020-01-14 RX ORDER — BUSPIRONE HYDROCHLORIDE 10 MG/1
10 TABLET ORAL 2 TIMES DAILY
Qty: 180 TABLET | Refills: 1 | Status: SHIPPED | OUTPATIENT
Start: 2020-01-14 | End: 2020-08-12

## 2020-01-14 RX ORDER — CLONAZEPAM 1 MG/1
1 TABLET ORAL 2 TIMES DAILY
Qty: 180 TABLET | Refills: 1 | Status: CANCELLED | OUTPATIENT
Start: 2020-01-14

## 2020-01-14 RX ORDER — METOPROLOL TARTRATE 50 MG
50 TABLET ORAL 2 TIMES DAILY
Qty: 180 TABLET | Refills: 3 | Status: SHIPPED | OUTPATIENT
Start: 2020-01-14 | End: 2020-05-06 | Stop reason: ALTCHOICE

## 2020-01-14 RX ORDER — NITROGLYCERIN 0.3 MG/1
TABLET SUBLINGUAL
Qty: 25 TABLET | Refills: 11 | Status: SHIPPED | OUTPATIENT
Start: 2020-01-14 | End: 2020-06-03

## 2020-01-14 RX ORDER — ALBUTEROL SULFATE 90 UG/1
2 AEROSOL, METERED RESPIRATORY (INHALATION) EVERY 6 HOURS
Qty: 2 INHALER | Refills: 3 | Status: SHIPPED | OUTPATIENT
Start: 2020-01-14 | End: 2022-04-29

## 2020-01-14 RX ORDER — ROSUVASTATIN CALCIUM 5 MG/1
5 TABLET, COATED ORAL AT BEDTIME
Qty: 60 TABLET | Refills: 3 | Status: SHIPPED | OUTPATIENT
Start: 2020-01-14 | End: 2020-02-05

## 2020-01-14 RX ORDER — LISINOPRIL 20 MG/1
20 TABLET ORAL DAILY
Qty: 90 TABLET | Refills: 3 | Status: SHIPPED | OUTPATIENT
Start: 2020-01-14 | End: 2020-06-03

## 2020-01-14 RX ORDER — METOCLOPRAMIDE 10 MG/1
10 TABLET ORAL EVERY 6 HOURS PRN
Qty: 30 TABLET | Refills: 0 | Status: SHIPPED | OUTPATIENT
Start: 2020-01-14 | End: 2020-04-01

## 2020-01-22 ENCOUNTER — OFFICE VISIT (OUTPATIENT)
Dept: FAMILY MEDICINE | Facility: OTHER | Age: 65
End: 2020-01-22
Attending: FAMILY MEDICINE
Payer: MEDICARE

## 2020-01-22 VITALS
TEMPERATURE: 97 F | RESPIRATION RATE: 15 BRPM | SYSTOLIC BLOOD PRESSURE: 112 MMHG | DIASTOLIC BLOOD PRESSURE: 69 MMHG | OXYGEN SATURATION: 97 % | HEART RATE: 60 BPM

## 2020-01-22 DIAGNOSIS — R07.89 CHEST WALL PAIN, CHRONIC: ICD-10-CM

## 2020-01-22 DIAGNOSIS — M54.50 CHRONIC BILATERAL LOW BACK PAIN WITHOUT SCIATICA: ICD-10-CM

## 2020-01-22 DIAGNOSIS — R11.0 NAUSEA: ICD-10-CM

## 2020-01-22 DIAGNOSIS — G89.29 CHRONIC BILATERAL LOW BACK PAIN WITHOUT SCIATICA: ICD-10-CM

## 2020-01-22 DIAGNOSIS — Z79.899 CONTROLLED SUBSTANCE AGREEMENT SIGNED: ICD-10-CM

## 2020-01-22 DIAGNOSIS — I26.99 ACUTE PULMONARY EMBOLISM WITHOUT ACUTE COR PULMONALE, UNSPECIFIED PULMONARY EMBOLISM TYPE (H): ICD-10-CM

## 2020-01-22 DIAGNOSIS — K27.9 PEPTIC ULCER: ICD-10-CM

## 2020-01-22 DIAGNOSIS — G89.4 CHRONIC PAIN DISORDER: ICD-10-CM

## 2020-01-22 DIAGNOSIS — I25.10 ASCVD (ARTERIOSCLEROTIC CARDIOVASCULAR DISEASE): Primary | ICD-10-CM

## 2020-01-22 DIAGNOSIS — G89.29 CHEST WALL PAIN, CHRONIC: ICD-10-CM

## 2020-01-22 PROCEDURE — G0463 HOSPITAL OUTPT CLINIC VISIT: HCPCS

## 2020-01-22 PROCEDURE — 99214 OFFICE O/P EST MOD 30 MIN: CPT | Performed by: FAMILY MEDICINE

## 2020-01-22 RX ORDER — ONDANSETRON 4 MG/1
4 TABLET, FILM COATED ORAL EVERY 6 HOURS PRN
Qty: 30 TABLET | Refills: 0 | Status: SHIPPED | OUTPATIENT
Start: 2020-01-22 | End: 2020-03-18

## 2020-01-22 RX ORDER — OXYCODONE AND ACETAMINOPHEN 10; 325 MG/1; MG/1
1 TABLET ORAL EVERY 4 HOURS PRN
Qty: 150 TABLET | Refills: 0 | Status: SHIPPED | OUTPATIENT
Start: 2020-01-22 | End: 2020-01-30

## 2020-01-22 ASSESSMENT — PAIN SCALES - GENERAL: PAINLEVEL: MILD PAIN (3)

## 2020-01-22 NOTE — PROGRESS NOTES
Nursing Notes:   Jamilah Berger LPN  1/22/2020  9:35 AM  Signed  Pt presents to clinic today for medication management.      Medication Reconciliation: complete  Jamilah Berger LPN      SUBJECTIVE:  65 year old female presents to follow up on multiple issues:    PE diagnosed Jan 2, 2020. On Xarelto. Also was on aspirin and clopidogrel due to complex CAD history. Did not make cardiology appointment on January 15. She is now taking Plavix and Xarelto, but stopped aspirin as she was worried about bleeding due to some darker stool. No further dark stool.    Stopped duloxetine and changed to Trentillix. We planned increase from 5 to 10 mg, but she is only taking 5 mg still.    Oxycodone increased from previous 5/325 mg taking 8 per day up to 10/325 mg taking 6 per day due to increased pain and headaches. She feels the increase helped. Has not been feeling well at all since last appointment but finally got some good sleep and feels much better today.    Changed pharmacies due to insurance. Going to [facility] today to review medications. May need some medications sent to mail order [facility].    No longer on gabapentin. Mentation improved since stopping.    Not using Reglan for headache. She feels it has not helped, but was using for any kind of headache and not migraines.  Still having frequent headaches. Occurring daily    History of GI bleed from stomach ulcer, seen in 2017. Healed when aspirin stopped and treated with PPI and sucralfate. She is still on sucralfate as needed. On omeprazole 20 mg daily.      REVIEW OF SYSTEMS:    Pertinent items are noted in HPI.    Current Outpatient Medications   Medication Sig Dispense Refill     albuterol (PROAIR HFA/PROVENTIL HFA/VENTOLIN HFA) 108 (90 Base) MCG/ACT inhaler Inhale 2 puffs into the lungs every 6 hours 2 Inhaler 3     amLODIPine (NORVASC) 10 MG tablet Take 1 tablet (10 mg) by mouth daily 90 tablet 3     busPIRone (BUSPAR) 10 MG tablet Take 1 tablet (10 mg) by mouth 2 times  daily 180 tablet 1     clonazePAM (KLONOPIN) 1 MG tablet Take 1 tablet (1 mg) by mouth 2 times daily (Patient taking differently: Take 1 mg by mouth 2 times daily May take additional 1 mg daily as needed for anxiety) 180 tablet 1     clopidogrel (PLAVIX) 75 MG tablet Take 1 tablet (75 mg) by mouth daily 90 tablet 3     Diclofenac Sodium 1.5 % SOLN Apply 20 drops to each hand or 40 drops to each knee up to 4 times daily as needed for arthritis pain 450 mL 3     docusate sodium (COLACE) 100 MG capsule Take 100 mg by mouth twice a week       lisinopril (PRINIVIL/ZESTRIL) 20 MG tablet Take 1 tablet (20 mg) by mouth daily 90 tablet 3     metoprolol tartrate (LOPRESSOR) 50 MG tablet Take 1 tablet (50 mg) by mouth 2 times daily 180 tablet 3     naloxone (NARCAN) 4 MG/0.1ML nasal spray Spray into one nostril for opioid reversal if unresponsive. May repeat every 2-3 minutes until patient responsive or EMS arrives 1 each 1     nitroGLYcerin (NITROSTAT) 0.3 MG sublingual tablet PLACE 1 TABLET UNDER THE TONGUE EVERY 5 MINUTES AS NEEDED FOR CHEST PAIN 25 tablet 11     omeprazole (PRILOSEC) 20 MG DR capsule Take 1 capsule (20 mg) by mouth daily 90 capsule 3     oxyCODONE-acetaminophen (PERCOCET)  MG per tablet Take 1 tablet by mouth every 4 hours as needed for severe pain 90 tablet 0     ranolazine (RANEXA) 500 MG 12 hr tablet Take 1 tablet (500 mg) by mouth 2 times daily 60 tablet 3     [START ON 1/24/2020] rivaroxaban ANTICOAGULANT (XARELTO) 20 MG TABS tablet Take 1 tablet (20 mg) by mouth daily (with dinner) 30 tablet 11     rosuvastatin (CRESTOR) 5 MG tablet Take 1 tablet (5 mg) by mouth At Bedtime 60 tablet 3     sucralfate (CARAFATE) 1 GM tablet Take 1 g by mouth 4 times daily as needed       VITAMIN D, CHOLECALCIFEROL, PO Take 5,000 Units by mouth daily       vortioxetine (TRINTELLIX/BRINTELLIX) 10 MG tablet Take 1 tablet (10 mg) by mouth daily (Patient taking differently: Take 5 mg by mouth daily ) 30 tablet 1      metoclopramide (REGLAN) 10 MG tablet Take 1 tablet (10 mg) by mouth every 6 hours as needed (headache) (Patient not taking: Reported on 1/22/2020) 30 tablet 0     saccharomyces boulardii (FLORASTOR) 250 MG capsule Take 250 mg by mouth 2 times daily       Allergies   Allergen Reactions     Atorvastatin Muscle Pain (Myalgia)     Liquid Adhesive Rash     Other reaction(s): Erythema  Silk and plastic gloves (long term attachment of adhesives)     Tiotropium Bromide [Tiotropium] Rash     Ezetimibe Muscle Pain (Myalgia)     Latex Rash     Niacin      Other reaction(s): Flushing     No Clinical Screening - See Comments Itching, Rash and Blisters     Metals and plastics       Rosuvastatin Muscle Pain (Myalgia)     Other reaction(s): Myalgia  Currently taking 5 mg daily 1/2/20 - retrial     Tape [Adhesive Tape] Rash       OBJECTIVE:  /69   Pulse 60   Temp 97  F (36.1  C) (Tympanic)   Resp 15   SpO2 97%     EXAM:  General Appearance: Alert. No acute distress  Chest/Respiratory Exam: Clear to auscultation bilaterally  Cardiovascular Exam: Regular rate and rhythm. S1, S2, no murmur, gallop, or rubs.  Extremities:  No lower extremity edema.  Psychiatric: Normal affect and mentation        ASSESSMENT/PLAN:    ICD-10-CM    1. ASCVD (arteriosclerotic cardiovascular disease) I25.10 CARDIOLOGY EVAL ADULT REFERRAL   2. Peptic ulcer K27.9 omeprazole (PRILOSEC) 20 MG DR capsule   3. Chronic pain disorder G89.4 oxyCODONE-acetaminophen (PERCOCET)  MG per tablet   4. Chronic bilateral low back pain without sciatica M54.5 oxyCODONE-acetaminophen (PERCOCET)  MG per tablet    G89.29    5. Chest wall pain, chronic R07.89 oxyCODONE-acetaminophen (PERCOCET)  MG per tablet    G89.29    6. Controlled substance agreement signed 9/18/19 Z79.899 oxyCODONE-acetaminophen (PERCOCET)  MG per tablet   7. Acute pulmonary embolism without acute cor pulmonale, unspecified pulmonary embolism type (H) I26.99 CARDIOLOGY EVAL  ADULT REFERRAL   8. Nausea R11.0 ondansetron (ZOFRAN) 4 MG tablet       Discussed getting labs as she had a GI bleed in the past and was on aspirin, Plavix, and Xarelto. However, just checked labs 2 weeks ago and she feels better than previously, so no labs obtained.    Continue on omeprazole, increase to 20 mg BID given darker stools and her concern for repeat GI bleed, but no clear symptoms. Needs to meet with cardiology to determine ideal combination of medications between Xarelto, Plavix, and aspirin, but likely not all 3. Referral placed.    Xarelto likely life-long as PE was not provoked.    Increase Trentillix to 10 mg daily as previously planned    Discussed opioid dosing. She received a temporary increase for pain after PE. Now improving and dose needs to be reduced. She proposes oxycodone 60 mg daily still for 2 more weeks, then reduce to 40 mg daily, which is her baseline.  reviewed. Prescription for #150 pills for 30 days.    Refilled ondansetron for as needed use. Try Reglan for migraines.    Greater than 50% of this 25 minute appointment spent on counseling       Follow-up 1 month    Ramirez Cano MD    This document was prepared using a combination of typing and voice generated software.  While every attempt was made for accuracy, spelling and grammatical errors may exist.

## 2020-01-22 NOTE — PATIENT INSTRUCTIONS
Try Reglan again for severe headache with nausea or light sensitivity. It treats migraines, not regular headache    Increase omeprazole to 20 mg twice daily to protect stomach    See cardiology about aspirin, Plavix and Xarelto. Do you really need all 3?  Referral placed to Dr Gramajo    Refilled oxycodone. Taking max 6 per day for 15 days, then 4 per day for 15 days = 150 pills for 30 days    Follow up in a month

## 2020-01-24 DIAGNOSIS — I25.810 ATHEROSCLEROSIS OF CORONARY ARTERY BYPASS GRAFT OF NATIVE HEART WITHOUT ANGINA PECTORIS: ICD-10-CM

## 2020-01-27 RX ORDER — PRAVASTATIN SODIUM 40 MG
TABLET ORAL
Qty: 90 TABLET | Refills: 4 | OUTPATIENT
Start: 2020-01-27

## 2020-01-27 NOTE — TELEPHONE ENCOUNTER
[facility] sent Rx request for the following:      PRAVASTATIN TABS 40MG   Sig: TAKE 1 TABLET AT BEDTIME  Last Prescription Date:   1/23/19  Last Fill Qty/Refills:         90, R-3 (End: 12/23/19)    Last Office Visit:              1/22/20  Future Office visit:             Feb 19, 2020  9:40 AM CST  SHORT with Ramirez Cano MD  [facility] [address]  [facility] [id number] [phone]        Statins Protocol Failed1/27 10:57 AM   LDL on file in past 12 months    Medication is active on med list     Per chart review, Pravastatin was discontinued from medication list on 12/23/19 in cardiology visit. New prescription was written for Rosuvastatin. Called and spoke to Patient after verifying last name and date of birth. Pt states she is no longer taking the requested medication, and no longer using [facility]. [facility] is using [facility] now. [facility] and [facility] removed from preferred pharmacy list, per Pt request. Refill request refused, with note to pharmacy. Unable to complete prescription refill per RN Medication Refill Policy. Kathleen Puckett RN .............. 1/27/2020  11:08 AM

## 2020-01-29 DIAGNOSIS — G89.29 CHEST WALL PAIN, CHRONIC: ICD-10-CM

## 2020-01-29 DIAGNOSIS — G89.4 CHRONIC PAIN DISORDER: ICD-10-CM

## 2020-01-29 DIAGNOSIS — R07.89 CHEST WALL PAIN, CHRONIC: ICD-10-CM

## 2020-01-29 DIAGNOSIS — Z79.899 CONTROLLED SUBSTANCE AGREEMENT SIGNED: ICD-10-CM

## 2020-01-29 DIAGNOSIS — M54.50 CHRONIC BILATERAL LOW BACK PAIN WITHOUT SCIATICA: ICD-10-CM

## 2020-01-29 DIAGNOSIS — G89.29 CHRONIC BILATERAL LOW BACK PAIN WITHOUT SCIATICA: ICD-10-CM

## 2020-01-29 RX ORDER — OXYCODONE AND ACETAMINOPHEN 10; 325 MG/1; MG/1
1 TABLET ORAL EVERY 4 HOURS PRN
Qty: 90 TABLET | Refills: 0 | OUTPATIENT
Start: 2020-01-29

## 2020-01-29 NOTE — TELEPHONE ENCOUNTER
[facility] sent Rx request for the following:      oxyCODONE-acetaminophen (PERCOCET)  MG per tablet  Sig: Take 1 tablet by mouth every 4 hours as needed for severe pain Taking max 6 per day for 15 days, then 4 per day for 15 days    Last Prescription:  01/22/20 1002 Ramirez Butler MD Reorder from Order: [id number]     oxyCODONE-acetaminophen (PERCOCET)  MG per tablet 150 tablet 0 1/22/2020  No   Sig - Route: Take 1 tablet by mouth every 4 hours as needed for severe pain Taking max 6 per day for 15 days, then 4 per day for 15 days - Oral   Class: E-Prescribe   Earliest Fill Date: 1/22/2020   E-Prescribing Status: Receipt confirmed by pharmacy (1/22/2020 10:02 AM CST)     [facility] [id number] [place] [facility] - [place], [place] - [address]     Per LOV Note (1/22/20), Pt was instructed to follow-up 1 months later; around 2/22/20.    Redundant refill request refused: Too soon.     Unable to complete prescription refill per RN Medication Refill Policy. Kathleen Puckett RN .............. 1/29/2020  1:30 PM

## 2020-01-30 ENCOUNTER — TELEPHONE (OUTPATIENT)
Dept: FAMILY MEDICINE | Facility: OTHER | Age: 65
End: 2020-01-30

## 2020-01-30 DIAGNOSIS — M54.50 CHRONIC BILATERAL LOW BACK PAIN WITHOUT SCIATICA: ICD-10-CM

## 2020-01-30 DIAGNOSIS — G89.4 CHRONIC PAIN DISORDER: ICD-10-CM

## 2020-01-30 DIAGNOSIS — Z79.899 CONTROLLED SUBSTANCE AGREEMENT SIGNED: ICD-10-CM

## 2020-01-30 DIAGNOSIS — G89.29 CHRONIC BILATERAL LOW BACK PAIN WITHOUT SCIATICA: ICD-10-CM

## 2020-01-30 DIAGNOSIS — G89.29 CHEST WALL PAIN, CHRONIC: ICD-10-CM

## 2020-01-30 DIAGNOSIS — R07.89 CHEST WALL PAIN, CHRONIC: ICD-10-CM

## 2020-01-30 RX ORDER — OXYCODONE AND ACETAMINOPHEN 10; 325 MG/1; MG/1
1 TABLET ORAL 4 TIMES DAILY PRN
Qty: 60 TABLET | Refills: 0 | Status: SHIPPED | OUTPATIENT
Start: 2020-02-06 | End: 2020-02-19

## 2020-01-31 DIAGNOSIS — F41.1 ANXIETY STATE: ICD-10-CM

## 2020-02-03 RX ORDER — BUSPIRONE HYDROCHLORIDE 10 MG/1
TABLET ORAL
Qty: 180 TABLET | Refills: 4 | OUTPATIENT
Start: 2020-02-03

## 2020-02-03 NOTE — TELEPHONE ENCOUNTER
[facility] sent Rx request for the following:    BUSPIRONE HCL TABS 10MG  Sig: TAKE 1 TABLET TWICE A DAY    Last Prescription:  01/14/20 1205 Ramirez Butler MD Reorder from Order: [id number]     busPIRone (BUSPAR) 10 MG tablet 180 tablet 1 1/14/2020  No   Sig - Route: Take 1 tablet (10 mg) by mouth 2 times daily - Oral   Sent to pharmacy as: busPIRone (BUSPAR) 10 MG tablet   Class: E-Prescribe   Order: [id number]   E-Prescribing Status: Receipt confirmed by pharmacy (1/14/2020 12:06 PM CST)     [id number] - [place], [place] - [address]     Per refill encounter, dated 1/24/20:  Pt states she is no longer taking the requested medication, and no longer using [facility]. [facility] now.     Refill request refused, with note to pharmacy. Unable to complete prescription refill per RN Medication Refill Policy. Kathleen Puckett RN .............. 2/3/2020  10:34 AM

## 2020-02-05 ENCOUNTER — OFFICE VISIT (OUTPATIENT)
Dept: CARDIOLOGY | Facility: OTHER | Age: 65
End: 2020-02-05
Attending: FAMILY MEDICINE
Payer: MEDICARE

## 2020-02-05 VITALS
HEIGHT: 66 IN | HEART RATE: 60 BPM | DIASTOLIC BLOOD PRESSURE: 58 MMHG | WEIGHT: 185 LBS | SYSTOLIC BLOOD PRESSURE: 90 MMHG | TEMPERATURE: 97 F | BODY MASS INDEX: 29.73 KG/M2 | RESPIRATION RATE: 18 BRPM | OXYGEN SATURATION: 93 %

## 2020-02-05 DIAGNOSIS — I26.99 ACUTE PULMONARY EMBOLISM WITHOUT ACUTE COR PULMONALE, UNSPECIFIED PULMONARY EMBOLISM TYPE (H): ICD-10-CM

## 2020-02-05 DIAGNOSIS — G89.4 CHRONIC PAIN DISORDER: ICD-10-CM

## 2020-02-05 DIAGNOSIS — N18.2 CKD (CHRONIC KIDNEY DISEASE) STAGE 2, GFR 60-89 ML/MIN: ICD-10-CM

## 2020-02-05 DIAGNOSIS — I25.9 CHRONIC ISCHEMIC HEART DISEASE: ICD-10-CM

## 2020-02-05 DIAGNOSIS — E78.2 MIXED HYPERLIPIDEMIA: ICD-10-CM

## 2020-02-05 DIAGNOSIS — R19.5 LOOSE STOOLS: ICD-10-CM

## 2020-02-05 DIAGNOSIS — I20.89 CHRONIC STABLE ANGINA (H): ICD-10-CM

## 2020-02-05 DIAGNOSIS — I25.10 ASCVD (ARTERIOSCLEROTIC CARDIOVASCULAR DISEASE): Primary | ICD-10-CM

## 2020-02-05 DIAGNOSIS — R06.09 DOE (DYSPNEA ON EXERTION): ICD-10-CM

## 2020-02-05 DIAGNOSIS — Z95.5 HISTORY OF CORONARY ARTERY STENT PLACEMENT: ICD-10-CM

## 2020-02-05 LAB
ANION GAP SERPL CALCULATED.3IONS-SCNC: 9 MMOL/L (ref 3–14)
BASOPHILS # BLD AUTO: 0.1 10E9/L (ref 0–0.2)
BASOPHILS NFR BLD AUTO: 0.7 %
BUN SERPL-MCNC: 16 MG/DL (ref 7–25)
CALCIUM SERPL-MCNC: 9.4 MG/DL (ref 8.6–10.3)
CHLORIDE SERPL-SCNC: 101 MMOL/L (ref 98–107)
CO2 SERPL-SCNC: 26 MMOL/L (ref 21–31)
CREAT SERPL-MCNC: 1.21 MG/DL (ref 0.6–1.2)
DIFFERENTIAL METHOD BLD: ABNORMAL
EOSINOPHIL # BLD AUTO: 0.3 10E9/L (ref 0–0.7)
EOSINOPHIL NFR BLD AUTO: 3.5 %
ERYTHROCYTE [DISTWIDTH] IN BLOOD BY AUTOMATED COUNT: 13.5 % (ref 10–15)
GFR SERPL CREATININE-BSD FRML MDRD: 45 ML/MIN/{1.73_M2}
GLUCOSE SERPL-MCNC: 87 MG/DL (ref 70–105)
HCT VFR BLD AUTO: 36.3 % (ref 35–47)
HGB BLD-MCNC: 11.6 G/DL (ref 11.7–15.7)
IMM GRANULOCYTES # BLD: 0 10E9/L (ref 0–0.4)
IMM GRANULOCYTES NFR BLD: 0.2 %
LYMPHOCYTES # BLD AUTO: 1.9 10E9/L (ref 0.8–5.3)
LYMPHOCYTES NFR BLD AUTO: 23 %
MAGNESIUM SERPL-MCNC: 2.1 MG/DL (ref 1.9–2.7)
MCH RBC QN AUTO: 28.7 PG (ref 26.5–33)
MCHC RBC AUTO-ENTMCNC: 32 G/DL (ref 31.5–36.5)
MCV RBC AUTO: 90 FL (ref 78–100)
MONOCYTES # BLD AUTO: 0.6 10E9/L (ref 0–1.3)
MONOCYTES NFR BLD AUTO: 6.7 %
NEUTROPHILS # BLD AUTO: 5.5 10E9/L (ref 1.6–8.3)
NEUTROPHILS NFR BLD AUTO: 65.9 %
PLATELET # BLD AUTO: 331 10E9/L (ref 150–450)
POTASSIUM SERPL-SCNC: 4.5 MMOL/L (ref 3.5–5.1)
RBC # BLD AUTO: 4.04 10E12/L (ref 3.8–5.2)
SODIUM SERPL-SCNC: 136 MMOL/L (ref 134–144)
WBC # BLD AUTO: 8.3 10E9/L (ref 4–11)

## 2020-02-05 PROCEDURE — 99214 OFFICE O/P EST MOD 30 MIN: CPT | Performed by: NURSE PRACTITIONER

## 2020-02-05 PROCEDURE — G0463 HOSPITAL OUTPT CLINIC VISIT: HCPCS

## 2020-02-05 PROCEDURE — 83735 ASSAY OF MAGNESIUM: CPT | Mod: ZL | Performed by: NURSE PRACTITIONER

## 2020-02-05 PROCEDURE — 85025 COMPLETE CBC W/AUTO DIFF WBC: CPT | Mod: ZL | Performed by: NURSE PRACTITIONER

## 2020-02-05 PROCEDURE — 36415 COLL VENOUS BLD VENIPUNCTURE: CPT | Mod: ZL | Performed by: NURSE PRACTITIONER

## 2020-02-05 PROCEDURE — 80048 BASIC METABOLIC PNL TOTAL CA: CPT | Mod: ZL | Performed by: NURSE PRACTITIONER

## 2020-02-05 RX ORDER — ROSUVASTATIN CALCIUM 5 MG/1
10 TABLET, COATED ORAL AT BEDTIME
Qty: 60 TABLET | Refills: 3 | Status: SHIPPED | OUTPATIENT
Start: 2020-02-05 | End: 2020-02-05

## 2020-02-05 RX ORDER — ROSUVASTATIN CALCIUM 10 MG/1
10 TABLET, COATED ORAL AT BEDTIME
Qty: 90 TABLET | Refills: 1 | Status: SHIPPED | OUTPATIENT
Start: 2020-02-05 | End: 2020-02-19

## 2020-02-05 ASSESSMENT — MIFFLIN-ST. JEOR: SCORE: 1395.65

## 2020-02-05 ASSESSMENT — PAIN SCALES - GENERAL: PAINLEVEL: MODERATE PAIN (5)

## 2020-02-05 NOTE — NURSING NOTE
"Chief Complaint   Patient presents with     Follow Up     ASCVD       Initial BP 90/58 (BP Location: Right arm, Patient Position: Sitting, Cuff Size: Adult Large)   Pulse 60   Temp 97  F (36.1  C) (Tympanic)   Resp 18   Ht 1.676 m (5' 5.98\")   Wt 83.9 kg (185 lb)   SpO2 93%   BMI 29.87 kg/m   Estimated body mass index is 29.87 kg/m  as calculated from the following:    Height as of this encounter: 1.676 m (5' 5.98\").    Weight as of this encounter: 83.9 kg (185 lb).  Meds Reconciled: complete  Pt is not on Aspirin  Pt is on a Statin  PHQ and/or YAYA reviewed. Pt referred to PCP/MH Provider as appropriate.    Taylor Owusu LPN      "

## 2020-02-05 NOTE — PATIENT INSTRUCTIONS
You were seen by  JOSELYN Kilpatrick CNP       1. Laboratory blood work has been ordered.  You will be notified by phone call or [facility] message when the results are available.       2. A referral to with Hematology has been placed. You will be contacted by that facility to schedule an appointment.       3. Increase water and salt for today.    4. Increase to rosuvastatin (CRESTOR) 5 MG tablet Take 2 tablets (10 mg) by mouth At Bedtime.  Recheck fasting lab in 6 weeks.         You will follow up with [facility] in 6 months, sooner if needed.       Please call the cardiology office with problems, questions, or concerns at [phone].    If you experience chest pain, chest pressure, chest tightness, shortness of breath, fainting, lightheadedness, nausea, vomiting, or other concerning symptoms, please report to the Emergency Department or call 911. These symptoms may be emergent, and best treated in the Emergency Department.     Cardiology Nurses  VIOLET Starks LPN Amy M., LPN  [facility] ([facility])  [phone]

## 2020-02-05 NOTE — PROGRESS NOTES
"[facility] HEART CARE   CARDIOLOGY PROGRESS NOTE    Ankita Day   [address] [address] [address]      Ramirez Cano     Chief Complaint   Patient presents with     Follow Up     ASCVD        HPI:   Ms. Day is a 65 year old patient who presents for cardiology follow-up to visit on 12/23/19 with history of ischemic heart disease. Patient has a history of hypertension, hyperlipidemia, CAD, history of pulmonary embolism, left subclavian artery stenosis, anxiety, collagenous colitis, depression, osteoarthritis, history of tobacco use, central sleep apnea for which she is not compliant with CPAP use, history of C. difficile, history of multiple concussions, iron deficiency anemia, stage II CKD, myofascial pain, vitamin D deficiency, lumbar facet arthropathy, history of gastric ulcer with hemorrhage, COPD and peptic ulcer disease.    Patient has a known history of ischemic heart disease, she underwent  coronary angiogram and bypass angiogram on 9/15/2017 which was described as having stable disease. Mild to moderate 3 vessel CAD involving RCA, LAD and LCX with <50% stenosis at the greatest.  Occluded RAFI and SVG.  Patent LIMA.  No new obstructive lesions were identified and normal left heart cath.      She has a history of CABG on 2/12/03 x 3, history of multiple stent placements, patient reported 17 total.  Additionally, she has a history of tobacco abuse, sleep apnea, anxiety/depression, COPD, hypertension and history of left subclavian steal syndrome involving the LIMA status post stent placement.    At her last visit patient reported occasional recurrence of chest pain, not as severe as last ED visit on 10/25. She reported \"my breathing has been bad\", describes worsening CORTEZ. She describes activity intolerance and little to no energy. Recommended repeat stress testing. NM lexiscan stress test was performed on 12/16/19 which was negative for inducible myocardial ischemia or infarction.  Left " ventricular function was normal.    Carotid US on 12/12/19 without significant stenosis, mild atherosclerotic disease present.  Abdominal ultrasound on 12/12/2018 with no abdominal aortic aneurysm identified.    Since her last visit patient was seen in the ED with dyspnea in early January. She was identified to have small segmental and subsegmental emboli bilaterally. Repeat imaging on 1/6 with decreasing volume of pulmonary emboli compared to scan on 1/2/2020. She was initially treated with Lovanox in the ED and discharged on Xarelto oral anticoagulation which has been going well for her, no bleeding complication. She also remains on Plavix with her significant ischemic heart disease history.     Patient admits today from a cardiac standpoint she is doing very well. Anginal symptoms significantly improved since starting Ranexa, she is very happy with this. Breathing has improved some since diagnosed with PE. She does have some continued pleuritic chest pains. She does also report 3-4 days of frequent loose stools. No fever or chills. She is not sure if this is viral or flare-up of colitis.     PAST MEDICAL HISTORY:   Past Medical History:   Diagnosis Date     Acute ischemic heart disease (H)     06/15/2007,with PTCA and stenting of 90% osteo circumflex lesion and patent LAD graft, patent left main stent.     Acute myocardial infarction (H)     3/30/2013     Anxiety disorder     No Comments Provided     Atherosclerotic heart disease of native coronary artery without angina pectoris     -angio revealed 3 vessel dz  -4 stents placed; 2 overlapping stents placed in mLAD, 1 stent pRCA, 1 stent pCirc 1 s 9/02 -non-ST elevation MI 1/03  -angio revealed restenosis of Circ.    -PTCA and brachytherapy of pCirc -repeat angio 2/03 -no intervension -CABG x3 12/03 - Dr Sinclair  -LIMA-LAD, RAFI-RCA, SVG-OM -PCI 7/04 stent to L -CTangio 9/05   -patentent LIMA-LAD. RAFI-RCA occluded; RCA ostio/p*     Bilateral carpal tunnel syndrome      No Comments Provided     Cervicalgia     No Comments Provided     Chest pain     12/29/2014     Chronic gastric ulcer without hemorrhage or perforation     10/03/2011,hx of GI bleed (2003)     Chronic ischemic heart disease     06/27/2012     Chronic obstructive pulmonary disease (H)     06/15/2007,low DLCO, normal spirometry     Chronic or unspecified gastric ulcer with hemorrhage     10/2003     Chronic pain syndrome     12/01/2010,chest wall, back     Constipation     11/29/2011     Coronary angioplasty status     09/12/2002,with triple stenting     Coronary angioplasty status     01/10/2003,repeat angioplasty     Coronary angioplasty status     No Comments Provided     Dorsalgia     05/31/2011     Encounter for other administrative examinations     1/28/2014     Encounter for screening for cardiovascular disorders     10/2004,Cardiolite (2004, 2005, 2006 and 2011)     Enterocolitis due to Clostridium difficile     8/19/2016     Essential (primary) hypertension     No Comments Provided     Hyperlipidemia     No Comments Provided     Major depressive disorder, single episode     Severe, hx of suicide attempt/hospitalization     Migraine without status migrainosus, not intractable     No Comments Provided     Nodular corneal degeneration     09/26/2011     Noninfective gastroenteritis and colitis     06/15/2007,history of     Noninfective gastroenteritis and colitis     Microcytic     Osteoarthritis     No Comments Provided     Other chest pain     10/13/2009,chronic     Pain in knee     05/31/2011     Pain in right shoulder     No Comments Provided     Panic disorder without agoraphobia     No Comments Provided     Peptic ulcer without hemorrhage or perforation     6/15/2007     Peripheral vascular disease (H)     No Comments Provided     Personal history of diseases of the blood and blood-forming organs and certain disorders involving the immune mechanism (CODE)     No Comments Provided     Personal history of  nicotine dependence     No Comments Provided     Personal history of other medical treatment (CODE)     10/14/2013     Presence of aortocoronary bypass graft     2003     Primary central sleep apnea     10/14/2013     Sepsis due to Escherichia coli (E. coli) (H)     16,[facility]     Stricture of artery (H)     3/31/2013,S/p prox left SCA stent 2013     Thoracic, thoracolumbar and lumbosacral intervertebral disc disorder     No Comments Provided     Uncomplicated opioid abuse (H)     history of     Vitamin D deficiency     2013          FAMILY HISTORY:   Family History   Problem Relation Age of Onset     Heart Disease Father         Heart Disease,Heart condition/Significant for atherosclerotic cardiovascular disease, but non premature.     Colon Cancer Father         Cancer-colon, of colon cancer     Cancer Father         Cancer,mets to liver, secondary to colon cancer     Heart Disease Mother         Heart Disease     Cancer Other         Cancer,Multiple Myeloma     Heart Disease Other         Heart Disease,Ischemic Heart Disease     Colon Cancer Other         Cancer-colon,Malignant neoplasms     Cancer Sister         Cancer,multiple myeloma     Other - See Comments Son         gallstones          PAST SURGICAL HISTORY:   Past Surgical History:   Procedure Laterality Date     ANGIOPLASTY      02,with triple stenting     APPENDECTOMY OPEN      No Comments Provided     ARTHROSCOPY KNEE      left     ARTHROSCOPY SHOULDER Right 2017    labral tear, rotator cuff tear and some subacromial decompression      BYPASS GRAFT ARTERY CORONARY      02,Triple bypass, left internal mammary  to LAD, right internal mammary to right coronary artery, saphenous to obtuse marginal of the left circumflex.     COLONOSCOPY      ,Dr Bowman benign polyps     COLONOSCOPY  10/03/2011    2011,benign polyps, Dr. Bowman     COLONOSCOPY  2016,normal, Dr Bowman     ELBOW SURGERY      baby,birth  malachi removed from right arm     EMBOLECTOMY UPPER EXTREMITY  2013    brachial artery pseudoaneurysm after stenting     ESOPHAGOSCOPY, GASTROSCOPY, DUODENOSCOPY (EGD), COMBINED      ,EGD Dr Bowman with pyloric ulcer     ESOPHAGOSCOPY, GASTROSCOPY, DUODENOSCOPY (EGD), COMBINED      ,pyloric ulcer, Dr. Bowman     ESOPHAGOSCOPY, GASTROSCOPY, DUODENOSCOPY (EGD), COMBINED      16,mild gastritis, Dr Bowman     ESOPHAGOSCOPY, GASTROSCOPY, DUODENOSCOPY (EGD), COMBINED      2017,Dr Bowman. Antral ulcer     ESOPHAGOSCOPY, GASTROSCOPY, DUODENOSCOPY (EGD), COMBINED  2018    Dr Bowman, healed ulcer     HYSTERECTOMY TOTAL ABDOMINAL      age 21     LAPAROSCOPIC CHOLECYSTECTOMY           OSTEOTOMY FEMUR DISTAL      x3, right knee     OSTEOTOMY FEMUR DISTAL      2000,left knee  ligament surgery     OTHER SURGICAL HISTORY      1/10/2003,,PTCA     OTHER SURGICAL HISTORY      2012,,PTCA,DELONTE in LAD and left main     OTHER SURGICAL HISTORY      2013,,PTCA,L subclavian stenosis     SALPINGO-OOPHORECTOMY BILATERAL      age 27,Bilateral salpingo-oophorectomy     TONSILLECTOMY, ADENOIDECTOMY, COMBINED      childhood          SOCIAL HISTORY:   Social History     Socioeconomic History     Marital status:      Spouse name: None     Number of children: None     Years of education: None     Highest education level: None   Occupational History     None   Social Needs     Financial resource strain: None     Food insecurity:     Worry: None     Inability: None     Transportation needs:     Medical: None     Non-medical: None   Tobacco Use     Smoking status: Former Smoker     Packs/day: 0.25     Years: 35.00     Pack years: 8.75     Types: Cigarettes     Last attempt to quit: 2/15/2017     Years since quittin.2     Smokeless tobacco: Never Used   Substance and Sexual Activity     Alcohol use: Yes     Alcohol/week: 0.0 oz     Comment: Alcoholic Drinks/day: rare     Drug use: No      Comment: Drug use: No     Sexual activity: Never     Partners: Male   Lifestyle     Physical activity:     Days per week: None     Minutes per session: None     Stress: None   Relationships     Social connections:     Talks on phone: None     Gets together: None     Attends Baptism service: None     Active member of club or organization: None     Attends meetings of clubs or organizations: None     Relationship status: None     Intimate partner violence:     Fear of current or ex partner: None     Emotionally abused: None     Physically abused: None     Forced sexual activity: None   Other Topics Concern     Parent/sibling w/ CABG, MI or angioplasty before 65F 55M? Not Asked   Social History Narrative    ,  Steven.  Currently not working outside the home. Lives [place]mile self [place] met with . Tobacco abuse, quit 2001, restarted. Quit 2012 and has since quit, no alcohol.          CURRENT MEDICATIONS:   Prior to Admission medications    Medication Sig Start Date End Date Taking? Authorizing Provider   albuterol (PROAIR HFA/PROVENTIL HFA/VENTOLIN HFA) 108 (90 Base) MCG/ACT inhaler Inhale 2 puffs into the lungs every 6 hours 1/23/19 1/23/20 Yes Ramirez Cano MD   amLODIPine (NORVASC) 10 MG tablet Take 1 tablet (10 mg) by mouth daily 11/21/18  Yes Ramirez Cano MD   aspirin EC 81 MG EC tablet Take 81 mg by mouth daily with food   Yes Reported, Patient   busPIRone (BUSPAR) 10 MG tablet TAKE 1 TABLET TWICE A DAY 11/7/18  Yes Ramirez Cano MD   clonazePAM (KLONOPIN) 1 MG tablet Take 1 tablet (1 mg) by mouth 3 times daily as needed for anxiety #75 per 30 days, #225 pills per 90 days 4/3/19  Yes Ramirez Cano MD   clopidogrel (PLAVIX) 75 MG tablet Take 1 tablet (75 mg) by mouth daily 11/21/18  Yes Ramirez Cano MD   cyclobenzaprine (FLEXERIL) 10 MG tablet Take 1 tablet (10 mg) by mouth 2 times daily as needed for muscle spasms 11/21/18  Yes Ramirez Cano MD   Diclofenac  Sodium 1.5 % SOLN Apply 20 drops to each hand or 40 drops to each knee up to 4 times daily as needed for arthritis pain 11/21/18  Yes Ramirez Cano MD   docusate sodium (COLACE) 50 MG capsule Take 50 mg by mouth daily   Yes Reported, Patient   doxycycline hyclate (VIBRAMYCIN) 100 MG capsule Take 1 capsule (100 mg) by mouth 2 times daily for 14 days 5/13/19 5/27/19 Yes Mikel Ashraf PA-C   DULoxetine (CYMBALTA) 60 MG EC capsule Take 1 capsule (60 mg) by mouth daily 8/23/18  Yes Ramirez Cano MD   gabapentin (NEURONTIN) 600 MG tablet Take 1 tablet (600 mg) by mouth 3 times daily 1/23/19  Yes Ramirez Cano MD   lisinopril (PRINIVIL/ZESTRIL) 40 MG tablet Take 1 tablet (40 mg) by mouth daily 4/23/19  Yes Ramirez Cano MD   metoprolol tartrate (LOPRESSOR) 50 MG tablet Take 1 tablet (50 mg) by mouth 2 times daily 11/21/18  Yes Ramirez Cano MD   NITROSTAT 0.3 MG sublingual tablet PLACE 1 TABLET UNDER THE TONGUE EVERY 5 MINUTES AS NEEDED FOR CHEST PAIN 4/9/18  Yes Ramirez Cano MD   ondansetron (ZOFRAN) 4 MG tablet Take 4 mg by mouth every 6 hours as needed for nausea 10/31/17  Yes Reported, Patient   oxyCODONE-acetaminophen (PERCOCET) 5-325 MG tablet Take 2 tablets by mouth 4 times daily Max acetaminophen dose: 4000mg in 24 hrs 5/23/19  Yes Ramirez Cano MD   pantoprazole (PROTONIX) 40 MG EC tablet TAKE 1 TABLET TWICE A DAY 1/22/19  Yes Ramirez Cano MD   pravastatin (PRAVACHOL) 40 MG tablet Take 1 tablet (40 mg) by mouth At Bedtime 1/23/19  Yes Ramirez Cano MD   saccharomyces boulardii (FLORASTOR) 250 MG capsule Take 250 mg by mouth 2 times daily   Yes Reported, Patient   sucralfate (CARAFATE) 1 GM tablet Take 1 tablet (1 g) by mouth 4 times daily Before meals & at bedtime 8/23/18  Yes Ramirez Cano MD   VITAMIN D, CHOLECALCIFEROL, PO Take 5,000 Units by mouth daily   Yes Reported, Patient          ALLERGIES:   Allergies   Allergen Reactions     Atorvastatin Muscle  "Pain (Myalgia)     Liquid Adhesive Rash     Other reaction(s): Erythema  Silk and plastic gloves (long term attachment of adhesives)     Tiotropium Bromide [Tiotropium] Rash     Ezetimibe Muscle Pain (Myalgia)     Latex Rash     Niacin      Other reaction(s): Flushing     No Clinical Screening - See Comments Itching, Rash and Blisters     Metals and plastics       Rosuvastatin Muscle Pain (Myalgia)     Other reaction(s): Myalgia  Currently taking 5 mg daily 1/2/20 - retrial     Tape [Adhesive Tape] Rash          ROS:   CONSTITUTIONAL: No fever and chills. No changes in weight.   ENT: No visual disturbance, ear ache, epistaxis or sore throat.   CARDIOVASCULAR: No recurrence of chest pains. No palpitations or lower extremity edema.   RESPIRATORY: Chronic shortness of breath with exertional dyspnea. No cough, wheezing or hemoptysis.   GI: No reported abdominal pain. Positive for diarrhea and loose stools.  : No reported hematuria or dysuria.   NEUROLOGICAL: Positive for chronic headaches. No lightheadedness, dizziness, syncope, ataxia, paresthesias or weakness.   HEMATOLOGIC: No history of anemia. No bleeding or excessive bruising. No history of blood clots.   MUSCULOSKELETAL: No reported joint pain or swelling, myalgias resolved.    ENDOCRINOLOGIC: No temperature intolerance. No hair or skin changes.  SKIN: No abnormal rashes or sores, no unusual itching.  PSYCHIATRIC: Positive for history of anxiety and depression.      PHYSICAL EXAM:   BP 90/58 (BP Location: Right arm, Patient Position: Sitting, Cuff Size: Adult Large)   Pulse 60   Temp 97  F (36.1  C) (Tympanic)   Resp 18   Ht 1.676 m (5' 5.98\")   Wt 83.9 kg (185 lb)   SpO2 93%   BMI 29.87 kg/m    GENERAL: The patient is a well-developed, well-nourished, in no apparent distress.  HEENT: Head is normocephalic and atraumatic. Eyes are symmetrical with normal visual tracking. No icterus, no xanthelasmas. Nares appeared normal without nasal drainage. Mucous " membranes are moist, no cyanosis.  NECK: Supple.   CHEST/ LUNGS: Lungs diminished over bases. No rales, rhonchi or wheezes, no use of accessory muscles, no retractions, respirations unlabored and normal respiratory rate.   CARDIO: Regular rate and rhythm normal with S1 and S2, no S3 or S4 and no murmur, click or rub.   ABD: Abdomen is nondistended.   EXTREMITIES: No LE edema present.   MUSCULOSKELETAL: No visible joint swelling.   NEUROLOGIC: Alert and oriented X3. Normal speech, gait and affect. No focal neurologic deficits.   SKIN: No jaundice. No rashes or visible skin lesions present. No ecchymosis.       LAB RESULTS:   Office Visit on 05/13/2019   Component Date Value Ref Range Status     Troponin I ES 05/13/2019 <0.030  0.000 - 0.034 ug/L Final     A Phagocytophil IgG 05/13/2019 >1:1,280* <1:80 Final     A Phagocytophil IgM 05/13/2019 > 1:256  <1:16 Final     Lyme Disease Antibodies Serum 05/13/2019 0.10  0.00 - 0.89 Final     Sodium 05/13/2019 137  134 - 144 mmol/L Final     Potassium 05/13/2019 3.4* 3.5 - 5.1 mmol/L Final     Chloride 05/13/2019 103  98 - 107 mmol/L Final     Carbon Dioxide 05/13/2019 23  21 - 31 mmol/L Final     Anion Gap 05/13/2019 11  3 - 14 mmol/L Final     Glucose 05/13/2019 117* 70 - 105 mg/dL Final     Urea Nitrogen 05/13/2019 23  7 - 25 mg/dL Final     Creatinine 05/13/2019 1.07  0.60 - 1.20 mg/dL Final     GFR Estimate 05/13/2019 51* >60 mL/min/[1.73_m2] Final     GFR Estimate If Black 05/13/2019 62  >60 mL/min/[1.73_m2] Final     Calcium 05/13/2019 9.3  8.6 - 10.3 mg/dL Final     Bilirubin Total 05/13/2019 1.1* 0.3 - 1.0 mg/dL Final     Albumin 05/13/2019 4.3  3.5 - 5.7 g/dL Final     Protein Total 05/13/2019 7.3  6.4 - 8.9 g/dL Final     Alkaline Phosphatase 05/13/2019 107* 34 - 104 U/L Final     ALT 05/13/2019 37  7 - 52 U/L Final     AST 05/13/2019 39  13 - 39 U/L Final     WBC 05/13/2019 8.2  4.0 - 11.0 10e9/L Final     RBC Count 05/13/2019 3.91  3.8 - 5.2 10e12/L Final      Hemoglobin 05/13/2019 11.8  11.7 - 15.7 g/dL Final     Hematocrit 05/13/2019 35.0  35.0 - 47.0 % Final     MCV 05/13/2019 90  78 - 100 fl Final     MCH 05/13/2019 30.2  26.5 - 33.0 pg Final     MCHC 05/13/2019 33.7  31.5 - 36.5 g/dL Final     RDW 05/13/2019 14.0  10.0 - 15.0 % Final     Platelet Count 05/13/2019 190  150 - 450 10e9/L Final     Diff Method 05/13/2019 Automated Method   Final     % Neutrophils 05/13/2019 71.4  % Final     % Lymphocytes 05/13/2019 20.9  % Final     % Monocytes 05/13/2019 6.4  % Final     % Eosinophils 05/13/2019 0.5  % Final     % Basophils 05/13/2019 0.2  % Final     % Immature Granulocytes 05/13/2019 0.6  % Final     Absolute Neutrophil 05/13/2019 5.8  1.6 - 8.3 10e9/L Final     Absolute Lymphocytes 05/13/2019 1.7  0.8 - 5.3 10e9/L Final     Absolute Monocytes 05/13/2019 0.5  0.0 - 1.3 10e9/L Final     Absolute Eosinophils 05/13/2019 0.0  0.0 - 0.7 10e9/L Final     Absolute Basophils 05/13/2019 0.0  0.0 - 0.2 10e9/L Final     Abs Immature Granulocytes 05/13/2019 0.1  0 - 0.4 10e9/L Final     Platelet Estimate 05/13/2019 Automated count confirmed.  Platelet morphology is normal.   Final     RBC Morphology 05/13/2019 Consistent with reported results   Final     Specimen Description 05/13/2019 Blood   Final     Culture Micro 05/13/2019 No growth after 6 days   Final          ASSESSMENT:   Ankita Day presents for cardiology follow-up to visit on 12/23/19 with history of ischemic heart disease. Patient has a history of hypertension, hyperlipidemia, CAD, history of pulmonary embolism, left subclavian artery stenosis, anxiety, collagenous colitis, depression, osteoarthritis, history of tobacco use, central sleep apnea for which she is not compliant with CPAP use, history of C. difficile, history of multiple concussions, iron deficiency anemia, stage II CKD, myofascial pain, vitamin D deficiency, lumbar facet arthropathy, history of gastric ulcer with hemorrhage, COPD and peptic ulcer  disease.  Since her last visit patient was seen in the ED with dyspnea in early January. She was identified to have small segmental and subsegmental emboli bilaterally. Repeat imaging on 1/6 with decreasing volume of pulmonary emboli compared to scan on 1/2/2020. She was initially treated with Lovanox in the ED and discharged on Xarelto oral anticoagulation which has been going well for her, no bleeding complication. She also remains on Plavix with her significant ischemic heart disease history.   Patient admits today from a cardiac standpoint she is doing very well. Anginal symptoms significantly improved since starting Ranexa, she is very happy with this. Breathing has improved some since diagnosed with PE. She does have some continued pleuritic chest pains. She does also report 3-4 days of frequent loose stools. No fever or chills. She is not sure if this is viral or flare-up of colitis.     PLAN:  1. ASCVD (arteriosclerotic cardiovascular disease)  History of 3V CABG (2003) and history of coronary stenting.   Chronic stable angina which has been very well controlled on Ranexa without any recurrence of symptoms.   NM Lexiscan stress test in 2018 with no stress induced ischemia.     2. Acute pulmonary embolism without acute cor pulmonale, unspecified pulmonary embolism type (H)  Newly identified PE's, on Xarelto now.   Referral to hematology.    3. Loose stools  3 days of loose stools suspected viral vs colitis.   Labs today.  Hypotensive today and not symptomatic with this, likely a bit dehydrated.   Follow-up with PCP if not improved.   - CBC with platelets differential; Future  - Basic metabolic panel; Future  - Magnesium  - Basic metabolic panel  - CBC with platelets differential    4. Chronic ischemic heart disease  Stable, see above.     5. CORTEZ (dyspnea on exertion)  Improved some with treatment of PE's.   She will continue on oral anticoagulation.    6. Chronic stable angina (H)  Doing well on Ranexa, she  will continue with this.    7. Mixed hyperlipidemia  Increase Rosuvastatin to 10 mg daily.   - rosuvastatin (CRESTOR) 5 MG tablet; Take 2 tablets (10 mg) by mouth At Bedtime  Dispense: 60 tablet; Refill: 3    8. CKD (chronic kidney disease) stage 2, GFR 60-89 ml/min  Renal function stable.     9. Chronic pain disorder    10. History of coronary artery stent placement  See above.    Follow-up with cardiology in 4 weeks to review results of testing, certainly sooner if needed.     Thank you for allowing me to participate in the care of your patient. Please do not hesitate to contact me if you have any questions.     Juanita Wang

## 2020-02-10 ENCOUNTER — HEALTH MAINTENANCE LETTER (OUTPATIENT)
Age: 65
End: 2020-02-10

## 2020-02-18 DIAGNOSIS — F33.1 MODERATE EPISODE OF RECURRENT MAJOR DEPRESSIVE DISORDER (H): Primary | ICD-10-CM

## 2020-02-18 NOTE — TELEPHONE ENCOUNTER
Please send 90-day supply per insurance.    Last prescription:  01/14/20 1205 Sign Ramirez Cano MD Reorder from Order: [id number]     vortioxetine (TRINTELLIX/BRINTELLIX) 10 MG tablet 30 tablet 1 1/14/2020  No   Sig - Route: Take 1 tablet (10 mg) by mouth daily - Oral   Patient taking differently: Take 5 mg by mouth daily           [facility] - [place], [place] - [address]     Kathleen Puckett RN .............. 2/18/2020  11:37 AM

## 2020-02-19 ENCOUNTER — OFFICE VISIT (OUTPATIENT)
Dept: FAMILY MEDICINE | Facility: OTHER | Age: 65
End: 2020-02-19
Attending: FAMILY MEDICINE
Payer: MEDICARE

## 2020-02-19 VITALS
RESPIRATION RATE: 18 BRPM | OXYGEN SATURATION: 96 % | HEART RATE: 56 BPM | TEMPERATURE: 97.2 F | SYSTOLIC BLOOD PRESSURE: 119 MMHG | DIASTOLIC BLOOD PRESSURE: 96 MMHG

## 2020-02-19 DIAGNOSIS — E78.2 MIXED HYPERLIPIDEMIA: Primary | ICD-10-CM

## 2020-02-19 DIAGNOSIS — R07.9 RECURRENT CHEST PAIN: ICD-10-CM

## 2020-02-19 DIAGNOSIS — R07.89 CHEST WALL PAIN, CHRONIC: ICD-10-CM

## 2020-02-19 DIAGNOSIS — G89.4 CHRONIC PAIN DISORDER: ICD-10-CM

## 2020-02-19 DIAGNOSIS — M54.50 CHRONIC BILATERAL LOW BACK PAIN WITHOUT SCIATICA: ICD-10-CM

## 2020-02-19 DIAGNOSIS — I20.89 CHRONIC STABLE ANGINA (H): ICD-10-CM

## 2020-02-19 DIAGNOSIS — I25.10 ASCVD (ARTERIOSCLEROTIC CARDIOVASCULAR DISEASE): ICD-10-CM

## 2020-02-19 DIAGNOSIS — Z79.899 CONTROLLED SUBSTANCE AGREEMENT SIGNED: ICD-10-CM

## 2020-02-19 DIAGNOSIS — K25.4 CHRONIC GASTRIC ULCER WITH HEMORRHAGE: ICD-10-CM

## 2020-02-19 DIAGNOSIS — G89.29 CHRONIC BILATERAL LOW BACK PAIN WITHOUT SCIATICA: ICD-10-CM

## 2020-02-19 DIAGNOSIS — Z95.5 HISTORY OF CORONARY ARTERY STENT PLACEMENT: ICD-10-CM

## 2020-02-19 DIAGNOSIS — F41.9 CHRONIC ANXIETY: ICD-10-CM

## 2020-02-19 DIAGNOSIS — I26.99 ACUTE PULMONARY EMBOLISM WITHOUT ACUTE COR PULMONALE, UNSPECIFIED PULMONARY EMBOLISM TYPE (H): ICD-10-CM

## 2020-02-19 DIAGNOSIS — E78.2 MIXED HYPERLIPIDEMIA: ICD-10-CM

## 2020-02-19 DIAGNOSIS — F33.1 MODERATE EPISODE OF RECURRENT MAJOR DEPRESSIVE DISORDER (H): Primary | ICD-10-CM

## 2020-02-19 DIAGNOSIS — G89.29 CHEST WALL PAIN, CHRONIC: ICD-10-CM

## 2020-02-19 PROCEDURE — 99214 OFFICE O/P EST MOD 30 MIN: CPT | Performed by: FAMILY MEDICINE

## 2020-02-19 PROCEDURE — G0463 HOSPITAL OUTPT CLINIC VISIT: HCPCS

## 2020-02-19 RX ORDER — RANOLAZINE 500 MG/1
500 TABLET, EXTENDED RELEASE ORAL 2 TIMES DAILY
Qty: 180 TABLET | Refills: 3 | Status: SHIPPED | OUTPATIENT
Start: 2020-02-19 | End: 2020-10-09

## 2020-02-19 RX ORDER — CLONAZEPAM 1 MG/1
1 TABLET ORAL 2 TIMES DAILY
Qty: 180 TABLET | Refills: 0 | Status: SHIPPED | OUTPATIENT
Start: 2020-03-09 | End: 2020-05-13

## 2020-02-19 RX ORDER — OXYCODONE AND ACETAMINOPHEN 10; 325 MG/1; MG/1
1 TABLET ORAL 4 TIMES DAILY PRN
Qty: 120 TABLET | Refills: 0 | Status: SHIPPED | OUTPATIENT
Start: 2020-02-19 | End: 2020-03-18

## 2020-02-19 RX ORDER — SUCRALFATE 1 G/1
1 TABLET ORAL 4 TIMES DAILY PRN
Qty: 360 TABLET | Refills: 0 | Status: SHIPPED | OUTPATIENT
Start: 2020-02-19 | End: 2020-05-18

## 2020-02-19 RX ORDER — ROSUVASTATIN CALCIUM 10 MG/1
10 TABLET, COATED ORAL AT BEDTIME
Qty: 90 TABLET | Refills: 1 | Status: SHIPPED | OUTPATIENT
Start: 2020-02-19 | End: 2020-05-06

## 2020-02-19 NOTE — PROGRESS NOTES
Nursing Notes:   Jamilah Berger LPN  2020  9:33 AM  Sign at exiting of workspace  Pt presents to clinic today for medication management.      Medication Reconciliation: complete  Jamilah Berger LPN      SUBJECTIVE:  65 year old female presents to follow up on multiple issues:    PE diagnosed 2020. On Xarelto. Also was on aspirin and clopidogrel due to complex CAD history. She is taking Plavix and Xarelto, but stopped aspirin as she was worried about bleeding due to some darker stool. No further dark stool since stopping aspirin. Saw cardiology on , referral placed to hematology. She does not have an appointment scheduled yet.     Ranexa has improved chronic stable angina symptoms.    Trentillix increased from 5 to 10 mg. Tolerating. It helps.      Chronically on clonazepam. Used to take 1 g four times daily and has reduced over time. Most recently attempting to use 1 g twice daily. Brought clonazepam with and has about 25 in bottle, 12 at home, and 8 more in medical packs, putting her at using twice daily with 7 extra doses taken. Her mother-in-law  and she took more clonazepam for a while.     Was temporarily on increased oxycodone after PE.  Has now reduced back to chronic amount of 40 mg oxycodone daily.  Previously she was using 5/325 mg pills.  Is now on 10/325 mg pills.  She is requesting to take 2 pills in the morning and then for the rest of the day, which would be a clear dose increase.  We discussed that is not going to occur.    History of GI bleed from stomach ulcer, seen in 2017. Healed when aspirin stopped and treated with PPI and sucralfate. She is still on sucralfate as needed. On omeprazole 20 mg daily.  Hemoglobin recently checked and has declined slightly.    REVIEW OF SYSTEMS:    Pertinent items are noted in HPI.    Current Outpatient Medications   Medication Sig Dispense Refill     albuterol (PROAIR HFA/PROVENTIL HFA/VENTOLIN HFA) 108 (90 Base) MCG/ACT inhaler Inhale 2  puffs into the lungs every 6 hours 2 Inhaler 3     amLODIPine (NORVASC) 10 MG tablet Take 1 tablet (10 mg) by mouth daily 90 tablet 3     busPIRone (BUSPAR) 10 MG tablet Take 1 tablet (10 mg) by mouth 2 times daily 180 tablet 1     clonazePAM (KLONOPIN) 1 MG tablet Take 1 tablet (1 mg) by mouth 2 times daily (Patient taking differently: Take 1 mg by mouth 2 times daily May take additional 1 mg daily as needed for anxiety) 180 tablet 1     clopidogrel (PLAVIX) 75 MG tablet Take 1 tablet (75 mg) by mouth daily 90 tablet 3     Diclofenac Sodium 1.5 % SOLN Apply 20 drops to each hand or 40 drops to each knee up to 4 times daily as needed for arthritis pain 450 mL 3     docusate sodium (COLACE) 100 MG capsule Take 100 mg by mouth twice a week       lisinopril (PRINIVIL/ZESTRIL) 20 MG tablet Take 1 tablet (20 mg) by mouth daily 90 tablet 3     metoclopramide (REGLAN) 10 MG tablet Take 1 tablet (10 mg) by mouth every 6 hours as needed (headache) 30 tablet 0     metoprolol tartrate (LOPRESSOR) 50 MG tablet Take 1 tablet (50 mg) by mouth 2 times daily 180 tablet 3     naloxone (NARCAN) 4 MG/0.1ML nasal spray Spray into one nostril for opioid reversal if unresponsive. May repeat every 2-3 minutes until patient responsive or EMS arrives 1 each 1     nitroGLYcerin (NITROSTAT) 0.3 MG sublingual tablet PLACE 1 TABLET UNDER THE TONGUE EVERY 5 MINUTES AS NEEDED FOR CHEST PAIN 25 tablet 11     omeprazole (PRILOSEC) 20 MG DR capsule Take 1 capsule (20 mg) by mouth 2 times daily 180 capsule 3     ondansetron (ZOFRAN) 4 MG tablet Take 1 tablet (4 mg) by mouth every 6 hours as needed for nausea 30 tablet 0     oxyCODONE-acetaminophen (PERCOCET)  MG per tablet Take 1 tablet by mouth 4 times daily as needed for severe pain 60 tablet 0     ranolazine (RANEXA) 500 MG 12 hr tablet Take 1 tablet (500 mg) by mouth 2 times daily 60 tablet 3     rivaroxaban ANTICOAGULANT (XARELTO) 20 MG TABS tablet Take 1 tablet (20 mg) by mouth daily  (with dinner) 30 tablet 11     rosuvastatin (CRESTOR) 10 MG tablet Take 1 tablet (10 mg) by mouth At Bedtime 90 tablet 1     saccharomyces boulardii (FLORASTOR) 250 MG capsule Take 250 mg by mouth 2 times daily       sucralfate (CARAFATE) 1 GM tablet Take 1 g by mouth 4 times daily as needed       VITAMIN D, CHOLECALCIFEROL, PO Take 5,000 Units by mouth daily       vortioxetine (TRINTELLIX/BRINTELLIX) 10 MG tablet Take 1 tablet (10 mg) by mouth daily 30 tablet 1     Allergies   Allergen Reactions     Atorvastatin Muscle Pain (Myalgia)     Tiotropium Bromide [Tiotropium] Rash     Ezetimibe Muscle Pain (Myalgia)     Latex Rash     Niacin      Other reaction(s): Flushing     No Clinical Screening - See Comments Itching, Rash and Blisters     Metals and plastics       Rosuvastatin Muscle Pain (Myalgia)     Other reaction(s): Myalgia  Currently taking 5 mg daily 1/2/20 - retrial     Tape [Adhesive Tape] Rash       OBJECTIVE:  BP (!) 119/96   Pulse 56   Temp 97.2  F (36.2  C) (Tympanic)   Resp 18   SpO2 96%     EXAM:  General Appearance: Alert. No acute distress  Chest/Respiratory Exam: Clear to auscultation bilaterally  Cardiovascular Exam: Regular rate and rhythm. S1, S2, no murmur, gallop, or rubs.  Extremities:  No lower extremity edema.  Psychiatric: Normal affect and mentation        ASSESSMENT/PLAN:    ICD-10-CM    1. Moderate episode of recurrent major depressive disorder (H) F33.1 vortioxetine 10 MG PO tablet   2. Chronic pain disorder G89.4 oxyCODONE-acetaminophen  MG PO per tablet   3. Chronic bilateral low back pain without sciatica M54.5 oxyCODONE-acetaminophen  MG PO per tablet    G89.29    4. Chest wall pain, chronic R07.89 oxyCODONE-acetaminophen  MG PO per tablet    G89.29    5. Controlled substance agreement signed 9/18/19 Z79.899 oxyCODONE-acetaminophen  MG PO per tablet     clonazePAM 1 MG PO tablet   6. Chronic anxiety F41.9 clonazePAM 1 MG PO tablet   7. ASCVD  (arteriosclerotic cardiovascular disease) I25.10 rosuvastatin 10 MG PO tablet   8. History of coronary artery stent placement Z95.5 rosuvastatin 10 MG PO tablet   9. Mixed hyperlipidemia E78.2 rosuvastatin 10 MG PO tablet   10. Acute pulmonary embolism without acute cor pulmonale, unspecified pulmonary embolism type (H) I26.99 rivaroxaban ANTICOAGULANT 20 MG PO TABS tablet     Oncology/Hematology Adult Referral   11. Chronic stable angina (H) I20.8 ranolazine 500 MG PO 12 hr tablet   12. Recurrent chest pain R07.9 ranolazine 500 MG PO 12 hr tablet   13. Chronic gastric ulcer with hemorrhage K25.4 sucralfate 1 GM PO tablet     Trintellix is helping.  Increase further from 10 up to 15 mg daily.  She apparently has some leftover samples from psychiatry and can utilize 10+5 mg until follow-up.    History of gastritis.  Remains on Plavix and Xarelto.  She was also on aspirin.  Has not stopped the aspirin.  Recommend taking sucralfate 1 g 4 times daily consistently for a month to help heal presumptive gastritis.  Continue with PPI.  If hemoglobin continues to drop, then repeat EGD.    Refilled rosuvastatin and ranolazine.    Referral placed to hematology due to PE.  Main question is duration of anticoagulation.  PE sounds unprovoked, so would favor lifelong anticoagulation.    Refilled clonazepam.  She has been tapering down from 4 mg/day down to 3 mg/day most recently down to about 2 mg/day.  Recently due to life stressors, she ended up taking several extra clonazepam.  By my count she is taken about 7 extra in the past couple months.  Continue with current dosing of 2 mg total daily, taking 1 mg twice daily.    She is aware of the risk for overdose and excess sedation with concurrent benzodiazepine and opioid use.  Again, her benzos and opioids have been reduced over time.  Agree to refill oxycodone for 10/325 mg 4 times daily, which has been recent stable baseline.  Higher amounts of opioids have caused side effects.   When she has gone off in the past she had reduction in function.   reviewed. Given 1 month supply of oxycodone.    Greater than 50% of this 32 minute appointment spent on counseling     Follow-up 1 month    Ramirez Cano MD    This document was prepared using a combination of typing and voice generated software.  While every attempt was made for accuracy, spelling and grammatical errors may exist.

## 2020-02-19 NOTE — PATIENT INSTRUCTIONS
Increase Trentillix to 15 mg daily  Take sucralfate 1 g four times daily for a month  Follow-up in a month    Referral to hematology about pulmonary embolism and duration of anti-coagulation

## 2020-02-20 NOTE — TELEPHONE ENCOUNTER
Noted approval as requested:    02/19/20 1019 Ramirez Butler MD Reorder from Order: [id number]     vortioxetine 10 MG PO tablet 90 tablet 1 2/19/2020  No   Sig - Route: Take 1 tablet (10 mg) by mouth daily - Oral   Sent to pharmacy as: Vortioxetine HBr 10 MG Oral Tablet (TRINTELLIX)   Class: E-Prescribe   Order: [id number]   E-Prescribing Status: Receipt confirmed by pharmacy (2/19/2020 10:19 AM CST)     [facility] [id number] [facility] - [place], [place] - [address]     Kathleen Puckett RN .............. 2/20/2020  10:47 AM

## 2020-02-24 DIAGNOSIS — F41.9 CHRONIC ANXIETY: ICD-10-CM

## 2020-02-24 DIAGNOSIS — Z79.899 CONTROLLED SUBSTANCE AGREEMENT SIGNED: ICD-10-CM

## 2020-02-24 RX ORDER — CLONAZEPAM 1 MG/1
TABLET ORAL
Qty: 180 TABLET | Refills: 0 | OUTPATIENT
Start: 2020-02-24

## 2020-02-24 NOTE — TELEPHONE ENCOUNTER
[facility] sent Rx request for the following:      CLONAZEPAM 1 MG TABLET  Sig: TAKE 1 TABLET BY MOUTH TWICE A DAY     Last Prescription Date:   2/24/2020  Last Fill Qty/Refills:         180, R-0    Last Office Visit:              2/19/2020  Future Office visit:           3/18/2020      Redundant refill request refused: Too soon: There is an order already placed for a refill on 3/9/2020.     Nikos Toure RN, BSN  ....................  2/24/2020   1:02 PM

## 2020-03-18 ENCOUNTER — OFFICE VISIT (OUTPATIENT)
Dept: FAMILY MEDICINE | Facility: OTHER | Age: 65
End: 2020-03-18
Attending: FAMILY MEDICINE
Payer: MEDICARE

## 2020-03-18 VITALS
BODY MASS INDEX: 30.2 KG/M2 | HEART RATE: 62 BPM | DIASTOLIC BLOOD PRESSURE: 76 MMHG | RESPIRATION RATE: 17 BRPM | TEMPERATURE: 96.8 F | OXYGEN SATURATION: 93 % | WEIGHT: 187 LBS | SYSTOLIC BLOOD PRESSURE: 122 MMHG

## 2020-03-18 DIAGNOSIS — G43.719 INTRACTABLE CHRONIC COMMON MIGRAINE WITHOUT AURA: ICD-10-CM

## 2020-03-18 DIAGNOSIS — Z23 NEED FOR VACCINATION FOR PNEUMOCOCCUS: ICD-10-CM

## 2020-03-18 DIAGNOSIS — G89.29 CHEST WALL PAIN, CHRONIC: ICD-10-CM

## 2020-03-18 DIAGNOSIS — G89.29 CHRONIC BILATERAL LOW BACK PAIN WITHOUT SCIATICA: ICD-10-CM

## 2020-03-18 DIAGNOSIS — M54.50 CHRONIC BILATERAL LOW BACK PAIN WITHOUT SCIATICA: ICD-10-CM

## 2020-03-18 DIAGNOSIS — R07.89 CHEST WALL PAIN, CHRONIC: ICD-10-CM

## 2020-03-18 DIAGNOSIS — Z79.899 CONTROLLED SUBSTANCE AGREEMENT SIGNED: ICD-10-CM

## 2020-03-18 DIAGNOSIS — R11.0 NAUSEA: ICD-10-CM

## 2020-03-18 DIAGNOSIS — G89.4 CHRONIC PAIN DISORDER: Primary | ICD-10-CM

## 2020-03-18 DIAGNOSIS — D64.9 ANEMIA, UNSPECIFIED TYPE: ICD-10-CM

## 2020-03-18 DIAGNOSIS — F33.1 MODERATE EPISODE OF RECURRENT MAJOR DEPRESSIVE DISORDER (H): ICD-10-CM

## 2020-03-18 LAB
ERYTHROCYTE [DISTWIDTH] IN BLOOD BY AUTOMATED COUNT: 13.2 % (ref 10–15)
HCT VFR BLD AUTO: 37.6 % (ref 35–47)
HGB BLD-MCNC: 12.2 G/DL (ref 11.7–15.7)
IRON SATN MFR SERPL: 16 % (ref 20–55)
IRON SERPL-MCNC: 84 UG/DL (ref 50–212)
MCH RBC QN AUTO: 29.1 PG (ref 26.5–33)
MCHC RBC AUTO-ENTMCNC: 32.4 G/DL (ref 31.5–36.5)
MCV RBC AUTO: 90 FL (ref 78–100)
PLATELET # BLD AUTO: 296 10E9/L (ref 150–450)
RBC # BLD AUTO: 4.19 10E12/L (ref 3.8–5.2)
TIBC SERPL-MCNC: 515.2 UG/DL (ref 245–400)
UIBC (UNSATURATED): 431.2 MG/DL
VIT B12 SERPL-MCNC: 313 PG/ML (ref 180–914)
WBC # BLD AUTO: 7.8 10E9/L (ref 4–11)

## 2020-03-18 PROCEDURE — G0009 ADMIN PNEUMOCOCCAL VACCINE: HCPCS

## 2020-03-18 PROCEDURE — 90471 IMMUNIZATION ADMIN: CPT

## 2020-03-18 PROCEDURE — 83540 ASSAY OF IRON: CPT | Mod: ZL | Performed by: FAMILY MEDICINE

## 2020-03-18 PROCEDURE — 90670 PCV13 VACCINE IM: CPT

## 2020-03-18 PROCEDURE — G0463 HOSPITAL OUTPT CLINIC VISIT: HCPCS

## 2020-03-18 PROCEDURE — 36415 COLL VENOUS BLD VENIPUNCTURE: CPT | Mod: ZL | Performed by: FAMILY MEDICINE

## 2020-03-18 PROCEDURE — 83550 IRON BINDING TEST: CPT | Mod: ZL | Performed by: FAMILY MEDICINE

## 2020-03-18 PROCEDURE — G0463 HOSPITAL OUTPT CLINIC VISIT: HCPCS | Mod: 25

## 2020-03-18 PROCEDURE — 82607 VITAMIN B-12: CPT | Mod: ZL | Performed by: FAMILY MEDICINE

## 2020-03-18 PROCEDURE — 85027 COMPLETE CBC AUTOMATED: CPT | Mod: ZL | Performed by: FAMILY MEDICINE

## 2020-03-18 PROCEDURE — 99215 OFFICE O/P EST HI 40 MIN: CPT | Performed by: FAMILY MEDICINE

## 2020-03-18 RX ORDER — ONDANSETRON 4 MG/1
4 TABLET, FILM COATED ORAL EVERY 6 HOURS PRN
Qty: 30 TABLET | Refills: 2 | Status: SHIPPED | OUTPATIENT
Start: 2020-03-18 | End: 2020-06-15

## 2020-03-18 RX ORDER — OXYCODONE AND ACETAMINOPHEN 10; 325 MG/1; MG/1
1 TABLET ORAL 4 TIMES DAILY PRN
Qty: 120 TABLET | Refills: 0 | Status: SHIPPED | OUTPATIENT
Start: 2020-04-17 | End: 2020-05-13

## 2020-03-18 RX ORDER — OXYCODONE AND ACETAMINOPHEN 10; 325 MG/1; MG/1
1 TABLET ORAL 4 TIMES DAILY PRN
Qty: 120 TABLET | Refills: 0 | Status: SHIPPED | OUTPATIENT
Start: 2020-03-18 | End: 2020-05-13

## 2020-03-18 RX ORDER — PREGABALIN 50 MG/1
50 CAPSULE ORAL 3 TIMES DAILY
Qty: 90 CAPSULE | Refills: 5 | Status: SHIPPED | OUTPATIENT
Start: 2020-03-18 | End: 2020-05-13

## 2020-03-18 ASSESSMENT — PATIENT HEALTH QUESTIONNAIRE - PHQ9: SUM OF ALL RESPONSES TO PHQ QUESTIONS 1-9: 4

## 2020-03-18 NOTE — PROGRESS NOTES
Nursing Notes:   Jamilah Berger LPN  3/18/2020  9:35 AM  Signed  Pt presents to clinic today for medication management and swelling under bilateral arms.      Medication Reconciliation: complete  Jamilah Berger LPN      SUBJECTIVE:  65 year old female presents to follow up on multiple issues:    Had flu symptoms for 2 weeks, now resolved. No fever. Not SOB.  Felt nauseated the entire time. Used up Zofran. Better now.    Has ongoing headaches since.    Trentillix increased from 5 to 10 to 15 mg daily. Tolerating. It helps mood.     Taking sucralfate 1 g four times daily for gastritis presumed from aspirin, Plavix and Xarelto use. She is no longer on aspirin. Hemoglobin was down to 11.6. We planned recheck this appointment.    She resumed gabapentin as it seemed to help with pain.  Taking 600 mg four times daily.    Seeing Dr Murillo in April for a PE diagnosed Jan 2, 2020. On Xarelto. Also was on aspirin and clopidogrel due to complex CAD history. She is taking Plavix and Xarelto, but stopped aspirin as she was worried about bleeding due to some darker stool. No further dark stool since stopping aspirin. On omeprazole 20 mg daily and four times daily sucralfate.    Ranexa still effective and improving chronic stable angina symptoms.    Chronically on clonazepam. Used to take 1 g four times daily and has reduced over time down to twice daily.    Was temporarily on increased oxycodone after PE.  Has now reduced back to chronic amount of 40 mg oxycodone daily, on 10/325 mg pills.      REVIEW OF SYSTEMS:    Pertinent items are noted in HPI.    Current Outpatient Medications   Medication Sig Dispense Refill     albuterol (PROAIR HFA/PROVENTIL HFA/VENTOLIN HFA) 108 (90 Base) MCG/ACT inhaler Inhale 2 puffs into the lungs every 6 hours 2 Inhaler 3     amLODIPine (NORVASC) 10 MG tablet Take 1 tablet (10 mg) by mouth daily 90 tablet 3     busPIRone (BUSPAR) 10 MG tablet Take 1 tablet (10 mg) by mouth 2 times daily 180  tablet 1     clonazePAM 1 MG PO tablet Take 1 tablet (1 mg) by mouth 2 times daily 180 tablet 0     clopidogrel (PLAVIX) 75 MG tablet Take 1 tablet (75 mg) by mouth daily 90 tablet 3     Diclofenac Sodium 1.5 % SOLN Apply 20 drops to each hand or 40 drops to each knee up to 4 times daily as needed for arthritis pain 450 mL 3     docusate sodium (COLACE) 100 MG capsule Take 100 mg by mouth twice a week       lisinopril (PRINIVIL/ZESTRIL) 20 MG tablet Take 1 tablet (20 mg) by mouth daily 90 tablet 3     metoclopramide (REGLAN) 10 MG tablet Take 1 tablet (10 mg) by mouth every 6 hours as needed (headache) 30 tablet 0     metoprolol tartrate (LOPRESSOR) 50 MG tablet Take 1 tablet (50 mg) by mouth 2 times daily 180 tablet 3     naloxone (NARCAN) 4 MG/0.1ML nasal spray Spray into one nostril for opioid reversal if unresponsive. May repeat every 2-3 minutes until patient responsive or EMS arrives 1 each 1     nitroGLYcerin (NITROSTAT) 0.3 MG sublingual tablet PLACE 1 TABLET UNDER THE TONGUE EVERY 5 MINUTES AS NEEDED FOR CHEST PAIN 25 tablet 11     omeprazole (PRILOSEC) 20 MG DR capsule Take 1 capsule (20 mg) by mouth 2 times daily 180 capsule 3     ondansetron (ZOFRAN) 4 MG tablet Take 1 tablet (4 mg) by mouth every 6 hours as needed for nausea 30 tablet 0     oxyCODONE-acetaminophen  MG PO per tablet Take 1 tablet by mouth 4 times daily as needed for severe pain 120 tablet 0     ranolazine 500 MG PO 12 hr tablet Take 1 tablet (500 mg) by mouth 2 times daily 180 tablet 3     rivaroxaban ANTICOAGULANT 20 MG PO TABS tablet Take 1 tablet (20 mg) by mouth daily (with dinner) 90 tablet 3     rosuvastatin 10 MG PO tablet Take 1 tablet (10 mg) by mouth At Bedtime 90 tablet 1     saccharomyces boulardii (FLORASTOR) 250 MG capsule Take 250 mg by mouth 2 times daily       sucralfate 1 GM PO tablet Take 1 tablet (1 g) by mouth 4 times daily as needed for nausea (or dark stools) 360 tablet 0     VITAMIN D, CHOLECALCIFEROL, PO  Take 5,000 Units by mouth daily       vortioxetine 10 MG PO tablet Take 1 tablet (10 mg) by mouth daily 90 tablet 1     Allergies   Allergen Reactions     Atorvastatin Muscle Pain (Myalgia)     Tiotropium Bromide [Tiotropium] Rash     Ezetimibe Muscle Pain (Myalgia)     Latex Rash     Niacin      Other reaction(s): Flushing     No Clinical Screening - See Comments Itching, Rash and Blisters     Metals and plastics       Tape [Adhesive Tape] Rash       OBJECTIVE:  /76   Pulse 62   Temp 96.8  F (36  C) (Temporal)   Resp 17   Wt 84.8 kg (187 lb)   SpO2 93%   BMI 30.20 kg/m      EXAM:  General Appearance: Alert. No acute distress  Chest/Respiratory Exam: Clear to auscultation bilaterally  Cardiovascular Exam: Regular rate and rhythm. S1, S2, no murmur, gallop, or rubs.  Extremities:  No lower extremity edema.  Psychiatric: Normal affect and mentation    Results for orders placed or performed in visit on 03/18/20   CBC W PLT No Diff     Status: None   Result Value Ref Range    WBC 7.8 4.0 - 11.0 10e9/L    RBC Count 4.19 3.8 - 5.2 10e12/L    Hemoglobin 12.2 11.7 - 15.7 g/dL    Hematocrit 37.6 35.0 - 47.0 %    MCV 90 78 - 100 fl    MCH 29.1 26.5 - 33.0 pg    MCHC 32.4 31.5 - 36.5 g/dL    RDW 13.2 10.0 - 15.0 %    Platelet Count 296 150 - 450 10e9/L   Vitamin B12     Status: None   Result Value Ref Range    Vitamin B12 313 180 - 914 pg/mL   Iron Binding Panel     Status: Abnormal   Result Value Ref Range    Iron 84 50 - 212 ug/dL    UIBC (Unsaturated) 431.20 mg/dL    Iron Binding Capacity 515.20 (H) 245.00 - 400.00 ug/dL    Iron Saturation 16 (L) 20 - 55 %        ASSESSMENT/PLAN:    ICD-10-CM    1. Chronic pain disorder  G89.4 oxyCODONE-acetaminophen (PERCOCET)  MG per tablet     pregabalin (LYRICA) 50 MG capsule     oxyCODONE-acetaminophen (PERCOCET)  MG per tablet   2. Chronic bilateral low back pain without sciatica  M54.5 oxyCODONE-acetaminophen (PERCOCET)  MG per tablet    G89.29  pregabalin (LYRICA) 50 MG capsule     oxyCODONE-acetaminophen (PERCOCET)  MG per tablet   3. Chest wall pain, chronic  R07.89 oxyCODONE-acetaminophen (PERCOCET)  MG per tablet    G89.29 pregabalin (LYRICA) 50 MG capsule     oxyCODONE-acetaminophen (PERCOCET)  MG per tablet   4. Controlled substance agreement signed 9/18/19  Z79.899 oxyCODONE-acetaminophen (PERCOCET)  MG per tablet     oxyCODONE-acetaminophen (PERCOCET)  MG per tablet   5. Nausea  R11.0 ondansetron (ZOFRAN) 4 MG tablet   6. Moderate episode of recurrent major depressive disorder (H)  F33.1 vortioxetine (TRINTELLIX) 20 MG tablet   7. Intractable chronic common migraine without aura  G43.719 pregabalin (LYRICA) 50 MG capsule   8. Anemia, unspecified type  D64.9 Iron Binding Panel     Vitamin B12     CBC W PLT No Diff     CBC W PLT No Diff     Vitamin B12     Iron Binding Panel   9. Need for vaccination for pneumococcus  Z23 GH IMM-  PNEUMOCOCCAL CONJ VACCINE 13 VALENT IM (Prevnar)      reviewed. Agree to refill oxycodone for 10/325 mg 4 times daily, which has been recent stable baseline.  Higher amounts of opioids have caused side effects.  When she has gone off in the past she had reduction in function. She is stable with opioids and due to coronavirus, clinic follow up may be impacted. Given 30 day supply and may obtain post-date prescription for April 17 from pharmacy. Due for refill May 17.    She is aware of the risk for overdose and excess sedation with concurrent benzodiazepine and opioid use. Continues with clonazepam 1 mg BID.    Resumed gabapentin, but may impact her mentation. She tried pregabalin before, but not for long, discontinuing in 2017 when trying medical cannabis. That was not effective, so she resumed opioids. Taper off gabapentin due to side effects. Start pregabalin 50 mg daily and increase to BID and then TID.    Trintellix increase has helped. Increase further from 15 mg to 20 mg daily.       History of gastritis.  Remains on Plavix and Xarelto.  She was also on aspirin.  Has not stopped the aspirin. Hemoglobin has improved to normal since taking sucralfate. She can resume as needed sucralfate. Continue PPI.     B12 level is low, try oral supplement 1000 mcg daily.    Has appointment with heme/onc about PE for duration of Xarelto. PE sounds unprovoked, so would favor lifelong anticoagulation.    Greater than 50% of this 40 minute appointment spent on counseling     Follow-up 2 months    Ramirez Cano MD    This document was prepared using a combination of typing and voice generated software.  While every attempt was made for accuracy, spelling and grammatical errors may exist.

## 2020-03-18 NOTE — NURSING NOTE
Pt presents to clinic today for medication management and swelling under bilateral arms.      Medication Reconciliation: complete  Jamilah Berger LPN

## 2020-03-18 NOTE — PATIENT INSTRUCTIONS
Increase Trentillix from 15 to 20 mg daily    Taper gabapentin to 3 times daily for 2 days, then twice daily for 2 days, then once daily for 2 days, then off.  Once off gabapentin, then start pregabalin 50 mg daily for a few days, then 50 mg twice daily for a few days, then up to 50 mg three times daily    Checking hemoglobin    Refilled oxycodone for 2 months. Must pick it up April 17  Follow-up in May

## 2020-04-01 DIAGNOSIS — R51.9 LEFT-SIDED HEADACHE: Primary | ICD-10-CM

## 2020-04-01 RX ORDER — METOCLOPRAMIDE 10 MG/1
10 TABLET ORAL EVERY 6 HOURS PRN
Qty: 30 TABLET | Refills: 1 | Status: SHIPPED | OUTPATIENT
Start: 2020-04-01 | End: 2020-04-27

## 2020-04-01 NOTE — TELEPHONE ENCOUNTER
[id number] [place] sent Rx request for the following:      metoclopramide (REGLAN) 10 MG tablet      Sig: Take 1 tablet (10 mg) by mouth every 6 hours as needed (headache)    Last Prescription Date:   1/14/20  Last Fill Qty/Refills:         30, R-0    Last Office Visit:              3/18/20  Future Office visit:           None.    Per LOV note, Pt was instructed to return 2 months later; around 5/18/20. Previously given limited refill. Will route to Dr. Cano, for refill consideration. Kathleen Puckett RN .............. 4/1/2020  3:40 PM

## 2020-04-09 ENCOUNTER — ONCOLOGY VISIT (OUTPATIENT)
Dept: ONCOLOGY | Facility: OTHER | Age: 65
End: 2020-04-09
Attending: FAMILY MEDICINE
Payer: MEDICARE

## 2020-04-09 ENCOUNTER — HOSPITAL ENCOUNTER (OUTPATIENT)
Facility: CLINIC | Age: 65
Setting detail: SPECIMEN
Discharge: HOME | End: 2020-04-09
Admitting: INTERNAL MEDICINE
Payer: MEDICARE

## 2020-04-09 VITALS
DIASTOLIC BLOOD PRESSURE: 70 MMHG | BODY MASS INDEX: 29.73 KG/M2 | HEIGHT: 66 IN | OXYGEN SATURATION: 96 % | HEART RATE: 92 BPM | RESPIRATION RATE: 16 BRPM | TEMPERATURE: 97.7 F | SYSTOLIC BLOOD PRESSURE: 130 MMHG | WEIGHT: 185 LBS

## 2020-04-09 DIAGNOSIS — I26.99 ACUTE PULMONARY EMBOLISM WITHOUT ACUTE COR PULMONALE, UNSPECIFIED PULMONARY EMBOLISM TYPE (H): ICD-10-CM

## 2020-04-09 LAB
ALBUMIN SERPL-MCNC: 4.7 G/DL (ref 3.5–5.7)
ALBUMIN UR-MCNC: NEGATIVE MG/DL
ALP SERPL-CCNC: 59 U/L (ref 34–104)
ALT SERPL W P-5'-P-CCNC: 9 U/L (ref 7–52)
ANION GAP SERPL CALCULATED.3IONS-SCNC: 9 MMOL/L (ref 3–14)
APPEARANCE UR: CLEAR
AST SERPL W P-5'-P-CCNC: 13 U/L (ref 13–39)
BASOPHILS # BLD AUTO: 0.1 10E9/L (ref 0–0.2)
BASOPHILS NFR BLD AUTO: 0.8 %
BILIRUB SERPL-MCNC: 0.5 MG/DL (ref 0.3–1)
BILIRUB UR QL STRIP: NEGATIVE
BUN SERPL-MCNC: 20 MG/DL (ref 7–25)
CALCIUM SERPL-MCNC: 9.9 MG/DL (ref 8.6–10.3)
CHLORIDE SERPL-SCNC: 99 MMOL/L (ref 98–107)
CO2 SERPL-SCNC: 26 MMOL/L (ref 21–31)
COLOR UR AUTO: NORMAL
CREAT SERPL-MCNC: 1.09 MG/DL (ref 0.6–1.2)
DIFFERENTIAL METHOD BLD: NORMAL
EOSINOPHIL # BLD AUTO: 0.1 10E9/L (ref 0–0.7)
EOSINOPHIL NFR BLD AUTO: 1.6 %
ERYTHROCYTE [DISTWIDTH] IN BLOOD BY AUTOMATED COUNT: 13.2 % (ref 10–15)
ERYTHROCYTE [SEDIMENTATION RATE] IN BLOOD BY WESTERGREN METHOD: 13 MM/H (ref 1–15)
GFR SERPL CREATININE-BSD FRML MDRD: 50 ML/MIN/{1.73_M2}
GLUCOSE SERPL-MCNC: 99 MG/DL (ref 70–105)
GLUCOSE UR STRIP-MCNC: NEGATIVE MG/DL
HCT VFR BLD AUTO: 37.4 % (ref 35–47)
HGB BLD-MCNC: 12.1 G/DL (ref 11.7–15.7)
HGB UR QL STRIP: NEGATIVE
IMM GRANULOCYTES # BLD: 0 10E9/L (ref 0–0.4)
IMM GRANULOCYTES NFR BLD: 0.3 %
KETONES UR STRIP-MCNC: NEGATIVE MG/DL
LDH SERPL L TO P-CCNC: 170 U/L (ref 140–271)
LEUKOCYTE ESTERASE UR QL STRIP: NEGATIVE
LYMPHOCYTES # BLD AUTO: 2.8 10E9/L (ref 0.8–5.3)
LYMPHOCYTES NFR BLD AUTO: 37.1 %
MCH RBC QN AUTO: 28.4 PG (ref 26.5–33)
MCHC RBC AUTO-ENTMCNC: 32.4 G/DL (ref 31.5–36.5)
MCV RBC AUTO: 88 FL (ref 78–100)
MONOCYTES # BLD AUTO: 0.6 10E9/L (ref 0–1.3)
MONOCYTES NFR BLD AUTO: 7.5 %
NEUTROPHILS # BLD AUTO: 4 10E9/L (ref 1.6–8.3)
NEUTROPHILS NFR BLD AUTO: 52.7 %
NITRATE UR QL: NEGATIVE
PH UR STRIP: 6 PH (ref 5–7)
PLATELET # BLD AUTO: 322 10E9/L (ref 150–450)
POTASSIUM SERPL-SCNC: 4 MMOL/L (ref 3.5–5.1)
PROT SERPL-MCNC: 7.7 G/DL (ref 6.4–8.9)
RBC # BLD AUTO: 4.26 10E12/L (ref 3.8–5.2)
RHEUMATOID FACT SER NEPH-ACNC: <14 IU/ML (ref 0–20)
SODIUM SERPL-SCNC: 134 MMOL/L (ref 134–144)
SOURCE: NORMAL
SP GR UR STRIP: 1.01 (ref 1–1.03)
UROBILINOGEN UR STRIP-MCNC: NORMAL MG/DL (ref 0–2)
WBC # BLD AUTO: 7.6 10E9/L (ref 4–11)

## 2020-04-09 PROCEDURE — 85385 FIBRINOGEN ANTIGEN: CPT | Mod: ZL | Performed by: INTERNAL MEDICINE

## 2020-04-09 PROCEDURE — 83615 LACTATE (LD) (LDH) ENZYME: CPT | Mod: ZL | Performed by: INTERNAL MEDICINE

## 2020-04-09 PROCEDURE — 86147 CARDIOLIPIN ANTIBODY EA IG: CPT | Mod: ZL,91 | Performed by: INTERNAL MEDICINE

## 2020-04-09 PROCEDURE — 86146 BETA-2 GLYCOPROTEIN ANTIBODY: CPT | Mod: ZL | Performed by: INTERNAL MEDICINE

## 2020-04-09 PROCEDURE — 85300 ANTITHROMBIN III ACTIVITY: CPT | Mod: ZL | Performed by: INTERNAL MEDICINE

## 2020-04-09 PROCEDURE — 85305 CLOT INHIBIT PROT S TOTAL: CPT | Mod: ZL | Performed by: INTERNAL MEDICINE

## 2020-04-09 PROCEDURE — 85306 CLOT INHIBIT PROT S FREE: CPT | Mod: ZL | Performed by: INTERNAL MEDICINE

## 2020-04-09 PROCEDURE — 36415 COLL VENOUS BLD VENIPUNCTURE: CPT | Mod: ZL | Performed by: INTERNAL MEDICINE

## 2020-04-09 PROCEDURE — 99205 OFFICE O/P NEW HI 60 MIN: CPT | Performed by: INTERNAL MEDICINE

## 2020-04-09 PROCEDURE — 81291 MTHFR GENE: CPT

## 2020-04-09 PROCEDURE — 85670 THROMBIN TIME PLASMA: CPT | Mod: ZL | Performed by: INTERNAL MEDICINE

## 2020-04-09 PROCEDURE — G0463 HOSPITAL OUTPT CLINIC VISIT: HCPCS

## 2020-04-09 PROCEDURE — 86147 CARDIOLIPIN ANTIBODY EA IG: CPT | Mod: ZL | Performed by: INTERNAL MEDICINE

## 2020-04-09 PROCEDURE — 81240 F2 GENE: CPT

## 2020-04-09 PROCEDURE — 86431 RHEUMATOID FACTOR QUANT: CPT | Mod: ZL | Performed by: INTERNAL MEDICINE

## 2020-04-09 PROCEDURE — 85025 COMPLETE CBC W/AUTO DIFF WBC: CPT | Mod: ZL | Performed by: INTERNAL MEDICINE

## 2020-04-09 PROCEDURE — 81003 URINALYSIS AUTO W/O SCOPE: CPT | Mod: ZL | Performed by: INTERNAL MEDICINE

## 2020-04-09 PROCEDURE — 81241 F5 GENE: CPT

## 2020-04-09 PROCEDURE — 86038 ANTINUCLEAR ANTIBODIES: CPT | Mod: ZL | Performed by: INTERNAL MEDICINE

## 2020-04-09 PROCEDURE — 83090 ASSAY OF HOMOCYSTEINE: CPT | Mod: ZL | Performed by: INTERNAL MEDICINE

## 2020-04-09 PROCEDURE — 85652 RBC SED RATE AUTOMATED: CPT | Mod: ZL | Performed by: INTERNAL MEDICINE

## 2020-04-09 PROCEDURE — 80053 COMPREHEN METABOLIC PANEL: CPT | Mod: ZL | Performed by: INTERNAL MEDICINE

## 2020-04-09 PROCEDURE — 84999 UNLISTED CHEMISTRY PROCEDURE: CPT | Mod: ZL | Performed by: INTERNAL MEDICINE

## 2020-04-09 PROCEDURE — 85302 CLOT INHIBIT PROT C ANTIGEN: CPT | Mod: ZL | Performed by: INTERNAL MEDICINE

## 2020-04-09 ASSESSMENT — MIFFLIN-ST. JEOR: SCORE: 1395.9

## 2020-04-09 ASSESSMENT — PAIN SCALES - GENERAL: PAINLEVEL: SEVERE PAIN (7)

## 2020-04-09 NOTE — NURSING NOTE
"Chief Complaint   Patient presents with     Consult     Acute PE   No current concerns or complaints.     Initial /70   Pulse 92   Temp 97.7  F (36.5  C) (Oral)   Resp 16   Ht 1.676 m (5' 6\")   Wt 83.9 kg (185 lb)   SpO2 96%   BMI 29.86 kg/m   Estimated body mass index is 29.86 kg/m  as calculated from the following:    Height as of this encounter: 1.676 m (5' 6\").    Weight as of this encounter: 83.9 kg (185 lb).  Medication Reconciliation: complete    Mackenzie Max CMA (AAMA)  "

## 2020-04-09 NOTE — PROGRESS NOTES
Visit Date:   2020      HEMATOLOGY/ONCOLOGY CONSULTATION      REASON FOR CONSULTATION:  Pulmonary embolism, rule out hypercoagulable state.      REQUESTING PHYSICIAN:  Ramirez Cano MD      HISTORY OF PRESENT ILLNESS:  Ms. Day is a 65-year-old white female with history of coronary artery disease, chronic pain syndrome, hyperlipidemia, hypertension, whom we were asked to evaluate concerning pulmonary embolism, rule out hypercoagulable state.  Apparently, she had presented to the emergency room on 2020 with complaints of chest pain with associated shortness of breath x5 days.  She took nitroglycerin without improvement.  She underwent CT of the chest which revealed small segmental and subsegmental emboli bilaterally.  She was started on Lovenox in the emergency room and transitioned to Xarelto.  She was counseled to stop smoking as she was a smoker.  She is now referred for evaluation of unprovoked pulmonary embolism.  She denies any antecedent trauma surgery or sedentary state.  She says she has chronic sciatic pain but also currently complains of some right lower quadrant abdominal pain.  She is convinced that this is her bladder.  She says that she has alternating episodes of discoloration of her urine where it turns yellow sometimes and is clear the next day and she is concerned.  No hematuria.  She also complains of shortness of breath.  She does have a smoking history of at least 40 years, smoking a pack per day.  She said she quit with her first heart attack, but apparently she still smokes periodically.  She is on chronic pain meds.  She is currently on Percocet, taking up to 8 per day.  She denies any family history of DVT although she states her mother  suddenly and may have had a pulmonary embolus, not clear at this point.  She denies any leg swelling or personal DVT in the past.      PAST MEDICAL HISTORY:  Significant for chronic stable angina, coronary artery disease, chronic  anxiety, collagenous colitis, major depressive disorder (recurrent episode), essential hypertension, mixed hyperlipidemia, osteoarthritis, panic attacks, history of tobacco abuse, history of coronary artery bypass graft x3, history of stent placement for coronary artery disease, intractable common migraine without aura, history of multiple concussions, history of C. difficile, iron deficiency anemia, chronic kidney disease stage II, chronic chest wall pain, central sleep apnea, myofascial pain, vitamin D deficiency, subclavian artery stenosis, lumbar facet arthropathy, nonspecific abnormal results of pulmonary system function studies, slow transit constipation, gastric ulcer with hemorrhage, family history of colon cancer, nodular degeneration of the cornea, bilateral low back pain with chronic pain disorder, COPD, peptic ulcer.      ALLERGIES:  NO KNOWN DRUG ALLERGIES.      CURRENT MEDICATIONS:   1.  Reglan 10 mg by mouth every 6 hours p.r.n.   2.  Zofran 4 mg q.6 hours p.r.n.   3.  Percocet 10/325, 1 tablet p.o. q.i.d.   4.  Lyrica 50 mg daily.   5.  Trintellix 20 mg daily.   6.  Clonazepam 1 mg twice daily.   7.  Ranolazine 5 mg q.12 hours.   8.  Xarelto 20 mg daily   9.  Rosuvastatin 10 mg daily.   10.  Sucralfate 1 gram p.o. q.i.d.   11.  Prilosec 20 mg b.i.d.   12.  Albuterol 2 puffs every 6 hours.   13.  Norvasc 10 mg daily.   14.  BuSpar 10 mg b.i.d.   15.  Plavix 75 mg daily.   16.  Diclofenac solution 40 drops to each knee q.i.d. p.r.n.   17.  Lisinopril 20 mg daily.   18.  Lopressor 50 mg b.i.d.   19.  Narcan spray into 1 nostril for opioid reversible if unresponsive.     20.  Nitroglycerin 0.3 mg sublingual p.r.n. chest pain.      SOCIAL HISTORY:  She has at least a 40-pack-year smoking history.  She states she is currently not smoking.  Alcohol is negative.  She worked at various [employer] job.  She was most recently worked at [employer].      FAMILY HISTORY:  Significant for mother with question  of pulmonary embolus.  Father with colon cancer.      REVIEW OF SYSTEMS:   CENTRAL NERVOUS SYSTEM:  Significant for chronic migraine headaches.   ENT:  Negative for hearing loss.   RESPIRATORY:  Significant for dyspnea on exertion, no cough or hemoptysis.   CARDIAC:  Negative for chest pain, palpitations, orthopnea, PND, ankle edema.   GASTROINTESTINAL:  Significant for occasional bright red blood per rectum, right lower quadrant abdominal pain.  No constipation or diarrhea.   MUSCULOSKELETAL:  Significant for chronic back pain, chronic chest wall pain.   GENITOURINARY:  Significant for discoloration of urine, question hematuria.  No dysuria, urgency, frequency.   CONSTITUTIONAL:  Negative fevers, night sweats, weight loss.   HEMATOLOGIC:  Negative for easy bruisability, gingival bleeding, epistaxis.      PHYSICAL EXAMINATION:   GENERAL:  She is a well-developed, well-nourished middle-aged white female in no acute distress.   VITAL SIGNS:  Blood pressure 130/70, pulse 92, respirations 16, temperature 97.7.   HEENT:  Atraumatic, normocephalic.  Oropharynx nonerythematous.   NECK:  Supple.   LUNGS:  Clear to auscultation and percussion.   HEART:  Regular rhythm, S1, S2 normal.   ABDOMEN:  Soft.  There is some tenderness over the right lower quadrant, no rebound.   LYMPHATICS:  No cervical or supraclavicular nodes.   EXTREMITIES:  Without edema.   NEUROLOGIC:  Nonfocal.      LABORATORY:  Pending.      IMPRESSION:  Unprovoked pulmonary embolism, rule out hypercoagulable state.  We will proceed with workup including factor V Leiden, prothrombin 2 mutation, antithrombin III, beta-2 glycoprotein antibodies, cardiolipin antibodies, sed rate, rheumatoid factor, ENEDELIA, prothrombin time, homocysteine level, MTHFR DNA mutation, serum protein electrophoresis.  Given her urinary complaints, we will also obtain urinalysis.  We will also rule out occult malignancy given her multiple complaints of shortness of breath, abdominal pain,  will obtain CT neck, chest, abdomen and pelvis.  Otherwise, we will see the patient after the above workup.      Seventy minutes was spent with the patient, greater than half the time was spent in counseling and coordinating of care.         ESTHER ANDREW MD             D: 2020   T: 2020   MT: NISHANT      Name:     DARRIAN JURADO   MRN:      [id number]        Account:      [id number]   :      1955           Visit Date:   2020      Document: [id number]       cc: Ramirez Cano MD

## 2020-04-10 LAB
ANA SER QL IF: NEGATIVE
AT III ACT/NOR PPP CHRO: 111 % (ref 85–135)
B2 GLYCOPROT1 IGG SERPL IA-ACNC: 0.9 U/ML
B2 GLYCOPROT1 IGM SERPL IA-ACNC: <2.9 U/ML
CARDIOLIPIN ANTIBODY IGG: <1.6 GPL-U/ML (ref 0–19.9)
CARDIOLIPIN ANTIBODY IGM: 0.8 MPL-U/ML (ref 0–19.9)
THROMBIN TIME: 15.7 SEC (ref 13–19)

## 2020-04-11 ENCOUNTER — TRANSFERRED RECORDS (OUTPATIENT)
Dept: HEALTH INFORMATION MANAGEMENT | Facility: OTHER | Age: 65
End: 2020-04-11

## 2020-04-11 ENCOUNTER — APPOINTMENT (OUTPATIENT)
Dept: GENERAL RADIOLOGY | Facility: OTHER | Age: 65
End: 2020-04-11
Attending: PHYSICIAN ASSISTANT
Payer: MEDICARE

## 2020-04-11 ENCOUNTER — HOSPITAL ENCOUNTER (EMERGENCY)
Facility: OTHER | Age: 65
Discharge: ANOTHER HOSPITAL | End: 2020-04-11
Attending: PHYSICIAN ASSISTANT | Admitting: PHYSICIAN ASSISTANT
Payer: MEDICARE

## 2020-04-11 VITALS
WEIGHT: 185 LBS | OXYGEN SATURATION: 98 % | BODY MASS INDEX: 29.86 KG/M2 | RESPIRATION RATE: 11 BRPM | DIASTOLIC BLOOD PRESSURE: 84 MMHG | SYSTOLIC BLOOD PRESSURE: 165 MMHG | TEMPERATURE: 97.1 F | HEART RATE: 67 BPM

## 2020-04-11 DIAGNOSIS — I20.0 UNSTABLE ANGINA (H): ICD-10-CM

## 2020-04-11 DIAGNOSIS — I10 HTN (HYPERTENSION): ICD-10-CM

## 2020-04-11 DIAGNOSIS — R07.89 ATYPICAL CHEST PAIN: ICD-10-CM

## 2020-04-11 DIAGNOSIS — I26.99 BILATERAL PULMONARY EMBOLISM (H): ICD-10-CM

## 2020-04-11 LAB
ALBUMIN SERPL-MCNC: 4.1 G/DL (ref 3.5–5.7)
ALP SERPL-CCNC: 54 U/L (ref 34–104)
ALT SERPL W P-5'-P-CCNC: 8 U/L (ref 7–52)
ANION GAP SERPL CALCULATED.3IONS-SCNC: 10 MMOL/L (ref 3–14)
AST SERPL W P-5'-P-CCNC: 13 U/L (ref 13–39)
BASOPHILS # BLD AUTO: 0.1 10E9/L (ref 0–0.2)
BASOPHILS NFR BLD AUTO: 0.9 %
BILIRUB SERPL-MCNC: 0.3 MG/DL (ref 0.3–1)
BUN SERPL-MCNC: 18 MG/DL (ref 7–25)
CALCIUM SERPL-MCNC: 9.1 MG/DL (ref 8.6–10.3)
CHLORIDE SERPL-SCNC: 103 MMOL/L (ref 98–107)
CO2 SERPL-SCNC: 24 MMOL/L (ref 21–31)
CREAT SERPL-MCNC: 1.15 MG/DL (ref 0.6–1.2)
DIFFERENTIAL METHOD BLD: ABNORMAL
EOSINOPHIL # BLD AUTO: 0.1 10E9/L (ref 0–0.7)
EOSINOPHIL NFR BLD AUTO: 1.7 %
ERYTHROCYTE [DISTWIDTH] IN BLOOD BY AUTOMATED COUNT: 13.3 % (ref 10–15)
GFR SERPL CREATININE-BSD FRML MDRD: 47 ML/MIN/{1.73_M2}
GLUCOSE SERPL-MCNC: 116 MG/DL (ref 70–105)
HCT VFR BLD AUTO: 33.3 % (ref 35–47)
HGB BLD-MCNC: 11 G/DL (ref 11.7–15.7)
IMM GRANULOCYTES # BLD: 0 10E9/L (ref 0–0.4)
IMM GRANULOCYTES NFR BLD: 0.1 %
INR PPP: 1.25 (ref 0–1.3)
LACTATE BLD-SCNC: 1.5 MMOL/L (ref 0.7–2)
LYMPHOCYTES # BLD AUTO: 1.9 10E9/L (ref 0.8–5.3)
LYMPHOCYTES NFR BLD AUTO: 27.4 %
MCH RBC QN AUTO: 28.6 PG (ref 26.5–33)
MCHC RBC AUTO-ENTMCNC: 33 G/DL (ref 31.5–36.5)
MCV RBC AUTO: 87 FL (ref 78–100)
MONOCYTES # BLD AUTO: 0.6 10E9/L (ref 0–1.3)
MONOCYTES NFR BLD AUTO: 8.2 %
NEUTROPHILS # BLD AUTO: 4.3 10E9/L (ref 1.6–8.3)
NEUTROPHILS NFR BLD AUTO: 61.7 %
NT-PROBNP SERPL-MCNC: 132 PG/ML (ref 0–100)
PLATELET # BLD AUTO: 291 10E9/L (ref 150–450)
POTASSIUM SERPL-SCNC: 3.9 MMOL/L (ref 3.5–5.1)
PROT C AG ACT/NOR PPP IA: >95 % (ref 63–153)
PROT SERPL-MCNC: 6.9 G/DL (ref 6.4–8.9)
RBC # BLD AUTO: 3.85 10E12/L (ref 3.8–5.2)
SODIUM SERPL-SCNC: 137 MMOL/L (ref 134–144)
TROPONIN I SERPL-MCNC: 2.7 PG/ML
TROPONIN I SERPL-MCNC: 2.7 PG/ML
TSH SERPL DL<=0.05 MIU/L-ACNC: 1.91 IU/ML (ref 0.34–5.6)
WBC # BLD AUTO: 7 10E9/L (ref 4–11)

## 2020-04-11 PROCEDURE — 25800030 ZZH RX IP 258 OP 636: Performed by: PHYSICIAN ASSISTANT

## 2020-04-11 PROCEDURE — 83880 ASSAY OF NATRIURETIC PEPTIDE: CPT | Mod: GZ | Performed by: PHYSICIAN ASSISTANT

## 2020-04-11 PROCEDURE — 80053 COMPREHEN METABOLIC PANEL: CPT | Performed by: PHYSICIAN ASSISTANT

## 2020-04-11 PROCEDURE — 84443 ASSAY THYROID STIM HORMONE: CPT | Performed by: PHYSICIAN ASSISTANT

## 2020-04-11 PROCEDURE — 71046 X-RAY EXAM CHEST 2 VIEWS: CPT

## 2020-04-11 PROCEDURE — 93010 ELECTROCARDIOGRAM REPORT: CPT | Performed by: INTERNAL MEDICINE

## 2020-04-11 PROCEDURE — 25000125 ZZHC RX 250: Performed by: PHYSICIAN ASSISTANT

## 2020-04-11 PROCEDURE — 83605 ASSAY OF LACTIC ACID: CPT | Performed by: PHYSICIAN ASSISTANT

## 2020-04-11 PROCEDURE — 84484 ASSAY OF TROPONIN QUANT: CPT | Performed by: PHYSICIAN ASSISTANT

## 2020-04-11 PROCEDURE — 85025 COMPLETE CBC W/AUTO DIFF WBC: CPT | Performed by: PHYSICIAN ASSISTANT

## 2020-04-11 PROCEDURE — 93005 ELECTROCARDIOGRAM TRACING: CPT | Performed by: PHYSICIAN ASSISTANT

## 2020-04-11 PROCEDURE — 25000128 H RX IP 250 OP 636: Performed by: PHYSICIAN ASSISTANT

## 2020-04-11 PROCEDURE — 99285 EMERGENCY DEPT VISIT HI MDM: CPT | Mod: Z6 | Performed by: PHYSICIAN ASSISTANT

## 2020-04-11 PROCEDURE — 96374 THER/PROPH/DIAG INJ IV PUSH: CPT | Performed by: PHYSICIAN ASSISTANT

## 2020-04-11 PROCEDURE — 36415 COLL VENOUS BLD VENIPUNCTURE: CPT | Performed by: PHYSICIAN ASSISTANT

## 2020-04-11 PROCEDURE — 25000132 ZZH RX MED GY IP 250 OP 250 PS 637: Mod: GY | Performed by: PHYSICIAN ASSISTANT

## 2020-04-11 PROCEDURE — 96375 TX/PRO/DX INJ NEW DRUG ADDON: CPT | Performed by: PHYSICIAN ASSISTANT

## 2020-04-11 PROCEDURE — 99285 EMERGENCY DEPT VISIT HI MDM: CPT | Mod: 25 | Performed by: PHYSICIAN ASSISTANT

## 2020-04-11 PROCEDURE — 85610 PROTHROMBIN TIME: CPT | Performed by: PHYSICIAN ASSISTANT

## 2020-04-11 RX ORDER — LIDOCAINE HYDROCHLORIDE 20 MG/ML
15 SOLUTION OROPHARYNGEAL ONCE
Status: COMPLETED | OUTPATIENT
Start: 2020-04-11 | End: 2020-04-11

## 2020-04-11 RX ORDER — ASPIRIN 81 MG/1
324 TABLET, CHEWABLE ORAL ONCE
Status: COMPLETED | OUTPATIENT
Start: 2020-04-11 | End: 2020-04-11

## 2020-04-11 RX ORDER — NITROGLYCERIN 10 MG/100ML
.07-2 INJECTION INTRAVENOUS CONTINUOUS
Status: DISCONTINUED | OUTPATIENT
Start: 2020-04-11 | End: 2020-04-12 | Stop reason: HOSPADM

## 2020-04-11 RX ORDER — SODIUM CHLORIDE 9 MG/ML
INJECTION, SOLUTION INTRAVENOUS CONTINUOUS
Status: DISCONTINUED | OUTPATIENT
Start: 2020-04-11 | End: 2020-04-12 | Stop reason: HOSPADM

## 2020-04-11 RX ORDER — ALUMINA, MAGNESIA, AND SIMETHICONE 2400; 2400; 240 MG/30ML; MG/30ML; MG/30ML
15 SUSPENSION ORAL ONCE
Status: COMPLETED | OUTPATIENT
Start: 2020-04-11 | End: 2020-04-11

## 2020-04-11 RX ORDER — FENTANYL CITRATE 50 UG/ML
50 INJECTION, SOLUTION INTRAMUSCULAR; INTRAVENOUS EVERY 30 MIN PRN
Status: DISCONTINUED | OUTPATIENT
Start: 2020-04-11 | End: 2020-04-12 | Stop reason: HOSPADM

## 2020-04-11 RX ADMIN — FENTANYL CITRATE 50 MCG: 50 INJECTION, SOLUTION INTRAMUSCULAR; INTRAVENOUS at 21:18

## 2020-04-11 RX ADMIN — FAMOTIDINE 20 MG: 10 INJECTION, SOLUTION INTRAVENOUS at 19:47

## 2020-04-11 RX ADMIN — ASPIRIN 81 MG 324 MG: 81 TABLET ORAL at 21:02

## 2020-04-11 RX ADMIN — ALUMINUM HYDROXIDE, MAGNESIUM HYDROXIDE, AND DIMETHICONE 15 ML: 400; 400; 40 SUSPENSION ORAL at 19:46

## 2020-04-11 RX ADMIN — NITROGLYCERIN 0.07 MCG/KG/MIN: 10 INJECTION INTRAVENOUS at 21:04

## 2020-04-11 RX ADMIN — SODIUM CHLORIDE: 9 INJECTION, SOLUTION INTRAVENOUS at 18:08

## 2020-04-11 RX ADMIN — LIDOCAINE HYDROCHLORIDE 15 ML: 20 SOLUTION ORAL; TOPICAL at 19:46

## 2020-04-11 ASSESSMENT — ENCOUNTER SYMPTOMS
BRUISES/BLEEDS EASILY: 0
SEIZURES: 0
CONFUSION: 0
FREQUENCY: 0
CHILLS: 0
HEMATURIA: 0
APPETITE CHANGE: 0
FATIGUE: 0
BACK PAIN: 0
STRIDOR: 0
DYSURIA: 0
RHINORRHEA: 0
WHEEZING: 0
FACIAL ASYMMETRY: 0
ADENOPATHY: 0
CONSTIPATION: 0
FEVER: 0
LIGHT-HEADEDNESS: 0
TROUBLE SWALLOWING: 0
COLOR CHANGE: 0
DIARRHEA: 0
VOMITING: 0
SORE THROAT: 0
ACTIVITY CHANGE: 0
TREMORS: 0
EYE PAIN: 0
DIZZINESS: 0
WOUND: 0
NECK PAIN: 0
HEADACHES: 0
COUGH: 0
FACIAL SWELLING: 0
CHEST TIGHTNESS: 1
WEAKNESS: 0
NAUSEA: 0
SPEECH DIFFICULTY: 0
ABDOMINAL PAIN: 0
SHORTNESS OF BREATH: 0
NECK STIFFNESS: 0

## 2020-04-11 NOTE — ED PROVIDER NOTES
History     Chief Complaint   Patient presents with     Chest Pain     HPI  Ankita Day is a 65 year old female who is longstanding history of some recurrent atypical chest pain.  Last time she was here was in January and at that time she was diagnosed with a PE.  She is currently on both Plavix and Xarelto.  Today she had sudden onset of right-sided chest pain that radiated to the midsternal area.  She took 3 nitroglycerin of her own with no relief.  This started about 10:00 this morning (8 hours ago).  Denies any nausea or vomiting.  No lightheadedness or dizziness.  No sore throat cough or flulike symptoms.  She is afebrile.  Does not appear to be in distress.    Allergies:  Allergies   Allergen Reactions     Atorvastatin Muscle Pain (Myalgia)     Tiotropium Bromide [Tiotropium] Rash     Ezetimibe Muscle Pain (Myalgia)     Latex Rash     Niacin      Other reaction(s): Flushing     No Clinical Screening - See Comments Itching, Rash and Blisters     Metals and plastics       Tape [Adhesive Tape] Rash       Problem List:    Patient Active Problem List    Diagnosis Date Noted     Chronic stable angina (H) 02/05/2020     Priority: Medium     Chronic ischemic heart disease 02/05/2020     Priority: Medium     Acute pulmonary embolism without acute cor pulmonale (H) 01/02/2020     Priority: Medium     Chronic anxiety 01/22/2018     Priority: Medium     Collagenous colitis 01/22/2018     Priority: Medium     Overview:   Possible Dx 2007       Major depressive disorder, recurrent episode, severe (H) 01/22/2018     Priority: Medium     Essential hypertension 01/22/2018     Priority: Medium     Mixed hyperlipidemia 01/22/2018     Priority: Medium     Osteoarthritis 01/22/2018     Priority: Medium     Panic attack 01/22/2018     Priority: Medium     Status post coronary angiogram 09/15/2017     Priority: Medium     History of tobacco abuse 08/10/2017     Priority: Medium     Presence of stent in coronary artery in  patient with coronary artery disease 08/10/2017     Priority: Medium     History of coronary artery bypass graft x 3 08/10/2017     Priority: Medium     Noncompliance with CPAP treatment 10/21/2016     Priority: Medium     Intractable chronic common migraine without aura 10/21/2016     Priority: Medium     H/O multiple concussions 10/21/2016     Priority: Medium     History of Clostridium difficile 08/19/2016     Priority: Medium     Iron deficiency anemia 07/26/2016     Priority: Medium     CKD (chronic kidney disease) stage 2, GFR 60-89 ml/min 12/09/2015     Priority: Medium     Chest wall pain, chronic 11/04/2015     Priority: Medium     Controlled substance agreement signed 9/18/19 01/28/2014     Priority: Medium     Overview:        Central sleep apnea 10/14/2013     Priority: Medium     Myofascial pain 10/14/2013     Priority: Medium     Vitamin D deficiency 05/06/2013     Priority: Medium     Subclavian artery stenosis, left (H) 03/31/2013     Priority: Medium     Overview:   S/p prox left SCA stent 4/1/2013       Lumbar facet arthropathy 01/02/2013     Priority: Medium     Nonspecific abnormal results of pulmonary system function study 12/21/2011     Priority: Medium     Slow transit constipation 11/29/2011     Priority: Medium     Gastric ulcer with hemorrhage 10/03/2011     Priority: Medium     Family history of malignant neoplasm of gastrointestinal tract 09/26/2011     Priority: Medium     Nodular degeneration of cornea 09/26/2011     Priority: Medium     Bilateral low back pain without sciatica 05/31/2011     Priority: Medium     Chronic pain disorder 12/01/2010     Priority: Medium     COPD (chronic obstructive pulmonary disease) (H) 06/15/2007     Priority: Medium     Overview:   Low DLCO, normal spirometry on 12/15/11       Peptic ulcer 06/15/2007     Priority: Medium     Postsurgical aortocoronary bypass status 02/12/2003     Priority: Medium     Coronary atherosclerosis 09/12/2002     Priority:  Medium     Overview:   Multiple Angigrams prior to 2007. Also:  6/5/2007: Angiogram: TAXUS DELONTE to ostial circumflux, instent restenosis  5/23/2008: Left Main 30% diseased, LAD stents patent, Left circ stent patent, Chronic occluded right internal mammary artery graft to distal RCA, native RCA has 30% stenosis; NO intevention  9/19/2012 CT Angiogram: Patent LIMA to LAD, patent LAD stents, moderate proximal circumflex disease, patent RCA , occluded right internal mammary artery graft to distal RCA.  9/20/2012: Angiogram: Stent to Left Main, ostial LAD, and PTCA of ostial left circumflex          Past Medical History:    Past Medical History:   Diagnosis Date     Acute ischemic heart disease (H)      Acute myocardial infarction (H)      Anxiety disorder      Atherosclerotic heart disease of native coronary artery without angina pectoris      Bilateral carpal tunnel syndrome      Cervicalgia      Chest pain      Chronic gastric ulcer without hemorrhage or perforation      Chronic ischemic heart disease      Chronic obstructive pulmonary disease (H)      Chronic or unspecified gastric ulcer with hemorrhage      Chronic pain syndrome      Constipation      Coronary angioplasty status      Coronary angioplasty status      Coronary angioplasty status      Dorsalgia      Encounter for other administrative examinations      Encounter for screening for cardiovascular disorders      Enterocolitis due to Clostridium difficile      Essential (primary) hypertension      Hyperlipidemia      Major depressive disorder, single episode      Migraine without status migrainosus, not intractable      Nodular corneal degeneration      Noninfective gastroenteritis and colitis      Noninfective gastroenteritis and colitis      Osteoarthritis      Other chest pain      Pain in knee      Pain in right shoulder      Panic disorder without agoraphobia      Peptic ulcer without hemorrhage or perforation      Peripheral vascular disease (H)       Personal history of diseases of the blood and blood-forming organs and certain disorders involving the immune mechanism (CODE)      Personal history of nicotine dependence      Personal history of other medical treatment (CODE)      Presence of aortocoronary bypass graft      Primary central sleep apnea      Sepsis due to Escherichia coli (E. coli) (H)      Stricture of artery (H)      Thoracic, thoracolumbar and lumbosacral intervertebral disc disorder      Uncomplicated opioid abuse (H)      Vitamin D deficiency        Past Surgical History:    Past Surgical History:   Procedure Laterality Date     ANGIOPLASTY      9/12/02,with triple stenting     APPENDECTOMY OPEN      No Comments Provided     ARTHROSCOPY KNEE      left     ARTHROSCOPY SHOULDER Right 05/12/2017    labral tear, rotator cuff tear and some subacromial decompression      BYPASS GRAFT ARTERY CORONARY      12/13/02,Triple bypass, left internal mammary  to LAD, right internal mammary to right coronary artery, saphenous to obtuse marginal of the left circumflex.     COLONOSCOPY      2011,Dr Bowman benign polyps     COLONOSCOPY  10/03/2011    2011,benign polyps, Dr. Bowman     COLONOSCOPY  08/08/2016 8/8/16,normal, Dr Bowman     ELBOW SURGERY      baby,birth malachi removed from right arm     EMBOLECTOMY UPPER EXTREMITY  04/02/2013    brachial artery pseudoaneurysm after stenting     ESOPHAGOSCOPY, GASTROSCOPY, DUODENOSCOPY (EGD), COMBINED      2011,EGD Dr Bowman with pyloric ulcer     ESOPHAGOSCOPY, GASTROSCOPY, DUODENOSCOPY (EGD), COMBINED      2011,pyloric ulcer, Dr. Bowman     ESOPHAGOSCOPY, GASTROSCOPY, DUODENOSCOPY (EGD), COMBINED      8/8/16,mild gastritis, Dr Bowman     ESOPHAGOSCOPY, GASTROSCOPY, DUODENOSCOPY (EGD), COMBINED      11/27/2017,Dr Bowman. Antral ulcer     ESOPHAGOSCOPY, GASTROSCOPY, DUODENOSCOPY (EGD), COMBINED  02/02/2018    Dr Bowman, healed ulcer     HYSTERECTOMY TOTAL ABDOMINAL      age 21     LAPAROSCOPIC CHOLECYSTECTOMY      2006      OSTEOTOMY FEMUR DISTAL      x3, right knee     OSTEOTOMY FEMUR DISTAL      2000,left knee  ligament surgery     OTHER SURGICAL HISTORY      1/10/2003,,PTCA     OTHER SURGICAL HISTORY      2012,,PTCA,DELONTE in LAD and left main     OTHER SURGICAL HISTORY      2013,,PTCA,L subclavian stenosis     SALPINGO-OOPHORECTOMY BILATERAL      age 27,Bilateral salpingo-oophorectomy     TONSILLECTOMY, ADENOIDECTOMY, COMBINED      childhood       Family History:    Family History   Problem Relation Age of Onset     Heart Disease Father         Heart Disease,Heart condition/Significant for atherosclerotic cardiovascular disease, but non premature.     Colon Cancer Father         Cancer-colon, of colon cancer     Cancer Father         Cancer,mets to liver, secondary to colon cancer     Heart Disease Mother         Heart Disease     Cancer Other         Cancer,Multiple Myeloma     Heart Disease Other         Heart Disease,Ischemic Heart Disease     Colon Cancer Other         Cancer-colon,Malignant neoplasms     Cancer Sister         Cancer,multiple myeloma     Other - See Comments Son         gallstones       Social History:  Marital Status:   [2]  Social History     Tobacco Use     Smoking status: Former Smoker     Packs/day: 0.25     Years: 35.00     Pack years: 8.75     Types: Cigarettes     Last attempt to quit: 2/15/2017     Years since quitting: 3.1     Smokeless tobacco: Never Used     Tobacco comment: Smokes 1-2 packs every 6 months for the past 16 years. Used to smoke 1 pack/day before that.    Substance Use Topics     Alcohol use: Not Currently     Alcohol/week: 0.0 standard drinks     Drug use: No        Medications:    albuterol (PROAIR HFA/PROVENTIL HFA/VENTOLIN HFA) 108 (90 Base) MCG/ACT inhaler  amLODIPine (NORVASC) 10 MG tablet  busPIRone (BUSPAR) 10 MG tablet  clonazePAM 1 MG PO tablet  clopidogrel (PLAVIX) 75 MG tablet  Diclofenac Sodium 1.5 % SOLN  docusate sodium (COLACE) 100 MG  capsule  lisinopril (PRINIVIL/ZESTRIL) 20 MG tablet  metoclopramide (REGLAN) 10 MG tablet  metoprolol tartrate (LOPRESSOR) 50 MG tablet  naloxone (NARCAN) 4 MG/0.1ML nasal spray  nitroGLYcerin (NITROSTAT) 0.3 MG sublingual tablet  omeprazole (PRILOSEC) 20 MG DR capsule  ondansetron (ZOFRAN) 4 MG tablet  oxyCODONE-acetaminophen (PERCOCET)  MG per tablet  [START ON 4/17/2020] oxyCODONE-acetaminophen (PERCOCET)  MG per tablet  pregabalin (LYRICA) 50 MG capsule  ranolazine 500 MG PO 12 hr tablet  rivaroxaban ANTICOAGULANT 20 MG PO TABS tablet  rosuvastatin 10 MG PO tablet  saccharomyces boulardii (FLORASTOR) 250 MG capsule  sucralfate 1 GM PO tablet  VITAMIN D, CHOLECALCIFEROL, PO  vortioxetine (TRINTELLIX) 20 MG tablet          Review of Systems   Constitutional: Negative for activity change, appetite change, chills, fatigue and fever.   HENT: Negative for congestion, drooling, facial swelling, rhinorrhea, sore throat and trouble swallowing.    Eyes: Negative for pain and visual disturbance.   Respiratory: Positive for chest tightness. Negative for cough, shortness of breath, wheezing and stridor.    Cardiovascular: Negative for chest pain and leg swelling.   Gastrointestinal: Negative for abdominal pain, constipation, diarrhea, nausea and vomiting.   Genitourinary: Negative for dysuria, frequency, hematuria and urgency.   Musculoskeletal: Negative for back pain, neck pain and neck stiffness.   Skin: Negative for color change, rash and wound.   Neurological: Negative for dizziness, tremors, seizures, syncope, facial asymmetry, speech difficulty, weakness, light-headedness and headaches.   Hematological: Negative for adenopathy. Does not bruise/bleed easily.   Psychiatric/Behavioral: Negative for confusion.       Physical Exam   BP: (!) 156/82  Pulse: 71(placed on cardiac monitor)  Temp: 97.9  F (36.6  C)  Resp: 18  Weight: 83.9 kg (185 lb)  SpO2: 98 %      Physical Exam  Constitutional:       General: She  is not in acute distress.     Appearance: She is not ill-appearing, toxic-appearing or diaphoretic.   HENT:      Head: No raccoon eyes or Jackson's sign.      Jaw: No trismus.      Right Ear: No drainage or tenderness.      Left Ear: No drainage or tenderness.      Nose: Nose normal.   Eyes:      General: No scleral icterus.     Extraocular Movements: Extraocular movements intact.      Right eye: Normal extraocular motion and no nystagmus.      Left eye: Normal extraocular motion and no nystagmus.      Pupils: Pupils are equal, round, and reactive to light.      Right eye: Pupil is reactive and not sluggish.      Left eye: Pupil is reactive and not sluggish.      Funduscopic exam:     Right eye: No AV nicking, arteriolar narrowing or papilledema. Red reflex present.         Left eye: No AV nicking, arteriolar narrowing or papilledema. Red reflex present.  Neck:      Musculoskeletal: Normal range of motion. Normal range of motion. No neck rigidity, pain with movement, spinous process tenderness or muscular tenderness.      Vascular: No JVD.      Trachea: No tracheal deviation.   Cardiovascular:      Rate and Rhythm: Normal rate and regular rhythm.   Pulmonary:      Effort: Pulmonary effort is normal. No respiratory distress.      Breath sounds: Normal breath sounds. No stridor. No wheezing.      Comments: Lung sounds are clear.  SaO2 is 98% on room air.  She is not appear to be in any respiratory distress.  Abdominal:      General: There is no distension.      Palpations: There is no mass.      Tenderness: There is no abdominal tenderness. There is no right CVA tenderness, left CVA tenderness, guarding or rebound.   Musculoskeletal: Normal range of motion.         General: No tenderness or deformity.   Lymphadenopathy:      Cervical: No cervical adenopathy.      Right cervical: No superficial cervical adenopathy.     Left cervical: No superficial cervical adenopathy.   Skin:     General: Skin is warm and dry.       Capillary Refill: Capillary refill takes less than 2 seconds.   Neurological:      Mental Status: She is alert and oriented to person, place, and time.      GCS: GCS eye subscore is 4. GCS verbal subscore is 5. GCS motor subscore is 6.      Motor: No tremor or seizure activity.      Coordination: Coordination normal.      Gait: Gait normal.         ED Course     EKG shows normal sinus rhythm with a heart rate of 63.  This essentially unchanged from previous EKG on 1/6/2020.    Results for orders placed or performed during the hospital encounter of 04/11/20 (from the past 24 hour(s))   Troponin GH (now)   Result Value Ref Range    Troponin 2.7 <34.0 pg/mL   CBC with platelets differential   Result Value Ref Range    WBC 7.0 4.0 - 11.0 10e9/L    RBC Count 3.85 3.8 - 5.2 10e12/L    Hemoglobin 11.0 (L) 11.7 - 15.7 g/dL    Hematocrit 33.3 (L) 35.0 - 47.0 %    MCV 87 78 - 100 fl    MCH 28.6 26.5 - 33.0 pg    MCHC 33.0 31.5 - 36.5 g/dL    RDW 13.3 10.0 - 15.0 %    Platelet Count 291 150 - 450 10e9/L    Diff Method Automated Method     % Neutrophils 61.7 %    % Lymphocytes 27.4 %    % Monocytes 8.2 %    % Eosinophils 1.7 %    % Basophils 0.9 %    % Immature Granulocytes 0.1 %    Absolute Neutrophil 4.3 1.6 - 8.3 10e9/L    Absolute Lymphocytes 1.9 0.8 - 5.3 10e9/L    Absolute Monocytes 0.6 0.0 - 1.3 10e9/L    Absolute Eosinophils 0.1 0.0 - 0.7 10e9/L    Absolute Basophils 0.1 0.0 - 0.2 10e9/L    Abs Immature Granulocytes 0.0 0 - 0.4 10e9/L   INR   Result Value Ref Range    INR 1.25 0 - 1.3   Comprehensive metabolic panel   Result Value Ref Range    Sodium 137 134 - 144 mmol/L    Potassium 3.9 3.5 - 5.1 mmol/L    Chloride 103 98 - 107 mmol/L    Carbon Dioxide 24 21 - 31 mmol/L    Anion Gap 10 3 - 14 mmol/L    Glucose 116 (H) 70 - 105 mg/dL    Urea Nitrogen 18 7 - 25 mg/dL    Creatinine 1.15 0.60 - 1.20 mg/dL    GFR Estimate 47 (L) >60 mL/min/[1.73_m2]    GFR Estimate If Black 57 (L) >60 mL/min/[1.73_m2]    Calcium 9.1 8.6 -  10.3 mg/dL    Bilirubin Total 0.3 0.3 - 1.0 mg/dL    Albumin 4.1 3.5 - 5.7 g/dL    Protein Total 6.9 6.4 - 8.9 g/dL    Alkaline Phosphatase 54 34 - 104 U/L    ALT 8 7 - 52 U/L    AST 13 13 - 39 U/L   Troponin GH   Result Value Ref Range    Troponin 2.7 <34.0 pg/mL   TSH Reflex GH   Result Value Ref Range    TSH Reflex 1.91 0.34 - 5.60 IU/mL   Nt probnp inpatient (BNP)   Result Value Ref Range    N-Terminal Pro BNP Inpatient 132 (H) 0 - 100 pg/mL   Lactic acid whole blood   Result Value Ref Range    Lactic Acid 1.5 0.7 - 2.0 mmol/L   XR Chest 2 Views    Narrative    EXAM:XR CHEST 2 VW     CLINICAL HISTORY: Patient Age  65 years Additional clinical info: pain      COMPARISON: 1/6/2020      TECHNIQUE: 2 views      Impression    IMPRESSION: Sternotomy with mediastinal clips. Strand of fibrosis in  the right midlung and in the left lung base. No infiltrate identified.  Suture anchor in the right humeral head. Chest otherwise normal.    BALBINA LEGER MD       Medications   sodium chloride 0.9% infusion ( Intravenous New Bag 4/11/20 1808)   famotidine (PEPCID) injection 20 mg (20 mg Intravenous Given 4/11/20 1947)   nitroGLYcerin 25 mg in D5W 250 mL infusion (has no administration in time range)   aspirin (ASA) chewable tablet 324 mg (has no administration in time range)   fentaNYL (PF) (SUBLIMAZE) injection 50 mcg (has no administration in time range)   alum & mag hydroxide-simethicone (MAALOX  ES) suspension 15 mL (15 mLs Oral Given 4/11/20 1946)     And   lidocaine (XYLOCAINE) 2 % solution 15 mL (15 mLs Mouth/Throat Given 4/11/20 1946)       Assessments & Plan (with Medical Decision Making)     I have reviewed the nursing notes.    I have reviewed the findings, diagnosis, plan and need for follow up with the patient.      New Prescriptions    No medications on file       Final diagnoses:   Unstable angina (H)   Atypical chest pain   Bilateral pulmonary embolism (H)   HTN (hypertension)     Afebrile.  Vital signs stable  but slightly elevated blood pressures in the 150s over 90s.  Patient complaining of chest pressure and pain that started this morning and continues despite nitroglycerin x3.  Recent diagnosis of bilateral pulmonary embolisms and currently is on Plavix as well as Xarelto.   EKG shows normal sinus rhythm with a heart rate of 63.  This essentially unchanged from previous EKG on 1/6/2020.  IV established and she was given fluids.  Initial troponin was normal.  CBC shows normal white blood cells and no left shift.  Hemoglobin is 11 with hematocrit of 33.3 this is her normal range.  TSH is normal.  Lactic acid is normal.  BNP is slightly elevated at 132 but otherwise unremarkable.  CMP shows a GFR of 47 but this is her normal rate.  Chest x-ray shows a   Sternotomy with mediastinal clips. Strand of fibrosis in the right midlung and in the left lung base. No infiltrate identified.  Suture anchor in the right humeral head. Chest otherwise normal.  Her pain seemed to continue and she was given Pepcid as well as a GI cocktail without relief.  Her 90-minute troponin returns normal as well however she continues to complain of pressure in her chest.  This point she was given treatment 5 mg of aspirin.  She was started on nitroglycerin drip as well.  I discussed this case with Dr. Cason, hospitalist at [facility] and the patient will be transferred via ground ambulance at this time.  The patient continued to complain of some pain and she was started on fentanyl as well for pain relief.      4/11/2020   [facility]     Mikel Ashraf PA-C  04/11/20 [id number]

## 2020-04-11 NOTE — ED TRIAGE NOTES
Pt presents with chest pain and SOB that started at 1000. Pt took 3nitro tabs at home with no relief, Midsternum chest pain. Pt has a Hx of blood clots per her report and has an extensive cardiac Hx. EKG obtained upon arrival. Nola Toney RN .............. 4/11/2020  5:50 PM

## 2020-04-12 ENCOUNTER — TRANSFERRED RECORDS (OUTPATIENT)
Dept: HEALTH INFORMATION MANAGEMENT | Facility: OTHER | Age: 65
End: 2020-04-12

## 2020-04-12 NOTE — ED NOTES
Awoke patient to administered her medications.  Patient states that her pain has not changed.  Still rates it at a 7.  No additional needs at this time.   Ansley Vargas RN on 4/11/2020 at 7:51 PM

## 2020-04-13 ENCOUNTER — TELEPHONE (OUTPATIENT)
Dept: FAMILY MEDICINE | Facility: OTHER | Age: 65
End: 2020-04-13

## 2020-04-13 LAB
FIBRINOGEN AG PPP IA-MCNC: 410 MG/DL (ref 149–353)
INTERPRETATION ECG - MUSE: NORMAL
LOCATION PERFORMED: NORMAL
RESULT: NORMAL
SEND OUTS MISC TEST CODE: 1043
SEND OUTS MISC TEST SPECIMEN: NORMAL
TEST NAME: NORMAL

## 2020-04-13 NOTE — TELEPHONE ENCOUNTER
Дмитрий from [facility] called asked for permission for rx to be filled a couple days early.  Patient doesn't come to town very often and was hoping to pick it up when she was here.    oxyCODONE-acetaminophen (PERCOCET)  MG per tablet.     Please return his call [phone].

## 2020-04-13 NOTE — TELEPHONE ENCOUNTER
Called and spoke to Дмитрий told him that Dr. Phillip said it was ok to fill early.    Lopez Durán LPN .......  4/13/2020  1:47 PM

## 2020-04-14 LAB — COPATH REPORT: NORMAL

## 2020-04-15 LAB — HCYS SERPL-SCNC: 10.2 UMOL/L (ref 4–12)

## 2020-04-16 ENCOUNTER — HOSPITAL ENCOUNTER (OUTPATIENT)
Dept: CT IMAGING | Facility: OTHER | Age: 65
End: 2020-04-16
Attending: INTERNAL MEDICINE
Payer: MEDICARE

## 2020-04-16 ENCOUNTER — OFFICE VISIT (OUTPATIENT)
Dept: FAMILY MEDICINE | Facility: OTHER | Age: 65
End: 2020-04-16
Attending: FAMILY MEDICINE
Payer: MEDICARE

## 2020-04-16 VITALS
SYSTOLIC BLOOD PRESSURE: 136 MMHG | TEMPERATURE: 97.7 F | RESPIRATION RATE: 24 BRPM | WEIGHT: 185 LBS | OXYGEN SATURATION: 95 % | HEART RATE: 62 BPM | DIASTOLIC BLOOD PRESSURE: 78 MMHG | BODY MASS INDEX: 29.86 KG/M2

## 2020-04-16 DIAGNOSIS — S60.221A TRAUMATIC ECCHYMOSIS OF RIGHT HAND, INITIAL ENCOUNTER: ICD-10-CM

## 2020-04-16 DIAGNOSIS — I26.99 ACUTE PULMONARY EMBOLISM WITHOUT ACUTE COR PULMONALE, UNSPECIFIED PULMONARY EMBOLISM TYPE (H): ICD-10-CM
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
## 2020-04-16 DIAGNOSIS — S40.021A CONTUSION OF RIGHT UPPER EXTREMITY, INITIAL ENCOUNTER: Primary | ICD-10-CM

## 2020-04-16 LAB — COPATH REPORT: NORMAL

## 2020-04-16 PROCEDURE — 74177 CT ABD & PELVIS W/CONTRAST: CPT

## 2020-04-16 PROCEDURE — G0463 HOSPITAL OUTPT CLINIC VISIT: HCPCS | Mod: 25

## 2020-04-16 PROCEDURE — 99214 OFFICE O/P EST MOD 30 MIN: CPT | Performed by: FAMILY MEDICINE

## 2020-04-16 PROCEDURE — 70491 CT SOFT TISSUE NECK W/DYE: CPT

## 2020-04-16 PROCEDURE — 71260 CT THORAX DX C+: CPT

## 2020-04-16 PROCEDURE — 25500064 ZZH RX 255 OP 636: Performed by: INTERNAL MEDICINE

## 2020-04-16 PROCEDURE — G0463 HOSPITAL OUTPT CLINIC VISIT: HCPCS

## 2020-04-16 RX ORDER — IODIXANOL 320 MG/ML
100 INJECTION, SOLUTION INTRAVASCULAR ONCE
Status: COMPLETED | OUTPATIENT
Start: 2020-04-16 | End: 2020-04-16

## 2020-04-16 RX ADMIN — IODIXANOL 100 ML: 320 INJECTION, SOLUTION INTRAVASCULAR at 09:38

## 2020-04-16 ASSESSMENT — ENCOUNTER SYMPTOMS
LIGHT-HEADEDNESS: 0
HEADACHES: 0
COUGH: 0
ARTHRALGIAS: 0
FATIGUE: 0
JOINT SWELLING: 0
DIZZINESS: 0
SHORTNESS OF BREATH: 0
BRUISES/BLEEDS EASILY: 1
NECK PAIN: 0
NECK STIFFNESS: 0
BACK PAIN: 0
MYALGIAS: 0
WEAKNESS: 0
FEVER: 0

## 2020-04-16 ASSESSMENT — PAIN SCALES - GENERAL: PAINLEVEL: SEVERE PAIN (7)

## 2020-04-16 NOTE — NURSING NOTE
Patient is here to be check out, states she fell at [employer] this morning about 830/9 am. States she doesn't know how she fell. Hit her right arm, butt, and leg is sore. States had a ct scan today and also noticed bruising on left arm.  Morelia Park LPN .............4/16/2020     9:57 AM      No LMP recorded (lmp unknown). Patient has had a hysterectomy.  Medication Reconciliation: complete    Morelia Park LPN  4/16/2020 10:02 AM

## 2020-04-16 NOTE — PROGRESS NOTES
SUBJECTIVE:   Ankita Day is a 65 year old female who presents to clinic today for the following health issues:  Nursing Notes:   Morelia Park LPN  4/16/2020 10:14 AM  Signed  Patient is here to be check out, states she fell at [employer] this morning about 830/9 am. States she doesn't know how she fell. Hit her right arm, butt, and leg is sore. States had a ct scan today and also noticed bruising on left arm.  Morelia Park LPN .............4/16/2020     9:57 AM      No LMP recorded (lmp unknown). Patient has had a hysterectomy.  Medication Reconciliation: complete    Morelia Park LPN  4/16/2020 10:02 AM      HPI  66 yo female presents after falling at local store.    Walking in [employer] and fell. She is uncertain what caused her to fall. Fell backwards and struck left hand on metal shelf and right posterior forearm.  May have twisted her left knee as well.  She has a long history of knee pain due to osteoarthritis.  Feels anxious today.    Did not feel light headed or unsteady prior to fall. Did not see anything that she slipped on.     Did not hit her head, no LOC.    Was scheduled for CT scan at [facility] and just had those done, decided to walk-in to be seen.    Currently on Xarelto for PE.  Reports no obvious issues with bleeding.    Patient was n.p.o. this morning and had drank 2 bottles of contrast prior to her scans.  This certainly could have contributed to her fall.  She is also on chronic Percocet.        Patient Active Problem List    Diagnosis Date Noted     Chronic stable angina (H) 02/05/2020     Priority: Medium     Chronic ischemic heart disease 02/05/2020     Priority: Medium     Acute pulmonary embolism without acute cor pulmonale (H) 01/02/2020     Priority: Medium     Chronic anxiety 01/22/2018     Priority: Medium     Collagenous colitis 01/22/2018     Priority: Medium     Overview:   Possible Dx 2007       Major depressive disorder, recurrent episode, severe (H) 01/22/2018      Priority: Medium     Essential hypertension 01/22/2018     Priority: Medium     Mixed hyperlipidemia 01/22/2018     Priority: Medium     Osteoarthritis 01/22/2018     Priority: Medium     Panic attack 01/22/2018     Priority: Medium     Status post coronary angiogram 09/15/2017     Priority: Medium     History of tobacco abuse 08/10/2017     Priority: Medium     Presence of stent in coronary artery in patient with coronary artery disease 08/10/2017     Priority: Medium     History of coronary artery bypass graft x 3 08/10/2017     Priority: Medium     Noncompliance with CPAP treatment 10/21/2016     Priority: Medium     Intractable chronic common migraine without aura 10/21/2016     Priority: Medium     H/O multiple concussions 10/21/2016     Priority: Medium     History of Clostridium difficile 08/19/2016     Priority: Medium     Iron deficiency anemia 07/26/2016     Priority: Medium     CKD (chronic kidney disease) stage 2, GFR 60-89 ml/min 12/09/2015     Priority: Medium     Chest wall pain, chronic 11/04/2015     Priority: Medium     Controlled substance agreement signed 9/18/19 01/28/2014     Priority: Medium     Overview:        Central sleep apnea 10/14/2013     Priority: Medium     Myofascial pain 10/14/2013     Priority: Medium     Vitamin D deficiency 05/06/2013     Priority: Medium     Subclavian artery stenosis, left (H) 03/31/2013     Priority: Medium     Overview:   S/p prox left SCA stent 4/1/2013       Lumbar facet arthropathy 01/02/2013     Priority: Medium     Nonspecific abnormal results of pulmonary system function study 12/21/2011     Priority: Medium     Slow transit constipation 11/29/2011     Priority: Medium     Gastric ulcer with hemorrhage 10/03/2011     Priority: Medium     Family history of malignant neoplasm of gastrointestinal tract 09/26/2011     Priority: Medium     Nodular degeneration of cornea 09/26/2011     Priority: Medium     Bilateral low back pain without sciatica 05/31/2011      Priority: Medium     Chronic pain disorder 12/01/2010     Priority: Medium     COPD (chronic obstructive pulmonary disease) (H) 06/15/2007     Priority: Medium     Overview:   Low DLCO, normal spirometry on 12/15/11       Peptic ulcer 06/15/2007     Priority: Medium     Postsurgical aortocoronary bypass status 02/12/2003     Priority: Medium     Coronary atherosclerosis 09/12/2002     Priority: Medium     Overview:   Multiple Angigrams prior to 2007. Also:  6/5/2007: Angiogram: TAXUS DELONTE to ostial circumflux, instent restenosis  5/23/2008: Left Main 30% diseased, LAD stents patent, Left circ stent patent, Chronic occluded right internal mammary artery graft to distal RCA, native RCA has 30% stenosis; NO intevention  9/19/2012 CT Angiogram: Patent LIMA to LAD, patent LAD stents, moderate proximal circumflex disease, patent RCA , occluded right internal mammary artery graft to distal RCA.  9/20/2012: Angiogram: Stent to Left Main, ostial LAD, and PTCA of ostial left circumflex       Past Medical History:   Diagnosis Date     Acute ischemic heart disease (H)     06/15/2007,with PTCA and stenting of 90% osteo circumflex lesion and patent LAD graft, patent left main stent.     Acute myocardial infarction (H)     3/30/2013     Anxiety disorder     No Comments Provided     Atherosclerotic heart disease of native coronary artery without angina pectoris     -angio revealed 3 vessel dz  -4 stents placed; 2 overlapping stents placed in mLAD, 1 stent pRCA, 1 stent pCirc 1 s 9/02 -non-ST elevation MI 1/03  -angio revealed restenosis of Circ.    -PTCA and brachytherapy of pCirc -repeat angio 2/03 -no intervension -CABG x3 12/03 - Dr Sinclair  -LIMA-LAD, RAFI-RCA, SVG-OM -PCI 7/04 stent to L -CTangio 9/05   -patentent LIMA-LAD. RAFI-RCA occluded; RCA ostio/p*     Bilateral carpal tunnel syndrome     No Comments Provided     Cervicalgia     No Comments Provided     Chest pain     12/29/2014     Chronic gastric ulcer without  hemorrhage or perforation     10/03/2011,hx of GI bleed (2003)     Chronic ischemic heart disease     06/27/2012     Chronic obstructive pulmonary disease (H)     06/15/2007,low DLCO, normal spirometry     Chronic or unspecified gastric ulcer with hemorrhage     10/2003     Chronic pain syndrome     12/01/2010,chest wall, back     Constipation     11/29/2011     Coronary angioplasty status     09/12/2002,with triple stenting     Coronary angioplasty status     01/10/2003,repeat angioplasty     Coronary angioplasty status     No Comments Provided     Dorsalgia     05/31/2011     Encounter for other administrative examinations     1/28/2014     Encounter for screening for cardiovascular disorders     10/2004,[facility] (2004, 2005, 2006 and 2011)     Enterocolitis due to Clostridium difficile     8/19/2016     Essential (primary) hypertension     No Comments Provided     Hyperlipidemia     No Comments Provided     Major depressive disorder, single episode     Severe, hx of suicide attempt/hospitalization     Migraine without status migrainosus, not intractable     No Comments Provided     Nodular corneal degeneration     09/26/2011     Noninfective gastroenteritis and colitis     06/15/2007,history of     Noninfective gastroenteritis and colitis     Microcytic     Osteoarthritis     No Comments Provided     Other chest pain     10/13/2009,chronic     Pain in knee     05/31/2011     Pain in right shoulder     No Comments Provided     Panic disorder without agoraphobia     No Comments Provided     Peptic ulcer without hemorrhage or perforation     6/15/2007     Peripheral vascular disease (H)     No Comments Provided     Personal history of diseases of the blood and blood-forming organs and certain disorders involving the immune mechanism (CODE)     No Comments Provided     Personal history of nicotine dependence     No Comments Provided     Personal history of other medical treatment (CODE)     10/14/2013     Presence  of aortocoronary bypass graft     2/12/2003     Primary central sleep apnea     10/14/2013     Sepsis due to Escherichia coli (E. coli) (H)     7/14/16,[facility]     Stricture of artery (H)     3/31/2013,S/p prox left SCA stent 4/1/2013     Thoracic, thoracolumbar and lumbosacral intervertebral disc disorder     No Comments Provided     Uncomplicated opioid abuse (H)     history of     Vitamin D deficiency     5/6/2013      Current Outpatient Medications   Medication Sig Dispense Refill     albuterol (PROAIR HFA/PROVENTIL HFA/VENTOLIN HFA) 108 (90 Base) MCG/ACT inhaler Inhale 2 puffs into the lungs every 6 hours 2 Inhaler 3     amLODIPine (NORVASC) 10 MG tablet Take 1 tablet (10 mg) by mouth daily 90 tablet 3     busPIRone (BUSPAR) 10 MG tablet Take 1 tablet (10 mg) by mouth 2 times daily 180 tablet 1     clonazePAM 1 MG PO tablet Take 1 tablet (1 mg) by mouth 2 times daily 180 tablet 0     clopidogrel (PLAVIX) 75 MG tablet Take 1 tablet (75 mg) by mouth daily 90 tablet 3     docusate sodium (COLACE) 100 MG capsule Take 100 mg by mouth twice a week       lisinopril (PRINIVIL/ZESTRIL) 20 MG tablet Take 1 tablet (20 mg) by mouth daily 90 tablet 3     metoclopramide (REGLAN) 10 MG tablet Take 1 tablet (10 mg) by mouth every 6 hours as needed (headache) 30 tablet 1     metoprolol tartrate (LOPRESSOR) 50 MG tablet Take 1 tablet (50 mg) by mouth 2 times daily 180 tablet 3     naloxone (NARCAN) 4 MG/0.1ML nasal spray Spray into one nostril for opioid reversal if unresponsive. May repeat every 2-3 minutes until patient responsive or EMS arrives 1 each 1     omeprazole (PRILOSEC) 20 MG DR capsule Take 1 capsule (20 mg) by mouth 2 times daily 180 capsule 3     ondansetron (ZOFRAN) 4 MG tablet Take 1 tablet (4 mg) by mouth every 6 hours as needed for nausea 30 tablet 2     oxyCODONE-acetaminophen (PERCOCET)  MG per tablet Take 1 tablet by mouth 4 times daily as needed for severe pain 120 tablet 0     [START ON 4/17/2020]  oxyCODONE-acetaminophen (PERCOCET)  MG per tablet Take 1 tablet by mouth 4 times daily as needed for severe pain 120 tablet 0     pregabalin (LYRICA) 50 MG capsule Take 1 capsule (50 mg) by mouth 3 times daily 90 capsule 5     ranolazine 500 MG PO 12 hr tablet Take 1 tablet (500 mg) by mouth 2 times daily 180 tablet 3     rivaroxaban ANTICOAGULANT 20 MG PO TABS tablet Take 1 tablet (20 mg) by mouth daily (with dinner) 90 tablet 3     rosuvastatin 10 MG PO tablet Take 1 tablet (10 mg) by mouth At Bedtime 90 tablet 1     saccharomyces boulardii (FLORASTOR) 250 MG capsule Take 250 mg by mouth 2 times daily       sucralfate 1 GM PO tablet Take 1 tablet (1 g) by mouth 4 times daily as needed for nausea (or dark stools) 360 tablet 0     VITAMIN D, CHOLECALCIFEROL, PO Take 5,000 Units by mouth daily       vortioxetine (TRINTELLIX) 20 MG tablet Take 1 tablet (20 mg) by mouth daily 90 tablet 1     Diclofenac Sodium 1.5 % SOLN Apply 20 drops to each hand or 40 drops to each knee up to 4 times daily as needed for arthritis pain (Patient not taking: Reported on 4/9/2020) 450 mL 3     nitroGLYcerin (NITROSTAT) 0.3 MG sublingual tablet PLACE 1 TABLET UNDER THE TONGUE EVERY 5 MINUTES AS NEEDED FOR CHEST PAIN (Patient not taking: Reported on 4/9/2020) 25 tablet 11       Review of Systems   Constitutional: Negative for fatigue and fever.   Respiratory: Negative for cough and shortness of breath.    Cardiovascular: Negative for chest pain and peripheral edema.   Musculoskeletal: Negative for arthralgias, back pain, gait problem, joint swelling, myalgias, neck pain and neck stiffness.   Neurological: Negative for dizziness, weakness, light-headedness and headaches.   Hematological: Bruises/bleeds easily.        OBJECTIVE:     /78 (BP Location: Right arm, Patient Position: Sitting, Cuff Size: Adult Large)   Pulse 62   Temp 97.7  F (36.5  C) (Tympanic)   Resp 24   Wt 83.9 kg (185 lb)   LMP  (LMP Unknown)   SpO2 95%    Breastfeeding No   BMI 29.86 kg/m    Body mass index is 29.86 kg/m .  Physical Exam  Constitutional:       General: She is not in acute distress.     Appearance: She is not ill-appearing or diaphoretic.   Cardiovascular:      Rate and Rhythm: Normal rate.      Heart sounds: No murmur.   Pulmonary:      Effort: Pulmonary effort is normal. No respiratory distress.      Breath sounds: No wheezing.   Musculoskeletal:      Comments: Hematoma right posterior forearm 4 x 4 cm  Minimal bruising on left dorsal hand    No other bruising or signs of trauma    ;left knee exam is normal, no effusion/swelling, FROM   Neurological:      General: No focal deficit present.      Mental Status: She is alert and oriented to person, place, and time.      Cranial Nerves: No cranial nerve deficit.      Motor: No weakness.      Gait: Gait normal.             ASSESSMENT/PLAN:           ICD-10-CM    1. Contusion of right upper extremity, initial encounter  S40.021A    2. Fall, initial encounter  W19.XXXA    3. Traumatic ecchymosis of right hand, initial encounter  S60.221A        Recommend ice and rest.  He has to avoid NSAIDs due to concurrent Xarelto use.  We will watch for expansion of the hematoma under right forearm.  Suspect the cause was related to being n.p.o. and drinking contrast.  Did not have any preceding symptoms.  Had a CT scan done today and will be followed up with Dr. hCidi alberts for those results.      Natalia Ellis MD  [facility]

## 2020-04-20 LAB — LAB SCANNED RESULT: NORMAL

## 2020-04-23 ENCOUNTER — VIRTUAL VISIT (OUTPATIENT)
Dept: ONCOLOGY | Facility: OTHER | Age: 65
End: 2020-04-23
Attending: INTERNAL MEDICINE
Payer: MEDICARE

## 2020-04-23 DIAGNOSIS — I26.99 ACUTE PULMONARY EMBOLISM WITHOUT ACUTE COR PULMONALE, UNSPECIFIED PULMONARY EMBOLISM TYPE (H): Primary | ICD-10-CM

## 2020-04-23 DIAGNOSIS — R55 SYNCOPE, UNSPECIFIED SYNCOPE TYPE: ICD-10-CM

## 2020-04-23 PROCEDURE — 99443 ZZC PHYSICIAN TELEPHONE EVALUATION 21-30 MIN: CPT | Performed by: INTERNAL MEDICINE

## 2020-04-23 ASSESSMENT — PAIN SCALES - GENERAL: PAINLEVEL: MODERATE PAIN (4)

## 2020-04-23 NOTE — PROGRESS NOTES
"Ankita Day is a 65 year old female who is being evaluated via a billable telephone visit.      The patient has been notified of following:     \"This telephone visit will be conducted via a call between you and your physician/provider. We have found that certain health care needs can be provided without the need for a physical exam.  This service lets us provide the care you need with a short phone conversation.  If a prescription is necessary we can send it directly to your pharmacy.  If lab work is needed we can place an order for that and you can then stop by our lab to have the test done at a later time.    Telephone visits are billed at different rates depending on your insurance coverage. During this emergency period, for some insurers they may be billed the same as an in-person visit.  Please reach out to your insurance provider with any questions.    If during the course of the call the physician/provider feels a telephone visit is not appropriate, you will not be charged for this service.\"    Patient has given verbal consent for Telephone visit?  Yes    Phone call duration: 25 minutes          "

## 2020-04-23 NOTE — PROGRESS NOTES
Visit Date:   04/23/2020      HEMATOLOGY/ONCOLOGY CLINIC NOTE/TELEPHONE VIRTUAL VISIT      HISTORY OF PRESENT ILLNESS: This is a billable telephone visit performed due to the COVID-19 bio  pandemic.  Mrs. Day is being evaluated for pulmonary embolism, rule out hypercoagulable state.  We had seen the patient in consultation at the request of Dr. Cano on 4/9/2020.  At that time, Ms. Day was a 65-year-old white female with a history of coronary artery disease, chronic pain syndrome, hyperlipidemia, hypertension whom we were asked to evaluate concerning pulmonary embolism, rule out hypercoagulable state.  Apparently, she presented to the Emergency Room on 1/2/2020 with complaints of chest pain and associated shortness of breath x 5 days.  She took nitroglycerin without improvement.  She underwent CT chest which reveals segmental subsegmental emboli bilaterally.  She was started on Lovenox in the Emergency Room and transitioned to Xarelto.  She was counseled to stop smoking as she was a smoker.  She was referred for evaluation of unprovoked pulmonary embolism.  She denied any antecedent trauma, surgery or sedentary state.  She says she has had chronic sciatic pain but also complained of some right lower quadrant abdominal pain.  She is convinced that it was in her bladder.  She did have a smoking history of at least 40 years, smoking a pack per day.  She says she quit with her first heart attack.  Apparently, she still smokes periodically.  She was on chronic pain meds.  She is currently on Percocet, taking up to 8 per day.  She stated there was a family history with her mother dying suddenly, and she thinks it was due to a pulmonary embolus.  When we saw her, we wanted to rule out hypercoagulable state.  We obtained prothrombin 2 mutation, factor V Leiden.  These came back negative.  Antithrombin III was negative.  Antiphospholipid profile was negative.  Sed rate, rheumatoid factor, ENEDELIA was negative.   Homocysteine level was normal.  MTHFR DNA mutation was normal.  We did obtain scans including CT neck, chest, abdomen and pelvis.  These were negative for malignancy.  In the interim, apparently the patient was transferred on  with chest pain to [facility] and was ruled out for MI, but it was questionable whether she would need a coronary angiogram given her prior history of coronary artery disease.  Nonetheless, the patient says she has been having falling spells.  She does not really know why, but she just falls.  She denies any loss of consciousness.  She has fallen at [employer].  She wonders if she needs angiogram.  Otherwise, no other complaints.  Physical examination could not be performed due to COVID-19 pandemic.  Laboratories as above.      IMPRESSION:  Unprovoked pulmonary embolism.  No evidence of hypercoagulable state at this point.  We did not check protein C and S but nonetheless, given the fact it was unprovoked, the fact that there is a family history, and she does have a history of coronary artery disease, would favor keeping her on Xarelto indefinitely.  The patient agreed and wants to proceed.  Otherwise, we will refer to Juanita Wang, concerning her falling spells and whether she needs an angiogram or not.  Otherwise, we will plan on seeing her in 6 months.  Obtain CBC, CMP, LDH and a D-dimer, as well as SPEP.        Twenty-five minutes was spent on this telephone virtual visit.         ESTHER ANDREW MD             D: 2020   T: 2020   MT:       Name:     DARRIAN JURADO   MRN:      [id number]        Account:      [id number]   :      1955           Visit Date:   2020      Document: [id number]       cc: Ramirez Cano MD

## 2020-04-27 DIAGNOSIS — R51.9 LEFT-SIDED HEADACHE: ICD-10-CM

## 2020-04-27 RX ORDER — METOCLOPRAMIDE 10 MG/1
10 TABLET ORAL EVERY 6 HOURS PRN
Qty: 30 TABLET | Refills: 1 | Status: SHIPPED | OUTPATIENT
Start: 2020-04-27 | End: 2020-06-15

## 2020-04-27 NOTE — TELEPHONE ENCOUNTER
[facility] sent Rx request for the following:      METOCLOPRAMIDE 10 MG TABLET   Sig: Take 1 tablet (10 mg) by mouth every 6 hours as needed (headache)       Last Prescription Date:   4/1/2020  Last Fill Qty/Refills:         30, R-1    Last Office Visit:              4/16/2020   Future Office visit:           5/13/2020      Prescription refilled per RN Medication Refill Policy.................... Nikos Toure RN ....................  4/27/2020   2:06 PM

## 2020-05-06 ENCOUNTER — OFFICE VISIT (OUTPATIENT)
Dept: CARDIOLOGY | Facility: OTHER | Age: 65
End: 2020-05-06
Attending: INTERNAL MEDICINE
Payer: MEDICARE

## 2020-05-06 VITALS
OXYGEN SATURATION: 96 % | HEART RATE: 60 BPM | BODY MASS INDEX: 29.86 KG/M2 | DIASTOLIC BLOOD PRESSURE: 80 MMHG | SYSTOLIC BLOOD PRESSURE: 120 MMHG | TEMPERATURE: 98 F | RESPIRATION RATE: 20 BRPM | HEIGHT: 66 IN

## 2020-05-06 DIAGNOSIS — I26.99 ACUTE PULMONARY EMBOLISM WITHOUT ACUTE COR PULMONALE, UNSPECIFIED PULMONARY EMBOLISM TYPE (H): ICD-10-CM

## 2020-05-06 DIAGNOSIS — R29.6 RECURRENT FALLS: ICD-10-CM

## 2020-05-06 DIAGNOSIS — I25.9 CHRONIC ISCHEMIC HEART DISEASE: ICD-10-CM

## 2020-05-06 DIAGNOSIS — N18.2 CKD (CHRONIC KIDNEY DISEASE) STAGE 2, GFR 60-89 ML/MIN: ICD-10-CM

## 2020-05-06 DIAGNOSIS — I10 ESSENTIAL HYPERTENSION: ICD-10-CM

## 2020-05-06 DIAGNOSIS — I20.89 CHRONIC STABLE ANGINA (H): Primary | ICD-10-CM

## 2020-05-06 DIAGNOSIS — Z95.1 HISTORY OF CORONARY ARTERY BYPASS GRAFT X 3: ICD-10-CM

## 2020-05-06 DIAGNOSIS — E78.2 MIXED HYPERLIPIDEMIA: ICD-10-CM

## 2020-05-06 DIAGNOSIS — Z95.5 HISTORY OF CORONARY ARTERY STENT PLACEMENT: ICD-10-CM

## 2020-05-06 DIAGNOSIS — I25.10 ASCVD (ARTERIOSCLEROTIC CARDIOVASCULAR DISEASE): ICD-10-CM

## 2020-05-06 DIAGNOSIS — Z98.890 STATUS POST CORONARY ANGIOGRAM: ICD-10-CM

## 2020-05-06 LAB
ANION GAP SERPL CALCULATED.3IONS-SCNC: 7 MMOL/L (ref 3–14)
BASOPHILS # BLD AUTO: 0.1 10E9/L (ref 0–0.2)
BASOPHILS NFR BLD AUTO: 1.2 %
BUN SERPL-MCNC: 16 MG/DL (ref 7–25)
CALCIUM SERPL-MCNC: 9.3 MG/DL (ref 8.6–10.3)
CHLORIDE SERPL-SCNC: 102 MMOL/L (ref 98–107)
CO2 SERPL-SCNC: 27 MMOL/L (ref 21–31)
CREAT SERPL-MCNC: 1.44 MG/DL (ref 0.6–1.2)
DIFFERENTIAL METHOD BLD: ABNORMAL
EOSINOPHIL # BLD AUTO: 0.2 10E9/L (ref 0–0.7)
EOSINOPHIL NFR BLD AUTO: 2.7 %
ERYTHROCYTE [DISTWIDTH] IN BLOOD BY AUTOMATED COUNT: 13.7 % (ref 10–15)
GFR SERPL CREATININE-BSD FRML MDRD: 36 ML/MIN/{1.73_M2}
GLUCOSE SERPL-MCNC: 145 MG/DL (ref 70–105)
HCT VFR BLD AUTO: 36.1 % (ref 35–47)
HGB BLD-MCNC: 11.5 G/DL (ref 11.7–15.7)
IMM GRANULOCYTES # BLD: 0 10E9/L (ref 0–0.4)
IMM GRANULOCYTES NFR BLD: 0.3 %
LYMPHOCYTES # BLD AUTO: 2.9 10E9/L (ref 0.8–5.3)
LYMPHOCYTES NFR BLD AUTO: 38.7 %
MCH RBC QN AUTO: 28.5 PG (ref 26.5–33)
MCHC RBC AUTO-ENTMCNC: 31.9 G/DL (ref 31.5–36.5)
MCV RBC AUTO: 89 FL (ref 78–100)
MONOCYTES # BLD AUTO: 0.7 10E9/L (ref 0–1.3)
MONOCYTES NFR BLD AUTO: 9.4 %
NEUTROPHILS # BLD AUTO: 3.5 10E9/L (ref 1.6–8.3)
NEUTROPHILS NFR BLD AUTO: 47.7 %
PLATELET # BLD AUTO: 321 10E9/L (ref 150–450)
POTASSIUM SERPL-SCNC: 4.4 MMOL/L (ref 3.5–5.1)
RBC # BLD AUTO: 4.04 10E12/L (ref 3.8–5.2)
SODIUM SERPL-SCNC: 136 MMOL/L (ref 134–144)
WBC # BLD AUTO: 7.4 10E9/L (ref 4–11)

## 2020-05-06 PROCEDURE — 85025 COMPLETE CBC W/AUTO DIFF WBC: CPT | Mod: ZL | Performed by: NURSE PRACTITIONER

## 2020-05-06 PROCEDURE — G0463 HOSPITAL OUTPT CLINIC VISIT: HCPCS

## 2020-05-06 PROCEDURE — 36415 COLL VENOUS BLD VENIPUNCTURE: CPT | Mod: ZL | Performed by: NURSE PRACTITIONER

## 2020-05-06 PROCEDURE — 99215 OFFICE O/P EST HI 40 MIN: CPT | Performed by: NURSE PRACTITIONER

## 2020-05-06 PROCEDURE — 80048 BASIC METABOLIC PNL TOTAL CA: CPT | Mod: ZL | Performed by: NURSE PRACTITIONER

## 2020-05-06 RX ORDER — ROSUVASTATIN CALCIUM 10 MG/1
10 TABLET, COATED ORAL AT BEDTIME
Qty: 90 TABLET | Refills: 3 | Status: SHIPPED | OUTPATIENT
Start: 2020-05-06 | End: 2020-07-01

## 2020-05-06 RX ORDER — METOPROLOL SUCCINATE 25 MG/1
25 TABLET, EXTENDED RELEASE ORAL 2 TIMES DAILY
Qty: 60 TABLET | Refills: 3 | Status: SHIPPED | OUTPATIENT
Start: 2020-05-06 | End: 2020-05-13

## 2020-05-06 ASSESSMENT — ANXIETY QUESTIONNAIRES
IF YOU CHECKED OFF ANY PROBLEMS ON THIS QUESTIONNAIRE, HOW DIFFICULT HAVE THESE PROBLEMS MADE IT FOR YOU TO DO YOUR WORK, TAKE CARE OF THINGS AT HOME, OR GET ALONG WITH OTHER PEOPLE: NOT DIFFICULT AT ALL
5. BEING SO RESTLESS THAT IT IS HARD TO SIT STILL: NOT AT ALL
1. FEELING NERVOUS, ANXIOUS, OR ON EDGE: NOT AT ALL
GAD7 TOTAL SCORE: 0
3. WORRYING TOO MUCH ABOUT DIFFERENT THINGS: NOT AT ALL
7. FEELING AFRAID AS IF SOMETHING AWFUL MIGHT HAPPEN: NOT AT ALL
6. BECOMING EASILY ANNOYED OR IRRITABLE: NOT AT ALL
2. NOT BEING ABLE TO STOP OR CONTROL WORRYING: NOT AT ALL

## 2020-05-06 ASSESSMENT — PAIN SCALES - GENERAL: PAINLEVEL: MODERATE PAIN (4)

## 2020-05-06 ASSESSMENT — PATIENT HEALTH QUESTIONNAIRE - PHQ9
5. POOR APPETITE OR OVEREATING: NOT AT ALL
SUM OF ALL RESPONSES TO PHQ QUESTIONS 1-9: 1

## 2020-05-06 NOTE — PATIENT INSTRUCTIONS
You were seen by  JOSELYN Kilpatrick CNP       1. Laboratory blood work has been ordered.  You will be notified by phone call or [employer] message when the results are available.       2. START metoprolol succinate ER (TOPROL XL) 25 MG 24 hr tablet Take 1 tablet (25 mg) by mouth 2 times daily. Prescription sent to pharmacy.    3. STOP metoprolol tartrate (LOPRESSOR) 50 MG tablet (Discontinued)     4. No other changes at this time.        You will follow up with [facility] in 4 weeks, sooner if needed.       Please call the cardiology office with problems, questions, or concerns at [phone].    If you experience chest pain, chest pressure, chest tightness, shortness of breath, fainting, lightheadedness, nausea, vomiting, or other concerning symptoms, please report to the Emergency Department or call 911. These symptoms may be emergent, and best treated in the Emergency Department.     Cardiology Nurses  VIOLET Starks, MAULIK WRAY, MAULIK  [facility] ([facility])  [phone]

## 2020-05-06 NOTE — NURSING NOTE
"Chief Complaint   Patient presents with     Follow Up     syncope       Initial /80 (BP Location: Right arm, Patient Position: Sitting, Cuff Size: Adult Regular)   Pulse 60   Temp 98  F (36.7  C) (Tympanic)   Resp 20   Ht 1.676 m (5' 6\")   LMP  (LMP Unknown)   SpO2 96%   BMI 29.86 kg/m   Estimated body mass index is 29.86 kg/m  as calculated from the following:    Height as of this encounter: 1.676 m (5' 6\").    Weight as of 4/16/20: 83.9 kg (185 lb).  Meds Reconciled: complete  Pt is not on Aspirin  Pt is on a Statin  PHQ and/or YAYA reviewed. Pt referred to PCP/MH Provider as appropriate.    Taylor Owusu LPN      "

## 2020-05-06 NOTE — PROGRESS NOTES
"[facility] HEART CARE   CARDIOLOGY PROGRESS NOTE    Ankita Day   [id number] [address] [id number]      Ramirez Cano     Chief Complaint   Patient presents with     Follow Up     syncope        HPI:   Ms. Day is a 65 year old patient who presents for cardiology follow-up to visit on 2/5/2020 with history of ischemic heart disease. Patient has a history of hypertension, hyperlipidemia, CAD, history of pulmonary embolism, left subclavian artery stenosis, anxiety, collagenous colitis, depression, osteoarthritis, history of tobacco use, central sleep apnea for which she is not compliant with CPAP use, history of C. difficile, history of multiple concussions, iron deficiency anemia, stage II CKD, myofascial pain, vitamin D deficiency, lumbar facet arthropathy, history of gastric ulcer with hemorrhage, COPD and peptic ulcer disease.    Patient has a known history of ischemic heart disease, she underwent  coronary angiogram and bypass angiogram on 9/15/2017 which was described as having stable disease. Mild to moderate 3 vessel CAD involving RCA, LAD and LCX with <50% stenosis at the greatest.  Occluded RAFI and SVG.  Patent LIMA.  No new obstructive lesions were identified and normal left heart cath.      She has a history of CABG on 2/12/03 x 3, history of multiple stent placements, patient reported 17 total.  Additionally, she has a history of tobacco abuse, sleep apnea, anxiety/depression, COPD, hypertension and history of left subclavian steal syndrome involving the LIMA status post stent placement.    At previous visit patient reported occasional recurrence of chest pain, not as severe as last ED visit on 10/25/19. She reported \"my breathing has been bad\", describes worsening CORTEZ. She described activity intolerance and little to no energy. Recommended repeat stress testing. NM lexiscan stress test was performed on 12/16/19 which was negative for inducible myocardial ischemia or infarction.  Left " ventricular function was normal.    Carotid US on 12/12/19 without significant stenosis, mild atherosclerotic disease present.  Abdominal ultrasound on 12/12/2018 with no abdominal aortic aneurysm identified.    Since her last visit patient was seen in the ED with dyspnea in early January 2020. She was identified to have small segmental and subsegmental emboli bilaterally. Repeat imaging on 1/6 with decreasing volume of pulmonary emboli compared to scan on 1/2/2020. She was initially treated with Lovanox in the ED and discharged on Xarelto oral anticoagulation which has been going well for her, no bleeding complication. She also remains on Plavix with her significant ischemic heart disease history.     At her last visit on 2/5/2020 patient reported doing very well. Anginal symptoms significantly improved since starting Ranexa, she was very happy with this. Breathing has improved some since diagnosed with PE.     INTERVAL HISTORY:  Since her last visit patient reports that chest pain has reoccurred, typical to her past angina. She was seen in the ED on 4/11/2020 with angina, no acute EKG changes or troponin elevation, she was transferred to [facility] due to her significant CAD history for further rule out. She was monitored at [facility] with negative serial troponin's and no EKG changes. She was discharged home on 4/12/2020. Today she reports that chest pain has improved again, no changes in breathing and no increased edema.     Starting later this winter she had a fall with repeat fall again about one month ago. Denies feeling lightheadedness or weakness with falls. No vertigo. No ataxia and no unilateral changes. She denies this being a syncope or near syncope, no LOC. Patient reports that she feels her falls are related to spikes in her BP. Declines wearing cardiac monitor, does not feel related to arrhythmia and no palpitations.     PAST MEDICAL HISTORY:   Past Medical History:   Diagnosis Date     Acute  ischemic heart disease (H)     06/15/2007,with PTCA and stenting of 90% osteo circumflex lesion and patent LAD graft, patent left main stent.     Acute myocardial infarction (H)     3/30/2013     Anxiety disorder     No Comments Provided     Atherosclerotic heart disease of native coronary artery without angina pectoris     -angio revealed 3 vessel dz  -4 stents placed; 2 overlapping stents placed in mLAD, 1 stent pRCA, 1 stent pCirc 1 s 9/02 -non-ST elevation MI 1/03  -angio revealed restenosis of Circ.    -PTCA and brachytherapy of pCirc -repeat angio 2/03 -no intervension -CABG x3 12/03 - Dr Sinclair  -LIMA-LAD, RAFI-RCA, SVG-OM -PCI 7/04 stent to L -CTangio 9/05   -patentent LIMA-LAD. RAFI-RCA occluded; RCA ostio/p*     Bilateral carpal tunnel syndrome     No Comments Provided     Cervicalgia     No Comments Provided     Chest pain     12/29/2014     Chronic gastric ulcer without hemorrhage or perforation     10/03/2011,hx of GI bleed (2003)     Chronic ischemic heart disease     06/27/2012     Chronic obstructive pulmonary disease (H)     06/15/2007,low DLCO, normal spirometry     Chronic or unspecified gastric ulcer with hemorrhage     10/2003     Chronic pain syndrome     12/01/2010,chest wall, back     Constipation     11/29/2011     Coronary angioplasty status     09/12/2002,with triple stenting     Coronary angioplasty status     01/10/2003,repeat angioplasty     Coronary angioplasty status     No Comments Provided     Dorsalgia     05/31/2011     Encounter for other administrative examinations     1/28/2014     Encounter for screening for cardiovascular disorders     10/2004,Cardiolite (2004, 2005, 2006 and 2011)     Enterocolitis due to Clostridium difficile     8/19/2016     Essential (primary) hypertension     No Comments Provided     Hyperlipidemia     No Comments Provided     Major depressive disorder, single episode     Severe, hx of suicide attempt/hospitalization     Migraine without status  migrainosus, not intractable     No Comments Provided     Nodular corneal degeneration     2011     Noninfective gastroenteritis and colitis     06/15/2007,history of     Noninfective gastroenteritis and colitis     Microcytic     Osteoarthritis     No Comments Provided     Other chest pain     10/13/2009,chronic     Pain in knee     2011     Pain in right shoulder     No Comments Provided     Panic disorder without agoraphobia     No Comments Provided     Peptic ulcer without hemorrhage or perforation     6/15/2007     Peripheral vascular disease (H)     No Comments Provided     Personal history of diseases of the blood and blood-forming organs and certain disorders involving the immune mechanism (CODE)     No Comments Provided     Personal history of nicotine dependence     No Comments Provided     Personal history of other medical treatment (CODE)     10/14/2013     Presence of aortocoronary bypass graft     2003     Primary central sleep apnea     10/14/2013     Sepsis due to Escherichia coli (E. coli) (H)     16,[facility]     Stricture of artery (H)     3/31/2013,S/p prox left SCA stent 2013     Thoracic, thoracolumbar and lumbosacral intervertebral disc disorder     No Comments Provided     Uncomplicated opioid abuse (H)     history of     Vitamin D deficiency     2013          FAMILY HISTORY:   Family History   Problem Relation Age of Onset     Heart Disease Father         Heart Disease,Heart condition/Significant for atherosclerotic cardiovascular disease, but non premature.     Colon Cancer Father         Cancer-colon, of colon cancer     Cancer Father         Cancer,mets to liver, secondary to colon cancer     Heart Disease Mother         Heart Disease     Cancer Other         Cancer,Multiple Myeloma     Heart Disease Other         Heart Disease,Ischemic Heart Disease     Colon Cancer Other         Cancer-colon,Malignant neoplasms     Cancer Sister         Cancer,multiple  myeloma     Other - See Comments Son         gallstones          PAST SURGICAL HISTORY:   Past Surgical History:   Procedure Laterality Date     ANGIOPLASTY      9/12/02,with triple stenting     APPENDECTOMY OPEN      No Comments Provided     ARTHROSCOPY KNEE      left     ARTHROSCOPY SHOULDER Right 05/12/2017    labral tear, rotator cuff tear and some subacromial decompression      BYPASS GRAFT ARTERY CORONARY      12/13/02,Triple bypass, left internal mammary  to LAD, right internal mammary to right coronary artery, saphenous to obtuse marginal of the left circumflex.     COLONOSCOPY      2011,Dr Bowman benign polyps     COLONOSCOPY  10/03/2011    2011,benign polyps, Dr. Bowman     COLONOSCOPY  08/08/2016 8/8/16,normal, Dr Bowman     ELBOW SURGERY      baby,birth malachi removed from right arm     EMBOLECTOMY UPPER EXTREMITY  04/02/2013    brachial artery pseudoaneurysm after stenting     ESOPHAGOSCOPY, GASTROSCOPY, DUODENOSCOPY (EGD), COMBINED      2011,EGD Dr Bowman with pyloric ulcer     ESOPHAGOSCOPY, GASTROSCOPY, DUODENOSCOPY (EGD), COMBINED      2011,pyloric ulcer, Dr. Bowman     ESOPHAGOSCOPY, GASTROSCOPY, DUODENOSCOPY (EGD), COMBINED      8/8/16,mild gastritis, Dr Bowman     ESOPHAGOSCOPY, GASTROSCOPY, DUODENOSCOPY (EGD), COMBINED      11/27/2017,Dr Bowman. Antral ulcer     ESOPHAGOSCOPY, GASTROSCOPY, DUODENOSCOPY (EGD), COMBINED  02/02/2018    Dr Bowman, healed ulcer     HYSTERECTOMY TOTAL ABDOMINAL      age 21     LAPAROSCOPIC CHOLECYSTECTOMY      2006     OSTEOTOMY FEMUR DISTAL      x3, right knee     OSTEOTOMY FEMUR DISTAL      2000,left knee  ligament surgery     OTHER SURGICAL HISTORY      1/10/2003,,PTCA     OTHER SURGICAL HISTORY      09/20/2012,,PTCA,DELONTE in LAD and left main     OTHER SURGICAL HISTORY      4/1/2013,,PTCA,L subclavian stenosis     SALPINGO-OOPHORECTOMY BILATERAL      age 27,Bilateral salpingo-oophorectomy     TONSILLECTOMY, ADENOIDECTOMY, COMBINED      childhood           SOCIAL HISTORY:   Social History     Socioeconomic History     Marital status:      Spouse name: None     Number of children: None     Years of education: None     Highest education level: None   Occupational History     None   Social Needs     Financial resource strain: None     Food insecurity:     Worry: None     Inability: None     Transportation needs:     Medical: None     Non-medical: None   Tobacco Use     Smoking status: Former Smoker     Packs/day: 0.25     Years: 35.00     Pack years: 8.75     Types: Cigarettes     Last attempt to quit: 2/15/2017     Years since quittin.2     Smokeless tobacco: Never Used   Substance and Sexual Activity     Alcohol use: Yes     Alcohol/week: 0.0 oz     Comment: Alcoholic Drinks/day: rare     Drug use: No     Comment: Drug use: No     Sexual activity: Never     Partners: Male   Lifestyle     Physical activity:     Days per week: None     Minutes per session: None     Stress: None   Relationships     Social connections:     Talks on phone: None     Gets together: None     Attends Gnosticist service: None     Active member of club or organization: None     Attends meetings of clubs or organizations: None     Relationship status: None     Intimate partner violence:     Fear of current or ex partner: None     Emotionally abused: None     Physically abused: None     Forced sexual activity: None   Other Topics Concern     Parent/sibling w/ CABG, MI or angioplasty before 65F 55M? Not Asked   Social History Narrative    ,  Steven.  Currently not working outside the home. Lives [place]-mile self [place] met with . Tobacco abuse, quit , restarted. Quit  and has since quit, no alcohol.          CURRENT MEDICATIONS:   Prior to Admission medications    Medication Sig Start Date End Date Taking? Authorizing Provider   albuterol (PROAIR HFA/PROVENTIL HFA/VENTOLIN HFA) 108 (90 Base) MCG/ACT inhaler Inhale 2 puffs into the lungs every 6 hours 19  1/23/20 Yes Ramirez Cano MD   amLODIPine (NORVASC) 10 MG tablet Take 1 tablet (10 mg) by mouth daily 11/21/18  Yes Ramirez Cano MD   aspirin EC 81 MG EC tablet Take 81 mg by mouth daily with food   Yes Reported, Patient   busPIRone (BUSPAR) 10 MG tablet TAKE 1 TABLET TWICE A DAY 11/7/18  Yes Ramirez Cano MD   clonazePAM (KLONOPIN) 1 MG tablet Take 1 tablet (1 mg) by mouth 3 times daily as needed for anxiety #75 per 30 days, #225 pills per 90 days 4/3/19  Yes Ramirez Cano MD   clopidogrel (PLAVIX) 75 MG tablet Take 1 tablet (75 mg) by mouth daily 11/21/18  Yes Ramirez Cano MD   cyclobenzaprine (FLEXERIL) 10 MG tablet Take 1 tablet (10 mg) by mouth 2 times daily as needed for muscle spasms 11/21/18  Yes Ramirez Cano MD   Diclofenac Sodium 1.5 % SOLN Apply 20 drops to each hand or 40 drops to each knee up to 4 times daily as needed for arthritis pain 11/21/18  Yes Ramirez Cano MD   docusate sodium (COLACE) 50 MG capsule Take 50 mg by mouth daily   Yes Reported, Patient   doxycycline hyclate (VIBRAMYCIN) 100 MG capsule Take 1 capsule (100 mg) by mouth 2 times daily for 14 days 5/13/19 5/27/19 Yes Mikel Ashraf PA-C   DULoxetine (CYMBALTA) 60 MG EC capsule Take 1 capsule (60 mg) by mouth daily 8/23/18  Yes Ramirez Cano MD   gabapentin (NEURONTIN) 600 MG tablet Take 1 tablet (600 mg) by mouth 3 times daily 1/23/19  Yes Ramirez Cano MD   lisinopril (PRINIVIL/ZESTRIL) 40 MG tablet Take 1 tablet (40 mg) by mouth daily 4/23/19  Yes Ramirez Cano MD   metoprolol tartrate (LOPRESSOR) 50 MG tablet Take 1 tablet (50 mg) by mouth 2 times daily 11/21/18  Yes Ramirez Cano MD   NITROSTAT 0.3 MG sublingual tablet PLACE 1 TABLET UNDER THE TONGUE EVERY 5 MINUTES AS NEEDED FOR CHEST PAIN 4/9/18  Yes Ramirez Cano MD   ondansetron (ZOFRAN) 4 MG tablet Take 4 mg by mouth every 6 hours as needed for nausea 10/31/17  Yes Reported, Patient   oxyCODONE-acetaminophen  (PERCOCET) 5-325 MG tablet Take 2 tablets by mouth 4 times daily Max acetaminophen dose: 4000mg in 24 hrs 5/23/19  Yes Ramirez Cano MD   pantoprazole (PROTONIX) 40 MG EC tablet TAKE 1 TABLET TWICE A DAY 1/22/19  Yes Ramirez Cano MD   pravastatin (PRAVACHOL) 40 MG tablet Take 1 tablet (40 mg) by mouth At Bedtime 1/23/19  Yes Ramirez Cano MD   saccharomyces boulardii (FLORASTOR) 250 MG capsule Take 250 mg by mouth 2 times daily   Yes Reported, Patient   sucralfate (CARAFATE) 1 GM tablet Take 1 tablet (1 g) by mouth 4 times daily Before meals & at bedtime 8/23/18  Yes Ramirez Cano MD   VITAMIN D, CHOLECALCIFEROL, PO Take 5,000 Units by mouth daily   Yes Reported, Patient          ALLERGIES:   Allergies   Allergen Reactions     Atorvastatin Muscle Pain (Myalgia)     Tiotropium Bromide [Tiotropium] Rash     Ezetimibe Muscle Pain (Myalgia)     Latex Rash     Niacin      Other reaction(s): Flushing     No Clinical Screening - See Comments Itching, Rash and Blisters     Metals and plastics       Tape [Adhesive Tape] Rash          ROS:   CONSTITUTIONAL: No fever and chills. No changes in weight.   ENT: No visual disturbance, ear ache, epistaxis or sore throat.   CARDIOVASCULAR: Positive for chronic recurrent angina. No palpitations or increased lower extremity edema.   RESPIRATORY: Chronic shortness of breath with exertional dyspnea. No cough, wheezing or hemoptysis.   GI: No reported abdominal pain. Positive for chronic loose stools.  : No reported hematuria or dysuria. Positive for bilateral flank pain and urinary urgency. Recent UA without UTI.  NEUROLOGICAL: Positive for chronic headaches. No lightheadedness, dizziness, syncope, ataxia, paresthesias or weakness. Positive for recurrent falls.   HEMATOLOGIC: No history of anemia. No bleeding or excessive bruising. Positive for history of PE.  MUSCULOSKELETAL: No reported new joint pain or swelling, myalgias resolved.    ENDOCRINOLOGIC: No  "temperature intolerance. No hair or skin changes.  SKIN: No abnormal rashes or sores, no unusual itching.  PSYCHIATRIC: Positive for history of anxiety and depression.      PHYSICAL EXAM:   /80 (BP Location: Right arm, Patient Position: Sitting, Cuff Size: Adult Regular)   Pulse 60   Temp 98  F (36.7  C) (Tympanic)   Resp 20   Ht 1.676 m (5' 6\")   LMP  (LMP Unknown)   SpO2 96%   BMI 29.86 kg/m    GENERAL: The patient is a well-developed, well-nourished, in no apparent distress.  HEENT: Head is normocephalic and atraumatic. Eyes are symmetrical with normal visual tracking. No icterus, no xanthelasmas. Nares appeared normal without nasal drainage. Mucous membranes are moist, no cyanosis.  NECK: Supple.   CHEST/ LUNGS: Lungs diminished over bases. No rales, rhonchi or wheezes, no use of accessory muscles, no retractions, respirations unlabored and normal respiratory rate.   CARDIO: Regular rate and rhythm normal with S1 and S2, no S3 or S4 and no murmur, click or rub.   ABD: Abdomen is nondistended.   EXTREMITIES: No LE edema present.   MUSCULOSKELETAL: No visible joint swelling.   NEUROLOGIC: Alert and oriented X3. Normal speech, gait and affect. No focal neurologic deficits.   SKIN: No jaundice. No rashes or visible skin lesions present. No ecchymosis.       LAB RESULTS:   Office Visit on 05/13/2019   Component Date Value Ref Range Status     Troponin I ES 05/13/2019 <0.030  0.000 - 0.034 ug/L Final     A Phagocytophil IgG 05/13/2019 >1:1,280* <1:80 Final     A Phagocytophil IgM 05/13/2019 > 1:256  <1:16 Final     Lyme Disease Antibodies Serum 05/13/2019 0.10  0.00 - 0.89 Final     Sodium 05/13/2019 137  134 - 144 mmol/L Final     Potassium 05/13/2019 3.4* 3.5 - 5.1 mmol/L Final     Chloride 05/13/2019 103  98 - 107 mmol/L Final     Carbon Dioxide 05/13/2019 23  21 - 31 mmol/L Final     Anion Gap 05/13/2019 11  3 - 14 mmol/L Final     Glucose 05/13/2019 117* 70 - 105 mg/dL Final     Urea Nitrogen " 05/13/2019 23  7 - 25 mg/dL Final     Creatinine 05/13/2019 1.07  0.60 - 1.20 mg/dL Final     GFR Estimate 05/13/2019 51* >60 mL/min/[1.73_m2] Final     GFR Estimate If Black 05/13/2019 62  >60 mL/min/[1.73_m2] Final     Calcium 05/13/2019 9.3  8.6 - 10.3 mg/dL Final     Bilirubin Total 05/13/2019 1.1* 0.3 - 1.0 mg/dL Final     Albumin 05/13/2019 4.3  3.5 - 5.7 g/dL Final     Protein Total 05/13/2019 7.3  6.4 - 8.9 g/dL Final     Alkaline Phosphatase 05/13/2019 107* 34 - 104 U/L Final     ALT 05/13/2019 37  7 - 52 U/L Final     AST 05/13/2019 39  13 - 39 U/L Final     WBC 05/13/2019 8.2  4.0 - 11.0 10e9/L Final     RBC Count 05/13/2019 3.91  3.8 - 5.2 10e12/L Final     Hemoglobin 05/13/2019 11.8  11.7 - 15.7 g/dL Final     Hematocrit 05/13/2019 35.0  35.0 - 47.0 % Final     MCV 05/13/2019 90  78 - 100 fl Final     MCH 05/13/2019 30.2  26.5 - 33.0 pg Final     MCHC 05/13/2019 33.7  31.5 - 36.5 g/dL Final     RDW 05/13/2019 14.0  10.0 - 15.0 % Final     Platelet Count 05/13/2019 190  150 - 450 10e9/L Final     Diff Method 05/13/2019 Automated Method   Final     % Neutrophils 05/13/2019 71.4  % Final     % Lymphocytes 05/13/2019 20.9  % Final     % Monocytes 05/13/2019 6.4  % Final     % Eosinophils 05/13/2019 0.5  % Final     % Basophils 05/13/2019 0.2  % Final     % Immature Granulocytes 05/13/2019 0.6  % Final     Absolute Neutrophil 05/13/2019 5.8  1.6 - 8.3 10e9/L Final     Absolute Lymphocytes 05/13/2019 1.7  0.8 - 5.3 10e9/L Final     Absolute Monocytes 05/13/2019 0.5  0.0 - 1.3 10e9/L Final     Absolute Eosinophils 05/13/2019 0.0  0.0 - 0.7 10e9/L Final     Absolute Basophils 05/13/2019 0.0  0.0 - 0.2 10e9/L Final     Abs Immature Granulocytes 05/13/2019 0.1  0 - 0.4 10e9/L Final     Platelet Estimate 05/13/2019 Automated count confirmed.  Platelet morphology is normal.   Final     RBC Morphology 05/13/2019 Consistent with reported results   Final     Specimen Description 05/13/2019 Blood   Final     Culture  Micro 05/13/2019 No growth after 6 days   Final          ASSESSMENT:   Ankita Day presents for cardiology follow-up to visit on 2/5/2020 with history of ischemic heart disease and chronic stable angina. Patient has a history of hypertension, hyperlipidemia, CAD, history of pulmonary embolism, left subclavian artery stenosis, anxiety, collagenous colitis, depression, osteoarthritis, history of tobacco use, central sleep apnea for which she is not compliant with CPAP use, history of C. difficile, history of multiple concussions, iron deficiency anemia, stage II CKD, myofascial pain, vitamin D deficiency, lumbar facet arthropathy, history of gastric ulcer with hemorrhage, COPD and peptic ulcer disease.  Patient has a known history of ischemic heart disease, she underwent  coronary angiogram and bypass angiogram on 9/15/2017 which was described as having stable disease.   NM lexiscan stress test was performed on 12/16/19 which was negative for inducible myocardial ischemia or infarction.  Left ventricular function was normal.  Carotid US on 12/12/19 without significant stenosis, mild atherosclerotic disease present.  Abdominal ultrasound on 12/12/2018 with no abdominal aortic aneurysm identified.    Since her last visit patient reports that chest pain has reoccurred, typical to her past angina. She was seen in the ED on 4/11/2020 with angina, no acute EKG changes or troponin elevation, she was transferred to [facility] due to her significant CAD history for further rule out. She was monitored at [facility] with negative serial troponin's and no EKG changes. She was discharged home on 4/12/2020. Today she reports that chest pain has improved again, no changes in breathing and no increased edema.   Starting later this winter she had a fall with repeat fall again about one month ago. Denies feeling lightheadedness or weakness with falls. No vertigo. No ataxia and no unilateral changes. She denies this being a syncope  or near syncope, no LOC. Patient reports that she feels her falls are related to spikes in her BP. Declines wearing cardiac monitor, does not feel related to arrhythmia and no palpitations.     1. Chronic stable angina (H)  2. ASCVD (arteriosclerotic cardiovascular disease)  3. Acute pulmonary embolism without acute cor pulmonale, unspecified pulmonary embolism type (H)  4. Chronic ischemic heart disease  5. CKD (chronic kidney disease) stage 2, GFR 60-89 ml/min  6. History of coronary artery stent placement  7. Essential hypertension  8. History of coronary artery bypass graft x 3  9. Recurrent falls  10. Status post coronary angiogram (2017)    PLAN:  1. History of 3V CABG (2003) and history of coronary stenting. Cor angio stable in 2017, lexiscan 5 months ago without ischemia.   Chronic stable angina for which she will continue on Ranexa 500 mg BID, adjusted beta blocker to Toprol XL 25 mg BID today and continue on Amlodipine 10 mg daily.    2. Recent falls without syncope, weakness, lightheadedness or vertigo. Patient believes secondary to spikes in BP, reports that her mother had this same thing. Does not feel weak prior to fall and no other associated symptoms. Adjustment of beta blocker should help level out her pressure with long acting. She declines cardaic monitoring, does not feel this is related to arrhythmia, no syncope of LOC with falls.     3. Recently identifed PE's in Jan 2020, Xarelto now with continued use of plavix. No complication with anemia or blood loss. She has seen hematology. Repeat CBC today.    4. Continue on Rosuvastatin to 10 mg daily.     5. CKD, reassess renal function today.    6. TTE with preserved LVEF, no RWMA, septum intact. No valvular disease or right heart strain.    Follow-up with cardiology in 4 weeks, certainly sooner if needed.     50 min spent with patient and >50% of visit counseling on above diagnoses and coordination of care with patient.     Thank you for allowing me  to participate in the care of your patient. Please do not hesitate to contact me if you have any questions.     Juanita Wang

## 2020-05-07 ASSESSMENT — ANXIETY QUESTIONNAIRES: GAD7 TOTAL SCORE: 0

## 2020-05-11 ENCOUNTER — TELEPHONE (OUTPATIENT)
Dept: FAMILY MEDICINE | Facility: OTHER | Age: 65
End: 2020-05-11

## 2020-05-11 NOTE — TELEPHONE ENCOUNTER
They are calling to inform [facility] that pt states [facility] told her she did not need to get stress test and angiogram done.  So they are going to cancel appts.

## 2020-05-13 ENCOUNTER — VIRTUAL VISIT (OUTPATIENT)
Dept: FAMILY MEDICINE | Facility: OTHER | Age: 65
End: 2020-05-13
Attending: FAMILY MEDICINE
Payer: MEDICARE

## 2020-05-13 VITALS — HEART RATE: 89 BPM

## 2020-05-13 DIAGNOSIS — G89.4 CHRONIC PAIN DISORDER: Primary | ICD-10-CM

## 2020-05-13 DIAGNOSIS — F41.9 CHRONIC ANXIETY: ICD-10-CM

## 2020-05-13 DIAGNOSIS — N18.2 CKD (CHRONIC KIDNEY DISEASE) STAGE 2, GFR 60-89 ML/MIN: ICD-10-CM

## 2020-05-13 DIAGNOSIS — G89.29 CHEST WALL PAIN, CHRONIC: ICD-10-CM

## 2020-05-13 DIAGNOSIS — D50.0 IRON DEFICIENCY ANEMIA DUE TO CHRONIC BLOOD LOSS: ICD-10-CM

## 2020-05-13 DIAGNOSIS — G89.29 CHRONIC BILATERAL LOW BACK PAIN WITHOUT SCIATICA: ICD-10-CM

## 2020-05-13 DIAGNOSIS — M54.50 CHRONIC BILATERAL LOW BACK PAIN WITHOUT SCIATICA: ICD-10-CM

## 2020-05-13 DIAGNOSIS — R07.89 CHEST WALL PAIN, CHRONIC: ICD-10-CM

## 2020-05-13 DIAGNOSIS — I25.10 ASCVD (ARTERIOSCLEROTIC CARDIOVASCULAR DISEASE): ICD-10-CM

## 2020-05-13 DIAGNOSIS — Z79.899 CONTROLLED SUBSTANCE AGREEMENT SIGNED: ICD-10-CM

## 2020-05-13 DIAGNOSIS — G43.719 INTRACTABLE CHRONIC COMMON MIGRAINE WITHOUT AURA: ICD-10-CM

## 2020-05-13 PROCEDURE — 99443 ZZC PHYSICIAN TELEPHONE EVALUATION 21-30 MIN: CPT | Performed by: FAMILY MEDICINE

## 2020-05-13 RX ORDER — OXYCODONE AND ACETAMINOPHEN 10; 325 MG/1; MG/1
1 TABLET ORAL 4 TIMES DAILY PRN
Qty: 120 TABLET | Refills: 0 | Status: SHIPPED | OUTPATIENT
Start: 2020-05-13 | End: 2020-07-13

## 2020-05-13 RX ORDER — PREGABALIN 75 MG/1
75 CAPSULE ORAL 3 TIMES DAILY
Qty: 90 CAPSULE | Refills: 1 | Status: SHIPPED | OUTPATIENT
Start: 2020-05-13 | End: 2020-07-15

## 2020-05-13 RX ORDER — CLONAZEPAM 1 MG/1
1 TABLET ORAL 2 TIMES DAILY
Qty: 180 TABLET | Refills: 0 | Status: SHIPPED | OUTPATIENT
Start: 2020-05-13 | End: 2020-07-13

## 2020-05-13 RX ORDER — OXYCODONE AND ACETAMINOPHEN 10; 325 MG/1; MG/1
1 TABLET ORAL 4 TIMES DAILY PRN
Qty: 120 TABLET | Refills: 0 | Status: SHIPPED | OUTPATIENT
Start: 2020-06-12 | End: 2020-06-15

## 2020-05-13 ASSESSMENT — ANXIETY QUESTIONNAIRES
5. BEING SO RESTLESS THAT IT IS HARD TO SIT STILL: NOT AT ALL
6. BECOMING EASILY ANNOYED OR IRRITABLE: NOT AT ALL
1. FEELING NERVOUS, ANXIOUS, OR ON EDGE: SEVERAL DAYS
GAD7 TOTAL SCORE: 1
IF YOU CHECKED OFF ANY PROBLEMS ON THIS QUESTIONNAIRE, HOW DIFFICULT HAVE THESE PROBLEMS MADE IT FOR YOU TO DO YOUR WORK, TAKE CARE OF THINGS AT HOME, OR GET ALONG WITH OTHER PEOPLE: SOMEWHAT DIFFICULT
3. WORRYING TOO MUCH ABOUT DIFFERENT THINGS: NOT AT ALL
7. FEELING AFRAID AS IF SOMETHING AWFUL MIGHT HAPPEN: NOT AT ALL
2. NOT BEING ABLE TO STOP OR CONTROL WORRYING: NOT AT ALL

## 2020-05-13 ASSESSMENT — PAIN SCALES - GENERAL: PAINLEVEL: NO PAIN (0)

## 2020-05-13 ASSESSMENT — PATIENT HEALTH QUESTIONNAIRE - PHQ9
SUM OF ALL RESPONSES TO PHQ QUESTIONS 1-9: 3
5. POOR APPETITE OR OVEREATING: NOT AT ALL

## 2020-05-13 NOTE — PROGRESS NOTES
"Ankita Day is a 65 year old female who is being evaluated via a billable telephone visit.      The patient has been notified of following:     \"This telephone visit will be conducted via a call between you and your physician/provider. We have found that certain health care needs can be provided without the need for a physical exam.  This service lets us provide the care you need with a short phone conversation.  If a prescription is necessary we can send it directly to your pharmacy.  If lab work is needed we can place an order for that and you can then stop by our lab to have the test done at a later time.    Telephone visits are billed at different rates depending on your insurance coverage. During this emergency period, for some insurers they may be billed the same as an in-person visit.  Please reach out to your insurance provider with any questions.    If during the course of the call the physician/provider feels a telephone visit is not appropriate, you will not be charged for this service.\"    Patient has given verbal consent for Telephone visit?  Yes    What phone number would you like to be contacted at? [phone]    How would you like to obtain your AVS? Dilciahart    Subjective     Ankita Day is a 65 year old female who presents to clinic today for the following health issues:    HPI    Follow up on chronic pain and anxiety. She has Xarelto for PE and multiple medications for CAD    Increase in Trentellix has worked well    Tried pregabalin 50 mg three times daily with mild benefit. She feels gabapentin works better, but it caused her confusion.    Was taking sucralfate 1 g four times daily for gastritis presumed from aspirin, Plavix and Xarelto use. She is no longer on aspirin. Hemoglobin was down to 11.6 on 2/5/20 and then improved over 12 in March and April. In ED 4/11 for chest pain and hemoglobin 11. Sent to [facility] for unstable angina, but had no further symptoms and troponin was " normal, so she was discharged home. Saw cardiology on 5/6. No medication changes made and no further testing necessary. Patient comfortable with this plan. She often has noncardiac chest pain. Feels well. Hemoglobin rechecked and had improved to 11.5. She notices no melena.     Saw Dr Murillo who recommends lifelong anticoagulation for PE. CT scans showed no cancer.    Had a fall when NPO for scans while at [employer]. Had bruising on arm, but nothing else of concern and has recovered.    Chronically on clonazepam. Used to take 1 g four times daily and has reduced over time down to twice daily. Due for refill.     Was temporarily on increased oxycodone after PE.  Has now reduced back to chronic amount of 40 mg oxycodone daily, on 10/325 mg pills.  When she was off opioids in past saw reduction in function.        Patient Active Problem List   Diagnosis     Status post coronary angiogram     Bilateral low back pain without sciatica     Central sleep apnea     Chest wall pain, chronic     Chronic anxiety     Chronic pain disorder     CKD (chronic kidney disease) stage 2, GFR 60-89 ml/min     Collagenous colitis     COPD (chronic obstructive pulmonary disease) (H)     Coronary atherosclerosis     Major depressive disorder, recurrent episode, severe (H)     Essential hypertension     Lumbar facet arthropathy     Gastric ulcer with hemorrhage     History of Clostridium difficile     History of tobacco abuse     Peptic ulcer     Iron deficiency anemia     Subclavian artery stenosis, left (H)     Mixed hyperlipidemia     Myofascial pain     Family history of malignant neoplasm of gastrointestinal tract     Nodular degeneration of cornea     Osteoarthritis     Controlled substance agreement signed 9/18/19     Panic attack     Postsurgical aortocoronary bypass status     Presence of stent in coronary artery in patient with coronary artery disease     Nonspecific abnormal results of pulmonary system function study     History of  coronary artery bypass graft x 3     Slow transit constipation     Vitamin D deficiency     Noncompliance with CPAP treatment     Intractable chronic common migraine without aura     H/O multiple concussions     Acute pulmonary embolism without acute cor pulmonale (H)     Chronic stable angina (H)     Chronic ischemic heart disease     Past Surgical History:   Procedure Laterality Date     ANGIOPLASTY      9/12/02,with triple stenting     APPENDECTOMY OPEN      No Comments Provided     ARTHROSCOPY KNEE      left     ARTHROSCOPY SHOULDER Right 05/12/2017    labral tear, rotator cuff tear and some subacromial decompression      BYPASS GRAFT ARTERY CORONARY      12/13/02,Triple bypass, left internal mammary  to LAD, right internal mammary to right coronary artery, saphenous to obtuse marginal of the left circumflex.     COLONOSCOPY      2011,Dr Bowman benign polyps     COLONOSCOPY  10/03/2011    2011,benign polyps, Dr. Bowman     COLONOSCOPY  08/08/2016 8/8/16,normal, Dr Bowman     ELBOW SURGERY      baby,birth malachi removed from right arm     EMBOLECTOMY UPPER EXTREMITY  04/02/2013    brachial artery pseudoaneurysm after stenting     ESOPHAGOSCOPY, GASTROSCOPY, DUODENOSCOPY (EGD), COMBINED      2011,EGD Dr Bowman with pyloric ulcer     ESOPHAGOSCOPY, GASTROSCOPY, DUODENOSCOPY (EGD), COMBINED      2011,pyloric ulcer, Dr. Bowman     ESOPHAGOSCOPY, GASTROSCOPY, DUODENOSCOPY (EGD), COMBINED      8/8/16,mild gastritis, Dr Bowman     ESOPHAGOSCOPY, GASTROSCOPY, DUODENOSCOPY (EGD), COMBINED      11/27/2017,Dr Bowman. Antral ulcer     ESOPHAGOSCOPY, GASTROSCOPY, DUODENOSCOPY (EGD), COMBINED  02/02/2018    Dr Bowman, healed ulcer     HYSTERECTOMY TOTAL ABDOMINAL      age 21     LAPAROSCOPIC CHOLECYSTECTOMY      2006     OSTEOTOMY FEMUR DISTAL      x3, right knee     OSTEOTOMY FEMUR DISTAL      2000,left knee  ligament surgery     OTHER SURGICAL HISTORY      1/10/2003,,PTCA     OTHER SURGICAL HISTORY       2012,,PTCA,DELONTE in LAD and left main     OTHER SURGICAL HISTORY      2013,,PTCA,L subclavian stenosis     SALPINGO-OOPHORECTOMY BILATERAL      age 27,Bilateral salpingo-oophorectomy     TONSILLECTOMY, ADENOIDECTOMY, COMBINED      childhood       Social History     Tobacco Use     Smoking status: Former Smoker     Packs/day: 0.25     Years: 35.00     Pack years: 8.75     Types: Cigarettes     Last attempt to quit: 2/15/2017     Years since quitting: 3.2     Smokeless tobacco: Never Used     Tobacco comment: Smokes 1-2 packs every 6 months for the past 16 years. Used to smoke 1 pack/day before that.    Substance Use Topics     Alcohol use: Not Currently     Alcohol/week: 0.0 standard drinks     Family History   Problem Relation Age of Onset     Heart Disease Father         Heart Disease,Heart condition/Significant for atherosclerotic cardiovascular disease, but non premature.     Colon Cancer Father         Cancer-colon, of colon cancer     Cancer Father         Cancer,mets to liver, secondary to colon cancer     Heart Disease Mother         Heart Disease     Cancer Other         Cancer,Multiple Myeloma     Heart Disease Other         Heart Disease,Ischemic Heart Disease     Colon Cancer Other         Cancer-colon,Malignant neoplasms     Cancer Sister         Cancer,multiple myeloma     Other - See Comments Son         gallstones         Current Outpatient Medications   Medication Sig Dispense Refill     albuterol (PROAIR HFA/PROVENTIL HFA/VENTOLIN HFA) 108 (90 Base) MCG/ACT inhaler Inhale 2 puffs into the lungs every 6 hours 2 Inhaler 3     amLODIPine (NORVASC) 10 MG tablet Take 1 tablet (10 mg) by mouth daily 90 tablet 3     busPIRone (BUSPAR) 10 MG tablet Take 1 tablet (10 mg) by mouth 2 times daily 180 tablet 1     clonazePAM 1 MG PO tablet Take 1 tablet (1 mg) by mouth 2 times daily 180 tablet 0     clopidogrel (PLAVIX) 75 MG tablet Take 1 tablet (75 mg) by mouth daily 90 tablet 3      Diclofenac Sodium 1.5 % SOLN Apply 20 drops to each hand or 40 drops to each knee up to 4 times daily as needed for arthritis pain 450 mL 3     docusate sodium (COLACE) 100 MG capsule Take 100 mg by mouth twice a week       lisinopril (PRINIVIL/ZESTRIL) 20 MG tablet Take 1 tablet (20 mg) by mouth daily 90 tablet 3     metoclopramide (REGLAN) 10 MG tablet TAKE 1 TABLET (10 MG) BY MOUTH EVERY 6 HOURS AS NEEDED (HEADACHE) 30 tablet 1     naloxone (NARCAN) 4 MG/0.1ML nasal spray Spray into one nostril for opioid reversal if unresponsive. May repeat every 2-3 minutes until patient responsive or EMS arrives 1 each 1     nitroGLYcerin (NITROSTAT) 0.3 MG sublingual tablet PLACE 1 TABLET UNDER THE TONGUE EVERY 5 MINUTES AS NEEDED FOR CHEST PAIN 25 tablet 11     omeprazole (PRILOSEC) 20 MG DR capsule Take 1 capsule (20 mg) by mouth 2 times daily 180 capsule 3     ondansetron (ZOFRAN) 4 MG tablet Take 1 tablet (4 mg) by mouth every 6 hours as needed for nausea 30 tablet 2     oxyCODONE-acetaminophen (PERCOCET)  MG per tablet Take 1 tablet by mouth 4 times daily as needed for severe pain 120 tablet 0     oxyCODONE-acetaminophen (PERCOCET)  MG per tablet Take 1 tablet by mouth 4 times daily as needed for severe pain 120 tablet 0     pregabalin (LYRICA) 50 MG capsule Take 1 capsule (50 mg) by mouth 3 times daily 90 capsule 5     ranolazine 500 MG PO 12 hr tablet Take 1 tablet (500 mg) by mouth 2 times daily 180 tablet 3     rivaroxaban ANTICOAGULANT 20 MG PO TABS tablet Take 1 tablet (20 mg) by mouth daily (with dinner) 90 tablet 3     rosuvastatin (CRESTOR) 10 MG tablet Take 1 tablet (10 mg) by mouth At Bedtime 90 tablet 3     saccharomyces boulardii (FLORASTOR) 250 MG capsule Take 250 mg by mouth 2 times daily       sucralfate 1 GM PO tablet Take 1 tablet (1 g) by mouth 4 times daily as needed for nausea (or dark stools) 360 tablet 0     VITAMIN D, CHOLECALCIFEROL, PO Take 5,000 Units by mouth daily        vortioxetine (TRINTELLIX) 20 MG tablet Take 1 tablet (20 mg) by mouth daily 90 tablet 1     Allergies   Allergen Reactions     Atorvastatin Muscle Pain (Myalgia)     Tiotropium Bromide [Tiotropium] Rash     Ezetimibe Muscle Pain (Myalgia)     Latex Rash     Niacin      Other reaction(s): Flushing     No Clinical Screening - See Comments Itching, Rash and Blisters     Metals and plastics       Tape [Adhesive Tape] Rash       Reviewed and updated as needed this visit by Provider  Tobacco  Allergies  Meds  Problems  Med Hx  Surg Hx  Fam Hx         Review of Systems   As above       Objective   Reported vitals:  LMP  (LMP Unknown)    PSYCH: Alert and oriented times 3; coherent speech, normal   rate and volume, able to articulate logical thoughts, able   to abstract reason, no tangential thoughts, no hallucinations   or delusions  Her affect is normal  RESP: No cough, no audible wheezing, able to talk in full sentences  Remainder of exam unable to be completed due to telephone visits          Assessment/Plan:    ICD-10-CM    1. Chronic pain disorder  G89.4 oxyCODONE-acetaminophen (PERCOCET)  MG per tablet     oxyCODONE-acetaminophen (PERCOCET)  MG per tablet     pregabalin (LYRICA) 75 MG capsule   2. Chronic bilateral low back pain without sciatica  M54.5 oxyCODONE-acetaminophen (PERCOCET)  MG per tablet    G89.29 oxyCODONE-acetaminophen (PERCOCET)  MG per tablet     pregabalin (LYRICA) 75 MG capsule   3. Chest wall pain, chronic  R07.89 oxyCODONE-acetaminophen (PERCOCET)  MG per tablet    G89.29 oxyCODONE-acetaminophen (PERCOCET)  MG per tablet     pregabalin (LYRICA) 75 MG capsule   4. Controlled substance agreement signed 9/18/19  Z79.899 oxyCODONE-acetaminophen (PERCOCET)  MG per tablet     oxyCODONE-acetaminophen (PERCOCET)  MG per tablet     clonazePAM (KLONOPIN) 1 MG tablet   5. Chronic anxiety  F41.9 clonazePAM (KLONOPIN) 1 MG tablet   6. Intractable chronic  common migraine without aura  G43.719 pregabalin (LYRICA) 75 MG capsule     She is due for refill on clonazepam and oxycodone. Is aware of risk for overdose and excess sedation with concurrent benzodiazepine and opioid use.  reviewed. Refilled clonazepam for 3 months. Given 2 months of oxycodone.  Needs appointment when due for follow up.    Increase pregabalin from 50 to 75 mg TID to help with pain and anxiety. Maintain this dosing until next appointment.    Creatinine is higher on labs with cardiology. She's had elevated creatinine before. Renal ultrasound was normal. Recent CT shows no hydro. Likely relates to medications. Recommend recheck on CBC and BMP when she sees Juanita Wang NP on [address].    Follow up 2 months    Phone call duration:  21 minutes    Ramirez Cano MD

## 2020-05-14 ASSESSMENT — ANXIETY QUESTIONNAIRES: GAD7 TOTAL SCORE: 1

## 2020-05-16 DIAGNOSIS — K25.4 CHRONIC GASTRIC ULCER WITH HEMORRHAGE: ICD-10-CM

## 2020-05-18 RX ORDER — SUCRALFATE 1 G/1
1 TABLET ORAL 4 TIMES DAILY PRN
Qty: 360 TABLET | Refills: 0 | Status: SHIPPED | OUTPATIENT
Start: 2020-05-18 | End: 2021-05-10

## 2020-05-18 NOTE — TELEPHONE ENCOUNTER
[facility] sent Rx request for the following:      SUCRALFATE 1 GM TABLET   Sig: Take 1 tablet (1 g) by mouth 4 times daily as needed for nausea (or dark stools)       Last Prescription Date:   2/19/2020  Last Fill Qty/Refills:         360, R-0    Last Office Visit:              5/13/2020   Future Office visit:           none      Prescription refilled per RN Medication Refill Policy.................... Nikos Toure RN ....................  5/18/2020   4:04 PM

## 2020-06-03 ENCOUNTER — OFFICE VISIT (OUTPATIENT)
Dept: CARDIOLOGY | Facility: OTHER | Age: 65
End: 2020-06-03
Attending: NURSE PRACTITIONER
Payer: MEDICARE

## 2020-06-03 VITALS
HEIGHT: 66 IN | TEMPERATURE: 98.3 F | SYSTOLIC BLOOD PRESSURE: 138 MMHG | DIASTOLIC BLOOD PRESSURE: 80 MMHG | OXYGEN SATURATION: 94 % | HEART RATE: 72 BPM | BODY MASS INDEX: 30.7 KG/M2 | RESPIRATION RATE: 20 BRPM | WEIGHT: 191 LBS

## 2020-06-03 DIAGNOSIS — I10 ESSENTIAL HYPERTENSION: ICD-10-CM

## 2020-06-03 DIAGNOSIS — N18.30 CKD (CHRONIC KIDNEY DISEASE) STAGE 3, GFR 30-59 ML/MIN (H): ICD-10-CM

## 2020-06-03 DIAGNOSIS — R42 EPISODIC LIGHTHEADEDNESS: ICD-10-CM

## 2020-06-03 DIAGNOSIS — I25.10 ASCVD (ARTERIOSCLEROTIC CARDIOVASCULAR DISEASE): ICD-10-CM

## 2020-06-03 DIAGNOSIS — I20.89 CHRONIC STABLE ANGINA (H): Primary | ICD-10-CM

## 2020-06-03 DIAGNOSIS — I95.1 ORTHOSTATIC HYPOTENSION: ICD-10-CM

## 2020-06-03 DIAGNOSIS — J44.9 CHRONIC OBSTRUCTIVE PULMONARY DISEASE, UNSPECIFIED COPD TYPE (H): ICD-10-CM

## 2020-06-03 DIAGNOSIS — Z95.1 HISTORY OF CORONARY ARTERY BYPASS GRAFT X 3: ICD-10-CM

## 2020-06-03 DIAGNOSIS — I26.99 ACUTE PULMONARY EMBOLISM WITHOUT ACUTE COR PULMONALE, UNSPECIFIED PULMONARY EMBOLISM TYPE (H): ICD-10-CM

## 2020-06-03 DIAGNOSIS — D50.9 IRON DEFICIENCY ANEMIA, UNSPECIFIED IRON DEFICIENCY ANEMIA TYPE: ICD-10-CM

## 2020-06-03 DIAGNOSIS — Z98.890 STATUS POST CORONARY ANGIOGRAM: ICD-10-CM

## 2020-06-03 DIAGNOSIS — Z95.5 HISTORY OF CORONARY ARTERY STENT PLACEMENT: ICD-10-CM

## 2020-06-03 LAB
ANION GAP SERPL CALCULATED.3IONS-SCNC: 11 MMOL/L (ref 3–14)
BUN SERPL-MCNC: 22 MG/DL (ref 7–25)
CALCIUM SERPL-MCNC: 9.2 MG/DL (ref 8.6–10.3)
CHLORIDE SERPL-SCNC: 103 MMOL/L (ref 98–107)
CO2 SERPL-SCNC: 23 MMOL/L (ref 21–31)
CREAT SERPL-MCNC: 1.33 MG/DL (ref 0.6–1.2)
ERYTHROCYTE [DISTWIDTH] IN BLOOD BY AUTOMATED COUNT: 13.6 % (ref 10–15)
GFR SERPL CREATININE-BSD FRML MDRD: 40 ML/MIN/{1.73_M2}
GLUCOSE SERPL-MCNC: 92 MG/DL (ref 70–105)
HCT VFR BLD AUTO: 34.8 % (ref 35–47)
HGB BLD-MCNC: 11.2 G/DL (ref 11.7–15.7)
MCH RBC QN AUTO: 27.6 PG (ref 26.5–33)
MCHC RBC AUTO-ENTMCNC: 32.2 G/DL (ref 31.5–36.5)
MCV RBC AUTO: 86 FL (ref 78–100)
PLATELET # BLD AUTO: 286 10E9/L (ref 150–450)
POTASSIUM SERPL-SCNC: 4.4 MMOL/L (ref 3.5–5.1)
RBC # BLD AUTO: 4.06 10E12/L (ref 3.8–5.2)
SODIUM SERPL-SCNC: 137 MMOL/L (ref 134–144)
WBC # BLD AUTO: 6.2 10E9/L (ref 4–11)

## 2020-06-03 PROCEDURE — 80048 BASIC METABOLIC PNL TOTAL CA: CPT | Mod: ZL | Performed by: FAMILY MEDICINE

## 2020-06-03 PROCEDURE — 85027 COMPLETE CBC AUTOMATED: CPT | Mod: ZL | Performed by: FAMILY MEDICINE

## 2020-06-03 PROCEDURE — G0463 HOSPITAL OUTPT CLINIC VISIT: HCPCS

## 2020-06-03 PROCEDURE — 99214 OFFICE O/P EST MOD 30 MIN: CPT | Performed by: NURSE PRACTITIONER

## 2020-06-03 PROCEDURE — 36415 COLL VENOUS BLD VENIPUNCTURE: CPT | Mod: ZL | Performed by: FAMILY MEDICINE

## 2020-06-03 RX ORDER — NITROGLYCERIN 400 UG/1
SPRAY ORAL
Qty: 4.9 G | Refills: 3 | Status: SHIPPED | OUTPATIENT
Start: 2020-06-03 | End: 2020-09-30

## 2020-06-03 RX ORDER — LISINOPRIL 20 MG/1
10 TABLET ORAL DAILY
Qty: 90 TABLET | Refills: 3 | Status: SHIPPED | OUTPATIENT
Start: 2020-06-03 | End: 2021-06-02

## 2020-06-03 RX ORDER — METOPROLOL SUCCINATE 25 MG/1
25 TABLET, EXTENDED RELEASE ORAL 2 TIMES DAILY
Start: 2020-06-03 | End: 2020-06-10

## 2020-06-03 RX ORDER — FERROUS GLUCONATE 324(38)MG
324 TABLET ORAL 2 TIMES DAILY WITH MEALS
Qty: 60 TABLET | Refills: 3 | Status: SHIPPED | OUTPATIENT
Start: 2020-06-03 | End: 2020-10-05

## 2020-06-03 ASSESSMENT — ANXIETY QUESTIONNAIRES
IF YOU CHECKED OFF ANY PROBLEMS ON THIS QUESTIONNAIRE, HOW DIFFICULT HAVE THESE PROBLEMS MADE IT FOR YOU TO DO YOUR WORK, TAKE CARE OF THINGS AT HOME, OR GET ALONG WITH OTHER PEOPLE: NOT DIFFICULT AT ALL
6. BECOMING EASILY ANNOYED OR IRRITABLE: SEVERAL DAYS
3. WORRYING TOO MUCH ABOUT DIFFERENT THINGS: NOT AT ALL
2. NOT BEING ABLE TO STOP OR CONTROL WORRYING: NOT AT ALL
1. FEELING NERVOUS, ANXIOUS, OR ON EDGE: SEVERAL DAYS
5. BEING SO RESTLESS THAT IT IS HARD TO SIT STILL: NOT AT ALL
7. FEELING AFRAID AS IF SOMETHING AWFUL MIGHT HAPPEN: NOT AT ALL
GAD7 TOTAL SCORE: 2

## 2020-06-03 ASSESSMENT — PATIENT HEALTH QUESTIONNAIRE - PHQ9
SUM OF ALL RESPONSES TO PHQ QUESTIONS 1-9: 1
5. POOR APPETITE OR OVEREATING: NOT AT ALL

## 2020-06-03 ASSESSMENT — MIFFLIN-ST. JEOR: SCORE: 1422.87

## 2020-06-03 ASSESSMENT — PAIN SCALES - GENERAL: PAINLEVEL: EXTREME PAIN (8)

## 2020-06-03 NOTE — PROGRESS NOTES
"[facility] HEART CARE   CARDIOLOGY PROGRESS NOTE    Ankita Day   [address] [address] [id number]      Ramirez Cano     Chief Complaint   Patient presents with     Follow Up     1 month follow up        HPI:   Ms. Day is a 65 year old patient who presents for cardiology follow-up to visit on 5/6/2020 with history of ischemic heart disease. Patient has a history of hypertension, hyperlipidemia, CAD, history of pulmonary embolism, left subclavian artery stenosis, anxiety, collagenous colitis, depression, osteoarthritis, history of tobacco use, central sleep apnea for which she is not compliant with CPAP use, history of C. difficile, history of multiple concussions, iron deficiency anemia, stage II CKD, myofascial pain, vitamin D deficiency, lumbar facet arthropathy, history of gastric ulcer with hemorrhage, COPD and peptic ulcer disease.    Patient has a known history of ischemic heart disease, she underwent  coronary angiogram and bypass angiogram on 9/15/2017 which was described as having stable disease. Mild to moderate 3 vessel CAD involving RCA, LAD and LCX with <50% stenosis at the greatest.  Occluded RAFI and SVG.  Patent LIMA.  No new obstructive lesions were identified and normal left heart cath.      She has a history of CABG on 2/12/03 x 3, history of multiple stent placements, patient reported 17 total.  Additionally, she has a history of tobacco abuse, sleep apnea, anxiety/depression, COPD, hypertension and history of left subclavian steal syndrome involving the LIMA status post stent placement.    At previous visit patient reported occasional recurrence of chest pain, not as severe as last ED visit on 10/25/19. She reported \"my breathing has been bad\", describes worsening CORTEZ. She described activity intolerance and little to no energy. Recommended repeat stress testing. NM lexiscan stress test was performed on 12/16/19 which was negative for inducible myocardial ischemia or " infarction.  Left ventricular function was normal.    Carotid US on 12/12/19 without significant stenosis, mild atherosclerotic disease present.  Abdominal ultrasound on 12/12/2018 with no abdominal aortic aneurysm identified.    Patient returned to the ED with dyspnea in early January 2020. She was identified to have small segmental and subsegmental emboli bilaterally. Repeat imaging on 1/6 with decreasing volume of pulmonary emboli compared to scan on 1/2/2020. She was initially treated with Lovanox in the ED and discharged on Xarelto oral anticoagulation which has been going well for her, no bleeding complication. She also remains on Plavix with her significant ischemic heart disease history.     Patient was again seen in the ED on 4/11/2020 with anginal symptoms, symptoms had been well controlled on Ranexa up to this point. No acute EKG changes or troponin elevation, she was transferred to [facility] due to her significant CAD history for further rule out. She was monitored at [facility] with negative serial troponin's and no EKG changes. She was discharged home on 4/12/2020. At her last visit she reported chest pain had improved following discharge, no changes in breathing and no increased edema.      Starting later this winter she had a fall with repeat fall again about one month ago. Denied feeling lightheadedness or weakness with falls. No vertigo. No ataxia and no unilateral changes. She denied this being a syncope or near syncope, no LOC. Patient reported that she felt her falls were related to spikes in her BP. Declined wearing cardiac monitor, did not feel related to arrhythmia and no palpitations.     She has been on medication management of her chronic stable angina which includes Ranexa 500 mg BID and amlodipine 10 mg daily. Today it has been recommended that she adjusted beta blocker to Toprol XL 25 mg BID. This change should also help stabilize her pressures during the day and prevent spikes.      Today patient reports that anginal symptoms have improved with recent adjustment of her beta blocker. She continues to feel lightheaded with standing and at times with walking and standing for extended period of time. No recurrence of falls and no syncope. She is requesting if she can return for back injection with chronic arthritic back pain.    PAST MEDICAL HISTORY:   Past Medical History:   Diagnosis Date     Acute ischemic heart disease (H)     06/15/2007,with PTCA and stenting of 90% osteo circumflex lesion and patent LAD graft, patent left main stent.     Acute myocardial infarction (H)     3/30/2013     Anxiety disorder     No Comments Provided     Atherosclerotic heart disease of native coronary artery without angina pectoris     -angio revealed 3 vessel dz  -4 stents placed; 2 overlapping stents placed in mLAD, 1 stent pRCA, 1 stent pCirc 1 s 9/02 -non-ST elevation MI 1/03  -angio revealed restenosis of Circ.    -PTCA and brachytherapy of pCirc -repeat angio 2/03 -no intervension -CABG x3 12/03 - Dr Sinclair  -LIMA-LAD, RAFI-RCA, SVG-OM -PCI 7/04 stent to L -CTangio 9/05   -patentent LIMA-LAD. RAFI-RCA occluded; RCA ostio/p*     Bilateral carpal tunnel syndrome     No Comments Provided     Cervicalgia     No Comments Provided     Chest pain     12/29/2014     Chronic gastric ulcer without hemorrhage or perforation     10/03/2011,hx of GI bleed (2003)     Chronic ischemic heart disease     06/27/2012     Chronic obstructive pulmonary disease (H)     06/15/2007,low DLCO, normal spirometry     Chronic or unspecified gastric ulcer with hemorrhage     10/2003     Chronic pain syndrome     12/01/2010,chest wall, back     Constipation     11/29/2011     Coronary angioplasty status     09/12/2002,with triple stenting     Coronary angioplasty status     01/10/2003,repeat angioplasty     Coronary angioplasty status     No Comments Provided     Dorsalgia     05/31/2011     Encounter for other administrative  examinations     1/28/2014     Encounter for screening for cardiovascular disorders     10/2004,[facility] (2004, 2005, 2006 and 2011)     Enterocolitis due to Clostridium difficile     8/19/2016     Essential (primary) hypertension     No Comments Provided     Hyperlipidemia     No Comments Provided     Major depressive disorder, single episode     Severe, hx of suicide attempt/hospitalization     Migraine without status migrainosus, not intractable     No Comments Provided     Nodular corneal degeneration     09/26/2011     Noninfective gastroenteritis and colitis     06/15/2007,history of     Noninfective gastroenteritis and colitis     Microcytic     Osteoarthritis     No Comments Provided     Other chest pain     10/13/2009,chronic     Pain in knee     05/31/2011     Pain in right shoulder     No Comments Provided     Panic disorder without agoraphobia     No Comments Provided     Peptic ulcer without hemorrhage or perforation     6/15/2007     Peripheral vascular disease (H)     No Comments Provided     Personal history of diseases of the blood and blood-forming organs and certain disorders involving the immune mechanism (CODE)     No Comments Provided     Personal history of nicotine dependence     No Comments Provided     Personal history of other medical treatment (CODE)     10/14/2013     Presence of aortocoronary bypass graft     2/12/2003     Primary central sleep apnea     10/14/2013     Sepsis due to Escherichia coli (E. coli) (H)     7/14/16,[facility]     Stricture of artery (H)     3/31/2013,S/p prox left SCA stent 4/1/2013     Thoracic, thoracolumbar and lumbosacral intervertebral disc disorder     No Comments Provided     Uncomplicated opioid abuse (H)     history of     Vitamin D deficiency     5/6/2013          FAMILY HISTORY:   Family History   Problem Relation Age of Onset     Heart Disease Father         Heart Disease,Heart condition/Significant for atherosclerotic cardiovascular disease, but  non premature.     Colon Cancer Father         Cancer-colon, of colon cancer     Cancer Father         Cancer,mets to liver, secondary to colon cancer     Heart Disease Mother         Heart Disease     Cancer Other         Cancer,Multiple Myeloma     Heart Disease Other         Heart Disease,Ischemic Heart Disease     Colon Cancer Other         Cancer-colon,Malignant neoplasms     Cancer Sister         Cancer,multiple myeloma     Other - See Comments Son         gallstones          PAST SURGICAL HISTORY:   Past Surgical History:   Procedure Laterality Date     ANGIOPLASTY      02,with triple stenting     APPENDECTOMY OPEN      No Comments Provided     ARTHROSCOPY KNEE      left     ARTHROSCOPY SHOULDER Right 2017    labral tear, rotator cuff tear and some subacromial decompression      BYPASS GRAFT ARTERY CORONARY      02,Triple bypass, left internal mammary  to LAD, right internal mammary to right coronary artery, saphenous to obtuse marginal of the left circumflex.     COLONOSCOPY      ,Dr Bowman benign polyps     COLONOSCOPY  10/03/2011    2011,benign polyps, Dr. Bowman     COLONOSCOPY  2016,normal, Dr Bowman     ELBOW SURGERY      baby,birth malachi removed from right arm     EMBOLECTOMY UPPER EXTREMITY  2013    brachial artery pseudoaneurysm after stenting     ESOPHAGOSCOPY, GASTROSCOPY, DUODENOSCOPY (EGD), COMBINED      ,EGD Dr Bowman with pyloric ulcer     ESOPHAGOSCOPY, GASTROSCOPY, DUODENOSCOPY (EGD), COMBINED      ,pyloric ulcer, Dr. Bowman     ESOPHAGOSCOPY, GASTROSCOPY, DUODENOSCOPY (EGD), COMBINED      16,mild gastritis, Dr Bowman     ESOPHAGOSCOPY, GASTROSCOPY, DUODENOSCOPY (EGD), COMBINED      2017,Dr Bowman. Antral ulcer     ESOPHAGOSCOPY, GASTROSCOPY, DUODENOSCOPY (EGD), COMBINED  2018    Dr Bowman, healed ulcer     HYSTERECTOMY TOTAL ABDOMINAL      age 21     LAPAROSCOPIC CHOLECYSTECTOMY           OSTEOTOMY FEMUR DISTAL      x3, right  knee     OSTEOTOMY FEMUR DISTAL      ,left knee  ligament surgery     OTHER SURGICAL HISTORY      1/10/2003,,PTCA     OTHER SURGICAL HISTORY      2012,,PTCA,DELONTE in LAD and left main     OTHER SURGICAL HISTORY      2013,,PTCA,L subclavian stenosis     SALPINGO-OOPHORECTOMY BILATERAL      age 27,Bilateral salpingo-oophorectomy     TONSILLECTOMY, ADENOIDECTOMY, COMBINED      childhood          SOCIAL HISTORY:   Social History     Socioeconomic History     Marital status:      Spouse name: None     Number of children: None     Years of education: None     Highest education level: None   Occupational History     None   Social Needs     Financial resource strain: None     Food insecurity:     Worry: None     Inability: None     Transportation needs:     Medical: None     Non-medical: None   Tobacco Use     Smoking status: Former Smoker     Packs/day: 0.25     Years: 35.00     Pack years: 8.75     Types: Cigarettes     Last attempt to quit: 2/15/2017     Years since quittin.2     Smokeless tobacco: Never Used   Substance and Sexual Activity     Alcohol use: Yes     Alcohol/week: 0.0 oz     Comment: Alcoholic Drinks/day: rare     Drug use: No     Comment: Drug use: No     Sexual activity: Never     Partners: Male   Lifestyle     Physical activity:     Days per week: None     Minutes per session: None     Stress: None   Relationships     Social connections:     Talks on phone: None     Gets together: None     Attends Islam service: None     Active member of club or organization: None     Attends meetings of clubs or organizations: None     Relationship status: None     Intimate partner violence:     Fear of current or ex partner: None     Emotionally abused: None     Physically abused: None     Forced sexual activity: None   Other Topics Concern     Parent/sibling w/ CABG, MI or angioplasty before 65F 55M? Not Asked   Social History Narrative    ,  Steven.  Currently  not working outside the home. Lives [place] met with . Tobacco abuse, quit 2001, restarted. Quit 2012 and has since quit, no alcohol.          CURRENT MEDICATIONS:   Prior to Admission medications    Medication Sig Start Date End Date Taking? Authorizing Provider   albuterol (PROAIR HFA/PROVENTIL HFA/VENTOLIN HFA) 108 (90 Base) MCG/ACT inhaler Inhale 2 puffs into the lungs every 6 hours 1/23/19 1/23/20 Yes Ramirez Cano MD   amLODIPine (NORVASC) 10 MG tablet Take 1 tablet (10 mg) by mouth daily 11/21/18  Yes Ramirez Cano MD   aspirin EC 81 MG EC tablet Take 81 mg by mouth daily with food   Yes Reported, Patient   busPIRone (BUSPAR) 10 MG tablet TAKE 1 TABLET TWICE A DAY 11/7/18  Yes Ramirez Cano MD   clonazePAM (KLONOPIN) 1 MG tablet Take 1 tablet (1 mg) by mouth 3 times daily as needed for anxiety #75 per 30 days, #225 pills per 90 days 4/3/19  Yes Ramirez Cano MD   clopidogrel (PLAVIX) 75 MG tablet Take 1 tablet (75 mg) by mouth daily 11/21/18  Yes Ramirez Cano MD   cyclobenzaprine (FLEXERIL) 10 MG tablet Take 1 tablet (10 mg) by mouth 2 times daily as needed for muscle spasms 11/21/18  Yes Ramirez Cano MD   Diclofenac Sodium 1.5 % SOLN Apply 20 drops to each hand or 40 drops to each knee up to 4 times daily as needed for arthritis pain 11/21/18  Yes Ramirez Cano MD   docusate sodium (COLACE) 50 MG capsule Take 50 mg by mouth daily   Yes Reported, Patient   doxycycline hyclate (VIBRAMYCIN) 100 MG capsule Take 1 capsule (100 mg) by mouth 2 times daily for 14 days 5/13/19 5/27/19 Yes Mikel Ashraf PA-C   DULoxetine (CYMBALTA) 60 MG EC capsule Take 1 capsule (60 mg) by mouth daily 8/23/18  Yes Ramirez Cano MD   gabapentin (NEURONTIN) 600 MG tablet Take 1 tablet (600 mg) by mouth 3 times daily 1/23/19  Yes Ramirez Cano MD   lisinopril (PRINIVIL/ZESTRIL) 40 MG tablet Take 1 tablet (40 mg) by mouth daily 4/23/19  Yes Ramirez Cano MD    metoprolol tartrate (LOPRESSOR) 50 MG tablet Take 1 tablet (50 mg) by mouth 2 times daily 11/21/18  Yes Ramirez Cano MD   NITROSTAT 0.3 MG sublingual tablet PLACE 1 TABLET UNDER THE TONGUE EVERY 5 MINUTES AS NEEDED FOR CHEST PAIN 4/9/18  Yes Ramirez Cano MD   ondansetron (ZOFRAN) 4 MG tablet Take 4 mg by mouth every 6 hours as needed for nausea 10/31/17  Yes Reported, Patient   oxyCODONE-acetaminophen (PERCOCET) 5-325 MG tablet Take 2 tablets by mouth 4 times daily Max acetaminophen dose: 4000mg in 24 hrs 5/23/19  Yes Ramirez Cano MD   pantoprazole (PROTONIX) 40 MG EC tablet TAKE 1 TABLET TWICE A DAY 1/22/19  Yes Ramirez Cano MD   pravastatin (PRAVACHOL) 40 MG tablet Take 1 tablet (40 mg) by mouth At Bedtime 1/23/19  Yes Ramirez Cano MD   saccharomyces boulardii (FLORASTOR) 250 MG capsule Take 250 mg by mouth 2 times daily   Yes Reported, Patient   sucralfate (CARAFATE) 1 GM tablet Take 1 tablet (1 g) by mouth 4 times daily Before meals & at bedtime 8/23/18  Yes Ramirez Cano MD   VITAMIN D, CHOLECALCIFEROL, PO Take 5,000 Units by mouth daily   Yes Reported, Patient          ALLERGIES:   Allergies   Allergen Reactions     Atorvastatin Muscle Pain (Myalgia)     Tiotropium Bromide [Tiotropium] Rash     Ezetimibe Muscle Pain (Myalgia)     Latex Rash     Niacin      Other reaction(s): Flushing     No Clinical Screening - See Comments Itching, Rash and Blisters     Metals and plastics       Tape [Adhesive Tape] Rash          ROS:   CONSTITUTIONAL: No fever and chills. No changes in weight.   ENT: No visual disturbance, ear ache, epistaxis or sore throat.   CARDIOVASCULAR: Angina improved with recent change in her beta blocker. No palpitations or increased lower extremity edema.   RESPIRATORY: Chronic shortness of breath with exertional dyspnea. No cough, wheezing or hemoptysis.   GI: No reported abdominal pain. Positive for chronic loose stools.  : No reported hematuria or  "dysuria.  NEUROLOGICAL: Positive for chronic headaches. Positive for orthostatic lightheadedness. No vertigo/dizziness, syncope, ataxia, paresthesias or weakness.  HEMATOLOGIC: No history of anemia. No bleeding or excessive bruising. Positive for history of PE.  MUSCULOSKELETAL: No reported new joint pain or swelling, positive for chronic back pain.   ENDOCRINOLOGIC: No temperature intolerance. No hair or skin changes.  SKIN: No abnormal rashes or sores, no unusual itching.  PSYCHIATRIC: Positive for history of anxiety and depression.    PHYSICAL EXAM:   /80 (BP Location: Right arm, Patient Position: Sitting, Cuff Size: Adult Regular)   Pulse 72   Temp 98.3  F (36.8  C) (Tympanic)   Resp 20   Ht 1.676 m (5' 5.98\")   Wt 86.6 kg (191 lb)   LMP  (LMP Unknown)   SpO2 94%   BMI 30.84 kg/m    GENERAL: The patient is a well-developed, well-nourished, in no apparent distress.  HEENT: Head is normocephalic and atraumatic. Eyes are symmetrical with normal visual tracking. No icterus, no xanthelasmas. Nares appeared normal without nasal drainage. Mucous membranes are moist, no cyanosis.  NECK: Supple.   CHEST/ LUNGS: Lungs sounds clear, no rales, rhonchi or wheezes, no use of accessory muscles, no retractions, respirations unlabored and normal respiratory rate.   CARDIO: Regular rate and rhythm normal with S1 and S2, no S3 or S4 and no murmur, click or rub.   ABD: Abdomen is nondistended.   EXTREMITIES: No LE edema present.   MUSCULOSKELETAL: No visible joint swelling.   NEUROLOGIC: Alert and oriented X3. Normal speech, gait and affect. No focal neurologic deficits.   SKIN: No jaundice. No rashes or visible skin lesions present. No ecchymosis.       LAB RESULTS:   Office Visit on 05/13/2019   Component Date Value Ref Range Status     Troponin I ES 05/13/2019 <0.030  0.000 - 0.034 ug/L Final     A Phagocytophil IgG 05/13/2019 >1:1,280* <1:80 Final     A Phagocytophil IgM 05/13/2019 > 1:256  <1:16 Final     Lyme " Disease Antibodies Serum 05/13/2019 0.10  0.00 - 0.89 Final     Sodium 05/13/2019 137  134 - 144 mmol/L Final     Potassium 05/13/2019 3.4* 3.5 - 5.1 mmol/L Final     Chloride 05/13/2019 103  98 - 107 mmol/L Final     Carbon Dioxide 05/13/2019 23  21 - 31 mmol/L Final     Anion Gap 05/13/2019 11  3 - 14 mmol/L Final     Glucose 05/13/2019 117* 70 - 105 mg/dL Final     Urea Nitrogen 05/13/2019 23  7 - 25 mg/dL Final     Creatinine 05/13/2019 1.07  0.60 - 1.20 mg/dL Final     GFR Estimate 05/13/2019 51* >60 mL/min/[1.73_m2] Final     GFR Estimate If Black 05/13/2019 62  >60 mL/min/[1.73_m2] Final     Calcium 05/13/2019 9.3  8.6 - 10.3 mg/dL Final     Bilirubin Total 05/13/2019 1.1* 0.3 - 1.0 mg/dL Final     Albumin 05/13/2019 4.3  3.5 - 5.7 g/dL Final     Protein Total 05/13/2019 7.3  6.4 - 8.9 g/dL Final     Alkaline Phosphatase 05/13/2019 107* 34 - 104 U/L Final     ALT 05/13/2019 37  7 - 52 U/L Final     AST 05/13/2019 39  13 - 39 U/L Final     WBC 05/13/2019 8.2  4.0 - 11.0 10e9/L Final     RBC Count 05/13/2019 3.91  3.8 - 5.2 10e12/L Final     Hemoglobin 05/13/2019 11.8  11.7 - 15.7 g/dL Final     Hematocrit 05/13/2019 35.0  35.0 - 47.0 % Final     MCV 05/13/2019 90  78 - 100 fl Final     MCH 05/13/2019 30.2  26.5 - 33.0 pg Final     MCHC 05/13/2019 33.7  31.5 - 36.5 g/dL Final     RDW 05/13/2019 14.0  10.0 - 15.0 % Final     Platelet Count 05/13/2019 190  150 - 450 10e9/L Final     Diff Method 05/13/2019 Automated Method   Final     % Neutrophils 05/13/2019 71.4  % Final     % Lymphocytes 05/13/2019 20.9  % Final     % Monocytes 05/13/2019 6.4  % Final     % Eosinophils 05/13/2019 0.5  % Final     % Basophils 05/13/2019 0.2  % Final     % Immature Granulocytes 05/13/2019 0.6  % Final     Absolute Neutrophil 05/13/2019 5.8  1.6 - 8.3 10e9/L Final     Absolute Lymphocytes 05/13/2019 1.7  0.8 - 5.3 10e9/L Final     Absolute Monocytes 05/13/2019 0.5  0.0 - 1.3 10e9/L Final     Absolute Eosinophils 05/13/2019 0.0   0.0 - 0.7 10e9/L Final     Absolute Basophils 05/13/2019 0.0  0.0 - 0.2 10e9/L Final     Abs Immature Granulocytes 05/13/2019 0.1  0 - 0.4 10e9/L Final     Platelet Estimate 05/13/2019 Automated count confirmed.  Platelet morphology is normal.   Final     RBC Morphology 05/13/2019 Consistent with reported results   Final     Specimen Description 05/13/2019 Blood   Final     Culture Micro 05/13/2019 No growth after 6 days   Final          ASSESSMENT:   Ankita Day presents for cardiology follow-up to visit on 5/6/2020 with history of ischemic heart disease. Patient has a history of hypertension, hyperlipidemia, CAD, history of pulmonary embolism, left subclavian artery stenosis, anxiety, collagenous colitis, depression, osteoarthritis, history of tobacco use, central sleep apnea for which she is not compliant with CPAP use, history of C. difficile, history of multiple concussions, iron deficiency anemia, stage II CKD, myofascial pain, vitamin D deficiency, lumbar facet arthropathy, history of gastric ulcer with hemorrhage, COPD and peptic ulcer disease.  Patient has a known history of ischemic heart disease, she underwent  coronary angiogram and bypass angiogram on 9/15/2017 which was described as having stable disease.   NM lexiscan stress test was performed on 12/16/19 which was negative for inducible myocardial ischemia or infarction.  Left ventricular function was normal.  Carotid US on 12/12/19 without significant stenosis, mild atherosclerotic disease present.  Abdominal ultrasound on 12/12/2018 with no abdominal aortic aneurysm identified.  She has been on medication management of her chronic stable angina which includes Ranexa 500 mg BID and amlodipine 10 mg daily. Today it has been recommended that she adjusted beta blocker to Toprol XL 25 mg BID. This change should also help stabilize her pressures during the day and prevent spikes in BP.  Today patient reports that anginal symptoms have improved with  recent adjustment of her beta blocker. She continues to feel lightheaded with standing and at times with walking and standing for extended period of time. No recurrence of falls and no syncope. She is requesting if she can return for back injection with chronic arthritic back pain.    1. Chronic stable angina (H)  2. ASCVD (arteriosclerotic cardiovascular disease)  3. History of coronary artery bypass graft x 3  4. CKD (chronic kidney disease) stage 3, GFR 30-59 ml/min (H)  5. Acute pulmonary embolism without acute cor pulmonale, unspecified pulmonary embolism type (H)  6. Chronic obstructive pulmonary disease, unspecified COPD type (H)  7. Essential hypertension  8. Episodic lightheadedness  9. History of coronary artery stent placement  10. Status post coronary angiogram (2017)  11. Iron deficiency anemia, unspecified iron deficiency anemia type  12. Orthostatic hypotension    PLAN:  1. History of 3V CABG (2003) and history of coronary stenting. Cor angio stable in 2017, lexiscan 5 months ago without ischemia.   Chronic stable angina for which she will continue on Ranexa 500 mg BID and continue on Amlodipine 10 mg daily. Recently adjusted beta blocker to Toprol XL 25 mg BID which has significantly helped in control of her angina. No further changes.    2. Orthostatic hypotension, mildly positive orthostatic exam today and symptoms present. It has been recommended that she reduce Lisinopril to 10 mg daily (has been on 20 mg). I would like to hold her beta blocker and amlodipine where it is for management of her angina.    3. Recently identifed PE's in Jan 2020, Xarelto now with continued use of plavix. No complication with anemia or blood loss. She has seen hematology without evidence of cancer.     4. Continue on Rosuvastatin to 10 mg daily.     5. CKD has progressed to stage III. Repeat labs today with slight improvement in her creatinine. By reducing her Lisinopril dose in half this should also improve her  "creatinine. Return to reassess renal function again in 4 weeks with this change.    6. Chronic NITIN, HGB remains low and unchanged. She will start oral iron 325 mg BID with food. I would like to see her take twice daily if she can tolerate. Recheck HGB again in 4 weeks.    7. She will follow-up with her PCP in regards to order for back injection by radiology, her question in regards to antibody testing for COVID and finally her request for \"stronger\" pain medication. Offered to schedule visit with her PCP before she leaves today to discuss these concerns and patient declined.    Follow-up with cardiology in 3 months, certainly sooner if needed.     50 min spent with patient and >50% of visit counseling on above diagnoses and coordination of care with patient.     Thank you for allowing me to participate in the care of your patient. Please do not hesitate to contact me if you have any questions.     Juanita Wang     "

## 2020-06-03 NOTE — NURSING NOTE
"Chief Complaint   Patient presents with     Follow Up     1 month follow up       Initial /80 (BP Location: Right arm, Patient Position: Sitting, Cuff Size: Adult Regular)   Pulse 72   Temp 98.3  F (36.8  C) (Tympanic)   Resp 20   Ht 1.676 m (5' 5.98\")   Wt 86.6 kg (191 lb)   LMP  (LMP Unknown)   SpO2 94%   BMI 30.84 kg/m   Estimated body mass index is 30.84 kg/m  as calculated from the following:    Height as of this encounter: 1.676 m (5' 5.98\").    Weight as of this encounter: 86.6 kg (191 lb).  Meds Reconciled: complete  Pt is not on Aspirin  Pt is on a Statin  PHQ and/or YAYA reviewed. Pt referred to PCP/MH Provider as appropriate.    Taylor Owusu LPN      "

## 2020-06-03 NOTE — PATIENT INSTRUCTIONS
You were seen by  Paul Gramajo, DO       1. Decrease Lisinopril  from 20mg to 10mg.   Take 0.5 tablets (10 mg) by mouth daily     2. Start ferrous gluconate (FERGON) 324 (38 Fe) MG tablet Take 1 tablet (324 mg) by mouth 2 times daily (with meals)    3. Continue metoprolol succinate ER (TOPROL-XL) 25 MG 24 hr tablet Take 1 tablet (25 mg) by mouth 2 times daily    4. Laboratory blood work has been ordered for  In 4 weeks.  You will be notified by phone call or [employer] message when the results are available.      5. No other changes at this time.     6. Schedule lab only appointment    You will follow up with [facility] in 3 months, sooner if needed.       Please call the cardiology office with problems, questions, or concerns at [phone].    If you experience chest pain, chest pressure, chest tightness, shortness of breath, fainting, lightheadedness, nausea, vomiting, or other concerning symptoms, pleaseport to the Emergency Department or call 911. These symptoms may be emergent, and best treated in the Emergency Department.     Cardiology Nurses  VIOLET Starks, MAULIK WRAY, MAULIK  [facility] (Unit [address])  [phone]

## 2020-06-04 ASSESSMENT — ANXIETY QUESTIONNAIRES: GAD7 TOTAL SCORE: 2

## 2020-06-15 ENCOUNTER — OFFICE VISIT (OUTPATIENT)
Dept: FAMILY MEDICINE | Facility: OTHER | Age: 65
End: 2020-06-15
Attending: FAMILY MEDICINE
Payer: MEDICARE

## 2020-06-15 VITALS
BODY MASS INDEX: 30.86 KG/M2 | DIASTOLIC BLOOD PRESSURE: 70 MMHG | HEART RATE: 72 BPM | HEIGHT: 66 IN | TEMPERATURE: 98.1 F | SYSTOLIC BLOOD PRESSURE: 120 MMHG | WEIGHT: 192 LBS | RESPIRATION RATE: 18 BRPM

## 2020-06-15 DIAGNOSIS — R51.9 LEFT-SIDED HEADACHE: ICD-10-CM

## 2020-06-15 DIAGNOSIS — R10.2 SUPRAPUBIC PAIN, ACUTE: ICD-10-CM

## 2020-06-15 DIAGNOSIS — R11.0 NAUSEA: ICD-10-CM

## 2020-06-15 DIAGNOSIS — F33.1 MODERATE EPISODE OF RECURRENT MAJOR DEPRESSIVE DISORDER (H): ICD-10-CM

## 2020-06-15 DIAGNOSIS — F41.9 CHRONIC ANXIETY: ICD-10-CM

## 2020-06-15 DIAGNOSIS — Z79.899 CONTROLLED SUBSTANCE AGREEMENT SIGNED: ICD-10-CM

## 2020-06-15 DIAGNOSIS — M54.50 CHRONIC BILATERAL LOW BACK PAIN WITHOUT SCIATICA: ICD-10-CM

## 2020-06-15 DIAGNOSIS — G89.29 CHEST WALL PAIN, CHRONIC: ICD-10-CM

## 2020-06-15 DIAGNOSIS — G89.4 CHRONIC PAIN DISORDER: Primary | ICD-10-CM

## 2020-06-15 DIAGNOSIS — R07.89 CHEST WALL PAIN, CHRONIC: ICD-10-CM

## 2020-06-15 DIAGNOSIS — R79.89 LOW VITAMIN B12 LEVEL: ICD-10-CM

## 2020-06-15 DIAGNOSIS — M47.816 LUMBAR FACET ARTHROPATHY: ICD-10-CM

## 2020-06-15 DIAGNOSIS — G89.29 CHRONIC BILATERAL LOW BACK PAIN WITHOUT SCIATICA: ICD-10-CM

## 2020-06-15 DIAGNOSIS — I20.89 CHRONIC STABLE ANGINA (H): ICD-10-CM

## 2020-06-15 DIAGNOSIS — I10 ESSENTIAL HYPERTENSION: ICD-10-CM

## 2020-06-15 LAB
ALBUMIN UR-MCNC: NEGATIVE MG/DL
APPEARANCE UR: CLEAR
BILIRUB UR QL STRIP: NEGATIVE
COLOR UR AUTO: YELLOW
GLUCOSE UR STRIP-MCNC: NEGATIVE MG/DL
HGB UR QL STRIP: NEGATIVE
KETONES UR STRIP-MCNC: NEGATIVE MG/DL
LEUKOCYTE ESTERASE UR QL STRIP: NEGATIVE
NITRATE UR QL: NEGATIVE
PH UR STRIP: 5.5 PH (ref 5–7)
SOURCE: NORMAL
SP GR UR STRIP: 1.02 (ref 1–1.03)
UROBILINOGEN UR STRIP-MCNC: NORMAL MG/DL (ref 0–2)

## 2020-06-15 PROCEDURE — 81003 URINALYSIS AUTO W/O SCOPE: CPT | Mod: ZL | Performed by: FAMILY MEDICINE

## 2020-06-15 PROCEDURE — 99215 OFFICE O/P EST HI 40 MIN: CPT | Performed by: FAMILY MEDICINE

## 2020-06-15 PROCEDURE — G0463 HOSPITAL OUTPT CLINIC VISIT: HCPCS

## 2020-06-15 PROCEDURE — 96372 THER/PROPH/DIAG INJ SC/IM: CPT

## 2020-06-15 PROCEDURE — 25000128 H RX IP 250 OP 636: Performed by: FAMILY MEDICINE

## 2020-06-15 PROCEDURE — G0463 HOSPITAL OUTPT CLINIC VISIT: HCPCS | Mod: 25

## 2020-06-15 RX ORDER — METOCLOPRAMIDE 10 MG/1
10 TABLET ORAL EVERY 6 HOURS PRN
Qty: 30 TABLET | Refills: 1 | Status: SHIPPED | OUTPATIENT
Start: 2020-06-15 | End: 2020-07-13

## 2020-06-15 RX ORDER — METOPROLOL SUCCINATE 25 MG/1
25 TABLET, EXTENDED RELEASE ORAL 2 TIMES DAILY
Qty: 180 TABLET | Refills: 3 | Status: SHIPPED | OUTPATIENT
Start: 2020-06-15 | End: 2021-06-02

## 2020-06-15 RX ORDER — OXYCODONE AND ACETAMINOPHEN 10; 325 MG/1; MG/1
1 TABLET ORAL 4 TIMES DAILY PRN
Qty: 120 TABLET | Refills: 0 | Status: SHIPPED | OUTPATIENT
Start: 2020-06-15 | End: 2020-08-12

## 2020-06-15 RX ORDER — CLONAZEPAM 1 MG/1
1 TABLET ORAL 2 TIMES DAILY
Qty: 180 TABLET | Refills: 0 | Status: CANCELLED | OUTPATIENT
Start: 2020-06-15

## 2020-06-15 RX ORDER — LANOLIN ALCOHOL/MO/W.PET/CERES
1000 CREAM (GRAM) TOPICAL DAILY
Status: ON HOLD | COMMUNITY
Start: 2020-06-15 | End: 2021-07-17

## 2020-06-15 RX ORDER — CYANOCOBALAMIN 1000 UG/ML
1000 INJECTION, SOLUTION INTRAMUSCULAR; SUBCUTANEOUS ONCE
Status: COMPLETED | OUTPATIENT
Start: 2020-06-15 | End: 2020-06-15

## 2020-06-15 RX ORDER — ONDANSETRON 4 MG/1
4 TABLET, FILM COATED ORAL EVERY 6 HOURS PRN
Qty: 30 TABLET | Refills: 2 | Status: SHIPPED | OUTPATIENT
Start: 2020-06-15 | End: 2020-07-13

## 2020-06-15 RX ADMIN — CYANOCOBALAMIN 1000 MCG: 1000 INJECTION, SOLUTION INTRAMUSCULAR at 10:56

## 2020-06-15 ASSESSMENT — PATIENT HEALTH QUESTIONNAIRE - PHQ9
5. POOR APPETITE OR OVEREATING: NOT AT ALL
SUM OF ALL RESPONSES TO PHQ QUESTIONS 1-9: 3

## 2020-06-15 ASSESSMENT — ANXIETY QUESTIONNAIRES
2. NOT BEING ABLE TO STOP OR CONTROL WORRYING: NOT AT ALL
3. WORRYING TOO MUCH ABOUT DIFFERENT THINGS: NOT AT ALL
6. BECOMING EASILY ANNOYED OR IRRITABLE: NOT AT ALL
1. FEELING NERVOUS, ANXIOUS, OR ON EDGE: SEVERAL DAYS
5. BEING SO RESTLESS THAT IT IS HARD TO SIT STILL: NOT AT ALL
7. FEELING AFRAID AS IF SOMETHING AWFUL MIGHT HAPPEN: NOT AT ALL
GAD7 TOTAL SCORE: 1
IF YOU CHECKED OFF ANY PROBLEMS ON THIS QUESTIONNAIRE, HOW DIFFICULT HAVE THESE PROBLEMS MADE IT FOR YOU TO DO YOUR WORK, TAKE CARE OF THINGS AT HOME, OR GET ALONG WITH OTHER PEOPLE: NOT DIFFICULT AT ALL

## 2020-06-15 ASSESSMENT — PAIN SCALES - GENERAL: PAINLEVEL: MODERATE PAIN (4)

## 2020-06-15 ASSESSMENT — MIFFLIN-ST. JEOR: SCORE: 1419.72

## 2020-06-15 NOTE — NURSING NOTE
"Patient presents to follow up with her meds and states she hasnt been feeling well. States she feels really run down and tired.  Chief Complaint   Patient presents with     Recheck Medication       Initial LMP  (LMP Unknown)  Estimated body mass index is 30.84 kg/m  as calculated from the following:    Height as of 6/3/20: 1.676 m (5' 5.98\").    Weight as of 6/3/20: 86.6 kg (191 lb).  Medication Reconciliation: complete    Ngoc Scott LPN  "
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
## 2020-06-16 ASSESSMENT — ANXIETY QUESTIONNAIRES: GAD7 TOTAL SCORE: 1

## 2020-06-22 ENCOUNTER — TELEPHONE (OUTPATIENT)
Dept: FAMILY MEDICINE | Facility: OTHER | Age: 65
End: 2020-06-22

## 2020-06-22 DIAGNOSIS — Z20.822 COVID-19 RULED OUT: Primary | ICD-10-CM

## 2020-06-22 NOTE — TELEPHONE ENCOUNTER
Received call from Alisha at [facility] stating that patient needs a COVID test before patient's radiofrequency ablation procedure scheduled on 07/16/20.  Andie Pan LPN 6/22/2020   3:07 PM

## 2020-07-01 DIAGNOSIS — E78.2 MIXED HYPERLIPIDEMIA: ICD-10-CM

## 2020-07-01 DIAGNOSIS — I25.10 ASCVD (ARTERIOSCLEROTIC CARDIOVASCULAR DISEASE): ICD-10-CM

## 2020-07-01 DIAGNOSIS — Z95.5 HISTORY OF CORONARY ARTERY STENT PLACEMENT: ICD-10-CM

## 2020-07-01 DIAGNOSIS — N18.30 CKD (CHRONIC KIDNEY DISEASE) STAGE 3, GFR 30-59 ML/MIN (H): ICD-10-CM

## 2020-07-01 DIAGNOSIS — D50.9 IRON DEFICIENCY ANEMIA, UNSPECIFIED IRON DEFICIENCY ANEMIA TYPE: ICD-10-CM

## 2020-07-01 LAB
ANION GAP SERPL CALCULATED.3IONS-SCNC: 9 MMOL/L (ref 3–14)
BUN SERPL-MCNC: 25 MG/DL (ref 7–25)
CALCIUM SERPL-MCNC: 9.9 MG/DL (ref 8.6–10.3)
CHLORIDE SERPL-SCNC: 101 MMOL/L (ref 98–107)
CHOLEST SERPL-MCNC: 210 MG/DL
CO2 SERPL-SCNC: 26 MMOL/L (ref 21–31)
CREAT SERPL-MCNC: 1.23 MG/DL (ref 0.6–1.2)
ERYTHROCYTE [DISTWIDTH] IN BLOOD BY AUTOMATED COUNT: 13.9 % (ref 10–15)
GFR SERPL CREATININE-BSD FRML MDRD: 44 ML/MIN/{1.73_M2}
GLUCOSE SERPL-MCNC: 108 MG/DL (ref 70–105)
HCT VFR BLD AUTO: 37.6 % (ref 35–47)
HDLC SERPL-MCNC: 70 MG/DL (ref 23–92)
HGB BLD-MCNC: 12.4 G/DL (ref 11.7–15.7)
LDLC SERPL CALC-MCNC: 113 MG/DL
MCH RBC QN AUTO: 27.7 PG (ref 26.5–33)
MCHC RBC AUTO-ENTMCNC: 33 G/DL (ref 31.5–36.5)
MCV RBC AUTO: 84 FL (ref 78–100)
NONHDLC SERPL-MCNC: 140 MG/DL
PLATELET # BLD AUTO: 285 10E9/L (ref 150–450)
POTASSIUM SERPL-SCNC: 4.1 MMOL/L (ref 3.5–5.1)
RBC # BLD AUTO: 4.47 10E12/L (ref 3.8–5.2)
SODIUM SERPL-SCNC: 136 MMOL/L (ref 134–144)
TRIGL SERPL-MCNC: 133 MG/DL
WBC # BLD AUTO: 6.9 10E9/L (ref 4–11)

## 2020-07-01 PROCEDURE — 80061 LIPID PANEL: CPT | Mod: ZL | Performed by: NURSE PRACTITIONER

## 2020-07-01 PROCEDURE — 36415 COLL VENOUS BLD VENIPUNCTURE: CPT | Mod: ZL | Performed by: NURSE PRACTITIONER

## 2020-07-01 PROCEDURE — 80048 BASIC METABOLIC PNL TOTAL CA: CPT | Mod: ZL | Performed by: NURSE PRACTITIONER

## 2020-07-01 PROCEDURE — 85027 COMPLETE CBC AUTOMATED: CPT | Mod: ZL | Performed by: NURSE PRACTITIONER

## 2020-07-01 RX ORDER — ROSUVASTATIN CALCIUM 10 MG/1
20 TABLET, COATED ORAL AT BEDTIME
Qty: 180 TABLET | Refills: 3 | Status: SHIPPED | OUTPATIENT
Start: 2020-07-01 | End: 2021-06-02

## 2020-07-13 ENCOUNTER — OFFICE VISIT (OUTPATIENT)
Dept: FAMILY MEDICINE | Facility: OTHER | Age: 65
End: 2020-07-13
Attending: FAMILY MEDICINE
Payer: MEDICARE

## 2020-07-13 VITALS
WEIGHT: 186 LBS | HEART RATE: 72 BPM | RESPIRATION RATE: 16 BRPM | BODY MASS INDEX: 30.48 KG/M2 | SYSTOLIC BLOOD PRESSURE: 144 MMHG | TEMPERATURE: 98.7 F | DIASTOLIC BLOOD PRESSURE: 68 MMHG

## 2020-07-13 DIAGNOSIS — G89.29 CHRONIC BILATERAL LOW BACK PAIN WITHOUT SCIATICA: ICD-10-CM

## 2020-07-13 DIAGNOSIS — R51.9 LEFT-SIDED HEADACHE: ICD-10-CM

## 2020-07-13 DIAGNOSIS — M54.50 CHRONIC BILATERAL LOW BACK PAIN WITHOUT SCIATICA: ICD-10-CM

## 2020-07-13 DIAGNOSIS — G89.29 CHEST WALL PAIN, CHRONIC: ICD-10-CM

## 2020-07-13 DIAGNOSIS — Z79.899 CONTROLLED SUBSTANCE AGREEMENT SIGNED: ICD-10-CM

## 2020-07-13 DIAGNOSIS — G43.719 INTRACTABLE CHRONIC COMMON MIGRAINE WITHOUT AURA: ICD-10-CM

## 2020-07-13 DIAGNOSIS — R07.89 CHEST WALL PAIN, CHRONIC: ICD-10-CM

## 2020-07-13 DIAGNOSIS — F41.9 CHRONIC ANXIETY: ICD-10-CM

## 2020-07-13 DIAGNOSIS — R20.2 PARESTHESIA: ICD-10-CM

## 2020-07-13 DIAGNOSIS — G89.4 CHRONIC PAIN DISORDER: Primary | ICD-10-CM

## 2020-07-13 DIAGNOSIS — R11.0 NAUSEA: ICD-10-CM

## 2020-07-13 DIAGNOSIS — R09.89 DECREASED PEDAL PULSES: ICD-10-CM

## 2020-07-13 PROCEDURE — G0463 HOSPITAL OUTPT CLINIC VISIT: HCPCS

## 2020-07-13 PROCEDURE — 99214 OFFICE O/P EST MOD 30 MIN: CPT | Performed by: FAMILY MEDICINE

## 2020-07-13 RX ORDER — METOCLOPRAMIDE 10 MG/1
10 TABLET ORAL EVERY 6 HOURS PRN
Qty: 30 TABLET | Refills: 1 | Status: SHIPPED | OUTPATIENT
Start: 2020-07-13 | End: 2020-09-30

## 2020-07-13 RX ORDER — CLONAZEPAM 1 MG/1
1 TABLET ORAL 2 TIMES DAILY
Qty: 180 TABLET | Refills: 0 | Status: SHIPPED | OUTPATIENT
Start: 2020-07-13 | End: 2020-07-29

## 2020-07-13 RX ORDER — ONDANSETRON 4 MG/1
4 TABLET, FILM COATED ORAL EVERY 6 HOURS PRN
Qty: 30 TABLET | Refills: 2 | Status: SHIPPED | OUTPATIENT
Start: 2020-07-13 | End: 2021-04-07 | Stop reason: ALTCHOICE

## 2020-07-13 RX ORDER — HYDROCODONE BITARTRATE AND ACETAMINOPHEN 10; 325 MG/1; MG/1
1-2 TABLET ORAL EVERY 4 HOURS PRN
Qty: 180 TABLET | Refills: 0 | Status: SHIPPED | OUTPATIENT
Start: 2020-07-13 | End: 2020-08-12

## 2020-07-13 ASSESSMENT — ANXIETY QUESTIONNAIRES
7. FEELING AFRAID AS IF SOMETHING AWFUL MIGHT HAPPEN: NOT AT ALL
GAD7 TOTAL SCORE: 4
6. BECOMING EASILY ANNOYED OR IRRITABLE: SEVERAL DAYS
1. FEELING NERVOUS, ANXIOUS, OR ON EDGE: NEARLY EVERY DAY
5. BEING SO RESTLESS THAT IT IS HARD TO SIT STILL: NOT AT ALL
3. WORRYING TOO MUCH ABOUT DIFFERENT THINGS: NOT AT ALL
IF YOU CHECKED OFF ANY PROBLEMS ON THIS QUESTIONNAIRE, HOW DIFFICULT HAVE THESE PROBLEMS MADE IT FOR YOU TO DO YOUR WORK, TAKE CARE OF THINGS AT HOME, OR GET ALONG WITH OTHER PEOPLE: NOT DIFFICULT AT ALL
2. NOT BEING ABLE TO STOP OR CONTROL WORRYING: NOT AT ALL

## 2020-07-13 ASSESSMENT — PATIENT HEALTH QUESTIONNAIRE - PHQ9
5. POOR APPETITE OR OVEREATING: NOT AT ALL
SUM OF ALL RESPONSES TO PHQ QUESTIONS 1-9: 3

## 2020-07-13 ASSESSMENT — PAIN SCALES - GENERAL: PAINLEVEL: SEVERE PAIN (6)

## 2020-07-13 NOTE — PROGRESS NOTES
"Nursing Notes:   Sheri Preston LPN  7/13/2020  9:38 AM  Signed  Chief Complaint   Patient presents with     Recheck Medication     Pt present to clinic today for medication refills. Medications are pending.  Initial BP (!) 144/68 (BP Location: Right arm, Patient Position: Sitting, Cuff Size: Adult Regular)   Pulse 72   Temp 98.7  F (37.1  C) (Tympanic)   Resp 16   Wt 84.4 kg (186 lb)   LMP  (LMP Unknown)   BMI 30.48 kg/m   Estimated body mass index is 30.48 kg/m  as calculated from the following:    Height as of 6/15/20: 1.664 m (5' 5.5\").    Weight as of this encounter: 84.4 kg (186 lb).  Medication Reconciliation: complete    Sheri Preston LPN    SUBJECTIVE:  65 year old female with CAD presents to follow up on chronic pain and anxiety    Had a flare of irritable bowel. Alternating diarrhea and constipation. No fever. Some cramping pain that will improve over time based on her experience.    Having pain in big toes and some tingling in the toes of the left foot. Started B12.    Still feels more fatigued lately. Some chest pain, but felt to be noncardiac. Complex heart history with multiple cardiac stents as well as a subclavian stent. Working with cardiology. Avoiding ED as she always gets sent to [facility] and then does not need any interventions.    Requesting an increase in oxycodone.  She feels this will help with headaches and various pains.  In the past she was on methadone and hydrocodone.  On the methadone she is often sedated.  She had at times taken herself off opioids and done much worse.  Recently the oxycodone seems to be a good balance of pain management and function preservation.  Taking oxycodone 10/325 mg 4 times daily.    Anemia has been stable. Was taking sucralfate 1 g four times daily for gastritis presumed from aspirin, Plavix and Xarelto use. She is no longer on aspirin. Hemoglobin was down to 11.6 on 2/5/20 and then improved over 12 in March and April. Has been at hemoglobin of " 11, 11.5 and 11.2 for the past 2 months.    REVIEW OF SYSTEMS:    Pertinent items are noted in HPI.    Current Outpatient Medications   Medication Sig Dispense Refill     albuterol (PROAIR HFA/PROVENTIL HFA/VENTOLIN HFA) 108 (90 Base) MCG/ACT inhaler Inhale 2 puffs into the lungs every 6 hours 2 Inhaler 3     amLODIPine (NORVASC) 10 MG tablet Take 1 tablet (10 mg) by mouth daily 90 tablet 3     busPIRone (BUSPAR) 10 MG tablet Take 1 tablet (10 mg) by mouth 2 times daily 180 tablet 1     clonazePAM (KLONOPIN) 1 MG tablet Take 1 tablet (1 mg) by mouth 2 times daily 180 tablet 0     clopidogrel (PLAVIX) 75 MG tablet Take 1 tablet (75 mg) by mouth daily 90 tablet 3     cyanocobalamin (VITAMIN B-12) 1000 MCG tablet Take 1 tablet (1,000 mcg) by mouth daily       Diclofenac Sodium 1.5 % SOLN Apply 20 drops to each hand or 40 drops to each knee up to 4 times daily as needed for arthritis pain 450 mL 3     ferrous gluconate (FERGON) 324 (38 Fe) MG tablet Take 1 tablet (324 mg) by mouth 2 times daily (with meals) 60 tablet 3     lisinopril (ZESTRIL) 20 MG tablet Take 0.5 tablets (10 mg) by mouth daily 90 tablet 3     metoclopramide (REGLAN) 10 MG tablet Take 1 tablet (10 mg) by mouth every 6 hours as needed (headache) 30 tablet 1     metoprolol succinate ER (TOPROL-XL) 25 MG 24 hr tablet Take 1 tablet (25 mg) by mouth 2 times daily 180 tablet 3     naloxone (NARCAN) 4 MG/0.1ML nasal spray Spray into one nostril for opioid reversal if unresponsive. May repeat every 2-3 minutes until patient responsive or EMS arrives 1 each 1     nitroGLYcerin (NITROLINGUAL) 0.4 MG/SPRAY spray For chest pain spray 1 spray under tongue every 5 minutes for 3 doses. If symptoms persist 5 minutes after 1st dose call 911. 4.9 g 3     omeprazole (PRILOSEC) 20 MG DR capsule Take 1 capsule (20 mg) by mouth 2 times daily 180 capsule 3     ondansetron (ZOFRAN) 4 MG tablet Take 1 tablet (4 mg) by mouth every 6 hours as needed for nausea 30 tablet 2      oxyCODONE-acetaminophen (PERCOCET)  MG per tablet Take 1 tablet by mouth 4 times daily as needed for severe pain 120 tablet 0     oxyCODONE-acetaminophen (PERCOCET)  MG per tablet Take 1 tablet by mouth 4 times daily as needed for severe pain 120 tablet 0     pregabalin (LYRICA) 75 MG capsule Take 1 capsule (75 mg) by mouth 3 times daily 90 capsule 1     ranolazine 500 MG PO 12 hr tablet Take 1 tablet (500 mg) by mouth 2 times daily 180 tablet 3     rivaroxaban ANTICOAGULANT 20 MG PO TABS tablet Take 1 tablet (20 mg) by mouth daily (with dinner) 90 tablet 3     rosuvastatin (CRESTOR) 10 MG tablet Take 2 tablets (20 mg) by mouth At Bedtime 180 tablet 3     sucralfate (CARAFATE) 1 GM tablet TAKE 1 TABLET (1 G) BY MOUTH 4 TIMES DAILY AS NEEDED FOR NAUSEA (OR DARK STOOLS) 360 tablet 0     VITAMIN D, CHOLECALCIFEROL, PO Take 5,000 Units by mouth daily       vortioxetine (TRINTELLIX) 20 MG tablet Take 1 tablet (20 mg) by mouth daily 90 tablet 1     Allergies   Allergen Reactions     Atorvastatin Muscle Pain (Myalgia)     Tiotropium Bromide [Tiotropium] Rash     Ezetimibe Muscle Pain (Myalgia)     Latex Rash     Niacin      Other reaction(s): Flushing     No Clinical Screening - See Comments Itching, Rash and Blisters     Metals and plastics       Tape [Adhesive Tape] Rash       OBJECTIVE:  BP (!) 144/68 (BP Location: Right arm, Patient Position: Sitting, Cuff Size: Adult Regular)   Pulse 72   Temp 98.7  F (37.1  C) (Tympanic)   Resp 16   Wt 84.4 kg (186 lb)   LMP  (LMP Unknown)   BMI 30.48 kg/m      EXAM:  General Appearance: Alert. No acute distress  Chest/Respiratory Exam: Clear to auscultation bilaterally  Cardiovascular Exam: Regular rate and rhythm. S1, S2, no murmur, gallop, or rubs.  Extremities: Nonpalpable pedal pulses.  No lower extremity edema.  Neurological: Decreased sensation in feet  Psychiatric: Normal affect and mentation    No results found for any visits on 07/13/20.      ASSESSMENT/PLAN:    ICD-10-CM    1. Chronic pain disorder  G89.4 HYDROcodone-acetaminophen (NORCO)  MG per tablet   2. Chronic anxiety  F41.9 clonazePAM (KLONOPIN) 1 MG tablet   3. Controlled substance agreement signed 9/18/19  Z79.899 clonazePAM (KLONOPIN) 1 MG tablet     HYDROcodone-acetaminophen (NORCO)  MG per tablet   4. Left-sided headache  R51 metoclopramide (REGLAN) 10 MG tablet   5. Nausea  R11.0 ondansetron (ZOFRAN) 4 MG tablet   6. Chronic bilateral low back pain without sciatica  M54.5 HYDROcodone-acetaminophen (NORCO)  MG per tablet    G89.29    7. Chest wall pain, chronic  R07.89 HYDROcodone-acetaminophen (NORCO)  MG per tablet    G89.29    8. Paresthesia  R20.2 US ARYA Doppler No Exercise 1-2 Levels Bilateral   9. Decreased pedal pulses  R09.89 US ARYA Doppler No Exercise 1-2 Levels Bilateral     She is requesting  increase in oxycodone.  Is also on clonazepam.  We discussed harms with concurrent opioid and benzodiazepine use.  No increase in either medication.  She has been working down on the clonazepam with a stable oxycodone dosing for a few years.  After some back-and-forth conversation, agreed to try opioid rotation to hydrocodone, taking 10/325 mg up to 6/day.  This will still be 60 morphine milligram equivalents, but without reduction for cross tolerance, she may see some greater benefit than with oxycodone.    Keep clonazepam the same, up to 1 mg twice daily.    Refilled nausea medications    For paresthesias in her feet and decreased pedal pulses, ordered ARYA for further assessment.    Follow up in a month    Greater than 50% of this 26 minute appointment spent on counseling       Ramirez Cano MD    This document was prepared using a combination of typing and voice generated software.  While every attempt was made for accuracy, spelling and grammatical errors may exist.

## 2020-07-13 NOTE — NURSING NOTE
"Chief Complaint   Patient presents with     Recheck Medication     Pt present to clinic today for medication refills. Medications are pending.  Initial BP (!) 144/68 (BP Location: Right arm, Patient Position: Sitting, Cuff Size: Adult Regular)   Pulse 72   Temp 98.7  F (37.1  C) (Tympanic)   Resp 16   Wt 84.4 kg (186 lb)   LMP  (LMP Unknown)   BMI 30.48 kg/m   Estimated body mass index is 30.48 kg/m  as calculated from the following:    Height as of 6/15/20: 1.664 m (5' 5.5\").    Weight as of this encounter: 84.4 kg (186 lb).  Medication Reconciliation: complete    Sheri Preston LPN  "

## 2020-07-13 NOTE — PATIENT INSTRUCTIONS
Try a change to hydrocodone 10 mg pill up to 6 per day instead of oxycodone    Ordered circulation test    Follow-up in a month

## 2020-07-14 ASSESSMENT — ANXIETY QUESTIONNAIRES: GAD7 TOTAL SCORE: 4

## 2020-07-15 RX ORDER — PREGABALIN 75 MG/1
75 CAPSULE ORAL 3 TIMES DAILY
Qty: 90 CAPSULE | Refills: 5 | Status: SHIPPED | OUTPATIENT
Start: 2020-07-15 | End: 2021-04-07

## 2020-07-15 NOTE — TELEPHONE ENCOUNTER
[id number] in [facility] [place] sent Rx request for the following:      Requested Prescriptions   Pending Prescriptions Disp Refills   pregabalin (LYRICA) 75 MG capsule [Pharmacy Med Name: PREGABALIN 75 MG CAPSULE] 90 capsule 1    Sig: TAKE 1 CAPSULE (75 MG) BY MOUTH 3 TIMES DAILY   Last Prescription Date:   5/13/20  Last Fill Qty/Refills:         90, R-1    Last Office Visit:              7/13/20  Future Office visit:             Next 5 appointments (look out 90 days)    Aug 12, 2020  9:20 AM CDT  [facility] Cano [facility]  [facility] [address]  [place] [address]  [phone]   Sep 02, 2020 10:15 AM CDT  Return Visit with JOSELYN [facility] ([facility]) [address]  [place] [address]  [phone]      Routing refill request to provider for review/approval because:  Drug not on the [facility], [facility] or [facility] refill protocol or controlled substance    Unable to complete prescription refill per RN Medication Refill Policy. Kathleen Puckett RN .............. 7/15/2020  3:49 PM

## 2020-07-17 ENCOUNTER — HOSPITAL ENCOUNTER (OUTPATIENT)
Dept: ULTRASOUND IMAGING | Facility: OTHER | Age: 65
Discharge: HOME | End: 2020-07-17
Attending: FAMILY MEDICINE | Admitting: FAMILY MEDICINE
Payer: MEDICARE

## 2020-07-17 DIAGNOSIS — R20.2 PARESTHESIA: ICD-10-CM

## 2020-07-17 DIAGNOSIS — R09.89 DECREASED PEDAL PULSES: ICD-10-CM

## 2020-07-17 PROCEDURE — 93922 UPR/L XTREMITY ART 2 LEVELS: CPT

## 2020-07-24 ENCOUNTER — TELEPHONE (OUTPATIENT)
Dept: FAMILY MEDICINE | Facility: OTHER | Age: 65
End: 2020-07-24

## 2020-07-24 DIAGNOSIS — Z79.899 CONTROLLED SUBSTANCE AGREEMENT SIGNED: ICD-10-CM

## 2020-07-24 DIAGNOSIS — F41.9 CHRONIC ANXIETY: ICD-10-CM

## 2020-07-24 NOTE — TELEPHONE ENCOUNTER
Pt is out of her Anxiety med and it cant be filled until the 13 of august and pt stated that her anxiety was so high today if you could please contact this pt in regards to this thank you.

## 2020-07-29 RX ORDER — CLONAZEPAM 1 MG/1
1 TABLET ORAL 3 TIMES DAILY PRN
Qty: 225 TABLET | Refills: 0 | Status: SHIPPED | OUTPATIENT
Start: 2020-07-29 | End: 2020-10-09

## 2020-08-04 DIAGNOSIS — I25.10 ASCVD (ARTERIOSCLEROTIC CARDIOVASCULAR DISEASE): ICD-10-CM

## 2020-08-04 DIAGNOSIS — Z95.1 HISTORY OF CORONARY ARTERY BYPASS GRAFT X 3: ICD-10-CM

## 2020-08-04 DIAGNOSIS — I20.89 CHRONIC STABLE ANGINA (H): ICD-10-CM

## 2020-08-05 RX ORDER — NITROGLYCERIN 400 UG/1
SPRAY ORAL
Refills: 3 | OUTPATIENT
Start: 2020-08-05

## 2020-08-05 NOTE — TELEPHONE ENCOUNTER
"Requested Prescriptions   Pending Prescriptions Disp Refills     nitroGLYcerin (NITROLINGUAL) 0.4 MG/SPRAY spray [Pharmacy Med Name: NITROGLYCERIN LINGUAL 0.4 MG]  3     Sig: FOR CHEST PAIN SPRAY 1 SPRAY UNDER TONGUE EVERY 5 MINUTES FOR 3 DOSES. IF SYMPTOMS PERSIST 5 MINUTES AFTER 1ST DOSE CALL 911.       Nitrates Failed - 8/4/2020 11:30 AM        Failed - Blood pressure under 140/90 in past 12 months     BP Readings from Last 3 Encounters:   07/13/20 (!) 144/68   06/15/20 120/70   06/03/20 138/80                 Passed - Pt is not on erectile dysfunction medications        Passed - Recent (12 mo) or future (30 days) visit within the authorizing provider's specialty     Patient has had an office visit with the authorizing provider or a provider within the authorizing providers department within the previous 12 mos or has a future within next 30 days. See \"Patient Info\" tab in inbasket, or \"Choose Columns\" in Meds & Orders section of the refill encounter.              Passed - Medication is active on med list        Passed - Patient is age 18 or older         Called and talked with Дмитрий pharmacist at [facility] request was sent in error this medication was filled on 06/3/2020 4.9g with 3 refills.  Will deny request.  Dian Dumont RN on 8/5/2020 at 9:44 AM       "

## 2020-08-12 ENCOUNTER — OFFICE VISIT (OUTPATIENT)
Dept: FAMILY MEDICINE | Facility: OTHER | Age: 65
End: 2020-08-12
Attending: FAMILY MEDICINE
Payer: MEDICARE

## 2020-08-12 VITALS
OXYGEN SATURATION: 96 % | TEMPERATURE: 97.7 F | HEART RATE: 88 BPM | DIASTOLIC BLOOD PRESSURE: 60 MMHG | SYSTOLIC BLOOD PRESSURE: 100 MMHG | RESPIRATION RATE: 17 BRPM

## 2020-08-12 DIAGNOSIS — I20.89 CHRONIC STABLE ANGINA (H): ICD-10-CM

## 2020-08-12 DIAGNOSIS — F41.1 ANXIETY STATE: ICD-10-CM

## 2020-08-12 DIAGNOSIS — A05.9 FOOD POISONING: ICD-10-CM

## 2020-08-12 DIAGNOSIS — Z12.31 ENCOUNTER FOR SCREENING MAMMOGRAM FOR BREAST CANCER: ICD-10-CM

## 2020-08-12 DIAGNOSIS — Z79.899 CONTROLLED SUBSTANCE AGREEMENT SIGNED: ICD-10-CM

## 2020-08-12 DIAGNOSIS — G89.4 CHRONIC PAIN DISORDER: Primary | ICD-10-CM

## 2020-08-12 DIAGNOSIS — G89.29 CHRONIC BILATERAL LOW BACK PAIN WITHOUT SCIATICA: ICD-10-CM

## 2020-08-12 DIAGNOSIS — G89.29 CHEST WALL PAIN, CHRONIC: ICD-10-CM

## 2020-08-12 DIAGNOSIS — M54.50 CHRONIC BILATERAL LOW BACK PAIN WITHOUT SCIATICA: ICD-10-CM

## 2020-08-12 DIAGNOSIS — R07.89 CHEST WALL PAIN, CHRONIC: ICD-10-CM

## 2020-08-12 PROCEDURE — 99214 OFFICE O/P EST MOD 30 MIN: CPT | Performed by: FAMILY MEDICINE

## 2020-08-12 PROCEDURE — G0463 HOSPITAL OUTPT CLINIC VISIT: HCPCS

## 2020-08-12 RX ORDER — OXYCODONE AND ACETAMINOPHEN 10; 325 MG/1; MG/1
1 TABLET ORAL 4 TIMES DAILY PRN
Qty: 120 TABLET | Refills: 0 | Status: CANCELLED | OUTPATIENT
Start: 2020-08-12

## 2020-08-12 RX ORDER — HYDROCODONE BITARTRATE AND ACETAMINOPHEN 10; 325 MG/1; MG/1
1-2 TABLET ORAL EVERY 4 HOURS PRN
Qty: 180 TABLET | Refills: 0 | Status: SHIPPED | OUTPATIENT
Start: 2020-09-11 | End: 2020-10-09

## 2020-08-12 RX ORDER — HYDROCODONE BITARTRATE AND ACETAMINOPHEN 10; 325 MG/1; MG/1
1-2 TABLET ORAL EVERY 4 HOURS PRN
Qty: 180 TABLET | Refills: 0 | Status: SHIPPED | OUTPATIENT
Start: 2020-08-12 | End: 2020-10-09

## 2020-08-12 RX ORDER — BUSPIRONE HYDROCHLORIDE 15 MG/1
15 TABLET ORAL 2 TIMES DAILY
Qty: 180 TABLET | Refills: 1 | Status: SHIPPED | OUTPATIENT
Start: 2020-08-12 | End: 2020-12-11 | Stop reason: DRUGHIGH

## 2020-08-12 ASSESSMENT — ANXIETY QUESTIONNAIRES
2. NOT BEING ABLE TO STOP OR CONTROL WORRYING: NOT AT ALL
6. BECOMING EASILY ANNOYED OR IRRITABLE: NEARLY EVERY DAY
3. WORRYING TOO MUCH ABOUT DIFFERENT THINGS: NOT AT ALL
1. FEELING NERVOUS, ANXIOUS, OR ON EDGE: NEARLY EVERY DAY
7. FEELING AFRAID AS IF SOMETHING AWFUL MIGHT HAPPEN: NOT AT ALL
5. BEING SO RESTLESS THAT IT IS HARD TO SIT STILL: SEVERAL DAYS
GAD7 TOTAL SCORE: 7

## 2020-08-12 ASSESSMENT — PATIENT HEALTH QUESTIONNAIRE - PHQ9
SUM OF ALL RESPONSES TO PHQ QUESTIONS 1-9: 2
5. POOR APPETITE OR OVEREATING: NOT AT ALL

## 2020-08-12 ASSESSMENT — PAIN SCALES - GENERAL: PAINLEVEL: NO PAIN (0)

## 2020-08-12 NOTE — PATIENT INSTRUCTIONS
I recommend starting some counseling. Check with Breanna Nesbitt and if that does not work, then check with other counselors  [phone]    Increase buspirone from 10 up to 15 mg twice daily    Refilled hydrocodone for 2 prescriptions    Follow-up in 2 months

## 2020-08-12 NOTE — PROGRESS NOTES
"Nursing Notes:   Jamilah Berger, LPN  8/12/2020  9:46 AM  Signed  Pt presents to clinic today for medication management.      Medication Reconciliation: complete  Jamilah Berger LPN      SUBJECTIVE:  65 year old female with CAD presents to follow up on chronic pain and anxiety    Had food poisoning last night. Better today. Denies any need for workup.    Slowly reducing clonazepam over time. She was down to 1 pill twice daily, but now for the past few months has \"nervous energy\" and ended up increasing back to 2.5 pills per day. Is no longer seeing psychiatry or counseling.    Changed last month from oxycodone to hydrocodone as she felt oxycodone was providing incomplete relief and requesting a refill. We discussed that opioids would not be increased. She was on methadone before, but was sedated. Without opioids she had loss of function. Changed from 60 morphine milligram equivalents of oxycodone to hydrocodone.    Developed anemia earlier this year, presumed gastritis from aspirin, Plavix and Xarelto use. She is no longer on aspirin. Hemoglobin was down to 11.6 on 2/5/20 and then improved over 12 in March and April. Has been at hemoglobin of around 11 since. She feels fine and declines labs today.    REVIEW OF SYSTEMS:    General: Denies general constitutional problems  Cardiovascular: Denies problems  Respiratory: Denies problems  Neurologic: Denies problems    Past Medical History:   Diagnosis Date     Acute ischemic heart disease (H)     06/15/2007,with PTCA and stenting of 90% osteo circumflex lesion and patent LAD graft, patent left main stent.     Acute myocardial infarction (H)     3/30/2013     Anxiety disorder     No Comments Provided     Atherosclerotic heart disease of native coronary artery without angina pectoris     -angio revealed 3 vessel dz  -4 stents placed; 2 overlapping stents placed in mLAD, 1 stent pRCA, 1 stent pCirc 1 s 9/02 -non-ST elevation MI 1/03  -angio revealed restenosis of Circ.    " -PTCA and brachytherapy of pCirc -repeat angio 2/03 -no intervension -CABG x3 12/03 - Dr Sinclair  -LIMA-LAD, RAFI-RCA, SVG-OM -PCI 7/04 stent to L -CTangio 9/05   -patentent LIMA-LAD. RAFI-RCA occluded; RCA ostio/p*     Bilateral carpal tunnel syndrome     No Comments Provided     Cervicalgia     No Comments Provided     Chest pain     12/29/2014     Chronic gastric ulcer without hemorrhage or perforation     10/03/2011,hx of GI bleed (2003)     Chronic ischemic heart disease     06/27/2012     Chronic obstructive pulmonary disease (H)     06/15/2007,low DLCO, normal spirometry     Chronic or unspecified gastric ulcer with hemorrhage     10/2003     Chronic pain syndrome     12/01/2010,chest wall, back     Constipation     11/29/2011     Coronary angioplasty status     09/12/2002,with triple stenting     Coronary angioplasty status     01/10/2003,repeat angioplasty     Coronary angioplasty status     No Comments Provided     Dorsalgia     05/31/2011     Encounter for other administrative examinations     1/28/2014     Encounter for screening for cardiovascular disorders     10/2004,Cardiolite (2004, 2005, 2006 and 2011)     Enterocolitis due to Clostridium difficile     8/19/2016     Essential (primary) hypertension     No Comments Provided     Hyperlipidemia     No Comments Provided     Major depressive disorder, single episode     Severe, hx of suicide attempt/hospitalization     Migraine without status migrainosus, not intractable     No Comments Provided     Nodular corneal degeneration     09/26/2011     Noninfective gastroenteritis and colitis     06/15/2007,history of     Noninfective gastroenteritis and colitis     Microcytic     Osteoarthritis     No Comments Provided     Other chest pain     10/13/2009,chronic     Pain in knee     05/31/2011     Pain in right shoulder     No Comments Provided     Panic disorder without agoraphobia     No Comments Provided     Peptic ulcer without hemorrhage or perforation      6/15/2007     Peripheral vascular disease (H)     No Comments Provided     Personal history of diseases of the blood and blood-forming organs and certain disorders involving the immune mechanism (CODE)     No Comments Provided     Personal history of nicotine dependence     No Comments Provided     Personal history of other medical treatment (CODE)     10/14/2013     Presence of aortocoronary bypass graft     2/12/2003     Primary central sleep apnea     10/14/2013     Sepsis due to Escherichia coli (E. coli) (H)     7/14/16,[facility]     Stricture of artery (H)     3/31/2013,S/p prox left SCA stent 4/1/2013     Thoracic, thoracolumbar and lumbosacral intervertebral disc disorder     No Comments Provided     Uncomplicated opioid abuse (H)     history of     Vitamin D deficiency     5/6/2013      Past Surgical History:   Procedure Laterality Date     ANGIOPLASTY      9/12/02,with triple stenting     APPENDECTOMY OPEN      No Comments Provided     ARTHROSCOPY KNEE      left     ARTHROSCOPY SHOULDER Right 05/12/2017    labral tear, rotator cuff tear and some subacromial decompression      BYPASS GRAFT ARTERY CORONARY      12/13/02,Triple bypass, left internal mammary  to LAD, right internal mammary to right coronary artery, saphenous to obtuse marginal of the left circumflex.     COLONOSCOPY      2011,Dr Bowman benign polyps     COLONOSCOPY  10/03/2011    2011,benign polyps, Dr. Bowman     COLONOSCOPY  08/08/2016 8/8/16,normal, Dr Bowman     ELBOW SURGERY      baby,birth malachi removed from right arm     EMBOLECTOMY UPPER EXTREMITY  04/02/2013    brachial artery pseudoaneurysm after stenting     ESOPHAGOSCOPY, GASTROSCOPY, DUODENOSCOPY (EGD), COMBINED      2011,EGD Dr Bowman with pyloric ulcer     ESOPHAGOSCOPY, GASTROSCOPY, DUODENOSCOPY (EGD), COMBINED      2011,pyloric ulcer, Dr. Bowman     ESOPHAGOSCOPY, GASTROSCOPY, DUODENOSCOPY (EGD), COMBINED      8/8/16,mild gastritis, Dr Bowman     ESOPHAGOSCOPY, GASTROSCOPY,  DUODENOSCOPY (EGD), COMBINED      11/27/2017,Dr Bowman. Antral ulcer     ESOPHAGOSCOPY, GASTROSCOPY, DUODENOSCOPY (EGD), COMBINED  02/02/2018    Dr Bowman, healed ulcer     HYSTERECTOMY TOTAL ABDOMINAL      age 21     LAPAROSCOPIC CHOLECYSTECTOMY      2006     OSTEOTOMY FEMUR DISTAL      x3, right knee     OSTEOTOMY FEMUR DISTAL      2000,left knee  ligament surgery     OTHER SURGICAL HISTORY      1/10/2003,,PTCA     OTHER SURGICAL HISTORY      09/20/2012,,PTCA,DELONTE in LAD and left main     OTHER SURGICAL HISTORY      4/1/2013,,PTCA,L subclavian stenosis     SALPINGO-OOPHORECTOMY BILATERAL      age 27,Bilateral salpingo-oophorectomy     TONSILLECTOMY, ADENOIDECTOMY, COMBINED      childhood          Current Outpatient Medications   Medication Sig Dispense Refill     albuterol (PROAIR HFA/PROVENTIL HFA/VENTOLIN HFA) 108 (90 Base) MCG/ACT inhaler Inhale 2 puffs into the lungs every 6 hours 2 Inhaler 3     amLODIPine (NORVASC) 10 MG tablet Take 1 tablet (10 mg) by mouth daily 90 tablet 3     busPIRone (BUSPAR) 10 MG tablet Take 1 tablet (10 mg) by mouth 2 times daily 180 tablet 1     clonazePAM (KLONOPIN) 1 MG tablet Take 1 tablet (1 mg) by mouth 3 times daily as needed for anxiety Max 2.5 per day = 225 per 90 days 225 tablet 0     clopidogrel (PLAVIX) 75 MG tablet Take 1 tablet (75 mg) by mouth daily 90 tablet 3     cyanocobalamin (VITAMIN B-12) 1000 MCG tablet Take 1 tablet (1,000 mcg) by mouth daily       Diclofenac Sodium 1.5 % SOLN Apply 20 drops to each hand or 40 drops to each knee up to 4 times daily as needed for arthritis pain 450 mL 3     ferrous gluconate (FERGON) 324 (38 Fe) MG tablet Take 1 tablet (324 mg) by mouth 2 times daily (with meals) 60 tablet 3     HYDROcodone-acetaminophen (NORCO)  MG per tablet Take 1-2 tablets by mouth every 4 hours as needed for severe pain 6 per day 180 tablet 0     lisinopril (ZESTRIL) 20 MG tablet Take 0.5 tablets (10 mg) by mouth daily 90 tablet 3      metoclopramide (REGLAN) 10 MG tablet Take 1 tablet (10 mg) by mouth every 6 hours as needed (headache) 30 tablet 1     metoprolol succinate ER (TOPROL-XL) 25 MG 24 hr tablet Take 1 tablet (25 mg) by mouth 2 times daily 180 tablet 3     naloxone (NARCAN) 4 MG/0.1ML nasal spray Spray into one nostril for opioid reversal if unresponsive. May repeat every 2-3 minutes until patient responsive or EMS arrives 1 each 1     nitroGLYcerin (NITROLINGUAL) 0.4 MG/SPRAY spray For chest pain spray 1 spray under tongue every 5 minutes for 3 doses. If symptoms persist 5 minutes after 1st dose call 911. 4.9 g 3     omeprazole (PRILOSEC) 20 MG DR capsule Take 1 capsule (20 mg) by mouth 2 times daily 180 capsule 3     ondansetron (ZOFRAN) 4 MG tablet Take 1 tablet (4 mg) by mouth every 6 hours as needed for nausea 30 tablet 2     oxyCODONE-acetaminophen (PERCOCET)  MG per tablet Take 1 tablet by mouth 4 times daily as needed for severe pain 120 tablet 0     pregabalin (LYRICA) 75 MG capsule TAKE 1 CAPSULE (75 MG) BY MOUTH 3 TIMES DAILY 90 capsule 5     ranolazine 500 MG PO 12 hr tablet Take 1 tablet (500 mg) by mouth 2 times daily 180 tablet 3     rivaroxaban ANTICOAGULANT 20 MG PO TABS tablet Take 1 tablet (20 mg) by mouth daily (with dinner) 90 tablet 3     rosuvastatin (CRESTOR) 10 MG tablet Take 2 tablets (20 mg) by mouth At Bedtime 180 tablet 3     sucralfate (CARAFATE) 1 GM tablet TAKE 1 TABLET (1 G) BY MOUTH 4 TIMES DAILY AS NEEDED FOR NAUSEA (OR DARK STOOLS) 360 tablet 0     VITAMIN D, CHOLECALCIFEROL, PO Take 5,000 Units by mouth daily       vortioxetine (TRINTELLIX) 20 MG tablet Take 1 tablet (20 mg) by mouth daily 90 tablet 1     Allergies   Allergen Reactions     Atorvastatin Muscle Pain (Myalgia)     Tiotropium Bromide [Tiotropium] Rash     Ezetimibe Muscle Pain (Myalgia)     Latex Rash     Niacin      Other reaction(s): Flushing     No Clinical Screening - See Comments Itching, Rash and Blisters     Metals and  plastics       Tape [Adhesive Tape] Rash       OBJECTIVE:  /60   Pulse 88   Temp 97.7  F (36.5  C) (Temporal)   Resp 17   LMP  (LMP Unknown)   SpO2 96%     EXAM:  General Appearance: Alert. No acute distress  Chest/Respiratory Exam: Clear to auscultation bilaterally  Cardiovascular Exam: Regular rate and rhythm. S1, S2, no murmur, gallop, or rubs.  GI: Soft, nontender  Extremities: No edema  Psychiatric: Normal affect and mentation         ASSESSMENT/PLAN:    ICD-10-CM    1. Chronic pain disorder  G89.4 HYDROcodone-acetaminophen (NORCO)  MG per tablet     HYDROcodone-acetaminophen (NORCO)  MG per tablet   2. Chronic bilateral low back pain without sciatica  M54.5 HYDROcodone-acetaminophen (NORCO)  MG per tablet    G89.29 HYDROcodone-acetaminophen (NORCO)  MG per tablet   3. Chest wall pain, chronic  R07.89 HYDROcodone-acetaminophen (NORCO)  MG per tablet    G89.29 HYDROcodone-acetaminophen (NORCO)  MG per tablet   4. Controlled substance agreement signed 9/18/19  Z79.899 HYDROcodone-acetaminophen (NORCO)  MG per tablet     HYDROcodone-acetaminophen (NORCO)  MG per tablet   5. Anxiety state  F41.1 busPIRone (BUSPAR) 15 MG tablet   6. Encounter for screening mammogram for breast cancer  Z12.31 MA Screen Bilateral w/[facility]   7. Food poisoning  A05.9    8. Chronic stable angina (H)  I20.8        Change from 60 morphine milligram equivalents of oxycodone to hydrocodone has been effective. She desires to continue hydrocodone. Refilled 10/325 mg 6 pills per day. Given 2 prescriptions, for 2 months total.    Recently increased clonazepam from 1 mg BID to up to 2.5 per day. She needs to return to counseling to improve anxiety and reduce clonazepam. Also, increase buspirone from 10 to 15 mg BID.    Resolving food poisoning, no testing performed at this time.    CAD symptoms stable, has been following with cardiology    Follow up in 2 months, labs at that  time.    Greater than 50% of this 26 minute appointment spent on counseling       Ramirez Cano MD    This document was prepared using a combination of typing and voice generated software.  While every attempt was made for accuracy, spelling and grammatical errors may exist.

## 2020-08-13 ASSESSMENT — ANXIETY QUESTIONNAIRES: GAD7 TOTAL SCORE: 7

## 2020-09-03 DIAGNOSIS — I25.10 ASCVD (ARTERIOSCLEROTIC CARDIOVASCULAR DISEASE): ICD-10-CM

## 2020-09-03 DIAGNOSIS — Z95.1 HISTORY OF CORONARY ARTERY BYPASS GRAFT X 3: ICD-10-CM

## 2020-09-03 DIAGNOSIS — I20.89 CHRONIC STABLE ANGINA (H): ICD-10-CM

## 2020-09-03 RX ORDER — NITROGLYCERIN 400 UG/1
SPRAY ORAL
Refills: 3 | OUTPATIENT
Start: 2020-09-03

## 2020-09-03 NOTE — TELEPHONE ENCOUNTER
Called [facility]  In regards to request pharmacy Tech.  States this request was sent in error.   Patient has refills on this medication.  Will deny request.  Dian Dumont RN on 9/3/2020 at 2:06 PM

## 2020-09-03 NOTE — TELEPHONE ENCOUNTER
NITROLINGUAL      Last Written Prescription Date:  6-3-2020  Last Fill Quantity: 4.9G,   # refills: 3  Last Office Visit: 6-3-2020  Future Office visit:    Next 5 appointments (look out 90 days)    Sep 16, 2020  9:45 AM CDT  [facility] with JOSELYN [facility]eller CNP  [facility] [address]  [place] [address]  [phone]   Oct 09, 2020  9:20 AM CDT  SHORT with Ramirez Cano MD  [facility] ([facility]) [address]  [place] [address]  [phone]

## 2020-09-16 ENCOUNTER — OFFICE VISIT (OUTPATIENT)
Dept: CARDIOLOGY | Facility: OTHER | Age: 65
End: 2020-09-16
Attending: NURSE PRACTITIONER
Payer: MEDICARE

## 2020-09-16 VITALS
HEIGHT: 66 IN | TEMPERATURE: 97.4 F | BODY MASS INDEX: 30.47 KG/M2 | OXYGEN SATURATION: 98 % | SYSTOLIC BLOOD PRESSURE: 138 MMHG | HEART RATE: 64 BPM | DIASTOLIC BLOOD PRESSURE: 88 MMHG | RESPIRATION RATE: 16 BRPM

## 2020-09-16 DIAGNOSIS — J44.9 CHRONIC OBSTRUCTIVE PULMONARY DISEASE, UNSPECIFIED COPD TYPE (H): ICD-10-CM

## 2020-09-16 DIAGNOSIS — I95.1 ORTHOSTATIC HYPOTENSION: ICD-10-CM

## 2020-09-16 DIAGNOSIS — D50.9 IRON DEFICIENCY ANEMIA, UNSPECIFIED IRON DEFICIENCY ANEMIA TYPE: ICD-10-CM

## 2020-09-16 DIAGNOSIS — Z95.5 HISTORY OF CORONARY ARTERY STENT PLACEMENT: ICD-10-CM

## 2020-09-16 DIAGNOSIS — I10 ESSENTIAL HYPERTENSION: ICD-10-CM

## 2020-09-16 DIAGNOSIS — I25.10 ASCVD (ARTERIOSCLEROTIC CARDIOVASCULAR DISEASE): ICD-10-CM

## 2020-09-16 DIAGNOSIS — I26.99 ACUTE PULMONARY EMBOLISM WITHOUT ACUTE COR PULMONALE, UNSPECIFIED PULMONARY EMBOLISM TYPE (H): ICD-10-CM

## 2020-09-16 DIAGNOSIS — R42 EPISODIC LIGHTHEADEDNESS: ICD-10-CM

## 2020-09-16 DIAGNOSIS — Z98.890 STATUS POST CORONARY ANGIOGRAM: ICD-10-CM

## 2020-09-16 DIAGNOSIS — Z95.1 HISTORY OF CORONARY ARTERY BYPASS GRAFT X 3: ICD-10-CM

## 2020-09-16 DIAGNOSIS — I20.89 CHRONIC STABLE ANGINA (H): Primary | ICD-10-CM

## 2020-09-16 DIAGNOSIS — N18.30 CKD (CHRONIC KIDNEY DISEASE) STAGE 3, GFR 30-59 ML/MIN (H): ICD-10-CM

## 2020-09-16 PROCEDURE — G0463 HOSPITAL OUTPT CLINIC VISIT: HCPCS

## 2020-09-16 PROCEDURE — 99214 OFFICE O/P EST MOD 30 MIN: CPT | Performed by: NURSE PRACTITIONER

## 2020-09-16 ASSESSMENT — PAIN SCALES - GENERAL: PAINLEVEL: MODERATE PAIN (5)

## 2020-09-16 NOTE — NURSING NOTE
"Chief Complaint   Patient presents with     Follow Up     3 month follow up       Initial /88 (BP Location: Right arm, Patient Position: Sitting, Cuff Size: Adult Large)   Pulse 64   Temp 97.4  F (36.3  C) (Tympanic)   Resp 16   Ht 1.664 m (5' 5.51\")   LMP  (LMP Unknown)   SpO2 98%   BMI 30.47 kg/m   Estimated body mass index is 30.47 kg/m  as calculated from the following:    Height as of this encounter: 1.664 m (5' 5.51\").    Weight as of 7/13/20: 84.4 kg (186 lb).  Meds Reconciled: complete  Pt is not on Aspirin  Pt is on a Statin  PHQ and/or YAYA reviewed. Pt referred to PCP/MH Provider as appropriate.    Taylor Owusu LPN      "

## 2020-09-16 NOTE — PROGRESS NOTES
"[facility] HEART CARE   CARDIOLOGY PROGRESS NOTE    Ankita Day   [address] [address] [address]      Ramirez Cano     Chief Complaint   Patient presents with     Follow Up     3 month follow up        HPI:   Ms. Day is a 65 year old patient who presents for cardiology follow-up to visit on 6/03/2020 with history of ischemic heart disease and chronic stable angina. Patient has a history of hypertension, hyperlipidemia, CAD, history of pulmonary embolism, left subclavian artery stenosis, anxiety, collagenous colitis, depression, osteoarthritis, history of tobacco use, central sleep apnea for which she is not compliant with CPAP use, history of C. difficile, history of multiple concussions, iron deficiency anemia, CKD, myofascial pain, vitamin D deficiency, lumbar facet arthropathy, history of gastric ulcer with hemorrhage, COPD and peptic ulcer disease.    Patient has a known history of ischemic heart disease, she underwent  coronary angiogram and bypass angiogram on 9/15/2017 which was described as having stable disease. Mild to moderate 3 vessel CAD involving RCA, LAD and LCX with <50% stenosis at the greatest.  Occluded RAFI and SVG.  Patent LIMA.  No new obstructive lesions were identified and normal left heart cath.      She has a history of CABG on 2/12/03 x 3, history of multiple stent placements, patient reported 17 total.  Additionally, she has a history of tobacco abuse, sleep apnea, anxiety/depression, COPD, hypertension and history of left subclavian steal syndrome involving the LIMA status post stent placement.    At previous visit patient reported occasional recurrence of chest pain, not as severe as last ED visit on 10/25/19. She reported \"my breathing has been bad\", describes worsening CORTEZ. She described activity intolerance and little to no energy. Recommended repeat stress testing. NM lexiscan stress test was performed on 12/16/19 which was negative for inducible myocardial " ischemia or infarction.  Left ventricular function was normal.    Carotid US on 12/12/19 without significant stenosis, mild atherosclerotic disease present.  Abdominal ultrasound on 12/12/2018 with no abdominal aortic aneurysm identified.    Patient returned to the ED with dyspnea in early January 2020. She was identified to have small segmental and subsegmental emboli bilaterally. Repeat imaging on 1/6 with decreasing volume of pulmonary emboli compared to scan on 1/2/2020. She was initially treated with Lovanox in the ED and discharged on Xarelto oral anticoagulation which has been going well for her, no bleeding complication. She also remains on Plavix with her significant ischemic heart disease history.     She has been on medication management of her chronic stable angina which includes Ranexa 500 mg BID and amlodipine 10 mg daily. Her Toprol XL was recently adjusted to 25 mg BID. This change should also help stabilize her pressures during the day and prevent spikes.     Today patient reports that anginal symptoms remain stable. They did improve with previous adjustment of her beta blocker. Symptoms do increase sometimes with stress/ anxiety. Orthostatic hypotension, lightheadedness with standing improved when we reduced her Lisinopril at last visit. No recurrence of falls and no syncope.     PAST MEDICAL HISTORY:   Past Medical History:   Diagnosis Date     Acute ischemic heart disease (H)     06/15/2007,with PTCA and stenting of 90% osteo circumflex lesion and patent LAD graft, patent left main stent.     Acute myocardial infarction (H)     3/30/2013     Anxiety disorder     No Comments Provided     Atherosclerotic heart disease of native coronary artery without angina pectoris     -angio revealed 3 vessel dz  -4 stents placed; 2 overlapping stents placed in mLAD, 1 stent pRCA, 1 stent pCirc 1 s 9/02 -non-ST elevation MI 1/03  -angio revealed restenosis of Circ.    -PTCA and brachytherapy of pCirc -repeat  angio 2/03 -no intervension -CABG x3 12/03 - Dr Sinclair  -LIMA-LAD, RAFI-RCA, SVG-OM -PCI 7/04 stent to L -CTangio 9/05   -patentent LIMA-LAD. RAFI-RCA occluded; RCA ostio/p*     Bilateral carpal tunnel syndrome     No Comments Provided     Cervicalgia     No Comments Provided     Chest pain     12/29/2014     Chronic gastric ulcer without hemorrhage or perforation     10/03/2011,hx of GI bleed (2003)     Chronic ischemic heart disease     06/27/2012     Chronic obstructive pulmonary disease (H)     06/15/2007,low DLCO, normal spirometry     Chronic or unspecified gastric ulcer with hemorrhage     10/2003     Chronic pain syndrome     12/01/2010,chest wall, back     Constipation     11/29/2011     Coronary angioplasty status     09/12/2002,with triple stenting     Coronary angioplasty status     01/10/2003,repeat angioplasty     Coronary angioplasty status     No Comments Provided     Dorsalgia     05/31/2011     Encounter for other administrative examinations     1/28/2014     Encounter for screening for cardiovascular disorders     10/2004,[facility] (2004, 2005, 2006 and 2011)     Enterocolitis due to Clostridium difficile     8/19/2016     Essential (primary) hypertension     No Comments Provided     Hyperlipidemia     No Comments Provided     Major depressive disorder, single episode     Severe, hx of suicide attempt/hospitalization     Migraine without status migrainosus, not intractable     No Comments Provided     Nodular corneal degeneration     09/26/2011     Noninfective gastroenteritis and colitis     06/15/2007,history of     Noninfective gastroenteritis and colitis     Microcytic     Osteoarthritis     No Comments Provided     Other chest pain     10/13/2009,chronic     Pain in knee     05/31/2011     Pain in right shoulder     No Comments Provided     Panic disorder without agoraphobia     No Comments Provided     Peptic ulcer without hemorrhage or perforation     6/15/2007     Peripheral vascular  disease (H)     No Comments Provided     Personal history of diseases of the blood and blood-forming organs and certain disorders involving the immune mechanism (CODE)     No Comments Provided     Personal history of nicotine dependence     No Comments Provided     Personal history of other medical treatment (CODE)     10/14/2013     Presence of aortocoronary bypass graft     2003     Primary central sleep apnea     10/14/2013     Sepsis due to Escherichia coli (E. coli) (H)     16,[facility]     Stricture of artery (H)     3/31/2013,S/p prox left SCA stent 2013     Thoracic, thoracolumbar and lumbosacral intervertebral disc disorder     No Comments Provided     Uncomplicated opioid abuse (H)     history of     Vitamin D deficiency     2013          FAMILY HISTORY:   Family History   Problem Relation Age of Onset     Heart Disease Father         Heart Disease,Heart condition/Significant for atherosclerotic cardiovascular disease, but non premature.     Colon Cancer Father         Cancer-colon, of colon cancer     Cancer Father         Cancer,mets to liver, secondary to colon cancer     Heart Disease Mother         Heart Disease     Cancer Other         Cancer,Multiple Myeloma     Heart Disease Other         Heart Disease,Ischemic Heart Disease     Colon Cancer Other         Cancer-colon,Malignant neoplasms     Cancer Sister         Cancer,multiple myeloma     Other - See Comments Son         gallstones          PAST SURGICAL HISTORY:   Past Surgical History:   Procedure Laterality Date     ANGIOPLASTY      02,with triple stenting     APPENDECTOMY OPEN      No Comments Provided     ARTHROSCOPY KNEE      left     ARTHROSCOPY SHOULDER Right 2017    labral tear, rotator cuff tear and some subacromial decompression      BYPASS GRAFT ARTERY CORONARY      02,Triple bypass, left internal mammary  to LAD, right internal mammary to right coronary artery, saphenous to obtuse marginal of  the left circumflex.     COLONOSCOPY      2011,Dr Bowman benign polyps     COLONOSCOPY  10/03/2011    2011,benign polyps, Dr. Bowman     COLONOSCOPY  08/08/2016 8/8/16,normal, Dr Bowman     ELBOW SURGERY      baby,birth malachi removed from right arm     EMBOLECTOMY UPPER EXTREMITY  04/02/2013    brachial artery pseudoaneurysm after stenting     ESOPHAGOSCOPY, GASTROSCOPY, DUODENOSCOPY (EGD), COMBINED      2011,EGD Dr Bowman with pyloric ulcer     ESOPHAGOSCOPY, GASTROSCOPY, DUODENOSCOPY (EGD), COMBINED      2011,pyloric ulcer, Dr. Bowman     ESOPHAGOSCOPY, GASTROSCOPY, DUODENOSCOPY (EGD), COMBINED      8/8/16,mild gastritis, Dr Bowman     ESOPHAGOSCOPY, GASTROSCOPY, DUODENOSCOPY (EGD), COMBINED      11/27/2017,Dr Bowman. Antral ulcer     ESOPHAGOSCOPY, GASTROSCOPY, DUODENOSCOPY (EGD), COMBINED  02/02/2018    Dr Bowman, healed ulcer     HYSTERECTOMY TOTAL ABDOMINAL      age 21     LAPAROSCOPIC CHOLECYSTECTOMY      2006     OSTEOTOMY FEMUR DISTAL      x3, right knee     OSTEOTOMY FEMUR DISTAL      2000,left knee  ligament surgery     OTHER SURGICAL HISTORY      1/10/2003,,PTCA     OTHER SURGICAL HISTORY      09/20/2012,,PTCA,DELONTE in LAD and left main     OTHER SURGICAL HISTORY      4/1/2013,,PTCA,L subclavian stenosis     SALPINGO-OOPHORECTOMY BILATERAL      age 27,Bilateral salpingo-oophorectomy     TONSILLECTOMY, ADENOIDECTOMY, COMBINED      childhood          SOCIAL HISTORY:   Social History     Socioeconomic History     Marital status:      Spouse name: None     Number of children: None     Years of education: None     Highest education level: None   Occupational History     None   Social Needs     Financial resource strain: None     Food insecurity:     Worry: None     Inability: None     Transportation needs:     Medical: None     Non-medical: None   Tobacco Use     Smoking status: Former Smoker     Packs/day: 0.25     Years: 35.00     Pack years: 8.75     Types: Cigarettes     Last attempt to  quit: 2/15/2017     Years since quittin.2     Smokeless tobacco: Never Used   Substance and Sexual Activity     Alcohol use: Yes     Alcohol/week: 0.0 oz     Comment: Alcoholic Drinks/day: rare     Drug use: No     Comment: Drug use: No     Sexual activity: Never     Partners: Male   Lifestyle     Physical activity:     Days per week: None     Minutes per session: None     Stress: None   Relationships     Social connections:     Talks on phone: None     Gets together: None     Attends Mu-ism service: None     Active member of club or organization: None     Attends meetings of clubs or organizations: None     Relationship status: None     Intimate partner violence:     Fear of current or ex partner: None     Emotionally abused: None     Physically abused: None     Forced sexual activity: None   Other Topics Concern     Parent/sibling w/ CABG, MI or angioplasty before 65F 55M? Not Asked   Social History Narrative    ,  Steven.  Currently not working outside the home. Lives [place]-mile self [place] met with . Tobacco abuse, quit , restarted. Quit  and has since quit, no alcohol.          CURRENT MEDICATIONS:   Prior to Admission medications    Medication Sig Start Date End Date Taking? Authorizing Provider   albuterol (PROAIR HFA/PROVENTIL HFA/VENTOLIN HFA) 108 (90 Base) MCG/ACT inhaler Inhale 2 puffs into the lungs every 6 hours 19 Yes Ramirez Cano MD   amLODIPine (NORVASC) 10 MG tablet Take 1 tablet (10 mg) by mouth daily 18  Yes Ramirez Cano MD   aspirin EC 81 MG EC tablet Take 81 mg by mouth daily with food   Yes Reported, Patient   busPIRone (BUSPAR) 10 MG tablet TAKE 1 TABLET TWICE A DAY 18  Yes Ramirez Cano MD   clonazePAM (KLONOPIN) 1 MG tablet Take 1 tablet (1 mg) by mouth 3 times daily as needed for anxiety #75 per 30 days, #225 pills per 90 days 4/3/19  Yes Ramirez Cano MD   clopidogrel (PLAVIX) 75 MG tablet Take 1 tablet (75 mg)  by mouth daily 11/21/18  Yes Ramirez Cano MD   cyclobenzaprine (FLEXERIL) 10 MG tablet Take 1 tablet (10 mg) by mouth 2 times daily as needed for muscle spasms 11/21/18  Yes Ramirez Cano MD   Diclofenac Sodium 1.5 % SOLN Apply 20 drops to each hand or 40 drops to each knee up to 4 times daily as needed for arthritis pain 11/21/18  Yes Ramirez Cano MD   docusate sodium (COLACE) 50 MG capsule Take 50 mg by mouth daily   Yes Reported, Patient   doxycycline hyclate (VIBRAMYCIN) 100 MG capsule Take 1 capsule (100 mg) by mouth 2 times daily for 14 days 5/13/19 5/27/19 Yes Mikel Ashraf PA-C   DULoxetine (CYMBALTA) 60 MG EC capsule Take 1 capsule (60 mg) by mouth daily 8/23/18  Yes Ramirez Cano MD   gabapentin (NEURONTIN) 600 MG tablet Take 1 tablet (600 mg) by mouth 3 times daily 1/23/19  Yes Ramirez Cano MD   lisinopril (PRINIVIL/ZESTRIL) 40 MG tablet Take 1 tablet (40 mg) by mouth daily 4/23/19  Yes Ramirez Cano MD   metoprolol tartrate (LOPRESSOR) 50 MG tablet Take 1 tablet (50 mg) by mouth 2 times daily 11/21/18  Yes Ramirez Cano MD   NITROSTAT 0.3 MG sublingual tablet PLACE 1 TABLET UNDER THE TONGUE EVERY 5 MINUTES AS NEEDED FOR CHEST PAIN 4/9/18  Yes Ramirez Cano MD   ondansetron (ZOFRAN) 4 MG tablet Take 4 mg by mouth every 6 hours as needed for nausea 10/31/17  Yes Reported, Patient   oxyCODONE-acetaminophen (PERCOCET) 5-325 MG tablet Take 2 tablets by mouth 4 times daily Max acetaminophen dose: 4000mg in 24 hrs 5/23/19  Yes Ramirez Cano MD   pantoprazole (PROTONIX) 40 MG EC tablet TAKE 1 TABLET TWICE A DAY 1/22/19  Yes Ramirez Cano MD   pravastatin (PRAVACHOL) 40 MG tablet Take 1 tablet (40 mg) by mouth At Bedtime 1/23/19  Yes Ramirez Cano MD   saccharomyces boulardii (FLORASTOR) 250 MG capsule Take 250 mg by mouth 2 times daily   Yes Reported, Patient   sucralfate (CARAFATE) 1 GM tablet Take 1 tablet (1 g) by mouth 4 times daily Before meals  "& at bedtime 8/23/18  Yes Ramirez Cano MD   VITAMIN D, CHOLECALCIFEROL, PO Take 5,000 Units by mouth daily   Yes Reported, Patient          ALLERGIES:   Allergies   Allergen Reactions     Atorvastatin Muscle Pain (Myalgia)     Tiotropium Bromide [Tiotropium] Rash     Ezetimibe Muscle Pain (Myalgia)     Latex Rash     Niacin      Other reaction(s): Flushing     No Clinical Screening - See Comments Itching, Rash and Blisters     Metals and plastics       Tape [Adhesive Tape] Rash          ROS:   CONSTITUTIONAL: No fever and chills. No changes in weight.   ENT: No visual disturbance, ear ache, epistaxis or sore throat.   CARDIOVASCULAR: Anginal symptoms stable. No palpitations or increased lower extremity edema.   RESPIRATORY: Chronic shortness of breath with exertional dyspnea. No cough, wheezing or hemoptysis.   GI: No reported abdominal pain.   : No reported hematuria or dysuria.  NEUROLOGICAL: Positive for chronic headaches. orthostatic lightheadedness improved. No vertigo/dizziness, syncope, ataxia, paresthesias or weakness.  HEMATOLOGIC: No history of anemia. No bleeding or excessive bruising. Positive for history of PE.  MUSCULOSKELETAL: No reported new joint pain or swelling, positive for chronic back pain.   ENDOCRINOLOGIC: No temperature intolerance. No hair or skin changes.  SKIN: No abnormal rashes or sores, no unusual itching.  PSYCHIATRIC: Positive for history of anxiety and depression.    PHYSICAL EXAM:   /88 (BP Location: Right arm, Patient Position: Sitting, Cuff Size: Adult Large)   Pulse 64   Temp 97.4  F (36.3  C) (Tympanic)   Resp 16   Ht 1.664 m (5' 5.51\")   LMP  (LMP Unknown)   SpO2 98%   BMI 30.47 kg/m    GENERAL: The patient is a well-developed, well-nourished, in no apparent distress.  HEENT: Head is normocephalic and atraumatic. Eyes are symmetrical with normal visual tracking. No icterus, no xanthelasmas. Nares appeared normal without nasal drainage. Mucous membranes are " moist, no cyanosis.  NECK: Supple. No JVD visible.   CHEST/ LUNGS: Lungs sounds clear, no rales, rhonchi or wheezes, no use of accessory muscles, no retractions, respirations unlabored and normal respiratory rate.   CARDIO: Regular rate and rhythm normal with S1 and S2, no S3 or S4 and no murmur, click or rub.   ABD: Abdomen is nondistended.   EXTREMITIES: No LE edema present.   MUSCULOSKELETAL: No visible joint swelling.   NEUROLOGIC: Alert and oriented X3. Normal speech, gait and affect. No focal neurologic deficits.   SKIN: No jaundice. No rashes or visible skin lesions present. No ecchymosis.       LAB RESULTS:   Office Visit on 05/13/2019   Component Date Value Ref Range Status     Troponin I ES 05/13/2019 <0.030  0.000 - 0.034 ug/L Final     A Phagocytophil IgG 05/13/2019 >1:1,280* <1:80 Final     A Phagocytophil IgM 05/13/2019 > 1:256  <1:16 Final     Lyme Disease Antibodies Serum 05/13/2019 0.10  0.00 - 0.89 Final     Sodium 05/13/2019 137  134 - 144 mmol/L Final     Potassium 05/13/2019 3.4* 3.5 - 5.1 mmol/L Final     Chloride 05/13/2019 103  98 - 107 mmol/L Final     Carbon Dioxide 05/13/2019 23  21 - 31 mmol/L Final     Anion Gap 05/13/2019 11  3 - 14 mmol/L Final     Glucose 05/13/2019 117* 70 - 105 mg/dL Final     Urea Nitrogen 05/13/2019 23  7 - 25 mg/dL Final     Creatinine 05/13/2019 1.07  0.60 - 1.20 mg/dL Final     GFR Estimate 05/13/2019 51* >60 mL/min/[1.73_m2] Final     GFR Estimate If Black 05/13/2019 62  >60 mL/min/[1.73_m2] Final     Calcium 05/13/2019 9.3  8.6 - 10.3 mg/dL Final     Bilirubin Total 05/13/2019 1.1* 0.3 - 1.0 mg/dL Final     Albumin 05/13/2019 4.3  3.5 - 5.7 g/dL Final     Protein Total 05/13/2019 7.3  6.4 - 8.9 g/dL Final     Alkaline Phosphatase 05/13/2019 107* 34 - 104 U/L Final     ALT 05/13/2019 37  7 - 52 U/L Final     AST 05/13/2019 39  13 - 39 U/L Final     WBC 05/13/2019 8.2  4.0 - 11.0 10e9/L Final     RBC Count 05/13/2019 3.91  3.8 - 5.2 10e12/L Final     Hemoglobin  05/13/2019 11.8  11.7 - 15.7 g/dL Final     Hematocrit 05/13/2019 35.0  35.0 - 47.0 % Final     MCV 05/13/2019 90  78 - 100 fl Final     MCH 05/13/2019 30.2  26.5 - 33.0 pg Final     MCHC 05/13/2019 33.7  31.5 - 36.5 g/dL Final     RDW 05/13/2019 14.0  10.0 - 15.0 % Final     Platelet Count 05/13/2019 190  150 - 450 10e9/L Final     Diff Method 05/13/2019 Automated Method   Final     % Neutrophils 05/13/2019 71.4  % Final     % Lymphocytes 05/13/2019 20.9  % Final     % Monocytes 05/13/2019 6.4  % Final     % Eosinophils 05/13/2019 0.5  % Final     % Basophils 05/13/2019 0.2  % Final     % Immature Granulocytes 05/13/2019 0.6  % Final     Absolute Neutrophil 05/13/2019 5.8  1.6 - 8.3 10e9/L Final     Absolute Lymphocytes 05/13/2019 1.7  0.8 - 5.3 10e9/L Final     Absolute Monocytes 05/13/2019 0.5  0.0 - 1.3 10e9/L Final     Absolute Eosinophils 05/13/2019 0.0  0.0 - 0.7 10e9/L Final     Absolute Basophils 05/13/2019 0.0  0.0 - 0.2 10e9/L Final     Abs Immature Granulocytes 05/13/2019 0.1  0 - 0.4 10e9/L Final     Platelet Estimate 05/13/2019 Automated count confirmed.  Platelet morphology is normal.   Final     RBC Morphology 05/13/2019 Consistent with reported results   Final     Specimen Description 05/13/2019 Blood   Final     Culture Micro 05/13/2019 No growth after 6 days   Final          ASSESSMENT:   Ankita Day presents for cardiology follow-up to visit on 6/03/2020 with history of ischemic heart disease and chronic stable angina. Patient has a history of hypertension, hyperlipidemia, CAD, history of pulmonary embolism, left subclavian artery stenosis, anxiety, collagenous colitis, depression, osteoarthritis, history of tobacco use, central sleep apnea for which she is not compliant with CPAP use, history of C. difficile, history of multiple concussions, iron deficiency anemia, CKD, myofascial pain, vitamin D deficiency, lumbar facet arthropathy, history of gastric ulcer with hemorrhage, COPD and peptic  ulcer disease.  Patient has a known history of ischemic heart disease, she underwent  coronary angiogram and bypass angiogram on 9/15/2017 which was described as having stable disease.   NM lexiscan stress test was performed on 12/16/19 which was negative for inducible myocardial ischemia or infarction.  Left ventricular function was normal.  She has been on medication management of her chronic stable angina which includes Ranexa 500 mg BID and amlodipine 10 mg daily. Her Toprol XL was recently adjusted to 25 mg BID. This change should also help stabilize her pressures during the day and prevent spikes.   Today patient reports that anginal symptoms remain stable. They did improve with previous adjustment of her beta blocker. Symptoms do increase sometimes with stress/ anxiety. Orthostatic hypotension, lightheadedness with standing improved when we reduced her Lisinopril at last visit. No recurrence of falls and no syncope.     1. Chronic stable angina (H)  2. ASCVD (arteriosclerotic cardiovascular disease)  3. History of coronary artery bypass graft x 3  4. CKD (chronic kidney disease) stage 3, GFR 30-59 ml/min (H)  5. Acute pulmonary embolism without acute cor pulmonale, unspecified pulmonary embolism type (H)  6. Chronic obstructive pulmonary disease, unspecified COPD type (H)  7. Essential hypertension  8. Episodic lightheadedness  9. Status post coronary angiogram (2017)  10. History of coronary artery stent placement  11. Iron deficiency anemia, unspecified iron deficiency anemia type  12. Orthostatic hypotension      PLAN:  1. History of 3V CABG (2003) and history of coronary stenting. Cor angio stable in 2017, lexiscan 5 months ago without ischemia.  Chronic stable angina for which she will continue on Ranexa 500 mg BID, Toprol XL 25 mg BId and continue on Amlodipine 10 mg daily. Symptoms stable.     2. Orthostatic hypotension improved with reduction in Lisinopril to 10 mg daily. Orthostatic today stable with  no symptoms during exam.     3. Recently identifed PE's in Jan 2020, Xarelto now with continued use of plavix. No complication with anemia or blood loss. Given that she is also on Plavix, recommended reducing Xarelto to 15 mg daily.     4. Continue on Rosuvastatin to 10 mg daily.     5. CKD with renal function improved when we reduced her Lisinopril.     Follow-up with cardiology in 6 months, certainly sooner if needed.       Thank you for allowing me to participate in the care of your patient. Please do not hesitate to contact me if you have any questions.     Juanita Wang

## 2020-09-16 NOTE — PATIENT INSTRUCTIONS
You were seen by  JOSELYN Kilpatrick CNP      1. Decrease Xarelto to 15 mg daily     2. No other changes      You will follow up with [facility] in 6 months, sooner if needed.       Please call the cardiology office with problems, questions, or concerns at [phone].    If you experience chest pain, chest pressure, chest tightness, shortness of breath, fainting, lightheadedness, nausea, vomiting, or other concerning symptoms, please report to the Emergency Department or call 911. These symptoms may be emergent, and best treated in the Emergency Department.     Cardiology Nurses  VIOLET Starks, MAULIK WRAY, MAULIK  [facility] ([address])  [phone]

## 2020-09-28 ENCOUNTER — HOSPITAL ENCOUNTER (OUTPATIENT)
Dept: MAMMOGRAPHY | Facility: OTHER | Age: 65
Discharge: HOME | End: 2020-09-28
Attending: FAMILY MEDICINE | Admitting: FAMILY MEDICINE
Payer: MEDICARE

## 2020-09-28 DIAGNOSIS — Z12.31 ENCOUNTER FOR SCREENING MAMMOGRAM FOR BREAST CANCER: ICD-10-CM

## 2020-09-28 PROCEDURE — 77067 SCR MAMMO BI INCL CAD: CPT

## 2020-09-30 DIAGNOSIS — I25.10 ASCVD (ARTERIOSCLEROTIC CARDIOVASCULAR DISEASE): ICD-10-CM

## 2020-09-30 DIAGNOSIS — R51.9 LEFT-SIDED HEADACHE: ICD-10-CM

## 2020-09-30 DIAGNOSIS — Z95.1 HISTORY OF CORONARY ARTERY BYPASS GRAFT X 3: ICD-10-CM

## 2020-09-30 DIAGNOSIS — I20.89 CHRONIC STABLE ANGINA (H): ICD-10-CM

## 2020-09-30 RX ORDER — METOCLOPRAMIDE 10 MG/1
10 TABLET ORAL EVERY 6 HOURS PRN
Qty: 30 TABLET | Refills: 1 | Status: SHIPPED | OUTPATIENT
Start: 2020-09-30 | End: 2020-11-10

## 2020-09-30 RX ORDER — NITROGLYCERIN 400 UG/1
SPRAY ORAL
Qty: 25 G | Refills: 3 | OUTPATIENT
Start: 2020-09-30

## 2020-09-30 RX ORDER — NITROGLYCERIN 400 UG/1
SPRAY ORAL
Qty: 4.9 G | Refills: 3 | Status: SHIPPED | OUTPATIENT
Start: 2020-09-30 | End: 2022-01-11

## 2020-09-30 NOTE — TELEPHONE ENCOUNTER
"[facility] sent Rx request for the following:   metoclopramide (REGLAN) 10 MG tablet   Sig:TAKE 1 TABLET (10 MG) BY MOUTH EVERY 6 HOURS AS NEEDED (HEADACHE)    Last Prescription Date:   07/13/2020  Last Fill Qty/Refills:         30, R-1    Last Office Visit:              08/12/2020   Future Office visit:           10/09/2020    Antivertigo/Antiemetic Agents Passed -         Passed - Recent (12 mo) or future (30 days) visit within the authorizing provider's specialty     Patient has had an office visit with the authorizing provider or a provider within the authorizing providers department within the previous 12 mos or has a future within next 30 days. See \"Patient Info\" tab in inbasket, or \"Choose Columns\" in Meds & Orders section of the refill encounter.              Passed - Medication is active on med list        Passed - Patient is 18 years of age or older           Prescription approved per [facility] Ref[facility] Protocol.  Tiffany Johnson RN ....................  9/30/2020   2:40 PM      "

## 2020-09-30 NOTE — TELEPHONE ENCOUNTER
"Requested Prescriptions   Pending Prescriptions Disp Refills     nitroGLYcerin (NITROLINGUAL) 0.4 MG/SPRAY spray [Pharmacy Med Name: NITROGLYCERIN LINGUAL 0.4 MG]  3     Sig: FOR CHEST PAIN SPRAY 1 SPRAY UNDER TONGUE EVERY 5 MINUTES FOR 3 DOSES. IF SYMPTOMS PERSIST 5 MINUTES AFTER 1ST DOSE CALL 911.       Nitrates Passed - 9/30/2020  1:06 PM        Passed - Blood pressure under 140/90 in past 12 months     BP Readings from Last 3 Encounters:   09/16/20 138/88   08/12/20 100/60   07/13/20 (!) 144/68                 Passed - Pt is not on erectile dysfunction medications        Passed - Recent (12 mo) or future (30 days) visit within the authorizing provider's specialty     Patient has had an office visit with the authorizing provider or a provider within the authorizing providers department within the previous 12 mos or has a future within next 30 days. See \"Patient Info\" tab in inbasket, or \"Choose Columns\" in Meds & Orders section of the refill encounter.              Passed - Medication is active on med list        Passed - Patient is age 18 or older         Called and talked with Дмитрий pharmacist at [facility] states patient filled last refill of medication on 09/02/2020 no remain refills at this time.  06/03/2020 was given 4.9g with 3 refills.  Will send to Paul Gramajo DO to review and refill if appropriate.  Dian Dumont RN on 9/30/2020 at 2:13 PM       "

## 2020-10-01 DIAGNOSIS — K27.9 PEPTIC ULCER: Primary | ICD-10-CM

## 2020-10-01 NOTE — TELEPHONE ENCOUNTER
Redundant refill request refused: Too soon:    omeprazole (PRILOSEC) 20 MG DR capsule 180 capsule 3 1/22/2020  No   Sig - Route: Take 1 capsule (20 mg) by mouth 2 times daily - Oral   Sent to pharmacy as: omeprazole (PRILOSEC) 20 MG DR capsule   Class: E-Prescribe   Order: [id number]   E-Prescribing Status: Receipt confirmed by pharmacy (1/22/2020  9:47 AM CST)     [id number] - [place], [place] - [address]     Unable to complete prescription refill per RN Medication Refill Policy. Kathleen Puckett RN .............. 10/1/2020  2:51 PM

## 2020-10-03 DIAGNOSIS — D50.9 IRON DEFICIENCY ANEMIA, UNSPECIFIED IRON DEFICIENCY ANEMIA TYPE: ICD-10-CM

## 2020-10-05 RX ORDER — FERROUS GLUCONATE 324(38)MG
324 TABLET ORAL 2 TIMES DAILY WITH MEALS
Qty: 180 TABLET | Refills: 1 | Status: SHIPPED | OUTPATIENT
Start: 2020-10-05 | End: 2020-12-11

## 2020-10-05 NOTE — TELEPHONE ENCOUNTER
"Requested Prescriptions   Pending Prescriptions Disp Refills     ferrous gluconate (FERGON) 324 (38 Fe) MG tablet [Pharmacy Med Name: FERROUS GLUCONATE 324 MG TAB] 180 tablet 1     Sig: TAKE 1 TABLET (324 MG) BY MOUTH 2 TIMES DAILY (WITH MEALS)       Iron Supplements Passed - 10/3/2020 12:34 PM        Passed - Patient is 12 years of age or older        Passed - Recent (12 mo) or future (30 days) visit within the authorizing provider's specialty     Patient has had an office visit with the authorizing provider or a provider within the authorizing providers department within the previous 12 mos or has a future within next 30 days. See \"Patient Info\" tab in inbasket, or \"Choose Columns\" in Meds & Orders section of the refill encounter.              Passed - Hgb OR Hct on record within the past 12 mos.     Patient need only have had a HGB or HCT on file in the past 12 mos. That result does not need to be normal.    Recent Labs   Lab Test 07/01/20  1019 06/03/20  1045 05/06/20  1211   HGB 12.4 11.2* 11.5*     Recent Labs   Lab Test 07/01/20  1019 06/03/20  1045 05/06/20  1211   HCT 37.6 34.8* 36.1       Please verify a HGB or HCT has been checked SINCE THE LAST DOSE CHANGE.            Passed - Medication is active on med list               Last Written Prescription Date:  06/03/2020  Last Fill Quantity: 60,   # refills: 3  Last Office Visit: 09/16/2020 with JOSELYN Kilpatrick CNP  Future Office visit:    Next 5 appointments (look out 90 days)    Oct 09, 2020  9:20 AM  SHORT with Ramirez Cano MD  [facility] ([facility]) [address]  [place] [id number] [phone]      Unable to complete prescription refill per RN medication refill policy. Will route to provider for review and consideration.  Dian Dumont RN on 10/5/2020 at 3:41 PM               "

## 2020-10-09 ENCOUNTER — OFFICE VISIT (OUTPATIENT)
Dept: FAMILY MEDICINE | Facility: OTHER | Age: 65
End: 2020-10-09
Attending: FAMILY MEDICINE
Payer: MEDICARE

## 2020-10-09 VITALS
DIASTOLIC BLOOD PRESSURE: 64 MMHG | TEMPERATURE: 96.4 F | HEART RATE: 80 BPM | OXYGEN SATURATION: 96 % | RESPIRATION RATE: 18 BRPM | SYSTOLIC BLOOD PRESSURE: 110 MMHG

## 2020-10-09 DIAGNOSIS — M54.41 CHRONIC BILATERAL LOW BACK PAIN WITH RIGHT-SIDED SCIATICA: ICD-10-CM

## 2020-10-09 DIAGNOSIS — K27.9 PEPTIC ULCER: ICD-10-CM

## 2020-10-09 DIAGNOSIS — F11.90 CHRONIC, CONTINUOUS USE OF OPIOIDS: ICD-10-CM

## 2020-10-09 DIAGNOSIS — R07.89 CHEST WALL PAIN, CHRONIC: ICD-10-CM

## 2020-10-09 DIAGNOSIS — G89.29 CHRONIC BILATERAL LOW BACK PAIN WITH RIGHT-SIDED SCIATICA: ICD-10-CM

## 2020-10-09 DIAGNOSIS — G89.4 CHRONIC PAIN DISORDER: Primary | ICD-10-CM

## 2020-10-09 DIAGNOSIS — I20.89 CHRONIC STABLE ANGINA (H): ICD-10-CM

## 2020-10-09 DIAGNOSIS — F41.9 CHRONIC ANXIETY: ICD-10-CM

## 2020-10-09 DIAGNOSIS — R07.9 RECURRENT CHEST PAIN: ICD-10-CM

## 2020-10-09 DIAGNOSIS — G89.29 CHEST WALL PAIN, CHRONIC: ICD-10-CM

## 2020-10-09 DIAGNOSIS — Z79.899 CONTROLLED SUBSTANCE AGREEMENT SIGNED: ICD-10-CM

## 2020-10-09 LAB
ANION GAP SERPL CALCULATED.3IONS-SCNC: 9 MMOL/L (ref 3–14)
BUN SERPL-MCNC: 18 MG/DL (ref 7–25)
CALCIUM SERPL-MCNC: 9.6 MG/DL (ref 8.6–10.3)
CHLORIDE SERPL-SCNC: 104 MMOL/L (ref 98–107)
CO2 SERPL-SCNC: 25 MMOL/L (ref 21–31)
CREAT SERPL-MCNC: 1.09 MG/DL (ref 0.6–1.2)
ERYTHROCYTE [DISTWIDTH] IN BLOOD BY AUTOMATED COUNT: 14 % (ref 10–15)
GFR SERPL CREATININE-BSD FRML MDRD: 50 ML/MIN/{1.73_M2}
GLUCOSE SERPL-MCNC: 134 MG/DL (ref 70–105)
HCT VFR BLD AUTO: 37.4 % (ref 35–47)
HGB BLD-MCNC: 12 G/DL (ref 11.7–15.7)
MCH RBC QN AUTO: 27.9 PG (ref 26.5–33)
MCHC RBC AUTO-ENTMCNC: 32.1 G/DL (ref 31.5–36.5)
MCV RBC AUTO: 87 FL (ref 78–100)
PLATELET # BLD AUTO: 287 10E9/L (ref 150–450)
POTASSIUM SERPL-SCNC: 4 MMOL/L (ref 3.5–5.1)
RBC # BLD AUTO: 4.3 10E12/L (ref 3.8–5.2)
SODIUM SERPL-SCNC: 138 MMOL/L (ref 134–144)
WBC # BLD AUTO: 7.1 10E9/L (ref 4–11)

## 2020-10-09 PROCEDURE — 85027 COMPLETE CBC AUTOMATED: CPT | Mod: ZL | Performed by: FAMILY MEDICINE

## 2020-10-09 PROCEDURE — 80307 DRUG TEST PRSMV CHEM ANLYZR: CPT | Mod: ZL | Performed by: FAMILY MEDICINE

## 2020-10-09 PROCEDURE — 36415 COLL VENOUS BLD VENIPUNCTURE: CPT | Mod: ZL | Performed by: FAMILY MEDICINE

## 2020-10-09 PROCEDURE — 99214 OFFICE O/P EST MOD 30 MIN: CPT | Performed by: FAMILY MEDICINE

## 2020-10-09 PROCEDURE — 80048 BASIC METABOLIC PNL TOTAL CA: CPT | Mod: ZL | Performed by: FAMILY MEDICINE

## 2020-10-09 PROCEDURE — G0463 HOSPITAL OUTPT CLINIC VISIT: HCPCS

## 2020-10-09 RX ORDER — HYDROCODONE BITARTRATE AND ACETAMINOPHEN 10; 325 MG/1; MG/1
1-2 TABLET ORAL EVERY 4 HOURS PRN
Qty: 180 TABLET | Refills: 0 | Status: SHIPPED | OUTPATIENT
Start: 2020-11-10 | End: 2020-12-11

## 2020-10-09 RX ORDER — CLONAZEPAM 1 MG/1
1 TABLET ORAL 3 TIMES DAILY PRN
Qty: 225 TABLET | Refills: 0 | Status: SHIPPED | OUTPATIENT
Start: 2020-10-26 | End: 2021-01-19

## 2020-10-09 RX ORDER — RANOLAZINE 500 MG/1
500 TABLET, EXTENDED RELEASE ORAL 2 TIMES DAILY
Qty: 180 TABLET | Refills: 3 | Status: ON HOLD | OUTPATIENT
Start: 2020-10-09 | End: 2021-07-17

## 2020-10-09 RX ORDER — HYDROCODONE BITARTRATE AND ACETAMINOPHEN 10; 325 MG/1; MG/1
1-2 TABLET ORAL EVERY 4 HOURS PRN
Qty: 180 TABLET | Refills: 0 | Status: SHIPPED | OUTPATIENT
Start: 2020-10-09 | End: 2020-12-11

## 2020-10-09 ASSESSMENT — PAIN SCALES - GENERAL: PAINLEVEL: MODERATE PAIN (4)

## 2020-10-09 NOTE — PROGRESS NOTES
Nursing Notes:   Jamilah Berger LPN  10/9/2020  9:33 AM  Signed  Pt presents to clinic today for medication refills.      Medication Reconciliation: complete  Jamilah Berger LPN        SUBJECTIVE:  65 year old female with CAD and history of PE presents to follow up on chronic pain and anxiety    Burning, stabbing pain in lateral right thigh. Does not go past knee. Nearly constant. Worse with standing and walking. Aspercreme helps. Chronic low back pain. Had improved with injections previously, but not able to have injections now due to anticoagulation.    PE in January 2019. Cardiology reduced Xarelto dose recently due to concurrent use of Plavix.    Sharp brief chest pain today. Resolved. Did not take any nitroglycerin for it.    Taking 2.5 clonazepam on average.  Previously she had reduced down to 1 pill twice daily, but for the past several months has increased anxiety. Is no longer seeing psychiatry or counseling.    Changed 2 months ago from oxycodone to hydrocodone as she felt oxycodone was providing incomplete relief. She was on methadone before, but was sedated. Without opioids she had loss of function. Changed from 60 morphine milligram equivalents of oxycodone to hydrocodone.    Developed anemia earlier this year, presumed gastritis from aspirin, Plavix and Xarelto use. She is no longer on aspirin. Hemoglobin was down to 11.6 on 2/5/20 and then improved over 12 in March and April. Has been at hemoglobin of around 11 since. Last check was July.    REVIEW OF SYSTEMS:    General: Denies general constitutional problems  Cardiovascular: Denies problems  Respiratory: Denies problems  Neurologic: Denies problems    Past Medical History:   Diagnosis Date     Acute ischemic heart disease (H)     06/15/2007,with PTCA and stenting of 90% osteo circumflex lesion and patent LAD graft, patent left main stent.     Acute myocardial infarction (H)     3/30/2013     Anxiety disorder     No Comments Provided      Atherosclerotic heart disease of native coronary artery without angina pectoris     -angio revealed 3 vessel dz  -4 stents placed; 2 overlapping stents placed in mLAD, 1 stent pRCA, 1 stent pCirc 1 s 9/02 -non-ST elevation MI 1/03  -angio revealed restenosis of Circ.    -PTCA and brachytherapy of pCirc -repeat angio 2/03 -no intervension -CABG x3 12/03 - Dr Sinclair  -LIMA-LAD, RAFI-RCA, SVG-OM -PCI 7/04 stent to L -CTangio 9/05   -patentent LIMA-LAD. RAFI-RCA occluded; RCA ostio/p*     Bilateral carpal tunnel syndrome     No Comments Provided     Cervicalgia     No Comments Provided     Chest pain     12/29/2014     Chronic gastric ulcer without hemorrhage or perforation     10/03/2011,hx of GI bleed (2003)     Chronic ischemic heart disease     06/27/2012     Chronic obstructive pulmonary disease (H)     06/15/2007,low DLCO, normal spirometry     Chronic or unspecified gastric ulcer with hemorrhage     10/2003     Chronic pain syndrome     12/01/2010,chest wall, back     Constipation     11/29/2011     Coronary angioplasty status     09/12/2002,with triple stenting     Coronary angioplasty status     01/10/2003,repeat angioplasty     Coronary angioplasty status     No Comments Provided     Dorsalgia     05/31/2011     Encounter for other administrative examinations     1/28/2014     Encounter for screening for cardiovascular disorders     10/2004,Cardiolite (2004, 2005, 2006 and 2011)     Enterocolitis due to Clostridium difficile     8/19/2016     Essential (primary) hypertension     No Comments Provided     Hyperlipidemia     No Comments Provided     Major depressive disorder, single episode     Severe, hx of suicide attempt/hospitalization     Migraine without status migrainosus, not intractable     No Comments Provided     Nodular corneal degeneration     09/26/2011     Noninfective gastroenteritis and colitis     06/15/2007,history of     Noninfective gastroenteritis and colitis     Microcytic     Osteoarthritis      No Comments Provided     Other chest pain     10/13/2009,chronic     Pain in knee     05/31/2011     Pain in right shoulder     No Comments Provided     Panic disorder without agoraphobia     No Comments Provided     Peptic ulcer without hemorrhage or perforation     6/15/2007     Peripheral vascular disease (H)     No Comments Provided     Personal history of diseases of the blood and blood-forming organs and certain disorders involving the immune mechanism (CODE)     No Comments Provided     Personal history of nicotine dependence     No Comments Provided     Personal history of other medical treatment (CODE)     10/14/2013     Presence of aortocoronary bypass graft     2/12/2003     Primary central sleep apnea     10/14/2013     Sepsis due to Escherichia coli (E. coli) (H)     7/14/16,[facility]     Stricture of artery (H)     3/31/2013,S/p prox left SCA stent 4/1/2013     Thoracic, thoracolumbar and lumbosacral intervertebral disc disorder     No Comments Provided     Uncomplicated opioid abuse (H)     history of     Vitamin D deficiency     5/6/2013      Past Surgical History:   Procedure Laterality Date     ANGIOPLASTY      9/12/02,with triple stenting     APPENDECTOMY OPEN      No Comments Provided     ARTHROSCOPY KNEE      left     ARTHROSCOPY SHOULDER Right 05/12/2017    labral tear, rotator cuff tear and some subacromial decompression      BYPASS GRAFT ARTERY CORONARY      12/13/02,Triple bypass, left internal mammary  to LAD, right internal mammary to right coronary artery, saphenous to obtuse marginal of the left circumflex.     COLONOSCOPY      2011,Dr Bowman benign polyps     COLONOSCOPY  10/03/2011    2011,benign polyps, Dr. Bowman     COLONOSCOPY  08/08/2016 8/8/16,normal, Dr Bowman     ELBOW SURGERY      baby,birth malachi removed from right arm     EMBOLECTOMY UPPER EXTREMITY  04/02/2013    brachial artery pseudoaneurysm after stenting     ESOPHAGOSCOPY, GASTROSCOPY, DUODENOSCOPY (EGD), COMBINED       2011,EGD Dr Bowman with pyloric ulcer     ESOPHAGOSCOPY, GASTROSCOPY, DUODENOSCOPY (EGD), COMBINED      2011,pyloric ulcer, Dr. Bowman     ESOPHAGOSCOPY, GASTROSCOPY, DUODENOSCOPY (EGD), COMBINED      8/8/16,mild gastritis, Dr Bowman     ESOPHAGOSCOPY, GASTROSCOPY, DUODENOSCOPY (EGD), COMBINED      11/27/2017,Dr Bowman. Antral ulcer     ESOPHAGOSCOPY, GASTROSCOPY, DUODENOSCOPY (EGD), COMBINED  02/02/2018    Dr Bowman, healed ulcer     HYSTERECTOMY TOTAL ABDOMINAL      age 21     LAPAROSCOPIC CHOLECYSTECTOMY      2006     OSTEOTOMY FEMUR DISTAL      x3, right knee     OSTEOTOMY FEMUR DISTAL      2000,left knee  ligament surgery     OTHER SURGICAL HISTORY      1/10/2003,,PTCA     OTHER SURGICAL HISTORY      09/20/2012,,PTCA,DELONTE in LAD and left main     OTHER SURGICAL HISTORY      4/1/2013,,PTCA,L subclavian stenosis     SALPINGO-OOPHORECTOMY BILATERAL      age 27,Bilateral salpingo-oophorectomy     TONSILLECTOMY, ADENOIDECTOMY, COMBINED      childhood          Current Outpatient Medications   Medication Sig Dispense Refill     albuterol (PROAIR HFA/PROVENTIL HFA/VENTOLIN HFA) 108 (90 Base) MCG/ACT inhaler Inhale 2 puffs into the lungs every 6 hours 2 Inhaler 3     amLODIPine (NORVASC) 10 MG tablet Take 1 tablet (10 mg) by mouth daily 90 tablet 3     busPIRone (BUSPAR) 15 MG tablet Take 1 tablet (15 mg) by mouth 2 times daily 180 tablet 1     clonazePAM (KLONOPIN) 1 MG tablet Take 1 tablet (1 mg) by mouth 3 times daily as needed for anxiety Max 2.5 per day = 225 per 90 days 225 tablet 0     clopidogrel (PLAVIX) 75 MG tablet Take 1 tablet (75 mg) by mouth daily 90 tablet 3     cyanocobalamin (VITAMIN B-12) 1000 MCG tablet Take 1 tablet (1,000 mcg) by mouth daily       Diclofenac Sodium 1.5 % SOLN Apply 20 drops to each hand or 40 drops to each knee up to 4 times daily as needed for arthritis pain 450 mL 3     ferrous gluconate (FERGON) 324 (38 Fe) MG tablet TAKE 1 TABLET (324 MG) BY MOUTH 2 TIMES DAILY  (WITH MEALS) 180 tablet 1     HYDROcodone-acetaminophen (NORCO)  MG per tablet Take 1-2 tablets by mouth every 4 hours as needed for severe pain 6 per day 180 tablet 0     HYDROcodone-acetaminophen (NORCO)  MG per tablet Take 1-2 tablets by mouth every 4 hours as needed for severe pain 6 per day 180 tablet 0     lisinopril (ZESTRIL) 20 MG tablet Take 0.5 tablets (10 mg) by mouth daily 90 tablet 3     metoclopramide (REGLAN) 10 MG tablet TAKE 1 TABLET (10 MG) BY MOUTH EVERY 6 HOURS AS NEEDED (HEADACHE) 30 tablet 1     metoprolol succinate ER (TOPROL-XL) 25 MG 24 hr tablet Take 1 tablet (25 mg) by mouth 2 times daily 180 tablet 3     naloxone (NARCAN) 4 MG/0.1ML nasal spray Spray into one nostril for opioid reversal if unresponsive. May repeat every 2-3 minutes until patient responsive or EMS arrives 1 each 1     nitroGLYcerin (NITROLINGUAL) 0.4 MG/SPRAY spray For chest pain spray 1 spray under tongue every 5 minutes for 3 doses. If symptoms persist 5 minutes after 1st dose call 911. 4.9 g 3     omeprazole (PRILOSEC) 20 MG DR capsule Take 1 capsule (20 mg) by mouth 2 times daily 180 capsule 3     ondansetron (ZOFRAN) 4 MG tablet Take 1 tablet (4 mg) by mouth every 6 hours as needed for nausea 30 tablet 2     pregabalin (LYRICA) 75 MG capsule TAKE 1 CAPSULE (75 MG) BY MOUTH 3 TIMES DAILY 90 capsule 5     ranolazine 500 MG PO 12 hr tablet Take 1 tablet (500 mg) by mouth 2 times daily 180 tablet 3     rivaroxaban ANTICOAGULANT (XARELTO) 15 MG TABS tablet Take 1 tablet (15 mg) by mouth daily (with dinner) 90 tablet 3     rosuvastatin (CRESTOR) 10 MG tablet Take 2 tablets (20 mg) by mouth At Bedtime 180 tablet 3     sucralfate (CARAFATE) 1 GM tablet TAKE 1 TABLET (1 G) BY MOUTH 4 TIMES DAILY AS NEEDED FOR NAUSEA (OR DARK STOOLS) 360 tablet 0     VITAMIN D, CHOLECALCIFEROL, PO Take 5,000 Units by mouth daily       vortioxetine (TRINTELLIX) 20 MG tablet Take 1 tablet (20 mg) by mouth daily 90 tablet 1      Allergies   Allergen Reactions     Atorvastatin Muscle Pain (Myalgia)     Tiotropium Bromide [Tiotropium] Rash     Ezetimibe Muscle Pain (Myalgia)     Latex Rash     Niacin      Other reaction(s): Flushing     No Clinical Screening - See Comments Itching, Rash and Blisters     Metals and plastics       Tape [Adhesive Tape] Rash       OBJECTIVE:  /64   Pulse 80   Temp 96.4  F (35.8  C) (Temporal)   Resp 18   LMP  (LMP Unknown)   SpO2 96%     EXAM:  General Appearance: Pleasant, alert, appropriate appearance for age. No acute distress  Musculoskeletal Exam: Lumbar Spine: tenderness over lumbar paraspinous muscles  Neurologic Exam: reflexes 2+, strength symmetric lower extremities; SLR negative bilaterally  Psychiatric: Normal affect and mentation    Results for orders placed or performed in visit on 10/09/20   Basic Metabolic Panel     Status: Abnormal   Result Value Ref Range    Sodium 138 134 - 144 mmol/L    Potassium 4.0 3.5 - 5.1 mmol/L    Chloride 104 98 - 107 mmol/L    Carbon Dioxide 25 21 - 31 mmol/L    Anion Gap 9 3 - 14 mmol/L    Glucose 134 (H) 70 - 105 mg/dL    Urea Nitrogen 18 7 - 25 mg/dL    Creatinine 1.09 0.60 - 1.20 mg/dL    GFR Estimate 50 (L) >60 mL/min/[1.73_m2]    GFR Estimate If Black 61 >60 mL/min/[1.73_m2]    Calcium 9.6 8.6 - 10.3 mg/dL   CBC W PLT No Diff     Status: None   Result Value Ref Range    WBC 7.1 4.0 - 11.0 10e9/L    RBC Count 4.30 3.8 - 5.2 10e12/L    Hemoglobin 12.0 11.7 - 15.7 g/dL    Hematocrit 37.4 35.0 - 47.0 %    MCV 87 78 - 100 fl    MCH 27.9 26.5 - 33.0 pg    MCHC 32.1 31.5 - 36.5 g/dL    RDW 14.0 10.0 - 15.0 %    Platelet Count 287 150 - 450 10e9/L           ASSESSMENT/PLAN:    ICD-10-CM    1. Chronic pain disorder  G89.4 HYDROcodone-acetaminophen (NORCO)  MG per tablet     HYDROcodone-acetaminophen (NORCO)  MG per tablet   2. Chronic bilateral low back pain with right-sided sciatica  M54.41 HYDROcodone-acetaminophen (NORCO)  MG per tablet     G89.29 HYDROcodone-acetaminophen (NORCO)  MG per tablet     PHYSICAL THERAPY REFERRAL   3. Chest wall pain, chronic  R07.89 HYDROcodone-acetaminophen (NORCO)  MG per tablet    G89.29 HYDROcodone-acetaminophen (NORCO)  MG per tablet   4. Controlled substance agreement updated 10/9/2020  Z79.899 HYDROcodone-acetaminophen (NORCO)  MG per tablet     HYDROcodone-acetaminophen (NORCO)  MG per tablet     clonazePAM (KLONOPIN) 1 MG tablet     Drug  Screen Comprehensive , Urine with Reported Meds ([facility]) (Pain Care Package)   5. Chronic, continuous use of opioids  F11.90 Drug  Screen Comprehensive , Urine with Reported Meds ([facility]) (Pain Care Package)   6. Peptic ulcer  K27.9 omeprazole (PRILOSEC) 20 MG DR capsule   7. Chronic stable angina (H)  I20.8 ranolazine (RANEXA) 500 MG 12 hr tablet     CBC W PLT No Diff     Basic Metabolic Panel     Basic Metabolic Panel     CBC W PLT No Diff   8. Recurrent chest pain  R07.9 ranolazine (RANEXA) 500 MG 12 hr tablet   9. Chronic anxiety  F41.9 clonazePAM (KLONOPIN) 1 MG tablet       Change from 60 morphine milligram equivalents of oxycodone to hydrocodone has been effective. She desires to continue hydrocodone. Refilled 10/325 mg 6 pills per day. Given 2 prescriptions, for 2 months total. May fill today 10/9 early, but then 11/10 to get back on cycle.  Urine drug monitoring today 10/9/2020. Updated controlled substance agreement.    Recently increased clonazepam from 1 mg BID to up to 2.5 per day. She should return to counseling to improve anxiety and reduce clonazepam. Tolerating increased buspirone from 10 to 15 mg BID, seems to help some. Willing to attempt clonazepam reduction again soon.    Labs for monitoring of anemia and other medical conditions are stable. Refilled chronic medications.    Follow up in 2 months  Ramirez Cano MD    This document was prepared using a combination of typing and voice generated software.  While every attempt  was made for accuracy, spelling and grammatical errors may exist.

## 2020-10-09 NOTE — NURSING NOTE
Pt presents to clinic today for medication refills.      Medication Reconciliation: complete  Jamilah Berger LPN

## 2020-10-12 ENCOUNTER — TRANSFERRED RECORDS (OUTPATIENT)
Dept: HEALTH INFORMATION MANAGEMENT | Facility: OTHER | Age: 65
End: 2020-10-12

## 2020-10-14 LAB — PAIN DRUG SCR UR W RPTD MEDS: NORMAL

## 2020-10-20 ENCOUNTER — HOSPITAL ENCOUNTER (EMERGENCY)
Facility: OTHER | Age: 65
Discharge: HOME | End: 2020-10-20
Attending: PHYSICIAN ASSISTANT | Admitting: PHYSICIAN ASSISTANT
Payer: MEDICARE

## 2020-10-20 ENCOUNTER — APPOINTMENT (OUTPATIENT)
Dept: CT IMAGING | Facility: OTHER | Age: 65
End: 2020-10-20
Attending: PHYSICIAN ASSISTANT
Payer: MEDICARE

## 2020-10-20 ENCOUNTER — NURSE TRIAGE (OUTPATIENT)
Dept: FAMILY MEDICINE | Facility: OTHER | Age: 65
End: 2020-10-20

## 2020-10-20 VITALS
SYSTOLIC BLOOD PRESSURE: 121 MMHG | TEMPERATURE: 98.8 F | BODY MASS INDEX: 30.53 KG/M2 | DIASTOLIC BLOOD PRESSURE: 67 MMHG | HEIGHT: 66 IN | RESPIRATION RATE: 20 BRPM | WEIGHT: 190 LBS | OXYGEN SATURATION: 96 % | HEART RATE: 75 BPM

## 2020-10-20 DIAGNOSIS — S00.03XA HEMATOMA OF FRONTAL SCALP, INITIAL ENCOUNTER: ICD-10-CM

## 2020-10-20 DIAGNOSIS — S16.1XXA CERVICAL MUSCLE STRAIN, INITIAL ENCOUNTER: ICD-10-CM

## 2020-10-20 DIAGNOSIS — S09.90XA CLOSED HEAD INJURY, INITIAL ENCOUNTER: ICD-10-CM

## 2020-10-20 LAB
ALBUMIN SERPL-MCNC: 4.1 G/DL (ref 3.5–5.7)
ALP SERPL-CCNC: 65 U/L (ref 34–104)
ALT SERPL W P-5'-P-CCNC: 14 U/L (ref 7–52)
ANION GAP SERPL CALCULATED.3IONS-SCNC: 10 MMOL/L (ref 3–14)
AST SERPL W P-5'-P-CCNC: 23 U/L (ref 13–39)
BASOPHILS # BLD AUTO: 0.1 10E9/L (ref 0–0.2)
BASOPHILS NFR BLD AUTO: 0.9 %
BILIRUB SERPL-MCNC: 0.4 MG/DL (ref 0.3–1)
BUN SERPL-MCNC: 20 MG/DL (ref 7–25)
CALCIUM SERPL-MCNC: 8.7 MG/DL (ref 8.6–10.3)
CHLORIDE SERPL-SCNC: 106 MMOL/L (ref 98–107)
CO2 SERPL-SCNC: 24 MMOL/L (ref 21–31)
CREAT SERPL-MCNC: 1.38 MG/DL (ref 0.6–1.2)
DIFFERENTIAL METHOD BLD: ABNORMAL
EOSINOPHIL # BLD AUTO: 0.2 10E9/L (ref 0–0.7)
EOSINOPHIL NFR BLD AUTO: 2.8 %
ERYTHROCYTE [DISTWIDTH] IN BLOOD BY AUTOMATED COUNT: 14.2 % (ref 10–15)
GFR SERPL CREATININE-BSD FRML MDRD: 38 ML/MIN/{1.73_M2}
GLUCOSE SERPL-MCNC: 121 MG/DL (ref 70–105)
HCT VFR BLD AUTO: 34.1 % (ref 35–47)
HGB BLD-MCNC: 11 G/DL (ref 11.7–15.7)
IMM GRANULOCYTES # BLD: 0 10E9/L (ref 0–0.4)
IMM GRANULOCYTES NFR BLD: 0.2 %
INR PPP: 1.4 (ref 0.86–1.14)
LYMPHOCYTES # BLD AUTO: 2.2 10E9/L (ref 0.8–5.3)
LYMPHOCYTES NFR BLD AUTO: 34.9 %
MCH RBC QN AUTO: 27.7 PG (ref 26.5–33)
MCHC RBC AUTO-ENTMCNC: 32.3 G/DL (ref 31.5–36.5)
MCV RBC AUTO: 86 FL (ref 78–100)
MONOCYTES # BLD AUTO: 0.6 10E9/L (ref 0–1.3)
MONOCYTES NFR BLD AUTO: 9.4 %
NEUTROPHILS # BLD AUTO: 3.3 10E9/L (ref 1.6–8.3)
NEUTROPHILS NFR BLD AUTO: 51.8 %
NT-PROBNP SERPL-MCNC: 46 PG/ML (ref 0–100)
PLATELET # BLD AUTO: 250 10E9/L (ref 150–450)
POTASSIUM SERPL-SCNC: 3.8 MMOL/L (ref 3.5–5.1)
PROT SERPL-MCNC: 6.6 G/DL (ref 6.4–8.9)
RBC # BLD AUTO: 3.97 10E12/L (ref 3.8–5.2)
SODIUM SERPL-SCNC: 140 MMOL/L (ref 134–144)
TROPONIN I SERPL-MCNC: 3.4 PG/ML
WBC # BLD AUTO: 6.4 10E9/L (ref 4–11)

## 2020-10-20 PROCEDURE — 80053 COMPREHEN METABOLIC PANEL: CPT | Performed by: PHYSICIAN ASSISTANT

## 2020-10-20 PROCEDURE — 93005 ELECTROCARDIOGRAM TRACING: CPT | Performed by: PHYSICIAN ASSISTANT

## 2020-10-20 PROCEDURE — 99284 EMERGENCY DEPT VISIT MOD MDM: CPT | Mod: 25 | Performed by: PHYSICIAN ASSISTANT

## 2020-10-20 PROCEDURE — 93010 ELECTROCARDIOGRAM REPORT: CPT | Performed by: INTERNAL MEDICINE

## 2020-10-20 PROCEDURE — 99283 EMERGENCY DEPT VISIT LOW MDM: CPT | Performed by: PHYSICIAN ASSISTANT

## 2020-10-20 PROCEDURE — 72125 CT NECK SPINE W/O DYE: CPT

## 2020-10-20 PROCEDURE — 85610 PROTHROMBIN TIME: CPT | Performed by: PHYSICIAN ASSISTANT

## 2020-10-20 PROCEDURE — 36415 COLL VENOUS BLD VENIPUNCTURE: CPT | Performed by: PHYSICIAN ASSISTANT

## 2020-10-20 PROCEDURE — 84484 ASSAY OF TROPONIN QUANT: CPT | Performed by: PHYSICIAN ASSISTANT

## 2020-10-20 PROCEDURE — 70450 CT HEAD/BRAIN W/O DYE: CPT

## 2020-10-20 PROCEDURE — 85025 COMPLETE CBC W/AUTO DIFF WBC: CPT | Performed by: PHYSICIAN ASSISTANT

## 2020-10-20 PROCEDURE — 83880 ASSAY OF NATRIURETIC PEPTIDE: CPT | Performed by: PHYSICIAN ASSISTANT

## 2020-10-20 PROCEDURE — 250N000013 HC RX MED GY IP 250 OP 250 PS 637: Performed by: PHYSICIAN ASSISTANT

## 2020-10-20 RX ORDER — RIZATRIPTAN BENZOATE 10 MG/1
10 TABLET, ORALLY DISINTEGRATING ORAL
Status: COMPLETED | OUTPATIENT
Start: 2020-10-20 | End: 2020-10-20

## 2020-10-20 RX ORDER — HYDROCODONE BITARTRATE AND ACETAMINOPHEN 5; 325 MG/1; MG/1
1 TABLET ORAL ONCE
Status: COMPLETED | OUTPATIENT
Start: 2020-10-20 | End: 2020-10-20

## 2020-10-20 RX ADMIN — RIZATRIPTAN BENZOATE 10 MG: 10 TABLET, ORALLY DISINTEGRATING ORAL at 18:15

## 2020-10-20 RX ADMIN — HYDROCODONE BITARTRATE AND ACETAMINOPHEN 1 TABLET: 5; 325 TABLET ORAL at 18:15

## 2020-10-20 ASSESSMENT — ENCOUNTER SYMPTOMS
SORE THROAT: 0
WHEEZING: 0
CONSTIPATION: 0
STRIDOR: 0
TROUBLE SWALLOWING: 0
DIARRHEA: 0
SHORTNESS OF BREATH: 1
NECK STIFFNESS: 0
SPEECH DIFFICULTY: 0
NECK PAIN: 1
FACIAL ASYMMETRY: 0
HEADACHES: 1
COLOR CHANGE: 0
DIZZINESS: 0
RHINORRHEA: 0
ACTIVITY CHANGE: 0
SEIZURES: 0
EYE PAIN: 0
FATIGUE: 0
BACK PAIN: 0
FACIAL SWELLING: 1
CHEST TIGHTNESS: 0
WEAKNESS: 0
ABDOMINAL PAIN: 0
FREQUENCY: 0
FEVER: 0
PALPITATIONS: 1
TREMORS: 0
VOMITING: 0
LIGHT-HEADEDNESS: 0
APPETITE CHANGE: 0
DYSURIA: 0
COUGH: 0
NAUSEA: 0

## 2020-10-20 ASSESSMENT — MIFFLIN-ST. JEOR: SCORE: 1418.58

## 2020-10-20 NOTE — ED NOTES
Call from clinic nurse: Pt encouraged to come by ambulance but is coming in by private vehicle. Pt on blood thinners. On/off chest pain for a few days, using nitros . Last night fell off toilet and hit head. C/o headache pain today, c/o about feeling dizzy, about 3o min ETA. Nola Toney RN .............. 10/20/2020  3:18 PM

## 2020-10-20 NOTE — ED AVS SNAPSHOT
[facility]  [address]  [place] [address]  Phone: [phone]  Fax: [phone]                                    Ankita Day   MRN: [id number]    Department: [facility]   Date of Visit: 10/20/2020           After Visit Summary Signature Page    I have received my discharge instructions, and my questions have been answered. I have discussed any challenges I see with this plan with the nurse or doctor.    ..........................................................................................................................................  Patient/Patient Representative Signature      ..........................................................................................................................................  Patient Representative Print Name and Relationship to Patient    ..................................................               ................................................  Date                                   Time    ..........................................................................................................................................  Reviewed by Signature/Title    ...................................................              ..............................................  Date                                               Time          22EPIC Rev 08/18

## 2020-10-20 NOTE — ED TRIAGE NOTES
"ED Nursing Triage Note (General)   ________________________________    Ankita Day is a 65 year old Female that presents to triage private car  With history of  Fell last night while sitting on the toilet and hit the ceramic floor and hit her head at midnight reported by patient.  No LOC.  Since she has developed pain in her head and neck and later today she developed pain in her left arm that she is concerned is due to her heart, with palpitations and numbness and SOB on the way here in the car.   Significant symptoms had onset 2 hour(s) ago.  /67   Pulse 83   Temp 98.8  F (37.1  C) (Tympanic)   Resp 15   Ht 1.676 m (5' 6\")   Wt 86.2 kg (190 lb)   LMP  (LMP Unknown)   SpO2 96%   BMI 30.67 kg/m  t  Patient appears alert , in mild distress., and cooperative behavior.    GCS Total = 15  Airway: intact  Breathing noted as Normal.  Circulation Normal  Skin normal  Action taken:  To room 907      PRE HOSPITAL PRIOR LIVING SITUATION Spouse  "

## 2020-10-20 NOTE — TELEPHONE ENCOUNTER
S-(situation): Headache, Dizziness, Syncope, fall last night, Head injury    B-(background): Syncope episode last night fell off th toilet and hit her head. Intermittent chest pain for the last week. Taken nitroglycerin a couple of times per patient report. On Xarelto. COPD, Chronic ischemic heart disease, acute pulmonary embolism without acute cor pulmonale, stent, hypertension, chronic pain, stage 3 kidney disease.     A-(assessment): No chest pain currently. Dizzy. Headache 7/10 with pain medication on board. Patient reports a long history of headaches but this one started after the fall. Denies hypertension.  Last night she reported her right arm was hurting by her elbow (not currently hurting). No jaw pain. Last night she got up to go to the bathroom and when she was on the toilet she feel off the toilet and hit her head really hard. Left eye is swollen looking/droopy. Neck, shoulder areas are sore.     R-(recommendations): Recommended for patient to come to the ED to be evaluated per protocol. Discussed an ambulance ride to the ED. Patient reported she does not trust an ambulance on icy roads. She reported she will have her  drive her in. She reported she will arrive in about 30 or so minutes. Called ED and spoke with Salud and provided update on patient.          Amaya Tompkins RN  ....................  10/20/2020   3:28 PM              Reason for Disposition    Fainted, and still feels dizzy afterwards    Additional Information    Age over 65 years with and area of head swelling or bruise     Reports eye(left) is still swollen from hitting it on the floor.    Followed a head injury within last 3 days    Lightheadedness (dizziness) present now, after 2 hours of rest and fluids    Commented on: Knocked out (unconscious) < 1 minute and now fine     Unsure how long she was out    Protocols used: DIZZINESS-A-OH, HEAD INJURY-A-OH, HEADACHE-A-OH

## 2020-10-20 NOTE — ED PROVIDER NOTES
History     Chief Complaint   Patient presents with     Fall     Chest Pain     Headache     HPI  Ankita Day is a 65 year old female who reports that she fell last night while sitting on the toilet and hit the ceramic floor with her frontal head.  Now she is complaining of a headache as well as neck pain.  The patient does have some chronic pain source difficult to sort through pain.  She also reports that she developed some pain in her left arm and her heart.  She feels palpitations no notes shortness of breath on the way here.  Does not appear to be in any distress at this time.  She was placed in a cervical collar in triage.    Allergies:  Allergies   Allergen Reactions     Atorvastatin Muscle Pain (Myalgia)     Tiotropium Bromide [Tiotropium] Rash     Ezetimibe Muscle Pain (Myalgia)     Latex Rash     Niacin      Other reaction(s): Flushing     No Clinical Screening - See Comments Itching, Rash and Blisters     Metals and plastics       Tape [Adhesive Tape] Rash       Problem List:    Patient Active Problem List    Diagnosis Date Noted     Chronic stable angina (H) 02/05/2020     Priority: Medium     Chronic ischemic heart disease 02/05/2020     Priority: Medium     Acute pulmonary embolism without acute cor pulmonale (H) 01/02/2020     Priority: Medium     CKD (chronic kidney disease) stage 3, GFR 30-59 ml/min 06/19/2019     Priority: Medium     Chronic anxiety 01/22/2018     Priority: Medium     Collagenous colitis 01/22/2018     Priority: Medium     Overview:   Possible Dx 2007       Major depressive disorder, recurrent episode, severe (H) 01/22/2018     Priority: Medium     Essential hypertension 01/22/2018     Priority: Medium     Mixed hyperlipidemia 01/22/2018     Priority: Medium     Osteoarthritis 01/22/2018     Priority: Medium     Panic attack 01/22/2018     Priority: Medium     Status post coronary angiogram 09/15/2017     Priority: Medium     History of tobacco abuse 08/10/2017     Priority:  Medium     Presence of stent in coronary artery in patient with coronary artery disease 08/10/2017     Priority: Medium     History of coronary artery bypass graft x 3 08/10/2017     Priority: Medium     Noncompliance with CPAP treatment 10/21/2016     Priority: Medium     Intractable chronic common migraine without aura 10/21/2016     Priority: Medium     H/O multiple concussions 10/21/2016     Priority: Medium     History of Clostridium difficile 08/19/2016     Priority: Medium     Iron deficiency anemia 07/26/2016     Priority: Medium     Chest wall pain, chronic 11/04/2015     Priority: Medium     Controlled substance agreement updated 10/9/2020 01/28/2014     Priority: Medium     Overview:        Central sleep apnea 10/14/2013     Priority: Medium     Myofascial pain 10/14/2013     Priority: Medium     Vitamin D deficiency 05/06/2013     Priority: Medium     Subclavian artery stenosis, left (H) 03/31/2013     Priority: Medium     Overview:   S/p prox left SCA stent 4/1/2013       Lumbar facet arthropathy 01/02/2013     Priority: Medium     Nonspecific abnormal results of pulmonary system function study 12/21/2011     Priority: Medium     Slow transit constipation 11/29/2011     Priority: Medium     Gastric ulcer with hemorrhage 10/03/2011     Priority: Medium     Family history of malignant neoplasm of gastrointestinal tract 09/26/2011     Priority: Medium     Nodular degeneration of cornea 09/26/2011     Priority: Medium     Bilateral low back pain without sciatica 05/31/2011     Priority: Medium     Chronic pain disorder 12/01/2010     Priority: Medium     COPD (chronic obstructive pulmonary disease) (H) 06/15/2007     Priority: Medium     Overview:   Low DLCO, normal spirometry on 12/15/11       Peptic ulcer 06/15/2007     Priority: Medium     Postsurgical aortocoronary bypass status 02/12/2003     Priority: Medium     Coronary atherosclerosis 09/12/2002     Priority: Medium     Overview:   Multiple  Angigrams prior to 2007. Also:  6/5/2007: Angiogram: TAXUS DELONTE to ostial circumflux, instent restenosis  5/23/2008: Left Main 30% diseased, LAD stents patent, Left circ stent patent, Chronic occluded right internal mammary artery graft to distal RCA, native RCA has 30% stenosis; NO intevention  9/19/2012 CT Angiogram: Patent LIMA to LAD, patent LAD stents, moderate proximal circumflex disease, patent RCA , occluded right internal mammary artery graft to distal RCA.  9/20/2012: Angiogram: Stent to Left Main, ostial LAD, and PTCA of ostial left circumflex          Past Medical History:    Past Medical History:   Diagnosis Date     Acute ischemic heart disease (H)      Acute myocardial infarction (H)      Anxiety disorder      Atherosclerotic heart disease of native coronary artery without angina pectoris      Bilateral carpal tunnel syndrome      Cervicalgia      Chest pain      Chronic gastric ulcer without hemorrhage or perforation      Chronic ischemic heart disease      Chronic obstructive pulmonary disease (H)      Chronic or unspecified gastric ulcer with hemorrhage      Chronic pain syndrome      Constipation      Coronary angioplasty status      Coronary angioplasty status      Coronary angioplasty status      Dorsalgia      Encounter for other administrative examinations      Encounter for screening for cardiovascular disorders      Enterocolitis due to Clostridium difficile      Essential (primary) hypertension      Hyperlipidemia      Major depressive disorder, single episode      Migraine without status migrainosus, not intractable      Nodular corneal degeneration      Noninfective gastroenteritis and colitis      Noninfective gastroenteritis and colitis      Osteoarthritis      Other chest pain      Pain in knee      Pain in right shoulder      Panic disorder without agoraphobia      Peptic ulcer without hemorrhage or perforation      Peripheral vascular disease (H)      Personal history of diseases of  the blood and blood-forming organs and certain disorders involving the immune mechanism (CODE)      Personal history of nicotine dependence      Personal history of other medical treatment (CODE)      Presence of aortocoronary bypass graft      Primary central sleep apnea      Sepsis due to Escherichia coli (E. coli) (H)      Stricture of artery (H)      Thoracic, thoracolumbar and lumbosacral intervertebral disc disorder      Uncomplicated opioid abuse (H)      Vitamin D deficiency        Past Surgical History:    Past Surgical History:   Procedure Laterality Date     ANGIOPLASTY      9/12/02,with triple stenting     APPENDECTOMY OPEN      No Comments Provided     ARTHROSCOPY KNEE      left     ARTHROSCOPY SHOULDER Right 05/12/2017    labral tear, rotator cuff tear and some subacromial decompression      BYPASS GRAFT ARTERY CORONARY      12/13/02,Triple bypass, left internal mammary  to LAD, right internal mammary to right coronary artery, saphenous to obtuse marginal of the left circumflex.     COLONOSCOPY      2011,Dr Bowman benign polyps     COLONOSCOPY  10/03/2011    2011,benign polyps, Dr. Bowman     COLONOSCOPY  08/08/2016 8/8/16,normal, Dr Bowman     ELBOW SURGERY      baby,birth malachi removed from right arm     EMBOLECTOMY UPPER EXTREMITY  04/02/2013    brachial artery pseudoaneurysm after stenting     ESOPHAGOSCOPY, GASTROSCOPY, DUODENOSCOPY (EGD), COMBINED      2011,EGD Dr Bowman with pyloric ulcer     ESOPHAGOSCOPY, GASTROSCOPY, DUODENOSCOPY (EGD), COMBINED      2011,pyloric ulcer, Dr. Bowman     ESOPHAGOSCOPY, GASTROSCOPY, DUODENOSCOPY (EGD), COMBINED      8/8/16,mild gastritis, Dr Bowman     ESOPHAGOSCOPY, GASTROSCOPY, DUODENOSCOPY (EGD), COMBINED      11/27/2017,Dr Bowman. Antral ulcer     ESOPHAGOSCOPY, GASTROSCOPY, DUODENOSCOPY (EGD), COMBINED  02/02/2018    Dr Bowman, healed ulcer     HYSTERECTOMY TOTAL ABDOMINAL      age 21     LAPAROSCOPIC CHOLECYSTECTOMY      2006     OSTEOTOMY FEMUR DISTAL      x3,  right knee     OSTEOTOMY FEMUR DISTAL      ,left knee  ligament surgery     OTHER SURGICAL HISTORY      1/10/2003,,PTCA     OTHER SURGICAL HISTORY      2012,,PTCA,DELONTE in LAD and left main     OTHER SURGICAL HISTORY      2013,,PTCA,L subclavian stenosis     SALPINGO-OOPHORECTOMY BILATERAL      age 27,Bilateral salpingo-oophorectomy     TONSILLECTOMY, ADENOIDECTOMY, COMBINED      childhood       Family History:    Family History   Problem Relation Age of Onset     Heart Disease Father         Heart Disease,Heart condition/Significant for atherosclerotic cardiovascular disease, but non premature.     Colon Cancer Father         Cancer-colon, of colon cancer     Cancer Father         Cancer,mets to liver, secondary to colon cancer     Heart Disease Mother         Heart Disease     Cancer Other         Cancer,Multiple Myeloma     Heart Disease Other         Heart Disease,Ischemic Heart Disease     Colon Cancer Other         Cancer-colon,Malignant neoplasms     Cancer Sister         Cancer,multiple myeloma     Other - See Comments Son         gallstones       Social History:  Marital Status:   [2]  Social History     Tobacco Use     Smoking status: Former Smoker     Packs/day: 0.25     Years: 35.00     Pack years: 8.75     Types: Cigarettes     Quit date: 2/15/2017     Years since quitting: 3.6     Smokeless tobacco: Never Used     Tobacco comment: Smokes 1-2 packs every 6 months for the past 16 years. Used to smoke 1 pack/day before that.    Substance Use Topics     Alcohol use: Not Currently     Alcohol/week: 0.0 standard drinks     Drug use: No        Medications:         albuterol (PROAIR HFA/PROVENTIL HFA/VENTOLIN HFA) 108 (90 Base) MCG/ACT inhaler       amLODIPine (NORVASC) 10 MG tablet       busPIRone (BUSPAR) 15 MG tablet       [START ON 10/26/2020] clonazePAM (KLONOPIN) 1 MG tablet       clopidogrel (PLAVIX) 75 MG tablet       cyanocobalamin (VITAMIN B-12) 1000 MCG  "tablet       Diclofenac Sodium 1.5 % SOLN       ferrous gluconate (FERGON) 324 (38 Fe) MG tablet       [START ON 11/10/2020] HYDROcodone-acetaminophen (NORCO)  MG per tablet       HYDROcodone-acetaminophen (NORCO)  MG per tablet       lisinopril (ZESTRIL) 20 MG tablet       metoclopramide (REGLAN) 10 MG tablet       metoprolol succinate ER (TOPROL-XL) 25 MG 24 hr tablet       naloxone (NARCAN) 4 MG/0.1ML nasal spray       nitroGLYcerin (NITROLINGUAL) 0.4 MG/SPRAY spray       omeprazole (PRILOSEC) 20 MG DR capsule       ondansetron (ZOFRAN) 4 MG tablet       pregabalin (LYRICA) 75 MG capsule       ranolazine (RANEXA) 500 MG 12 hr tablet       rivaroxaban ANTICOAGULANT (XARELTO) 15 MG TABS tablet       rosuvastatin (CRESTOR) 10 MG tablet       sucralfate (CARAFATE) 1 GM tablet       VITAMIN D, CHOLECALCIFEROL, PO       vortioxetine (TRINTELLIX) 20 MG tablet          Review of Systems   Constitutional: Negative for activity change, appetite change, fatigue and fever.   HENT: Positive for facial swelling. Negative for drooling, rhinorrhea, sore throat and trouble swallowing.         Left frontal scalp small hematoma.   Eyes: Negative for pain and visual disturbance.   Respiratory: Positive for shortness of breath. Negative for cough, chest tightness, wheezing and stridor.    Cardiovascular: Positive for chest pain and palpitations. Negative for leg swelling.   Gastrointestinal: Negative for abdominal pain, constipation, diarrhea, nausea and vomiting.   Genitourinary: Negative for dysuria, frequency and urgency.   Musculoskeletal: Positive for neck pain. Negative for back pain and neck stiffness.   Skin: Negative for color change.   Neurological: Positive for headaches. Negative for dizziness, tremors, seizures, facial asymmetry, speech difficulty, weakness and light-headedness.       Physical Exam   BP: 136/67  Pulse: 83  Temp: 98.8  F (37.1  C)  Resp: 15  Height: 167.6 cm (5' 6\")  Weight: 86.2 kg (190 " lb)  SpO2: 96 %      Physical Exam  Constitutional:       General: She is not in acute distress.     Appearance: She is not ill-appearing, toxic-appearing or diaphoretic.   HENT:      Head: No raccoon eyes or Jackson's sign.      Jaw: No trismus.      Comments: Patient with left frontal scalp small hematoma.     Right Ear: Tympanic membrane normal. No drainage or tenderness.      Left Ear: Tympanic membrane normal. No drainage or tenderness.      Nose: Nose normal.   Eyes:      General: No scleral icterus.     Extraocular Movements: Extraocular movements intact.      Right eye: Normal extraocular motion and no nystagmus.      Left eye: Normal extraocular motion and no nystagmus.      Pupils: Pupils are equal, round, and reactive to light.      Right eye: Pupil is reactive and not sluggish.      Left eye: Pupil is reactive and not sluggish.      Funduscopic exam:     Right eye: No AV nicking, arteriolar narrowing or papilledema. Red reflex present.         Left eye: No AV nicking, arteriolar narrowing or papilledema. Red reflex present.  Neck:      Musculoskeletal: Normal range of motion. Normal range of motion. Muscular tenderness present. No neck rigidity, pain with movement or spinous process tenderness.      Vascular: No JVD.      Trachea: No tracheal deviation.   Cardiovascular:      Rate and Rhythm: Normal rate and regular rhythm.   Pulmonary:      Effort: Pulmonary effort is normal. No respiratory distress.      Breath sounds: Normal breath sounds. No stridor. No wheezing.   Abdominal:      General: There is no distension.      Palpations: There is no mass.      Tenderness: There is no abdominal tenderness. There is no right CVA tenderness, left CVA tenderness, guarding or rebound.   Musculoskeletal: Normal range of motion.         General: No tenderness or deformity.   Lymphadenopathy:      Cervical: No cervical adenopathy.      Right cervical: No superficial cervical adenopathy.     Left cervical: No  superficial cervical adenopathy.   Skin:     General: Skin is warm and dry.      Capillary Refill: Capillary refill takes less than 2 seconds.   Neurological:      General: No focal deficit present.      Mental Status: She is alert and oriented to person, place, and time.      GCS: GCS eye subscore is 4. GCS verbal subscore is 5. GCS motor subscore is 6.      Motor: No tremor or seizure activity.      Coordination: Coordination normal.      Gait: Gait normal.   Psychiatric:         Mood and Affect: Mood normal.         Behavior: Behavior normal.         Thought Content: Thought content normal.         Judgment: Judgment normal.         ED Course     EKG shows normal sinus rhythm with heart rate 73  Results for orders placed or performed during the hospital encounter of 10/20/20 (from the past 24 hour(s))   CT Cervical Spine w/o Contrast    Narrative    Exam:CT CERVICAL SPINE W/O CONTRAST    History:65 years Female C-spine trauma, low clinical risk (NEXUS/CCR)    Comparisons:None    Technique: Axial CT imaging of the cervical spine was performed.  Coronal and sagittal reconstructions were obtained.    Findings:Alignment of the cervical spine is normal. There is no  evidence of subluxation or fracture. There is no concerning  prevertebral soft tissue edema. There is disc space narrowing of C5-C6  and C6-C7.      Impression    IMPRESSION: No evidence of traumatic subluxation or fracture of the  cervical spine.    Degenerative disc disease at the C5-C6 and C6-C7 levels.    CLEMENTE RODRIGUEZ MD   CT Head w/o Contrast    Narrative    Exam: CT HEAD W/O CONTRAST    Clinical history:65 years Female Headache, post traumatic; Head  trauma, minor, pt on anticoagulation    Comparisons: 3/16/2018    Technique: Axial CT imaging of the head was performed Without  intervenous contrast.    FINDINGS:   Ventricles and sulci are symmetric. The gray-white matter  differentiation throughout the brain is well maintained. There is  no  evidence of intracranial mass or hemorrhage. Visualized portions of  the paranasal sinuses and mastoid air cells are well aerated. There is  no evidence of skull fracture.      Impression    IMPRESSION: Negative Head CT. There is no evidence of skull fracture  or intracranial hemorrhage.    CLEMENTE RODRIGUEZ MD   CBC with platelets differential   Result Value Ref Range    WBC 6.4 4.0 - 11.0 10e9/L    RBC Count 3.97 3.8 - 5.2 10e12/L    Hemoglobin 11.0 (L) 11.7 - 15.7 g/dL    Hematocrit 34.1 (L) 35.0 - 47.0 %    MCV 86 78 - 100 fl    MCH 27.7 26.5 - 33.0 pg    MCHC 32.3 31.5 - 36.5 g/dL    RDW 14.2 10.0 - 15.0 %    Platelet Count 250 150 - 450 10e9/L    Diff Method Automated Method     % Neutrophils 51.8 %    % Lymphocytes 34.9 %    % Monocytes 9.4 %    % Eosinophils 2.8 %    % Basophils 0.9 %    % Immature Granulocytes 0.2 %    Absolute Neutrophil 3.3 1.6 - 8.3 10e9/L    Absolute Lymphocytes 2.2 0.8 - 5.3 10e9/L    Absolute Monocytes 0.6 0.0 - 1.3 10e9/L    Absolute Eosinophils 0.2 0.0 - 0.7 10e9/L    Absolute Basophils 0.1 0.0 - 0.2 10e9/L    Abs Immature Granulocytes 0.0 0 - 0.4 10e9/L   INR   Result Value Ref Range    INR 1.40 (H) 0.86 - 1.14   Comprehensive metabolic panel   Result Value Ref Range    Sodium 140 134 - 144 mmol/L    Potassium 3.8 3.5 - 5.1 mmol/L    Chloride 106 98 - 107 mmol/L    Carbon Dioxide 24 21 - 31 mmol/L    Anion Gap 10 3 - 14 mmol/L    Glucose 121 (H) 70 - 105 mg/dL    Urea Nitrogen 20 7 - 25 mg/dL    Creatinine 1.38 (H) 0.60 - 1.20 mg/dL    GFR Estimate 38 (L) >60 mL/min/[1.73_m2]    GFR Estimate If Black 46 (L) >60 mL/min/[1.73_m2]    Calcium 8.7 8.6 - 10.3 mg/dL    Bilirubin Total 0.4 0.3 - 1.0 mg/dL    Albumin 4.1 3.5 - 5.7 g/dL    Protein Total 6.6 6.4 - 8.9 g/dL    Alkaline Phosphatase 65 34 - 104 U/L    ALT 14 7 - 52 U/L    AST 23 13 - 39 U/L   Troponin GH   Result Value Ref Range    Troponin 3.4 <34.0 pg/mL   NT pro BNP   Result Value Ref Range    N-Terminal Pro BNP Inpatient  46 0 - 100 pg/mL       Medications - No data to display    Assessments & Plan (with Medical Decision Making)     I have reviewed the nursing notes.    I have reviewed the findings, diagnosis, plan and need for follow up with the patient.      New Prescriptions    No medications on file       Final diagnoses:   Hematoma of frontal scalp, initial encounter   Closed head injury, initial encounter   Cervical muscle strain, initial encounter     Afebrile.  Vital signs stable.  Patient with fall off the toilet last night sustaining a frontal scalp hematoma.  She is currently on Plavix and Xarelto given her history of coronary artery disease.  Partial trauma was called and she was placed in a cervical collar.  EKG shows normal sinus rhythm.  Troponin is normal.  BNP is normal.  INR is 1.4.  CBC shows normal white blood cells no left shift.  Her hemoglobin is 11.  This is normal for her.  CT of her head shows no acute findings no hemorrhage.  CT cervical shows no acute findings.  Her cervical collar was removed.  I discussed continued monitoring and return if there is any concerns for further evaluation at this time.  She was given Maxalt tablet as well as a Norco for her headache.  Follow-up if there is any concerns immediately.    10/20/20  [facility] [place]     Mikel Ashraf PA-C  10/20/20 [id number]

## 2020-10-21 LAB — INTERPRETATION ECG - MUSE: NORMAL

## 2020-11-02 ENCOUNTER — TRANSFERRED RECORDS (OUTPATIENT)
Dept: HEALTH INFORMATION MANAGEMENT | Facility: OTHER | Age: 65
End: 2020-11-02

## 2020-11-10 DIAGNOSIS — F41.1 ANXIETY STATE: Primary | ICD-10-CM

## 2020-11-10 DIAGNOSIS — R51.9 LEFT-SIDED HEADACHE: ICD-10-CM

## 2020-11-10 RX ORDER — METOCLOPRAMIDE 10 MG/1
10 TABLET ORAL EVERY 6 HOURS PRN
Qty: 30 TABLET | Refills: 1 | Status: SHIPPED | OUTPATIENT
Start: 2020-11-10 | End: 2020-12-11

## 2020-11-10 RX ORDER — BUSPIRONE HYDROCHLORIDE 10 MG/1
10 TABLET ORAL 3 TIMES DAILY
Qty: 180 TABLET | Refills: 1 | Status: SHIPPED | OUTPATIENT
Start: 2020-11-10 | End: 2020-11-16

## 2020-11-10 NOTE — TELEPHONE ENCOUNTER
Note from pharmacy requests Busperone 10 mg twice daily. They have script for 15 mg twice daily but patient states she can't take those. Medication teed up as 10 mg

## 2020-11-10 NOTE — TELEPHONE ENCOUNTER
[facility] sent Rx request for the following:   metoclopramide (REGLAN) 10 MG tablet  Sig:Take 1 tablet (10 mg) by mouth every 6 hours as needed (headache)    Last Prescription Date:   09/30/2020  Last Fill Qty/Refills:         30, R-1      busPIRone (BUSPAR) 10 MG tablet  Sig:Take 1 tablet (10 mg) by mouth 3 times daily    Last Prescription Date:   08/12/2020  Last Fill Qty/Refills:         180, R-1      Last Office Visit:              10/09/2020 ([facility])   Future Office visit:           12/11/2020    Patient request for lower in dosing pertaining to BUSPAR. Medication previously prescribed as 15 mg, patient states cannot take. Routing for provider review.     Unable to complete prescription refill per RN Medication Refill Policy.................... Tiffany Johnson RN ....................  11/10/2020   3:26 PM

## 2020-11-16 DIAGNOSIS — F41.1 ANXIETY STATE: Primary | ICD-10-CM

## 2020-11-17 NOTE — TELEPHONE ENCOUNTER
[facility] in [id number] in [facility] [place] sent Rx request for the following:    Patient requesting 90-day supply.     busPIRone (BUSPAR) 10 MG tablet 180 tablet 1 11/10/2020  --   Sig - Route: Take 1 tablet (10 mg) by mouth 3 times daily - Oral     Routing to PCP for consideration.    Last Office Visit:              10/09/2020 ([facility])   Future Office visit:           12/11/2020    Unable to complete prescription refill per RN Medication Refill Policy. Kathleen Puckett RN .............. 11/17/2020  3:27 PM

## 2020-11-18 RX ORDER — BUSPIRONE HYDROCHLORIDE 10 MG/1
10 TABLET ORAL 3 TIMES DAILY
Qty: 270 TABLET | Refills: 0 | Status: SHIPPED | OUTPATIENT
Start: 2020-11-18 | End: 2020-12-11

## 2020-12-09 RX ORDER — PREGABALIN 75 MG/1
75 CAPSULE ORAL 3 TIMES DAILY
Qty: 90 CAPSULE | Refills: 5 | Status: CANCELLED | OUTPATIENT
Start: 2020-12-09

## 2020-12-09 RX ORDER — ALBUTEROL SULFATE 90 UG/1
2 AEROSOL, METERED RESPIRATORY (INHALATION) EVERY 6 HOURS
Qty: 2 INHALER | Refills: 3 | Status: CANCELLED | OUTPATIENT
Start: 2020-12-09

## 2020-12-09 RX ORDER — CLOPIDOGREL BISULFATE 75 MG/1
75 TABLET ORAL DAILY
Qty: 90 TABLET | Refills: 3 | Status: CANCELLED | OUTPATIENT
Start: 2020-12-09

## 2020-12-09 RX ORDER — BUSPIRONE HYDROCHLORIDE 10 MG/1
10 TABLET ORAL 3 TIMES DAILY
Qty: 270 TABLET | Refills: 0 | Status: CANCELLED | OUTPATIENT
Start: 2020-12-09

## 2020-12-09 RX ORDER — BUSPIRONE HYDROCHLORIDE 15 MG/1
15 TABLET ORAL 2 TIMES DAILY
Qty: 180 TABLET | Refills: 1 | Status: CANCELLED | OUTPATIENT
Start: 2020-12-09

## 2020-12-09 RX ORDER — AMLODIPINE BESYLATE 10 MG/1
10 TABLET ORAL DAILY
Qty: 90 TABLET | Refills: 3 | Status: CANCELLED | OUTPATIENT
Start: 2020-12-09

## 2020-12-09 RX ORDER — METOCLOPRAMIDE 10 MG/1
10 TABLET ORAL EVERY 6 HOURS PRN
Qty: 30 TABLET | Refills: 1 | Status: CANCELLED | OUTPATIENT
Start: 2020-12-09

## 2020-12-09 RX ORDER — CLONAZEPAM 1 MG/1
1 TABLET ORAL 3 TIMES DAILY PRN
Qty: 225 TABLET | Refills: 0 | Status: CANCELLED | OUTPATIENT
Start: 2020-12-09

## 2020-12-11 ENCOUNTER — VIRTUAL VISIT (OUTPATIENT)
Dept: FAMILY MEDICINE | Facility: OTHER | Age: 65
End: 2020-12-11
Attending: FAMILY MEDICINE
Payer: MEDICARE

## 2020-12-11 DIAGNOSIS — M54.50 CHRONIC BILATERAL LOW BACK PAIN WITHOUT SCIATICA: ICD-10-CM

## 2020-12-11 DIAGNOSIS — G89.4 CHRONIC PAIN DISORDER: ICD-10-CM

## 2020-12-11 DIAGNOSIS — G89.29 CHRONIC BILATERAL LOW BACK PAIN WITHOUT SCIATICA: ICD-10-CM

## 2020-12-11 DIAGNOSIS — J44.9 CHRONIC OBSTRUCTIVE PULMONARY DISEASE, UNSPECIFIED COPD TYPE (H): ICD-10-CM

## 2020-12-11 DIAGNOSIS — G89.29 CHEST WALL PAIN, CHRONIC: ICD-10-CM

## 2020-12-11 DIAGNOSIS — F41.1 ANXIETY STATE: ICD-10-CM

## 2020-12-11 DIAGNOSIS — I25.10 PRESENCE OF STENT IN CORONARY ARTERY IN PATIENT WITH CORONARY ARTERY DISEASE: ICD-10-CM

## 2020-12-11 DIAGNOSIS — Z79.899 CONTROLLED SUBSTANCE AGREEMENT SIGNED: ICD-10-CM

## 2020-12-11 DIAGNOSIS — F41.9 CHRONIC ANXIETY: ICD-10-CM

## 2020-12-11 DIAGNOSIS — R51.9 LEFT-SIDED HEADACHE: ICD-10-CM

## 2020-12-11 DIAGNOSIS — G89.29 CHRONIC BILATERAL LOW BACK PAIN WITH RIGHT-SIDED SCIATICA: ICD-10-CM

## 2020-12-11 DIAGNOSIS — G43.719 INTRACTABLE CHRONIC COMMON MIGRAINE WITHOUT AURA: ICD-10-CM

## 2020-12-11 DIAGNOSIS — I10 ESSENTIAL HYPERTENSION: ICD-10-CM

## 2020-12-11 DIAGNOSIS — Z95.5 PRESENCE OF STENT IN CORONARY ARTERY IN PATIENT WITH CORONARY ARTERY DISEASE: ICD-10-CM

## 2020-12-11 DIAGNOSIS — R07.89 CHEST WALL PAIN, CHRONIC: ICD-10-CM

## 2020-12-11 DIAGNOSIS — M54.41 CHRONIC BILATERAL LOW BACK PAIN WITH RIGHT-SIDED SCIATICA: ICD-10-CM

## 2020-12-11 PROCEDURE — 99442 PR PHYSICIAN TELEPHONE EVALUATION 11-20 MIN: CPT | Mod: 95 | Performed by: FAMILY MEDICINE

## 2020-12-11 RX ORDER — HYDROCODONE BITARTRATE AND ACETAMINOPHEN 10; 325 MG/1; MG/1
1-2 TABLET ORAL EVERY 4 HOURS PRN
Qty: 180 TABLET | Refills: 0 | Status: SHIPPED | OUTPATIENT
Start: 2021-01-09 | End: 2021-02-01

## 2020-12-11 RX ORDER — BUSPIRONE HYDROCHLORIDE 15 MG/1
15 TABLET ORAL 2 TIMES DAILY
Qty: 180 TABLET | Refills: 1 | Status: SHIPPED | OUTPATIENT
Start: 2020-12-11 | End: 2021-06-02

## 2020-12-11 RX ORDER — BUSPIRONE HYDROCHLORIDE 10 MG/1
10 TABLET ORAL 2 TIMES DAILY
COMMUNITY
Start: 2020-12-11 | End: 2020-12-11 | Stop reason: DRUGHIGH

## 2020-12-11 RX ORDER — HYDROCODONE BITARTRATE AND ACETAMINOPHEN 10; 325 MG/1; MG/1
1-2 TABLET ORAL EVERY 4 HOURS PRN
Qty: 180 TABLET | Refills: 0 | Status: SHIPPED | OUTPATIENT
Start: 2020-12-11 | End: 2021-02-01

## 2020-12-11 RX ORDER — METOCLOPRAMIDE 10 MG/1
10 TABLET ORAL EVERY 6 HOURS PRN
Qty: 30 TABLET | Refills: 1 | Status: SHIPPED | OUTPATIENT
Start: 2020-12-11 | End: 2021-04-07 | Stop reason: SINTOL

## 2020-12-11 NOTE — PROGRESS NOTES
"Telephone Visit: Controlled medication refills, denies needing anything refilled at this time.  Patient wants to see Ramirez Cano MD next month for an in person visit for back injections.      Taylor Hill LPN 12/11/2020 10:48 AM      Ankita Day is a 65 year old female who is being evaluated via a billable telephone visit.      The patient has been notified of following:     \"This telephone visit will be conducted via a call between you and your physician/provider. We have found that certain health care needs can be provided without the need for a physical exam.  This service lets us provide the care you need with a short phone conversation.  If a prescription is necessary we can send it directly to your pharmacy.  If lab work is needed we can place an order for that and you can then stop by our lab to have the test done at a later time.    Telephone visits are billed at different rates depending on your insurance coverage. During this emergency period, for some insurers they may be billed the same as an in-person visit.  Please reach out to your insurance provider with any questions.    If during the course of the call the physician/provider feels a telephone visit is not appropriate, you will not be charged for this service.\"    Patient has given verbal consent for Telephone visit?  Yes    What phone number would you like to be contacted at? [phone]    How would you like to obtain your AVS? Dinesht    Subjective     Ankita Day is a 65 year old female who presents via phone visit today for the following health issues:    HPI     65 year old female with CAD and history of PE presents to follow up on chronic pain and anxiety    Due for refill on hydrocodone. Changed 4 months ago from oxycodone to hydrocodone as she felt oxycodone was providing incomplete relief. She was on methadone before, but was sedated. Without opioids she had loss of function. Changed from 60 morphine milligram equivalents " of oxycodone to hydrocodone.    Went to PT for back pain. Improved and she is pleased that she attended PT.  Wondering about back injections in the future if she can stop Xarelto temporarily. Had PE in January 2019. Remains on Plavix for CAD and Xarelto lower dose due to PE    Using metoclopramide more frequently for headache.    Taking 2.5 clonazepam on average.  Previously she had reduced down to 1 pill twice daily, but for the past several months has increased anxiety with COVID. Is no longer seeing psychiatry or counseling.      Review of Systems   As above       Objective   Vitals - Patient Reported  Weight (Patient Reported): 83.9 kg (185 lb)  Pain Score: Severe Pain (7)  Pain Loc: Head        healthy, alert and no distress  PSYCH: Alert and oriented times 3; coherent speech, normal   rate and volume, able to articulate logical thoughts, able   to abstract reason, no tangential thoughts, no hallucinations   or delusions  Her affect is normal  RESP: No cough, no audible wheezing, able to talk in full sentences  Remainder of exam unable to be completed due to telephone visits            Assessment/Plan:      ICD-10-CM    1. Chronic pain disorder  G89.4 HYDROcodone-acetaminophen (NORCO)  MG per tablet     HYDROcodone-acetaminophen (NORCO)  MG per tablet   2. Chronic bilateral low back pain with right-sided sciatica  M54.41 HYDROcodone-acetaminophen (NORCO)  MG per tablet    G89.29 HYDROcodone-acetaminophen (NORCO)  MG per tablet   3. Chest wall pain, chronic  R07.89 HYDROcodone-acetaminophen (NORCO)  MG per tablet    G89.29 HYDROcodone-acetaminophen (NORCO)  MG per tablet   4. Controlled substance agreement updated 10/9/2020  Z79.899 HYDROcodone-acetaminophen (NORCO)  MG per tablet     HYDROcodone-acetaminophen (NORCO)  MG per tablet   5. Chronic obstructive pulmonary disease, unspecified COPD type (H)  J44.9    6. Essential hypertension  I10    7. Anxiety state   F41.1 busPIRone (BUSPAR) 15 MG tablet     DISCONTINUED: busPIRone (BUSPAR) 10 MG tablet   8. Chronic anxiety  F41.9    9. Presence of stent in coronary artery in patient with coronary artery disease  I25.10     Z95.5    10. Left-sided headache  R51.9 metoclopramide (REGLAN) 10 MG tablet   11. Chronic bilateral low back pain without sciatica  M54.5     G89.29    12. Intractable chronic common migraine without aura  G43.719      PDMP Review       Value Time User    State PDMP site checked  Yes 12/11/2020 10:59 AM Ramirez Cano MD         Refilled hydrocodone 10/325 mg 6 pills per day for 1 month and another prescription for 1/9/2020.    Continue clonazepam. Did not increase buspirone previously to 15 mg BID as recommended, she will do so now. Ideally this will result in clonazepam reduction. Aware of risk with concurrent opioid and benzo use.    She really wants the COVID vaccine. We discussed that some.     Phone call duration:  14 minutes    Follow up in 2 months  Ramirez Cano MD

## 2020-12-11 NOTE — NURSING NOTE
Telephone Visit: Controlled medication refills, denies needing anything refilled at this time.  Patient wants to see Ramirez Cano MD next month for an in person visit for back injections.      Taylor Hill LPN 12/11/2020 10:48 AM

## 2021-01-18 DIAGNOSIS — F41.9 CHRONIC ANXIETY: ICD-10-CM

## 2021-01-18 DIAGNOSIS — Z79.899 CONTROLLED SUBSTANCE AGREEMENT SIGNED: ICD-10-CM

## 2021-01-18 NOTE — TELEPHONE ENCOUNTER
[facility] sent Rx request for the following:      clonazePAM (KLONOPIN) 1 MG tablet  Sig: Take 1 tablet (1 mg) by mouth 3 times daily as needed for anxiety Max 2.5 per day = 225 per 90 days      Last Prescription Date:   10/26/2020  Last Fill Qty/Refills:         225, R-0    Last Office Visit:              12/11/2020   Future Office visit:           2/8/2021    Routing refill request to provider for review/approval because:  Drug not on the [facility] refill protocol or controlled substance    Unable to complete prescription refill per RN Medication Refill Policy.................... Nikos Toure RN ....................  1/18/2021   4:15 PM

## 2021-01-18 NOTE — TELEPHONE ENCOUNTER
There were 2 faxes.  The other one was for:  Clonazepam 1 mg - Take 1 tablet by mouth twice a day  ??

## 2021-01-19 RX ORDER — CLONAZEPAM 1 MG/1
1 TABLET ORAL 3 TIMES DAILY PRN
Qty: 225 TABLET | Refills: 0 | Status: SHIPPED | OUTPATIENT
Start: 2021-01-22 | End: 2021-04-07

## 2021-01-19 NOTE — TELEPHONE ENCOUNTER
PDMP Review       Value Time User    State PDMP site checked  Yes 12/11/2020 10:59 AM Ramirez Cano MD         Due for clonazepam 1/22/21

## 2021-01-22 ENCOUNTER — MYC MEDICAL ADVICE (OUTPATIENT)
Dept: FAMILY MEDICINE | Facility: OTHER | Age: 66
End: 2021-01-22

## 2021-01-25 DIAGNOSIS — I10 ESSENTIAL HYPERTENSION: ICD-10-CM

## 2021-01-25 DIAGNOSIS — Z95.5 PRESENCE OF STENT IN CORONARY ARTERY IN PATIENT WITH CORONARY ARTERY DISEASE: Primary | ICD-10-CM

## 2021-01-25 DIAGNOSIS — I25.10 PRESENCE OF STENT IN CORONARY ARTERY IN PATIENT WITH CORONARY ARTERY DISEASE: Primary | ICD-10-CM

## 2021-01-25 RX ORDER — CLOPIDOGREL BISULFATE 75 MG/1
75 TABLET ORAL DAILY
Qty: 30 TABLET | Refills: 0 | Status: SHIPPED | OUTPATIENT
Start: 2021-01-25 | End: 2021-02-01

## 2021-01-25 RX ORDER — AMLODIPINE BESYLATE 10 MG/1
10 TABLET ORAL DAILY
Qty: 30 TABLET | Refills: 0 | Status: SHIPPED | OUTPATIENT
Start: 2021-01-25 | End: 2021-03-09

## 2021-01-25 NOTE — TELEPHONE ENCOUNTER
[id number] [place] sent Rx request for the following:      Requested Prescriptions   Pending Prescriptions Disp Refills   amLODIPine (NORVASC) 10 MG tablet 90 tablet 3    Sig: Take 1 tablet (10 mg) by mouth daily   Last Prescription Date:   1/14/20  Last Fill Qty/Refills:         90, R-3    Calcium Channel Blockers Protocol  Failed - 1/25/2021  2:29 PM       Failed - Normal serum creatinine on file in past 12 months      clopidogrel (PLAVIX) 75 MG tablet 90 tablet 3    Sig: Take 1 tablet (75 mg) by mouth daily   Last Prescription Date:   1/14/20  Last Fill Qty/Refills:         90, R-3     Plavix Failed - 1/25/2021  2:29 PM       Failed - No active PPI on record unless is Protonix       Failed - Normal HGB on file in past 12 months     Last Office Visit:              12/11/20  Future Office visit:             Next 5 appointments (look out 90 days)    Feb 08, 2021  9:20 AM  SHORT with Ramirez Cano MD  [facility] ([facility]) [address]  [place] [address]  [phone]        Drug-Drug: rivaroxaban ANTICOAGULANT and clopidogrel  Use of Direct Factor Xa Inhibitors with Antiplatelet Agents may increase the risk of bleeding.    Overridden by Ramirez Cano MD on Feb 19, 2020 10:18 AM   Drug-Drug   1. DIRECT FACTOR XA INHIBITORS / ANTIPLATELET AGENTS [Level: Major] [Reason: Benefit outweighs risk]   Other Orders: rivaroxaban ANTICOAGULANT 20 MG PO TABS tablet           Noted upcoming appointment. Prescriptions approved per [facility] Refill Protocol for limited supply, to prevent break in medication. Kathleen Puckett RN .............. 1/25/2021  2:35 PM

## 2021-02-01 ENCOUNTER — OFFICE VISIT (OUTPATIENT)
Dept: FAMILY MEDICINE | Facility: OTHER | Age: 66
End: 2021-02-01
Attending: FAMILY MEDICINE
Payer: MEDICARE

## 2021-02-01 VITALS
DIASTOLIC BLOOD PRESSURE: 70 MMHG | TEMPERATURE: 96 F | RESPIRATION RATE: 20 BRPM | HEART RATE: 72 BPM | SYSTOLIC BLOOD PRESSURE: 110 MMHG | OXYGEN SATURATION: 98 %

## 2021-02-01 DIAGNOSIS — Z79.899 CONTROLLED SUBSTANCE AGREEMENT SIGNED: ICD-10-CM

## 2021-02-01 DIAGNOSIS — G89.29 CHEST WALL PAIN, CHRONIC: ICD-10-CM

## 2021-02-01 DIAGNOSIS — R07.89 CHEST WALL PAIN, CHRONIC: ICD-10-CM

## 2021-02-01 DIAGNOSIS — Z95.5 PRESENCE OF STENT IN CORONARY ARTERY IN PATIENT WITH CORONARY ARTERY DISEASE: ICD-10-CM

## 2021-02-01 DIAGNOSIS — G89.29 CHRONIC BILATERAL LOW BACK PAIN WITH RIGHT-SIDED SCIATICA: ICD-10-CM

## 2021-02-01 DIAGNOSIS — M54.41 CHRONIC BILATERAL LOW BACK PAIN WITH RIGHT-SIDED SCIATICA: ICD-10-CM

## 2021-02-01 DIAGNOSIS — G89.4 CHRONIC PAIN DISORDER: ICD-10-CM

## 2021-02-01 DIAGNOSIS — I25.10 PRESENCE OF STENT IN CORONARY ARTERY IN PATIENT WITH CORONARY ARTERY DISEASE: ICD-10-CM

## 2021-02-01 PROCEDURE — G0463 HOSPITAL OUTPT CLINIC VISIT: HCPCS

## 2021-02-01 PROCEDURE — 99213 OFFICE O/P EST LOW 20 MIN: CPT | Performed by: FAMILY MEDICINE

## 2021-02-01 RX ORDER — HYDROCODONE BITARTRATE AND ACETAMINOPHEN 10; 325 MG/1; MG/1
1-2 TABLET ORAL EVERY 4 HOURS PRN
Qty: 180 TABLET | Refills: 0 | Status: SHIPPED | OUTPATIENT
Start: 2021-02-01 | End: 2021-04-07

## 2021-02-01 RX ORDER — HYDROCODONE BITARTRATE AND ACETAMINOPHEN 10; 325 MG/1; MG/1
1-2 TABLET ORAL EVERY 4 HOURS PRN
Qty: 180 TABLET | Refills: 0 | Status: SHIPPED | OUTPATIENT
Start: 2021-03-09 | End: 2021-04-07

## 2021-02-01 RX ORDER — CLOPIDOGREL BISULFATE 75 MG/1
75 TABLET ORAL DAILY
Qty: 90 TABLET | Refills: 4 | Status: SHIPPED | OUTPATIENT
Start: 2021-02-01 | End: 2022-04-29

## 2021-02-01 ASSESSMENT — ANXIETY QUESTIONNAIRES
6. BECOMING EASILY ANNOYED OR IRRITABLE: NOT AT ALL
2. NOT BEING ABLE TO STOP OR CONTROL WORRYING: NOT AT ALL
5. BEING SO RESTLESS THAT IT IS HARD TO SIT STILL: NOT AT ALL
3. WORRYING TOO MUCH ABOUT DIFFERENT THINGS: NOT AT ALL
1. FEELING NERVOUS, ANXIOUS, OR ON EDGE: NOT AT ALL
GAD7 TOTAL SCORE: 0
7. FEELING AFRAID AS IF SOMETHING AWFUL MIGHT HAPPEN: NOT AT ALL
IF YOU CHECKED OFF ANY PROBLEMS ON THIS QUESTIONNAIRE, HOW DIFFICULT HAVE THESE PROBLEMS MADE IT FOR YOU TO DO YOUR WORK, TAKE CARE OF THINGS AT HOME, OR GET ALONG WITH OTHER PEOPLE: NOT DIFFICULT AT ALL

## 2021-02-01 ASSESSMENT — PATIENT HEALTH QUESTIONNAIRE - PHQ9
5. POOR APPETITE OR OVEREATING: NOT AT ALL
SUM OF ALL RESPONSES TO PHQ QUESTIONS 1-9: 0

## 2021-02-01 ASSESSMENT — PAIN SCALES - GENERAL: PAINLEVEL: MODERATE PAIN (4)

## 2021-02-01 NOTE — NURSING NOTE
"Patient presents to the clinic for Controlled medication refill, last administration was a couple of hours ago.  Contract and Tox completed on 10-.        Chief Complaint   Patient presents with     Recheck Medication       Initial /70 (BP Location: Right arm, Patient Position: Sitting, Cuff Size: Adult Regular)   Pulse 72   Temp 96  F (35.6  C) (Temporal)   Resp 20   LMP  (LMP Unknown)   SpO2 98%  Estimated body mass index is 30.67 kg/m  as calculated from the following:    Height as of 10/20/20: 1.676 m (5' 6\").    Weight as of 10/20/20: 86.2 kg (190 lb).  Medication Reconciliation: complete    Taylor Hill LPN    "

## 2021-02-01 NOTE — PROGRESS NOTES
"Nursing Notes:   Taylor Hill LPN  2/1/2021  2:29 PM  Signed  Patient presents to the clinic for Controlled medication refill, last administration was a couple of hours ago.  Contract and Tox completed on 10-.        Chief Complaint   Patient presents with     Recheck Medication       Initial /70 (BP Location: Right arm, Patient Position: Sitting, Cuff Size: Adult Regular)   Pulse 72   Temp 96  F (35.6  C) (Temporal)   Resp 20   LMP  (LMP Unknown)   SpO2 98%  Estimated body mass index is 30.67 kg/m  as calculated from the following:    Height as of 10/20/20: 1.676 m (5' 6\").    Weight as of 10/20/20: 86.2 kg (190 lb).  Medication Reconciliation: complete    Taylor Hill LPN         Assessment & Plan       ICD-10-CM    1. Chronic pain disorder  G89.4 HYDROcodone-acetaminophen (NORCO)  MG per tablet     HYDROcodone-acetaminophen (NORCO)  MG per tablet   2. Chronic bilateral low back pain with right-sided sciatica  M54.41 HYDROcodone-acetaminophen (NORCO)  MG per tablet    G89.29 HYDROcodone-acetaminophen (NORCO)  MG per tablet   3. Chest wall pain, chronic  R07.89 HYDROcodone-acetaminophen (NORCO)  MG per tablet    G89.29 HYDROcodone-acetaminophen (NORCO)  MG per tablet   4. Controlled substance agreement updated 10/9/2020  Z79.899 HYDROcodone-acetaminophen (NORCO)  MG per tablet     HYDROcodone-acetaminophen (NORCO)  MG per tablet       PDMP Review       Value Time User    State PDMP site checked  Yes 2/1/2021  2:31 PM Ramirez Cano MD        Urine drug monitoring Oct 2020.   Refilled hydrocodone for 60 morphine milligram equivalents daily. 2 month supply. In town, may fill 1 week early, but next due March 9. Due for follow up April 8.    Aware of risk for sedation and overdose with concurrent benzos and opioids. Has reduced dose of both over time.           Follow up 2 months    Ramirez Cano MD   [facility]      SUBJECTIVE:  66 year old female with CAD presents for follow up on chronic pain and anxiety.    Chronic back and chest wall pain. Due for refill on hydrocodone. In 2020 changed from oxycodone to hydrocodone as she felt oxycodone was providing incomplete relief. She was on methadone before, but was sedated. Without opioids she had loss of function. Changed from 60 morphine milligram equivalents of oxycodone to hydrocodone.  Satisfied with current dosing.    Increased buspirone 15 mg twice daily in December to help with anxiety. May have helped a little. She is still on clonazepam 2.5 per day on average and has not made a reduction lately. Was making progress, but could not drop dose further due to anxiety during COVID      REVIEW OF SYSTEMS:    Constitutional: negative  Respiratory: negative  Cardiovascular: negative    Current Outpatient Medications   Medication Sig Dispense Refill     albuterol (PROAIR HFA/PROVENTIL HFA/VENTOLIN HFA) 108 (90 Base) MCG/ACT inhaler Inhale 2 puffs into the lungs every 6 hours 2 Inhaler 3     amLODIPine (NORVASC) 10 MG tablet Take 1 tablet (10 mg) by mouth daily 30 tablet 0     busPIRone (BUSPAR) 15 MG tablet Take 1 tablet (15 mg) by mouth 2 times daily 180 tablet 1     clonazePAM (KLONOPIN) 1 MG tablet Take 1 tablet (1 mg) by mouth 3 times daily as needed for anxiety Max 2.5 per day = 225 per 90 days 225 tablet 0     clopidogrel (PLAVIX) 75 MG tablet Take 1 tablet (75 mg) by mouth daily 30 tablet 0     cyanocobalamin (VITAMIN B-12) 1000 MCG tablet Take 1 tablet (1,000 mcg) by mouth daily       Diclofenac Sodium 1.5 % SOLN Apply 20 drops to each hand or 40 drops to each knee up to 4 times daily as needed for arthritis pain 450 mL 3     HYDROcodone-acetaminophen (NORCO)  MG per tablet Take 1-2 tablets by mouth every 4 hours as needed for severe pain 6 per day 180 tablet 0     HYDROcodone-acetaminophen (NORCO)  MG per tablet Take 1-2 tablets by mouth every 4  hours as needed for severe pain 6 per day 180 tablet 0     lisinopril (ZESTRIL) 20 MG tablet Take 0.5 tablets (10 mg) by mouth daily 90 tablet 3     metoclopramide (REGLAN) 10 MG tablet Take 1 tablet (10 mg) by mouth every 6 hours as needed (headache) 30 tablet 1     metoprolol succinate ER (TOPROL-XL) 25 MG 24 hr tablet Take 1 tablet (25 mg) by mouth 2 times daily 180 tablet 3     naloxone (NARCAN) 4 MG/0.1ML nasal spray Spray into one nostril for opioid reversal if unresponsive. May repeat every 2-3 minutes until patient responsive or EMS arrives 1 each 1     nitroGLYcerin (NITROLINGUAL) 0.4 MG/SPRAY spray For chest pain spray 1 spray under tongue every 5 minutes for 3 doses. If symptoms persist 5 minutes after 1st dose call 911. 4.9 g 3     omeprazole (PRILOSEC) 20 MG DR capsule Take 1 capsule (20 mg) by mouth 2 times daily 180 capsule 3     ondansetron (ZOFRAN) 4 MG tablet Take 1 tablet (4 mg) by mouth every 6 hours as needed for nausea 30 tablet 2     pregabalin (LYRICA) 75 MG capsule TAKE 1 CAPSULE (75 MG) BY MOUTH 3 TIMES DAILY 90 capsule 5     ranolazine (RANEXA) 500 MG 12 hr tablet Take 1 tablet (500 mg) by mouth 2 times daily 180 tablet 3     rivaroxaban ANTICOAGULANT (XARELTO) 15 MG TABS tablet Take 1 tablet (15 mg) by mouth daily (with dinner) 90 tablet 3     rosuvastatin (CRESTOR) 10 MG tablet Take 2 tablets (20 mg) by mouth At Bedtime 180 tablet 3     sucralfate (CARAFATE) 1 GM tablet TAKE 1 TABLET (1 G) BY MOUTH 4 TIMES DAILY AS NEEDED FOR NAUSEA (OR DARK STOOLS) 360 tablet 0     VITAMIN D, CHOLECALCIFEROL, PO Take 5,000 Units by mouth daily       vortioxetine (TRINTELLIX) 20 MG tablet Take 1 tablet (20 mg) by mouth daily 90 tablet 1     Allergies   Allergen Reactions     Atorvastatin Muscle Pain (Myalgia)     Tiotropium Bromide [Tiotropium] Rash     Ezetimibe Muscle Pain (Myalgia)     Latex Rash     Niacin      Other reaction(s): Flushing     No Clinical Screening - See Comments Itching, Rash and  Blisters     Metals and plastics       Tape [Adhesive Tape] Rash       OBJECTIVE:  /70 (BP Location: Right arm, Patient Position: Sitting, Cuff Size: Adult Regular)   Pulse 72   Temp 96  F (35.6  C) (Temporal)   Resp 20   LMP  (LMP Unknown)   SpO2 98%     EXAM:  General Appearance: Alert. No acute distress  Chest/Respiratory Exam: Clear to auscultation bilaterally  Cardiovascular Exam: Regular rate and rhythm. S1, S2, no murmur, gallop, or rubs.  Extremities: 2+ pedal pulses.  No lower extremity edema.  Psychiatric: Normal affect and mentation              This document was prepared using a combination of typing and voice generated software.  While every attempt was made for accuracy, spelling and grammatical errors may exist.

## 2021-02-02 ASSESSMENT — ANXIETY QUESTIONNAIRES: GAD7 TOTAL SCORE: 0

## 2021-03-08 DIAGNOSIS — I10 ESSENTIAL HYPERTENSION: Primary | ICD-10-CM

## 2021-03-09 RX ORDER — AMLODIPINE BESYLATE 10 MG/1
10 TABLET ORAL DAILY
Qty: 30 TABLET | Refills: 0 | Status: SHIPPED | OUTPATIENT
Start: 2021-03-09 | End: 2021-04-07

## 2021-03-09 NOTE — TELEPHONE ENCOUNTER
[id number] [place] sent Rx request for the following:      Requested Prescriptions   Pending Prescriptions Disp Refills     amLODIPine (NORVASC) 10 MG tablet [Pharmacy Med Name: AMLODIPINE BESYLATE 10 MG TAB] 30 tablet 0     Sig: TAKE 1 TABLET BY MOUTH EVERY DAY       Calcium Channel Blockers Protocol  Failed - 3/9/2021 10:34 AM        Failed - Normal serum creatinine on file in past 12 months     Recent Labs   Lab Test 10/20/20  1719   CR 1.38*       Ok to refill medication if creatinine is low       Last Prescription Date:   1/25/21  Last Fill Qty/Refills:         30, R-0    Last Office Visit:              2/1/21   Future Office visit:             Next 5 appointments (look out 90 days)    Apr 07, 2021 10:20 AM  SHORT with Ramirez Cano MD  [facility] ([facility] ) [address]  [place] [address] [phone]        Noted upcoming appointment. Prescription approved per [facility] Refill Protocol for limited supply, to prevent break in medication. Kathleen Puckett RN .............. 3/9/2021  10:37 AM

## 2021-03-16 ENCOUNTER — APPOINTMENT (OUTPATIENT)
Dept: CT IMAGING | Facility: OTHER | Age: 66
End: 2021-03-16
Attending: FAMILY MEDICINE
Payer: MEDICARE

## 2021-03-16 ENCOUNTER — HOSPITAL ENCOUNTER (EMERGENCY)
Facility: OTHER | Age: 66
Discharge: HOME | End: 2021-03-16
Attending: FAMILY MEDICINE | Admitting: FAMILY MEDICINE
Payer: MEDICARE

## 2021-03-16 VITALS
WEIGHT: 185 LBS | HEIGHT: 66 IN | DIASTOLIC BLOOD PRESSURE: 93 MMHG | HEART RATE: 67 BPM | TEMPERATURE: 97.5 F | OXYGEN SATURATION: 96 % | RESPIRATION RATE: 18 BRPM | SYSTOLIC BLOOD PRESSURE: 119 MMHG | BODY MASS INDEX: 29.73 KG/M2

## 2021-03-16 DIAGNOSIS — R07.89 ATYPICAL CHEST PAIN: ICD-10-CM

## 2021-03-16 LAB
ALBUMIN SERPL-MCNC: 4.5 G/DL (ref 3.5–5.7)
ALP SERPL-CCNC: 48 U/L (ref 34–104)
ALT SERPL W P-5'-P-CCNC: 12 U/L (ref 7–52)
ANION GAP SERPL CALCULATED.3IONS-SCNC: 9 MMOL/L (ref 3–14)
AST SERPL W P-5'-P-CCNC: 18 U/L (ref 13–39)
BASOPHILS # BLD AUTO: 0 10E9/L (ref 0–0.2)
BASOPHILS NFR BLD AUTO: 0.6 %
BILIRUB SERPL-MCNC: 0.5 MG/DL (ref 0.3–1)
BUN SERPL-MCNC: 22 MG/DL (ref 7–25)
CALCIUM SERPL-MCNC: 9.7 MG/DL (ref 8.6–10.3)
CHLORIDE SERPL-SCNC: 104 MMOL/L (ref 98–107)
CO2 SERPL-SCNC: 26 MMOL/L (ref 21–31)
CREAT SERPL-MCNC: 0.98 MG/DL (ref 0.6–1.2)
DIFFERENTIAL METHOD BLD: NORMAL
EOSINOPHIL # BLD AUTO: 0.1 10E9/L (ref 0–0.7)
EOSINOPHIL NFR BLD AUTO: 1.1 %
ERYTHROCYTE [DISTWIDTH] IN BLOOD BY AUTOMATED COUNT: 13.2 % (ref 10–15)
GFR SERPL CREATININE-BSD FRML MDRD: 57 ML/MIN/{1.73_M2}
GLUCOSE SERPL-MCNC: 87 MG/DL (ref 70–105)
HCT VFR BLD AUTO: 35.7 % (ref 35–47)
HGB BLD-MCNC: 12.2 G/DL (ref 11.7–15.7)
IMM GRANULOCYTES # BLD: 0 10E9/L (ref 0–0.4)
IMM GRANULOCYTES NFR BLD: 0.2 %
LYMPHOCYTES # BLD AUTO: 1.6 10E9/L (ref 0.8–5.3)
LYMPHOCYTES NFR BLD AUTO: 30.5 %
MCH RBC QN AUTO: 29.4 PG (ref 26.5–33)
MCHC RBC AUTO-ENTMCNC: 34.2 G/DL (ref 31.5–36.5)
MCV RBC AUTO: 86 FL (ref 78–100)
MONOCYTES # BLD AUTO: 0.6 10E9/L (ref 0–1.3)
MONOCYTES NFR BLD AUTO: 11.1 %
NEUTROPHILS # BLD AUTO: 3 10E9/L (ref 1.6–8.3)
NEUTROPHILS NFR BLD AUTO: 56.5 %
PLATELET # BLD AUTO: 231 10E9/L (ref 150–450)
POTASSIUM SERPL-SCNC: 4 MMOL/L (ref 3.5–5.1)
PROT SERPL-MCNC: 7.3 G/DL (ref 6.4–8.9)
RBC # BLD AUTO: 4.15 10E12/L (ref 3.8–5.2)
SODIUM SERPL-SCNC: 139 MMOL/L (ref 134–144)
TROPONIN I SERPL-MCNC: <2.3 PG/ML
TROPONIN I SERPL-MCNC: <2.3 PG/ML
WBC # BLD AUTO: 5.3 10E9/L (ref 4–11)

## 2021-03-16 PROCEDURE — 71275 CT ANGIOGRAPHY CHEST: CPT | Mod: ME

## 2021-03-16 PROCEDURE — 80053 COMPREHEN METABOLIC PANEL: CPT | Performed by: FAMILY MEDICINE

## 2021-03-16 PROCEDURE — 258N000003 HC RX IP 258 OP 636: Performed by: FAMILY MEDICINE

## 2021-03-16 PROCEDURE — 85025 COMPLETE CBC W/AUTO DIFF WBC: CPT | Performed by: FAMILY MEDICINE

## 2021-03-16 PROCEDURE — 93010 ELECTROCARDIOGRAM REPORT: CPT | Performed by: INTERNAL MEDICINE

## 2021-03-16 PROCEDURE — 250N000011 HC RX IP 250 OP 636: Performed by: FAMILY MEDICINE

## 2021-03-16 PROCEDURE — 99283 EMERGENCY DEPT VISIT LOW MDM: CPT | Performed by: FAMILY MEDICINE

## 2021-03-16 PROCEDURE — 250N000013 HC RX MED GY IP 250 OP 250 PS 637: Performed by: FAMILY MEDICINE

## 2021-03-16 PROCEDURE — 93005 ELECTROCARDIOGRAM TRACING: CPT | Performed by: FAMILY MEDICINE

## 2021-03-16 PROCEDURE — 96374 THER/PROPH/DIAG INJ IV PUSH: CPT | Mod: XU | Performed by: FAMILY MEDICINE

## 2021-03-16 PROCEDURE — 36415 COLL VENOUS BLD VENIPUNCTURE: CPT | Performed by: FAMILY MEDICINE

## 2021-03-16 PROCEDURE — 84484 ASSAY OF TROPONIN QUANT: CPT | Performed by: FAMILY MEDICINE

## 2021-03-16 PROCEDURE — 99285 EMERGENCY DEPT VISIT HI MDM: CPT | Mod: 25 | Performed by: FAMILY MEDICINE

## 2021-03-16 PROCEDURE — 255N000002 HC RX 255 OP 636: Performed by: FAMILY MEDICINE

## 2021-03-16 RX ORDER — SODIUM CHLORIDE 9 MG/ML
INJECTION, SOLUTION INTRAVENOUS CONTINUOUS
Status: DISCONTINUED | OUTPATIENT
Start: 2021-03-16 | End: 2021-03-16 | Stop reason: HOSPADM

## 2021-03-16 RX ORDER — NITROGLYCERIN 0.4 MG/1
0.4 TABLET SUBLINGUAL EVERY 5 MIN PRN
Status: DISCONTINUED | OUTPATIENT
Start: 2021-03-16 | End: 2021-03-16 | Stop reason: HOSPADM

## 2021-03-16 RX ORDER — IODIXANOL 320 MG/ML
100 INJECTION, SOLUTION INTRAVASCULAR ONCE
Status: COMPLETED | OUTPATIENT
Start: 2021-03-16 | End: 2021-03-16

## 2021-03-16 RX ORDER — MORPHINE SULFATE 2 MG/ML
2 INJECTION, SOLUTION INTRAMUSCULAR; INTRAVENOUS
Status: COMPLETED | OUTPATIENT
Start: 2021-03-16 | End: 2021-03-16

## 2021-03-16 RX ADMIN — SODIUM CHLORIDE 1000 ML: 9 INJECTION, SOLUTION INTRAVENOUS at 11:15

## 2021-03-16 RX ADMIN — IODIXANOL 100 ML: 320 INJECTION, SOLUTION INTRAVASCULAR at 12:21

## 2021-03-16 RX ADMIN — MORPHINE SULFATE 2 MG: 2 INJECTION, SOLUTION INTRAMUSCULAR; INTRAVENOUS at 11:15

## 2021-03-16 RX ADMIN — NITROGLYCERIN 0.4 MG: 0.4 TABLET SUBLINGUAL at 11:16

## 2021-03-16 ASSESSMENT — ENCOUNTER SYMPTOMS
CHILLS: 0
LIGHT-HEADEDNESS: 0
PALPITATIONS: 0
ABDOMINAL PAIN: 0
DYSURIA: 0
HEADACHES: 0
CHEST TIGHTNESS: 1
FEVER: 0

## 2021-03-16 ASSESSMENT — MIFFLIN-ST. JEOR
SCORE: 1392.72
SCORE: 1390.9

## 2021-03-16 NOTE — ED PROVIDER NOTES
"  History     Chief Complaint   Patient presents with     Chest Pain     HPI  Ankita Day is a 66 year old female with history of chronic stable angina, ischemic heart disease, PE, CKD who presents the emergency department with recurrent episode of SOB, Chest and left arm pain starting this morning when waking up at 0300.   Discussed her history with her, history of an angiogram 4 years ago.  Medical management determined at that time and she said they told her at that time to \"do not keep going in for recurrent chest pains \"after that she had been doing better.  In the last 6 to 8 months she has been having more symptoms again.  Patient sees cardiology nurse practitioner Mohsen and Dr. Gramajo and is well-established with them.  Patient took a nitro at home and is feeling mostly better now.  Still 1-2 out of 10 chest pain.  Does have some shortness of breath, she states that this feels somewhat like her history of PE but not as severe.  Allergies:  Allergies   Allergen Reactions     Atorvastatin Muscle Pain (Myalgia)     Tiotropium Bromide [Tiotropium] Rash     Ezetimibe Muscle Pain (Myalgia)     Latex Rash     Niacin      Other reaction(s): Flushing     No Clinical Screening - See Comments Itching, Rash and Blisters     Metals and plastics       Tape [Adhesive Tape] Rash       Problem List:    Patient Active Problem List    Diagnosis Date Noted     Chronic stable angina (H) 02/05/2020     Priority: Medium     Chronic ischemic heart disease 02/05/2020     Priority: Medium     Acute pulmonary embolism without acute cor pulmonale (H) 01/02/2020     Priority: Medium     CKD (chronic kidney disease) stage 3, GFR 30-59 ml/min 06/19/2019     Priority: Medium     Chronic anxiety 01/22/2018     Priority: Medium     Collagenous colitis 01/22/2018     Priority: Medium     Overview:   Possible Dx 2007       Major depressive disorder, recurrent episode, severe (H) 01/22/2018     Priority: Medium     Essential hypertension " 01/22/2018     Priority: Medium     Mixed hyperlipidemia 01/22/2018     Priority: Medium     Osteoarthritis 01/22/2018     Priority: Medium     Panic attack 01/22/2018     Priority: Medium     Status post coronary angiogram 09/15/2017     Priority: Medium     History of tobacco abuse 08/10/2017     Priority: Medium     Presence of stent in coronary artery in patient with coronary artery disease 08/10/2017     Priority: Medium     History of coronary artery bypass graft x 3 08/10/2017     Priority: Medium     Noncompliance with CPAP treatment 10/21/2016     Priority: Medium     Intractable chronic common migraine without aura 10/21/2016     Priority: Medium     H/O multiple concussions 10/21/2016     Priority: Medium     History of Clostridium difficile 08/19/2016     Priority: Medium     Iron deficiency anemia 07/26/2016     Priority: Medium     Chest wall pain, chronic 11/04/2015     Priority: Medium     Controlled substance agreement updated 10/9/2020 01/28/2014     Priority: Medium     Overview:        Central sleep apnea 10/14/2013     Priority: Medium     Myofascial pain 10/14/2013     Priority: Medium     Vitamin D deficiency 05/06/2013     Priority: Medium     Subclavian artery stenosis, left (H) 03/31/2013     Priority: Medium     Overview:   S/p prox left SCA stent 4/1/2013       Lumbar facet arthropathy 01/02/2013     Priority: Medium     Nonspecific abnormal results of pulmonary system function study 12/21/2011     Priority: Medium     Slow transit constipation 11/29/2011     Priority: Medium     Gastric ulcer with hemorrhage 10/03/2011     Priority: Medium     Family history of malignant neoplasm of gastrointestinal tract 09/26/2011     Priority: Medium     Nodular degeneration of cornea 09/26/2011     Priority: Medium     Bilateral low back pain without sciatica 05/31/2011     Priority: Medium     Chronic pain disorder 12/01/2010     Priority: Medium     COPD (chronic obstructive pulmonary disease) (H)  06/15/2007     Priority: Medium     Overview:   Low DLCO, normal spirometry on 12/15/11       Peptic ulcer 06/15/2007     Priority: Medium     Postsurgical aortocoronary bypass status 02/12/2003     Priority: Medium     Coronary atherosclerosis 09/12/2002     Priority: Medium     Overview:   Multiple Angigrams prior to 2007. Also:  6/5/2007: Angiogram: TAXUS DELONTE to ostial circumflux, instent restenosis  5/23/2008: Left Main 30% diseased, LAD stents patent, Left circ stent patent, Chronic occluded right internal mammary artery graft to distal RCA, native RCA has 30% stenosis; NO intevention  9/19/2012 CT Angiogram: Patent LIMA to LAD, patent LAD stents, moderate proximal circumflex disease, patent RCA , occluded right internal mammary artery graft to distal RCA.  9/20/2012: Angiogram: Stent to Left Main, ostial LAD, and PTCA of ostial left circumflex          Past Medical History:    Past Medical History:   Diagnosis Date     Acute ischemic heart disease (H)      Acute myocardial infarction (H)      Anxiety disorder      Atherosclerotic heart disease of native coronary artery without angina pectoris      Bilateral carpal tunnel syndrome      Cervicalgia      Chest pain      Chronic gastric ulcer without hemorrhage or perforation      Chronic ischemic heart disease      Chronic obstructive pulmonary disease (H)      Chronic or unspecified gastric ulcer with hemorrhage      Chronic pain syndrome      Constipation      Coronary angioplasty status      Coronary angioplasty status      Coronary angioplasty status      Dorsalgia      Encounter for other administrative examinations      Encounter for screening for cardiovascular disorders      Enterocolitis due to Clostridium difficile      Essential (primary) hypertension      Hyperlipidemia      Major depressive disorder, single episode      Migraine without status migrainosus, not intractable      Nodular corneal degeneration      Noninfective gastroenteritis and colitis       Noninfective gastroenteritis and colitis      Osteoarthritis      Other chest pain      Pain in knee      Pain in right shoulder      Panic disorder without agoraphobia      Peptic ulcer without hemorrhage or perforation      Peripheral vascular disease (H)      Personal history of diseases of the blood and blood-forming organs and certain disorders involving the immune mechanism (CODE)      Personal history of nicotine dependence      Personal history of other medical treatment (CODE)      Presence of aortocoronary bypass graft      Primary central sleep apnea      Sepsis due to Escherichia coli (E. coli) (H)      Stricture of artery (H)      Thoracic, thoracolumbar and lumbosacral intervertebral disc disorder      Uncomplicated opioid abuse (H)      Vitamin D deficiency        Past Surgical History:    Past Surgical History:   Procedure Laterality Date     ANGIOPLASTY      9/12/02,with triple stenting     APPENDECTOMY OPEN      No Comments Provided     ARTHROSCOPY KNEE      left     ARTHROSCOPY SHOULDER Right 05/12/2017    labral tear, rotator cuff tear and some subacromial decompression      BYPASS GRAFT ARTERY CORONARY      12/13/02,Triple bypass, left internal mammary  to LAD, right internal mammary to right coronary artery, saphenous to obtuse marginal of the left circumflex.     COLONOSCOPY      2011,Dr Bowman benign polyps     COLONOSCOPY  10/03/2011    2011,benign polyps, Dr. Bowman     COLONOSCOPY  08/08/2016 8/8/16,normal, Dr Bowman     ELBOW SURGERY      baby,birth malachi removed from right arm     EMBOLECTOMY UPPER EXTREMITY  04/02/2013    brachial artery pseudoaneurysm after stenting     ESOPHAGOSCOPY, GASTROSCOPY, DUODENOSCOPY (EGD), COMBINED      2011,EGD Dr Bowman with pyloric ulcer     ESOPHAGOSCOPY, GASTROSCOPY, DUODENOSCOPY (EGD), COMBINED      2011,pyloric ulcer, Dr. Bowman     ESOPHAGOSCOPY, GASTROSCOPY, DUODENOSCOPY (EGD), COMBINED      8/8/16,mild gastritis, Dr Bowman     ESOPHAGOSCOPY,  GASTROSCOPY, DUODENOSCOPY (EGD), COMBINED      2017,Dr Bowman. Antral ulcer     ESOPHAGOSCOPY, GASTROSCOPY, DUODENOSCOPY (EGD), COMBINED  2018    Dr Bowman, healed ulcer     HYSTERECTOMY TOTAL ABDOMINAL      age 21     LAPAROSCOPIC CHOLECYSTECTOMY      2006     OSTEOTOMY FEMUR DISTAL      x3, right knee     OSTEOTOMY FEMUR DISTAL      2000,left knee  ligament surgery     OTHER SURGICAL HISTORY      1/10/2003,,PTCA     OTHER SURGICAL HISTORY      2012,,PTCA,DELONTE in LAD and left main     OTHER SURGICAL HISTORY      2013,,PTCA,L subclavian stenosis     SALPINGO-OOPHORECTOMY BILATERAL      age 27,Bilateral salpingo-oophorectomy     TONSILLECTOMY, ADENOIDECTOMY, COMBINED      childhood       Family History:    Family History   Problem Relation Age of Onset     Heart Disease Father         Heart Disease,Heart condition/Significant for atherosclerotic cardiovascular disease, but non premature.     Colon Cancer Father         Cancer-colon, of colon cancer     Cancer Father         Cancer,mets to liver, secondary to colon cancer     Heart Disease Mother         Heart Disease     Cancer Other         Cancer,Multiple Myeloma     Heart Disease Other         Heart Disease,Ischemic Heart Disease     Colon Cancer Other         Cancer-colon,Malignant neoplasms     Cancer Sister         Cancer,multiple myeloma     Other - See Comments Son         gallstones       Social History:  Marital Status:   [2]  Social History     Tobacco Use     Smoking status: Former Smoker     Packs/day: 0.25     Years: 35.00     Pack years: 8.75     Types: Cigarettes     Quit date: 2/15/2017     Years since quittin.0     Smokeless tobacco: Never Used     Tobacco comment: Smokes 1-2 packs every 6 months for the past 16 years. Used to smoke 1 pack/day before that.    Substance Use Topics     Alcohol use: Not Currently     Alcohol/week: 0.0 standard drinks     Drug use: No        Medications:    albuterol  "(PROAIR HFA/PROVENTIL HFA/VENTOLIN HFA) 108 (90 Base) MCG/ACT inhaler  amLODIPine (NORVASC) 10 MG tablet  busPIRone (BUSPAR) 15 MG tablet  clonazePAM (KLONOPIN) 1 MG tablet  clopidogrel (PLAVIX) 75 MG tablet  cyanocobalamin (VITAMIN B-12) 1000 MCG tablet  HYDROcodone-acetaminophen (NORCO)  MG per tablet  lisinopril (ZESTRIL) 20 MG tablet  metoclopramide (REGLAN) 10 MG tablet  metoprolol succinate ER (TOPROL-XL) 25 MG 24 hr tablet  nitroGLYcerin (NITROLINGUAL) 0.4 MG/SPRAY spray  omeprazole (PRILOSEC) 20 MG DR capsule  pregabalin (LYRICA) 75 MG capsule  ranolazine (RANEXA) 500 MG 12 hr tablet  rivaroxaban ANTICOAGULANT (XARELTO) 15 MG TABS tablet  rosuvastatin (CRESTOR) 10 MG tablet  sucralfate (CARAFATE) 1 GM tablet  VITAMIN D, CHOLECALCIFEROL, PO  vortioxetine (TRINTELLIX) 20 MG tablet  Diclofenac Sodium 1.5 % SOLN  HYDROcodone-acetaminophen (NORCO)  MG per tablet  naloxone (NARCAN) 4 MG/0.1ML nasal spray  ondansetron (ZOFRAN) 4 MG tablet          Review of Systems   Constitutional: Negative for chills and fever.   HENT: Negative for congestion.    Respiratory: Positive for chest tightness.    Cardiovascular: Negative for palpitations and leg swelling.   Gastrointestinal: Negative for abdominal pain.   Genitourinary: Negative for dysuria.   Neurological: Negative for light-headedness and headaches.       Physical Exam   BP: 113/68  Pulse: 81  Temp: 97.5  F (36.4  C)  Resp: 16  Height: 167.6 cm (5' 6\")  Weight: 84.1 kg (185 lb 6.4 oz)  SpO2: 96 %      Physical Exam  Vitals signs and nursing note reviewed.   Cardiovascular:      Heart sounds: Normal heart sounds.   Pulmonary:      Effort: Pulmonary effort is normal.   Musculoskeletal:      Right lower leg: No edema.      Left lower leg: No edema.   Neurological:      Mental Status: She is alert.         ED Course        Procedures  No significant change from baseline EKG.          Results for orders placed or performed during the hospital encounter of " 03/16/21 (from the past 24 hour(s))   CBC with platelets differential   Result Value Ref Range    WBC 5.3 4.0 - 11.0 10e9/L    RBC Count 4.15 3.8 - 5.2 10e12/L    Hemoglobin 12.2 11.7 - 15.7 g/dL    Hematocrit 35.7 35.0 - 47.0 %    MCV 86 78 - 100 fl    MCH 29.4 26.5 - 33.0 pg    MCHC 34.2 31.5 - 36.5 g/dL    RDW 13.2 10.0 - 15.0 %    Platelet Count 231 150 - 450 10e9/L    Diff Method Automated Method     % Neutrophils 56.5 %    % Lymphocytes 30.5 %    % Monocytes 11.1 %    % Eosinophils 1.1 %    % Basophils 0.6 %    % Immature Granulocytes 0.2 %    Absolute Neutrophil 3.0 1.6 - 8.3 10e9/L    Absolute Lymphocytes 1.6 0.8 - 5.3 10e9/L    Absolute Monocytes 0.6 0.0 - 1.3 10e9/L    Absolute Eosinophils 0.1 0.0 - 0.7 10e9/L    Absolute Basophils 0.0 0.0 - 0.2 10e9/L    Abs Immature Granulocytes 0.0 0 - 0.4 10e9/L   Comprehensive metabolic panel   Result Value Ref Range    Sodium 139 134 - 144 mmol/L    Potassium 4.0 3.5 - 5.1 mmol/L    Chloride 104 98 - 107 mmol/L    Carbon Dioxide 26 21 - 31 mmol/L    Anion Gap 9 3 - 14 mmol/L    Glucose 87 70 - 105 mg/dL    Urea Nitrogen 22 7 - 25 mg/dL    Creatinine 0.98 0.60 - 1.20 mg/dL    GFR Estimate 57 (L) >60 mL/min/[1.73_m2]    GFR Estimate If Black 69 >60 mL/min/[1.73_m2]    Calcium 9.7 8.6 - 10.3 mg/dL    Bilirubin Total 0.5 0.3 - 1.0 mg/dL    Albumin 4.5 3.5 - 5.7 g/dL    Protein Total 7.3 6.4 - 8.9 g/dL    Alkaline Phosphatase 48 34 - 104 U/L    ALT 12 7 - 52 U/L    AST 18 13 - 39 U/L   Troponin GH   Result Value Ref Range    Troponin <2.3 <34.0 pg/mL   CT Chest Pulmonary Embolism w Contrast    Narrative    PROCEDURE:  CT CHEST PULMONARY EMBOLISM W CONTRAST.    HISTORY:  Evaluate for pulmonary embolism.  PE suspected, high prob    TECHNIQUE:  Initial pre-contrast  and localizer images were  obtained.  Contrast enhanced helical CT pulmonary angiography was then  performed.  Routine transaxial and post-processed (multiplanar and/or  MIP) reformations were  obtained.    COMPARISON:  4/16/2020    MEDS/CONTRAST: visipaque 320 100 ml    PULMONARY CTA FINDINGS:  This is a diagnostic quality helical CT  pulmonary angiogram.  There is no acute pulmonary embolism to the  first subsegmental pulmonary artery level.    OTHER FINDINGS:      Changes of prior CABG are seen.. The heart is enlarged and there are  atherosclerotic calcifications of the coronary arteries.  There is no  pericardial or pleural effusion. The thoracic aorta images within  normal limits in size. The main pulmonary artery is borderline  dilated, which can be seen in chronic pulmonary hypertension.  Borderline right hilar nodes are unchanged. No progressive thoracic  adenopathy is identified.    Limited views of the upper abdomen reveal no adrenal mass or acute  process.     The pleura is without thickening or effusions. The central airways are  unremarkable. Mild subpleural atelectasis is seen. A 3 mm nodule in  the right upper lobe on image 65 is unchanged when compared back to  1/6/2020 and 5/13/2019, most likely benign.    No suspicious osseous lesion is identified.      Impression    IMPRESSION:    No acute pulmonary emboli to the subsegmental level.    CARRIE OLVERA MD   Troponin GH (now)   Result Value Ref Range    Troponin <2.3 <34.0 pg/mL       Medications   0.9% sodium chloride BOLUS (1,000 mLs Intravenous New Bag 3/16/21 1115)     Followed by   sodium chloride 0.9% infusion (has no administration in time range)   nitroGLYcerin (NITROSTAT) sublingual tablet 0.4 mg (0.4 mg Sublingual Given 3/16/21 1116)   morphine (PF) injection 2 mg (2 mg Intravenous Given 3/16/21 1115)   iodixanol (VISIPAQUE 320) injection 100 mL (100 mLs Intravenous Given 3/16/21 1221)       Assessments & Plan (with Medical Decision Making)     I have reviewed the nursing notes.    I have reviewed the findings,       HEART Score  Background  Calculates the overall risk of adverse event in patient's presenting with chest pain.   Based on 5 criteria (each assigned 0-2 points) including suspiciousness of history, EKG, age, risk factors and troponin.    Data  66 year old female  has Status post coronary angiogram; Bilateral low back pain without sciatica; Central sleep apnea; Chest wall pain, chronic; Chronic anxiety; Chronic pain disorder; Collagenous colitis; COPD (chronic obstructive pulmonary disease) (H); Coronary atherosclerosis; Major depressive disorder, recurrent episode, severe (H); Essential hypertension; Lumbar facet arthropathy; Gastric ulcer with hemorrhage; History of Clostridium difficile; History of tobacco abuse; Peptic ulcer; Iron deficiency anemia; Subclavian artery stenosis, left (H); Mixed hyperlipidemia; Myofascial pain; Family history of malignant neoplasm of gastrointestinal tract; Nodular degeneration of cornea; Osteoarthritis; Controlled substance agreement updated 10/9/2020; Panic attack; Postsurgical aortocoronary bypass status; Presence of stent in coronary artery in patient with coronary artery disease; Nonspecific abnormal results of pulmonary system function study; History of coronary artery bypass graft x 3; Slow transit constipation; Vitamin D deficiency; Noncompliance with CPAP treatment; Intractable chronic common migraine without aura; H/O multiple concussions; CKD (chronic kidney disease) stage 3, GFR 30-59 ml/min; Acute pulmonary embolism without acute cor pulmonale (H); Chronic stable angina (H); and Chronic ischemic heart disease on their problem list.   reports that she quit smoking about 4 years ago. Her smoking use included cigarettes. She has a 8.75 pack-year smoking history. She has never used smokeless tobacco.  family history includes Cancer in her father, sister, and another family member; Colon Cancer in her father and another family member; Heart Disease in her father, mother, and another family member; Other - See Comments in her son.  Lab Results   Component Value Date    TROPI <0.030  05/13/2019     Criteria   0-2 points for each of 5 items (maximum of 10 points):  Score 1- History moderately suspicious for coronary syndrome  Score 1- EKG with Non-specific repolarization disturbance  Score 2- Age 65 years or older  Score 2- Three or more risk factors for or history of atherosclerotic disease  Score 0- Within normal limits for troponin levels  Interpretation  Risk of adverse outcome  Heart Score: 6  Total Score 4-6- Adverse Outcome Risk 20.3% - Supports admission with standard rule-out management -serial troponins and stress testing    Patient does have high risk heart score.  Discussed transfer to cardiology and/or observation in the hospital as options.  Patient feels better and prefers not to go that route.  She feels fine following up as an outpatient with cardiology.  We discussed the risk and benefits of that and patient is agreeable.  She is to return immediately with any change in pattern of symptoms or escalating chest pain.    Chest pain: Differential diagnosis includes but not limited to ACS, unstable angina, aortic emergency, pericarditis, pneumonia, PE.  Reassuring laboratory work-up and imaging.  New Prescriptions    No medications on file       Final diagnoses:   Atypical chest pain       3/16/2021   [facility]  03/16/21 0110

## 2021-03-16 NOTE — PROGRESS NOTES
IV Contrast- Discharge Instructions After Your CT Scan      The IV contrast you received today will be filtered from your bloodstream by your kidneys during the next 24 hours and pass from the body in urine.  You will not be aware of this process and your urine will not change in color.  To help this process you should drink at least 4 additional glasses of water or juice today.  This reduces stress on your kidneys.    Most contrast reactions are immediate.  Should you develop symptoms of concern after discharge, contact the department at the number below.  After hours you should contact your personal physician.  If you develop breathing distress or wheezing, call 911.      1.  Has the patient had a previous reaction to IV contrast? no    2.  Does the patient have kidney disease? Yes, GFR 57 for visi  3.  Is the patient on dialysis? no    If YES to any of these questions, exam will be reviewed with a Radiologist before administering contrast.    GFR Estimate   Date Value Ref Range Status   03/16/2021 57 (L) >60 mL/min/[1.73_m2] Final     GFR Estimate If Black   Date Value Ref Range Status   03/16/2021 69 >60 mL/min/[1.73_m2] Final

## 2021-03-18 DIAGNOSIS — F33.1 MODERATE EPISODE OF RECURRENT MAJOR DEPRESSIVE DISORDER (H): Primary | ICD-10-CM

## 2021-03-18 LAB — INTERPRETATION ECG - MUSE: NORMAL

## 2021-03-19 RX ORDER — VORTIOXETINE 20 MG/1
TABLET, FILM COATED ORAL
Qty: 30 TABLET | Refills: 0 | Status: SHIPPED | OUTPATIENT
Start: 2021-03-19 | End: 2021-03-19

## 2021-03-19 NOTE — TELEPHONE ENCOUNTER
Called and spoke to Patient after verifying last name and date of birth. Pt notified of limited refill and is requesting consideration of 90-day refill, due to cost. Routing to [facility] for review. Kathleen Puckett RN .............. 3/19/2021  9:30 AM

## 2021-04-03 ENCOUNTER — HEALTH MAINTENANCE LETTER (OUTPATIENT)
Age: 66
End: 2021-04-03

## 2021-04-05 DIAGNOSIS — I10 ESSENTIAL HYPERTENSION: Primary | ICD-10-CM

## 2021-04-07 ENCOUNTER — OFFICE VISIT (OUTPATIENT)
Dept: FAMILY MEDICINE | Facility: OTHER | Age: 66
End: 2021-04-07
Attending: FAMILY MEDICINE
Payer: MEDICARE

## 2021-04-07 VITALS
TEMPERATURE: 96.2 F | OXYGEN SATURATION: 98 % | SYSTOLIC BLOOD PRESSURE: 122 MMHG | RESPIRATION RATE: 18 BRPM | BODY MASS INDEX: 30.22 KG/M2 | DIASTOLIC BLOOD PRESSURE: 80 MMHG | WEIGHT: 188 LBS | HEIGHT: 66 IN | HEART RATE: 78 BPM

## 2021-04-07 DIAGNOSIS — F11.90 CHRONIC, CONTINUOUS USE OF OPIOIDS: ICD-10-CM

## 2021-04-07 DIAGNOSIS — G89.29 CHEST WALL PAIN, CHRONIC: ICD-10-CM

## 2021-04-07 DIAGNOSIS — G44.219 EPISODIC TENSION-TYPE HEADACHE, NOT INTRACTABLE: Primary | ICD-10-CM

## 2021-04-07 DIAGNOSIS — F33.1 MODERATE EPISODE OF RECURRENT MAJOR DEPRESSIVE DISORDER (H): ICD-10-CM

## 2021-04-07 DIAGNOSIS — Z79.899 CONTROLLED SUBSTANCE AGREEMENT SIGNED: ICD-10-CM

## 2021-04-07 DIAGNOSIS — G43.719 INTRACTABLE CHRONIC COMMON MIGRAINE WITHOUT AURA: ICD-10-CM

## 2021-04-07 DIAGNOSIS — I10 ESSENTIAL HYPERTENSION: ICD-10-CM

## 2021-04-07 DIAGNOSIS — K25.4 CHRONIC GASTRIC ULCER WITH HEMORRHAGE: ICD-10-CM

## 2021-04-07 DIAGNOSIS — R07.89 CHEST WALL PAIN, CHRONIC: ICD-10-CM

## 2021-04-07 DIAGNOSIS — N18.31 STAGE 3A CHRONIC KIDNEY DISEASE (H): ICD-10-CM

## 2021-04-07 DIAGNOSIS — Z86.711 HISTORY OF PULMONARY EMBOLISM: ICD-10-CM

## 2021-04-07 DIAGNOSIS — I20.89 CHRONIC STABLE ANGINA (H): ICD-10-CM

## 2021-04-07 DIAGNOSIS — F41.9 CHRONIC ANXIETY: ICD-10-CM

## 2021-04-07 DIAGNOSIS — G44.219 EPISODIC TENSION-TYPE HEADACHE, NOT INTRACTABLE: ICD-10-CM

## 2021-04-07 DIAGNOSIS — M54.50 CHRONIC BILATERAL LOW BACK PAIN WITHOUT SCIATICA: ICD-10-CM

## 2021-04-07 DIAGNOSIS — G89.29 CHRONIC BILATERAL LOW BACK PAIN WITHOUT SCIATICA: ICD-10-CM

## 2021-04-07 DIAGNOSIS — G89.4 CHRONIC PAIN DISORDER: Primary | ICD-10-CM

## 2021-04-07 PROCEDURE — 99214 OFFICE O/P EST MOD 30 MIN: CPT | Performed by: FAMILY MEDICINE

## 2021-04-07 PROCEDURE — G0463 HOSPITAL OUTPT CLINIC VISIT: HCPCS

## 2021-04-07 RX ORDER — AMLODIPINE BESYLATE 10 MG/1
10 TABLET ORAL DAILY
Qty: 90 TABLET | Refills: 3 | Status: SHIPPED | OUTPATIENT
Start: 2021-04-07 | End: 2022-03-28

## 2021-04-07 RX ORDER — AMLODIPINE BESYLATE 10 MG/1
10 TABLET ORAL DAILY
Qty: 30 TABLET | Refills: 0 | Status: CANCELLED | OUTPATIENT
Start: 2021-04-07

## 2021-04-07 RX ORDER — HYDROCODONE BITARTRATE AND ACETAMINOPHEN 10; 325 MG/1; MG/1
1-2 TABLET ORAL EVERY 4 HOURS PRN
Qty: 180 TABLET | Refills: 0 | Status: SHIPPED | OUTPATIENT
Start: 2021-04-07 | End: 2021-06-02

## 2021-04-07 RX ORDER — PREGABALIN 75 MG/1
75 CAPSULE ORAL 3 TIMES DAILY
Qty: 90 CAPSULE | Refills: 5 | Status: SHIPPED | OUTPATIENT
Start: 2021-04-07 | End: 2021-07-19

## 2021-04-07 RX ORDER — HYDROCODONE BITARTRATE AND ACETAMINOPHEN 10; 325 MG/1; MG/1
1-2 TABLET ORAL EVERY 4 HOURS PRN
Qty: 180 TABLET | Refills: 0 | Status: SHIPPED | OUTPATIENT
Start: 2021-05-07 | End: 2021-06-02

## 2021-04-07 RX ORDER — CLONAZEPAM 1 MG/1
1 TABLET ORAL 3 TIMES DAILY PRN
Qty: 225 TABLET | Refills: 0 | Status: SHIPPED | OUTPATIENT
Start: 2021-04-21 | End: 2021-07-16

## 2021-04-07 RX ORDER — METOCLOPRAMIDE 10 MG/1
10 TABLET ORAL EVERY 6 HOURS PRN
Qty: 30 TABLET | Refills: 1 | Status: CANCELLED | OUTPATIENT
Start: 2021-04-07

## 2021-04-07 ASSESSMENT — ANXIETY QUESTIONNAIRES
1. FEELING NERVOUS, ANXIOUS, OR ON EDGE: NOT AT ALL
5. BEING SO RESTLESS THAT IT IS HARD TO SIT STILL: NOT AT ALL
7. FEELING AFRAID AS IF SOMETHING AWFUL MIGHT HAPPEN: NOT AT ALL
6. BECOMING EASILY ANNOYED OR IRRITABLE: NOT AT ALL
IF YOU CHECKED OFF ANY PROBLEMS ON THIS QUESTIONNAIRE, HOW DIFFICULT HAVE THESE PROBLEMS MADE IT FOR YOU TO DO YOUR WORK, TAKE CARE OF THINGS AT HOME, OR GET ALONG WITH OTHER PEOPLE: NOT DIFFICULT AT ALL
GAD7 TOTAL SCORE: 0
2. NOT BEING ABLE TO STOP OR CONTROL WORRYING: NOT AT ALL
3. WORRYING TOO MUCH ABOUT DIFFERENT THINGS: NOT AT ALL

## 2021-04-07 ASSESSMENT — PAIN SCALES - GENERAL: PAINLEVEL: EXTREME PAIN (8)

## 2021-04-07 ASSESSMENT — PATIENT HEALTH QUESTIONNAIRE - PHQ9
5. POOR APPETITE OR OVEREATING: NOT AT ALL
SUM OF ALL RESPONSES TO PHQ QUESTIONS 1-9: 0

## 2021-04-07 ASSESSMENT — MIFFLIN-ST. JEOR: SCORE: 1396.57

## 2021-04-07 NOTE — TELEPHONE ENCOUNTER
New medications from outside sources are available for reconciliation.   Changes Requested     Name from pharmacy: TIZANIDINE HCL 4 MG TABLET         Will file in chart as: tiZANidine (ZANAFLEX) 4 MG tablet    Sig: Take 1 tablet (4 mg) by mouth daily as needed for muscle spasms    Disp:  90 tablet    Refills:  1    Start: 4/7/2021    Class: E-Prescribe    For: Episodic tension-type headache, not intractable    Last ordered: Today by Ramirez Cano MD     Rx #: [id number]    Pharmacy comment: REQUEST FOR 90 DAYS PRESCRIPTION.       [facility] [id number] [place] [address]        Patient was seen today in office visit. Routing to PCP.     Kathleen Puckett RN .............. 4/7/2021  2:42 PM

## 2021-04-07 NOTE — TELEPHONE ENCOUNTER
[id number] in [employer] [facility] [place] sent Rx request for the following:      Requested Prescriptions   Pending Prescriptions Disp Refills   amLODIPine (NORVASC) 10 MG tablet [Pharmacy Med Name: AMLODIPINE BESYLATE 10 MG TAB] 30 tablet 0    Sig: TAKE 1 TABLET (10 MG) BY MOUTH DAILY DX. CODE: I10.   Last Prescription Date:   3/9/21  Last Fill Qty/Refills:         30, R-0    Last Office Visit:              2/1/21  Future Office visit:             Apr 07, 2021 10:20 AM  SHORT with Ramirez Cano MD  [facility] ([facility] ) [address]  [place] [id number] [phone]   Routing refill request to provider for review/approval because:   Calcium Channel Blockers Protocol  Failed - 4/7/2021  9:15 AM       Failed - Blood pressure under 140/90 in past 12 months    BP Readings from Last 3 Encounters:   03/16/21 (!) 119/93   02/01/21 110/70   10/20/20 121/67        Routing to PCP, to address during today's appointment. Unable to complete prescription refill per RN Medication Refill Policy. Kathleen Puckett RN .............. 4/7/2021  9:16 AM

## 2021-04-07 NOTE — PROGRESS NOTES
"Nursing Notes:   Taylor Hill LPN  4/7/2021 10:44 AM  Sign at exiting of workspace  Patient presents to the clinic for medication management, last administration of [place] was today around 0830.  Contract and TOX 10-.  Declines a refill on Voltaren gel, Albuterol inhaler and Carafate at this time.      Chief Complaint   Patient presents with     Recheck Medication       Initial /80 (BP Location: Right arm, Patient Position: Sitting, Cuff Size: Adult Regular)   Pulse 78   Temp 96.2  F (35.7  C) (Temporal)   Resp 18   Ht 1.664 m (5' 5.5\")   Wt 85.3 kg (188 lb)   LMP  (LMP Unknown)   SpO2 98%   BMI 30.81 kg/m   Estimated body mass index is 30.81 kg/m  as calculated from the following:    Height as of this encounter: 1.664 m (5' 5.5\").    Weight as of this encounter: 85.3 kg (188 lb).  Medication Reconciliation: complete    Taylor Hill LPN         Assessment & Plan       ICD-10-CM    1. Chronic pain disorder  G89.4 HYDROcodone-acetaminophen (NORCO)  MG per tablet     HYDROcodone-acetaminophen (NORCO)  MG per tablet     pregabalin (LYRICA) 75 MG capsule   2. Chronic, continuous use of opioids  F11.90 naloxone (NARCAN) 4 MG/0.1ML nasal spray   3. Controlled substance agreement updated 10/9/2020  Z79.899 HYDROcodone-acetaminophen (NORCO)  MG per tablet     HYDROcodone-acetaminophen (NORCO)  MG per tablet     clonazePAM (KLONOPIN) 1 MG tablet   4. Chronic bilateral low back pain without sciatica  M54.5 HYDROcodone-acetaminophen (NORCO)  MG per tablet    G89.29 HYDROcodone-acetaminophen (NORCO)  MG per tablet     pregabalin (LYRICA) 75 MG capsule   5. Chest wall pain, chronic  R07.89 HYDROcodone-acetaminophen (NORCO)  MG per tablet    G89.29 HYDROcodone-acetaminophen (NORCO)  MG per tablet     pregabalin (LYRICA) 75 MG capsule   6. Chronic anxiety  F41.9 clonazePAM (KLONOPIN) 1 MG tablet   7. Moderate episode of recurrent major depressive disorder " (H)  F33.1    8. Chronic stable angina (H)  I20.8    9. History of pulmonary embolism  Z86.711    10. Essential hypertension  I10    11. Stage 3a chronic kidney disease  N18.31    12. Chronic gastric ulcer with hemorrhage  K25.4    13. Intractable chronic common migraine without aura  G43.719 pregabalin (LYRICA) 75 MG capsule   14. Episodic tension-type headache, not intractable  G44.219 DISCONTINUED: tiZANidine (ZANAFLEX) 4 MG tablet       Reviewed ED note.  Reviewed labs from ED visit.  She has a cardiology appointment next week.  Defer any necessary evaluation such as stress testing to cardiology.    Patient interested in lumbar PARISH.  We discussed that she would need to hold Xarelto and potentially the clopidogrel for short time.  There is no way she should hold medications right now when we have concerns about her chest pain.  Need to defer lumbar PARISH at this time.  Potentially Xarelto could be held for 2 days, clopidogrel could be held for 5 days and she could take aspirin instead of clopidogrel temporarily until the injection is performed and then return to Xarelto and clopidogrel.  Discussed patient's desire for PARISH with cardiology, Juanita Connolly NP.    PDMP Review       Value Time User    State PDMP site checked  Yes 4/7/2021 11:07 AM Ramirez Cano MD         Urine drug monitoring Oct 2020.   Refilled hydrocodone for 60 morphine milligram equivalents daily. 2 month supply.   Aware of risk for sedation and overdose with concurrent benzos and opioids. Has reduced dose of both over time.     Refilled clonazepam for 90 days.  She has been using 2-1/2/day on average.  Prior to Covid, had worked down to 2 clonazepam per day.  Several years ago she was on 4 mg daily.  She will try and reduce down to 2 mg total per day over the next couple months.    Metoclopramide is causing some slight excess akathisia effects.  Discontinue.  She is denying migraine symptoms.  Having more tension headache symptoms.  Tried  "tizanidine to see if this helps with headaches, only using rarely.      BMI:   Estimated body mass index is 30.81 kg/m  as calculated from the following:    Height as of this encounter: 1.664 m (5' 5.5\").    Weight as of this encounter: 85.3 kg (188 lb).   Weight management plan: Discussed healthy diet and exercise guidelines      Follow up 2 months    Ramirez Cano MD     [facility]      SUBJECTIVE:  66 year old female with CAD, history of PE presents to follow up on chronic pain, anxiety, headaches.    Chronic back and chest wall pain. Due for refill on hydrocodone. In 2020 changed from oxycodone to hydrocodone as she felt oxycodone was providing incomplete relief. She was on methadone before, but was sedated. Without opioids she had loss of function.  Back pain worse.  She had good benefit from epidural injection in the past, but due to PE in January 2020, unable to receive PARISH.  She is wondering if it is possible to get an PARISH now that has been over 1 year since her PE.  Unable to stand to make a meal and cannot do housework.  No numbness, tingling, or weakness in legs.    Had PE in January 2020. Remains on Plavix for CAD and Xarelto lower dose due to PE    Was in ED for chest pain 3/16. Negative troponin . Has cardiology follow up next week. Continues with intermittent chest pain.     Increased buspirone 15 mg twice daily in December to help with anxiety. She is still on clonazepam 2.5 per day on average. Now vaccinated for COVID, which helps stress some.  She will consider a reduction in clonazepam.  Trintellix has been effective for mood.    Developed anemia in early 2020, presumed gastritis from aspirin, Plavix and Xarelto use. She is no longer on aspirin. Hemoglobin was down to 11.6 on 2/5/20 and then improved over 12 in March and April 2020. Stable in October. Checked in ED in March 2021 and normal.      Has metoclopramide to use for headache, but makes her feel anxious. All over " tension headache more now.  Has seen neurology for migraines and Botox was recommended, but not covered by insurance.  She does not think that she currently is having migraines, but more tension headache.      REVIEW OF SYSTEMS:    Pertinent items are noted in HPI.    Current Outpatient Medications   Medication Sig Dispense Refill     albuterol (PROAIR HFA/PROVENTIL HFA/VENTOLIN HFA) 108 (90 Base) MCG/ACT inhaler Inhale 2 puffs into the lungs every 6 hours 2 Inhaler 3     amLODIPine (NORVASC) 10 MG tablet Take 1 tablet (10 mg) by mouth daily Dx. Code: I10. 30 tablet 0     busPIRone (BUSPAR) 15 MG tablet Take 1 tablet (15 mg) by mouth 2 times daily 180 tablet 1     clonazePAM (KLONOPIN) 1 MG tablet Take 1 tablet (1 mg) by mouth 3 times daily as needed for anxiety Max 2.5 per day = 225 per 90 days 225 tablet 0     clopidogrel (PLAVIX) 75 MG tablet Take 1 tablet (75 mg) by mouth daily 90 tablet 4     cyanocobalamin (VITAMIN B-12) 1000 MCG tablet Take 1 tablet (1,000 mcg) by mouth daily       Diclofenac Sodium 1.5 % SOLN Apply 20 drops to each hand or 40 drops to each knee up to 4 times daily as needed for arthritis pain 450 mL 3     HYDROcodone-acetaminophen (NORCO)  MG per tablet Take 1-2 tablets by mouth every 4 hours as needed for severe pain 6 per day 180 tablet 0     HYDROcodone-acetaminophen (NORCO)  MG per tablet Take 1-2 tablets by mouth every 4 hours as needed for severe pain 6 per day 180 tablet 0     lisinopril (ZESTRIL) 20 MG tablet Take 0.5 tablets (10 mg) by mouth daily 90 tablet 3     metoclopramide (REGLAN) 10 MG tablet Take 1 tablet (10 mg) by mouth every 6 hours as needed (headache) 30 tablet 1     metoprolol succinate ER (TOPROL-XL) 25 MG 24 hr tablet Take 1 tablet (25 mg) by mouth 2 times daily 180 tablet 3     naloxone (NARCAN) 4 MG/0.1ML nasal spray Spray into one nostril for opioid reversal if unresponsive. May repeat every 2-3 minutes until patient responsive or EMS arrives 1 each 1  "    nitroGLYcerin (NITROLINGUAL) 0.4 MG/SPRAY spray For chest pain spray 1 spray under tongue every 5 minutes for 3 doses. If symptoms persist 5 minutes after 1st dose call 911. 4.9 g 3     omeprazole (PRILOSEC) 20 MG DR capsule Take 1 capsule (20 mg) by mouth 2 times daily 180 capsule 3     pregabalin (LYRICA) 75 MG capsule TAKE 1 CAPSULE (75 MG) BY MOUTH 3 TIMES DAILY 90 capsule 5     ranolazine (RANEXA) 500 MG 12 hr tablet Take 1 tablet (500 mg) by mouth 2 times daily 180 tablet 3     rivaroxaban ANTICOAGULANT (XARELTO) 15 MG TABS tablet Take 1 tablet (15 mg) by mouth daily (with dinner) 90 tablet 3     rosuvastatin (CRESTOR) 10 MG tablet Take 2 tablets (20 mg) by mouth At Bedtime 180 tablet 3     sucralfate (CARAFATE) 1 GM tablet TAKE 1 TABLET (1 G) BY MOUTH 4 TIMES DAILY AS NEEDED FOR NAUSEA (OR DARK STOOLS) 360 tablet 0     VITAMIN D, CHOLECALCIFEROL, PO Take 5,000 Units by mouth daily       vortioxetine (TRINTELLIX) 20 MG tablet Take 1 tablet (20 mg) by mouth daily 90 tablet 0     Allergies   Allergen Reactions     Atorvastatin Muscle Pain (Myalgia)     Tiotropium Bromide [Tiotropium] Rash     Ezetimibe Muscle Pain (Myalgia)     Latex Rash     Niacin      Other reaction(s): Flushing     No Clinical Screening - See Comments Itching, Rash and Blisters     Metals and plastics       Tape [Adhesive Tape] Rash       OBJECTIVE:  /80 (BP Location: Right arm, Patient Position: Sitting, Cuff Size: Adult Regular)   Pulse 78   Temp 96.2  F (35.7  C) (Temporal)   Resp 18   Ht 1.664 m (5' 5.5\")   Wt 85.3 kg (188 lb)   LMP  (LMP Unknown)   SpO2 98%   BMI 30.81 kg/m      EXAM:  General Appearance: Alert. No acute distress  Chest/Respiratory Exam: Clear to auscultation bilaterally  Cardiovascular Exam: Regular rate and rhythm. S1, S2, no murmur, gallop, or rubs.  Musculoskeletal: Tender in bilateral lumbar muscles  Neurological: Strength out of 5 bilateral lower extremities  Extremities: No lower extremity " edema.  Psychiatric: Normal affect and mentation    Recent Results (from the past 720 hour(s))   EKG 12 lead    Collection Time: 03/16/21 10:52 AM   Result Value Ref Range    Interpretation ECG Click View Image link to view waveform and result    CBC with platelets differential    Collection Time: 03/16/21 11:10 AM   Result Value Ref Range    WBC 5.3 4.0 - 11.0 10e9/L    RBC Count 4.15 3.8 - 5.2 10e12/L    Hemoglobin 12.2 11.7 - 15.7 g/dL    Hematocrit 35.7 35.0 - 47.0 %    MCV 86 78 - 100 fl    MCH 29.4 26.5 - 33.0 pg    MCHC 34.2 31.5 - 36.5 g/dL    RDW 13.2 10.0 - 15.0 %    Platelet Count 231 150 - 450 10e9/L    Diff Method Automated Method     % Neutrophils 56.5 %    % Lymphocytes 30.5 %    % Monocytes 11.1 %    % Eosinophils 1.1 %    % Basophils 0.6 %    % Immature Granulocytes 0.2 %    Absolute Neutrophil 3.0 1.6 - 8.3 10e9/L    Absolute Lymphocytes 1.6 0.8 - 5.3 10e9/L    Absolute Monocytes 0.6 0.0 - 1.3 10e9/L    Absolute Eosinophils 0.1 0.0 - 0.7 10e9/L    Absolute Basophils 0.0 0.0 - 0.2 10e9/L    Abs Immature Granulocytes 0.0 0 - 0.4 10e9/L   Comprehensive metabolic panel    Collection Time: 03/16/21 11:10 AM   Result Value Ref Range    Sodium 139 134 - 144 mmol/L    Potassium 4.0 3.5 - 5.1 mmol/L    Chloride 104 98 - 107 mmol/L    Carbon Dioxide 26 21 - 31 mmol/L    Anion Gap 9 3 - 14 mmol/L    Glucose 87 70 - 105 mg/dL    Urea Nitrogen 22 7 - 25 mg/dL    Creatinine 0.98 0.60 - 1.20 mg/dL    GFR Estimate 57 (L) >60 mL/min/[1.73_m2]    GFR Estimate If Black 69 >60 mL/min/[1.73_m2]    Calcium 9.7 8.6 - 10.3 mg/dL    Bilirubin Total 0.5 0.3 - 1.0 mg/dL    Albumin 4.5 3.5 - 5.7 g/dL    Protein Total 7.3 6.4 - 8.9 g/dL    Alkaline Phosphatase 48 34 - 104 U/L    ALT 12 7 - 52 U/L    AST 18 13 - 39 U/L   Troponin GH    Collection Time: 03/16/21 11:10 AM   Result Value Ref Range    Troponin <2.3 <34.0 pg/mL   Troponin GH (now)    Collection Time: 03/16/21 12:49 PM   Result Value Ref Range    Troponin <2.3 <34.0  pg/mL

## 2021-04-07 NOTE — NURSING NOTE
"Patient presents to the clinic for medication management, last administration of [place] was today around 0830.  Contract and TOX 10-.  Declines a refill on Voltaren gel, Albuterol inhaler and Carafate at this time.      Chief Complaint   Patient presents with     Recheck Medication       Initial /80 (BP Location: Right arm, Patient Position: Sitting, Cuff Size: Adult Regular)   Pulse 78   Temp 96.2  F (35.7  C) (Temporal)   Resp 18   Ht 1.664 m (5' 5.5\")   Wt 85.3 kg (188 lb)   LMP  (LMP Unknown)   SpO2 98%   BMI 30.81 kg/m   Estimated body mass index is 30.81 kg/m  as calculated from the following:    Height as of this encounter: 1.664 m (5' 5.5\").    Weight as of this encounter: 85.3 kg (188 lb).  Medication Reconciliation: complete    Taylor Hill LPN    "

## 2021-04-08 ASSESSMENT — ANXIETY QUESTIONNAIRES: GAD7 TOTAL SCORE: 0

## 2021-04-14 ENCOUNTER — OFFICE VISIT (OUTPATIENT)
Dept: CARDIOLOGY | Facility: OTHER | Age: 66
End: 2021-04-14
Attending: NURSE PRACTITIONER
Payer: MEDICARE

## 2021-04-14 VITALS
DIASTOLIC BLOOD PRESSURE: 76 MMHG | RESPIRATION RATE: 18 BRPM | HEART RATE: 68 BPM | BODY MASS INDEX: 30.49 KG/M2 | WEIGHT: 183 LBS | TEMPERATURE: 96.8 F | OXYGEN SATURATION: 96 % | SYSTOLIC BLOOD PRESSURE: 120 MMHG | HEIGHT: 65 IN

## 2021-04-14 DIAGNOSIS — I20.89 CHRONIC STABLE ANGINA (H): Primary | ICD-10-CM

## 2021-04-14 DIAGNOSIS — J44.9 CHRONIC OBSTRUCTIVE PULMONARY DISEASE, UNSPECIFIED COPD TYPE (H): ICD-10-CM

## 2021-04-14 DIAGNOSIS — I25.10 ASCVD (ARTERIOSCLEROTIC CARDIOVASCULAR DISEASE): ICD-10-CM

## 2021-04-14 DIAGNOSIS — R42 EPISODIC LIGHTHEADEDNESS: ICD-10-CM

## 2021-04-14 DIAGNOSIS — N18.31 STAGE 3A CHRONIC KIDNEY DISEASE (H): ICD-10-CM

## 2021-04-14 DIAGNOSIS — I26.99 ACUTE PULMONARY EMBOLISM WITHOUT ACUTE COR PULMONALE, UNSPECIFIED PULMONARY EMBOLISM TYPE (H): ICD-10-CM

## 2021-04-14 DIAGNOSIS — R10.2 PELVIC PAIN IN FEMALE: ICD-10-CM

## 2021-04-14 DIAGNOSIS — D50.9 IRON DEFICIENCY ANEMIA, UNSPECIFIED IRON DEFICIENCY ANEMIA TYPE: ICD-10-CM

## 2021-04-14 DIAGNOSIS — I10 ESSENTIAL HYPERTENSION: ICD-10-CM

## 2021-04-14 DIAGNOSIS — N30.00 ACUTE CYSTITIS WITHOUT HEMATURIA: ICD-10-CM

## 2021-04-14 DIAGNOSIS — Z95.1 HISTORY OF CORONARY ARTERY BYPASS GRAFT X 3: ICD-10-CM

## 2021-04-14 DIAGNOSIS — Z98.890 STATUS POST CORONARY ANGIOGRAM: ICD-10-CM

## 2021-04-14 LAB
ALBUMIN UR-MCNC: 10 MG/DL
APPEARANCE UR: CLEAR
BACTERIA #/AREA URNS HPF: ABNORMAL /HPF
BILIRUB UR QL STRIP: NEGATIVE
COLOR UR AUTO: YELLOW
GLUCOSE UR STRIP-MCNC: NEGATIVE MG/DL
HGB UR QL STRIP: NEGATIVE
KETONES UR STRIP-MCNC: NEGATIVE MG/DL
LEUKOCYTE ESTERASE UR QL STRIP: ABNORMAL
MUCOUS THREADS #/AREA URNS LPF: PRESENT /LPF
NITRATE UR QL: NEGATIVE
PH UR STRIP: 5.5 PH (ref 5–7)
RBC #/AREA URNS AUTO: 2 /HPF (ref 0–2)
SOURCE: ABNORMAL
SP GR UR STRIP: 1.02 (ref 1–1.03)
UROBILINOGEN UR STRIP-MCNC: NORMAL MG/DL (ref 0–2)
WBC #/AREA URNS AUTO: 30 /HPF (ref 0–5)

## 2021-04-14 PROCEDURE — G0463 HOSPITAL OUTPT CLINIC VISIT: HCPCS

## 2021-04-14 PROCEDURE — 99214 OFFICE O/P EST MOD 30 MIN: CPT | Performed by: NURSE PRACTITIONER

## 2021-04-14 PROCEDURE — 81001 URINALYSIS AUTO W/SCOPE: CPT | Mod: ZL | Performed by: NURSE PRACTITIONER

## 2021-04-14 ASSESSMENT — MIFFLIN-ST. JEOR: SCORE: 1371.57

## 2021-04-14 ASSESSMENT — PAIN SCALES - GENERAL: PAINLEVEL: MODERATE PAIN (4)

## 2021-04-14 NOTE — NURSING NOTE
"Chief Complaint   Patient presents with     Follow Up     follow up on chest pain       Initial /76 (BP Location: Right arm, Patient Position: Sitting, Cuff Size: Adult Large)   Pulse 68   Temp 96.8  F (36  C) (Tympanic)   Resp 18   Ht 1.66 m (5' 5.35\")   Wt 83 kg (183 lb)   LMP  (LMP Unknown)   SpO2 96%   BMI 30.12 kg/m   Estimated body mass index is 30.12 kg/m  as calculated from the following:    Height as of this encounter: 1.66 m (5' 5.35\").    Weight as of this encounter: 83 kg (183 lb).  Meds Reconciled: complete  Pt is not on Aspirin  Pt is on a Statin  PHQ and/or YAYA reviewed. Pt referred to PCP/MH Provider as appropriate.    Taylor Owusu LPN      "

## 2021-04-14 NOTE — PROGRESS NOTES
"[facility] HEART CARE   CARDIOLOGY PROGRESS NOTE    Ankita Day   [id number] [address] [address]      Ramirez Cano     Chief Complaint   Patient presents with     Follow Up     follow up on chest pain        HPI:   Ms. Day is a 66 year old female who presents for cardiology follow-up with recent ED visit on 3/16/2021.  Patient has a history of chronic stable angina, known ischemic heart disease, hypertension, hyperlipidemia, history of pulmonary embolism, left subclavian artery stenosis, anxiety, collagenous colitis, depression, osteoarthritis, history of tobacco use, central sleep apnea for which she is not compliant with CPAP use, iron deficiency anemia, CKD, myofascial pain, vitamin D deficiency, lumbar facet arthropathy, history of gastric ulcer with hemorrhage, COPD and peptic ulcer disease.    Patient has a known history of ischemic heart disease, she underwent  coronary angiogram and bypass angiogram on 9/15/2017 which was described as having stable disease. Mild to moderate 3 vessel CAD involving RCA, LAD and LCX with <50% stenosis at the greatest.  Occluded RAFI and SVG.  Patent LIMA.  No new obstructive lesions were identified and normal left heart cath.      She has a history of CABG on 2/12/03 x 3, history of multiple stent placements, patient reported 17 total.  Additionally, she has a history of tobacco abuse, sleep apnea, anxiety/depression, COPD, hypertension and history of left subclavian steal syndrome involving the LIMA status post stent placement.    At previous visit patient reported occasional recurrence of chest pain, not as severe as last ED visit on 10/25/19. She reported \"my breathing has been bad\", describes worsening CORTEZ. She described activity intolerance and little to no energy. Recommended repeat stress testing. NM lexiscan stress test was performed on 12/16/19 which was negative for inducible myocardial ischemia or infarction.  Left ventricular function was " normal.    Carotid US on 12/12/19 without significant stenosis, mild atherosclerotic disease present.  Abdominal ultrasound on 12/12/2018 with no abdominal aortic aneurysm identified.    Patient returned to the ED with dyspnea in early January 2020. She was identified to have small segmental and subsegmental emboli bilaterally. Repeat imaging on 1/6 with decreasing volume of pulmonary emboli compared to scan on 1/2/2020. She was initially treated with Lovanox in the ED and discharged on Xarelto oral anticoagulation which has been going well for her, no bleeding complication. She also remains on Plavix with her significant ischemic heart disease history.     She presented to the ED on 3/16/2021 with reports of chest pain, chronic stable angina.  No ACS.  EKG stable and no elevation in troponin's. Patient admits that her BP was low and it was suspected she was dehydrated, she felt better following IV fluids and reports that this likely brought on her angina.  She is currently working on maintaining good hydration.  She denies any recurrence of acute chest pain or pressure today.  No use of nitroglycerin since her recent ED visit.      PAST MEDICAL HISTORY:   Past Medical History:   Diagnosis Date     Acute ischemic heart disease (H)     06/15/2007,with PTCA and stenting of 90% osteo circumflex lesion and patent LAD graft, patent left main stent.     Acute myocardial infarction (H)     3/30/2013     Anxiety disorder     No Comments Provided     Atherosclerotic heart disease of native coronary artery without angina pectoris     -angio revealed 3 vessel dz  -4 stents placed; 2 overlapping stents placed in mLAD, 1 stent pRCA, 1 stent pCirc 1 s 9/02 -non-ST elevation MI 1/03  -angio revealed restenosis of Circ.    -PTCA and brachytherapy of pCirc -repeat angio 2/03 -no intervension -CABG x3 12/03 - Dr Sinclair  -LIMA-LAD, RAFI-RCA, SVG-OM -PCI 7/04 stent to L -CTangio 9/05   -patentent LIMA-LAD. RAFI-RCA occluded; RCA  ostio/p*     Bilateral carpal tunnel syndrome     No Comments Provided     Cervicalgia     No Comments Provided     Chest pain     12/29/2014     Chronic gastric ulcer without hemorrhage or perforation     10/03/2011,hx of GI bleed (2003)     Chronic ischemic heart disease     06/27/2012     Chronic obstructive pulmonary disease (H)     06/15/2007,low DLCO, normal spirometry     Chronic or unspecified gastric ulcer with hemorrhage     10/2003     Chronic pain syndrome     12/01/2010,chest wall, back     Constipation     11/29/2011     Coronary angioplasty status     09/12/2002,with triple stenting     Coronary angioplasty status     01/10/2003,repeat angioplasty     Coronary angioplasty status     No Comments Provided     Dorsalgia     05/31/2011     Encounter for other administrative examinations     1/28/2014     Encounter for screening for cardiovascular disorders     10/2004,Cardiolite (2004, 2005, 2006 and 2011)     Enterocolitis due to Clostridium difficile     8/19/2016     Essential (primary) hypertension     No Comments Provided     Hyperlipidemia     No Comments Provided     Major depressive disorder, single episode     Severe, hx of suicide attempt/hospitalization     Migraine without status migrainosus, not intractable     No Comments Provided     Nodular corneal degeneration     09/26/2011     Noninfective gastroenteritis and colitis     06/15/2007,history of     Noninfective gastroenteritis and colitis     Microcytic     Osteoarthritis     No Comments Provided     Other chest pain     10/13/2009,chronic     Pain in knee     05/31/2011     Pain in right shoulder     No Comments Provided     Panic disorder without agoraphobia     No Comments Provided     Peptic ulcer without hemorrhage or perforation     6/15/2007     Peripheral vascular disease (H)     No Comments Provided     Personal history of diseases of the blood and blood-forming organs and certain disorders involving the immune mechanism (CODE)      No Comments Provided     Personal history of nicotine dependence     No Comments Provided     Personal history of other medical treatment (CODE)     10/14/2013     Presence of aortocoronary bypass graft     2003     Primary central sleep apnea     10/14/2013     Sepsis due to Escherichia coli (E. coli) (H)     16,[facility]     Stricture of artery (H)     3/31/2013,S/p prox left SCA stent 2013     Thoracic, thoracolumbar and lumbosacral intervertebral disc disorder     No Comments Provided     Uncomplicated opioid abuse (H)     history of     Vitamin D deficiency     2013          FAMILY HISTORY:   Family History   Problem Relation Age of Onset     Heart Disease Father         Heart Disease,Heart condition/Significant for atherosclerotic cardiovascular disease, but non premature.     Colon Cancer Father         Cancer-colon, of colon cancer     Cancer Father         Cancer,mets to liver, secondary to colon cancer     Heart Disease Mother         Heart Disease     Cancer Other         Cancer,Multiple Myeloma     Heart Disease Other         Heart Disease,Ischemic Heart Disease     Colon Cancer Other         Cancer-colon,Malignant neoplasms     Cancer Sister         Cancer,multiple myeloma     Other - See Comments Son         gallstones          PAST SURGICAL HISTORY:   Past Surgical History:   Procedure Laterality Date     ANGIOPLASTY      02,with triple stenting     APPENDECTOMY OPEN      No Comments Provided     ARTHROSCOPY KNEE      left     ARTHROSCOPY SHOULDER Right 2017    labral tear, rotator cuff tear and some subacromial decompression      BYPASS GRAFT ARTERY CORONARY      02,Triple bypass, left internal mammary  to LAD, right internal mammary to right coronary artery, saphenous to obtuse marginal of the left circumflex.     COLONOSCOPY      ,Dr Bowman benign polyps     COLONOSCOPY  10/03/2011    2011,benign polyps, Dr. Bowman     COLONOSCOPY  2016     16,normal, Dr Bowman     ELBOW SURGERY      baby,birth amlachi removed from right arm     EMBOLECTOMY UPPER EXTREMITY  2013    brachial artery pseudoaneurysm after stenting     ESOPHAGOSCOPY, GASTROSCOPY, DUODENOSCOPY (EGD), COMBINED      ,EGD Dr Bowman with pyloric ulcer     ESOPHAGOSCOPY, GASTROSCOPY, DUODENOSCOPY (EGD), COMBINED      ,pyloric ulcer, Dr. Bowman     ESOPHAGOSCOPY, GASTROSCOPY, DUODENOSCOPY (EGD), COMBINED      16,mild gastritis, Dr Bowman     ESOPHAGOSCOPY, GASTROSCOPY, DUODENOSCOPY (EGD), COMBINED      2017,Dr Bowman. Antral ulcer     ESOPHAGOSCOPY, GASTROSCOPY, DUODENOSCOPY (EGD), COMBINED  2018    Dr Bowman, healed ulcer     HYSTERECTOMY TOTAL ABDOMINAL      age 21     LAPAROSCOPIC CHOLECYSTECTOMY      2006     OSTEOTOMY FEMUR DISTAL      x3, right knee     OSTEOTOMY FEMUR DISTAL      2000,left knee  ligament surgery     OTHER SURGICAL HISTORY      1/10/2003,,PTCA     OTHER SURGICAL HISTORY      2012,,PTCA,DELONTE in LAD and left main     OTHER SURGICAL HISTORY      2013,,PTCA,L subclavian stenosis     SALPINGO-OOPHORECTOMY BILATERAL      age 27,Bilateral salpingo-oophorectomy     TONSILLECTOMY, ADENOIDECTOMY, COMBINED      childhood          SOCIAL HISTORY:   Social History     Socioeconomic History     Marital status:      Spouse name: None     Number of children: None     Years of education: None     Highest education level: None   Occupational History     None   Social Needs     Financial resource strain: None     Food insecurity:     Worry: None     Inability: None     Transportation needs:     Medical: None     Non-medical: None   Tobacco Use     Smoking status: Former Smoker     Packs/day: 0.25     Years: 35.00     Pack years: 8.75     Types: Cigarettes     Last attempt to quit: 2/15/2017     Years since quittin.2     Smokeless tobacco: Never Used   Substance and Sexual Activity     Alcohol use: Yes     Alcohol/week: 0.0 oz      Comment: Alcoholic Drinks/day: rare     Drug use: No     Comment: Drug use: No     Sexual activity: Never     Partners: Male   Lifestyle     Physical activity:     Days per week: None     Minutes per session: None     Stress: None   Relationships     Social connections:     Talks on phone: None     Gets together: None     Attends Alevism service: None     Active member of club or organization: None     Attends meetings of clubs or organizations: None     Relationship status: None     Intimate partner violence:     Fear of current or ex partner: None     Emotionally abused: None     Physically abused: None     Forced sexual activity: None   Other Topics Concern     Parent/sibling w/ CABG, MI or angioplasty before 65F 55M? Not Asked   Social History Narrative    ,  Steven.  Currently not working outside the home. Lives [place]mile self [place] met with . Tobacco abuse, quit 2001, restarted. Quit 2012 and has since quit, no alcohol.          CURRENT MEDICATIONS:   Prior to Admission medications    Medication Sig Start Date End Date Taking? Authorizing Provider   albuterol (PROAIR HFA/PROVENTIL HFA/VENTOLIN HFA) 108 (90 Base) MCG/ACT inhaler Inhale 2 puffs into the lungs every 6 hours 1/23/19 1/23/20 Yes Ramirez Cano MD   amLODIPine (NORVASC) 10 MG tablet Take 1 tablet (10 mg) by mouth daily 11/21/18  Yes Ramirez Cano MD   aspirin EC 81 MG EC tablet Take 81 mg by mouth daily with food   Yes Reported, Patient   busPIRone (BUSPAR) 10 MG tablet TAKE 1 TABLET TWICE A DAY 11/7/18  Yes Ramirez Cano MD   clonazePAM (KLONOPIN) 1 MG tablet Take 1 tablet (1 mg) by mouth 3 times daily as needed for anxiety #75 per 30 days, #225 pills per 90 days 4/3/19  Yes Ramirez Cano MD   clopidogrel (PLAVIX) 75 MG tablet Take 1 tablet (75 mg) by mouth daily 11/21/18  Yes Ramirez Cano MD   cyclobenzaprine (FLEXERIL) 10 MG tablet Take 1 tablet (10 mg) by mouth 2 times daily as needed for muscle  spasms 11/21/18  Yes Ramirez Cano MD   Diclofenac Sodium 1.5 % SOLN Apply 20 drops to each hand or 40 drops to each knee up to 4 times daily as needed for arthritis pain 11/21/18  Yes Ramirez Cano MD   docusate sodium (COLACE) 50 MG capsule Take 50 mg by mouth daily   Yes Reported, Patient   doxycycline hyclate (VIBRAMYCIN) 100 MG capsule Take 1 capsule (100 mg) by mouth 2 times daily for 14 days 5/13/19 5/27/19 Yes Mikel Ashraf PA-C   DULoxetine (CYMBALTA) 60 MG EC capsule Take 1 capsule (60 mg) by mouth daily 8/23/18  Yes Ramirez Cano MD   gabapentin (NEURONTIN) 600 MG tablet Take 1 tablet (600 mg) by mouth 3 times daily 1/23/19  Yes Ramirez Cano MD   lisinopril (PRINIVIL/ZESTRIL) 40 MG tablet Take 1 tablet (40 mg) by mouth daily 4/23/19  Yes Ramirez Cano MD   metoprolol tartrate (LOPRESSOR) 50 MG tablet Take 1 tablet (50 mg) by mouth 2 times daily 11/21/18  Yes Ramirez Cano MD   NITROSTAT 0.3 MG sublingual tablet PLACE 1 TABLET UNDER THE TONGUE EVERY 5 MINUTES AS NEEDED FOR CHEST PAIN 4/9/18  Yes Ramirez Cano MD   ondansetron (ZOFRAN) 4 MG tablet Take 4 mg by mouth every 6 hours as needed for nausea 10/31/17  Yes Reported, Patient   oxyCODONE-acetaminophen (PERCOCET) 5-325 MG tablet Take 2 tablets by mouth 4 times daily Max acetaminophen dose: 4000mg in 24 hrs 5/23/19  Yes Ramirez Cano MD   pantoprazole (PROTONIX) 40 MG EC tablet TAKE 1 TABLET TWICE A DAY 1/22/19  Yes Ramirez Cano MD   pravastatin (PRAVACHOL) 40 MG tablet Take 1 tablet (40 mg) by mouth At Bedtime 1/23/19  Yes Ramirez Cano MD   saccharomyces boulardii (FLORASTOR) 250 MG capsule Take 250 mg by mouth 2 times daily   Yes Reported, Patient   sucralfate (CARAFATE) 1 GM tablet Take 1 tablet (1 g) by mouth 4 times daily Before meals & at bedtime 8/23/18  Yes Ramirez Cano MD   VITAMIN D, CHOLECALCIFEROL, PO Take 5,000 Units by mouth daily   Yes Reported, Patient          ALLERGIES:  "  Allergies   Allergen Reactions     Atorvastatin Muscle Pain (Myalgia)     Tiotropium Bromide [Tiotropium] Rash     Ezetimibe Muscle Pain (Myalgia)     Latex Rash     Niacin      Other reaction(s): Flushing     No Clinical Screening - See Comments Itching, Rash and Blisters     Metals and plastics       Tape [Adhesive Tape] Rash          ROS:   CONSTITUTIONAL: No fever and chills. No changes in weight.   ENT: No visual disturbance, ear ache, epistaxis or sore throat.   CARDIOVASCULAR: Anginal symptoms stable. No palpitations or increased lower extremity edema.   RESPIRATORY: Chronic shortness of breath with exertional dyspnea. No cough, wheezing or hemoptysis.   GI: No reported abdominal pain.   : No reported hematuria or dysuria.  NEUROLOGICAL: Positive for chronic headaches. orthostatic lightheadedness improved. No vertigo/dizziness, syncope, ataxia, paresthesias or weakness.  HEMATOLOGIC: No history of anemia. No bleeding or excessive bruising. Positive for history of PE.  MUSCULOSKELETAL: No reported new joint pain or swelling, positive for chronic back pain.   ENDOCRINOLOGIC: No temperature intolerance. No hair or skin changes.  SKIN: No abnormal rashes or sores, no unusual itching.  PSYCHIATRIC: Positive for history of anxiety and depression.    PHYSICAL EXAM:   /76 (BP Location: Right arm, Patient Position: Sitting, Cuff Size: Adult Large)   Pulse 68   Temp 96.8  F (36  C) (Tympanic)   Resp 18   Ht 1.66 m (5' 5.35\")   Wt 83 kg (183 lb)   LMP  (LMP Unknown)   SpO2 96%   BMI 30.12 kg/m    GENERAL: The patient is a well-developed, well-nourished, in no apparent distress.  HEENT: Head is normocephalic and atraumatic. Eyes are symmetrical with normal visual tracking. No icterus, no xanthelasmas. Nares appeared normal without nasal drainage. Mucous membranes are moist, no cyanosis.  NECK: Supple. No JVD visible.  No carotid bruits.  CHEST/ LUNGS: Lungs sounds clear, no rales, rhonchi or wheezes, no " use of accessory muscles, no retractions, respirations unlabored and normal respiratory rate.   CARDIO: Regular rate and rhythm normal with S1 and S2, no S3 or S4 and no murmur, click or rub.   ABD: Abdomen is nondistended.   EXTREMITIES: No LE edema present.   MUSCULOSKELETAL: No visible joint swelling.   NEUROLOGIC: Alert and oriented X3. Normal speech, gait and affect. No focal neurologic deficits.   SKIN: No jaundice. No rashes or visible skin lesions present. No ecchymosis.       LAB RESULTS:   Office Visit on 05/13/2019   Component Date Value Ref Range Status     Troponin I ES 05/13/2019 <0.030  0.000 - 0.034 ug/L Final     A Phagocytophil IgG 05/13/2019 >1:1,280* <1:80 Final     A Phagocytophil IgM 05/13/2019 > 1:256  <1:16 Final     Lyme Disease Antibodies Serum 05/13/2019 0.10  0.00 - 0.89 Final     Sodium 05/13/2019 137  134 - 144 mmol/L Final     Potassium 05/13/2019 3.4* 3.5 - 5.1 mmol/L Final     Chloride 05/13/2019 103  98 - 107 mmol/L Final     Carbon Dioxide 05/13/2019 23  21 - 31 mmol/L Final     Anion Gap 05/13/2019 11  3 - 14 mmol/L Final     Glucose 05/13/2019 117* 70 - 105 mg/dL Final     Urea Nitrogen 05/13/2019 23  7 - 25 mg/dL Final     Creatinine 05/13/2019 1.07  0.60 - 1.20 mg/dL Final     GFR Estimate 05/13/2019 51* >60 mL/min/[1.73_m2] Final     GFR Estimate If Black 05/13/2019 62  >60 mL/min/[1.73_m2] Final     Calcium 05/13/2019 9.3  8.6 - 10.3 mg/dL Final     Bilirubin Total 05/13/2019 1.1* 0.3 - 1.0 mg/dL Final     Albumin 05/13/2019 4.3  3.5 - 5.7 g/dL Final     Protein Total 05/13/2019 7.3  6.4 - 8.9 g/dL Final     Alkaline Phosphatase 05/13/2019 107* 34 - 104 U/L Final     ALT 05/13/2019 37  7 - 52 U/L Final     AST 05/13/2019 39  13 - 39 U/L Final     WBC 05/13/2019 8.2  4.0 - 11.0 10e9/L Final     RBC Count 05/13/2019 3.91  3.8 - 5.2 10e12/L Final     Hemoglobin 05/13/2019 11.8  11.7 - 15.7 g/dL Final     Hematocrit 05/13/2019 35.0  35.0 - 47.0 % Final     MCV 05/13/2019 90  78 -  100 fl Final     MCH 05/13/2019 30.2  26.5 - 33.0 pg Final     MCHC 05/13/2019 33.7  31.5 - 36.5 g/dL Final     RDW 05/13/2019 14.0  10.0 - 15.0 % Final     Platelet Count 05/13/2019 190  150 - 450 10e9/L Final     Diff Method 05/13/2019 Automated Method   Final     % Neutrophils 05/13/2019 71.4  % Final     % Lymphocytes 05/13/2019 20.9  % Final     % Monocytes 05/13/2019 6.4  % Final     % Eosinophils 05/13/2019 0.5  % Final     % Basophils 05/13/2019 0.2  % Final     % Immature Granulocytes 05/13/2019 0.6  % Final     Absolute Neutrophil 05/13/2019 5.8  1.6 - 8.3 10e9/L Final     Absolute Lymphocytes 05/13/2019 1.7  0.8 - 5.3 10e9/L Final     Absolute Monocytes 05/13/2019 0.5  0.0 - 1.3 10e9/L Final     Absolute Eosinophils 05/13/2019 0.0  0.0 - 0.7 10e9/L Final     Absolute Basophils 05/13/2019 0.0  0.0 - 0.2 10e9/L Final     Abs Immature Granulocytes 05/13/2019 0.1  0 - 0.4 10e9/L Final     Platelet Estimate 05/13/2019 Automated count confirmed.  Platelet morphology is normal.   Final     RBC Morphology 05/13/2019 Consistent with reported results   Final     Specimen Description 05/13/2019 Blood   Final     Culture Micro 05/13/2019 No growth after 6 days   Final          ASSESSMENT:   Ankita Day presents for cardiology follow-up with recent ED visit on 3/16/2021.  Patient has a history of chronic stable angina, known ischemic heart disease, hypertension, hyperlipidemia, history of pulmonary embolism, left subclavian artery stenosis, anxiety, collagenous colitis, depression, osteoarthritis, history of tobacco use, central sleep apnea for which she is not compliant with CPAP use, iron deficiency anemia, CKD, myofascial pain, vitamin D deficiency, lumbar facet arthropathy, history of gastric ulcer with hemorrhage, COPD and peptic ulcer disease.  She presented to the ED on 3/16/2021 with reports of chest pain, chronic stable angina.  No ACS.  EKG stable and no elevation in troponin's. Patient admits that her  BP was low and it was suspected she was dehydrated, she felt better following IV fluids and reports that this likely brought on her angina.  She is currently working on maintaining good hydration.  She denies any recurrence of acute chest pain or pressure today.  No use of nitroglycerin since her recent ED visit.    1. Chronic stable angina (H)  2. ASCVD (arteriosclerotic cardiovascular disease)  3. History of coronary artery bypass graft x 3  4. Stage 3a chronic kidney disease  5. Acute pulmonary embolism without acute cor pulmonale, unspecified pulmonary embolism type (H)  6. Chronic obstructive pulmonary disease, unspecified COPD type (H)  7. Essential hypertension  8. Episodic lightheadedness  9. Status post coronary angiogram (2017)  10. Iron deficiency anemia, unspecified iron deficiency anemia type  11. Pelvic pain in female  12. Acute cystitis without hematuria      PLAN:  1. History of 3V CABG (2003) and history of coronary stenting. Cor angio stable in 2017, lexiscan 5 months ago without ischemia.  Chronic stable angina for which she will continue on Ranexa 500 mg BID, Toprol XL 25 mg BID and continue on Amlodipine 10 mg daily. Symptoms stable.      2. Orthostatic hypotension improved with reduction in Lisinopril to 10 mg daily.  She will continue on this dose.    3. Recently identifed PE's in Jan 2020, Xarelto now with continued use of plavix. No complication with anemia or blood loss.     4. Continue on Rosuvastatin to 10 mg daily.     5. Labs today, see orders. Concern she may have UTI.   Orders Placed This Encounter   Procedures     UA reflex to Microscopic and Culture       Follow-up with cardiology in 6 months, certainly sooner if needed.        Thank you for allowing me to participate in the care of your patient. Please do not hesitate to contact me if you have any questions.     Juanita Wang

## 2021-04-14 NOTE — PATIENT INSTRUCTIONS
You were seen by  JOSELYN Kilpatrick CNP      1. Decrease Lisinopril to 10 mg daily.    2. Lab Today, we will call you with the results.      You will follow up with [facility] in 6 months, sooner if needed.       Please call the cardiology office with problems, questions, or concerns at [phone].    If you experience chest pain, chest pressure, chest tightness, shortness of breath, fainting, lightheadedness, nausea, vomiting, or other concerning symptoms, please report to the Emergency Department or call 911. These symptoms may be emergent, and best treated in the Emergency Department.     Cardiology Nurses  Dian Dumont, MAULIK Reagan LPN  [facility] ([facility])  [phone]

## 2021-04-15 RX ORDER — SULFAMETHOXAZOLE/TRIMETHOPRIM 800-160 MG
1 TABLET ORAL 2 TIMES DAILY
Qty: 6 TABLET | Refills: 0 | Status: SHIPPED | OUTPATIENT
Start: 2021-04-15 | End: 2021-04-18

## 2021-04-23 NOTE — TELEPHONE ENCOUNTER
Note from pharmacy: patient requests New R: Refill Ondansetron 4mg tab qty 30 one q6h prn nausea.  last filled 2/25/2021    Not on active med list.   Sujatha Quiroga RN ,....................  4/23/2021   11:44 AM

## 2021-04-23 NOTE — TELEPHONE ENCOUNTER
This Order Has Been Discontinued  Order Status Reason By On   Discontinued Alternate therapy [place], LPN 4/7/21 1038     Left message for patient to return call. Unable to complete prescription refill per RN Medication Refill Policy.................... Tiffany Beebe RN ....................  4/23/2021   12:28 PM

## 2021-04-26 RX ORDER — ONDANSETRON 4 MG/1
4 TABLET, FILM COATED ORAL EVERY 6 HOURS PRN
OUTPATIENT
Start: 2021-04-26

## 2021-04-27 DIAGNOSIS — R11.0 NAUSEA: ICD-10-CM

## 2021-04-27 RX ORDER — ONDANSETRON 4 MG/1
4 TABLET, FILM COATED ORAL EVERY 6 HOURS PRN
Qty: 30 TABLET | Refills: 2 | OUTPATIENT
Start: 2021-04-27

## 2021-04-27 NOTE — TELEPHONE ENCOUNTER
Note from pharmacy: patient requests new Rx, refill ondansetron 4mg tab qty 30. One Q6H PRN Nausea. Last Fill 02/25/2021 Thanks Дмитрий

## 2021-04-27 NOTE — TELEPHONE ENCOUNTER
[facility] in #[id number] in [facility] [place] sent Rx request for the following:      Requested Prescriptions   Pending Prescriptions Disp Refills   ondansetron (ZOFRAN) 4 MG tablet 30 tablet 2    Sig: Take 1 tablet (4 mg) by mouth every 6 hours as needed for nausea   Last Prescription Date:   7/13/20  Last Fill Qty/Refills:         30, R-2        Antivertigo/Antiemetic Agents Failed - 4/27/2021  8:54 AM       Failed - Medication is active on med list. Discontinued 4/7/21; reason: alternate therapy.     Unable to complete prescription refill per RN Medication Refill Policy. Kathleen Puckett RN .............. 4/27/2021  9:02 AM

## 2021-05-04 DIAGNOSIS — R11.0 NAUSEA: ICD-10-CM

## 2021-05-04 RX ORDER — ONDANSETRON 4 MG/1
4 TABLET, FILM COATED ORAL EVERY 6 HOURS PRN
Qty: 30 TABLET | Refills: 2 | OUTPATIENT
Start: 2021-05-04

## 2021-05-04 NOTE — TELEPHONE ENCOUNTER
[facility] in #[id number] in [facility] [place] sent Rx request for the following:      Requested Prescriptions   Pending Prescriptions Disp Refills   ondansetron (ZOFRAN) 4 MG tablet 30 tablet 2    Sig: Take 1 tablet (4 mg) by mouth every 6 hours as needed for nausea     See refill encounter, dated 4/23/21. Medication discontinued. Unable to complete prescription refill per RN Medication Refill Policy. Kathleen Puckett RN .............. 5/4/2021  3:30 PM

## 2021-05-10 ENCOUNTER — HOSPITAL ENCOUNTER (OUTPATIENT)
Dept: GENERAL RADIOLOGY | Facility: OTHER | Age: 66
End: 2021-05-10
Attending: FAMILY MEDICINE
Payer: MEDICARE

## 2021-05-10 ENCOUNTER — OFFICE VISIT (OUTPATIENT)
Dept: FAMILY MEDICINE | Facility: OTHER | Age: 66
End: 2021-05-10
Attending: FAMILY MEDICINE
Payer: MEDICARE

## 2021-05-10 VITALS
TEMPERATURE: 96.6 F | WEIGHT: 181 LBS | DIASTOLIC BLOOD PRESSURE: 74 MMHG | HEART RATE: 72 BPM | OXYGEN SATURATION: 97 % | HEIGHT: 66 IN | RESPIRATION RATE: 16 BRPM | SYSTOLIC BLOOD PRESSURE: 136 MMHG | BODY MASS INDEX: 29.09 KG/M2

## 2021-05-10 DIAGNOSIS — R10.13 EPIGASTRIC PAIN: ICD-10-CM

## 2021-05-10 DIAGNOSIS — M25.511 CHRONIC RIGHT SHOULDER PAIN: ICD-10-CM

## 2021-05-10 DIAGNOSIS — R11.0 NAUSEA: ICD-10-CM

## 2021-05-10 DIAGNOSIS — G89.29 CHRONIC RIGHT SHOULDER PAIN: ICD-10-CM

## 2021-05-10 DIAGNOSIS — F33.1 MODERATE EPISODE OF RECURRENT MAJOR DEPRESSIVE DISORDER (H): ICD-10-CM

## 2021-05-10 DIAGNOSIS — K25.4 CHRONIC GASTRIC ULCER WITH HEMORRHAGE: ICD-10-CM

## 2021-05-10 DIAGNOSIS — K59.03 DRUG INDUCED CONSTIPATION: Primary | ICD-10-CM

## 2021-05-10 PROCEDURE — 99215 OFFICE O/P EST HI 40 MIN: CPT | Performed by: FAMILY MEDICINE

## 2021-05-10 PROCEDURE — 74019 RADEX ABDOMEN 2 VIEWS: CPT

## 2021-05-10 PROCEDURE — 73030 X-RAY EXAM OF SHOULDER: CPT | Mod: RT

## 2021-05-10 PROCEDURE — G0463 HOSPITAL OUTPT CLINIC VISIT: HCPCS | Mod: 25

## 2021-05-10 RX ORDER — SUCRALFATE 1 G/1
1 TABLET ORAL 4 TIMES DAILY PRN
Qty: 360 TABLET | Refills: 0 | Status: SHIPPED | OUTPATIENT
Start: 2021-05-10 | End: 2021-08-06

## 2021-05-10 RX ORDER — POLYETHYLENE GLYCOL 3350 17 G/17G
17 POWDER, FOR SOLUTION ORAL DAILY
Qty: 510 G | Refills: 4 | Status: ON HOLD | OUTPATIENT
Start: 2021-05-10 | End: 2021-07-17

## 2021-05-10 RX ORDER — ONDANSETRON 4 MG/1
4 TABLET, FILM COATED ORAL EVERY 6 HOURS PRN
Qty: 30 TABLET | Refills: 3 | Status: SHIPPED | OUTPATIENT
Start: 2021-05-10 | End: 2021-07-30

## 2021-05-10 ASSESSMENT — ANXIETY QUESTIONNAIRES
6. BECOMING EASILY ANNOYED OR IRRITABLE: NOT AT ALL
1. FEELING NERVOUS, ANXIOUS, OR ON EDGE: NOT AT ALL
2. NOT BEING ABLE TO STOP OR CONTROL WORRYING: NOT AT ALL
7. FEELING AFRAID AS IF SOMETHING AWFUL MIGHT HAPPEN: NOT AT ALL
GAD7 TOTAL SCORE: 0
3. WORRYING TOO MUCH ABOUT DIFFERENT THINGS: NOT AT ALL
5. BEING SO RESTLESS THAT IT IS HARD TO SIT STILL: NOT AT ALL
IF YOU CHECKED OFF ANY PROBLEMS ON THIS QUESTIONNAIRE, HOW DIFFICULT HAVE THESE PROBLEMS MADE IT FOR YOU TO DO YOUR WORK, TAKE CARE OF THINGS AT HOME, OR GET ALONG WITH OTHER PEOPLE: NOT DIFFICULT AT ALL

## 2021-05-10 ASSESSMENT — PAIN SCALES - GENERAL: PAINLEVEL: MODERATE PAIN (5)

## 2021-05-10 ASSESSMENT — PATIENT HEALTH QUESTIONNAIRE - PHQ9
5. POOR APPETITE OR OVEREATING: NOT AT ALL
SUM OF ALL RESPONSES TO PHQ QUESTIONS 1-9: 0

## 2021-05-10 ASSESSMENT — MIFFLIN-ST. JEOR: SCORE: 1364.82

## 2021-05-10 NOTE — PROGRESS NOTES
"Nursing Notes:   Taylor Hill LPN  5/10/2021 10:26 AM  Signed  Patient presents to the clinic for consideration of right shoulder injection, last injection was about 3 years ago.  Patient requesting a refill of Zofran, this was discontinued last month due to using Reglan instead.        Chief Complaint   Patient presents with     Musculoskeletal Problem     Clinic Care Coordination - Follow-up       Initial /74 (BP Location: Right arm, Patient Position: Sitting, Cuff Size: Adult Regular)   Pulse 72   Temp 96.6  F (35.9  C) (Temporal)   Resp 16   Ht 1.664 m (5' 5.5\")   Wt 82.1 kg (181 lb)   LMP  (LMP Unknown)   SpO2 97%   BMI 29.66 kg/m   Estimated body mass index is 29.66 kg/m  as calculated from the following:    Height as of this encounter: 1.664 m (5' 5.5\").    Weight as of this encounter: 82.1 kg (181 lb).  Medication Reconciliation: complete    Taylor Hill LPN         Assessment & Plan       ICD-10-CM    1. Drug induced constipation  K59.03 polyethylene glycol (MIRALAX) 17 GM/Dose powder   2. Chronic gastric ulcer with hemorrhage  K25.4 sucralfate (CARAFATE) 1 GM tablet   3. Epigastric pain  R10.13 XR Abdomen 2 Views   4. Nausea  R11.0 ondansetron (ZOFRAN) 4 MG tablet   5. Chronic right shoulder pain  M25.511 XR Shoulder Right G/E 3 Views    G89.29 PHYSICAL THERAPY REFERRAL   6. Moderate episode of recurrent major depressive disorder (H)  F33.1 vortioxetine (TRINTELLIX) 20 MG tablet     She is reporting worsened abdominal pain with eating, not classic for gastritis.  History of cholecystectomy.  Only having a bowel movement once per week.  We discussed how relative bowel obstruction could be causing her pain.  Obtained abdominal x-ray showing significant stool, but no air-fluid levels.  She was started on MiraLAX 17 g daily to help with constipation and should see improvement in abdominal discomfort.    Hemoglobin when last checked was normal.  Current symptoms seem less like ulceration. "  Sucralfate does not appear indicated, should minimize use.  Continue with PPI.  Stay off NSAIDs given past gastritis.  Refilled ondansetron to help with nausea    On exam, she does not have clear impingement fitting for rotator cuff disease.  There is no arthritis on x-ray.  A steroid injection would not clearly help her pain.  With use of DOAC and clopidogrel, risk for bleeding is higher.  Recommend against injection at this time.  Referral placed to physical therapy.  If not improving with PT, then we could consider an injection.    Trintellix is working well for mood.  Refilled.    Follow up 1 month    40 minutes spent on the date of the encounter doing chart review, history and exam, documentation and further activities per the note    Ramirez Cano MD     [facility]      SUBJECTIVE:  66 year old female presents for a few concerns.    BM only once per week. Wondering about using an herbal remedy. History of IBS. She was having regular BMs for a while, but now quite constipated.    Nausea worse. Has been present for months but worse with constipation.  More noticeable with stress. Eating much of anything makes her feel worse  Taking sucralfate several per month, but not all the time.  Had anemia in 2020, which was presumed gastritis from aspirin, Plavix and Xarelto use.  She is no longer on aspirin and hemoglobin improved.  History of GI bleed from stomach ulcer, seen in 2017. Healed when aspirin stopped and treated with PPI and sucralfate.      Right lateral shoulder pain for a couple weeks. No known injury, but did further aggravate falling. Cannot lay on that shoulder.  History of previous shoulder problems, s/p surgery x 2. Last surgery in 2017 labral tear, rotator cuff tear and some subacromial decompression.   Last shoulder injection in 2016 was helpful. Wondering about another injection.      REVIEW OF SYSTEMS:    Pertinent items are noted in HPI.    Current Outpatient Medications    Medication Sig Dispense Refill     albuterol (PROAIR HFA/PROVENTIL HFA/VENTOLIN HFA) 108 (90 Base) MCG/ACT inhaler Inhale 2 puffs into the lungs every 6 hours 2 Inhaler 3     amLODIPine (NORVASC) 10 MG tablet TAKE 1 TABLET (10 MG) BY MOUTH DAILY DX. CODE: I10. 90 tablet 3     busPIRone (BUSPAR) 15 MG tablet Take 1 tablet (15 mg) by mouth 2 times daily 180 tablet 1     clonazePAM (KLONOPIN) 1 MG tablet Take 1 tablet (1 mg) by mouth 3 times daily as needed for anxiety Max 2.5 per day = 225 per 90 days 225 tablet 0     clopidogrel (PLAVIX) 75 MG tablet Take 1 tablet (75 mg) by mouth daily 90 tablet 4     cyanocobalamin (VITAMIN B-12) 1000 MCG tablet Take 1 tablet (1,000 mcg) by mouth daily       Diclofenac Sodium 1.5 % SOLN Apply 20 drops to each hand or 40 drops to each knee up to 4 times daily as needed for arthritis pain 450 mL 3     HYDROcodone-acetaminophen (NORCO)  MG per tablet Take 1-2 tablets by mouth every 4 hours as needed for severe pain 6 per day 180 tablet 0     HYDROcodone-acetaminophen (NORCO)  MG per tablet Take 1-2 tablets by mouth every 4 hours as needed for severe pain 6 per day 180 tablet 0     lisinopril (ZESTRIL) 20 MG tablet Take 0.5 tablets (10 mg) by mouth daily 90 tablet 3     metoprolol succinate ER (TOPROL-XL) 25 MG 24 hr tablet Take 1 tablet (25 mg) by mouth 2 times daily 180 tablet 3     naloxone (NARCAN) 4 MG/0.1ML nasal spray Spray into one nostril for opioid reversal if unresponsive. May repeat every 2-3 minutes until patient responsive or EMS arrives 1 each 1     nitroGLYcerin (NITROLINGUAL) 0.4 MG/SPRAY spray For chest pain spray 1 spray under tongue every 5 minutes for 3 doses. If symptoms persist 5 minutes after 1st dose call 911. 4.9 g 3     omeprazole (PRILOSEC) 20 MG DR capsule Take 1 capsule (20 mg) by mouth 2 times daily 180 capsule 3     pregabalin (LYRICA) 75 MG capsule Take 1 capsule (75 mg) by mouth 3 times daily 90 capsule 5     ranolazine (RANEXA) 500 MG 12  "hr tablet Take 1 tablet (500 mg) by mouth 2 times daily 180 tablet 3     rivaroxaban ANTICOAGULANT (XARELTO) 15 MG TABS tablet Take 1 tablet (15 mg) by mouth daily (with dinner) 90 tablet 3     rosuvastatin (CRESTOR) 10 MG tablet Take 2 tablets (20 mg) by mouth At Bedtime 180 tablet 3     sucralfate (CARAFATE) 1 GM tablet TAKE 1 TABLET (1 G) BY MOUTH 4 TIMES DAILY AS NEEDED FOR NAUSEA (OR DARK STOOLS) 360 tablet 0     VITAMIN D, CHOLECALCIFEROL, PO Take 5,000 Units by mouth daily       vortioxetine (TRINTELLIX) 20 MG tablet Take 1 tablet (20 mg) by mouth daily 90 tablet 0     Allergies   Allergen Reactions     Atorvastatin Muscle Pain (Myalgia)     Tiotropium Bromide [Tiotropium] Rash     Ezetimibe Muscle Pain (Myalgia)     Latex Rash     Niacin      Other reaction(s): Flushing     No Clinical Screening - See Comments Itching, Rash and Blisters     Metals and plastics       Tape [Adhesive Tape] Rash       OBJECTIVE:  /74 (BP Location: Right arm, Patient Position: Sitting, Cuff Size: Adult Regular)   Pulse 72   Temp 96.6  F (35.9  C) (Temporal)   Resp 16   Ht 1.664 m (5' 5.5\")   Wt 82.1 kg (181 lb)   LMP  (LMP Unknown)   SpO2 97%   BMI 29.66 kg/m      EXAM:  General Appearance: Alert. No acute distress  Gastrointestinal Exam: Soft, nontender, no abnormal masses or organomegaly.  Musculoskeletal: No shoulder effusion.  Nontender over AC joint and palpation of the shoulder.  Mild biceps tenderness.  No weakness on rotator cuff testing.  Mild pain with resisted external rotation.  Negative Loja and Neer's impingement.  Psychiatric: Normal affect and mentation      Results for orders placed or performed during the hospital encounter of 05/10/21   XR Abdomen 2 Views     Status: None    Narrative    PROCEDURE: XR ABDOMEN 2VIEWS 5/10/2021 11:31 AM    HISTORY: Epigastric pain    COMPARISONS: None.    TECHNIQUE: Flat and upright    FINDINGS: The intestinal gas pattern is normal. There is no  extraluminal gas. " Surgical clips are seen in the right side of the  abdomen.         Impression    IMPRESSION: Normal abdominal gas pattern    KHALIF SWIFT MD   Results for orders placed or performed during the hospital encounter of 05/10/21   XR Shoulder Right G/E 3 Views     Status: None    Narrative    PROCEDURE:  XR SHOULDER RIGHT G/E 3 VIEWS    HISTORY: Chronic right shoulder pain; Chronic right shoulder pain.    COMPARISON:  None    TECHNIQUE:  3 views.    FINDINGS:  There is mild arthritic changes of the AC joint. There is  mild arthritic change of the glenohumeral joint. No fracture. Surgical  started in the humerus.       Impression    IMPRESSION:   1. Surgical started in the humerus.  2. Mild atrophic changes AC joint and glenohumeral joint.  3. No fracture.      MARY ANN WILLAMS MD

## 2021-05-10 NOTE — PATIENT INSTRUCTIONS
Try a fan or a white noise machine for the ringing    The current leading theory on tinnitus is that it originates from the central nervous system itself, and is usually associated with hearing loss and injury to cochlear hair cells.   Advancing age can accompany hearing loss and tinnitus.  If a person is younger without hearing loss, loud noise usually is the leading cause of tinnitus.  Delayed damage to hearing is often seen as well.  Ear protection from noise exposure is important to protect your hearing and decrease the risk for tinnitus.  You can also take steps to mask the noise, such as a low volume de-tuned radio, a fan in the background, a sound balwinder, or hearing aids if indicated.  Ways to help prevent or decrease the symptoms of tinnitus include a low salt diet, control of hypertension, avoiding stimulants such as caffeine, tobacco or alcohol and proper sleep, exercise and stress management.  Tinnitus can also be worsened by stress, anxiety, depression, and high blood pressure.  I caution you against numerous expensive non-pharmaceutical options that are advertised, and have no proven benefit.      Try diclofenac 4 times daily to the shoulder to help pain  If not improving, then start physical therapy    Miralax 17 g daily to help constipation

## 2021-05-10 NOTE — NURSING NOTE
"Patient presents to the clinic for consideration of right shoulder injection, last injection was about 3 years ago.  Patient requesting a refill of Zofran, this was discontinued last month due to using Reglan instead.        Chief Complaint   Patient presents with     Musculoskeletal Problem     Clinic Care Coordination - Follow-up       Initial /74 (BP Location: Right arm, Patient Position: Sitting, Cuff Size: Adult Regular)   Pulse 72   Temp 96.6  F (35.9  C) (Temporal)   Resp 16   Ht 1.664 m (5' 5.5\")   Wt 82.1 kg (181 lb)   LMP  (LMP Unknown)   SpO2 97%   BMI 29.66 kg/m   Estimated body mass index is 29.66 kg/m  as calculated from the following:    Height as of this encounter: 1.664 m (5' 5.5\").    Weight as of this encounter: 82.1 kg (181 lb).  Medication Reconciliation: complete    Taylor Hill LPN    "

## 2021-05-11 ASSESSMENT — ANXIETY QUESTIONNAIRES: GAD7 TOTAL SCORE: 0

## 2021-06-02 ENCOUNTER — TELEPHONE (OUTPATIENT)
Dept: FAMILY MEDICINE | Facility: OTHER | Age: 66
End: 2021-06-02

## 2021-06-02 ENCOUNTER — OFFICE VISIT (OUTPATIENT)
Dept: FAMILY MEDICINE | Facility: OTHER | Age: 66
End: 2021-06-02
Attending: FAMILY MEDICINE
Payer: MEDICARE

## 2021-06-02 VITALS
HEART RATE: 68 BPM | BODY MASS INDEX: 28.93 KG/M2 | SYSTOLIC BLOOD PRESSURE: 128 MMHG | TEMPERATURE: 96.1 F | WEIGHT: 180 LBS | HEIGHT: 66 IN | DIASTOLIC BLOOD PRESSURE: 72 MMHG | RESPIRATION RATE: 20 BRPM | OXYGEN SATURATION: 98 %

## 2021-06-02 DIAGNOSIS — M47.816 LUMBAR FACET ARTHROPATHY: ICD-10-CM

## 2021-06-02 DIAGNOSIS — Z79.899 CONTROLLED SUBSTANCE AGREEMENT SIGNED: ICD-10-CM

## 2021-06-02 DIAGNOSIS — I20.89 CHRONIC STABLE ANGINA (H): ICD-10-CM

## 2021-06-02 DIAGNOSIS — I10 ESSENTIAL HYPERTENSION: ICD-10-CM

## 2021-06-02 DIAGNOSIS — M54.50 CHRONIC BILATERAL LOW BACK PAIN WITHOUT SCIATICA: Primary | ICD-10-CM

## 2021-06-02 DIAGNOSIS — Z95.5 HISTORY OF CORONARY ARTERY STENT PLACEMENT: ICD-10-CM

## 2021-06-02 DIAGNOSIS — I20.89 CHRONIC STABLE ANGINA (H): Primary | ICD-10-CM

## 2021-06-02 DIAGNOSIS — F41.1 ANXIETY STATE: ICD-10-CM

## 2021-06-02 DIAGNOSIS — I25.10 ASCVD (ARTERIOSCLEROTIC CARDIOVASCULAR DISEASE): ICD-10-CM

## 2021-06-02 DIAGNOSIS — R07.89 CHEST WALL PAIN, CHRONIC: ICD-10-CM

## 2021-06-02 DIAGNOSIS — E78.2 MIXED HYPERLIPIDEMIA: ICD-10-CM

## 2021-06-02 DIAGNOSIS — G89.4 CHRONIC PAIN DISORDER: ICD-10-CM

## 2021-06-02 DIAGNOSIS — G89.29 CHRONIC BILATERAL LOW BACK PAIN WITHOUT SCIATICA: Primary | ICD-10-CM

## 2021-06-02 DIAGNOSIS — G89.29 CHEST WALL PAIN, CHRONIC: ICD-10-CM

## 2021-06-02 PROCEDURE — 99214 OFFICE O/P EST MOD 30 MIN: CPT | Performed by: FAMILY MEDICINE

## 2021-06-02 PROCEDURE — G0463 HOSPITAL OUTPT CLINIC VISIT: HCPCS | Mod: 25

## 2021-06-02 PROCEDURE — G0463 HOSPITAL OUTPT CLINIC VISIT: HCPCS

## 2021-06-02 RX ORDER — METOPROLOL SUCCINATE 25 MG/1
25 TABLET, EXTENDED RELEASE ORAL 2 TIMES DAILY
Qty: 180 TABLET | Refills: 3 | Status: ON HOLD | OUTPATIENT
Start: 2021-06-02 | End: 2021-07-17

## 2021-06-02 RX ORDER — LISINOPRIL 20 MG/1
10 TABLET ORAL DAILY
Qty: 90 TABLET | Refills: 3 | Status: SHIPPED | OUTPATIENT
Start: 2021-06-02 | End: 2022-01-11

## 2021-06-02 RX ORDER — BUSPIRONE HYDROCHLORIDE 15 MG/1
15 TABLET ORAL 2 TIMES DAILY
Qty: 180 TABLET | Refills: 1 | Status: SHIPPED | OUTPATIENT
Start: 2021-06-02 | End: 2021-11-29

## 2021-06-02 RX ORDER — HYDROCODONE BITARTRATE AND ACETAMINOPHEN 10; 325 MG/1; MG/1
1-2 TABLET ORAL EVERY 4 HOURS PRN
Qty: 180 TABLET | Refills: 0 | Status: SHIPPED | OUTPATIENT
Start: 2021-06-02 | End: 2021-07-30

## 2021-06-02 RX ORDER — HYDROCODONE BITARTRATE AND ACETAMINOPHEN 10; 325 MG/1; MG/1
1-2 TABLET ORAL EVERY 4 HOURS PRN
Qty: 180 TABLET | Refills: 0 | Status: ON HOLD | OUTPATIENT
Start: 2021-07-05 | End: 2021-07-17

## 2021-06-02 RX ORDER — ROSUVASTATIN CALCIUM 10 MG/1
20 TABLET, COATED ORAL AT BEDTIME
Qty: 180 TABLET | Refills: 3 | Status: ON HOLD | OUTPATIENT
Start: 2021-06-02 | End: 2021-07-17

## 2021-06-02 ASSESSMENT — ANXIETY QUESTIONNAIRES
1. FEELING NERVOUS, ANXIOUS, OR ON EDGE: NOT AT ALL
6. BECOMING EASILY ANNOYED OR IRRITABLE: NOT AT ALL
3. WORRYING TOO MUCH ABOUT DIFFERENT THINGS: NOT AT ALL
7. FEELING AFRAID AS IF SOMETHING AWFUL MIGHT HAPPEN: NOT AT ALL
2. NOT BEING ABLE TO STOP OR CONTROL WORRYING: NOT AT ALL
GAD7 TOTAL SCORE: 0
5. BEING SO RESTLESS THAT IT IS HARD TO SIT STILL: NOT AT ALL
IF YOU CHECKED OFF ANY PROBLEMS ON THIS QUESTIONNAIRE, HOW DIFFICULT HAVE THESE PROBLEMS MADE IT FOR YOU TO DO YOUR WORK, TAKE CARE OF THINGS AT HOME, OR GET ALONG WITH OTHER PEOPLE: NOT DIFFICULT AT ALL

## 2021-06-02 ASSESSMENT — MIFFLIN-ST. JEOR: SCORE: 1360.28

## 2021-06-02 ASSESSMENT — PATIENT HEALTH QUESTIONNAIRE - PHQ9
SUM OF ALL RESPONSES TO PHQ QUESTIONS 1-9: 0
5. POOR APPETITE OR OVEREATING: NOT AT ALL

## 2021-06-02 ASSESSMENT — PAIN SCALES - GENERAL: PAINLEVEL: EXTREME PAIN (8)

## 2021-06-02 NOTE — PATIENT INSTRUCTIONS
Ordered lumbar MRI and injection  5 full days prior to injection, stop Plavix. Use aspirin 81 mg daily instead and take through time of injection  2 full days prior stop Xarelto  After injection, stop aspirin and resume Plavix and Xarelto    Refilled hydrocodone for today (early) and July 5    Follow up in 2 months

## 2021-06-02 NOTE — TELEPHONE ENCOUNTER
Patient called is waiting at [facility] and they have not received a RX for patients Vicodin.        So Dobbs on 6/2/2021 at 12:13 PM

## 2021-06-02 NOTE — NURSING NOTE
"Patient presents to the clinic for controlled medication refill with concerns about ongoing headaches and increase in ringing of the ears.  Last administration of Norco was today around 0700/0730, Klonopin was today at 0800.  Contract and TOX completed on 10-9-2020.  Declines a refill on Klonopin, Carafate and Albuterol Inhaler at this time.      Chief Complaint   Patient presents with     Recheck Medication       Initial /72 (BP Location: Right arm, Patient Position: Sitting, Cuff Size: Adult Regular)   Pulse 68   Temp 96.1  F (35.6  C) (Temporal)   Resp 20   Ht 1.664 m (5' 5.5\")   Wt 81.6 kg (180 lb)   LMP  (LMP Unknown)   SpO2 98%   BMI 29.50 kg/m   Estimated body mass index is 29.5 kg/m  as calculated from the following:    Height as of this encounter: 1.664 m (5' 5.5\").    Weight as of this encounter: 81.6 kg (180 lb).  Medication Reconciliation: complete    Taylor Hill LPN      "

## 2021-06-02 NOTE — PROGRESS NOTES
"Nursing Notes:   Taylor Hill LPN  6/2/2021  9:52 AM  Signed  Patient presents to the clinic for controlled medication refill with concerns about ongoing headaches and increase in ringing of the ears.  Last administration of Norco was today around 0700/0730, Klonopin was today at 0800.  Contract and TOX completed on 10-9-2020.  Declines a refill on Klonopin, Carafate and Albuterol Inhaler at this time.      Chief Complaint   Patient presents with     Recheck Medication       Initial /72 (BP Location: Right arm, Patient Position: Sitting, Cuff Size: Adult Regular)   Pulse 68   Temp 96.1  F (35.6  C) (Temporal)   Resp 20   Ht 1.664 m (5' 5.5\")   Wt 81.6 kg (180 lb)   LMP  (LMP Unknown)   SpO2 98%   BMI 29.50 kg/m   Estimated body mass index is 29.5 kg/m  as calculated from the following:    Height as of this encounter: 1.664 m (5' 5.5\").    Weight as of this encounter: 81.6 kg (180 lb).  Medication Reconciliation: complete    Taylor Hill LPN           Assessment & Plan       ICD-10-CM    1. Anxiety state  F41.1 busPIRone (BUSPAR) 15 MG tablet   2. Chronic pain disorder  G89.4 HYDROcodone-acetaminophen (NORCO)  MG per tablet     HYDROcodone-acetaminophen (NORCO)  MG per tablet   3. Chest wall pain, chronic  R07.89 HYDROcodone-acetaminophen (NORCO)  MG per tablet    G89.29 HYDROcodone-acetaminophen (NORCO)  MG per tablet   4. Controlled substance agreement updated 10/9/2020  Z79.899 HYDROcodone-acetaminophen (NORCO)  MG per tablet     HYDROcodone-acetaminophen (NORCO)  MG per tablet   5. Chronic bilateral low back pain without sciatica  M54.5 HYDROcodone-acetaminophen (NORCO)  MG per tablet    G89.29 HYDROcodone-acetaminophen (NORCO)  MG per tablet   6. Essential hypertension  I10 lisinopril (ZESTRIL) 20 MG tablet     metoprolol succinate ER (TOPROL-XL) 25 MG 24 hr tablet   7. Chronic stable angina (H)  I20.8 metoprolol succinate ER (TOPROL-XL) 25 " MG 24 hr tablet   8. ASCVD (arteriosclerotic cardiovascular disease)  I25.10 rosuvastatin (CRESTOR) 10 MG tablet   9. History of coronary artery stent placement  Z95.5 rosuvastatin (CRESTOR) 10 MG tablet   10. Mixed hyperlipidemia  E78.2 rosuvastatin (CRESTOR) 10 MG tablet       PDMP Review       Value Time User    State PDMP site checked  Yes 6/2/2021 10:15 AM Ramirez Cano MD         She is due for hydrocodone refill June 6.  Is in town currently, so refilled with permission to obtain today.  Next refill sent for July 5  Follow-up in August when due for hydrocodone    Has worse lumbar pain.  Previous history of lumbar rhizotomy.  She was hoping for another back injection, but then developed PE in January 2020.  Due to need for anticoagulation, has not been able to have surgery or injections.  With duration of time from PE, it is reasonable to pause anticoagulation so she could have a lumbar epidural.  Outlined a plan to hold clopidogrel for 5 days, but use aspirin 81 mg daily instead due to complex CAD history.  Hold Xarelto for 2 days prior to epidural.  Resume clopidogrel and Xarelto after the injection.    Ordered updated MRI and placed order for lumbar epidural.    Sent in refills for rosuvastatin, lisinopril, metoprolol.  Metoprolol was rejected by insurance as the Toprol-XL is written for twice daily. Toprol XL recommended BID per cardiology on Yuni 3, 2020 to stabilize blood pressures. The change has been beneficial.     33 minutes spent on the date of the encounter doing chart review, patient visit and documentation       Follow up in 2 months    Ramirez Cano MD     [facility]      SUBJECTIVE:  66 year old female presents for follow up on chronic pain, anxiety, lumbar pain, shoulder pain, nausea, and CAD    Right shoulder pain improved. Stopped laying on the shoulder. Diclofenac helped some as well.  She had asked about an injection last month, but does not feel it is  necessary now.    Has been seen by orthopedics and discussed left shoulder replacement previously, but it is not bothering her as much now.  She wants to hold off on surgery.    Nausea has improved. BM are more frequent every 3 days so far. Did not start Miralax. Not using nausea medication now. Still using sucralfate, which is left over from previous treatment for gastritis when she was on aspirin.  Had anemia in 2020, but is no longer on aspirin and hemoglobin has been normal in 2021.  Does have a history of GI bleed from a stomach ulcer in 2017.    Low back is bothering her more.  She again is wondering if a back injection is possible.  Function has decreased due to back pain.  No numbness or paresthesias in legs, those resolved with physical therapy.    Remains on chronic opioids for chest wall and lumbar pain.  In the past was on methadone, but has been weaned off.  Also using clonazepam for anxiety.  Is aware of risk for sedation and overdose with concurrent benzodiazepine and opioids.    REVIEW OF SYSTEMS:    Pertinent items are noted in HPI.    Current Outpatient Medications   Medication Sig Dispense Refill     albuterol (PROAIR HFA/PROVENTIL HFA/VENTOLIN HFA) 108 (90 Base) MCG/ACT inhaler Inhale 2 puffs into the lungs every 6 hours 2 Inhaler 3     amLODIPine (NORVASC) 10 MG tablet TAKE 1 TABLET (10 MG) BY MOUTH DAILY DX. CODE: I10. 90 tablet 3     busPIRone (BUSPAR) 15 MG tablet Take 1 tablet (15 mg) by mouth 2 times daily 180 tablet 1     clonazePAM (KLONOPIN) 1 MG tablet Take 1 tablet (1 mg) by mouth 3 times daily as needed for anxiety Max 2.5 per day = 225 per 90 days 225 tablet 0     clopidogrel (PLAVIX) 75 MG tablet Take 1 tablet (75 mg) by mouth daily 90 tablet 4     cyanocobalamin (VITAMIN B-12) 1000 MCG tablet Take 1 tablet (1,000 mcg) by mouth daily       Diclofenac Sodium 1.5 % SOLN Apply 20 drops to each hand or 40 drops to each knee up to 4 times daily as needed for arthritis pain 450 mL 3      HYDROcodone-acetaminophen (NORCO)  MG per tablet Take 1-2 tablets by mouth every 4 hours as needed for severe pain 6 per day 180 tablet 0     HYDROcodone-acetaminophen (NORCO)  MG per tablet Take 1-2 tablets by mouth every 4 hours as needed for severe pain 6 per day 180 tablet 0     lisinopril (ZESTRIL) 20 MG tablet Take 0.5 tablets (10 mg) by mouth daily 90 tablet 3     metoprolol succinate ER (TOPROL-XL) 25 MG 24 hr tablet Take 1 tablet (25 mg) by mouth 2 times daily 180 tablet 3     naloxone (NARCAN) 4 MG/0.1ML nasal spray Spray into one nostril for opioid reversal if unresponsive. May repeat every 2-3 minutes until patient responsive or EMS arrives 1 each 1     nitroGLYcerin (NITROLINGUAL) 0.4 MG/SPRAY spray For chest pain spray 1 spray under tongue every 5 minutes for 3 doses. If symptoms persist 5 minutes after 1st dose call 911. 4.9 g 3     omeprazole (PRILOSEC) 20 MG DR capsule Take 1 capsule (20 mg) by mouth 2 times daily 180 capsule 3     ondansetron (ZOFRAN) 4 MG tablet Take 1 tablet (4 mg) by mouth every 6 hours as needed for nausea 30 tablet 3     polyethylene glycol (MIRALAX) 17 GM/Dose powder Take 17 g by mouth daily 510 g 4     pregabalin (LYRICA) 75 MG capsule Take 1 capsule (75 mg) by mouth 3 times daily 90 capsule 5     ranolazine (RANEXA) 500 MG 12 hr tablet Take 1 tablet (500 mg) by mouth 2 times daily 180 tablet 3     rivaroxaban ANTICOAGULANT (XARELTO) 15 MG TABS tablet Take 1 tablet (15 mg) by mouth daily (with dinner) 90 tablet 3     rosuvastatin (CRESTOR) 10 MG tablet Take 2 tablets (20 mg) by mouth At Bedtime 180 tablet 3     sucralfate (CARAFATE) 1 GM tablet Take 1 tablet (1 g) by mouth 4 times daily as needed for nausea (or dark stools) 360 tablet 0     VITAMIN D, CHOLECALCIFEROL, PO Take 5,000 Units by mouth daily       vortioxetine (TRINTELLIX) 20 MG tablet Take 1 tablet (20 mg) by mouth daily 90 tablet 4     Allergies   Allergen Reactions     Atorvastatin Muscle Pain  "(Myalgia)     Tiotropium Bromide [Tiotropium] Rash     Advil [Ibuprofen] Other (See Comments)     Does not recall but feel like it interacted with blood thinners and HX of GI BLEED     Ezetimibe Muscle Pain (Myalgia)     Latex Rash     Niacin      Other reaction(s): Flushing     No Clinical Screening - See Comments Itching, Rash and Blisters     Metals and plastics       Tape [Adhesive Tape] Rash       OBJECTIVE:  /72 (BP Location: Right arm, Patient Position: Sitting, Cuff Size: Adult Regular)   Pulse 68   Temp 96.1  F (35.6  C) (Temporal)   Resp 20   Ht 1.664 m (5' 5.5\")   Wt 81.6 kg (180 lb)   LMP  (LMP Unknown)   SpO2 98%   BMI 29.50 kg/m      EXAM:  General Appearance: Alert. No acute distress  Musculoskeletal: Lumbar paraspinous muscles tender to palpation  Psychiatric: Normal affect and mentation      No results found for any visits on 06/02/21.    "

## 2021-06-03 DIAGNOSIS — Z79.899 CONTROLLED SUBSTANCE AGREEMENT SIGNED: ICD-10-CM

## 2021-06-03 DIAGNOSIS — G89.4 CHRONIC PAIN DISORDER: ICD-10-CM

## 2021-06-03 DIAGNOSIS — M54.50 CHRONIC BILATERAL LOW BACK PAIN WITHOUT SCIATICA: ICD-10-CM

## 2021-06-03 DIAGNOSIS — G89.29 CHRONIC BILATERAL LOW BACK PAIN WITHOUT SCIATICA: ICD-10-CM

## 2021-06-03 DIAGNOSIS — R07.89 CHEST WALL PAIN, CHRONIC: ICD-10-CM

## 2021-06-03 DIAGNOSIS — G89.29 CHEST WALL PAIN, CHRONIC: ICD-10-CM

## 2021-06-03 RX ORDER — HYDROCODONE BITARTRATE AND ACETAMINOPHEN 10; 325 MG/1; MG/1
1-2 TABLET ORAL EVERY 4 HOURS PRN
Qty: 180 TABLET | Refills: 0 | OUTPATIENT
Start: 2021-06-03

## 2021-06-03 ASSESSMENT — ANXIETY QUESTIONNAIRES: GAD7 TOTAL SCORE: 0

## 2021-06-03 NOTE — TELEPHONE ENCOUNTER
Patient was able to get controlled medication today.      Insurance needs to be called to for Metoprolol.      Taylor Hill LPN 6/3/2021 12:46 PM

## 2021-06-03 NOTE — TELEPHONE ENCOUNTER
[facility] sent Rx request for the following:      HYDROcodone-acetaminophen (NORCO)  MG per tablet  Sig: Take 1-2 tablets by mouth every 4 hours as needed for severe pain 6 per day      Last Prescription Date:   6/2/2021  Last Fill Qty/Refills:         180, R-0    Last Office Visit:              6/2/2021   Future Office visit:           8/6/2021    Prescription refused.  This is a duplicate request.  Nikos Toure RN.......6/3/2021 2:08 PM

## 2021-06-03 NOTE — TELEPHONE ENCOUNTER
PA was indicated on prescription status.  It now states that PA is no longer indicated.    Waiting for pharmacy to open to confirm they have received the [place] prescription.      Also need to speak with pharmacy staff about Metoprolol Succinate.  25 mg BID prescribed per Cardiology at office visit of 6-3-2020.      Taylor Hill LPN 6/3/2021 8:10 AM

## 2021-06-07 NOTE — TELEPHONE ENCOUNTER
Phone kept dropping out when on hold with insurance representatives last Friday.    Ramirez Cano MD refill Metoprolol Succinate ER 25 mg 1 tab po BID because patient was due for a refill.  Insurance company wants patient to be on a higher strength for 1 tab per day.      Ok to increase to 50 mg po daily?  Original prescription by Juanita Wang NP, last cardiology visit 9-.          Taylor Hill LPN 6/7/2021 8:07 AM

## 2021-06-09 RX ORDER — METOPROLOL SUCCINATE 50 MG/1
50 TABLET, EXTENDED RELEASE ORAL AT BEDTIME
Qty: 90 TABLET | Refills: 0 | Status: ON HOLD | OUTPATIENT
Start: 2021-06-09 | End: 2021-07-17

## 2021-06-11 ENCOUNTER — HOSPITAL ENCOUNTER (OUTPATIENT)
Dept: MRI IMAGING | Facility: OTHER | Age: 66
End: 2021-06-11
Attending: FAMILY MEDICINE
Payer: MEDICARE

## 2021-06-11 ENCOUNTER — HOSPITAL ENCOUNTER (OUTPATIENT)
Dept: GENERAL RADIOLOGY | Facility: OTHER | Age: 66
End: 2021-06-11
Attending: FAMILY MEDICINE
Payer: MEDICARE

## 2021-06-11 DIAGNOSIS — M54.50 CHRONIC BILATERAL LOW BACK PAIN WITHOUT SCIATICA: ICD-10-CM

## 2021-06-11 DIAGNOSIS — G89.29 CHRONIC BILATERAL LOW BACK PAIN WITHOUT SCIATICA: ICD-10-CM

## 2021-06-11 DIAGNOSIS — M47.816 LUMBAR FACET ARTHROPATHY: ICD-10-CM

## 2021-06-11 PROCEDURE — 250N000011 HC RX IP 250 OP 636: Performed by: RADIOLOGY

## 2021-06-11 PROCEDURE — 62323 NJX INTERLAMINAR LMBR/SAC: CPT

## 2021-06-11 PROCEDURE — 255N000002 HC RX 255 OP 636: Performed by: RADIOLOGY

## 2021-06-11 PROCEDURE — 250N000009 HC RX 250: Performed by: RADIOLOGY

## 2021-06-11 PROCEDURE — 72148 MRI LUMBAR SPINE W/O DYE: CPT | Mod: ME

## 2021-06-11 PROCEDURE — G1004 CDSM NDSC: HCPCS

## 2021-06-11 RX ORDER — LIDOCAINE HYDROCHLORIDE 10 MG/ML
2 INJECTION, SOLUTION EPIDURAL; INFILTRATION; INTRACAUDAL; PERINEURAL ONCE
Status: COMPLETED | OUTPATIENT
Start: 2021-06-11 | End: 2021-06-11

## 2021-06-11 RX ORDER — LIDOCAINE HYDROCHLORIDE 10 MG/ML
2 INJECTION, SOLUTION INFILTRATION; PERINEURAL ONCE
Status: COMPLETED | OUTPATIENT
Start: 2021-06-11 | End: 2021-06-11

## 2021-06-11 RX ORDER — DEXAMETHASONE SODIUM PHOSPHATE 10 MG/ML
10 INJECTION, SOLUTION INTRAMUSCULAR; INTRAVENOUS ONCE
Status: COMPLETED | OUTPATIENT
Start: 2021-06-11 | End: 2021-06-11

## 2021-06-11 RX ADMIN — DEXAMETHASONE SODIUM PHOSPHATE 10 MG: 10 INJECTION, SOLUTION INTRAMUSCULAR; INTRAVENOUS at 12:45

## 2021-06-11 RX ADMIN — LIDOCAINE HYDROCHLORIDE 2 ML: 10 INJECTION, SOLUTION INFILTRATION; PERINEURAL at 12:45

## 2021-06-11 RX ADMIN — LIDOCAINE HYDROCHLORIDE 2 ML: 10 INJECTION, SOLUTION EPIDURAL; INFILTRATION; INTRACAUDAL; PERINEURAL at 12:45

## 2021-06-11 RX ADMIN — IOHEXOL 2 ML: 240 INJECTION, SOLUTION INTRATHECAL; INTRAVASCULAR; INTRAVENOUS; ORAL at 12:45

## 2021-07-16 ENCOUNTER — HOSPITAL ENCOUNTER (OUTPATIENT)
Facility: OTHER | Age: 66
Setting detail: OBSERVATION
Discharge: HOME | End: 2021-07-17
Attending: PHYSICIAN ASSISTANT | Admitting: INTERNAL MEDICINE
Payer: MEDICARE

## 2021-07-16 ENCOUNTER — APPOINTMENT (OUTPATIENT)
Dept: GENERAL RADIOLOGY | Facility: OTHER | Age: 66
End: 2021-07-16
Attending: PHYSICIAN ASSISTANT
Payer: MEDICARE

## 2021-07-16 ENCOUNTER — TELEPHONE (OUTPATIENT)
Dept: FAMILY MEDICINE | Facility: OTHER | Age: 66
End: 2021-07-16

## 2021-07-16 DIAGNOSIS — I95.2 HYPOTENSION DUE TO DRUGS: ICD-10-CM

## 2021-07-16 DIAGNOSIS — F41.9 CHRONIC ANXIETY: ICD-10-CM

## 2021-07-16 DIAGNOSIS — I73.9 PERIPHERAL VASCULAR DISEASE, UNSPECIFIED (H): ICD-10-CM

## 2021-07-16 DIAGNOSIS — J44.89 OBSTRUCTIVE CHRONIC BRONCHITIS WITHOUT EXACERBATION (H): ICD-10-CM

## 2021-07-16 DIAGNOSIS — R42 LIGHTHEADEDNESS: ICD-10-CM

## 2021-07-16 DIAGNOSIS — F41.9 ANXIETY: ICD-10-CM

## 2021-07-16 DIAGNOSIS — Z79.899 ENCOUNTER FOR LONG-TERM (CURRENT) USE OF MEDICATIONS: ICD-10-CM

## 2021-07-16 DIAGNOSIS — Z11.52 ENCOUNTER FOR SCREENING LABORATORY TESTING FOR COVID-19 VIRUS: ICD-10-CM

## 2021-07-16 DIAGNOSIS — I10 ESSENTIAL HYPERTENSION, MALIGNANT: ICD-10-CM

## 2021-07-16 DIAGNOSIS — R00.1 SYMPTOMATIC BRADYCARDIA: ICD-10-CM

## 2021-07-16 DIAGNOSIS — Z79.899 CONTROLLED SUBSTANCE AGREEMENT SIGNED: ICD-10-CM

## 2021-07-16 DIAGNOSIS — I25.2 OLD MYOCARDIAL INFARCTION: ICD-10-CM

## 2021-07-16 DIAGNOSIS — R07.89 OTHER CHEST PAIN: ICD-10-CM

## 2021-07-16 DIAGNOSIS — Z79.51 LONG TERM CURRENT USE OF INHALED STEROID: ICD-10-CM

## 2021-07-16 DIAGNOSIS — Z87.891 PERSONAL HISTORY OF TOBACCO USE, PRESENTING HAZARDS TO HEALTH: ICD-10-CM

## 2021-07-16 DIAGNOSIS — R42 DIZZINESS AND GIDDINESS: ICD-10-CM

## 2021-07-16 DIAGNOSIS — Z86.711 PERSONAL HISTORY OF PE (PULMONARY EMBOLISM): ICD-10-CM

## 2021-07-16 DIAGNOSIS — F41.9 CHRONIC ANXIETY: Primary | ICD-10-CM

## 2021-07-16 DIAGNOSIS — Z79.01 LONG TERM (CURRENT) USE OF ANTICOAGULANTS: ICD-10-CM

## 2021-07-16 DIAGNOSIS — Z86.711 HISTORY OF PULMONARY EMBOLISM: ICD-10-CM

## 2021-07-16 DIAGNOSIS — T50.995A ADVERSE EFFECT OF OTHER DRUGS, MEDICAMENTS AND BIOLOGICAL SUBSTANCES, INITIAL ENCOUNTER: ICD-10-CM

## 2021-07-16 DIAGNOSIS — R07.89 ATYPICAL CHEST PAIN: ICD-10-CM

## 2021-07-16 DIAGNOSIS — Z79.891 ADMISSION FOR LONG-TERM OPIATE ANALGESIC USE: ICD-10-CM

## 2021-07-16 DIAGNOSIS — E78.5 HYPERLIPIDEMIA, UNSPECIFIED HYPERLIPIDEMIA TYPE: ICD-10-CM

## 2021-07-16 LAB
ALBUMIN SERPL-MCNC: 4.3 G/DL (ref 3.5–5.7)
ALBUMIN UR-MCNC: NEGATIVE MG/DL
ALP SERPL-CCNC: 54 U/L (ref 34–104)
ALT SERPL W P-5'-P-CCNC: 11 U/L (ref 7–52)
ANION GAP SERPL CALCULATED.3IONS-SCNC: 8 MMOL/L (ref 3–14)
APPEARANCE UR: CLEAR
AST SERPL W P-5'-P-CCNC: 17 U/L (ref 13–39)
ATRIAL RATE - MUSE: 54 BPM
BACTERIA #/AREA URNS HPF: ABNORMAL /HPF
BASOPHILS # BLD AUTO: 0 10E3/UL (ref 0–0.2)
BASOPHILS NFR BLD AUTO: 1 %
BILIRUB SERPL-MCNC: 0.4 MG/DL (ref 0.3–1)
BILIRUB UR QL STRIP: NEGATIVE
BUN SERPL-MCNC: 25 MG/DL (ref 7–25)
CALCIUM SERPL-MCNC: 9.5 MG/DL (ref 8.6–10.3)
CHLORIDE BLD-SCNC: 104 MMOL/L (ref 98–107)
CO2 SERPL-SCNC: 24 MMOL/L (ref 21–31)
COLOR UR AUTO: YELLOW
CREAT SERPL-MCNC: 1.29 MG/DL (ref 0.6–1.2)
CRP SERPL-MCNC: <1 MG/L
D DIMER PPP FEU-MCNC: <=0.27 UG/ML FEU (ref 0–0.5)
DIASTOLIC BLOOD PRESSURE - MUSE: NORMAL MMHG
EOSINOPHIL # BLD AUTO: 0.2 10E3/UL (ref 0–0.7)
EOSINOPHIL NFR BLD AUTO: 3 %
ERYTHROCYTE [DISTWIDTH] IN BLOOD BY AUTOMATED COUNT: 13.4 % (ref 10–15)
ERYTHROCYTE [SEDIMENTATION RATE] IN BLOOD BY WESTERGREN METHOD: 5 MM/HR (ref 0–30)
GFR SERPL CREATININE-BSD FRML MDRD: 43 ML/MIN/1.73M2
GLUCOSE BLD-MCNC: 142 MG/DL (ref 70–105)
GLUCOSE UR STRIP-MCNC: NEGATIVE MG/DL
HCT VFR BLD AUTO: 35 % (ref 35–47)
HGB BLD-MCNC: 11.7 G/DL (ref 11.7–15.7)
HGB UR QL STRIP: NEGATIVE
IMM GRANULOCYTES # BLD: 0 10E3/UL
IMM GRANULOCYTES NFR BLD: 0 %
INTERPRETATION ECG - MUSE: NORMAL
KETONES UR STRIP-MCNC: NEGATIVE MG/DL
LEUKOCYTE ESTERASE UR QL STRIP: ABNORMAL
LYMPHOCYTES # BLD AUTO: 1.9 10E3/UL (ref 0.8–5.3)
LYMPHOCYTES NFR BLD AUTO: 38 %
MCH RBC QN AUTO: 29.1 PG (ref 26.5–33)
MCHC RBC AUTO-ENTMCNC: 33.4 G/DL (ref 31.5–36.5)
MCV RBC AUTO: 87 FL (ref 78–100)
MONOCYTES # BLD AUTO: 0.6 10E3/UL (ref 0–1.3)
MONOCYTES NFR BLD AUTO: 11 %
NEUTROPHILS # BLD AUTO: 2.3 10E3/UL (ref 1.6–8.3)
NEUTROPHILS NFR BLD AUTO: 47 %
NITRATE UR QL: NEGATIVE
NRBC # BLD AUTO: 0 10E3/UL
NRBC BLD AUTO-RTO: 0 /100
NT-PROBNP SERPL-MCNC: 152 PG/ML (ref 0–100)
P AXIS - MUSE: 7 DEGREES
PH UR STRIP: 5.5 [PH] (ref 5–9)
PLATELET # BLD AUTO: 214 10E3/UL (ref 150–450)
POTASSIUM BLD-SCNC: 3.7 MMOL/L (ref 3.5–5.1)
PR INTERVAL - MUSE: 178 MS
PROT SERPL-MCNC: 6.8 G/DL (ref 6.4–8.9)
QRS DURATION - MUSE: 86 MS
QT - MUSE: 430 MS
QTC - MUSE: 407 MS
R AXIS - MUSE: 36 DEGREES
RBC # BLD AUTO: 4.02 10E6/UL (ref 3.8–5.2)
RBC URINE: 1 /HPF
SARS-COV-2 RNA RESP QL NAA+PROBE: NEGATIVE
SODIUM SERPL-SCNC: 136 MMOL/L (ref 134–144)
SP GR UR STRIP: 1.01 (ref 1–1.03)
SYSTOLIC BLOOD PRESSURE - MUSE: NORMAL MMHG
T AXIS - MUSE: 68 DEGREES
TROPONIN I SERPL-MCNC: 3.1 PG/ML (ref 0–34)
TROPONIN I SERPL-MCNC: <2.4 PG/ML (ref 0–34)
UROBILINOGEN UR STRIP-MCNC: NORMAL MG/DL
VENTRICULAR RATE- MUSE: 54 BPM
WBC # BLD AUTO: 5 10E3/UL (ref 4–11)
WBC URINE: 9 /HPF

## 2021-07-16 PROCEDURE — 36415 COLL VENOUS BLD VENIPUNCTURE: CPT | Performed by: PHYSICIAN ASSISTANT

## 2021-07-16 PROCEDURE — 250N000013 HC RX MED GY IP 250 OP 250 PS 637: Performed by: INTERNAL MEDICINE

## 2021-07-16 PROCEDURE — 84484 ASSAY OF TROPONIN QUANT: CPT | Performed by: PHYSICIAN ASSISTANT

## 2021-07-16 PROCEDURE — G0378 HOSPITAL OBSERVATION PER HR: HCPCS

## 2021-07-16 PROCEDURE — 93010 ELECTROCARDIOGRAM REPORT: CPT | Performed by: INTERNAL MEDICINE

## 2021-07-16 PROCEDURE — 96365 THER/PROPH/DIAG IV INF INIT: CPT | Performed by: PHYSICIAN ASSISTANT

## 2021-07-16 PROCEDURE — U0003 INFECTIOUS AGENT DETECTION BY NUCLEIC ACID (DNA OR RNA); SEVERE ACUTE RESPIRATORY SYNDROME CORONAVIRUS 2 (SARS-COV-2) (CORONAVIRUS DISEASE [COVID-19]), AMPLIFIED PROBE TECHNIQUE, MAKING USE OF HIGH THROUGHPUT TECHNOLOGIES AS DESCRIBED BY CMS-2020-01-R: HCPCS | Performed by: PHYSICIAN ASSISTANT

## 2021-07-16 PROCEDURE — 81001 URINALYSIS AUTO W/SCOPE: CPT | Performed by: PHYSICIAN ASSISTANT

## 2021-07-16 PROCEDURE — C9803 HOPD COVID-19 SPEC COLLECT: HCPCS | Performed by: PHYSICIAN ASSISTANT

## 2021-07-16 PROCEDURE — 83880 ASSAY OF NATRIURETIC PEPTIDE: CPT | Performed by: PHYSICIAN ASSISTANT

## 2021-07-16 PROCEDURE — 258N000003 HC RX IP 258 OP 636: Performed by: PHYSICIAN ASSISTANT

## 2021-07-16 PROCEDURE — 99285 EMERGENCY DEPT VISIT HI MDM: CPT | Performed by: PHYSICIAN ASSISTANT

## 2021-07-16 PROCEDURE — 87086 URINE CULTURE/COLONY COUNT: CPT | Performed by: PHYSICIAN ASSISTANT

## 2021-07-16 PROCEDURE — 85379 FIBRIN DEGRADATION QUANT: CPT | Performed by: PHYSICIAN ASSISTANT

## 2021-07-16 PROCEDURE — 80053 COMPREHEN METABOLIC PANEL: CPT | Performed by: PHYSICIAN ASSISTANT

## 2021-07-16 PROCEDURE — 99285 EMERGENCY DEPT VISIT HI MDM: CPT | Mod: 25 | Performed by: PHYSICIAN ASSISTANT

## 2021-07-16 PROCEDURE — 71045 X-RAY EXAM CHEST 1 VIEW: CPT

## 2021-07-16 PROCEDURE — 99220 PR INITIAL OBSERVATION CARE,LEVEL III: CPT | Performed by: INTERNAL MEDICINE

## 2021-07-16 PROCEDURE — 85025 COMPLETE CBC W/AUTO DIFF WBC: CPT | Performed by: PHYSICIAN ASSISTANT

## 2021-07-16 PROCEDURE — 86140 C-REACTIVE PROTEIN: CPT | Performed by: PHYSICIAN ASSISTANT

## 2021-07-16 PROCEDURE — 96374 THER/PROPH/DIAG INJ IV PUSH: CPT | Performed by: PHYSICIAN ASSISTANT

## 2021-07-16 PROCEDURE — 258N000003 HC RX IP 258 OP 636: Performed by: INTERNAL MEDICINE

## 2021-07-16 PROCEDURE — 250N000011 HC RX IP 250 OP 636: Performed by: INTERNAL MEDICINE

## 2021-07-16 PROCEDURE — 93005 ELECTROCARDIOGRAM TRACING: CPT | Performed by: PHYSICIAN ASSISTANT

## 2021-07-16 PROCEDURE — 85652 RBC SED RATE AUTOMATED: CPT | Performed by: PHYSICIAN ASSISTANT

## 2021-07-16 RX ORDER — CLOPIDOGREL BISULFATE 75 MG/1
75 TABLET ORAL DAILY
Status: DISCONTINUED | OUTPATIENT
Start: 2021-07-17 | End: 2021-07-17 | Stop reason: HOSPADM

## 2021-07-16 RX ORDER — ROSUVASTATIN CALCIUM 20 MG/1
20 TABLET, COATED ORAL
Status: ON HOLD | COMMUNITY
Start: 2021-06-02 | End: 2021-07-17

## 2021-07-16 RX ORDER — RANOLAZINE 500 MG/1
500 TABLET, EXTENDED RELEASE ORAL DAILY
Status: DISCONTINUED | OUTPATIENT
Start: 2021-07-17 | End: 2021-07-17 | Stop reason: HOSPADM

## 2021-07-16 RX ORDER — ROSUVASTATIN CALCIUM 20 MG/1
20 TABLET, COATED ORAL DAILY
Status: DISCONTINUED | OUTPATIENT
Start: 2021-07-17 | End: 2021-07-17 | Stop reason: HOSPADM

## 2021-07-16 RX ORDER — SODIUM CHLORIDE 9 MG/ML
INJECTION, SOLUTION INTRAVENOUS CONTINUOUS
Status: DISCONTINUED | OUTPATIENT
Start: 2021-07-16 | End: 2021-07-17 | Stop reason: HOSPADM

## 2021-07-16 RX ORDER — SODIUM CHLORIDE 9 MG/ML
INJECTION, SOLUTION INTRAVENOUS CONTINUOUS
Status: DISCONTINUED | OUTPATIENT
Start: 2021-07-16 | End: 2021-07-16

## 2021-07-16 RX ORDER — NITROGLYCERIN 0.4 MG/1
0.4 TABLET SUBLINGUAL EVERY 5 MIN PRN
Status: DISCONTINUED | OUTPATIENT
Start: 2021-07-16 | End: 2021-07-17 | Stop reason: HOSPADM

## 2021-07-16 RX ORDER — ROSUVASTATIN CALCIUM 20 MG/1
20 TABLET, COATED ORAL AT BEDTIME
Status: DISCONTINUED | OUTPATIENT
Start: 2021-07-16 | End: 2021-07-16

## 2021-07-16 RX ORDER — LISINOPRIL 10 MG/1
10 TABLET ORAL DAILY
Status: DISCONTINUED | OUTPATIENT
Start: 2021-07-17 | End: 2021-07-17 | Stop reason: HOSPADM

## 2021-07-16 RX ORDER — ONDANSETRON 2 MG/ML
4 INJECTION INTRAMUSCULAR; INTRAVENOUS EVERY 6 HOURS PRN
Status: DISCONTINUED | OUTPATIENT
Start: 2021-07-16 | End: 2021-07-17 | Stop reason: HOSPADM

## 2021-07-16 RX ORDER — CLONAZEPAM 1 MG/1
1 TABLET ORAL 3 TIMES DAILY PRN
Qty: 90 TABLET | Refills: 0 | Status: SHIPPED | OUTPATIENT
Start: 2021-07-16 | End: 2021-08-12

## 2021-07-16 RX ORDER — HYDROCODONE BITARTRATE AND ACETAMINOPHEN 10; 325 MG/1; MG/1
1-2 TABLET ORAL EVERY 4 HOURS PRN
Status: DISCONTINUED | OUTPATIENT
Start: 2021-07-16 | End: 2021-07-17 | Stop reason: HOSPADM

## 2021-07-16 RX ORDER — CLONAZEPAM 1 MG/1
1 TABLET ORAL 3 TIMES DAILY PRN
Status: DISCONTINUED | OUTPATIENT
Start: 2021-07-16 | End: 2021-07-17 | Stop reason: HOSPADM

## 2021-07-16 RX ORDER — MORPHINE SULFATE 2 MG/ML
1 INJECTION, SOLUTION INTRAMUSCULAR; INTRAVENOUS
Status: DISCONTINUED | OUTPATIENT
Start: 2021-07-16 | End: 2021-07-17 | Stop reason: HOSPADM

## 2021-07-16 RX ORDER — ONDANSETRON 4 MG/1
4 TABLET, ORALLY DISINTEGRATING ORAL EVERY 6 HOURS PRN
Status: DISCONTINUED | OUTPATIENT
Start: 2021-07-16 | End: 2021-07-17 | Stop reason: HOSPADM

## 2021-07-16 RX ORDER — PREGABALIN 25 MG/1
75 CAPSULE ORAL 3 TIMES DAILY
Status: DISCONTINUED | OUTPATIENT
Start: 2021-07-16 | End: 2021-07-17 | Stop reason: HOSPADM

## 2021-07-16 RX ORDER — SUCRALFATE 1 G/1
1 TABLET ORAL 4 TIMES DAILY PRN
Status: DISCONTINUED | OUTPATIENT
Start: 2021-07-16 | End: 2021-07-17 | Stop reason: HOSPADM

## 2021-07-16 RX ADMIN — BUSPIRONE HYDROCHLORIDE 15 MG: 10 TABLET ORAL at 21:14

## 2021-07-16 RX ADMIN — SODIUM CHLORIDE: 9 INJECTION, SOLUTION INTRAVENOUS at 18:29

## 2021-07-16 RX ADMIN — HYDROCODONE BITARTRATE AND ACETAMINOPHEN 1 TABLET: 10; 325 TABLET ORAL at 21:15

## 2021-07-16 RX ADMIN — SODIUM CHLORIDE: 9 INJECTION, SOLUTION INTRAVENOUS at 14:24

## 2021-07-16 RX ADMIN — MORPHINE SULFATE 1 MG: 2 INJECTION, SOLUTION INTRAMUSCULAR; INTRAVENOUS at 16:36

## 2021-07-16 RX ADMIN — SODIUM CHLORIDE 1000 ML: 9 INJECTION, SOLUTION INTRAVENOUS at 13:21

## 2021-07-16 RX ADMIN — CLONAZEPAM 1 MG: 1 TABLET ORAL at 21:15

## 2021-07-16 RX ADMIN — SODIUM CHLORIDE: 9 INJECTION, SOLUTION INTRAVENOUS at 18:45

## 2021-07-16 ASSESSMENT — ENCOUNTER SYMPTOMS
ACTIVITY CHANGE: 1
CHILLS: 0
CHEST TIGHTNESS: 0
LIGHT-HEADEDNESS: 1
BACK PAIN: 0
CONFUSION: 0
SHORTNESS OF BREATH: 0
FEVER: 0
DIZZINESS: 1
WEAKNESS: 1
WOUND: 0
SORE THROAT: 0
HEMATURIA: 0
FATIGUE: 1
DIARRHEA: 0
ABDOMINAL PAIN: 0
VOMITING: 0
CONSTIPATION: 0
NAUSEA: 0
ADENOPATHY: 0
BRUISES/BLEEDS EASILY: 0

## 2021-07-16 ASSESSMENT — MIFFLIN-ST. JEOR
SCORE: 1338.75
SCORE: 1375.03

## 2021-07-16 NOTE — ED PROVIDER NOTES
"  History     Chief Complaint   Patient presents with     Chest Pain     Hypotension     Generalized Weakness     Dizziness     HPI  Ankita Day is a 66 year old female who reports that over the past 2 weeks she has felt \"achy\".  She does report that she was recently started on Metroprolol 50 mg p.o. daily which she usually takes at night.  Over the past 3 days she has been having some pressure on her heart.  She has a persistent headache with nausea and weakness.  Anxiety has been increasing as well.  She reports she took her blood pressure at home and it was 77/52.  She feels lightheaded and \"woozy\".  She is currently on Plavix and Xarelto with a history of PE in the past.    Allergies:  Allergies   Allergen Reactions     Atorvastatin Muscle Pain (Myalgia)     Tiotropium Bromide [Tiotropium] Rash     Advil [Ibuprofen] Other (See Comments)     Does not recall but feel like it interacted with blood thinners and HX of GI BLEED     Ezetimibe Muscle Pain (Myalgia)     Latex Rash     Niacin      Other reaction(s): Flushing     No Clinical Screening - See Comments Itching, Rash and Blisters     Metals and plastics       Tape [Adhesive Tape] Rash       Problem List:    Patient Active Problem List    Diagnosis Date Noted     Chronic stable angina (H) 02/05/2020     Priority: Medium     Chronic ischemic heart disease 02/05/2020     Priority: Medium     History of pulmonary embolism 01/02/2020     Priority: Medium     CKD (chronic kidney disease) stage 3, GFR 30-59 ml/min 06/19/2019     Priority: Medium     Chronic anxiety 01/22/2018     Priority: Medium     Collagenous colitis 01/22/2018     Priority: Medium     Overview:   Possible Dx 2007       Major depressive disorder, recurrent episode, severe (H) 01/22/2018     Priority: Medium     Essential hypertension 01/22/2018     Priority: Medium     Mixed hyperlipidemia 01/22/2018     Priority: Medium     Osteoarthritis 01/22/2018     Priority: Medium     Panic attack " 01/22/2018     Priority: Medium     Status post coronary angiogram 09/15/2017     Priority: Medium     History of tobacco abuse 08/10/2017     Priority: Medium     Presence of stent in coronary artery in patient with coronary artery disease 08/10/2017     Priority: Medium     History of coronary artery bypass graft x 3 08/10/2017     Priority: Medium     Noncompliance with CPAP treatment 10/21/2016     Priority: Medium     Intractable chronic common migraine without aura 10/21/2016     Priority: Medium     H/O multiple concussions 10/21/2016     Priority: Medium     History of Clostridium difficile 08/19/2016     Priority: Medium     Iron deficiency anemia 07/26/2016     Priority: Medium     Chest wall pain, chronic 11/04/2015     Priority: Medium     Controlled substance agreement updated 10/9/2020 01/28/2014     Priority: Medium     Overview:        Pain medication agreement 01/28/2014     Priority: Medium     Formatting of this note might be different from the original.       Central sleep apnea 10/14/2013     Priority: Medium     Myofascial pain 10/14/2013     Priority: Medium     Vitamin D deficiency 05/06/2013     Priority: Medium     Subclavian artery stenosis, left (H) 03/31/2013     Priority: Medium     Overview:   S/p prox left SCA stent 4/1/2013       Lumbar facet arthropathy 01/02/2013     Priority: Medium     Nonspecific abnormal results of pulmonary system function study 12/21/2011     Priority: Medium     Slow transit constipation 11/29/2011     Priority: Medium     Gastric ulcer with hemorrhage 10/03/2011     Priority: Medium     Family history of malignant neoplasm of gastrointestinal tract 09/26/2011     Priority: Medium     Nodular degeneration of cornea 09/26/2011     Priority: Medium     Bilateral low back pain without sciatica 05/31/2011     Priority: Medium     Chronic pain disorder 12/01/2010     Priority: Medium     COPD (chronic obstructive pulmonary disease) (H) 06/15/2007     Priority:  Medium     Overview:   Low DLCO, normal spirometry on 12/15/11       Peptic ulcer 06/15/2007     Priority: Medium     Postsurgical aortocoronary bypass status 02/12/2003     Priority: Medium     Coronary atherosclerosis 09/12/2002     Priority: Medium     Overview:   Multiple Angigrams prior to 2007. Also:  6/5/2007: Angiogram: TAXUS DELONTE to ostial circumflux, instent restenosis  5/23/2008: Left Main 30% diseased, LAD stents patent, Left circ stent patent, Chronic occluded right internal mammary artery graft to distal RCA, native RCA has 30% stenosis; NO intevention  9/19/2012 CT Angiogram: Patent LIMA to LAD, patent LAD stents, moderate proximal circumflex disease, patent RCA , occluded right internal mammary artery graft to distal RCA.  9/20/2012: Angiogram: Stent to Left Main, ostial LAD, and PTCA of ostial left circumflex          Past Medical History:    Past Medical History:   Diagnosis Date     Acute ischemic heart disease (H)      Acute myocardial infarction (H)      Anxiety disorder      Atherosclerotic heart disease of native coronary artery without angina pectoris      Bilateral carpal tunnel syndrome      Cervicalgia      Chest pain      Chronic gastric ulcer without hemorrhage or perforation      Chronic ischemic heart disease      Chronic obstructive pulmonary disease (H)      Chronic or unspecified gastric ulcer with hemorrhage      Chronic pain syndrome      Constipation      Coronary angioplasty status      Coronary angioplasty status      Coronary angioplasty status      Dorsalgia      Encounter for other administrative examinations      Encounter for screening for cardiovascular disorders      Enterocolitis due to Clostridium difficile      Essential (primary) hypertension      Hyperlipidemia      Major depressive disorder, single episode      Migraine without status migrainosus, not intractable      Nodular corneal degeneration      Noninfective gastroenteritis and colitis      Noninfective  gastroenteritis and colitis      Osteoarthritis      Other chest pain      Pain in knee      Pain in right shoulder      Panic disorder without agoraphobia      Peptic ulcer without hemorrhage or perforation      Peripheral vascular disease (H)      Personal history of diseases of the blood and blood-forming organs and certain disorders involving the immune mechanism (CODE)      Personal history of nicotine dependence      Personal history of other medical treatment (CODE)      Presence of aortocoronary bypass graft      Primary central sleep apnea      Sepsis due to Escherichia coli (E. coli) (H)      Stricture of artery (H)      Thoracic, thoracolumbar and lumbosacral intervertebral disc disorder      Uncomplicated opioid abuse (H)      Vitamin D deficiency        Past Surgical History:    Past Surgical History:   Procedure Laterality Date     ANGIOPLASTY      9/12/02,with triple stenting     APPENDECTOMY OPEN      No Comments Provided     ARTHROSCOPY KNEE      left     ARTHROSCOPY SHOULDER Right 05/12/2017    labral tear, rotator cuff tear and some subacromial decompression      BYPASS GRAFT ARTERY CORONARY      12/13/02,Triple bypass, left internal mammary  to LAD, right internal mammary to right coronary artery, saphenous to obtuse marginal of the left circumflex.     COLONOSCOPY      2011,Dr Bowman benign polyps     COLONOSCOPY  10/03/2011    2011,benign polyps, Dr. Bowman     COLONOSCOPY  08/08/2016 8/8/16,normal, Dr Bowman     ELBOW SURGERY      baby,birth malachi removed from right arm     EMBOLECTOMY UPPER EXTREMITY  04/02/2013    brachial artery pseudoaneurysm after stenting     ESOPHAGOSCOPY, GASTROSCOPY, DUODENOSCOPY (EGD), COMBINED      2011,EGD Dr Bowman with pyloric ulcer     ESOPHAGOSCOPY, GASTROSCOPY, DUODENOSCOPY (EGD), COMBINED      2011,pyloric ulcer, Dr. Bowman     ESOPHAGOSCOPY, GASTROSCOPY, DUODENOSCOPY (EGD), COMBINED      8/8/16,mild gastritis, Dr Bowman     ESOPHAGOSCOPY, GASTROSCOPY,  DUODENOSCOPY (EGD), COMBINED      2017,Dr Bowman. Antral ulcer     ESOPHAGOSCOPY, GASTROSCOPY, DUODENOSCOPY (EGD), COMBINED  2018    Dr Bowman, healed ulcer     HYSTERECTOMY TOTAL ABDOMINAL      age 21     LAPAROSCOPIC CHOLECYSTECTOMY      2006     OSTEOTOMY FEMUR DISTAL      x3, right knee     OSTEOTOMY FEMUR DISTAL      2000,left knee  ligament surgery     OTHER SURGICAL HISTORY      1/10/2003,,PTCA     OTHER SURGICAL HISTORY      2012,,PTCA,DELONTE in LAD and left main     OTHER SURGICAL HISTORY      2013,,PTCA,L subclavian stenosis     SALPINGO-OOPHORECTOMY BILATERAL      age 27,Bilateral salpingo-oophorectomy     TONSILLECTOMY, ADENOIDECTOMY, COMBINED      childhood       Family History:    Family History   Problem Relation Age of Onset     Heart Disease Father         Heart Disease,Heart condition/Significant for atherosclerotic cardiovascular disease, but non premature.     Colon Cancer Father         Cancer-colon, of colon cancer     Cancer Father         Cancer,mets to liver, secondary to colon cancer     Heart Disease Mother         Heart Disease     Cancer Other         Cancer,Multiple Myeloma     Heart Disease Other         Heart Disease,Ischemic Heart Disease     Colon Cancer Other         Cancer-colon,Malignant neoplasms     Cancer Sister         Cancer,multiple myeloma     Other - See Comments Son         gallstones       Social History:  Marital Status:   [2]  Social History     Tobacco Use     Smoking status: Former Smoker     Packs/day: 0.25     Years: 35.00     Pack years: 8.75     Types: Cigarettes     Quit date: 2/15/2017     Years since quittin.4     Smokeless tobacco: Never Used   Substance Use Topics     Alcohol use: Not Currently     Alcohol/week: 0.0 standard drinks     Drug use: No        Medications:    albuterol (PROAIR HFA/PROVENTIL HFA/VENTOLIN HFA) 108 (90 Base) MCG/ACT inhaler  amLODIPine (NORVASC) 10 MG tablet  busPIRone (BUSPAR) 15 MG  "tablet  clonazePAM (KLONOPIN) 1 MG tablet  clopidogrel (PLAVIX) 75 MG tablet  cyanocobalamin (VITAMIN B-12) 1000 MCG tablet  Diclofenac Sodium 1.5 % SOLN  HYDROcodone-acetaminophen (NORCO)  MG per tablet  HYDROcodone-acetaminophen (NORCO)  MG per tablet  lisinopril (ZESTRIL) 20 MG tablet  metoprolol succinate ER (TOPROL-XL) 25 MG 24 hr tablet  metoprolol succinate ER (TOPROL-XL) 50 MG 24 hr tablet  naloxone (NARCAN) 4 MG/0.1ML nasal spray  nitroGLYcerin (NITROLINGUAL) 0.4 MG/SPRAY spray  omeprazole (PRILOSEC) 20 MG DR capsule  ondansetron (ZOFRAN) 4 MG tablet  polyethylene glycol (MIRALAX) 17 GM/Dose powder  pregabalin (LYRICA) 75 MG capsule  ranolazine (RANEXA) 500 MG 12 hr tablet  rivaroxaban ANTICOAGULANT (XARELTO) 15 MG TABS tablet  rosuvastatin (CRESTOR) 10 MG tablet  rosuvastatin (CRESTOR) 20 MG tablet  sucralfate (CARAFATE) 1 GM tablet  VITAMIN D, CHOLECALCIFEROL, PO  vortioxetine (TRINTELLIX) 20 MG tablet          Review of Systems   Constitutional: Positive for activity change and fatigue. Negative for chills and fever.   HENT: Negative for congestion and sore throat.    Eyes: Negative for visual disturbance.   Respiratory: Negative for chest tightness and shortness of breath.    Cardiovascular: Negative for chest pain.   Gastrointestinal: Negative for abdominal pain, constipation, diarrhea, nausea and vomiting.   Genitourinary: Negative for hematuria.   Musculoskeletal: Negative for back pain.   Skin: Negative for rash and wound.   Neurological: Positive for dizziness, weakness and light-headedness. Negative for syncope.   Hematological: Negative for adenopathy. Does not bruise/bleed easily.   Psychiatric/Behavioral: Negative for confusion.   All other systems reviewed and are negative.      Physical Exam   BP: 105/54  Pulse: 56  Temp: 98.7  F (37.1  C)  Resp: 20  Height: 165.1 cm (5' 5\")  Weight: 80.3 kg (177 lb)  SpO2: 97 %      Physical Exam  Vitals and nursing note reviewed. "   Constitutional:       General: She is not in acute distress.     Appearance: She is not ill-appearing or toxic-appearing.   HENT:      Head: No raccoon eyes or Jackson's sign.      Jaw: No trismus.      Right Ear: No drainage or tenderness.      Left Ear: No drainage or tenderness.      Nose: Nose normal.   Eyes:      General: Lids are normal. Gaze aligned appropriately. No scleral icterus.     Extraocular Movements: Extraocular movements intact.      Right eye: No nystagmus.      Left eye: No nystagmus.      Pupils:      Right eye: Pupil is reactive and not sluggish.      Left eye: Pupil is reactive and not sluggish.   Neck:      Vascular: No JVD.      Trachea: No tracheal deviation.   Cardiovascular:      Rate and Rhythm: Normal rate.   Pulmonary:      Effort: Pulmonary effort is normal. No respiratory distress.      Breath sounds: No stridor. No wheezing.      Comments: Lung sounds are clear but decreased.  SaO2 is 97% on room air.  She is not appear to be in any respiratory distress.  No tachypnea.  Abdominal:      General: Bowel sounds are normal.      Palpations: Abdomen is soft. There is no mass.      Tenderness: There is no guarding.      Comments: Abdomen is soft nontender no rebound or masses bowel sounds are normal.   Musculoskeletal:         General: No deformity or signs of injury. Normal range of motion.   Skin:     General: Skin is warm and dry.      Coloration: Skin is not jaundiced or pale.   Neurological:      General: No focal deficit present.      Mental Status: She is alert and oriented to person, place, and time.      GCS: GCS eye subscore is 4. GCS verbal subscore is 5. GCS motor subscore is 6.      Motor: No tremor or seizure activity.   Psychiatric:         Mood and Affect: Mood normal.         ED Course     EKG shows sinus bradycardia with a heart rate of 54.  Nonspecific T wave abnormality.  On cardiac telemetry the patient at times is noted to be in with a heart rate of 49.-    Results  for orders placed or performed during the hospital encounter of 07/16/21 (from the past 24 hour(s))   CBC with platelets differential    Narrative    The following orders were created for panel order CBC with platelets differential.  Procedure                               Abnormality         Status                     ---------                               -----------         ------                     CBC with platelets and d...[[id number]]                      Final result                 Please view results for these tests on the individual orders.   D dimer quantitative   Result Value Ref Range    D-Dimer Quantitative <=0.27 0.00 - 0.50 ug/mL FEU    Narrative    This D-dimer assay is intended for use in conjunction with a clinical pretest probability assessment model to exclude pulmonary embolism (PE) and deep venous thrombosis (DVT) in outpatients suspected of PE or DVT. The cut-off value is 0.50 ug/mL FEU.   Comprehensive metabolic panel   Result Value Ref Range    Sodium 136 134 - 144 mmol/L    Potassium 3.7 3.5 - 5.1 mmol/L    Chloride 104 98 - 107 mmol/L    Carbon Dioxide (CO2) 24 21 - 31 mmol/L    Anion Gap 8 3 - 14 mmol/L    Urea Nitrogen 25 7 - 25 mg/dL    Creatinine 1.29 (H) 0.60 - 1.20 mg/dL    Calcium 9.5 8.6 - 10.3 mg/dL    Glucose 142 (H) 70 - 105 mg/dL    Alkaline Phosphatase 54 34 - 104 U/L    AST 17 13 - 39 U/L    ALT 11 7 - 52 U/L    Protein Total 6.8 6.4 - 8.9 g/dL    Albumin 4.3 3.5 - 5.7 g/dL    Bilirubin Total 0.4 0.3 - 1.0 mg/dL    GFR Estimate 43 (L) >60 mL/min/1.73m2   Troponin I   Result Value Ref Range    Troponin I <2.4 0.0 - 34.0 pg/mL   Nt probnp inpatient (BNP)   Result Value Ref Range    N terminal Pro BNP Inpatient 152 (H) 0 - 100 pg/mL   CRP inflammation   Result Value Ref Range    CRP Inflammation <1.0 <10.0 mg/L   Erythrocyte sedimentation rate auto   Result Value Ref Range    Erythrocyte Sedimentation Rate 5 0 - 30 mm/hr   CBC with platelets and differential   Result Value  Ref Range    WBC Count 5.0 4.0 - 11.0 10e3/uL    RBC Count 4.02 3.80 - 5.20 10e6/uL    Hemoglobin 11.7 11.7 - 15.7 g/dL    Hematocrit 35.0 35.0 - 47.0 %    MCV 87 78 - 100 fL    MCH 29.1 26.5 - 33.0 pg    MCHC 33.4 31.5 - 36.5 g/dL    RDW 13.4 10.0 - 15.0 %    Platelet Count 214 150 - 450 10e3/uL    % Neutrophils 47 %    % Lymphocytes 38 %    % Monocytes 11 %    % Eosinophils 3 %    % Basophils 1 %    % Immature Granulocytes 0 %    NRBCs per 100 WBC 0 <1 /100    Absolute Neutrophils 2.3 1.6 - 8.3 10e3/uL    Absolute Lymphocytes 1.9 0.8 - 5.3 10e3/uL    Absolute Monocytes 0.6 0.0 - 1.3 10e3/uL    Absolute Eosinophils 0.2 0.0 - 0.7 10e3/uL    Absolute Basophils 0.0 0.0 - 0.2 10e3/uL    Absolute Immature Granulocytes 0.0 <=0.0 10e3/uL    Absolute NRBCs 0.0 10e3/uL   XR Chest Port 1 View    Narrative    PROCEDURE:  XR CHEST PORT 1 VIEW    HISTORY:  chest pain.     COMPARISON:  April 2020    FINDINGS:   The cardiac silhouette is normal in size. Postoperative changes are  seen in the mediastinum The pulmonary vasculature is normal.  There is  some linear scarring or atelectasis at the left lung base. No pleural  effusion or pneumothorax.      Impression    IMPRESSION:  Atelectasis or scarring at the left lung base      KHALIF SWIFT MD         SYSTEM ID:  [id number]   Troponin I (second draw)   Result Value Ref Range    Troponin I 3.1 0.0 - 34.0 pg/mL   UA with Microscopic reflex to Culture    Specimen: Urine, Clean Catch   Result Value Ref Range    Color Urine Yellow Colorless, Straw, Light Yellow, Yellow    Appearance Urine Clear Clear    Glucose Urine Negative Negative mg/dL    Bilirubin Urine Negative Negative    Ketones Urine Negative Negative mg/dL    Specific Gravity Urine 1.006 1.000 - 1.030    Blood Urine Negative Negative    pH Urine 5.5 5.0 - 9.0    Protein Albumin Urine Negative Negative mg/dL    Urobilinogen Urine Normal Normal, 2.0 mg/dL    Nitrite Urine Negative Negative    Leukocyte Esterase Urine  Moderate (A) Negative    Bacteria Urine Moderate (A) None Seen /HPF    RBC Urine 1 <=2 /HPF    WBC Urine 9 (H) <=5 /HPF    Narrative    Urine Culture ordered based on laboratory criteria   Asymptomatic COVID-19 Virus (Coronavirus) by PCR Nasopharyngeal    Specimen: Nasopharyngeal; Swab    Narrative    The following orders were created for panel order Asymptomatic COVID-19 Virus (Coronavirus) by PCR Nasopharyngeal.  Procedure                               Abnormality         Status                     ---------                               -----------         ------                     SARS-COV2 (COVID-19) Vir...[[id number]]  Normal              Final result                 Please view results for these tests on the individual orders.   SARS-COV2 (COVID-19) Virus RT-PCR    Specimen: Nasopharyngeal; Swab   Result Value Ref Range    SARS CoV2 PCR Negative Negative    Narrative    Testing was performed using the Xpert Xpress SARS-CoV-2 Assay on the   Cepheid Gene-Xpert Instrument Systems. Additional information about   this Emergency Use Authorization (EUA) assay can be found via the Lab   Guide. This test should be ordered for the detection of SARS-CoV-2 in   individuals who meet SARS-CoV-2 clinical and/or epidemiological   criteria. Test performance is unknown in asymptomatic patients. This   test is for in vitro diagnostic use under the FDA EUA for   laboratories certified under CLIA to perform high complexity testing.   This test has not been FDA cleared or approved. A negative result   does not rule out the presence of PCR inhibitors in the specimen or   target RNA in concentration below the limit of detection for the   assay. The possibility of a false negative should be considered if   the patient's recent exposure or clinical presentation suggests   COVID-19. This test was validated by [facility]. This laboratory is certified under the [facility] Laboratory  Improvement Amendments (CLIA) as qualified to perform high complex  ity clinical laboratory testing.       Medications   morphine (PF) injection 1 mg (1 mg Intravenous Given 7/16/21 1636)   busPIRone (BUSPAR) tablet 15 mg (has no administration in time range)   clonazePAM (klonoPIN) tablet 1 mg (has no administration in time range)   clopidogrel (PLAVIX) tablet 75 mg (has no administration in time range)   HYDROcodone-acetaminophen (NORCO)  MG per tablet 1-2 tablet (has no administration in time range)   lisinopril (ZESTRIL) tablet 10 mg (has no administration in time range)   nitroGLYcerin (NITROSTAT) sublingual tablet 0.4 mg (has no administration in time range)   pregabalin (LYRICA) capsule 75 mg (75 mg Oral Not Given 7/16/21 2106)   ranolazine (RANEXA) 12 hr tablet 500 mg (has no administration in time range)   sucralfate (CARAFATE) tablet 1 g (has no administration in time range)   vortioxetine (TRINTELLIX) tablet 20 mg (has no administration in time range)   ondansetron (ZOFRAN-ODT) ODT tab 4 mg (has no administration in time range)     Or   ondansetron (ZOFRAN) injection 4 mg (has no administration in time range)   sodium chloride 0.9% infusion ( Intravenous New Bag 7/16/21 1845)   rivaroxaban ANTICOAGULANT (XARELTO) tablet 15 mg (has no administration in time range)   rosuvastatin (CRESTOR) tablet 20 mg (has no administration in time range)   0.9% sodium chloride BOLUS (0 mLs Intravenous Stopped 7/16/21 1430)       Assessments & Plan (with Medical Decision Making)     I have reviewed the nursing notes.    I have reviewed the findings, diagnosis, plan and need for follow up with the patient.       ED to Inpatient Handoff:    Discussed with Dr. Perez at [facility]  Patient accepted for Observation Stay  Pending studies include none  Code Status: Not Addressed           Current Discharge Medication List          Final diagnoses:   Symptomatic bradycardia   Atypical chest pain   Hypotension due to drugs    Lightheadedness   History of pulmonary embolism     Afebrile.  Vital signs stable.  Patient with general ill feeling over the past 2 weeks since starting metoprolol.  Complaining of a 3-day history of worsening on and off atypical chest pain as well as back pain.  IV established and she was given fluids.  EKG shows sinus bradycardia with a heart rate of 54.  Nonspecific T wave abnormality.  On cardiac telemetry the patient at times is noted to be in with a heart rate of 49.  Initial troponin is normal.  D-dimer is normal.  BNP is only minimally elevated at 152.,  This only slightly worse than her normal.  CBC showed normal white blood cells no left shift.  CMP is unremarkable.  CRP and ESR normal.  She continued to be bradycardic with hypotension and her fluids continued.  She was requesting something for pain however given her hypotension this was held off at this time.  Most of her symptoms are due to her Metroprolol.  We did attempt to get her on her feet at which point she became lightheaded with standing.  Her 90-minute troponin returns only minimally elevated 3.1 where previously it was less than 2.4.  I discussed this case with Dr. Perez and the patient will be admitted for observation at this time and further medical management.  Her Covid test returns negative.  7/16/2021   [facility]     Mikel Ashraf PA-C  07/16/21 2111

## 2021-07-16 NOTE — TELEPHONE ENCOUNTER
Patient declined a refill of Klonopin at last office visit on 6-2-2021.  Next office visit 8-6-2021.    Short term refill pending.    Taylor Hill LPN 7/16/2021 12:25 PM

## 2021-07-16 NOTE — H&P
[facility]    History and Physical - Hospitalist Service       Date of Admission:  7/16/2021    Assessment & Plan      Ankita Day is a 66 year old female admitted on 7/16/2021. She presents with weakness, bradycardia and hypotension.     Principal Problem:    Symptomatic bradycardia  Assessment: Present on admission.  The patient was started on Toprol-XL this spring and has noticed progressively worsening symptoms over the past few weeks.  She had been on metoprolol prior to this.  I do not believe she is taking her old and new prescriptions concurrently.  Plan: Observation status.  Will stop Toprol-XL at this time.  Monitor on telemetry.  Consider restarting Toprol-XL at a 12.5 mg dose rather than the 50 mg dose she is currently taking.    Hypotension  Assessment: Present on admission, I suspect this is related to her Toprol  Plan: Monitor  Active Problems:    COPD (chronic obstructive pulmonary disease) (H)  Assessment: Chronic, stable, no evidence of exacerbation at this time  Plan: Monitor    Major depressive disorder, recurrent episode, severe (H)  Assessment: Chronic, stable  Plan: Continue antidepressants    Essential hypertension  Assessment: Chronic, hypotensive at this time  Plan: Hold amlodipine    CKD (chronic kidney disease) stage 3, GFR 30-59 ml/min  Assessment: Chronic, stable    History of pulmonary embolism  Assessment: Chronic  Plan: Continue Xarelto    Chronic ischemic heart disease  Assessment: No evidence of of ischemia at this time  Plan: Monitor       Diet: Regular Diet Adult    DVT Prophylaxis: xarelto  Varela Catheter: Not present  Central Lines: None  Code Status: Full Code        Disposition Plan   Expected discharge: tomorrow   recommended to prior living arrangement once blood pressure greater than 90 systolic.     The patient's care was discussed with the Patient.    Ronnie Perez MD  [facility]  Securely message with the Vocera Web  "Console (learn more here)  Text page via [facility] Paging/Directory      ______________________________________________________________________    Chief Complaint   Weak, dizzy    History is obtained from the patient    History of Present Illness   Ankita Day is a 66 year old female who presents to the emergency department with 2 weeks of progressively worsening weakness, exercise intolerance and flulike symptoms.  She denies any fevers or chills.  She has had a headache with some nausea.  She notes intermittent chest pressure but none currently.  She was feeling \"woozy\" today and found her blood pressure was 77/52 at home.  With this she presented to the emergency department.    She has a history of coronary artery disease and in April with switched from metoprolol to Toprol-XL.  She was also started on Ranexa.  She has noticed the symptoms have progressively gotten worse over the past 2 to 3 weeks.    In the emergency department her heart rate ranges from 49-52.  Her initial blood pressure was 96/54.  Troponins negative and EKG shows sinus bradycardia without concerning ST changes.  She was placed on observation for symptomatic bradycardia.    Review of Systems    CONSTITUTIONAL: NEGATIVE for fever, chills, change in weight  INTEGUMENTARY/SKIN: NEGATIVE for worrisome rashes, moles or lesions  EYES: NEGATIVE for vision changes or irritation  ENT/MOUTH: NEGATIVE for ear, mouth and throat problems  RESP:POSITIVE for dyspnea on exertion and SOB/dyspnea  CV: POSITIVE for chest pain/chest pressure  GI: POSITIVE for nausea  : NEGATIVE for frequency, dysuria, or hematuria  MUSCULOSKELETAL: NEGATIVE for significant arthralgias or myalgia  NEURO: POSITIVE for dizziness/lightheadedness  ENDOCRINE: NEGATIVE for temperature intolerance, skin/hair changes  HEME: NEGATIVE for bleeding problems  PSYCHIATRIC: NEGATIVE for changes in mood or affect    Past Medical History    I have reviewed this patient's medical history and " updated it with pertinent information if needed.   Past Medical History:   Diagnosis Date     Acute ischemic heart disease (H)     06/15/2007,with PTCA and stenting of 90% osteo circumflex lesion and patent LAD graft, patent left main stent.     Acute myocardial infarction (H)     3/30/2013     Anxiety disorder     No Comments Provided     Atherosclerotic heart disease of native coronary artery without angina pectoris     -angio revealed 3 vessel dz  -4 stents placed; 2 overlapping stents placed in mLAD, 1 stent pRCA, 1 stent pCirc 1 s 9/02 -non-ST elevation MI 1/03  -angio revealed restenosis of Circ.    -PTCA and brachytherapy of pCirc -repeat angio 2/03 -no intervension -CABG x3 12/03 - Dr Sinclair  -LIMA-LAD, RAFI-RCA, SVG-OM -PCI 7/04 stent to L -CTangio 9/05   -patentent LIMA-LAD. RAFI-RCA occluded; RCA ostio/p*     Bilateral carpal tunnel syndrome     No Comments Provided     Cervicalgia     No Comments Provided     Chest pain     12/29/2014     Chronic gastric ulcer without hemorrhage or perforation     10/03/2011,hx of GI bleed (2003)     Chronic ischemic heart disease     06/27/2012     Chronic obstructive pulmonary disease (H)     06/15/2007,low DLCO, normal spirometry     Chronic or unspecified gastric ulcer with hemorrhage     10/2003     Chronic pain syndrome     12/01/2010,chest wall, back     Constipation     11/29/2011     Coronary angioplasty status     09/12/2002,with triple stenting     Coronary angioplasty status     01/10/2003,repeat angioplasty     Coronary angioplasty status     No Comments Provided     Dorsalgia     05/31/2011     Encounter for other administrative examinations     1/28/2014     Encounter for screening for cardiovascular disorders     10/2004,[facility]iol[facility] (2004, 2005, 2006 and 2011)     Enterocolitis due to Clostridium difficile     8/19/2016     Essential (primary) hypertension     No Comments Provided     Hyperlipidemia     No Comments Provided     Major depressive disorder,  single episode     Severe, hx of suicide attempt/hospitalization     Migraine without status migrainosus, not intractable     No Comments Provided     Nodular corneal degeneration     09/26/2011     Noninfective gastroenteritis and colitis     06/15/2007,history of     Noninfective gastroenteritis and colitis     Microcytic     Osteoarthritis     No Comments Provided     Other chest pain     10/13/2009,chronic     Pain in knee     05/31/2011     Pain in right shoulder     No Comments Provided     Panic disorder without agoraphobia     No Comments Provided     Peptic ulcer without hemorrhage or perforation     6/15/2007     Peripheral vascular disease (H)     No Comments Provided     Personal history of diseases of the blood and blood-forming organs and certain disorders involving the immune mechanism (CODE)     No Comments Provided     Personal history of nicotine dependence     No Comments Provided     Personal history of other medical treatment (CODE)     10/14/2013     Presence of aortocoronary bypass graft     2/12/2003     Primary central sleep apnea     10/14/2013     Sepsis due to Escherichia coli (E. coli) (H)     7/14/16,[facility]     Stricture of artery (H)     3/31/2013,S/p prox left SCA stent 4/1/2013     Thoracic, thoracolumbar and lumbosacral intervertebral disc disorder     No Comments Provided     Uncomplicated opioid abuse (H)     history of     Vitamin D deficiency     5/6/2013       Past Surgical History   I have reviewed this patient's surgical history and updated it with pertinent information if needed.  Past Surgical History:   Procedure Laterality Date     ANGIOPLASTY      9/12/02,with triple stenting     APPENDECTOMY OPEN      No Comments Provided     ARTHROSCOPY KNEE      left     ARTHROSCOPY SHOULDER Right 05/12/2017    labral tear, rotator cuff tear and some subacromial decompression      BYPASS GRAFT ARTERY CORONARY      12/13/02,Triple bypass, left internal mammary  to LAD, right  internal mammary to right coronary artery, saphenous to obtuse marginal of the left circumflex.     COLONOSCOPY      ,Dr Bowman benign polyps     COLONOSCOPY  10/03/2011    2011,benign polyps, Dr. Bowman     COLONOSCOPY  2016,normal, Dr Bowman     ELBOW SURGERY      baby,birth malachi removed from right arm     EMBOLECTOMY UPPER EXTREMITY  2013    brachial artery pseudoaneurysm after stenting     ESOPHAGOSCOPY, GASTROSCOPY, DUODENOSCOPY (EGD), COMBINED      ,EGD Dr Bowman with pyloric ulcer     ESOPHAGOSCOPY, GASTROSCOPY, DUODENOSCOPY (EGD), COMBINED      ,pyloric ulcer, Dr. Bowman     ESOPHAGOSCOPY, GASTROSCOPY, DUODENOSCOPY (EGD), COMBINED      16,mild gastritis, Dr Bowman     ESOPHAGOSCOPY, GASTROSCOPY, DUODENOSCOPY (EGD), COMBINED      2017,Dr Bowman. Antral ulcer     ESOPHAGOSCOPY, GASTROSCOPY, DUODENOSCOPY (EGD), COMBINED  2018    Dr Bowman, healed ulcer     HYSTERECTOMY TOTAL ABDOMINAL      age 21     LAPAROSCOPIC CHOLECYSTECTOMY      2006     OSTEOTOMY FEMUR DISTAL      x3, right knee     OSTEOTOMY FEMUR DISTAL      2000,left knee  ligament surgery     OTHER SURGICAL HISTORY      1/10/2003,,PTCA     OTHER SURGICAL HISTORY      2012,,PTCA,DELONTE in LAD and left main     OTHER SURGICAL HISTORY      2013,,PTCA,L subclavian stenosis     SALPINGO-OOPHORECTOMY BILATERAL      age 27,Bilateral salpingo-oophorectomy     TONSILLECTOMY, ADENOIDECTOMY, COMBINED      childhood       Social History   I have reviewed this patient's social history and updated it with pertinent information if needed.  Social History     Tobacco Use     Smoking status: Former Smoker     Packs/day: 0.25     Years: 35.00     Pack years: 8.75     Types: Cigarettes     Quit date: 2/15/2017     Years since quittin.4     Smokeless tobacco: Never Used   Substance Use Topics     Alcohol use: Not Currently     Alcohol/week: 0.0 standard drinks     Drug use: No       Family History   I  have reviewed this patient's family history and updated it with pertinent information if needed.  Family History   Problem Relation Age of Onset     Heart Disease Father         Heart Disease,Heart condition/Significant for atherosclerotic cardiovascular disease, but non premature.     Colon Cancer Father         Cancer-colon, of colon cancer     Cancer Father         Cancer,mets to liver, secondary to colon cancer     Heart Disease Mother         Heart Disease     Cancer Other         Cancer,Multiple Myeloma     Heart Disease Other         Heart Disease,Ischemic Heart Disease     Colon Cancer Other         Cancer-colon,Malignant neoplasms     Cancer Sister         Cancer,multiple myeloma     Other - See Comments Son         gallstones       Prior to Admission Medications   Prior to Admission Medications   Prescriptions Last Dose Informant Patient Reported? Taking?   Diclofenac Sodium 1.5 % SOLN   No No   Sig: Apply 20 drops to each hand or 40 drops to each knee up to 4 times daily as needed for arthritis pain   HYDROcodone-acetaminophen (NORCO)  MG per tablet   No No   Sig: Take 1-2 tablets by mouth every 4 hours as needed for severe pain 6 per day   HYDROcodone-acetaminophen (NORCO)  MG per tablet   No No   Sig: Take 1-2 tablets by mouth every 4 hours as needed for severe pain 6 per day   VITAMIN D, CHOLECALCIFEROL, PO  Self Yes No   Sig: Take 5,000 Units by mouth daily   albuterol (PROAIR HFA/PROVENTIL HFA/VENTOLIN HFA) 108 (90 Base) MCG/ACT inhaler   No No   Sig: Inhale 2 puffs into the lungs every 6 hours   amLODIPine (NORVASC) 10 MG tablet   No No   Sig: TAKE 1 TABLET (10 MG) BY MOUTH DAILY DX. CODE: I10.   busPIRone (BUSPAR) 15 MG tablet   No No   Sig: Take 1 tablet (15 mg) by mouth 2 times daily   clonazePAM (KLONOPIN) 1 MG tablet   No No   Sig: Take 1 tablet (1 mg) by mouth 3 times daily as needed for anxiety Max 2.5 per day = 225 per 90 days   clopidogrel (PLAVIX) 75 MG tablet   No No    Sig: Take 1 tablet (75 mg) by mouth daily   cyanocobalamin (VITAMIN B-12) 1000 MCG tablet   No No   Sig: Take 1 tablet (1,000 mcg) by mouth daily   lisinopril (ZESTRIL) 20 MG tablet   No No   Sig: Take 0.5 tablets (10 mg) by mouth daily   metoprolol succinate ER (TOPROL-XL) 25 MG 24 hr tablet   No No   Sig: Take 1 tablet (25 mg) by mouth 2 times daily   metoprolol succinate ER (TOPROL-XL) 50 MG 24 hr tablet   No No   Sig: Take 1 tablet (50 mg) by mouth At Bedtime   naloxone (NARCAN) 4 MG/0.1ML nasal spray   No No   Sig: Spray into one nostril for opioid reversal if unresponsive. May repeat every 2-3 minutes until patient responsive or EMS arrives   nitroGLYcerin (NITROLINGUAL) 0.4 MG/SPRAY spray   No No   Sig: For chest pain spray 1 spray under tongue every 5 minutes for 3 doses. If symptoms persist 5 minutes after 1st dose call 911.   omeprazole (PRILOSEC) 20 MG DR capsule   No No   Sig: Take 1 capsule (20 mg) by mouth 2 times daily   ondansetron (ZOFRAN) 4 MG tablet   No No   Sig: Take 1 tablet (4 mg) by mouth every 6 hours as needed for nausea   polyethylene glycol (MIRALAX) 17 GM/Dose powder   No No   Sig: Take 17 g by mouth daily   pregabalin (LYRICA) 75 MG capsule   No No   Sig: Take 1 capsule (75 mg) by mouth 3 times daily   ranolazine (RANEXA) 500 MG 12 hr tablet   No No   Sig: Take 1 tablet (500 mg) by mouth 2 times daily   rivaroxaban ANTICOAGULANT (XARELTO) 15 MG TABS tablet   No No   Sig: Take 1 tablet (15 mg) by mouth daily (with dinner)   rosuvastatin (CRESTOR) 10 MG tablet   No No   Sig: Take 2 tablets (20 mg) by mouth At Bedtime   rosuvastatin (CRESTOR) 20 MG tablet   Yes No   Sig: Take 20 mg by mouth   sucralfate (CARAFATE) 1 GM tablet   No No   Sig: Take 1 tablet (1 g) by mouth 4 times daily as needed for nausea (or dark stools)   vortioxetine (TRINTELLIX) 20 MG tablet   No No   Sig: Take 1 tablet (20 mg) by mouth daily      Facility-Administered Medications: None     Allergies   Allergies    Allergen Reactions     Atorvastatin Muscle Pain (Myalgia)     Tiotropium Bromide [Tiotropium] Rash     Advil [Ibuprofen] Other (See Comments)     Does not recall but feel like it interacted with blood thinners and HX of GI BLEED     Ezetimibe Muscle Pain (Myalgia)     Latex Rash     Niacin      Other reaction(s): Flushing     No Clinical Screening - See Comments Itching, Rash and Blisters     Metals and plastics       Tape [Adhesive Tape] Rash       Physical Exam   Vital Signs: Temp: 97  F (36.1  C) Temp src: Tympanic BP: 115/59 Pulse: 58   Resp: 16 SpO2: 98 % O2 Device: None (Room air)    Weight: 185 lbs 0 oz    Constitutional: In no apparent distress  Eyes: pupils reactive, extraocular movements intact. Anicteric sclera.   HEENT: TM's normal, oropharynx nonerythematous. Neck supple, no JVD.  Respiratory: no crackles noted, no wheezes.  Cardiovascular:  Bradycardia, regular, no murmur. No lower extremity edema.  GI: soft, non-tender, bowel sounds present.  Lymph/Hematologic: no cervical or supraclavicular LAD.  Genitourinary: deferred  Skin: no rashes, or sores  Musculoskeletal: no joint erythema or swelling  Neurologic: cranial nerves symmetric. Neuro exam nonfocal  Psychiatric: alert and oriented x3. Interactive.       Data   Data reviewed today: I reviewed all medications, new labs and imaging results over the last 24 hours. I personally reviewed the EKG tracing showing sinus bradycardia, no ST changes.    Recent Labs   Lab 07/16/21  1325   WBC 5.0   HGB 11.7   MCV 87         POTASSIUM 3.7   CHLORIDE 104   CO2 24   BUN 25   CR 1.29*   ANIONGAP 8   GM 9.5   *   ALBUMIN 4.3   PROTTOTAL 6.8   BILITOTAL 0.4   ALKPHOS 54   ALT 11   AST 17     Recent Results (from the past 24 hour(s))   XR Chest Port 1 View    Narrative    PROCEDURE:  XR CHEST PORT 1 VIEW    HISTORY:  chest pain.     COMPARISON:  April 2020    FINDINGS:   The cardiac silhouette is normal in size. Postoperative changes are  seen  in the mediastinum The pulmonary vasculature is normal.  There is  some linear scarring or atelectasis at the left lung base. No pleural  effusion or pneumothorax.      Impression    IMPRESSION:  Atelectasis or scarring at the left lung base      KHALIF SWIFT MD         SYSTEM ID:  [id number]

## 2021-07-16 NOTE — PROGRESS NOTES
Admission Note    Data:  Ankita Day admitted to 315 from emergency room at 1738.      Action:  Dr. Perez has been notified of admission. Pt oriented to unit, call light in reach.     Response:  Patient tolerated transfer well .

## 2021-07-16 NOTE — ED TRIAGE NOTES
"Pt to Er with c/o CP,hypotension.  Recently cardiology started her on metoprolol.  Has been feeling \"icky\" for 2 weeks or so. Pain to upper back, \"pressure on my heart\", persistent headache, nausea, weakness, anxiety has been increasing randomly.  Took BP at home 77/52. Admits dizziness.  "

## 2021-07-16 NOTE — TELEPHONE ENCOUNTER
Refilled.  PDMP Review       Value Time User    State PDMP site checked  Yes 7/16/2021 12:44 PM Ramirez Cano MD

## 2021-07-17 VITALS
OXYGEN SATURATION: 97 % | HEART RATE: 56 BPM | DIASTOLIC BLOOD PRESSURE: 38 MMHG | SYSTOLIC BLOOD PRESSURE: 105 MMHG | RESPIRATION RATE: 16 BRPM | WEIGHT: 180.5 LBS | HEIGHT: 65 IN | TEMPERATURE: 96.9 F | BODY MASS INDEX: 30.07 KG/M2

## 2021-07-17 LAB
ANION GAP SERPL CALCULATED.3IONS-SCNC: 9 MMOL/L (ref 3–14)
BUN SERPL-MCNC: 17 MG/DL (ref 7–25)
CALCIUM SERPL-MCNC: 9.4 MG/DL (ref 8.6–10.3)
CHLORIDE BLD-SCNC: 107 MMOL/L (ref 98–107)
CO2 SERPL-SCNC: 25 MMOL/L (ref 21–31)
CREAT SERPL-MCNC: 0.99 MG/DL (ref 0.6–1.2)
ERYTHROCYTE [DISTWIDTH] IN BLOOD BY AUTOMATED COUNT: 13.5 % (ref 10–15)
GFR SERPL CREATININE-BSD FRML MDRD: 59 ML/MIN/1.73M2
GLUCOSE BLD-MCNC: 97 MG/DL (ref 70–105)
HCT VFR BLD AUTO: 38.7 % (ref 35–47)
HGB BLD-MCNC: 12.9 G/DL (ref 11.7–15.7)
MCH RBC QN AUTO: 29.2 PG (ref 26.5–33)
MCHC RBC AUTO-ENTMCNC: 33.3 G/DL (ref 31.5–36.5)
MCV RBC AUTO: 88 FL (ref 78–100)
PLATELET # BLD AUTO: 217 10E3/UL (ref 150–450)
POTASSIUM BLD-SCNC: 4.4 MMOL/L (ref 3.5–5.1)
RBC # BLD AUTO: 4.42 10E6/UL (ref 3.8–5.2)
SODIUM SERPL-SCNC: 141 MMOL/L (ref 134–144)
TROPONIN I SERPL-MCNC: 3.9 PG/ML (ref 0–34)
WBC # BLD AUTO: 4.9 10E3/UL (ref 4–11)

## 2021-07-17 PROCEDURE — G0378 HOSPITAL OBSERVATION PER HR: HCPCS

## 2021-07-17 PROCEDURE — 36415 COLL VENOUS BLD VENIPUNCTURE: CPT | Performed by: INTERNAL MEDICINE

## 2021-07-17 PROCEDURE — 250N000013 HC RX MED GY IP 250 OP 250 PS 637: Performed by: INTERNAL MEDICINE

## 2021-07-17 PROCEDURE — 84484 ASSAY OF TROPONIN QUANT: CPT | Performed by: INTERNAL MEDICINE

## 2021-07-17 PROCEDURE — 85027 COMPLETE CBC AUTOMATED: CPT | Performed by: INTERNAL MEDICINE

## 2021-07-17 PROCEDURE — 80048 BASIC METABOLIC PNL TOTAL CA: CPT | Performed by: INTERNAL MEDICINE

## 2021-07-17 PROCEDURE — 99217 PR OBSERVATION CARE DISCHARGE: CPT | Performed by: INTERNAL MEDICINE

## 2021-07-17 RX ORDER — METOPROLOL SUCCINATE 25 MG/1
12.5 TABLET, EXTENDED RELEASE ORAL DAILY
Qty: 30 TABLET | Refills: 11 | Status: SHIPPED | OUTPATIENT
Start: 2021-07-17 | End: 2021-08-17

## 2021-07-17 RX ORDER — ROSUVASTATIN CALCIUM 20 MG/1
20 TABLET, COATED ORAL DAILY
COMMUNITY
End: 2022-04-29

## 2021-07-17 RX ORDER — ROSUVASTATIN CALCIUM 20 MG/1
20 TABLET, COATED ORAL DAILY
Status: ON HOLD | COMMUNITY
End: 2021-07-17

## 2021-07-17 RX ORDER — RANOLAZINE 500 MG/1
500 TABLET, EXTENDED RELEASE ORAL DAILY
COMMUNITY
End: 2021-09-27

## 2021-07-17 RX ADMIN — RANOLAZINE 500 MG: 500 TABLET, FILM COATED, EXTENDED RELEASE ORAL at 09:27

## 2021-07-17 RX ADMIN — LISINOPRIL 10 MG: 10 TABLET ORAL at 09:27

## 2021-07-17 RX ADMIN — HYDROCODONE BITARTRATE AND ACETAMINOPHEN 2 TABLET: 10; 325 TABLET ORAL at 07:31

## 2021-07-17 RX ADMIN — VORTIOXETINE 20 MG: 10 TABLET, FILM COATED ORAL at 09:27

## 2021-07-17 RX ADMIN — ROSUVASTATIN CALCIUM 20 MG: 20 TABLET, FILM COATED ORAL at 09:27

## 2021-07-17 RX ADMIN — HYDROCODONE BITARTRATE AND ACETAMINOPHEN 2 TABLET: 10; 325 TABLET ORAL at 02:50

## 2021-07-17 RX ADMIN — CLOPIDOGREL BISULFATE 75 MG: 75 TABLET, FILM COATED ORAL at 09:26

## 2021-07-17 RX ADMIN — BUSPIRONE HYDROCHLORIDE 15 MG: 10 TABLET ORAL at 09:26

## 2021-07-17 RX ADMIN — METOPROLOL SUCCINATE 12.5 MG: 25 TABLET, EXTENDED RELEASE ORAL at 10:20

## 2021-07-17 RX ADMIN — RIVAROXABAN 15 MG: 15 TABLET, FILM COATED ORAL at 09:27

## 2021-07-17 ASSESSMENT — MIFFLIN-ST. JEOR: SCORE: 1354.62

## 2021-07-17 NOTE — PHARMACY-ADMISSION MEDICATION HISTORY
Pharmacy -- Admission Medication Reconciliation    Prior to admission (PTA) medications were reviewed and the patient's PTA medication list was updated.    Sources Consulted: Patient Interview, Chart Review, SureScripts, Patient's Home Med List     The reliability of this Medication Reconciliation is: Reliability: Reliable    The following significant changes were made:  -Removed Vitamin B 12, patient no longer taking.   -Patient recently had tablet size changes of lisinopril and metoprol, she will triple check when she gets home that she is taking things correctly.   -Removed Miralax.   -Changed Ranexa to Once daily  -Corrected tablet size of Crestor    *patient has held her Lyrica for about 4 days, wondering if it is effecting her kidneys. She says her MD knows     In addition, the patient's allergies were reviewed with the patient and updated as follows:   Allergies: Atorvastatin, Tiotropium bromide [tiotropium], Advil [ibuprofen], Ezetimibe, Latex, Niacin, No clinical screening - see comments, and Tape [adhesive tape]    The pharmacist has reviewed with the patient that all personal medications should be removed from the building or locked in the belongings safe.  Patient shall only take medications ordered by the physician and administered by the nursing staff.       Medication barriers identified: None noted.    Medication adherence concerns: Stopped taking Lyrica on her own, recent tablet size changes due to insurance, patient will confirm what she is taking   Understanding of emergency medications: ANGEL Lutz [facility], 7/17/2021,  8:25 AM

## 2021-07-17 NOTE — PHARMACY - DISCHARGE MEDICATION RECONCILIATION
Pharmacy:  Discharge Counseling and Medication Reconciliation    Ankita Day  [address] [address] [address]  [phone] (home)   66 year old female  PCP: Ramirez Cano    Allergies: Atorvastatin, Tiotropium bromide [tiotropium], Advil [ibuprofen], Ezetimibe, Latex, Niacin, No clinical screening - see comments, and Tape [adhesive tape]    Discharge Counseling:    Pharmacist met with patient (and/or family) today to review the medication portion of the After Visit Summary (with an emphasis on NEW medications) and to address patient's questions/concerns.    Summary of Education: Met with patient and reviewed indication, expected duration, potential side effects, and answered questions.  Discussed change in dose of metoprolol    Materials Provided:  MedCounselor sheets printed from Clinical Pharmacology on: NA    Discharge Medication Reconciliation:    It has been determined that the patient has an adequate supply of medications available or which can be obtained from the patient's preferred pharmacy.    Thank you for the consult.    Aleksandr Lutz RPH........July 17, 2021 10:11 AM

## 2021-07-17 NOTE — DISCHARGE SUMMARY
"[facility]  Hospitalist Discharge Summary      Date of Admission:  7/16/2021  Date of Discharge:  7/17/2021  Discharging Provider: Ronnie Perez MD      Discharge Diagnoses   Principal Problem:    Symptomatic bradycardia  Active Problems:    COPD (chronic obstructive pulmonary disease) (H)    Major depressive disorder, recurrent episode, severe (H)    Essential hypertension    CKD (chronic kidney disease) stage 3, GFR 30-59 ml/min    History of pulmonary embolism    Chronic ischemic heart disease      Follow-ups Needed After Discharge   Follow-up Appointments     Follow-up and recommended labs and tests       Follow up with Juanita Connolly , at [facility], within 1-2 weeks  to   evaluate medication change.               Discharge Disposition   Discharged to home  Condition at discharge: Stable  [id number]}    Hospital Course      Ankita Day is a 66 year old female who presents to the emergency department with 2 weeks of progressively worsening weakness, exercise intolerance and flulike symptoms.  She denies any fevers or chills.  She has had a headache with some nausea.  She notes intermittent chest pressure but none currently.  She was feeling \"woozy\" today and found her blood pressure was 77/52 at home.  With this she presented to the emergency department.     She has a history of coronary artery disease and in April with switched from metoprolol to Toprol-XL.  She was also started on Ranexa.  She has noticed the symptoms have progressively gotten worse over the past 2 to 3 weeks.     In the emergency department her heart rate ranges from 49-52.  Her initial blood pressure was 96/54.  Troponins negative and EKG shows sinus bradycardia without concerning ST changes.  She was placed on observation for symptomatic bradycardia.    Principal Problem:    Symptomatic bradycardia  Assessment: Present on admission.  The patient was started on Toprol-XL this spring and has noticed progressively " "worsening symptoms over the past few weeks.  She had been on metoprolol prior to this.  I do not believe she is taking her old and new prescriptions concurrently. I held the Toprol and she felt much better, but did note a \"racing heart\" when getting up. HR was in the 90's. Because of this, I started her back on Toprol XL 12.5 mg.    Hypotension  Assessment: Present on admission, I suspect this is related to her Toprol, BP improved over her stay.    Active Problems:    COPD (chronic obstructive pulmonary disease) (H)  Assessment: Chronic, stable, no evidence of exacerbation at this time  Plan: Monitor    Major depressive disorder, recurrent episode, severe (H)  Assessment: Chronic, stable  Plan: Continue antidepressants    CKD (chronic kidney disease) stage 3, GFR 30-59 ml/min  Assessment: Chronic, stable    History of pulmonary embolism  Assessment: Chronic  Plan: Continue Xarelto    Chronic ischemic heart disease  Assessment: No evidence of of ischemia at this time  Plan: Monitor      Consultations This Hospital Stay   None    Code Status   Full Code    Time Spent on this Encounter   I, Ronnie Perez MD, personally saw the patient today and spent less than or equal to 30 minutes discharging this patient.       Ronnie Perez MD  [facility] [place]  [address]  [place] [address]  Phone: [phone]  Fax: [phone]  ______________________________________________________________________    Physical Exam   Vital Signs: Temp: 96.9  F (36.1  C) Temp src: Tympanic BP: 114/60 Pulse: 57   Resp: 16 SpO2: 97 % O2 Device: None (Room air)    Weight: 180 lbs 8 oz  GENERAL: Comfortable, no apparent distress.  CARDIOVASCULAR: regular rate and rhythm, no murmur. No lower extremity edema   RESPIRATORY: Clear to auscultation bilaterally, no wheezes or crackles.  GI: non-tender, non-distended, normal bowel sounds.   SKIN: warm periphery, no rashes         Primary Care Physician   Ramirez GUTIERREZ" Imholte    Discharge Orders      Reason for your hospital stay    Low heart rate     Follow-up and recommended labs and tests     Follow up with Juanita Connolly , at [facility], within 1-2 weeks  to evaluate medication change.     Activity    Your activity upon discharge: activity as tolerated     Diet    Regular Diet Adult       Significant Results and Procedures   Most Recent 3 CBC's:Recent Labs   Lab Test 07/17/21  0604 07/16/21  1325 03/16/21  1110   WBC 4.9 5.0 5.3   HGB 12.9 11.7 12.2   MCV 88 87 86    214 231     Most Recent 3 BMP's:Recent Labs   Lab Test 07/17/21  0604 07/16/21  1325 03/16/21  1110    136 139   POTASSIUM 4.4 3.7 4.0   CHLORIDE 107 104 104   CO2 25 24 26   BUN 17 25 22   CR 0.99 1.29* 0.98   ANIONGAP 9 8 9   GM 9.4 9.5 9.7   GLC 97 142* 87     Most Recent 3 Troponin's:Recent Labs   Lab Test 05/13/19  1301 05/13/19  1125 05/13/19  0949   TROPI <0.030 <0.030 <0.030   ,   Results for orders placed or performed during the hospital encounter of 07/16/21   XR Chest Port 1 View    Narrative    PROCEDURE:  XR CHEST PORT 1 VIEW    HISTORY:  chest pain.     COMPARISON:  April 2020    FINDINGS:   The cardiac silhouette is normal in size. Postoperative changes are  seen in the mediastinum The pulmonary vasculature is normal.  There is  some linear scarring or atelectasis at the left lung base. No pleural  effusion or pneumothorax.      Impression    IMPRESSION:  Atelectasis or scarring at the left lung base      KHALIF SWIFT MD         SYSTEM ID:  [id number]       Discharge Medications   Current Discharge Medication List      CONTINUE these medications which have CHANGED    Details   metoprolol succinate ER (TOPROL-XL) 25 MG 24 hr tablet Take 0.5 tablets (12.5 mg) by mouth daily  Qty: 30 tablet, Refills: 11    Associated Diagnoses: Symptomatic bradycardia         CONTINUE these medications which have NOT CHANGED    Details   amLODIPine (NORVASC) 10 MG tablet TAKE 1 TABLET (10 MG) BY  MOUTH DAILY DX. CODE: I10.  Qty: 90 tablet, Refills: 3    Associated Diagnoses: Essential hypertension      busPIRone (BUSPAR) 15 MG tablet Take 1 tablet (15 mg) by mouth 2 times daily  Qty: 180 tablet, Refills: 1    Associated Diagnoses: Anxiety state      clonazePAM (KLONOPIN) 1 MG tablet Take 1 tablet (1 mg) by mouth 3 times daily as needed for anxiety Max 2.5 per day = 225 per 90 days  Qty: 90 tablet, Refills: 0    Associated Diagnoses: Chronic anxiety; Controlled substance agreement signed      clopidogrel (PLAVIX) 75 MG tablet Take 1 tablet (75 mg) by mouth daily  Qty: 90 tablet, Refills: 4    Associated Diagnoses: Presence of stent in coronary artery in patient with coronary artery disease      Diclofenac Sodium 1.5 % SOLN Apply 20 drops to each hand or 40 drops to each knee up to 4 times daily as needed for arthritis pain  Qty: 450 mL, Refills: 3    Associated Diagnoses: Primary osteoarthritis involving multiple joints      HYDROcodone-acetaminophen (NORCO)  MG per tablet Take 1-2 tablets by mouth every 4 hours as needed for severe pain 6 per day  Qty: 180 tablet, Refills: 0    Comments: OK to fill  Associated Diagnoses: Chronic pain disorder; Chest wall pain, chronic; Controlled substance agreement signed; Chronic bilateral low back pain without sciatica      lisinopril (ZESTRIL) 20 MG tablet Take 0.5 tablets (10 mg) by mouth daily  Qty: 90 tablet, Refills: 3    Associated Diagnoses: Essential hypertension      omeprazole (PRILOSEC) 20 MG DR capsule Take 1 capsule (20 mg) by mouth 2 times daily  Qty: 180 capsule, Refills: 3    Associated Diagnoses: Peptic ulcer      ondansetron (ZOFRAN) 4 MG tablet Take 1 tablet (4 mg) by mouth every 6 hours as needed for nausea  Qty: 30 tablet, Refills: 3    Associated Diagnoses: Nausea      pregabalin (LYRICA) 75 MG capsule Take 1 capsule (75 mg) by mouth 3 times daily  Qty: 90 capsule, Refills: 5    Associated Diagnoses: Chronic pain disorder; Chronic bilateral low  back pain without sciatica; Chest wall pain, chronic; Intractable chronic common migraine without aura      ranolazine (RANEXA) 500 MG 12 hr tablet Take 500 mg by mouth daily      rivaroxaban ANTICOAGULANT (XARELTO) 15 MG TABS tablet Take 1 tablet (15 mg) by mouth daily (with dinner)  Qty: 90 tablet, Refills: 3    Comments: This will replace Xarelto 20 mg, we would like her on 15 mg with taking Plavix as well.  Associated Diagnoses: Acute pulmonary embolism without acute cor pulmonale, unspecified pulmonary embolism type (H)      rosuvastatin (CRESTOR) 20 MG tablet Take 20 mg by mouth daily      sucralfate (CARAFATE) 1 GM tablet Take 1 tablet (1 g) by mouth 4 times daily as needed for nausea (or dark stools)  Qty: 360 tablet, Refills: 0    Associated Diagnoses: Chronic gastric ulcer with hemorrhage      VITAMIN D, CHOLECALCIFEROL, PO Take 5,000 Units by mouth daily      vortioxetine (TRINTELLIX) 20 MG tablet Take 1 tablet (20 mg) by mouth daily  Qty: 90 tablet, Refills: 4    Associated Diagnoses: Moderate episode of recurrent major depressive disorder (H)      albuterol (PROAIR HFA/PROVENTIL HFA/VENTOLIN HFA) 108 (90 Base) MCG/ACT inhaler Inhale 2 puffs into the lungs every 6 hours  Qty: 2 Inhaler, Refills: 3    Comments: Pharmacy may dispense brand covered by insurance (Proair, or proventil or ventolin or generic albuterol inhaler)  Associated Diagnoses: Chronic obstructive pulmonary disease, unspecified COPD type (H)      naloxone (NARCAN) 4 MG/0.1ML nasal spray Spray into one nostril for opioid reversal if unresponsive. May repeat every 2-3 minutes until patient responsive or EMS arrives  Qty: 1 each, Refills: 1    Associated Diagnoses: Chronic, continuous use of opioids      nitroGLYcerin (NITROLINGUAL) 0.4 MG/SPRAY spray For chest pain spray 1 spray under tongue every 5 minutes for 3 doses. If symptoms persist 5 minutes after 1st dose call 911.  Qty: 4.9 g, Refills: 3    Associated Diagnoses: Chronic stable  angina (H); ASCVD (arteriosclerotic cardiovascular disease); History of coronary artery bypass graft x 3           Allergies   Allergies   Allergen Reactions     Atorvastatin Muscle Pain (Myalgia)     Tiotropium Bromide [Tiotropium] Rash     Advil [Ibuprofen] Other (See Comments)     Does not recall but feel like it interacted with blood thinners and HX of GI BLEED     Ezetimibe Muscle Pain (Myalgia)     Latex Rash     Niacin      Other reaction(s): Flushing     No Clinical Screening - See Comments Itching, Rash and Blisters     Metals and plastics       Tape [Adhesive Tape] Rash

## 2021-07-17 NOTE — PLAN OF CARE
"Patient A&O, independent in room. Patient reports headache and chronic back pain, requested PRN norco as she takes this at home before bed, effective. VSS, afebrile, BP has increased from earlier in the day, HR still sitting in the mid-50s but asymptomatic. LS clear. Reported she was very hungry at the beginning of the shift and was given a sandwich. Stated she was looking forward to getting some sleep. Requested she get her bedtime meds asap, given at 2100.    /61   Pulse 54   Temp 97.4  F (36.3  C) (Tympanic)   Resp 16   Ht 1.651 m (5' 5\")   Wt 83.9 kg (185 lb)   LMP  (LMP Unknown)   SpO2 93%   BMI 30.79 kg/m      Neida Ray RN on 7/16/2021 at 7:54 PM    ___________________________________________________________________________________    Patient remains vitally stable, HR still in the mid-50s. States PRN pain medication was effective for headache and back pain. Resting comfortably at this time, no further needs. Continuing to monitor.    /47   Pulse 56   Temp 97.9  F (36.6  C) (Tympanic)   Resp 16   Ht 1.651 m (5' 5\")   Wt 83.9 kg (185 lb)   LMP  (LMP Unknown)   SpO2 94%   BMI 30.79 kg/m      Neida Ray RN on 7/16/2021 at 11:52 PM    ____________________________________________________________________________________    Patient awake and complains of headache at 8/10, states she gets these same headaches at home that are relieved with medication, 2 norco tabs given. VSS, afebrile, HR remains 55-58. No further complaints. Will continue to monitor.    /68   Pulse 55   Temp 97.3  F (36.3  C) (Tympanic)   Resp 16   Ht 1.651 m (5' 5\")   Wt 83.9 kg (185 lb)   LMP  (LMP Unknown)   SpO2 95%   BMI 30.79 kg/m       Neida Ray RN on 7/17/2021 at 2:59 AM    ___________________________________________________________________________________    Patient refused both doses of Lyrica, states this med causes her kidney issues to worsen. States she did not " sleep after 0230 but norco was effective for her headache.    Neida Ray RN on 7/17/2021 at 6:43 AM

## 2021-07-17 NOTE — PROGRESS NOTES
SAFETY CHECKLIST  ID Bands and Risk clasps correct and in place (DNR, Fall risk, Allergy, Latex, Limb):  Yes  All Lines Reconciled and labeled correctly: Yes  Whiteboard updated:Yes  Environmental interventions (bed/chair alarm on, call light, side rails, restraints, sitter....): Yes  Verify Tele #: [id number]    Neida Ray RN on 7/16/2021 at 7:54 PM

## 2021-07-17 NOTE — PROGRESS NOTES
SAFETY CHECKLIST  ID Bands and Risk clasps correct and in place (DNR, Fall risk, Allergy, Latex, Limb):  Yes  All Lines Reconciled and labeled correctly: Yes  Whiteboard updated:Yes  Environmental interventions (bed/chair alarm on, call light, side rails, restraints, sitter....): Yes  Verify Tele #: [id number]

## 2021-07-17 NOTE — PROGRESS NOTES
Discharge Note    Data:  Ankita Day has been Discharged to home at 1131 via wheel chair accompanied by spouse.      Action:  Written discharge/follow-up instructions were provided to spouse. Prescriptions: . Belongings sent with patient.Equipment None.     Response:  spouse verbalized understanding of discharge instructions, reason for discharge, and necessary follow-up appointments.

## 2021-07-18 LAB — BACTERIA UR CULT: ABNORMAL

## 2021-07-19 ENCOUNTER — PATIENT OUTREACH (OUTPATIENT)
Dept: CARE COORDINATION | Facility: CLINIC | Age: 66
End: 2021-07-19

## 2021-07-19 RX ORDER — CLONAZEPAM 1 MG/1
1 TABLET ORAL 3 TIMES DAILY PRN
Qty: 225 TABLET | Refills: 0 | OUTPATIENT
Start: 2021-07-19

## 2021-07-19 NOTE — TELEPHONE ENCOUNTER
Redundant refill request refused: Too soon:    clonazePAM (KLONOPIN) 1 MG tablet 90 tablet 0 7/16/2021  No   Sig - Route: Take 1 tablet (1 mg) by mouth 3 times daily as needed for anxiety Max 2.5 per day = 225 per 90 days - Oral   Sent to pharmacy as: clonazePAM 1 MG Oral Tablet (klonoPIN)   Class: E-Prescribe   Order: [id number]   E-Prescribing Status: Receipt confirmed by pharmacy (7/16/2021 12:44 PM CDT)     [id number] - [place], [place] - [address]     Unable to complete prescription refill per RN Medication Refill Policy. Kathleen Puckett RN .............. 7/19/2021  4:41 PM

## 2021-07-19 NOTE — PROGRESS NOTES
Transitional Care Management Phone Call    Summary of hospitalization:  [facility] discharge summary reviewed  DISCHARGE DIAGNOSIS: Symptomatic bradycardia  DATE OF DISCHARGE: 07/17/2021    Diagnostic Tests/Treatments reviewed.  Follow up needed: Follow up needed: follow up with Dr. Cano on 07/30/2021 @ 10:40AM also is needing follow up appointment with JOSELYN Kilpatrick CNP or Paul Gramajo DO scheduled patient for 08/17/2021 at 3:45PM follow up           Post Discharge Medication Reconciliation: medication was reconciled per my self and over medications changed per patient request stopped Lyrica on 07/13/2021 per self  Medications reviewed by: by myself and patient    Problems taking medications regularly:  None  Problems adhering to non-medication therapy:  None    Other Healthcare Providers Involved in Patient s Care:         None  Update since discharge: stable.  Patient states blood pressure this morning was 108/67 and pulse was 67 patient states is feeling better post hospital stay has had decreased dizziness and nausea had a little bout of palpitations yesterday but has subsided.  Patient states stopped her Lyrica on 7/13/2021 states medication was not helping.  Patient also states headaches have improved.  Plan of care communicated with patient  Just a friendly reminder that you appointment is   Next 5 appointments (look out 90 days)    Jul 30, 2021 10:40 AM  Office Visit with Ramirez Cano MD  [facility] ([facility] ) [address]  [place] [address]  [phone]   Aug 06, 2021 10:20 AM  Office Visit with Ramirez Cano MD  [facility] ([facility] ) [address]  [place] [address]  [phone]      .  We encourage you to keep this appointment.  Please remember to bring all of your pills in their bottles  (including any vitamins or over the counter pills) with you to your appointment.   The patient indicates understanding of these issues and agrees with the plan of care.   Yes    Was the patient contacted within the 2 business days or other approved timeframe?  Yes  Was the Medication reconciliation and management done since the patient was discharged? Yes    Dian Dumont RN  7/19/2021 10:08 AM

## 2021-07-21 RX ORDER — LISINOPRIL 10 MG/1
TABLET ORAL
Qty: 90 TABLET | Refills: 2 | OUTPATIENT
Start: 2021-07-21

## 2021-07-21 NOTE — TELEPHONE ENCOUNTER
Verified with Steven du pharmacist at [facility] that a prescription was sent on 6/2/21 for a years worth of lisinopril.  Prescription request cancelled.  Jamilah Ray RN......July 21, 2021...11:35 AM

## 2021-07-23 ENCOUNTER — HOSPITAL ENCOUNTER (EMERGENCY)
Facility: OTHER | Age: 66
Discharge: HOME | End: 2021-07-23
Attending: EMERGENCY MEDICINE | Admitting: EMERGENCY MEDICINE
Payer: MEDICARE

## 2021-07-23 ENCOUNTER — NURSE TRIAGE (OUTPATIENT)
Dept: FAMILY MEDICINE | Facility: OTHER | Age: 66
End: 2021-07-23

## 2021-07-23 ENCOUNTER — APPOINTMENT (OUTPATIENT)
Dept: GENERAL RADIOLOGY | Facility: OTHER | Age: 66
End: 2021-07-23
Attending: EMERGENCY MEDICINE
Payer: MEDICARE

## 2021-07-23 VITALS
HEART RATE: 52 BPM | SYSTOLIC BLOOD PRESSURE: 116 MMHG | OXYGEN SATURATION: 96 % | TEMPERATURE: 98.3 F | DIASTOLIC BLOOD PRESSURE: 70 MMHG | RESPIRATION RATE: 13 BRPM | BODY MASS INDEX: 29.95 KG/M2 | WEIGHT: 180 LBS

## 2021-07-23 DIAGNOSIS — R42 LIGHT HEADEDNESS: ICD-10-CM

## 2021-07-23 LAB
ANION GAP SERPL CALCULATED.3IONS-SCNC: 7 MMOL/L (ref 3–14)
BASOPHILS # BLD AUTO: 0 10E3/UL (ref 0–0.2)
BASOPHILS NFR BLD AUTO: 1 %
BUN SERPL-MCNC: 24 MG/DL (ref 7–25)
CALCIUM SERPL-MCNC: 9.6 MG/DL (ref 8.6–10.3)
CHLORIDE BLD-SCNC: 106 MMOL/L (ref 98–107)
CO2 SERPL-SCNC: 26 MMOL/L (ref 21–31)
CREAT SERPL-MCNC: 1.14 MG/DL (ref 0.6–1.2)
EOSINOPHIL # BLD AUTO: 0.1 10E3/UL (ref 0–0.7)
EOSINOPHIL NFR BLD AUTO: 2 %
ERYTHROCYTE [DISTWIDTH] IN BLOOD BY AUTOMATED COUNT: 13.7 % (ref 10–15)
GFR SERPL CREATININE-BSD FRML MDRD: 50 ML/MIN/1.73M2
GLUCOSE BLD-MCNC: 103 MG/DL (ref 70–105)
HCT VFR BLD AUTO: 34.8 % (ref 35–47)
HGB BLD-MCNC: 11.6 G/DL (ref 11.7–15.7)
IMM GRANULOCYTES # BLD: 0 10E3/UL
IMM GRANULOCYTES NFR BLD: 0 %
LYMPHOCYTES # BLD AUTO: 1.9 10E3/UL (ref 0.8–5.3)
LYMPHOCYTES NFR BLD AUTO: 31 %
MCH RBC QN AUTO: 29 PG (ref 26.5–33)
MCHC RBC AUTO-ENTMCNC: 33.3 G/DL (ref 31.5–36.5)
MCV RBC AUTO: 87 FL (ref 78–100)
MONOCYTES # BLD AUTO: 0.6 10E3/UL (ref 0–1.3)
MONOCYTES NFR BLD AUTO: 9 %
NEUTROPHILS # BLD AUTO: 3.6 10E3/UL (ref 1.6–8.3)
NEUTROPHILS NFR BLD AUTO: 57 %
NRBC # BLD AUTO: 0 10E3/UL
NRBC BLD AUTO-RTO: 0 /100
PLATELET # BLD AUTO: 210 10E3/UL (ref 150–450)
POTASSIUM BLD-SCNC: 3.7 MMOL/L (ref 3.5–5.1)
RBC # BLD AUTO: 4 10E6/UL (ref 3.8–5.2)
SODIUM SERPL-SCNC: 139 MMOL/L (ref 134–144)
TROPONIN I SERPL-MCNC: <2.4 PG/ML (ref 0–34)
WBC # BLD AUTO: 6.3 10E3/UL (ref 4–11)

## 2021-07-23 PROCEDURE — 96361 HYDRATE IV INFUSION ADD-ON: CPT | Performed by: EMERGENCY MEDICINE

## 2021-07-23 PROCEDURE — 84484 ASSAY OF TROPONIN QUANT: CPT | Performed by: EMERGENCY MEDICINE

## 2021-07-23 PROCEDURE — 96375 TX/PRO/DX INJ NEW DRUG ADDON: CPT | Performed by: EMERGENCY MEDICINE

## 2021-07-23 PROCEDURE — 71045 X-RAY EXAM CHEST 1 VIEW: CPT

## 2021-07-23 PROCEDURE — 250N000013 HC RX MED GY IP 250 OP 250 PS 637: Performed by: EMERGENCY MEDICINE

## 2021-07-23 PROCEDURE — 99283 EMERGENCY DEPT VISIT LOW MDM: CPT | Performed by: EMERGENCY MEDICINE

## 2021-07-23 PROCEDURE — 36415 COLL VENOUS BLD VENIPUNCTURE: CPT | Performed by: EMERGENCY MEDICINE

## 2021-07-23 PROCEDURE — 258N000003 HC RX IP 258 OP 636: Performed by: EMERGENCY MEDICINE

## 2021-07-23 PROCEDURE — 80048 BASIC METABOLIC PNL TOTAL CA: CPT | Performed by: EMERGENCY MEDICINE

## 2021-07-23 PROCEDURE — 96374 THER/PROPH/DIAG INJ IV PUSH: CPT | Performed by: EMERGENCY MEDICINE

## 2021-07-23 PROCEDURE — 250N000011 HC RX IP 250 OP 636: Performed by: EMERGENCY MEDICINE

## 2021-07-23 PROCEDURE — 93010 ELECTROCARDIOGRAM REPORT: CPT | Performed by: INTERNAL MEDICINE

## 2021-07-23 PROCEDURE — 99285 EMERGENCY DEPT VISIT HI MDM: CPT | Mod: 25 | Performed by: EMERGENCY MEDICINE

## 2021-07-23 PROCEDURE — 93005 ELECTROCARDIOGRAM TRACING: CPT | Performed by: EMERGENCY MEDICINE

## 2021-07-23 PROCEDURE — 85004 AUTOMATED DIFF WBC COUNT: CPT | Performed by: EMERGENCY MEDICINE

## 2021-07-23 RX ORDER — DIPHENHYDRAMINE HYDROCHLORIDE 50 MG/ML
25 INJECTION INTRAMUSCULAR; INTRAVENOUS ONCE
Status: COMPLETED | OUTPATIENT
Start: 2021-07-23 | End: 2021-07-23

## 2021-07-23 RX ORDER — KETOROLAC TROMETHAMINE 15 MG/ML
15 INJECTION, SOLUTION INTRAMUSCULAR; INTRAVENOUS ONCE
Status: COMPLETED | OUTPATIENT
Start: 2021-07-23 | End: 2021-07-23

## 2021-07-23 RX ORDER — ASPIRIN 81 MG/1
324 TABLET, CHEWABLE ORAL ONCE
Status: COMPLETED | OUTPATIENT
Start: 2021-07-23 | End: 2021-07-23

## 2021-07-23 RX ORDER — METOCLOPRAMIDE HYDROCHLORIDE 5 MG/ML
10 INJECTION INTRAMUSCULAR; INTRAVENOUS ONCE
Status: COMPLETED | OUTPATIENT
Start: 2021-07-23 | End: 2021-07-23

## 2021-07-23 RX ORDER — SODIUM CHLORIDE 9 MG/ML
INJECTION, SOLUTION INTRAVENOUS CONTINUOUS
Status: DISCONTINUED | OUTPATIENT
Start: 2021-07-23 | End: 2021-07-23 | Stop reason: HOSPADM

## 2021-07-23 RX ADMIN — KETOROLAC TROMETHAMINE 15 MG: 15 INJECTION, SOLUTION INTRAMUSCULAR; INTRAVENOUS at 17:39

## 2021-07-23 RX ADMIN — DIPHENHYDRAMINE HYDROCHLORIDE 25 MG: 50 INJECTION INTRAMUSCULAR; INTRAVENOUS at 17:33

## 2021-07-23 RX ADMIN — SODIUM CHLORIDE 250 ML: 9 INJECTION, SOLUTION INTRAVENOUS at 17:29

## 2021-07-23 RX ADMIN — METOCLOPRAMIDE 10 MG: 5 INJECTION, SOLUTION INTRAMUSCULAR; INTRAVENOUS at 17:35

## 2021-07-23 RX ADMIN — ASPIRIN 81 MG CHEWABLE TABLET 324 MG: 81 TABLET CHEWABLE at 17:32

## 2021-07-23 ASSESSMENT — ENCOUNTER SYMPTOMS
SHORTNESS OF BREATH: 0
NAUSEA: 0
VOMITING: 0
FEVER: 0
CHILLS: 0
CHEST TIGHTNESS: 0
DYSURIA: 0
AGITATION: 0
ARTHRALGIAS: 0
LIGHT-HEADEDNESS: 0

## 2021-07-23 NOTE — TELEPHONE ENCOUNTER
S-(situation): Multiple symptoms    B-(background): Hx chest wall pain, bilateral low back pain, chronic pain disorder, intractable chronic common migraine without aura, chronic stable angina, COPD, Cardiac history, iron deficiency history, History of Pulmonary Embolism, Panic attack.    A-(assessment): No SOB, Lung pain, Upper back pain, Left shoulder pain, Dizzy all day, Headache, weakness, chest pain off and on. Patient sounded reluctant to come in to be evaluated.    R-(recommendations): Recommended the patient go to the ED per triage protocol. Patient reported she will talk to her  and think about it. Did some education about cardiac concerns and when to call 911. Patient verbalized understanding. Amaya Tompkins RN  ....................  7/23/2021   4:00 PM        Reason for Disposition    SEVERE dizziness (e.g., unable to stand, requires support to walk, feels like passing out now)    Additional Information    Negative: Difficulty breathing     Only when going up stairs    Protocols used: DIZZINESS - [id number]

## 2021-07-23 NOTE — ED TRIAGE NOTES
"ED Nursing Triage Note (General)   ________________________________    Ankita Day is a 66 year old Female that presents to triage   Patient states she woke up feeling light headed/dizzy.  Patient states she then fell on the steps due to dizziness. Patient denies injury from the fall, however, states dizziness and feeling light headed has persisted throughout the day.  Patient also states throughout the day she has been having increasing chest pain.  Patient states she was hospitalized last week, however, when asked what she was hospitalized for patient is unclear and states, \"I dont remember, I know I was having chest pain and back pain but I dont remember\".  Patient states she has a hx of PE and states she currently feels similar to her previous dx and states the nurse triage line advised her to come in for an eval due to her hx.    Significant symptoms had onset 8 hours ago.  GCS-15  Airway: intact  Breathing noted as Normal  Action taken: 2      PRE HOSPITAL PRIOR LIVING SITUATION-home  "

## 2021-07-23 NOTE — ED PROVIDER NOTES
History     Chief Complaint   Patient presents with     Chest Pain     Dizziness     HPI  Ankita Day is a 66 year old female who comes in complaining of some pain in her lungs, dizziness and a fall earlier this morning.  She states she has not felt well since she was discharged home from the hospital recently.  She was admitted here overnight on July 16 for some symptomatic bradycardia.  Today she says that when she takes a deep breath it hurts at the bottom of her lungs bilaterally.  It reminds her of how she felt when she was diagnosed with pulmonary embolus.  She is on blood thinners for this.  This morning early, at 5 AM, she went out to get her cat.  As she was walking back up the steps she suddenly got dizzy and lightheaded and was afraid she is going to fall, she then started to fall but turned around and was able to just sit down hard on the steps.  She sat there for a few minutes felt better got up and went inside.  She continues to feel lightheaded frequently throughout the day but has not fallen again.  No fevers or chills.  No URI type symptoms.  Her breathing is good.  No change in bowel or bladder.  Also complaining of a headache.  She does get headaches, however today it seems worse    Allergies:  Allergies   Allergen Reactions     Atorvastatin Muscle Pain (Myalgia)     Tiotropium Bromide [Tiotropium] Rash     Advil [Ibuprofen] Other (See Comments)     Does not recall but feel like it interacted with blood thinners and HX of GI BLEED     Ezetimibe Muscle Pain (Myalgia)     Latex Rash     Niacin      Other reaction(s): Flushing     No Clinical Screening - See Comments Itching, Rash and Blisters     Metals and plastics       Tape [Adhesive Tape] Rash       Problem List:    Patient Active Problem List    Diagnosis Date Noted     Symptomatic bradycardia 07/16/2021     Priority: Medium     Chronic stable angina (H) 02/05/2020     Priority: Medium     Chronic ischemic heart disease 02/05/2020      Priority: Medium     History of pulmonary embolism 01/02/2020     Priority: Medium     CKD (chronic kidney disease) stage 3, GFR 30-59 ml/min 06/19/2019     Priority: Medium     Chronic anxiety 01/22/2018     Priority: Medium     Collagenous colitis 01/22/2018     Priority: Medium     Overview:   Possible Dx 2007       Major depressive disorder, recurrent episode, severe (H) 01/22/2018     Priority: Medium     Essential hypertension 01/22/2018     Priority: Medium     Mixed hyperlipidemia 01/22/2018     Priority: Medium     Osteoarthritis 01/22/2018     Priority: Medium     Panic attack 01/22/2018     Priority: Medium     Status post coronary angiogram 09/15/2017     Priority: Medium     History of tobacco abuse 08/10/2017     Priority: Medium     Presence of stent in coronary artery in patient with coronary artery disease 08/10/2017     Priority: Medium     History of coronary artery bypass graft x 3 08/10/2017     Priority: Medium     Noncompliance with CPAP treatment 10/21/2016     Priority: Medium     Intractable chronic common migraine without aura 10/21/2016     Priority: Medium     H/O multiple concussions 10/21/2016     Priority: Medium     History of Clostridium difficile 08/19/2016     Priority: Medium     Iron deficiency anemia 07/26/2016     Priority: Medium     Chest wall pain, chronic 11/04/2015     Priority: Medium     Controlled substance agreement updated 10/9/2020 01/28/2014     Priority: Medium     Overview:        Pain medication agreement 01/28/2014     Priority: Medium     Formatting of this note might be different from the original.       Central sleep apnea 10/14/2013     Priority: Medium     Myofascial pain 10/14/2013     Priority: Medium     Vitamin D deficiency 05/06/2013     Priority: Medium     Subclavian artery stenosis, left (H) 03/31/2013     Priority: Medium     Overview:   S/p prox left SCA stent 4/1/2013       Lumbar facet arthropathy 01/02/2013     Priority: Medium     Nonspecific  abnormal results of pulmonary system function study 12/21/2011     Priority: Medium     Slow transit constipation 11/29/2011     Priority: Medium     Gastric ulcer with hemorrhage 10/03/2011     Priority: Medium     Family history of malignant neoplasm of gastrointestinal tract 09/26/2011     Priority: Medium     Nodular degeneration of cornea 09/26/2011     Priority: Medium     Bilateral low back pain without sciatica 05/31/2011     Priority: Medium     Chronic pain disorder 12/01/2010     Priority: Medium     COPD (chronic obstructive pulmonary disease) (H) 06/15/2007     Priority: Medium     Overview:   Low DLCO, normal spirometry on 12/15/11       Peptic ulcer 06/15/2007     Priority: Medium     Postsurgical aortocoronary bypass status 02/12/2003     Priority: Medium     Coronary atherosclerosis 09/12/2002     Priority: Medium     Overview:   Multiple Angigrams prior to 2007. Also:  6/5/2007: Angiogram: TAXUS DELONTE to ostial circumflux, instent restenosis  5/23/2008: Left Main 30% diseased, LAD stents patent, Left circ stent patent, Chronic occluded right internal mammary artery graft to distal RCA, native RCA has 30% stenosis; NO intevention  9/19/2012 CT Angiogram: Patent LIMA to LAD, patent LAD stents, moderate proximal circumflex disease, patent RCA , occluded right internal mammary artery graft to distal RCA.  9/20/2012: Angiogram: Stent to Left Main, ostial LAD, and PTCA of ostial left circumflex          Past Medical History:    Past Medical History:   Diagnosis Date     Acute ischemic heart disease (H)      Acute myocardial infarction (H)      Anxiety disorder      Atherosclerotic heart disease of native coronary artery without angina pectoris      Bilateral carpal tunnel syndrome      Cervicalgia      Chest pain      Chronic gastric ulcer without hemorrhage or perforation      Chronic ischemic heart disease      Chronic obstructive pulmonary disease (H)      Chronic or unspecified gastric ulcer with  hemorrhage      Chronic pain syndrome      Constipation      Coronary angioplasty status      Coronary angioplasty status      Coronary angioplasty status      Dorsalgia      Encounter for other administrative examinations      Encounter for screening for cardiovascular disorders      Enterocolitis due to Clostridium difficile      Essential (primary) hypertension      Hyperlipidemia      Major depressive disorder, single episode      Migraine without status migrainosus, not intractable      Nodular corneal degeneration      Noninfective gastroenteritis and colitis      Noninfective gastroenteritis and colitis      Osteoarthritis      Other chest pain      Pain in knee      Pain in right shoulder      Panic disorder without agoraphobia      Peptic ulcer without hemorrhage or perforation      Peripheral vascular disease (H)      Personal history of diseases of the blood and blood-forming organs and certain disorders involving the immune mechanism (CODE)      Personal history of nicotine dependence      Personal history of other medical treatment (CODE)      Presence of aortocoronary bypass graft      Primary central sleep apnea      Sepsis due to Escherichia coli (E. coli) (H)      Stricture of artery (H)      Thoracic, thoracolumbar and lumbosacral intervertebral disc disorder      Uncomplicated opioid abuse (H)      Vitamin D deficiency        Past Surgical History:    Past Surgical History:   Procedure Laterality Date     ANGIOPLASTY      9/12/02,with triple stenting     APPENDECTOMY OPEN      No Comments Provided     ARTHROSCOPY KNEE      left     ARTHROSCOPY SHOULDER Right 05/12/2017    labral tear, rotator cuff tear and some subacromial decompression      BYPASS GRAFT ARTERY CORONARY      12/13/02,Triple bypass, left internal mammary  to LAD, right internal mammary to right coronary artery, saphenous to obtuse marginal of the left circumflex.     COLONOSCOPY      2011,Dr Bowman benign polyps     COLONOSCOPY   10/03/2011    2011,benign polyps, Dr. Bowman     COLONOSCOPY  2016,normal, Dr Bowman     ELBOW SURGERY      baby,birth malachi removed from right arm     EMBOLECTOMY UPPER EXTREMITY  2013    brachial artery pseudoaneurysm after stenting     ESOPHAGOSCOPY, GASTROSCOPY, DUODENOSCOPY (EGD), COMBINED      ,EGD Dr Bowman with pyloric ulcer     ESOPHAGOSCOPY, GASTROSCOPY, DUODENOSCOPY (EGD), COMBINED      ,pyloric ulcer, Dr. Bowman     ESOPHAGOSCOPY, GASTROSCOPY, DUODENOSCOPY (EGD), COMBINED      16,mild gastritis, Dr Bowman     ESOPHAGOSCOPY, GASTROSCOPY, DUODENOSCOPY (EGD), COMBINED      2017,Dr Bowman. Antral ulcer     ESOPHAGOSCOPY, GASTROSCOPY, DUODENOSCOPY (EGD), COMBINED  2018    Dr Bowman, healed ulcer     HYSTERECTOMY TOTAL ABDOMINAL      age 21     LAPAROSCOPIC CHOLECYSTECTOMY      2006     OSTEOTOMY FEMUR DISTAL      x3, right knee     OSTEOTOMY FEMUR DISTAL      2000,left knee  ligament surgery     OTHER SURGICAL HISTORY      1/10/2003,,PTCA     OTHER SURGICAL HISTORY      2012,,PTCA,DELONTE in LAD and left main     OTHER SURGICAL HISTORY      2013,,PTCA,L subclavian stenosis     SALPINGO-OOPHORECTOMY BILATERAL      age 27,Bilateral salpingo-oophorectomy     TONSILLECTOMY, ADENOIDECTOMY, COMBINED      childhood       Family History:    Family History   Problem Relation Age of Onset     Heart Disease Father         Heart Disease,Heart condition/Significant for atherosclerotic cardiovascular disease, but non premature.     Colon Cancer Father         Cancer-colon, of colon cancer     Cancer Father         Cancer,mets to liver, secondary to colon cancer     Heart Disease Mother         Heart Disease     Cancer Other         Cancer,Multiple Myeloma     Heart Disease Other         Heart Disease,Ischemic Heart Disease     Colon Cancer Other         Cancer-colon,Malignant neoplasms     Cancer Sister         Cancer,multiple myeloma     Other - See Comments  Son         gallstones       Social History:  Marital Status:   [2]  Social History     Tobacco Use     Smoking status: Former Smoker     Packs/day: 0.25     Years: 35.00     Pack years: 8.75     Types: Cigarettes     Quit date: 2/15/2017     Years since quittin.4     Smokeless tobacco: Never Used   Substance Use Topics     Alcohol use: Not Currently     Alcohol/week: 0.0 standard drinks     Drug use: No        Medications:    albuterol (PROAIR HFA/PROVENTIL HFA/VENTOLIN HFA) 108 (90 Base) MCG/ACT inhaler  amLODIPine (NORVASC) 10 MG tablet  busPIRone (BUSPAR) 15 MG tablet  clonazePAM (KLONOPIN) 1 MG tablet  clopidogrel (PLAVIX) 75 MG tablet  Diclofenac Sodium 1.5 % SOLN  HYDROcodone-acetaminophen (NORCO)  MG per tablet  lisinopril (ZESTRIL) 20 MG tablet  metoprolol succinate ER (TOPROL-XL) 25 MG 24 hr tablet  naloxone (NARCAN) 4 MG/0.1ML nasal spray  nitroGLYcerin (NITROLINGUAL) 0.4 MG/SPRAY spray  omeprazole (PRILOSEC) 20 MG DR capsule  ondansetron (ZOFRAN) 4 MG tablet  ranolazine (RANEXA) 500 MG 12 hr tablet  rivaroxaban ANTICOAGULANT (XARELTO) 15 MG TABS tablet  rosuvastatin (CRESTOR) 20 MG tablet  sucralfate (CARAFATE) 1 GM tablet  VITAMIN D, CHOLECALCIFEROL, PO  vortioxetine (TRINTELLIX) 20 MG tablet          Review of Systems   Constitutional: Negative for chills and fever.   HENT: Negative for congestion.    Eyes: Negative for visual disturbance.   Respiratory: Negative for chest tightness and shortness of breath.    Cardiovascular: Positive for chest pain.   Gastrointestinal: Negative for nausea and vomiting.   Genitourinary: Negative for dysuria.   Musculoskeletal: Negative for arthralgias.   Skin: Negative for rash.   Neurological: Negative for light-headedness.   Psychiatric/Behavioral: Negative for agitation.       Physical Exam   BP: (!) 143/63  Pulse: 69  Temp: 98.3  F (36.8  C)  Resp: 18  Weight: 81.6 kg (180 lb)  SpO2: 96 %      Physical Exam  Vitals and nursing note reviewed.    Constitutional:       Appearance: She is well-developed.   HENT:      Head: Normocephalic and atraumatic.      Mouth/Throat:      Mouth: Mucous membranes are moist.   Eyes:      Conjunctiva/sclera: Conjunctivae normal.   Cardiovascular:      Rate and Rhythm: Normal rate and regular rhythm.      Heart sounds: Normal heart sounds.   Pulmonary:      Effort: Pulmonary effort is normal.      Breath sounds: Normal breath sounds.      Comments: Some tenderness over the lower sternum.  Chest:      Chest wall: Tenderness present.   Abdominal:      General: Abdomen is flat. Bowel sounds are normal. There is no distension.      Palpations: Abdomen is soft.      Tenderness: There is no abdominal tenderness.   Skin:     General: Skin is warm and dry.   Neurological:      Mental Status: She is alert and oriented to person, place, and time.   Psychiatric:         Behavior: Behavior normal.         ED Course        Procedures                Results for orders placed or performed during the hospital encounter of 07/23/21 (from the past 24 hour(s))   Troponin I   Result Value Ref Range    Troponin I <2.4 0.0 - 34.0 pg/mL   CBC with platelets differential    Narrative    The following orders were created for panel order CBC with platelets differential.  Procedure                               Abnormality         Status                     ---------                               -----------         ------                     CBC with platelets and d...[[id number]]  Abnormal            Final result                 Please view results for these tests on the individual orders.   Basic metabolic panel   Result Value Ref Range    Sodium 139 134 - 144 mmol/L    Potassium 3.7 3.5 - 5.1 mmol/L    Chloride 106 98 - 107 mmol/L    Carbon Dioxide (CO2) 26 21 - 31 mmol/L    Anion Gap 7 3 - 14 mmol/L    Urea Nitrogen 24 7 - 25 mg/dL    Creatinine 1.14 0.60 - 1.20 mg/dL    Calcium 9.6 8.6 - 10.3 mg/dL    Glucose 103 70 - 105 mg/dL    GFR Estimate 50  (L) >60 mL/min/1.73m2   CBC with platelets and differential   Result Value Ref Range    WBC Count 6.3 4.0 - 11.0 10e3/uL    RBC Count 4.00 3.80 - 5.20 10e6/uL    Hemoglobin 11.6 (L) 11.7 - 15.7 g/dL    Hematocrit 34.8 (L) 35.0 - 47.0 %    MCV 87 78 - 100 fL    MCH 29.0 26.5 - 33.0 pg    MCHC 33.3 31.5 - 36.5 g/dL    RDW 13.7 10.0 - 15.0 %    Platelet Count 210 150 - 450 10e3/uL    % Neutrophils 57 %    % Lymphocytes 31 %    % Monocytes 9 %    % Eosinophils 2 %    % Basophils 1 %    % Immature Granulocytes 0 %    NRBCs per 100 WBC 0 <1 /100    Absolute Neutrophils 3.6 1.6 - 8.3 10e3/uL    Absolute Lymphocytes 1.9 0.8 - 5.3 10e3/uL    Absolute Monocytes 0.6 0.0 - 1.3 10e3/uL    Absolute Eosinophils 0.1 0.0 - 0.7 10e3/uL    Absolute Basophils 0.0 0.0 - 0.2 10e3/uL    Absolute Immature Granulocytes 0.0 <=0.0 10e3/uL    Absolute NRBCs 0.0 10e3/uL   XR Chest Port 1 View    Narrative    PROCEDURE:  XR CHEST PORT 1 VIEW    HISTORY:  chest pain.     COMPARISON:  July 16, 2021    FINDINGS:   The cardiac silhouette is normal in size. The pulmonary vasculature is  normal.  The lungs are clear. No pleural effusion or pneumothorax.  Postoperative changes are seen in the mediastinum      Impression    IMPRESSION:  No acute cardiopulmonary disease.      KHALIF SWIFT MD         SYSTEM ID:  [id number]     EKG shows sinus bradycardia 57 bpm.  No acute ST segment or T wave changes.  No ectopy.  Medications   0.9% sodium chloride BOLUS (0 mLs Intravenous Stopped 7/23/21 1840)     Followed by   sodium chloride 0.9% infusion ( Intravenous Rate/Dose Change 7/23/21 1841)   aspirin (ASA) chewable tablet 324 mg (324 mg Oral Given 7/23/21 1732)   ketorolac (TORADOL) injection 15 mg (15 mg Intravenous Given 7/23/21 1739)   metoclopramide (REGLAN) injection 10 mg (10 mg Intravenous Given 7/23/21 1735)   diphenhydrAMINE (BENADRYL) injection 25 mg (25 mg Intravenous Given 7/23/21 1653)       Assessments & Plan (with Medical Decision Making)      I have reviewed the nursing notes.    I have reviewed the findings, diagnosis, plan and need for follow up with the patient.  I see no evidence of any type of acute cardiac etiology to explain her symptoms but she is feeling better with above interventions.  She states she just got anxious.  She feels she is safe to go home and I do 2.  Discharge at this time.  Return if worse.    New Prescriptions    No medications on file       Final diagnoses:   Light headedness       7/23/2021   [facility] [place]     Timothy Siu MD  07/23/21 1916

## 2021-07-26 LAB
ATRIAL RATE - MUSE: 57 BPM
DIASTOLIC BLOOD PRESSURE - MUSE: NORMAL MMHG
INTERPRETATION ECG - MUSE: NORMAL
P AXIS - MUSE: 52 DEGREES
PR INTERVAL - MUSE: 172 MS
QRS DURATION - MUSE: 84 MS
QT - MUSE: 458 MS
QTC - MUSE: 445 MS
R AXIS - MUSE: 37 DEGREES
SYSTOLIC BLOOD PRESSURE - MUSE: NORMAL MMHG
T AXIS - MUSE: 57 DEGREES
VENTRICULAR RATE- MUSE: 57 BPM

## 2021-07-30 ENCOUNTER — HOSPITAL ENCOUNTER (OUTPATIENT)
Dept: CARDIOLOGY | Facility: OTHER | Age: 66
End: 2021-07-30
Attending: FAMILY MEDICINE
Payer: MEDICARE

## 2021-07-30 ENCOUNTER — OFFICE VISIT (OUTPATIENT)
Dept: FAMILY MEDICINE | Facility: OTHER | Age: 66
End: 2021-07-30
Attending: FAMILY MEDICINE
Payer: MEDICARE

## 2021-07-30 VITALS
SYSTOLIC BLOOD PRESSURE: 120 MMHG | HEART RATE: 60 BPM | BODY MASS INDEX: 28.61 KG/M2 | RESPIRATION RATE: 20 BRPM | OXYGEN SATURATION: 98 % | HEIGHT: 66 IN | WEIGHT: 178 LBS | DIASTOLIC BLOOD PRESSURE: 72 MMHG | TEMPERATURE: 96 F

## 2021-07-30 DIAGNOSIS — R00.2 PALPITATIONS: ICD-10-CM

## 2021-07-30 DIAGNOSIS — G89.29 CHRONIC BILATERAL LOW BACK PAIN WITHOUT SCIATICA: ICD-10-CM

## 2021-07-30 DIAGNOSIS — R07.89 CHEST WALL PAIN, CHRONIC: ICD-10-CM

## 2021-07-30 DIAGNOSIS — G89.29 CHEST WALL PAIN, CHRONIC: ICD-10-CM

## 2021-07-30 DIAGNOSIS — R11.0 NAUSEA: ICD-10-CM

## 2021-07-30 DIAGNOSIS — Z79.899 CONTROLLED SUBSTANCE AGREEMENT SIGNED: ICD-10-CM

## 2021-07-30 DIAGNOSIS — M54.50 CHRONIC BILATERAL LOW BACK PAIN WITHOUT SCIATICA: ICD-10-CM

## 2021-07-30 DIAGNOSIS — R42 DIZZINESS: Primary | ICD-10-CM

## 2021-07-30 DIAGNOSIS — G89.4 CHRONIC PAIN DISORDER: ICD-10-CM

## 2021-07-30 DIAGNOSIS — R42 DIZZINESS: ICD-10-CM

## 2021-07-30 LAB
ANION GAP SERPL CALCULATED.3IONS-SCNC: 10 MMOL/L (ref 3–14)
BASOPHILS # BLD AUTO: 0.1 10E3/UL (ref 0–0.2)
BASOPHILS NFR BLD AUTO: 1 %
BUN SERPL-MCNC: 19 MG/DL (ref 7–25)
CALCIUM SERPL-MCNC: 9.9 MG/DL (ref 8.6–10.3)
CHLORIDE BLD-SCNC: 105 MMOL/L (ref 98–107)
CO2 SERPL-SCNC: 24 MMOL/L (ref 21–31)
CREAT SERPL-MCNC: 1.08 MG/DL (ref 0.6–1.2)
EOSINOPHIL # BLD AUTO: 0.1 10E3/UL (ref 0–0.7)
EOSINOPHIL NFR BLD AUTO: 2 %
ERYTHROCYTE [DISTWIDTH] IN BLOOD BY AUTOMATED COUNT: 13.5 % (ref 10–15)
GFR SERPL CREATININE-BSD FRML MDRD: 53 ML/MIN/1.73M2
GLUCOSE BLD-MCNC: 77 MG/DL (ref 70–105)
HCT VFR BLD AUTO: 38.6 % (ref 35–47)
HGB BLD-MCNC: 12.9 G/DL (ref 11.7–15.7)
IMM GRANULOCYTES # BLD: 0 10E3/UL
IMM GRANULOCYTES NFR BLD: 0 %
LYMPHOCYTES # BLD AUTO: 2.1 10E3/UL (ref 0.8–5.3)
LYMPHOCYTES NFR BLD AUTO: 30 %
MAGNESIUM SERPL-MCNC: 2.1 MG/DL (ref 1.9–2.7)
MCH RBC QN AUTO: 29.4 PG (ref 26.5–33)
MCHC RBC AUTO-ENTMCNC: 33.4 G/DL (ref 31.5–36.5)
MCV RBC AUTO: 88 FL (ref 78–100)
MONOCYTES # BLD AUTO: 0.7 10E3/UL (ref 0–1.3)
MONOCYTES NFR BLD AUTO: 10 %
NEUTROPHILS # BLD AUTO: 4 10E3/UL (ref 1.6–8.3)
NEUTROPHILS NFR BLD AUTO: 57 %
NRBC # BLD AUTO: 0 10E3/UL
NRBC BLD AUTO-RTO: 0 /100
PLATELET # BLD AUTO: 273 10E3/UL (ref 150–450)
POTASSIUM BLD-SCNC: 4.4 MMOL/L (ref 3.5–5.1)
RBC # BLD AUTO: 4.39 10E6/UL (ref 3.8–5.2)
SODIUM SERPL-SCNC: 139 MMOL/L (ref 134–144)
WBC # BLD AUTO: 7 10E3/UL (ref 4–11)

## 2021-07-30 PROCEDURE — 85025 COMPLETE CBC W/AUTO DIFF WBC: CPT | Mod: ZL | Performed by: FAMILY MEDICINE

## 2021-07-30 PROCEDURE — 99495 TRANSJ CARE MGMT MOD F2F 14D: CPT | Performed by: FAMILY MEDICINE

## 2021-07-30 PROCEDURE — 80048 BASIC METABOLIC PNL TOTAL CA: CPT | Mod: ZL | Performed by: FAMILY MEDICINE

## 2021-07-30 PROCEDURE — G0463 HOSPITAL OUTPT CLINIC VISIT: HCPCS | Mod: 25

## 2021-07-30 PROCEDURE — 999N000096 CARDIAC MOBILE TELEMETRY MONITOR

## 2021-07-30 PROCEDURE — 83735 ASSAY OF MAGNESIUM: CPT | Mod: ZL | Performed by: FAMILY MEDICINE

## 2021-07-30 PROCEDURE — 93228 REMOTE 30 DAY ECG REV/REPORT: CPT | Performed by: INTERNAL MEDICINE

## 2021-07-30 PROCEDURE — 36415 COLL VENOUS BLD VENIPUNCTURE: CPT | Mod: ZL | Performed by: FAMILY MEDICINE

## 2021-07-30 RX ORDER — HYDROCODONE BITARTRATE AND ACETAMINOPHEN 10; 325 MG/1; MG/1
1-2 TABLET ORAL EVERY 4 HOURS PRN
Qty: 180 TABLET | Refills: 0 | Status: SHIPPED | OUTPATIENT
Start: 2021-08-03 | End: 2021-09-03

## 2021-07-30 RX ORDER — ONDANSETRON 4 MG/1
4 TABLET, FILM COATED ORAL EVERY 6 HOURS PRN
Qty: 30 TABLET | Refills: 1 | Status: SHIPPED | OUTPATIENT
Start: 2021-07-30 | End: 2021-09-14

## 2021-07-30 ASSESSMENT — ANXIETY QUESTIONNAIRES
6. BECOMING EASILY ANNOYED OR IRRITABLE: NOT AT ALL
GAD7 TOTAL SCORE: 5
7. FEELING AFRAID AS IF SOMETHING AWFUL MIGHT HAPPEN: SEVERAL DAYS
GAD7 TOTAL SCORE: 5
5. BEING SO RESTLESS THAT IT IS HARD TO SIT STILL: NOT AT ALL
2. NOT BEING ABLE TO STOP OR CONTROL WORRYING: SEVERAL DAYS
1. FEELING NERVOUS, ANXIOUS, OR ON EDGE: MORE THAN HALF THE DAYS
3. WORRYING TOO MUCH ABOUT DIFFERENT THINGS: SEVERAL DAYS
7. FEELING AFRAID AS IF SOMETHING AWFUL MIGHT HAPPEN: SEVERAL DAYS
4. TROUBLE RELAXING: NOT AT ALL
8. IF YOU CHECKED OFF ANY PROBLEMS, HOW DIFFICULT HAVE THESE MADE IT FOR YOU TO DO YOUR WORK, TAKE CARE OF THINGS AT HOME, OR GET ALONG WITH OTHER PEOPLE?: SOMEWHAT DIFFICULT
GAD7 TOTAL SCORE: 5

## 2021-07-30 ASSESSMENT — PATIENT HEALTH QUESTIONNAIRE - PHQ9
SUM OF ALL RESPONSES TO PHQ QUESTIONS 1-9: 6
SUM OF ALL RESPONSES TO PHQ QUESTIONS 1-9: 6
10. IF YOU CHECKED OFF ANY PROBLEMS, HOW DIFFICULT HAVE THESE PROBLEMS MADE IT FOR YOU TO DO YOUR WORK, TAKE CARE OF THINGS AT HOME, OR GET ALONG WITH OTHER PEOPLE: SOMEWHAT DIFFICULT

## 2021-07-30 ASSESSMENT — MIFFLIN-ST. JEOR: SCORE: 1355.18

## 2021-07-30 ASSESSMENT — PAIN SCALES - GENERAL: PAINLEVEL: EXTREME PAIN (8)

## 2021-07-30 NOTE — NURSING NOTE
"Patient presents to the clinic for Hospital follow up.      FOOD SECURITY SCREENING QUESTIONS  Hunger Vital Signs:  Within the past 12 months we worried whether our food would run out before we got money to buy more. Never  Within the past 12 months the food we bought just didn't last and we didn't have money to get more. Never     Chief Complaint   Patient presents with     Hospital F/U       Initial /72 (BP Location: Right arm, Patient Position: Sitting, Cuff Size: Adult Regular)   Pulse 60   Temp (!) 96  F (35.6  C) (Temporal)   Resp 20   Ht 1.67 m (5' 5.75\")   Wt 80.7 kg (178 lb)   LMP  (LMP Unknown)   SpO2 98%   BMI 28.95 kg/m   Estimated body mass index is 28.95 kg/m  as calculated from the following:    Height as of this encounter: 1.67 m (5' 5.75\").    Weight as of this encounter: 80.7 kg (178 lb).  Medication Reconciliation: complete    [place] LPN         "

## 2021-07-30 NOTE — PROGRESS NOTES
"  Hospital Follow-up Visit:    Hospital/Nursing Home/IP Rehab Facility: [facility]  Date of Admission: 7-  Date of Discharge: 7-  Reason(s) for Admission: Symptomatic Bradycardia    Was your hospitalization related to COVID-19? No   Problems taking medications regularly:  None  Medication changes since discharge: Metoprolol Succinate 25 mg, 1/2 Tab PO QD  Problems adhering to non-medication therapy:  None    Summary of hospitalization: [facility] discharge summary reviewed  Diagnostic Tests/Treatments reviewed.  Follow up needed: none  Other Healthcare Providers Involved in Patient s Care:         cardiology  Update since discharge: fluctuating course. Post Discharge Medication Reconciliation: discharge medications reconciled and changed, per note/orders.  Plan of care communicated with patient          Answers for HPI/ROS submitted by the patient on 7/30/2021  If you checked off any problems, how difficult have these problems made it for you to do your work, take care of things at home, or get along with other people?: Somewhat difficult  PHQ9 TOTAL SCORE: 6  YAYA 7 TOTAL SCORE: 5    Care coordination call made 7/19/21    Hospitalized for weakness, exercise intolerance, headache an nausea.   Blood pressure was 77/52. Pulse at home around 50  Toprol XL felt to be the culprit. Dose reduced from 50 to 12.5 mg  Felt better on discharge    Now, still feels dizzy  Nauseated  Fell last night and bumped head    Feels lightheaded, then her heart \"feels weird.\" Notices palpitations after dizziness.   Can have lightheadedness seated, but more severe when standing  Hemoglobin lower on ED visit 1 week after hospital discharge  History of GI bleed from a stomach ulcer in 2017.    Remains on chronic opioids for chest wall and lumbar pain.  In the past was on methadone, but has been weaned off.  Also using clonazepam for anxiety.  Is aware of risk for sedation and overdose with concurrent " benzodiazepine and opioids.  Back pain improved with lumbar PARISH in June 2021      Current Outpatient Medications   Medication Sig Dispense Refill     albuterol (PROAIR HFA/PROVENTIL HFA/VENTOLIN HFA) 108 (90 Base) MCG/ACT inhaler Inhale 2 puffs into the lungs every 6 hours 2 Inhaler 3     amLODIPine (NORVASC) 10 MG tablet TAKE 1 TABLET (10 MG) BY MOUTH DAILY DX. CODE: I10. 90 tablet 3     busPIRone (BUSPAR) 15 MG tablet Take 1 tablet (15 mg) by mouth 2 times daily 180 tablet 1     clonazePAM (KLONOPIN) 1 MG tablet Take 1 tablet (1 mg) by mouth 3 times daily as needed for anxiety Max 2.5 per day = 225 per 90 days 90 tablet 0     clopidogrel (PLAVIX) 75 MG tablet Take 1 tablet (75 mg) by mouth daily 90 tablet 4     Diclofenac Sodium 1.5 % SOLN Apply 20 drops to each hand or 40 drops to each knee up to 4 times daily as needed for arthritis pain 450 mL 3     [START ON 8/3/2021] HYDROcodone-acetaminophen (NORCO)  MG per tablet Take 1-2 tablets by mouth every 4 hours as needed for severe pain 6 per day 180 tablet 0     lisinopril (ZESTRIL) 20 MG tablet Take 0.5 tablets (10 mg) by mouth daily 90 tablet 3     metoprolol succinate ER (TOPROL-XL) 25 MG 24 hr tablet Take 0.5 tablets (12.5 mg) by mouth daily 30 tablet 11     naloxone (NARCAN) 4 MG/0.1ML nasal spray Spray into one nostril for opioid reversal if unresponsive. May repeat every 2-3 minutes until patient responsive or EMS arrives 1 each 1     nitroGLYcerin (NITROLINGUAL) 0.4 MG/SPRAY spray For chest pain spray 1 spray under tongue every 5 minutes for 3 doses. If symptoms persist 5 minutes after 1st dose call 911. 4.9 g 3     omeprazole (PRILOSEC) 20 MG DR capsule Take 1 capsule (20 mg) by mouth 2 times daily 180 capsule 3     ondansetron (ZOFRAN) 4 MG tablet Take 1 tablet (4 mg) by mouth every 6 hours as needed for nausea 30 tablet 1     ranolazine (RANEXA) 500 MG 12 hr tablet Take 500 mg by mouth daily       rivaroxaban ANTICOAGULANT (XARELTO) 15 MG TABS  "tablet Take 1 tablet (15 mg) by mouth daily (with dinner) 90 tablet 3     rosuvastatin (CRESTOR) 20 MG tablet Take 20 mg by mouth daily       sucralfate (CARAFATE) 1 GM tablet Take 1 tablet (1 g) by mouth 4 times daily as needed for nausea (or dark stools) 360 tablet 0     VITAMIN D, CHOLECALCIFEROL, PO Take 5,000 Units by mouth daily       vortioxetine (TRINTELLIX) 20 MG tablet Take 1 tablet (20 mg) by mouth daily 90 tablet 4     Allergies   Allergen Reactions     Atorvastatin Muscle Pain (Myalgia)     Tiotropium Bromide [Tiotropium] Rash     Advil [Ibuprofen] Other (See Comments)     Does not recall but feel like it interacted with blood thinners and HX of GI BLEED     Ezetimibe Muscle Pain (Myalgia)     Latex Rash     Niacin      Other reaction(s): Flushing     No Clinical Screening - See Comments Itching, Rash and Blisters     Metals and plastics       Tape [Adhesive Tape] Rash       OBJECTIVE:  /72 (BP Location: Right arm, Patient Position: Sitting, Cuff Size: Adult Regular)   Pulse 60   Temp (!) 96  F (35.6  C) (Temporal)   Resp 20   Ht 1.67 m (5' 5.75\")   Wt 80.7 kg (178 lb)   LMP  (LMP Unknown)   SpO2 98%   BMI 28.95 kg/m      EXAM:  General Appearance: Alert. No acute distress  Eyes: EOMI, PERRL, no nystagmus, but felt \"funny\" on left gaze  Ears: Normal TM bilaterally  Chest/Respiratory Exam: Clear to auscultation bilaterally  Cardiovascular Exam: Regular rate and rhythm. S1, S2, no murmur, gallop, or rubs.  Neuro Exam: Alert, oriented x 3. CN II-XI intact. Sensation to light touch intact; reflexes 2+, strength symmetric upper and lower extremities. No dysmetria. No pronator drift  Extremities: 2+ pedal pulses.  No lower extremity edema.  Psychiatric: Normal affect and mentation        Results for orders placed or performed in visit on 07/30/21   Basic Metabolic Panel     Status: Abnormal   Result Value Ref Range    Sodium 139 134 - 144 mmol/L    Potassium 4.4 3.5 - 5.1 mmol/L    Chloride 105 " 98 - 107 mmol/L    Carbon Dioxide (CO2) 24 21 - 31 mmol/L    Anion Gap 10 3 - 14 mmol/L    Urea Nitrogen 19 7 - 25 mg/dL    Creatinine 1.08 0.60 - 1.20 mg/dL    Calcium 9.9 8.6 - 10.3 mg/dL    Glucose 77 70 - 105 mg/dL    GFR Estimate 53 (L) >60 mL/min/1.73m2   Magnesium     Status: Normal   Result Value Ref Range    Magnesium 2.1 1.9 - 2.7 mg/dL   CBC with platelets and differential     Status: None   Result Value Ref Range    WBC Count 7.0 4.0 - 11.0 10e3/uL    RBC Count 4.39 3.80 - 5.20 10e6/uL    Hemoglobin 12.9 11.7 - 15.7 g/dL    Hematocrit 38.6 35.0 - 47.0 %    MCV 88 78 - 100 fL    MCH 29.4 26.5 - 33.0 pg    MCHC 33.4 31.5 - 36.5 g/dL    RDW 13.5 10.0 - 15.0 %    Platelet Count 273 150 - 450 10e3/uL    % Neutrophils 57 %    % Lymphocytes 30 %    % Monocytes 10 %    % Eosinophils 2 %    % Basophils 1 %    % Immature Granulocytes 0 %    NRBCs per 100 WBC 0 <1 /100    Absolute Neutrophils 4.0 1.6 - 8.3 10e3/uL    Absolute Lymphocytes 2.1 0.8 - 5.3 10e3/uL    Absolute Monocytes 0.7 0.0 - 1.3 10e3/uL    Absolute Eosinophils 0.1 0.0 - 0.7 10e3/uL    Absolute Basophils 0.1 0.0 - 0.2 10e3/uL    Absolute Immature Granulocytes 0.0 <=0.0 10e3/uL    Absolute NRBCs 0.0 10e3/uL   CBC and Differential     Status: None    Narrative    The following orders were created for panel order CBC and Differential.  Procedure                               Abnormality         Status                     ---------                               -----------         ------                     CBC with platelets and d...[[id number]]                      Final result                 Please view results for these tests on the individual orders.          Assessment & Plan       ICD-10-CM    1. Dizziness  R42 Cardiac Mobile Telemetry Monitor     CBC and Differential     Basic Metabolic Panel     Magnesium     Physical Therapy Referral     CBC and Differential     Basic Metabolic Panel     Magnesium   2. Nausea  R11.0 ondansetron (ZOFRAN) 4 MG  tablet   3. Chronic pain disorder  G89.4 HYDROcodone-acetaminophen (NORCO)  MG per tablet   4. Chest wall pain, chronic  R07.89 HYDROcodone-acetaminophen (NORCO)  MG per tablet    G89.29    5. Controlled substance agreement updated 10/9/2020  Z79.899 HYDROcodone-acetaminophen (NORCO)  MG per tablet   6. Chronic bilateral low back pain without sciatica  M54.5 HYDROcodone-acetaminophen (NORCO)  MG per tablet    G89.29    7. Palpitations  R00.2 Cardiac Mobile Telemetry Monitor     At first she was reporting lightheadedness, which would certainly be consistent with hospitalization symptoms from hypotension and bradycardia.  However, on exam she felt funny on left gaze, but had no nystagmus.  It is possible that she has concurrent vertigo, although it is difficult to determine today.    Of concern is report of palpitations associated with dizziness.  Recommend placement of an M cot.  She has already fallen and bumped her head last night and is at high risk to return to the ED or hospital without urgent investigation.  We were able to get a cardiac monitor placed today.    Discussed the impact of bumping her head.  She is on anticoagulation and clopidogrel.  Declines CT scan of the head.  It does not sound like she had a significant impact and is mentating fine today.    Labs obtained for further investigation.  She does have a history of GI bleed and anemia.  Hemoglobin has returned to normal.  Other labs look fine and do not seem contributory towards her current symptoms.    PDMP Review       Value Time User    State PDMP site checked  Yes 7/30/2021 11:35 AM Ramirez Cano MD         Refilled hydrocodone for next week when due.  No changes to nursing.  Given 1 month quantity.        She will follow up next week at the end of the cardiac monitor for reassessment.    Ramirez Cano MD     [facility]

## 2021-07-30 NOTE — PATIENT INSTRUCTIONS
Date Placed on Pt:  07/30/2021    Patient was given MCOT device and instructed on:    -Keeping monitor and sensor within 30 feet of each other at all times  -How to record a sympton manually  -To remove patch at 5-7 days to charge sensor and reapply patch.  -How to remove sensor from original patch and to place in new patch.  -How to charge sensor and monitor  -How to move through the screens on the monitor  -When to trigger a juan j event  -How to return devices (FedEx/UPS)  -How long to wear device  -Who to call with questions  -Not to swim or take a bath with device on  -Patient instructed to wear for 10 days

## 2021-07-31 ASSESSMENT — PATIENT HEALTH QUESTIONNAIRE - PHQ9: SUM OF ALL RESPONSES TO PHQ QUESTIONS 1-9: 6

## 2021-07-31 ASSESSMENT — ANXIETY QUESTIONNAIRES: GAD7 TOTAL SCORE: 5

## 2021-08-03 DIAGNOSIS — K25.4 CHRONIC GASTRIC ULCER WITH HEMORRHAGE: ICD-10-CM

## 2021-08-04 RX ORDER — SUCRALFATE 1 G/1
1 TABLET ORAL 4 TIMES DAILY PRN
Qty: 360 TABLET | Refills: 0 | Status: SHIPPED | OUTPATIENT
Start: 2021-08-04 | End: 2021-09-29

## 2021-08-04 NOTE — TELEPHONE ENCOUNTER
[facility] sent Rx request for the following:      SUCRALFATE 1 GM TABLET  Sig: Take 1 tablet (1 g) by mouth 4 times daily as needed for nausea (or dark stools)      Last Prescription Date:   5/10/2021  Last Fill Qty/Refills:         360, R-0    Last Office Visit:              7/30/2021   Future Office visit:           8/6/2021    Prescription refilled per RN Medication Refill Policy.................... Nikos Toure RN ....................  8/4/2021   3:02 PM

## 2021-08-06 ENCOUNTER — OFFICE VISIT (OUTPATIENT)
Dept: FAMILY MEDICINE | Facility: OTHER | Age: 66
End: 2021-08-06
Attending: FAMILY MEDICINE
Payer: MEDICARE

## 2021-08-06 VITALS
HEART RATE: 64 BPM | OXYGEN SATURATION: 96 % | RESPIRATION RATE: 20 BRPM | DIASTOLIC BLOOD PRESSURE: 70 MMHG | TEMPERATURE: 96.2 F | SYSTOLIC BLOOD PRESSURE: 110 MMHG

## 2021-08-06 DIAGNOSIS — J44.9 CHRONIC OBSTRUCTIVE PULMONARY DISEASE, UNSPECIFIED COPD TYPE (H): ICD-10-CM

## 2021-08-06 DIAGNOSIS — G43.719 INTRACTABLE CHRONIC COMMON MIGRAINE WITHOUT AURA: ICD-10-CM

## 2021-08-06 DIAGNOSIS — N39.0 E. COLI UTI: Primary | ICD-10-CM

## 2021-08-06 DIAGNOSIS — N18.31 STAGE 3A CHRONIC KIDNEY DISEASE (H): ICD-10-CM

## 2021-08-06 DIAGNOSIS — B96.20 E. COLI UTI: Primary | ICD-10-CM

## 2021-08-06 DIAGNOSIS — R06.02 SOB (SHORTNESS OF BREATH): ICD-10-CM

## 2021-08-06 DIAGNOSIS — I20.89 CHRONIC STABLE ANGINA (H): ICD-10-CM

## 2021-08-06 LAB
ALBUMIN UR-MCNC: 20 MG/DL
APPEARANCE UR: ABNORMAL
BACTERIA #/AREA URNS HPF: ABNORMAL /HPF
BILIRUB UR QL STRIP: NEGATIVE
COLOR UR AUTO: YELLOW
GLUCOSE UR STRIP-MCNC: NEGATIVE MG/DL
HGB UR QL STRIP: ABNORMAL
KETONES UR STRIP-MCNC: NEGATIVE MG/DL
LEUKOCYTE ESTERASE UR QL STRIP: ABNORMAL
MUCOUS THREADS #/AREA URNS LPF: PRESENT /LPF
NITRATE UR QL: POSITIVE
PH UR STRIP: 6 [PH] (ref 5–9)
RBC URINE: 6 /HPF
SP GR UR STRIP: 1.02 (ref 1–1.03)
UROBILINOGEN UR STRIP-MCNC: NORMAL MG/DL
WBC URINE: >182 /HPF

## 2021-08-06 PROCEDURE — 250N000011 HC RX IP 250 OP 636: Performed by: FAMILY MEDICINE

## 2021-08-06 PROCEDURE — 99214 OFFICE O/P EST MOD 30 MIN: CPT | Performed by: FAMILY MEDICINE

## 2021-08-06 PROCEDURE — 81001 URINALYSIS AUTO W/SCOPE: CPT | Mod: ZL | Performed by: FAMILY MEDICINE

## 2021-08-06 PROCEDURE — 87086 URINE CULTURE/COLONY COUNT: CPT | Mod: ZL | Performed by: FAMILY MEDICINE

## 2021-08-06 PROCEDURE — 96372 THER/PROPH/DIAG INJ SC/IM: CPT | Performed by: FAMILY MEDICINE

## 2021-08-06 PROCEDURE — G0463 HOSPITAL OUTPT CLINIC VISIT: HCPCS | Mod: 25

## 2021-08-06 RX ORDER — KETOROLAC TROMETHAMINE 30 MG/ML
15 INJECTION, SOLUTION INTRAMUSCULAR; INTRAVENOUS ONCE
Status: COMPLETED | OUTPATIENT
Start: 2021-08-06 | End: 2021-08-06

## 2021-08-06 RX ORDER — CEFUROXIME AXETIL 500 MG/1
500 TABLET ORAL 2 TIMES DAILY
Qty: 14 TABLET | Refills: 0 | Status: SHIPPED | OUTPATIENT
Start: 2021-08-06 | End: 2021-08-13

## 2021-08-06 RX ADMIN — KETOROLAC TROMETHAMINE 15 MG: 30 INJECTION, SOLUTION INTRAMUSCULAR; INTRAVENOUS at 11:08

## 2021-08-06 ASSESSMENT — PAIN SCALES - GENERAL: PAINLEVEL: EXTREME PAIN (9)

## 2021-08-06 ASSESSMENT — PATIENT HEALTH QUESTIONNAIRE - PHQ9
10. IF YOU CHECKED OFF ANY PROBLEMS, HOW DIFFICULT HAVE THESE PROBLEMS MADE IT FOR YOU TO DO YOUR WORK, TAKE CARE OF THINGS AT HOME, OR GET ALONG WITH OTHER PEOPLE: NOT DIFFICULT AT ALL
SUM OF ALL RESPONSES TO PHQ QUESTIONS 1-9: 7
SUM OF ALL RESPONSES TO PHQ QUESTIONS 1-9: 7

## 2021-08-06 NOTE — NURSING NOTE
Clinic Administered Medication Documentation    Administrations This Visit     ketorolac (TORADOL) injection 15 mg     Admin Date  08/06/2021 Action  Given Dose  15 mg Route  Intramuscular Site  Left Deltoid Administered By  Taylor Hill LPN    Ordering Provider: Ramirez Cano MD    Patient Supplied?: No                  Injectable Medication Documentation    Patient was given Ketorolac Tromethamine (Toradol). Prior to medication administration, verified patients identity using patient s name and date of birth. Please see MAR and medication order for additional information. Patient instructed to remain in clinic for 15 minutes.      Was entire vial of medication used? No 15 mg waste from the 30 mg vial  Vial/Syringe: Single dose vial  Expiration Date:  04/2022  Was this medication supplied by the patient? No   Taylor Hill LPN 8/6/2021 11:13 AM

## 2021-08-06 NOTE — PROGRESS NOTES
"Nursing Notes:   Taylor Hill LPN  8/6/2021 10:33 AM  Sign at exiting of workspace  Patient presents to the clinic for 1 week follow up.        FOOD SECURITY SCREENING QUESTIONS  Hunger Vital Signs:  Within the past 12 months we worried whether our food would run out before we got money to buy more. Never  Within the past 12 months the food we bought just didn't last and we didn't have money to get more. Never       Chief Complaint   Patient presents with     Clinic Care Coordination - Follow-up       Initial /70 (BP Location: Right arm, Patient Position: Sitting, Cuff Size: Adult Regular)   Pulse 64   Temp (!) 96.2  F (35.7  C) (Temporal)   Resp 20   LMP  (LMP Unknown)   SpO2 96%  Estimated body mass index is 28.95 kg/m  as calculated from the following:    Height as of 7/30/21: 1.67 m (5' 5.75\").    Weight as of 7/30/21: 80.7 kg (178 lb).  Medication Reconciliation: complete    Taylor Hill LPN         Assessment & Plan       ICD-10-CM    1. E. coli UTI  N39.0 UA reflex to Microscopic and Culture    B96.20 cefuroxime (CEFTIN) 500 MG tablet     UA reflex to Microscopic and Culture     Urine Culture   2. Chronic stable angina (H)  I20.8 Echocardiogram Complete   3. Chronic obstructive pulmonary disease, unspecified COPD type (H)  J44.9 CANCELED: XR Chest 2 Views   4. Stage 3a chronic kidney disease  N18.31    5. SOB (shortness of breath)  R06.02 Echocardiogram Complete     CANCELED: XR Chest 2 Views   6. Intractable chronic common migraine without aura  G43.719 ketorolac (TORADOL) injection 15 mg         With generalized malaise, discussed obtaining UA.  Looking back at her hospital chart, she actually had a UA obtained and a urine culture showing E. coli.  It appears this was never treated while she was hospitalized.  Likely this infection persists.  This certainly could make her feel crummy.  Recommend repeat UA.  Elected to treat with cefuroxime 500 mg twice daily for 1 week given the duration " "of illness for over 3 weeks likely makes this more of a complicated UTI.    Discussed obtaining a chest x-ray today given her shortness of breath, but this could be from the UTI.  She declined an x-ray.    Does have known CAD with chronic stable angina on Ranexa.  Last echocardiogram was in 2019.  Ordered updated echo.  Keep appointment with Dr. Gramajo in 2 weeks.    We discussed how opioids can lower blood pressure and are not recommended for headache treatment.  She is on anticoagulants and clopidogrel.  NSAIDs should not be used in general, but one-time dose of ketorolac could help headache and be safe.  She agrees to receive ketorolac 15 mg IM x1.  He is to go home and rest.  Headache could be from untreated UTI.  Should feel better as she starts on antibiotics.    Arranged close follow-up within 1 week.    Ramirez Cano MD     [facility]      SUBJECTIVE:  66 year old female with CAD, COPD presents for follow up on dizziness and chronic pain    Hospitalized in July for symptomatic bradycardia.  Metoprolol dose reduced.  At appointment last week she still felt dizzy.  Blood pressure has improved with reduction in metoprolol.  She felt funny on left gaze but had no nystagmus.  Referral placed for vestibular therapy.  Recommended cardiac monitor given palpitations and dizziness.  Labs were obtained with normal CBC and BMP.    Stills feels \"crummy.\"  Weak and dizzy. Some SOB.  Some palpitations. MCOT in place  Starting vestibular therapy 8/26    She is requesting a shot of morphine for worse headache today.  Is on chronic opioids for back and chest pain.  She reports using them for headaches as well.      REVIEW OF SYSTEMS:    Pertinent items are noted in HPI.    Current Outpatient Medications   Medication Sig Dispense Refill     albuterol (PROAIR HFA/PROVENTIL HFA/VENTOLIN HFA) 108 (90 Base) MCG/ACT inhaler Inhale 2 puffs into the lungs every 6 hours 2 Inhaler 3     amLODIPine (NORVASC) 10 MG " tablet TAKE 1 TABLET (10 MG) BY MOUTH DAILY DX. CODE: I10. 90 tablet 3     busPIRone (BUSPAR) 15 MG tablet Take 1 tablet (15 mg) by mouth 2 times daily 180 tablet 1     clonazePAM (KLONOPIN) 1 MG tablet Take 1 tablet (1 mg) by mouth 3 times daily as needed for anxiety Max 2.5 per day = 225 per 90 days 90 tablet 0     clopidogrel (PLAVIX) 75 MG tablet Take 1 tablet (75 mg) by mouth daily 90 tablet 4     Diclofenac Sodium 1.5 % SOLN Apply 20 drops to each hand or 40 drops to each knee up to 4 times daily as needed for arthritis pain 450 mL 3     HYDROcodone-acetaminophen (NORCO)  MG per tablet Take 1-2 tablets by mouth every 4 hours as needed for severe pain 6 per day 180 tablet 0     lisinopril (ZESTRIL) 20 MG tablet Take 0.5 tablets (10 mg) by mouth daily 90 tablet 3     metoprolol succinate ER (TOPROL-XL) 25 MG 24 hr tablet Take 0.5 tablets (12.5 mg) by mouth daily 30 tablet 11     naloxone (NARCAN) 4 MG/0.1ML nasal spray Spray into one nostril for opioid reversal if unresponsive. May repeat every 2-3 minutes until patient responsive or EMS arrives 1 each 1     nitroGLYcerin (NITROLINGUAL) 0.4 MG/SPRAY spray For chest pain spray 1 spray under tongue every 5 minutes for 3 doses. If symptoms persist 5 minutes after 1st dose call 911. 4.9 g 3     omeprazole (PRILOSEC) 20 MG DR capsule Take 1 capsule (20 mg) by mouth 2 times daily 180 capsule 3     ondansetron (ZOFRAN) 4 MG tablet Take 1 tablet (4 mg) by mouth every 6 hours as needed for nausea 30 tablet 1     ranolazine (RANEXA) 500 MG 12 hr tablet Take 500 mg by mouth daily       rivaroxaban ANTICOAGULANT (XARELTO) 15 MG TABS tablet Take 1 tablet (15 mg) by mouth daily (with dinner) 90 tablet 3     rosuvastatin (CRESTOR) 20 MG tablet Take 20 mg by mouth daily       sucralfate (CARAFATE) 1 GM tablet TAKE 1 TABLET (1 G) BY MOUTH 4 TIMES DAILY AS NEEDED FOR NAUSEA (OR DARK STOOLS) 360 tablet 0     VITAMIN D, CHOLECALCIFEROL, PO Take 5,000 Units by mouth daily        vortioxetine (TRINTELLIX) 20 MG tablet Take 1 tablet (20 mg) by mouth daily 90 tablet 4     Allergies   Allergen Reactions     Atorvastatin Muscle Pain (Myalgia)     Tiotropium Bromide [Tiotropium] Rash     Advil [Ibuprofen] Other (See Comments)     Does not recall but feel like it interacted with blood thinners and HX of GI BLEED     Ezetimibe Muscle Pain (Myalgia)     Latex Rash     Niacin      Other reaction(s): Flushing     No Clinical Screening - See Comments Itching, Rash and Blisters     Metals and plastics       Tape [Adhesive Tape] Rash       OBJECTIVE:  /70 (BP Location: Right arm, Patient Position: Sitting, Cuff Size: Adult Regular)   Pulse 64   Temp (!) 96.2  F (35.7  C) (Temporal)   Resp 20   LMP  (LMP Unknown)   SpO2 96%     EXAM:  General Appearance: Alert. No acute distress  Chest/Respiratory Exam: Clear to auscultation bilaterally  Cardiovascular Exam: Regular rate and rhythm. S1, S2, no murmur, gallop, or rubs.  Extremities: 2+ pedal pulses.  No lower extremity edema.  Psychiatric: Normal affect and mentation        Results for orders placed or performed in visit on 08/06/21   UA reflex to Microscopic and Culture     Status: Abnormal    Specimen: Urine, Midstream   Result Value Ref Range    Color Urine Yellow Colorless, Straw, Light Yellow, Yellow    Appearance Urine Slightly Cloudy (A) Clear    Glucose Urine Negative Negative mg/dL    Bilirubin Urine Negative Negative    Ketones Urine Negative Negative mg/dL    Specific Gravity Urine 1.018 1.000 - 1.030    Blood Urine Small (A) Negative    pH Urine 6.0 5.0 - 9.0    Protein Albumin Urine 20  (A) Negative mg/dL    Urobilinogen Urine Normal Normal, 2.0 mg/dL    Nitrite Urine Positive (A) Negative    Leukocyte Esterase Urine Large (A) Negative    Bacteria Urine Many (A) None Seen /HPF    Mucus Urine Present (A) None Seen /LPF    RBC Urine 6 (H) <=2 /HPF    WBC Urine >182 (H) <=5 /HPF    Narrative    Urine Culture ordered based on  laboratory criteria             Answers for HPI/ROS submitted by the patient on 8/6/2021  If you checked off any problems, how difficult have these problems made it for you to do your work, take care of things at home, or get along with other people?: Not difficult at all  PHQ9 TOTAL SCORE: 7

## 2021-08-06 NOTE — PATIENT INSTRUCTIONS
Likely bladder infection  Treat with cefuroxime 500 mg twice daily for 1 week  Follow-up in a week

## 2021-08-06 NOTE — NURSING NOTE
"Patient presents to the clinic for 1 week follow up.        FOOD SECURITY SCREENING QUESTIONS  Hunger Vital Signs:  Within the past 12 months we worried whether our food would run out before we got money to buy more. Never  Within the past 12 months the food we bought just didn't last and we didn't have money to get more. Never       Chief Complaint   Patient presents with     Clinic Care Coordination - Follow-up       Initial /70 (BP Location: Right arm, Patient Position: Sitting, Cuff Size: Adult Regular)   Pulse 64   Temp (!) 96.2  F (35.7  C) (Temporal)   Resp 20   LMP  (LMP Unknown)   SpO2 96%  Estimated body mass index is 28.95 kg/m  as calculated from the following:    Height as of 7/30/21: 1.67 m (5' 5.75\").    Weight as of 7/30/21: 80.7 kg (178 lb).  Medication Reconciliation: complete    Taylor Hill LPN    "

## 2021-08-07 ASSESSMENT — PATIENT HEALTH QUESTIONNAIRE - PHQ9: SUM OF ALL RESPONSES TO PHQ QUESTIONS 1-9: 7

## 2021-08-08 LAB — BACTERIA UR CULT: ABNORMAL

## 2021-08-09 DIAGNOSIS — F41.9 CHRONIC ANXIETY: ICD-10-CM

## 2021-08-09 DIAGNOSIS — Z79.899 CONTROLLED SUBSTANCE AGREEMENT SIGNED: ICD-10-CM

## 2021-08-11 NOTE — TELEPHONE ENCOUNTER
Routing refill request to provider for review/approval because:  Drug not on the [facility] refill protocol   LOV: 8/6/2021    Jeimy Whyte RN on 8/11/2021 at 8:11 AM

## 2021-08-12 RX ORDER — CLONAZEPAM 1 MG/1
1 TABLET ORAL 3 TIMES DAILY PRN
Qty: 90 TABLET | Refills: 0 | Status: SHIPPED | OUTPATIENT
Start: 2021-08-12 | End: 2021-09-22

## 2021-08-12 NOTE — TELEPHONE ENCOUNTER
Dr. Cano is out of the clinic today through 8/17/21. Routing to covering provider, in his absence, for refill consideration. Kathleen Pucektt RN .............. 8/12/2021  8:25 AM

## 2021-08-17 ENCOUNTER — OFFICE VISIT (OUTPATIENT)
Dept: CARDIOLOGY | Facility: OTHER | Age: 66
End: 2021-08-17
Attending: INTERNAL MEDICINE
Payer: MEDICARE

## 2021-08-17 VITALS
BODY MASS INDEX: 29.73 KG/M2 | SYSTOLIC BLOOD PRESSURE: 128 MMHG | WEIGHT: 185 LBS | HEIGHT: 66 IN | DIASTOLIC BLOOD PRESSURE: 60 MMHG | OXYGEN SATURATION: 97 % | TEMPERATURE: 97.4 F | RESPIRATION RATE: 18 BRPM | HEART RATE: 75 BPM

## 2021-08-17 DIAGNOSIS — J45.909 ASTHMA, UNSPECIFIED ASTHMA SEVERITY, UNSPECIFIED WHETHER COMPLICATED, UNSPECIFIED WHETHER PERSISTENT: ICD-10-CM

## 2021-08-17 DIAGNOSIS — R42 LIGHTHEADEDNESS: ICD-10-CM

## 2021-08-17 DIAGNOSIS — Z95.1 HISTORY OF CORONARY ARTERY BYPASS GRAFT X 3: ICD-10-CM

## 2021-08-17 DIAGNOSIS — I25.810 ATHEROSCLEROSIS OF CORONARY ARTERY BYPASS GRAFT OF NATIVE HEART WITHOUT ANGINA PECTORIS: ICD-10-CM

## 2021-08-17 DIAGNOSIS — I77.1 SUBCLAVIAN ARTERY STENOSIS, LEFT (H): ICD-10-CM

## 2021-08-17 DIAGNOSIS — R07.9 CHEST PAIN, UNSPECIFIED TYPE: ICD-10-CM

## 2021-08-17 DIAGNOSIS — E86.0 DEHYDRATION: ICD-10-CM

## 2021-08-17 DIAGNOSIS — J44.9 CHRONIC OBSTRUCTIVE PULMONARY DISEASE, UNSPECIFIED COPD TYPE (H): ICD-10-CM

## 2021-08-17 DIAGNOSIS — R51.9 NONINTRACTABLE EPISODIC HEADACHE, UNSPECIFIED HEADACHE TYPE: ICD-10-CM

## 2021-08-17 DIAGNOSIS — R00.2 PALPITATIONS: Primary | ICD-10-CM

## 2021-08-17 DIAGNOSIS — R42 DIZZINESS: ICD-10-CM

## 2021-08-17 DIAGNOSIS — R00.1 SYMPTOMATIC BRADYCARDIA: ICD-10-CM

## 2021-08-17 DIAGNOSIS — I25.9 CHRONIC ISCHEMIC HEART DISEASE: ICD-10-CM

## 2021-08-17 DIAGNOSIS — I20.89 CHRONIC STABLE ANGINA (H): ICD-10-CM

## 2021-08-17 DIAGNOSIS — Z86.711 HISTORY OF PULMONARY EMBOLISM: ICD-10-CM

## 2021-08-17 PROCEDURE — 99215 OFFICE O/P EST HI 40 MIN: CPT | Performed by: INTERNAL MEDICINE

## 2021-08-17 PROCEDURE — 93005 ELECTROCARDIOGRAM TRACING: CPT | Performed by: INTERNAL MEDICINE

## 2021-08-17 PROCEDURE — G0463 HOSPITAL OUTPT CLINIC VISIT: HCPCS | Mod: 25

## 2021-08-17 PROCEDURE — 93010 ELECTROCARDIOGRAM REPORT: CPT | Performed by: INTERNAL MEDICINE

## 2021-08-17 PROCEDURE — 99417 PROLNG OP E/M EACH 15 MIN: CPT | Performed by: INTERNAL MEDICINE

## 2021-08-17 PROCEDURE — G0463 HOSPITAL OUTPT CLINIC VISIT: HCPCS

## 2021-08-17 ASSESSMENT — PAIN SCALES - GENERAL: PAINLEVEL: NO PAIN (0)

## 2021-08-17 ASSESSMENT — MIFFLIN-ST. JEOR: SCORE: 1386.9

## 2021-08-17 NOTE — NURSING NOTE
"Patient comes in for follow up on bradycardia.  Татьяна Bonilla LPN ....................8/17/2021   3:56 PM  Chief Complaint   Patient presents with     Follow Up     bradycardia       Initial /60 (BP Location: Right arm, Patient Position: Sitting, Cuff Size: Adult Large)   Pulse 75   Temp 97.4  F (36.3  C)   Resp 18   Ht 1.67 m (5' 5.75\")   Wt 83.9 kg (185 lb)   LMP  (LMP Unknown)   SpO2 97%   BMI 30.09 kg/m   Estimated body mass index is 30.09 kg/m  as calculated from the following:    Height as of this encounter: 1.67 m (5' 5.75\").    Weight as of this encounter: 83.9 kg (185 lb).  Meds Reconciled: complete  Pt is not on Aspirin  Pt is on a Statin  PHQ and/or YAYA reviewed. Pt referred to PCP/MH Provider as appropriate.    Татьяна Bonilla LPN    FOOD SECURITY SCREENING QUESTIONS  Hunger Vital Signs:  Within the past 12 months we worried whether our food would run out before we got money to buy more. Never  Within the past 12 months the food we bought just didn't last and we didn't have money to get more. Never  Татьяна Bonilla LPN 8/17/2021 3:56 PM    "

## 2021-08-17 NOTE — PROGRESS NOTES
[facility] HEART CARE   CARDIOLOGY PROGRESS NOTE     Chief Complaint   Patient presents with     Follow Up     bradycardia          Diagnosis:  1. Chronic stable angina with most cath on 9/25/17 at the [facility] with stable dz.  2. CAD.  3. H/O CABG x 3 on 2/12/2003 with LIMA to LAD, RAFI to RCA, and SVG to OM.   4. CKD-3.  5. H/O PE on 1/2/20 to RLL, MICHELLE and LLL.  6. COPD-unknown serenity.  7. HTN-controlled  8. Episodic lightheadedness  9. H/O coronary angiogram (2017)  10. Iron deficiency anemia.  11.  COPD-unknown type.  12. Left-sided subclavian steal syndrome status post stenting with patency as noted on 9/15/17.  13.  Tobacco abuse with counseling, quitting 8/23/17.   14.  H/O alcohol abuse.  15.  Hyperlipidemia-uncontrolled.  16.  Bradycardia with the slowest heart rate of 50 bpm on 7/30/2021.  17.  Dizziness/lightheadedness secondary to dehydration.      Assessment/Plan:    1.  She continues to endorse dizziness.  I suspect this is secondary to dehydration with poor fluid consumption.  As you know, the differential is vast.  I have encouraged her to double or triple her fluid intake and decrease or discontinue her coffee consumption.  2.  We will discontinue metoprolol 12.5 mg daily to see if this improves her dizziness.  3.  Being that she has headaches and dizziness/lightheadedness, will obtain an MRI of her brain to rule out stroke as a cause of her dizziness.  4.  With chest discomfort, shortness of breath, dizziness, will obtain a stress test.  5.  Follow-up in 6 months or sooner if issues.      Interval history:  Malina continues to endorse dizziness.  She states when she gets up she starts to feel dizzy like being off balance, will have a headache, her heart will pound and pulse increase.  She will be short of breath.  At times she has chest discomfort.  This has been going on for 1 to 2 years.      She was seen in the ER and admitted for a day on 7/16/2021.  She was felt to have symptomatic  "bradycardia/hypotension.  She felt \"woozy\" with a blood pressure of 77/52 at home.  She reports getting fluids and feeling much better, resolution of symptoms.  She felt her symptoms had gotten worse over the last 2 to 3 weeks.  Heart rates were noted to be between 49-52 in the ER.  Troponin negative and EKG showed heart rates in the 50s.    She was seen back in the ED on 7/22/2021 for lightheadedness once again.  She actually fell and was having concerning pain in her lungs.  She has a history of a PE and is on Xarelto.  No identifiable cause was found.  There was concern that her symptoms may be anxiety related.    Today, she reports continuing to feel these symptoms of dizziness.  She has a hard time describing them.  She feels her heart pounding, she has a headache, she is dizzy, she gets chest pain.  She took 2 nitro without relief.  Heart rates will fluctuate significantly from the 60s to 80s with simple movements around her house.  Symptoms have not improved with reduction in metoprolol.  She does have chronic ringing in ears with dry eyes.  She describes straining to move her bowels.  Recently, she had a normal bowel movement and was so happy asked her  to come see it.  He declined.  She drinks 2 glasses of water and 3 cups of coffee a day.  I believe she is chronically dehydrated and the reason for her symptoms.  She is to double and preferably triple her fluid intake.  She should cut back/discontinue coffee intake.      HPI:    Ms. Day is being seen by cardiology in follow-up.  Patient has a history of chronic stable angina, known ischemic heart disease, hypertension, hyperlipidemia, history of pulmonary embolism, left subclavian artery stenosis, anxiety, collagenous colitis, depression, osteoarthritis, history of tobacco use, central sleep apnea for which she is not compliant with CPAP use, iron deficiency anemia, CKD, myofascial pain, vitamin D deficiency, lumbar facet arthropathy, history of " "gastric ulcer with hemorrhage, COPD and peptic ulcer disease.     Patient has a known history of ischemic heart disease, she underwent  coronary angiogram and bypass angiogram on 9/15/2017 which was described as having stable disease. Mild to moderate 3 vessel CAD involving RCA, LAD and LCX with <50% stenosis at the greatest.  Occluded RAFI and SVG.  Patent LIMA.  No new obstructive lesions were identified and normal left heart cath.       She has a history of CABG on 2/12/03 x 3, history of multiple stent placements, patient reported 17 total.       At previous visit patient reported occasional recurrence of chest pain, not as severe as last ED visit on 10/25/19. She reported \"my breathing has been bad\", describes worsening CORTEZ. She described activity intolerance and little to no energy. Recommended repeat stress testing. NM lexiscan stress test was performed on 12/16/19 which was negative for inducible myocardial ischemia or infarction.  Left ventricular function was normal.     Carotid US on 12/12/19 without significant stenosis, mild atherosclerotic disease present.  Abdominal ultrasound on 12/12/2018 with no abdominal aortic aneurysm identified.     Patient returned to the ED with dyspnea in early January 2020. She was identified to have small segmental and subsegmental emboli bilaterally. Repeat imaging on 1/6 with decreasing volume of pulmonary emboli compared to scan on 1/2/2020. She was initially treated with Lovanox in the ED and discharged on Xarelto oral anticoagulation which has been going well for her, no bleeding complication. She also remains on Plavix with her significant ischemic heart disease history.      She presented to the ED on 3/16/2021 with reports of chest pain, chronic stable angina.  No ACS.  EKG stable and no elevation in troponin's. Patient admits that her BP was low and it was suspected she was dehydrated, she felt better following IV fluids and reports that this likely brought on her " angina.  She is currently working on maintaining good hydration.  She denies any recurrence of acute chest pain or pressure today.  No use of nitroglycerin since her recent ED visit.      Relevant testing:  MCOT from 7/30/21:  Findings: Patient's monitor was in place for 9 days and 7 hours.  Minimum heart rate 50 bpm, average heart rate 63 bpm, maximum heart rate 120 bpm. Rhythm/s present include sinus rhythm.  There were 37 symptomatic events during which time the noted rhythms were sinus rhythm, sinus bradycardia and sinus tachycardia.  There were six asymptomatic events during which time the noted rhythms were sinus rhythm.  There was rare atrial ectopy and rare ventricular ectopy.  Review of actual tracings showed no other abnormality.    ARYA on 7/17/20:  The right ankle-brachial index is 1.17.  Left ankle-brachial index is 0.98.    Stress test on 12/16/19:     The nuclear stress test is negative for inducible myocardial ischemia   or infarction.      A small amount of soft tissue artifact is present, more pronounced at   rest.      Left ventricular function is normal.      The left ventricular ejection fraction at rest is 79%.  The left   ventricular ejection fraction at stress is 72%.      A prior study was conducted on 6/30/2015.     US carotids on 12/12/19:  1.  No ultrasound evidence of hemo-dynamically significant stenosis.  Mild atherosclerotic disease.    US abdominal aorta on 12/12/19:  No abdominal aortic aneurysm.      ECHO on 5/13/19:  Global and regional left ventricular function is normal with an EF of 60-65%.  Mild concentric wall thickening consistent with left ventricular hypertrophy  is present.  Right ventricular function, chamber size, wall motion, and thickness are  normal.  No significant valvular abnormalities were noted.  Previous study not available for comparison.    Cardiac cath on 3/30/13:  LEFT MAIN: Widely patent stent.   LEFT ANTERIOR DESCENDING: Widely patent proximal stent. There  is an 85% to  90% mid lesion after the second diagonal and prior to the LIMA insertion as before.   CIRCUMFLEX: There is diffuse 60% to 70% disease throughout, the ostium is 70   to 75, and the OM2 is a 70, but it is very diffuse disease and basically   unchanged from previous.   RCA: Widely patent stents with only mild irregularities.   LEFT VENTRICULOGRAM: Normal left ventricular ejection fraction, LVEF 60%,   with no focal wall motion abnormality. LV pressure 146/29.   FLORENTINO to LAD: Widely patent, although there is less flow than before, and, in   fact, the retrograde filling of the LIMA from the antegrade vessel. There is   a severe subclavian stenosis that a pressure gradient revealed in the aorta   was 153/78, and in the right subclavian was 100 to 106/73, for a nearly 50 mm   pressure gradient. The stenosis was 75% to 80%.   RAFI to RCA: 100%.   SVG to OM: 100%.                     ICD-10-CM    1. Palpitations  R00.2 EKG 12-lead, tracing only   2. Dizziness  R42 MRI Brain w & w/o contrast   3. Lightheadedness  R42 MRI Brain w & w/o contrast   4. Chest pain, unspecified type  R07.9 NM Lexiscan stress test   5. Chronic stable angina (H)  I20.8 NM Lexiscan stress test   6. Dehydration  E86.0    7. Atherosclerosis of coronary artery bypass graft of native heart without angina pectoris  I25.810 NM Lexiscan stress test   8. Chronic ischemic heart disease  I25.9 NM Lexiscan stress test   9. Subclavian artery stenosis, left (H)  I77.1    10. Symptomatic bradycardia  R00.1    11. History of coronary artery bypass graft x 3  Z95.1 NM Lexiscan stress test   12. History of pulmonary embolism  Z86.711    13. Nonintractable episodic headache, unspecified headache type  R51.9    14. Chronic obstructive pulmonary disease, unspecified COPD type (H)  J44.9    15. Asthma, unspecified asthma severity, unspecified whether complicated, unspecified whether persistent  J45.909 Pulmonary Function Test       Past Medical History:    Diagnosis Date     Acute ischemic heart disease (H)     06/15/2007,with PTCA and stenting of 90% osteo circumflex lesion and patent LAD graft, patent left main stent.     Acute myocardial infarction (H)     3/30/2013     Anxiety disorder     No Comments Provided     Atherosclerotic heart disease of native coronary artery without angina pectoris     -angio revealed 3 vessel dz  -4 stents placed; 2 overlapping stents placed in mLAD, 1 stent pRCA, 1 stent pCirc 1 s 9/02 -non-ST elevation MI 1/03  -angio revealed restenosis of Circ.    -PTCA and brachytherapy of pCirc -repeat angio 2/03 -no intervension -CABG x3 12/03 - Dr Sinclair  -LIMA-LAD, RAFI-RCA, SVG-OM -PCI 7/04 stent to L -CTangio 9/05   -patentent LIMA-LAD. RAFI-RCA occluded; RCA ostio/p*     Bilateral carpal tunnel syndrome     No Comments Provided     Cervicalgia     No Comments Provided     Chest pain     12/29/2014     Chronic gastric ulcer without hemorrhage or perforation     10/03/2011,hx of GI bleed (2003)     Chronic ischemic heart disease     06/27/2012     Chronic obstructive pulmonary disease (H)     06/15/2007,low DLCO, normal spirometry     Chronic or unspecified gastric ulcer with hemorrhage     10/2003     Chronic pain syndrome     12/01/2010,chest wall, back     Constipation     11/29/2011     Coronary angioplasty status     09/12/2002,with triple stenting     Coronary angioplasty status     01/10/2003,repeat angioplasty     Coronary angioplasty status     No Comments Provided     Dorsalgia     05/31/2011     Encounter for other administrative examinations     1/28/2014     Encounter for screening for cardiovascular disorders     10/2004,Cardiolite (2004, 2005, 2006 and 2011)     Enterocolitis due to Clostridium difficile     8/19/2016     Essential (primary) hypertension     No Comments Provided     Hyperlipidemia     No Comments Provided     Major depressive disorder, single episode     Severe, hx of suicide attempt/hospitalization      Migraine without status migrainosus, not intractable     No Comments Provided     Nodular corneal degeneration     09/26/2011     Noninfective gastroenteritis and colitis     06/15/2007,history of     Noninfective gastroenteritis and colitis     Microcytic     Osteoarthritis     No Comments Provided     Other chest pain     10/13/2009,chronic     Pain in knee     05/31/2011     Pain in right shoulder     No Comments Provided     Panic disorder without agoraphobia     No Comments Provided     Peptic ulcer without hemorrhage or perforation     6/15/2007     Peripheral vascular disease (H)     No Comments Provided     Personal history of diseases of the blood and blood-forming organs and certain disorders involving the immune mechanism (CODE)     No Comments Provided     Personal history of nicotine dependence     No Comments Provided     Personal history of other medical treatment (CODE)     10/14/2013     Presence of aortocoronary bypass graft     2/12/2003     Primary central sleep apnea     10/14/2013     Sepsis due to Escherichia coli (E. coli) (H)     7/14/16,[facility]     Stricture of artery (H)     3/31/2013,S/p prox left SCA stent 4/1/2013     Thoracic, thoracolumbar and lumbosacral intervertebral disc disorder     No Comments Provided     Uncomplicated opioid abuse (H)     history of     Vitamin D deficiency     5/6/2013       Past Surgical History:   Procedure Laterality Date     ANGIOPLASTY      9/12/02,with triple stenting     APPENDECTOMY OPEN      No Comments Provided     ARTHROSCOPY KNEE      left     ARTHROSCOPY SHOULDER Right 05/12/2017    labral tear, rotator cuff tear and some subacromial decompression      BYPASS GRAFT ARTERY CORONARY      12/13/02,Triple bypass, left internal mammary  to LAD, right internal mammary to right coronary artery, saphenous to obtuse marginal of the left circumflex.     COLONOSCOPY      2011,Dr Bowman benign polyps     COLONOSCOPY  10/03/2011    2011,benign polyps,   Gracie     COLONOSCOPY  08/08/2016 8/8/16,normal, Dr Bowman     ELBOW SURGERY      baby,birth malachi removed from right arm     EMBOLECTOMY UPPER EXTREMITY  04/02/2013    brachial artery pseudoaneurysm after stenting     ESOPHAGOSCOPY, GASTROSCOPY, DUODENOSCOPY (EGD), COMBINED      2011,EGD Dr Bowman with pyloric ulcer     ESOPHAGOSCOPY, GASTROSCOPY, DUODENOSCOPY (EGD), COMBINED      2011,pyloric ulcer, Dr. Bowman     ESOPHAGOSCOPY, GASTROSCOPY, DUODENOSCOPY (EGD), COMBINED      8/8/16,mild gastritis, Dr Bowman     ESOPHAGOSCOPY, GASTROSCOPY, DUODENOSCOPY (EGD), COMBINED      11/27/2017,Dr Bowman. Antral ulcer     ESOPHAGOSCOPY, GASTROSCOPY, DUODENOSCOPY (EGD), COMBINED  02/02/2018    Dr Bowman, healed ulcer     HYSTERECTOMY TOTAL ABDOMINAL      age 21     LAPAROSCOPIC CHOLECYSTECTOMY      2006     OSTEOTOMY FEMUR DISTAL      x3, right knee     OSTEOTOMY FEMUR DISTAL      2000,left knee  ligament surgery     OTHER SURGICAL HISTORY      1/10/2003,,PTCA     OTHER SURGICAL HISTORY      09/20/2012,,PTCA,DELONTE in LAD and left main     OTHER SURGICAL HISTORY      4/1/2013,,PTCA,L subclavian stenosis     SALPINGO-OOPHORECTOMY BILATERAL      age 27,Bilateral salpingo-oophorectomy     TONSILLECTOMY, ADENOIDECTOMY, COMBINED      childhood       Allergies   Allergen Reactions     Atorvastatin Muscle Pain (Myalgia)     Tiotropium Bromide [Tiotropium] Rash     Advil [Ibuprofen] Other (See Comments)     Does not recall but feel like it interacted with blood thinners and HX of GI BLEED     Ezetimibe Muscle Pain (Myalgia)     Latex Rash     Niacin      Other reaction(s): Flushing     No Clinical Screening - See Comments Itching, Rash and Blisters     Metals and plastics       Tape [Adhesive Tape] Rash       Current Outpatient Medications   Medication Sig Dispense Refill     albuterol (PROAIR HFA/PROVENTIL HFA/VENTOLIN HFA) 108 (90 Base) MCG/ACT inhaler Inhale 2 puffs into the lungs every 6 hours 2 Inhaler 3     amLODIPine  (NORVASC) 10 MG tablet TAKE 1 TABLET (10 MG) BY MOUTH DAILY DX. CODE: I10. 90 tablet 3     busPIRone (BUSPAR) 15 MG tablet Take 1 tablet (15 mg) by mouth 2 times daily 180 tablet 1     clonazePAM (KLONOPIN) 1 MG tablet Take 1 tablet (1 mg) by mouth 3 times daily as needed for anxiety Max 2.5 per day = 225 per 90 days 90 tablet 0     clopidogrel (PLAVIX) 75 MG tablet Take 1 tablet (75 mg) by mouth daily 90 tablet 4     Diclofenac Sodium 1.5 % SOLN Apply 20 drops to each hand or 40 drops to each knee up to 4 times daily as needed for arthritis pain 450 mL 3     HYDROcodone-acetaminophen (NORCO)  MG per tablet Take 1-2 tablets by mouth every 4 hours as needed for severe pain 6 per day 180 tablet 0     lisinopril (ZESTRIL) 20 MG tablet Take 0.5 tablets (10 mg) by mouth daily 90 tablet 3     naloxone (NARCAN) 4 MG/0.1ML nasal spray Spray into one nostril for opioid reversal if unresponsive. May repeat every 2-3 minutes until patient responsive or EMS arrives 1 each 1     nitroGLYcerin (NITROLINGUAL) 0.4 MG/SPRAY spray For chest pain spray 1 spray under tongue every 5 minutes for 3 doses. If symptoms persist 5 minutes after 1st dose call 911. 4.9 g 3     omeprazole (PRILOSEC) 20 MG DR capsule Take 1 capsule (20 mg) by mouth 2 times daily 180 capsule 3     ondansetron (ZOFRAN) 4 MG tablet Take 1 tablet (4 mg) by mouth every 6 hours as needed for nausea 30 tablet 1     ranolazine (RANEXA) 500 MG 12 hr tablet Take 500 mg by mouth daily       rivaroxaban ANTICOAGULANT (XARELTO) 15 MG TABS tablet Take 1 tablet (15 mg) by mouth daily (with dinner) 90 tablet 3     rosuvastatin (CRESTOR) 20 MG tablet Take 20 mg by mouth daily       sucralfate (CARAFATE) 1 GM tablet TAKE 1 TABLET (1 G) BY MOUTH 4 TIMES DAILY AS NEEDED FOR NAUSEA (OR DARK STOOLS) 360 tablet 0     VITAMIN D, CHOLECALCIFEROL, PO Take 5,000 Units by mouth daily       vortioxetine (TRINTELLIX) 20 MG tablet Take 1 tablet (20 mg) by mouth daily 90 tablet 4        Social History     Socioeconomic History     Marital status:      Spouse name: Not on file     Number of children: Not on file     Years of education: Not on file     Highest education level: Not on file   Occupational History     Not on file   Tobacco Use     Smoking status: Former Smoker     Packs/day: 0.25     Years: 35.00     Pack years: 8.75     Types: Cigarettes     Quit date: 2/15/2017     Years since quittin.5     Smokeless tobacco: Never Used   Vaping Use     Vaping Use: Never used   Substance and Sexual Activity     Alcohol use: Not Currently     Alcohol/week: 0.0 standard drinks     Drug use: No     Sexual activity: Yes     Partners: Male     Birth control/protection: None   Other Topics Concern     Parent/sibling w/ CABG, MI or angioplasty before 65F 55M? Not Asked   Social History Narrative    ,  Steven.  Currently not working outside the home. Lives three-mile self [place] met with . Tobacco abuse, quit , restarted. Quit  and has since quit, no alcohol.     Social Determinants of Health     Financial Resource Strain:      Difficulty of Paying Living Expenses:    Food Insecurity:      Worried About Running Out of Food in the Last Year:      Ran Out of Food in the Last Year:    Transportation Needs:      Lack of Transportation (Medical):      Lack of Transportation (Non-Medical):    Physical Activity:      Days of Exercise per Week:      Minutes of Exercise per Session:    Stress:      Feeling of Stress :    Social Connections:      Frequency of Communication with Friends and Family:      Frequency of Social Gatherings with Friends and Family:      Attends Rastafarian Services:      Active Member of Clubs or Organizations:      Attends Club or Organization Meetings:      Marital Status:    Intimate Partner Violence:      Fear of Current or Ex-Partner:      Emotionally Abused:      Physically Abused:      Sexually Abused:        LAB RESULTS:   Office Visit on  08/06/2021   Component Date Value Ref Range Status     Color Urine 08/06/2021 Yellow  Colorless, Straw, Light Yellow, Yellow Final     Appearance Urine 08/06/2021 Slightly Cloudy* Clear Final     Glucose Urine 08/06/2021 Negative  Negative mg/dL Final     Bilirubin Urine 08/06/2021 Negative  Negative Final     Ketones Urine 08/06/2021 Negative  Negative mg/dL Final     Specific Gravity Urine 08/06/2021 1.018  1.000 - 1.030 Final     Blood Urine 08/06/2021 Small* Negative Final     pH Urine 08/06/2021 6.0  5.0 - 9.0 Final     Protein Albumin Urine 08/06/2021 20 * Negative mg/dL Final     Urobilinogen Urine 08/06/2021 Normal  Normal, 2.0 mg/dL Final     Nitrite Urine 08/06/2021 Positive* Negative Final     Leukocyte Esterase Urine 08/06/2021 Large* Negative Final     Bacteria Urine 08/06/2021 Many* None Seen /HPF Final     Mucus Urine 08/06/2021 Present* None Seen /LPF Final     RBC Urine 08/06/2021 6* <=2 /HPF Final     WBC Urine 08/06/2021 >182* <=5 /HPF Final     Culture 08/06/2021 >100,000 CFU/mL Escherichia coli*  Final   Office Visit on 07/30/2021   Component Date Value Ref Range Status     Sodium 07/30/2021 139  134 - 144 mmol/L Final     Potassium 07/30/2021 4.4  3.5 - 5.1 mmol/L Final     Chloride 07/30/2021 105  98 - 107 mmol/L Final     Carbon Dioxide (CO2) 07/30/2021 24  21 - 31 mmol/L Final     Anion Gap 07/30/2021 10  3 - 14 mmol/L Final     Urea Nitrogen 07/30/2021 19  7 - 25 mg/dL Final     Creatinine 07/30/2021 1.08  0.60 - 1.20 mg/dL Final     Calcium 07/30/2021 9.9  8.6 - 10.3 mg/dL Final     Glucose 07/30/2021 77  70 - 105 mg/dL Final     GFR Estimate 07/30/2021 53* >60 mL/min/1.73m2 Final     Magnesium 07/30/2021 2.1  1.9 - 2.7 mg/dL Final     WBC Count 07/30/2021 7.0  4.0 - 11.0 10e3/uL Final     RBC Count 07/30/2021 4.39  3.80 - 5.20 10e6/uL Final     Hemoglobin 07/30/2021 12.9  11.7 - 15.7 g/dL Final     Hematocrit 07/30/2021 38.6  35.0 - 47.0 % Final     MCV 07/30/2021 88  78 - 100 fL Final  "    MCH 07/30/2021 29.4  26.5 - 33.0 pg Final     MCHC 07/30/2021 33.4  31.5 - 36.5 g/dL Final     RDW 07/30/2021 13.5  10.0 - 15.0 % Final     Platelet Count 07/30/2021 273  150 - 450 10e3/uL Final     % Neutrophils 07/30/2021 57  % Final     % Lymphocytes 07/30/2021 30  % Final     % Monocytes 07/30/2021 10  % Final     % Eosinophils 07/30/2021 2  % Final     % Basophils 07/30/2021 1  % Final     % Immature Granulocytes 07/30/2021 0  % Final     NRBCs per 100 WBC 07/30/2021 0  <1 /100 Final     Absolute Neutrophils 07/30/2021 4.0  1.6 - 8.3 10e3/uL Final     Absolute Lymphocytes 07/30/2021 2.1  0.8 - 5.3 10e3/uL Final     Absolute Monocytes 07/30/2021 0.7  0.0 - 1.3 10e3/uL Final     Absolute Eosinophils 07/30/2021 0.1  0.0 - 0.7 10e3/uL Final     Absolute Basophils 07/30/2021 0.1  0.0 - 0.2 10e3/uL Final     Absolute Immature Granulocytes 07/30/2021 0.0  <=0.0 10e3/uL Final     Absolute NRBCs 07/30/2021 0.0  10e3/uL Final        Review of systems: Negative except that which was noted in the HPI.    Physical examination:  /60 (BP Location: Right arm, Patient Position: Sitting, Cuff Size: Adult Large)   Pulse 75   Temp 97.4  F (36.3  C)   Resp 18   Ht 1.67 m (5' 5.75\")   Wt 83.9 kg (185 lb)   LMP  (LMP Unknown)   SpO2 97%   BMI 30.09 kg/m      GENERAL APPEARANCE: healthy, alert and no distress  HEENT: no icterus, no xanthelasmas, normal pupil size and reaction, no cyanosis.  NECK: no adenopathy, no asymmetry, masses.  CHEST: lungs clear to auscultation - no rales, rhonchi or wheezes, no use of accessory muscles, no retractions, respirations are unlabored, normal respiratory rate  CARDIOVASCULAR: regular rhythm, normal S1 with physiologic split S2, no S3 or S4 and no murmur, click or rub  EXTREMITIES: no clubbing, cyanosis or edema  NEURO: alert and oriented normal speech, and affect  VASC: No vascular bruits heard.  SKIN: no ecchymoses, no rashes    Total time spent on day of visit, including review " of tests, obtaining/reviewing separately obtained history, ordering medications/tests/procedures, communicating with PCP/consultants, and documenting in electronic medical record: 90 minutes.       Thank you for allowing me to participate in the care of your patient. Please do not hesitate to contact me if you have any questions.     Paul Gramajo, DO

## 2021-08-18 LAB
ATRIAL RATE - MUSE: 68 BPM
DIASTOLIC BLOOD PRESSURE - MUSE: NORMAL MMHG
INTERPRETATION ECG - MUSE: NORMAL
P AXIS - MUSE: 68 DEGREES
PR INTERVAL - MUSE: 164 MS
QRS DURATION - MUSE: 84 MS
QT - MUSE: 436 MS
QTC - MUSE: 463 MS
R AXIS - MUSE: 27 DEGREES
SYSTOLIC BLOOD PRESSURE - MUSE: NORMAL MMHG
T AXIS - MUSE: 60 DEGREES
VENTRICULAR RATE- MUSE: 68 BPM

## 2021-08-20 ENCOUNTER — HOSPITAL ENCOUNTER (OUTPATIENT)
Dept: MRI IMAGING | Facility: OTHER | Age: 66
Discharge: HOME | End: 2021-08-20
Attending: INTERNAL MEDICINE | Admitting: INTERNAL MEDICINE
Payer: MEDICARE

## 2021-08-20 DIAGNOSIS — R42 DIZZINESS: ICD-10-CM

## 2021-08-20 DIAGNOSIS — R42 LIGHTHEADEDNESS: ICD-10-CM

## 2021-08-20 PROCEDURE — A9575 INJ GADOTERATE MEGLUMI 0.1ML: HCPCS | Performed by: INTERNAL MEDICINE

## 2021-08-20 PROCEDURE — 70553 MRI BRAIN STEM W/O & W/DYE: CPT | Mod: MG

## 2021-08-20 PROCEDURE — 255N000002 HC RX 255 OP 636: Performed by: INTERNAL MEDICINE

## 2021-08-20 RX ORDER — GADOTERATE MEGLUMINE 376.9 MG/ML
15 INJECTION INTRAVENOUS ONCE
Status: COMPLETED | OUTPATIENT
Start: 2021-08-20 | End: 2021-08-20

## 2021-08-20 RX ADMIN — GADOTERATE MEGLUMINE 15 ML: 376.9 INJECTION INTRAVENOUS at 13:52

## 2021-09-03 ENCOUNTER — TELEPHONE (OUTPATIENT)
Dept: FAMILY MEDICINE | Facility: OTHER | Age: 66
End: 2021-09-03

## 2021-09-03 DIAGNOSIS — M54.50 CHRONIC BILATERAL LOW BACK PAIN WITHOUT SCIATICA: ICD-10-CM

## 2021-09-03 DIAGNOSIS — G89.29 CHRONIC BILATERAL LOW BACK PAIN WITHOUT SCIATICA: ICD-10-CM

## 2021-09-03 DIAGNOSIS — G89.4 CHRONIC PAIN DISORDER: ICD-10-CM

## 2021-09-03 DIAGNOSIS — G89.29 CHEST WALL PAIN, CHRONIC: ICD-10-CM

## 2021-09-03 DIAGNOSIS — Z79.899 CONTROLLED SUBSTANCE AGREEMENT SIGNED: ICD-10-CM

## 2021-09-03 DIAGNOSIS — R07.89 CHEST WALL PAIN, CHRONIC: ICD-10-CM

## 2021-09-03 RX ORDER — HYDROCODONE BITARTRATE AND ACETAMINOPHEN 10; 325 MG/1; MG/1
1-2 TABLET ORAL EVERY 4 HOURS PRN
Qty: 180 TABLET | Refills: 0 | Status: SHIPPED | OUTPATIENT
Start: 2021-09-03 | End: 2021-09-29

## 2021-09-03 NOTE — TELEPHONE ENCOUNTER
Patient notified of providers note.    Prescription of Norco will not go through status: waiting for payer response.  [facility] will not receive this prescription until the insurance company is called.    On hold for 10 minutes and requested to leave a message, [employer] is no longer accepting messages at this time.                Taylor Hill LPN 9/3/2021 12:21 PM

## 2021-09-03 NOTE — TELEPHONE ENCOUNTER
Patient call returned, verification of name and . Discussion with patient regarding Rx controlled substance policy for monthly office visit. Patient states sees Dr. Cano every other month, usually get's two scripts, two month's of medication sent at a time. Believes this was an error being not sent from last office visit 2021. Currently out of medication.  is in town waiting for prescription. Requests urgent note be sent to provider. Patient requests call back once addressed. No further questions at this time. Tiffany Beebe RN ....................  9/3/2021   9:47 AM

## 2021-09-03 NOTE — TELEPHONE ENCOUNTER
She was to follow up in 1 week from last appointment August 6 to recheck on UTI  I wrote those instructions on AVS that day  That was why she only received 1 prescription     At this point I cannot fit her in my schedule today. I can refill the hydrocodone, but she really should be seen sooner than Oct unless she feels like all her symptoms from last month are gone

## 2021-09-03 NOTE — TELEPHONE ENCOUNTER
The patient called regarding Vicodin and stated the prescription has not been faxed in.  She needs it for this weekend as she is out.  [facility] is the [facility].   Please advise.

## 2021-09-14 ENCOUNTER — TELEPHONE (OUTPATIENT)
Dept: CARDIOLOGY | Facility: OTHER | Age: 66
End: 2021-09-14

## 2021-09-14 DIAGNOSIS — R11.0 NAUSEA: ICD-10-CM

## 2021-09-14 RX ORDER — ONDANSETRON 4 MG/1
4 TABLET, FILM COATED ORAL EVERY 6 HOURS PRN
Qty: 30 TABLET | Refills: 1 | Status: SHIPPED | OUTPATIENT
Start: 2021-09-14 | End: 2022-01-11

## 2021-09-14 NOTE — TELEPHONE ENCOUNTER
Patient verified .  Reminder call for stress test with instructions given. Emphasis on no caffeine 12 hours prior to the test.  Patient verbalized understanding.     Also let her know Dr. Cano sent samanthafran refill to pharmacy.

## 2021-09-14 NOTE — TELEPHONE ENCOUNTER
Patient called and states she needs a call back as soon as possible. She states she needs a medication refill for her Zofran 4mg medication that she takes every 6 hours. She says she disposed of the medication accidentally and needs another refill to replace it. Ok to leave detailed message [phone].        Melly Palmer on 9/14/2021 at 9:28 AM

## 2021-09-14 NOTE — TELEPHONE ENCOUNTER
Patient called to review instructions for Lexiscan.  Message left to return our call at [phone].

## 2021-09-15 ENCOUNTER — HOSPITAL ENCOUNTER (OUTPATIENT)
Dept: CARDIOLOGY | Facility: OTHER | Age: 66
End: 2021-09-15
Attending: FAMILY MEDICINE
Payer: MEDICARE

## 2021-09-15 ENCOUNTER — HOSPITAL ENCOUNTER (OUTPATIENT)
Dept: NUCLEAR MEDICINE | Facility: OTHER | Age: 66
End: 2021-09-15
Attending: INTERNAL MEDICINE
Payer: MEDICARE

## 2021-09-15 DIAGNOSIS — I20.89 CHRONIC STABLE ANGINA (H): ICD-10-CM

## 2021-09-15 DIAGNOSIS — I25.9 CHRONIC ISCHEMIC HEART DISEASE: ICD-10-CM

## 2021-09-15 DIAGNOSIS — I25.810 ATHEROSCLEROSIS OF CORONARY ARTERY BYPASS GRAFT OF NATIVE HEART WITHOUT ANGINA PECTORIS: ICD-10-CM

## 2021-09-15 DIAGNOSIS — R07.9 CHEST PAIN, UNSPECIFIED TYPE: ICD-10-CM

## 2021-09-15 DIAGNOSIS — Z95.1 HISTORY OF CORONARY ARTERY BYPASS GRAFT X 3: ICD-10-CM

## 2021-09-15 DIAGNOSIS — R06.02 SOB (SHORTNESS OF BREATH): ICD-10-CM

## 2021-09-15 LAB
CV BLOOD PRESSURE: 64 MMHG
CV STRESS MAX HR HE: 78
LVEF ECHO: NORMAL
NUC STRESS EJECTION FRACTION: 66 %
RATE PRESSURE PRODUCT: 7800
STRESS ECHO BASELINE DIASTOLIC HE: 68
STRESS ECHO BASELINE HR: 54
STRESS ECHO BASELINE SYSTOLIC BP: 106
STRESS ECHO CALCULATED PERCENT HR: 51 %
STRESS ECHO LAST STRESS DIASTOLIC BP: 78
STRESS ECHO LAST STRESS SYSTOLIC BP: 100
STRESS ECHO POST ESTIMATED WORKLOAD: 1 METS
STRESS ECHO TARGET HR: 153

## 2021-09-15 PROCEDURE — G1004 CDSM NDSC: HCPCS

## 2021-09-15 PROCEDURE — 343N000001 HC RX 343: Performed by: INTERNAL MEDICINE

## 2021-09-15 PROCEDURE — 250N000011 HC RX IP 250 OP 636: Performed by: INTERNAL MEDICINE

## 2021-09-15 PROCEDURE — 93016 CV STRESS TEST SUPVJ ONLY: CPT | Performed by: INTERNAL MEDICINE

## 2021-09-15 PROCEDURE — 93306 TTE W/DOPPLER COMPLETE: CPT | Mod: 26 | Performed by: INTERNAL MEDICINE

## 2021-09-15 PROCEDURE — G1004 CDSM NDSC: HCPCS | Performed by: INTERNAL MEDICINE

## 2021-09-15 PROCEDURE — C8929 TTE W OR WO FOL WCON,DOPPLER: HCPCS

## 2021-09-15 PROCEDURE — 93017 CV STRESS TEST TRACING ONLY: CPT

## 2021-09-15 PROCEDURE — A9500 TC99M SESTAMIBI: HCPCS | Performed by: INTERNAL MEDICINE

## 2021-09-15 PROCEDURE — 93018 CV STRESS TEST I&R ONLY: CPT | Mod: MG | Performed by: INTERNAL MEDICINE

## 2021-09-15 PROCEDURE — 255N000002 HC RX 255 OP 636: Performed by: FAMILY MEDICINE

## 2021-09-15 RX ORDER — REGADENOSON 0.08 MG/ML
0.4 INJECTION, SOLUTION INTRAVENOUS ONCE
Status: COMPLETED | OUTPATIENT
Start: 2021-09-15 | End: 2021-09-15

## 2021-09-15 RX ORDER — AMINOPHYLLINE 25 MG/ML
50-200 INJECTION, SOLUTION INTRAVENOUS
Status: DISCONTINUED | OUTPATIENT
Start: 2021-09-15 | End: 2021-09-16 | Stop reason: HOSPADM

## 2021-09-15 RX ADMIN — PERFLUTREN 4 ML: 6.52 INJECTION, SUSPENSION INTRAVENOUS at 15:00

## 2021-09-15 RX ADMIN — KIT FOR THE PREPARATION OF TECHNETIUM TC99M SESTAMIBI 31.9 MILLICURIE: 1 INJECTION, POWDER, LYOPHILIZED, FOR SOLUTION PARENTERAL at 12:55

## 2021-09-15 RX ADMIN — REGADENOSON 0.4 MG: 0.08 INJECTION, SOLUTION INTRAVENOUS at 12:54

## 2021-09-15 RX ADMIN — KIT FOR THE PREPARATION OF TECHNETIUM TC99M SESTAMIBI 8.03 MILLICURIE: 1 INJECTION, POWDER, LYOPHILIZED, FOR SOLUTION PARENTERAL at 11:10

## 2021-09-15 NOTE — PROGRESS NOTES
1100 The patient arrived for a Lexiscan Cardiolite stress test.  The procedure, risks, and benefits were discussed with the patient, and the consent was signed.  A saline lock was started,and the Cardiolite was injected by Nuclear Medicine.  The patient was taken to the waiting area, to await resting images at 1130.  1230 The patient returned from Nuclear Medicine and was prepped for the stress test. Dr. Pozo arrived, and the patient was administered the Lexiscan per procedure.  The patient tolerated the procedure. She was given a snack and taken to Nuclear Medicine in stable condition for stress images.  The saline lock will be removed by Nuclear Medicine for proper disposal.  The patient was instructed that the ordering MD will call with results in one to two days.  Please see the chart for the complete test results.

## 2021-09-18 ENCOUNTER — HEALTH MAINTENANCE LETTER (OUTPATIENT)
Age: 66
End: 2021-09-18

## 2021-09-21 DIAGNOSIS — Z79.899 CONTROLLED SUBSTANCE AGREEMENT SIGNED: ICD-10-CM

## 2021-09-21 DIAGNOSIS — F41.9 CHRONIC ANXIETY: ICD-10-CM

## 2021-09-22 RX ORDER — CLONAZEPAM 1 MG/1
1 TABLET ORAL 3 TIMES DAILY PRN
Qty: 225 TABLET | Refills: 0 | Status: SHIPPED | OUTPATIENT
Start: 2021-09-22 | End: 2021-12-01

## 2021-09-22 NOTE — TELEPHONE ENCOUNTER
clonazePAM (KLONOPIN) 1 MG tablet     Sig: Take 1 tablet (1 mg) by mouth 3 times daily as needed for anxiety Max 2.5 per day = 225 per 90 days           Last Written Prescription Date:  8/12/21  Last Fill Quantity: 90,   # refills: 0  Last Office Visit: 8/6/21  Future Office visit:    Next 5 appointments (look out 90 days)    Sep 29, 2021  1:20 PM  Office Visit with Ramirez Cano MD  [facility] ([facility] ) [address]  [place] [address]  [phone]   Nov 16, 2021  9:45 AM  Return Visit with Paul Gramajo,   [facility] ([facility] ) [address] [facility] [address]  [phone]           Routing refill request to provider for review/approval because:  Drug not on the [facility], [facility] or [facility] refill protocol or controlled substance Ninoska Richards RN on 9/22/2021 at 9:06 AM

## 2021-09-24 DIAGNOSIS — I26.99 ACUTE PULMONARY EMBOLISM WITHOUT ACUTE COR PULMONALE, UNSPECIFIED PULMONARY EMBOLISM TYPE (H): ICD-10-CM

## 2021-09-24 DIAGNOSIS — Z86.711 HISTORY OF PULMONARY EMBOLISM: Primary | ICD-10-CM

## 2021-09-24 DIAGNOSIS — I25.708 ATHEROSCLEROSIS OF CORONARY ARTERY BYPASS GRAFT OF NATIVE HEART WITH STABLE ANGINA PECTORIS (H): Primary | ICD-10-CM

## 2021-09-27 RX ORDER — RANOLAZINE 500 MG/1
TABLET, EXTENDED RELEASE ORAL
Qty: 180 TABLET | Refills: 3 | Status: SHIPPED | OUTPATIENT
Start: 2021-09-27 | End: 2022-04-29

## 2021-09-27 NOTE — TELEPHONE ENCOUNTER
RANOLAZINE  MG TABLET        Last Written Prescription Date:  7/17/21  Last Fill Quantity: ,   # refills: unknown statement of resume upon hospital discharge  Last Office Visit: 7/30/21  Future Office visit:    Next 5 appointments (look out 90 days)    Sep 29, 2021  1:20 PM  Office Visit with Ramirez Cano MD  [facility] ([facility] ) [address]  [place] [address]  [phone]   Nov 16, 2021  9:45 AM  Return Visit with Paul Gramajo,   [facility] ([facility] ) [address]  [place] [address]  [phone]           Routing refill request to provider for review/approval because:  Drug not on the [facility], [facility] or [facility] refill protocol or controlled substance Unable to complete prescription refill per RNMedication Refill Policy.................... Ness Calloway RN ....................  9/27/2021   2:29 PM

## 2021-09-29 ENCOUNTER — OFFICE VISIT (OUTPATIENT)
Dept: FAMILY MEDICINE | Facility: OTHER | Age: 66
End: 2021-09-29
Attending: FAMILY MEDICINE
Payer: MEDICARE

## 2021-09-29 VITALS
HEART RATE: 76 BPM | WEIGHT: 177 LBS | SYSTOLIC BLOOD PRESSURE: 110 MMHG | DIASTOLIC BLOOD PRESSURE: 58 MMHG | TEMPERATURE: 96.8 F | RESPIRATION RATE: 16 BRPM | OXYGEN SATURATION: 97 % | BODY MASS INDEX: 28.79 KG/M2

## 2021-09-29 DIAGNOSIS — G89.29 CHEST WALL PAIN, CHRONIC: ICD-10-CM

## 2021-09-29 DIAGNOSIS — G89.4 CHRONIC PAIN DISORDER: Primary | ICD-10-CM

## 2021-09-29 DIAGNOSIS — N39.0 E. COLI UTI: ICD-10-CM

## 2021-09-29 DIAGNOSIS — M54.50 CHRONIC BILATERAL LOW BACK PAIN WITHOUT SCIATICA: ICD-10-CM

## 2021-09-29 DIAGNOSIS — N18.31 STAGE 3A CHRONIC KIDNEY DISEASE (H): ICD-10-CM

## 2021-09-29 DIAGNOSIS — G89.29 CHRONIC BILATERAL LOW BACK PAIN WITHOUT SCIATICA: ICD-10-CM

## 2021-09-29 DIAGNOSIS — R07.89 CHEST WALL PAIN, CHRONIC: ICD-10-CM

## 2021-09-29 DIAGNOSIS — I20.89 CHRONIC STABLE ANGINA (H): ICD-10-CM

## 2021-09-29 DIAGNOSIS — B96.20 E. COLI UTI: ICD-10-CM

## 2021-09-29 DIAGNOSIS — K25.4 CHRONIC GASTRIC ULCER WITH HEMORRHAGE: ICD-10-CM

## 2021-09-29 DIAGNOSIS — R11.0 NAUSEA: ICD-10-CM

## 2021-09-29 DIAGNOSIS — Z23 NEED FOR PROPHYLACTIC VACCINATION AND INOCULATION AGAINST INFLUENZA: ICD-10-CM

## 2021-09-29 DIAGNOSIS — D50.0 IRON DEFICIENCY ANEMIA DUE TO CHRONIC BLOOD LOSS: ICD-10-CM

## 2021-09-29 DIAGNOSIS — Z79.899 CONTROLLED SUBSTANCE AGREEMENT SIGNED: ICD-10-CM

## 2021-09-29 LAB
ALBUMIN UR-MCNC: NEGATIVE MG/DL
ANION GAP SERPL CALCULATED.3IONS-SCNC: 11 MMOL/L (ref 3–14)
APPEARANCE UR: CLEAR
BACTERIA #/AREA URNS HPF: ABNORMAL /HPF
BILIRUB UR QL STRIP: NEGATIVE
BUN SERPL-MCNC: 24 MG/DL (ref 7–25)
CALCIUM SERPL-MCNC: 9.7 MG/DL (ref 8.6–10.3)
CHLORIDE BLD-SCNC: 102 MMOL/L (ref 98–107)
CO2 SERPL-SCNC: 26 MMOL/L (ref 21–31)
COLOR UR AUTO: YELLOW
CREAT SERPL-MCNC: 1.39 MG/DL (ref 0.6–1.2)
CREAT UR-MCNC: 113 MG/DL
ERYTHROCYTE [DISTWIDTH] IN BLOOD BY AUTOMATED COUNT: 13 % (ref 10–15)
GFR SERPL CREATININE-BSD FRML MDRD: 39 ML/MIN/1.73M2
GLUCOSE BLD-MCNC: 86 MG/DL (ref 70–105)
GLUCOSE UR STRIP-MCNC: NEGATIVE MG/DL
HCT VFR BLD AUTO: 38.1 % (ref 35–47)
HGB BLD-MCNC: 12.5 G/DL (ref 11.7–15.7)
HGB UR QL STRIP: NEGATIVE
HYALINE CASTS: 24 /LPF
KETONES UR STRIP-MCNC: NEGATIVE MG/DL
LEUKOCYTE ESTERASE UR QL STRIP: ABNORMAL
MCH RBC QN AUTO: 28.3 PG (ref 26.5–33)
MCHC RBC AUTO-ENTMCNC: 32.8 G/DL (ref 31.5–36.5)
MCV RBC AUTO: 86 FL (ref 78–100)
MUCOUS THREADS #/AREA URNS LPF: PRESENT /LPF
NITRATE UR QL: NEGATIVE
PH UR STRIP: 5.5 [PH] (ref 5–9)
PLATELET # BLD AUTO: 296 10E3/UL (ref 150–450)
POTASSIUM BLD-SCNC: 4.5 MMOL/L (ref 3.5–5.1)
RBC # BLD AUTO: 4.41 10E6/UL (ref 3.8–5.2)
RBC URINE: 1 /HPF
SODIUM SERPL-SCNC: 139 MMOL/L (ref 134–144)
SP GR UR STRIP: 1.02 (ref 1–1.03)
SQUAMOUS EPITHELIAL: 2 /HPF
UROBILINOGEN UR STRIP-MCNC: NORMAL MG/DL
WBC # BLD AUTO: 10.8 10E3/UL (ref 4–11)
WBC URINE: 5 /HPF

## 2021-09-29 PROCEDURE — 80048 BASIC METABOLIC PNL TOTAL CA: CPT | Mod: ZL | Performed by: FAMILY MEDICINE

## 2021-09-29 PROCEDURE — 81001 URINALYSIS AUTO W/SCOPE: CPT | Mod: ZL | Performed by: FAMILY MEDICINE

## 2021-09-29 PROCEDURE — G0463 HOSPITAL OUTPT CLINIC VISIT: HCPCS | Mod: 25

## 2021-09-29 PROCEDURE — 80307 DRUG TEST PRSMV CHEM ANLYZR: CPT | Mod: ZL,XU | Performed by: FAMILY MEDICINE

## 2021-09-29 PROCEDURE — 90662 IIV NO PRSV INCREASED AG IM: CPT

## 2021-09-29 PROCEDURE — 99214 OFFICE O/P EST MOD 30 MIN: CPT | Performed by: FAMILY MEDICINE

## 2021-09-29 PROCEDURE — 85027 COMPLETE CBC AUTOMATED: CPT | Mod: ZL | Performed by: FAMILY MEDICINE

## 2021-09-29 PROCEDURE — G0008 ADMIN INFLUENZA VIRUS VAC: HCPCS

## 2021-09-29 PROCEDURE — 36415 COLL VENOUS BLD VENIPUNCTURE: CPT | Mod: ZL | Performed by: FAMILY MEDICINE

## 2021-09-29 PROCEDURE — 82570 ASSAY OF URINE CREATININE: CPT | Mod: ZL | Performed by: FAMILY MEDICINE

## 2021-09-29 PROCEDURE — 87086 URINE CULTURE/COLONY COUNT: CPT | Mod: ZL | Performed by: FAMILY MEDICINE

## 2021-09-29 RX ORDER — HYDROCODONE BITARTRATE AND ACETAMINOPHEN 10; 325 MG/1; MG/1
1-2 TABLET ORAL EVERY 4 HOURS PRN
Qty: 180 TABLET | Refills: 0 | Status: SHIPPED | OUTPATIENT
Start: 2021-10-02 | End: 2021-12-01

## 2021-09-29 RX ORDER — HYDROCODONE BITARTRATE AND ACETAMINOPHEN 10; 325 MG/1; MG/1
1-2 TABLET ORAL EVERY 4 HOURS PRN
Qty: 180 TABLET | Refills: 0 | Status: SHIPPED | OUTPATIENT
Start: 2021-11-01 | End: 2021-12-01

## 2021-09-29 RX ORDER — SUCRALFATE 1 G/1
1 TABLET ORAL 4 TIMES DAILY PRN
Qty: 360 TABLET | Refills: 1 | Status: SHIPPED | OUTPATIENT
Start: 2021-09-29

## 2021-09-29 ASSESSMENT — PAIN SCALES - GENERAL: PAINLEVEL: MILD PAIN (3)

## 2021-09-29 NOTE — NURSING NOTE
"Patient presents to the clinic for controlled medication refill, last administration was today around 0930/1000.  Contract updated today, TOX 10-9-2020.      FOOD SECURITY SCREENING QUESTIONS  Hunger Vital Signs:  Within the past 12 months we worried whether our food would run out before we got money to buy more. Never  Within the past 12 months the food we bought just didn't last and we didn't have money to get more. Never    Advance Care Directive on file? yes  Advance Care Directive provided to patient? n/a     Chief Complaint   Patient presents with     Recheck Medication       Initial /58 (BP Location: Right arm, Patient Position: Sitting, Cuff Size: Adult Large)   Pulse 76   Temp 96.8  F (36  C) (Temporal)   Resp 16   Wt 80.3 kg (177 lb)   LMP  (LMP Unknown)   SpO2 97%   BMI 28.79 kg/m   Estimated body mass index is 28.79 kg/m  as calculated from the following:    Height as of 8/17/21: 1.67 m (5' 5.75\").    Weight as of this encounter: 80.3 kg (177 lb).  Medication Reconciliation: complete    Tayolr Hill, MAULIK    "

## 2021-09-29 NOTE — PROGRESS NOTES
"Nursing Notes:   Taylor Hill LPN  9/29/2021  1:35 PM  Signed  Patient presents to the clinic for controlled medication refill, last administration was today around 0930/1000.  Contract updated today, TOX 10-9-2020.      FOOD SECURITY SCREENING QUESTIONS  Hunger Vital Signs:  Within the past 12 months we worried whether our food would run out before we got money to buy more. Never  Within the past 12 months the food we bought just didn't last and we didn't have money to get more. Never    Advance Care Directive on file? yes  Advance Care Directive provided to patient? n/a     Chief Complaint   Patient presents with     Recheck Medication       Initial /58 (BP Location: Right arm, Patient Position: Sitting, Cuff Size: Adult Large)   Pulse 76   Temp 96.8  F (36  C) (Temporal)   Resp 16   Wt 80.3 kg (177 lb)   LMP  (LMP Unknown)   SpO2 97%   BMI 28.79 kg/m   Estimated body mass index is 28.79 kg/m  as calculated from the following:    Height as of 8/17/21: 1.67 m (5' 5.75\").    Weight as of this encounter: 80.3 kg (177 lb).  Medication Reconciliation: complete    MAULIK Galvez Amy M., LPN  9/29/2021  2:17 PM  Signed  Immunization Documentation    Prior to Immunization administration, verified patients identity using patient's name and date of birth. Please see IMMUNIZATIONS  and order for additional information.  Patient / Parent instructed to remain in clinic for 15 minutes and report any adverse reaction to staff immediately.    Was entire vial of medication used? Yes  Vial/Syringe: Syringe    Taylor Hill LPN  9/29/2021   2:17 PM      Assessment & Plan       ICD-10-CM    1. Chronic pain disorder  G89.4 HYDROcodone-acetaminophen (NORCO)  MG per tablet     Drug Confirmation Panel Urine with Creat     HYDROcodone-acetaminophen (NORCO)  MG per tablet     Drug Confirmation Panel Urine with Creat   2. Chest wall pain, chronic  R07.89 HYDROcodone-acetaminophen (NORCO) "  MG per tablet    G89.29 HYDROcodone-acetaminophen (NORCO)  MG per tablet   3. Controlled substance agreement updated 10/9/2020  Z79.899 HYDROcodone-acetaminophen (NORCO)  MG per tablet     Drug Confirmation Panel Urine with Creat     HYDROcodone-acetaminophen (NORCO)  MG per tablet     Drug Confirmation Panel Urine with Creat   4. Chronic bilateral low back pain without sciatica  M54.5 HYDROcodone-acetaminophen (NORCO)  MG per tablet    G89.29 HYDROcodone-acetaminophen (NORCO)  MG per tablet   5. Chronic gastric ulcer with hemorrhage  K25.4 sucralfate (CARAFATE) 1 GM tablet   6. Nausea  R11.0    7. E. coli UTI  N39.0 UA reflex to Microscopic    B96.20 UA reflex to Microscopic     Urine Culture Aerobic Bacterial     Urine Culture Aerobic Bacterial   8. Chronic stable angina (H)  I20.8 CBC W PLT No Diff     Basic Metabolic Panel     Basic Metabolic Panel     CBC W PLT No Diff   9. Iron deficiency anemia due to chronic blood loss  D50.0 CBC W PLT No Diff     CBC W PLT No Diff   10. Stage 3a chronic kidney disease  N18.31 Basic Metabolic Panel     Basic Metabolic Panel   11. Need for prophylactic vaccination and inoculation against influenza  Z23 INFLUENZA, QUAD, HIGH DOSE, PF, 65YR + (FLUZONE HD)     Refilled hydrocodone and updated controlled substance agreement today.     PDMP Review       Value Time User    State PDMP site checked  Yes 9/29/2021  2:03 PM Ramirez Cano MD         Repeat UA today mildly suggestive of ongoing bacterial UTI.  We will culture this for resistance.    Creatinine up and GFR down. She has had fluctuations like this before but this could be due to incompletely treated UTI, which was due to E. Coli in August. Waiting on culture result to best target an antibiotic. She is fairly asymptomatic so I feel we can wait to treat her. She will follow up as needed.    She did remark today about occasional abdominal pain but could find no tender spots in clinic  today. She has been using her sucralfate for nausea some and reports this helps. She is not anemic on her CBC today so I don't think we need to worry about a worsening of her chonic bleeding ulcer. We will continue to monitor this.     Flu shot given in clinic today.  Refilled sucralfate for gastric ulcer.    Follow up in 2 months     IVANNA Patel    Physician Attestation   I personally saw this patient, independently performing a history and an exam as documented by IVANNA Patel.    Items personally reviewed: vitals and labs.  I developed the plan of care and performed medical decision-making that is documented in the note.     Multiple medical issues, overall stable.  Checked labs to ensure no UTI, stable CKD, and for anemia. Overall stable, see notation above    Follow up in 2 months    Ramirez Cano MD      [facility]      SUBJECTIVE:  66 year old female presents for pain medication refill.    Hydrocodone is still working well, some more back pain than normal, thinks it may be the colder weather and arthritis.     Had a brain MRI for dizziness but no evidence of a stroke or clear etiology. Dr. Gramajo recommended more water and less caffeine. She says these changes have helped.     Had a stress test and ECHO, which were normal.     Was treated for an E. coli UTI on 8/6/2021.  Instructed to return for repeat UTI for test of cure, but she did not follow up.  We will repeat UA today.    Requesting a flu shot today.    REVIEW OF SYSTEMS:    Pertinent items are noted in HPI.    Current Outpatient Medications   Medication Sig Dispense Refill     [START ON 10/2/2021] HYDROcodone-acetaminophen (NORCO)  MG per tablet Take 1-2 tablets by mouth every 4 hours as needed for severe pain 6 per day 180 tablet 0     [START ON 11/1/2021] HYDROcodone-acetaminophen (NORCO)  MG per tablet Take 1-2 tablets by mouth every 4 hours as needed for severe pain 6 per day 180 tablet 0      sucralfate (CARAFATE) 1 GM tablet Take 1 tablet (1 g) by mouth 4 times daily as needed for nausea (or dark stools) 360 tablet 1     albuterol (PROAIR HFA/PROVENTIL HFA/VENTOLIN HFA) 108 (90 Base) MCG/ACT inhaler Inhale 2 puffs into the lungs every 6 hours 2 Inhaler 3     amLODIPine (NORVASC) 10 MG tablet TAKE 1 TABLET (10 MG) BY MOUTH DAILY DX. CODE: I10. 90 tablet 3     busPIRone (BUSPAR) 15 MG tablet Take 1 tablet (15 mg) by mouth 2 times daily 180 tablet 1     clonazePAM (KLONOPIN) 1 MG tablet TAKE 1 TABLET (1 MG) BY MOUTH 3 TIMES DAILY AS NEEDED FOR ANXIETY MAX 2.5 PER DAY = 225 PER 90 DAYS 225 tablet 0     clopidogrel (PLAVIX) 75 MG tablet Take 1 tablet (75 mg) by mouth daily 90 tablet 4     Diclofenac Sodium 1.5 % SOLN Apply 20 drops to each hand or 40 drops to each knee up to 4 times daily as needed for arthritis pain 450 mL 3     lisinopril (ZESTRIL) 20 MG tablet Take 0.5 tablets (10 mg) by mouth daily 90 tablet 3     naloxone (NARCAN) 4 MG/0.1ML nasal spray Spray into one nostril for opioid reversal if unresponsive. May repeat every 2-3 minutes until patient responsive or EMS arrives 1 each 1     nitroGLYcerin (NITROLINGUAL) 0.4 MG/SPRAY spray For chest pain spray 1 spray under tongue every 5 minutes for 3 doses. If symptoms persist 5 minutes after 1st dose call 911. 4.9 g 3     omeprazole (PRILOSEC) 20 MG DR capsule Take 1 capsule (20 mg) by mouth 2 times daily 180 capsule 3     ondansetron (ZOFRAN) 4 MG tablet Take 1 tablet (4 mg) by mouth every 6 hours as needed for nausea 30 tablet 1     ranolazine (RANEXA) 500 MG 12 hr tablet TAKE 1 TABLET BY MOUTH TWICE A  tablet 3     rivaroxaban ANTICOAGULANT (XARELTO ANTICOAGULANT) 20 MG TABS tablet Take 1 tablet (20 mg) by mouth daily (with dinner) 90 tablet 3     rosuvastatin (CRESTOR) 20 MG tablet Take 20 mg by mouth daily       VITAMIN D, CHOLECALCIFEROL, PO Take 5,000 Units by mouth daily       vortioxetine (TRINTELLIX) 20 MG tablet Take 1 tablet (20  mg) by mouth daily 90 tablet 4     Allergies   Allergen Reactions     Atorvastatin Muscle Pain (Myalgia)     Tiotropium Bromide [Tiotropium] Rash     Advil [Ibuprofen] Other (See Comments)     Does not recall but feel like it interacted with blood thinners and HX of GI BLEED     Ezetimibe Muscle Pain (Myalgia)     Latex Rash     Niacin      Other reaction(s): Flushing     No Clinical Screening - See Comments Itching, Rash and Blisters     Metals and plastics       Tape [Adhesive Tape] Rash       OBJECTIVE:  /58 (BP Location: Right arm, Patient Position: Sitting, Cuff Size: Adult Large)   Pulse 76   Temp 96.8  F (36  C) (Temporal)   Resp 16   Wt 80.3 kg (177 lb)   LMP  (LMP Unknown)   SpO2 97%   BMI 28.79 kg/m      EXAM:  GENERAL APPEARANCE: healthy, alert and no distress  RESP: lungs clear to auscultation - no rales, rhonchi or wheezes  CV: regular rate and rhythm, normal S1 S2, no S3 or S4 and no murmur, click or rub   ABDOMEN: soft, nontender, no HSM or masses and bowel sounds normal    DIAGNOSTICS:    Results for orders placed or performed in visit on 09/29/21   UA reflex to Microscopic     Status: Abnormal   Result Value Ref Range    Color Urine Yellow Colorless, Straw, Light Yellow, Yellow    Appearance Urine Clear Clear    Glucose Urine Negative Negative mg/dL    Bilirubin Urine Negative Negative    Ketones Urine Negative Negative mg/dL    Specific Gravity Urine 1.022 1.000 - 1.030    Blood Urine Negative Negative    pH Urine 5.5 5.0 - 9.0    Protein Albumin Urine Negative Negative mg/dL    Urobilinogen Urine Normal Normal, 2.0 mg/dL    Nitrite Urine Negative Negative    Leukocyte Esterase Urine Large (A) Negative    Bacteria Urine Few (A) None Seen /HPF    RBC Urine 1 <=2 /HPF    WBC Urine 5 <=5 /HPF    Squamous Epithelials Urine 2 (H) <=1 /HPF    Mucus Urine Present (A) None Seen /LPF    Hyaline Casts Urine 24 (H) <=2 /LPF   Basic Metabolic Panel     Status: Abnormal   Result Value Ref Range     Sodium 139 134 - 144 mmol/L    Potassium 4.5 3.5 - 5.1 mmol/L    Chloride 102 98 - 107 mmol/L    Carbon Dioxide (CO2) 26 21 - 31 mmol/L    Anion Gap 11 3 - 14 mmol/L    Urea Nitrogen 24 7 - 25 mg/dL    Creatinine 1.39 (H) 0.60 - 1.20 mg/dL    Calcium 9.7 8.6 - 10.3 mg/dL    Glucose 86 70 - 105 mg/dL    GFR Estimate 39 (L) >60 mL/min/1.73m2   CBC W PLT No Diff     Status: Normal   Result Value Ref Range    WBC Count 10.8 4.0 - 11.0 10e3/uL    RBC Count 4.41 3.80 - 5.20 10e6/uL    Hemoglobin 12.5 11.7 - 15.7 g/dL    Hematocrit 38.1 35.0 - 47.0 %    MCV 86 78 - 100 fL    MCH 28.3 26.5 - 33.0 pg    MCHC 32.8 31.5 - 36.5 g/dL    RDW 13.0 10.0 - 15.0 %    Platelet Count 296 150 - 450 10e3/uL   Creatinine random urine     Status: None   Result Value Ref Range    Creatinine Urine mg/dL 113 mg/dL   Drug Confirmation Panel Urine with Creat     Status: None (In process)    Narrative    The following orders were created for panel order Drug Confirmation Panel Urine with Creat.  Procedure                               Abnormality         Status                     ---------                               -----------         ------                     Urine Drug Confirmation ...[[id number]]                      In process                 Creatinine random urine[[id number]]                          Final result                 Please view results for these tests on the individual orders.

## 2021-09-29 NOTE — TELEPHONE ENCOUNTER
Requested Prescriptions   Pending Prescriptions Disp Refills     XARELTO ANTICOAGULANT 15 MG TABS tablet [Pharmacy Med Name: XARELTO 15 MG TABLET] 90 tablet 3     Sig: TAKE 1 TABLET (15 MG) BY MOUTH DAILY (WITH DINNER).       Direct Oral Anticoagulant Agents Failed - 9/24/2021 12:29 AM        Failed - Creatinine Clearance greater than 50 ml/min on file in past 3 mos     No lab results found.          Passed - Normal Platelets on file in past 12 months     Recent Labs   Lab Test 07/30/21  1142                  Passed - Medication is active on med list        Passed - Serum creatinine less than or equal to 1.4 on file in past 3 mos     Recent Labs   Lab Test 07/30/21  1142   CR 1.08       Ok to refill medication if creatinine is low          Passed - Patient is 18 years of age or older        Passed - No active pregnancy on record        Passed - No positive pregnancy test within past 12 months        Passed - Recent (6 mo) or future (30 days) visit within the authorizing provider's specialty             Last Written Prescription Date:  09/16/2020  Last Fill Quantity: 90,   # refills: 3  Last Office Visit: 08/17/2021  Future Office visit:    Next 5 appointments (look out 90 days)    Nov 16, 2021  9:45 AM  Return Visit with Paul Gramajo DO  [facility] ([facility] ) [address]  [facility] [id number] [phone]      Unable to complete prescription refill per RN medication refill policy. Will route to provider for review and consideration.  Dian Dumont RN on 9/29/2021 at 2:09 PM

## 2021-09-29 NOTE — LETTER
Opioid / Opioid Plus Controlled Substance Agreement    This is an agreement between you and your provider about the safe and appropriate use of controlled substance/opioids prescribed by your care team. Controlled substances are medicines that can cause physical and mental dependence (abuse).    There are strict laws about having and using these medicines. We here at [facility] are committing to working with you in your efforts to get better. To support you in this work, we ll help you schedule regular office appointments for medicine refills. If we must cancel or change your appointment for any reason, we ll make sure you have enough medicine to last until your next appointment.     As a Provider, I will:    Listen carefully to your concerns and treat you with respect.     Recommend a treatment plan that I believe is in your best interest. This plan may involve therapies other than opioid pain medication.     Talk with you often about the possible benefits, and the risk of harm of any medicine that we prescribe for you.     Provide a plan on how to taper (discontinue or go off) using this medicine if the decision is made to stop its use.    As a Patient, I understand that opioid(s):     Are a controlled substance prescribed by my care team to help me function or work and manage my condition(s).     Are strong medicines and can cause serious side effects such as:    Drowsiness, which can seriously affect my driving ability    A lower breathing rate, enough to cause death    Harm to my thinking ability     Depression     Abuse of and addiction to this medicine    Need to be taken exactly as prescribed. Combining opioids with certain medicines or chemicals (such as illegal drugs, sedatives, sleeping pills, and benzodiazepines) can be dangerous or even fatal. If I stop opioids suddenly, I may have severe withdrawal symptoms.    Do not work for all types of pain nor for all patients. If they re not helpful, I may  be asked to stop them.        The risks, benefits and side effects of these medicine(s) were explained to me. I agree that:  1. I will take part in other treatments as advised by my care team. This may be psychiatry or counseling, physical therapy, behavioral therapy, group treatment or a referral to a specialist.     2. I will keep all my appointments. I understand that this is part of the monitoring of opioids. My care team may require an office visit for EVERY opioid/controlled substance refill. If I miss appointments or don t follow instructions, my care team may stop my medicine.    3. I will take my medicines as prescribed. I will not change the dose or schedule unless my care team tells me to. There will be no refills if I run out early.     4. I may be asked to come to the clinic and complete a urine drug test or complete a pill count at any time. If I don t give a urine sample or participate in a pill count, the care team may stop my medicine.    5. I will only receive prescriptions from this clinic for chronic pain. If I am treated by another provider for acute pain issues, I will tell them that I am taking opioid pain medication for chronic pain and that I have a treatment agreement with this provider. I will inform my [facility] care team within one business day if I am given a prescription for any pain medication by another healthcare provider. My [facility] care team can contact other providers and pharmacists about my use of any medicines.    6. It is up to me to make sure that I don t run out of my medicines on weekends or holidays. If my care team is willing to refill my opioid prescription without a visit, I must request refills only during office hours. Refills may take up to 3 business days to process. I will use one pharmacy to fill all my opioid and other controlled substance prescriptions. I will notify the clinic about any changes to my insurance or medication  availability.    7. I am responsible for my prescriptions. If the medicine/prescription is lost, stolen or destroyed, it will not be replaced. I also agree not to share controlled substance medicines with anyone.    8. I am aware I should not use any illegal or recreational drugs. I agree not to drink alcohol unless my care team says I can.       9. If I enroll in the [place] [facility] program, I will tell my care team prior to my next refill.     10. I will tell my care team right away if I become pregnant, have a new medical problem treated outside of my regular clinic, or have a change in my medications.    11. I understand that this medicine can affect my thinking, judgment and reaction time. Alcohol and drugs affect the brain and body, which can affect the safety of my driving. Being under the influence of alcohol or drugs can affect my decision-making, behaviors, personal safety, and the safety of others. Driving while impaired (DWI) can occur if a person is driving, operating, or in physical control of a car, motorcycle, boat, snowmobile, ATV, motorbike, off-road vehicle, or any other motor vehicle ([place] Statute 169A.20). I understand the risk if I choose to drive or operate any vehicle or machinery.    I understand that if I do not follow any of the conditions above, my prescriptions or treatment may be stopped or changed.          Opioids  What You Need to Know    What are opioids?   Opioids are pain medicines that must be prescribed by a doctor. They are also known as narcotics.     Examples are:   1. morphine (MS Contin, Felicia)  2. oxycodone (Oxycontin)  3. oxycodone and acetaminophen (Percocet)  4. hydrocodone and acetaminophen (Vicodin, Norco)   5. fentanyl patch (Duragesic)   6. hydromorphone (Dilaudid)   7. methadone  8. codeine (Tylenol #3)     What do opioids do well?   Opioids are best for severe short-term pain such as after a surgery or injury. They may work well for cancer pain. They may  help some people with long-lasting (chronic) pain.     What do opioids NOT do well?   Opioids never get rid of pain entirely, and they don t work well for most patients with chronic pain. Opioids don t reduce swelling, one of the causes of pain.                                    Other ways to manage chronic pain and improve function include:       Treat the health problem that may be causing pain    Anti-inflammation medicines, which reduce swelling and tenderness, such as ibuprofen (Advil, Motrin) or naproxen (Aleve)    Acetaminophen (Tylenol)    Antidepressants and anti-seizure medicines, especially for nerve pain    Topical treatments such as patches or creams    Injections or nerve blocks    Chiropractic or osteopathic treatment    Acupuncture, massage, deep breathing, meditation, visual imagery, aromatherapy    Use heat or ice at the pain site    Physical therapy     Exercise    Stop smoking    Take part in therapy       Risks and side effects     Talk to your doctor before you start or decide to keep taking opioids. Possible side effects include:      Lowering your breathing rate enough to cause death    Overdose, including death, especially if taking higher than prescribed doses    Worse depression symptoms; less pleasure in things you usually enjoy    Feeling tired or sluggish    Slower thoughts or cloudy thinking    Being more sensitive to pain over time; pain is harder to control    Trouble sleeping or restless sleep    Changes in hormone levels (for example, less testosterone)    Changes in sex drive or ability to have sex    Constipation    Unsafe driving    Itching and sweating    Dizziness    Nausea, throwing up and dry mouth    What else should I know about opioids?    Opioids may lead to dependence, tolerance, or addiction.      Dependence means that if you stop or reduce the medicine too quickly, you will have withdrawal symptoms. These include loose poop (diarrhea), jitters, flu-like symptoms,  nervousness and tremors. Dependence is not the same as addiction.                       Tolerance means needing higher doses over time to get the same effect. This may increase the chance of serious side effects.      Addiction is when people improperly use a substance that harms their body, their mind or their relations with others. Use of opiates can cause a relapse of addiction if you have a history of drug or alcohol abuse.      People who have used opioids for a long time may have a lower quality of life, worse depression, higher levels of pain and more visits to doctors.    You can overdose on opioids. Take these steps to lower your risk of overdose:    1. Recognize the signs:  Signs of overdose include decrease or loss of consciousness (blackout), slowed breathing, trouble waking up and blue lips. If someone is worried about overdose, they should call 911.    2. Talk to your doctor about Narcan (naloxone).   If you are at risk for overdose, you may be given a prescription for Narcan. This medicine very quickly reverses the effects of opioids.   If you overdose, a friend or family member can give you Narcan while waiting for the ambulance. They need to know the signs of overdose and how to give Narcan.     3. Don't use alcohol or street drugs.   Taking them with opioids can cause death.    4. Do not take any of these medicines unless your doctor says it s OK. Taking these with opioids can cause death:    Benzodiazepines, such as lorazepam (Ativan), alprazolam (Xanax) or diazepam (Valium)    Muscle relaxers, such as cyclobenzaprine (Flexeril)    Sleeping pills like zolpidem (Ambien)     Other opioids      How to keep you and other people safe while taking opioids:    1. Never share your opioids with others.  Opioid medicines are regulated by the Drug Enforcement Agency (RAVI). Selling or sharing medications is a criminal act.    2. Be sure to store opioids in a secure place, locked up if possible. Young children  can easily swallow them and overdose.    3. When you are traveling with your medicines, keep them in the original bottles. If you use a pill box, be sure you also carry a copy of your medicine list from your clinic or pharmacy.    4. Safe disposal of opioids    Most pharmacies have places to get rid of medicine, called disposal kiosks. Medicine disposal options are also available in every [facility]. Search your county and  medication disposal  to find more options. You can find more details at:  [email or web address]-[email or web address]-[email or web address]-medications     I agree that my provider, clinic care team, and pharmacy may work with any city, state or federal law enforcement agency that investigates the misuse, sale, or other diversion of my controlled medicine. I will allow my provider to discuss my care with, or share a copy of, this agreement with any other treating provider, pharmacy or emergency room where I receive care.    I have read this agreement and have asked questions about anything I did not understand.    _______________________________________________________  Patient Signature - Ankita Day _____________________                   Date     _______________________________________________________  Provider Signature - Ramirez Cano MD   _____________________                   Date     _______________________________________________________  Witness Signature (required if provider not present while patient signing)   _____________________                   Date

## 2021-09-29 NOTE — PATIENT INSTRUCTIONS
Refilled hydrocodone for 2 months  Refilled sucralfate    Checking blood and urine tests. Results to [facility]    Follow up in 2 months

## 2021-10-01 LAB — BACTERIA UR CULT: NO GROWTH

## 2021-10-02 LAB
7AMINOCLONAZEPAM UR QL CFM: PRESENT
CREATININE URINE MG/DL  (SYNCED VALUE): 113 MG/DL
DHC UR CFM-MCNC: 2720 NG/ML
DHC/CREAT UR: 2407 NG/MG {CREAT}
HYDROCODONE UR CFM-MCNC: >9000 NG/ML
HYDROCODONE/CREAT UR: ABNORMAL
HYDROMORPHONE UR CFM-MCNC: >3000 NG/ML
HYDROMORPHONE/CREAT UR: ABNORMAL

## 2021-10-18 ENCOUNTER — IMMUNIZATION (OUTPATIENT)
Dept: FAMILY MEDICINE | Facility: OTHER | Age: 66
End: 2021-10-18
Attending: FAMILY MEDICINE
Payer: MEDICARE

## 2021-10-18 PROCEDURE — 91300 PR COVID VAC PFIZER DIL RECON 30 MCG/0.3 ML IM: CPT

## 2021-11-14 NOTE — PROGRESS NOTES
[facility] HEART CARE   CARDIOLOGY PROGRESS NOTE     Chief Complaint   Patient presents with     Follow Up     3 month          Diagnosis:  1. Chronic stable angina with most cath on 9/25/17 at the [facility] with stable dz.  2. CAD.  3. H/O CABG x 3 on 2/12/2003 with LIMA to LAD, RAFI to RCA, and SVG to OM.   4. CKD-3.  5. H/O PE on 1/2/20 to RLL, MICHELLE and LLL.  6. COPD-unknown severity.  7. HTN-controlled  8. Lightheadedness-episodic.  9. H/O coronary angiogram (2017)  10. Iron deficiency anemia.  11.  COPD-unknown type.  12. Left-sided subclavian steal syndrome .  Stenting with patency as noted on 9/15/17.  13.  Tobacco abuse, quitting 8/23/17.   14.  H/O alcohol abuse.  15.  Hyperlipidemia-uncontrolled.  16.  Bradycardia with the slowest heart rate of 50 bpm on 7/30/2021.  17.  Dizziness/lightheadedness secondary to dehydration.  18.  B12 deficiency 313 on 3/18/2020.  19.  Vitamin D deficiency.  20.  CVA-Mild chronic ischemic cerebral disease on 8/20/2021.      Assessment/Plan:    1.  Patient is having palpitations, dyspnea, weakness, and episodes of near syncope.  We will plan for work-up as outlined below.  A.  With history of blood clot shortness of breath, chest pain, plan for VQ scan to see if there is a persistent blood clot causing her symptoms.  She is on Xarelto.  B.  We will plan for a chest x-ray shortness of breath.  C.  We will plan for Zio patch related to palpitations.  D.  Related shortness of breath with history of COPD and asthma, plan for PFT's.  E.  Start on Symbicort.  F.  Referral to pulmonary in [place] secondary to COPD and asthma.  2.  Discontinue Xarelto 20 mg daily.  3.  Start on Eliquis 5 mg twice a day.  4.  Follow-up after completion of testing.      Interval history:  Malina continues to endorse dizziness.  She states when she gets up she starts to feel dizzy like being off balance, will have a headache, her heart will pound and pulse increase.  She will be short of breath.  At times she  "has chest discomfort.  This has been going on for 1 to 2 years.      She was seen in the ER and admitted for a day on 7/16/2021.  She was felt to have symptomatic bradycardia/hypotension.  She felt \"woozy\" with a blood pressure of 77/52 at home.  She reports getting fluids and feeling much better, resolution of symptoms.  She felt her symptoms had gotten worse over the last 2 to 3 weeks.  Heart rates were noted to be between 49-52 in the ER.  Troponin negative and EKG showed heart rates in the 50's.    She was seen back in the ED on 7/22/2021 for lightheadedness once again.  She actually fell and was having concerning pain in her lungs.  She has a history of a PE and is on Xarelto.  No identifiable cause was found.  There was concern that her symptoms may be anxiety related.    Today, she reports continuing to feel these symptoms of dizziness.  She has a hard time describing them.  She feels her heart pounding, she has a headache, she is dizzy, she gets chest pain.  She also continues to be dyspneic on exertion.  She has been using nitro regularly with some relief.  She has a history of asthma and suspected COPD.  She has seen relief with nitro as well as the albuterol.  She was on Symbicort in the past with benefit but was discontinued for unknown reasons.  She drinks 2 glasses of water and 3 cups of coffee a day.  I believe she is chronically dehydrated which plays a part in her symptoms.  She has tried to increase her fluid intake.  She is to double and preferably triple her fluid intake.  She should cut back/discontinue coffee intake.  With symptoms of chest pain, shortness of breath, and history of blood clot, will plan for VQ scan.  We will also get a chest x-ray.  She describes regular palpitations and acceleration in her heart rates.  We will plan for Zio patch.  She did have a MCOT on 7/30/2021.  Both asymptomatic and symptomatic events include sinus bradycardia, sinus rhythm, sinus tachycardia, occasional " "PVC's and PAC's.  No other significant rhythms were identified.  We discussed Eliquis versus Xarelto.  It was decided we switched to Eliquis as I seen less bleeding on this medication compared to Xarelto.  Related to shortness of breath, history of asthma, and presumptive COPD, referral was placed to pulmonary.  Had intended to prescribe Spiriva but she is allergic and this was not prescribed.  Patient also describes weakness.  She has been using nitro with resolution of her chest pain.  She does have a history of bypass but had a cath in 2017 with only mild disease followed.  I am concerned at this point.  I am concerned that her chest discomfort is more of a lung issue than a heart issue.      HPI:    Ms. Day is being seen by cardiology in follow-up.  Patient has a history of chronic stable angina, known ischemic heart disease, hypertension, hyperlipidemia, history of pulmonary embolism, left subclavian artery stenosis, anxiety, collagenous colitis, depression, osteoarthritis, history of tobacco use, central sleep apnea for which she is not compliant with CPAP use, iron deficiency anemia, CKD, myofascial pain, vitamin D deficiency, lumbar facet arthropathy, history of gastric ulcer with hemorrhage, COPD and peptic ulcer disease.     Patient has a known history of ischemic heart disease, she underwent  coronary angiogram and bypass angiogram on 9/15/2017 which was described as having stable disease. Mild to moderate 3 vessel CAD involving RCA, LAD and LCX with <50% stenosis at the greatest.  Occluded RAFI and SVG.  Patent LIMA.  No new obstructive lesions were identified and normal left heart cath.       She has a history of CABG on 2/12/03 x 3, history of multiple stent placements, patient reported 17 total.       At previous visit patient reported occasional recurrence of chest pain, not as severe as last ED visit on 10/25/19. She reported \"my breathing has been bad\", describes worsening CORTEZ. She described " activity intolerance and little to no energy. Recommended repeat stress testing. NM Lexiscan stress test was performed on 12/16/19 which was negative for inducible myocardial ischemia or infarction.  Left ventricular function was normal.     Carotid US on 12/12/19 without significant stenosis, mild atherosclerotic disease present.  Abdominal ultrasound on 12/12/2018 with no abdominal aortic aneurysm identified.     Patient returned to the ED with dyspnea in early January 2020. She was identified to have small segmental and subsegmental emboli bilaterally. Repeat imaging on 1/6 with decreasing volume of pulmonary emboli compared to scan on 1/2/2020. She was initially treated with Lovenox in the ED and discharged on Xarelto oral anticoagulation which has been going well for her, no bleeding complication. She also remains on Plavix with her significant ischemic heart disease history.      She presented to the ED on 3/16/2021 with reports of chest pain, chronic stable angina.  No ACS.  EKG stable and no elevation in troponin's. Patient admits that her BP was low and it was suspected she was dehydrated, she felt better following IV fluids and reports that this likely brought on her angina.  She is currently working on maintaining good hydration.  She denies any recurrence of acute chest pain or pressure today.  No use of nitroglycerin since her recent ED visit.      Relevant testing:  Stress test on 9/18/2021:     The nuclear stress test is negative for inducible myocardial ischemia   or infarction.      Left ventricular function is normal.      The left ventricular ejection fraction at rest is 64%.  The left   ventricular ejection fraction at stress is 66%.      A prior study was conducted on 12/16/2019.     Echo on 9/15/2021:  Global and regional left ventricular function is normal with an EF of 55-60%.  Right ventricular function, chamber size, wall motion, and thickness are normal.  Pulmonary artery systolic pressure  is normal.  The inferior vena cava is normal.  No pericardial effusion is present.  No significant changes noted.    MRI brain on 8/20/2021:  A few small nonspecific white matter signal abnormalities  are present, likely sequela of mild chronic small vessel ischemic disease. The exam is otherwise unremarkable.    MCOT from 7/30/21:  Findings: Patient's monitor was in place for 9 days and 7 hours.  Minimum heart rate 50 bpm, average heart rate 63 bpm, maximum heart rate 120 bpm. Rhythm/s present include sinus rhythm.  There were 37 symptomatic events during which time the noted rhythms were sinus rhythm, sinus bradycardia and sinus tachycardia.  There were six asymptomatic events during which time the noted rhythms were sinus rhythm.  There was rare atrial ectopy and rare ventricular ectopy.  Review of actual tracings showed no other abnormality.    ARYA on 7/17/20:  The right ankle-brachial index is 1.17.  Left ankle-brachial index is 0.98.    Stress test on 12/16/19:     The nuclear stress test is negative for inducible myocardial ischemia   or infarction.      A small amount of soft tissue artifact is present, more pronounced at   rest.      Left ventricular function is normal.      The left ventricular ejection fraction at rest is 79%.  The left   ventricular ejection fraction at stress is 72%.      A prior study was conducted on 6/30/2015.     US carotids on 12/12/19:  1.  No ultrasound evidence of hemo-dynamically significant stenosis.  Mild atherosclerotic disease.    US abdominal aorta on 12/12/19:  No abdominal aortic aneurysm.      ECHO on 5/13/19:  Global and regional left ventricular function is normal with an EF of 60-65%.  Mild concentric wall thickening consistent with left ventricular hypertrophy  is present.  Right ventricular function, chamber size, wall motion, and thickness are  normal.  No significant valvular abnormalities were noted.  Previous study not available for comparison.    Cardiac cath  on 3/30/13:  LEFT MAIN: Widely patent stent.   LEFT ANTERIOR DESCENDING: Widely patent proximal stent. There is an 85% to  90% mid lesion after the second diagonal and prior to the LIMA insertion as before.   CIRCUMFLEX: There is diffuse 60% to 70% disease throughout, the ostium is 70   to 75, and the OM2 is a 70, but it is very diffuse disease and basically   unchanged from previous.   RCA: Widely patent stents with only mild irregularities.   LEFT VENTRICULOGRAM: Normal left ventricular ejection fraction, LVEF 60%,   with no focal wall motion abnormality. LV pressure 146/29.   FLORENTINO to LAD: Widely patent, although there is less flow than before, and, in   fact, the retrograde filling of the LIMA from the antegrade vessel. There is   a severe subclavian stenosis that a pressure gradient revealed in the aorta   was 153/78, and in the right subclavian was 100 to 106/73, for a nearly 50 mm   pressure gradient. The stenosis was 75% to 80%.   RAFI to RCA: 100%.   SVG to OM: 100%.                     ICD-10-CM    1. Atherosclerosis of coronary artery bypass graft of native heart with stable angina pectoris (H)  I25.708 Lipid panel reflex to direct LDL Fasting     Troponin I     Lipid panel reflex to direct LDL Fasting     Troponin I   2. Chronic stable angina (H)  I20.8 Lipid panel reflex to direct LDL Fasting     Magnesium     Lipid panel reflex to direct LDL Fasting     Magnesium   3. Mixed hyperlipidemia  E78.2 Lipid panel reflex to direct LDL Fasting     Lipid panel reflex to direct LDL Fasting   4. Presence of stent in coronary artery in patient with coronary artery disease  I25.10 Lipid panel reflex to direct LDL Fasting    Z95.5 Lipid panel reflex to direct LDL Fasting   5. Chronic ischemic heart disease  I25.9 N terminal pro BNP outpatient     Troponin I     N terminal pro BNP outpatient     Troponin I   6. Status post coronary angiogram  Z98.890 Lipid panel reflex to direct LDL Fasting     Lipid panel reflex to  direct LDL Fasting   7. Panic attack  F41.0    8. History of coronary artery bypass graft x 3  Z95.1 Lipid panel reflex to direct LDL Fasting     Troponin I     Lipid panel reflex to direct LDL Fasting     Troponin I   9. History of pulmonary embolism  Z86.711 V/Q Scan     X-ray Chest 2 vws*     N terminal pro BNP outpatient     Troponin I     N terminal pro BNP outpatient     Troponin I   10. Noncompliance with CPAP treatment  Z91.14    11. History of tobacco abuse  Z87.891    12. Stage 3a chronic kidney disease (H)  N18.31 Magnesium     Magnesium   13. Subclavian artery stenosis, left (H)  I77.1    14. Essential hypertension  I10 TSH with free T4 reflex     TSH with free T4 reflex   15. Vitamin D deficiency  E55.9 Vitamin D Total GH     Vitamin D Total GH   16. Chronic obstructive pulmonary disease, unspecified COPD type (H)  J44.9 V/Q Scan     Adult Pulmonary Medicine Referral   17. Chronic gastric ulcer with hemorrhage  K25.4    18. Peptic ulcer  K27.9    19. Iron deficiency anemia due to chronic blood loss  D50.0    20. Central sleep apnea  G47.31    21. Vitamin B12 deficiency (non anemic)  E53.8 Vitamin B12     Folate     Vitamin B12     Folate   22. CORTEZ (dyspnea on exertion)  R06.00 Pulmonary Function Test     Leadless EKG Monitor 8 to 14 Days     V/Q Scan     X-ray Chest 2 vws*     budesonide-formoterol (SYMBICORT) 160-4.5 MCG/ACT Inhaler     N terminal pro BNP outpatient     TSH with free T4 reflex     Comprehensive metabolic panel     N terminal pro BNP outpatient     TSH with free T4 reflex     Comprehensive metabolic panel     Adult Pulmonary Medicine Referral   23. Palpitations  R00.2 Leadless EKG Monitor 8 to 14 Days     TSH with free T4 reflex     Comprehensive metabolic panel     apixaban ANTICOAGULANT (ELIQUIS ANTICOAGULANT) 5 MG tablet     TSH with free T4 reflex     Comprehensive metabolic panel   24. Symptomatic bradycardia  R00.1 Leadless EKG Monitor 8 to 14 Days     TSH with free T4 reflex      apixaban ANTICOAGULANT (ELIQUIS ANTICOAGULANT) 5 MG tablet     TSH with free T4 reflex   25. Chest pain, unspecified type  R07.9 V/Q Scan     Troponin I     Troponin I   26. Uncomplicated asthma, unspecified asthma severity, unspecified whether persistent  J45.909 Adult Pulmonary Medicine Referral       Past Medical History:   Diagnosis Date     Acute ischemic heart disease (H)     06/15/2007,with PTCA and stenting of 90% osteo circumflex lesion and patent LAD graft, patent left main stent.     Acute myocardial infarction (H)     3/30/2013     Anxiety disorder     No Comments Provided     Atherosclerotic heart disease of native coronary artery without angina pectoris     -angio revealed 3 vessel dz  -4 stents placed; 2 overlapping stents placed in mLAD, 1 stent pRCA, 1 stent pCirc 1 s 9/02 -non-ST elevation MI 1/03  -angio revealed restenosis of Circ.    -PTCA and brachytherapy of pCirc -repeat angio 2/03 -no intervension -CABG x3 12/03 - Dr Sinclair  -LIMA-LAD, RAFI-RCA, SVG-OM -PCI 7/04 stent to L -CTangio 9/05   -patentent LIMA-LAD. RAFI-RCA occluded; RCA ostio/p*     Bilateral carpal tunnel syndrome     No Comments Provided     Cervicalgia     No Comments Provided     Chest pain     12/29/2014     Chronic gastric ulcer without hemorrhage or perforation     10/03/2011,hx of GI bleed (2003)     Chronic ischemic heart disease     06/27/2012     Chronic obstructive pulmonary disease (H)     06/15/2007,low DLCO, normal spirometry     Chronic or unspecified gastric ulcer with hemorrhage     10/2003     Chronic pain syndrome     12/01/2010,chest wall, back     Constipation     11/29/2011     Coronary angioplasty status     09/12/2002,with triple stenting     Coronary angioplasty status     01/10/2003,repeat angioplasty     Coronary angioplasty status     No Comments Provided     Dorsalgia     05/31/2011     Encounter for other administrative examinations     1/28/2014     Encounter for screening for cardiovascular  disorders     10/2004,Cardiolite (2004, 2005, 2006 and 2011)     Enterocolitis due to Clostridium difficile     8/19/2016     Essential (primary) hypertension     No Comments Provided     Hyperlipidemia     No Comments Provided     Major depressive disorder, single episode     Severe, hx of suicide attempt/hospitalization     Migraine without status migrainosus, not intractable     No Comments Provided     Nodular corneal degeneration     09/26/2011     Noninfective gastroenteritis and colitis     06/15/2007,history of     Noninfective gastroenteritis and colitis     Microcytic     Osteoarthritis     No Comments Provided     Other chest pain     10/13/2009,chronic     Pain in knee     05/31/2011     Pain in right shoulder     No Comments Provided     Panic disorder without agoraphobia     No Comments Provided     Peptic ulcer without hemorrhage or perforation     6/15/2007     Peripheral vascular disease (H)     No Comments Provided     Personal history of diseases of the blood and blood-forming organs and certain disorders involving the immune mechanism (CODE)     No Comments Provided     Personal history of nicotine dependence     No Comments Provided     Personal history of other medical treatment (CODE)     10/14/2013     Presence of aortocoronary bypass graft     2/12/2003     Primary central sleep apnea     10/14/2013     Sepsis due to Escherichia coli (E. coli) (H)     7/14/16,[facility]     Stricture of artery (H)     3/31/2013,S/p prox left SCA stent 4/1/2013     Thoracic, thoracolumbar and lumbosacral intervertebral disc disorder     No Comments Provided     Uncomplicated opioid abuse (H)     history of     Vitamin D deficiency     5/6/2013       Past Surgical History:   Procedure Laterality Date     ANGIOPLASTY      9/12/02,with triple stenting     APPENDECTOMY OPEN      No Comments Provided     ARTHROSCOPY KNEE      left     ARTHROSCOPY SHOULDER Right 05/12/2017    labral tear, rotator cuff tear and some  subacromial decompression      BYPASS GRAFT ARTERY CORONARY      12/13/02,Triple bypass, left internal mammary  to LAD, right internal mammary to right coronary artery, saphenous to obtuse marginal of the left circumflex.     COLONOSCOPY      2011,Dr Bowman benign polyps     COLONOSCOPY  10/03/2011    2011,benign polyps, Dr. Bowman     COLONOSCOPY  08/08/2016 8/8/16,normal, Dr Bowman     ELBOW SURGERY      baby,birth malachi removed from right arm     EMBOLECTOMY UPPER EXTREMITY  04/02/2013    brachial artery pseudoaneurysm after stenting     ESOPHAGOSCOPY, GASTROSCOPY, DUODENOSCOPY (EGD), COMBINED      2011,EGD Dr Bowman with pyloric ulcer     ESOPHAGOSCOPY, GASTROSCOPY, DUODENOSCOPY (EGD), COMBINED      2011,pyloric ulcer, Dr. Bowman     ESOPHAGOSCOPY, GASTROSCOPY, DUODENOSCOPY (EGD), COMBINED      8/8/16,mild gastritis, Dr Bowman     ESOPHAGOSCOPY, GASTROSCOPY, DUODENOSCOPY (EGD), COMBINED      11/27/2017,Dr Bowman. Antral ulcer     ESOPHAGOSCOPY, GASTROSCOPY, DUODENOSCOPY (EGD), COMBINED  02/02/2018    Dr Bowman, healed ulcer     HYSTERECTOMY TOTAL ABDOMINAL      age 21     LAPAROSCOPIC CHOLECYSTECTOMY      2006     OSTEOTOMY FEMUR DISTAL      x3, right knee     OSTEOTOMY FEMUR DISTAL      2000,left knee  ligament surgery     OTHER SURGICAL HISTORY      1/10/2003,,PTCA     OTHER SURGICAL HISTORY      09/20/2012,,PTCA,DELONTE in LAD and left main     OTHER SURGICAL HISTORY      4/1/2013,,PTCA,L subclavian stenosis     SALPINGO-OOPHORECTOMY BILATERAL      age 27,Bilateral salpingo-oophorectomy     TONSILLECTOMY, ADENOIDECTOMY, COMBINED      childhood       Allergies   Allergen Reactions     Atorvastatin Muscle Pain (Myalgia)     Tiotropium Bromide [Tiotropium] Rash     Advil [Ibuprofen] Other (See Comments)     Does not recall but feel like it interacted with blood thinners and HX of GI BLEED     Ezetimibe Muscle Pain (Myalgia)     Latex Rash     Niacin      Other reaction(s): Flushing     No Clinical Screening  - See Comments Itching, Rash and Blisters     Metals and plastics       Tape [Adhesive Tape] Rash       Current Outpatient Medications   Medication Sig Dispense Refill     albuterol (PROAIR HFA/PROVENTIL HFA/VENTOLIN HFA) 108 (90 Base) MCG/ACT inhaler Inhale 2 puffs into the lungs every 6 hours 2 Inhaler 3     amLODIPine (NORVASC) 10 MG tablet TAKE 1 TABLET (10 MG) BY MOUTH DAILY DX. CODE: I10. 90 tablet 3     apixaban ANTICOAGULANT (ELIQUIS ANTICOAGULANT) 5 MG tablet Take 1 tablet (5 mg) by mouth 2 times daily 180 tablet 3     budesonide-formoterol (SYMBICORT) 160-4.5 MCG/ACT Inhaler Inhale 2 puffs into the lungs 2 times daily 6 g 11     busPIRone (BUSPAR) 15 MG tablet Take 1 tablet (15 mg) by mouth 2 times daily 180 tablet 1     clonazePAM (KLONOPIN) 1 MG tablet TAKE 1 TABLET (1 MG) BY MOUTH 3 TIMES DAILY AS NEEDED FOR ANXIETY MAX 2.5 PER DAY = 225 PER 90 DAYS 225 tablet 0     clopidogrel (PLAVIX) 75 MG tablet Take 1 tablet (75 mg) by mouth daily 90 tablet 4     Diclofenac Sodium 1.5 % SOLN Apply 20 drops to each hand or 40 drops to each knee up to 4 times daily as needed for arthritis pain 450 mL 3     HYDROcodone-acetaminophen (NORCO)  MG per tablet Take 1-2 tablets by mouth every 4 hours as needed for severe pain 6 per day 180 tablet 0     HYDROcodone-acetaminophen (NORCO)  MG per tablet Take 1-2 tablets by mouth every 4 hours as needed for severe pain 6 per day 180 tablet 0     lisinopril (ZESTRIL) 20 MG tablet Take 0.5 tablets (10 mg) by mouth daily 90 tablet 3     naloxone (NARCAN) 4 MG/0.1ML nasal spray Spray into one nostril for opioid reversal if unresponsive. May repeat every 2-3 minutes until patient responsive or EMS arrives 1 each 1     nitroGLYcerin (NITROLINGUAL) 0.4 MG/SPRAY spray For chest pain spray 1 spray under tongue every 5 minutes for 3 doses. If symptoms persist 5 minutes after 1st dose call 911. 4.9 g 3     omeprazole (PRILOSEC) 20 MG DR capsule Take 1 capsule (20 mg) by mouth  2 times daily 180 capsule 3     ondansetron (ZOFRAN) 4 MG tablet Take 1 tablet (4 mg) by mouth every 6 hours as needed for nausea 30 tablet 1     ranolazine (RANEXA) 500 MG 12 hr tablet TAKE 1 TABLET BY MOUTH TWICE A  tablet 3     rosuvastatin (CRESTOR) 20 MG tablet Take 20 mg by mouth daily       sucralfate (CARAFATE) 1 GM tablet Take 1 tablet (1 g) by mouth 4 times daily as needed for nausea (or dark stools) 360 tablet 1     VITAMIN D, CHOLECALCIFEROL, PO Take 5,000 Units by mouth daily       vortioxetine (TRINTELLIX) 20 MG tablet Take 1 tablet (20 mg) by mouth daily 90 tablet 4       Social History     Socioeconomic History     Marital status:      Spouse name: Not on file     Number of children: Not on file     Years of education: Not on file     Highest education level: Not on file   Occupational History     Not on file   Tobacco Use     Smoking status: Former Smoker     Packs/day: 0.25     Years: 35.00     Pack years: 8.75     Types: Cigarettes     Quit date: 2/15/2017     Years since quittin.7     Smokeless tobacco: Never Used   Vaping Use     Vaping Use: Never used   Substance and Sexual Activity     Alcohol use: Not Currently     Alcohol/week: 0.0 standard drinks     Drug use: No     Sexual activity: Yes     Partners: Male     Birth control/protection: None   Other Topics Concern     Parent/sibling w/ CABG, MI or angioplasty before 65F 55M? Not Asked   Social History Narrative    ,  Steven.  Currently not working outside the home. Lives three-mile self [place] met with . Tobacco abuse, quit , restarted. Quit  and has since quit, no alcohol.     Social Determinants of Health     Financial Resource Strain: Not on file   Food Insecurity: Not on file   Transportation Needs: Not on file   Physical Activity: Not on file   Stress: Not on file   Social Connections: Not on file   Intimate Partner Violence: Not on file   Housing Stability: Not on file       LAB RESULTS:    Office Visit on 08/06/2021   Component Date Value Ref Range Status     Color Urine 08/06/2021 Yellow  Colorless, Straw, Light Yellow, Yellow Final     Appearance Urine 08/06/2021 Slightly Cloudy* Clear Final     Glucose Urine 08/06/2021 Negative  Negative mg/dL Final     Bilirubin Urine 08/06/2021 Negative  Negative Final     Ketones Urine 08/06/2021 Negative  Negative mg/dL Final     Specific Gravity Urine 08/06/2021 1.018  1.000 - 1.030 Final     Blood Urine 08/06/2021 Small* Negative Final     pH Urine 08/06/2021 6.0  5.0 - 9.0 Final     Protein Albumin Urine 08/06/2021 20 * Negative mg/dL Final     Urobilinogen Urine 08/06/2021 Normal  Normal, 2.0 mg/dL Final     Nitrite Urine 08/06/2021 Positive* Negative Final     Leukocyte Esterase Urine 08/06/2021 Large* Negative Final     Bacteria Urine 08/06/2021 Many* None Seen /HPF Final     Mucus Urine 08/06/2021 Present* None Seen /LPF Final     RBC Urine 08/06/2021 6* <=2 /HPF Final     WBC Urine 08/06/2021 >182* <=5 /HPF Final     Culture 08/06/2021 >100,000 CFU/mL Escherichia coli*  Final   Office Visit on 07/30/2021   Component Date Value Ref Range Status     Sodium 07/30/2021 139  134 - 144 mmol/L Final     Potassium 07/30/2021 4.4  3.5 - 5.1 mmol/L Final     Chloride 07/30/2021 105  98 - 107 mmol/L Final     Carbon Dioxide (CO2) 07/30/2021 24  21 - 31 mmol/L Final     Anion Gap 07/30/2021 10  3 - 14 mmol/L Final     Urea Nitrogen 07/30/2021 19  7 - 25 mg/dL Final     Creatinine 07/30/2021 1.08  0.60 - 1.20 mg/dL Final     Calcium 07/30/2021 9.9  8.6 - 10.3 mg/dL Final     Glucose 07/30/2021 77  70 - 105 mg/dL Final     GFR Estimate 07/30/2021 53* >60 mL/min/1.73m2 Final     Magnesium 07/30/2021 2.1  1.9 - 2.7 mg/dL Final     WBC Count 07/30/2021 7.0  4.0 - 11.0 10e3/uL Final     RBC Count 07/30/2021 4.39  3.80 - 5.20 10e6/uL Final     Hemoglobin 07/30/2021 12.9  11.7 - 15.7 g/dL Final     Hematocrit 07/30/2021 38.6  35.0 - 47.0 % Final     MCV 07/30/2021 88  78  "- 100 fL Final     MCH 07/30/2021 29.4  26.5 - 33.0 pg Final     MCHC 07/30/2021 33.4  31.5 - 36.5 g/dL Final     RDW 07/30/2021 13.5  10.0 - 15.0 % Final     Platelet Count 07/30/2021 273  150 - 450 10e3/uL Final     % Neutrophils 07/30/2021 57  % Final     % Lymphocytes 07/30/2021 30  % Final     % Monocytes 07/30/2021 10  % Final     % Eosinophils 07/30/2021 2  % Final     % Basophils 07/30/2021 1  % Final     % Immature Granulocytes 07/30/2021 0  % Final     NRBCs per 100 WBC 07/30/2021 0  <1 /100 Final     Absolute Neutrophils 07/30/2021 4.0  1.6 - 8.3 10e3/uL Final     Absolute Lymphocytes 07/30/2021 2.1  0.8 - 5.3 10e3/uL Final     Absolute Monocytes 07/30/2021 0.7  0.0 - 1.3 10e3/uL Final     Absolute Eosinophils 07/30/2021 0.1  0.0 - 0.7 10e3/uL Final     Absolute Basophils 07/30/2021 0.1  0.0 - 0.2 10e3/uL Final     Absolute Immature Granulocytes 07/30/2021 0.0  <=0.0 10e3/uL Final     Absolute NRBCs 07/30/2021 0.0  10e3/uL Final        Review of systems: Negative except that which was noted in the HPI.    Physical examination:  /82 (BP Location: Right arm, Patient Position: Sitting, Cuff Size: Adult Regular)   Pulse 90   Temp 97.6  F (36.4  C) (Tympanic)   Resp 18   Ht 1.67 m (5' 5.75\")   Wt 80.3 kg (177 lb)   LMP  (LMP Unknown)   SpO2 97%   BMI 28.79 kg/m      GENERAL APPEARANCE: healthy, alert and no distress  HEENT: no icterus, no xanthelasmas, normal pupil size and reaction, no cyanosis.  NECK: no adenopathy, no asymmetry, masses.  CHEST: lungs clear to auscultation - no rales, rhonchi or wheezes, no use of accessory muscles, no retractions, respirations are unlabored, normal respiratory rate.  Reduced air movement but clear.  CARDIOVASCULAR: regular rhythm, normal S1 with physiologic split S2, no S3 or S4 and no murmur, click or rub  EXTREMITIES: no clubbing, cyanosis but with mild peripheral edema  NEURO: alert and oriented normal speech, and affect  VASC: No vascular bruits " heard.  SKIN: no ecchymoses, no rashes    Total time spent on day of visit, including review of tests, obtaining/reviewing separately obtained history, ordering medications/tests/procedures, communicating with PCP/consultants, and documenting in electronic medical record: 60 minutes.       Thank you for allowing me to participate in the care of your patient. Please do not hesitate to contact me if you have any questions.     Paul Gramajo, DO

## 2021-11-16 ENCOUNTER — OFFICE VISIT (OUTPATIENT)
Dept: CARDIOLOGY | Facility: OTHER | Age: 66
End: 2021-11-16
Attending: INTERNAL MEDICINE
Payer: MEDICARE

## 2021-11-16 ENCOUNTER — HOSPITAL ENCOUNTER (OUTPATIENT)
Dept: GENERAL RADIOLOGY | Facility: OTHER | Age: 66
End: 2021-11-16
Attending: INTERNAL MEDICINE
Payer: MEDICARE

## 2021-11-16 VITALS
HEART RATE: 90 BPM | OXYGEN SATURATION: 97 % | RESPIRATION RATE: 18 BRPM | HEIGHT: 66 IN | BODY MASS INDEX: 28.45 KG/M2 | WEIGHT: 177 LBS | TEMPERATURE: 97.6 F | SYSTOLIC BLOOD PRESSURE: 134 MMHG | DIASTOLIC BLOOD PRESSURE: 82 MMHG

## 2021-11-16 DIAGNOSIS — Z95.5 PRESENCE OF STENT IN CORONARY ARTERY IN PATIENT WITH CORONARY ARTERY DISEASE: ICD-10-CM

## 2021-11-16 DIAGNOSIS — E55.9 VITAMIN D DEFICIENCY: ICD-10-CM

## 2021-11-16 DIAGNOSIS — D50.0 IRON DEFICIENCY ANEMIA DUE TO CHRONIC BLOOD LOSS: ICD-10-CM

## 2021-11-16 DIAGNOSIS — Z87.891 HISTORY OF TOBACCO ABUSE: ICD-10-CM

## 2021-11-16 DIAGNOSIS — R07.9 CHEST PAIN, UNSPECIFIED TYPE: ICD-10-CM

## 2021-11-16 DIAGNOSIS — R00.1 SYMPTOMATIC BRADYCARDIA: ICD-10-CM

## 2021-11-16 DIAGNOSIS — J45.909 UNCOMPLICATED ASTHMA, UNSPECIFIED ASTHMA SEVERITY, UNSPECIFIED WHETHER PERSISTENT: ICD-10-CM

## 2021-11-16 DIAGNOSIS — Z95.1 HISTORY OF CORONARY ARTERY BYPASS GRAFT X 3: ICD-10-CM

## 2021-11-16 DIAGNOSIS — I25.9 CHRONIC ISCHEMIC HEART DISEASE: ICD-10-CM

## 2021-11-16 DIAGNOSIS — E53.8 VITAMIN B12 DEFICIENCY (NON ANEMIC): ICD-10-CM

## 2021-11-16 DIAGNOSIS — G47.31 CENTRAL SLEEP APNEA: ICD-10-CM

## 2021-11-16 DIAGNOSIS — Z86.711 HISTORY OF PULMONARY EMBOLISM: ICD-10-CM

## 2021-11-16 DIAGNOSIS — R00.2 PALPITATIONS: ICD-10-CM

## 2021-11-16 DIAGNOSIS — Z98.890 STATUS POST CORONARY ANGIOGRAM: ICD-10-CM

## 2021-11-16 DIAGNOSIS — K27.9 PEPTIC ULCER: ICD-10-CM

## 2021-11-16 DIAGNOSIS — K25.4 CHRONIC GASTRIC ULCER WITH HEMORRHAGE: ICD-10-CM

## 2021-11-16 DIAGNOSIS — I20.89 CHRONIC STABLE ANGINA (H): ICD-10-CM

## 2021-11-16 DIAGNOSIS — I25.10 PRESENCE OF STENT IN CORONARY ARTERY IN PATIENT WITH CORONARY ARTERY DISEASE: ICD-10-CM

## 2021-11-16 DIAGNOSIS — F41.0 PANIC ATTACK: ICD-10-CM

## 2021-11-16 DIAGNOSIS — I25.708 ATHEROSCLEROSIS OF CORONARY ARTERY BYPASS GRAFT OF NATIVE HEART WITH STABLE ANGINA PECTORIS (H): Primary | ICD-10-CM

## 2021-11-16 DIAGNOSIS — I77.1 SUBCLAVIAN ARTERY STENOSIS, LEFT (H): ICD-10-CM

## 2021-11-16 DIAGNOSIS — E78.2 MIXED HYPERLIPIDEMIA: ICD-10-CM

## 2021-11-16 DIAGNOSIS — J44.9 CHRONIC OBSTRUCTIVE PULMONARY DISEASE, UNSPECIFIED COPD TYPE (H): ICD-10-CM

## 2021-11-16 DIAGNOSIS — Z91.199 NONCOMPLIANCE WITH CPAP TREATMENT: ICD-10-CM

## 2021-11-16 DIAGNOSIS — R06.09 DOE (DYSPNEA ON EXERTION): ICD-10-CM

## 2021-11-16 DIAGNOSIS — N18.31 STAGE 3A CHRONIC KIDNEY DISEASE (H): ICD-10-CM

## 2021-11-16 DIAGNOSIS — I10 ESSENTIAL HYPERTENSION: ICD-10-CM

## 2021-11-16 LAB
ALBUMIN SERPL-MCNC: 4.7 G/DL (ref 3.5–5.7)
ALP SERPL-CCNC: 48 U/L (ref 34–104)
ALT SERPL W P-5'-P-CCNC: 13 U/L (ref 7–52)
ANION GAP SERPL CALCULATED.3IONS-SCNC: 10 MMOL/L (ref 3–14)
AST SERPL W P-5'-P-CCNC: 18 U/L (ref 13–39)
BILIRUB SERPL-MCNC: 0.4 MG/DL (ref 0.3–1)
BUN SERPL-MCNC: 20 MG/DL (ref 7–25)
CALCIUM SERPL-MCNC: 9.8 MG/DL (ref 8.6–10.3)
CHLORIDE BLD-SCNC: 103 MMOL/L (ref 98–107)
CHOLEST SERPL-MCNC: 185 MG/DL
CO2 SERPL-SCNC: 26 MMOL/L (ref 21–31)
CREAT SERPL-MCNC: 1.07 MG/DL (ref 0.6–1.2)
DEPRECATED CALCIDIOL+CALCIFEROL SERPL-MC: 44 UG/L (ref 30–100)
FASTING STATUS PATIENT QL REPORTED: YES
FOLATE SERPL-MCNC: >24.8 NG/ML
GFR SERPL CREATININE-BSD FRML MDRD: 54 ML/MIN/1.73M2
GLUCOSE BLD-MCNC: 99 MG/DL (ref 70–105)
HDLC SERPL-MCNC: 74 MG/DL
LDLC SERPL CALC-MCNC: 79 MG/DL
MAGNESIUM SERPL-MCNC: 2 MG/DL (ref 1.9–2.7)
NONHDLC SERPL-MCNC: 111 MG/DL
NT-PROBNP SERPL-MCNC: 121 PG/ML (ref 0–100)
POTASSIUM BLD-SCNC: 3.8 MMOL/L (ref 3.5–5.1)
PROT SERPL-MCNC: 6.9 G/DL (ref 6.4–8.9)
SODIUM SERPL-SCNC: 139 MMOL/L (ref 134–144)
TRIGL SERPL-MCNC: 159 MG/DL
TROPONIN I SERPL-MCNC: 3.9 PG/ML (ref 0–34)
TSH SERPL DL<=0.005 MIU/L-ACNC: 1.76 MU/L (ref 0.4–4)
VIT B12 SERPL-MCNC: 338 PG/ML (ref 180–914)

## 2021-11-16 PROCEDURE — 84484 ASSAY OF TROPONIN QUANT: CPT | Mod: ZL | Performed by: INTERNAL MEDICINE

## 2021-11-16 PROCEDURE — 83880 ASSAY OF NATRIURETIC PEPTIDE: CPT | Mod: ZL | Performed by: INTERNAL MEDICINE

## 2021-11-16 PROCEDURE — 82607 VITAMIN B-12: CPT | Mod: ZL | Performed by: INTERNAL MEDICINE

## 2021-11-16 PROCEDURE — G0463 HOSPITAL OUTPT CLINIC VISIT: HCPCS | Mod: 25

## 2021-11-16 PROCEDURE — 71046 X-RAY EXAM CHEST 2 VIEWS: CPT

## 2021-11-16 PROCEDURE — 80061 LIPID PANEL: CPT | Mod: ZL | Performed by: INTERNAL MEDICINE

## 2021-11-16 PROCEDURE — G0463 HOSPITAL OUTPT CLINIC VISIT: HCPCS

## 2021-11-16 PROCEDURE — 80053 COMPREHEN METABOLIC PANEL: CPT | Mod: ZL | Performed by: INTERNAL MEDICINE

## 2021-11-16 PROCEDURE — 83735 ASSAY OF MAGNESIUM: CPT | Mod: ZL | Performed by: INTERNAL MEDICINE

## 2021-11-16 PROCEDURE — 82306 VITAMIN D 25 HYDROXY: CPT | Mod: ZL | Performed by: INTERNAL MEDICINE

## 2021-11-16 PROCEDURE — 36415 COLL VENOUS BLD VENIPUNCTURE: CPT | Mod: ZL | Performed by: INTERNAL MEDICINE

## 2021-11-16 PROCEDURE — 99215 OFFICE O/P EST HI 40 MIN: CPT | Performed by: INTERNAL MEDICINE

## 2021-11-16 PROCEDURE — 82746 ASSAY OF FOLIC ACID SERUM: CPT | Mod: ZL | Performed by: INTERNAL MEDICINE

## 2021-11-16 PROCEDURE — 84443 ASSAY THYROID STIM HORMONE: CPT | Mod: ZL | Performed by: INTERNAL MEDICINE

## 2021-11-16 RX ORDER — BUDESONIDE AND FORMOTEROL FUMARATE DIHYDRATE 160; 4.5 UG/1; UG/1
2 AEROSOL RESPIRATORY (INHALATION) 2 TIMES DAILY
Qty: 6 G | Refills: 11 | Status: SHIPPED | OUTPATIENT
Start: 2021-11-16 | End: 2022-04-29

## 2021-11-16 RX ORDER — TIOTROPIUM BROMIDE 18 UG/1
18 CAPSULE ORAL; RESPIRATORY (INHALATION) DAILY
Qty: 30 CAPSULE | Refills: 11 | Status: CANCELLED | OUTPATIENT
Start: 2021-11-16

## 2021-11-16 ASSESSMENT — MIFFLIN-ST. JEOR: SCORE: 1350.62

## 2021-11-16 ASSESSMENT — PAIN SCALES - GENERAL: PAINLEVEL: NO PAIN (0)

## 2021-11-16 NOTE — NURSING NOTE
"Patient comes in for 3 month follow up.  Татьяна Bonilla LPN ....................11/16/2021   9:52 AM  Chief Complaint   Patient presents with     Follow Up     3 month       Initial /82 (BP Location: Right arm, Patient Position: Sitting, Cuff Size: Adult Regular)   Pulse 90   Temp 97.6  F (36.4  C) (Tympanic)   Resp 18   Ht 1.67 m (5' 5.75\")   Wt 80.3 kg (177 lb)   LMP  (LMP Unknown)   SpO2 97%   BMI 28.79 kg/m   Estimated body mass index is 28.79 kg/m  as calculated from the following:    Height as of this encounter: 1.67 m (5' 5.75\").    Weight as of this encounter: 80.3 kg (177 lb).  Meds Reconciled: complete  Pt is not on Aspirin  Pt is on a Statin  PHQ and/or YAYA reviewed. Pt referred to PCP/MH Provider as appropriate.    Татьяна Bonilla LPN    FOOD SECURITY SCREENING QUESTIONS  Hunger Vital Signs:  Within the past 12 months we worried whether our food would run out before we got money to buy more. Never  Within the past 12 months the food we bought just didn't last and we didn't have money to get more. Never  Татьяна Bonilla LPN 11/16/2021 9:52 AM    "

## 2021-11-16 NOTE — PATIENT INSTRUCTIONS
You were seen by  Paul Gramajo, DO     1. Stop your Xarelto.     2. Start the following medication:  apixaban ANTICOAGULANT (ELIQUIS ANTICOAGULANT) 5 MG tablet        Sig - Route: Take 1 tablet (5 mg) by mouth 2 times daily       3. Start the following medication:  budesonide-formoterol (SYMBICORT) 160-4.5 MCG/ACT Inhaler        Sig - Route: Inhale 2 puffs into the lungs 2 times daily       5. Laboratory blood work has been ordered for today.  You will be notified by phone call or [employer] message when the results are available.    6. A chest x-ray has been ordered for today. You will be called to schedule this. You will receive instructions for testing at that time. You will be contacted with results.    7. You will be scheduled for an appointment to have a Zio Patch placed on the skin.  This monitor will be worn for 14 days.  This is a device that will monitor your heart rate and rhythm. The hospital scheduling department will contact you to schedule this appointment, and educate you on the use of this patch.    8. PFT's (Pulmonary function test) has been ordered. You will be called to schedule this.  You will receive instructions for testing at that time.  You will be contacted with results.    9. A VQ Scan has been ordered. You will be called to schedule this. You will receive instructions for testing at that time. You will be contacted with results.    10. A referral has been placed to Dr. Whyte the Pulmonologist.  You will be called to schedule this.      You will follow up with [facility] in 6-8 weeks, sooner if needed.     Please call the cardiology office with problems, questions, or concerns at [phone].    If you experience chest pain, chest pressure, chest tightness, shortness of breath, fainting, lightheadedness, nausea, vomiting, or other concerning symptoms, please report to the Emergency Department or call 911. These symptoms may be emergent, and best treated in the Emergency  Department.     Cardiology Nurses  VIOLET Askew, MAULIK Gonsalves [facility] (Unit [address])  [phone]

## 2021-11-22 RX ORDER — LISINOPRIL 10 MG/1
TABLET ORAL
Qty: 90 TABLET | Refills: 3 | OUTPATIENT
Start: 2021-11-22

## 2021-11-22 NOTE — TELEPHONE ENCOUNTER
Spoke to [facility] and they have refills of lisinopril on file from 6/2/21.  Jamilah Ray RN......November 22, 2021...3:53 PM

## 2021-11-23 ENCOUNTER — HOSPITAL ENCOUNTER (OUTPATIENT)
Dept: GENERAL RADIOLOGY | Facility: OTHER | Age: 66
Discharge: HOME | End: 2021-11-23
Attending: INTERNAL MEDICINE | Admitting: INTERNAL MEDICINE
Payer: MEDICARE

## 2021-11-23 ENCOUNTER — HOSPITAL ENCOUNTER (OUTPATIENT)
Dept: NUCLEAR MEDICINE | Facility: OTHER | Age: 66
Setting detail: NUCLEAR MEDICINE
Discharge: HOME | End: 2021-11-23
Attending: INTERNAL MEDICINE | Admitting: INTERNAL MEDICINE
Payer: MEDICARE

## 2021-11-23 DIAGNOSIS — J44.9 CHRONIC OBSTRUCTIVE PULMONARY DISEASE, UNSPECIFIED COPD TYPE (H): ICD-10-CM

## 2021-11-23 DIAGNOSIS — Z86.711 HISTORY OF PULMONARY EMBOLISM: ICD-10-CM

## 2021-11-23 DIAGNOSIS — R07.9 CHEST PAIN, UNSPECIFIED TYPE: ICD-10-CM

## 2021-11-23 DIAGNOSIS — R06.09 DOE (DYSPNEA ON EXERTION): ICD-10-CM

## 2021-11-23 PROCEDURE — G1004 CDSM NDSC: HCPCS

## 2021-11-23 PROCEDURE — A9567 TECHNETIUM TC-99M AEROSOL: HCPCS | Performed by: INTERNAL MEDICINE

## 2021-11-23 PROCEDURE — A9540 TC99M MAA: HCPCS | Performed by: INTERNAL MEDICINE

## 2021-11-23 PROCEDURE — 343N000001 HC RX 343: Performed by: INTERNAL MEDICINE

## 2021-11-23 PROCEDURE — 78582 LUNG VENTILAT&PERFUS IMAGING: CPT | Mod: MG

## 2021-11-23 PROCEDURE — 71046 X-RAY EXAM CHEST 2 VIEWS: CPT

## 2021-11-23 RX ADMIN — KIT FOR THE PREPARATION OF TECHNETIUM TC 99M PENTETATE 31.6 MILLICURIE: 20 INJECTION, POWDER, LYOPHILIZED, FOR SOLUTION INTRAVENOUS; RESPIRATORY (INHALATION) at 11:10

## 2021-11-23 RX ADMIN — KIT FOR THE PREPARATION OF TECHNETIUM TC 99M ALBUMIN AGGREGATED 5.37 MILLICURIE: 2.5 INJECTION, POWDER, FOR SOLUTION INTRAVENOUS at 11:30

## 2021-11-27 DIAGNOSIS — F41.1 ANXIETY STATE: ICD-10-CM

## 2021-11-29 RX ORDER — BUSPIRONE HYDROCHLORIDE 15 MG/1
TABLET ORAL
Qty: 180 TABLET | Refills: 4 | Status: SHIPPED | OUTPATIENT
Start: 2021-11-29 | End: 2022-09-07

## 2021-11-29 NOTE — TELEPHONE ENCOUNTER
[id number] [employer] [facility] [place] sent Rx request for the following:      Requested Prescriptions   Pending Prescriptions Disp Refills     busPIRone (BUSPAR) 15 MG tablet [Pharmacy Med Name: BUSPIRONE HCL 15 MG TABLET] 180 tablet 1     Sig: TAKE 1 TABLET BY MOUTH TWICE A DAY   Last Prescription Date:   6/2/21  Last Fill Qty/Refills:         180, R-1    Last Office Visit:              9/29/21   Future Office visit:             Dec 01, 2021 10:40 AM  SHORT with Ramirez Cano MD  [facility] [address]  [place] [id number] [phone]     Unable to complete prescription refill per RN Medication Refill Policy. Kathleen Puckett RN .............. 11/29/2021  11:35 AM

## 2021-12-01 ENCOUNTER — OFFICE VISIT (OUTPATIENT)
Dept: FAMILY MEDICINE | Facility: OTHER | Age: 66
End: 2021-12-01
Attending: FAMILY MEDICINE
Payer: MEDICARE

## 2021-12-01 VITALS
OXYGEN SATURATION: 98 % | BODY MASS INDEX: 28.46 KG/M2 | HEART RATE: 96 BPM | DIASTOLIC BLOOD PRESSURE: 62 MMHG | RESPIRATION RATE: 22 BRPM | WEIGHT: 175 LBS | TEMPERATURE: 96.8 F | SYSTOLIC BLOOD PRESSURE: 118 MMHG

## 2021-12-01 DIAGNOSIS — F41.9 CHRONIC ANXIETY: ICD-10-CM

## 2021-12-01 DIAGNOSIS — Z78.0 POST-MENOPAUSE: ICD-10-CM

## 2021-12-01 DIAGNOSIS — G89.29 CHEST WALL PAIN, CHRONIC: ICD-10-CM

## 2021-12-01 DIAGNOSIS — R07.89 CHEST WALL PAIN, CHRONIC: ICD-10-CM

## 2021-12-01 DIAGNOSIS — M54.50 CHRONIC BILATERAL LOW BACK PAIN WITHOUT SCIATICA: ICD-10-CM

## 2021-12-01 DIAGNOSIS — G89.29 CHRONIC BILATERAL LOW BACK PAIN WITHOUT SCIATICA: ICD-10-CM

## 2021-12-01 DIAGNOSIS — G89.4 CHRONIC PAIN DISORDER: Primary | ICD-10-CM

## 2021-12-01 DIAGNOSIS — Z79.899 CONTROLLED SUBSTANCE AGREEMENT SIGNED: ICD-10-CM

## 2021-12-01 PROCEDURE — G0463 HOSPITAL OUTPT CLINIC VISIT: HCPCS | Mod: 25

## 2021-12-01 PROCEDURE — 99213 OFFICE O/P EST LOW 20 MIN: CPT | Performed by: FAMILY MEDICINE

## 2021-12-01 RX ORDER — CLONAZEPAM 1 MG/1
1 TABLET ORAL 3 TIMES DAILY PRN
Qty: 270 TABLET | Refills: 0 | Status: SHIPPED | OUTPATIENT
Start: 2021-12-01 | End: 2022-02-25

## 2021-12-01 RX ORDER — HYDROCODONE BITARTRATE AND ACETAMINOPHEN 10; 325 MG/1; MG/1
1-2 TABLET ORAL EVERY 4 HOURS PRN
Qty: 180 TABLET | Refills: 0 | Status: SHIPPED | OUTPATIENT
Start: 2021-12-01 | End: 2022-01-26

## 2021-12-01 RX ORDER — HYDROCODONE BITARTRATE AND ACETAMINOPHEN 10; 325 MG/1; MG/1
1-2 TABLET ORAL EVERY 4 HOURS PRN
Qty: 180 TABLET | Refills: 0 | Status: SHIPPED | OUTPATIENT
Start: 2021-12-31 | End: 2022-01-26

## 2021-12-01 ASSESSMENT — ANXIETY QUESTIONNAIRES
8. IF YOU CHECKED OFF ANY PROBLEMS, HOW DIFFICULT HAVE THESE MADE IT FOR YOU TO DO YOUR WORK, TAKE CARE OF THINGS AT HOME, OR GET ALONG WITH OTHER PEOPLE?: VERY DIFFICULT
2. NOT BEING ABLE TO STOP OR CONTROL WORRYING: MORE THAN HALF THE DAYS
7. FEELING AFRAID AS IF SOMETHING AWFUL MIGHT HAPPEN: NOT AT ALL
GAD7 TOTAL SCORE: 9
GAD7 TOTAL SCORE: 9
3. WORRYING TOO MUCH ABOUT DIFFERENT THINGS: SEVERAL DAYS
6. BECOMING EASILY ANNOYED OR IRRITABLE: SEVERAL DAYS
1. FEELING NERVOUS, ANXIOUS, OR ON EDGE: MORE THAN HALF THE DAYS
4. TROUBLE RELAXING: SEVERAL DAYS
5. BEING SO RESTLESS THAT IT IS HARD TO SIT STILL: MORE THAN HALF THE DAYS
7. FEELING AFRAID AS IF SOMETHING AWFUL MIGHT HAPPEN: NOT AT ALL
GAD7 TOTAL SCORE: 9

## 2021-12-01 ASSESSMENT — PATIENT HEALTH QUESTIONNAIRE - PHQ9
SUM OF ALL RESPONSES TO PHQ QUESTIONS 1-9: 10
10. IF YOU CHECKED OFF ANY PROBLEMS, HOW DIFFICULT HAVE THESE PROBLEMS MADE IT FOR YOU TO DO YOUR WORK, TAKE CARE OF THINGS AT HOME, OR GET ALONG WITH OTHER PEOPLE: SOMEWHAT DIFFICULT
SUM OF ALL RESPONSES TO PHQ QUESTIONS 1-9: 10

## 2021-12-01 ASSESSMENT — PAIN SCALES - GENERAL: PAINLEVEL: MODERATE PAIN (5)

## 2021-12-01 NOTE — NURSING NOTE
"Patient presents to the clinic for controlled medication refill.  Last administration of Ruth was at 0830 today.  Contract and TOX updated on 9-.      FOOD SECURITY SCREENING QUESTIONS  Hunger Vital Signs:  Within the past 12 months we worried whether our food would run out before we got money to buy more. Never  Within the past 12 months the food we bought just didn't last and we didn't have money to get more. Never    Advance Care Directive on file? yes  Advance Care Directive provided to patient? N/a    Chief Complaint   Patient presents with     Recheck Medication       Initial /62 (BP Location: Right arm, Patient Position: Sitting, Cuff Size: Adult Large)   Pulse 96   Temp 96.8  F (36  C) (Tympanic)   Resp 22   Wt 79.4 kg (175 lb)   LMP  (LMP Unknown)   SpO2 98%   Breastfeeding No   BMI 28.46 kg/m   Estimated body mass index is 28.46 kg/m  as calculated from the following:    Height as of 11/16/21: 1.67 m (5' 5.75\").    Weight as of this encounter: 79.4 kg (175 lb).  Medication Reconciliation: complete        Taylor Hill LPN       "

## 2021-12-01 NOTE — PROGRESS NOTES
"Nursing Notes:   Taylor Hill LPN  12/1/2021 11:27 AM  Signed  Patient presents to the clinic for controlled medication refill.  Last administration of [place] was at 0830 today.  Contract and TOX updated on 9-.      FOOD SECURITY SCREENING QUESTIONS  Hunger Vital Signs:  Within the past 12 months we worried whether our food would run out before we got money to buy more. Never  Within the past 12 months the food we bought just didn't last and we didn't have money to get more. Never    Advance Care Directive on file? yes  Advance Care Directive provided to patient? N/a    Chief Complaint   Patient presents with     Recheck Medication       Initial /62 (BP Location: Right arm, Patient Position: Sitting, Cuff Size: Adult Large)   Pulse 96   Temp 96.8  F (36  C) (Tympanic)   Resp 22   Wt 79.4 kg (175 lb)   LMP  (LMP Unknown)   SpO2 98%   Breastfeeding No   BMI 28.46 kg/m   Estimated body mass index is 28.46 kg/m  as calculated from the following:    Height as of 11/16/21: 1.67 m (5' 5.75\").    Weight as of this encounter: 79.4 kg (175 lb).  Medication Reconciliation: complete        Taylor Hill LPN            Assessment & Plan       ICD-10-CM    1. Chronic anxiety  F41.9    2. Controlled substance agreement updated 10/9/2020  Z79.899    3. Chronic pain disorder  G89.4    4. Chest wall pain, chronic  R07.89     G89.29    5. Chronic bilateral low back pain without sciatica  M54.50     G89.29        PDMP Review       Value Time User    State PDMP site checked  Yes 12/1/2021 11:38 AM Ramirez Cano MD         Discussed medical issues. Seeing some improvement. Scheduled for PFTs. No other medication changes clearly indicated at this time    Refilled hydrocodone for same dosing x 2 months    She is requesting increase in clonazepam from 2.5 daily on average to 3 due to worse anxiety. Agree to temporary increase, but if her other medical issues can be managed, she should reduce clonazepam to " "reduce risks of polypharmacy. We will discuss next appointment     Follow up 2 months    Ramirez Cano MD     [facility]      SUBJECTIVE:  66 year old female with CAD, history of PE presents to follow up on chronic pain and anxiety    Remains on chronic opioids for chest wall and lumbar pain.  In the past was on methadone, but has been weaned off.  Also using clonazepam for anxiety.  Is aware of risk for sedation and overdose with concurrent benzodiazepine and opioids.    She still feels \"like horse shit.\" Was using a lot of nitroglycerin.  Saw cardiology.  Dr Gramajo started Symbicort for COPD and this has helped her breathing and using less nitroglycerin now  Scheduled for updated PFTs.  PFTs in June 2014 with FEV1 87%, FVC 93%, ratio 73. Saw Dr Whyte of pulmonology and inhalers were stopped back then due to lack of perceived benefit.   Changed from Xarelto to Eliquis by Dr Gramajo    Reports more anxiety. Is not sure if this relates to her breathing or other social factors.       REVIEW OF SYSTEMS:    Pertinent items are noted in HPI.    Current Outpatient Medications   Medication Sig Dispense Refill     albuterol (PROAIR HFA/PROVENTIL HFA/VENTOLIN HFA) 108 (90 Base) MCG/ACT inhaler Inhale 2 puffs into the lungs every 6 hours 2 Inhaler 3     amLODIPine (NORVASC) 10 MG tablet TAKE 1 TABLET (10 MG) BY MOUTH DAILY DX. CODE: I10. 90 tablet 3     apixaban ANTICOAGULANT (ELIQUIS ANTICOAGULANT) 5 MG tablet Take 1 tablet (5 mg) by mouth 2 times daily 180 tablet 3     budesonide-formoterol (SYMBICORT) 160-4.5 MCG/ACT Inhaler Inhale 2 puffs into the lungs 2 times daily 6 g 11     busPIRone (BUSPAR) 15 MG tablet TAKE 1 TABLET BY MOUTH TWICE A  tablet 4     clonazePAM (KLONOPIN) 1 MG tablet TAKE 1 TABLET (1 MG) BY MOUTH 3 TIMES DAILY AS NEEDED FOR ANXIETY MAX 2.5 PER DAY = 225 PER 90 DAYS 225 tablet 0     clopidogrel (PLAVIX) 75 MG tablet Take 1 tablet (75 mg) by mouth daily 90 tablet 4     " Diclofenac Sodium 1.5 % SOLN Apply 20 drops to each hand or 40 drops to each knee up to 4 times daily as needed for arthritis pain 450 mL 3     HYDROcodone-acetaminophen (NORCO)  MG per tablet Take 1-2 tablets by mouth every 4 hours as needed for severe pain 6 per day 180 tablet 0     HYDROcodone-acetaminophen (NORCO)  MG per tablet Take 1-2 tablets by mouth every 4 hours as needed for severe pain 6 per day 180 tablet 0     lisinopril (ZESTRIL) 20 MG tablet Take 0.5 tablets (10 mg) by mouth daily 90 tablet 3     naloxone (NARCAN) 4 MG/0.1ML nasal spray Spray into one nostril for opioid reversal if unresponsive. May repeat every 2-3 minutes until patient responsive or EMS arrives 1 each 1     nitroGLYcerin (NITROLINGUAL) 0.4 MG/SPRAY spray For chest pain spray 1 spray under tongue every 5 minutes for 3 doses. If symptoms persist 5 minutes after 1st dose call 911. 4.9 g 3     omeprazole (PRILOSEC) 20 MG DR capsule Take 1 capsule (20 mg) by mouth 2 times daily 180 capsule 3     ondansetron (ZOFRAN) 4 MG tablet Take 1 tablet (4 mg) by mouth every 6 hours as needed for nausea 30 tablet 1     ranolazine (RANEXA) 500 MG 12 hr tablet TAKE 1 TABLET BY MOUTH TWICE A  tablet 3     rosuvastatin (CRESTOR) 20 MG tablet Take 20 mg by mouth daily       sucralfate (CARAFATE) 1 GM tablet Take 1 tablet (1 g) by mouth 4 times daily as needed for nausea (or dark stools) 360 tablet 1     VITAMIN D, CHOLECALCIFEROL, PO Take 5,000 Units by mouth daily       vortioxetine (TRINTELLIX) 20 MG tablet Take 1 tablet (20 mg) by mouth daily 90 tablet 4     Allergies   Allergen Reactions     Atorvastatin Muscle Pain (Myalgia)     Tiotropium Bromide [Tiotropium] Rash     Advil [Ibuprofen] Other (See Comments)     Does not recall but feel like it interacted with blood thinners and HX of GI BLEED     Ezetimibe Muscle Pain (Myalgia)     Latex Rash     Niacin      Other reaction(s): Flushing     No Clinical Screening - See Comments  Itching, Rash and Blisters     Metals and plastics       Tape [Adhesive Tape] Rash       OBJECTIVE:  /62 (BP Location: Right arm, Patient Position: Sitting, Cuff Size: Adult Large)   Pulse 96   Temp 96.8  F (36  C) (Tympanic)   Resp 22   Wt 79.4 kg (175 lb)   LMP  (LMP Unknown)   SpO2 98%   Breastfeeding No   BMI 28.46 kg/m      EXAM:  General Appearance: Alert. No acute distress  Psychiatric: Normal affect and mentation         Answers for HPI/ROS submitted by the patient on 12/1/2021  If you checked off any problems, how difficult have these problems made it for you to do your work, take care of things at home, or get along with other people?: Somewhat difficult  PHQ9 TOTAL SCORE: 10  YAYA 7 TOTAL SCORE: 9

## 2021-12-02 ASSESSMENT — PATIENT HEALTH QUESTIONNAIRE - PHQ9: SUM OF ALL RESPONSES TO PHQ QUESTIONS 1-9: 10

## 2021-12-02 ASSESSMENT — ANXIETY QUESTIONNAIRES: GAD7 TOTAL SCORE: 9

## 2021-12-04 ENCOUNTER — ALLIED HEALTH/NURSE VISIT (OUTPATIENT)
Dept: FAMILY MEDICINE | Facility: OTHER | Age: 66
End: 2021-12-04
Attending: FAMILY MEDICINE
Payer: MEDICARE

## 2021-12-04 DIAGNOSIS — Z20.822 COVID-19 RULED OUT: Primary | ICD-10-CM

## 2021-12-04 DIAGNOSIS — Z20.822 ENCOUNTER FOR LABORATORY TESTING FOR COVID-19 VIRUS: ICD-10-CM

## 2021-12-04 PROCEDURE — U0003 INFECTIOUS AGENT DETECTION BY NUCLEIC ACID (DNA OR RNA); SEVERE ACUTE RESPIRATORY SYNDROME CORONAVIRUS 2 (SARS-COV-2) (CORONAVIRUS DISEASE [COVID-19]), AMPLIFIED PROBE TECHNIQUE, MAKING USE OF HIGH THROUGHPUT TECHNOLOGIES AS DESCRIBED BY CMS-2020-01-R: HCPCS | Mod: ZL

## 2021-12-04 PROCEDURE — C9803 HOPD COVID-19 SPEC COLLECT: HCPCS

## 2021-12-04 NOTE — PROGRESS NOTES
Patient swabbed for COVID-19 testing.  Procedure 12/8/21  Thelma Nicole MA on 12/4/2021 at 10:28 AM

## 2021-12-05 LAB — SARS-COV-2 RNA RESP QL NAA+PROBE: NEGATIVE

## 2021-12-06 ENCOUNTER — TELEPHONE (OUTPATIENT)
Dept: FAMILY MEDICINE | Facility: OTHER | Age: 66
End: 2021-12-06
Payer: MEDICARE

## 2021-12-06 NOTE — TELEPHONE ENCOUNTER
Pt needs a refill on her Klonopin.  She states [facility] told her to call for refill.  Please call.    Corky Flores on 12/6/2021 at 11:01 AM

## 2021-12-06 NOTE — TELEPHONE ENCOUNTER
[facility] at [facility] is okay to fill today, it was too early to fill at last office visit on 12-1-2021.      Patient notified to contact pharmacy when she is ready to come into town to get it.      Taylor Hill LPN 12/6/2021 12:21 PM

## 2021-12-08 ENCOUNTER — HOSPITAL ENCOUNTER (OUTPATIENT)
Dept: CARDIOLOGY | Facility: OTHER | Age: 66
End: 2021-12-08
Attending: INTERNAL MEDICINE
Payer: MEDICARE

## 2021-12-08 ENCOUNTER — HOSPITAL ENCOUNTER (OUTPATIENT)
Dept: RESPIRATORY THERAPY | Facility: OTHER | Age: 66
End: 2021-12-08
Attending: INTERNAL MEDICINE
Payer: MEDICARE

## 2021-12-08 DIAGNOSIS — J45.909 ASTHMA, UNSPECIFIED ASTHMA SEVERITY, UNSPECIFIED WHETHER COMPLICATED, UNSPECIFIED WHETHER PERSISTENT: ICD-10-CM

## 2021-12-08 DIAGNOSIS — R06.09 DOE (DYSPNEA ON EXERTION): ICD-10-CM

## 2021-12-08 DIAGNOSIS — R00.2 PALPITATIONS: ICD-10-CM

## 2021-12-08 DIAGNOSIS — R00.1 SYMPTOMATIC BRADYCARDIA: ICD-10-CM

## 2021-12-08 PROCEDURE — 94729 DIFFUSING CAPACITY: CPT | Mod: 26 | Performed by: INTERNAL MEDICINE

## 2021-12-08 PROCEDURE — 999N000157 HC STATISTIC RCP TIME EA 10 MIN

## 2021-12-08 PROCEDURE — 94010 BREATHING CAPACITY TEST: CPT

## 2021-12-08 PROCEDURE — 94010 BREATHING CAPACITY TEST: CPT | Mod: 26 | Performed by: INTERNAL MEDICINE

## 2021-12-08 PROCEDURE — 93248 EXT ECG>7D<15D REV&INTERPJ: CPT | Performed by: INTERNAL MEDICINE

## 2021-12-08 PROCEDURE — 94729 DIFFUSING CAPACITY: CPT

## 2021-12-08 PROCEDURE — 94726 PLETHYSMOGRAPHY LUNG VOLUMES: CPT

## 2021-12-08 PROCEDURE — 94726 PLETHYSMOGRAPHY LUNG VOLUMES: CPT | Mod: 26 | Performed by: INTERNAL MEDICINE

## 2021-12-08 PROCEDURE — 93246 EXT ECG>7D<15D RECORDING: CPT

## 2021-12-08 NOTE — LETTER
January 13, 2022      Ankita Day  [address] [address]        Dear ,    We are writing to inform you of your test results.    Here are the results of your Zio patch.  Nothing on this monitor would explain your shortness of breath.  You did not have any life-threatening rhythms.  You did not have any atrial fibrillation which would require a blood thinner.  You did not have any heart block or pauses which would require pacemaker.  You had some extra beats/runs of extra beats and some early/runs of early beats noted on your monitor.  These are common.  Typically, they do not cause any issues or symptoms.  If they do cause symptoms such as palpitation or fluttering, when significant, they can be treated with medications.  Otherwise, they can be left alone.    Resulted Orders   Leadless EKG Monitor 8 to 14 Days    Narrative    Zio XT patch report on Malina Day.  Ordered secondary to dyspnea.     Worn for 13 days and 1 hr's.  After removing artifact, total time was 13   days and 1 hr's. Placed on 12/8/2021 at 1:09 PM and completed on   12/21/2021 at 2:23 PM.     Underlying rhythm was sinus.     Hrt rate ranged from 45 bpm, maximum heart rate of 162 bmp, averaging 67   bmp.     No significant bradycardia, pauses, Mobitz type II or 3rd degree heart   block.    No atrial fibrillation on this study.     x3 triggered events and x3 diary entries.  These corresponded to VE and   sinus rhythm.     x0 runs of VT.       x16 runs of SVT lasting up to 17 beats with a maximum heart rate of 162   bmp.     Rare, less than 1% of PAC's, atrial couplets, atrial triplets, PVC's,   ventricular couplets, and ventricular triplets.     0 episodes of ventricular bigeminy.    0 episodes of ventricular trigeminy.         If you have any questions or concerns, please call the clinic at the number listed above.       Sincerely,      Paul Gramajo, DO

## 2021-12-08 NOTE — PATIENT INSTRUCTIONS
Date Placed on Pt:  12/8/21    Patient instructed not to:   -take baths, swim, sauna   -remove device prior to 14 days   -use electric blankets   -shower or sweat excessively within first 24 hours of device application  Patient instructed to:   -shower as needed   -be carefull when toweling off and dressing   -press button when cardiac symptoms occur   -document symptoms in log book   -remove and return device (send via mail to [employer])   -Call [employer] with any questions

## 2021-12-20 DIAGNOSIS — K27.9 PEPTIC ULCER: ICD-10-CM

## 2022-01-01 NOTE — TELEPHONE ENCOUNTER
After verifying pts name and date of birth with Дмитрий the Pharmacist, Дмитрий states insurance only allowed him to fill 90 tablets of the Percocet. Pt is need additional refill.  Jamilah Berger LPN    
She was to take 6 per day for 15 days, so that would be from 1/22 until 2/6, then 4 per day for 15 days until follow up so 2/6 until 2/21.  Sent in 60 pills starting 2/6  
States she is needing [facility]  
negative

## 2022-01-02 ENCOUNTER — HOSPITAL ENCOUNTER (EMERGENCY)
Facility: OTHER | Age: 67
Discharge: HOME | End: 2022-01-02
Attending: STUDENT IN AN ORGANIZED HEALTH CARE EDUCATION/TRAINING PROGRAM | Admitting: STUDENT IN AN ORGANIZED HEALTH CARE EDUCATION/TRAINING PROGRAM
Payer: MEDICARE

## 2022-01-02 ENCOUNTER — APPOINTMENT (OUTPATIENT)
Dept: GENERAL RADIOLOGY | Facility: OTHER | Age: 67
End: 2022-01-02
Attending: STUDENT IN AN ORGANIZED HEALTH CARE EDUCATION/TRAINING PROGRAM
Payer: MEDICARE

## 2022-01-02 VITALS
HEART RATE: 56 BPM | RESPIRATION RATE: 17 BRPM | SYSTOLIC BLOOD PRESSURE: 125 MMHG | BODY MASS INDEX: 28.12 KG/M2 | OXYGEN SATURATION: 97 % | HEIGHT: 66 IN | DIASTOLIC BLOOD PRESSURE: 52 MMHG | WEIGHT: 175 LBS | TEMPERATURE: 98.8 F

## 2022-01-02 DIAGNOSIS — R53.1 GENERALIZED WEAKNESS: ICD-10-CM

## 2022-01-02 DIAGNOSIS — N39.0 URINARY TRACT INFECTION IN ELDERLY PATIENT: ICD-10-CM

## 2022-01-02 LAB
ALBUMIN UR-MCNC: 20 MG/DL
ANION GAP SERPL CALCULATED.3IONS-SCNC: 9 MMOL/L (ref 3–14)
APPEARANCE UR: ABNORMAL
BACTERIA #/AREA URNS HPF: ABNORMAL /HPF
BASOPHILS # BLD AUTO: 0.1 10E3/UL (ref 0–0.2)
BASOPHILS NFR BLD AUTO: 1 %
BILIRUB UR QL STRIP: NEGATIVE
BUN SERPL-MCNC: 22 MG/DL (ref 7–25)
CALCIUM SERPL-MCNC: 9.7 MG/DL (ref 8.6–10.3)
CHLORIDE BLD-SCNC: 107 MMOL/L (ref 98–107)
CO2 SERPL-SCNC: 21 MMOL/L (ref 21–31)
COLOR UR AUTO: YELLOW
CREAT SERPL-MCNC: 0.92 MG/DL (ref 0.6–1.2)
EOSINOPHIL # BLD AUTO: 0.2 10E3/UL (ref 0–0.7)
EOSINOPHIL NFR BLD AUTO: 3 %
ERYTHROCYTE [DISTWIDTH] IN BLOOD BY AUTOMATED COUNT: 15 % (ref 10–15)
FLUAV RNA SPEC QL NAA+PROBE: NEGATIVE
FLUBV RNA RESP QL NAA+PROBE: NEGATIVE
GFR SERPL CREATININE-BSD FRML MDRD: 68 ML/MIN/1.73M2
GLUCOSE BLD-MCNC: 184 MG/DL (ref 70–105)
GLUCOSE UR STRIP-MCNC: NEGATIVE MG/DL
HCT VFR BLD AUTO: 35.7 % (ref 35–47)
HGB BLD-MCNC: 11.9 G/DL (ref 11.7–15.7)
HGB UR QL STRIP: ABNORMAL
HOLD SPECIMEN: NORMAL
HYALINE CASTS: 6 /LPF
IMM GRANULOCYTES # BLD: 0 10E3/UL
IMM GRANULOCYTES NFR BLD: 0 %
KETONES UR STRIP-MCNC: NEGATIVE MG/DL
LEUKOCYTE ESTERASE UR QL STRIP: ABNORMAL
LYMPHOCYTES # BLD AUTO: 1.6 10E3/UL (ref 0.8–5.3)
LYMPHOCYTES NFR BLD AUTO: 19 %
MCH RBC QN AUTO: 29.2 PG (ref 26.5–33)
MCHC RBC AUTO-ENTMCNC: 33.3 G/DL (ref 31.5–36.5)
MCV RBC AUTO: 88 FL (ref 78–100)
MONOCYTES # BLD AUTO: 0.5 10E3/UL (ref 0–1.3)
MONOCYTES NFR BLD AUTO: 6 %
MUCOUS THREADS #/AREA URNS LPF: PRESENT /LPF
NEUTROPHILS # BLD AUTO: 5.9 10E3/UL (ref 1.6–8.3)
NEUTROPHILS NFR BLD AUTO: 71 %
NITRATE UR QL: POSITIVE
NRBC # BLD AUTO: 0 10E3/UL
NRBC BLD AUTO-RTO: 0 /100
NT-PROBNP SERPL-MCNC: 99 PG/ML (ref 0–100)
PH UR STRIP: 5.5 [PH] (ref 5–9)
PLATELET # BLD AUTO: 226 10E3/UL (ref 150–450)
POTASSIUM BLD-SCNC: 3.8 MMOL/L (ref 3.5–5.1)
RBC # BLD AUTO: 4.08 10E6/UL (ref 3.8–5.2)
RBC URINE: 8 /HPF
RSV RNA SPEC NAA+PROBE: NEGATIVE
SARS-COV-2 RNA RESP QL NAA+PROBE: NEGATIVE
SODIUM SERPL-SCNC: 137 MMOL/L (ref 134–144)
SP GR UR STRIP: 1.02 (ref 1–1.03)
TROPONIN I SERPL-MCNC: 2.7 PG/ML (ref 0–34)
UROBILINOGEN UR STRIP-MCNC: NORMAL MG/DL
WBC # BLD AUTO: 8.2 10E3/UL (ref 4–11)
WBC CLUMPS #/AREA URNS HPF: PRESENT /HPF
WBC URINE: >182 /HPF

## 2022-01-02 PROCEDURE — 93005 ELECTROCARDIOGRAM TRACING: CPT | Performed by: STUDENT IN AN ORGANIZED HEALTH CARE EDUCATION/TRAINING PROGRAM

## 2022-01-02 PROCEDURE — 36415 COLL VENOUS BLD VENIPUNCTURE: CPT | Performed by: STUDENT IN AN ORGANIZED HEALTH CARE EDUCATION/TRAINING PROGRAM

## 2022-01-02 PROCEDURE — 250N000011 HC RX IP 250 OP 636: Performed by: STUDENT IN AN ORGANIZED HEALTH CARE EDUCATION/TRAINING PROGRAM

## 2022-01-02 PROCEDURE — 96374 THER/PROPH/DIAG INJ IV PUSH: CPT | Performed by: STUDENT IN AN ORGANIZED HEALTH CARE EDUCATION/TRAINING PROGRAM

## 2022-01-02 PROCEDURE — 81001 URINALYSIS AUTO W/SCOPE: CPT | Performed by: STUDENT IN AN ORGANIZED HEALTH CARE EDUCATION/TRAINING PROGRAM

## 2022-01-02 PROCEDURE — 84484 ASSAY OF TROPONIN QUANT: CPT | Performed by: STUDENT IN AN ORGANIZED HEALTH CARE EDUCATION/TRAINING PROGRAM

## 2022-01-02 PROCEDURE — 71045 X-RAY EXAM CHEST 1 VIEW: CPT

## 2022-01-02 PROCEDURE — 96375 TX/PRO/DX INJ NEW DRUG ADDON: CPT | Performed by: STUDENT IN AN ORGANIZED HEALTH CARE EDUCATION/TRAINING PROGRAM

## 2022-01-02 PROCEDURE — 87086 URINE CULTURE/COLONY COUNT: CPT | Performed by: STUDENT IN AN ORGANIZED HEALTH CARE EDUCATION/TRAINING PROGRAM

## 2022-01-02 PROCEDURE — 83880 ASSAY OF NATRIURETIC PEPTIDE: CPT | Performed by: STUDENT IN AN ORGANIZED HEALTH CARE EDUCATION/TRAINING PROGRAM

## 2022-01-02 PROCEDURE — 80048 BASIC METABOLIC PNL TOTAL CA: CPT | Performed by: STUDENT IN AN ORGANIZED HEALTH CARE EDUCATION/TRAINING PROGRAM

## 2022-01-02 PROCEDURE — 93010 ELECTROCARDIOGRAM REPORT: CPT | Performed by: INTERNAL MEDICINE

## 2022-01-02 PROCEDURE — 85004 AUTOMATED DIFF WBC COUNT: CPT | Performed by: STUDENT IN AN ORGANIZED HEALTH CARE EDUCATION/TRAINING PROGRAM

## 2022-01-02 PROCEDURE — 99283 EMERGENCY DEPT VISIT LOW MDM: CPT | Performed by: STUDENT IN AN ORGANIZED HEALTH CARE EDUCATION/TRAINING PROGRAM

## 2022-01-02 PROCEDURE — 258N000003 HC RX IP 258 OP 636: Performed by: STUDENT IN AN ORGANIZED HEALTH CARE EDUCATION/TRAINING PROGRAM

## 2022-01-02 PROCEDURE — 99285 EMERGENCY DEPT VISIT HI MDM: CPT | Mod: 25 | Performed by: STUDENT IN AN ORGANIZED HEALTH CARE EDUCATION/TRAINING PROGRAM

## 2022-01-02 PROCEDURE — 87637 SARSCOV2&INF A&B&RSV AMP PRB: CPT | Performed by: STUDENT IN AN ORGANIZED HEALTH CARE EDUCATION/TRAINING PROGRAM

## 2022-01-02 PROCEDURE — C9803 HOPD COVID-19 SPEC COLLECT: HCPCS | Performed by: STUDENT IN AN ORGANIZED HEALTH CARE EDUCATION/TRAINING PROGRAM

## 2022-01-02 RX ORDER — METOCLOPRAMIDE HYDROCHLORIDE 5 MG/ML
5 INJECTION INTRAMUSCULAR; INTRAVENOUS ONCE
Status: COMPLETED | OUTPATIENT
Start: 2022-01-02 | End: 2022-01-02

## 2022-01-02 RX ORDER — ACETAMINOPHEN 325 MG/1
975 TABLET ORAL ONCE
Status: DISCONTINUED | OUTPATIENT
Start: 2022-01-02 | End: 2022-01-02 | Stop reason: HOSPADM

## 2022-01-02 RX ORDER — CEPHALEXIN 500 MG/1
500 CAPSULE ORAL 4 TIMES DAILY
Qty: 28 CAPSULE | Refills: 0 | Status: SHIPPED | OUTPATIENT
Start: 2022-01-02 | End: 2022-01-09

## 2022-01-02 RX ADMIN — PROCHLORPERAZINE EDISYLATE 10 MG: 5 INJECTION INTRAMUSCULAR; INTRAVENOUS at 12:20

## 2022-01-02 RX ADMIN — SODIUM CHLORIDE 1000 ML: 9 INJECTION, SOLUTION INTRAVENOUS at 10:58

## 2022-01-02 RX ADMIN — METOCLOPRAMIDE HYDROCHLORIDE 5 MG: 5 INJECTION INTRAMUSCULAR; INTRAVENOUS at 10:58

## 2022-01-02 ASSESSMENT — MIFFLIN-ST. JEOR: SCORE: 1345.54

## 2022-01-02 NOTE — ED PROVIDER NOTES
Emergency Department Provider Note  : 1955 Age: 66 year old Sex: female MRN: [id number]    Chief Complaint   Patient presents with     Chest Pain     Cough     Headache     Nausea     Generalized Weakness       Medical Decision Making / Assessment / Plan   66 year old female with a PMH of cardiac and pulmonary disease who presents with vague symptoms of headache, generalized weakness, fatigue, and 7 hours of constant chest pressure both over the lateral aspect of her chest.  This does not feel like when she had prior CABG but she has had this symptom before.  Arrives hemodynamically stable, satting well on room air, no distress.  Exam very reassuring including cardiopulmonary auscultation which is not reveal any abnormalities in the lungs.  Remainder of exam is quite benign as well.  Given patient's medical history, she is certainly at risk for very myriad of issues and orthostatic vitals are positive here which would suggest dehydration may be playing a role.  Will screen for Covid and influenza she had a recent exposure some has been ill as well we will attempt here comfortable with fluids and migraine management in the interim.    ED Course as of 22 1254   Sun 2022   1129 Troponin: 2.7   1130 N-Terminal Pro BNP Inpatient: 99   1130 Creatinine: 0.92   1130 Sodium: 137   1130 Potassium: 3.8   1130 Chloride: 107   1130 Carbon Dioxide: 21   1130 Anion Gap: 9   1130 WBC: 8.2   1130 Hemoglobin: 11.9   1130 Platelet Count: 226   1130 SARS CoV2 PCR: Negative   1130 Resp Syncytial Virus: Negative   1130 Influenza B: Negative   1130 Influenza A: Negative   1200 Chest x-ray negative. Awaiting UA but remainder of work-up unremarkable.   1210 Patient feels quite more comfortable although still has mild ongoing headache.  Informed her of her negative results so far.  Patient states she does not feel the need to urinate despite receiving a liter of fluid and drinking some coffee here.  We will perform  bladder scan and if there is evidence of retention, will straight catheter UA specimen.   1247 WBC Urine(!): >182   1247 RBC Urine(!): 8   1247 WBC Clumps(!): Present   1247 Bacteria Urine(!): Few   1247 Leukocyte Esterase Urine(!): Large   1247 Nitrite Urine(!): Positive      EKG nonischemic and without current of injury.  No arrhythmia.  Urinalysis ultimately convincingly infectious.  Last time patient's urine looked like this, she grew out E. coli which is intermittently is sensitive.  Does appear multiple prior E. coli cultures have been sensitive to cephalosporins and thus will prescribe Keflex.  This is actually a good explanation for patient's symptoms of generalized fatigue and lightheadedness.  We will treat her as an outpatient.  Fortunately, she is feeling better with treatment here.  Given return precautions worsening symptoms and PCP follow-up.    The patient was informed of the plan and verbalized understanding and agreed with the plan. The patient was given strict return to Emergency Department precautions as well as appropriate follow up instructions. The patient was discharged in stable condition.    New Prescriptions    CEPHALEXIN (KEFLEX) 500 MG CAPSULE    Take 1 capsule (500 mg) by mouth 4 times daily for 7 days       Final diagnoses:   Urinary tract infection in elderly patient   Generalized weakness       Cristhian Cotton MD  1/2/2022   Emergency Department    Subjective   Ankita is a 66 year old female with PMH of COPD, CAD status post CABG and stenting, CKD who presents at  9:54 AM for evaluation of 3 weeks of intermittent headache, several weeks of progressive fatigue and generalized weakness as well as lightheadedness, and today new development of left chest pressure.  Recent exposure to someone who was ill but supposedly tested negative for Covid on home test.  Patient denies any fevers, nausea, vomiting, diarrhea or abdominal pain.  This chest pain she feels is different than when she had  "her prior heart attacks.  Has not had any cough, congestion, rhinorrhea.  Is compliant with her anticoagulation that she takes for prior pulmonary embolism.  Does not appear chest pain is pleuritic or exertional.  Has not taken anything for symptoms today.    I have reviewed the Medications, Allergies, Past Medical and Surgical History, and Social History in the Epic System and with family.    Review of Systems:  Please see HPI for pertinent positives and negatives. All other systems reviewed and found to be negative.      Objective   BP: 138/74  Pulse: 71  Temp: 97.5  F (36.4  C)  Resp: 20  Height: 167.6 cm (5' 6\")  Weight: 79.4 kg (175 lb)  SpO2: 97 %  Lying Orthostatic BP: 118/62  Lying Orthostatic Pulse: 86 bpm  Sitting Orthostatic BP: 112/69  Sitting Orthostatic Pulse: 87 bpm  Standing Orthostatic BP: 99/63  Standing Orthostatic Pulse: 73 bpm    Physical Exam:   Gen: Fatigued but otherwise comfortable appearing.  HEENT: MMM. AT/NC.  Eye: EOMI.   CV:  Regular rate and rhythm.  Intact distal pulses.  Well perfused.   Pulm: Clear to auscultation bilaterally. Nonlabored, equal chest rise  Abd: ND.   Ext: No significant edema.  No calf pain to palpation.  Neuro: AOx3, no focal deficit noted  Psych: Appropriate affect, cognition intact    Procedures / Critical Care   Procedures    Critical Care Time: None         Medical/Surgical History:  Past Medical History:   Diagnosis Date     Acute ischemic heart disease (H)     06/15/2007,with PTCA and stenting of 90% osteo circumflex lesion and patent LAD graft, patent left main stent.     Acute myocardial infarction (H)     3/30/2013     Anxiety disorder     No Comments Provided     Atherosclerotic heart disease of native coronary artery without angina pectoris     -angio revealed 3 vessel dz  -4 stents placed; 2 overlapping stents placed in mLAD, 1 stent pRCA, 1 stent pCirc 1 s 9/02 -non-ST elevation MI 1/03  -angio revealed restenosis of Circ.    -PTCA and brachytherapy of " pCirc -repeat angio 2/03 -no intervension -CABG x3 12/03 - Dr Sinclair  -LIMA-LAD, RAFI-RCA, SVG-OM -PCI 7/04 stent to L -CTangio 9/05   -patentent LIMA-LAD. RAFI-RCA occluded; RCA ostio/p*     Bilateral carpal tunnel syndrome     No Comments Provided     Cervicalgia     No Comments Provided     Chest pain     12/29/2014     Chronic gastric ulcer without hemorrhage or perforation     10/03/2011,hx of GI bleed (2003)     Chronic ischemic heart disease     06/27/2012     Chronic obstructive pulmonary disease (H)     06/15/2007,low DLCO, normal spirometry     Chronic or unspecified gastric ulcer with hemorrhage     10/2003     Chronic pain syndrome     12/01/2010,chest wall, back     Constipation     11/29/2011     Coronary angioplasty status     09/12/2002,with triple stenting     Coronary angioplasty status     01/10/2003,repeat angioplasty     Coronary angioplasty status     No Comments Provided     Dorsalgia     05/31/2011     Encounter for other administrative examinations     1/28/2014     Encounter for screening for cardiovascular disorders     10/2004,[facility] (2004, 2005, 2006 and 2011)     Enterocolitis due to Clostridium difficile     8/19/2016     Essential (primary) hypertension     No Comments Provided     Hyperlipidemia     No Comments Provided     Major depressive disorder, single episode     Severe, hx of suicide attempt/hospitalization     Migraine without status migrainosus, not intractable     No Comments Provided     Nodular corneal degeneration     09/26/2011     Noninfective gastroenteritis and colitis     06/15/2007,history of     Noninfective gastroenteritis and colitis     Microcytic     Osteoarthritis     No Comments Provided     Other chest pain     10/13/2009,chronic     Pain in knee     05/31/2011     Pain in right shoulder     No Comments Provided     Panic disorder without agoraphobia     No Comments Provided     Peptic ulcer without hemorrhage or perforation     6/15/2007     Peripheral  vascular disease (H)     No Comments Provided     Personal history of diseases of the blood and blood-forming organs and certain disorders involving the immune mechanism (CODE)     No Comments Provided     Personal history of nicotine dependence     No Comments Provided     Personal history of other medical treatment (CODE)     10/14/2013     Presence of aortocoronary bypass graft     2/12/2003     Primary central sleep apnea     10/14/2013     Sepsis due to Escherichia coli (E. coli) (H)     7/14/16,[facility]     Stricture of artery (H)     3/31/2013,S/p prox left SCA stent 4/1/2013     Thoracic, thoracolumbar and lumbosacral intervertebral disc disorder     No Comments Provided     Uncomplicated opioid abuse (H)     history of     Vitamin D deficiency     5/6/2013     Past Surgical History:   Procedure Laterality Date     ANGIOPLASTY      9/12/02,with triple stenting     APPENDECTOMY OPEN      No Comments Provided     ARTHROSCOPY KNEE      left     ARTHROSCOPY SHOULDER Right 05/12/2017    labral tear, rotator cuff tear and some subacromial decompression      BYPASS GRAFT ARTERY CORONARY      12/13/02,Triple bypass, left internal mammary  to LAD, right internal mammary to right coronary artery, saphenous to obtuse marginal of the left circumflex.     COLONOSCOPY      2011,Dr Bowman benign polyps     COLONOSCOPY  10/03/2011    2011,benign polyps, Dr. Bowman     COLONOSCOPY  08/08/2016 8/8/16,normal, Dr Bowman     ELBOW SURGERY      baby,birth malachi removed from right arm     EMBOLECTOMY UPPER EXTREMITY  04/02/2013    brachial artery pseudoaneurysm after stenting     ESOPHAGOSCOPY, GASTROSCOPY, DUODENOSCOPY (EGD), COMBINED      2011,EGD Dr Bowman with pyloric ulcer     ESOPHAGOSCOPY, GASTROSCOPY, DUODENOSCOPY (EGD), COMBINED      2011,pyloric ulcer, Dr. Bowman     ESOPHAGOSCOPY, GASTROSCOPY, DUODENOSCOPY (EGD), COMBINED      8/8/16,mild gastritis, Dr Bowman     ESOPHAGOSCOPY, GASTROSCOPY, DUODENOSCOPY (EGD), COMBINED       11/27/2017,Dr Bowman. Antral ulcer     ESOPHAGOSCOPY, GASTROSCOPY, DUODENOSCOPY (EGD), COMBINED  02/02/2018    Dr Bowman, healed ulcer     HYSTERECTOMY TOTAL ABDOMINAL      age 21     LAPAROSCOPIC CHOLECYSTECTOMY      2006     OSTEOTOMY FEMUR DISTAL      x3, right knee     OSTEOTOMY FEMUR DISTAL      2000,left knee  ligament surgery     OTHER SURGICAL HISTORY      1/10/2003,,PTCA     OTHER SURGICAL HISTORY      09/20/2012,,PTCA,DELONTE in LAD and left main     OTHER SURGICAL HISTORY      4/1/2013,,PTCA,L subclavian stenosis     SALPINGO-OOPHORECTOMY BILATERAL      age 27,Bilateral salpingo-oophorectomy     TONSILLECTOMY, ADENOIDECTOMY, COMBINED      childhood       Medications:  Current Facility-Administered Medications   Medication     acetaminophen (TYLENOL) tablet 975 mg     Current Outpatient Medications   Medication     cephALEXin (KEFLEX) 500 MG capsule     albuterol (PROAIR HFA/PROVENTIL HFA/VENTOLIN HFA) 108 (90 Base) MCG/ACT inhaler     amLODIPine (NORVASC) 10 MG tablet     apixaban ANTICOAGULANT (ELIQUIS ANTICOAGULANT) 5 MG tablet     budesonide-formoterol (SYMBICORT) 160-4.5 MCG/ACT Inhaler     busPIRone (BUSPAR) 15 MG tablet     clonazePAM (KLONOPIN) 1 MG tablet     clopidogrel (PLAVIX) 75 MG tablet     Diclofenac Sodium 1.5 % SOLN     HYDROcodone-acetaminophen (NORCO)  MG per tablet     HYDROcodone-acetaminophen (NORCO)  MG per tablet     lisinopril (ZESTRIL) 20 MG tablet     naloxone (NARCAN) 4 MG/0.1ML nasal spray     nitroGLYcerin (NITROLINGUAL) 0.4 MG/SPRAY spray     omeprazole (PRILOSEC) 20 MG DR capsule     ondansetron (ZOFRAN) 4 MG tablet     ranolazine (RANEXA) 500 MG 12 hr tablet     rosuvastatin (CRESTOR) 20 MG tablet     sucralfate (CARAFATE) 1 GM tablet     VITAMIN D, CHOLECALCIFEROL, PO     vortioxetine (TRINTELLIX) 20 MG tablet       Allergies:  Atorvastatin, Tiotropium bromide [tiotropium], Advil [ibuprofen], Ezetimibe, Latex, Niacin, No clinical screening - see  comments, and Tape [adhesive tape]    Relevant labs, images, EKGs, Epic and outside hospital (if applicable) charts were reviewed. The findings, diagnosis, plan, and need for follow up were discussed with the patient/family. Nursing notes were reviewed.

## 2022-01-02 NOTE — ED TRIAGE NOTES
Pt to ER from home with  via private car with c/o severe headache, chest pain, cough, nausea, dizziness. Symptoms for past 3 weeks, headache not tolerable today.Headache pain 8/10, lung pain 5/10, chest pain 8/10. Nearly fallen multiple times d/t dizziness.  Feels she may be dehydrated. Increased SOB recently.  Last COVID test 2 weeks ago, negative, is vaccinated and boosted.  States cardiology told her there was a problem with her blood delivering oxygen to her brain.  States more confused lately.

## 2022-01-03 DIAGNOSIS — J44.9 CHRONIC OBSTRUCTIVE PULMONARY DISEASE, UNSPECIFIED COPD TYPE (H): Primary | ICD-10-CM

## 2022-01-03 LAB
ATRIAL RATE - MUSE: 78 BPM
DIASTOLIC BLOOD PRESSURE - MUSE: NORMAL MMHG
INTERPRETATION ECG - MUSE: NORMAL
P AXIS - MUSE: 63 DEGREES
PR INTERVAL - MUSE: 158 MS
QRS DURATION - MUSE: 82 MS
QT - MUSE: 364 MS
QTC - MUSE: 414 MS
R AXIS - MUSE: 30 DEGREES
SYSTOLIC BLOOD PRESSURE - MUSE: NORMAL MMHG
T AXIS - MUSE: 91 DEGREES
VENTRICULAR RATE- MUSE: 78 BPM

## 2022-01-04 LAB — BACTERIA UR CULT: ABNORMAL

## 2022-01-07 ENCOUNTER — ALLIED HEALTH/NURSE VISIT (OUTPATIENT)
Dept: FAMILY MEDICINE | Facility: OTHER | Age: 67
End: 2022-01-07
Attending: FAMILY MEDICINE
Payer: MEDICARE

## 2022-01-07 DIAGNOSIS — Z20.822 COVID-19 RULED OUT: Primary | ICD-10-CM

## 2022-01-07 PROCEDURE — C9803 HOPD COVID-19 SPEC COLLECT: HCPCS

## 2022-01-07 PROCEDURE — U0003 INFECTIOUS AGENT DETECTION BY NUCLEIC ACID (DNA OR RNA); SEVERE ACUTE RESPIRATORY SYNDROME CORONAVIRUS 2 (SARS-COV-2) (CORONAVIRUS DISEASE [COVID-19]), AMPLIFIED PROBE TECHNIQUE, MAKING USE OF HIGH THROUGHPUT TECHNOLOGIES AS DESCRIBED BY CMS-2020-01-R: HCPCS | Mod: ZL

## 2022-01-07 NOTE — PROGRESS NOTES
Patient here for Covid Testing. Procedure for methacholine 1/11/22 [facility].    Alexandria Quintanilla MA on 1/7/2022 at 11:24 AM

## 2022-01-08 LAB — SARS-COV-2 RNA RESP QL NAA+PROBE: NEGATIVE

## 2022-01-09 ENCOUNTER — TELEPHONE (OUTPATIENT)
Dept: EMERGENCY MEDICINE | Facility: OTHER | Age: 67
End: 2022-01-09
Payer: MEDICARE

## 2022-01-09 NOTE — TELEPHONE ENCOUNTER
"[facility] Emergency Department/Urgent Care Lab result notification [Positive for uti and bacteria is susceptible to antibiotic]:    Reason for call:   Notify of Final urine culture result, confirm patient is taking antibiotic, assess symptoms, and advise per Emergency Dept/Urgent Care discharge instructions and Emergency Dept urine culture protocol.    Lab Result & Date of Final Report [copied from Result Note]:    Final Urine Culture Report on 1/4/21  [facility] Emergency Dept discharge antibiotic prescribed: Cephalexin (Keflex) 500 mg capsule, 1 capsule (500 mg) by mouth 4 times daily for 7 days.  #1. Bacteria, >100,000 CFU/mL Escherichia coli, is SUSCEPTIBLE to Antibiotic.    No change in treatment per [facility] ED lab result Urine Culture protocol.    Current symptoms (include time patient called):    3:27PM: Spoke with patient. She states she is feeling better. \"The antibiotic makes me sick, but I've been taking it.\"     Recommendations/Instructions:   Patient notified of lab result and treatment recommendation.   Take antibiotic as directed by the Emergency Dept/Urgent Care Provider.  Advised to follow up with the PCP as directed by the ED provider.  The patient is comfortable with the information given and has no further questions.       UTI prevention/education reviewed with patient [Yes/No]:  Yes    Please contact you PCP or return to the Emergency Department if your:    Symptoms worsen or other concerning symptoms    Debi Avila RN  [facility] [place]  Emergency Dept Lab Result RN  Ph# [phone]           "

## 2022-01-10 NOTE — PROGRESS NOTES
[facility] HEART CARE   CARDIOLOGY PROGRESS NOTE     Chief Complaint   Patient presents with     Follow Up          Diagnosis:  1. Stable angina with cath on 9/25/17. [facility] with stable dz.  2. CAD.  3. CABG x 3 on 2/12/2003 with LIMA to LAD, RAFI to RCA, and SVG to OM.   4. CKD-3.  5. PE on 1/2/20.  RLL, MICHELLE and LLL.  6. COPD-unknown severity. PFT on 12/8/21.  7. HTN-controlled  8. Lightheadedness-episodic.  9. H/O coronary angiogram (2017)  10. Iron deficiency anemia-resolved.  11.  CVA-Mild chronic ischemic cerebral disease on 8/20/2021.  12. Left-sided subclavian steal syndrome. Stenting with patency as noted on 9/15/17.  13.  Tobacco abuse, quitting 8/23/17.   14.  H/O alcohol abuse.  15.  Hyperlipidemia-controlled.  16.  Bradycardia with the slowest heart rate of 50 bpm on 7/30/2021.  17.  Dizziness/lightheadedness secondary to dehydration.  18.  B12 deficiency 313 on 3/18/2020.  19.  Vitamin D deficiency.       Assessment/Plan:    1. We reviewed her testing today.  Patient continues to endorse nausea, fatigue and headaches.  Results discussed as outlined below.  2.  VQ scan negative with history of PE on 11/23/2021.  3.  Chest x-ray on 11/23/2021 with probable air trapping.  4.  Negative stress test on 5/15/2021.  5.  Echo on 9/15/2021 showing an EF of 55-60%.  6.  Zio patch on 12/8/2021 with x16 episodes of SVT lasting up to 17 beats.  No A. fib, pauses, or high-grade heart block.  7.  Referral to neurology at [facility] for chronic headaches.  8.  Refilled spray nitroglycerin.  8.  Discontinue Prilosec 20 mg twice a day.  10.  Started on Protonix 40 mg daily.  To be taken 30 to 60 minutes prior to eating as patient is having atypical chest pain with acid reflux symptoms.  11.  Patient was prescribed lisinopril 10 but has been taking 20 mg daily.  Will refill/continue lisinopril 20 mg daily since blood pressure is controlled.  12.  She will continue taking B12 related to B12 deficiency.  13.  She cannot  get into pulmonary/Dr. Whyte until April 2022 at [facility].  She will go to [place] to see pulmonary as she can get in sooner.  14.  PFTs on 12/8/2021 were negative for obstruction/restriction.  Patient not have moderate diffusion defect.  We will follow-up with pulmonary.  15.  Follow-up in 3 months.          Interval history:  Malina is still struggling.  She endorses headaches.  She has had headaches for years but her headaches have gotten worse.  I have referred her to neurology.  She will follow-up with her primary as well.  She she is previously seen in [facility] and did not like the neurologist.  She was referred to [facility] as result.  She also endorses chest pain. She states it lasts hours or for the day.  She describes it stable.  It is not exertional.  She has 2-3 episodes a week.  It is made worse with deep breathing but not by palpation.  She had a cardiac cath in 2017 with stable disease.  Her stress test on 9/15/2021 was normal.  It appears to be somewhat improved with breathing treatments.  It is possible it is acid reflux that she is describing acid reflux symptoms.  Being that she is on Plavix, will discontinue Prilosec 20 mg twice a day.  Will start on Protonix 40 mg daily as this is less likely to interact with Plavix.  We also discussed her VQ scan.  She has a history of a blood clot on 1/2/2020.  The VQ scan was normal suggesting she does not have a blood clot.  Reviewed her PFTs.  I am suspicious she has COPD.  Chest x-ray showed air trapping.  PFTs were read as being deficient of COPD.  She had does have a long history of tobacco abuse.  She was referred to pulmonary and cannot get in until April, 2022.  As result, she plans to go to do [place] to be seen sooner and was encouraged to do this.  Her nitro glycerin was refilled.  She recently found out that she was to split  20 mg lisinopril and take 10 mg daily.  She has been taking 20 mg with good control on her blood pressure.  We  "will continue that.  We discussed her brain MRI that shows mild chronic ischemic cerebral disease.  She is taking B12 with vitamin B12 deficiency.      HPI:    Ms. Day is being seen by cardiology in follow-up.  Patient has a history of chronic stable angina, known ischemic heart disease, hypertension, hyperlipidemia, history of pulmonary embolism, left subclavian artery stenosis, anxiety, collagenous colitis, depression, osteoarthritis, history of tobacco use, central sleep apnea for which she is not compliant with CPAP use, iron deficiency anemia, CKD, myofascial pain, vitamin D deficiency, lumbar facet arthropathy, history of gastric ulcer with hemorrhage, COPD and peptic ulcer disease.    Malina continues to endorse dizziness.  She states when she gets up she starts to feel dizzy like being off balance, will have a headache, her heart will pound and pulse increase.  She will be short of breath.  At times she has chest discomfort.  This has been going on for 1 to 2 years.      She was seen in the ER and admitted for a day on 7/16/2021.  She was felt to have symptomatic bradycardia/hypotension.  She felt \"woozy\" with a blood pressure of 77/52 at home.  She reports getting fluids and feeling much better, resolution of symptoms.  She felt her symptoms had gotten worse over the last 2 to 3 weeks.  Heart rates were noted to be between 49-52 in the ER.  Troponin negative and EKG showed heart rates in the 50's.    She was seen back in the ED on 7/22/2021 for lightheadedness once again.  She actually fell and was having concerning pain in her lungs.  She has a history of a PE and is on Xarelto.  No identifiable cause was found.  There was concern that her symptoms may be anxiety related.    Today, she reports continuing to feel these symptoms of dizziness.  She has a hard time describing them.  She feels her heart pounding, she has a headache, she is dizzy, she gets chest pain.  She also continues to be dyspneic on " exertion.  She has been using nitro regularly with some relief.  She has a history of asthma and suspected COPD.  She has seen relief with nitro as well as the albuterol.  She was on Symbicort in the past with benefit but was discontinued for unknown reasons.  She drinks 2 glasses of water and 3 cups of coffee a day.  I believe she is chronically dehydrated which plays a part in her symptoms.  She has tried to increase her fluid intake.  She is to double and preferably triple her fluid intake.  She should cut back/discontinue coffee intake.  With symptoms of chest pain, shortness of breath, and history of blood clot, will plan for VQ scan.  We will also get a chest x-ray.  She describes regular palpitations and acceleration in her heart rates.  We will plan for Zio patch.  She did have a MCOT on 7/30/2021.  Both asymptomatic and symptomatic events include sinus bradycardia, sinus rhythm, sinus tachycardia, occasional PVC's and PAC's.  No other significant rhythms were identified.  We discussed Eliquis versus Xarelto.  It was decided we switched to Eliquis as I seen less bleeding on this medication compared to Xarelto.  Related to shortness of breath, history of asthma, and presumptive COPD, referral was placed to pulmonary.  Had intended to prescribe Spiriva but she is allergic and this was not prescribed.  Patient also describes weakness.  She has been using nitro with resolution of her chest pain.  She does have a history of bypass but had a cath in 2017 with only mild disease followed.  I am concerned at this point.  I am concerned that her chest discomfort is more of a lung issue than a heart issue.     Patient has a known history of ischemic heart disease, she underwent  coronary angiogram and bypass angiogram on 9/15/2017 which was described as having stable disease. Mild to moderate 3 vessel CAD involving RCA, LAD and LCX with <50% stenosis at the greatest.  Occluded RAFI and SVG.  Patent LIMA.  No new  "obstructive lesions were identified and normal left heart cath.       She has a history of CABG on 2/12/03 x 3, history of multiple stent placements, patient reported 17 total.       At previous visit patient reported occasional recurrence of chest pain, not as severe as last ED visit on 10/25/19. She reported \"my breathing has been bad\", describes worsening CORTEZ. She described activity intolerance and little to no energy. Recommended repeat stress testing. NM Lexiscan stress test was performed on 12/16/19 which was negative for inducible myocardial ischemia or infarction.  Left ventricular function was normal.     Carotid US on 12/12/19 without significant stenosis, mild atherosclerotic disease present.  Abdominal ultrasound on 12/12/2018 with no abdominal aortic aneurysm identified.     Patient returned to the ED with dyspnea in early January 2020. She was identified to have small segmental and subsegmental emboli bilaterally. Repeat imaging on 1/6 with decreasing volume of pulmonary emboli compared to scan on 1/2/2020. She was initially treated with Lovenox in the ED and discharged on Xarelto oral anticoagulation which has been going well for her, no bleeding complication. She also remains on Plavix with her significant ischemic heart disease history.      She presented to the ED on 3/16/2021 with reports of chest pain, chronic stable angina.  No ACS.  EKG stable and no elevation in troponin's. Patient admits that her BP was low and it was suspected she was dehydrated, she felt better following IV fluids and reports that this likely brought on her angina.  She is currently working on maintaining good hydration.  She denies any recurrence of acute chest pain or pressure today.  No use of nitroglycerin since her recent ED visit.      Relevant testing:  V/Q scan on 11/23/21:  No evidence of pulmonary emboli.    CXR 11/23/21:  Probable air trapping.    PFT 12/8/21:  No obstructive or restrictive disease is noted, " moderate diffusion defect is noted consistent with pulmonary vascular disease     Stress test on 9/15/2021:     The nuclear stress test is negative for inducible myocardial ischemia or infarction.      Left ventricular function is normal.      The left ventricular ejection fraction at rest is 64%.  The left   ventricular ejection fraction at stress is 66%.      A prior study was conducted on 12/16/2019.     Echo on 9/15/2021:  Global and regional left ventricular function is normal with an EF of 55-60%.  Right ventricular function, chamber size, wall motion, and thickness are normal.  Pulmonary artery systolic pressure is normal.  The inferior vena cava is normal.  No pericardial effusion is present.  No significant changes noted.    MRI brain on 8/20/2021:  A few small nonspecific white matter signal abnormalities  are present, likely sequela of mild chronic small vessel ischemic disease. The exam is otherwise unremarkable.    MCOT from 7/30/21:  Findings: Patient's monitor was in place for 9 days and 7 hours.  Minimum heart rate 50 bpm, average heart rate 63 bpm, maximum heart rate 120 bpm. Rhythm/s present include sinus rhythm.  There were 37 symptomatic events during which time the noted rhythms were sinus rhythm, sinus bradycardia and sinus tachycardia.  There were six asymptomatic events during which time the noted rhythms were sinus rhythm.  There was rare atrial ectopy and rare ventricular ectopy.  Review of actual tracings showed no other abnormality.    ARYA on 7/17/20:  The right ankle-brachial index is 1.17.  Left ankle-brachial index is 0.98.    Stress test on 12/16/19:     The nuclear stress test is negative for inducible myocardial ischemia   or infarction.      A small amount of soft tissue artifact is present, more pronounced at   rest.      Left ventricular function is normal.      The left ventricular ejection fraction at rest is 79%.  The left   ventricular ejection fraction at stress is 72%.       A prior study was conducted on 6/30/2015.     US carotids on 12/12/19:  No ultrasound evidence of hemo-dynamically significant stenosis.  Mild atherosclerotic disease.    US abdominal aorta on 12/12/19:  No abdominal aortic aneurysm.      ECHO on 5/13/19:  Global and regional left ventricular function is normal with an EF of 60-65%.  Mild concentric wall thickening consistent with left ventricular hypertrophy  is present.  Right ventricular function, chamber size, wall motion, and thickness are  normal.  No significant valvular abnormalities were noted.  Previous study not available for comparison.    Cardiac cath on 3/30/13:  LEFT MAIN: Widely patent stent.   LEFT ANTERIOR DESCENDING: Widely patent proximal stent. There is an 85% to  90% mid lesion after the second diagonal and prior to the LIMA insertion as before.   CIRCUMFLEX: There is diffuse 60% to 70% disease throughout, the ostium is 70   to 75, and the OM2 is a 70, but it is very diffuse disease and basically   unchanged from previous.   RCA: Widely patent stents with only mild irregularities.   LEFT VENTRICULOGRAM: Normal left ventricular ejection fraction, LVEF 60%,   with no focal wall motion abnormality. LV pressure 146/29.   FLORENTINO to LAD: Widely patent, although there is less flow than before, and, in   fact, the retrograde filling of the LIMA from the antegrade vessel. There is   a severe subclavian stenosis that a pressure gradient revealed in the aorta   was 153/78, and in the right subclavian was 100 to 106/73, for a nearly 50 mm   pressure gradient. The stenosis was 75% to 80%.   RAFI to RCA: 100%.   SVG to OM: 100%.                     ICD-10-CM    1. Chest pain, unspecified type  R07.9 nitroGLYcerin (NITROLINGUAL) 0.4 MG/SPRAY spray   2. CORTEZ (dyspnea on exertion)  R06.00 lisinopril (ZESTRIL) 20 MG tablet   3. Nausea  R11.0 pantoprazole (PROTONIX) 40 MG EC tablet     ondansetron (ZOFRAN) 4 MG tablet   4. Essential hypertension  I10 lisinopril  (ZESTRIL) 20 MG tablet   5. ASCVD (arteriosclerotic cardiovascular disease)  I25.10 nitroGLYcerin (NITROLINGUAL) 0.4 MG/SPRAY spray   6. History of coronary artery bypass graft x 3  Z95.1 nitroGLYcerin (NITROLINGUAL) 0.4 MG/SPRAY spray   7. Chronic obstructive pulmonary disease, unspecified COPD type (H)  J44.9    8. Status post coronary angi[facility] on 9/25/2017 [facility]-stable disease  Z98.890    9. History of pulmonary embolism on 1/2/2020. (-) VQ scan on 11/23/2021  Z86.711    10. Central sleep apnea  G47.31    11. Noncompliance with CPAP treatment  Z91.14    12. History of tobacco abuse-quitting 8/23/2017  Z87.891    13. Iron deficiency anemia due to chronic blood loss  D50.0    14. Stage 3a chronic kidney disease (H)  N18.31    15. Subclavian artery stenosis, left (H)  I77.1    16. Presence of stent in coronary artery in patient with coronary artery disease  I25.10 nitroGLYcerin (NITROLINGUAL) 0.4 MG/SPRAY spray    Z95.5    17. Palpitations  R00.2    18. Chronic ischemic heart disease  I25.9 lisinopril (ZESTRIL) 20 MG tablet   19. Mixed hyperlipidemia  E78.2    20. Vitamin D deficiency  E55.9    21. Vitamin B12 deficiency (non anemic)  E53.8    22. Peptic ulcer  K27.9    23. Uncomplicated asthma, unspecified asthma severity, unspecified whether persistent  J45.909    24. Chronic stable angina (H)  I20.8 nitroGLYcerin (NITROLINGUAL) 0.4 MG/SPRAY spray   25. Atherosclerosis of coronary artery bypass graft of native heart with stable angina pectoris (H)  I25.708 nitroGLYcerin (NITROLINGUAL) 0.4 MG/SPRAY spray   26. History of CVA-mild chronic ischemic disease on 8/20/2021.  Z86.73    27. Nonintractable episodic headache, unspecified headache type  R51.9 Adult Neurology Referral   28. Gastroesophageal reflux disease with esophagitis without hemorrhage  K21.00 pantoprazole (PROTONIX) 40 MG EC tablet       Past Medical History:   Diagnosis Date     Acute ischemic heart disease (H)     06/15/2007,with PTCA and  stenting of 90% osteo circumflex lesion and patent LAD graft, patent left main stent.     Acute myocardial infarction (H)     3/30/2013     Anxiety disorder     No Comments Provided     Atherosclerotic heart disease of native coronary artery without angina pectoris     -angio revealed 3 vessel dz  -4 stents placed; 2 overlapping stents placed in mLAD, 1 stent pRCA, 1 stent pCirc 1 s 9/02 -non-ST elevation MI 1/03  -angio revealed restenosis of Circ.    -PTCA and brachytherapy of pCirc -repeat angio 2/03 -no intervension -CABG x3 12/03 - Dr Sinclair  -LIMA-LAD, RAFI-RCA, SVG-OM -PCI 7/04 stent to L -CTangio 9/05   -patentent LIMA-LAD. RAFI-RCA occluded; RCA ostio/p*     Bilateral carpal tunnel syndrome     No Comments Provided     Cervicalgia     No Comments Provided     Chest pain     12/29/2014     Chronic gastric ulcer without hemorrhage or perforation     10/03/2011,hx of GI bleed (2003)     Chronic ischemic heart disease     06/27/2012     Chronic obstructive pulmonary disease (H)     06/15/2007,low DLCO, normal spirometry     Chronic or unspecified gastric ulcer with hemorrhage     10/2003     Chronic pain syndrome     12/01/2010,chest wall, back     Constipation     11/29/2011     Coronary angioplasty status     09/12/2002,with triple stenting     Coronary angioplasty status     01/10/2003,repeat angioplasty     Coronary angioplasty status     No Comments Provided     Dorsalgia     05/31/2011     Encounter for other administrative examinations     1/28/2014     Encounter for screening for cardiovascular disorders     10/2004,Cardiolite (2004, 2005, 2006 and 2011)     Enterocolitis due to Clostridium difficile     8/19/2016     Essential (primary) hypertension     No Comments Provided     Hyperlipidemia     No Comments Provided     Major depressive disorder, single episode     Severe, hx of suicide attempt/hospitalization     Migraine without status migrainosus, not intractable     No Comments Provided     Nodular  corneal degeneration     09/26/2011     Noninfective gastroenteritis and colitis     06/15/2007,history of     Noninfective gastroenteritis and colitis     Microcytic     Osteoarthritis     No Comments Provided     Other chest pain     10/13/2009,chronic     Pain in knee     05/31/2011     Pain in right shoulder     No Comments Provided     Panic disorder without agoraphobia     No Comments Provided     Peptic ulcer without hemorrhage or perforation     6/15/2007     Peripheral vascular disease (H)     No Comments Provided     Personal history of diseases of the blood and blood-forming organs and certain disorders involving the immune mechanism (CODE)     No Comments Provided     Personal history of nicotine dependence     No Comments Provided     Personal history of other medical treatment (CODE)     10/14/2013     Presence of aortocoronary bypass graft     2/12/2003     Primary central sleep apnea     10/14/2013     Sepsis due to Escherichia coli (E. coli) (H)     7/14/16,[facility]     Stricture of artery (H)     3/31/2013,S/p prox left SCA stent 4/1/2013     Thoracic, thoracolumbar and lumbosacral intervertebral disc disorder     No Comments Provided     Uncomplicated opioid abuse (H)     history of     Vitamin D deficiency     5/6/2013       Past Surgical History:   Procedure Laterality Date     ANGIOPLASTY      9/12/02,with triple stenting     APPENDECTOMY OPEN      No Comments Provided     ARTHROSCOPY KNEE      left     ARTHROSCOPY SHOULDER Right 05/12/2017    labral tear, rotator cuff tear and some subacromial decompression      BYPASS GRAFT ARTERY CORONARY      12/13/02,Triple bypass, left internal mammary  to LAD, right internal mammary to right coronary artery, saphenous to obtuse marginal of the left circumflex.     COLONOSCOPY      2011,Dr Bowman benign polyps     COLONOSCOPY  10/03/2011    2011,benign polyps, Dr. Bowman     COLONOSCOPY  08/08/2016 8/8/16,normal, Dr Bowman     ELBOW SURGERY       baby,birth malachi removed from right arm     EMBOLECTOMY UPPER EXTREMITY  04/02/2013    brachial artery pseudoaneurysm after stenting     ESOPHAGOSCOPY, GASTROSCOPY, DUODENOSCOPY (EGD), COMBINED      2011,EGD Dr Bowman with pyloric ulcer     ESOPHAGOSCOPY, GASTROSCOPY, DUODENOSCOPY (EGD), COMBINED      2011,pyloric ulcer, Dr. Bowman     ESOPHAGOSCOPY, GASTROSCOPY, DUODENOSCOPY (EGD), COMBINED      8/8/16,mild gastritis, Dr Bowman     ESOPHAGOSCOPY, GASTROSCOPY, DUODENOSCOPY (EGD), COMBINED      11/27/2017,Dr Bowman. Antral ulcer     ESOPHAGOSCOPY, GASTROSCOPY, DUODENOSCOPY (EGD), COMBINED  02/02/2018    Dr Bowman, healed ulcer     HYSTERECTOMY TOTAL ABDOMINAL      age 21     LAPAROSCOPIC CHOLECYSTECTOMY      2006     OSTEOTOMY FEMUR DISTAL      x3, right knee     OSTEOTOMY FEMUR DISTAL      2000,left knee  ligament surgery     OTHER SURGICAL HISTORY      1/10/2003,,PTCA     OTHER SURGICAL HISTORY      09/20/2012,,PTCA,DELONTE in LAD and left main     OTHER SURGICAL HISTORY      4/1/2013,,PTCA,L subclavian stenosis     SALPINGO-OOPHORECTOMY BILATERAL      age 27,Bilateral salpingo-oophorectomy     TONSILLECTOMY, ADENOIDECTOMY, COMBINED      childhood       Allergies   Allergen Reactions     Atorvastatin Muscle Pain (Myalgia)     Tiotropium Bromide [Tiotropium] Rash     Advil [Ibuprofen] Other (See Comments)     Does not recall but feel like it interacted with blood thinners and HX of GI BLEED     Ezetimibe Muscle Pain (Myalgia)     Latex Rash     Niacin      Other reaction(s): Flushing     No Clinical Screening - See Comments Itching, Rash and Blisters     Metals and plastics       Tape [Adhesive Tape] Rash       Current Outpatient Medications   Medication Sig Dispense Refill     albuterol (PROAIR HFA/PROVENTIL HFA/VENTOLIN HFA) 108 (90 Base) MCG/ACT inhaler Inhale 2 puffs into the lungs every 6 hours 2 Inhaler 3     amLODIPine (NORVASC) 10 MG tablet TAKE 1 TABLET (10 MG) BY MOUTH DAILY DX. CODE: I10. 90 tablet 3      apixaban ANTICOAGULANT (ELIQUIS ANTICOAGULANT) 5 MG tablet Take 1 tablet (5 mg) by mouth 2 times daily 180 tablet 3     budesonide-formoterol (SYMBICORT) 160-4.5 MCG/ACT Inhaler Inhale 2 puffs into the lungs 2 times daily 6 g 11     busPIRone (BUSPAR) 15 MG tablet TAKE 1 TABLET BY MOUTH TWICE A  tablet 4     clonazePAM (KLONOPIN) 1 MG tablet Take 1 tablet (1 mg) by mouth 3 times daily as needed for anxiety Max 3 per day 270 tablet 0     clopidogrel (PLAVIX) 75 MG tablet Take 1 tablet (75 mg) by mouth daily 90 tablet 4     Diclofenac Sodium 1.5 % SOLN Apply 20 drops to each hand or 40 drops to each knee up to 4 times daily as needed for arthritis pain 450 mL 3     HYDROcodone-acetaminophen (NORCO)  MG per tablet Take 1-2 tablets by mouth every 4 hours as needed for severe pain 6 per day 180 tablet 0     lisinopril (ZESTRIL) 20 MG tablet Take 1 tablet (20 mg) by mouth daily 90 tablet 3     nitroGLYcerin (NITROLINGUAL) 0.4 MG/SPRAY spray For chest pain spray 1 spray under tongue every 5 minutes for 3 doses. If symptoms persist 5 minutes after 1st dose call 911. 4.9 g 3     ondansetron (ZOFRAN) 4 MG tablet Take 1 tablet (4 mg) by mouth every 6 hours as needed for nausea 90 tablet 1     pantoprazole (PROTONIX) 40 MG EC tablet Take 1 tablet (40 mg) by mouth daily 90 tablet 3     ranolazine (RANEXA) 500 MG 12 hr tablet TAKE 1 TABLET BY MOUTH TWICE A  tablet 3     rosuvastatin (CRESTOR) 20 MG tablet Take 20 mg by mouth daily       sucralfate (CARAFATE) 1 GM tablet Take 1 tablet (1 g) by mouth 4 times daily as needed for nausea (or dark stools) 360 tablet 1     VITAMIN D, CHOLECALCIFEROL, PO Take 5,000 Units by mouth daily       vortioxetine (TRINTELLIX) 20 MG tablet Take 1 tablet (20 mg) by mouth daily 90 tablet 4     HYDROcodone-acetaminophen (NORCO)  MG per tablet Take 1-2 tablets by mouth every 4 hours as needed for severe pain 6 per day 180 tablet 0     naloxone (NARCAN) 4 MG/0.1ML nasal  spray Spray into one nostril for opioid reversal if unresponsive. May repeat every 2-3 minutes until patient responsive or EMS arrives 1 each 1       Social History     Socioeconomic History     Marital status:      Spouse name: Not on file     Number of children: Not on file     Years of education: Not on file     Highest education level: Not on file   Occupational History     Not on file   Tobacco Use     Smoking status: Former Smoker     Packs/day: 0.25     Years: 35.00     Pack years: 8.75     Types: Cigarettes     Quit date: 2/15/2017     Years since quittin.9     Smokeless tobacco: Never Used   Vaping Use     Vaping Use: Never used   Substance and Sexual Activity     Alcohol use: Not Currently     Alcohol/week: 0.0 standard drinks     Drug use: No     Sexual activity: Yes     Partners: Male     Birth control/protection: None   Other Topics Concern     Parent/sibling w/ CABG, MI or angioplasty before 65F 55M? Not Asked   Social History Narrative    ,  Steven.  Currently not working outside the home. Lives three-mile self [place] met with . Tobacco abuse, quit , restarted. Quit  and has since quit, no alcohol.     Social Determinants of Health     Financial Resource Strain: Not on file   Food Insecurity: Not on file   Transportation Needs: Not on file   Physical Activity: Not on file   Stress: Not on file   Social Connections: Not on file   Intimate Partner Violence: Not on file   Housing Stability: Not on file       LAB RESULTS:   Office Visit on 2021   Component Date Value Ref Range Status     Color Urine 2021 Yellow  Colorless, Straw, Light Yellow, Yellow Final     Appearance Urine 2021 Slightly Cloudy* Clear Final     Glucose Urine 2021 Negative  Negative mg/dL Final     Bilirubin Urine 2021 Negative  Negative Final     Ketones Urine 2021 Negative  Negative mg/dL Final     Specific Gravity Urine 2021 1.018  1.000 - 1.030 Final      Blood Urine 08/06/2021 Small* Negative Final     pH Urine 08/06/2021 6.0  5.0 - 9.0 Final     Protein Albumin Urine 08/06/2021 20 * Negative mg/dL Final     Urobilinogen Urine 08/06/2021 Normal  Normal, 2.0 mg/dL Final     Nitrite Urine 08/06/2021 Positive* Negative Final     Leukocyte Esterase Urine 08/06/2021 Large* Negative Final     Bacteria Urine 08/06/2021 Many* None Seen /HPF Final     Mucus Urine 08/06/2021 Present* None Seen /LPF Final     RBC Urine 08/06/2021 6* <=2 /HPF Final     WBC Urine 08/06/2021 >182* <=5 /HPF Final     Culture 08/06/2021 >100,000 CFU/mL Escherichia coli*  Final   Office Visit on 07/30/2021   Component Date Value Ref Range Status     Sodium 07/30/2021 139  134 - 144 mmol/L Final     Potassium 07/30/2021 4.4  3.5 - 5.1 mmol/L Final     Chloride 07/30/2021 105  98 - 107 mmol/L Final     Carbon Dioxide (CO2) 07/30/2021 24  21 - 31 mmol/L Final     Anion Gap 07/30/2021 10  3 - 14 mmol/L Final     Urea Nitrogen 07/30/2021 19  7 - 25 mg/dL Final     Creatinine 07/30/2021 1.08  0.60 - 1.20 mg/dL Final     Calcium 07/30/2021 9.9  8.6 - 10.3 mg/dL Final     Glucose 07/30/2021 77  70 - 105 mg/dL Final     GFR Estimate 07/30/2021 53* >60 mL/min/1.73m2 Final     Magnesium 07/30/2021 2.1  1.9 - 2.7 mg/dL Final     WBC Count 07/30/2021 7.0  4.0 - 11.0 10e3/uL Final     RBC Count 07/30/2021 4.39  3.80 - 5.20 10e6/uL Final     Hemoglobin 07/30/2021 12.9  11.7 - 15.7 g/dL Final     Hematocrit 07/30/2021 38.6  35.0 - 47.0 % Final     MCV 07/30/2021 88  78 - 100 fL Final     MCH 07/30/2021 29.4  26.5 - 33.0 pg Final     MCHC 07/30/2021 33.4  31.5 - 36.5 g/dL Final     RDW 07/30/2021 13.5  10.0 - 15.0 % Final     Platelet Count 07/30/2021 273  150 - 450 10e3/uL Final     % Neutrophils 07/30/2021 57  % Final     % Lymphocytes 07/30/2021 30  % Final     % Monocytes 07/30/2021 10  % Final     % Eosinophils 07/30/2021 2  % Final     % Basophils 07/30/2021 1  % Final     % Immature Granulocytes 07/30/2021 0   "% Final     NRBCs per 100 WBC 07/30/2021 0  <1 /100 Final     Absolute Neutrophils 07/30/2021 4.0  1.6 - 8.3 10e3/uL Final     Absolute Lymphocytes 07/30/2021 2.1  0.8 - 5.3 10e3/uL Final     Absolute Monocytes 07/30/2021 0.7  0.0 - 1.3 10e3/uL Final     Absolute Eosinophils 07/30/2021 0.1  0.0 - 0.7 10e3/uL Final     Absolute Basophils 07/30/2021 0.1  0.0 - 0.2 10e3/uL Final     Absolute Immature Granulocytes 07/30/2021 0.0  <=0.0 10e3/uL Final     Absolute NRBCs 07/30/2021 0.0  10e3/uL Final        Review of systems: Negative except that which was noted in the HPI.    Physical examination:  /76 (BP Location: Right arm, Patient Position: Sitting, Cuff Size: Adult Large)   Pulse 76   Temp 96.9  F (36.1  C) (Tympanic)   Resp 16   Ht 1.67 m (5' 5.75\")   Wt 79.4 kg (175 lb)   LMP  (LMP Unknown)   SpO2 97%   BMI 28.46 kg/m      GENERAL APPEARANCE: healthy, alert and no distress  HEENT: no icterus, no xanthelasmas, normal pupil size and reaction, no cyanosis.  NECK: no adenopathy, no asymmetry, masses.  CHEST: lungs clear to auscultation - no rales, rhonchi or wheezes, no use of accessory muscles, no retractions, respirations are unlabored, normal respiratory rate.  Reduced air movement but clear.  CARDIOVASCULAR: regular rhythm, normal S1 with physiologic split S2, no S3 or S4 and no murmur, click or rub  EXTREMITIES: no clubbing, cyanosis but with mild peripheral edema  NEURO: alert and oriented normal speech, and affect  VASC: No vascular bruits heard.  SKIN: no ecchymoses, no rashes    Total time spent on day of visit, including review of tests, obtaining/reviewing separately obtained history, ordering medications/tests/procedures, communicating with PCP/consultants, and documenting in electronic medical record: 60 minutes.       Thank you for allowing me to participate in the care of your patient. Please do not hesitate to contact me if you have any questions.     Paul Gramajo, DO          "

## 2022-01-11 ENCOUNTER — OFFICE VISIT (OUTPATIENT)
Dept: CARDIOLOGY | Facility: OTHER | Age: 67
End: 2022-01-11
Attending: INTERNAL MEDICINE
Payer: MEDICARE

## 2022-01-11 ENCOUNTER — HOSPITAL ENCOUNTER (OUTPATIENT)
Dept: BONE DENSITY | Facility: OTHER | Age: 67
End: 2022-01-11
Attending: FAMILY MEDICINE
Payer: MEDICARE

## 2022-01-11 VITALS
OXYGEN SATURATION: 97 % | HEART RATE: 76 BPM | HEIGHT: 66 IN | WEIGHT: 175 LBS | DIASTOLIC BLOOD PRESSURE: 76 MMHG | SYSTOLIC BLOOD PRESSURE: 126 MMHG | RESPIRATION RATE: 16 BRPM | TEMPERATURE: 96.9 F | BODY MASS INDEX: 28.12 KG/M2

## 2022-01-11 DIAGNOSIS — K21.00 GASTROESOPHAGEAL REFLUX DISEASE WITH ESOPHAGITIS WITHOUT HEMORRHAGE: ICD-10-CM

## 2022-01-11 DIAGNOSIS — Z86.711 HISTORY OF PULMONARY EMBOLISM: ICD-10-CM

## 2022-01-11 DIAGNOSIS — I10 ESSENTIAL HYPERTENSION: ICD-10-CM

## 2022-01-11 DIAGNOSIS — I25.10 PRESENCE OF STENT IN CORONARY ARTERY IN PATIENT WITH CORONARY ARTERY DISEASE: ICD-10-CM

## 2022-01-11 DIAGNOSIS — G47.31 CENTRAL SLEEP APNEA: ICD-10-CM

## 2022-01-11 DIAGNOSIS — R07.9 CHEST PAIN, UNSPECIFIED TYPE: Primary | ICD-10-CM

## 2022-01-11 DIAGNOSIS — R51.9 NONINTRACTABLE EPISODIC HEADACHE, UNSPECIFIED HEADACHE TYPE: ICD-10-CM

## 2022-01-11 DIAGNOSIS — I77.1 SUBCLAVIAN ARTERY STENOSIS, LEFT (H): ICD-10-CM

## 2022-01-11 DIAGNOSIS — I25.10 ASCVD (ARTERIOSCLEROTIC CARDIOVASCULAR DISEASE): ICD-10-CM

## 2022-01-11 DIAGNOSIS — R11.0 NAUSEA: ICD-10-CM

## 2022-01-11 DIAGNOSIS — J45.909 UNCOMPLICATED ASTHMA, UNSPECIFIED ASTHMA SEVERITY, UNSPECIFIED WHETHER PERSISTENT: ICD-10-CM

## 2022-01-11 DIAGNOSIS — Z95.1 HISTORY OF CORONARY ARTERY BYPASS GRAFT X 3: ICD-10-CM

## 2022-01-11 DIAGNOSIS — N18.31 STAGE 3A CHRONIC KIDNEY DISEASE (H): ICD-10-CM

## 2022-01-11 DIAGNOSIS — R00.2 PALPITATIONS: ICD-10-CM

## 2022-01-11 DIAGNOSIS — Z86.73 HISTORY OF CVA (CEREBROVASCULAR ACCIDENT): ICD-10-CM

## 2022-01-11 DIAGNOSIS — Z91.199 NONCOMPLIANCE WITH CPAP TREATMENT: ICD-10-CM

## 2022-01-11 DIAGNOSIS — I20.89 CHRONIC STABLE ANGINA (H): ICD-10-CM

## 2022-01-11 DIAGNOSIS — Z98.890 STATUS POST CORONARY ANGIOGRAM: ICD-10-CM

## 2022-01-11 DIAGNOSIS — I25.9 CHRONIC ISCHEMIC HEART DISEASE: ICD-10-CM

## 2022-01-11 DIAGNOSIS — Z78.0 POST-MENOPAUSE: ICD-10-CM

## 2022-01-11 DIAGNOSIS — J44.9 CHRONIC OBSTRUCTIVE PULMONARY DISEASE, UNSPECIFIED COPD TYPE (H): ICD-10-CM

## 2022-01-11 DIAGNOSIS — E78.2 MIXED HYPERLIPIDEMIA: ICD-10-CM

## 2022-01-11 DIAGNOSIS — D50.0 IRON DEFICIENCY ANEMIA DUE TO CHRONIC BLOOD LOSS: ICD-10-CM

## 2022-01-11 DIAGNOSIS — K27.9 PEPTIC ULCER: ICD-10-CM

## 2022-01-11 DIAGNOSIS — Z95.5 PRESENCE OF STENT IN CORONARY ARTERY IN PATIENT WITH CORONARY ARTERY DISEASE: ICD-10-CM

## 2022-01-11 DIAGNOSIS — I25.708 ATHEROSCLEROSIS OF CORONARY ARTERY BYPASS GRAFT OF NATIVE HEART WITH STABLE ANGINA PECTORIS (H): ICD-10-CM

## 2022-01-11 DIAGNOSIS — E55.9 VITAMIN D DEFICIENCY: ICD-10-CM

## 2022-01-11 DIAGNOSIS — E53.8 VITAMIN B12 DEFICIENCY (NON ANEMIC): ICD-10-CM

## 2022-01-11 DIAGNOSIS — Z87.891 HISTORY OF TOBACCO ABUSE: ICD-10-CM

## 2022-01-11 DIAGNOSIS — R06.09 DOE (DYSPNEA ON EXERTION): ICD-10-CM

## 2022-01-11 PROCEDURE — G0463 HOSPITAL OUTPT CLINIC VISIT: HCPCS

## 2022-01-11 PROCEDURE — 77080 DXA BONE DENSITY AXIAL: CPT

## 2022-01-11 PROCEDURE — 99215 OFFICE O/P EST HI 40 MIN: CPT | Performed by: INTERNAL MEDICINE

## 2022-01-11 RX ORDER — PANTOPRAZOLE SODIUM 40 MG/1
40 TABLET, DELAYED RELEASE ORAL DAILY
Qty: 90 TABLET | Refills: 3 | Status: SHIPPED | OUTPATIENT
Start: 2022-01-11 | End: 2022-11-10

## 2022-01-11 RX ORDER — ONDANSETRON 4 MG/1
4 TABLET, FILM COATED ORAL EVERY 6 HOURS PRN
Qty: 90 TABLET | Refills: 1 | Status: SHIPPED | OUTPATIENT
Start: 2022-01-11 | End: 2022-05-18

## 2022-01-11 RX ORDER — LISINOPRIL 20 MG/1
20 TABLET ORAL DAILY
Qty: 90 TABLET | Refills: 3 | Status: SHIPPED | OUTPATIENT
Start: 2022-01-11 | End: 2023-01-04

## 2022-01-11 RX ORDER — NITROGLYCERIN 400 UG/1
SPRAY ORAL
Qty: 4.9 G | Refills: 3 | Status: SHIPPED | OUTPATIENT
Start: 2022-01-11 | End: 2023-07-13

## 2022-01-11 ASSESSMENT — MIFFLIN-ST. JEOR: SCORE: 1336.54

## 2022-01-11 ASSESSMENT — PAIN SCALES - GENERAL: PAINLEVEL: EXTREME PAIN (8)

## 2022-01-11 NOTE — NURSING NOTE
Patient presents to clinic today for follow up.   Medication reconciliation completed.    ACP on file? yes    FOOD SECURITY SCREENING QUESTIONS    The next two questions are to help us understand your food security.  If you are feeling you need any assistance in this area, we have resources available to support you today.    Hunger Vital Signs:  Within the past 12 months we worried whether our food would run out before we got money to buy more. Never  Within the past 12 months the food we bought just didn't last and we didn't have money to get more. Never    Missy Bains CMA(AAMA)..................1/11/2022   9:50 AM

## 2022-01-26 ENCOUNTER — OFFICE VISIT (OUTPATIENT)
Dept: FAMILY MEDICINE | Facility: OTHER | Age: 67
End: 2022-01-26
Attending: FAMILY MEDICINE
Payer: MEDICARE

## 2022-01-26 VITALS
SYSTOLIC BLOOD PRESSURE: 102 MMHG | WEIGHT: 177 LBS | TEMPERATURE: 97.8 F | OXYGEN SATURATION: 97 % | BODY MASS INDEX: 28.79 KG/M2 | HEART RATE: 78 BPM | RESPIRATION RATE: 16 BRPM | DIASTOLIC BLOOD PRESSURE: 68 MMHG

## 2022-01-26 DIAGNOSIS — M54.50 CHRONIC BILATERAL LOW BACK PAIN WITHOUT SCIATICA: ICD-10-CM

## 2022-01-26 DIAGNOSIS — G44.221 CHRONIC TENSION-TYPE HEADACHE, INTRACTABLE: Primary | ICD-10-CM

## 2022-01-26 DIAGNOSIS — Z79.899 CONTROLLED SUBSTANCE AGREEMENT SIGNED: ICD-10-CM

## 2022-01-26 DIAGNOSIS — G89.29 CHRONIC BILATERAL LOW BACK PAIN WITHOUT SCIATICA: ICD-10-CM

## 2022-01-26 DIAGNOSIS — G89.29 CHEST WALL PAIN, CHRONIC: ICD-10-CM

## 2022-01-26 DIAGNOSIS — G89.4 CHRONIC PAIN DISORDER: ICD-10-CM

## 2022-01-26 DIAGNOSIS — R07.89 CHEST WALL PAIN, CHRONIC: ICD-10-CM

## 2022-01-26 DIAGNOSIS — N39.0 ACUTE UTI: ICD-10-CM

## 2022-01-26 DIAGNOSIS — G43.719 INTRACTABLE CHRONIC COMMON MIGRAINE WITHOUT AURA: ICD-10-CM

## 2022-01-26 LAB
ALBUMIN UR-MCNC: NEGATIVE MG/DL
APPEARANCE UR: CLEAR
BILIRUB UR QL STRIP: NEGATIVE
COLOR UR AUTO: NORMAL
GLUCOSE UR STRIP-MCNC: NEGATIVE MG/DL
HGB UR QL STRIP: NEGATIVE
KETONES UR STRIP-MCNC: NEGATIVE MG/DL
LEUKOCYTE ESTERASE UR QL STRIP: NEGATIVE
NITRATE UR QL: NEGATIVE
PH UR STRIP: 6 [PH] (ref 5–9)
SP GR UR STRIP: 1.02 (ref 1–1.03)
UROBILINOGEN UR STRIP-MCNC: NORMAL MG/DL

## 2022-01-26 PROCEDURE — G0463 HOSPITAL OUTPT CLINIC VISIT: HCPCS | Mod: 25

## 2022-01-26 PROCEDURE — 99214 OFFICE O/P EST MOD 30 MIN: CPT | Performed by: FAMILY MEDICINE

## 2022-01-26 PROCEDURE — G0463 HOSPITAL OUTPT CLINIC VISIT: HCPCS

## 2022-01-26 PROCEDURE — 81003 URINALYSIS AUTO W/O SCOPE: CPT | Mod: ZL | Performed by: FAMILY MEDICINE

## 2022-01-26 RX ORDER — HYDROCODONE BITARTRATE AND ACETAMINOPHEN 10; 325 MG/1; MG/1
1-2 TABLET ORAL EVERY 4 HOURS PRN
Qty: 180 TABLET | Refills: 0 | Status: SHIPPED | OUTPATIENT
Start: 2022-01-29 | End: 2022-02-25

## 2022-01-26 RX ORDER — GABAPENTIN 300 MG/1
CAPSULE ORAL
Qty: 90 CAPSULE | Refills: 3 | Status: SHIPPED | OUTPATIENT
Start: 2022-01-26 | End: 2022-02-25

## 2022-01-26 ASSESSMENT — ANXIETY QUESTIONNAIRES
7. FEELING AFRAID AS IF SOMETHING AWFUL MIGHT HAPPEN: NOT AT ALL
7. FEELING AFRAID AS IF SOMETHING AWFUL MIGHT HAPPEN: NOT AT ALL
3. WORRYING TOO MUCH ABOUT DIFFERENT THINGS: NOT AT ALL
5. BEING SO RESTLESS THAT IT IS HARD TO SIT STILL: NOT AT ALL
4. TROUBLE RELAXING: NOT AT ALL
1. FEELING NERVOUS, ANXIOUS, OR ON EDGE: SEVERAL DAYS
GAD7 TOTAL SCORE: 2
GAD7 TOTAL SCORE: 2
6. BECOMING EASILY ANNOYED OR IRRITABLE: SEVERAL DAYS
GAD7 TOTAL SCORE: 2
2. NOT BEING ABLE TO STOP OR CONTROL WORRYING: NOT AT ALL

## 2022-01-26 ASSESSMENT — PATIENT HEALTH QUESTIONNAIRE - PHQ9
SUM OF ALL RESPONSES TO PHQ QUESTIONS 1-9: 12
SUM OF ALL RESPONSES TO PHQ QUESTIONS 1-9: 12
10. IF YOU CHECKED OFF ANY PROBLEMS, HOW DIFFICULT HAVE THESE PROBLEMS MADE IT FOR YOU TO DO YOUR WORK, TAKE CARE OF THINGS AT HOME, OR GET ALONG WITH OTHER PEOPLE: VERY DIFFICULT

## 2022-01-26 ASSESSMENT — PAIN SCALES - GENERAL: PAINLEVEL: EXTREME PAIN (8)

## 2022-01-26 ASSESSMENT — ASTHMA QUESTIONNAIRES: ACT_TOTALSCORE: 15

## 2022-01-26 NOTE — PROGRESS NOTES
Assessment & Plan       ICD-10-CM    1. Chronic tension-type headache, intractable  G44.221 gabapentin (NEURONTIN) 300 MG capsule   2. Intractable chronic common migraine without aura  G43.719    3. Controlled substance agreement updated 10/9/2020  Z79.899 HYDROcodone-acetaminophen (NORCO)  MG per tablet   4. Chronic pain disorder  G89.4 HYDROcodone-acetaminophen (NORCO)  MG per tablet   5. Chest wall pain, chronic  R07.89 HYDROcodone-acetaminophen (NORCO)  MG per tablet    G89.29    6. Chronic bilateral low back pain without sciatica  M54.50 HYDROcodone-acetaminophen (NORCO)  MG per tablet    G89.29    7. Acute UTI  N39.0 UA reflex to Microscopic     UA reflex to Microscopic       Reporting worse headaches. May be tension or migraine, diagnosed with both previously. MRI in Aug 2021 with small vessel ischemic changes  Opioids are not preferred therapy for headache and could be contributing to the problem  Based on vascular history we will avoid triptans  Reviewed options for tension headache. Depakote was not effective. Topiramate did not help, but she stopped prematurely. Gabapentin helped in the past, but caused confusion over 600 mg TID.   She requests gabapentin again. Start 300 mg at bedtime and work to 300 mg in AM and 600 mg in PM as per patient instructions    PDMP Review       Value Time User    State PDMP site checked  Yes 1/26/2022 10:15 AM Ramirez Cano MD       Refilled hydrocodone same dosing, no changes. Going off opioids in the past caused decreased function in life.    Continue plan for pulmonory workup    UA negative, so cleared recent UTI        Follow up in a month    32 minutes spent on the date of the encounter doing chart review, review of test results, patient visit and documentation     Ramirez Cano MD     [facility]      Nursing Notes:   Taylor Hill LPN  1/26/2022  9:44 AM  Signed  Patient presents to the clinic for controlled  "medication refills.  Contract and TOX 9-, last administration of [place] was today.    FOOD SECURITY SCREENING QUESTIONS:    The next two questions are to help us understand your food security.  If you are feeling you need any assistance in this area, we have resources available to support you today.    Hunger Vital Signs:  Within the past 12 months we worried whether our food would run out before we got money to buy more. Never  Within the past 12 months the food we bought just didn't last and we didn't have money to get more. Never    Advance Care Directive on file? yes  Advance Care Directive provided to patient? Declined.     Chief Complaint   Patient presents with     Recheck Medication       Initial /68 (BP Location: Right arm, Patient Position: Sitting, Cuff Size: Adult Large)   Pulse 78   Temp 97.8  F (36.6  C) (Tympanic)   Resp 16   Wt 80.3 kg (177 lb)   LMP  (LMP Unknown)   SpO2 97%   Breastfeeding No   BMI 28.79 kg/m   Estimated body mass index is 28.79 kg/m  as calculated from the following:    Height as of 1/11/22: 1.67 m (5' 5.75\").    Weight as of this encounter: 80.3 kg (177 lb).  Medication Reconciliation: complete        Taylor Hill LPN      SUBJECTIVE:  History of Present Illness       Back Pain:  She presents for follow up of back pain. Patient's back pain is a chronic problem.  Location of back pain:  Right lower back, left lower back, right middle of back, left middle of back, right hip and left hip  Description of back pain: stabbing  Back pain spreads: nowhere    Since patient first noticed back pain, pain is: gradually worsening  Does back pain interfere with her job:  Not applicable      Migraines:   Since the patient's last clinic visit, headaches are: worsened  The patient is getting headaches:  Daily  She is not able to do normal daily activities when she has a migraine.  The patient is taking the following rescue/relief medications:  Other   Patient states \"I get " "some relief\" from the rescue/relief medications.   The patient is taking the following medications to prevent migraines:  No medications to prevent migraines  In the past 4 weeks, the patient has gone to an Urgent Care or Emergency Room 0 times times due to headaches.    She eats 0-1 servings of fruits and vegetables daily.She consumes 0 sweetened beverage(s) daily.She exercises with enough effort to increase her heart rate 9 or less minutes per day.  She exercises with enough effort to increase her heart rate 3 or less days per week.   She is taking medications regularly.      67 year old female  with CAD, history of PE presents to follow up on chronic pain and anxiety     Remains on chronic opioids for chest wall and lumbar pain.  In the past was on methadone, but has been weaned off.  Also using clonazepam for anxiety.  Is aware of risk for sedation and overdose with concurrent benzodiazepine and opioids.    Still dealing with lumbar pain, but headaches are a greater concern at this time.    Daily headache. Headache is diffuse. \"Ringing in my ears.\" Top of the head feels like it will blow off. Worse in the morning. Some photophobia. Taking more hydrocodone in the morning seems to help get headache under better control.  Unable to tolerate CPAP mask  She proposes going back on methadone to help pain  We discussed why this is not a good idea  She was evaluated by neurology in 2016 with recommendation for Botox, but not covered by insurance    Reported worse breathing, cardiology provided LAMA. Breathing has improved. Inhalers help SOB.  Set up for a methacholine challenge. Pulmonology appointment not for a couple months with Dr Whyte.  Stopped taking nitroglycerin for heart since she starting inhaler. Was taking nitroglycerin 4-6 times daily.   Stopped Ranexa due to nausea. No chest pain    Recent UTI, symptoms resolved. History of urinary sepsis a few years ago.    Pain History:  When did you first notice your " pain? - More than 6 weeks   Have you seen this provider for your pain in the past?   Yes   Where in your body do you have pain? Chronic low back pain  Are you seeing anyone else for your pain? No    PHQ-9 SCORE 8/6/2021 12/1/2021 1/26/2022   PHQ-9 Total Score MyChart 7 (Mild depression) 10 (Moderate depression) 12 (Moderate depression)   PHQ-9 Total Score 7 10 12       YAYA-7 SCORE 7/30/2021 12/1/2021 1/26/2022   Total Score 5 (mild anxiety) 9 (mild anxiety) 2 (minimal anxiety)   Total Score 5 9 2       PEG Score 1/26/2022   PEG Total Score 9     PDMP Review       Value Time User    State PDMP site checked  Yes 12/1/2021 11:38 AM Ramirez Cano MD        Last CSA Agreement:   CSA -- Patient Level:    Controlled Substance Agreement - Opioid - Scan on 9/29/2021  2:26 PM       Last UDS: 10/2/2021    REVIEW OF SYSTEMS:    Pertinent items are noted in HPI.    Current Outpatient Medications   Medication Sig Dispense Refill     albuterol (PROAIR HFA/PROVENTIL HFA/VENTOLIN HFA) 108 (90 Base) MCG/ACT inhaler Inhale 2 puffs into the lungs every 6 hours 2 Inhaler 3     amLODIPine (NORVASC) 10 MG tablet TAKE 1 TABLET (10 MG) BY MOUTH DAILY DX. CODE: I10. 90 tablet 3     apixaban ANTICOAGULANT (ELIQUIS ANTICOAGULANT) 5 MG tablet Take 1 tablet (5 mg) by mouth 2 times daily 180 tablet 3     budesonide-formoterol (SYMBICORT) 160-4.5 MCG/ACT Inhaler Inhale 2 puffs into the lungs 2 times daily 6 g 11     busPIRone (BUSPAR) 15 MG tablet TAKE 1 TABLET BY MOUTH TWICE A  tablet 4     clonazePAM (KLONOPIN) 1 MG tablet Take 1 tablet (1 mg) by mouth 3 times daily as needed for anxiety Max 3 per day 270 tablet 0     clopidogrel (PLAVIX) 75 MG tablet Take 1 tablet (75 mg) by mouth daily 90 tablet 4     Diclofenac Sodium 1.5 % SOLN Apply 20 drops to each hand or 40 drops to each knee up to 4 times daily as needed for arthritis pain 450 mL 3     HYDROcodone-acetaminophen (NORCO)  MG per tablet Take 1-2 tablets by mouth every 4  hours as needed for severe pain 6 per day 180 tablet 0     HYDROcodone-acetaminophen (NORCO)  MG per tablet Take 1-2 tablets by mouth every 4 hours as needed for severe pain 6 per day 180 tablet 0     lisinopril (ZESTRIL) 20 MG tablet Take 1 tablet (20 mg) by mouth daily 90 tablet 3     naloxone (NARCAN) 4 MG/0.1ML nasal spray Spray into one nostril for opioid reversal if unresponsive. May repeat every 2-3 minutes until patient responsive or EMS arrives 1 each 1     nitroGLYcerin (NITROLINGUAL) 0.4 MG/SPRAY spray For chest pain spray 1 spray under tongue every 5 minutes for 3 doses. If symptoms persist 5 minutes after 1st dose call 911. 4.9 g 3     ondansetron (ZOFRAN) 4 MG tablet Take 1 tablet (4 mg) by mouth every 6 hours as needed for nausea 90 tablet 1     pantoprazole (PROTONIX) 40 MG EC tablet Take 1 tablet (40 mg) by mouth daily 90 tablet 3     ranolazine (RANEXA) 500 MG 12 hr tablet TAKE 1 TABLET BY MOUTH TWICE A  tablet 3     rosuvastatin (CRESTOR) 20 MG tablet Take 20 mg by mouth daily       sucralfate (CARAFATE) 1 GM tablet Take 1 tablet (1 g) by mouth 4 times daily as needed for nausea (or dark stools) 360 tablet 1     VITAMIN D, CHOLECALCIFEROL, PO Take 5,000 Units by mouth daily       vortioxetine (TRINTELLIX) 20 MG tablet Take 1 tablet (20 mg) by mouth daily 90 tablet 4     Allergies   Allergen Reactions     Atorvastatin Muscle Pain (Myalgia)     Tiotropium Bromide [Tiotropium] Rash     Advil [Ibuprofen] Other (See Comments)     Does not recall but feel like it interacted with blood thinners and HX of GI BLEED     Ezetimibe Muscle Pain (Myalgia)     Latex Rash     Niacin      Other reaction(s): Flushing     No Clinical Screening - See Comments Itching, Rash and Blisters     Metals and plastics       Tape [Adhesive Tape] Rash       OBJECTIVE:  /68 (BP Location: Right arm, Patient Position: Sitting, Cuff Size: Adult Large)   Pulse 78   Temp 97.8  F (36.6  C) (Tympanic)   Resp 16    Wt 80.3 kg (177 lb)   LMP  (LMP Unknown)   SpO2 97%   Breastfeeding No   BMI 28.79 kg/m      EXAM:  General Appearance: Alert. No acute distress  Psychiatric: Normal affect and mentation      Results for orders placed or performed in visit on 01/26/22   UA reflex to Microscopic     Status: Normal   Result Value Ref Range    Color Urine Light Yellow Colorless, Straw, Light Yellow, Yellow    Appearance Urine Clear Clear    Glucose Urine Negative Negative mg/dL    Bilirubin Urine Negative Negative    Ketones Urine Negative Negative mg/dL    Specific Gravity Urine 1.018 1.000 - 1.030    Blood Urine Negative Negative    pH Urine 6.0 5.0 - 9.0    Protein Albumin Urine Negative Negative mg/dL    Urobilinogen Urine Normal Normal, 2.0 mg/dL    Nitrite Urine Negative Negative    Leukocyte Esterase Urine Negative Negative    Narrative    Microscopic not indicated

## 2022-01-26 NOTE — PATIENT INSTRUCTIONS
For headache, take gabapentin 300 mg at bedtime for 3 days, then 300 mg twice daily for 3 days, then 300 mg in the morning and 600 mg at bedtime    Refilled hydrocodone  Follow up in a month

## 2022-01-26 NOTE — NURSING NOTE
"Patient presents to the clinic for controlled medication refills.  Contract and TOX 9-, last administration of [place] was today.    FOOD SECURITY SCREENING QUESTIONS:    The next two questions are to help us understand your food security.  If you are feeling you need any assistance in this area, we have resources available to support you today.    Hunger Vital Signs:  Within the past 12 months we worried whether our food would run out before we got money to buy more. Never  Within the past 12 months the food we bought just didn't last and we didn't have money to get more. Never    Advance Care Directive on file? yes  Advance Care Directive provided to patient? Declined.     Chief Complaint   Patient presents with     Recheck Medication       Initial /68 (BP Location: Right arm, Patient Position: Sitting, Cuff Size: Adult Large)   Pulse 78   Temp 97.8  F (36.6  C) (Tympanic)   Resp 16   Wt 80.3 kg (177 lb)   LMP  (LMP Unknown)   SpO2 97%   Breastfeeding No   BMI 28.79 kg/m   Estimated body mass index is 28.79 kg/m  as calculated from the following:    Height as of 1/11/22: 1.67 m (5' 5.75\").    Weight as of this encounter: 80.3 kg (177 lb).  Medication Reconciliation: complete        Taylor Hill LPN    "

## 2022-01-27 ASSESSMENT — ANXIETY QUESTIONNAIRES: GAD7 TOTAL SCORE: 2

## 2022-01-27 ASSESSMENT — PATIENT HEALTH QUESTIONNAIRE - PHQ9: SUM OF ALL RESPONSES TO PHQ QUESTIONS 1-9: 12

## 2022-02-15 DIAGNOSIS — R00.2 PALPITATIONS: ICD-10-CM

## 2022-02-15 DIAGNOSIS — R06.09 DOE (DYSPNEA ON EXERTION): Primary | ICD-10-CM

## 2022-02-15 DIAGNOSIS — Z86.711 HISTORY OF PULMONARY EMBOLISM: ICD-10-CM

## 2022-02-15 DIAGNOSIS — Z86.73 HISTORY OF CVA (CEREBROVASCULAR ACCIDENT): ICD-10-CM

## 2022-02-25 ENCOUNTER — OFFICE VISIT (OUTPATIENT)
Dept: FAMILY MEDICINE | Facility: OTHER | Age: 67
End: 2022-02-25
Attending: FAMILY MEDICINE
Payer: MEDICARE

## 2022-02-25 VITALS
WEIGHT: 177 LBS | SYSTOLIC BLOOD PRESSURE: 110 MMHG | RESPIRATION RATE: 20 BRPM | HEART RATE: 78 BPM | OXYGEN SATURATION: 96 % | HEIGHT: 66 IN | BODY MASS INDEX: 28.45 KG/M2 | TEMPERATURE: 96.4 F | DIASTOLIC BLOOD PRESSURE: 74 MMHG

## 2022-02-25 DIAGNOSIS — R07.89 CHEST WALL PAIN, CHRONIC: ICD-10-CM

## 2022-02-25 DIAGNOSIS — G44.221 CHRONIC TENSION-TYPE HEADACHE, INTRACTABLE: ICD-10-CM

## 2022-02-25 DIAGNOSIS — M75.51 ACUTE BURSITIS OF RIGHT SHOULDER: ICD-10-CM

## 2022-02-25 DIAGNOSIS — G89.29 CHRONIC BILATERAL LOW BACK PAIN WITHOUT SCIATICA: ICD-10-CM

## 2022-02-25 DIAGNOSIS — G89.4 CHRONIC PAIN DISORDER: Primary | ICD-10-CM

## 2022-02-25 DIAGNOSIS — Z79.899 CONTROLLED SUBSTANCE AGREEMENT SIGNED: ICD-10-CM

## 2022-02-25 DIAGNOSIS — F41.9 CHRONIC ANXIETY: ICD-10-CM

## 2022-02-25 DIAGNOSIS — M54.50 CHRONIC BILATERAL LOW BACK PAIN WITHOUT SCIATICA: ICD-10-CM

## 2022-02-25 DIAGNOSIS — G89.29 CHEST WALL PAIN, CHRONIC: ICD-10-CM

## 2022-02-25 PROCEDURE — G0463 HOSPITAL OUTPT CLINIC VISIT: HCPCS | Mod: 25

## 2022-02-25 PROCEDURE — G0463 HOSPITAL OUTPT CLINIC VISIT: HCPCS

## 2022-02-25 PROCEDURE — 99214 OFFICE O/P EST MOD 30 MIN: CPT | Performed by: FAMILY MEDICINE

## 2022-02-25 RX ORDER — GABAPENTIN 300 MG/1
CAPSULE ORAL
Qty: 90 CAPSULE | Refills: 3 | Status: SHIPPED | OUTPATIENT
Start: 2022-02-25 | End: 2022-04-29

## 2022-02-25 RX ORDER — HYDROCODONE BITARTRATE AND ACETAMINOPHEN 10; 325 MG/1; MG/1
1-2 TABLET ORAL EVERY 4 HOURS PRN
Qty: 180 TABLET | Refills: 0 | Status: SHIPPED | OUTPATIENT
Start: 2022-03-28 | End: 2022-04-29

## 2022-02-25 RX ORDER — HYDROCODONE BITARTRATE AND ACETAMINOPHEN 10; 325 MG/1; MG/1
1-2 TABLET ORAL EVERY 4 HOURS PRN
Qty: 180 TABLET | Refills: 0 | Status: SHIPPED | OUTPATIENT
Start: 2022-02-25 | End: 2022-04-29

## 2022-02-25 RX ORDER — CLONAZEPAM 1 MG/1
1 TABLET ORAL 3 TIMES DAILY PRN
Qty: 270 TABLET | Refills: 0 | Status: SHIPPED | OUTPATIENT
Start: 2022-03-04 | End: 2022-06-14

## 2022-02-25 ASSESSMENT — ANXIETY QUESTIONNAIRES
7. FEELING AFRAID AS IF SOMETHING AWFUL MIGHT HAPPEN: NOT AT ALL
IF YOU CHECKED OFF ANY PROBLEMS ON THIS QUESTIONNAIRE, HOW DIFFICULT HAVE THESE PROBLEMS MADE IT FOR YOU TO DO YOUR WORK, TAKE CARE OF THINGS AT HOME, OR GET ALONG WITH OTHER PEOPLE: NOT DIFFICULT AT ALL
3. WORRYING TOO MUCH ABOUT DIFFERENT THINGS: NOT AT ALL
GAD7 TOTAL SCORE: 0
2. NOT BEING ABLE TO STOP OR CONTROL WORRYING: NOT AT ALL
6. BECOMING EASILY ANNOYED OR IRRITABLE: NOT AT ALL
1. FEELING NERVOUS, ANXIOUS, OR ON EDGE: NOT AT ALL
5. BEING SO RESTLESS THAT IT IS HARD TO SIT STILL: NOT AT ALL

## 2022-02-25 ASSESSMENT — PATIENT HEALTH QUESTIONNAIRE - PHQ9
5. POOR APPETITE OR OVEREATING: NOT AT ALL
SUM OF ALL RESPONSES TO PHQ QUESTIONS 1-9: 0

## 2022-02-25 ASSESSMENT — ASTHMA QUESTIONNAIRES: ACT_TOTALSCORE: 25

## 2022-02-25 ASSESSMENT — PAIN SCALES - GENERAL: PAINLEVEL: MODERATE PAIN (5)

## 2022-02-25 NOTE — PATIENT INSTRUCTIONS
Try diclofenac gel to the shoulder 4 times daily to see if this helps  Continue gabapentin at the same dose  Hydrocodone for 2 months    Clonazepam refilled for March 5    Neurology appointment May 9 at 1:15 at [facility]

## 2022-02-25 NOTE — NURSING NOTE
"Patient presents to the clinic for controlled medication refill.  Contract and TOX 9-.  Last administration of Norco was today around 0800/0830.    FOOD SECURITY SCREENING QUESTIONS:    The next two questions are to help us understand your food security.  If you are feeling you need any assistance in this area, we have resources available to support you today.    Hunger Vital Signs:  Within the past 12 months we worried whether our food would run out before we got money to buy more. Never  Within the past 12 months the food we bought just didn't last and we didn't have money to get more. Never    Advance Care Directive on file? yes  Advance Care Directive provided to patient? Declined.  Chief Complaint   Patient presents with     Recheck Medication       Initial /74 (BP Location: Right arm, Patient Position: Sitting, Cuff Size: Adult Large)   Pulse 78   Temp (!) 96.4  F (35.8  C) (Tympanic)   Resp 20   Ht 1.67 m (5' 5.75\")   Wt 80.3 kg (177 lb)   LMP  (LMP Unknown)   SpO2 96%   BMI 28.79 kg/m   Estimated body mass index is 28.79 kg/m  as calculated from the following:    Height as of this encounter: 1.67 m (5' 5.75\").    Weight as of this encounter: 80.3 kg (177 lb).  Medication Reconciliation: complete        Taylor Hill LPN       "
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
## 2022-02-26 ASSESSMENT — ANXIETY QUESTIONNAIRES: GAD7 TOTAL SCORE: 0

## 2022-02-27 ENCOUNTER — ALLIED HEALTH/NURSE VISIT (OUTPATIENT)
Dept: FAMILY MEDICINE | Facility: OTHER | Age: 67
End: 2022-02-27
Attending: INTERNAL MEDICINE
Payer: MEDICARE

## 2022-02-27 DIAGNOSIS — Z20.822 COVID-19 RULED OUT: Primary | ICD-10-CM

## 2022-02-27 PROCEDURE — U0005 INFEC AGEN DETEC AMPLI PROBE: HCPCS | Mod: ZL

## 2022-02-27 PROCEDURE — C9803 HOPD COVID-19 SPEC COLLECT: HCPCS

## 2022-02-28 LAB — SARS-COV-2 RNA RESP QL NAA+PROBE: NEGATIVE

## 2022-03-03 ENCOUNTER — HOSPITAL ENCOUNTER (OUTPATIENT)
Dept: RESPIRATORY THERAPY | Facility: OTHER | Age: 67
Discharge: HOME | End: 2022-03-03
Attending: INTERNAL MEDICINE | Admitting: INTERNAL MEDICINE
Payer: MEDICARE

## 2022-03-03 PROCEDURE — 250N000009 HC RX 250: Performed by: INTERNAL MEDICINE

## 2022-03-03 PROCEDURE — 94640 AIRWAY INHALATION TREATMENT: CPT

## 2022-03-03 PROCEDURE — 94070 EVALUATION OF WHEEZING: CPT

## 2022-03-03 PROCEDURE — 94070 EVALUATION OF WHEEZING: CPT | Mod: 26 | Performed by: INTERNAL MEDICINE

## 2022-03-03 PROCEDURE — 95070 INHLJ BRNCL CHALLENGE TSTG: CPT

## 2022-03-03 PROCEDURE — 94640 AIRWAY INHALATION TREATMENT: CPT | Mod: 76

## 2022-03-03 PROCEDURE — 999N000157 HC STATISTIC RCP TIME EA 10 MIN

## 2022-03-03 PROCEDURE — 250N000013 HC RX MED GY IP 250 OP 250 PS 637

## 2022-03-03 PROCEDURE — 999N000105 HC STATISTIC NO DOCUMENTATION TO SUPPORT CHARGE

## 2022-03-03 RX ORDER — ALBUTEROL SULFATE 0.83 MG/ML
2.5 SOLUTION RESPIRATORY (INHALATION)
Status: DISCONTINUED | OUTPATIENT
Start: 2022-03-03 | End: 2022-03-04 | Stop reason: HOSPADM

## 2022-03-03 RX ADMIN — ALBUTEROL SULFATE 2.5 MG: 2.5 SOLUTION RESPIRATORY (INHALATION) at 10:21

## 2022-03-26 DIAGNOSIS — I10 ESSENTIAL HYPERTENSION: ICD-10-CM

## 2022-03-28 NOTE — TELEPHONE ENCOUNTER
"Requested Prescriptions   Pending Prescriptions Disp Refills     amLODIPine (NORVASC) 10 MG tablet [Pharmacy Med Name: AMLODIPINE BESYLATE 10 MG TAB] 90 tablet 3     Sig: TAKE 1 TABLET (10 MG) BY MOUTH DAILY DX. CODE: I10.       Calcium Channel Blockers Protocol  Passed - 3/26/2022  9:11 AM        Passed - Blood pressure under 140/90 in past 12 months     BP Readings from Last 3 Encounters:   02/25/22 110/74   01/26/22 102/68   01/11/22 126/76                 Passed - Recent (12 mo) or future (30 days) visit within the authorizing provider's specialty     Patient has had an office visit with the authorizing provider or a provider within the authorizing providers department within the previous 12 mos or has a future within next 30 days. See \"Patient Info\" tab in inbasket, or \"Choose Columns\" in Meds & Orders section of the refill encounter.              Passed - Medication is active on med list        Passed - Patient is age 18 or older        Passed - No active pregnancy on record        Passed - Normal serum creatinine on file in past 12 months     Recent Labs   Lab Test 01/02/22  1015   CR 0.92       Ok to refill medication if creatinine is low          Passed - No positive pregnancy test in past 12 months     Last Written Prescription Date:  4/7/21  Last Fill Quantity: 90,   # refills: 3  Last Office Visit: 2/25/22 [facility]  Future Office visit:  none     Routing refill request to provider for review/approval because:  Routed to provider for review and consideration.  Brenda J. Goodell, RN on 3/28/2022 at 12:59 PM      "

## 2022-03-29 RX ORDER — AMLODIPINE BESYLATE 10 MG/1
10 TABLET ORAL DAILY
Qty: 90 TABLET | Refills: 3 | Status: SHIPPED | OUTPATIENT
Start: 2022-03-29 | End: 2023-03-31

## 2022-04-24 ENCOUNTER — HEALTH MAINTENANCE LETTER (OUTPATIENT)
Age: 67
End: 2022-04-24

## 2022-04-26 ENCOUNTER — OFFICE VISIT (OUTPATIENT)
Dept: PULMONOLOGY | Facility: OTHER | Age: 67
End: 2022-04-26
Attending: INTERNAL MEDICINE
Payer: MEDICARE

## 2022-04-26 VITALS
HEART RATE: 78 BPM | SYSTOLIC BLOOD PRESSURE: 146 MMHG | BODY MASS INDEX: 28.46 KG/M2 | RESPIRATION RATE: 18 BRPM | DIASTOLIC BLOOD PRESSURE: 92 MMHG | WEIGHT: 175 LBS | OXYGEN SATURATION: 98 % | TEMPERATURE: 97.1 F

## 2022-04-26 DIAGNOSIS — R06.09 DYSPNEA ON EXERTION: Primary | ICD-10-CM

## 2022-04-26 DIAGNOSIS — Z87.891 FORMER SMOKER: ICD-10-CM

## 2022-04-26 LAB
BASOPHILS # BLD AUTO: 0.1 10E3/UL (ref 0–0.2)
BASOPHILS NFR BLD AUTO: 1 %
EOSINOPHIL # BLD AUTO: 0.1 10E3/UL (ref 0–0.7)
EOSINOPHIL NFR BLD AUTO: 2 %
ERYTHROCYTE [DISTWIDTH] IN BLOOD BY AUTOMATED COUNT: 12.8 % (ref 10–15)
ERYTHROCYTE [SEDIMENTATION RATE] IN BLOOD BY WESTERGREN METHOD: 7 MM/HR (ref 0–30)
HCT VFR BLD AUTO: 37.4 % (ref 35–47)
HGB BLD-MCNC: 12.4 G/DL (ref 11.7–15.7)
IMM GRANULOCYTES # BLD: 0 10E3/UL
IMM GRANULOCYTES NFR BLD: 0 %
LYMPHOCYTES # BLD AUTO: 2 10E3/UL (ref 0.8–5.3)
LYMPHOCYTES NFR BLD AUTO: 24 %
MCH RBC QN AUTO: 28.8 PG (ref 26.5–33)
MCHC RBC AUTO-ENTMCNC: 33.2 G/DL (ref 31.5–36.5)
MCV RBC AUTO: 87 FL (ref 78–100)
MONOCYTES # BLD AUTO: 0.7 10E3/UL (ref 0–1.3)
MONOCYTES NFR BLD AUTO: 8 %
NEUTROPHILS # BLD AUTO: 5.6 10E3/UL (ref 1.6–8.3)
NEUTROPHILS NFR BLD AUTO: 65 %
NRBC # BLD AUTO: 0 10E3/UL
NRBC BLD AUTO-RTO: 0 /100
PLATELET # BLD AUTO: 233 10E3/UL (ref 150–450)
RBC # BLD AUTO: 4.3 10E6/UL (ref 3.8–5.2)
WBC # BLD AUTO: 8.4 10E3/UL (ref 4–11)

## 2022-04-26 PROCEDURE — G0463 HOSPITAL OUTPT CLINIC VISIT: HCPCS

## 2022-04-26 PROCEDURE — 36415 COLL VENOUS BLD VENIPUNCTURE: CPT | Mod: ZL | Performed by: INTERNAL MEDICINE

## 2022-04-26 PROCEDURE — 85652 RBC SED RATE AUTOMATED: CPT | Mod: ZL | Performed by: INTERNAL MEDICINE

## 2022-04-26 PROCEDURE — 85025 COMPLETE CBC W/AUTO DIFF WBC: CPT | Mod: ZL | Performed by: INTERNAL MEDICINE

## 2022-04-26 PROCEDURE — 82785 ASSAY OF IGE: CPT | Mod: ZL | Performed by: INTERNAL MEDICINE

## 2022-04-26 RX ORDER — CYCLOSPORINE 0.5 MG/ML
EMULSION OPHTHALMIC
COMMUNITY
Start: 2022-02-21

## 2022-04-26 ASSESSMENT — PAIN SCALES - GENERAL: PAINLEVEL: MODERATE PAIN (5)

## 2022-04-26 NOTE — NURSING NOTE
Chief Complaint   Patient presents with     Consult     COPD/Asthma     Medication Reconciliation: complete    Mackenzie Max CMA ([facility])

## 2022-04-27 LAB — IGE SERPL-ACNC: 23 KU/L (ref 0–114)

## 2022-04-28 ASSESSMENT — PATIENT HEALTH QUESTIONNAIRE - PHQ9
SUM OF ALL RESPONSES TO PHQ QUESTIONS 1-9: 6
10. IF YOU CHECKED OFF ANY PROBLEMS, HOW DIFFICULT HAVE THESE PROBLEMS MADE IT FOR YOU TO DO YOUR WORK, TAKE CARE OF THINGS AT HOME, OR GET ALONG WITH OTHER PEOPLE: SOMEWHAT DIFFICULT
SUM OF ALL RESPONSES TO PHQ QUESTIONS 1-9: 6

## 2022-04-28 ASSESSMENT — ANXIETY QUESTIONNAIRES
4. TROUBLE RELAXING: NOT AT ALL
5. BEING SO RESTLESS THAT IT IS HARD TO SIT STILL: NOT AT ALL
GAD7 TOTAL SCORE: 2
GAD7 TOTAL SCORE: 2
7. FEELING AFRAID AS IF SOMETHING AWFUL MIGHT HAPPEN: NOT AT ALL
GAD7 TOTAL SCORE: 2
7. FEELING AFRAID AS IF SOMETHING AWFUL MIGHT HAPPEN: NOT AT ALL
3. WORRYING TOO MUCH ABOUT DIFFERENT THINGS: NOT AT ALL
2. NOT BEING ABLE TO STOP OR CONTROL WORRYING: NOT AT ALL
1. FEELING NERVOUS, ANXIOUS, OR ON EDGE: SEVERAL DAYS
6. BECOMING EASILY ANNOYED OR IRRITABLE: SEVERAL DAYS

## 2022-04-29 ENCOUNTER — OFFICE VISIT (OUTPATIENT)
Dept: FAMILY MEDICINE | Facility: OTHER | Age: 67
End: 2022-04-29
Attending: FAMILY MEDICINE
Payer: MEDICARE

## 2022-04-29 VITALS
RESPIRATION RATE: 20 BRPM | TEMPERATURE: 96.7 F | OXYGEN SATURATION: 95 % | WEIGHT: 181 LBS | BODY MASS INDEX: 29.44 KG/M2 | DIASTOLIC BLOOD PRESSURE: 78 MMHG | HEART RATE: 72 BPM | SYSTOLIC BLOOD PRESSURE: 134 MMHG

## 2022-04-29 DIAGNOSIS — Z79.899 CONTROLLED SUBSTANCE AGREEMENT SIGNED: ICD-10-CM

## 2022-04-29 DIAGNOSIS — F33.1 MODERATE EPISODE OF RECURRENT MAJOR DEPRESSIVE DISORDER (H): ICD-10-CM

## 2022-04-29 DIAGNOSIS — Z95.5 PRESENCE OF STENT IN CORONARY ARTERY IN PATIENT WITH CORONARY ARTERY DISEASE: ICD-10-CM

## 2022-04-29 DIAGNOSIS — G44.221 CHRONIC TENSION-TYPE HEADACHE, INTRACTABLE: ICD-10-CM

## 2022-04-29 DIAGNOSIS — Z23 NEED FOR COVID-19 VACCINE: ICD-10-CM

## 2022-04-29 DIAGNOSIS — J44.9 CHRONIC OBSTRUCTIVE PULMONARY DISEASE, UNSPECIFIED COPD TYPE (H): ICD-10-CM

## 2022-04-29 DIAGNOSIS — R07.89 CHEST WALL PAIN, CHRONIC: ICD-10-CM

## 2022-04-29 DIAGNOSIS — G89.29 CHEST WALL PAIN, CHRONIC: ICD-10-CM

## 2022-04-29 DIAGNOSIS — M54.50 CHRONIC BILATERAL LOW BACK PAIN WITHOUT SCIATICA: ICD-10-CM

## 2022-04-29 DIAGNOSIS — G89.4 CHRONIC PAIN DISORDER: Primary | ICD-10-CM

## 2022-04-29 DIAGNOSIS — M15.0 PRIMARY OSTEOARTHRITIS INVOLVING MULTIPLE JOINTS: ICD-10-CM

## 2022-04-29 DIAGNOSIS — Z23 NEED FOR VACCINATION FOR PNEUMOCOCCUS: ICD-10-CM

## 2022-04-29 DIAGNOSIS — I25.10 PRESENCE OF STENT IN CORONARY ARTERY IN PATIENT WITH CORONARY ARTERY DISEASE: ICD-10-CM

## 2022-04-29 DIAGNOSIS — I25.708 ATHEROSCLEROSIS OF CORONARY ARTERY BYPASS GRAFT OF NATIVE HEART WITH STABLE ANGINA PECTORIS (H): ICD-10-CM

## 2022-04-29 DIAGNOSIS — G89.29 CHRONIC BILATERAL LOW BACK PAIN WITHOUT SCIATICA: ICD-10-CM

## 2022-04-29 PROCEDURE — 90732 PPSV23 VACC 2 YRS+ SUBQ/IM: CPT

## 2022-04-29 PROCEDURE — 99214 OFFICE O/P EST MOD 30 MIN: CPT | Performed by: FAMILY MEDICINE

## 2022-04-29 PROCEDURE — 91305 COVID-19,PF,PFIZER (12+ YRS): CPT

## 2022-04-29 PROCEDURE — G0463 HOSPITAL OUTPT CLINIC VISIT: HCPCS | Mod: 25

## 2022-04-29 RX ORDER — HYDROCODONE BITARTRATE AND ACETAMINOPHEN 10; 325 MG/1; MG/1
1-2 TABLET ORAL EVERY 4 HOURS PRN
Qty: 180 TABLET | Refills: 0 | Status: SHIPPED | OUTPATIENT
Start: 2022-05-28 | End: 2022-06-17

## 2022-04-29 RX ORDER — GABAPENTIN 300 MG/1
CAPSULE ORAL
Qty: 270 CAPSULE | Refills: 3 | Status: SHIPPED | OUTPATIENT
Start: 2022-04-29 | End: 2023-03-06

## 2022-04-29 RX ORDER — ALBUTEROL SULFATE 90 UG/1
2 AEROSOL, METERED RESPIRATORY (INHALATION) EVERY 6 HOURS PRN
Qty: 18 G | Refills: 4 | Status: SHIPPED | OUTPATIENT
Start: 2022-04-29 | End: 2023-05-08

## 2022-04-29 RX ORDER — HYDROCODONE BITARTRATE AND ACETAMINOPHEN 10; 325 MG/1; MG/1
1-2 TABLET ORAL EVERY 4 HOURS PRN
Qty: 180 TABLET | Refills: 0 | Status: SHIPPED | OUTPATIENT
Start: 2022-04-29 | End: 2022-06-09

## 2022-04-29 RX ORDER — ROSUVASTATIN CALCIUM 20 MG/1
20 TABLET, COATED ORAL DAILY
Qty: 90 TABLET | Refills: 4 | Status: SHIPPED | OUTPATIENT
Start: 2022-04-29 | End: 2023-07-13

## 2022-04-29 RX ORDER — CLOPIDOGREL BISULFATE 75 MG/1
75 TABLET ORAL DAILY
Qty: 90 TABLET | Refills: 4 | Status: SHIPPED | OUTPATIENT
Start: 2022-04-29 | End: 2023-07-13

## 2022-04-29 ASSESSMENT — PATIENT HEALTH QUESTIONNAIRE - PHQ9
SUM OF ALL RESPONSES TO PHQ QUESTIONS 1-9: 6
10. IF YOU CHECKED OFF ANY PROBLEMS, HOW DIFFICULT HAVE THESE PROBLEMS MADE IT FOR YOU TO DO YOUR WORK, TAKE CARE OF THINGS AT HOME, OR GET ALONG WITH OTHER PEOPLE: SOMEWHAT DIFFICULT

## 2022-04-29 ASSESSMENT — ANXIETY QUESTIONNAIRES: GAD7 TOTAL SCORE: 2

## 2022-04-29 ASSESSMENT — PAIN SCALES - GENERAL: PAINLEVEL: MODERATE PAIN (5)

## 2022-04-29 NOTE — PROGRESS NOTES
Assessment & Plan       ICD-10-CM    1. Chronic pain disorder  G89.4 HYDROcodone-acetaminophen (NORCO)  MG per tablet     HYDROcodone-acetaminophen (NORCO)  MG per tablet   2. Chest wall pain, chronic  R07.89 HYDROcodone-acetaminophen (NORCO)  MG per tablet    G89.29 HYDROcodone-acetaminophen (NORCO)  MG per tablet   3. Chronic bilateral low back pain without sciatica  M54.50 HYDROcodone-acetaminophen (NORCO)  MG per tablet    G89.29 HYDROcodone-acetaminophen (NORCO)  MG per tablet   4. Controlled substance agreement signed  Z79.899 HYDROcodone-acetaminophen (NORCO)  MG per tablet     HYDROcodone-acetaminophen (NORCO)  MG per tablet   5. Chronic tension-type headache, intractable  G44.221 gabapentin (NEURONTIN) 300 MG capsule   6. Primary osteoarthritis involving multiple joints  M89.49 diclofenac (VOLTAREN) 1 % topical gel   7. Moderate episode of recurrent major depressive disorder (H)  F33.1 vortioxetine (TRINTELLIX) 20 MG tablet   8. Chronic obstructive pulmonary disease, unspecified COPD type (H)  J44.9 albuterol (PROAIR HFA/PROVENTIL HFA/VENTOLIN HFA) 108 (90 Base) MCG/ACT inhaler   9. Presence of stent in coronary artery in patient with coronary artery disease  I25.10 clopidogrel (PLAVIX) 75 MG tablet    Z95.5 rosuvastatin (CRESTOR) 20 MG tablet   10. 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 Need for vaccination for pneumococcus  Z23 GH IMM-  PNEUMOCOCCAL VACCINE,ADULT,SQ OR IM   12. Need for COVID-19 vaccine  Z23 COVID-19,PF,PFIZER (12+ Yrs GRAY LABEL)       Refilled hydrocodone same dosing, no changes. Going off opioids in the past caused decreased function in life. Provided 2 month supply.  PDMP Review       Value Time User    State PDMP site checked  Yes 4/29/2022 10:58 AM Ramirez Cano MD          Reminded of plan to reduce clonazepam for chronic  anxiety. She used to be on 4 mg daily, tapered down to 2.5 mg and then with life stressors increased back to 3 mg daily. Taper back down to 2.5 mg over next few months.     Refilled gabapentin, working to help headaches. No indication for updated imaging had brain MRI in Aug 2021.  Has an appointment with [facility] neurology in May.  She was going to cancel it, but we discussed newer medications for migraine such as gepants that could be considered.    Provided diclofenac gel for multiple arthritic joints.  Prior to this she had diclofenac drops as her preferred pharmaceutical alternative.    Refilled Trintellix for depression    Discontinued other inhalers with normal respiratory testing.  May use albuterol as needed.    Refilled clopidogrel and rosuvastatin.  Her CORTEZ could be due to chronic anginal symptoms.  She actually discontinued Ranexa months ago.  Cannot recall if this made a difference.    Provided pneumonia and COVID-19 vaccinations.    Follow up 2 months    31 minutes spent on the date of the encounter doing chart review, patient visit and documentation     Ramirez Cano MD     [facility]      Nursing Notes:   Taylor Hlil LPN  4/29/2022 10:50 AM  Signed  Patient presents to the clinic for controlled medication refill.    Last administration of [place] was today around 0700. Contract signed 9-, and TOX obtain 9-.   FOOD SECURITY SCREENING QUESTIONS:    The next two questions are to help us understand your food security.  If you are feeling you need any assistance in this area, we have resources available to support you today.    Hunger Vital Signs:  Within the past 12 months we worried whether our food would run out before we got money to buy more. Never  Within the past 12 months the food we bought just didn't last and we didn't have money to get more. Never    Advance Care Directive on file? yes  Advance Care Directive provided to patient? Declined.  Chief  "Complaint   Patient presents with     Recheck Medication       Initial /78 (BP Location: Left arm, Patient Position: Sitting, Cuff Size: Adult Large)   Pulse 72   Temp (!) 96.7  F (35.9  C) (Tympanic)   Resp 20   Wt 82.1 kg (181 lb)   LMP 04/29/1978 (Approximate)   SpO2 95%   BMI 29.44 kg/m   Estimated body mass index is 29.44 kg/m  as calculated from the following:    Height as of 2/25/22: 1.67 m (5' 5.75\").    Weight as of this encounter: 82.1 kg (181 lb).  Medication Reconciliation: complete        Taylor Hill LPN              SUBJECTIVE:  History of Present Illness       Mental Health Follow-up:                    Today's PHQ-9         PHQ-9 Total Score: 6  PHQ-9 Q9 Thoughts of better off dead/self-harm past 2 weeks :   (P) Not at all    How difficult have these problems made it for you to do your work, take care of things at home, or get along with other people: Somewhat difficult    Today's YAYA-7 Score: 2    Reason for visit:  Check up  Symptom onset:  More than a month  Symptom intensity:  Severe  Symptom progression:  Staying the same  Had these symptoms before:  Yes  Has tried/received treatment for these symptoms:  Yes  Previous treatment was successful:  Yes  Prior treatment description:  Medication  What makes it worse:  When I Havve Chest Pain  What makes it better:  My Medication    She eats 0-1 servings of fruits and vegetables daily.She consumes 0 sweetened beverage(s) daily.She exercises with enough effort to increase her heart rate 10 to 19 minutes per day.  She exercises with enough effort to increase her heart rate 3 or less days per week.   She is taking medications regularly.      Remains on chronic opioids for chest wall and lumbar pain.  In the past was on methadone, but has been weaned off.  Also using clonazepam for anxiety.  Is aware of risk for sedation and overdose with concurrent benzodiazepine and opioids.    Started gabapentin a few months ago with benefit on back " "pain and headaches.  Not having any side effects.  Previously it caused some cognitive impairment at higher doses.  She is scheduled to see neurology in May, but is thinking about not going since her headaches improved.    Follows with cardiology.  Had PFTs for CORTEZ.  Results were normal, so return for methacholine challenge.  Followed up with Dr. Whyte of pulmonology with approval that she can stop using inhalers as there is no evidence of benefit.  Stopped Ranexa months ago. \"I don't take them any more. Haven't for some time. They make me sick !!!!\"    Mood stable on Trintellix.    REVIEW OF SYSTEMS:    As above    Current Outpatient Medications   Medication Sig Dispense Refill     albuterol (PROAIR HFA/PROVENTIL HFA/VENTOLIN HFA) 108 (90 Base) MCG/ACT inhaler Inhale 2 puffs into the lungs every 6 hours 2 Inhaler 3     amLODIPine (NORVASC) 10 MG tablet TAKE 1 TABLET (10 MG) BY MOUTH DAILY DX. CODE: I10. 90 tablet 3     budesonide-formoterol (SYMBICORT) 160-4.5 MCG/ACT Inhaler Inhale 2 puffs into the lungs 2 times daily 6 g 11     busPIRone (BUSPAR) 15 MG tablet TAKE 1 TABLET BY MOUTH TWICE A  tablet 4     clonazePAM (KLONOPIN) 1 MG tablet Take 1 tablet (1 mg) by mouth 3 times daily as needed for anxiety Max 3 per day 270 tablet 0     clopidogrel (PLAVIX) 75 MG tablet Take 1 tablet (75 mg) by mouth daily 90 tablet 4     Diclofenac Sodium 1.5 % SOLN Apply 20 drops to each hand or 40 drops to each knee up to 4 times daily as needed for arthritis pain 450 mL 3     gabapentin (NEURONTIN) 300 MG capsule Take 1 capsule (300 mg) by mouth every morning AND 2 capsules (600 mg) every evening. 90 capsule 3     HYDROcodone-acetaminophen (NORCO)  MG per tablet Take 1-2 tablets by mouth every 4 hours as needed for severe pain 6 per day 180 tablet 0     HYDROcodone-acetaminophen (NORCO)  MG per tablet Take 1-2 tablets by mouth every 4 hours as needed for severe pain 6 per day 180 tablet 0     lisinopril " (ZESTRIL) 20 MG tablet Take 1 tablet (20 mg) by mouth daily 90 tablet 3     naloxone (NARCAN) 4 MG/0.1ML nasal spray Spray into one nostril for opioid reversal if unresponsive. May repeat every 2-3 minutes until patient responsive or EMS arrives 1 each 1     nitroGLYcerin (NITROLINGUAL) 0.4 MG/SPRAY spray For chest pain spray 1 spray under tongue every 5 minutes for 3 doses. If symptoms persist 5 minutes after 1st dose call 911. 4.9 g 3     ondansetron (ZOFRAN) 4 MG tablet Take 1 tablet (4 mg) by mouth every 6 hours as needed for nausea 90 tablet 1     pantoprazole (PROTONIX) 40 MG EC tablet Take 1 tablet (40 mg) by mouth daily 90 tablet 3     ranolazine (RANEXA) 500 MG 12 hr tablet TAKE 1 TABLET BY MOUTH TWICE A  tablet 3     RESTASIS 0.05 % ophthalmic emulsion INSTILL 1 DROP INTO BOTH EYES TWICE A DAY       rivaroxaban ANTICOAGULANT (XARELTO) 20 MG TABS tablet Take 1 tablet (20 mg) by mouth daily (with dinner) 90 tablet 3     rosuvastatin (CRESTOR) 20 MG tablet Take 20 mg by mouth daily       sucralfate (CARAFATE) 1 GM tablet Take 1 tablet (1 g) by mouth 4 times daily as needed for nausea (or dark stools) 360 tablet 1     VITAMIN D, CHOLECALCIFEROL, PO Take 5,000 Units by mouth daily       vortioxetine (TRINTELLIX) 20 MG tablet Take 1 tablet (20 mg) by mouth daily 90 tablet 4     Allergies   Allergen Reactions     Atorvastatin Muscle Pain (Myalgia)     Tiotropium Bromide [Tiotropium] Rash     Advil [Ibuprofen] Other (See Comments)     Does not recall but feel like it interacted with blood thinners and HX of GI BLEED     Ezetimibe Muscle Pain (Myalgia)     Latex Rash     Niacin      Other reaction(s): Flushing     No Clinical Screening - See Comments Itching, Rash and Blisters     Metals and plastics       Tape [Adhesive Tape] Rash       OBJECTIVE:  /78 (BP Location: Left arm, Patient Position: Sitting, Cuff Size: Adult Large)   Pulse 72   Temp (!) 96.7  F (35.9  C) (Tympanic)   Resp 20   Wt 82.1  kg (181 lb)   LMP 04/29/1978 (Approximate)   SpO2 95%   BMI 29.44 kg/m      EXAM:  General Appearance: Alert. No acute distress  Chest/Respiratory Exam: Clear to auscultation bilaterally  Cardiovascular Exam: Regular rate and rhythm. S1, S2, no murmur, gallop, or rubs.  Extremities: 2+ pedal pulses.  No lower extremity edema.  Psychiatric: Normal affect and mentation

## 2022-04-29 NOTE — PROGRESS NOTES
History of Present Illness    This is a [facility] Pulmonary Medicine outreach note.  The patient is a 67-year-old female who presents as a referral from Dr. Cano and Dr. Gramajo for COPD.    She is coming in as requested.    She has shortness of breath with exertion. She feels her heart racing during these times. She denies any associated coughing or wheezing. She has chest pain sometimes associated with the shortness of breath.  She has had syncopal episodes in association with making the bed.  Her symptoms are not consistent, but more sporadic.  She can walk on a flat surface for long distances.  She has some difficulty with hills.  She can keep up with other people her age.    She is currently on Symbicort 2 puffs twice daily which she does not use as prescribed. She has albuterol to use as needed.    She is also having ongoing muscle pains, dizziness, nausea since last 3 weeks.    She denies any respiratory problems growing up. No seasonal allergies in the past.    Past medical history is significant for coronary disease including acute myocardial infarction and PTA and stenting.  She reports a 3 vessel bypass grafting as well as 17 stents.  It also includes peptic ulcer, chronic pain, hyperlipidemia, depression, pulmonary embolus.    She started smoking at the age of 10 years and quit at 62 years. She smoked a pack on average during this time.    She denies any family history of lung problems.    She has a cat at home and she is not allergic to cats.      Previous tests are reviewed and includes:    4/12/2020 CTA of the chest revealed that the contrast opacification of the pulmonary arterial tree is adequate. No sign of pulmonary embolism. Atherosclerosis with proximal subclavian artery stent. Coronary atherosclerosis with left main and left anterior descending stents. No pericardial effusion. Thoracic aorta normal in caliber. Lungs and pleural: No pleural effusion or pneumothorax. Biapical pleural  parenchymal scarring. Calcified granuloma right upper lobe. Indeterminate 1 millimeter right lower lobe subpleural pulmonary nodule (5, 105). Scattered areas of discoid atelectasis. Lymph nodes/mediastinum: No mediastinal, hilar, or axillary adenopathy. Chest wall: No masses.    4/12/2020 CBC showed a WBC count of 5.5, hemoglobin of 11.4 and an absolute eosinophil count of 170.     3/3/2022 spirometry which was normal.  FVC was 2.96 L or 97% predicted, FEV1/FVC was 82%, FEV1 was 2.42 L or 103% predicted.  There were no changes with bronchodilators.  FEF 25-75% was 122% predicted.  The flow volume loop was normal appearing.    12/8/2021 pulmonary function testing showed reduced DLCO but was otherwise normal.  FVC was 3.02 L or 90% predicted, FEV1/FVC was 82%, FEV1 was 2.48 L or 104% predicted.  No post bronchodilator testing was performed.  FEF 25-75% was 132% predicted.  Lung volumes showed a TLC of 5.58 L or 109% predicted, RV was 2.44 L or 118% predicted.  DLCO was 61% predicted.  The flow volume loop was normal appearing.  Motion and thickness were normal.  Pulmonary systolic pressure is reported as normal.  RVSP was measured at 17.4 mmHg plus right atrial pressure.  No significant valvular abnormalities were reported.    9/15/2021 nuclear medicine stress test was negative for inducible myocardial ischemia.    1/2/2022 chest x-ray reports no acute cardiopulmonary disease.  The chest x-ray is personally reviewed and I agree with that assessment.    11/23/2021 V/Q scan showed no evidence of pulmonary emboli.    3/16/2021 CT chest is personally reviewed.  Reactive appearing mediastinal lymph nodes are seen.  Pulmonary artery is mildly enlarged.  Lung windows show subtle areas of dependent atelectasis, small sub 5 mm right upper lobe pulmonary nodule.  Radiology interpretation measures it at 3 mm and reports that is unchanged going back to 5/13/2019.  No pulmonary emboli were reported.    1/2/2022 CBC showed  hemoglobin 11.9, WBC 8.2, 200 absolute eosinophils.                              Home Medications     - Last Reconciled 04/29/22 by Gretel Whyte MD    albuterol sulfate 90 mcg/actuation aerosol inhaler (Ventolin HFA)  90 mcg inhalation PRN PRN  amlodipine 10 mg tablet  10 mg PO DAILY  aspirin 81 mg tablet,delayed release (Aspir-)  81 mg PO DAILY  azithromycin 250 mg tablet  250 mg PO DAILY 5 days  buspirone 10 mg tablet  10 mg PO DAILY  cholecalciferol (vitamin D3) 25 mcg (1,000 unit) capsule  1,000 units PO DAILY  clonazepam 1 mg tablet  1 mg PO BID  clopidogrel 75 mg tablet  75 mg PO DAILY  duloxetine 60 mg capsule,delayed release  60 mg PO DAILY  gabapentin 600 mg tablet  600 mg PO TID  lisinopril 40 mg tablet  40 mg PO DAILY  metoprolol tartrate 50 mg tablet  50 mg PO BID  naloxone 4 mg/actuation nasal spray (Narcan)  4 mg intranasal PRN PRN  nitroglycerin 0.3 mg sublingual tablet  Q5 minutes PRN chest pain  omeprazole 20 mg capsule,delayed release  20 mg PO BID  ondansetron 4 mg disintegrating tablet  4 mg PO Q6H PRN  oxycodone-acetaminophen 5 mg-325 mg tablet   2 tabs PO QID  pravastatin 40 mg tablet  40 mg PO DAILY  pregabalin 50 mg capsule  50 mg PO TID  ranolazine 500 mg tablet,extended release,12 hr  500 mg PO BID  rivaroxaban 20 mg tablet  20 mg PO DAILY  rosuvastatin 20 mg tablet (Crestor)  20 mg PO DAILY  Saccharomyces boulardii 250 mg capsule  250 mg PO BID  sucralfate 1 gram tablet  1 g PO QID PRN  vortioxetine 20 mg tablet (Trintellix)  20 mg PO DAILY    Allergies    atorvastatin Allergy (Intermediate, Verified 04/28/22 11:31)  legs get weak  latex Allergy (Intermediate, Verified 04/28/22 11:31)  Rash and itching  ezetimibe Allergy (Unknown, Verified 04/28/22 11:31)  Leg weakness  tiotropium Allergy (Unknown, Verified 04/28/22 11:31)  Can't remember  niacin Adverse Reaction (Unknown, Verified 04/28/22 11:31)  Flushing  Tape Allergy (Intermediate, Uncoded 04/28/22 11:31)  rash and itching  metals  Adverse Reaction (Unknown, Uncoded 04/28/22 11:31)  Rash    PFSH  PFSH:     Medical History (Updated 04/26/22 @ 10:21 by Gretel Whyte MD)    COPD (chronic obstructive pulmonary disease)  Diabetes  S/p bare metal coronary artery stent  Sleep apnea     Social History  Smoking Status:  Former smoker  second hand exposure:  No  housing:  house       ROS  Constitutional:   Reports chills and fatigue;  Denies daytime sleepiness, difficulty sleeping, fever(s), headache(s), night sweats, snoring, weight gain or weight loss   Eyes:   Denies change in vision   ENT:   Reports headache(s);  Denies vertigo, hearing problems or snoring   Cardiovascular:   Reports chest pain and lightheadedness;  Denies dyspnea   Respiratory:   Reports dyspnea;  Denies cough, hemoptysis, excessive phlegm production, snoring or wheezing   Gastrointestinal:   Reports constipation and nausea;  Denies diarrhea or vomiting   Urinary:   Denies dysuria   Musculoskeletal:   Reports myalgias, arthralgias, muscle weakness, stiffness and swelling   Integumentary:   Denies skin change, change in hair, change in nails or rash   Neurological:   Reports lightheadedness and headache(s); Denies vertigo, memory loss or seizures   Psychiatric:   Reports anxiety and memory loss;  Denies depression   Endocrine:   Denies fatigue or thyroid disease   Allergic/Immunologic:   Reports dyspnea;  Denies wheezing, neck lymphadenopathy or rash     Vital Signs      04/28/22  11:16  Weight    79.379 kg  Weight (lb)    175 lbs and  0.0  ozs  BP    154/84 H  Blood Pressure Location    Right Arm  Position    Sitting  Respiration    18  Pulse Rate    78  Pulse Oximetry (%)    98  Oxygen Delivery Method    room air  Pain Scale (0-10)    5  Pain Location    chest    Exam  Physical Exam:   GENERAL APPEARANCE: Alert an oriented. Well appearing.     HEAD: normocephalic, atraumatic.    EYES: Pupils equal, sclera clear.     FACE: Facial muscles symmetric.            THROAT:  she wore  mask during the visit.           NECK: Supple without adenopathy.            LUNGS: Clear to auscultation and percussion bilaterally. No wheezes, rhonchi, or crackles noted.            EXTREMITIES: No cyanosis, clubbing, or edema.            CARDIOVASCULAR: Regular rate and rhythm. Normal S1 and S2. No murmurs, gallops, or rubs.            LYMPH NODES: No cervical or supraclavicular adenopathy.            SKIN: Warm and dry. No rashes.            NEUROLOGICAL: Moves all extremities and can ambulate without difficulty.      A&P  Assessment & Plan  (1) Dyspnea on exertion:        Status: Acute        Code(s):  R06.00 - Dyspnea, unspecified  Dyspnea on exertion.  I suspect underlying coronary disease as the cause of her dyspnea with exertion.  However, she has had a recent normal nuclear medicine stress test.  Although she has a significant tobacco history, her pulmonary function testing has shown no evidence of airflow obstruction and her chest CT shows no significant centrilobular emphysematous changes.  Although she may have underlying asthma, she does report a negative methacholine challenge in the past.  I cannot find that test.  She has no atopic symptoms.     Underlying pulmonary vascular disease certainly is possible.  She has had a PE in the past as well as heart disease.  However, a recent echocardiogram showed normal PA pressures.  However, if there is no evidence of underlying lung disease and if Dr. Gramajo is sure that there is no underlying cardiac disease, then a right heart catheterization could be performed.  Alternatively, a cardiopulmonary exercise test could be considered.    At this point, I will rule out underlying pulmonary disease.  I will obtain a methacholine challenge to rule out airway hyperreactivity.  I will also obtain lab work to looking at asthma related inflammation.  I will obtain a CBC with differential, ESR, and IgE.  If those are negative, then I will refer back to Dr. Gramajo as the  dyspnea is likely cardiac related.  (2) Former smoker:        Status: Acute        Code(s):  Z87.891 - Personal history of nicotine dependence  (3) Coronary disease:        Status: Chronic        Code(s):  I25.10 - Atherosclerotic heart disease of native coronary artery without angina pectoris  Additional Plan Details  Plan Details:  Scribed for Dr. Whyte by Mechelle on 4/26/2022. I, Dr. Whyte, have personally viewed and agreed to the content.  Departure  Follow Up:      After tests (Virtual)

## 2022-04-29 NOTE — NURSING NOTE
"Patient presents to the clinic for controlled medication refill.    Last administration of [place] was today around 0700. Contract signed 9-, and TOX obtain 9-.   FOOD SECURITY SCREENING QUESTIONS:    The next two questions are to help us understand your food security.  If you are feeling you need any assistance in this area, we have resources available to support you today.    Hunger Vital Signs:  Within the past 12 months we worried whether our food would run out before we got money to buy more. Never  Within the past 12 months the food we bought just didn't last and we didn't have money to get more. Never    Advance Care Directive on file? yes  Advance Care Directive provided to patient? Declined.  Chief Complaint   Patient presents with     Recheck Medication       Initial /78 (BP Location: Left arm, Patient Position: Sitting, Cuff Size: Adult Large)   Pulse 72   Temp (!) 96.7  F (35.9  C) (Tympanic)   Resp 20   Wt 82.1 kg (181 lb)   LMP 04/29/1978 (Approximate)   SpO2 95%   BMI 29.44 kg/m   Estimated body mass index is 29.44 kg/m  as calculated from the following:    Height as of 2/25/22: 1.67 m (5' 5.75\").    Weight as of this encounter: 82.1 kg (181 lb).  Medication Reconciliation: complete        Taylor Hill LPN       "

## 2022-04-29 NOTE — PATIENT INSTRUCTIONS
Refilled medications    Shingrix for shingles should not give you shingles. Need to get this from a pharmacy    Consider seeing neurology as there are new headache options compared to a few years ago  Follow-up with Dr Gramajo

## 2022-05-05 ENCOUNTER — APPOINTMENT (OUTPATIENT)
Dept: GENERAL RADIOLOGY | Facility: OTHER | Age: 67
End: 2022-05-05
Attending: FAMILY MEDICINE
Payer: MEDICARE

## 2022-05-05 ENCOUNTER — HOSPITAL ENCOUNTER (EMERGENCY)
Facility: OTHER | Age: 67
Discharge: HOME | End: 2022-05-05
Attending: FAMILY MEDICINE | Admitting: FAMILY MEDICINE
Payer: MEDICARE

## 2022-05-05 VITALS
RESPIRATION RATE: 13 BRPM | SYSTOLIC BLOOD PRESSURE: 133 MMHG | BODY MASS INDEX: 29.27 KG/M2 | HEART RATE: 51 BPM | TEMPERATURE: 97.8 F | WEIGHT: 180 LBS | OXYGEN SATURATION: 98 % | DIASTOLIC BLOOD PRESSURE: 72 MMHG

## 2022-05-05 DIAGNOSIS — R07.89 ATYPICAL CHEST PAIN: ICD-10-CM

## 2022-05-05 LAB
ANION GAP SERPL CALCULATED.3IONS-SCNC: 8 MMOL/L (ref 3–14)
ATRIAL RATE - MUSE: 56 BPM
BASOPHILS # BLD AUTO: 0 10E3/UL (ref 0–0.2)
BASOPHILS NFR BLD AUTO: 1 %
BUN SERPL-MCNC: 12 MG/DL (ref 7–25)
CALCIUM SERPL-MCNC: 9.3 MG/DL (ref 8.6–10.3)
CHLORIDE BLD-SCNC: 107 MMOL/L (ref 98–107)
CO2 SERPL-SCNC: 26 MMOL/L (ref 21–31)
CREAT SERPL-MCNC: 0.84 MG/DL (ref 0.6–1.2)
DIASTOLIC BLOOD PRESSURE - MUSE: NORMAL MMHG
EOSINOPHIL # BLD AUTO: 0.2 10E3/UL (ref 0–0.7)
EOSINOPHIL NFR BLD AUTO: 4 %
ERYTHROCYTE [DISTWIDTH] IN BLOOD BY AUTOMATED COUNT: 13.1 % (ref 10–15)
GFR SERPL CREATININE-BSD FRML MDRD: 75 ML/MIN/1.73M2
GLUCOSE BLD-MCNC: 99 MG/DL (ref 70–105)
HCT VFR BLD AUTO: 33.6 % (ref 35–47)
HGB BLD-MCNC: 11 G/DL (ref 11.7–15.7)
IMM GRANULOCYTES # BLD: 0 10E3/UL
IMM GRANULOCYTES NFR BLD: 0 %
INTERPRETATION ECG - MUSE: NORMAL
LYMPHOCYTES # BLD AUTO: 1.5 10E3/UL (ref 0.8–5.3)
LYMPHOCYTES NFR BLD AUTO: 25 %
MCH RBC QN AUTO: 28.1 PG (ref 26.5–33)
MCHC RBC AUTO-ENTMCNC: 32.7 G/DL (ref 31.5–36.5)
MCV RBC AUTO: 86 FL (ref 78–100)
MONOCYTES # BLD AUTO: 0.5 10E3/UL (ref 0–1.3)
MONOCYTES NFR BLD AUTO: 9 %
NEUTROPHILS # BLD AUTO: 3.7 10E3/UL (ref 1.6–8.3)
NEUTROPHILS NFR BLD AUTO: 61 %
NRBC # BLD AUTO: 0 10E3/UL
NRBC BLD AUTO-RTO: 0 /100
P AXIS - MUSE: 50 DEGREES
PLATELET # BLD AUTO: 205 10E3/UL (ref 150–450)
POTASSIUM BLD-SCNC: 3.9 MMOL/L (ref 3.5–5.1)
PR INTERVAL - MUSE: 164 MS
QRS DURATION - MUSE: 84 MS
QT - MUSE: 446 MS
QTC - MUSE: 430 MS
R AXIS - MUSE: 22 DEGREES
RBC # BLD AUTO: 3.91 10E6/UL (ref 3.8–5.2)
SODIUM SERPL-SCNC: 141 MMOL/L (ref 134–144)
SYSTOLIC BLOOD PRESSURE - MUSE: NORMAL MMHG
T AXIS - MUSE: 65 DEGREES
TROPONIN I SERPL-MCNC: 2.9 PG/ML (ref 0–34)
VENTRICULAR RATE- MUSE: 56 BPM
WBC # BLD AUTO: 6 10E3/UL (ref 4–11)

## 2022-05-05 PROCEDURE — 99283 EMERGENCY DEPT VISIT LOW MDM: CPT | Performed by: FAMILY MEDICINE

## 2022-05-05 PROCEDURE — 84484 ASSAY OF TROPONIN QUANT: CPT | Performed by: FAMILY MEDICINE

## 2022-05-05 PROCEDURE — 250N000013 HC RX MED GY IP 250 OP 250 PS 637: Performed by: FAMILY MEDICINE

## 2022-05-05 PROCEDURE — 93005 ELECTROCARDIOGRAM TRACING: CPT | Performed by: FAMILY MEDICINE

## 2022-05-05 PROCEDURE — 93010 ELECTROCARDIOGRAM REPORT: CPT | Performed by: INTERNAL MEDICINE

## 2022-05-05 PROCEDURE — 71045 X-RAY EXAM CHEST 1 VIEW: CPT

## 2022-05-05 PROCEDURE — 99285 EMERGENCY DEPT VISIT HI MDM: CPT | Mod: 25 | Performed by: FAMILY MEDICINE

## 2022-05-05 PROCEDURE — 85025 COMPLETE CBC W/AUTO DIFF WBC: CPT | Performed by: FAMILY MEDICINE

## 2022-05-05 PROCEDURE — 82310 ASSAY OF CALCIUM: CPT | Performed by: FAMILY MEDICINE

## 2022-05-05 PROCEDURE — 36415 COLL VENOUS BLD VENIPUNCTURE: CPT | Performed by: FAMILY MEDICINE

## 2022-05-05 RX ORDER — HYDROCODONE BITARTRATE AND ACETAMINOPHEN 5; 325 MG/1; MG/1
1 TABLET ORAL ONCE
Status: COMPLETED | OUTPATIENT
Start: 2022-05-05 | End: 2022-05-05

## 2022-05-05 RX ADMIN — HYDROCODONE BITARTRATE AND ACETAMINOPHEN 1 TABLET: 5; 325 TABLET ORAL at 09:33

## 2022-05-05 NOTE — ED PROVIDER NOTES
History     Chief Complaint   Patient presents with     Arm Pain     Chest Pain     Shortness of Breath     The history is provided by the patient, the spouse and medical records.     Ankita Day is a 67 year old female who came in concerned about a bruise and tenderness on the right forearm. She noticed this recently and her  wanted her to be seen. She has had PE in the past, but not while on blood thinners.  Once she was here in the ED she told staff that she had chest pain over the past month.     She has an extensive history of heart disease including 8 heart attacks, 17 stents, a 3V CABG (treated at [facility], now wants her cardiac care at [facility] in [place]- on amlodipine, lisinopril), history of PE (was not on blood thinners, now on Plavix, Xarelto), sleep apnea, COPD (noncompliant with CPAP, on albuterol), mental health issues including anxiety, major depression, panic attacks (on Buspar, clonazepam PRN, Trintellix), gastric ulcer and reflux (on Protonix), stage 3 CKD, chronic pain (on Neurontin, Vicodin) and takes Crestor.    Allergies:  Allergies   Allergen Reactions     Atorvastatin Muscle Pain (Myalgia)     Tiotropium Bromide [Tiotropium] Rash     Advil [Ibuprofen] Other (See Comments)     Does not recall but feel like it interacted with blood thinners and HX of GI BLEED     Ezetimibe Muscle Pain (Myalgia)     Latex Rash     Niacin      Other reaction(s): Flushing     No Clinical Screening - See Comments Itching, Rash and Blisters     Metals and plastics       Tape [Adhesive Tape] Rash       Problem List:    Patient Active Problem List    Diagnosis Date Noted     History of CVA-mild chronic ischemic disease on 8/20/2021. 01/11/2022     Priority: Medium     ASCVD (arteriosclerotic cardiovascular disease) 01/11/2022     Priority: Medium     Nausea 01/11/2022     Priority: Medium     Nonintractable episodic headache, unspecified headache type 01/11/2022     Priority: Medium     Gastroesophageal  reflux disease with esophagitis without hemorrhage 01/11/2022     Priority: Medium     CORTEZ (dyspnea on exertion) 11/16/2021     Priority: Medium     Palpitations 11/16/2021     Priority: Medium     Chest pain, unspecified type 11/16/2021     Priority: Medium     Uncomplicated asthma, unspecified asthma severity, unspecified whether persistent 11/16/2021     Priority: Medium     Vitamin B12 deficiency (non anemic) 11/16/2021     Priority: Medium     Symptomatic bradycardia 07/16/2021     Priority: Medium     Chronic stable angina (H) 02/05/2020     Priority: Medium     Chronic ischemic heart disease 02/05/2020     Priority: Medium     History of pulmonary embolism on 1/2/2020. (-) VQ scan on 11/23/2021 01/02/2020     Priority: Medium     Stage 3a chronic kidney disease (H) 06/19/2019     Priority: Medium     Chronic anxiety 01/22/2018     Priority: Medium     Collagenous colitis 01/22/2018     Priority: Medium     Overview:   Possible Dx 2007       Major depressive disorder, recurrent episode, severe (H) 01/22/2018     Priority: Medium     Essential hypertension 01/22/2018     Priority: Medium     Mixed hyperlipidemia 01/22/2018     Priority: Medium     Osteoarthritis 01/22/2018     Priority: Medium     Panic attack 01/22/2018     Priority: Medium     Status [facility] 09/15/2017     Priority: Medium     History of tobacco abuse-quitting 8/23/2017 08/10/2017     Priority: Medium     Presence of stent in coronary artery in patient with coronary artery disease 08/10/2017     Priority: Medium     History of coronary artery bypass graft x 3 on 2/12/2003 08/10/2017     Priority: Medium     Noncompliance with CPAP treatment 10/21/2016     Priority: Medium     Intractable chronic common migraine without aura 10/21/2016     Priority: Medium     H/O multiple concussions 10/21/2016     Priority: Medium     History of Clostridium difficile 08/19/2016     Priority: Medium      Iron deficiency anemia 07/26/2016     Priority: Medium     Controlled substance agreement updated 9- 01/28/2014     Priority: Medium     Overview:        Pain medication agreement 01/28/2014     Priority: Medium     Formatting of this note might be different from the original.       Central sleep apnea 10/14/2013     Priority: Medium     Myofascial pain 10/14/2013     Priority: Medium     Vitamin D deficiency 05/06/2013     Priority: Medium     Subclavian artery stenosis, left (H) 03/31/2013     Priority: Medium     Overview:   S/p prox left SCA stent 4/1/2013       Lumbar facet arthropathy 01/02/2013     Priority: Medium     Nonspecific abnormal results of pulmonary system function study 12/21/2011     Priority: Medium     Slow transit constipation 11/29/2011     Priority: Medium     Gastric ulcer with hemorrhage 10/03/2011     Priority: Medium     Family history of malignant neoplasm of gastrointestinal tract 09/26/2011     Priority: Medium     Nodular degeneration of cornea 09/26/2011     Priority: Medium     Bilateral low back pain without sciatica 05/31/2011     Priority: Medium     Chronic pain disorder 12/01/2010     Priority: Medium     COPD (chronic obstructive pulmonary disease) (H) 06/15/2007     Priority: Medium     Overview:   Low DLCO, normal spirometry on 12/15/11       Peptic ulcer 06/15/2007     Priority: Medium     Atherosclerosis of coronary artery bypass graft of native heart with stable angina pectoris (H) 09/12/2002     Priority: Medium     Overview:   Multiple Angigrams prior to 2007. Also:  6/5/2007: Angiogram: TAXUS DELONTE to ostial circumflux, instent restenosis  5/23/2008: Left Main 30% diseased, LAD stents patent, Left circ stent patent, Chronic occluded right internal mammary artery graft to distal RCA, native RCA has 30% stenosis; NO intevention  9/19/2012 CT Angiogram: Patent LIMA to LAD, patent LAD stents, moderate proximal circumflex disease, patent RCA , occluded right  internal mammary artery graft to distal RCA.  9/20/2012: Angiogram: Stent to Left Main, ostial LAD, and PTCA of ostial left circumflex          Past Medical History:    Past Medical History:   Diagnosis Date     Acute ischemic heart disease (H)      Acute myocardial infarction (H)      Anxiety disorder      Atherosclerotic heart disease of native coronary artery without angina pectoris      Bilateral carpal tunnel syndrome      Cervicalgia      Chest pain      Chronic gastric ulcer without hemorrhage or perforation      Chronic ischemic heart disease      Chronic obstructive pulmonary disease (H)      Chronic or unspecified gastric ulcer with hemorrhage      Chronic pain syndrome      Constipation      Coronary angioplasty status      Coronary angioplasty status      Coronary angioplasty status      Dorsalgia      Encounter for other administrative examinations      Encounter for screening for cardiovascular disorders      Enterocolitis due to Clostridium difficile      Essential (primary) hypertension      Hyperlipidemia      Major depressive disorder, single episode      Migraine without status migrainosus, not intractable      Nodular corneal degeneration      Noninfective gastroenteritis and colitis      Noninfective gastroenteritis and colitis      Osteoarthritis      Other chest pain      Pain in knee      Pain in right shoulder      Panic disorder without agoraphobia      Peptic ulcer without hemorrhage or perforation      Peripheral vascular disease (H)      Personal history of diseases of the blood and blood-forming organs and certain disorders involving the immune mechanism (CODE)      Personal history of nicotine dependence      Personal history of other medical treatment (CODE)      Presence of aortocoronary bypass graft      Primary central sleep apnea      Sepsis due to Escherichia coli (E. coli) (H)      Stricture of artery (H)      Thoracic, thoracolumbar and lumbosacral intervertebral disc disorder       Uncomplicated opioid abuse (H)      Vitamin D deficiency        Past Surgical History:    Past Surgical History:   Procedure Laterality Date     ANGIOPLASTY      9/12/02,with triple stenting     APPENDECTOMY OPEN      No Comments Provided     ARTHROSCOPY KNEE      left     ARTHROSCOPY SHOULDER Right 05/12/2017    labral tear, rotator cuff tear and some subacromial decompression      BYPASS GRAFT ARTERY CORONARY      12/13/02,Triple bypass, left internal mammary  to LAD, right internal mammary to right coronary artery, saphenous to obtuse marginal of the left circumflex.     COLONOSCOPY      2011,Dr Bowman benign polyps     COLONOSCOPY  10/03/2011    2011,benign polyps, Dr. Bowman     COLONOSCOPY  08/08/2016 8/8/16,normal, Dr Bowman     ELBOW SURGERY      baby,birth malachi removed from right arm     EMBOLECTOMY UPPER EXTREMITY  04/02/2013    brachial artery pseudoaneurysm after stenting     ESOPHAGOSCOPY, GASTROSCOPY, DUODENOSCOPY (EGD), COMBINED      2011,EGD Dr Bowman with pyloric ulcer     ESOPHAGOSCOPY, GASTROSCOPY, DUODENOSCOPY (EGD), COMBINED      2011,pyloric ulcer, Dr. Bowman     ESOPHAGOSCOPY, GASTROSCOPY, DUODENOSCOPY (EGD), COMBINED      8/8/16,mild gastritis, Dr Bowman     ESOPHAGOSCOPY, GASTROSCOPY, DUODENOSCOPY (EGD), COMBINED      11/27/2017,Dr Bowman. Antral ulcer     ESOPHAGOSCOPY, GASTROSCOPY, DUODENOSCOPY (EGD), COMBINED  02/02/2018    Dr Bowman, healed ulcer     HYSTERECTOMY TOTAL ABDOMINAL      age 21     LAPAROSCOPIC CHOLECYSTECTOMY      2006     OSTEOTOMY FEMUR DISTAL      x3, right knee     OSTEOTOMY FEMUR DISTAL      2000,left knee  ligament surgery     OTHER SURGICAL HISTORY      1/10/2003,,PTCA     OTHER SURGICAL HISTORY      09/20/2012,,PTCA,DELONTE in LAD and left main     OTHER SURGICAL HISTORY      4/1/2013,,PTCA,L subclavian stenosis     SALPINGO-OOPHORECTOMY BILATERAL      age 27,Bilateral salpingo-oophorectomy     TONSILLECTOMY, ADENOIDECTOMY, COMBINED      childhood        Family History:    Family History   Problem Relation Age of Onset     Heart Disease Father         Heart Disease,Heart condition/Significant for atherosclerotic cardiovascular disease, but non premature.     Colon Cancer Father         Cancer-colon, of colon cancer     Cancer Father         Cancer,mets to liver, secondary to colon cancer     Heart Disease Mother         Heart Disease     Cancer Other         Cancer,Multiple Myeloma     Heart Disease Other         Heart Disease,Ischemic Heart Disease     Colon Cancer Other         Cancer-colon,Malignant neoplasms     Cancer Sister         Cancer,multiple myeloma     Other - See Comments Son         gallstones       Social History:  Marital Status:   [2]  Social History     Tobacco Use     Smoking status: Former Smoker     Packs/day: 1.00     Years: 35.00     Pack years: 35.00     Types: Cigarettes     Quit date: 2/15/2017     Years since quittin.2     Smokeless tobacco: Never Used   Vaping Use     Vaping Use: Never used   Substance Use Topics     Alcohol use: Not Currently     Alcohol/week: 0.0 standard drinks     Drug use: No        Medications:    albuterol (PROAIR HFA/PROVENTIL HFA/VENTOLIN HFA) 108 (90 Base) MCG/ACT inhaler  amLODIPine (NORVASC) 10 MG tablet  busPIRone (BUSPAR) 15 MG tablet  clonazePAM (KLONOPIN) 1 MG tablet  clopidogrel (PLAVIX) 75 MG tablet  diclofenac (VOLTAREN) 1 % topical gel  gabapentin (NEURONTIN) 300 MG capsule  [START ON 2022] HYDROcodone-acetaminophen (NORCO)  MG per tablet  ondansetron (ZOFRAN) 4 MG tablet  pantoprazole (PROTONIX) 40 MG EC tablet  rivaroxaban ANTICOAGULANT (XARELTO) 20 MG TABS tablet  rosuvastatin (CRESTOR) 20 MG tablet  vortioxetine (TRINTELLIX) 20 MG tablet  HYDROcodone-acetaminophen (NORCO)  MG per tablet  lisinopril (ZESTRIL) 20 MG tablet  naloxone (NARCAN) 4 MG/0.1ML nasal spray  nitroGLYcerin (NITROLINGUAL) 0.4 MG/SPRAY spray  RESTASIS 0.05 % ophthalmic emulsion  sucralfate  (CARAFATE) 1 GM tablet  VITAMIN D, CHOLECALCIFEROL, PO        Review of Systems   Cardiovascular: Positive for chest pain.   Skin:        Right forearm bruising   All other systems reviewed and are negative.      Physical Exam   BP: (!) 150/70  Pulse: 73  Temp: 97.8  F (36.6  C)  Resp: 18  Weight: 81.6 kg (180 lb)  SpO2: 96 %      Physical Exam  Vitals and nursing note reviewed.   Constitutional:       General: She is not in acute distress.     Appearance: She is well-developed. She is not ill-appearing.   Cardiovascular:      Rate and Rhythm: Regular rhythm. Bradycardia present.      Pulses:           Radial pulses are 2+ on the right side and 2+ on the left side.      Heart sounds: Normal heart sounds. Heart sounds not distant. No murmur heard.  Pulmonary:      Effort: Pulmonary effort is normal.      Breath sounds: Normal breath sounds.   Chest:      Chest wall: No mass, deformity or tenderness.   Abdominal:      General: Bowel sounds are normal.      Palpations: Abdomen is soft.      Tenderness: There is no abdominal tenderness.   Musculoskeletal:      Right lower leg: No tenderness. No edema.      Left lower leg: No tenderness. No edema.   Skin:     General: Skin is warm and dry.      Findings: No erythema or rash.      Nails: There is no clubbing.      Comments: She has an area of bruising on the right forearm   Neurological:      General: No focal deficit present.      Mental Status: She is alert and oriented to person, place, and time.   Psychiatric:         Mood and Affect: Mood normal.         Behavior: Behavior normal.            EKG Interpretation:      Interpreted by Nilesh Ray MD  Time reviewed: 9:15 AM   Symptoms at time of EKG: chest pain   Rhythm: sinus fatoumata  Rate: 56 bpm  Axis: normal  Ectopy: none  Conduction: normal  ST Segments/ T Waves: No ST-T wave changes  Q Waves: none  Comparison to prior: Unchanged    Clinical Impression: sinus bradycardia      Results for orders placed or  performed during the hospital encounter of 05/05/22 (from the past 24 hour(s))   EKG 12 lead   Result Value Ref Range    Systolic Blood Pressure  mmHg    Diastolic Blood Pressure  mmHg    Ventricular Rate 56 BPM    Atrial Rate 56 BPM    NE Interval 164 ms    QRS Duration 84 ms     ms    QTc 430 ms    P Axis 50 degrees    R AXIS 22 degrees    T Axis 65 degrees    Interpretation ECG       Sinus bradycardia  Otherwise normal ECG  When compared with ECG of 02-JAN-2022 09:57,  T wave inversion no longer evident in Anterior leads  Confirmed by MD ANDERSON MARC ([id number]) on 5/5/2022 10:03:16 AM     CBC with platelets differential    Narrative    The following orders were created for panel order CBC with platelets differential.  Procedure                               Abnormality         Status                     ---------                               -----------         ------                     CBC with platelets and d...[[id number]]  Abnormal            Final result                 Please view results for these tests on the individual orders.   Basic metabolic panel   Result Value Ref Range    Sodium 141 134 - 144 mmol/L    Potassium 3.9 3.5 - 5.1 mmol/L    Chloride 107 98 - 107 mmol/L    Carbon Dioxide (CO2) 26 21 - 31 mmol/L    Anion Gap 8 3 - 14 mmol/L    Urea Nitrogen 12 7 - 25 mg/dL    Creatinine 0.84 0.60 - 1.20 mg/dL    Calcium 9.3 8.6 - 10.3 mg/dL    Glucose 99 70 - 105 mg/dL    GFR Estimate 75 >60 mL/min/1.73m2   Troponin I   Result Value Ref Range    Troponin I 2.9 0.0 - 34.0 pg/mL   CBC with platelets and differential   Result Value Ref Range    WBC Count 6.0 4.0 - 11.0 10e3/uL    RBC Count 3.91 3.80 - 5.20 10e6/uL    Hemoglobin 11.0 (L) 11.7 - 15.7 g/dL    Hematocrit 33.6 (L) 35.0 - 47.0 %    MCV 86 78 - 100 fL    MCH 28.1 26.5 - 33.0 pg    MCHC 32.7 31.5 - 36.5 g/dL    RDW 13.1 10.0 - 15.0 %    Platelet Count 205 150 - 450 10e3/uL    % Neutrophils 61 %    % Lymphocytes 25 %    % Monocytes 9 %    %  Eosinophils 4 %    % Basophils 1 %    % Immature Granulocytes 0 %    NRBCs per 100 WBC 0 <1 /100    Absolute Neutrophils 3.7 1.6 - 8.3 10e3/uL    Absolute Lymphocytes 1.5 0.8 - 5.3 10e3/uL    Absolute Monocytes 0.5 0.0 - 1.3 10e3/uL    Absolute Eosinophils 0.2 0.0 - 0.7 10e3/uL    Absolute Basophils 0.0 0.0 - 0.2 10e3/uL    Absolute Immature Granulocytes 0.0 <=0.4 10e3/uL    Absolute NRBCs 0.0 10e3/uL   XR Chest Port 1 View    Narrative    PROCEDURE:  XR CHEST PORT 1 VIEW    HISTORY: chest pain. .    COMPARISON:  1/2/2022    FINDINGS:    The cardiomediastinal contours are stable.  No focal consolidation, effusion or pneumothorax.      Impression    IMPRESSION:  Stable chest.      CARRIE OLVERA MD         SYSTEM ID:  [id number]     *Note: Due to a large number of results and/or encounters for the requested time period, some results have not been displayed. A complete set of results can be found in Results Review.       Medications   HYDROcodone-acetaminophen (NORCO) 5-325 MG per tablet 1 tablet (1 tablet Oral Given 5/5/22 5252)       Assessments & Plan (with Medical Decision Making)  Ankita Day is a 67 year old female who came in concerned about a bruise and tenderness on the right forearm. She noticed this recently and her  wanted her to be seen. She has had PE in the past, but not while on blood thinners.  Once she was here in the ED she told staff that she had chest pain over the past month.  She has an extensive history of heart disease including 8 heart attacks, 17 stents, a 3V CABG (treated at [facility], now wants her cardiac care at [facility] in [place]- on amlodipine, lisinopril), history of PE (was not on blood thinners, now on Plavix, Xarelto), sleep apnea, COPD (noncompliant with CPAP, on albuterol), mental health issues including anxiety, major depression, panic attacks (on Buspar, clonazepam PRN, Trintellix), gastric ulcer and reflux (on Protonix), stage 3 CKD, chronic pain (on Neurontin,  Vicodin) and takes Crestor.  VS in the ED /72   Pulse 51   Temp 97.8  F (36.6  C)   Resp 13   Wt 81.6 kg (180 lb)   LMP 04/29/1978 (Approximate)   SpO2 98%   BMI 29.27 kg/m    Exam shows normal heart and lung sounds.  EKG shows sinus bradycardia with rate 56 bpm.  Labs show CBC with hgb 11.0, BMP normal, troponin normal.  Chest xray stable. She continues to feel well with normal labs and normal VS so we will get her home.      I have reviewed the nursing notes.    I have reviewed the findings, diagnosis, plan and need for follow up with the patient.    Final diagnoses:   Atypical chest pain       5/5/2022   [facility] AND [facility], Nilesh Garcia MD  05/05/22 1112

## 2022-05-05 NOTE — ED TRIAGE NOTES
Patient's initial complaint was a bruise on the right forearm after some discussion she reports new onset SOB and chest pain on and off over the last month. However she did state the chest pain is worse today and she can't stand it. She does report an extensive heart history, is on 3 blood thinners. Chest pain 5/10 middle back, jaw, and chest.      Triage Assessment     Row Name 05/05/22 0843       Triage Assessment (Adult)    Airway WDL WDL       Respiratory WDL    Respiratory WDL X;rhythm/pattern    Rhythm/Pattern, Respiratory shortness of breath       Skin Circulation/Temperature WDL    Skin Circulation/Temperature WDL X  echymosis        Cardiac WDL    Cardiac WDL X;chest pain       Chest Pain Assessment    Chest Pain Location epigastric;upper back, left    Character pressure

## 2022-05-12 NOTE — TELEPHONE ENCOUNTER
"[id number] [employer] [facility] [place] sent Rx request for the following:      Requested Prescriptions   Pending Prescriptions Disp Refills     omeprazole (PRILOSEC) 20 MG DR capsule 180 capsule 3     Sig: Take 1 capsule (20 mg) by mouth 2 times daily       PPI Protocol Failed - 12/20/2021 10:39 AM        Failed - Not on Clopidogrel (unless Pantoprazole ordered)        Passed - No diagnosis of osteoporosis on record        Passed - Recent (12 mo) or future (30 days) visit within the authorizing provider's specialty     Patient has had an office visit with the authorizing provider or a provider within the authorizing providers department within the previous 12 mos or has a future within next 30 days. See \"Patient Info\" tab in inbasket, or \"Choose Columns\" in Meds & Orders section of the refill encounter.              Passed - Medication is active on med list        Passed - Patient is age 18 or older        Passed - No active pregnacy on record        Passed - No positive pregnancy test in past 12 months             Last Prescription Date:   10/9/2020  Last Fill Qty/Refills:         180, R-3    Last Office Visit:              12/1/2021  [facility]  Future Office visit:           None noted  Routing refill request to provider for review/approval because:  Fails protocol Unable to complete prescription refill per RN Medication Refill Policy.................... Nisha Banerjee RN ....................  12/21/2021   2:02 PM          " THANK YOU FROM YOUR CARE TEAM    Our staff would like to THANK YOU for choosing [facility]. Our goal is to always provide you with the best of care and we continue to look for better ways to improve the services we provide.     You may receive a survey in the mail with questions specific to your encounter with our clinic. Should you receive a survey, please take a few minutes to rate your experience.   Our providers and staff value your feedback.  Thank you in advance for your time and participation.     We appreciate the opportunity to partner with you to meet your health care needs. THANK YOU, again, for choosing us to be your care team.     Medical Assistant: Susan  Provider: Sarwat Dennis MD  Care Coordinator:  CORIE Reyes    [facility]  [address], [address]  [place] [address]  Phone:  [phone]  Fax:  [phone]    Kathleen John, Manager Clinic operations  [email or web address]     [phone]                                            ...        Megha Lubin    DURING YOUR APPOINTMENT TODAY    Please review the following instructions carefully for the next steps in your care.           MEDICATIONS    REFILLS:  Please allow 3 business days for processing of all requests.    Lyrica (Pregabalin)   Lyrica is an anti-seizure medication which has been found to reduce pain in patients with post-shingles pain, post-amputation pain, facial nerve pain, and possibly other pain from nerve injury.    Possible side effects include but are not limited to: sleepiness, weight gain, dizziness, unsteadiness, clumsiness, tremor, increase the risk of suicidal thoughts or behaviors, and/or double vision.    Try to wean off the Lyrica  If you need to remain on the medication, please see your primary doctor for refills.           Narcotic     Tramadol   Refills from Primary care doctor    · Do not drive while using this mediation.    · Be aware that  this medication can be addictive, use only as needed.  · This medication can cause constipation.  · Drink more fluids and increase fruits and vegetables in your diet.              Steroids and COVID Vaccine Information  This information may or not pertain to your visit today    If you need to take an oral steroid, schedule a steroid injection and are getting the COVID vaccine:    • It is unclear if there is any impact with steroid use and the effectiveness of the COVID vaccine  • Based on that, our suggestion is to space out the vaccine and the steroids    • Pfizer and Moderna Vaccine (2 doses):  Allow 2 weeks before and 1 week after the COVID vaccine before having any steroid    • Bao & [employer]ao (1 dose):  Allow 2 weeks before and 2 weeks after the COVID vaccine before having any steroid          Patient Responsibility - Insurance Disclaimer  Insurance Disclaimer: A quote of benefits and/or authorization does not guarantee payment or verify eligibility. Payment of benefits are subject to all terms, conditions, limitations, and exclusions of the member's contract at time of service.   Insurance Liability for Payment: Your health insurance company will only pay for services that it determines to be “reasonable and necessary.” Every effort will be made by this office to have all services and procedures preauthorized by your health insurance company, when applicable. If your health insurance company determines that a service is not reasonable and necessary, or that a service is not covered under the plan, your insurer will deny payment for that service. We suggest to all patients that they contact their insurance to confirm that these services are covered.    Billing Concerns:  For assistance with medical billing and any financial inquiries.  please reach out to our Patient Contact Center, [phone]

## 2022-05-18 DIAGNOSIS — R11.0 NAUSEA: ICD-10-CM

## 2022-05-18 RX ORDER — ONDANSETRON 4 MG/1
TABLET, FILM COATED ORAL
Qty: 90 TABLET | Refills: 1 | Status: SHIPPED | OUTPATIENT
Start: 2022-05-18 | End: 2022-10-05

## 2022-05-18 NOTE — TELEPHONE ENCOUNTER
"Requested Prescriptions   Pending Prescriptions Disp Refills     ondansetron (ZOFRAN) 4 MG tablet [Pharmacy Med Name: ONDANSETRON HCL 4 MG TABLET] 90 tablet 1     Sig: TAKE 1 TABLET BY MOUTH EVERY 6 HOURS AS NEEDED FOR NAUSEA        Antivertigo/Antiemetic Agents Passed - 5/18/2022  2:12 PM        Passed - Recent (12 mo) or future (30 days) visit within the authorizing provider's specialty     Patient has had an office visit with the authorizing provider or a provider within the authorizing providers department within the previous 12 mos or has a future within next 30 days. See \"Patient Info\" tab in inbasket, or \"Choose Columns\" in Meds & Orders section of the refill encounter.              Passed - Medication is active on med list        Passed - Patient is 18 years of age or older           Last Written Prescription Date:  1/11/22  Last Fill Quantity: 90,   # refills: 1  Last Office Visit: 1/11/22  Future Office visit:    Next 5 appointments (look out 90 days)    May 26, 2022  3:15 PM  Return Visit with Paul Graamjo DO  [facility] [address]  [place] [address]  [phone]   Jun 29, 2022  9:20 AM  SHORT with Ramirez Cano MD  [facility] ([facility] ) [address]  [place] [address]  [phone]     Jamilah Ray RN ....................  5/18/2022   3:45 PM   "

## 2022-06-08 ENCOUNTER — TELEPHONE (OUTPATIENT)
Dept: FAMILY MEDICINE | Facility: OTHER | Age: 67
End: 2022-06-08
Payer: MEDICARE

## 2022-06-08 DIAGNOSIS — Z79.899 CONTROLLED SUBSTANCE AGREEMENT SIGNED: ICD-10-CM

## 2022-06-08 DIAGNOSIS — G89.29 CHEST WALL PAIN, CHRONIC: ICD-10-CM

## 2022-06-08 DIAGNOSIS — R07.89 CHEST WALL PAIN, CHRONIC: ICD-10-CM

## 2022-06-08 DIAGNOSIS — G89.29 CHRONIC BILATERAL LOW BACK PAIN WITHOUT SCIATICA: ICD-10-CM

## 2022-06-08 DIAGNOSIS — M54.50 CHRONIC BILATERAL LOW BACK PAIN WITHOUT SCIATICA: ICD-10-CM

## 2022-06-08 DIAGNOSIS — G89.4 CHRONIC PAIN DISORDER: ICD-10-CM

## 2022-06-08 NOTE — TELEPHONE ENCOUNTER
[facility] filled and should had been 180 and they need you to send a script from 16th-29th as will be out on 16th and has appt on the 29th   Call patient either way by Friday

## 2022-06-08 NOTE — TELEPHONE ENCOUNTER
After verifying patients and name and date of birth I did ask patient which medication she was talking.  She is talking her hydrocodone,    Sebastien Garcia .......  6/8/2022  4:35 PM

## 2022-06-09 ENCOUNTER — TRANSFERRED RECORDS (OUTPATIENT)
Dept: HEALTH INFORMATION MANAGEMENT | Facility: OTHER | Age: 67
End: 2022-06-09
Payer: MEDICARE

## 2022-06-09 RX ORDER — HYDROCODONE BITARTRATE AND ACETAMINOPHEN 10; 325 MG/1; MG/1
1-2 TABLET ORAL EVERY 4 HOURS PRN
Qty: 68 TABLET | Refills: 0 | Status: SHIPPED | OUTPATIENT
Start: 2022-06-16 | End: 2022-06-13

## 2022-06-09 NOTE — TELEPHONE ENCOUNTER
Due for 56 pills of hydrocodone on June 16  30 days will be June 27, she is coming June 29 so I sent in 68 pills to get her until the appointment    PDMP Review       Value Time User    State PDMP site checked  Yes 6/9/2022 10:16 AM Ramirez Cano MD

## 2022-06-13 DIAGNOSIS — Z79.899 CONTROLLED SUBSTANCE AGREEMENT SIGNED: ICD-10-CM

## 2022-06-13 DIAGNOSIS — F41.9 CHRONIC ANXIETY: ICD-10-CM

## 2022-06-13 RX ORDER — HYDROCODONE BITARTRATE AND ACETAMINOPHEN 10; 325 MG/1; MG/1
1-2 TABLET ORAL EVERY 4 HOURS PRN
Qty: 68 TABLET | Refills: 0 | Status: SHIPPED | OUTPATIENT
Start: 2022-06-15 | End: 2022-06-29

## 2022-06-13 NOTE — TELEPHONE ENCOUNTER
Pharmacy confirms they have received the newest prescription.      Taylor Hill LPN 6/13/2022 10:33 AM

## 2022-06-13 NOTE — TELEPHONE ENCOUNTER
She requested to  on Y[address] 15 since she will be in town. OK, sent in new prescription. Please make sure pharmacy is aware. Thanks

## 2022-06-14 NOTE — TELEPHONE ENCOUNTER
[facility] for the following:   clonazePAM (KLONOPIN) 1 MG tablet  SigTAKE 1 TABLET (1 MG) BY MOUTH 3 TIMES DAILY AS NEEDED FOR ANXIETY MAX 3 PER DAY    Last Prescription Date:   03/04/2022  Last Fill Qty/Refills:         270, R-0    Last Office Visit:              04/29/2022 ([facility])   Future Office visit:           06/29/2022 ([facility])    Routing refill request to provider for review/approval because:  Drug not on the [facility], [facility] or [facility] refill protocol or controlled substance    Unable to complete prescription refill per RN Medication Refill Policy.................... Tiffany Beebe RN ....................  6/14/2022   3:50 PM

## 2022-06-15 ENCOUNTER — TELEPHONE (OUTPATIENT)
Dept: FAMILY MEDICINE | Facility: OTHER | Age: 67
End: 2022-06-15
Payer: MEDICARE

## 2022-06-15 DIAGNOSIS — G89.4 CHRONIC PAIN DISORDER: ICD-10-CM

## 2022-06-15 DIAGNOSIS — G89.29 CHRONIC BILATERAL LOW BACK PAIN WITHOUT SCIATICA: ICD-10-CM

## 2022-06-15 DIAGNOSIS — M54.50 CHRONIC BILATERAL LOW BACK PAIN WITHOUT SCIATICA: ICD-10-CM

## 2022-06-15 DIAGNOSIS — G89.29 CHEST WALL PAIN, CHRONIC: ICD-10-CM

## 2022-06-15 DIAGNOSIS — R07.89 CHEST WALL PAIN, CHRONIC: ICD-10-CM

## 2022-06-15 DIAGNOSIS — Z79.899 CONTROLLED SUBSTANCE AGREEMENT SIGNED: ICD-10-CM

## 2022-06-15 RX ORDER — CLONAZEPAM 1 MG/1
1 TABLET ORAL 3 TIMES DAILY PRN
Qty: 270 TABLET | Refills: 0 | Status: SHIPPED | OUTPATIENT
Start: 2022-06-15 | End: 2022-09-07

## 2022-06-15 NOTE — TELEPHONE ENCOUNTER
They received two  RXs for hydrocodone with different dates. They need to know which date and to let you know that they had to cut down the quantity per insurance.  Please call      So Dobbs on 6/15/2022 at 9:59 AM

## 2022-06-15 NOTE — TELEPHONE ENCOUNTER
Patient reports that they can only fill 56 tabs from the 6-, prescription.  This is 10 days worth of medications.  They have the other prescription for June 16th that wasn't cancelled yet.  Would they be able to get a new fill date on that one until next appointment?    Taylor Hill LPN 6/15/2022 12:25 PM

## 2022-06-16 ENCOUNTER — TELEPHONE (OUTPATIENT)
Dept: FAMILY MEDICINE | Facility: OTHER | Age: 67
End: 2022-06-16
Payer: MEDICARE

## 2022-06-17 RX ORDER — HYDROCODONE BITARTRATE AND ACETAMINOPHEN 10; 325 MG/1; MG/1
1-2 TABLET ORAL EVERY 4 HOURS PRN
Qty: 24 TABLET | Refills: 0 | Status: SHIPPED | OUTPATIENT
Start: 2022-06-17 | End: 2022-06-29

## 2022-06-17 NOTE — TELEPHONE ENCOUNTER
Pharmacy did not fill the prescription from the 15th, only filled the one for the 16th.  New prescription will be needed for the short supply until upcoming appointment on 6-.    Taylor Hill LPN 6/17/2022 9:42 AM

## 2022-06-22 DIAGNOSIS — I25.9 CHRONIC ISCHEMIC HEART DISEASE: ICD-10-CM

## 2022-06-22 DIAGNOSIS — I10 ESSENTIAL HYPERTENSION: ICD-10-CM

## 2022-06-22 DIAGNOSIS — R06.09 DOE (DYSPNEA ON EXERTION): ICD-10-CM

## 2022-06-22 RX ORDER — LISINOPRIL 20 MG/1
TABLET ORAL
Qty: 45 TABLET | Refills: 7 | OUTPATIENT
Start: 2022-06-22

## 2022-06-22 NOTE — TELEPHONE ENCOUNTER
lisinopril (ZESTRIL) 20 MG tablet 90 tablet 3 1/11/2022  No   Sig - Route: Take 1 tablet (20 mg) by mouth daily      [facility]  Should have refills    Jeimy Whyte RN on 6/22/2022 at 12:48 PM

## 2022-06-28 ENCOUNTER — MYC MEDICAL ADVICE (OUTPATIENT)
Dept: FAMILY MEDICINE | Facility: OTHER | Age: 67
End: 2022-06-28

## 2022-06-28 ASSESSMENT — ANXIETY QUESTIONNAIRES
8. IF YOU CHECKED OFF ANY PROBLEMS, HOW DIFFICULT HAVE THESE MADE IT FOR YOU TO DO YOUR WORK, TAKE CARE OF THINGS AT HOME, OR GET ALONG WITH OTHER PEOPLE?: VERY DIFFICULT
7. FEELING AFRAID AS IF SOMETHING AWFUL MIGHT HAPPEN: MORE THAN HALF THE DAYS
GAD7 TOTAL SCORE: 10
1. FEELING NERVOUS, ANXIOUS, OR ON EDGE: MORE THAN HALF THE DAYS
6. BECOMING EASILY ANNOYED OR IRRITABLE: SEVERAL DAYS
7. FEELING AFRAID AS IF SOMETHING AWFUL MIGHT HAPPEN: MORE THAN HALF THE DAYS
GAD7 TOTAL SCORE: 10
2. NOT BEING ABLE TO STOP OR CONTROL WORRYING: MORE THAN HALF THE DAYS
3. WORRYING TOO MUCH ABOUT DIFFERENT THINGS: NOT AT ALL
4. TROUBLE RELAXING: MORE THAN HALF THE DAYS
GAD7 TOTAL SCORE: 10
5. BEING SO RESTLESS THAT IT IS HARD TO SIT STILL: SEVERAL DAYS

## 2022-06-28 ASSESSMENT — PATIENT HEALTH QUESTIONNAIRE - PHQ9
10. IF YOU CHECKED OFF ANY PROBLEMS, HOW DIFFICULT HAVE THESE PROBLEMS MADE IT FOR YOU TO DO YOUR WORK, TAKE CARE OF THINGS AT HOME, OR GET ALONG WITH OTHER PEOPLE: SOMEWHAT DIFFICULT
SUM OF ALL RESPONSES TO PHQ QUESTIONS 1-9: 9
SUM OF ALL RESPONSES TO PHQ QUESTIONS 1-9: 9

## 2022-06-29 ENCOUNTER — OFFICE VISIT (OUTPATIENT)
Dept: FAMILY MEDICINE | Facility: OTHER | Age: 67
End: 2022-06-29
Attending: FAMILY MEDICINE
Payer: MEDICARE

## 2022-06-29 VITALS
TEMPERATURE: 97.1 F | BODY MASS INDEX: 28.9 KG/M2 | HEART RATE: 84 BPM | RESPIRATION RATE: 17 BRPM | OXYGEN SATURATION: 95 % | DIASTOLIC BLOOD PRESSURE: 72 MMHG | HEIGHT: 66 IN | SYSTOLIC BLOOD PRESSURE: 138 MMHG | WEIGHT: 179.8 LBS

## 2022-06-29 DIAGNOSIS — M54.50 CHRONIC BILATERAL LOW BACK PAIN WITHOUT SCIATICA: ICD-10-CM

## 2022-06-29 DIAGNOSIS — G89.29 CHEST WALL PAIN, CHRONIC: ICD-10-CM

## 2022-06-29 DIAGNOSIS — R06.09 DOE (DYSPNEA ON EXERTION): Primary | ICD-10-CM

## 2022-06-29 DIAGNOSIS — G89.29 CHRONIC BILATERAL LOW BACK PAIN WITHOUT SCIATICA: ICD-10-CM

## 2022-06-29 DIAGNOSIS — Z79.899 CONTROLLED SUBSTANCE AGREEMENT SIGNED: ICD-10-CM

## 2022-06-29 DIAGNOSIS — I25.9 CHRONIC ISCHEMIC HEART DISEASE: ICD-10-CM

## 2022-06-29 DIAGNOSIS — G43.719 INTRACTABLE CHRONIC COMMON MIGRAINE WITHOUT AURA: ICD-10-CM

## 2022-06-29 DIAGNOSIS — Z86.711 HISTORY OF PULMONARY EMBOLISM: ICD-10-CM

## 2022-06-29 DIAGNOSIS — Z95.5 PRESENCE OF STENT IN CORONARY ARTERY IN PATIENT WITH CORONARY ARTERY DISEASE: ICD-10-CM

## 2022-06-29 DIAGNOSIS — I25.10 PRESENCE OF STENT IN CORONARY ARTERY IN PATIENT WITH CORONARY ARTERY DISEASE: ICD-10-CM

## 2022-06-29 DIAGNOSIS — R07.89 CHEST WALL PAIN, CHRONIC: ICD-10-CM

## 2022-06-29 DIAGNOSIS — G89.4 CHRONIC PAIN DISORDER: ICD-10-CM

## 2022-06-29 PROCEDURE — 99214 OFFICE O/P EST MOD 30 MIN: CPT | Performed by: FAMILY MEDICINE

## 2022-06-29 PROCEDURE — G0463 HOSPITAL OUTPT CLINIC VISIT: HCPCS

## 2022-06-29 RX ORDER — HYDROCODONE BITARTRATE AND ACETAMINOPHEN 10; 325 MG/1; MG/1
1-2 TABLET ORAL EVERY 4 HOURS PRN
Qty: 180 TABLET | Refills: 0 | Status: SHIPPED | OUTPATIENT
Start: 2022-07-07 | End: 2022-07-14

## 2022-06-29 RX ORDER — RANOLAZINE 500 MG/1
500 TABLET, EXTENDED RELEASE ORAL 2 TIMES DAILY
Qty: 60 TABLET | Refills: 1 | Status: SHIPPED | OUTPATIENT
Start: 2022-06-29 | End: 2022-07-22

## 2022-06-29 ASSESSMENT — PATIENT HEALTH QUESTIONNAIRE - PHQ9
10. IF YOU CHECKED OFF ANY PROBLEMS, HOW DIFFICULT HAVE THESE PROBLEMS MADE IT FOR YOU TO DO YOUR WORK, TAKE CARE OF THINGS AT HOME, OR GET ALONG WITH OTHER PEOPLE: SOMEWHAT DIFFICULT
SUM OF ALL RESPONSES TO PHQ QUESTIONS 1-9: 9

## 2022-06-29 ASSESSMENT — PAIN SCALES - GENERAL: PAINLEVEL: MODERATE PAIN (5)

## 2022-06-29 ASSESSMENT — ANXIETY QUESTIONNAIRES: GAD7 TOTAL SCORE: 10

## 2022-06-29 NOTE — PROGRESS NOTES
Assessment & Plan       ICD-10-CM    1. CORTEZ (dyspnea on exertion)  R06.00 Adult Cardiology Eval  Referral     ranolazine (RANEXA) 500 MG 12 hr tablet   2. Chronic ischemic heart disease  I25.9 Adult Cardiology Eval  Referral     ranolazine (RANEXA) 500 MG 12 hr tablet   3. Presence of stent in coronary artery in patient with coronary artery disease  I25.10 Adult Cardiology Eval  Referral    Z95.5    4. History of pulmonary embolism  Z86.711    5. Controlled substance agreement signed  Z79.899 HYDROcodone-acetaminophen (NORCO)  MG per tablet   6. Chronic pain disorder  G89.4 HYDROcodone-acetaminophen (NORCO)  MG per tablet   7. Chest wall pain, chronic  R07.89 HYDROcodone-acetaminophen (NORCO)  MG per tablet    G89.29    8. Chronic bilateral low back pain without sciatica  M54.50 HYDROcodone-acetaminophen (NORCO)  MG per tablet    G89.29    9. Intractable chronic common migraine without aura  G43.719 Adult Neurology  Referral     Complex cardiac history with multiple stents, CABG also history of PE.  Continues to have CORTEZ. Diagnosed with chronic ischemia, was on ranolazine, but stopped due to nausea. We discussed her symptoms and how they are consistent with chronic ischemia, why ranolazine was picked. She is willing to try it again. If not able to tolerate, will need alternate options with cardiology. If she has heartburn or nausea, can try sucralfate to help.    Would like to see [facility] cardiology since she was transferred to [facility] in the past when in the ED for ischemia. Referral placed.    Refilled hydrocodone for 1 month same dosing, starting 7/7 no changes. Going off opioids in the past caused decreased function in life.  PDMP Review       Value Time User    State PDMP site checked  Yes 6/29/2022 10:09 AM Ramirez Cano MD         She is inquiring about use of gepants for migraine prevention. I have not used these medications. Also, Up to  "Date mentions concerns about potential cardiac issues with these medications. Referral to neurology to discuss options for migraine prevention.    32 minutes spent on the date of the encounter doing chart review, patient visit and documentation         Return in about 5 weeks (around 8/6/2022).    Ramirez Cano MD   [facility]     Subjective   Malina is a 67 year old accompanied by her spouse., presenting for the following health issues:  Recheck Medication      History of Present Illness       Back Pain:  She presents for follow up of back pain. Patient's back pain is a chronic problem.  Location of back pain:  Right lower back, left lower back, right middle of back, left middle of back, right upper back, left upper back, right shoulder, left shoulder, right hip and left hip  Description of back pain: cramping  Back pain spreads: left buttocks, left thigh and left knee    Since patient first noticed back pain, pain is: gradually worsening  Does back pain interfere with her job:  Yes      Headaches:   Since the patient's last clinic visit, headaches are: no change  The patient is getting headaches:  Every day  She is not able to do normal daily activities when she has a migraine.  The patient is taking the following rescue/relief medications:  Other   Patient states \"I get some relief\" from the rescue/relief medications.   The patient is taking the following medications to prevent migraines:  Other  In the past 4 weeks, the patient has gone to an Urgent Care or Emergency Room 0 times times due to headaches.    Vascular Disease:  She presents for follow up of vascular disease.  She takes nitroglycerin occasionally. She is not taking daily aspirin.    She eats 2-3 servings of fruits and vegetables daily.She consumes 0 sweetened beverage(s) daily.She exercises with enough effort to increase her heart rate 9 or less minutes per day.  She exercises with enough effort to increase her heart rate 3 or " "less days per week.   She is taking medications regularly.    Today's PHQ-9         PHQ-9 Total Score: 9    PHQ-9 Q9 Thoughts of better off dead/self-harm past 2 weeks :   Not at all    How difficult have these problems made it for you to do your work, take care of things at home, or get along with other people: Somewhat difficult  Today's YAYA-7 Score: 10     Continues to have CORTEZ. Seeing pulmonology, felt to be cardiac in nature. Has not followed up with Dr Gramajo, appointment rescheduled. She is wondering about seeing [facility] cardiology.    Remains on chronic opioids for chest wall and lumbar pain.  In the past was on methadone, but has been weaned off.  Also using clonazepam for anxiety.  Is aware of risk for sedation and overdose with concurrent benzodiazepine and opioids.      Review of Systems   As above      Objective    /72   Pulse 84   Temp 97.1  F (36.2  C) (Tympanic)   Resp 17   Ht 1.664 m (5' 5.5\")   Wt 81.6 kg (179 lb 12.8 oz)   LMP 04/29/1978 (Approximate)   SpO2 95%   BMI 29.47 kg/m    Body mass index is 29.47 kg/m .  Physical Exam   General Appearance: Alert. No acute distress  Chest/Respiratory Exam: Clear to auscultation bilaterally  Cardiovascular Exam: Regular rate and rhythm. S1, S2, no murmur, gallop, or rubs.  Extremities: 2+ pedal pulses.  No lower extremity edema.  Psychiatric: Normal affect and mentation                        .  ..  "

## 2022-06-29 NOTE — NURSING NOTE
"Chief Complaint   Patient presents with     Recheck Medication       Initial /72   Pulse 84   Temp 97.1  F (36.2  C) (Tympanic)   Resp 17   Ht 1.664 m (5' 5.5\")   Wt 81.6 kg (179 lb 12.8 oz)   LMP 04/29/1978 (Approximate)   SpO2 95%   BMI 29.47 kg/m   Estimated body mass index is 29.47 kg/m  as calculated from the following:    Height as of this encounter: 1.664 m (5' 5.5\").    Weight as of this encounter: 81.6 kg (179 lb 12.8 oz).  Medication Reconciliation: complete    FOOD SECURITY SCREENING QUESTIONS  Hunger Vital Signs:  Within the past 12 months we worried whether our food would run out before we got money to buy more. Never  Within the past 12 months the food we bought just didn't last and we didn't have money to get more. Never  Inga Johnson LPN 6/29/2022 9:22 AM    Do you have a healthcare directive? Yes        "

## 2022-06-29 NOTE — PATIENT INSTRUCTIONS
See [facility] cardiology.   Restart Ranexa  If you have heartburn, OK to take the sucralfate    Referral to neurology to consider migraine prevention medications

## 2022-07-03 ENCOUNTER — APPOINTMENT (OUTPATIENT)
Dept: CT IMAGING | Facility: OTHER | Age: 67
End: 2022-07-03
Attending: FAMILY MEDICINE
Payer: MEDICARE

## 2022-07-03 ENCOUNTER — HOSPITAL ENCOUNTER (EMERGENCY)
Facility: OTHER | Age: 67
Discharge: HOME | End: 2022-07-03
Attending: FAMILY MEDICINE | Admitting: FAMILY MEDICINE
Payer: MEDICARE

## 2022-07-03 ENCOUNTER — APPOINTMENT (OUTPATIENT)
Dept: GENERAL RADIOLOGY | Facility: OTHER | Age: 67
End: 2022-07-03
Attending: FAMILY MEDICINE
Payer: MEDICARE

## 2022-07-03 VITALS
DIASTOLIC BLOOD PRESSURE: 77 MMHG | BODY MASS INDEX: 32.14 KG/M2 | OXYGEN SATURATION: 98 % | TEMPERATURE: 96.6 F | HEART RATE: 63 BPM | WEIGHT: 200 LBS | HEIGHT: 66 IN | SYSTOLIC BLOOD PRESSURE: 134 MMHG | RESPIRATION RATE: 18 BRPM

## 2022-07-03 DIAGNOSIS — R06.00 DYSPNEA, UNSPECIFIED TYPE: ICD-10-CM

## 2022-07-03 DIAGNOSIS — R07.89 ATYPICAL CHEST PAIN: ICD-10-CM

## 2022-07-03 LAB
ALBUMIN SERPL-MCNC: 4.4 G/DL (ref 3.5–5.7)
ALP SERPL-CCNC: 51 U/L (ref 34–104)
ALT SERPL W P-5'-P-CCNC: 10 U/L (ref 7–52)
ANION GAP SERPL CALCULATED.3IONS-SCNC: 11 MMOL/L (ref 3–14)
AST SERPL W P-5'-P-CCNC: 16 U/L (ref 13–39)
BASOPHILS # BLD AUTO: 0.1 10E3/UL (ref 0–0.2)
BASOPHILS NFR BLD AUTO: 1 %
BILIRUB SERPL-MCNC: 0.2 MG/DL (ref 0.3–1)
BUN SERPL-MCNC: 18 MG/DL (ref 7–25)
CALCIUM SERPL-MCNC: 9.4 MG/DL (ref 8.6–10.3)
CHLORIDE BLD-SCNC: 106 MMOL/L (ref 98–107)
CO2 SERPL-SCNC: 23 MMOL/L (ref 21–31)
CREAT SERPL-MCNC: 1.2 MG/DL (ref 0.6–1.2)
EOSINOPHIL # BLD AUTO: 0.3 10E3/UL (ref 0–0.7)
EOSINOPHIL NFR BLD AUTO: 3 %
ERYTHROCYTE [DISTWIDTH] IN BLOOD BY AUTOMATED COUNT: 14.3 % (ref 10–15)
GFR SERPL CREATININE-BSD FRML MDRD: 49 ML/MIN/1.73M2
GLUCOSE BLD-MCNC: 110 MG/DL (ref 70–105)
HCT VFR BLD AUTO: 34.4 % (ref 35–47)
HGB BLD-MCNC: 11.5 G/DL (ref 11.7–15.7)
IMM GRANULOCYTES # BLD: 0 10E3/UL
IMM GRANULOCYTES NFR BLD: 0 %
INR PPP: 1.27 (ref 0.85–1.15)
LYMPHOCYTES # BLD AUTO: 2.6 10E3/UL (ref 0.8–5.3)
LYMPHOCYTES NFR BLD AUTO: 31 %
MCH RBC QN AUTO: 28.1 PG (ref 26.5–33)
MCHC RBC AUTO-ENTMCNC: 33.4 G/DL (ref 31.5–36.5)
MCV RBC AUTO: 84 FL (ref 78–100)
MONOCYTES # BLD AUTO: 0.7 10E3/UL (ref 0–1.3)
MONOCYTES NFR BLD AUTO: 8 %
NEUTROPHILS # BLD AUTO: 4.9 10E3/UL (ref 1.6–8.3)
NEUTROPHILS NFR BLD AUTO: 57 %
NRBC # BLD AUTO: 0 10E3/UL
NRBC BLD AUTO-RTO: 0 /100
PLATELET # BLD AUTO: 291 10E3/UL (ref 150–450)
POTASSIUM BLD-SCNC: 3.6 MMOL/L (ref 3.5–5.1)
PROT SERPL-MCNC: 7 G/DL (ref 6.4–8.9)
RBC # BLD AUTO: 4.09 10E6/UL (ref 3.8–5.2)
SODIUM SERPL-SCNC: 140 MMOL/L (ref 134–144)
TROPONIN I SERPL-MCNC: 3.3 PG/ML (ref 0–34)
TROPONIN I SERPL-MCNC: 3.5 PG/ML (ref 0–34)
WBC # BLD AUTO: 8.7 10E3/UL (ref 4–11)

## 2022-07-03 PROCEDURE — 85610 PROTHROMBIN TIME: CPT | Performed by: FAMILY MEDICINE

## 2022-07-03 PROCEDURE — 80053 COMPREHEN METABOLIC PANEL: CPT | Performed by: FAMILY MEDICINE

## 2022-07-03 PROCEDURE — 99284 EMERGENCY DEPT VISIT MOD MDM: CPT | Performed by: STUDENT IN AN ORGANIZED HEALTH CARE EDUCATION/TRAINING PROGRAM

## 2022-07-03 PROCEDURE — 36415 COLL VENOUS BLD VENIPUNCTURE: CPT | Performed by: FAMILY MEDICINE

## 2022-07-03 PROCEDURE — 85025 COMPLETE CBC W/AUTO DIFF WBC: CPT | Performed by: FAMILY MEDICINE

## 2022-07-03 PROCEDURE — 250N000011 HC RX IP 250 OP 636: Performed by: FAMILY MEDICINE

## 2022-07-03 PROCEDURE — 84484 ASSAY OF TROPONIN QUANT: CPT | Performed by: FAMILY MEDICINE

## 2022-07-03 PROCEDURE — 250N000013 HC RX MED GY IP 250 OP 250 PS 637: Performed by: STUDENT IN AN ORGANIZED HEALTH CARE EDUCATION/TRAINING PROGRAM

## 2022-07-03 PROCEDURE — 71260 CT THORAX DX C+: CPT | Mod: MA

## 2022-07-03 PROCEDURE — 93010 ELECTROCARDIOGRAM REPORT: CPT | Performed by: INTERNAL MEDICINE

## 2022-07-03 PROCEDURE — 71045 X-RAY EXAM CHEST 1 VIEW: CPT | Mod: TC

## 2022-07-03 PROCEDURE — 93005 ELECTROCARDIOGRAM TRACING: CPT | Performed by: STUDENT IN AN ORGANIZED HEALTH CARE EDUCATION/TRAINING PROGRAM

## 2022-07-03 PROCEDURE — 99285 EMERGENCY DEPT VISIT HI MDM: CPT | Mod: 25 | Performed by: STUDENT IN AN ORGANIZED HEALTH CARE EDUCATION/TRAINING PROGRAM

## 2022-07-03 RX ORDER — HYDROCODONE BITARTRATE AND ACETAMINOPHEN 10; 325 MG/1; MG/1
1 TABLET ORAL ONCE
Status: COMPLETED | OUTPATIENT
Start: 2022-07-03 | End: 2022-07-03

## 2022-07-03 RX ORDER — IOPAMIDOL 755 MG/ML
70 INJECTION, SOLUTION INTRAVASCULAR ONCE
Status: COMPLETED | OUTPATIENT
Start: 2022-07-03 | End: 2022-07-03

## 2022-07-03 RX ORDER — ACETAMINOPHEN 325 MG/1
650 TABLET ORAL ONCE
Status: DISCONTINUED | OUTPATIENT
Start: 2022-07-03 | End: 2022-07-03 | Stop reason: HOSPADM

## 2022-07-03 RX ADMIN — HYDROCODONE BITARTRATE AND ACETAMINOPHEN 1 TABLET: 10; 325 TABLET ORAL at 20:38

## 2022-07-03 RX ADMIN — IOPAMIDOL 70 ML: 755 INJECTION, SOLUTION INTRAVENOUS at 19:20

## 2022-07-03 ASSESSMENT — ENCOUNTER SYMPTOMS
SHORTNESS OF BREATH: 1
CHEST TIGHTNESS: 1
ABDOMINAL PAIN: 0
FEVER: 0

## 2022-07-03 NOTE — ED NOTES
"Patient very upset and yelling at staff.  Told one nurse to \" shut the F%^& up\".  Nurse explained what and why we are doing the things we are doing.  Then patient states she did not like the Doctor and had a few choice words about the doctor.  "

## 2022-07-03 NOTE — ED TRIAGE NOTES
Patient here for chest pain and SOB that started at 1530 .  Patient did take ntg at home and was given 324mg of ASA  By ambulance.  Cardiac history includes 17 stents.     Triage Assessment     Row Name 07/03/22 6744       Triage Assessment (Adult)    Airway WDL X       Respiratory WDL    Respiratory WDL X       Skin Circulation/Temperature WDL    Skin Circulation/Temperature WDL WDL       Cardiac WDL    Cardiac WDL X       Peripheral/Neurovascular WDL    Peripheral Neurovascular WDL WDL       Cognitive/Neuro/Behavioral WDL    Cognitive/Neuro/Behavioral WDL WDL

## 2022-07-03 NOTE — ED TRIAGE NOTES
Triage Assessment     Row Name 07/03/22 0530       Triage Assessment (Adult)    Airway WDL X       Respiratory WDL    Respiratory WDL X       Skin Circulation/Temperature WDL    Skin Circulation/Temperature WDL WDL       Cardiac WDL    Cardiac WDL X       Peripheral/Neurovascular WDL    Peripheral Neurovascular WDL WDL       Cognitive/Neuro/Behavioral WDL    Cognitive/Neuro/Behavioral WDL WDL

## 2022-07-03 NOTE — ED NOTES
Patient crying, stating she is due for her hydrocodone and she is having an excruciating headache.  Demanding pain medications.   brought back from waiting room and is attempting to calm patient.

## 2022-07-03 NOTE — ED PROVIDER NOTES
History     Chief Complaint   Patient presents with     Chest Pain     Shortness of Breath     HPI  Ankita Day is a 67 year old female with history of coronary artery disease, anxiety, COPD, major depression, hypertension, gastric ulcer, hyperlipidemia, migraine, multiple stents, CKD, history of pulmonary embolism who presents the emergency department with chest pain and shortness of breath that began about 330 this afternoon.  Patient arrives via EMS she has already been given 325 mg of aspirin as well as nitro x2 at home.  Upon arrival to the emergency department she states mostly she feels shortness of breath, her chest pain is much better now down to 4-5 out of 10.    Reviewed nurses notes below, similar history is related to me.  Patient here for chest pain and SOB that started at 1530 .  Patient did take ntg at home and was given 324mg of ASA  By ambulance.  Cardiac history includes 17 stents.  Allergies:  Allergies   Allergen Reactions     Atorvastatin Muscle Pain (Myalgia)     Tiotropium Bromide [Tiotropium] Rash     Advil [Ibuprofen] Other (See Comments)     Does not recall but feel like it interacted with blood thinners and HX of GI BLEED     Ezetimibe Muscle Pain (Myalgia)     Latex Rash     Niacin      Other reaction(s): Flushing     No Clinical Screening - See Comments Itching, Rash and Blisters     Metals and plastics       Tape [Adhesive Tape] Rash       Problem List:    Patient Active Problem List    Diagnosis Date Noted     History of CVA-mild chronic ischemic disease on 8/20/2021. 01/11/2022     Priority: Medium     ASCVD (arteriosclerotic cardiovascular disease) 01/11/2022     Priority: Medium     Nausea 01/11/2022     Priority: Medium     Nonintractable episodic headache, unspecified headache type 01/11/2022     Priority: Medium     Gastroesophageal reflux disease with esophagitis without hemorrhage 01/11/2022     Priority: Medium     CORTEZ (dyspnea on exertion) 11/16/2021     Priority:  Medium     Palpitations 11/16/2021     Priority: Medium     Chest pain, unspecified type 11/16/2021     Priority: Medium     Uncomplicated asthma, unspecified asthma severity, unspecified whether persistent 11/16/2021     Priority: Medium     Vitamin B12 deficiency (non anemic) 11/16/2021     Priority: Medium     Symptomatic bradycardia 07/16/2021     Priority: Medium     Chronic stable angina (H) 02/05/2020     Priority: Medium     Chronic ischemic heart disease 02/05/2020     Priority: Medium     History of pulmonary embolism on 1/2/2020. (-) VQ scan on 11/23/2021 01/02/2020     Priority: Medium     Stage 3a chronic kidney disease (H) 06/19/2019     Priority: Medium     Chronic anxiety 01/22/2018     Priority: Medium     Collagenous colitis 01/22/2018     Priority: Medium     Overview:   Possible Dx 2007       Major depressive disorder, recurrent episode, severe (H) 01/22/2018     Priority: Medium     Essential hypertension 01/22/2018     Priority: Medium     Mixed hyperlipidemia 01/22/2018     Priority: Medium     Osteoarthritis 01/22/2018     Priority: Medium     Panic attack 01/22/2018     Priority: Medium     Status [facility] 09/15/2017     Priority: Medium     History of tobacco abuse-quitting 8/23/2017 08/10/2017     Priority: Medium     Presence of stent in coronary artery in patient with coronary artery disease 08/10/2017     Priority: Medium     History of coronary artery bypass graft x 3 on 2/12/2003 08/10/2017     Priority: Medium     Noncompliance with CPAP treatment 10/21/2016     Priority: Medium     Intractable chronic common migraine without aura 10/21/2016     Priority: Medium     H/O multiple concussions 10/21/2016     Priority: Medium     History of Clostridium difficile 08/19/2016     Priority: Medium     Iron deficiency anemia 07/26/2016     Priority: Medium     Controlled substance agreement updated 9- 01/28/2014     Priority:  Medium     Overview:        Pain medication agreement 01/28/2014     Priority: Medium     Formatting of this note might be different from the original.       Central sleep apnea 10/14/2013     Priority: Medium     Myofascial pain 10/14/2013     Priority: Medium     Vitamin D deficiency 05/06/2013     Priority: Medium     Subclavian artery stenosis, left (H) 03/31/2013     Priority: Medium     Overview:   S/p prox left SCA stent 4/1/2013       Lumbar facet arthropathy 01/02/2013     Priority: Medium     Nonspecific abnormal results of pulmonary system function study 12/21/2011     Priority: Medium     Slow transit constipation 11/29/2011     Priority: Medium     Gastric ulcer with hemorrhage 10/03/2011     Priority: Medium     Family history of malignant neoplasm of gastrointestinal tract 09/26/2011     Priority: Medium     Nodular degeneration of cornea 09/26/2011     Priority: Medium     Bilateral low back pain without sciatica 05/31/2011     Priority: Medium     Chronic pain disorder 12/01/2010     Priority: Medium     COPD (chronic obstructive pulmonary disease) (H) 06/15/2007     Priority: Medium     Overview:   Low DLCO, normal spirometry on 12/15/11       Peptic ulcer 06/15/2007     Priority: Medium     Atherosclerosis of coronary artery bypass graft of native heart with stable angina pectoris (H) 09/12/2002     Priority: Medium     Overview:   Multiple Angigrams prior to 2007. Also:  6/5/2007: Angiogram: TAXUS DELONTE to ostial circumflux, instent restenosis  5/23/2008: Left Main 30% diseased, LAD stents patent, Left circ stent patent, Chronic occluded right internal mammary artery graft to distal RCA, native RCA has 30% stenosis; NO intevention  9/19/2012 CT Angiogram: Patent LIMA to LAD, patent LAD stents, moderate proximal circumflex disease, patent RCA , occluded right internal mammary artery graft to distal RCA.  9/20/2012: Angiogram: Stent to Left Main, ostial LAD, and PTCA of ostial left circumflex           Past Medical History:    Past Medical History:   Diagnosis Date     Acute ischemic heart disease (H)      Acute myocardial infarction (H)      Anxiety disorder      Atherosclerotic heart disease of native coronary artery without angina pectoris      Bilateral carpal tunnel syndrome      Cervicalgia      Chest pain      Chronic gastric ulcer without hemorrhage or perforation      Chronic ischemic heart disease      Chronic obstructive pulmonary disease (H)      Chronic or unspecified gastric ulcer with hemorrhage      Chronic pain syndrome      Constipation      Coronary angioplasty status      Coronary angioplasty status      Coronary angioplasty status      Dorsalgia      Encounter for other administrative examinations      Encounter for screening for cardiovascular disorders      Enterocolitis due to Clostridium difficile      Essential (primary) hypertension      Hyperlipidemia      Major depressive disorder, single episode      Migraine without status migrainosus, not intractable      Nodular corneal degeneration      Noninfective gastroenteritis and colitis      Noninfective gastroenteritis and colitis      Osteoarthritis      Other chest pain      Pain in knee      Pain in right shoulder      Panic disorder without agoraphobia      Peptic ulcer without hemorrhage or perforation      Peripheral vascular disease (H)      Personal history of diseases of the blood and blood-forming organs and certain disorders involving the immune mechanism (CODE)      Personal history of nicotine dependence      Personal history of other medical treatment (CODE)      Presence of aortocoronary bypass graft      Primary central sleep apnea      Sepsis due to Escherichia coli (E. coli) (H)      Stricture of artery (H)      Thoracic, thoracolumbar and lumbosacral intervertebral disc disorder      Uncomplicated opioid abuse (H)      Vitamin D deficiency        Past Surgical History:    Past Surgical History:   Procedure Laterality  Date     ANGIOPLASTY      9/12/02,with triple stenting     APPENDECTOMY OPEN      No Comments Provided     ARTHROSCOPY KNEE      left     ARTHROSCOPY SHOULDER Right 05/12/2017    labral tear, rotator cuff tear and some subacromial decompression      BYPASS GRAFT ARTERY CORONARY      12/13/02,Triple bypass, left internal mammary  to LAD, right internal mammary to right coronary artery, saphenous to obtuse marginal of the left circumflex.     COLONOSCOPY      2011,Dr Bowman benign polyps     COLONOSCOPY  10/03/2011    2011,benign polyps, Dr. Bowman     COLONOSCOPY  08/08/2016 8/8/16,normal, Dr Bowman     ELBOW SURGERY      baby,birth malachi removed from right arm     EMBOLECTOMY UPPER EXTREMITY  04/02/2013    brachial artery pseudoaneurysm after stenting     ESOPHAGOSCOPY, GASTROSCOPY, DUODENOSCOPY (EGD), COMBINED      2011,EGD Dr Bowman with pyloric ulcer     ESOPHAGOSCOPY, GASTROSCOPY, DUODENOSCOPY (EGD), COMBINED      2011,pyloric ulcer, Dr. Bowman     ESOPHAGOSCOPY, GASTROSCOPY, DUODENOSCOPY (EGD), COMBINED      8/8/16,mild gastritis, Dr Bowman     ESOPHAGOSCOPY, GASTROSCOPY, DUODENOSCOPY (EGD), COMBINED      11/27/2017,Dr Bowman. Antral ulcer     ESOPHAGOSCOPY, GASTROSCOPY, DUODENOSCOPY (EGD), COMBINED  02/02/2018    Dr Bowman, healed ulcer     HYSTERECTOMY TOTAL ABDOMINAL      age 21     LAPAROSCOPIC CHOLECYSTECTOMY      2006     OSTEOTOMY FEMUR DISTAL      x3, right knee     OSTEOTOMY FEMUR DISTAL      2000,left knee  ligament surgery     OTHER SURGICAL HISTORY      1/10/2003,,PTCA     OTHER SURGICAL HISTORY      09/20/2012,,PTCA,DELONTE in LAD and left main     OTHER SURGICAL HISTORY      4/1/2013,,PTCA,L subclavian stenosis     SALPINGO-OOPHORECTOMY BILATERAL      age 27,Bilateral salpingo-oophorectomy     TONSILLECTOMY, ADENOIDECTOMY, COMBINED      childhood       Family History:    Family History   Problem Relation Age of Onset     Heart Disease Father         Heart Disease,Heart condition/Significant  for atherosclerotic cardiovascular disease, but non premature.     Colon Cancer Father         Cancer-colon, of colon cancer     Cancer Father         Cancer,mets to liver, secondary to colon cancer     Heart Disease Mother         Heart Disease     Cancer Other         Cancer,Multiple Myeloma     Heart Disease Other         Heart Disease,Ischemic Heart Disease     Colon Cancer Other         Cancer-colon,Malignant neoplasms     Cancer Sister         Cancer,multiple myeloma     Other - See Comments Son         gallstones       Social History:  Marital Status:   [2]  Social History     Tobacco Use     Smoking status: Former Smoker     Packs/day: 1.00     Years: 35.00     Pack years: 35.00     Types: Cigarettes     Quit date: 2/15/2017     Years since quittin.3     Smokeless tobacco: Never Used   Vaping Use     Vaping Use: Never used   Substance Use Topics     Alcohol use: Not Currently     Alcohol/week: 0.0 standard drinks     Drug use: No        Medications:    albuterol (PROAIR HFA/PROVENTIL HFA/VENTOLIN HFA) 108 (90 Base) MCG/ACT inhaler  amLODIPine (NORVASC) 10 MG tablet  busPIRone (BUSPAR) 15 MG tablet  clonazePAM (KLONOPIN) 1 MG tablet  clopidogrel (PLAVIX) 75 MG tablet  diclofenac (VOLTAREN) 1 % topical gel  gabapentin (NEURONTIN) 300 MG capsule  [START ON 2022] HYDROcodone-acetaminophen (NORCO)  MG per tablet  lisinopril (ZESTRIL) 20 MG tablet  naloxone (NARCAN) 4 MG/0.1ML nasal spray  nitroGLYcerin (NITROLINGUAL) 0.4 MG/SPRAY spray  ondansetron (ZOFRAN) 4 MG tablet  pantoprazole (PROTONIX) 40 MG EC tablet  ranolazine (RANEXA) 500 MG 12 hr tablet  rivaroxaban ANTICOAGULANT (XARELTO) 20 MG TABS tablet  rosuvastatin (CRESTOR) 20 MG tablet  sucralfate (CARAFATE) 1 GM tablet  vortioxetine (TRINTELLIX) 20 MG tablet  RESTASIS 0.05 % ophthalmic emulsion  VITAMIN D, CHOLECALCIFEROL, PO          Review of Systems   Constitutional: Negative for fever.   Respiratory: Positive for chest tightness  "and shortness of breath.    Cardiovascular: Positive for chest pain.   Gastrointestinal: Negative for abdominal pain.   All other systems reviewed and are negative.      Physical Exam   BP: 132/76  Pulse: 66  Temp: (!) 96.6  F (35.9  C)  Resp: 26  Height: 166.4 cm (5' 5.5\")  Weight: 90.7 kg (200 lb)  SpO2: 98 %      Physical Exam  Vitals and nursing note reviewed.   Constitutional:       Appearance: She is well-developed.   Eyes:      Pupils: Pupils are equal, round, and reactive to light.   Cardiovascular:      Heart sounds: Normal heart sounds.   Pulmonary:      Effort: Pulmonary effort is normal. No tachypnea, accessory muscle usage or respiratory distress.      Breath sounds: No stridor.   Neurological:      Mental Status: She is alert.         ED Course       ED Course as of 07/03/22 2046   Sun Jul 03, 2022 2044 [place] cardiology consult agrees with starting low-dose Imdur 30 mg.  Went to update patient but she had left AMA before discharge.     Procedures  EKG: Normal sinus rhythm, no significant ST-T abnormality, rate 66, QTc 457 ms, QRS duration 86 ms.  When compared to EKG from May 5, 2022, morphology of T wave in lead V2 is somewhat different, there is new T wave flattening in V3, lateral leads looks unchanged, inferior leads appear unchanged    Results for orders placed or performed during the hospital encounter of 07/03/22 (from the past 24 hour(s))   INR   Result Value Ref Range    INR 1.27 (H) 0.85 - 1.15   CBC with platelets differential    Narrative    The following orders were created for panel order CBC with platelets differential.  Procedure                               Abnormality         Status                     ---------                               -----------         ------                     CBC with platelets and d...[[id number]]  Abnormal            Final result                 Please view results for these tests on the individual orders.   Troponin I   Result Value Ref Range    " Troponin I 3.5 0.0 - 34.0 pg/mL   Comprehensive metabolic panel   Result Value Ref Range    Sodium 140 134 - 144 mmol/L    Potassium 3.6 3.5 - 5.1 mmol/L    Chloride 106 98 - 107 mmol/L    Carbon Dioxide (CO2) 23 21 - 31 mmol/L    Anion Gap 11 3 - 14 mmol/L    Urea Nitrogen 18 7 - 25 mg/dL    Creatinine 1.20 0.60 - 1.20 mg/dL    Calcium 9.4 8.6 - 10.3 mg/dL    Glucose 110 (H) 70 - 105 mg/dL    Alkaline Phosphatase 51 34 - 104 U/L    AST 16 13 - 39 U/L    ALT 10 7 - 52 U/L    Protein Total 7.0 6.4 - 8.9 g/dL    Albumin 4.4 3.5 - 5.7 g/dL    Bilirubin Total 0.2 (L) 0.3 - 1.0 mg/dL    GFR Estimate 49 (L) >60 mL/min/1.73m2   CBC with platelets and differential   Result Value Ref Range    WBC Count 8.7 4.0 - 11.0 10e3/uL    RBC Count 4.09 3.80 - 5.20 10e6/uL    Hemoglobin 11.5 (L) 11.7 - 15.7 g/dL    Hematocrit 34.4 (L) 35.0 - 47.0 %    MCV 84 78 - 100 fL    MCH 28.1 26.5 - 33.0 pg    MCHC 33.4 31.5 - 36.5 g/dL    RDW 14.3 10.0 - 15.0 %    Platelet Count 291 150 - 450 10e3/uL    % Neutrophils 57 %    % Lymphocytes 31 %    % Monocytes 8 %    % Eosinophils 3 %    % Basophils 1 %    % Immature Granulocytes 0 %    NRBCs per 100 WBC 0 <1 /100    Absolute Neutrophils 4.9 1.6 - 8.3 10e3/uL    Absolute Lymphocytes 2.6 0.8 - 5.3 10e3/uL    Absolute Monocytes 0.7 0.0 - 1.3 10e3/uL    Absolute Eosinophils 0.3 0.0 - 0.7 10e3/uL    Absolute Basophils 0.1 0.0 - 0.2 10e3/uL    Absolute Immature Granulocytes 0.0 <=0.4 10e3/uL    Absolute NRBCs 0.0 10e3/uL   XR Chest Port 1 View    Narrative    PROCEDURE INFORMATION:   Exam: XR Chest   Exam date and time: 7/3/2022 5:03 PM   Age: 67 years old   Clinical indication: Other: General chest pain; Prior surgery; Surgery date: 6+   months; Additional info: Cp     TECHNIQUE:   Imaging protocol: Radiologic exam of the chest.   Views: 1 view.     COMPARISON:   CR XR CHEST PORT 1 VIEW 5/5/2022 10:17 AM     FINDINGS:   Tubes, catheters and devices: EKG leads overlying the chest.     Lungs:  Subsegmental atelectasis in the right mid lung and left lung base.   Pleural spaces: Unremarkable. No pleural effusion. No pneumothorax.   Heart/Mediastinum:  Coronary artery bypass graft. No cardiomegaly.   Bones/joints: Mid sternotomy wires and postthoracotomy changes.       Impression    IMPRESSION:   Subsegmental atelectasis in the right mid lung and left lung base.    THIS DOCUMENT HAS BEEN ELECTRONICALLY SIGNED BY CROW STOVER DO   Troponin I (now)   Result Value Ref Range    Troponin I 3.3 0.0 - 34.0 pg/mL   CT Chest w Contrast    Narrative    PROCEDURE:  CT CHEST W CONTRAST.      HISTORY:  CP, SOB      TECHNIQUE:  Contrast enhanced helical thoracic CT was performed.    COMPARISON:  CXR 7/3/22    MEDS/CONTRAST: 70 mL Isovue 370    FINDINGS:    Prior median sternotomy for CABG. Cardiac size is normal. There is no  pericardial or pleural effusion. The thoracic aorta and pulmonary  artery are within normal limits in size. No suspicious thoracic  adenopathy is identified.    The central airways are patent. A few new clustered micronodules are  seen in the right upper lobe.         Limited views of the upper abdomen reveal no adrenal mass or acute  process.     No suspicious osseous lesions are seen.      Impression    IMPRESSION:    Multiple sub-4 mm micronodules the right upper lobe, in a somewhat  branching pattern, can be seen with subtle infection    CARRIE OLVERA MD         SYSTEM ID:  [id number]     *Note: Due to a large number of results and/or encounters for the requested time period, some results have not been displayed. A complete set of results can be found in Results Review.       Medications   acetaminophen (TYLENOL) tablet 650 mg (650 mg Oral Not Given 7/3/22 1703)   iopamidol (ISOVUE-370) solution 70 mL (70 mLs Intravenous Given 7/3/22 1920)   HYDROcodone-acetaminophen (NORCO)  MG per tablet 1 tablet (1 tablet Oral Given 7/3/22 2038)       Assessments & Plan (with Medical Decision  "Making)     I have reviewed the nursing notes.    I have reviewed the findings, diagnosis, plan and need for follow up with the patient.    Discharge Medication List as of 7/3/2022  8:36 PM        Symptoms likely multifactorial including underlying coronary artery disease, COPD and anxiety.  I did offer her Tylenol to her and she stated that \"if she can't have her hydrocodone she does not want anything.\"     Patient is already on blood thinners so given her stability I do not think a PE study would  at this point.  We will certainly check a serial troponin, given abnormal chest x-ray might consider a noncontrast or none PE protocol CT chest to rule out occult pneumonia.  Offered neb to the patient and she states she does not think she needs 1.    Will sign patient out to Dr. Alonzo at shift change.    Final diagnoses:   Atypical chest pain   Dyspnea, unspecified type       7/3/2022   [facility]  07/03/22 1810    Transfer of care from previous shift provider. Sign out details: Chest pain and dyspnea responding to nitroglycerin. CXR with possible early PNA therefore contrast chest CT ordered. On doac making PE unlikely. Serial troponin pending. See prior shift provider's Emergency Department note for additional details.    Final diagnoses:   Atypical chest pain   Dyspnea, unspecified type       Assessment and Plan:  Reassuring serial troponin making ACS unlikely.  CT scan demonstrates right upper lobe micronodules possibly representing early infection. With no fever, cough, normal wbc and clear lungs and chest pain described as a band bilaterally not consistent with right upper lobe pain, there is low suspicion for pneumonia at this time. Discussed getting into cardiology sooner than September, but scheduling here is unable to schedule appointments for specialist.  Contacted [facility] who agrees with plan for low-dose Imdur with patient's " description of exertional dyspnea.  Went to update patient but she had left prior to discharge. She had been given her norco she requested prior to discharge.    Marshall Alonzo MD   7/3/2022  [facility]         Marshall Alonzo MD  07/03/22 2049

## 2022-07-03 NOTE — ED NOTES
"Patient feeling SOB.  Chest pain located over her heart and radiates to her back \"where the heart is.\"  She was driving in the car when this started today, but states that she's been having symptoms for weeks.  Denies any cough.  Took a Norco today for her headache, states that she was going to take one before she left but EMS got there before her  could give her next dose.  "

## 2022-07-03 NOTE — ED NOTES
Patient wanting to go home since she cannot have pain medications.  Awaiting for labs to result,  encouraging wife to stay for results to come back.

## 2022-07-03 NOTE — ED NOTES
"Patient angry, yelling, swearing and stating that she is going to leave.  She is unhappy with staff asking questions during their assessment stating, \"It's not my heart.  It's not my lungs.\"  MD notified that patient is unhappy.  Attempted to give tylenol for pain, patient refused stating, \"If it's not my hydrocodone, I do not want it.\"  "

## 2022-07-04 NOTE — PROGRESS NOTES
1.  Has the patient had a previous reaction to IV contrast? no    2.  Does the patient have kidney disease? yes    3.  Is the patient on dialysis? no    If YES to any of these questions, exam will be reviewed with a Radiologist before administering contrast.    IV Contrast- Discharge Instructions After Your CT Scan      The IV contrast you received today will be filtered from your bloodstream by your kidneys during the next 24 hours and pass from the body in urine.  You will not be aware of this process and your urine will not change in color.  To help this process you should drink at least 4 additional glasses of water or juice today.  This reduces stress on your kidneys.    Most contrast reactions are immediate.  Should you develop symptoms of concern after discharge, contact the department at the number below.  After hours you should contact your personal physician.  If you develop breathing distress or wheezing, call 911.

## 2022-07-04 NOTE — DISCHARGE INSTRUCTIONS
Evaluation today was reassuring for unlikely immediately life-threatening serious cause for your chest pain. Your exertional shortness of breath is concerning for stable angina (see attached information). We are unable to schedule appointments for specialists such as cardiology here in the ED it turns out. Please contact Dr Gramajo's office Tuesday to see if you can get in earlier. Recommend follow up with a PCP (primary care provider) in the next 1-2 weeks. Please review attached instructions including reasons to return to the emergency department.

## 2022-07-05 LAB
ATRIAL RATE - MUSE: 66 BPM
DIASTOLIC BLOOD PRESSURE - MUSE: NORMAL MMHG
INTERPRETATION ECG - MUSE: NORMAL
P AXIS - MUSE: 56 DEGREES
PR INTERVAL - MUSE: 168 MS
QRS DURATION - MUSE: 86 MS
QT - MUSE: 436 MS
QTC - MUSE: 457 MS
R AXIS - MUSE: 44 DEGREES
SYSTOLIC BLOOD PRESSURE - MUSE: NORMAL MMHG
T AXIS - MUSE: 58 DEGREES
VENTRICULAR RATE- MUSE: 66 BPM

## 2022-07-08 ENCOUNTER — MYC MEDICAL ADVICE (OUTPATIENT)
Dept: FAMILY MEDICINE | Facility: OTHER | Age: 67
End: 2022-07-08

## 2022-07-12 ENCOUNTER — TRANSFERRED RECORDS (OUTPATIENT)
Dept: HEALTH INFORMATION MANAGEMENT | Facility: OTHER | Age: 67
End: 2022-07-12

## 2022-07-12 NOTE — TELEPHONE ENCOUNTER
"Per , patient does not need a PA.  Patient needs a \"DUR override and Back on Track override.\"  Second representative notified of this and reports that a \"Back on Track override\" is not possible because this is a controlled medication and current plan does not allow this.  \"If patient can buy the meds without running it through the insurance, then she can get back on track.\"  Patient got off track due to the pharmacy only giving her 120 tabs in May due to pharmacy running out of meds.    Pharmacy representative confirms that patient can buy controlled medications without insurance as long as her insurance allows it.    Message sent back to patient at this time with an update.  Patient is encouraged to contact insurance to double check.    Taylor Hill LPN 7/12/2022 1:19 PM      "

## 2022-07-12 NOTE — TELEPHONE ENCOUNTER
Please call and get her 30 days reset to resume previous dosing. I thought the pharmacy should do this. Thanks

## 2022-07-14 ENCOUNTER — TELEPHONE (OUTPATIENT)
Dept: FAMILY MEDICINE | Facility: OTHER | Age: 67
End: 2022-07-14

## 2022-07-14 DIAGNOSIS — Z79.899 CONTROLLED SUBSTANCE AGREEMENT SIGNED: ICD-10-CM

## 2022-07-14 DIAGNOSIS — R07.89 CHEST WALL PAIN, CHRONIC: ICD-10-CM

## 2022-07-14 DIAGNOSIS — G89.29 CHEST WALL PAIN, CHRONIC: ICD-10-CM

## 2022-07-14 DIAGNOSIS — G89.4 CHRONIC PAIN DISORDER: ICD-10-CM

## 2022-07-14 DIAGNOSIS — G89.29 CHRONIC BILATERAL LOW BACK PAIN WITHOUT SCIATICA: ICD-10-CM

## 2022-07-14 DIAGNOSIS — M54.50 CHRONIC BILATERAL LOW BACK PAIN WITHOUT SCIATICA: ICD-10-CM

## 2022-07-14 RX ORDER — HYDROCODONE BITARTRATE AND ACETAMINOPHEN 10; 325 MG/1; MG/1
1-2 TABLET ORAL EVERY 4 HOURS PRN
Qty: 124 TABLET | Refills: 0 | Status: SHIPPED | OUTPATIENT
Start: 2022-07-14 | End: 2022-07-18

## 2022-07-14 NOTE — TELEPHONE ENCOUNTER
[facility] sent Rx request for the following:      HYDROcodone-acetaminophen (NORCO)  MG per tablet      Last Prescription Date:   7/7/2022  Last Fill Qty/Refills:         180, R-0    Last Office Visit:              6/29/2022   Future Office visit:           8/3/2022    Routing refill request to provider for review/approval because:  Spoke with pharmacist at [facility]. They were only able to fill the Norco on 7/7/22 for 56 tabs due to insurance restrictions. They are needing to fill the remaining tabs for a total of 180 so they are requesting a refill for the additional 124 tabs.     Nikos Toure RN, BSN  ....................  7/14/2022   10:06 AM

## 2022-07-14 NOTE — TELEPHONE ENCOUNTER
Please call the patient.  RX - Hydrocodone   Please fax the new order with the correct dose  to the Pharmacy - [facility].      She has other information for you as well, per her insurance company.      Inga Durán on 7/14/2022 at 9:12 AM

## 2022-07-15 NOTE — TELEPHONE ENCOUNTER
Patient notified of conversation with RN and [facility] yesterday.  Patient will discuss this more at upcoming visit.    Taylor Hill LPN 7/15/2022 9:25 AM

## 2022-07-18 ENCOUNTER — TELEPHONE (OUTPATIENT)
Dept: FAMILY MEDICINE | Facility: OTHER | Age: 67
End: 2022-07-18

## 2022-07-18 DIAGNOSIS — G89.4 CHRONIC PAIN DISORDER: ICD-10-CM

## 2022-07-18 DIAGNOSIS — M54.50 CHRONIC BILATERAL LOW BACK PAIN WITHOUT SCIATICA: ICD-10-CM

## 2022-07-18 DIAGNOSIS — Z79.899 CONTROLLED SUBSTANCE AGREEMENT SIGNED: ICD-10-CM

## 2022-07-18 DIAGNOSIS — G89.29 CHEST WALL PAIN, CHRONIC: ICD-10-CM

## 2022-07-18 DIAGNOSIS — G89.29 CHRONIC BILATERAL LOW BACK PAIN WITHOUT SCIATICA: ICD-10-CM

## 2022-07-18 DIAGNOSIS — R07.89 CHEST WALL PAIN, CHRONIC: ICD-10-CM

## 2022-07-18 RX ORDER — HYDROCODONE BITARTRATE AND ACETAMINOPHEN 10; 325 MG/1; MG/1
1-2 TABLET ORAL EVERY 4 HOURS PRN
Qty: 216 TABLET | Refills: 0 | Status: SHIPPED | OUTPATIENT
Start: 2022-07-18 | End: 2022-08-03

## 2022-07-18 NOTE — TELEPHONE ENCOUNTER
Patient called and is letting the doctor know that she has been talking with the insurance company and she thinks they got it figured out. But she needs to talk to PMI for help.  Please call      So Dobbs on 7/18/2022 at 8:26 AM

## 2022-07-18 NOTE — TELEPHONE ENCOUNTER
Patient was only able to get 56 tabs Saturday.  Patient will be out on the 24th.  Patient spoke with an insurance company representative that requested Ramirez Cano MD to fill out a prescription for 216 tabs as a 1 time deal.  This will get patient back on track.    1 insurance rep said she could pay for Controlled med out of pocket and the 1 today said patient would get kicked out of the insurance program.    Taylor Hill LPN 7/18/2022 9:38 AM

## 2022-07-18 NOTE — TELEPHONE ENCOUNTER
OK, I wrote for 216, but I doubt that will work. If not, then I have another idea we can discuss at her next appointment

## 2022-07-21 DIAGNOSIS — R06.09 DOE (DYSPNEA ON EXERTION): ICD-10-CM

## 2022-07-21 DIAGNOSIS — I25.9 CHRONIC ISCHEMIC HEART DISEASE: ICD-10-CM

## 2022-07-22 ENCOUNTER — TELEPHONE (OUTPATIENT)
Dept: FAMILY MEDICINE | Facility: OTHER | Age: 67
End: 2022-07-22

## 2022-07-22 DIAGNOSIS — R06.09 DOE (DYSPNEA ON EXERTION): ICD-10-CM

## 2022-07-22 DIAGNOSIS — I25.9 CHRONIC ISCHEMIC HEART DISEASE: ICD-10-CM

## 2022-07-22 RX ORDER — RANOLAZINE 500 MG/1
500 TABLET, EXTENDED RELEASE ORAL 2 TIMES DAILY
Qty: 180 TABLET | Refills: 1 | Status: SHIPPED | OUTPATIENT
Start: 2022-07-22 | End: 2022-09-07 | Stop reason: SINTOL

## 2022-07-22 RX ORDER — RANOLAZINE 500 MG/1
TABLET, EXTENDED RELEASE ORAL
Qty: 60 TABLET | Refills: 1 | Status: SHIPPED | OUTPATIENT
Start: 2022-07-22 | End: 2022-07-22

## 2022-07-22 NOTE — TELEPHONE ENCOUNTER
[facility]:      RANOLAZINE  MG TABLET      Last Prescription Date:   6/29/2022  Last Fill Qty/Refills:         60, R-1    Last Office Visit:              6/29/2022   Future Office visit:           8/3/2022    Nikos Toure RN, BSN  ....................  7/22/2022   10:44 AM

## 2022-07-22 NOTE — TELEPHONE ENCOUNTER
Please call the pharmacy.   RX - Ranolazine   Needs a 90  New script.      Inga Durán on 7/22/2022 at 11:06 AM

## 2022-07-25 DIAGNOSIS — G89.29 CHEST WALL PAIN, CHRONIC: ICD-10-CM

## 2022-07-25 DIAGNOSIS — R07.89 CHEST WALL PAIN, CHRONIC: ICD-10-CM

## 2022-07-25 DIAGNOSIS — G89.4 CHRONIC PAIN DISORDER: ICD-10-CM

## 2022-07-25 DIAGNOSIS — M54.50 CHRONIC BILATERAL LOW BACK PAIN WITHOUT SCIATICA: ICD-10-CM

## 2022-07-25 DIAGNOSIS — Z79.899 CONTROLLED SUBSTANCE AGREEMENT SIGNED: ICD-10-CM

## 2022-07-25 DIAGNOSIS — G89.29 CHRONIC BILATERAL LOW BACK PAIN WITHOUT SCIATICA: ICD-10-CM

## 2022-07-25 RX ORDER — HYDROCODONE BITARTRATE AND ACETAMINOPHEN 10; 325 MG/1; MG/1
1-2 TABLET ORAL EVERY 4 HOURS PRN
Qty: 216 TABLET | Refills: 0 | OUTPATIENT
Start: 2022-07-25

## 2022-07-25 NOTE — TELEPHONE ENCOUNTER
Per pharmacy: Alternative requested: Can fill for 124 or #180/25 day rolling count, would need PA to reset, other wise got #56 about 10 days ago. Celia Bautista RN on 7/25/2022 at 8:41 AM

## 2022-07-25 NOTE — TELEPHONE ENCOUNTER
Patient confirms that she has enough pain medications until next office visit.    Taylor Hill LPN 7/25/2022 4:23 PM

## 2022-08-03 ENCOUNTER — OFFICE VISIT (OUTPATIENT)
Dept: FAMILY MEDICINE | Facility: OTHER | Age: 67
End: 2022-08-03
Attending: FAMILY MEDICINE
Payer: MEDICARE

## 2022-08-03 VITALS
DIASTOLIC BLOOD PRESSURE: 84 MMHG | OXYGEN SATURATION: 96 % | HEIGHT: 66 IN | HEART RATE: 64 BPM | RESPIRATION RATE: 22 BRPM | SYSTOLIC BLOOD PRESSURE: 138 MMHG | WEIGHT: 177.38 LBS | BODY MASS INDEX: 28.51 KG/M2 | TEMPERATURE: 97.4 F

## 2022-08-03 DIAGNOSIS — G89.29 CHRONIC BILATERAL LOW BACK PAIN WITHOUT SCIATICA: ICD-10-CM

## 2022-08-03 DIAGNOSIS — G89.4 CHRONIC PAIN DISORDER: Primary | ICD-10-CM

## 2022-08-03 DIAGNOSIS — Z79.899 CONTROLLED SUBSTANCE AGREEMENT SIGNED: ICD-10-CM

## 2022-08-03 DIAGNOSIS — R07.89 CHEST WALL PAIN, CHRONIC: ICD-10-CM

## 2022-08-03 DIAGNOSIS — G89.29 CHEST WALL PAIN, CHRONIC: ICD-10-CM

## 2022-08-03 DIAGNOSIS — M54.50 CHRONIC BILATERAL LOW BACK PAIN WITHOUT SCIATICA: ICD-10-CM

## 2022-08-03 DIAGNOSIS — I25.9 CHRONIC ISCHEMIC HEART DISEASE: ICD-10-CM

## 2022-08-03 PROCEDURE — G0463 HOSPITAL OUTPT CLINIC VISIT: HCPCS | Mod: 25

## 2022-08-03 PROCEDURE — 99213 OFFICE O/P EST LOW 20 MIN: CPT | Performed by: FAMILY MEDICINE

## 2022-08-03 PROCEDURE — G0463 HOSPITAL OUTPT CLINIC VISIT: HCPCS

## 2022-08-03 RX ORDER — HYDROCODONE BITARTRATE AND ACETAMINOPHEN 10; 325 MG/1; MG/1
1-2 TABLET ORAL EVERY 4 HOURS PRN
Qty: 180 TABLET | Refills: 0 | Status: SHIPPED | OUTPATIENT
Start: 2022-08-10 | End: 2022-09-07

## 2022-08-03 RX ORDER — HYDROCODONE BITARTRATE AND ACETAMINOPHEN 10; 325 MG/1; MG/1
1-2 TABLET ORAL EVERY 4 HOURS PRN
Qty: 36 TABLET | Refills: 0 | Status: SHIPPED | OUTPATIENT
Start: 2022-08-03 | End: 2022-08-09

## 2022-08-03 ASSESSMENT — PATIENT HEALTH QUESTIONNAIRE - PHQ9
10. IF YOU CHECKED OFF ANY PROBLEMS, HOW DIFFICULT HAVE THESE PROBLEMS MADE IT FOR YOU TO DO YOUR WORK, TAKE CARE OF THINGS AT HOME, OR GET ALONG WITH OTHER PEOPLE: SOMEWHAT DIFFICULT
SUM OF ALL RESPONSES TO PHQ QUESTIONS 1-9: 19
SUM OF ALL RESPONSES TO PHQ QUESTIONS 1-9: 19

## 2022-08-03 ASSESSMENT — ANXIETY QUESTIONNAIRES
5. BEING SO RESTLESS THAT IT IS HARD TO SIT STILL: NOT AT ALL
IF YOU CHECKED OFF ANY PROBLEMS ON THIS QUESTIONNAIRE, HOW DIFFICULT HAVE THESE PROBLEMS MADE IT FOR YOU TO DO YOUR WORK, TAKE CARE OF THINGS AT HOME, OR GET ALONG WITH OTHER PEOPLE: SOMEWHAT DIFFICULT
7. FEELING AFRAID AS IF SOMETHING AWFUL MIGHT HAPPEN: SEVERAL DAYS
3. WORRYING TOO MUCH ABOUT DIFFERENT THINGS: NOT AT ALL
GAD7 TOTAL SCORE: 5
GAD7 TOTAL SCORE: 5
7. FEELING AFRAID AS IF SOMETHING AWFUL MIGHT HAPPEN: SEVERAL DAYS
4. TROUBLE RELAXING: NOT AT ALL
6. BECOMING EASILY ANNOYED OR IRRITABLE: NOT AT ALL
GAD7 TOTAL SCORE: 5
2. NOT BEING ABLE TO STOP OR CONTROL WORRYING: NEARLY EVERY DAY
1. FEELING NERVOUS, ANXIOUS, OR ON EDGE: SEVERAL DAYS
8. IF YOU CHECKED OFF ANY PROBLEMS, HOW DIFFICULT HAVE THESE MADE IT FOR YOU TO DO YOUR WORK, TAKE CARE OF THINGS AT HOME, OR GET ALONG WITH OTHER PEOPLE?: SOMEWHAT DIFFICULT

## 2022-08-03 ASSESSMENT — PAIN SCALES - GENERAL: PAINLEVEL: MODERATE PAIN (5)

## 2022-08-03 NOTE — NURSING NOTE
"Patient presents to the clinic for controlled medication refill.  Last administration of [place] was today around 0630.  Contract signed 9-, and TOX obtain 9-.     FOOD SECURITY SCREENING QUESTIONS:    The next two questions are to help us understand your food security.  If you are feeling you need any assistance in this area, we have resources available to support you today.    Hunger Vital Signs:  Within the past 12 months we worried whether our food would run out before we got money to buy more. Never  Within the past 12 months the food we bought just didn't last and we didn't have money to get more. Never    Advance Care Directive on file? yes  Advance Care Directive provided to patient? yes    Chief Complaint   Patient presents with     Recheck Medication       Initial /84 (BP Location: Right arm, Patient Position: Sitting, Cuff Size: Adult Large)   Pulse 64   Temp 97.4  F (36.3  C) (Tympanic)   Resp 22   Ht 1.67 m (5' 5.75\")   Wt 80.5 kg (177 lb 6 oz)   LMP 04/29/1978 (Approximate)   SpO2 96%   BMI 28.85 kg/m   Estimated body mass index is 28.85 kg/m  as calculated from the following:    Height as of this encounter: 1.67 m (5' 5.75\").    Weight as of this encounter: 80.5 kg (177 lb 6 oz).  Medication Reconciliation: complete        Taylor Hill LPN       "

## 2022-08-03 NOTE — PROGRESS NOTES
Assessment & Plan       ICD-10-CM    1. Chronic pain disorder  G89.4 HYDROcodone-acetaminophen (NORCO)  MG per tablet     HYDROcodone-acetaminophen (NORCO)  MG per tablet   2. Chest wall pain, chronic  R07.89 HYDROcodone-acetaminophen (NORCO)  MG per tablet    G89.29 HYDROcodone-acetaminophen (NORCO)  MG per tablet   3. Chronic bilateral low back pain without sciatica  M54.50 HYDROcodone-acetaminophen (NORCO)  MG per tablet    G89.29 HYDROcodone-acetaminophen (NORCO)  MG per tablet   4. Controlled substance agreement signed  Z79.899 HYDROcodone-acetaminophen (NORCO)  MG per tablet     HYDROcodone-acetaminophen (NORCO)  MG per tablet   5. Chronic ischemic heart disease  I25.9        She has been receiving partial prescriptions of hydrocodone 10/325 mg the past couple months ever since a partial prescription fill when the pharmacy did not have enough medicine.  She called insurance and was told that with the current process if she can get until August 10, she should be able to receive 180 pills.  I sent in a prescription for 36 pills to complete the supply she had from her last prescription to get in until August 9.  On August 10 she can  180 pills.  Due to other health concerns, I would like her to follow-up in a little more than a month to make sure everything is okay.    PDMP Review       Value Time User    State PDMP site checked  Yes 8/3/2022  9:52 AM Ramirez Cano MD           Angiogram with [facility] cardiology in 2 days for CORTEZ.  Recent echocardiogram with good per her report.  She reports that cardiology was wondering why she needed Xarelto.  She used to be on aspirin and clopidogrel until a PE.  Aspirin was stopped and she has been maintained on clopidogrel and Xarelto since.  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          Depression Screening Follow Up    PHQ 8/3/2022   PHQ-9 Total Score 19    Q9: Thoughts of better off dead/self-harm past 2 weeks Several days   F/U: Thoughts of suicide or self-harm Yes   F/U: Self harm-plan No   F/U: Self-harm action No   F/U: Safety concerns No       Follow Up      Follow Up Actions Taken  No changes, will follow up at next appointment, similar past responses    Discussed the following ways the patient can remain in a safe environment:  be around others      Return in about 5 weeks (around 9/7/2022).    Ramirez Cano MD  [facility]    Subjective   Malina is a 67 year old, presenting for the following health issues:  Recheck Medication      History of Present Illness       Back Pain:  She presents for follow up of back pain. Patient's back pain is a chronic problem.  Location of back pain:  Right lower back, left lower back and right middle of back  Description of back pain: cramping  Back pain spreads: nowhere    Since last visit, how has back pain changed: unchanged  Does back pain interfere with her job:  Not applicable  Has the patient tried any new treatments:  No       Reason for visit:  MEDS    She eats 0-1 servings of fruits and vegetables daily.She consumes 0 sweetened beverage(s) daily.She exercises with enough effort to increase her heart rate 9 or less minutes per day.  She exercises with enough effort to increase her heart rate 3 or less days per week.   She is taking medications regularly.    Today's PHQ-9         PHQ-9 Total Score: 19    PHQ-9 Q9 Thoughts of better off dead/self-harm past 2 weeks :   Several days  Thoughts of suicide or self harm: (P) Yes  Self-harm Plan:   (P) No  Self-harm Action:     (P) No  Safety concerns for self or others: (P) No    How difficult have these problems made it for you to do your work, take care of things at home, or get along with other people: Somewhat difficult  Today's YAYA-7 Score: 5    Ranolazine caused bladder cramps so she stopped  Saw Dr Reyes at [facility] and angiogram  "recommended    Still cannot get full 180 prescription of hydrocodone due to previous partial fill when pharmacy did not have enough pills        Review of Systems   As above      Objective    /84 (BP Location: Right arm, Patient Position: Sitting, Cuff Size: Adult Large)   Pulse 64   Temp 97.4  F (36.3  C) (Tympanic)   Resp 22   Ht 1.67 m (5' 5.75\")   Wt 80.5 kg (177 lb 6 oz)   LMP 04/29/1978 (Approximate)   SpO2 96%   BMI 28.85 kg/m    Body mass index is 28.85 kg/m .  Physical Exam   General Appearance: Alert. No acute distress  Chest/Respiratory Exam: Clear to auscultation bilaterally  Cardiovascular Exam: Regular rate and rhythm. S1, S2, no murmur, gallop, or rubs.  Extremities: 2+ pedal pulses.  No lower extremity edema.  Psychiatric: Normal affect and mentation                        .  ..  "

## 2022-08-11 ENCOUNTER — TELEPHONE (OUTPATIENT)
Dept: FAMILY MEDICINE | Facility: OTHER | Age: 67
End: 2022-08-11

## 2022-08-11 NOTE — TELEPHONE ENCOUNTER
The patient wants her PCP nurse to know that she fixed all of her medication issues herself.   She has her whole month worth of RX - Hydrocodone.  She does want the nurse to call her.             Inga Durán on 8/11/2022 at 12:35 PM

## 2022-09-05 ASSESSMENT — PATIENT HEALTH QUESTIONNAIRE - PHQ9
SUM OF ALL RESPONSES TO PHQ QUESTIONS 1-9: 9
10. IF YOU CHECKED OFF ANY PROBLEMS, HOW DIFFICULT HAVE THESE PROBLEMS MADE IT FOR YOU TO DO YOUR WORK, TAKE CARE OF THINGS AT HOME, OR GET ALONG WITH OTHER PEOPLE: SOMEWHAT DIFFICULT
SUM OF ALL RESPONSES TO PHQ QUESTIONS 1-9: 9

## 2022-09-05 ASSESSMENT — ANXIETY QUESTIONNAIRES
4. TROUBLE RELAXING: SEVERAL DAYS
2. NOT BEING ABLE TO STOP OR CONTROL WORRYING: MORE THAN HALF THE DAYS
IF YOU CHECKED OFF ANY PROBLEMS ON THIS QUESTIONNAIRE, HOW DIFFICULT HAVE THESE PROBLEMS MADE IT FOR YOU TO DO YOUR WORK, TAKE CARE OF THINGS AT HOME, OR GET ALONG WITH OTHER PEOPLE: SOMEWHAT DIFFICULT
7. FEELING AFRAID AS IF SOMETHING AWFUL MIGHT HAPPEN: SEVERAL DAYS
5. BEING SO RESTLESS THAT IT IS HARD TO SIT STILL: NOT AT ALL
GAD7 TOTAL SCORE: 9
8. IF YOU CHECKED OFF ANY PROBLEMS, HOW DIFFICULT HAVE THESE MADE IT FOR YOU TO DO YOUR WORK, TAKE CARE OF THINGS AT HOME, OR GET ALONG WITH OTHER PEOPLE?: SOMEWHAT DIFFICULT
3. WORRYING TOO MUCH ABOUT DIFFERENT THINGS: MORE THAN HALF THE DAYS
6. BECOMING EASILY ANNOYED OR IRRITABLE: SEVERAL DAYS
GAD7 TOTAL SCORE: 9
1. FEELING NERVOUS, ANXIOUS, OR ON EDGE: MORE THAN HALF THE DAYS
7. FEELING AFRAID AS IF SOMETHING AWFUL MIGHT HAPPEN: SEVERAL DAYS
GAD7 TOTAL SCORE: 9

## 2022-09-07 ENCOUNTER — OFFICE VISIT (OUTPATIENT)
Dept: FAMILY MEDICINE | Facility: OTHER | Age: 67
End: 2022-09-07
Attending: FAMILY MEDICINE
Payer: MEDICARE

## 2022-09-07 VITALS
WEIGHT: 172 LBS | SYSTOLIC BLOOD PRESSURE: 136 MMHG | RESPIRATION RATE: 20 BRPM | TEMPERATURE: 97.5 F | HEART RATE: 62 BPM | OXYGEN SATURATION: 98 % | DIASTOLIC BLOOD PRESSURE: 72 MMHG | BODY MASS INDEX: 27.97 KG/M2

## 2022-09-07 DIAGNOSIS — Z79.899 CONTROLLED SUBSTANCE AGREEMENT SIGNED: ICD-10-CM

## 2022-09-07 DIAGNOSIS — Z79.891 LONG TERM (CURRENT) USE OF OPIATE ANALGESIC: ICD-10-CM

## 2022-09-07 DIAGNOSIS — G89.4 CHRONIC PAIN DISORDER: Primary | ICD-10-CM

## 2022-09-07 DIAGNOSIS — I77.1 SUBCLAVIAN ARTERY STENOSIS, LEFT (H): ICD-10-CM

## 2022-09-07 DIAGNOSIS — R07.89 CHEST WALL PAIN, CHRONIC: ICD-10-CM

## 2022-09-07 DIAGNOSIS — F41.9 CHRONIC ANXIETY: ICD-10-CM

## 2022-09-07 DIAGNOSIS — G89.29 CHEST WALL PAIN, CHRONIC: ICD-10-CM

## 2022-09-07 DIAGNOSIS — G89.29 CHRONIC BILATERAL LOW BACK PAIN WITHOUT SCIATICA: ICD-10-CM

## 2022-09-07 DIAGNOSIS — N18.31 STAGE 3A CHRONIC KIDNEY DISEASE (H): ICD-10-CM

## 2022-09-07 DIAGNOSIS — M65.312 TRIGGER THUMB OF LEFT HAND: ICD-10-CM

## 2022-09-07 DIAGNOSIS — I13.0 HYPERTENSIVE HEART AND CHRONIC KIDNEY DISEASE WITH HEART FAILURE AND STAGE 1 THROUGH STAGE 4 CHRONIC KIDNEY DISEASE, OR UNSPECIFIED CHRONIC KIDNEY DISEASE (H): ICD-10-CM

## 2022-09-07 DIAGNOSIS — K25.4 CHRONIC GASTRIC ULCER WITH HEMORRHAGE: ICD-10-CM

## 2022-09-07 DIAGNOSIS — M54.50 CHRONIC BILATERAL LOW BACK PAIN WITHOUT SCIATICA: ICD-10-CM

## 2022-09-07 DIAGNOSIS — I25.9 CHRONIC ISCHEMIC HEART DISEASE: ICD-10-CM

## 2022-09-07 LAB
CREAT UR-MCNC: 26.9 MG/DL
CREAT UR-MCNC: 29 MG/DL
MICROALBUMIN UR-MCNC: <12 MG/L
MICROALBUMIN/CREAT UR: NORMAL MG/G{CREAT}

## 2022-09-07 PROCEDURE — G0463 HOSPITAL OUTPT CLINIC VISIT: HCPCS

## 2022-09-07 PROCEDURE — 80307 DRUG TEST PRSMV CHEM ANLYZR: CPT | Mod: ZL | Performed by: FAMILY MEDICINE

## 2022-09-07 PROCEDURE — 82043 UR ALBUMIN QUANTITATIVE: CPT | Mod: ZL | Performed by: FAMILY MEDICINE

## 2022-09-07 PROCEDURE — 99214 OFFICE O/P EST MOD 30 MIN: CPT | Performed by: FAMILY MEDICINE

## 2022-09-07 PROCEDURE — G0463 HOSPITAL OUTPT CLINIC VISIT: HCPCS | Mod: 25

## 2022-09-07 RX ORDER — HYDROCODONE BITARTRATE AND ACETAMINOPHEN 10; 325 MG/1; MG/1
1-2 TABLET ORAL EVERY 4 HOURS PRN
Qty: 180 TABLET | Refills: 0 | Status: SHIPPED | OUTPATIENT
Start: 2022-09-09 | End: 2022-11-09

## 2022-09-07 RX ORDER — CLONAZEPAM 1 MG/1
1 TABLET ORAL 3 TIMES DAILY PRN
Qty: 270 TABLET | Refills: 0 | Status: SHIPPED | OUTPATIENT
Start: 2022-09-12 | End: 2022-12-14

## 2022-09-07 RX ORDER — HYDROCODONE BITARTRATE AND ACETAMINOPHEN 10; 325 MG/1; MG/1
1-2 TABLET ORAL EVERY 4 HOURS PRN
Qty: 180 TABLET | Refills: 0 | Status: SHIPPED | OUTPATIENT
Start: 2022-10-10 | End: 2022-11-09

## 2022-09-07 RX ORDER — BUSPIRONE HYDROCHLORIDE 15 MG/1
15 TABLET ORAL 2 TIMES DAILY
Qty: 180 TABLET | Refills: 4 | Status: SHIPPED | OUTPATIENT
Start: 2022-09-07 | End: 2023-07-13

## 2022-09-07 ASSESSMENT — ANXIETY QUESTIONNAIRES: GAD7 TOTAL SCORE: 9

## 2022-09-07 ASSESSMENT — PAIN SCALES - GENERAL: PAINLEVEL: SEVERE PAIN (6)

## 2022-09-07 NOTE — PATIENT INSTRUCTIONS
Hydrocodone for 2 months  Referral to ortho Dr Wilkinson about thumb  Break up cleaning and other activity to not overdue your activity at one time.

## 2022-09-07 NOTE — LETTER
Opioid / Opioid Plus Controlled Substance Agreement    This is an agreement between you and your provider about the safe and appropriate use of controlled substance/opioids prescribed by your care team. Controlled substances are medicines that can cause physical and mental dependence (abuse).    There are strict laws about having and using these medicines. We here at [facility] are committing to working with you in your efforts to get better. To support you in this work, we ll help you schedule regular office appointments for medicine refills. If we must cancel or change your appointment for any reason, we ll make sure you have enough medicine to last until your next appointment.     As a Provider, I will:    Listen carefully to your concerns and treat you with respect.     Recommend a treatment plan that I believe is in your best interest. This plan may involve therapies other than opioid pain medication.     Talk with you often about the possible benefits, and the risk of harm of any medicine that we prescribe for you.     Provide a plan on how to taper (discontinue or go off) using this medicine if the decision is made to stop its use.    As a Patient, I understand that opioid(s):     Are a controlled substance prescribed by my care team to help me function or work and manage my condition(s).     Are strong medicines and can cause serious side effects such as:    Drowsiness, which can seriously affect my driving ability    A lower breathing rate, enough to cause death    Harm to my thinking ability     Depression     Abuse of and addiction to this medicine    Need to be taken exactly as prescribed. Combining opioids with certain medicines or chemicals (such as illegal drugs, sedatives, sleeping pills, and benzodiazepines) can be dangerous or even fatal. If I stop opioids suddenly, I may have severe withdrawal symptoms.    Do not work for all types of pain nor for all patients. If they re not helpful, I may  be asked to stop them.        The risks, benefits and side effects of these medicine(s) were explained to me. I agree that:  1. I will take part in other treatments as advised by my care team. This may be psychiatry or counseling, physical therapy, behavioral therapy, group treatment or a referral to a specialist.     2. I will keep all my appointments. I understand that this is part of the monitoring of opioids. My care team may require an office visit for EVERY opioid/controlled substance refill. If I miss appointments or don t follow instructions, my care team may stop my medicine.    3. I will take my medicines as prescribed. I will not change the dose or schedule unless my care team tells me to. There will be no refills if I run out early.     4. I may be asked to come to the clinic and complete a urine drug test or complete a pill count at any time. If I don t give a urine sample or participate in a pill count, the care team may stop my medicine.    5. I will only receive prescriptions from this clinic for chronic pain. If I am treated by another provider for acute pain issues, I will tell them that I am taking opioid pain medication for chronic pain and that I have a treatment agreement with this provider. I will inform my [facility] care team within one business day if I am given a prescription for any pain medication by another healthcare provider. My [facility] care team can contact other providers and pharmacists about my use of any medicines.    6. It is up to me to make sure that I don t run out of my medicines on weekends or holidays. If my care team is willing to refill my opioid prescription without a visit, I must request refills only during office hours. Refills may take up to 3 business days to process. I will use one pharmacy to fill all my opioid and other controlled substance prescriptions. I will notify the clinic about any changes to my insurance or medication  availability.    7. I am responsible for my prescriptions. If the medicine/prescription is lost, stolen or destroyed, it will not be replaced. I also agree not to share controlled substance medicines with anyone.    8. I am aware I should not use any illegal or recreational drugs. I agree not to drink alcohol unless my care team says I can.       9. If I enroll in the [place] Medical Cannabis program, I will tell my care team prior to my next refill.     10. I will tell my care team right away if I become pregnant, have a new medical problem treated outside of my regular clinic, or have a change in my medications.    11. I understand that this medicine can affect my thinking, judgment and reaction time. Alcohol and drugs affect the brain and body, which can affect the safety of my driving. Being under the influence of alcohol or drugs can affect my decision-making, behaviors, personal safety, and the safety of others. Driving while impaired (DWI) can occur if a person is driving, operating, or in physical control of a car, motorcycle, boat, snowmobile, ATV, motorbike, off-road vehicle, or any other motor vehicle ([place] Statute 169A.20). I understand the risk if I choose to drive or operate any vehicle or machinery.    I understand that if I do not follow any of the conditions above, my prescriptions or treatment may be stopped or changed.          Opioids  What You Need to Know    What are opioids?   Opioids are pain medicines that must be prescribed by a doctor. They are also known as narcotics.     Examples are:   1. morphine (MS Contin, Felicia)  2. oxycodone (Oxycontin)  3. oxycodone and acetaminophen (Percocet)  4. hydrocodone and acetaminophen (Vicodin, Norco)   5. fentanyl patch (Duragesic)   6. hydromorphone (Dilaudid)   7. methadone  8. codeine (Tylenol #3)     What do opioids do well?   Opioids are best for severe short-term pain such as after a surgery or injury. They may work well for cancer pain. They may  help some people with long-lasting (chronic) pain.     What do opioids NOT do well?   Opioids never get rid of pain entirely, and they don t work well for most patients with chronic pain. Opioids don t reduce swelling, one of the causes of pain.                                    Other ways to manage chronic pain and improve function include:       Treat the health problem that may be causing pain    Anti-inflammation medicines, which reduce swelling and tenderness, such as ibuprofen (Advil, Motrin) or naproxen (Aleve)    Acetaminophen (Tylenol)    Antidepressants and anti-seizure medicines, especially for nerve pain    Topical treatments such as patches or creams    Injections or nerve blocks    Chiropractic or osteopathic treatment    Acupuncture, massage, deep breathing, meditation, visual imagery, aromatherapy    Use heat or ice at the pain site    Physical therapy     Exercise    Stop smoking    Take part in therapy       Risks and side effects     Talk to your doctor before you start or decide to keep taking opioids. Possible side effects include:      Lowering your breathing rate enough to cause death    Overdose, including death, especially if taking higher than prescribed doses    Worse depression symptoms; less pleasure in things you usually enjoy    Feeling tired or sluggish    Slower thoughts or cloudy thinking    Being more sensitive to pain over time; pain is harder to control    Trouble sleeping or restless sleep    Changes in hormone levels (for example, less testosterone)    Changes in sex drive or ability to have sex    Constipation    Unsafe driving    Itching and sweating    Dizziness    Nausea, throwing up and dry mouth    What else should I know about opioids?    Opioids may lead to dependence, tolerance, or addiction.      Dependence means that if you stop or reduce the medicine too quickly, you will have withdrawal symptoms. These include loose poop (diarrhea), jitters, flu-like symptoms,  nervousness and tremors. Dependence is not the same as addiction.                       Tolerance means needing higher doses over time to get the same effect. This may increase the chance of serious side effects.      Addiction is when people improperly use a substance that harms their body, their mind or their relations with others. Use of opiates can cause a relapse of addiction if you have a history of drug or alcohol abuse.      People who have used opioids for a long time may have a lower quality of life, worse depression, higher levels of pain and more visits to doctors.    You can overdose on opioids. Take these steps to lower your risk of overdose:    1. Recognize the signs:  Signs of overdose include decrease or loss of consciousness (blackout), slowed breathing, trouble waking up and blue lips. If someone is worried about overdose, they should call 911.    2. Talk to your doctor about Narcan (naloxone).   If you are at risk for overdose, you may be given a prescription for Narcan. This medicine very quickly reverses the effects of opioids.   If you overdose, a friend or family member can give you Narcan while waiting for the ambulance. They need to know the signs of overdose and how to give Narcan.     3. Don't use alcohol or street drugs.   Taking them with opioids can cause death.    4. Do not take any of these medicines unless your doctor says it s OK. Taking these with opioids can cause death:    Benzodiazepines, such as lorazepam (Ativan), alprazolam (Xanax) or diazepam (Valium)    Muscle relaxers, such as cyclobenzaprine (Flexeril)    Sleeping pills like zolpidem (Ambien)     Other opioids      How to keep you and other people safe while taking opioids:    1. Never share your opioids with others.  Opioid medicines are regulated by the Drug Enforcement Agency (RAVI). Selling or sharing medications is a criminal act.    2. Be sure to store opioids in a secure place, locked up if possible. Young children  can easily swallow them and overdose.    3. When you are traveling with your medicines, keep them in the original bottles. If you use a pill box, be sure you also carry a copy of your medicine list from your clinic or pharmacy.    4. Safe disposal of opioids    Most pharmacies have places to get rid of medicine, called disposal kiosks. Medicine disposal options are also available in every [facility]. Search your county and  medication disposal  to find more options. You can find more details at:  [email or web address]     I agree that my provider, clinic care team, and pharmacy may work with any city, state or federal law enforcement agency that investigates the misuse, sale, or other diversion of my controlled medicine. I will allow my provider to discuss my care with, or share a copy of, this agreement with any other treating provider, pharmacy or emergency room where I receive care.    I have read this agreement and have asked questions about anything I did not understand.    _______________________________________________________  Patient Signature - Ankita Day _____________________                   Date     _______________________________________________________  Provider Signature - Ramirez Cano MD   _____________________                   Date     _______________________________________________________  Witness Signature (required if provider not present while patient signing)   _____________________                   Date

## 2022-09-07 NOTE — NURSING NOTE
"Patient presents to the clinic for medication refill.  Last administration of Percocet was today around 0700.   Contract signed today, and TOX order pending.     FOOD SECURITY SCREENING QUESTIONS:    The next two questions are to help us understand your food security.  If you are feeling you need any assistance in this area, we have resources available to support you today.    Hunger Vital Signs:  Within the past 12 months we worried whether our food would run out before we got money to buy more. Never  Within the past 12 months the food we bought just didn't last and we didn't have money to get more. Never    Advance Care Directive on file? yes  Advance Care Directive provided to patient? Declined.  Chief Complaint   Patient presents with     Recheck Medication       Initial /72 (BP Location: Right arm, Patient Position: Sitting, Cuff Size: Adult Large)   Pulse 62   Temp 97.5  F (36.4  C) (Tympanic)   Resp 20   Wt 78 kg (172 lb)   LMP 04/29/1978 (Approximate)   SpO2 98%   BMI 27.97 kg/m   Estimated body mass index is 27.97 kg/m  as calculated from the following:    Height as of 8/3/22: 1.67 m (5' 5.75\").    Weight as of this encounter: 78 kg (172 lb).  Medication Reconciliation: complete        Taylor Hill LPN       "
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
## 2022-09-10 LAB
7AMINOCLONAZEPAM UR QL CFM: PRESENT
DHC UR CFM-MCNC: 255 NG/ML
DHC/CREAT UR: 879 NG/MG {CREAT}
GABAPENTIN UR QL CFM: PRESENT
HYDROCODONE UR CFM-MCNC: 780 NG/ML
HYDROCODONE/CREAT UR: 2690 NG/MG {CREAT}
HYDROMORPHONE UR CFM-MCNC: 1520 NG/ML
HYDROMORPHONE/CREAT UR: 5241 NG/MG {CREAT}

## 2022-09-21 ENCOUNTER — TELEPHONE (OUTPATIENT)
Dept: FAMILY MEDICINE | Facility: OTHER | Age: 67
End: 2022-09-21

## 2022-09-21 ENCOUNTER — IMMUNIZATION (OUTPATIENT)
Dept: FAMILY MEDICINE | Facility: OTHER | Age: 67
End: 2022-09-21
Attending: FAMILY MEDICINE
Payer: MEDICARE

## 2022-09-21 DIAGNOSIS — Z23 HIGH PRIORITY FOR 2019-NCOV VACCINE: Primary | ICD-10-CM

## 2022-09-21 DIAGNOSIS — M65.312 TRIGGER THUMB OF LEFT HAND: Primary | ICD-10-CM

## 2022-09-21 PROCEDURE — 91312 COVID-19,PF,PFIZER BOOSTER BIVALENT: CPT

## 2022-09-21 PROCEDURE — 0124A COVID-19,PF,PFIZER BOOSTER BIVALENT: CPT

## 2022-09-21 NOTE — TELEPHONE ENCOUNTER
Patient has a question thumb surgery. She would like Dr. Cano to set up surgery date. The last one was cancelled.     Racheal Saravia on 9/21/2022 at 3:10 PM

## 2022-09-23 NOTE — TELEPHONE ENCOUNTER
Patient was referred to Dr. Wilkinson for trigger finger. When they called to schedule patient declined an appointment as her finger was feeling better. It is now bothering her again and she changed her mind and would like to see Dr. Wilkinson. She has called there 3 times and nobody has returned her call. She would like another referral sent so they call her again to schedule. Referral pending.     Breana Ramirez CMA on 9/23/2022 at 1:58 PM

## 2022-10-03 ENCOUNTER — APPOINTMENT (OUTPATIENT)
Dept: CT IMAGING | Facility: OTHER | Age: 67
End: 2022-10-03
Attending: PHYSICIAN ASSISTANT
Payer: MEDICARE

## 2022-10-03 ENCOUNTER — HOSPITAL ENCOUNTER (EMERGENCY)
Facility: OTHER | Age: 67
Discharge: HOME | End: 2022-10-03
Attending: PHYSICIAN ASSISTANT | Admitting: PHYSICIAN ASSISTANT
Payer: MEDICARE

## 2022-10-03 ENCOUNTER — APPOINTMENT (OUTPATIENT)
Dept: GENERAL RADIOLOGY | Facility: OTHER | Age: 67
End: 2022-10-03
Attending: PHYSICIAN ASSISTANT
Payer: MEDICARE

## 2022-10-03 ENCOUNTER — NURSE TRIAGE (OUTPATIENT)
Dept: FAMILY MEDICINE | Facility: OTHER | Age: 67
End: 2022-10-03

## 2022-10-03 VITALS
HEART RATE: 49 BPM | OXYGEN SATURATION: 97 % | BODY MASS INDEX: 27 KG/M2 | WEIGHT: 172 LBS | HEIGHT: 67 IN | RESPIRATION RATE: 15 BRPM | TEMPERATURE: 97.3 F | SYSTOLIC BLOOD PRESSURE: 133 MMHG | DIASTOLIC BLOOD PRESSURE: 94 MMHG

## 2022-10-03 DIAGNOSIS — K59.00 CONSTIPATION: ICD-10-CM

## 2022-10-03 DIAGNOSIS — N39.0 URINARY TRACT INFECTION: ICD-10-CM

## 2022-10-03 DIAGNOSIS — R07.9 CHEST PAIN: ICD-10-CM

## 2022-10-03 DIAGNOSIS — R10.84 ABDOMINAL PAIN, GENERALIZED: ICD-10-CM

## 2022-10-03 LAB
ALBUMIN SERPL-MCNC: 4.5 G/DL (ref 3.5–5.7)
ALBUMIN UR-MCNC: NEGATIVE MG/DL
ALP SERPL-CCNC: 57 U/L (ref 34–104)
ALT SERPL W P-5'-P-CCNC: 11 U/L (ref 7–52)
ANION GAP SERPL CALCULATED.3IONS-SCNC: 7 MMOL/L (ref 3–14)
APPEARANCE UR: CLEAR
AST SERPL W P-5'-P-CCNC: 15 U/L (ref 13–39)
ATRIAL RATE - MUSE: 47 BPM
ATRIAL RATE - MUSE: 55 BPM
BACTERIA #/AREA URNS HPF: ABNORMAL /HPF
BASOPHILS # BLD AUTO: 0.1 10E3/UL (ref 0–0.2)
BASOPHILS NFR BLD AUTO: 1 %
BILIRUB SERPL-MCNC: 0.4 MG/DL (ref 0.3–1)
BILIRUB UR QL STRIP: NEGATIVE
BUN SERPL-MCNC: 19 MG/DL (ref 7–25)
CALCIUM SERPL-MCNC: 9.6 MG/DL (ref 8.6–10.3)
CHLORIDE BLD-SCNC: 105 MMOL/L (ref 98–107)
CO2 SERPL-SCNC: 27 MMOL/L (ref 21–31)
COLOR UR AUTO: YELLOW
CREAT SERPL-MCNC: 0.91 MG/DL (ref 0.6–1.2)
DIASTOLIC BLOOD PRESSURE - MUSE: NORMAL MMHG
DIASTOLIC BLOOD PRESSURE - MUSE: NORMAL MMHG
EOSINOPHIL # BLD AUTO: 0.4 10E3/UL (ref 0–0.7)
EOSINOPHIL NFR BLD AUTO: 5 %
ERYTHROCYTE [DISTWIDTH] IN BLOOD BY AUTOMATED COUNT: 13.5 % (ref 10–15)
FLUAV RNA SPEC QL NAA+PROBE: NEGATIVE
FLUBV RNA RESP QL NAA+PROBE: NEGATIVE
GFR SERPL CREATININE-BSD FRML MDRD: 68 ML/MIN/1.73M2
GLUCOSE BLD-MCNC: 110 MG/DL (ref 70–105)
GLUCOSE UR STRIP-MCNC: NEGATIVE MG/DL
HCT VFR BLD AUTO: 35.1 % (ref 35–47)
HGB BLD-MCNC: 11.9 G/DL (ref 11.7–15.7)
HGB UR QL STRIP: NEGATIVE
HOLD SPECIMEN: NORMAL
IMM GRANULOCYTES # BLD: 0 10E3/UL
IMM GRANULOCYTES NFR BLD: 0 %
INTERPRETATION ECG - MUSE: NORMAL
INTERPRETATION ECG - MUSE: NORMAL
KETONES UR STRIP-MCNC: NEGATIVE MG/DL
LACTATE SERPL-SCNC: 0.9 MMOL/L (ref 0.7–2)
LEUKOCYTE ESTERASE UR QL STRIP: ABNORMAL
LIPASE SERPL-CCNC: 22 U/L (ref 11–82)
LYMPHOCYTES # BLD AUTO: 2.5 10E3/UL (ref 0.8–5.3)
LYMPHOCYTES NFR BLD AUTO: 36 %
MCH RBC QN AUTO: 28.7 PG (ref 26.5–33)
MCHC RBC AUTO-ENTMCNC: 33.9 G/DL (ref 31.5–36.5)
MCV RBC AUTO: 85 FL (ref 78–100)
MONOCYTES # BLD AUTO: 0.6 10E3/UL (ref 0–1.3)
MONOCYTES NFR BLD AUTO: 9 %
NEUTROPHILS # BLD AUTO: 3.4 10E3/UL (ref 1.6–8.3)
NEUTROPHILS NFR BLD AUTO: 49 %
NITRATE UR QL: NEGATIVE
NRBC # BLD AUTO: 0 10E3/UL
NRBC BLD AUTO-RTO: 0 /100
NT-PROBNP SERPL-MCNC: 99 PG/ML (ref 0–100)
P AXIS - MUSE: 27 DEGREES
P AXIS - MUSE: 7 DEGREES
PH UR STRIP: 5.5 [PH] (ref 5–9)
PLATELET # BLD AUTO: 248 10E3/UL (ref 150–450)
POTASSIUM BLD-SCNC: 3.6 MMOL/L (ref 3.5–5.1)
PR INTERVAL - MUSE: 168 MS
PR INTERVAL - MUSE: 174 MS
PROT SERPL-MCNC: 7.4 G/DL (ref 6.4–8.9)
QRS DURATION - MUSE: 90 MS
QRS DURATION - MUSE: 94 MS
QT - MUSE: 430 MS
QT - MUSE: 488 MS
QTC - MUSE: 411 MS
QTC - MUSE: 431 MS
R AXIS - MUSE: 20 DEGREES
R AXIS - MUSE: 38 DEGREES
RBC # BLD AUTO: 4.15 10E6/UL (ref 3.8–5.2)
RBC URINE: <1 /HPF
RSV RNA SPEC NAA+PROBE: NEGATIVE
SARS-COV-2 RNA RESP QL NAA+PROBE: NEGATIVE
SODIUM SERPL-SCNC: 139 MMOL/L (ref 134–144)
SP GR UR STRIP: 1.02 (ref 1–1.03)
SYSTOLIC BLOOD PRESSURE - MUSE: NORMAL MMHG
SYSTOLIC BLOOD PRESSURE - MUSE: NORMAL MMHG
T AXIS - MUSE: 53 DEGREES
T AXIS - MUSE: 67 DEGREES
TROPONIN I SERPL-MCNC: 8.3 PG/ML (ref 0–34)
TROPONIN I SERPL-MCNC: 9.5 PG/ML (ref 0–34)
UROBILINOGEN UR STRIP-MCNC: NORMAL MG/DL
VENTRICULAR RATE- MUSE: 47 BPM
VENTRICULAR RATE- MUSE: 55 BPM
WBC # BLD AUTO: 6.9 10E3/UL (ref 4–11)
WBC URINE: 28 /HPF

## 2022-10-03 PROCEDURE — 71045 X-RAY EXAM CHEST 1 VIEW: CPT

## 2022-10-03 PROCEDURE — 250N000011 HC RX IP 250 OP 636: Performed by: PHYSICIAN ASSISTANT

## 2022-10-03 PROCEDURE — C9803 HOPD COVID-19 SPEC COLLECT: HCPCS | Performed by: PHYSICIAN ASSISTANT

## 2022-10-03 PROCEDURE — 93005 ELECTROCARDIOGRAM TRACING: CPT | Mod: 76 | Performed by: PHYSICIAN ASSISTANT

## 2022-10-03 PROCEDURE — 99285 EMERGENCY DEPT VISIT HI MDM: CPT | Mod: 25,CS | Performed by: PHYSICIAN ASSISTANT

## 2022-10-03 PROCEDURE — 96365 THER/PROPH/DIAG IV INF INIT: CPT | Mod: XU | Performed by: PHYSICIAN ASSISTANT

## 2022-10-03 PROCEDURE — 87086 URINE CULTURE/COLONY COUNT: CPT | Performed by: PHYSICIAN ASSISTANT

## 2022-10-03 PROCEDURE — 96375 TX/PRO/DX INJ NEW DRUG ADDON: CPT | Performed by: PHYSICIAN ASSISTANT

## 2022-10-03 PROCEDURE — 83880 ASSAY OF NATRIURETIC PEPTIDE: CPT | Mod: GZ | Performed by: PHYSICIAN ASSISTANT

## 2022-10-03 PROCEDURE — 36415 COLL VENOUS BLD VENIPUNCTURE: CPT | Performed by: PHYSICIAN ASSISTANT

## 2022-10-03 PROCEDURE — 74177 CT ABD & PELVIS W/CONTRAST: CPT | Mod: MG

## 2022-10-03 PROCEDURE — 80053 COMPREHEN METABOLIC PANEL: CPT | Performed by: PHYSICIAN ASSISTANT

## 2022-10-03 PROCEDURE — 85025 COMPLETE CBC W/AUTO DIFF WBC: CPT | Performed by: PHYSICIAN ASSISTANT

## 2022-10-03 PROCEDURE — 99284 EMERGENCY DEPT VISIT MOD MDM: CPT | Mod: CS | Performed by: PHYSICIAN ASSISTANT

## 2022-10-03 PROCEDURE — 87637 SARSCOV2&INF A&B&RSV AMP PRB: CPT | Performed by: PHYSICIAN ASSISTANT

## 2022-10-03 PROCEDURE — 83605 ASSAY OF LACTIC ACID: CPT | Performed by: PHYSICIAN ASSISTANT

## 2022-10-03 PROCEDURE — 83690 ASSAY OF LIPASE: CPT | Performed by: PHYSICIAN ASSISTANT

## 2022-10-03 PROCEDURE — 84484 ASSAY OF TROPONIN QUANT: CPT | Performed by: PHYSICIAN ASSISTANT

## 2022-10-03 PROCEDURE — 96376 TX/PRO/DX INJ SAME DRUG ADON: CPT | Performed by: PHYSICIAN ASSISTANT

## 2022-10-03 PROCEDURE — 93005 ELECTROCARDIOGRAM TRACING: CPT | Performed by: PHYSICIAN ASSISTANT

## 2022-10-03 PROCEDURE — 93010 ELECTROCARDIOGRAM REPORT: CPT | Mod: 76 | Performed by: INTERNAL MEDICINE

## 2022-10-03 PROCEDURE — 81001 URINALYSIS AUTO W/SCOPE: CPT | Performed by: PHYSICIAN ASSISTANT

## 2022-10-03 RX ORDER — CEFTRIAXONE SODIUM 1 G/50ML
1 INJECTION, SOLUTION INTRAVENOUS ONCE
Status: COMPLETED | OUTPATIENT
Start: 2022-10-03 | End: 2022-10-03

## 2022-10-03 RX ORDER — CEPHALEXIN 500 MG/1
500 CAPSULE ORAL 4 TIMES DAILY
Qty: 30 CAPSULE | Refills: 0 | Status: SHIPPED | OUTPATIENT
Start: 2022-10-03 | End: 2022-11-09

## 2022-10-03 RX ORDER — FENTANYL CITRATE 50 UG/ML
50 INJECTION, SOLUTION INTRAMUSCULAR; INTRAVENOUS ONCE
Status: COMPLETED | OUTPATIENT
Start: 2022-10-03 | End: 2022-10-03

## 2022-10-03 RX ORDER — IOPAMIDOL 755 MG/ML
100 INJECTION, SOLUTION INTRAVASCULAR ONCE
Status: COMPLETED | OUTPATIENT
Start: 2022-10-03 | End: 2022-10-03

## 2022-10-03 RX ADMIN — FENTANYL CITRATE 50 MCG: 50 INJECTION, SOLUTION INTRAMUSCULAR; INTRAVENOUS at 19:46

## 2022-10-03 RX ADMIN — CEFTRIAXONE SODIUM 1 G: 1 INJECTION, SOLUTION INTRAVENOUS at 19:46

## 2022-10-03 RX ADMIN — IOPAMIDOL 100 ML: 755 INJECTION, SOLUTION INTRAVENOUS at 18:26

## 2022-10-03 RX ADMIN — FENTANYL CITRATE 50 MCG: 50 INJECTION, SOLUTION INTRAMUSCULAR; INTRAVENOUS at 17:26

## 2022-10-03 ASSESSMENT — ACTIVITIES OF DAILY LIVING (ADL)
ADLS_ACUITY_SCORE: 35
ADLS_ACUITY_SCORE: 35

## 2022-10-03 NOTE — ED NOTES
"Updated Provider about change in chest pain, pt rates 5/10 and describes it as \"it feels like my dog is sitting on me\".   "

## 2022-10-03 NOTE — ED PROVIDER NOTES
History     Chief Complaint   Patient presents with     Chest Pain     Abdominal Pain     HPI  Ankita Day is a 67 year old female who presents to the ED for evaluation of chest and abdominal pain. Pt arrives with c/o chest pain, abdominal pain, and kidney pain that has been ongoing for the last week. Pt states that chest pain radiates into her face/jaw. Pain worsens with exertion.      Allergies:  Allergies   Allergen Reactions     Atorvastatin Muscle Pain (Myalgia)     Tiotropium Bromide [Tiotropium] Rash     Advil [Ibuprofen] Other (See Comments)     Does not recall but feel like it interacted with blood thinners and HX of GI BLEED     Ezetimibe Muscle Pain (Myalgia)     Latex Rash     Niacin      Other reaction(s): Flushing     No Clinical Screening - See Comments Itching, Rash and Blisters     Metals and plastics       Tape [Adhesive Tape] Rash       Problem List:    Patient Active Problem List    Diagnosis Date Noted     Hypertensive heart and chronic kidney disease with heart failure and stage 1 through stage 4 chronic kidney disease, or unspecified chronic kidney disease (H) 09/07/2022     Priority: Medium     History of CVA-mild chronic ischemic disease on 8/20/2021. 01/11/2022     Priority: Medium     ASCVD (arteriosclerotic cardiovascular disease) 01/11/2022     Priority: Medium     Nausea 01/11/2022     Priority: Medium     Nonintractable episodic headache, unspecified headache type 01/11/2022     Priority: Medium     Gastroesophageal reflux disease with esophagitis without hemorrhage 01/11/2022     Priority: Medium     CORTEZ (dyspnea on exertion) 11/16/2021     Priority: Medium     Palpitations 11/16/2021     Priority: Medium     Chest pain, unspecified type 11/16/2021     Priority: Medium     Uncomplicated asthma, unspecified asthma severity, unspecified whether persistent 11/16/2021     Priority: Medium     Vitamin B12 deficiency (non anemic) 11/16/2021     Priority: Medium     Symptomatic  bradycardia 07/16/2021     Priority: Medium     Chronic stable angina (H) 02/05/2020     Priority: Medium     Chronic ischemic heart disease 02/05/2020     Priority: Medium     History of pulmonary embolism on 1/2/2020. (-) VQ scan on 11/23/2021 01/02/2020     Priority: Medium     Stage 3a chronic kidney disease (H) 06/19/2019     Priority: Medium     Chronic anxiety 01/22/2018     Priority: Medium     Collagenous colitis 01/22/2018     Priority: Medium     Overview:   Possible Dx 2007       Major depressive disorder, recurrent episode, severe (H) 01/22/2018     Priority: Medium     Essential hypertension 01/22/2018     Priority: Medium     Mixed hyperlipidemia 01/22/2018     Priority: Medium     Osteoarthritis 01/22/2018     Priority: Medium     Panic attack 01/22/2018     Priority: Medium     Status post coronary angiogram on 9/25/2017 [facility]-stable disease 09/15/2017     Priority: Medium     History of tobacco abuse-quitting 8/23/2017 08/10/2017     Priority: Medium     Presence of stent in coronary artery in patient with coronary artery disease 08/10/2017     Priority: Medium     History of coronary artery bypass graft x 3 on 2/12/2003 08/10/2017     Priority: Medium     Noncompliance with CPAP treatment 10/21/2016     Priority: Medium     Intractable chronic common migraine without aura 10/21/2016     Priority: Medium     H/O multiple concussions 10/21/2016     Priority: Medium     History of Clostridium difficile 08/19/2016     Priority: Medium     Iron deficiency anemia 07/26/2016     Priority: Medium     Controlled substance agreement updated 9- 01/28/2014     Priority: Medium     Overview:        Pain medication agreement 01/28/2014     Priority: Medium     Formatting of this note might be different from the original.       Central sleep apnea 10/14/2013     Priority: Medium     Myofascial pain 10/14/2013     Priority: Medium     Vitamin D deficiency 05/06/2013     Priority: Medium      Subclavian artery stenosis, left (H) 03/31/2013     Priority: Medium     Overview:   S/p prox left SCA stent 4/1/2013       Lumbar facet arthropathy 01/02/2013     Priority: Medium     Nonspecific abnormal results of pulmonary system function study 12/21/2011     Priority: Medium     Slow transit constipation 11/29/2011     Priority: Medium     Gastric ulcer with hemorrhage 10/03/2011     Priority: Medium     Family history of malignant neoplasm of gastrointestinal tract 09/26/2011     Priority: Medium     Nodular degeneration of cornea 09/26/2011     Priority: Medium     Bilateral low back pain without sciatica 05/31/2011     Priority: Medium     Chronic pain disorder 12/01/2010     Priority: Medium     COPD (chronic obstructive pulmonary disease) (H) 06/15/2007     Priority: Medium     Overview:   Low DLCO, normal spirometry on 12/15/11       Peptic ulcer 06/15/2007     Priority: Medium     Atherosclerosis of coronary artery bypass graft of native heart with stable angina pectoris (H) 09/12/2002     Priority: Medium     Overview:   Multiple Angigrams prior to 2007. Also:  6/5/2007: Angiogram: TAXUS DELONTE to ostial circumflux, instent restenosis  5/23/2008: Left Main 30% diseased, LAD stents patent, Left circ stent patent, Chronic occluded right internal mammary artery graft to distal RCA, native RCA has 30% stenosis; NO intevention  9/19/2012 CT Angiogram: Patent LIMA to LAD, patent LAD stents, moderate proximal circumflex disease, patent RCA , occluded right internal mammary artery graft to distal RCA.  9/20/2012: Angiogram: Stent to Left Main, ostial LAD, and PTCA of ostial left circumflex          Past Medical History:    Past Medical History:   Diagnosis Date     Acute ischemic heart disease (H)      Acute myocardial infarction (H)      Anxiety disorder      Atherosclerotic heart disease of native coronary artery without angina pectoris      Bilateral carpal tunnel syndrome      Cervicalgia      Chest pain       Chronic gastric ulcer without hemorrhage or perforation      Chronic ischemic heart disease      Chronic obstructive pulmonary disease (H)      Chronic or unspecified gastric ulcer with hemorrhage      Chronic pain syndrome      Constipation      Coronary angioplasty status      Coronary angioplasty status      Coronary angioplasty status      Dorsalgia      Encounter for other administrative examinations      Encounter for screening for cardiovascular disorders      Enterocolitis due to Clostridium difficile      Essential (primary) hypertension      Hyperlipidemia      Major depressive disorder, single episode      Migraine without status migrainosus, not intractable      Nodular corneal degeneration      Noninfective gastroenteritis and colitis      Noninfective gastroenteritis and colitis      Osteoarthritis      Other chest pain      Pain in knee      Pain in right shoulder      Panic disorder without agoraphobia      Peptic ulcer without hemorrhage or perforation      Peripheral vascular disease (H)      Personal history of diseases of the blood and blood-forming organs and certain disorders involving the immune mechanism (CODE)      Personal history of nicotine dependence      Personal history of other medical treatment (CODE)      Presence of aortocoronary bypass graft      Primary central sleep apnea      Sepsis due to Escherichia coli (E. coli) (H)      Stricture of artery (H)      Thoracic, thoracolumbar and lumbosacral intervertebral disc disorder      Uncomplicated opioid abuse (H)      Vitamin D deficiency        Past Surgical History:    Past Surgical History:   Procedure Laterality Date     ANGIOPLASTY      9/12/02,with triple stenting     APPENDECTOMY OPEN      No Comments Provided     ARTHROSCOPY KNEE      left     ARTHROSCOPY SHOULDER Right 05/12/2017    labral tear, rotator cuff tear and some subacromial decompression      BYPASS GRAFT ARTERY CORONARY      12/13/02,Triple bypass, left internal  mammary  to LAD, right internal mammary to right coronary artery, saphenous to obtuse marginal of the left circumflex.     COLONOSCOPY      ,Dr Bowman benign polyps     COLONOSCOPY  10/03/2011    2011,benign polyps, Dr. Bowman     COLONOSCOPY  2016,normal, Dr Bowman     ELBOW SURGERY      baby,birth malachi removed from right arm     EMBOLECTOMY UPPER EXTREMITY  2013    brachial artery pseudoaneurysm after stenting     ESOPHAGOSCOPY, GASTROSCOPY, DUODENOSCOPY (EGD), COMBINED      ,EGD Dr Bowman with pyloric ulcer     ESOPHAGOSCOPY, GASTROSCOPY, DUODENOSCOPY (EGD), COMBINED      ,pyloric ulcer, Dr. Bowman     ESOPHAGOSCOPY, GASTROSCOPY, DUODENOSCOPY (EGD), COMBINED      16,mild gastritis, Dr Bowman     ESOPHAGOSCOPY, GASTROSCOPY, DUODENOSCOPY (EGD), COMBINED      2017,Dr Bowman. Antral ulcer     ESOPHAGOSCOPY, GASTROSCOPY, DUODENOSCOPY (EGD), COMBINED  2018    Dr Bowman, healed ulcer     HYSTERECTOMY TOTAL ABDOMINAL      age 21     LAPAROSCOPIC CHOLECYSTECTOMY           OSTEOTOMY FEMUR DISTAL      x3, right knee     OSTEOTOMY FEMUR DISTAL      2000,left knee  ligament surgery     OTHER SURGICAL HISTORY      1/10/2003,,PTCA     OTHER SURGICAL HISTORY      2012,,PTCA,DELONTE in LAD and left main     OTHER SURGICAL HISTORY      2013,,PTCA,L subclavian stenosis     SALPINGO-OOPHORECTOMY BILATERAL      age 27,Bilateral salpingo-oophorectomy     TONSILLECTOMY, ADENOIDECTOMY, COMBINED      childhood       Family History:    Family History   Problem Relation Age of Onset     Heart Disease Father         Heart Disease,Heart condition/Significant for atherosclerotic cardiovascular disease, but non premature.     Colon Cancer Father         Cancer-colon, of colon cancer     Cancer Father         Cancer,mets to liver, secondary to colon cancer     Heart Disease Mother         Heart Disease     Cancer Other         Cancer,Multiple Myeloma     Heart Disease Other          Heart Disease,Ischemic Heart Disease     Colon Cancer Other         Cancer-colon,Malignant neoplasms     Cancer Sister         Cancer,multiple myeloma     Other - See Comments Son         gallstones       Social History:  Marital Status:   [2]  Social History     Tobacco Use     Smoking status: Former Smoker     Packs/day: 1.00     Years: 35.00     Pack years: 35.00     Types: Cigarettes     Quit date: 2/15/2017     Years since quittin.6     Smokeless tobacco: Never Used   Vaping Use     Vaping Use: Never used   Substance Use Topics     Alcohol use: Not Currently     Alcohol/week: 0.0 standard drinks     Drug use: No        Medications:    albuterol (PROAIR HFA/PROVENTIL HFA/VENTOLIN HFA) 108 (90 Base) MCG/ACT inhaler  amLODIPine (NORVASC) 10 MG tablet  busPIRone (BUSPAR) 15 MG tablet  cephALEXin (KEFLEX) 500 MG capsule  clonazePAM (KLONOPIN) 1 MG tablet  clopidogrel (PLAVIX) 75 MG tablet  diclofenac (VOLTAREN) 1 % topical gel  HYDROcodone-acetaminophen (NORCO)  MG per tablet  [START ON 10/10/2022] HYDROcodone-acetaminophen (NORCO)  MG per tablet  lisinopril (ZESTRIL) 20 MG tablet  naloxone (NARCAN) 4 MG/0.1ML nasal spray  nitroGLYcerin (NITROLINGUAL) 0.4 MG/SPRAY spray  ondansetron (ZOFRAN) 4 MG tablet  pantoprazole (PROTONIX) 40 MG EC tablet  RESTASIS 0.05 % ophthalmic emulsion  rivaroxaban ANTICOAGULANT (XARELTO) 20 MG TABS tablet  sucralfate (CARAFATE) 1 GM tablet  VITAMIN D, CHOLECALCIFEROL, PO  vortioxetine (TRINTELLIX) 20 MG tablet  gabapentin (NEURONTIN) 300 MG capsule  rosuvastatin (CRESTOR) 20 MG tablet          Review of Systems   Constitutional: Negative for fever.   HENT: Negative for congestion.    Eyes: Negative for visual disturbance.   Respiratory: Negative for shortness of breath.    Cardiovascular: Positive for chest pain.   Gastrointestinal: Positive for abdominal pain. Negative for nausea and vomiting.   Genitourinary: Positive for frequency. Negative for dysuria.  "  Psychiatric/Behavioral: Negative for confusion.       Physical Exam   BP: 128/78  Pulse: 58  Temp: 97.3  F (36.3  C)  Resp: 16  Height: 170.2 cm (5' 7\")  Weight: 77.1 kg (170 lb)  SpO2: 97 %      Physical Exam  Constitutional:       General: She is not in acute distress.     Appearance: She is well-developed. She is not diaphoretic.   HENT:      Head: Normocephalic and atraumatic.   Eyes:      General: No scleral icterus.     Conjunctiva/sclera: Conjunctivae normal.   Cardiovascular:      Rate and Rhythm: Normal rate and regular rhythm.   Pulmonary:      Effort: Pulmonary effort is normal.      Breath sounds: Normal breath sounds.   Abdominal:      Palpations: Abdomen is soft.      Tenderness: There is no abdominal tenderness.   Musculoskeletal:         General: No deformity.      Cervical back: Neck supple.   Lymphadenopathy:      Cervical: No cervical adenopathy.   Skin:     General: Skin is warm and dry.      Coloration: Skin is not jaundiced.      Findings: No rash.   Neurological:      Mental Status: She is alert and oriented to person, place, and time. Mental status is at baseline.   Psychiatric:         Mood and Affect: Mood normal.         Behavior: Behavior normal.         ED Course              Procedures         EKG read at 1625, HR 55, sinus bradycardia, no ST changes.     EKG read at 1719. HR 47, sinus bradycardia, no ST changes.     Critical Care time:  none               No results found. However, due to the size of the patient record, not all encounters were searched. Please check Results Review for a complete set of results.    Medications   fentaNYL (PF) (SUBLIMAZE) injection 50 mcg (50 mcg Intravenous Given 10/3/22 1726)   iopamidol (ISOVUE-370) solution 100 mL (100 mLs Intravenous Given 10/3/22 1826)   fentaNYL (PF) (SUBLIMAZE) injection 50 mcg (50 mcg Intravenous Given 10/3/22 1946)   cefTRIAXone in d5w (ROCEPHIN) intermittent infusion 1 g (0 g Intravenous Stopped 10/3/22 2018) "       Assessments & Plan (with Medical Decision Making)   Pt nontoxic in NAD. Heart, lung, bowel sounds normal, abd soft, nontender to palpation, nondistended. VSS, afebrile. She has no CVA tenderness.     Ct abdomen read as constipation, CXR read as normal.     Lab work appeared excellent and included 2 negative downward trending troponins.    Urinalysis consistent with infection.  Did give her dose of Rocephin.  We will send her home on Keflex.  Upon reassessment she seemed to be doing very well she feels comfortable going home at this time.  We discussed our findings.  She says she always suffers from some chest discomfort and this is nothing new.  Given her report of constant chest discomfort and with reassuring troponin studies I think less likely she is suffering from ACS today.  Referral was placed for follow-up with PCP for reassessment and she is told return the ED if there are worsening or concerning symptoms.  She and her  understand agree with plan patient is discharged.    Roberto Carlos Silverio PA-C        I have reviewed the nursing notes.    I have reviewed the findings, diagnosis, plan and need for follow up with the patient.       Discharge Medication List as of 10/3/2022  8:19 PM      START taking these medications    Details   cephALEXin (KEFLEX) 500 MG capsule Take 1 capsule (500 mg) by mouth 4 times daily, Disp-30 capsule, R-0, [facility]             Final diagnoses:   Chest pain   Abdominal pain, generalized   Urinary tract infection   Constipation       10/3/2022   [facility]     Roberto Carlos Silverio PA  10/05/22 0221

## 2022-10-03 NOTE — TELEPHONE ENCOUNTER
"Patient calling and states that the last 5-7 days has had upper abdominal pain rating 7-8/10.   Patient states that she has been having more chest pain due to abdomin pain.  Rates chest pain 9/10 now and lasting over 5 minutes and has been going on for the last 5-7 days.  Patient has extensive heart history.  Patient states did have to take nitroglycerin a couple days ago and it helped but doesn't like taking them because she gets a headache.  Patient states she has urinary frequency.  Patient states had a bowel movement today and it wasn't black but almost.  Patient advised to call 911.   Patient states she will have her  drive her into ED.  Jeimy Whyte RN on 10/3/2022 at 3:48 PM      Reason for Disposition    Chest pain lasting longer than 5 minutes and ANY of the following:* Over 44 years old* Over 30 years old and at least one cardiac risk factor (e.g., diabetes mellitus, high blood pressure, high cholesterol, smoker, or strong family history of heart disease)* History of heart disease (i.e., angina, heart attack, heart failure, bypass surgery, takes nitroglycerin)* Pain is crushing, pressure-like, or heavy    Additional Information    Negative: SEVERE difficulty breathing (e.g., struggling for each breath, speaks in single words)    Negative: Passed out (i.e., fainted, collapsed and was not responding)    Negative: Difficult to awaken or acting confused (e.g., disoriented, slurred speech)    Negative: Shock suspected (e.g., cold/pale/clammy skin, too weak to stand, low BP, rapid pulse)    Answer Assessment - Initial Assessment Questions  1. LOCATION: \"Where does it hurt?\"        In center of chest of to left side   2. RADIATION: \"Does the pain go anywhere else?\" (e.g., into neck, jaw, arms, back)      To back, jaw sore  3. ONSET: \"When did the chest pain begin?\" (Minutes, hours or days)       6 days  4. PATTERN \"Does the pain come and go, or has it been constant since it started?\"  \"Does it get " "worse with exertion?\"       Come goes, worse with exertion  5. DURATION: \"How long does it last\" (e.g., seconds, minutes, hours)      eache time is long  6. SEVERITY: \"How bad is the pain?\"  (e.g., Scale 1-10; mild, moderate, or severe)     - MILD (1-3): doesn't interfere with normal activities      - MODERATE (4-7): interferes with normal activities or awakens from sleep     - SEVERE (8-10): excruciating pain, unable to do any normal activities        7-8/10, abdominal pain 9/10  7. CARDIAC RISK FACTORS: \"Do you have any history of heart problems or risk factors for heart disease?\" (e.g., angina, prior heart attack; diabetes, high blood pressure, high cholesterol, smoker, or strong family history of heart disease)      8 heart attack , 17 stents, blood clots  8. PULMONARY RISK FACTORS: \"Do you have any history of lung disease?\"  (e.g., blood clots in lung, asthma, emphysema, birth control pills)      Blood clots in lungs  9. CAUSE: \"What do you think is causing the chest pain?\"      Unsure, abdominal pain triggering chest pain getting worse  10. OTHER SYMPTOMS: \"Do you have any other symptoms?\" (e.g., dizziness, nausea, vomiting, sweating, fever, difficulty breathing, cough)        Worse with eating, always dizziness, difficulty breathing, urinary frequency  11. PREGNANCY: \"Is there any chance you are pregnant?\" \"When was your last menstrual period?\"        n/a    Protocols used: CHEST PAIN-A-OH      "

## 2022-10-03 NOTE — ED TRIAGE NOTES
Pt arrives with c/o chest pain, abdominal pain, and kidney pain that has been ongoing for the last week. Pt states that chest pain radiates into her face/jaw. Pain worsens with exertion.      Triage Assessment     Row Name 10/03/22 1845       Triage Assessment (Adult)    Airway WDL WDL       Respiratory WDL    Respiratory WDL WDL       Skin Circulation/Temperature WDL    Skin Circulation/Temperature WDL WDL       Cardiac WDL    Cardiac WDL X;chest pain       Peripheral/Neurovascular WDL    Peripheral Neurovascular WDL WDL       Cognitive/Neuro/Behavioral WDL    Cognitive/Neuro/Behavioral WDL WDL

## 2022-10-03 NOTE — PROGRESS NOTES

## 2022-10-04 ENCOUNTER — TELEPHONE (OUTPATIENT)
Dept: FAMILY MEDICINE | Facility: OTHER | Age: 67
End: 2022-10-04

## 2022-10-04 DIAGNOSIS — R11.0 NAUSEA: ICD-10-CM

## 2022-10-04 NOTE — RESULT ENCOUNTER NOTE
[facility] Emergency Dept discharge antibiotic (if prescribed): Cephalexin (Keflex) 500 mg capsule, 1 capsule (500 mg) by mouth 4 times daily for 7 days.   Date of Rx (if applicable):  10/3/22  No changes in treatment per [facility] ED Lab Result Urine culture protocol.

## 2022-10-04 NOTE — TELEPHONE ENCOUNTER
Patient informed by ER provider that she needed to follow up with Ramirez Cano when Antibiotics are done.  Patient reports that she is feeling fine now.  Patient is okay with waiting for upcoming visit in November.    Patient would like to call back later this week to confirm or deny if the antibiotic are still working for a work in appointment.  Taylor Hill LPN 10/4/2022 12:04 PM

## 2022-10-04 NOTE — TELEPHONE ENCOUNTER
The patient needs to see Dr Cano on the 10th .  She was seen in the ER on October 3, 2022.   Can she be worked in.  Please advise.

## 2022-10-05 LAB — BACTERIA UR CULT: ABNORMAL

## 2022-10-05 RX ORDER — ONDANSETRON 4 MG/1
TABLET, FILM COATED ORAL
Qty: 90 TABLET | Refills: 1 | Status: SHIPPED | OUTPATIENT
Start: 2022-10-05 | End: 2022-11-22

## 2022-10-05 ASSESSMENT — ENCOUNTER SYMPTOMS
CONFUSION: 0
ABDOMINAL PAIN: 1
NAUSEA: 0
FEVER: 0
SHORTNESS OF BREATH: 0
DYSURIA: 0
FREQUENCY: 1
VOMITING: 0

## 2022-10-05 NOTE — RESULT ENCOUNTER NOTE
Final Urine Culture Report on 10/5/22  [facility] Emergency Dept discharge antibiotic prescribed:  Cephalexin (Keflex) 500 mg capsule, 1 capsule (500 mg) by mouth 4 times daily for 7 days.  #1. Bacteria, 50,000 - 100,000 CFU/ML Escherichia coli , is SUSCEPTIBLE to Antibiotic.    No change in treatment per [facility] ED lab result Urine Culture protocol.

## 2022-10-05 NOTE — TELEPHONE ENCOUNTER
"Requested Prescriptions   Pending Prescriptions Disp Refills     ondansetron (ZOFRAN) 4 MG tablet [Pharmacy Med Name: ONDANSETRON HCL 4 MG TABLET] 90 tablet 1     Sig: TAKE 1 TABLET BY MOUTH EVERY 6 HOURS AS NEEDED FOR NAUSEA        Antivertigo/Antiemetic Agents Passed - 10/5/2022  8:27 AM        Passed - Recent (12 mo) or future (30 days) visit within the authorizing provider's specialty     Patient has had an office visit with the authorizing provider or a provider within the authorizing providers department within the previous 12 mos or has a future within next 30 days. See \"Patient Info\" tab in inbasket, or \"Choose Columns\" in Meds & Orders section of the refill encounter.              Passed - Medication is active on med list        Passed - Patient is 18 years of age or older           Last Written Prescription Date:  5/18/22  Last Fill Quantity: 90,   # refills: 1  Last Office Visit: 1/11/22  Future Office visit:    Next 5 appointments (look out 90 days)    Nov 09, 2022 10:00 AM  SHORT with Ramirez Cano MD  [facility] ([facility] ) [address]  [place] [id number] [phone]     Routing refill request to provider for review/approval because:  Unable to complete prescription refill per RN Medication Refill Policy.   Jamilah Ray RN ....................  10/5/2022   2:34 PM    "

## 2022-10-10 NOTE — TELEPHONE ENCOUNTER
Please let Malina know her pill supply should be sufficient until appointment and then I will write prescription to try and reset the count. Thanks.    Refill denied  She had 124 pills on May 28 on schedule for refill and 56 on 6/16 to complete 30 day supply due refill on June 27  Since then has received 68 pills June 24, 56 pills July 7, 56 pills July 16 bringing her to 180 good until July 27. Now refilled pills on July 24, so is a few days ahead of refill schedule with temporary prescriptions.  Next refill could be Aug 4 for 124 pills to complete 180 for the month. She has appointment Aug 3 and should be good until then.            Fall with Harm Risk

## 2022-10-13 ENCOUNTER — HOSPITAL ENCOUNTER (EMERGENCY)
Facility: OTHER | Age: 67
Discharge: HOME | End: 2022-10-13
Attending: EMERGENCY MEDICINE | Admitting: EMERGENCY MEDICINE
Payer: MEDICARE

## 2022-10-13 VITALS
DIASTOLIC BLOOD PRESSURE: 78 MMHG | SYSTOLIC BLOOD PRESSURE: 93 MMHG | WEIGHT: 172 LBS | HEART RATE: 89 BPM | TEMPERATURE: 97.8 F | BODY MASS INDEX: 27 KG/M2 | RESPIRATION RATE: 14 BRPM | HEIGHT: 67 IN | OXYGEN SATURATION: 94 %

## 2022-10-13 DIAGNOSIS — R31.9 URINARY TRACT INFECTION WITH HEMATURIA, SITE UNSPECIFIED: ICD-10-CM

## 2022-10-13 DIAGNOSIS — M62.81 GENERALIZED MUSCLE WEAKNESS: ICD-10-CM

## 2022-10-13 DIAGNOSIS — N39.0 URINARY TRACT INFECTION WITH HEMATURIA, SITE UNSPECIFIED: ICD-10-CM

## 2022-10-13 LAB
ALBUMIN SERPL-MCNC: 4.3 G/DL (ref 3.5–5.7)
ALBUMIN UR-MCNC: 50 MG/DL
ALP SERPL-CCNC: 52 U/L (ref 34–104)
ALT SERPL W P-5'-P-CCNC: 15 U/L (ref 7–52)
AMPHETAMINES UR QL: NOT DETECTED
ANION GAP SERPL CALCULATED.3IONS-SCNC: 10 MMOL/L (ref 3–14)
APPEARANCE UR: ABNORMAL
AST SERPL W P-5'-P-CCNC: 33 U/L (ref 13–39)
BARBITURATES UR QL SCN: NOT DETECTED
BASOPHILS # BLD AUTO: 0.1 10E3/UL (ref 0–0.2)
BASOPHILS NFR BLD AUTO: 1 %
BENZODIAZ UR QL SCN: NOT DETECTED
BILIRUB SERPL-MCNC: 0.3 MG/DL (ref 0.3–1)
BILIRUB UR QL STRIP: NEGATIVE
BUN SERPL-MCNC: 26 MG/DL (ref 7–25)
BUPRENORPHINE UR QL: NOT DETECTED
CALCIUM SERPL-MCNC: 9.3 MG/DL (ref 8.6–10.3)
CANNABINOIDS UR QL: NOT DETECTED
CHLORIDE BLD-SCNC: 109 MMOL/L (ref 98–107)
CO2 SERPL-SCNC: 22 MMOL/L (ref 21–31)
COCAINE UR QL SCN: NOT DETECTED
COLOR UR AUTO: YELLOW
CREAT SERPL-MCNC: 1.39 MG/DL (ref 0.6–1.2)
D-METHAMPHET UR QL: NOT DETECTED
EOSINOPHIL # BLD AUTO: 0.2 10E3/UL (ref 0–0.7)
EOSINOPHIL NFR BLD AUTO: 2 %
ERYTHROCYTE [DISTWIDTH] IN BLOOD BY AUTOMATED COUNT: 14.8 % (ref 10–15)
ETHANOL SERPL-MCNC: <0.01 G/DL
FLUAV RNA SPEC QL NAA+PROBE: NEGATIVE
FLUBV RNA RESP QL NAA+PROBE: NEGATIVE
GFR SERPL CREATININE-BSD FRML MDRD: 41 ML/MIN/1.73M2
GLUCOSE BLD-MCNC: 89 MG/DL (ref 70–105)
GLUCOSE UR STRIP-MCNC: NEGATIVE MG/DL
HCT VFR BLD AUTO: 31.7 % (ref 35–47)
HGB BLD-MCNC: 10.5 G/DL (ref 11.7–15.7)
HGB UR QL STRIP: ABNORMAL
HOLD SPECIMEN: NORMAL
IMM GRANULOCYTES # BLD: 0 10E3/UL
IMM GRANULOCYTES NFR BLD: 0 %
KETONES UR STRIP-MCNC: NEGATIVE MG/DL
LACTATE SERPL-SCNC: 0.7 MMOL/L (ref 0.7–2)
LEUKOCYTE ESTERASE UR QL STRIP: NEGATIVE
LYMPHOCYTES # BLD AUTO: 1.7 10E3/UL (ref 0.8–5.3)
LYMPHOCYTES NFR BLD AUTO: 16 %
MCH RBC QN AUTO: 28.9 PG (ref 26.5–33)
MCHC RBC AUTO-ENTMCNC: 33.1 G/DL (ref 31.5–36.5)
MCV RBC AUTO: 87 FL (ref 78–100)
METHADONE UR QL SCN: NOT DETECTED
MONOCYTES # BLD AUTO: 0.7 10E3/UL (ref 0–1.3)
MONOCYTES NFR BLD AUTO: 6 %
NEUTROPHILS # BLD AUTO: 8 10E3/UL (ref 1.6–8.3)
NEUTROPHILS NFR BLD AUTO: 75 %
NITRATE UR QL: NEGATIVE
NRBC # BLD AUTO: 0 10E3/UL
NRBC BLD AUTO-RTO: 0 /100
OPIATES UR QL SCN: ABNORMAL
OXYCODONE UR QL SCN: NOT DETECTED
PCP UR QL SCN: NOT DETECTED
PH UR STRIP: 5.5 [PH] (ref 5–9)
PLATELET # BLD AUTO: 231 10E3/UL (ref 150–450)
POTASSIUM BLD-SCNC: 4.3 MMOL/L (ref 3.5–5.1)
PROPOXYPH UR QL: NOT DETECTED
PROT SERPL-MCNC: 7.1 G/DL (ref 6.4–8.9)
RBC # BLD AUTO: 3.63 10E6/UL (ref 3.8–5.2)
RBC URINE: >182 /HPF
RSV RNA SPEC NAA+PROBE: NEGATIVE
SARS-COV-2 RNA RESP QL NAA+PROBE: NEGATIVE
SODIUM SERPL-SCNC: 141 MMOL/L (ref 134–144)
SP GR UR STRIP: 1.02 (ref 1–1.03)
TRICYCLICS UR QL SCN: NOT DETECTED
UROBILINOGEN UR STRIP-MCNC: NORMAL MG/DL
WBC # BLD AUTO: 10.6 10E3/UL (ref 4–11)
WBC URINE: 6 /HPF

## 2022-10-13 PROCEDURE — 82077 ASSAY SPEC XCP UR&BREATH IA: CPT | Performed by: EMERGENCY MEDICINE

## 2022-10-13 PROCEDURE — C9803 HOPD COVID-19 SPEC COLLECT: HCPCS | Performed by: EMERGENCY MEDICINE

## 2022-10-13 PROCEDURE — 85025 COMPLETE CBC W/AUTO DIFF WBC: CPT | Performed by: EMERGENCY MEDICINE

## 2022-10-13 PROCEDURE — 87637 SARSCOV2&INF A&B&RSV AMP PRB: CPT | Performed by: EMERGENCY MEDICINE

## 2022-10-13 PROCEDURE — 258N000003 HC RX IP 258 OP 636: Performed by: EMERGENCY MEDICINE

## 2022-10-13 PROCEDURE — 80053 COMPREHEN METABOLIC PANEL: CPT | Performed by: EMERGENCY MEDICINE

## 2022-10-13 PROCEDURE — 81001 URINALYSIS AUTO W/SCOPE: CPT | Mod: XU | Performed by: EMERGENCY MEDICINE

## 2022-10-13 PROCEDURE — 99283 EMERGENCY DEPT VISIT LOW MDM: CPT | Mod: CS | Performed by: EMERGENCY MEDICINE

## 2022-10-13 PROCEDURE — 83605 ASSAY OF LACTIC ACID: CPT | Performed by: EMERGENCY MEDICINE

## 2022-10-13 PROCEDURE — 87086 URINE CULTURE/COLONY COUNT: CPT | Performed by: EMERGENCY MEDICINE

## 2022-10-13 PROCEDURE — 36415 COLL VENOUS BLD VENIPUNCTURE: CPT | Performed by: EMERGENCY MEDICINE

## 2022-10-13 PROCEDURE — 80306 DRUG TEST PRSMV INSTRMNT: CPT | Performed by: EMERGENCY MEDICINE

## 2022-10-13 RX ADMIN — SODIUM CHLORIDE 1000 ML: 9 INJECTION, SOLUTION INTRAVENOUS at 14:31

## 2022-10-13 ASSESSMENT — ENCOUNTER SYMPTOMS
NAUSEA: 0
CHEST TIGHTNESS: 0
SHORTNESS OF BREATH: 0
WEAKNESS: 1
VOMITING: 0
DYSURIA: 0
AGITATION: 0
FEVER: 0
ARTHRALGIAS: 0
CHILLS: 0

## 2022-10-13 ASSESSMENT — ACTIVITIES OF DAILY LIVING (ADL): ADLS_ACUITY_SCORE: 35

## 2022-10-13 NOTE — ED TRIAGE NOTES
Pt here with ,  reports increased weakness and lethargy over the past few days, pt recently diagnosed with bladder infection,  reports a hx of sepsis, pt is oriented but appears very tired, VSS, pt out into waiting room to wait for ED room     Triage Assessment     Row Name 10/13/22 2021       Triage Assessment (Adult)    Airway WDL WDL       Respiratory WDL    Respiratory WDL WDL       Skin Circulation/Temperature WDL    Skin Circulation/Temperature WDL WDL       Cardiac WDL    Cardiac WDL WDL       Peripheral/Neurovascular WDL    Peripheral Neurovascular WDL WDL       Cognitive/Neuro/Behavioral WDL    Cognitive/Neuro/Behavioral WDL WDL

## 2022-10-13 NOTE — ED PROVIDER NOTES
History     Chief Complaint   Patient presents with     Generalized Weakness     HPI  Ankita Day is a 67 year old female who is here with her  concern for weakness and lethargy.  Was recently diagnosed with bladder infection.  They say they have but a day left of her antibiotics.  She does have a history of sepsis in the past and  was worried that this might be coming on because of her weakness.  She is alert oriented with good vital signs on arrival.  She really has no complaints and did not even want to come in.    Allergies:  Allergies   Allergen Reactions     Atorvastatin Muscle Pain (Myalgia)     Tiotropium Bromide [Tiotropium] Rash     Advil [Ibuprofen] Other (See Comments)     Does not recall but feel like it interacted with blood thinners and HX of GI BLEED     Ezetimibe Muscle Pain (Myalgia)     Latex Rash     Niacin      Other reaction(s): Flushing     No Clinical Screening - See Comments Itching, Rash and Blisters     Metals and plastics       Tape [Adhesive Tape] Rash       Problem List:    Patient Active Problem List    Diagnosis Date Noted     Hypertensive heart and chronic kidney disease with heart failure and stage 1 through stage 4 chronic kidney disease, or unspecified chronic kidney disease (H) 09/07/2022     Priority: Medium     History of CVA-mild chronic ischemic disease on 8/20/2021. 01/11/2022     Priority: Medium     ASCVD (arteriosclerotic cardiovascular disease) 01/11/2022     Priority: Medium     Nausea 01/11/2022     Priority: Medium     Nonintractable episodic headache, unspecified headache type 01/11/2022     Priority: Medium     Gastroesophageal reflux disease with esophagitis without hemorrhage 01/11/2022     Priority: Medium     CORTEZ (dyspnea on exertion) 11/16/2021     Priority: Medium     Palpitations 11/16/2021     Priority: Medium     Chest pain, unspecified type 11/16/2021     Priority: Medium     Uncomplicated asthma, unspecified asthma severity,  unspecified whether persistent 11/16/2021     Priority: Medium     Vitamin B12 deficiency (non anemic) 11/16/2021     Priority: Medium     Symptomatic bradycardia 07/16/2021     Priority: Medium     Chronic stable angina (H) 02/05/2020     Priority: Medium     Chronic ischemic heart disease 02/05/2020     Priority: Medium     History of pulmonary embolism on 1/2/2020. (-) VQ scan on 11/23/2021 01/02/2020     Priority: Medium     Stage 3a chronic kidney disease (H) 06/19/2019     Priority: Medium     Chronic anxiety 01/22/2018     Priority: Medium     Collagenous colitis 01/22/2018     Priority: Medium     Overview:   Possible Dx 2007       Major depressive disorder, recurrent episode, severe (H) 01/22/2018     Priority: Medium     Essential hypertension 01/22/2018     Priority: Medium     Mixed hyperlipidemia 01/22/2018     Priority: Medium     Osteoarthritis 01/22/2018     Priority: Medium     Panic attack 01/22/2018     Priority: Medium     Status [facility] 09/15/2017     Priority: Medium     History of tobacco abuse-quitting 8/23/2017 08/10/2017     Priority: Medium     Presence of stent in coronary artery in patient with coronary artery disease 08/10/2017     Priority: Medium     History of coronary artery bypass graft x 3 on 2/12/2003 08/10/2017     Priority: Medium     Noncompliance with CPAP treatment 10/21/2016     Priority: Medium     Intractable chronic common migraine without aura 10/21/2016     Priority: Medium     H/O multiple concussions 10/21/2016     Priority: Medium     History of Clostridium difficile 08/19/2016     Priority: Medium     Iron deficiency anemia 07/26/2016     Priority: Medium     Controlled substance agreement updated 9- 01/28/2014     Priority: Medium     Overview:        Pain medication agreement 01/28/2014     Priority: Medium     Formatting of this note might be different from the original.       Central sleep apnea  10/14/2013     Priority: Medium     Myofascial pain 10/14/2013     Priority: Medium     Vitamin D deficiency 05/06/2013     Priority: Medium     Subclavian artery stenosis, left (H) 03/31/2013     Priority: Medium     Overview:   S/p prox left SCA stent 4/1/2013       Lumbar facet arthropathy 01/02/2013     Priority: Medium     Nonspecific abnormal results of pulmonary system function study 12/21/2011     Priority: Medium     Slow transit constipation 11/29/2011     Priority: Medium     Gastric ulcer with hemorrhage 10/03/2011     Priority: Medium     Family history of malignant neoplasm of gastrointestinal tract 09/26/2011     Priority: Medium     Nodular degeneration of cornea 09/26/2011     Priority: Medium     Bilateral low back pain without sciatica 05/31/2011     Priority: Medium     Chronic pain disorder 12/01/2010     Priority: Medium     COPD (chronic obstructive pulmonary disease) (H) 06/15/2007     Priority: Medium     Overview:   Low DLCO, normal spirometry on 12/15/11       Peptic ulcer 06/15/2007     Priority: Medium     Atherosclerosis of coronary artery bypass graft of native heart with stable angina pectoris (H) 09/12/2002     Priority: Medium     Overview:   Multiple Angigrams prior to 2007. Also:  6/5/2007: Angiogram: TAXUS DELONTE to ostial circumflux, instent restenosis  5/23/2008: Left Main 30% diseased, LAD stents patent, Left circ stent patent, Chronic occluded right internal mammary artery graft to distal RCA, native RCA has 30% stenosis; NO intevention  9/19/2012 CT Angiogram: Patent LIMA to LAD, patent LAD stents, moderate proximal circumflex disease, patent RCA , occluded right internal mammary artery graft to distal RCA.  9/20/2012: Angiogram: Stent to Left Main, ostial LAD, and PTCA of ostial left circumflex          Past Medical History:    Past Medical History:   Diagnosis Date     Acute ischemic heart disease (H)      Acute myocardial infarction (H)      Anxiety disorder       Atherosclerotic heart disease of native coronary artery without angina pectoris      Bilateral carpal tunnel syndrome      Cervicalgia      Chest pain      Chronic gastric ulcer without hemorrhage or perforation      Chronic ischemic heart disease      Chronic obstructive pulmonary disease (H)      Chronic or unspecified gastric ulcer with hemorrhage      Chronic pain syndrome      Constipation      Coronary angioplasty status      Coronary angioplasty status      Coronary angioplasty status      Dorsalgia      Encounter for other administrative examinations      Encounter for screening for cardiovascular disorders      Enterocolitis due to Clostridium difficile      Essential (primary) hypertension      Hyperlipidemia      Major depressive disorder, single episode      Migraine without status migrainosus, not intractable      Nodular corneal degeneration      Noninfective gastroenteritis and colitis      Noninfective gastroenteritis and colitis      Osteoarthritis      Other chest pain      Pain in knee      Pain in right shoulder      Panic disorder without agoraphobia      Peptic ulcer without hemorrhage or perforation      Peripheral vascular disease (H)      Personal history of diseases of the blood and blood-forming organs and certain disorders involving the immune mechanism (CODE)      Personal history of nicotine dependence      Personal history of other medical treatment (CODE)      Presence of aortocoronary bypass graft      Primary central sleep apnea      Sepsis due to Escherichia coli (E. coli) (H)      Stricture of artery (H)      Thoracic, thoracolumbar and lumbosacral intervertebral disc disorder      Uncomplicated opioid abuse (H)      Vitamin D deficiency        Past Surgical History:    Past Surgical History:   Procedure Laterality Date     ANGIOPLASTY      9/12/02,with triple stenting     APPENDECTOMY OPEN      No Comments Provided     ARTHROSCOPY KNEE      left     ARTHROSCOPY SHOULDER Right  2017    labral tear, rotator cuff tear and some subacromial decompression      BYPASS GRAFT ARTERY CORONARY      02,Triple bypass, left internal mammary  to LAD, right internal mammary to right coronary artery, saphenous to obtuse marginal of the left circumflex.     COLONOSCOPY      ,Dr Bowman benign polyps     COLONOSCOPY  10/03/2011    2011,benign polyps, Dr. Bowman     COLONOSCOPY  2016,normal, Dr Bowman     ELBOW SURGERY      baby,birth malachi removed from right arm     EMBOLECTOMY UPPER EXTREMITY  2013    brachial artery pseudoaneurysm after stenting     ESOPHAGOSCOPY, GASTROSCOPY, DUODENOSCOPY (EGD), COMBINED      ,EGD Dr Bowman with pyloric ulcer     ESOPHAGOSCOPY, GASTROSCOPY, DUODENOSCOPY (EGD), COMBINED      ,pyloric ulcer, Dr. Bowman     ESOPHAGOSCOPY, GASTROSCOPY, DUODENOSCOPY (EGD), COMBINED      16,mild gastritis, Dr Bowman     ESOPHAGOSCOPY, GASTROSCOPY, DUODENOSCOPY (EGD), COMBINED      2017,Dr Bowman. Antral ulcer     ESOPHAGOSCOPY, GASTROSCOPY, DUODENOSCOPY (EGD), COMBINED  2018    Dr Bowman, healed ulcer     HYSTERECTOMY TOTAL ABDOMINAL      age 21     LAPAROSCOPIC CHOLECYSTECTOMY      2006     OSTEOTOMY FEMUR DISTAL      x3, right knee     OSTEOTOMY FEMUR DISTAL      2000,left knee  ligament surgery     OTHER SURGICAL HISTORY      1/10/2003,,PTCA     OTHER SURGICAL HISTORY      2012,,PTCA,DELONTE in LAD and left main     OTHER SURGICAL HISTORY      2013,,PTCA,L subclavian stenosis     SALPINGO-OOPHORECTOMY BILATERAL      age 27,Bilateral salpingo-oophorectomy     TONSILLECTOMY, ADENOIDECTOMY, COMBINED      childhood       Family History:    Family History   Problem Relation Age of Onset     Heart Disease Father         Heart Disease,Heart condition/Significant for atherosclerotic cardiovascular disease, but non premature.     Colon Cancer Father         Cancer-colon, of colon cancer     Cancer Father         Cancer,mets  to liver, secondary to colon cancer     Heart Disease Mother         Heart Disease     Cancer Other         Cancer,Multiple Myeloma     Heart Disease Other         Heart Disease,Ischemic Heart Disease     Colon Cancer Other         Cancer-colon,Malignant neoplasms     Cancer Sister         Cancer,multiple myeloma     Other - See Comments Son         gallstones       Social History:  Marital Status:   [2]  Social History     Tobacco Use     Smoking status: Former     Packs/day: 1.00     Years: 35.00     Pack years: 35.00     Types: Cigarettes     Quit date: 2/15/2017     Years since quittin.6     Smokeless tobacco: Never   Vaping Use     Vaping Use: Never used   Substance Use Topics     Alcohol use: Not Currently     Alcohol/week: 0.0 standard drinks     Drug use: No        Medications:    amoxicillin-clavulanate (AUGMENTIN) 875-125 MG tablet  albuterol (PROAIR HFA/PROVENTIL HFA/VENTOLIN HFA) 108 (90 Base) MCG/ACT inhaler  amLODIPine (NORVASC) 10 MG tablet  busPIRone (BUSPAR) 15 MG tablet  cephALEXin (KEFLEX) 500 MG capsule  clonazePAM (KLONOPIN) 1 MG tablet  clopidogrel (PLAVIX) 75 MG tablet  diclofenac (VOLTAREN) 1 % topical gel  gabapentin (NEURONTIN) 300 MG capsule  HYDROcodone-acetaminophen (NORCO)  MG per tablet  HYDROcodone-acetaminophen (NORCO)  MG per tablet  lisinopril (ZESTRIL) 20 MG tablet  naloxone (NARCAN) 4 MG/0.1ML nasal spray  nitroGLYcerin (NITROLINGUAL) 0.4 MG/SPRAY spray  ondansetron (ZOFRAN) 4 MG tablet  pantoprazole (PROTONIX) 40 MG EC tablet  RESTASIS 0.05 % ophthalmic emulsion  rivaroxaban ANTICOAGULANT (XARELTO) 20 MG TABS tablet  rosuvastatin (CRESTOR) 20 MG tablet  sucralfate (CARAFATE) 1 GM tablet  VITAMIN D, CHOLECALCIFEROL, PO  vortioxetine (TRINTELLIX) 20 MG tablet          Review of Systems   Constitutional: Negative for chills and fever.   HENT: Negative for congestion.    Eyes: Negative for visual disturbance.   Respiratory: Negative for chest tightness and  "shortness of breath.    Cardiovascular: Negative for chest pain.   Gastrointestinal: Negative for nausea and vomiting.   Genitourinary: Negative for dysuria.   Musculoskeletal: Negative for arthralgias.   Skin: Negative for rash.   Neurological: Positive for weakness.   Psychiatric/Behavioral: Negative for agitation.       Physical Exam   BP: 124/50  Pulse: 76  Temp: 97.8  F (36.6  C)  Resp: 14  Height: 170.2 cm (5' 7\")  Weight: 78 kg (172 lb)  SpO2: 93 %      Physical Exam  Vitals and nursing note reviewed.   Constitutional:       Appearance: Normal appearance.   HENT:      Head: Normocephalic and atraumatic.      Mouth/Throat:      Mouth: Mucous membranes are moist.   Eyes:      Conjunctiva/sclera: Conjunctivae normal.   Cardiovascular:      Rate and Rhythm: Normal rate and regular rhythm.      Heart sounds: Normal heart sounds.   Pulmonary:      Effort: Pulmonary effort is normal.      Breath sounds: Normal breath sounds.   Skin:     General: Skin is warm and dry.   Neurological:      Mental Status: She is alert and oriented to person, place, and time.   Psychiatric:         Behavior: Behavior normal.         ED Course              ED Course as of 10/13/22 1629   u Oct 13, 2022   1422 Patient recently seen diagnosed with urinary tract infection started on Keflex.  I did review urine culture which shows E. coli which should be susceptible to the Keflex.     Procedures                Results for orders placed or performed during the hospital encounter of 10/13/22 (from the past 24 hour(s))   Symptomatic; Unknown Influenza A/B & SARS-CoV2 (COVID-19) Virus PCR Multiplex Nose    Specimen: Nose; Swab   Result Value Ref Range    Influenza A PCR Negative Negative    Influenza B PCR Negative Negative    RSV PCR Negative Negative    SARS CoV2 PCR Negative Negative    Narrative    Testing was performed using the Xpert Xpress CoV2/Flu/RSV Assay on the Cepheid GeneXpert Instrument. This test should be ordered for the " detection of SARS-CoV-2 and influenza viruses in individuals who meet clinical and/or epidemiological criteria. Test performance is unknown in asymptomatic patients. This test is for in vitro diagnostic use under the FDA EUA for laboratories certified under CLIA to perform high or moderate complexity testing. This test has not been FDA cleared or approved. A negative result does not rule out the presence of PCR inhibitors in the specimen or target RNA in concentration below the limit of detection for the assay. If only one viral target is positive but coinfection with multiple targets is suspected, the sample should be re-tested with another FDA cleared, approved, or authorized test, if coinfection would change clinical management. This test was validated by the [facility]. These laboratories are certified under the Clinical Laboratory Improvement Amendments of 1988 (CLIA-88) as qualified to perform high complexity laboratory testing.   CBC with platelets differential    Narrative    The following orders were created for panel order CBC with platelets differential.  Procedure                               Abnormality         Status                     ---------                               -----------         ------                     CBC with platelets and d...[[id number]]  Abnormal            Final result                 Please view results for these tests on the individual orders.   Comprehensive metabolic panel   Result Value Ref Range    Sodium 141 134 - 144 mmol/L    Potassium 4.3 3.5 - 5.1 mmol/L    Chloride 109 (H) 98 - 107 mmol/L    Carbon Dioxide (CO2) 22 21 - 31 mmol/L    Anion Gap 10 3 - 14 mmol/L    Urea Nitrogen 26 (H) 7 - 25 mg/dL    Creatinine 1.39 (H) 0.60 - 1.20 mg/dL    Calcium 9.3 8.6 - 10.3 mg/dL    Glucose 89 70 - 105 mg/dL    Alkaline Phosphatase 52 34 - 104 U/L    AST 33 13 - 39 U/L    ALT 15 7 - 52 U/L    Protein Total 7.1 6.4 - 8.9 g/dL    Albumin 4.3 3.5 - 5.7 g/dL     Bilirubin Total 0.3 0.3 - 1.0 mg/dL    GFR Estimate 41 (L) >60 mL/min/1.73m2   Ethanol GH   Result Value Ref Range    Alcohol ethyl <0.01 <=0.01 g/dL   Lactic acid whole blood   Result Value Ref Range    Lactic Acid 0.7 0.7 - 2.0 mmol/L   CBC with platelets and differential   Result Value Ref Range    WBC Count 10.6 4.0 - 11.0 10e3/uL    RBC Count 3.63 (L) 3.80 - 5.20 10e6/uL    Hemoglobin 10.5 (L) 11.7 - 15.7 g/dL    Hematocrit 31.7 (L) 35.0 - 47.0 %    MCV 87 78 - 100 fL    MCH 28.9 26.5 - 33.0 pg    MCHC 33.1 31.5 - 36.5 g/dL    RDW 14.8 10.0 - 15.0 %    Platelet Count 231 150 - 450 10e3/uL    % Neutrophils 75 %    % Lymphocytes 16 %    % Monocytes 6 %    % Eosinophils 2 %    % Basophils 1 %    % Immature Granulocytes 0 %    NRBCs per 100 WBC 0 <1 /100    Absolute Neutrophils 8.0 1.6 - 8.3 10e3/uL    Absolute Lymphocytes 1.7 0.8 - 5.3 10e3/uL    Absolute Monocytes 0.7 0.0 - 1.3 10e3/uL    Absolute Eosinophils 0.2 0.0 - 0.7 10e3/uL    Absolute Basophils 0.1 0.0 - 0.2 10e3/uL    Absolute Immature Granulocytes 0.0 <=0.4 10e3/uL    Absolute NRBCs 0.0 10e3/uL   Extra Tube    Narrative    The following orders were created for panel order Extra Tube.  Procedure                               Abnormality         Status                     ---------                               -----------         ------                     Extra Serum Separator Tu...[[id number]]                      Final result                 Please view results for these tests on the individual orders.   Extra Serum Separator Tube (SST)   Result Value Ref Range    Hold Specimen [facility]    Urine Drugs of Abuse Screen    Narrative    The following orders were created for panel order Urine Drugs of Abuse Screen.  Procedure                               Abnormality         Status                     ---------                               -----------         ------                     Urine Drugs of Abuse Scr...[[id number]]  Abnormal            Final result                  Please view results for these tests on the individual orders.   UA with Microscopic reflex to Culture    Specimen: Urine, Catheter   Result Value Ref Range    Color Urine Yellow Colorless, Straw, Light Yellow, Yellow    Appearance Urine Cloudy (A) Clear    Glucose Urine Negative Negative mg/dL    Bilirubin Urine Negative Negative    Ketones Urine Negative Negative mg/dL    Specific Gravity Urine 1.021 1.000 - 1.030    Blood Urine Large (A) Negative    pH Urine 5.5 5.0 - 9.0    Protein Albumin Urine 50 (A) Negative mg/dL    Urobilinogen Urine Normal Normal, 2.0 mg/dL    Nitrite Urine Negative Negative    Leukocyte Esterase Urine Negative Negative    RBC Urine >182 (H) <=2 /HPF    WBC Urine 6 (H) <=5 /HPF    Narrative    Urine Culture not indicated   Urine Drugs of Abuse Screen Panel 13   Result Value Ref Range    Cannabinoids (52-djn-7-carboxy-9-THC) Not Detected Not Detected    Phencyclidine Not Detected Not Detected    Cocaine (Benzoylecgonine) Not Detected Not Detected    Methamphetamine (d-Methamphetamine) Not Detected Not Detected    Opiates (Morphine) Presumptive positive-Unconfirmed result (A) Not Detected    Amphetamine (d-Amphetamine) Not Detected Not Detected    Benzodiazepines (Nordiazepam) Not Detected Not Detected    Tricyclic Antidepressants (Desipramine) Not Detected Not Detected    Methadone Not Detected Not Detected    Barbiturates (Butalbital) Not Detected Not Detected    Oxycodone Not Detected Not Detected    Propoxyphene (Norpropoxyphene) Not Detected Not Detected    Buprenorphine Not Detected Not Detected     *Note: Due to a large number of results and/or encounters for the requested time period, some results have not been displayed. A complete set of results can be found in Results Review.       Medications   0.9% sodium chloride BOLUS (1,000 mLs Intravenous New Bag 10/13/22 1431)       Assessments & Plan (with Medical Decision Making)     I have reviewed the nursing notes.    I have  reviewed the findings, diagnosis, plan and need for follow up with the patient.  Labs show some acute renal insufficiency indicating some dehydration which could certainly explain some of her symptoms.  Urinalysis also significant amount of blood in the urine.  Recent culture would indicate that the Keflex should be working,  however with her ongoing symptoms and the hematuria, I am concerned for not a complete lead treated UTI.  We will do another culture, however I am going to extend antibiotics.  I Bambi change them up a bit we will try some Augmentin.  She wants to go home at this time will be discharged.  Return if worse.    New Prescriptions    AMOXICILLIN-CLAVULANATE (AUGMENTIN) 875-125 MG TABLET    Take 1 tablet by mouth 2 times daily       Final diagnoses:   Generalized muscle weakness   Urinary tract infection with hematuria, site unspecified       10/13/2022   [facility] AND [facility]     Timothy Siu MD  10/13/22 0195

## 2022-10-16 LAB — BACTERIA UR CULT: NO GROWTH

## 2022-10-19 ENCOUNTER — OFFICE VISIT (OUTPATIENT)
Dept: ORTHOPEDICS | Facility: OTHER | Age: 67
End: 2022-10-19
Attending: FAMILY MEDICINE
Payer: MEDICARE

## 2022-10-19 VITALS — BODY MASS INDEX: 26.63 KG/M2 | WEIGHT: 170 LBS

## 2022-10-19 DIAGNOSIS — M65.312 TRIGGER THUMB OF LEFT HAND: ICD-10-CM

## 2022-10-19 PROCEDURE — 99213 OFFICE O/P EST LOW 20 MIN: CPT | Performed by: ORTHOPAEDIC SURGERY

## 2022-10-19 PROCEDURE — G0463 HOSPITAL OUTPT CLINIC VISIT: HCPCS | Performed by: ORTHOPAEDIC SURGERY

## 2022-10-19 NOTE — NURSING NOTE
"Chief Complaint   Patient presents with     Consult     Left Trigger Thumb        Pt is here today for an evaluation of possible left trigger thumb.     Velma Balbuena LPN on 10/19/2022 at 12:47 PM       Initial LMP 04/29/1978 (Approximate)  Estimated body mass index is 26.94 kg/m  as calculated from the following:    Height as of 10/13/22: 1.702 m (5' 7\").    Weight as of 10/13/22: 78 kg (172 lb).  Medication Reconciliation: complete    Velma Balbuena LPN  "

## 2022-10-19 NOTE — PROGRESS NOTES
Visit Date: 10/19/2022    REASON FOR EVALUATION:  Left trigger thumb.    HISTORY OF PRESENT ILLNESS:  Malina comes in left trigger thumb.  Overall, reports that she is doing well.  She has been having triggering that has been taking place for the past couple of months, it has gotten progressively worse here over time.  She is interested in looking at evaluation and surgical management and is using a brace.  The brace seems to help, but is having quite a bit of pain associated with this as well as locking of sensation in addition to that.    MEDICATIONS:  Reviewed.    ALLERGIES:  REVIEWED.  NO CHANGES.    PHYSICAL EXAMINATION:  The patient is alert and oriented x3, cooperative with exam, in no acute distress.  She is 65 inches, 170 pounds at this time.  Examination of left thumb shows tenderness across the A1 pulley.  Clicking sensation seen throughout that arc of motion.  Swelling is present there as well.  Neurovascular examination is otherwise intact.    IMAGING:  No x-rays.    IMPRESSION:  Left trigger thumb.    PLAN:  At this time, we have recommended that she does proceed forward with left trigger thumb release.  She is in agreement with that plan.  We will look at surgical intervention in the very near future.  Local MAC for anesthesia and anticipate significant improvement here in a short period of time.  All questions answered as it pertains to surgery and interventions and expectations at this point.    Cristhian Wilkinson MD        D: 10/19/2022   T: 10/19/2022   MT: NISHANT    Name:     DARRIAN JURADO  MRN:      [id number]        Account:    [id number]   :      1955           Visit Date: 10/19/2022     Document: [id number]

## 2022-10-19 NOTE — PROGRESS NOTES
Pt is here today for an evaluation of possible left trigger thumb.     Velma Balbuena LPN on 10/19/2022 at 12:47 PM

## 2022-11-09 ENCOUNTER — OFFICE VISIT (OUTPATIENT)
Dept: FAMILY MEDICINE | Facility: OTHER | Age: 67
End: 2022-11-09
Attending: FAMILY MEDICINE
Payer: MEDICARE

## 2022-11-09 ENCOUNTER — TELEPHONE (OUTPATIENT)
Dept: FAMILY MEDICINE | Facility: OTHER | Age: 67
End: 2022-11-09

## 2022-11-09 VITALS
TEMPERATURE: 97 F | HEART RATE: 60 BPM | BODY MASS INDEX: 27.25 KG/M2 | SYSTOLIC BLOOD PRESSURE: 132 MMHG | RESPIRATION RATE: 20 BRPM | WEIGHT: 174 LBS | DIASTOLIC BLOOD PRESSURE: 82 MMHG | OXYGEN SATURATION: 98 %

## 2022-11-09 DIAGNOSIS — R07.89 CHEST WALL PAIN, CHRONIC: ICD-10-CM

## 2022-11-09 DIAGNOSIS — G89.29 CHEST WALL PAIN, CHRONIC: ICD-10-CM

## 2022-11-09 DIAGNOSIS — Z23 NEEDS FLU SHOT: ICD-10-CM

## 2022-11-09 DIAGNOSIS — G44.221 CHRONIC TENSION-TYPE HEADACHE, INTRACTABLE: ICD-10-CM

## 2022-11-09 DIAGNOSIS — R11.0 NAUSEA: ICD-10-CM

## 2022-11-09 DIAGNOSIS — G43.719 INTRACTABLE CHRONIC COMMON MIGRAINE WITHOUT AURA: ICD-10-CM

## 2022-11-09 DIAGNOSIS — Z79.899 CONTROLLED SUBSTANCE AGREEMENT SIGNED: ICD-10-CM

## 2022-11-09 DIAGNOSIS — G89.29 CHRONIC BILATERAL LOW BACK PAIN WITHOUT SCIATICA: ICD-10-CM

## 2022-11-09 DIAGNOSIS — K21.00 GASTROESOPHAGEAL REFLUX DISEASE WITH ESOPHAGITIS WITHOUT HEMORRHAGE: ICD-10-CM

## 2022-11-09 DIAGNOSIS — M54.50 CHRONIC BILATERAL LOW BACK PAIN WITHOUT SCIATICA: ICD-10-CM

## 2022-11-09 DIAGNOSIS — G89.4 CHRONIC PAIN DISORDER: Primary | ICD-10-CM

## 2022-11-09 PROCEDURE — G0463 HOSPITAL OUTPT CLINIC VISIT: HCPCS | Mod: 25

## 2022-11-09 PROCEDURE — 99213 OFFICE O/P EST LOW 20 MIN: CPT | Performed by: FAMILY MEDICINE

## 2022-11-09 PROCEDURE — G0008 ADMIN INFLUENZA VIRUS VAC: HCPCS

## 2022-11-09 RX ORDER — HYDROCODONE BITARTRATE AND ACETAMINOPHEN 10; 325 MG/1; MG/1
1-2 TABLET ORAL EVERY 4 HOURS PRN
Qty: 180 TABLET | Refills: 0 | Status: SHIPPED | OUTPATIENT
Start: 2022-12-09 | End: 2023-01-04

## 2022-11-09 RX ORDER — HYDROCODONE BITARTRATE AND ACETAMINOPHEN 10; 325 MG/1; MG/1
1-2 TABLET ORAL EVERY 4 HOURS PRN
Qty: 180 TABLET | Refills: 0 | Status: SHIPPED | OUTPATIENT
Start: 2022-11-09 | End: 2023-01-04

## 2022-11-09 ASSESSMENT — ANXIETY QUESTIONNAIRES
7. FEELING AFRAID AS IF SOMETHING AWFUL MIGHT HAPPEN: NOT AT ALL
GAD7 TOTAL SCORE: 0
IF YOU CHECKED OFF ANY PROBLEMS ON THIS QUESTIONNAIRE, HOW DIFFICULT HAVE THESE PROBLEMS MADE IT FOR YOU TO DO YOUR WORK, TAKE CARE OF THINGS AT HOME, OR GET ALONG WITH OTHER PEOPLE: NOT DIFFICULT AT ALL
GAD7 TOTAL SCORE: 0
3. WORRYING TOO MUCH ABOUT DIFFERENT THINGS: NOT AT ALL
5. BEING SO RESTLESS THAT IT IS HARD TO SIT STILL: NOT AT ALL
6. BECOMING EASILY ANNOYED OR IRRITABLE: NOT AT ALL
2. NOT BEING ABLE TO STOP OR CONTROL WORRYING: NOT AT ALL
1. FEELING NERVOUS, ANXIOUS, OR ON EDGE: NOT AT ALL

## 2022-11-09 ASSESSMENT — PATIENT HEALTH QUESTIONNAIRE - PHQ9
5. POOR APPETITE OR OVEREATING: NOT AT ALL
SUM OF ALL RESPONSES TO PHQ QUESTIONS 1-9: 0

## 2022-11-09 ASSESSMENT — PAIN SCALES - GENERAL: PAINLEVEL: SEVERE PAIN (6)

## 2022-11-09 NOTE — TELEPHONE ENCOUNTER
Note from pharmacy:  Patient requests new Rx: We're waiting for the Hydrocodone script. Pt is here. We haven't received it yet.    HYDROcodone-acetaminophen (NORCO)  MG per tablet 180 tablet 0 11/9/2022  No   Sig - Route: Take 1-2 tablets by mouth every 4 hours as needed for severe pain 6 per day - Oral   Class: E-Prescribe   Earliest Fill Date: 11/9/2022   Order: [id number]   Prior authorization: Waiting for Payer Response   This order has not been released to its destination.     Order released.     E-Prescribing Status: Receipt confirmed by pharmacy (11/9/2022  2:35 PM CST)    Kathleen Puckett RN .............. 11/9/2022  2:35 PM

## 2022-11-09 NOTE — NURSING NOTE
"Patient presents to the clinic for controlled medication refill.  Last administration of Gomer was today around 0800.    Contract signed 9-7-2022, and TOX obtain 9-7-22.     FOOD SECURITY SCREENING QUESTIONS:    The next two questions are to help us understand your food security.  If you are feeling you need any assistance in this area, we have resources available to support you today.    Hunger Vital Signs:  Within the past 12 months we worried whether our food would run out before we got money to buy more. Never  Within the past 12 months the food we bought just didn't last and we didn't have money to get more. Never    Advance Care Directive on file? yes  Advance Care Directive provided to patient? Declined.    Chief Complaint   Patient presents with     Recheck Medication       Initial /82 (BP Location: Right arm, Patient Position: Sitting, Cuff Size: Adult Large)   Pulse 60   Temp 97  F (36.1  C) (Tympanic)   Resp 20   Wt 78.9 kg (174 lb)   LMP 04/29/1978 (Approximate)   SpO2 98%   BMI 27.25 kg/m   Estimated body mass index is 27.25 kg/m  as calculated from the following:    Height as of 10/13/22: 1.702 m (5' 7\").    Weight as of this encounter: 78.9 kg (174 lb).  Medication Reconciliation: complete        [place] LPN       "

## 2022-11-09 NOTE — PROGRESS NOTES
Assessment & Plan       ICD-10-CM    1. Chronic pain disorder  G89.4 HYDROcodone-acetaminophen (NORCO)  MG per tablet     HYDROcodone-acetaminophen (NORCO)  MG per tablet      2. Chest wall pain, chronic  R07.89 HYDROcodone-acetaminophen (NORCO)  MG per tablet    G89.29 HYDROcodone-acetaminophen (NORCO)  MG per tablet      3. Chronic bilateral low back pain without sciatica  M54.50 HYDROcodone-acetaminophen (NORCO)  MG per tablet    G89.29 HYDROcodone-acetaminophen (NORCO)  MG per tablet      4. Controlled substance agreement signed  Z79.899 HYDROcodone-acetaminophen (NORCO)  MG per tablet     HYDROcodone-acetaminophen (NORCO)  MG per tablet      5. Chronic tension-type headache, intractable  G44.221 Adult Neurology  Referral      6. Intractable chronic common migraine without aura  G43.719 Adult Neurology  Referral      7. Needs flu shot  Z23 FLU SHOT 65+ (FLUZONE HD)        Chronic chest wall pain in the setting of repeated stenting and bypass for CAD. She has been on opioids in the past to reduce ED visits. Also has chronic LBP and due to anticoagulation, injections have been only pursued when absolutely needed. With higher opioid doses had side effects. Stopped in the past with decline in function. Current dosing balance of benefit and minimal side effects. Refilled hydrocodone 10/325 mg taking 6 per day for 2 prescription of 1 month for a 2 month supply.  PDMP Review       Value Time User    State PDMP site checked  Yes 11/9/2022 10:39 AM Ramirez Cano MD        Urine drug monitoring obtained. Updated controlled substance agreement.    She is inquiring about use of gepants for migraine prevention. I have not used these medications. Also, Up to Date mentions concerns about potential cardiac issues with these medications. Referral to neurology to discuss options for migraine prevention.  I placed a referral previously to [facility], but she  never pursued. Interested in seeing Dr Mccracken at [facility] when able.    Provided flu vaccine    Return in about 2 months (around 1/9/2023).    Ramirez Cano MD   [facility]     Subjective   Malina is a 67 year old, presenting for the following health issues:  Recheck Medication      History of Present Illness       Reason for visit:  Tell him how my test went and meds    She eats 0-1 servings of fruits and vegetables daily.She consumes 1 sweetened beverage(s) daily.She exercises with enough effort to increase her heart rate 9 or less minutes per day.  She exercises with enough effort to increase her heart rate 3 or less days per week.   She is taking medications regularly.       Pain History:  When did you first notice your pain? - Chronic Pain   Have you seen this provider for your pain in the past?   Yes   Where in your body do you have pain? Low back  Are you seeing anyone else for your pain? No    PHQ-9 SCORE 8/3/2022 9/5/2022 11/9/2022   PHQ-9 Total Score MyChart 19 (Moderately severe depression) 9 (Mild depression) -   PHQ-9 Total Score 19 9 0       YAYA-7 SCORE 8/3/2022 9/5/2022 11/9/2022   Total Score 5 (mild anxiety) 9 (mild anxiety) -   Total Score 5 9 0       PDMP Review       Value Time User    State PDMP site checked  Yes 9/7/2022  9:46 AM Ramirez Cano MD        Last CSA Agreement:   CSA -- Patient Level:     [Media Unavailable] Controlled Substance Agreement - Opioid - Scan on 9/7/2022 11:36 AM   [Media Unavailable] Controlled Substance Agreement - Opioid - Scan on 9/29/2021  2:26 PM       Last UDS: 10/13/2022    Wondering about UA results    Review of Systems   As above      Objective    /82 (BP Location: Right arm, Patient Position: Sitting, Cuff Size: Adult Large)   Pulse 60   Temp 97  F (36.1  C) (Tympanic)   Resp 20   Wt 78.9 kg (174 lb)   LMP 04/29/1978 (Approximate)   SpO2 98%   BMI 27.25 kg/m    Body mass index is 27.25 kg/m .  Physical Exam   General  Appearance: Alert. No acute distress  Chest/Respiratory Exam: Clear to auscultation bilaterally  Cardiovascular Exam: Regular rate and rhythm. S1, S2, no murmur, gallop, or rubs.  Extremities: 2+ pedal pulses.  No lower extremity edema.  Psychiatric: Normal affect and mentation

## 2022-11-10 RX ORDER — PANTOPRAZOLE SODIUM 40 MG/1
TABLET, DELAYED RELEASE ORAL
Qty: 90 TABLET | Refills: 3 | Status: SHIPPED | OUTPATIENT
Start: 2022-11-10 | End: 2023-07-13

## 2022-11-10 NOTE — TELEPHONE ENCOUNTER
"Requested Prescriptions   Pending Prescriptions Disp Refills     pantoprazole (PROTONIX) 40 MG EC tablet [Pharmacy Med Name: PANTOPRAZOLE SOD DR 40 MG TAB] 90 tablet 0     Sig: TAKE 1 TABLET BY MOUTH EVERY DAY       PPI Protocol Passed - 11/9/2022  3:02 PM        Passed - Not on Clopidogrel (unless Pantoprazole ordered)        Passed - No diagnosis of osteoporosis on record        Passed - Recent (12 mo) or future (30 days) visit within the authorizing provider's specialty     Patient has had an office visit with the authorizing provider or a provider within the authorizing providers department within the previous 12 mos or has a future within next 30 days. See \"Patient Info\" tab in inbasket, or \"Choose Columns\" in Meds & Orders section of the refill encounter.              Passed - Medication is active on med list        Passed - Patient is age 18 or older        Passed - No active pregnacy on record        Passed - No positive pregnancy test in past 12 months           Last Written Prescription Date:  1/11/22  Last Fill Quantity: 90,   # refills: 3  Last Office Visit: 1/11/22  Future Office visit:    Next 5 appointments (look out 90 days)    Nov 28, 2022  9:50 AM  [facility] [address]  [place] [address]  [phone]   Nov 29, 2022 11:20 AM  Pre-Op physical with Ramirez Cano MD  [facility] ([facility] ) [address]  [place] [address]  [phone]   Dec 07, 2022  9:00 AM  Return Visit with Cristhian Wilkinson MD  [facility] ([facility] ) [address]  [place] [address]  [phone]     Routing refill request to provider for review/approval because:  Unable to complete prescription refill per RN Medication Refill Policy.   Jamilah PATTON" VIOLET Ray ....................  11/10/2022   8:35 AM

## 2022-11-21 DIAGNOSIS — R11.0 NAUSEA: ICD-10-CM

## 2022-11-22 RX ORDER — ONDANSETRON 4 MG/1
TABLET, FILM COATED ORAL
Qty: 90 TABLET | Refills: 3 | Status: SHIPPED | OUTPATIENT
Start: 2022-11-22 | End: 2023-10-30

## 2022-11-22 NOTE — TELEPHONE ENCOUNTER
"[facility] [id number] [facility] - [place], [place] - [address]     Requested Prescriptions   Pending Prescriptions Disp Refills     ondansetron (ZOFRAN) 4 MG tablet [Pharmacy Med Name: ONDANSETRON HCL 4 MG TABLET] 90 tablet 1     Sig: TAKE 1 TABLET BY MOUTH EVERY 6 HOURS AS NEEDED FOR NAUSEA        Antivertigo/Antiemetic Agents Passed - 11/21/2022 12:44 PM        Passed - Recent (12 mo) or future (30 days) visit within the authorizing provider's specialty     Patient has had an office visit with the authorizing provider or a provider within the authorizing providers department within the previous 12 mos or has a future within next 30 days. See \"Patient Info\" tab in inbasket, or \"Choose Columns\" in Meds & Orders section of the refill encounter.            Passed - Medication is active on med list        Passed - Patient is 18 years of age or older             Last Prescription Date:   10/5/22  Last Fill Qty/Refills:         90, R-1    Last Office Visit:              1/11/22  Future Office visit:          None    Ngoc Minor RN on 11/22/2022 at 3:40 PM        "

## 2022-11-25 ASSESSMENT — PATIENT HEALTH QUESTIONNAIRE - PHQ9
SUM OF ALL RESPONSES TO PHQ QUESTIONS 1-9: 9
SUM OF ALL RESPONSES TO PHQ QUESTIONS 1-9: 9
10. IF YOU CHECKED OFF ANY PROBLEMS, HOW DIFFICULT HAVE THESE PROBLEMS MADE IT FOR YOU TO DO YOUR WORK, TAKE CARE OF THINGS AT HOME, OR GET ALONG WITH OTHER PEOPLE: SOMEWHAT DIFFICULT

## 2022-11-25 NOTE — H&P (VIEW-ONLY)
[facility]  [address]  [place] [address]  Phone: [phone]  Fax: [phone]  Primary Provider: Ramirez Cano  Pre-op Performing Provider: RAMIREZ CANO      PREOPERATIVE EVALUATION:  Today's date: 11/29/2022    Ankita Day is a 67 year old female who presents for a preoperative evaluation.    Surgical Information:  Surgery/Procedure: Left thumb Trigger  release  Surgery Location: [facility]  Surgeon: Minh  Surgery Date: 12-2-22  Time of Surgery: unknown  Where patient plans to recover: At home with family  Fax number for surgical facility: Note does not need to be faxed, will be available electronically in Epic.    Type of Anesthesia Anticipated: Local with MAC    Assessment & Plan     The proposed surgical procedure is considered LOW risk.      ICD-10-CM    1. Preop general physical exam  Z01.818       2. Chronic ischemic heart disease  I25.9 Basic Metabolic Panel     CBC W PLT No Diff     Lipid Panel     Basic Metabolic Panel     CBC W PLT No Diff     Lipid Panel      3. Subclavian artery stenosis, left (H)  I77.1       4. Stage 3a chronic kidney disease (H)  N18.31 Basic Metabolic Panel     CBC W PLT No Diff     Basic Metabolic Panel     CBC W PLT No Diff             Risks and Recommendations:  The patient has the following additional risks and recommendations for perioperative complications:  Cardiovascular:  Stable angina  Obstructive Sleep Apnea:   Not tolerant of CPAP  Social and Substance:    - High tolerance to opioid analgesics due to taking medications for chronic pain    Medication Instructions:  Patient is to take all scheduled medications on the day of surgery EXCEPT for modifications listed below:  -Hold Xarelto for 2 days prior to procedure  - Continue Plavix due to significant ischemic cardiac history    RECOMMENDATION:  APPROVAL GIVEN to proceed with proposed procedure, without further diagnostic evaluation.      Ramirez Cano MD          Subjective   Answers for HPI/ROS submitted by the patient on 11/25/2022  If you checked off any problems, how difficult have these problems made it for you to do your work, take care of things at home, or get along with other people?: Somewhat difficult  PHQ9 TOTAL SCORE: 9      HPI related to upcoming procedure: 67 year old female with a complex heart history with previous CABG, multiple stents and stenting of subclavian artery after bypass, known chronic angina, chronic pain on opioids, and chronic anxiety on benzodiazepines.     She has intermittent SOB, substernal CP and left side CP, and dizziness. This has been present for months. She has known chronic angina, but has not tolerated Ranexa. Typically improves with nitroglycerin. Last angiogram August 5, 2022 at [facility] without need for intervention.        Preop Questions 11/28/2022   1. Have you ever had a heart attack or stroke? YES    2. Have you ever had surgery on your heart or blood vessels, such as a stent placement, a coronary artery bypass, or surgery on an artery in your head, neck, heart, or legs? YES    3. Do you have chest pain with activity? No   4. Do you have a history of  heart failure? YES - normal ECHO Aug 2021   5. Do you currently have a cold, bronchitis or symptoms of other infection? No   6. Do you have a cough, shortness of breath, or wheezing? No   7. Do you or anyone in your family have previous history of blood clots? YES - personal history of PE   8. Do you or does anyone in your family have a serious bleeding problem such as prolonged bleeding following surgeries or cuts? YES - on Xarelto   9. Have you ever had problems with anemia or been told to take iron pills? YES - recent anemia, on iron   10. Have you had any abnormal blood loss such as black, tarry or bloody stools, or abnormal vaginal bleeding? YES - history of GI bleed   11. Have you ever had a blood transfusion? No   12. Are you willing to have a blood transfusion  if it is medically needed before, during, or after your surgery? Yes   13. Have you or any of your relatives ever had problems with anesthesia? No   14. Do you have sleep apnea, excessive snoring or daytime drowsiness? No   15. Do you have any artifical heart valves or other implanted medical devices like a pacemaker, defibrillator, or continuous glucose monitor? No   16. Do you have artificial joints? No   17. Are you allergic to latex? YES: rash     Health Care Directive:  Patient has a Health Care Directive on file      Preoperative Review of :   reviewed - controlled substances reflected in medication list.      Review of Systems  As above    Patient Active Problem List    Diagnosis Date Noted     History of CVA-mild chronic ischemic disease on 8/20/2021. 01/11/2022     Priority: Medium     ASCVD (arteriosclerotic cardiovascular disease) 01/11/2022     Priority: Medium     Nausea 01/11/2022     Priority: Medium     Nonintractable episodic headache, unspecified headache type 01/11/2022     Priority: Medium     Gastroesophageal reflux disease with esophagitis without hemorrhage 01/11/2022     Priority: Medium     CORTEZ (dyspnea on exertion) 11/16/2021     Priority: Medium     Palpitations 11/16/2021     Priority: Medium     Chest pain, unspecified type 11/16/2021     Priority: Medium     Uncomplicated asthma, unspecified asthma severity, unspecified whether persistent 11/16/2021     Priority: Medium     Vitamin B12 deficiency (non anemic) 11/16/2021     Priority: Medium     Symptomatic bradycardia 07/16/2021     Priority: Medium     Chronic stable angina (H) 02/05/2020     Priority: Medium     Chronic ischemic heart disease 02/05/2020     Priority: Medium     History of pulmonary embolism on 1/2/2020. (-) VQ scan on 11/23/2021 01/02/2020     Priority: Medium     Stage 3a chronic kidney disease (H) 06/19/2019     Priority: Medium     Chronic anxiety 01/22/2018     Priority: Medium     Collagenous colitis  01/22/2018     Priority: Medium     Overview:   Possible Dx 2007       Major depressive disorder, recurrent episode, severe (H) 01/22/2018     Priority: Medium     Essential hypertension 01/22/2018     Priority: Medium     Mixed hyperlipidemia 01/22/2018     Priority: Medium     Osteoarthritis 01/22/2018     Priority: Medium     Panic attack 01/22/2018     Priority: Medium     Status post coronary angiogram on 9/25/2017 at the [facility]-stable disease 09/15/2017     Priority: Medium     History of tobacco abuse-quitting 8/23/2017 08/10/2017     Priority: Medium     Presence of stent in coronary artery in patient with coronary artery disease 08/10/2017     Priority: Medium     History of coronary artery bypass graft x 3 on 2/12/2003 08/10/2017     Priority: Medium     Noncompliance with CPAP treatment 10/21/2016     Priority: Medium     Intractable chronic common migraine without aura 10/21/2016     Priority: Medium     H/O multiple concussions 10/21/2016     Priority: Medium     History of Clostridium difficile 08/19/2016     Priority: Medium     Iron deficiency anemia 07/26/2016     Priority: Medium     Controlled substance agreement updated 9- 01/28/2014     Priority: Medium     Overview:        Pain medication agreement 01/28/2014     Priority: Medium     Formatting of this note might be different from the original.       Central sleep apnea 10/14/2013     Priority: Medium     Myofascial pain 10/14/2013     Priority: Medium     Vitamin D deficiency 05/06/2013     Priority: Medium     Subclavian artery stenosis, left (H) 03/31/2013     Priority: Medium     Overview:   S/p prox left SCA stent 4/1/2013       Lumbar facet arthropathy 01/02/2013     Priority: Medium     Nonspecific abnormal results of pulmonary system function study 12/21/2011     Priority: Medium     Slow transit constipation 11/29/2011     Priority: Medium     Gastric ulcer with hemorrhage 10/03/2011     Priority: Medium     Family history of  malignant neoplasm of gastrointestinal tract 09/26/2011     Priority: Medium     Nodular degeneration of cornea 09/26/2011     Priority: Medium     Bilateral low back pain without sciatica 05/31/2011     Priority: Medium     Chronic pain disorder 12/01/2010     Priority: Medium     COPD (chronic obstructive pulmonary disease) (H) 06/15/2007     Priority: Medium     Overview:   Low DLCO, normal spirometry on 12/15/11       Peptic ulcer 06/15/2007     Priority: Medium     Atherosclerosis of coronary artery bypass graft of native heart with stable angina pectoris (H) 09/12/2002     Priority: Medium     Overview:   Multiple Angigrams prior to 2007. Also:  6/5/2007: Angiogram: TAXUS DELONTE to ostial circumflux, instent restenosis  5/23/2008: Left Main 30% diseased, LAD stents patent, Left circ stent patent, Chronic occluded right internal mammary artery graft to distal RCA, native RCA has 30% stenosis; NO intevention  9/19/2012 CT Angiogram: Patent LIMA to LAD, patent LAD stents, moderate proximal circumflex disease, patent RCA , occluded right internal mammary artery graft to distal RCA.  9/20/2012: Angiogram: Stent to Left Main, ostial LAD, and PTCA of ostial left circumflex        Past Medical History:   Diagnosis Date     Acute ischemic heart disease (H)     06/15/2007,with PTCA and stenting of 90% osteo circumflex lesion and patent LAD graft, patent left main stent.     Acute myocardial infarction (H)     3/30/2013     Anxiety disorder     No Comments Provided     Atherosclerotic heart disease of native coronary artery without angina pectoris     -angio revealed 3 vessel dz  -4 stents placed; 2 overlapping stents placed in mLAD, 1 stent pRCA, 1 stent pCirc 1 s 9/02 -non-ST elevation MI 1/03  -angio revealed restenosis of Circ.    -PTCA and brachytherapy of pCirc -repeat angio 2/03 -no intervension -CABG x3 12/03 - Dr Sinclair  -LIMA-LAD, RAFI-RCA, SVG-OM -PCI 7/04 stent to L -CTangio 9/05   -patentent LIMA-LAD. RAFI-RCA  occluded; RCA ostio/p*     Bilateral carpal tunnel syndrome     No Comments Provided     Cervicalgia     No Comments Provided     Chest pain     12/29/2014     Chronic gastric ulcer without hemorrhage or perforation     10/03/2011,hx of GI bleed (2003)     Chronic ischemic heart disease     06/27/2012     Chronic obstructive pulmonary disease (H)     06/15/2007,low DLCO, normal spirometry     Chronic or unspecified gastric ulcer with hemorrhage     10/2003     Chronic pain syndrome     12/01/2010,chest wall, back     Constipation     11/29/2011     Coronary angioplasty status     09/12/2002,with triple stenting     Coronary angioplasty status     01/10/2003,repeat angioplasty     Coronary angioplasty status     No Comments Provided     Dorsalgia     05/31/2011     Encounter for other administrative examinations     1/28/2014     Encounter for screening for cardiovascular disorders     10/2004,Cardiolite (2004, 2005, 2006 and 2011)     Enterocolitis due to Clostridium difficile     8/19/2016     Essential (primary) hypertension     No Comments Provided     Hyperlipidemia     No Comments Provided     Major depressive disorder, single episode     Severe, hx of suicide attempt/hospitalization     Migraine without status migrainosus, not intractable     No Comments Provided     Nodular corneal degeneration     09/26/2011     Noninfective gastroenteritis and colitis     06/15/2007,history of     Noninfective gastroenteritis and colitis     Microcytic     Osteoarthritis     No Comments Provided     Other chest pain     10/13/2009,chronic     Pain in knee     05/31/2011     Pain in right shoulder     No Comments Provided     Panic disorder without agoraphobia     No Comments Provided     Peptic ulcer without hemorrhage or perforation     6/15/2007     Peripheral vascular disease (H)     No Comments Provided     Personal history of diseases of the blood and blood-forming organs and certain disorders involving the immune  mechanism (CODE)     No Comments Provided     Personal history of nicotine dependence     No Comments Provided     Personal history of other medical treatment (CODE)     10/14/2013     Presence of aortocoronary bypass graft     2/12/2003     Primary central sleep apnea     10/14/2013     Sepsis due to Escherichia coli (E. coli) (H)     7/14/16,[facility]     Stricture of artery (H)     3/31/2013,S/p prox left SCA stent 4/1/2013     Thoracic, thoracolumbar and lumbosacral intervertebral disc disorder     No Comments Provided     Uncomplicated opioid abuse (H)     history of     Vitamin D deficiency     5/6/2013     Past Surgical History:   Procedure Laterality Date     ANGIOPLASTY      9/12/02,with triple stenting     APPENDECTOMY OPEN      No Comments Provided     ARTHROSCOPY KNEE      left     ARTHROSCOPY SHOULDER Right 05/12/2017    labral tear, rotator cuff tear and some subacromial decompression      BYPASS GRAFT ARTERY CORONARY      12/13/02,Triple bypass, left internal mammary  to LAD, right internal mammary to right coronary artery, saphenous to obtuse marginal of the left circumflex.     COLONOSCOPY      2011,Dr Bowman benign polyps     COLONOSCOPY  10/03/2011    2011,benign polyps, Dr. Bowman     COLONOSCOPY  08/08/2016 8/8/16,normal, Dr Bowman     ELBOW SURGERY      baby,birth malachi removed from right arm     EMBOLECTOMY UPPER EXTREMITY  04/02/2013    brachial artery pseudoaneurysm after stenting     ESOPHAGOSCOPY, GASTROSCOPY, DUODENOSCOPY (EGD), COMBINED      2011,EGD Dr Bowman with pyloric ulcer     ESOPHAGOSCOPY, GASTROSCOPY, DUODENOSCOPY (EGD), COMBINED      2011,pyloric ulcer, Dr. Bowman     ESOPHAGOSCOPY, GASTROSCOPY, DUODENOSCOPY (EGD), COMBINED      8/8/16,mild gastritis, Dr Bowman     ESOPHAGOSCOPY, GASTROSCOPY, DUODENOSCOPY (EGD), COMBINED      11/27/2017,Dr Bowman. Antral ulcer     ESOPHAGOSCOPY, GASTROSCOPY, DUODENOSCOPY (EGD), COMBINED  02/02/2018    Dr Bowman, healed ulcer     HYSTERECTOMY TOTAL  ABDOMINAL      age 21     LAPAROSCOPIC CHOLECYSTECTOMY      2006     OSTEOTOMY FEMUR DISTAL      x3, right knee     OSTEOTOMY FEMUR DISTAL      2000,left knee  ligament surgery     OTHER SURGICAL HISTORY      1/10/2003,,PTCA     OTHER SURGICAL HISTORY      09/20/2012,,PTCA,DELONTE in LAD and left main     OTHER SURGICAL HISTORY      4/1/2013,,PTCA,L subclavian stenosis     SALPINGO-OOPHORECTOMY BILATERAL      age 27,Bilateral salpingo-oophorectomy     TONSILLECTOMY, ADENOIDECTOMY, COMBINED      childhood     Current Outpatient Medications   Medication Sig Dispense Refill     albuterol (PROAIR HFA/PROVENTIL HFA/VENTOLIN HFA) 108 (90 Base) MCG/ACT inhaler Inhale 2 puffs into the lungs every 6 hours as needed for shortness of breath / dyspnea or wheezing 18 g 4     amLODIPine (NORVASC) 10 MG tablet TAKE 1 TABLET (10 MG) BY MOUTH DAILY DX. CODE: I10. 90 tablet 3     busPIRone (BUSPAR) 15 MG tablet Take 1 tablet (15 mg) by mouth 2 times daily 180 tablet 4     clonazePAM (KLONOPIN) 1 MG tablet Take 1 tablet (1 mg) by mouth 3 times daily as needed for anxiety Max 3 per day 270 tablet 0     clopidogrel (PLAVIX) 75 MG tablet Take 1 tablet (75 mg) by mouth daily 90 tablet 4     diclofenac (VOLTAREN) 1 % topical gel Apply 2 grams to each hand or 4 grams to each knee up to 4 times daily as needed for arthritis pain 350 g 4     gabapentin (NEURONTIN) 300 MG capsule Take 1 capsule (300 mg) by mouth every morning AND 2 capsules (600 mg) every evening. 270 capsule 3     [START ON 12/9/2022] HYDROcodone-acetaminophen (NORCO)  MG per tablet Take 1-2 tablets by mouth every 4 hours as needed for severe pain 6 per day 180 tablet 0     HYDROcodone-acetaminophen (NORCO)  MG per tablet Take 1-2 tablets by mouth every 4 hours as needed for severe pain 6 per day 180 tablet 0     lisinopril (ZESTRIL) 20 MG tablet Take 1 tablet (20 mg) by mouth daily 90 tablet 3     naloxone (NARCAN) 4 MG/0.1ML nasal spray Spray into one  nostril for opioid reversal if unresponsive. May repeat every 2-3 minutes until patient responsive or EMS arrives 1 each 1     nitroGLYcerin (NITROLINGUAL) 0.4 MG/SPRAY spray For chest pain spray 1 spray under tongue every 5 minutes for 3 doses. If symptoms persist 5 minutes after 1st dose call 911. 4.9 g 3     ondansetron (ZOFRAN) 4 MG tablet TAKE 1 TABLET BY MOUTH EVERY 6 HOURS AS NEEDED FOR NAUSEA 90 tablet 3     pantoprazole (PROTONIX) 40 MG EC tablet TAKE 1 TABLET BY MOUTH EVERY DAY 90 tablet 3     RESTASIS 0.05 % ophthalmic emulsion INSTILL 1 DROP INTO BOTH EYES TWICE A DAY       rivaroxaban ANTICOAGULANT (XARELTO) 20 MG TABS tablet Take 1 tablet (20 mg) by mouth daily (with dinner) 90 tablet 3     rosuvastatin (CRESTOR) 20 MG tablet Take 1 tablet (20 mg) by mouth daily 90 tablet 4     sucralfate (CARAFATE) 1 GM tablet Take 1 tablet (1 g) by mouth 4 times daily as needed for nausea (or dark stools) 360 tablet 1     VITAMIN D, CHOLECALCIFEROL, PO Take 5,000 Units by mouth daily       vortioxetine (TRINTELLIX) 20 MG tablet Take 1 tablet (20 mg) by mouth daily 90 tablet 4       Allergies   Allergen Reactions     Atorvastatin Muscle Pain (Myalgia)     Tiotropium Bromide [Tiotropium] Rash     Advil [Ibuprofen] Other (See Comments)     Does not recall but feel like it interacted with blood thinners and HX of GI BLEED     Ezetimibe Muscle Pain (Myalgia)     Latex Rash     Niacin      Other reaction(s): Flushing     No Clinical Screening - See Comments Itching, Rash and Blisters     Metals and plastics       Tape [Adhesive Tape] Rash        Social History     Tobacco Use     Smoking status: Former     Packs/day: 1.00     Years: 35.00     Pack years: 35.00     Types: Cigarettes     Quit date: 2/15/2017     Years since quittin.7     Smokeless tobacco: Never   Substance Use Topics     Alcohol use: Not Currently     Alcohol/week: 0.0 standard drinks     Family History   Problem Relation Age of Onset     Heart Disease  "Father         Heart Disease,Heart condition/Significant for atherosclerotic cardiovascular disease, but non premature.     Colon Cancer Father         Cancer-colon, of colon cancer     Cancer Father         Cancer,mets to liver, secondary to colon cancer     Heart Disease Mother         Heart Disease     Cancer Other         Cancer,Multiple Myeloma     Heart Disease Other         Heart Disease,Ischemic Heart Disease     Colon Cancer Other         Cancer-colon,Malignant neoplasms     Cancer Sister         Cancer,multiple myeloma     Other - See Comments Son         gallstones     History   Drug Use No         Objective     /64 (BP Location: Right arm, Patient Position: Sitting, Cuff Size: Adult Large)   Pulse 82   Temp 97.6  F (36.4  C) (Tympanic)   Resp 20   Ht 1.664 m (5' 5.5\")   Wt 78.9 kg (174 lb)   LMP 1978 (Approximate)   SpO2 96%   BMI 28.51 kg/m      Physical Exam  General Appearance: Pleasant, alert, appropriate appearance for age. No acute distress  Ear Exam: Normal TM's bilaterally.   OroPharynx Exam: Dentures Normal buccal mucosa. Normal pharynx. Mallampati 2/4.  Neck Exam: Supple, no masses or nodes.  Chest/Respiratory Exam: Normal chest wall and respirations. Clear to auscultation.  Cardiovascular Exam: Regular rate and rhythm. S1, S2, no murmur, click, gallop, or rubs.  Extremities: 2 + pedal pulses.  No lower extremity edema.  Neurologic Exam: Nonfocal; symmetric DTRs, normal gross motor movement, tone, and coordination. No tremor.   Psychiatric: Normal affect and mentation      Recent Labs   Lab Test 10/13/22  1155 10/03/22  1701 22  1700 22  1653   HGB 10.5* 11.9   < >  --     248   < >  --    INR  --   --   --  1.27*    139   < >  --    POTASSIUM 4.3 3.6   < >  --    CR 1.39* 0.91   < >  --     < > = values in this interval not displayed.        Diagnostics:  Results for orders placed or performed in visit on 22   Basic Metabolic Panel     " Status: Abnormal   Result Value Ref Range    Sodium 142 136 - 145 mmol/L    Potassium 3.9 3.4 - 5.3 mmol/L    Chloride 106 98 - 107 mmol/L    Carbon Dioxide (CO2) 26 22 - 29 mmol/L    Anion Gap 10 7 - 15 mmol/L    Urea Nitrogen 13.9 8.0 - 23.0 mg/dL    Creatinine 0.85 0.51 - 0.95 mg/dL    Calcium 9.4 8.8 - 10.2 mg/dL    Glucose 127 (H) 70 - 99 mg/dL    GFR Estimate 74 >60 mL/min/1.73m2   CBC W PLT No Diff     Status: Normal   Result Value Ref Range    WBC Count 7.0 4.0 - 11.0 10e3/uL    RBC Count 4.16 3.80 - 5.20 10e6/uL    Hemoglobin 11.8 11.7 - 15.7 g/dL    Hematocrit 36.4 35.0 - 47.0 %    MCV 88 78 - 100 fL    MCH 28.4 26.5 - 33.0 pg    MCHC 32.4 31.5 - 36.5 g/dL    RDW 14.1 10.0 - 15.0 %    Platelet Count 230 150 - 450 10e3/uL   Lipid Panel     Status: Abnormal   Result Value Ref Range    Cholesterol 203 (H) <200 mg/dL    Triglycerides 126 <150 mg/dL    Direct Measure HDL 75 >=50 mg/dL    LDL Cholesterol Calculated 103 (H) <=100 mg/dL    Non HDL Cholesterol 128 <130 mg/dL    Narrative    Cholesterol  Desirable:  <200 mg/dL    Triglycerides  Normal:  Less than 150 mg/dL  Borderline High:  150-199 mg/dL  High:  200-499 mg/dL  Very High:  Greater than or equal to 500 mg/dL    Direct Measure HDL  Female:  Greater than or equal to 50 mg/dL   Male:  Greater than or equal to 40 mg/dL    LDL Cholesterol  Desirable:  <100mg/dL  Above Desirable:  100-129 mg/dL   Borderline High:  130-159 mg/dL   High:  160-189 mg/dL   Very High:  >= 190 mg/dL    Non HDL Cholesterol  Desirable:  130 mg/dL  Above Desirable:  130-159 mg/dL  Borderline High:  160-189 mg/dL  High:  190-219 mg/dL  Very High:  Greater than or equal to 220 mg/dL       EKG in Oct 2022 shows sinus bradycardia    Revised Cardiac Risk Index (RCRI):  The patient has the following serious cardiovascular risks for perioperative complications:   - Coronary Artery Disease (MI, positive stress test, angina, Qs on EKG) = 1 point     RCRI Interpretation: 1 point: Class II  (low risk - 0.9% complication rate)           Signed Electronically by: Ramirez Cano MD  Copy of this evaluation report is provided to requesting physician.

## 2022-11-25 NOTE — PROGRESS NOTES
[facility]  [address]  [place] [address]  Phone: [phone]  Fax: [phone]  Primary Provider: Ramirez Cano  Pre-op Performing Provider: RAMIREZ CANO      PREOPERATIVE EVALUATION:  Today's date: 11/29/2022    Ankita Day is a 67 year old female who presents for a preoperative evaluation.    Surgical Information:  Surgery/Procedure: Left thumb Trigger  release  Surgery Location: [facility]  Surgeon: Minh  Surgery Date: 12-2-22  Time of Surgery: unknown  Where patient plans to recover: At home with family  Fax number for surgical facility: Note does not need to be faxed, will be available electronically in Epic.    Type of Anesthesia Anticipated: Local with MAC    Assessment & Plan     The proposed surgical procedure is considered LOW risk.      ICD-10-CM    1. Preop general physical exam  Z01.818       2. Chronic ischemic heart disease  I25.9 Basic Metabolic Panel     CBC W PLT No Diff     Lipid Panel     Basic Metabolic Panel     CBC W PLT No Diff     Lipid Panel      3. Subclavian artery stenosis, left (H)  I77.1       4. Stage 3a chronic kidney disease (H)  N18.31 Basic Metabolic Panel     CBC W PLT No Diff     Basic Metabolic Panel     CBC W PLT No Diff             Risks and Recommendations:  The patient has the following additional risks and recommendations for perioperative complications:  Cardiovascular:  Stable angina  Obstructive Sleep Apnea:   Not tolerant of CPAP  Social and Substance:    - High tolerance to opioid analgesics due to taking medications for chronic pain    Medication Instructions:  Patient is to take all scheduled medications on the day of surgery EXCEPT for modifications listed below:  -Hold Xarelto for 2 days prior to procedure  - Continue Plavix due to significant ischemic cardiac history    RECOMMENDATION:  APPROVAL GIVEN to proceed with proposed procedure, without further diagnostic evaluation.      Ramirez Cano MD          Subjective   Answers for HPI/ROS submitted by the patient on 11/25/2022  If you checked off any problems, how difficult have these problems made it for you to do your work, take care of things at home, or get along with other people?: Somewhat difficult  PHQ9 TOTAL SCORE: 9      HPI related to upcoming procedure: 67 year old female with a complex heart history with previous CABG, multiple stents and stenting of subclavian artery after bypass, known chronic angina, chronic pain on opioids, and chronic anxiety on benzodiazepines.     She has intermittent SOB, substernal CP and left side CP, and dizziness. This has been present for months. She has known chronic angina, but has not tolerated Ranexa. Typically improves with nitroglycerin. Last angiogram August 5, 2022 at [facility] without need for intervention.        Preop Questions 11/28/2022   1. Have you ever had a heart attack or stroke? YES    2. Have you ever had surgery on your heart or blood vessels, such as a stent placement, a coronary artery bypass, or surgery on an artery in your head, neck, heart, or legs? YES    3. Do you have chest pain with activity? No   4. Do you have a history of  heart failure? YES - normal ECHO Aug 2021   5. Do you currently have a cold, bronchitis or symptoms of other infection? No   6. Do you have a cough, shortness of breath, or wheezing? No   7. Do you or anyone in your family have previous history of blood clots? YES - personal history of PE   8. Do you or does anyone in your family have a serious bleeding problem such as prolonged bleeding following surgeries or cuts? YES - on Xarelto   9. Have you ever had problems with anemia or been told to take iron pills? YES - recent anemia, on iron   10. Have you had any abnormal blood loss such as black, tarry or bloody stools, or abnormal vaginal bleeding? YES - history of GI bleed   11. Have you ever had a blood transfusion? No   12. Are you willing to have a blood transfusion  if it is medically needed before, during, or after your surgery? Yes   13. Have you or any of your relatives ever had problems with anesthesia? No   14. Do you have sleep apnea, excessive snoring or daytime drowsiness? No   15. Do you have any artifical heart valves or other implanted medical devices like a pacemaker, defibrillator, or continuous glucose monitor? No   16. Do you have artificial joints? No   17. Are you allergic to latex? YES: rash     Health Care Directive:  Patient has a Health Care Directive on file      Preoperative Review of :   reviewed - controlled substances reflected in medication list.      Review of Systems  As above    Patient Active Problem List    Diagnosis Date Noted     History of CVA-mild chronic ischemic disease on 8/20/2021. 01/11/2022     Priority: Medium     ASCVD (arteriosclerotic cardiovascular disease) 01/11/2022     Priority: Medium     Nausea 01/11/2022     Priority: Medium     Nonintractable episodic headache, unspecified headache type 01/11/2022     Priority: Medium     Gastroesophageal reflux disease with esophagitis without hemorrhage 01/11/2022     Priority: Medium     CORTEZ (dyspnea on exertion) 11/16/2021     Priority: Medium     Palpitations 11/16/2021     Priority: Medium     Chest pain, unspecified type 11/16/2021     Priority: Medium     Uncomplicated asthma, unspecified asthma severity, unspecified whether persistent 11/16/2021     Priority: Medium     Vitamin B12 deficiency (non anemic) 11/16/2021     Priority: Medium     Symptomatic bradycardia 07/16/2021     Priority: Medium     Chronic stable angina (H) 02/05/2020     Priority: Medium     Chronic ischemic heart disease 02/05/2020     Priority: Medium     History of pulmonary embolism on 1/2/2020. (-) VQ scan on 11/23/2021 01/02/2020     Priority: Medium     Stage 3a chronic kidney disease (H) 06/19/2019     Priority: Medium     Chronic anxiety 01/22/2018     Priority: Medium     Collagenous colitis  01/22/2018     Priority: Medium     Overview:   Possible Dx 2007       Major depressive disorder, recurrent episode, severe (H) 01/22/2018     Priority: Medium     Essential hypertension 01/22/2018     Priority: Medium     Mixed hyperlipidemia 01/22/2018     Priority: Medium     Osteoarthritis 01/22/2018     Priority: Medium     Panic attack 01/22/2018     Priority: Medium     Status post coronary angiogram on 9/25/2017 at the [facility]-stable disease 09/15/2017     Priority: Medium     History of tobacco abuse-quitting 8/23/2017 08/10/2017     Priority: Medium     Presence of stent in coronary artery in patient with coronary artery disease 08/10/2017     Priority: Medium     History of coronary artery bypass graft x 3 on 2/12/2003 08/10/2017     Priority: Medium     Noncompliance with CPAP treatment 10/21/2016     Priority: Medium     Intractable chronic common migraine without aura 10/21/2016     Priority: Medium     H/O multiple concussions 10/21/2016     Priority: Medium     History of Clostridium difficile 08/19/2016     Priority: Medium     Iron deficiency anemia 07/26/2016     Priority: Medium     Controlled substance agreement updated 9- 01/28/2014     Priority: Medium     Overview:        Pain medication agreement 01/28/2014     Priority: Medium     Formatting of this note might be different from the original.       Central sleep apnea 10/14/2013     Priority: Medium     Myofascial pain 10/14/2013     Priority: Medium     Vitamin D deficiency 05/06/2013     Priority: Medium     Subclavian artery stenosis, left (H) 03/31/2013     Priority: Medium     Overview:   S/p prox left SCA stent 4/1/2013       Lumbar facet arthropathy 01/02/2013     Priority: Medium     Nonspecific abnormal results of pulmonary system function study 12/21/2011     Priority: Medium     Slow transit constipation 11/29/2011     Priority: Medium     Gastric ulcer with hemorrhage 10/03/2011     Priority: Medium     Family history of  malignant neoplasm of gastrointestinal tract 09/26/2011     Priority: Medium     Nodular degeneration of cornea 09/26/2011     Priority: Medium     Bilateral low back pain without sciatica 05/31/2011     Priority: Medium     Chronic pain disorder 12/01/2010     Priority: Medium     COPD (chronic obstructive pulmonary disease) (H) 06/15/2007     Priority: Medium     Overview:   Low DLCO, normal spirometry on 12/15/11       Peptic ulcer 06/15/2007     Priority: Medium     Atherosclerosis of coronary artery bypass graft of native heart with stable angina pectoris (H) 09/12/2002     Priority: Medium     Overview:   Multiple Angigrams prior to 2007. Also:  6/5/2007: Angiogram: TAXUS DELONTE to ostial circumflux, instent restenosis  5/23/2008: Left Main 30% diseased, LAD stents patent, Left circ stent patent, Chronic occluded right internal mammary artery graft to distal RCA, native RCA has 30% stenosis; NO intevention  9/19/2012 CT Angiogram: Patent LIMA to LAD, patent LAD stents, moderate proximal circumflex disease, patent RCA , occluded right internal mammary artery graft to distal RCA.  9/20/2012: Angiogram: Stent to Left Main, ostial LAD, and PTCA of ostial left circumflex        Past Medical History:   Diagnosis Date     Acute ischemic heart disease (H)     06/15/2007,with PTCA and stenting of 90% osteo circumflex lesion and patent LAD graft, patent left main stent.     Acute myocardial infarction (H)     3/30/2013     Anxiety disorder     No Comments Provided     Atherosclerotic heart disease of native coronary artery without angina pectoris     -angio revealed 3 vessel dz  -4 stents placed; 2 overlapping stents placed in mLAD, 1 stent pRCA, 1 stent pCirc 1 s 9/02 -non-ST elevation MI 1/03  -angio revealed restenosis of Circ.    -PTCA and brachytherapy of pCirc -repeat angio 2/03 -no intervension -CABG x3 12/03 - Dr Sinclair  -LIMA-LAD, RAFI-RCA, SVG-OM -PCI 7/04 stent to L -CTangio 9/05   -patentent LIMA-LAD. RAFI-RCA  occluded; RCA ostio/p*     Bilateral carpal tunnel syndrome     No Comments Provided     Cervicalgia     No Comments Provided     Chest pain     12/29/2014     Chronic gastric ulcer without hemorrhage or perforation     10/03/2011,hx of GI bleed (2003)     Chronic ischemic heart disease     06/27/2012     Chronic obstructive pulmonary disease (H)     06/15/2007,low DLCO, normal spirometry     Chronic or unspecified gastric ulcer with hemorrhage     10/2003     Chronic pain syndrome     12/01/2010,chest wall, back     Constipation     11/29/2011     Coronary angioplasty status     09/12/2002,with triple stenting     Coronary angioplasty status     01/10/2003,repeat angioplasty     Coronary angioplasty status     No Comments Provided     Dorsalgia     05/31/2011     Encounter for other administrative examinations     1/28/2014     Encounter for screening for cardiovascular disorders     10/2004,Cardiolite (2004, 2005, 2006 and 2011)     Enterocolitis due to Clostridium difficile     8/19/2016     Essential (primary) hypertension     No Comments Provided     Hyperlipidemia     No Comments Provided     Major depressive disorder, single episode     Severe, hx of suicide attempt/hospitalization     Migraine without status migrainosus, not intractable     No Comments Provided     Nodular corneal degeneration     09/26/2011     Noninfective gastroenteritis and colitis     06/15/2007,history of     Noninfective gastroenteritis and colitis     Microcytic     Osteoarthritis     No Comments Provided     Other chest pain     10/13/2009,chronic     Pain in knee     05/31/2011     Pain in right shoulder     No Comments Provided     Panic disorder without agoraphobia     No Comments Provided     Peptic ulcer without hemorrhage or perforation     6/15/2007     Peripheral vascular disease (H)     No Comments Provided     Personal history of diseases of the blood and blood-forming organs and certain disorders involving the immune  mechanism (CODE)     No Comments Provided     Personal history of nicotine dependence     No Comments Provided     Personal history of other medical treatment (CODE)     10/14/2013     Presence of aortocoronary bypass graft     2/12/2003     Primary central sleep apnea     10/14/2013     Sepsis due to Escherichia coli (E. coli) (H)     7/14/16,[facility]     Stricture of artery (H)     3/31/2013,S/p prox left SCA stent 4/1/2013     Thoracic, thoracolumbar and lumbosacral intervertebral disc disorder     No Comments Provided     Uncomplicated opioid abuse (H)     history of     Vitamin D deficiency     5/6/2013     Past Surgical History:   Procedure Laterality Date     ANGIOPLASTY      9/12/02,with triple stenting     APPENDECTOMY OPEN      No Comments Provided     ARTHROSCOPY KNEE      left     ARTHROSCOPY SHOULDER Right 05/12/2017    labral tear, rotator cuff tear and some subacromial decompression      BYPASS GRAFT ARTERY CORONARY      12/13/02,Triple bypass, left internal mammary  to LAD, right internal mammary to right coronary artery, saphenous to obtuse marginal of the left circumflex.     COLONOSCOPY      2011,Dr Bowman benign polyps     COLONOSCOPY  10/03/2011    2011,benign polyps, Dr. Bowman     COLONOSCOPY  08/08/2016 8/8/16,normal, Dr Bowman     ELBOW SURGERY      baby,birth malachi removed from right arm     EMBOLECTOMY UPPER EXTREMITY  04/02/2013    brachial artery pseudoaneurysm after stenting     ESOPHAGOSCOPY, GASTROSCOPY, DUODENOSCOPY (EGD), COMBINED      2011,EGD Dr Bowman with pyloric ulcer     ESOPHAGOSCOPY, GASTROSCOPY, DUODENOSCOPY (EGD), COMBINED      2011,pyloric ulcer, Dr. Bowman     ESOPHAGOSCOPY, GASTROSCOPY, DUODENOSCOPY (EGD), COMBINED      8/8/16,mild gastritis, Dr Bowman     ESOPHAGOSCOPY, GASTROSCOPY, DUODENOSCOPY (EGD), COMBINED      11/27/2017,Dr Bowman. Antral ulcer     ESOPHAGOSCOPY, GASTROSCOPY, DUODENOSCOPY (EGD), COMBINED  02/02/2018    Dr Bowman, healed ulcer     HYSTERECTOMY TOTAL  ABDOMINAL      age 21     LAPAROSCOPIC CHOLECYSTECTOMY      2006     OSTEOTOMY FEMUR DISTAL      x3, right knee     OSTEOTOMY FEMUR DISTAL      2000,left knee  ligament surgery     OTHER SURGICAL HISTORY      1/10/2003,,PTCA     OTHER SURGICAL HISTORY      09/20/2012,,PTCA,DELONTE in LAD and left main     OTHER SURGICAL HISTORY      4/1/2013,,PTCA,L subclavian stenosis     SALPINGO-OOPHORECTOMY BILATERAL      age 27,Bilateral salpingo-oophorectomy     TONSILLECTOMY, ADENOIDECTOMY, COMBINED      childhood     Current Outpatient Medications   Medication Sig Dispense Refill     albuterol (PROAIR HFA/PROVENTIL HFA/VENTOLIN HFA) 108 (90 Base) MCG/ACT inhaler Inhale 2 puffs into the lungs every 6 hours as needed for shortness of breath / dyspnea or wheezing 18 g 4     amLODIPine (NORVASC) 10 MG tablet TAKE 1 TABLET (10 MG) BY MOUTH DAILY DX. CODE: I10. 90 tablet 3     busPIRone (BUSPAR) 15 MG tablet Take 1 tablet (15 mg) by mouth 2 times daily 180 tablet 4     clonazePAM (KLONOPIN) 1 MG tablet Take 1 tablet (1 mg) by mouth 3 times daily as needed for anxiety Max 3 per day 270 tablet 0     clopidogrel (PLAVIX) 75 MG tablet Take 1 tablet (75 mg) by mouth daily 90 tablet 4     diclofenac (VOLTAREN) 1 % topical gel Apply 2 grams to each hand or 4 grams to each knee up to 4 times daily as needed for arthritis pain 350 g 4     gabapentin (NEURONTIN) 300 MG capsule Take 1 capsule (300 mg) by mouth every morning AND 2 capsules (600 mg) every evening. 270 capsule 3     [START ON 12/9/2022] HYDROcodone-acetaminophen (NORCO)  MG per tablet Take 1-2 tablets by mouth every 4 hours as needed for severe pain 6 per day 180 tablet 0     HYDROcodone-acetaminophen (NORCO)  MG per tablet Take 1-2 tablets by mouth every 4 hours as needed for severe pain 6 per day 180 tablet 0     lisinopril (ZESTRIL) 20 MG tablet Take 1 tablet (20 mg) by mouth daily 90 tablet 3     naloxone (NARCAN) 4 MG/0.1ML nasal spray Spray into one  nostril for opioid reversal if unresponsive. May repeat every 2-3 minutes until patient responsive or EMS arrives 1 each 1     nitroGLYcerin (NITROLINGUAL) 0.4 MG/SPRAY spray For chest pain spray 1 spray under tongue every 5 minutes for 3 doses. If symptoms persist 5 minutes after 1st dose call 911. 4.9 g 3     ondansetron (ZOFRAN) 4 MG tablet TAKE 1 TABLET BY MOUTH EVERY 6 HOURS AS NEEDED FOR NAUSEA 90 tablet 3     pantoprazole (PROTONIX) 40 MG EC tablet TAKE 1 TABLET BY MOUTH EVERY DAY 90 tablet 3     RESTASIS 0.05 % ophthalmic emulsion INSTILL 1 DROP INTO BOTH EYES TWICE A DAY       rivaroxaban ANTICOAGULANT (XARELTO) 20 MG TABS tablet Take 1 tablet (20 mg) by mouth daily (with dinner) 90 tablet 3     rosuvastatin (CRESTOR) 20 MG tablet Take 1 tablet (20 mg) by mouth daily 90 tablet 4     sucralfate (CARAFATE) 1 GM tablet Take 1 tablet (1 g) by mouth 4 times daily as needed for nausea (or dark stools) 360 tablet 1     VITAMIN D, CHOLECALCIFEROL, PO Take 5,000 Units by mouth daily       vortioxetine (TRINTELLIX) 20 MG tablet Take 1 tablet (20 mg) by mouth daily 90 tablet 4       Allergies   Allergen Reactions     Atorvastatin Muscle Pain (Myalgia)     Tiotropium Bromide [Tiotropium] Rash     Advil [Ibuprofen] Other (See Comments)     Does not recall but feel like it interacted with blood thinners and HX of GI BLEED     Ezetimibe Muscle Pain (Myalgia)     Latex Rash     Niacin      Other reaction(s): Flushing     No Clinical Screening - See Comments Itching, Rash and Blisters     Metals and plastics       Tape [Adhesive Tape] Rash        Social History     Tobacco Use     Smoking status: Former     Packs/day: 1.00     Years: 35.00     Pack years: 35.00     Types: Cigarettes     Quit date: 2/15/2017     Years since quittin.7     Smokeless tobacco: Never   Substance Use Topics     Alcohol use: Not Currently     Alcohol/week: 0.0 standard drinks     Family History   Problem Relation Age of Onset     Heart Disease  "Father         Heart Disease,Heart condition/Significant for atherosclerotic cardiovascular disease, but non premature.     Colon Cancer Father         Cancer-colon, of colon cancer     Cancer Father         Cancer,mets to liver, secondary to colon cancer     Heart Disease Mother         Heart Disease     Cancer Other         Cancer,Multiple Myeloma     Heart Disease Other         Heart Disease,Ischemic Heart Disease     Colon Cancer Other         Cancer-colon,Malignant neoplasms     Cancer Sister         Cancer,multiple myeloma     Other - See Comments Son         gallstones     History   Drug Use No         Objective     /64 (BP Location: Right arm, Patient Position: Sitting, Cuff Size: Adult Large)   Pulse 82   Temp 97.6  F (36.4  C) (Tympanic)   Resp 20   Ht 1.664 m (5' 5.5\")   Wt 78.9 kg (174 lb)   LMP 1978 (Approximate)   SpO2 96%   BMI 28.51 kg/m      Physical Exam  General Appearance: Pleasant, alert, appropriate appearance for age. No acute distress  Ear Exam: Normal TM's bilaterally.   OroPharynx Exam: Dentures Normal buccal mucosa. Normal pharynx. Mallampati 2/4.  Neck Exam: Supple, no masses or nodes.  Chest/Respiratory Exam: Normal chest wall and respirations. Clear to auscultation.  Cardiovascular Exam: Regular rate and rhythm. S1, S2, no murmur, click, gallop, or rubs.  Extremities: 2 + pedal pulses.  No lower extremity edema.  Neurologic Exam: Nonfocal; symmetric DTRs, normal gross motor movement, tone, and coordination. No tremor.   Psychiatric: Normal affect and mentation      Recent Labs   Lab Test 10/13/22  1155 10/03/22  1701 22  1700 22  1653   HGB 10.5* 11.9   < >  --     248   < >  --    INR  --   --   --  1.27*    139   < >  --    POTASSIUM 4.3 3.6   < >  --    CR 1.39* 0.91   < >  --     < > = values in this interval not displayed.        Diagnostics:  Results for orders placed or performed in visit on 22   Basic Metabolic Panel     " Status: Abnormal   Result Value Ref Range    Sodium 142 136 - 145 mmol/L    Potassium 3.9 3.4 - 5.3 mmol/L    Chloride 106 98 - 107 mmol/L    Carbon Dioxide (CO2) 26 22 - 29 mmol/L    Anion Gap 10 7 - 15 mmol/L    Urea Nitrogen 13.9 8.0 - 23.0 mg/dL    Creatinine 0.85 0.51 - 0.95 mg/dL    Calcium 9.4 8.8 - 10.2 mg/dL    Glucose 127 (H) 70 - 99 mg/dL    GFR Estimate 74 >60 mL/min/1.73m2   CBC W PLT No Diff     Status: Normal   Result Value Ref Range    WBC Count 7.0 4.0 - 11.0 10e3/uL    RBC Count 4.16 3.80 - 5.20 10e6/uL    Hemoglobin 11.8 11.7 - 15.7 g/dL    Hematocrit 36.4 35.0 - 47.0 %    MCV 88 78 - 100 fL    MCH 28.4 26.5 - 33.0 pg    MCHC 32.4 31.5 - 36.5 g/dL    RDW 14.1 10.0 - 15.0 %    Platelet Count 230 150 - 450 10e3/uL   Lipid Panel     Status: Abnormal   Result Value Ref Range    Cholesterol 203 (H) <200 mg/dL    Triglycerides 126 <150 mg/dL    Direct Measure HDL 75 >=50 mg/dL    LDL Cholesterol Calculated 103 (H) <=100 mg/dL    Non HDL Cholesterol 128 <130 mg/dL    Narrative    Cholesterol  Desirable:  <200 mg/dL    Triglycerides  Normal:  Less than 150 mg/dL  Borderline High:  150-199 mg/dL  High:  200-499 mg/dL  Very High:  Greater than or equal to 500 mg/dL    Direct Measure HDL  Female:  Greater than or equal to 50 mg/dL   Male:  Greater than or equal to 40 mg/dL    LDL Cholesterol  Desirable:  <100mg/dL  Above Desirable:  100-129 mg/dL   Borderline High:  130-159 mg/dL   High:  160-189 mg/dL   Very High:  >= 190 mg/dL    Non HDL Cholesterol  Desirable:  130 mg/dL  Above Desirable:  130-159 mg/dL  Borderline High:  160-189 mg/dL  High:  190-219 mg/dL  Very High:  Greater than or equal to 220 mg/dL       EKG in Oct 2022 shows sinus bradycardia    Revised Cardiac Risk Index (RCRI):  The patient has the following serious cardiovascular risks for perioperative complications:   - Coronary Artery Disease (MI, positive stress test, angina, Qs on EKG) = 1 point     RCRI Interpretation: 1 point: Class II  (low risk - 0.9% complication rate)           Signed Electronically by: Ramirez Cano MD  Copy of this evaluation report is provided to requesting physician.

## 2022-11-28 ENCOUNTER — ALLIED HEALTH/NURSE VISIT (OUTPATIENT)
Dept: FAMILY MEDICINE | Facility: OTHER | Age: 67
End: 2022-11-28
Attending: ORTHOPAEDIC SURGERY
Payer: MEDICARE

## 2022-11-28 DIAGNOSIS — Z20.822 COVID-19 RULED OUT: Primary | ICD-10-CM

## 2022-11-28 PROCEDURE — C9803 HOPD COVID-19 SPEC COLLECT: HCPCS

## 2022-11-28 PROCEDURE — U0005 INFEC AGEN DETEC AMPLI PROBE: HCPCS | Mod: ZL

## 2022-11-29 ENCOUNTER — OFFICE VISIT (OUTPATIENT)
Dept: FAMILY MEDICINE | Facility: OTHER | Age: 67
End: 2022-11-29
Attending: FAMILY MEDICINE
Payer: MEDICARE

## 2022-11-29 VITALS
DIASTOLIC BLOOD PRESSURE: 64 MMHG | SYSTOLIC BLOOD PRESSURE: 138 MMHG | HEART RATE: 82 BPM | HEIGHT: 66 IN | RESPIRATION RATE: 20 BRPM | TEMPERATURE: 97.6 F | OXYGEN SATURATION: 96 % | BODY MASS INDEX: 27.97 KG/M2 | WEIGHT: 174 LBS

## 2022-11-29 DIAGNOSIS — I77.1 SUBCLAVIAN ARTERY STENOSIS, LEFT (H): ICD-10-CM

## 2022-11-29 DIAGNOSIS — N18.31 STAGE 3A CHRONIC KIDNEY DISEASE (H): ICD-10-CM

## 2022-11-29 DIAGNOSIS — Z01.818 PREOP GENERAL PHYSICAL EXAM: Primary | ICD-10-CM

## 2022-11-29 DIAGNOSIS — I25.9 CHRONIC ISCHEMIC HEART DISEASE: ICD-10-CM

## 2022-11-29 PROBLEM — I13.0 HYPERTENSIVE HEART AND CHRONIC KIDNEY DISEASE WITH HEART FAILURE AND STAGE 1 THROUGH STAGE 4 CHRONIC KIDNEY DISEASE, OR UNSPECIFIED CHRONIC KIDNEY DISEASE (H): Status: RESOLVED | Noted: 2022-09-07 | Resolved: 2022-11-29

## 2022-11-29 LAB
ANION GAP SERPL CALCULATED.3IONS-SCNC: 10 MMOL/L (ref 7–15)
BUN SERPL-MCNC: 13.9 MG/DL (ref 8–23)
CALCIUM SERPL-MCNC: 9.4 MG/DL (ref 8.8–10.2)
CHLORIDE SERPL-SCNC: 106 MMOL/L (ref 98–107)
CHOLEST SERPL-MCNC: 203 MG/DL
CREAT SERPL-MCNC: 0.85 MG/DL (ref 0.51–0.95)
DEPRECATED HCO3 PLAS-SCNC: 26 MMOL/L (ref 22–29)
ERYTHROCYTE [DISTWIDTH] IN BLOOD BY AUTOMATED COUNT: 14.1 % (ref 10–15)
GFR SERPL CREATININE-BSD FRML MDRD: 74 ML/MIN/1.73M2
GLUCOSE SERPL-MCNC: 127 MG/DL (ref 70–99)
HCT VFR BLD AUTO: 36.4 % (ref 35–47)
HDLC SERPL-MCNC: 75 MG/DL
HGB BLD-MCNC: 11.8 G/DL (ref 11.7–15.7)
LDLC SERPL CALC-MCNC: 103 MG/DL
MCH RBC QN AUTO: 28.4 PG (ref 26.5–33)
MCHC RBC AUTO-ENTMCNC: 32.4 G/DL (ref 31.5–36.5)
MCV RBC AUTO: 88 FL (ref 78–100)
NONHDLC SERPL-MCNC: 128 MG/DL
PLATELET # BLD AUTO: 230 10E3/UL (ref 150–450)
POTASSIUM SERPL-SCNC: 3.9 MMOL/L (ref 3.4–5.3)
RBC # BLD AUTO: 4.16 10E6/UL (ref 3.8–5.2)
SARS-COV-2 RNA RESP QL NAA+PROBE: NEGATIVE
SODIUM SERPL-SCNC: 142 MMOL/L (ref 136–145)
TRIGL SERPL-MCNC: 126 MG/DL
WBC # BLD AUTO: 7 10E3/UL (ref 4–11)

## 2022-11-29 PROCEDURE — 36415 COLL VENOUS BLD VENIPUNCTURE: CPT | Mod: ZL | Performed by: FAMILY MEDICINE

## 2022-11-29 PROCEDURE — 82310 ASSAY OF CALCIUM: CPT | Mod: ZL | Performed by: FAMILY MEDICINE

## 2022-11-29 PROCEDURE — G0463 HOSPITAL OUTPT CLINIC VISIT: HCPCS | Mod: 25

## 2022-11-29 PROCEDURE — 85027 COMPLETE CBC AUTOMATED: CPT | Mod: ZL | Performed by: FAMILY MEDICINE

## 2022-11-29 PROCEDURE — 99214 OFFICE O/P EST MOD 30 MIN: CPT | Performed by: FAMILY MEDICINE

## 2022-11-29 PROCEDURE — G0463 HOSPITAL OUTPT CLINIC VISIT: HCPCS

## 2022-11-29 PROCEDURE — 80061 LIPID PANEL: CPT | Mod: ZL | Performed by: FAMILY MEDICINE

## 2022-11-29 ASSESSMENT — PAIN SCALES - GENERAL: PAINLEVEL: MODERATE PAIN (5)

## 2022-11-29 NOTE — NURSING NOTE
"Patient presents to the clinic for pre-op exam.    FOOD SECURITY SCREENING QUESTIONS:    The next two questions are to help us understand your food security.  If you are feeling you need any assistance in this area, we have resources available to support you today.    Hunger Vital Signs:  Within the past 12 months we worried whether our food would run out before we got money to buy more. Never  Within the past 12 months the food we bought just didn't last and we didn't have money to get more. Never    Advance Care Directive on file? yes  Advance Care Directive provided to patient? Declined.    Chief Complaint   Patient presents with     Pre-Op Exam       Initial /64 (BP Location: Right arm, Patient Position: Sitting, Cuff Size: Adult Large)   Pulse 82   Temp 97.6  F (36.4  C) (Tympanic)   Resp 20   Ht 1.664 m (5' 5.5\")   Wt 78.9 kg (174 lb)   LMP 04/29/1978 (Approximate)   SpO2 96%   BMI 28.51 kg/m   Estimated body mass index is 28.51 kg/m  as calculated from the following:    Height as of this encounter: 1.664 m (5' 5.5\").    Weight as of this encounter: 78.9 kg (174 lb).  Medication Reconciliation: complete        [place] LPN       "

## 2022-12-01 ENCOUNTER — ANESTHESIA EVENT (OUTPATIENT)
Dept: SURGERY | Facility: OTHER | Age: 67
End: 2022-12-01
Payer: MEDICARE

## 2022-12-01 RX ORDER — HYDROMORPHONE HYDROCHLORIDE 1 MG/ML
0.4 INJECTION, SOLUTION INTRAMUSCULAR; INTRAVENOUS; SUBCUTANEOUS EVERY 5 MIN PRN
Status: CANCELLED | OUTPATIENT
Start: 2022-12-01

## 2022-12-01 RX ORDER — ONDANSETRON 4 MG/1
4 TABLET, ORALLY DISINTEGRATING ORAL EVERY 30 MIN PRN
Status: CANCELLED | OUTPATIENT
Start: 2022-12-01

## 2022-12-01 RX ORDER — FENTANYL CITRATE 50 UG/ML
25 INJECTION, SOLUTION INTRAMUSCULAR; INTRAVENOUS
Status: CANCELLED | OUTPATIENT
Start: 2022-12-01

## 2022-12-01 RX ORDER — ONDANSETRON 2 MG/ML
4 INJECTION INTRAMUSCULAR; INTRAVENOUS EVERY 30 MIN PRN
Status: CANCELLED | OUTPATIENT
Start: 2022-12-01

## 2022-12-01 RX ORDER — MEPERIDINE HYDROCHLORIDE 50 MG/ML
12.5 INJECTION INTRAMUSCULAR; INTRAVENOUS; SUBCUTANEOUS
Status: CANCELLED | OUTPATIENT
Start: 2022-12-01

## 2022-12-01 RX ORDER — SODIUM CHLORIDE, SODIUM LACTATE, POTASSIUM CHLORIDE, CALCIUM CHLORIDE 600; 310; 30; 20 MG/100ML; MG/100ML; MG/100ML; MG/100ML
INJECTION, SOLUTION INTRAVENOUS CONTINUOUS
Status: CANCELLED | OUTPATIENT
Start: 2022-12-01

## 2022-12-01 RX ORDER — HYDROMORPHONE HYDROCHLORIDE 1 MG/ML
0.2 INJECTION, SOLUTION INTRAMUSCULAR; INTRAVENOUS; SUBCUTANEOUS EVERY 5 MIN PRN
Status: CANCELLED | OUTPATIENT
Start: 2022-12-01

## 2022-12-01 RX ORDER — FENTANYL CITRATE 50 UG/ML
50 INJECTION, SOLUTION INTRAMUSCULAR; INTRAVENOUS EVERY 5 MIN PRN
Status: CANCELLED | OUTPATIENT
Start: 2022-12-01

## 2022-12-01 RX ORDER — FENTANYL CITRATE 50 UG/ML
25 INJECTION, SOLUTION INTRAMUSCULAR; INTRAVENOUS EVERY 5 MIN PRN
Status: CANCELLED | OUTPATIENT
Start: 2022-12-01

## 2022-12-02 ENCOUNTER — ANESTHESIA (OUTPATIENT)
Dept: SURGERY | Facility: OTHER | Age: 67
End: 2022-12-02
Payer: MEDICARE

## 2022-12-02 ENCOUNTER — HOSPITAL ENCOUNTER (OUTPATIENT)
Facility: OTHER | Age: 67
Discharge: HOME | End: 2022-12-02
Attending: ORTHOPAEDIC SURGERY | Admitting: ORTHOPAEDIC SURGERY
Payer: MEDICARE

## 2022-12-02 VITALS
OXYGEN SATURATION: 97 % | BODY MASS INDEX: 27.97 KG/M2 | WEIGHT: 174 LBS | SYSTOLIC BLOOD PRESSURE: 133 MMHG | DIASTOLIC BLOOD PRESSURE: 67 MMHG | TEMPERATURE: 97.1 F | HEART RATE: 52 BPM | HEIGHT: 66 IN

## 2022-12-02 PROCEDURE — 272N000001 HC OR GENERAL SUPPLY STERILE: Performed by: ORTHOPAEDIC SURGERY

## 2022-12-02 PROCEDURE — 258N000003 HC RX IP 258 OP 636: Performed by: NURSE ANESTHETIST, CERTIFIED REGISTERED

## 2022-12-02 PROCEDURE — 250N000009 HC RX 250: Performed by: ORTHOPAEDIC SURGERY

## 2022-12-02 PROCEDURE — 999N000141 HC STATISTIC PRE-PROCEDURE NURSING ASSESSMENT: Performed by: ORTHOPAEDIC SURGERY

## 2022-12-02 PROCEDURE — 26055 INCISE FINGER TENDON SHEATH: CPT | Mod: FA | Performed by: ORTHOPAEDIC SURGERY

## 2022-12-02 PROCEDURE — 250N000025 HC SEVOFLURANE, PER MIN: Performed by: ORTHOPAEDIC SURGERY

## 2022-12-02 PROCEDURE — 360N000074 HC SURGERY LEVEL 1, PER MIN: Performed by: ORTHOPAEDIC SURGERY

## 2022-12-02 PROCEDURE — 250N000011 HC RX IP 250 OP 636: Performed by: NURSE ANESTHETIST, CERTIFIED REGISTERED

## 2022-12-02 PROCEDURE — 370N000017 HC ANESTHESIA TECHNICAL FEE, PER MIN: Performed by: ORTHOPAEDIC SURGERY

## 2022-12-02 PROCEDURE — 250N000009 HC RX 250: Performed by: NURSE ANESTHETIST, CERTIFIED REGISTERED

## 2022-12-02 PROCEDURE — 26055 INCISE FINGER TENDON SHEATH: CPT | Performed by: NURSE ANESTHETIST, CERTIFIED REGISTERED

## 2022-12-02 PROCEDURE — 250N000011 HC RX IP 250 OP 636: Performed by: ORTHOPAEDIC SURGERY

## 2022-12-02 PROCEDURE — 710N000012 HC RECOVERY PHASE 2, PER MINUTE: Performed by: ORTHOPAEDIC SURGERY

## 2022-12-02 RX ORDER — ACETAMINOPHEN 325 MG/1
650 TABLET ORAL
Status: CANCELLED | OUTPATIENT
Start: 2022-12-02

## 2022-12-02 RX ORDER — PROPOFOL 10 MG/ML
INJECTION, EMULSION INTRAVENOUS PRN
Status: DISCONTINUED | OUTPATIENT
Start: 2022-12-02 | End: 2022-12-02

## 2022-12-02 RX ORDER — CEFAZOLIN SODIUM/WATER 2 G/20 ML
2 SYRINGE (ML) INTRAVENOUS SEE ADMIN INSTRUCTIONS
Status: DISCONTINUED | OUTPATIENT
Start: 2022-12-02 | End: 2022-12-02 | Stop reason: HOSPADM

## 2022-12-02 RX ORDER — LIDOCAINE 40 MG/G
CREAM TOPICAL
Status: DISCONTINUED | OUTPATIENT
Start: 2022-12-02 | End: 2022-12-02 | Stop reason: HOSPADM

## 2022-12-02 RX ORDER — PROPOFOL 10 MG/ML
INJECTION, EMULSION INTRAVENOUS CONTINUOUS PRN
Status: DISCONTINUED | OUTPATIENT
Start: 2022-12-02 | End: 2022-12-02

## 2022-12-02 RX ORDER — LIDOCAINE HYDROCHLORIDE 20 MG/ML
INJECTION, SOLUTION INFILTRATION; PERINEURAL PRN
Status: DISCONTINUED | OUTPATIENT
Start: 2022-12-02 | End: 2022-12-02

## 2022-12-02 RX ORDER — CEFAZOLIN SODIUM/WATER 2 G/20 ML
2 SYRINGE (ML) INTRAVENOUS
Status: COMPLETED | OUTPATIENT
Start: 2022-12-02 | End: 2022-12-02

## 2022-12-02 RX ORDER — SODIUM CHLORIDE, SODIUM LACTATE, POTASSIUM CHLORIDE, CALCIUM CHLORIDE 600; 310; 30; 20 MG/100ML; MG/100ML; MG/100ML; MG/100ML
INJECTION, SOLUTION INTRAVENOUS CONTINUOUS
Status: DISCONTINUED | OUTPATIENT
Start: 2022-12-02 | End: 2022-12-02 | Stop reason: HOSPADM

## 2022-12-02 RX ADMIN — MIDAZOLAM HYDROCHLORIDE 2 MG: 1 INJECTION, SOLUTION INTRAMUSCULAR; INTRAVENOUS at 07:32

## 2022-12-02 RX ADMIN — PROPOFOL 100 MCG/KG/MIN: 10 INJECTION, EMULSION INTRAVENOUS at 07:32

## 2022-12-02 RX ADMIN — SODIUM CHLORIDE, POTASSIUM CHLORIDE, SODIUM LACTATE AND CALCIUM CHLORIDE 100 ML/HR: 600; 310; 30; 20 INJECTION, SOLUTION INTRAVENOUS at 07:24

## 2022-12-02 RX ADMIN — Medication 2 G: at 07:24

## 2022-12-02 RX ADMIN — PROPOFOL 40 MG: 10 INJECTION, EMULSION INTRAVENOUS at 07:32

## 2022-12-02 RX ADMIN — LIDOCAINE HYDROCHLORIDE 20 MG: 20 INJECTION, SOLUTION INFILTRATION; PERINEURAL at 07:32

## 2022-12-02 ASSESSMENT — ACTIVITIES OF DAILY LIVING (ADL): ADLS_ACUITY_SCORE: 35

## 2022-12-02 ASSESSMENT — COPD QUESTIONNAIRES
COPD: 1
CAT_SEVERITY: MILD

## 2022-12-02 ASSESSMENT — LIFESTYLE VARIABLES: TOBACCO_USE: 1

## 2022-12-02 NOTE — INTERVAL H&P NOTE
Brief History of Illness:   Ankita Day is a 67 year old female who was admitted for left trigger thumb    H&P reviewed and patient examined with no new updates from prior exam

## 2022-12-02 NOTE — OR NURSING
Patient and responsible adult given discharge instructions with no questions regarding instructions. Robert score 20. Pain level 0/10.  Discharged from unit via walking . Patient discharged to home.

## 2022-12-02 NOTE — ANESTHESIA POSTPROCEDURE EVALUATION
Patient: Ankita Day    Procedure: Procedure(s):  Left thumb Trigger  release       Anesthesia Type:  MAC    Note:  Disposition: Outpatient   Postop Pain Control: Uneventful            Sign Out: Well controlled pain   PONV: No   Neuro/Psych: Uneventful            Sign Out: Acceptable/Baseline neuro status   Airway/Respiratory: Uneventful            Sign Out: Acceptable/Baseline resp. status   CV/Hemodynamics: Uneventful            Sign Out: Acceptable CV status; No obvious hypovolemia; No obvious fluid overload   Other NRE: NONE   DID A NON-ROUTINE EVENT OCCUR?            Last vitals:  Vitals Value Taken Time   /73 12/02/22 0800   Temp     Pulse 64 12/02/22 0800   Resp     SpO2 94 % 12/02/22 0809   Vitals shown include unvalidated device data.    Electronically Signed By: JOSELYN BELL CRNA  December 2, 2022  8:11 AM

## 2022-12-02 NOTE — ANESTHESIA PREPROCEDURE EVALUATION
Anesthesia Pre-Procedure Evaluation    Patient: Ankita Day   MRN: [id number] : 1955        Procedure : Procedure(s):  Left thumb Trigger  release          Past Medical History:   Diagnosis Date     Acute ischemic heart disease (H)     06/15/2007,with PTCA and stenting of 90% osteo circumflex lesion and patent LAD graft, patent left main stent.     Acute myocardial infarction (H)     3/30/2013     Anxiety disorder     No Comments Provided     Atherosclerotic heart disease of native coronary artery without angina pectoris     -angio revealed 3 vessel dz  -4 stents placed; 2 overlapping stents placed in mLAD, 1 stent pRCA, 1 stent pCirc 1 s  -non-ST elevation MI   -angio revealed restenosis of Circ.    -PTCA and brachytherapy of pCirc -repeat angio  -no intervension -CABG x3  - Dr Sinclair  -LIMA-LAD, RAFI-RCA, SVG-OM -PCI  stent to L -CTangio    -patentent LIMA-LAD. RAFI-RCA occluded; RCA ostio/p*     Bilateral carpal tunnel syndrome     No Comments Provided     Cervicalgia     No Comments Provided     Chest pain     2014     Chronic gastric ulcer without hemorrhage or perforation     10/03/2011,hx of GI bleed ()     Chronic ischemic heart disease     2012     Chronic obstructive pulmonary disease (H)     06/15/2007,low DLCO, normal spirometry     Chronic or unspecified gastric ulcer with hemorrhage     10/2003     Chronic pain syndrome     2010,chest wall, back     Constipation     2011     Coronary angioplasty status     2002,with triple stenting     Coronary angioplasty status     01/10/2003,repeat angioplasty     Coronary angioplasty status     No Comments Provided     Dorsalgia     2011     Encounter for other administrative examinations     2014     Encounter for screening for cardiovascular disorders     10/2004,Cardiol[facility] (, ,  and )     Enterocolitis due to Clostridium difficile     2016     Essential  (primary) hypertension     No Comments Provided     Hyperlipidemia     No Comments Provided     Major depressive disorder, single episode     Severe, hx of suicide attempt/hospitalization     Migraine without status migrainosus, not intractable     No Comments Provided     Nodular corneal degeneration     09/26/2011     Noninfective gastroenteritis and colitis     06/15/2007,history of     Noninfective gastroenteritis and colitis     Microcytic     Osteoarthritis     No Comments Provided     Other chest pain     10/13/2009,chronic     Pain in knee     05/31/2011     Pain in right shoulder     No Comments Provided     Panic disorder without agoraphobia     No Comments Provided     Peptic ulcer without hemorrhage or perforation     6/15/2007     Peripheral vascular disease (H)     No Comments Provided     Personal history of diseases of the blood and blood-forming organs and certain disorders involving the immune mechanism (CODE)     No Comments Provided     Personal history of nicotine dependence     No Comments Provided     Personal history of other medical treatment (CODE)     10/14/2013     Presence of aortocoronary bypass graft     2/12/2003     Primary central sleep apnea     10/14/2013     Sepsis due to Escherichia coli (E. coli) (H)     7/14/16,[facility]     Stricture of artery (H)     3/31/2013,S/p prox left SCA stent 4/1/2013     Thoracic, thoracolumbar and lumbosacral intervertebral disc disorder     No Comments Provided     Uncomplicated opioid abuse (H)     history of     Vitamin D deficiency     5/6/2013      Past Surgical History:   Procedure Laterality Date     ANGIOPLASTY      9/12/02,with triple stenting     APPENDECTOMY OPEN      No Comments Provided     ARTHROSCOPY KNEE      left     ARTHROSCOPY SHOULDER Right 05/12/2017    labral tear, rotator cuff tear and some subacromial decompression      BYPASS GRAFT ARTERY CORONARY      12/13/02,Triple bypass, left internal mammary  to LAD, right internal  mammary to right coronary artery, saphenous to obtuse marginal of the left circumflex.     COLONOSCOPY      2011,Dr Bowman benign polyps     COLONOSCOPY  10/03/2011    2011,benign polyps, Dr. Bowman     COLONOSCOPY  08/08/2016 8/8/16,normal, Dr Bowman     ELBOW SURGERY      baby,birth malachi removed from right arm     EMBOLECTOMY UPPER EXTREMITY  04/02/2013    brachial artery pseudoaneurysm after stenting     ESOPHAGOSCOPY, GASTROSCOPY, DUODENOSCOPY (EGD), COMBINED      2011,EGD Dr Bowman with pyloric ulcer     ESOPHAGOSCOPY, GASTROSCOPY, DUODENOSCOPY (EGD), COMBINED      2011,pyloric ulcer, Dr. Bowman     ESOPHAGOSCOPY, GASTROSCOPY, DUODENOSCOPY (EGD), COMBINED      8/8/16,mild gastritis, Dr Bowman     ESOPHAGOSCOPY, GASTROSCOPY, DUODENOSCOPY (EGD), COMBINED      11/27/2017,Dr Bowman. Antral ulcer     ESOPHAGOSCOPY, GASTROSCOPY, DUODENOSCOPY (EGD), COMBINED  02/02/2018    Dr Bowman, healed ulcer     HYSTERECTOMY TOTAL ABDOMINAL      age 21     LAPAROSCOPIC CHOLECYSTECTOMY      2006     OSTEOTOMY FEMUR DISTAL      x3, right knee     OSTEOTOMY FEMUR DISTAL      2000,left knee  ligament surgery     OTHER SURGICAL HISTORY      1/10/2003,,PTCA     OTHER SURGICAL HISTORY      09/20/2012,,PTCA,DELONTE in LAD and left main     OTHER SURGICAL HISTORY      4/1/2013,,PTCA,L subclavian stenosis     SALPINGO-OOPHORECTOMY BILATERAL      age 27,Bilateral salpingo-oophorectomy     TONSILLECTOMY, ADENOIDECTOMY, COMBINED      childhood      Allergies   Allergen Reactions     Atorvastatin Muscle Pain (Myalgia)     Tiotropium Bromide [Tiotropium] Rash     Advil [Ibuprofen] Other (See Comments)     Does not recall but feel like it interacted with blood thinners and HX of GI BLEED     Ezetimibe Muscle Pain (Myalgia)     Latex Rash     Niacin      Other reaction(s): Flushing     No Clinical Screening - See Comments Itching, Rash and Blisters     Metals and plastics       Tape [Adhesive Tape] Rash      Social History     Tobacco Use      Smoking status: Former     Packs/day: 1.00     Years: 35.00     Pack years: 35.00     Types: Cigarettes     Quit date: 2/15/2017     Years since quittin.7     Smokeless tobacco: Never   Substance Use Topics     Alcohol use: Not Currently     Alcohol/week: 0.0 standard drinks      Wt Readings from Last 1 Encounters:   22 78.9 kg (174 lb)        Anesthesia Evaluation   Pt has had prior anesthetic. Type: General and MAC.        ROS/MED HX  ENT/Pulmonary:     (+) sleep apnea, doesn't use CPAP, tobacco use, Past use, mild,  COPD,     Neurologic: Comment: Denies previous stroke - neg neurologic ROS     Cardiovascular:     (+) hypertension--CAD angina-with excertion. past MI CABG-stent-Taking blood thinners CORTEZ. Previous cardiac testing   Echo: Date: Results:    Stress Test: Date: Results:    ECG Reviewed: Date: 10/3/22 Results:  Sinus rhythm   Nonspecific ST and T wave abnormality   Abnormal ECG   When compared with ECG of 17-AUG-2021 16:22,   T wave inversion now evident in Anterior leads   QT has shortened   Confirmed by MD MONICA, ANIKET ([id number]) on 1/3/2022 7:22:44 AM  Cath: Date: Results:      METS/Exercise Tolerance:     Hematologic:     (+) History of blood clots, pt is anticoagulated,     Musculoskeletal:       GI/Hepatic: Comment: Previous GI bleed      Renal/Genitourinary:     (+) renal disease, type: CRI,     Endo:  - neg endo ROS     Psychiatric/Substance Use:     (+) H/O chronic opiod use .     Infectious Disease:  - neg infectious disease ROS     Malignancy:  - neg malignancy ROS     Other:  - neg other ROS    (+) , H/O Chronic Pain,        Physical Exam    Airway        Mallampati: I   TM distance: > 3 FB   Neck ROM: full   Mouth opening: > 3 cm    Respiratory Devices and Support         Dental       (+) upper dentures      Cardiovascular   cardiovascular exam normal       Rhythm and rate: regular and normal     Pulmonary   pulmonary exam normal        breath sounds clear to auscultation            OUTSIDE LABS:  CBC:   Lab Results   Component Value Date    WBC 7.0 11/29/2022    WBC 10.6 10/13/2022    HGB 11.8 11/29/2022    HGB 10.5 (L) 10/13/2022    HCT 36.4 11/29/2022    HCT 31.7 (L) 10/13/2022     11/29/2022     10/13/2022     BMP:   Lab Results   Component Value Date     11/29/2022     10/13/2022    POTASSIUM 3.9 11/29/2022    POTASSIUM 4.3 10/13/2022    CHLORIDE 106 11/29/2022    CHLORIDE 109 (H) 10/13/2022    CO2 26 11/29/2022    CO2 22 10/13/2022    BUN 13.9 11/29/2022    BUN 26 (H) 10/13/2022    CR 0.85 11/29/2022    CR 1.39 (H) 10/13/2022     (H) 11/29/2022    GLC 89 10/13/2022     COAGS:   Lab Results   Component Value Date    PTT 29 10/02/2019    INR 1.27 (H) 07/03/2022     POC: No results found for: BGM, HCG, HCGS  HEPATIC:   Lab Results   Component Value Date    ALBUMIN 4.3 10/13/2022    PROTTOTAL 7.1 10/13/2022    ALT 15 10/13/2022    AST 33 10/13/2022    ALKPHOS 52 10/13/2022    BILITOTAL 0.3 10/13/2022     OTHER:   Lab Results   Component Value Date    PH 7.44 01/06/2020    LACT 0.7 10/13/2022    GM 9.4 11/29/2022    PHOS 2.1 (L) 07/14/2016    MAG 2.0 11/16/2021    LIPASE 22 10/03/2022    AMYLASE 17 (L) 10/29/2014    TSH 1.76 11/16/2021    CRP <1.0 07/16/2021    SED 7 04/26/2022       Anesthesia Plan    ASA Status:  3   NPO Status:  NPO Appropriate    Anesthesia Type: MAC.     - Reason for MAC: straight local not clinically adequate, chronic cardiopulmonary disease   Induction: Intravenous.   Maintenance: Balanced.        Consents    Anesthesia Plan(s) and associated risks, benefits, and realistic alternatives discussed. Questions answered and patient/representative(s) expressed understanding.     - Discussed: Risks, Benefits and Alternatives for BOTH SEDATION and the PROCEDURE were discussed     - Discussed with:  Patient      - Extended Intubation/Ventilatory Support Discussed: No.      - Patient is DNR/DNI Status: No    Use of blood products  discussed: No .     Postoperative Care    Pain management: IV analgesics.   PONV prophylaxis: Ondansetron (or other 5HT-3)     Comments:                JOSELYN WLISON CRNA

## 2022-12-02 NOTE — DISCHARGE INSTRUCTIONS
[facility]  Adult Discharge Orders & Instructions      For 24 hours after surgery:  Get plenty of rest.  A responsible adult must stay with you for at least 24 hours after you leave the hospital.   You may feel lightheaded.  IF so, sit for a few minutes before standing.  Have someone help you get up.   You may have a slight fever. Call the doctor if your fever is over 101 F (38.3 C) (taken under the tongue) or lasts longer than 24 hours.  You may have a dry mouth, a sore throat, muscle aches or trouble sleeping.  These should go away after 24 hours.  Do not make important or legal decisions.  6.   Do not drive or use heavy equipment.  If you have weakness or tingling, don't drive or use heavy equipment until this feeling goes away.                                                                                                                                                                         To contact a doctor, call    [phone]______________     Surgeon Contact Information  If you have questions or concerns related to your procedure please contact your surgeon through [facility] at [phone].

## 2022-12-02 NOTE — OP NOTE
Procedure Date: 2022    PREOPERATIVE DIAGNOSIS:  Left trigger thumb.    POSTOPERATIVE DIAGNOSIS:  Left trigger thumb.    PROCEDURE:  Left trigger thumb release.    SURGEON:  Cristhian Wilkinson M.D.    ANESTHESIA:  Local MAC.    COMPLICATIONS:  Without.    DISPOSITION:  To recovery room in stable condition.    INDICATIONS:  Ms. Day is a 67-year-old with history of left thumb triggering.  Recommendation was for release.  The patient did elect to proceed following a discussion of the risks and benefits.    DESCRIPTION OF PROCEDURE:  The patient was taken to the operative suite, administered anesthesia.  Following sedation, left upper extremity prepped and draped in normal sterile fashion.  Preoperative antibiotics administered and timeout was called.  At this point in time, a small incision was made overlying the A1 pulley.  Once identified and the neurovascular structures were protected, we then released that pulley without difficulty.  Once the pulley was released, we then confirmed proper tracking of the tendon, and then after confirmed, we then obtained hemostasis with bipolar cautery.  The wound was copiously irrigated and then closed with nylon in an interrupted fashion followed by application of soft postsurgical dressing.  The patient then was transferred to recovery in stable condition.    POSTOPERATIVE PLAN:  Sutures out here 10 days, range of motion as tolerated.  Keep incisional site clean, dry and intact.    A skilled first assistant was necessary for position, intraoperative decision making, retraction and exposure during the procedure.    Furthermore, a skilled first assistant was necessary to complete the procedure in a technically safe and efficient manner.    Cristhian Wilkinson MD        D: 2022   T: 2022   MT: [facility]    Name:     DARRIAN DAY  MRN:      [id number]        Account:        [id number]   :      1955           Procedure Date: 2022     Document:  [id number]

## 2022-12-02 NOTE — ANESTHESIA CARE TRANSFER NOTE
Patient: Ankita Day    Procedure: Procedure(s):  Left thumb Trigger  release       Diagnosis: Trigger thumb of left hand [M65.312]  Diagnosis Additional Information: No value filed.    Anesthesia Type:   MAC     Note:    Oropharynx: oropharynx clear of all foreign objects  Level of Consciousness: awake  Oxygen Supplementation: room air    Independent Airway: airway patency satisfactory and stable    Vital Signs Stable: post-procedure vital signs reviewed and stable  Report to RN Given: handoff report given  Patient transferred to: Phase II    Handoff Report: Identifed the Patient, Identified the Reponsible Provider, Reviewed the pertinent medical history, Discussed the surgical course, Reviewed Intra-OP anesthesia mangement and issues during anesthesia, Set expectations for post-procedure period and Allowed opportunity for questions and acknowledgement of understanding      Vitals:  Vitals Value Taken Time   BP     Temp     Pulse     Resp     SpO2         Electronically Signed By: JOSELYN BELL CRNA  December 2, 2022  7:52 AM

## 2022-12-07 DIAGNOSIS — F41.9 CHRONIC ANXIETY: ICD-10-CM

## 2022-12-07 DIAGNOSIS — Z79.899 CONTROLLED SUBSTANCE AGREEMENT SIGNED: ICD-10-CM

## 2022-12-12 RX ORDER — CLONAZEPAM 1 MG/1
1 TABLET ORAL 3 TIMES DAILY PRN
Qty: 270 TABLET | Refills: 0 | OUTPATIENT
Start: 2022-12-12

## 2022-12-12 NOTE — TELEPHONE ENCOUNTER
[facility] [address] [place] [address]     Requested Prescriptions   Pending Prescriptions Disp Refills     clonazePAM (KLONOPIN) 1 MG tablet [Pharmacy Med Name: CLONAZEPAM 1 MG TABLET] 270 tablet 0     Sig: TAKE 1 TABLET (1 MG) BY MOUTH 3 TIMES DAILY AS NEEDED FOR ANXIETY MAX 3 PER DAY       There is no refill protocol information for this order          Last Prescription Date:   9/12/22   Last Fill Qty/Refills:         270, R-0    Last Office Visit:            11/29/22  Future Office visit:           1/9/23      Ngoc Minor RN on 12/12/2022 at 2:53 PM

## 2022-12-13 DIAGNOSIS — F41.9 CHRONIC ANXIETY: ICD-10-CM

## 2022-12-13 DIAGNOSIS — Z79.899 CONTROLLED SUBSTANCE AGREEMENT SIGNED: ICD-10-CM

## 2022-12-14 RX ORDER — CLONAZEPAM 1 MG/1
1 TABLET ORAL 3 TIMES DAILY PRN
Qty: 270 TABLET | Refills: 0 | Status: SHIPPED | OUTPATIENT
Start: 2022-12-14 | End: 2023-03-06

## 2022-12-14 NOTE — TELEPHONE ENCOUNTER
CLONAZEPAM 1 MG TABLET  Last Written Prescription Date:  9/12/22  Last Fill Quantity: 270,   # refills: 0  Last Office Visit: 11/29/22  Future Office visit:    Next 5 appointments (look out 90 days)    Jan 09, 2023  9:20 AM  SHORT with Ramirez Cano MD  [facility] ([facility] ) [address]  [place] [address]  [phone]           Routing refill request to provider for review/approval because:  Drug not on the [facility], P or [facility] refill protocol or controlled substance. Unable to complete prescription refill per RNMedication Refill Policy.................... Ness Calloway RN ....................  12/14/2022   3:08 PM

## 2022-12-19 DIAGNOSIS — R06.09 DOE (DYSPNEA ON EXERTION): ICD-10-CM

## 2022-12-19 DIAGNOSIS — R00.2 PALPITATIONS: ICD-10-CM

## 2022-12-19 DIAGNOSIS — Z86.73 HISTORY OF CVA (CEREBROVASCULAR ACCIDENT): ICD-10-CM

## 2022-12-19 DIAGNOSIS — Z86.711 HISTORY OF PULMONARY EMBOLISM: ICD-10-CM

## 2022-12-19 NOTE — TELEPHONE ENCOUNTER
Called and spoke to Patient after verifying last name and date of birth. Patient stated that she now sees Dr. Cano every other month for her heart condition and he just refilled this prescription so she is ok for the next 3months.     Ngoc Minor RN on 12/19/2022 at 11:50 AM

## 2022-12-20 RX ORDER — RIVAROXABAN 20 MG/1
TABLET, FILM COATED ORAL
Qty: 90 TABLET | Refills: 3 | Status: SHIPPED | OUTPATIENT
Start: 2022-12-20 | End: 2023-07-13

## 2022-12-20 NOTE — TELEPHONE ENCOUNTER
Requested Prescriptions   Pending Prescriptions Disp Refills     XARELTO ANTICOAGULANT 20 MG TABS tablet [Pharmacy Med Name: XARELTO 20 MG TABLET] 90 tablet 3     Sig: TAKE 1 TABLET BY MOUTH ONCE DAILY WITH DINNER       Direct Oral Anticoagulant Agents Failed - 12/19/2022  3:56 PM        Failed - Creatinine Clearance greater than 50 ml/min on file in past 3 mos     No lab results found.          Failed - Recent (6 mo) or future (30 days) visit within the authorizing provider's specialty        Passed - Normal Platelets on file in past 12 months     Recent Labs   Lab Test 11/29/22  1204                  Passed - Medication is active on med list        Passed - Serum creatinine less than or equal to 1.4 on file in past 3 mos     Recent Labs   Lab Test 11/29/22  1204   CR 0.85       Ok to refill medication if creatinine is low          Passed - Patient is 18 years of age or older        Passed - No active pregnancy on record        Passed - No positive pregnancy test within past 12 months     Last Written Prescription Date:  2/15/22  Last Fill Quantity: 90,   # refills: 3  Last Office Visit: 1/11/2022  Future Office visit:    Next 5 appointments (look out 90 days)    Jan 09, 2023  9:20 AM  SHORT with Ramirez Cano MD  [facility] ([facility] ) [address]  [place] [address] [phone]     Routing refill request to provider for review/approval because:  Unable to complete prescription refill per RN Medication Refill Policy.   Jamilah Ray RN ....................  12/20/2022   10:28 AM

## 2022-12-30 DIAGNOSIS — R06.09 DOE (DYSPNEA ON EXERTION): ICD-10-CM

## 2022-12-30 DIAGNOSIS — I10 ESSENTIAL HYPERTENSION: ICD-10-CM

## 2022-12-30 DIAGNOSIS — I25.9 CHRONIC ISCHEMIC HEART DISEASE: ICD-10-CM

## 2022-12-30 RX ORDER — LISINOPRIL 20 MG/1
TABLET ORAL
Qty: 45 TABLET | Refills: 7 | OUTPATIENT
Start: 2022-12-30

## 2022-12-30 NOTE — TELEPHONE ENCOUNTER
[facility] sent Rx request for the following:    LISINOPRIL 20 MG TABLET  Last Prescription Date:   1/11/22  Last Fill Qty/Refills:         90, R-3    Last Office Visit:              11/29/22   Future Office visit:           1/9/23  Called patient and verified name and date of birth. Confirmed with patient she has enough to make it until her appointment. Also changed her appointment date to 1/4/22.    Sheri Preston RN on 12/30/2022 at 1:32 PM

## 2023-01-04 ENCOUNTER — OFFICE VISIT (OUTPATIENT)
Dept: FAMILY MEDICINE | Facility: OTHER | Age: 68
End: 2023-01-04
Attending: FAMILY MEDICINE
Payer: COMMERCIAL

## 2023-01-04 VITALS
SYSTOLIC BLOOD PRESSURE: 134 MMHG | WEIGHT: 173.5 LBS | HEART RATE: 74 BPM | OXYGEN SATURATION: 97 % | DIASTOLIC BLOOD PRESSURE: 70 MMHG | BODY MASS INDEX: 28.43 KG/M2 | TEMPERATURE: 97.9 F | RESPIRATION RATE: 20 BRPM

## 2023-01-04 DIAGNOSIS — Z79.899 CONTROLLED SUBSTANCE AGREEMENT SIGNED: ICD-10-CM

## 2023-01-04 DIAGNOSIS — R07.89 CHEST WALL PAIN, CHRONIC: ICD-10-CM

## 2023-01-04 DIAGNOSIS — I10 ESSENTIAL HYPERTENSION: ICD-10-CM

## 2023-01-04 DIAGNOSIS — G43.719 INTRACTABLE CHRONIC COMMON MIGRAINE WITHOUT AURA: Primary | ICD-10-CM

## 2023-01-04 DIAGNOSIS — R06.09 DOE (DYSPNEA ON EXERTION): ICD-10-CM

## 2023-01-04 DIAGNOSIS — G89.29 CHEST WALL PAIN, CHRONIC: ICD-10-CM

## 2023-01-04 DIAGNOSIS — G44.221 CHRONIC TENSION-TYPE HEADACHE, INTRACTABLE: ICD-10-CM

## 2023-01-04 DIAGNOSIS — G89.29 CHRONIC BILATERAL LOW BACK PAIN WITHOUT SCIATICA: ICD-10-CM

## 2023-01-04 DIAGNOSIS — I25.9 CHRONIC ISCHEMIC HEART DISEASE: ICD-10-CM

## 2023-01-04 DIAGNOSIS — M54.50 CHRONIC BILATERAL LOW BACK PAIN WITHOUT SCIATICA: ICD-10-CM

## 2023-01-04 DIAGNOSIS — G89.4 CHRONIC PAIN DISORDER: ICD-10-CM

## 2023-01-04 PROCEDURE — G0463 HOSPITAL OUTPT CLINIC VISIT: HCPCS | Mod: 25

## 2023-01-04 PROCEDURE — G0463 HOSPITAL OUTPT CLINIC VISIT: HCPCS

## 2023-01-04 PROCEDURE — 99214 OFFICE O/P EST MOD 30 MIN: CPT | Performed by: FAMILY MEDICINE

## 2023-01-04 RX ORDER — HYDROCODONE BITARTRATE AND ACETAMINOPHEN 10; 325 MG/1; MG/1
1-2 TABLET ORAL EVERY 4 HOURS PRN
Qty: 180 TABLET | Refills: 0 | Status: SHIPPED | OUTPATIENT
Start: 2023-02-07 | End: 2023-02-03

## 2023-01-04 RX ORDER — LISINOPRIL 20 MG/1
20 TABLET ORAL DAILY
Qty: 90 TABLET | Refills: 4 | Status: SHIPPED | OUTPATIENT
Start: 2023-01-04 | End: 2023-05-08

## 2023-01-04 RX ORDER — HYDROCODONE BITARTRATE AND ACETAMINOPHEN 10; 325 MG/1; MG/1
1-2 TABLET ORAL EVERY 4 HOURS PRN
Qty: 180 TABLET | Refills: 0 | Status: SHIPPED | OUTPATIENT
Start: 2023-01-08 | End: 2023-02-03

## 2023-01-04 ASSESSMENT — ANXIETY QUESTIONNAIRES
3. WORRYING TOO MUCH ABOUT DIFFERENT THINGS: NOT AT ALL
7. FEELING AFRAID AS IF SOMETHING AWFUL MIGHT HAPPEN: NOT AT ALL
GAD7 TOTAL SCORE: 0
IF YOU CHECKED OFF ANY PROBLEMS ON THIS QUESTIONNAIRE, HOW DIFFICULT HAVE THESE PROBLEMS MADE IT FOR YOU TO DO YOUR WORK, TAKE CARE OF THINGS AT HOME, OR GET ALONG WITH OTHER PEOPLE: NOT DIFFICULT AT ALL
2. NOT BEING ABLE TO STOP OR CONTROL WORRYING: NOT AT ALL
1. FEELING NERVOUS, ANXIOUS, OR ON EDGE: NOT AT ALL
GAD7 TOTAL SCORE: 0
5. BEING SO RESTLESS THAT IT IS HARD TO SIT STILL: NOT AT ALL
6. BECOMING EASILY ANNOYED OR IRRITABLE: NOT AT ALL

## 2023-01-04 ASSESSMENT — PATIENT HEALTH QUESTIONNAIRE - PHQ9
5. POOR APPETITE OR OVEREATING: NOT AT ALL
SUM OF ALL RESPONSES TO PHQ QUESTIONS 1-9: 0

## 2023-01-04 ASSESSMENT — PAIN SCALES - GENERAL: PAINLEVEL: MODERATE PAIN (5)

## 2023-01-04 NOTE — PROGRESS NOTES
Assessment & Plan       ICD-10-CM    1. Intractable chronic common migraine without aura  G43.719 rimegepant (NURTEC) 75 MG ODT tablet     Adult Neurology  Referral      2. Chronic ischemic heart disease  I25.9 lisinopril (ZESTRIL) 20 MG tablet     Adult Neurology  Referral      3. Chronic tension-type headache, intractable  G44.221       4. Controlled substance agreement signed  Z79.899 HYDROcodone-acetaminophen (NORCO)  MG per tablet     HYDROcodone-acetaminophen (NORCO)  MG per tablet      5. Chronic pain disorder  G89.4 HYDROcodone-acetaminophen (NORCO)  MG per tablet     HYDROcodone-acetaminophen (NORCO)  MG per tablet      6. Chest wall pain, chronic  R07.89 HYDROcodone-acetaminophen (NORCO)  MG per tablet    G89.29 HYDROcodone-acetaminophen (NORCO)  MG per tablet      7. Chronic bilateral low back pain without sciatica  M54.50 HYDROcodone-acetaminophen (NORCO)  MG per tablet    G89.29 HYDROcodone-acetaminophen (NORCO)  MG per tablet      8. Essential hypertension  I10 lisinopril (ZESTRIL) 20 MG tablet      9. CORTEZ (dyspnea on exertion)  R06.09 lisinopril (ZESTRIL) 20 MG tablet        She is requesting a gepants medication for migraine. Previously in 2022 I recommended seeing neurology, but she has not yet been seen. Tripans are contra-indicated with complex CAD history including ongoing angina symptoms periodically. Rimegepant is acceptable to use when triptans are contra-indicated. She may try Nurtec 75 mg as needed for headache up to twice per week. Prevention dosing is every other day, so should not be taking any more frequently than 2 times per week. If this helps all of headache we can then discuss preventative options for migraine. If it does not make much of a difference, then she may benefit from again looking at prevention of tension headache. Depakote and topiramate were ineffective and she is on pregabalin already for chronic pain.  Tried duloxetine in the past, but it did not help so she returned to [facility]   Referral placed to neurology again to review options depending on results of this trial of Nurtec    Ongoing periodic angina symptoms. This is expected, she is intolerant of Ranexa. If she takes a nitroglycerin from time to time with resolution of her symptoms, based on recent cardiac testing there is no need for an ED visit or change to medical management. If nitroglycerin does not help or she is having frequent angina, should be seen.    Chronic chest wall pain in the setting of repeated stenting and bypass for CAD. She has been on opioids in the past to reduce ED visits. Also has chronic LBP and due to anticoagulation, injections have been only pursued when absolutely needed. With higher opioid doses had side effects. Stopped in the past with decline in function. Current dosing balance of benefit and minimal side effects. Refilled hydrocodone 10/325 mg taking 6 per day for 2 prescription of 1 month for a 2 month supply.  PDMP Review       Value Time User    State PDMP site checked  Yes 1/4/2023  9:25 AM Ramirez Cano MD         Refilled lisinopril      31 minutes spent on the date of the encounter doing chart review, patient visit and documentation     Return in about 1 month (around 2/4/2023).    Ramirez Cano MD   [facility]     Subjective   Malina is a 68 year old, presenting for the following health issues:  Recheck Medication      History of Present Illness       Back Pain:  She presents for follow up of back pain. Patient's back pain is a chronic problem.  Location of back pain:  Right lower back, left lower back, right middle of back, left middle of back, right upper back, left upper back, right side of neck, left side of neck, right shoulder, left shoulder, right hip and left hip  Description of back pain: cramping  Back pain spreads: right side of neck and left side of neck    Since patient first  "noticed back pain, pain is: always present, but gets better and worse  Does back pain interfere with her job:  Yes      Headaches:   Since the patient's last clinic visit, headaches are: worsened  The patient is getting headaches:  EVERY DAY ALMOST ALL DAY, SICK OF IT!  She is not able to do normal daily activities when she has a migraine.  The patient is taking the following rescue/relief medications:  Other   Patient states \"The relief is inconsistent\" from the rescue/relief medications.   The patient is taking the following medications to prevent migraines:  Other  In the past 4 weeks, the patient has gone to an Urgent Care or Emergency Room 0 times times due to headaches.    Reason for visit:  PAIN, All Over    She eats 0-1 servings of fruits and vegetables daily.She consumes 1 sweetened beverage(s) daily.She exercises with enough effort to increase her heart rate 10 to 19 minutes per day.  She exercises with enough effort to increase her heart rate 7 days per week.   She is taking medications regularly.       Palpitations one day and chest discomfort. Improved with nitroglycerin.    Headache daily  Photophobia, phonophobia, but not nausea  Never saw neurology in the past year  Several years ago Botox recommended, but never approved by insurance        Review of Systems   As above      Objective    /70 (BP Location: Right arm, Patient Position: Sitting, Cuff Size: Adult Large)   Pulse 74   Temp 97.9  F (36.6  C) (Tympanic)   Resp 20   Wt 78.7 kg (173 lb 8 oz)   LMP 04/29/1978 (Approximate)   SpO2 97%   BMI 28.43 kg/m    Body mass index is 28.43 kg/m .  Physical Exam   General Appearance: Alert. No acute distress  Chest/Respiratory Exam: Clear to auscultation bilaterally  Cardiovascular Exam: Regular rate and rhythm. S1, S2, no murmur, gallop, or rubs.  Extremities: No lower extremity edema.  Neurological: CN 2-12 intact. No deficits noted in strength or coordination.  Psychiatric: Normal affect and " mentation

## 2023-01-04 NOTE — NURSING NOTE
"Patient presents to the clinic for medication refills.  Last administration of norco was today around 0530.  Last administration of klonopin was today around 0700.   Contract signed 9-7-22, and TOX obtain 9-7-22.     FOOD SECURITY SCREENING QUESTIONS:    The next two questions are to help us understand your food security.  If you are feeling you need any assistance in this area, we have resources available to support you today.    Hunger Vital Signs:  Within the past 12 months we worried whether our food would run out before we got money to buy more. Never  Within the past 12 months the food we bought just didn't last and we didn't have money to get more. Never    Advance Care Directive on file? no  Advance Care Directive provided to patient? Declined-has completed forms.    Chief Complaint   Patient presents with     Recheck Medication       Initial /70 (BP Location: Right arm, Patient Position: Sitting, Cuff Size: Adult Large)   Pulse 74   Temp 97.9  F (36.6  C) (Tympanic)   Resp 20   Wt 78.7 kg (173 lb 8 oz)   LMP 04/29/1978 (Approximate)   SpO2 97%   BMI 28.43 kg/m   Estimated body mass index is 28.43 kg/m  as calculated from the following:    Height as of 12/2/22: 1.664 m (5' 5.5\").    Weight as of this encounter: 78.7 kg (173 lb 8 oz).  Medication Reconciliation: complete        Taylor Hill LPN       "

## 2023-01-09 ENCOUNTER — TELEPHONE (OUTPATIENT)
Dept: FAMILY MEDICINE | Facility: OTHER | Age: 68
End: 2023-01-09

## 2023-01-09 ENCOUNTER — DOCUMENTATION ONLY (OUTPATIENT)
Dept: OTHER | Facility: CLINIC | Age: 68
End: 2023-01-09

## 2023-01-09 NOTE — TELEPHONE ENCOUNTER
Received a denial for [facility].  Pharmacy is currently closed for lunch and will be available at 2:00.      Taylor Hill LPN 1/9/2023 1:39 PM

## 2023-01-10 NOTE — TELEPHONE ENCOUNTER
Patient was able to get this medication on 1-4-23, $40 co-pay.  There are no refills on this at this time, patient was advised to only use 2 pills per week.  This will be enough medication until next office visit on 2-3-23.      Taylor Hill LPN 1/10/2023 10:21 AM

## 2023-01-31 ENCOUNTER — TELEPHONE (OUTPATIENT)
Dept: FAMILY MEDICINE | Facility: OTHER | Age: 68
End: 2023-01-31
Payer: COMMERCIAL

## 2023-01-31 NOTE — TELEPHONE ENCOUNTER
Insurance company reports that they called patient and got the answers needed.  Nurtec was approved and the approval letter will be faxed to [phone] at this time.      Taylor Hill LPN 1/31/2023 4:08 PM

## 2023-01-31 NOTE — TELEPHONE ENCOUNTER
Bianka, pharmacy representative with [facility], called and requested a call back from [facility] nurse regarding pre authorization for nurtec 75 mg. Bianka stated a few clinical questions need to be answered to determine coverage.    Please call back.    Althea Mayer on 1/31/2023 at 10:52 AM

## 2023-02-02 ASSESSMENT — ANXIETY QUESTIONNAIRES
GAD7 TOTAL SCORE: 14
7. FEELING AFRAID AS IF SOMETHING AWFUL MIGHT HAPPEN: NEARLY EVERY DAY
3. WORRYING TOO MUCH ABOUT DIFFERENT THINGS: NEARLY EVERY DAY
7. FEELING AFRAID AS IF SOMETHING AWFUL MIGHT HAPPEN: NEARLY EVERY DAY
1. FEELING NERVOUS, ANXIOUS, OR ON EDGE: MORE THAN HALF THE DAYS
5. BEING SO RESTLESS THAT IT IS HARD TO SIT STILL: SEVERAL DAYS
6. BECOMING EASILY ANNOYED OR IRRITABLE: SEVERAL DAYS
2. NOT BEING ABLE TO STOP OR CONTROL WORRYING: MORE THAN HALF THE DAYS
4. TROUBLE RELAXING: MORE THAN HALF THE DAYS
GAD7 TOTAL SCORE: 14
GAD7 TOTAL SCORE: 14
8. IF YOU CHECKED OFF ANY PROBLEMS, HOW DIFFICULT HAVE THESE MADE IT FOR YOU TO DO YOUR WORK, TAKE CARE OF THINGS AT HOME, OR GET ALONG WITH OTHER PEOPLE?: VERY DIFFICULT
IF YOU CHECKED OFF ANY PROBLEMS ON THIS QUESTIONNAIRE, HOW DIFFICULT HAVE THESE PROBLEMS MADE IT FOR YOU TO DO YOUR WORK, TAKE CARE OF THINGS AT HOME, OR GET ALONG WITH OTHER PEOPLE: VERY DIFFICULT

## 2023-02-02 ASSESSMENT — PATIENT HEALTH QUESTIONNAIRE - PHQ9
SUM OF ALL RESPONSES TO PHQ QUESTIONS 1-9: 11
10. IF YOU CHECKED OFF ANY PROBLEMS, HOW DIFFICULT HAVE THESE PROBLEMS MADE IT FOR YOU TO DO YOUR WORK, TAKE CARE OF THINGS AT HOME, OR GET ALONG WITH OTHER PEOPLE: VERY DIFFICULT
SUM OF ALL RESPONSES TO PHQ QUESTIONS 1-9: 11

## 2023-02-03 ENCOUNTER — OFFICE VISIT (OUTPATIENT)
Dept: FAMILY MEDICINE | Facility: OTHER | Age: 68
End: 2023-02-03
Attending: FAMILY MEDICINE
Payer: COMMERCIAL

## 2023-02-03 VITALS
OXYGEN SATURATION: 98 % | TEMPERATURE: 96.4 F | SYSTOLIC BLOOD PRESSURE: 118 MMHG | WEIGHT: 176 LBS | BODY MASS INDEX: 28.84 KG/M2 | RESPIRATION RATE: 16 BRPM | HEART RATE: 78 BPM | DIASTOLIC BLOOD PRESSURE: 70 MMHG

## 2023-02-03 DIAGNOSIS — R06.09 DOE (DYSPNEA ON EXERTION): ICD-10-CM

## 2023-02-03 DIAGNOSIS — R00.2 PALPITATIONS: Primary | ICD-10-CM

## 2023-02-03 DIAGNOSIS — R07.89 CHEST WALL PAIN, CHRONIC: ICD-10-CM

## 2023-02-03 DIAGNOSIS — I77.1 SUBCLAVIAN ARTERY STENOSIS, LEFT (H): ICD-10-CM

## 2023-02-03 DIAGNOSIS — G89.4 CHRONIC PAIN DISORDER: ICD-10-CM

## 2023-02-03 DIAGNOSIS — G89.29 CHEST WALL PAIN, CHRONIC: ICD-10-CM

## 2023-02-03 DIAGNOSIS — G43.719 INTRACTABLE CHRONIC COMMON MIGRAINE WITHOUT AURA: ICD-10-CM

## 2023-02-03 DIAGNOSIS — Z79.899 CONTROLLED SUBSTANCE AGREEMENT SIGNED: ICD-10-CM

## 2023-02-03 DIAGNOSIS — J44.9 CHRONIC OBSTRUCTIVE PULMONARY DISEASE, UNSPECIFIED COPD TYPE (H): ICD-10-CM

## 2023-02-03 DIAGNOSIS — G89.29 CHRONIC BILATERAL LOW BACK PAIN WITHOUT SCIATICA: ICD-10-CM

## 2023-02-03 DIAGNOSIS — I25.708 ATHEROSCLEROSIS OF CORONARY ARTERY BYPASS GRAFT OF NATIVE HEART WITH STABLE ANGINA PECTORIS (H): ICD-10-CM

## 2023-02-03 DIAGNOSIS — F33.1 MODERATE EPISODE OF RECURRENT MAJOR DEPRESSIVE DISORDER (H): ICD-10-CM

## 2023-02-03 DIAGNOSIS — M54.50 CHRONIC BILATERAL LOW BACK PAIN WITHOUT SCIATICA: ICD-10-CM

## 2023-02-03 PROCEDURE — G0463 HOSPITAL OUTPT CLINIC VISIT: HCPCS

## 2023-02-03 PROCEDURE — 99214 OFFICE O/P EST MOD 30 MIN: CPT | Performed by: FAMILY MEDICINE

## 2023-02-03 PROCEDURE — G0463 HOSPITAL OUTPT CLINIC VISIT: HCPCS | Mod: 25

## 2023-02-03 PROCEDURE — 93005 ELECTROCARDIOGRAM TRACING: CPT | Performed by: FAMILY MEDICINE

## 2023-02-03 PROCEDURE — 93010 ELECTROCARDIOGRAM REPORT: CPT | Performed by: INTERNAL MEDICINE

## 2023-02-03 RX ORDER — HYDROCODONE BITARTRATE AND ACETAMINOPHEN 10; 325 MG/1; MG/1
1-2 TABLET ORAL EVERY 4 HOURS PRN
Qty: 180 TABLET | Refills: 0 | Status: SHIPPED | OUTPATIENT
Start: 2023-03-09 | End: 2023-03-06

## 2023-02-03 RX ORDER — HYDROCODONE BITARTRATE AND ACETAMINOPHEN 10; 325 MG/1; MG/1
1-2 TABLET ORAL EVERY 4 HOURS PRN
Qty: 180 TABLET | Refills: 0 | Status: SHIPPED | OUTPATIENT
Start: 2023-02-07 | End: 2023-03-06

## 2023-02-03 ASSESSMENT — PATIENT HEALTH QUESTIONNAIRE - PHQ9
SUM OF ALL RESPONSES TO PHQ QUESTIONS 1-9: 11
10. IF YOU CHECKED OFF ANY PROBLEMS, HOW DIFFICULT HAVE THESE PROBLEMS MADE IT FOR YOU TO DO YOUR WORK, TAKE CARE OF THINGS AT HOME, OR GET ALONG WITH OTHER PEOPLE: VERY DIFFICULT

## 2023-02-03 ASSESSMENT — ANXIETY QUESTIONNAIRES: GAD7 TOTAL SCORE: 14

## 2023-02-03 ASSESSMENT — PAIN SCALES - GENERAL: PAINLEVEL: MILD PAIN (3)

## 2023-02-03 NOTE — PROGRESS NOTES
Assessment & Plan       ICD-10-CM    1. Palpitations  R00.2 EKG 12-lead, tracing only      2. CORTEZ (dyspnea on exertion)  R06.09 EKG 12-lead, tracing only      3. 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 Subclavian artery stenosis, left (H)  I77.1       5. Chronic obstructive pulmonary disease, unspecified COPD type (H)  J44.9       6. Controlled substance agreement signed  Z79.899 HYDROcodone-acetaminophen (NORCO)  MG per tablet     HYDROcodone-acetaminophen (NORCO)  MG per tablet      7. Chronic pain disorder  G89.4 HYDROcodone-acetaminophen (NORCO)  MG per tablet     HYDROcodone-acetaminophen (NORCO)  MG per tablet      8. Chest wall pain, chronic  R07.89 HYDROcodone-acetaminophen (NORCO)  MG per tablet    G89.29 HYDROcodone-acetaminophen (NORCO)  MG per tablet      9. Chronic bilateral low back pain without sciatica  M54.50 HYDROcodone-acetaminophen (NORCO)  MG per tablet    G89.29 HYDROcodone-acetaminophen (NORCO)  MG per tablet      10. Intractable chronic common migraine without aura  G43.719 rimegepant (NURTEC) 75 MG ODT tablet      11. Moderate episode of recurrent major depressive disorder (H)  F33.1         She has noticed new palpitations, but does not believe they relate to institution of Nurtec.  Significant cardiac history with chronic angina.  Intolerant of Ranexa.  In the past any CORTEZ and chest pressure have been attributed to chronic angina.  Most recent stress testing was negative.  She is had bypass as well as stenting post bypass and stenting of the subclavian artery.  Very complex cardiac history.  At this point, intermittent angina that resolves with rest or occasional nitro seems to be the optimal treatment.  If she continues to have concerning symptoms or an escalation in symptoms, then see cardiology again.  To evaluate for palpitations as well as recent symptoms, obtain  EKG to see if she had atrial fibrillation.  Since she had symptoms with exertion, actually had her do some stairstepping on the foot rest from the exam table until she had some slight CORTEZ.  Then EKG was performed.  EKG is in sinus rhythm.  There are some slight ST changes, but not enough to be consistent with ischemia.  Very comparable to last EKG.    Mild COPD.  This does not seem to be the explanation for her CORTEZ.    Chronic chest wall pain in the setting of repeated stenting and bypass for CAD. She has been on opioids in the past to reduce ED visits. Also has chronic LBP and due to anticoagulation, injections have been only pursued when absolutely needed. With higher opioid doses had side effects. Stopped in the past with decline in function. Current dosing balance of benefit and minimal side effects. Refilled hydrocodone 10/325 mg taking 6 per day for 2 prescription of 1 month for a 2 month supply.  PDMP Review       Value Time User    State PDMP site checked  Yes 2/4/2023  9:09 AM Ramirez Cano MD           Nurtec has been effective for migraine.  No side effects.  Refilled.    Depression controlled on current medications.  Intolerant of multiple other medications, Trintellix has been the best option.      Return in about 2 months (around 4/3/2023).    Ramirez Cano MD   [facility]      Subjective   Malina is a 68 year old accompanied by her spouse, presenting for the following health issues:  Recheck Medication      History of Present Illness       Reason for visit:  Check on new medication/how it's working    She eats 0-1 servings of fruits and vegetables daily.She consumes 0 sweetened beverage(s) daily.She exercises with enough effort to increase her heart rate 9 or less minutes per day.  She exercises with enough effort to increase her heart rate 7 days per week.   She is taking medications regularly.    Today's PHQ-9         PHQ-9 Total Score: 11    PHQ-9 Q9 Thoughts of better off  dead/self-harm past 2 weeks :   Not at all    How difficult have these problems made it for you to do your work, take care of things at home, or get along with other people: Very difficult  Today's YAYA-7 Score: 14       Pain History:  When did you first notice your pain? - Chronic Pain   Have you seen this provider for your pain in the past?   Yes   Where in your body do you have pain? Multiple areas  Are you seeing anyone else for your pain? No    PHQ-9 SCORE 11/25/2022 1/4/2023 2/2/2023   PHQ-9 Total Score MyChart 9 (Mild depression) - 11 (Moderate depression)   PHQ-9 Total Score 9 0 11       YAYA-7 SCORE 11/9/2022 1/4/2023 2/2/2023   Total Score - - 14 (moderate anxiety)   Total Score 0 0 14             PDMP Review       Value Time User    State PDMP site checked  Yes 2/4/2023  9:09 AM Ramirez Cano MD        Last CSA Agreement:   CSA -- Patient Level:     [Media Unavailable] Controlled Substance Agreement - Opioid - Scan on 9/7/2022 11:36 AM   [Media Unavailable] Controlled Substance Agreement - Opioid - Scan on 9/29/2021  2:26 PM       Last UDS: 10/13/2022    Woke up from sleep for dull diffuse anterior chest pain and palpitations  Felt incoherent   Took sucralfate and went back to sleep  Palpitations since    Nurtec works for migraine  Last was a week ago  With more dosing has noticed less headaches          Review of Systems   As above      Objective    /70 (BP Location: Right arm, Patient Position: Sitting, Cuff Size: Adult Large)   Pulse 78   Temp (!) 96.4  F (35.8  C) (Tympanic)   Resp 16   Wt 79.8 kg (176 lb)   LMP 04/29/1978 (Approximate)   SpO2 98%   BMI 28.84 kg/m    Body mass index is 28.84 kg/m .  Physical Exam   General Appearance: Alert. No acute distress  Chest/Respiratory Exam: Clear to auscultation bilaterally  Cardiovascular Exam: Regular rate and rhythm. S1, S2, no murmur, gallop, or rubs.  Extremities: No lower extremity edema.  Psychiatric: Normal affect and  mentation    Results for orders placed or performed in visit on 02/03/23   EKG 12-lead, tracing only     Status: None (Preliminary result)   Result Value Ref Range    Systolic Blood Pressure  mmHg    Diastolic Blood Pressure  mmHg    Ventricular Rate 90 BPM    Atrial Rate 90 BPM    ME Interval 160 ms    QRS Duration 86 ms     ms    QTc 437 ms    P Axis 63 degrees    R AXIS 39 degrees    T Axis 53 degrees    Interpretation ECG       Sinus rhythm  Nonspecific ST and T wave abnormality  Abnormal ECG  When compared with ECG of 03-OCT-2022 17:18,  Vent. rate has increased BY  43 BPM

## 2023-02-03 NOTE — NURSING NOTE
"Patient presents to the clinic for controlled medication refill.  Last administration of Admire was today around 0730.  Contract signed 9-7-22, and TOX obtain 9-7-22.     FOOD SECURITY SCREENING QUESTIONS:    The next two questions are to help us understand your food security.  If you are feeling you need any assistance in this area, we have resources available to support you today.    Hunger Vital Signs:  Within the past 12 months we worried whether our food would run out before we got money to buy more. Never  Within the past 12 months the food we bought just didn't last and we didn't have money to get more. Never    Advance Care Directive on file? yes  Advance Care Directive provided to patient? N/a      Chief Complaint   Patient presents with     Recheck Medication       Initial /70 (BP Location: Right arm, Patient Position: Sitting, Cuff Size: Adult Large)   Pulse 78   Temp (!) 96.4  F (35.8  C) (Tympanic)   Resp 16   Wt 79.8 kg (176 lb)   LMP 04/29/1978 (Approximate)   SpO2 98%   BMI 28.84 kg/m   Estimated body mass index is 28.84 kg/m  as calculated from the following:    Height as of 12/2/22: 1.664 m (5' 5.5\").    Weight as of this encounter: 79.8 kg (176 lb).  Medication Reconciliation: complete        Taylor Hill LPN       "

## 2023-02-03 NOTE — PATIENT INSTRUCTIONS
Heart monitor for more  palpitations  Use nitroglycerin for chest pressure, potentially with shortness of breath  Nurtec refilled

## 2023-02-07 LAB
ATRIAL RATE - MUSE: 90 BPM
DIASTOLIC BLOOD PRESSURE - MUSE: NORMAL MMHG
INTERPRETATION ECG - MUSE: NORMAL
P AXIS - MUSE: 63 DEGREES
PR INTERVAL - MUSE: 160 MS
QRS DURATION - MUSE: 86 MS
QT - MUSE: 358 MS
QTC - MUSE: 437 MS
R AXIS - MUSE: 39 DEGREES
SYSTOLIC BLOOD PRESSURE - MUSE: NORMAL MMHG
T AXIS - MUSE: 53 DEGREES
VENTRICULAR RATE- MUSE: 90 BPM

## 2023-03-05 ASSESSMENT — ANXIETY QUESTIONNAIRES
5. BEING SO RESTLESS THAT IT IS HARD TO SIT STILL: SEVERAL DAYS
8. IF YOU CHECKED OFF ANY PROBLEMS, HOW DIFFICULT HAVE THESE MADE IT FOR YOU TO DO YOUR WORK, TAKE CARE OF THINGS AT HOME, OR GET ALONG WITH OTHER PEOPLE?: SOMEWHAT DIFFICULT
GAD7 TOTAL SCORE: 5
GAD7 TOTAL SCORE: 5
1. FEELING NERVOUS, ANXIOUS, OR ON EDGE: SEVERAL DAYS
4. TROUBLE RELAXING: SEVERAL DAYS
GAD7 TOTAL SCORE: 5
7. FEELING AFRAID AS IF SOMETHING AWFUL MIGHT HAPPEN: NOT AT ALL
3. WORRYING TOO MUCH ABOUT DIFFERENT THINGS: SEVERAL DAYS
6. BECOMING EASILY ANNOYED OR IRRITABLE: NOT AT ALL
IF YOU CHECKED OFF ANY PROBLEMS ON THIS QUESTIONNAIRE, HOW DIFFICULT HAVE THESE PROBLEMS MADE IT FOR YOU TO DO YOUR WORK, TAKE CARE OF THINGS AT HOME, OR GET ALONG WITH OTHER PEOPLE: SOMEWHAT DIFFICULT
2. NOT BEING ABLE TO STOP OR CONTROL WORRYING: SEVERAL DAYS
7. FEELING AFRAID AS IF SOMETHING AWFUL MIGHT HAPPEN: NOT AT ALL

## 2023-03-05 ASSESSMENT — PATIENT HEALTH QUESTIONNAIRE - PHQ9
10. IF YOU CHECKED OFF ANY PROBLEMS, HOW DIFFICULT HAVE THESE PROBLEMS MADE IT FOR YOU TO DO YOUR WORK, TAKE CARE OF THINGS AT HOME, OR GET ALONG WITH OTHER PEOPLE: SOMEWHAT DIFFICULT
SUM OF ALL RESPONSES TO PHQ QUESTIONS 1-9: 7
SUM OF ALL RESPONSES TO PHQ QUESTIONS 1-9: 7

## 2023-03-06 ENCOUNTER — OFFICE VISIT (OUTPATIENT)
Dept: FAMILY MEDICINE | Facility: OTHER | Age: 68
End: 2023-03-06
Attending: NURSE PRACTITIONER
Payer: COMMERCIAL

## 2023-03-06 VITALS
SYSTOLIC BLOOD PRESSURE: 144 MMHG | RESPIRATION RATE: 16 BRPM | WEIGHT: 174 LBS | BODY MASS INDEX: 28.51 KG/M2 | TEMPERATURE: 97.7 F | HEART RATE: 80 BPM | OXYGEN SATURATION: 97 % | DIASTOLIC BLOOD PRESSURE: 80 MMHG

## 2023-03-06 DIAGNOSIS — R07.89 CHEST WALL PAIN, CHRONIC: ICD-10-CM

## 2023-03-06 DIAGNOSIS — M54.50 CHRONIC BILATERAL LOW BACK PAIN WITHOUT SCIATICA: ICD-10-CM

## 2023-03-06 DIAGNOSIS — Z79.899 CONTROLLED SUBSTANCE AGREEMENT SIGNED: ICD-10-CM

## 2023-03-06 DIAGNOSIS — G43.719 INTRACTABLE CHRONIC COMMON MIGRAINE WITHOUT AURA: ICD-10-CM

## 2023-03-06 DIAGNOSIS — R00.2 PALPITATIONS: ICD-10-CM

## 2023-03-06 DIAGNOSIS — G89.29 CHRONIC BILATERAL LOW BACK PAIN WITHOUT SCIATICA: ICD-10-CM

## 2023-03-06 DIAGNOSIS — I47.10 PAROXYSMAL SUPRAVENTRICULAR TACHYCARDIA (H): ICD-10-CM

## 2023-03-06 DIAGNOSIS — G89.29 CHEST WALL PAIN, CHRONIC: ICD-10-CM

## 2023-03-06 DIAGNOSIS — G89.4 CHRONIC PAIN DISORDER: Primary | ICD-10-CM

## 2023-03-06 DIAGNOSIS — I25.708 ATHEROSCLEROSIS OF CORONARY ARTERY BYPASS GRAFT OF NATIVE HEART WITH STABLE ANGINA PECTORIS (H): ICD-10-CM

## 2023-03-06 DIAGNOSIS — F41.9 CHRONIC ANXIETY: ICD-10-CM

## 2023-03-06 PROCEDURE — 99214 OFFICE O/P EST MOD 30 MIN: CPT | Performed by: FAMILY MEDICINE

## 2023-03-06 PROCEDURE — G0463 HOSPITAL OUTPT CLINIC VISIT: HCPCS | Mod: 25

## 2023-03-06 PROCEDURE — G0463 HOSPITAL OUTPT CLINIC VISIT: HCPCS

## 2023-03-06 RX ORDER — HYDROCODONE BITARTRATE AND ACETAMINOPHEN 10; 325 MG/1; MG/1
1-2 TABLET ORAL EVERY 4 HOURS PRN
Qty: 180 TABLET | Refills: 0 | Status: SHIPPED | OUTPATIENT
Start: 2023-04-07 | End: 2023-05-08

## 2023-03-06 RX ORDER — CLONAZEPAM 1 MG/1
1 TABLET ORAL 3 TIMES DAILY PRN
Qty: 180 TABLET | Refills: 1 | Status: SHIPPED | OUTPATIENT
Start: 2023-03-13 | End: 2023-08-22

## 2023-03-06 RX ORDER — METOPROLOL SUCCINATE 25 MG/1
12.5 TABLET, EXTENDED RELEASE ORAL DAILY
Qty: 45 TABLET | Refills: 1 | Status: SHIPPED | OUTPATIENT
Start: 2023-03-06 | End: 2023-05-08

## 2023-03-06 RX ORDER — HYDROCODONE BITARTRATE AND ACETAMINOPHEN 10; 325 MG/1; MG/1
1-2 TABLET ORAL EVERY 4 HOURS PRN
Qty: 180 TABLET | Refills: 0 | Status: SHIPPED | OUTPATIENT
Start: 2023-03-09 | End: 2023-05-08

## 2023-03-06 ASSESSMENT — PATIENT HEALTH QUESTIONNAIRE - PHQ9
SUM OF ALL RESPONSES TO PHQ QUESTIONS 1-9: 7
10. IF YOU CHECKED OFF ANY PROBLEMS, HOW DIFFICULT HAVE THESE PROBLEMS MADE IT FOR YOU TO DO YOUR WORK, TAKE CARE OF THINGS AT HOME, OR GET ALONG WITH OTHER PEOPLE: SOMEWHAT DIFFICULT

## 2023-03-06 ASSESSMENT — ANXIETY QUESTIONNAIRES: GAD7 TOTAL SCORE: 5

## 2023-03-06 NOTE — PROGRESS NOTES
Assessment & Plan       ICD-10-CM    1. Chronic pain disorder  G89.4 HYDROcodone-acetaminophen (NORCO)  MG per tablet     HYDROcodone-acetaminophen (NORCO)  MG per tablet      2. Chest wall pain, chronic  R07.89 HYDROcodone-acetaminophen (NORCO)  MG per tablet    G89.29 HYDROcodone-acetaminophen (NORCO)  MG per tablet      3. Chronic bilateral low back pain without sciatica  M54.50 HYDROcodone-acetaminophen (NORCO)  MG per tablet    G89.29 HYDROcodone-acetaminophen (NORCO)  MG per tablet      4. Controlled substance agreement signed  Z79.899 HYDROcodone-acetaminophen (NORCO)  MG per tablet     HYDROcodone-acetaminophen (NORCO)  MG per tablet     clonazePAM (KLONOPIN) 1 MG tablet      5. Chronic anxiety  F41.9 clonazePAM (KLONOPIN) 1 MG tablet      6. Intractable chronic common migraine without aura  G43.719       7. 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 Paroxysmal supraventricular tachycardia (H)  I47.1 metoprolol succinate ER (TOPROL XL) 25 MG 24 hr tablet     Adult Leadless EKG Monitor 8 to 14 Days      9. Palpitations  R00.2 Adult Leadless EKG Monitor 8 to 14 Days        Chronic chest wall pain in the setting of repeated stenting and bypass for CAD. She has been on opioids in the past to reduce ED visits. Also has chronic LBP and due to anticoagulation, injections have been only pursued when absolutely needed. With higher opioid doses had side effects. Stopped in the past with decline in function. Current dosing balance of benefit and minimal side effects. Refilled hydrocodone 10/325 mg taking 6 per day for 2 prescription of 1 month for a 2 month supply.  PDMP Review       Value Time User    State PDMP site checked  Yes 3/6/2023 10:56 AM Ramirez Cano MD         She is on chronic clonazepam.  Aware of risks with concurrent  benzodiazepine and opioid use.  Dose has been reduced over time.  She used to take 4 mg of clonazepam a day 10 years ago, is weaned down to 3 mg more recently.  Currently down to 2 mg by taking milligram twice daily.  Refilled clonazepam for 3-month supply with a refill for total of 6 months.    Nurtec has been effective at controlling migraine.  She was able to wean off of gabapentin.  This was removed from her medication list.    Reporting occasional palpitations and tachycardia.  This happening 4 to 5 times per week, sometimes 3 times per day. She actually had similar symptoms in 2021 with SVT seen on Zio patch in Dec 2021. Metoprolol was stopped due to dizziness in 2021. She would benefit from a beta blocker due to palpitations, SVT, and ASCVD. Restart metoprolol 12.5 mg daily. OK to continue nitroglycerin as needed. If symptoms escalate, needs to be seen. Ordered Zio patch to assess if symptoms not improved on metoprolol.    Return in about 2 months (around 5/6/2023).    Ramirez Cano MD   [facility]       Subjective   Malina is a 68 year old, presenting for the following health issues:  Recheck Medication      History of Present Illness       Reason for visit:  Med Check    She eats 2-3 servings of fruits and vegetables daily.She consumes 0 sweetened beverage(s) daily.She exercises with enough effort to increase her heart rate 10 to 19 minutes per day.  She exercises with enough effort to increase her heart rate 3 or less days per week.   She is taking medications regularly.    Today's PHQ-9         PHQ-9 Total Score: 7    PHQ-9 Q9 Thoughts of better off dead/self-harm past 2 weeks :   Not at all    How difficult have these problems made it for you to do your work, take care of things at home, or get along with other people: Somewhat difficult  Today's YAYA-7 Score: 5     Nurtec controlling migraines  Now can tell when she has a tension or stress headache  Stopped gabapentin about a week  ago    Has palpitations about 4-5 times per week  Sometimes 3 times per day  Resolve with a spray of nitroglycerin        Review of Systems   As above      Objective    BP (!) 144/80   Pulse 80   Temp 97.7  F (36.5  C) (Tympanic)   Resp 16   Wt 78.9 kg (174 lb)   LMP 04/29/1978 (Approximate)   SpO2 97%   BMI 28.51 kg/m    Body mass index is 28.51 kg/m .  Physical Exam   General Appearance: Alert. No acute distress  Chest/Respiratory Exam: Clear to auscultation bilaterally  Cardiovascular Exam: Regular rate and rhythm. S1, S2, no murmur, gallop, or rubs.  Extremities: 2+ pedal pulses.  No lower extremity edema.  Psychiatric: Normal affect and mentation

## 2023-03-06 NOTE — PATIENT INSTRUCTIONS
Restart metoprolol 12.5 mg daily to control heart rate  Zio patch to check heart beats/palpitations    Refilled hydrocodone for Mar 9  Refilled clonazepam for twice daily when running out

## 2023-03-06 NOTE — NURSING NOTE
"Patient presents to the clinic for controlled medication refill.  Last administration of Scalf was today around 0900.  Last administration of Klonopin was today around 0700.   Contract signed 9-7-22, and TOX obtain 9-7-22.     FOOD SECURITY SCREENING QUESTIONS:    The next two questions are to help us understand your food security.  If you are feeling you need any assistance in this area, we have resources available to support you today.    Hunger Vital Signs:  Within the past 12 months we worried whether our food would run out before we got money to buy more. Never  Within the past 12 months the food we bought just didn't last and we didn't have money to get more. Never    Advance Care Directive on file? yes  Advance Care Directive provided to patient? N/a    Chief Complaint   Patient presents with     Recheck Medication       Initial BP (!) 144/80   Pulse 80   Temp 97.7  F (36.5  C) (Tympanic)   Resp 16   Wt 78.9 kg (174 lb)   LMP 04/29/1978 (Approximate)   SpO2 97%   BMI 28.51 kg/m   Estimated body mass index is 28.51 kg/m  as calculated from the following:    Height as of 12/2/22: 1.664 m (5' 5.5\").    Weight as of this encounter: 78.9 kg (174 lb).  Medication Reconciliation: complete        Taylor Hill LPN       "

## 2023-03-21 DIAGNOSIS — Z79.899 CONTROLLED SUBSTANCE AGREEMENT SIGNED: ICD-10-CM

## 2023-03-21 DIAGNOSIS — F41.9 CHRONIC ANXIETY: ICD-10-CM

## 2023-03-24 RX ORDER — CLONAZEPAM 1 MG/1
1 TABLET ORAL 3 TIMES DAILY PRN
Qty: 270 TABLET | Refills: 0 | OUTPATIENT
Start: 2023-03-24

## 2023-03-24 NOTE — TELEPHONE ENCOUNTER
[facility] sent Rx request for the following:      CLONAZEPAM 1 MG TABLET         Last Prescription Date:   3/13/23  Last Fill Qty/Refills:         180, R-1    Last Office Visit:              3/6/23   Future Office visit:           5/9/23  Redundant refill request refused: Too soon:    Sheri Preston RN on 3/24/2023 at 10:16 AM

## 2023-03-27 ENCOUNTER — HOSPITAL ENCOUNTER (OUTPATIENT)
Dept: CARDIOLOGY | Facility: OTHER | Age: 68
Discharge: HOME | End: 2023-03-27
Attending: FAMILY MEDICINE | Admitting: FAMILY MEDICINE
Payer: COMMERCIAL

## 2023-03-27 DIAGNOSIS — I47.10 PAROXYSMAL SUPRAVENTRICULAR TACHYCARDIA (H): ICD-10-CM

## 2023-03-27 DIAGNOSIS — R00.2 PALPITATIONS: ICD-10-CM

## 2023-03-27 PROCEDURE — 93248 EXT ECG>7D<15D REV&INTERPJ: CPT | Performed by: INTERNAL MEDICINE

## 2023-03-27 PROCEDURE — 93246 EXT ECG>7D<15D RECORDING: CPT

## 2023-03-27 PROCEDURE — 999N000157 HC STATISTIC RCP TIME EA 10 MIN

## 2023-03-27 NOTE — PATIENT INSTRUCTIONS
Date Placed on Pt:  03/27/2023    Patient instructed not to:   -take baths, swim, sauna   -remove device prior to 14 days   -use electric blankets   -shower or sweat excessively within first 24 hours of device application  Patient instructed to:   -shower as needed   -be carefull when toweling off and dressing   -press button when cardiac symptoms occur   -document symptoms in log book   -remove and return device (send via mail to [employer])   -Call [employer] with any questions

## 2023-03-28 NOTE — TELEPHONE ENCOUNTER
Antibiotic is supposed to be working. We can recheck urine at her next appointment    Call from Lacey Doty requesting refill for Mark's medications. Refills needed for Fentanyl 12 mcg and [place] 5/325 mg. Pharmacy is [facility]. [facility]

## 2023-03-30 DIAGNOSIS — I10 ESSENTIAL HYPERTENSION: ICD-10-CM

## 2023-03-31 RX ORDER — AMLODIPINE BESYLATE 10 MG/1
10 TABLET ORAL DAILY
Qty: 90 TABLET | Refills: 3 | Status: SHIPPED | OUTPATIENT
Start: 2023-03-31 | End: 2024-03-05

## 2023-03-31 NOTE — TELEPHONE ENCOUNTER
[facility] sent [facility] request for the following:    AMLODIPINE BESYLATE 10 MG TAB  Last Prescription Date:   3/29/22  Last Fill Qty/Refills:         90, R-3    Last Office Visit:              3/6/23   Future Office visit:           5/9/23  Sheri Preston RN on 3/31/2023 at 8:10 AM

## 2023-04-15 ENCOUNTER — HOSPITAL ENCOUNTER (EMERGENCY)
Facility: OTHER | Age: 68
Discharge: HOME | End: 2023-04-15
Attending: STUDENT IN AN ORGANIZED HEALTH CARE EDUCATION/TRAINING PROGRAM | Admitting: STUDENT IN AN ORGANIZED HEALTH CARE EDUCATION/TRAINING PROGRAM
Payer: COMMERCIAL

## 2023-04-15 ENCOUNTER — APPOINTMENT (OUTPATIENT)
Dept: GENERAL RADIOLOGY | Facility: OTHER | Age: 68
End: 2023-04-15
Attending: STUDENT IN AN ORGANIZED HEALTH CARE EDUCATION/TRAINING PROGRAM
Payer: COMMERCIAL

## 2023-04-15 VITALS
SYSTOLIC BLOOD PRESSURE: 141 MMHG | HEART RATE: 67 BPM | OXYGEN SATURATION: 91 % | HEIGHT: 66 IN | DIASTOLIC BLOOD PRESSURE: 71 MMHG | BODY MASS INDEX: 27.8 KG/M2 | WEIGHT: 173 LBS | TEMPERATURE: 96.1 F | RESPIRATION RATE: 14 BRPM

## 2023-04-15 DIAGNOSIS — I20.9 ANGINA PECTORIS WITHOUT MYOCARDIAL INFARCTION (H): ICD-10-CM

## 2023-04-15 LAB
ANION GAP SERPL CALCULATED.3IONS-SCNC: 13 MMOL/L (ref 7–15)
BASOPHILS # BLD AUTO: 0.1 10E3/UL (ref 0–0.2)
BASOPHILS NFR BLD AUTO: 1 %
BUN SERPL-MCNC: 13.8 MG/DL (ref 8–23)
CALCIUM SERPL-MCNC: 9.8 MG/DL (ref 8.8–10.2)
CHLORIDE SERPL-SCNC: 99 MMOL/L (ref 98–107)
CREAT SERPL-MCNC: 0.81 MG/DL (ref 0.51–0.95)
DEPRECATED HCO3 PLAS-SCNC: 26 MMOL/L (ref 22–29)
EOSINOPHIL # BLD AUTO: 0.2 10E3/UL (ref 0–0.7)
EOSINOPHIL NFR BLD AUTO: 3 %
ERYTHROCYTE [DISTWIDTH] IN BLOOD BY AUTOMATED COUNT: 13.2 % (ref 10–15)
GFR SERPL CREATININE-BSD FRML MDRD: 78 ML/MIN/1.73M2
GLUCOSE SERPL-MCNC: 101 MG/DL (ref 70–99)
HCT VFR BLD AUTO: 38.8 % (ref 35–47)
HGB BLD-MCNC: 13.2 G/DL (ref 11.7–15.7)
HOLD SPECIMEN: NORMAL
HOLD SPECIMEN: NORMAL
IMM GRANULOCYTES # BLD: 0 10E3/UL
IMM GRANULOCYTES NFR BLD: 0 %
LYMPHOCYTES # BLD AUTO: 2.3 10E3/UL (ref 0.8–5.3)
LYMPHOCYTES NFR BLD AUTO: 33 %
MCH RBC QN AUTO: 29.8 PG (ref 26.5–33)
MCHC RBC AUTO-ENTMCNC: 34 G/DL (ref 31.5–36.5)
MCV RBC AUTO: 88 FL (ref 78–100)
MONOCYTES # BLD AUTO: 0.5 10E3/UL (ref 0–1.3)
MONOCYTES NFR BLD AUTO: 8 %
NEUTROPHILS # BLD AUTO: 3.9 10E3/UL (ref 1.6–8.3)
NEUTROPHILS NFR BLD AUTO: 55 %
NRBC # BLD AUTO: 0 10E3/UL
NRBC BLD AUTO-RTO: 0 /100
NT-PROBNP SERPL-MCNC: 256 PG/ML (ref 0–900)
PLATELET # BLD AUTO: 236 10E3/UL (ref 150–450)
POTASSIUM SERPL-SCNC: 4 MMOL/L (ref 3.4–5.3)
RBC # BLD AUTO: 4.43 10E6/UL (ref 3.8–5.2)
SODIUM SERPL-SCNC: 138 MMOL/L (ref 136–145)
TROPONIN T SERPL HS-MCNC: 10 NG/L
TROPONIN T SERPL HS-MCNC: 10 NG/L
WBC # BLD AUTO: 7 10E3/UL (ref 4–11)

## 2023-04-15 PROCEDURE — 84484 ASSAY OF TROPONIN QUANT: CPT | Performed by: STUDENT IN AN ORGANIZED HEALTH CARE EDUCATION/TRAINING PROGRAM

## 2023-04-15 PROCEDURE — 93005 ELECTROCARDIOGRAM TRACING: CPT | Performed by: STUDENT IN AN ORGANIZED HEALTH CARE EDUCATION/TRAINING PROGRAM

## 2023-04-15 PROCEDURE — 93010 ELECTROCARDIOGRAM REPORT: CPT | Performed by: INTERNAL MEDICINE

## 2023-04-15 PROCEDURE — 99291 CRITICAL CARE FIRST HOUR: CPT | Mod: 25 | Performed by: STUDENT IN AN ORGANIZED HEALTH CARE EDUCATION/TRAINING PROGRAM

## 2023-04-15 PROCEDURE — 99284 EMERGENCY DEPT VISIT MOD MDM: CPT | Performed by: STUDENT IN AN ORGANIZED HEALTH CARE EDUCATION/TRAINING PROGRAM

## 2023-04-15 PROCEDURE — 83880 ASSAY OF NATRIURETIC PEPTIDE: CPT | Performed by: STUDENT IN AN ORGANIZED HEALTH CARE EDUCATION/TRAINING PROGRAM

## 2023-04-15 PROCEDURE — 80048 BASIC METABOLIC PNL TOTAL CA: CPT | Performed by: STUDENT IN AN ORGANIZED HEALTH CARE EDUCATION/TRAINING PROGRAM

## 2023-04-15 PROCEDURE — 250N000013 HC RX MED GY IP 250 OP 250 PS 637: Performed by: STUDENT IN AN ORGANIZED HEALTH CARE EDUCATION/TRAINING PROGRAM

## 2023-04-15 PROCEDURE — 71045 X-RAY EXAM CHEST 1 VIEW: CPT

## 2023-04-15 PROCEDURE — 85025 COMPLETE CBC W/AUTO DIFF WBC: CPT | Performed by: STUDENT IN AN ORGANIZED HEALTH CARE EDUCATION/TRAINING PROGRAM

## 2023-04-15 PROCEDURE — 36415 COLL VENOUS BLD VENIPUNCTURE: CPT | Performed by: STUDENT IN AN ORGANIZED HEALTH CARE EDUCATION/TRAINING PROGRAM

## 2023-04-15 RX ORDER — HYDROCODONE BITARTRATE AND ACETAMINOPHEN 5; 325 MG/1; MG/1
2 TABLET ORAL ONCE
Status: COMPLETED | OUTPATIENT
Start: 2023-04-15 | End: 2023-04-15

## 2023-04-15 RX ORDER — ISOSORBIDE MONONITRATE 30 MG/1
30 TABLET, EXTENDED RELEASE ORAL DAILY
Qty: 30 TABLET | Refills: 0 | Status: SHIPPED | OUTPATIENT
Start: 2023-04-15 | End: 2023-05-08 | Stop reason: SINTOL

## 2023-04-15 RX ADMIN — HYDROCODONE BITARTRATE AND ACETAMINOPHEN 2 TABLET: 5; 325 TABLET ORAL at 16:25

## 2023-04-15 ASSESSMENT — ACTIVITIES OF DAILY LIVING (ADL)
ADLS_ACUITY_SCORE: 35
ADLS_ACUITY_SCORE: 35

## 2023-04-15 NOTE — ED PROVIDER NOTES
Emergency Department Provider Note  : 1955 Age: 68 year old Sex: female MRN: [id number]    Chief Complaint   Patient presents with     Shortness of Breath     Chest Pain     Fatigue     Dizziness     Palpitations       Medical Decision Making / Assessment / Plan   68 year old female with a PMH of chronic chest pain in the setting of known CAD with prior CABG and stents who presents for evaluation of 2 months of progressively worsening daily chest pain episodes that are nonpleuritic, not particularly exertional.  On arrival the patient's mildly hypertensive but otherwise vitally stable.  She is comfortable appearing and reports no pain at present.  Presenting EKG is without evidence of acute ischemia.  Exam is quite benign.  Broad differential diagnosis considered given patient's medical history in the context of daily, worsening chest pain and patient's cardiac history, primary concern for unstable angina.  Given reported compliance with anticoagulation, for lower suspicion for PE especially given lack of tachypnea, desaturations here, or findings for DVT.  Recent imaging of the chest and abdomen showed no evidence of aortic pathology and intermittent chest pain would be inconsistent with such pathology.    ED Course as of 04/15/23 1555   Sat Apr 15, 2023   1321 Troponin T, High Sensitivity: 10   1321 N-Terminal Pro BNP Inpatient: 256   1321 WBC: 7.0   1321 Hemoglobin: 13.2   1322 XR Chest Port 1 View  IMPRESSION:  Stable chest.        Delta troponin unchanged at 10.  Patient had an episode of chest pain while she was here as well without any change in vital signs or change in physical appearance.  Exam remains reassuring. Spoke with Dr. Bautista cardiology at [facility] in [place] where the patient had her last cath done last year.  After discussion regarding patient's symptoms and labs as well as medications, he would suggest starting her back on Imdur with a plan to follow-up with cardiology.  Does not feel  that admission or further work-up here today is indicated given that last cath did not show any epicardial disease and suspect she is likely experiencing microvascular disease leading to her pain if it is indeed cardiac in nature.  Discussed this with the patient and will prescribe Imdur here.  Strict return precautions given for changing or worsening symptoms.  Patient in agreement with this plan with return precautions.    The patient was informed of the plan and verbalized understanding and agreed with the plan. The patient was given strict return to Emergency Department precautions as well as appropriate follow up instructions. The patient was discharged in stable condition.    New Prescriptions    ISOSORBIDE MONONITRATE (IMDUR) 30 MG 24 HR TABLET    Take 1 tablet (30 mg) by mouth daily for 30 days       Final diagnoses:   Angina pectoris without myocardial infarction (H)       Cristhian Cotton MD  4/15/2023   Emergency Department    Mars Luciano is a 68 year old female with PMH of COPD, CAD s/po stents and prior CABG who presents at 11:37 AM for evaluation of worsening of chronic essentially daily chest pain that is been occurring over the past several months.  Patient describes pain in the center of her chest radiating to the right aspect of her chest into her back that is somewhat worse today than it has been over the past several months but has been occurring daily with and without exertion.  She also describes some dyspnea on exertion which appears to be chronic as well.  Denies peripheral edema or focal calf pain although does have a history of pulmonary embolism and is anticoagulated.  She reports compliance with all of her medications but is unclear what all of her medications are and whether she is taking Imdur daily.  She did take nitroglycerin which may be helped her chest pain today.    I have reviewed the Medications, Allergies, Past Medical and Surgical History, and Social History in the  "Epic System and with family.    Review of Systems:  Please see HPI for pertinent positives and negatives. All other systems reviewed and found to be negative.      Objective   BP: (!) 163/73  Pulse: 70  Temp: (!) 96.1  F (35.6  C)  Resp: 18  Height: 166.4 cm (5' 5.5\")  Weight: 78.5 kg (173 lb)  SpO2: 97 %    Physical Exam:   Gen: Comfortable. NAD  HEENT: MMM. AT/NC.  Eye: EOMI.   CV: Well perfused.  Regular rate and rhythm.  Pulm: Nonlabored, equal chest rise.  Clear to auscultation bilaterally.  Abd: ND.   Ext: No significant edema.  No calf pain to palpation.  Neuro: AOx3, no focal deficit noted  Psych: Appropriate affect, cognition intact    Procedures / Critical Care   Procedures    Critical Care Time: none         Medical/Surgical History:  Past Medical History:   Diagnosis Date     Acute ischemic heart disease (H)     06/15/2007,with PTCA and stenting of 90% osteo circumflex lesion and patent LAD graft, patent left main stent.     Acute myocardial infarction (H)     3/30/2013     Anxiety disorder     No Comments Provided     Atherosclerotic heart disease of native coronary artery without angina pectoris     -angio revealed 3 vessel dz  -4 stents placed; 2 overlapping stents placed in mLAD, 1 stent pRCA, 1 stent pCirc 1 s 9/02 -non-ST elevation MI 1/03  -angio revealed restenosis of Circ.    -PTCA and brachytherapy of pCirc -repeat angio 2/03 -no intervension -CABG x3 12/03 - Dr Sinclair  -LIMA-LAD, RAFI-RCA, SVG-OM -PCI 7/04 stent to L -CTangio 9/05   -patentent LIMA-LAD. RAFI-RCA occluded; RCA ostio/p*     Bilateral carpal tunnel syndrome     No Comments Provided     Cervicalgia     No Comments Provided     Chest pain     12/29/2014     Chronic gastric ulcer without hemorrhage or perforation     10/03/2011,hx of GI bleed (2003)     Chronic ischemic heart disease     06/27/2012     Chronic obstructive pulmonary disease (H)     06/15/2007,low DLCO, normal spirometry     Chronic or unspecified gastric ulcer with " hemorrhage     10/2003     Chronic pain syndrome     12/01/2010,chest wall, back     Constipation     11/29/2011     Coronary angioplasty status     09/12/2002,with triple stenting     Coronary angioplasty status     01/10/2003,repeat angioplasty     Coronary angioplasty status     No Comments Provided     Dorsalgia     05/31/2011     Encounter for other administrative examinations     1/28/2014     Encounter for screening for cardiovascular disorders     10/2004,Cardiolite (2004, 2005, 2006 and 2011)     Enterocolitis due to Clostridium difficile     8/19/2016     Essential (primary) hypertension     No Comments Provided     Hyperlipidemia     No Comments Provided     Major depressive disorder, single episode     Severe, hx of suicide attempt/hospitalization     Migraine without status migrainosus, not intractable     No Comments Provided     Nodular corneal degeneration     09/26/2011     Noninfective gastroenteritis and colitis     06/15/2007,history of     Noninfective gastroenteritis and colitis     Microcytic     Osteoarthritis     No Comments Provided     Other chest pain     10/13/2009,chronic     Pain in knee     05/31/2011     Pain in right shoulder     No Comments Provided     Panic disorder without agoraphobia     No Comments Provided     Peptic ulcer without hemorrhage or perforation     6/15/2007     Peripheral vascular disease (H)     No Comments Provided     Personal history of diseases of the blood and blood-forming organs and certain disorders involving the immune mechanism (CODE)     No Comments Provided     Personal history of nicotine dependence     No Comments Provided     Personal history of other medical treatment (CODE)     10/14/2013     Presence of aortocoronary bypass graft     2/12/2003     Primary central sleep apnea     10/14/2013     Sepsis due to Escherichia coli (E. coli) (H)     7/14/16,[facility]     Stricture of artery (H)     3/31/2013,S/p prox left SCA stent 4/1/2013      Thoracic, thoracolumbar and lumbosacral intervertebral disc disorder     No Comments Provided     Uncomplicated opioid abuse (H)     history of     Vitamin D deficiency     5/6/2013     Past Surgical History:   Procedure Laterality Date     ANGIOPLASTY      9/12/02,with triple stenting     APPENDECTOMY OPEN      No Comments Provided     ARTHROSCOPY KNEE      left     ARTHROSCOPY SHOULDER Right 05/12/2017    labral tear, rotator cuff tear and some subacromial decompression      BYPASS GRAFT ARTERY CORONARY      12/13/02,Triple bypass, left internal mammary  to LAD, right internal mammary to right coronary artery, saphenous to obtuse marginal of the left circumflex.     COLONOSCOPY      2011,Dr Bowman benign polyps     COLONOSCOPY  10/03/2011    2011,benign polyps, Dr. Bowman     COLONOSCOPY  08/08/2016 8/8/16,normal, Dr Bowman     ELBOW SURGERY      baby,birth malachi removed from right arm     EMBOLECTOMY UPPER EXTREMITY  04/02/2013    brachial artery pseudoaneurysm after stenting     ESOPHAGOSCOPY, GASTROSCOPY, DUODENOSCOPY (EGD), COMBINED      2011,EGD Dr Bowman with pyloric ulcer     ESOPHAGOSCOPY, GASTROSCOPY, DUODENOSCOPY (EGD), COMBINED      2011,pyloric ulcer, Dr. Bowman     ESOPHAGOSCOPY, GASTROSCOPY, DUODENOSCOPY (EGD), COMBINED      8/8/16,mild gastritis, Dr Bowman     ESOPHAGOSCOPY, GASTROSCOPY, DUODENOSCOPY (EGD), COMBINED      11/27/2017,Dr Bowman. Antral ulcer     ESOPHAGOSCOPY, GASTROSCOPY, DUODENOSCOPY (EGD), COMBINED  02/02/2018    Dr Bowman, healed ulcer     HYSTERECTOMY TOTAL ABDOMINAL      age 21     LAPAROSCOPIC CHOLECYSTECTOMY      2006     OSTEOTOMY FEMUR DISTAL      x3, right knee     OSTEOTOMY FEMUR DISTAL      2000,left knee  ligament surgery     OTHER SURGICAL HISTORY      1/10/2003,,PTCA     OTHER SURGICAL HISTORY      09/20/2012,,PTCA,DELONTE in LAD and left main     OTHER SURGICAL HISTORY      4/1/2013,,PTCA,L subclavian stenosis     RELEASE TRIGGER FINGER Left 12/2/2022     Procedure: Left thumb Trigger  release;  Surgeon: Cristhian Wilkinson MD;  Location: [facility] OR     SALPINGO-OOPHORECTOMY BILATERAL      age 27,Bilateral salpingo-oophorectomy     TONSILLECTOMY, ADENOIDECTOMY, COMBINED      childhood       Medications:  Current Facility-Administered Medications   Medication     HYDROcodone-acetaminophen (NORCO) 5-325 MG per tablet 2 tablet     Current Outpatient Medications   Medication     isosorbide mononitrate (IMDUR) 30 MG 24 hr tablet     albuterol (PROAIR HFA/PROVENTIL HFA/VENTOLIN HFA) 108 (90 Base) MCG/ACT inhaler     amLODIPine (NORVASC) 10 MG tablet     busPIRone (BUSPAR) 15 MG tablet     clonazePAM (KLONOPIN) 1 MG tablet     clopidogrel (PLAVIX) 75 MG tablet     diclofenac (VOLTAREN) 1 % topical gel     HYDROcodone-acetaminophen (NORCO)  MG per tablet     HYDROcodone-acetaminophen (NORCO)  MG per tablet     lisinopril (ZESTRIL) 20 MG tablet     metoprolol succinate ER (TOPROL XL) 25 MG 24 hr tablet     naloxone (NARCAN) 4 MG/0.1ML nasal spray     nitroGLYcerin (NITROLINGUAL) 0.4 MG/SPRAY spray     ondansetron (ZOFRAN) 4 MG tablet     pantoprazole (PROTONIX) 40 MG EC tablet     RESTASIS 0.05 % ophthalmic emulsion     rimegepant (NURTEC) 75 MG ODT tablet     rosuvastatin (CRESTOR) 20 MG tablet     sucralfate (CARAFATE) 1 GM tablet     VITAMIN D, CHOLECALCIFEROL, PO     vortioxetine (TRINTELLIX) 20 MG tablet     XARELTO ANTICOAGULANT 20 MG TABS tablet       Allergies:  Atorvastatin, Tiotropium bromide [tiotropium], Advil [ibuprofen], Ezetimibe, Latex, Niacin, No clinical screening - see comments, and Tape [adhesive tape]    Relevant labs, images, EKGs, Epic and outside hospital (if applicable) charts were reviewed. The findings, diagnosis, plan, and need for follow up were discussed with the patient/family. Nursing notes were reviewed.      Cristhian Cotton MD  04/15/23 9808

## 2023-04-15 NOTE — ED TRIAGE NOTES
Pt arrived to ER with her  with worsening symptoms that she has had for the past 2 months.  Pt states she has been having chest pressure and SOB for two months that she feels is worse today.  Pt has history of 8 MI's, 17 stents, blood clots in lungs and triple bypass.  Pt states that she feels more dizzy and fatigue today compared to usual.  Took X2 nitroglycerin tabs this am around 0700, effective for a couple hours pt states.      Triage Assessment     Row Name 04/15/23 1132       Triage Assessment (Adult)    Airway WDL WDL       Respiratory WDL    Respiratory WDL WDL       Skin Circulation/Temperature WDL    Skin Circulation/Temperature WDL WDL       Cardiac WDL    Cardiac WDL WDL       Peripheral/Neurovascular WDL    Peripheral Neurovascular WDL WDL       Cognitive/Neuro/Behavioral WDL    Cognitive/Neuro/Behavioral WDL WDL

## 2023-04-15 NOTE — DISCHARGE INSTRUCTIONS
You were seen today for evaluation of recurrent chest pain.  After discussion with one of the cardiologist at [facility] where your last cath was completed, they would like you to start taking Imdur once again and follow-up closely with cardiology.  They do not feel that hospitalization is indicated right now but if your symptoms worsen, please do return to the ER for evaluation.

## 2023-04-19 LAB
ATRIAL RATE - MUSE: 60 BPM
DIASTOLIC BLOOD PRESSURE - MUSE: NORMAL MMHG
INTERPRETATION ECG - MUSE: NORMAL
P AXIS - MUSE: 58 DEGREES
PR INTERVAL - MUSE: 166 MS
QRS DURATION - MUSE: 84 MS
QT - MUSE: 436 MS
QTC - MUSE: 436 MS
R AXIS - MUSE: 31 DEGREES
SYSTOLIC BLOOD PRESSURE - MUSE: NORMAL MMHG
T AXIS - MUSE: 57 DEGREES
VENTRICULAR RATE- MUSE: 60 BPM

## 2023-05-07 ASSESSMENT — ANXIETY QUESTIONNAIRES
GAD7 TOTAL SCORE: 10
GAD7 TOTAL SCORE: 10
6. BECOMING EASILY ANNOYED OR IRRITABLE: MORE THAN HALF THE DAYS
7. FEELING AFRAID AS IF SOMETHING AWFUL MIGHT HAPPEN: NOT AT ALL
GAD7 TOTAL SCORE: 10
5. BEING SO RESTLESS THAT IT IS HARD TO SIT STILL: NOT AT ALL
1. FEELING NERVOUS, ANXIOUS, OR ON EDGE: MORE THAN HALF THE DAYS
3. WORRYING TOO MUCH ABOUT DIFFERENT THINGS: MORE THAN HALF THE DAYS
7. FEELING AFRAID AS IF SOMETHING AWFUL MIGHT HAPPEN: NOT AT ALL
2. NOT BEING ABLE TO STOP OR CONTROL WORRYING: MORE THAN HALF THE DAYS
4. TROUBLE RELAXING: MORE THAN HALF THE DAYS

## 2023-05-07 ASSESSMENT — ASTHMA QUESTIONNAIRES
QUESTION_5 LAST FOUR WEEKS HOW WOULD YOU RATE YOUR ASTHMA CONTROL: COMPLETELY CONTROLLED
QUESTION_3 LAST FOUR WEEKS HOW OFTEN DID YOUR ASTHMA SYMPTOMS (WHEEZING, COUGHING, SHORTNESS OF BREATH, CHEST TIGHTNESS OR PAIN) WAKE YOU UP AT NIGHT OR EARLIER THAN USUAL IN THE MORNING: NOT AT ALL
QUESTION_1 LAST FOUR WEEKS HOW MUCH OF THE TIME DID YOUR ASTHMA KEEP YOU FROM GETTING AS MUCH DONE AT WORK, SCHOOL OR AT HOME: NONE OF THE TIME
ACT_TOTALSCORE: 25
ACT_TOTALSCORE: 25
QUESTION_2 LAST FOUR WEEKS HOW OFTEN HAVE YOU HAD SHORTNESS OF BREATH: NOT AT ALL
QUESTION_4 LAST FOUR WEEKS HOW OFTEN HAVE YOU USED YOUR RESCUE INHALER OR NEBULIZER MEDICATION (SUCH AS ALBUTEROL): NOT AT ALL

## 2023-05-08 ENCOUNTER — OFFICE VISIT (OUTPATIENT)
Dept: FAMILY MEDICINE | Facility: OTHER | Age: 68
End: 2023-05-08
Attending: FAMILY MEDICINE
Payer: COMMERCIAL

## 2023-05-08 VITALS
OXYGEN SATURATION: 97 % | RESPIRATION RATE: 16 BRPM | WEIGHT: 175 LBS | TEMPERATURE: 98 F | HEART RATE: 60 BPM | BODY MASS INDEX: 28.68 KG/M2 | DIASTOLIC BLOOD PRESSURE: 84 MMHG | SYSTOLIC BLOOD PRESSURE: 128 MMHG

## 2023-05-08 DIAGNOSIS — I10 ESSENTIAL HYPERTENSION: ICD-10-CM

## 2023-05-08 DIAGNOSIS — G89.29 CHEST WALL PAIN, CHRONIC: ICD-10-CM

## 2023-05-08 DIAGNOSIS — I47.10 PAROXYSMAL SUPRAVENTRICULAR TACHYCARDIA (H): ICD-10-CM

## 2023-05-08 DIAGNOSIS — M54.50 CHRONIC BILATERAL LOW BACK PAIN WITHOUT SCIATICA: ICD-10-CM

## 2023-05-08 DIAGNOSIS — I25.9 CHRONIC ISCHEMIC HEART DISEASE: ICD-10-CM

## 2023-05-08 DIAGNOSIS — R06.09 DOE (DYSPNEA ON EXERTION): ICD-10-CM

## 2023-05-08 DIAGNOSIS — Z79.899 CONTROLLED SUBSTANCE AGREEMENT SIGNED: ICD-10-CM

## 2023-05-08 DIAGNOSIS — G89.4 CHRONIC PAIN DISORDER: Primary | ICD-10-CM

## 2023-05-08 DIAGNOSIS — R07.89 CHEST WALL PAIN, CHRONIC: ICD-10-CM

## 2023-05-08 DIAGNOSIS — I25.708 ATHEROSCLEROSIS OF CORONARY ARTERY BYPASS GRAFT OF NATIVE HEART WITH STABLE ANGINA PECTORIS (H): ICD-10-CM

## 2023-05-08 DIAGNOSIS — G89.29 CHRONIC BILATERAL LOW BACK PAIN WITHOUT SCIATICA: ICD-10-CM

## 2023-05-08 PROBLEM — J45.909 UNCOMPLICATED ASTHMA, UNSPECIFIED ASTHMA SEVERITY, UNSPECIFIED WHETHER PERSISTENT: Status: RESOLVED | Noted: 2021-11-16 | Resolved: 2023-05-08

## 2023-05-08 PROCEDURE — 99214 OFFICE O/P EST MOD 30 MIN: CPT | Performed by: FAMILY MEDICINE

## 2023-05-08 PROCEDURE — G0463 HOSPITAL OUTPT CLINIC VISIT: HCPCS | Performed by: FAMILY MEDICINE

## 2023-05-08 RX ORDER — HYDROCODONE BITARTRATE AND ACETAMINOPHEN 10; 325 MG/1; MG/1
1-2 TABLET ORAL EVERY 4 HOURS PRN
Qty: 180 TABLET | Refills: 0 | Status: SHIPPED | OUTPATIENT
Start: 2023-06-07 | End: 2023-07-07

## 2023-05-08 RX ORDER — LISINOPRIL 10 MG/1
10 TABLET ORAL DAILY
Qty: 90 TABLET | Refills: 4 | Status: SHIPPED | OUTPATIENT
Start: 2023-05-08 | End: 2024-03-05

## 2023-05-08 RX ORDER — HYDROCODONE BITARTRATE AND ACETAMINOPHEN 10; 325 MG/1; MG/1
1-2 TABLET ORAL EVERY 4 HOURS PRN
Qty: 180 TABLET | Refills: 0 | Status: SHIPPED | OUTPATIENT
Start: 2023-05-08 | End: 2023-07-07

## 2023-05-08 RX ORDER — METOPROLOL SUCCINATE 25 MG/1
25 TABLET, EXTENDED RELEASE ORAL DAILY
Qty: 90 TABLET | Refills: 1 | Status: SHIPPED | OUTPATIENT
Start: 2023-05-08 | End: 2023-07-07

## 2023-05-08 ASSESSMENT — PATIENT HEALTH QUESTIONNAIRE - PHQ9
SUM OF ALL RESPONSES TO PHQ QUESTIONS 1-9: 9
10. IF YOU CHECKED OFF ANY PROBLEMS, HOW DIFFICULT HAVE THESE PROBLEMS MADE IT FOR YOU TO DO YOUR WORK, TAKE CARE OF THINGS AT HOME, OR GET ALONG WITH OTHER PEOPLE: SOMEWHAT DIFFICULT

## 2023-05-08 ASSESSMENT — ANXIETY QUESTIONNAIRES: GAD7 TOTAL SCORE: 10

## 2023-05-08 ASSESSMENT — PAIN SCALES - GENERAL: PAINLEVEL: MODERATE PAIN (5)

## 2023-05-08 NOTE — NURSING NOTE
"Patient presents to the clinic for controlled medication refills.    FOOD SECURITY SCREENING QUESTIONS:    The next two questions are to help us understand your food security.  If you are feeling you need any assistance in this area, we have resources available to support you today.    Hunger Vital Signs:  Within the past 12 months we worried whether our food would run out before we got money to buy more. Never  Within the past 12 months the food we bought just didn't last and we didn't have money to get more. Never    Advance Care Directive on file? no  Advance Care Directive provided to patient? Declined.      Chief Complaint   Patient presents with     Recheck Medication       Initial /84 (BP Location: Right arm, Patient Position: Sitting, Cuff Size: Adult Large)   Pulse 60   Temp 98  F (36.7  C) (Tympanic)   Resp 16   Wt 79.4 kg (175 lb)   LMP 04/29/1978 (Approximate)   SpO2 97%   BMI 28.68 kg/m   Estimated body mass index is 28.68 kg/m  as calculated from the following:    Height as of 4/15/23: 1.664 m (5' 5.5\").    Weight as of this encounter: 79.4 kg (175 lb).  Medication Reconciliation: complete        Taylor Hill LPN       "

## 2023-05-08 NOTE — PROGRESS NOTES
Assessment & Plan       ICD-10-CM    1. Chronic pain disorder  G89.4 HYDROcodone-acetaminophen (NORCO)  MG per tablet     HYDROcodone-acetaminophen (NORCO)  MG per tablet      2. Chest wall pain, chronic  R07.89 HYDROcodone-acetaminophen (NORCO)  MG per tablet    G89.29 HYDROcodone-acetaminophen (NORCO)  MG per tablet      3. Chronic bilateral low back pain without sciatica  M54.50 HYDROcodone-acetaminophen (NORCO)  MG per tablet    G89.29 HYDROcodone-acetaminophen (NORCO)  MG per tablet      4. Controlled substance agreement signed  Z79.899 HYDROcodone-acetaminophen (NORCO)  MG per tablet     HYDROcodone-acetaminophen (NORCO)  MG per tablet      5. Atherosclerosis of coronary artery bypass graft of native heart with stable angina pectoris (H)  I25.708 metoprolol succinate ER (TOPROL XL) 25 MG 24 hr tablet     Adult Cardiology Eval  Referral      6. Essential hypertension  I10 lisinopril (ZESTRIL) 10 MG tablet      7. CORTEZ (dyspnea on exertion)  R06.09 lisinopril (ZESTRIL) 10 MG tablet      8. Chronic ischemic heart disease  I25.9 lisinopril (ZESTRIL) 10 MG tablet     Adult Cardiology Eval  Referral      9. Paroxysmal supraventricular tachycardia (H)  I47.1 metoprolol succinate ER (TOPROL XL) 25 MG 24 hr tablet     Adult Cardiology Eval  Referral        Chronic chest wall pain in the setting of repeated stenting and bypass for CAD. She has been on opioids in the past to reduce ED visits. Also has chronic LBP and due to anticoagulation, injections have been only pursued when absolutely needed. With higher opioid doses had side effects. Stopped in the past with decline in function. Current dosing balance of benefit and minimal side effects. Refilled hydrocodone 10/325 mg taking 6 per day for 2 prescription of 1 month for a 2 month supply.  PDMP Review       Value Time User    State PDMP site checked  Yes 5/8/2023 10:58 AM Ramirez Cano MD          Complex cardiac history with chronic angina.  She is recent in the ED and started on Imdur for angina.  This caused a significant headache.  She felt quite poorly and discontinued it.  Intolerant of Ranexa as well.  Already on amlodipine and metoprolol.  She is wondering about increasing metoprolol.  This was discontinued in the past due to dizziness.  She prefers dizziness over the headache.  Is possible dizziness was due to orthostasis after further discussion.  Reduce lisinopril from 20 down to 10 mg daily.  Increase metoprolol from 12.5 up to 25 mg daily.  Track blood pressure and pulse at home.  Referral placed back to cardiology, she has seen Dr. Gramajo in the past.    Return in about 2 months (around 7/8/2023).    Ramirez Cano MD  [facility]    Subjective   Malina is a 68 year old, presenting for the following health issues:  Recheck Medication        5/8/2023    10:21 AM   Additional Questions   Roomed by andrey contreras lpn   Accompanied by -         5/8/2023    10:21 AM   Patient Reported Additional Medications   Patient reports taking the following new medications -     History of Present Illness       Reason for visit:  Med Check.    She eats 0-1 servings of fruits and vegetables daily.She consumes 0 sweetened beverage(s) daily.She exercises with enough effort to increase her heart rate 10 to 19 minutes per day.  She exercises with enough effort to increase her heart rate 3 or less days per week.   She is taking medications regularly.    Today's PHQ-9         PHQ-9 Total Score: 9    PHQ-9 Q9 Thoughts of better off dead/self-harm past 2 weeks :   Not at all    How difficult have these problems made it for you to do your work, take care of things at home, or get along with other people: Somewhat difficult  Today's YAYA-7 Score: 10     She received Imdur in the ED 4/15  Feels it caused her to have more headache and fatigue, so she stopped  Nitroglycerin helps breathing and chest  pain    Stopped metoprolol due to dizziness in Aug 2021. It seemed to help, but due to tachycardia episodes I had her restart metoprolol in March at 12.5 mg daily. She's wondering about increasing        Review of Systems   As above      Objective    /84 (BP Location: Right arm, Patient Position: Sitting, Cuff Size: Adult Large)   Pulse 60   Temp 98  F (36.7  C) (Tympanic)   Resp 16   Wt 79.4 kg (175 lb)   LMP 04/29/1978 (Approximate)   SpO2 97%   BMI 28.68 kg/m    Body mass index is 28.68 kg/m .  Physical Exam   General Appearance: Alert. No acute distress  Psychiatric: Normal affect and mentation

## 2023-05-08 NOTE — PATIENT INSTRUCTIONS
Reduce lisinopril to 10 mg daily. Half of your current pill. I sent a new prescription for 10 mg to the pharmacy  Increase metoprolol to 25 mg daily  Track your pulse at home along with blood pressure daily  If you feel dizzy, then check your pulse

## 2023-05-21 DIAGNOSIS — F33.1 MODERATE EPISODE OF RECURRENT MAJOR DEPRESSIVE DISORDER (H): ICD-10-CM

## 2023-05-24 RX ORDER — VORTIOXETINE 20 MG/1
TABLET, FILM COATED ORAL
Qty: 90 TABLET | Refills: 4 | Status: SHIPPED | OUTPATIENT
Start: 2023-05-24 | End: 2024-07-29

## 2023-05-24 NOTE — TELEPHONE ENCOUNTER
[facility] [id number] IN [employer]  sent Rx request for the following:      Requested Prescriptions   Pending Prescriptions Disp Refills     TRINTELLIX 20 MG tablet [Pharmacy Med Name: TRINTELLIX 20 MG TABLET] 90 tablet 4     Sig: TAKE 1 TABLET BY MOUTH EVERY DAY       SSRIs Protocol Failed - 5/21/2023  6:31 PM        Failed - PHQ-9 score less than 5 in past 6 months     Please review last PHQ-9 score.        Last Prescription Date:   04/29/22  Last Fill Qty/Refills:         90, R-4  Last Office Visit:              05/08/23   Future Office visit:             Next 5 appointments (look out 90 days)    Jun 07, 2023  9:20 AM  SHORT with Ramirez Cano MD  [facility] ([facility] ) [address]  [place] [address]  [phone]   Jul 13, 2023  2:45 PM  Return Visit with Paul Gramajo DO  [facility] ([facility] ) [address]  [place] [address]  [phone]        Danni Bernardo RN on 5/24/2023 at 10:44 AM

## 2023-07-05 ENCOUNTER — NURSE TRIAGE (OUTPATIENT)
Dept: FAMILY MEDICINE | Facility: OTHER | Age: 68
End: 2023-07-05
Payer: COMMERCIAL

## 2023-07-05 ENCOUNTER — TELEPHONE (OUTPATIENT)
Dept: FAMILY MEDICINE | Facility: OTHER | Age: 68
End: 2023-07-05
Payer: COMMERCIAL

## 2023-07-05 NOTE — TELEPHONE ENCOUNTER
Patient has a scheduled appointment with Sheri GONCALVES on 7-7-23.  Triage note started about BP readings.      Taylor Hill LPN 7/5/2023 10:39 AM

## 2023-07-05 NOTE — TELEPHONE ENCOUNTER
S-(situation): Blood pressures 7/3: 98/65 to 114/63 Reports her blood pressures are fluctuating. Reports that she needs an appointment with Dr. Cano regarding having her hydrocodone filled.     B-(background): History of Hypertension, intractable pain, Subclavian stenosis, chronic ischemic heart disease.     A-(assessment): Denies issues with blood pressure at this time, has had them in the past. Will be out of Hydrocodone on Friday and needs and appointment. Dr. Cano will be out of the office and will have no openings upon his return.  Has an appointment set up with Dr. Gramajo on 7/13/23.     R-(recommendations): Make an appointment with another provider. Appointment was made with Sheri Cordon NP. Celia Bautista RN on 7/5/2023 at 10:45 AM

## 2023-07-05 NOTE — TELEPHONE ENCOUNTER
Reason for call: Patient wanting a work in appointment.    Is the appointment for a Hospital Follow up?  No     (If yes - Unable to find an appointment with any provider during the time frame needed. Nurse/Provider - Can this patient be worked into a schedule with PCP or team member?)    Patient is having the following symptoms:  Medication check    The patient is requesting an appointment with  [facility]    Was an appointment offered for this call? Yes    If Yes, what is the date of the appointment?  7/24     Preferred method for responding to this message: Telephone Call    Phone number patient can be reached at? Home number on file [phone] (home)    If we can't reach you directly, may we leave a detailed response at the number you provided? Yes    Can this message wait until your PCP/provider returns if unavailable today? No

## 2023-07-05 NOTE — TELEPHONE ENCOUNTER
Patient confirms that she has an appointment this Friday with Sheri GONCALVES.      Taylor Hill LPN 7/5/2023 1:17 PM

## 2023-07-06 ASSESSMENT — PATIENT HEALTH QUESTIONNAIRE - PHQ9
SUM OF ALL RESPONSES TO PHQ QUESTIONS 1-9: 11
SUM OF ALL RESPONSES TO PHQ QUESTIONS 1-9: 11
10. IF YOU CHECKED OFF ANY PROBLEMS, HOW DIFFICULT HAVE THESE PROBLEMS MADE IT FOR YOU TO DO YOUR WORK, TAKE CARE OF THINGS AT HOME, OR GET ALONG WITH OTHER PEOPLE: SOMEWHAT DIFFICULT

## 2023-07-07 ENCOUNTER — OFFICE VISIT (OUTPATIENT)
Dept: FAMILY MEDICINE | Facility: OTHER | Age: 68
End: 2023-07-07
Attending: PHYSICIAN ASSISTANT
Payer: COMMERCIAL

## 2023-07-07 VITALS
SYSTOLIC BLOOD PRESSURE: 130 MMHG | HEIGHT: 66 IN | RESPIRATION RATE: 15 BRPM | OXYGEN SATURATION: 97 % | BODY MASS INDEX: 27.51 KG/M2 | HEART RATE: 82 BPM | TEMPERATURE: 97.6 F | WEIGHT: 171.2 LBS | DIASTOLIC BLOOD PRESSURE: 72 MMHG

## 2023-07-07 DIAGNOSIS — Z79.899 CONTROLLED SUBSTANCE AGREEMENT SIGNED: Primary | ICD-10-CM

## 2023-07-07 DIAGNOSIS — M54.50 CHRONIC BILATERAL LOW BACK PAIN WITHOUT SCIATICA: ICD-10-CM

## 2023-07-07 DIAGNOSIS — G89.29 CHEST WALL PAIN, CHRONIC: ICD-10-CM

## 2023-07-07 DIAGNOSIS — R07.89 CHEST WALL PAIN, CHRONIC: ICD-10-CM

## 2023-07-07 DIAGNOSIS — G89.29 CHRONIC BILATERAL LOW BACK PAIN WITHOUT SCIATICA: ICD-10-CM

## 2023-07-07 DIAGNOSIS — G89.4 CHRONIC PAIN DISORDER: ICD-10-CM

## 2023-07-07 PROCEDURE — G0463 HOSPITAL OUTPT CLINIC VISIT: HCPCS

## 2023-07-07 PROCEDURE — 99214 OFFICE O/P EST MOD 30 MIN: CPT | Performed by: PHYSICIAN ASSISTANT

## 2023-07-07 RX ORDER — HYDROCODONE BITARTRATE AND ACETAMINOPHEN 10; 325 MG/1; MG/1
1-2 TABLET ORAL EVERY 4 HOURS PRN
Qty: 180 TABLET | Refills: 0 | Status: SHIPPED | OUTPATIENT
Start: 2023-08-06 | End: 2023-09-05

## 2023-07-07 RX ORDER — HYDROCODONE BITARTRATE AND ACETAMINOPHEN 10; 325 MG/1; MG/1
1-2 TABLET ORAL EVERY 4 HOURS PRN
Qty: 180 TABLET | Refills: 0 | Status: SHIPPED | OUTPATIENT
Start: 2023-07-07 | End: 2023-08-06

## 2023-07-07 ASSESSMENT — PATIENT HEALTH QUESTIONNAIRE - PHQ9
10. IF YOU CHECKED OFF ANY PROBLEMS, HOW DIFFICULT HAVE THESE PROBLEMS MADE IT FOR YOU TO DO YOUR WORK, TAKE CARE OF THINGS AT HOME, OR GET ALONG WITH OTHER PEOPLE: SOMEWHAT DIFFICULT
SUM OF ALL RESPONSES TO PHQ QUESTIONS 1-9: 11

## 2023-07-07 ASSESSMENT — PAIN SCALES - GENERAL: PAINLEVEL: SEVERE PAIN (6)

## 2023-07-07 NOTE — NURSING NOTE
"Chief Complaint   Patient presents with     Recheck Medication       Initial /72   Pulse 82   Temp 97.6  F (36.4  C) (Tympanic)   Resp 15   Ht 1.664 m (5' 5.5\")   Wt 77.7 kg (171 lb 3.2 oz)   LMP 04/29/1978 (Approximate)   SpO2 97%   BMI 28.06 kg/m   Estimated body mass index is 28.06 kg/m  as calculated from the following:    Height as of this encounter: 1.664 m (5' 5.5\").    Weight as of this encounter: 77.7 kg (171 lb 3.2 oz).  Medication Reconciliation: complete    FOOD SECURITY SCREENING QUESTIONS  Hunger Vital Signs:  Within the past 12 months we worried whether our food would run out before we got money to buy more. Never  Within the past 12 months the food we bought just didn't last and we didn't have money to get more. Never  Inga Johnson LPN 7/7/2023 10:16 AM      "

## 2023-07-07 NOTE — PROGRESS NOTES
"  Assessment & Plan     1. Controlled substance agreement signed  2. Chronic pain disorder  3. Chest wall pain, chronic  4. Chronic bilateral low back pain without sciatica  - HYDROcodone-acetaminophen (NORCO)  MG per tablet; Take 1-2 tablets by mouth every 4 hours as needed for severe pain 6 per day  Dispense: 180 tablet; Refill: 0  - HYDROcodone-acetaminophen (NORCO)  MG per tablet; Take 1-2 tablets by mouth every 4 hours as needed for severe pain 6 per day  Dispense: 180 tablet; Refill: 0  -Vital signs are stable, blood pressure 130/72, pulse of 82, afebrile temperature 97.6  F.  She does appear in discomfort due to her pain.  No acute findings on examination today.  CSA and UDS are up-to-date.  PDMP reviewed.     Maintenance of person-centered care plan - goals, personal strengths, clinical needs, desired outcomes: Ongoing.   How is patient functioning/improving/not improving with the current pain protocol / RX:   - What is patient unable to do or has difficulty completing because of pain: Stairs, walking, bending, twisting, lifting  - How has the current pain protocol / RX helped: Allowed her to complete or activities of daily living.  Adequate pain management helps to improve quality of life and performing ADLs independently.   - Encouraged regular stretching, walking, exercise. Healthy meals and diet.     Facilitation and coordination of any necessary behavorial health treatment: Ongoing.   - Encouraged counseling and behavorial health management to help with chronic pain, and if any variable anxiety / depression symptoms develop.     Communication and care coordination between relevant practitioners furnishing care (PT/OT, complementary and integrative approaches, community-based care): Ongoing.     BMI:   Estimated body mass index is 28.06 kg/m  as calculated from the following:    Height as of this encounter: 1.664 m (5' 5.5\").    Weight as of this encounter: 77.7 kg (171 lb 3.2 oz).   Weight " management plan: Discussed healthy diet and exercise guidelines    See Patient Instructions    Return in about 8 weeks (around 9/1/2023) for Medication follow up.    Sheri Cordon PA-C  [facility]    Subjective   Malina is a 68 year old, presenting for the following health issues:  Recheck Medication        7/7/2023    10:13 AM   Additional Questions   Roomed by MAULIK Xiong   Accompanied by Self         7/7/2023    10:13 AM   Patient Reported Additional Medications   Patient reports taking the following new medications N/A     History of Present Illness       Reason for visit:  Med Check    She eats 0-1 servings of fruits and vegetables daily.She consumes 1 sweetened beverage(s) daily.She exercises with enough effort to increase her heart rate 9 or less minutes per day.  She exercises with enough effort to increase her heart rate 3 or less days per week.   She is taking medications regularly.    Today's PHQ-9         PHQ-9 Total Score: 11    PHQ-9 Q9 Thoughts of better off dead/self-harm past 2 weeks :   Not at all    How difficult have these problems made it for you to do your work, take care of things at home, or get along with other people: Somewhat difficult       Pain History:  When did you first notice your pain? Chronic   Have you seen this provider for your pain in the past?   Yes   Where in your body do you have pain? Knees  Are you seeing anyone else for your pain? Yes -         3/5/2023    10:52 AM 5/7/2023     9:09 AM 7/6/2023     9:23 AM   PHQ-9 SCORE   PHQ-9 Total Score MyChart 7 (Mild depression) 9 (Mild depression) 11 (Moderate depression)   PHQ-9 Total Score 7 9 11         2/2/2023     2:25 PM 3/5/2023    10:53 AM 5/7/2023     9:11 AM   YAYA-7 SCORE   Total Score 14 (moderate anxiety) 5 (mild anxiety) 10 (moderate anxiety)   Total Score 14 5 10         3/5/2023    10:52 AM 5/7/2023     9:09 AM 7/6/2023     9:23 AM   PHQ-9 SCORE   PHQ-9 Total Score MyChart 7 (Mild  "depression) 9 (Mild depression) 11 (Moderate depression)   PHQ-9 Total Score 7 9 11     Chronic Pain Follow Up:    Location of pain: knee, hip, shoulders, neck, back  Analgesia/pain control:    - Recent changes:  Worsened with weather changes    - Overall control: Tolerable with discomfort    - Current treatments: Hydrocodone 1-2 tablets every 4 hours for pain   Adherence:     - Do you ever take more pain medicine than prescribed? No    - When did you take your last dose of pain medicine?  She has 4 tablets left   Adverse effects: No   PDMP Review       Value Time User    State PDMP site checked  Yes 7/7/2023 10:27 AM Sheri Cordon PA-C        Last CSA Agreement:   CSA -- Patient Level:     [Media Unavailable] Controlled Substance Agreement - Opioid - Scan on 9/7/2022 11:36 AM   [Media Unavailable] Controlled Substance Agreement - Opioid - Scan on 9/29/2021  2:26 PM       Last UDS: 10/13/2022           No data to display              Low Risk (0-3)  Moderate Risk (4-7)  High Risk (>8)    Review of Systems   Constitutional, HEENT, cardiovascular, pulmonary, GI, , musculoskeletal, neuro, skin, endocrine and psych systems are negative, except as otherwise noted.      Objective    /72   Pulse 82   Temp 97.6  F (36.4  C) (Tympanic)   Resp 15   Ht 1.664 m (5' 5.5\")   Wt 77.7 kg (171 lb 3.2 oz)   LMP 04/29/1978 (Approximate)   SpO2 97%   BMI 28.06 kg/m    Body mass index is 28.06 kg/m .  Physical Exam   GENERAL: healthy, alert and no distress  RESP: lungs clear to auscultation - no rales, rhonchi or wheezes  CV: regular rate and rhythm, normal S1 S2, no S3 or S4, no murmur, click or rub, no peripheral edema and peripheral pulses strong  ABDOMEN: soft, nontender, no hepatosplenomegaly, no masses and bowel sounds normal  MS: no gross musculoskeletal defects noted, no edema  SKIN: no suspicious lesions or rashes  PSYCH: mentation appears normal, affect normal/bright    No results found for any visits on " 07/07/23.

## 2023-07-12 NOTE — PROGRESS NOTES
[facility] HEART CARE   CARDIOLOGY PROGRESS NOTE     Chief Complaint   Patient presents with     Follow Up     Over one year visit for Chronic Ischemic Heart Disease. Last seenon 1/11/2022           Diagnosis:  1. Cath on 9/25/17. [facility], stable dz. LIMA but occluded RAFI/SVG.  2. CORTEZ.  3. CABG x 3 on 2/12/2003 with LIMA to LAD, RAFI to RCA, and SVG to OM.    4.  Palpitations.  SVE/VE on Zio from 3/27/2023.  5. PE on 1/2/20.  RLL, MICHELLE and LLL.  6. COPD-unknown severity. PFT on 12/8/21.  7. HTN-controlled  8. Lightheadedness-episodic.  9. H/O coronary angiogram (2017)  10. Iron deficiency anemia-resolved.  11.  CVA-Mild chronic ischemic cerebral disease on 8/20/2021.  12. Left-sided subclavian steal syndrome. Stenting with patency as noted on 9/15/17.  13.  Tobacco abuse, quitting 8/23/17.   14.  H/O alcohol abuse.  15.  Hyperlipidemia-controlled.  16.  Bradycardia with the slowest heart rate of 50 bpm on 7/30/2021.  17.  Dizziness/lightheadedness secondary to dehydration.  18.  B12 deficiency 313 on 3/18/2020.  19.  Vitamin D deficiency.  20.  CKD-3.       Assessment/Plan:    1.  Patient describing dyspnea exertion, palpitations, weakness, and other symptoms.  She has a rather significant history of anxiety.  She describes a history of PTSD.  This seems very possible that her symptoms she is having are common her anxiety.  She does better when on 3-4 times a day Klonopin.  Will increase BuSpar 15 mg twice a day to 30 mg twice a day.  I believe this is a big part of the symptoms she is having.  However, out of abundance of caution, we will plan work-up as outlined below.  2.  Chest discomfort: Reports having had a cath at [facility] in fall 2022.  Do not have this report.  Patient states there was no stents placed.  As a result, no stress test ordered.    3.  Refill Plavix, sublingual nitro as needed for chest pain, Protonix 40 mg daily, Xarelto 20 mg daily, and Crestor 20 mg daily.  4.  Related to palpitations,  hypertension, and anxiety, will start on propanolol 40 mg twice a day.  5.  CORTEZ: Is dyspneic on exertion.  Will obtain labs, ABG, echo, chest x-ray, and propranolol.  Have encouraged activity.  5.  Follow-up after completion of echo, chest x-ray, labs, and discuss changes to BuSpar and initiation of propranolol.          Interval history:  Malina continues to struggle.  She was last seen by myself on 1/11/2022.  I believe she is having similar symptoms for which she was evaluated in the past.  She describes herself having significant anxiety.  She has a history of PTSD per personal report.  I am concerned that the symptoms she is having are from anxiety/stress.  She has a lot of symptoms that are hard to fit in 1 box.  She describes shortness of breath, palpitations, weakness, different discomforts and other symptoms.  I explained to her that she has had a rather substantial work-up in the past.  Work-up has included an unremarkable chest x-ray on 4/15/2023.  CT scan on 10/3/2022 that shows possible constipation.  I had ordered a VQ scan on 11/23/2021 that showed no evidence of PE.  MRI of brain showing possible small vessel disease. MCOT on 7/30/2021 showing only PVCs and PACs.  Zio patch on 12/8/2021 and 3/27/2023 showing SVE, VE, and short runs of SVT.  Negative stress test on 12/16/2019 and 9/15/2021.  Normal ABIs on 10/31/2016 and 7/17/2020.  Again, she has many complaints.  Her symptoms seem to improve with her benzodiazepine.  She has significant anxiety with self-reported PTSD.  I think a lot of her symptoms are coming from stress and anxiety.  However, I suggest that she have work-up today which include labs, chest x-ray, labs, echo, increasing BuSpar from 15 mg twice a day to 30 mg twice a day and propranolol 40 mg twice a day for anxiety, hypertension, and palpitations.  She will follow-up after completion of testing.    HPI:    Ms. Day is being seen by cardiology in follow-up.  Patient has a history of  "chronic stable angina, known ischemic heart disease, hypertension, hyperlipidemia, history of pulmonary embolism, left subclavian artery stenosis, anxiety, collagenous colitis, depression, osteoarthritis, history of tobacco use, central sleep apnea for which she is not compliant with CPAP use, iron deficiency anemia, CKD, myofascial pain, vitamin D deficiency, lumbar facet arthropathy, history of gastric ulcer with hemorrhage, COPD and peptic ulcer disease.    Malina continues to endorse dizziness.  She states when she gets up she starts to feel dizzy like being off balance, will have a headache, her heart will pound and pulse increase.  She will be short of breath.  At times she has chest discomfort.  This has been going on for 1 to 2 years.      She was seen in the ER and admitted for a day on 7/16/2021.  She was felt to have symptomatic bradycardia/hypotension.  She felt \"woozy\" with a blood pressure of 77/52 at home.  She reports getting fluids and feeling much better, resolution of symptoms.  She felt her symptoms had gotten worse over the last 2 to 3 weeks.  Heart rates were noted to be between 49-52 in the ER.  Troponin negative and EKG showed heart rates in the 50's.    She was seen back in the ED on 7/22/2021 for lightheadedness once again.  She actually fell and was having concerning pain in her lungs.  She has a history of a PE and is on Xarelto.  No identifiable cause was found.  There was concern that her symptoms may be anxiety related.    Today, she reports continuing to feel these symptoms of dizziness.  She has a hard time describing them.  She feels her heart pounding, she has a headache, she is dizzy, she gets chest pain.  She also continues to be dyspneic on exertion.  She has been using nitro regularly with some relief.  She has a history of asthma and suspected COPD.  She has seen relief with nitro as well as the albuterol.  She was on Symbicort in the past with benefit but was discontinued for " unknown reasons.  She drinks 2 glasses of water and 3 cups of coffee a day.  I believe she is chronically dehydrated which plays a part in her symptoms.  She has tried to increase her fluid intake.  She is to double and preferably triple her fluid intake.  She should cut back/discontinue coffee intake.  With symptoms of chest pain, shortness of breath, and history of blood clot, will plan for VQ scan.  We will also get a chest x-ray.  She describes regular palpitations and acceleration in her heart rates.  We will plan for Zio patch.  She did have a MCOT on 7/30/2021.  Both asymptomatic and symptomatic events include sinus bradycardia, sinus rhythm, sinus tachycardia, occasional PVC's and PAC's.  No other significant rhythms were identified.  We discussed Eliquis versus Xarelto.  It was decided we switched to Eliquis as I seen less bleeding on this medication compared to Xarelto.  Related to shortness of breath, history of asthma, and presumptive COPD, referral was placed to pulmonary.  Had intended to prescribe Spiriva but she is allergic and this was not prescribed.  Patient also describes weakness.  She has been using nitro with resolution of her chest pain.  She does have a history of bypass but had a cath in 2017 with only mild disease followed.  I am concerned at this point.  I am concerned that her chest discomfort is more of a lung issue than a heart issue.     Patient has a known history of ischemic heart disease, she underwent  coronary angiogram and bypass angiogram on 9/15/2017 which was described as having stable disease. Mild to moderate 3 vessel CAD involving RCA, LAD and LCX with <50% stenosis at the greatest.  Occluded RAFI and SVG.  Patent LIMA.  No new obstructive lesions were identified and normal left heart cath.       She has a history of CABG on 2/12/03 x 3, history of multiple stent placements, patient reported 17 total.       At previous visit patient reported occasional recurrence of chest  "pain, not as severe as last ED visit on 10/25/19. She reported \"my breathing has been bad\", describes worsening CORTEZ. She described activity intolerance and little to no energy. Recommended repeat stress testing. NM Lexiscan stress test was performed on 12/16/19 which was negative for inducible myocardial ischemia or infarction.  Left ventricular function was normal.     Carotid US on 12/12/19 without significant stenosis, mild atherosclerotic disease present.  Abdominal ultrasound on 12/12/2018 with no abdominal aortic aneurysm identified.     Patient returned to the ED with dyspnea in early January 2020. She was identified to have small segmental and subsegmental emboli bilaterally. Repeat imaging on 1/6 with decreasing volume of pulmonary emboli compared to scan on 1/2/2020. She was initially treated with Lovenox in the ED and discharged on Xarelto oral anticoagulation which has been going well for her, no bleeding complication. She also remains on Plavix with her significant ischemic heart disease history.      She presented to the ED on 3/16/2021 with reports of chest pain, chronic stable angina.  No ACS.  EKG stable and no elevation in troponin's. Patient admits that her BP was low and it was suspected she was dehydrated, she felt better following IV fluids and reports that this likely brought on her angina.  She is currently working on maintaining good hydration.  She denies any recurrence of acute chest pain or pressure today.  No use of nitroglycerin since her recent ED visit.      Relevant testing:  Zio patch on 3/27/2023:  Patient's monitor was in place for 13 days and 16 hours.  Minimum heart rate 42 bpm, average heart rate 62 bpm, maximum heart rate 167 bpm. Rhythm/s present include sinus, SVT. There were rare ventricular ectopic beats accounting for <1% of all beats. There were 0  ventricular triplets and rare couplets. There were  rare supraventricular ectopic beats.  There were 12 triggered events and " 14 diary entries during which time the noted rhythms were sinus, SVE, VE.  There were 7 episodes of SVT with the fastest lasting 4 beats with a max rate of 167 bpm.  Review of actual tracings showed no other significant abnormality.    V/Q scan on 11/23/21:  No evidence of pulmonary emboli.    CXR 11/23/21:  Probable air trapping.    PFT 12/8/21:  No obstructive or restrictive disease is noted, moderate diffusion defect is noted consistent with pulmonary vascular disease     Stress test on 9/15/2021:     The nuclear stress test is negative for inducible myocardial ischemia or infarction.      Left ventricular function is normal.      The left ventricular ejection fraction at rest is 64%.  The left   ventricular ejection fraction at stress is 66%.      A prior study was conducted on 12/16/2019.     Echo on 9/15/2021:  Global and regional left ventricular function is normal with an EF of 55-60%.  Right ventricular function, chamber size, wall motion, and thickness are normal.  Pulmonary artery systolic pressure is normal.  The inferior vena cava is normal.  No pericardial effusion is present.  No significant changes noted.    MRI brain on 8/20/2021:  A few small nonspecific white matter signal abnormalities  are present, likely sequela of mild chronic small vessel ischemic disease. The exam is otherwise unremarkable.    MCOT from 7/30/21:  Findings: Patient's monitor was in place for 9 days and 7 hours.  Minimum heart rate 50 bpm, average heart rate 63 bpm, maximum heart rate 120 bpm. Rhythm/s present include sinus rhythm.  There were 37 symptomatic events during which time the noted rhythms were sinus rhythm, sinus bradycardia and sinus tachycardia.  There were six asymptomatic events during which time the noted rhythms were sinus rhythm.  There was rare atrial ectopy and rare ventricular ectopy.  Review of actual tracings showed no other abnormality.    ARYA on 7/17/20:  The right ankle-brachial index is 1.17.  Left  ankle-brachial index is 0.98.    Stress test on 12/16/19:     The nuclear stress test is negative for inducible myocardial ischemia   or infarction.      A small amount of soft tissue artifact is present, more pronounced at   rest.      Left ventricular function is normal.      The left ventricular ejection fraction at rest is 79%.  The left   ventricular ejection fraction at stress is 72%.      A prior study was conducted on 6/30/2015.     US carotids on 12/12/19:  No ultrasound evidence of hemo-dynamically significant stenosis.  Mild atherosclerotic disease.    US abdominal aorta on 12/12/19:  No abdominal aortic aneurysm.      ECHO on 5/13/19:  Global and regional left ventricular function is normal with an EF of 60-65%.  Mild concentric wall thickening consistent with left ventricular hypertrophy  is present.  Right ventricular function, chamber size, wall motion, and thickness are  normal.  No significant valvular abnormalities were noted.  Previous study not available for comparison.    Cardiac cath on 3/30/13:  LEFT MAIN: Widely patent stent.   LEFT ANTERIOR DESCENDING: Widely patent proximal stent. There is an 85% to  90% mid lesion after the second diagonal and prior to the LIMA insertion as before.   CIRCUMFLEX: There is diffuse 60% to 70% disease throughout, the ostium is 70   to 75, and the OM2 is a 70, but it is very diffuse disease and basically   unchanged from previous.   RCA: Widely patent stents with only mild irregularities.   LEFT VENTRICULOGRAM: Normal left ventricular ejection fraction, LVEF 60%,   with no focal wall motion abnormality. LV pressure 146/29.   FLORENTINO to LAD: Widely patent, although there is less flow than before, and, in   fact, the retrograde filling of the LIMA from the antegrade vessel. There is   a severe subclavian stenosis that a pressure gradient revealed in the aorta   was 153/78, and in the right subclavian was 100 to 106/73, for a nearly 50 mm   pressure gradient. The  stenosis was 75% to 80%.   RAFI to RCA: 100%.   SVG to OM: 100%.                     ICD-10-CM    1. Chronic ischemic heart disease  I25.9 Adult Cardiology Eval  Referral     EKG 12-lead, tracing only     Echocardiogram Complete     N terminal pro BNP outpatient     N terminal pro BNP outpatient      2. Atherosclerosis of coronary artery bypass graft of native heart with stable angina pectoris (H)  I25.708 Adult Cardiology Eval  Referral     EKG 12-lead, tracing only     nitroGLYcerin (NITROLINGUAL) 0.4 MG/SPRAY spray     rosuvastatin (CRESTOR) 20 MG tablet      3. Paroxysmal supraventricular tachycardia (H)  I47.1 Adult Cardiology Eval  Referral     EKG 12-lead, tracing only     propranolol (INDERAL) 40 MG tablet      4. Nausea  R11.0 pantoprazole (PROTONIX) 40 MG EC tablet      5. Gastroesophageal reflux disease with esophagitis without hemorrhage  K21.00 pantoprazole (PROTONIX) 40 MG EC tablet      6. CORTEZ (dyspnea on exertion)  R06.09 Echocardiogram Complete     Magnesium     N terminal pro BNP outpatient     Rheumatoid factor     TSH Reflex [facility]     Comprehensive metabolic panel     D dimer quantitative     INR     X-ray Chest 2 vws*     Lyme disease DNA detection by PCR     Blood gas arterial and oxyhgb     Magnesium     N terminal pro BNP outpatient     Rheumatoid factor     TSH Reflex [facility]     Comprehensive metabolic panel     D dimer quantitative     INR     Lyme disease DNA detection by PCR     Blood gas arterial and oxyhgb     T4 free      7. Palpitations  R00.2 EKG 12-lead, tracing only     Magnesium     TSH Reflex [facility]     propranolol (INDERAL) 40 MG tablet     Magnesium     TSH Reflex [facility]     T4 free      8. History of CVA-mild chronic ischemic disease on 8/20/2021.  Z86.73 rivaroxaban ANTICOAGULANT (XARELTO ANTICOAGULANT) 20 MG TABS tablet      9. History of pulmonary embolism  Z86.711 rivaroxaban ANTICOAGULANT (XARELTO ANTICOAGULANT) 20 MG TABS tablet      10. Chronic obstructive  pulmonary disease, unspecified COPD type (H)  J44.9       11. ASCVD (arteriosclerotic cardiovascular disease)  I25.10 nitroGLYcerin (NITROLINGUAL) 0.4 MG/SPRAY spray      12. History of coronary artery bypass graft x 3  Z95.1 nitroGLYcerin (NITROLINGUAL) 0.4 MG/SPRAY spray      13. Chest pain, unspecified type  R07.9 Echocardiogram Complete     Rheumatoid factor     nitroGLYcerin (NITROLINGUAL) 0.4 MG/SPRAY spray     Rheumatoid factor      14. Presence of stent in coronary artery in patient with coronary artery disease  I25.10 nitroGLYcerin (NITROLINGUAL) 0.4 MG/SPRAY spray    Z95.5 clopidogrel (PLAVIX) 75 MG tablet     rosuvastatin (CRESTOR) 20 MG tablet      15. Chronic anxiety  F41.9 busPIRone (BUSPAR) 30 MG tablet      16. Vitamin D deficiency  E55.9 Vitamin D Total [facility]     Vitamin D Total [facility]      17. Vitamin B12 deficiency (non anemic)  E53.8 Vitamin B12     Folate     Vitamin B12     Folate      18. Mixed hyperlipidemia  E78.2 TSH Reflex [facility]     Lipid Profile     TSH Reflex [facility]     Lipid Profile     T4 free      19. Subclavian artery stenosis, left (H)  I77.1       20. Essential hypertension  I10       21. Stage 3a chronic kidney disease (H)  N18.31 Magnesium     N terminal pro BNP outpatient     Comprehensive metabolic panel     Magnesium     N terminal pro BNP outpatient     Comprehensive metabolic panel      22. Iron deficiency anemia due to chronic blood loss  D50.0       23. Severe episode of recurrent major depressive disorder, without psychotic features (H)  F33.2       24. History of tobacco abuse-quitting 8/23/2017  Z87.891       25. Status post coronary angiogram on 9/25/2017 at the [facility]-stable disease  Z98.890       26. Hyperglycemia  R73.9 Hemoglobin A1c     Hemoglobin A1c      27. Localized edema  R60.0 INR     INR          Past Medical History:   Diagnosis Date     Acute ischemic heart disease (H)     06/15/2007,with PTCA and stenting of 90% osteo circumflex lesion and patent LAD graft, patent  left main stent.     Acute myocardial infarction (H)     3/30/2013     Anxiety disorder     No Comments Provided     Atherosclerotic heart disease of native coronary artery without angina pectoris     -angio revealed 3 vessel dz  -4 stents placed; 2 overlapping stents placed in mLAD, 1 stent pRCA, 1 stent pCirc 1 s 9/02 -non-ST elevation MI 1/03  -angio revealed restenosis of Circ.    -PTCA and brachytherapy of pCirc -repeat angio 2/03 -no intervension -CABG x3 12/03 - Dr Sinclair  -LIMA-LAD, RAFI-RCA, SVG-OM -PCI 7/04 stent to L -CTangio 9/05   -patentent LIMA-LAD. RAFI-RCA occluded; RCA ostio/p*     Bilateral carpal tunnel syndrome     No Comments Provided     Cervicalgia     No Comments Provided     Chest pain     12/29/2014     Chronic gastric ulcer without hemorrhage or perforation     10/03/2011,hx of GI bleed (2003)     Chronic ischemic heart disease     06/27/2012     Chronic obstructive pulmonary disease (H)     06/15/2007,low DLCO, normal spirometry     Chronic or unspecified gastric ulcer with hemorrhage     10/2003     Chronic pain syndrome     12/01/2010,chest wall, back     Constipation     11/29/2011     Coronary angioplasty status     09/12/2002,with triple stenting     Coronary angioplasty status     01/10/2003,repeat angioplasty     Coronary angioplasty status     No Comments Provided     Dorsalgia     05/31/2011     Encounter for other administrative examinations     1/28/2014     Encounter for screening for cardiovascular disorders     10/2004,Cardiolite (2004, 2005, 2006 and 2011)     Enterocolitis due to Clostridium difficile     8/19/2016     Essential (primary) hypertension     No Comments Provided     Hyperlipidemia     No Comments Provided     Major depressive disorder, single episode     Severe, hx of suicide attempt/hospitalization     Migraine without status migrainosus, not intractable     No Comments Provided     Nodular corneal degeneration     09/26/2011     Noninfective gastroenteritis  and colitis     06/15/2007,history of     Noninfective gastroenteritis and colitis     Microcytic     Osteoarthritis     No Comments Provided     Other chest pain     10/13/2009,chronic     Pain in knee     05/31/2011     Pain in right shoulder     No Comments Provided     Panic disorder without agoraphobia     No Comments Provided     Peptic ulcer without hemorrhage or perforation     6/15/2007     Peripheral vascular disease (H)     No Comments Provided     Personal history of diseases of the blood and blood-forming organs and certain disorders involving the immune mechanism (CODE)     No Comments Provided     Personal history of nicotine dependence     No Comments Provided     Personal history of other medical treatment (CODE)     10/14/2013     Presence of aortocoronary bypass graft     2/12/2003     Primary central sleep apnea     10/14/2013     Sepsis due to Escherichia coli (E. coli) (H)     7/14/16,[facility]     Stricture of artery (H)     3/31/2013,S/p prox left SCA stent 4/1/2013     Thoracic, thoracolumbar and lumbosacral intervertebral disc disorder     No Comments Provided     Uncomplicated opioid abuse (H)     history of     Vitamin D deficiency     5/6/2013       Past Surgical History:   Procedure Laterality Date     ANGIOPLASTY      9/12/02,with triple stenting     APPENDECTOMY OPEN      No Comments Provided     ARTHROSCOPY KNEE      left     ARTHROSCOPY SHOULDER Right 05/12/2017    labral tear, rotator cuff tear and some subacromial decompression      BYPASS GRAFT ARTERY CORONARY      12/13/02,Triple bypass, left internal mammary  to LAD, right internal mammary to right coronary artery, saphenous to obtuse marginal of the left circumflex.     COLONOSCOPY      2011,Dr Bowman benign polyps     COLONOSCOPY  10/03/2011    2011,benign polyps, Dr. Bowman     COLONOSCOPY  08/08/2016 8/8/16,normal, Dr Bowman     ELBOW SURGERY      baby,birth malachi removed from right arm     EMBOLECTOMY UPPER EXTREMITY   04/02/2013    brachial artery pseudoaneurysm after stenting     ESOPHAGOSCOPY, GASTROSCOPY, DUODENOSCOPY (EGD), COMBINED      2011,EGD Dr Bowman with pyloric ulcer     ESOPHAGOSCOPY, GASTROSCOPY, DUODENOSCOPY (EGD), COMBINED      2011,pyloric ulcer, Dr. Bowman     ESOPHAGOSCOPY, GASTROSCOPY, DUODENOSCOPY (EGD), COMBINED      8/8/16,mild gastritis, Dr Bowman     ESOPHAGOSCOPY, GASTROSCOPY, DUODENOSCOPY (EGD), COMBINED      11/27/2017,Dr Bowman. Antral ulcer     ESOPHAGOSCOPY, GASTROSCOPY, DUODENOSCOPY (EGD), COMBINED  02/02/2018    Dr Bowman, healed ulcer     HYSTERECTOMY TOTAL ABDOMINAL      age 21     LAPAROSCOPIC CHOLECYSTECTOMY      2006     OSTEOTOMY FEMUR DISTAL      x3, right knee     OSTEOTOMY FEMUR DISTAL      2000,left knee  ligament surgery     OTHER SURGICAL HISTORY      1/10/2003,,PTCA     OTHER SURGICAL HISTORY      09/20/2012,,PTCA,DELONTE in LAD and left main     OTHER SURGICAL HISTORY      4/1/2013,,PTCA,L subclavian stenosis     RELEASE TRIGGER FINGER Left 12/2/2022    Procedure: Left thumb Trigger  release;  Surgeon: Cristhian Wilkinson MD;  Location: [facility] OR     SALPINGO-OOPHORECTOMY BILATERAL      age 27,Bilateral salpingo-oophorectomy     TONSILLECTOMY, ADENOIDECTOMY, COMBINED      childhood       Allergies   Allergen Reactions     Atorvastatin Muscle Pain (Myalgia)     Tiotropium Bromide [Tiotropium] Rash     Advil [Ibuprofen] Other (See Comments)     Does not recall but feel like it interacted with blood thinners and HX of GI BLEED     Ezetimibe Muscle Pain (Myalgia)     Latex Rash     Niacin      Other reaction(s): Flushing     No Clinical Screening - See Comments Itching, Rash and Blisters     Metals and plastics       Tape [Adhesive Tape] Rash       Current Outpatient Medications   Medication Sig Dispense Refill     amLODIPine (NORVASC) 10 MG tablet TAKE 1 TABLET (10 MG) BY MOUTH DAILY DX. CODE: I10. 90 tablet 3     busPIRone (BUSPAR) 30 MG tablet Take 1 tablet (30 mg) by mouth 2 times  daily 180 tablet 3     clonazePAM (KLONOPIN) 1 MG tablet Take 1 tablet (1 mg) by mouth 3 times daily as needed for anxiety Max 3 per day 180 tablet 1     clopidogrel (PLAVIX) 75 MG tablet Take 1 tablet (75 mg) by mouth daily 90 tablet 3     diclofenac (VOLTAREN) 1 % topical gel Apply 2 grams to each hand or 4 grams to each knee up to 4 times daily as needed for arthritis pain 350 g 4     HYDROcodone-acetaminophen (NORCO)  MG per tablet Take 1-2 tablets by mouth every 4 hours as needed for severe pain 6 per day 180 tablet 0     [START ON 8/6/2023] HYDROcodone-acetaminophen (NORCO)  MG per tablet Take 1-2 tablets by mouth every 4 hours as needed for severe pain 6 per day 180 tablet 0     lisinopril (ZESTRIL) 10 MG tablet Take 1 tablet (10 mg) by mouth daily 90 tablet 4     naloxone (NARCAN) 4 MG/0.1ML nasal spray Spray into one nostril for opioid reversal if unresponsive. May repeat every 2-3 minutes until patient responsive or EMS arrives 1 each 1     nitroGLYcerin (NITROLINGUAL) 0.4 MG/SPRAY spray For chest pain spray 1 spray under tongue every 5 minutes for 3 doses. If symptoms persist 5 minutes after 1st dose call 911. 4.9 g 3     ondansetron (ZOFRAN) 4 MG tablet TAKE 1 TABLET BY MOUTH EVERY 6 HOURS AS NEEDED FOR NAUSEA 90 tablet 3     pantoprazole (PROTONIX) 40 MG EC tablet Take 1 tablet (40 mg) by mouth daily 90 tablet 3     propranolol (INDERAL) 40 MG tablet Take 1 tablet (40 mg) by mouth 2 times daily 180 tablet 3     RESTASIS 0.05 % ophthalmic emulsion INSTILL 1 DROP INTO BOTH EYES TWICE A DAY       rimegepant (NURTEC) 75 MG ODT tablet Place 1 tablet (75 mg) under the tongue daily as needed for migraine Maximum of 1 tablet every 48 hours and 2 per week. 24 tablet 4     rivaroxaban ANTICOAGULANT (XARELTO ANTICOAGULANT) 20 MG TABS tablet Take 1 tablet (20 mg) by mouth daily (with dinner) 90 tablet 3     rosuvastatin (CRESTOR) 20 MG tablet Take 1 tablet (20 mg) by mouth daily 90 tablet 3      sucralfate (CARAFATE) 1 GM tablet Take 1 tablet (1 g) by mouth 4 times daily as needed for nausea (or dark stools) 360 tablet 1     TRINTELLIX 20 MG tablet TAKE 1 TABLET BY MOUTH EVERY DAY 90 tablet 4     VITAMIN D, CHOLECALCIFEROL, PO Take 5,000 Units by mouth daily         Social History     Socioeconomic History     Marital status:      Spouse name: Not on file     Number of children: Not on file     Years of education: Not on file     Highest education level: Not on file   Occupational History     Not on file   Tobacco Use     Smoking status: Former     Packs/day: 1.00     Years: 35.00     Pack years: 35.00     Types: Cigarettes     Quit date: 2/15/2017     Years since quittin.4     Smokeless tobacco: Never   Vaping Use     Vaping Use: Never used   Substance and Sexual Activity     Alcohol use: Not Currently     Alcohol/week: 0.0 standard drinks of alcohol     Drug use: No     Sexual activity: Yes     Partners: Male     Birth control/protection: None   Other Topics Concern     Parent/sibling w/ CABG, MI or angioplasty before 65F 55M? Not Asked   Social History Narrative    ,  Steven.  Currently not working outside the home. Lives three-mile self [place] met with . Tobacco abuse, quit , restarted. Quit  and has since quit, no alcohol.     Social Determinants of Health     Financial Resource Strain: Not on file   Food Insecurity: Not on file   Transportation Needs: Not on file   Physical Activity: Not on file   Stress: Not on file   Social Connections: Not on file   Intimate Partner Violence: Not on file   Housing Stability: Not on file       LAB RESULTS:   Office Visit on 2021   Component Date Value Ref Range Status     Color Urine 2021 Yellow  Colorless, Straw, Light Yellow, Yellow Final     Appearance Urine 2021 Slightly Cloudy* Clear Final     Glucose Urine 2021 Negative  Negative mg/dL Final     Bilirubin Urine 2021 Negative  Negative Final      Ketones Urine 08/06/2021 Negative  Negative mg/dL Final     Specific Gravity Urine 08/06/2021 1.018  1.000 - 1.030 Final     Blood Urine 08/06/2021 Small* Negative Final     pH Urine 08/06/2021 6.0  5.0 - 9.0 Final     Protein Albumin Urine 08/06/2021 20 * Negative mg/dL Final     Urobilinogen Urine 08/06/2021 Normal  Normal, 2.0 mg/dL Final     Nitrite Urine 08/06/2021 Positive* Negative Final     Leukocyte Esterase Urine 08/06/2021 Large* Negative Final     Bacteria Urine 08/06/2021 Many* None Seen /HPF Final     Mucus Urine 08/06/2021 Present* None Seen /LPF Final     RBC Urine 08/06/2021 6* <=2 /HPF Final     WBC Urine 08/06/2021 >182* <=5 /HPF Final     Culture 08/06/2021 >100,000 CFU/mL Escherichia coli*  Final   Office Visit on 07/30/2021   Component Date Value Ref Range Status     Sodium 07/30/2021 139  134 - 144 mmol/L Final     Potassium 07/30/2021 4.4  3.5 - 5.1 mmol/L Final     Chloride 07/30/2021 105  98 - 107 mmol/L Final     Carbon Dioxide (CO2) 07/30/2021 24  21 - 31 mmol/L Final     Anion Gap 07/30/2021 10  3 - 14 mmol/L Final     Urea Nitrogen 07/30/2021 19  7 - 25 mg/dL Final     Creatinine 07/30/2021 1.08  0.60 - 1.20 mg/dL Final     Calcium 07/30/2021 9.9  8.6 - 10.3 mg/dL Final     Glucose 07/30/2021 77  70 - 105 mg/dL Final     GFR Estimate 07/30/2021 53* >60 mL/min/1.73m2 Final     Magnesium 07/30/2021 2.1  1.9 - 2.7 mg/dL Final     WBC Count 07/30/2021 7.0  4.0 - 11.0 10e3/uL Final     RBC Count 07/30/2021 4.39  3.80 - 5.20 10e6/uL Final     Hemoglobin 07/30/2021 12.9  11.7 - 15.7 g/dL Final     Hematocrit 07/30/2021 38.6  35.0 - 47.0 % Final     MCV 07/30/2021 88  78 - 100 fL Final     MCH 07/30/2021 29.4  26.5 - 33.0 pg Final     MCHC 07/30/2021 33.4  31.5 - 36.5 g/dL Final     RDW 07/30/2021 13.5  10.0 - 15.0 % Final     Platelet Count 07/30/2021 273  150 - 450 10e3/uL Final     % Neutrophils 07/30/2021 57  % Final     % Lymphocytes 07/30/2021 30  % Final     % Monocytes 07/30/2021 10  " % Final     % Eosinophils 07/30/2021 2  % Final     % Basophils 07/30/2021 1  % Final     % Immature Granulocytes 07/30/2021 0  % Final     NRBCs per 100 WBC 07/30/2021 0  <1 /100 Final     Absolute Neutrophils 07/30/2021 4.0  1.6 - 8.3 10e3/uL Final     Absolute Lymphocytes 07/30/2021 2.1  0.8 - 5.3 10e3/uL Final     Absolute Monocytes 07/30/2021 0.7  0.0 - 1.3 10e3/uL Final     Absolute Eosinophils 07/30/2021 0.1  0.0 - 0.7 10e3/uL Final     Absolute Basophils 07/30/2021 0.1  0.0 - 0.2 10e3/uL Final     Absolute Immature Granulocytes 07/30/2021 0.0  <=0.0 10e3/uL Final     Absolute NRBCs 07/30/2021 0.0  10e3/uL Final        Review of systems: Negative except that which was noted in the HPI.    Physical examination:  /82 (BP Location: Right arm, Patient Position: Sitting, Cuff Size: Adult Large)   Pulse 70   Temp 97.1  F (36.2  C) (Temporal)   Resp 14   Ht 1.664 m (5' 5.5\")   Wt 77 kg (169 lb 12.8 oz)   LMP 04/29/1978 (Approximate)   SpO2 98%   BMI 27.83 kg/m      GENERAL APPEARANCE: healthy, alert and no distress.  CHEST: lungs clear to auscultation - no rales, rhonchi or wheezes, no use of accessory muscles, no retractions, respirations are unlabored, normal respiratory rate.  Reduced air movement but clear.  CARDIOVASCULAR: regular rhythm, normal S1 with physiologic split S2, no S3 or S4 and no murmur, click or rub  EXTREMITIES: no clubbing, cyanosis but with mild peripheral edema      Total time spent on day of visit, including review of tests, obtaining/reviewing separately obtained history, ordering medications/tests/procedures, communicating with PCP/consultants, and documenting in electronic medical record: 60 minutes.       Thank you for allowing me to participate in the care of your patient. Please do not hesitate to contact me if you have any questions.     Paul Gramajo, DO          "

## 2023-07-13 ENCOUNTER — OFFICE VISIT (OUTPATIENT)
Dept: CARDIOLOGY | Facility: OTHER | Age: 68
End: 2023-07-13
Attending: INTERNAL MEDICINE
Payer: COMMERCIAL

## 2023-07-13 ENCOUNTER — HOSPITAL ENCOUNTER (OUTPATIENT)
Dept: GENERAL RADIOLOGY | Facility: OTHER | Age: 68
Discharge: HOME | End: 2023-07-13
Attending: INTERNAL MEDICINE
Payer: COMMERCIAL

## 2023-07-13 VITALS
TEMPERATURE: 97.1 F | RESPIRATION RATE: 14 BRPM | DIASTOLIC BLOOD PRESSURE: 82 MMHG | WEIGHT: 169.8 LBS | SYSTOLIC BLOOD PRESSURE: 130 MMHG | HEART RATE: 70 BPM | BODY MASS INDEX: 27.29 KG/M2 | OXYGEN SATURATION: 98 % | HEIGHT: 66 IN

## 2023-07-13 DIAGNOSIS — E53.8 VITAMIN B12 DEFICIENCY (NON ANEMIC): ICD-10-CM

## 2023-07-13 DIAGNOSIS — Z87.891 HISTORY OF TOBACCO ABUSE: ICD-10-CM

## 2023-07-13 DIAGNOSIS — I47.10 PAROXYSMAL SUPRAVENTRICULAR TACHYCARDIA (H): ICD-10-CM

## 2023-07-13 DIAGNOSIS — E55.9 VITAMIN D DEFICIENCY: ICD-10-CM

## 2023-07-13 DIAGNOSIS — Z95.1 HISTORY OF CORONARY ARTERY BYPASS GRAFT X 3: ICD-10-CM

## 2023-07-13 DIAGNOSIS — F33.2 SEVERE EPISODE OF RECURRENT MAJOR DEPRESSIVE DISORDER, WITHOUT PSYCHOTIC FEATURES (H): ICD-10-CM

## 2023-07-13 DIAGNOSIS — I10 ESSENTIAL HYPERTENSION: ICD-10-CM

## 2023-07-13 DIAGNOSIS — J44.9 CHRONIC OBSTRUCTIVE PULMONARY DISEASE, UNSPECIFIED COPD TYPE (H): ICD-10-CM

## 2023-07-13 DIAGNOSIS — Z86.711 HISTORY OF PULMONARY EMBOLISM: ICD-10-CM

## 2023-07-13 DIAGNOSIS — N18.31 STAGE 3A CHRONIC KIDNEY DISEASE (H): ICD-10-CM

## 2023-07-13 DIAGNOSIS — I25.9 CHRONIC ISCHEMIC HEART DISEASE: Primary | ICD-10-CM

## 2023-07-13 DIAGNOSIS — I25.10 PRESENCE OF STENT IN CORONARY ARTERY IN PATIENT WITH CORONARY ARTERY DISEASE: ICD-10-CM

## 2023-07-13 DIAGNOSIS — K21.00 GASTROESOPHAGEAL REFLUX DISEASE WITH ESOPHAGITIS WITHOUT HEMORRHAGE: ICD-10-CM

## 2023-07-13 DIAGNOSIS — Z95.5 PRESENCE OF STENT IN CORONARY ARTERY IN PATIENT WITH CORONARY ARTERY DISEASE: ICD-10-CM

## 2023-07-13 DIAGNOSIS — R06.09 DOE (DYSPNEA ON EXERTION): ICD-10-CM

## 2023-07-13 DIAGNOSIS — R00.2 PALPITATIONS: ICD-10-CM

## 2023-07-13 DIAGNOSIS — Z86.73 HISTORY OF CVA (CEREBROVASCULAR ACCIDENT): ICD-10-CM

## 2023-07-13 DIAGNOSIS — I77.1 SUBCLAVIAN ARTERY STENOSIS, LEFT (H): ICD-10-CM

## 2023-07-13 DIAGNOSIS — D50.0 IRON DEFICIENCY ANEMIA DUE TO CHRONIC BLOOD LOSS: ICD-10-CM

## 2023-07-13 DIAGNOSIS — E78.2 MIXED HYPERLIPIDEMIA: ICD-10-CM

## 2023-07-13 DIAGNOSIS — R11.0 NAUSEA: ICD-10-CM

## 2023-07-13 DIAGNOSIS — R60.0 LOCALIZED EDEMA: ICD-10-CM

## 2023-07-13 DIAGNOSIS — R73.9 HYPERGLYCEMIA: ICD-10-CM

## 2023-07-13 DIAGNOSIS — F41.9 CHRONIC ANXIETY: ICD-10-CM

## 2023-07-13 DIAGNOSIS — R07.9 CHEST PAIN, UNSPECIFIED TYPE: ICD-10-CM

## 2023-07-13 DIAGNOSIS — I25.10 ASCVD (ARTERIOSCLEROTIC CARDIOVASCULAR DISEASE): ICD-10-CM

## 2023-07-13 DIAGNOSIS — I25.708 ATHEROSCLEROSIS OF CORONARY ARTERY BYPASS GRAFT OF NATIVE HEART WITH STABLE ANGINA PECTORIS (H): ICD-10-CM

## 2023-07-13 DIAGNOSIS — Z98.890 STATUS POST CORONARY ANGIOGRAM: ICD-10-CM

## 2023-07-13 LAB
ALBUMIN SERPL BCG-MCNC: 4.8 G/DL (ref 3.5–5.2)
ALLEN'S TEST: NO
ALP SERPL-CCNC: 79 U/L (ref 35–104)
ALT SERPL W P-5'-P-CCNC: 16 U/L (ref 0–50)
ANION GAP SERPL CALCULATED.3IONS-SCNC: 13 MMOL/L (ref 7–15)
AST SERPL W P-5'-P-CCNC: 24 U/L (ref 0–45)
ATRIAL RATE - MUSE: 60 BPM
BASE EXCESS BLDA CALC-SCNC: 0.5 MMOL/L (ref -9–1.8)
BILIRUB SERPL-MCNC: 0.3 MG/DL
BUN SERPL-MCNC: 18.6 MG/DL (ref 8–23)
CALCIUM SERPL-MCNC: 9.7 MG/DL (ref 8.8–10.2)
CHLORIDE SERPL-SCNC: 100 MMOL/L (ref 98–107)
CHOLEST SERPL-MCNC: 200 MG/DL
CREAT SERPL-MCNC: 0.89 MG/DL (ref 0.51–0.95)
D DIMER PPP FEU-MCNC: 0.31 UG/ML FEU (ref 0–0.5)
DEPRECATED HCO3 PLAS-SCNC: 26 MMOL/L (ref 22–29)
DIASTOLIC BLOOD PRESSURE - MUSE: NORMAL MMHG
FOLATE SERPL-MCNC: >20 NG/ML (ref 4.6–34.8)
GFR SERPL CREATININE-BSD FRML MDRD: 70 ML/MIN/1.73M2
GLUCOSE SERPL-MCNC: 101 MG/DL (ref 70–99)
HBA1C MFR BLD: 5.8 % (ref 4–6.2)
HCO3 BLD-SCNC: 27 MMOL/L (ref 21–28)
HDLC SERPL-MCNC: 65 MG/DL
INR PPP: 1.07 (ref 0.85–1.15)
INTERPRETATION ECG - MUSE: NORMAL
LDLC SERPL CALC-MCNC: 102 MG/DL
MAGNESIUM SERPL-MCNC: 2.3 MG/DL (ref 1.7–2.3)
NONHDLC SERPL-MCNC: 135 MG/DL
NT-PROBNP SERPL-MCNC: 161 PG/ML (ref 0–900)
O2/TOTAL GAS SETTING VFR VENT: 0 %
OXYHGB MFR BLD: 92 % (ref 92–100)
P AXIS - MUSE: 48 DEGREES
PCO2 BLD: 47 MM HG (ref 35–45)
PH BLD: 7.36 [PH] (ref 7.35–7.45)
PO2 BLD: 64 MM HG (ref 80–105)
POTASSIUM SERPL-SCNC: 4.1 MMOL/L (ref 3.4–5.3)
PR INTERVAL - MUSE: 172 MS
PROT SERPL-MCNC: 7.8 G/DL (ref 6.4–8.3)
QRS DURATION - MUSE: 84 MS
QT - MUSE: 458 MS
QTC - MUSE: 458 MS
R AXIS - MUSE: 26 DEGREES
SODIUM SERPL-SCNC: 139 MMOL/L (ref 136–145)
SYSTOLIC BLOOD PRESSURE - MUSE: NORMAL MMHG
T AXIS - MUSE: 56 DEGREES
T4 FREE SERPL-MCNC: 1.28 NG/DL (ref 0.9–1.7)
TRIGL SERPL-MCNC: 166 MG/DL
TSH SERPL DL<=0.005 MIU/L-ACNC: 7.78 UIU/ML (ref 0.3–4.2)
VENTRICULAR RATE- MUSE: 60 BPM
VIT B12 SERPL-MCNC: 734 PG/ML (ref 232–1245)

## 2023-07-13 PROCEDURE — 36415 COLL VENOUS BLD VENIPUNCTURE: CPT | Mod: ZL | Performed by: INTERNAL MEDICINE

## 2023-07-13 PROCEDURE — 82746 ASSAY OF FOLIC ACID SERUM: CPT | Mod: ZL | Performed by: INTERNAL MEDICINE

## 2023-07-13 PROCEDURE — 80061 LIPID PANEL: CPT | Mod: ZL | Performed by: INTERNAL MEDICINE

## 2023-07-13 PROCEDURE — 36600 WITHDRAWAL OF ARTERIAL BLOOD: CPT | Mod: ZL | Performed by: INTERNAL MEDICINE

## 2023-07-13 PROCEDURE — 82805 BLOOD GASES W/O2 SATURATION: CPT | Mod: ZL | Performed by: INTERNAL MEDICINE

## 2023-07-13 PROCEDURE — 82607 VITAMIN B-12: CPT | Mod: ZL | Performed by: INTERNAL MEDICINE

## 2023-07-13 PROCEDURE — 83735 ASSAY OF MAGNESIUM: CPT | Mod: ZL | Performed by: INTERNAL MEDICINE

## 2023-07-13 PROCEDURE — 93005 ELECTROCARDIOGRAM TRACING: CPT | Performed by: INTERNAL MEDICINE

## 2023-07-13 PROCEDURE — 83036 HEMOGLOBIN GLYCOSYLATED A1C: CPT | Mod: ZL | Performed by: INTERNAL MEDICINE

## 2023-07-13 PROCEDURE — G0463 HOSPITAL OUTPT CLINIC VISIT: HCPCS | Mod: 25

## 2023-07-13 PROCEDURE — 85610 PROTHROMBIN TIME: CPT | Mod: ZL | Performed by: INTERNAL MEDICINE

## 2023-07-13 PROCEDURE — 84443 ASSAY THYROID STIM HORMONE: CPT | Mod: ZL | Performed by: INTERNAL MEDICINE

## 2023-07-13 PROCEDURE — 86431 RHEUMATOID FACTOR QUANT: CPT | Mod: ZL | Performed by: INTERNAL MEDICINE

## 2023-07-13 PROCEDURE — 87476 LYME DIS DNA AMP PROBE: CPT | Mod: ZL | Performed by: INTERNAL MEDICINE

## 2023-07-13 PROCEDURE — 82306 VITAMIN D 25 HYDROXY: CPT | Mod: ZL | Performed by: INTERNAL MEDICINE

## 2023-07-13 PROCEDURE — 93010 ELECTROCARDIOGRAM REPORT: CPT | Performed by: INTERNAL MEDICINE

## 2023-07-13 PROCEDURE — 99215 OFFICE O/P EST HI 40 MIN: CPT | Performed by: INTERNAL MEDICINE

## 2023-07-13 PROCEDURE — 85379 FIBRIN DEGRADATION QUANT: CPT | Mod: ZL | Performed by: INTERNAL MEDICINE

## 2023-07-13 PROCEDURE — 84439 ASSAY OF FREE THYROXINE: CPT | Mod: ZL | Performed by: INTERNAL MEDICINE

## 2023-07-13 PROCEDURE — 71046 X-RAY EXAM CHEST 2 VIEWS: CPT

## 2023-07-13 PROCEDURE — 80053 COMPREHEN METABOLIC PANEL: CPT | Mod: ZL | Performed by: INTERNAL MEDICINE

## 2023-07-13 PROCEDURE — 83880 ASSAY OF NATRIURETIC PEPTIDE: CPT | Mod: ZL | Performed by: INTERNAL MEDICINE

## 2023-07-13 RX ORDER — NITROGLYCERIN 400 UG/1
SPRAY ORAL
Qty: 4.9 G | Refills: 3 | Status: SHIPPED | OUTPATIENT
Start: 2023-07-13 | End: 2024-06-06

## 2023-07-13 RX ORDER — CLOPIDOGREL BISULFATE 75 MG/1
75 TABLET ORAL DAILY
Qty: 90 TABLET | Refills: 3 | Status: SHIPPED | OUTPATIENT
Start: 2023-07-13 | End: 2024-06-06

## 2023-07-13 RX ORDER — BUSPIRONE HYDROCHLORIDE 30 MG/1
30 TABLET ORAL 2 TIMES DAILY
Qty: 180 TABLET | Refills: 3 | Status: SHIPPED | OUTPATIENT
Start: 2023-07-13 | End: 2023-09-05 | Stop reason: DRUGHIGH

## 2023-07-13 RX ORDER — PROPRANOLOL HYDROCHLORIDE 40 MG/1
40 TABLET ORAL 2 TIMES DAILY
Qty: 180 TABLET | Refills: 3 | Status: SHIPPED | OUTPATIENT
Start: 2023-07-13 | End: 2024-03-05

## 2023-07-13 RX ORDER — PANTOPRAZOLE SODIUM 40 MG/1
40 TABLET, DELAYED RELEASE ORAL DAILY
Qty: 90 TABLET | Refills: 3 | Status: SHIPPED | OUTPATIENT
Start: 2023-07-13 | End: 2024-06-06

## 2023-07-13 RX ORDER — ROSUVASTATIN CALCIUM 20 MG/1
20 TABLET, COATED ORAL DAILY
Qty: 90 TABLET | Refills: 3 | Status: SHIPPED | OUTPATIENT
Start: 2023-07-13 | End: 2024-03-05

## 2023-07-13 ASSESSMENT — PAIN SCALES - GENERAL: PAINLEVEL: NO PAIN (0)

## 2023-07-15 LAB — DEPRECATED CALCIDIOL+CALCIFEROL SERPL-MC: 79 UG/L (ref 20–75)

## 2023-07-17 LAB
B BURGDOR DNA SPEC QL NAA+PROBE: NOT DETECTED
RHEUMATOID FACT SER NEPH-ACNC: 8 IU/ML

## 2023-07-31 ENCOUNTER — HOSPITAL ENCOUNTER (OUTPATIENT)
Dept: CARDIOLOGY | Facility: OTHER | Age: 68
Discharge: HOME | End: 2023-07-31
Attending: INTERNAL MEDICINE | Admitting: INTERNAL MEDICINE
Payer: COMMERCIAL

## 2023-07-31 DIAGNOSIS — R07.9 CHEST PAIN, UNSPECIFIED TYPE: ICD-10-CM

## 2023-07-31 DIAGNOSIS — R06.09 DOE (DYSPNEA ON EXERTION): ICD-10-CM

## 2023-07-31 DIAGNOSIS — I25.9 CHRONIC ISCHEMIC HEART DISEASE: ICD-10-CM

## 2023-07-31 LAB — LVEF ECHO: NORMAL

## 2023-07-31 PROCEDURE — 93306 TTE W/DOPPLER COMPLETE: CPT | Mod: 26 | Performed by: INTERNAL MEDICINE

## 2023-07-31 PROCEDURE — 93306 TTE W/DOPPLER COMPLETE: CPT

## 2023-08-01 ASSESSMENT — ANXIETY QUESTIONNAIRES
7. FEELING AFRAID AS IF SOMETHING AWFUL MIGHT HAPPEN: NOT AT ALL
3. WORRYING TOO MUCH ABOUT DIFFERENT THINGS: MORE THAN HALF THE DAYS
IF YOU CHECKED OFF ANY PROBLEMS ON THIS QUESTIONNAIRE, HOW DIFFICULT HAVE THESE PROBLEMS MADE IT FOR YOU TO DO YOUR WORK, TAKE CARE OF THINGS AT HOME, OR GET ALONG WITH OTHER PEOPLE: VERY DIFFICULT
GAD7 TOTAL SCORE: 14
1. FEELING NERVOUS, ANXIOUS, OR ON EDGE: NEARLY EVERY DAY
5. BEING SO RESTLESS THAT IT IS HARD TO SIT STILL: NEARLY EVERY DAY
4. TROUBLE RELAXING: MORE THAN HALF THE DAYS
2. NOT BEING ABLE TO STOP OR CONTROL WORRYING: MORE THAN HALF THE DAYS
GAD7 TOTAL SCORE: 14
6. BECOMING EASILY ANNOYED OR IRRITABLE: MORE THAN HALF THE DAYS

## 2023-08-02 ENCOUNTER — OFFICE VISIT (OUTPATIENT)
Dept: FAMILY MEDICINE | Facility: OTHER | Age: 68
End: 2023-08-02
Attending: FAMILY MEDICINE
Payer: COMMERCIAL

## 2023-08-02 VITALS
HEART RATE: 52 BPM | BODY MASS INDEX: 27.83 KG/M2 | OXYGEN SATURATION: 98 % | RESPIRATION RATE: 18 BRPM | SYSTOLIC BLOOD PRESSURE: 120 MMHG | DIASTOLIC BLOOD PRESSURE: 72 MMHG | TEMPERATURE: 97 F | WEIGHT: 169.8 LBS

## 2023-08-02 DIAGNOSIS — R06.09 DOE (DYSPNEA ON EXERTION): ICD-10-CM

## 2023-08-02 DIAGNOSIS — F41.9 CHRONIC ANXIETY: Primary | ICD-10-CM

## 2023-08-02 DIAGNOSIS — I25.9 CHRONIC ISCHEMIC HEART DISEASE: ICD-10-CM

## 2023-08-02 DIAGNOSIS — I25.708 ATHEROSCLEROSIS OF CORONARY ARTERY BYPASS GRAFT OF NATIVE HEART WITH STABLE ANGINA PECTORIS (H): ICD-10-CM

## 2023-08-02 PROCEDURE — G0463 HOSPITAL OUTPT CLINIC VISIT: HCPCS

## 2023-08-02 PROCEDURE — 99214 OFFICE O/P EST MOD 30 MIN: CPT | Performed by: FAMILY MEDICINE

## 2023-08-02 ASSESSMENT — PAIN SCALES - GENERAL: PAINLEVEL: MILD PAIN (3)

## 2023-08-02 NOTE — PROGRESS NOTES
Assessment & Plan       ICD-10-CM    1. Chronic anxiety  F41.9       2. 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 Chronic ischemic heart disease  I25.9       4. CORTEZ (dyspnea on exertion)  R06.09         She saw Dr. Gramajo 7/13/2023.  Noted dyspnea on exertion, palpitations, weakness, and some other symptoms that could be cardiac in nature.  However, she has had plenty of cardiac testing for various symptoms.  Dr. Gramajo increased buspirone from 15 up to 30 mg twice daily.  Additionally, propranolol 40 mg daily was started  She has noticed an improvement in status since the dose increase and addition of propranolol.    She was on metoprolol, but it was removed from her medication list in July before seeing cardiology. It was just refilled 7/28/2023 for 90-day supply of taking metoprolol succinate 12.5 mg daily.  Patient believes she still taking this at a full pill.  If so, cut in half to 12.5 mg for 7 days, then stop.    Since the propranolol can help anxiety more the metoprolol and is also used for migraine prophylaxis, continue propranolol.    Discussed with patient that I am leaving clinic practice and will need to find a new PCP.   On chronic hydrocodone and clonazepam. See note 3/6/23 for details. She received refills of medications in July and has a follow up scheduled with IVANNA Ward in September before running out.    Ramirez Cano MD   [facility]       Subjective   Malina is a 68 year old, presenting for the following health issues:  Follow Up (Cardiology )        8/2/2023     9:18 AM   Additional Questions   Roomed by Morelia boggs       History of Present Illness       Mental Health Follow-up:  Patient presents to follow-up on Anxiety.    Patient's anxiety since last visit has been:  Worse  The patient is having other symptoms associated with anxiety.  Any significant life events: No  Patient is feeling  anxious or having panic attacks.  Patient has no concerns about alcohol or drug use.    She eats 2-3 servings of fruits and vegetables daily.She consumes 0 sweetened beverage(s) daily.She exercises with enough effort to increase her heart rate 10 to 19 minutes per day.  She exercises with enough effort to increase her heart rate 7 days per week.   She is taking medications regularly.     Completed CXR showing hyperinflation  Previous PFTs in 2021 and 2022 were essentially normal  Less palpitations  Still having chest    Increased buspirone  Started propranolol  Was on metoprolol, stopped by cardiology due to dizziness. Restarted in March 2023. Stopped when propranolol      Review of Systems   As above      Objective    /72   Pulse 52   Temp 97  F (36.1  C) (Tympanic)   Resp 18   Wt 77 kg (169 lb 12.8 oz)   LMP 04/29/1978 (Approximate)   SpO2 98%   Breastfeeding No   BMI 27.83 kg/m    Body mass index is 27.83 kg/m .  Physical Exam   General Appearance: Alert. No acute distress  Chest/Respiratory Exam: Clear to auscultation bilaterally  Cardiovascular Exam: Regular rate and rhythm. S1, S2, no murmur, gallop, or rubs.  Extremities: 2+ pedal pulses.  No lower extremity edema.  Psychiatric: Normal affect and mentation

## 2023-08-02 NOTE — NURSING NOTE
Patient is here to follow up on recent cardiology visit and have a last visit with Ramirez Cano MD.     Patient's last menstrual period was 04/29/1978 (approximate).  Medication Reconciliation: complete    Morelia Park LPN 8/2/2023 9:23 AM       Advance care directive on file? yes  Advance care directive provided to patient? na       Morelia Park LPN    
No. PADMINI screening performed.  STOP BANG Legend: 0-2 = LOW Risk; 3-4 = INTERMEDIATE Risk; 5-8 = HIGH Risk

## 2023-08-02 NOTE — PATIENT INSTRUCTIONS
It looks like you are still receiving metoprolol  Cut metoprolol in half (12.5 mg) for 7 days, then stop  Continue propranolol.   Both medications are used for heart rate control and migraines. Propranolol can help anxiety more than metoprolol

## 2023-08-22 ENCOUNTER — TELEPHONE (OUTPATIENT)
Dept: FAMILY MEDICINE | Facility: OTHER | Age: 68
End: 2023-08-22
Payer: COMMERCIAL

## 2023-08-22 DIAGNOSIS — F41.9 CHRONIC ANXIETY: ICD-10-CM

## 2023-08-22 DIAGNOSIS — Z79.899 CONTROLLED SUBSTANCE AGREEMENT SIGNED: ICD-10-CM

## 2023-08-22 RX ORDER — CLONAZEPAM 1 MG/1
1 TABLET ORAL 3 TIMES DAILY PRN
Qty: 90 TABLET | Refills: 0 | Status: SHIPPED | OUTPATIENT
Start: 2023-08-22 | End: 2023-09-04

## 2023-08-22 NOTE — TELEPHONE ENCOUNTER
She received 180 pills on June 16.  Was taking BID, but written for up to TID. Refilled for 90 more pills until next clinic appointment

## 2023-08-22 NOTE — TELEPHONE ENCOUNTER
Patient called to talk to Dr. Cano's nurse regarding a prescription. Would not clarify. Please call.    Natalia Santiago on 8/22/2023 at 8:52 AM

## 2023-08-22 NOTE — TELEPHONE ENCOUNTER
Called and spoke with patient she was to see  and he says she should stay on the Klonopin TID.  She will see Sheri Barber on 09/05/2023 but will run out prior to that she has about 4 days left. She would like a prescription sent to [facility] for the klonopin if there are any problems please notify patient. Kimberly Bernardo LPN .......................8/22/2023  11:25 AM

## 2023-09-03 ASSESSMENT — ANXIETY QUESTIONNAIRES
7. FEELING AFRAID AS IF SOMETHING AWFUL MIGHT HAPPEN: SEVERAL DAYS
3. WORRYING TOO MUCH ABOUT DIFFERENT THINGS: SEVERAL DAYS
1. FEELING NERVOUS, ANXIOUS, OR ON EDGE: MORE THAN HALF THE DAYS
GAD7 TOTAL SCORE: 8
4. TROUBLE RELAXING: SEVERAL DAYS
2. NOT BEING ABLE TO STOP OR CONTROL WORRYING: SEVERAL DAYS
6. BECOMING EASILY ANNOYED OR IRRITABLE: SEVERAL DAYS
5. BEING SO RESTLESS THAT IT IS HARD TO SIT STILL: SEVERAL DAYS
GAD7 TOTAL SCORE: 8
IF YOU CHECKED OFF ANY PROBLEMS ON THIS QUESTIONNAIRE, HOW DIFFICULT HAVE THESE PROBLEMS MADE IT FOR YOU TO DO YOUR WORK, TAKE CARE OF THINGS AT HOME, OR GET ALONG WITH OTHER PEOPLE: VERY DIFFICULT

## 2023-09-05 ENCOUNTER — OFFICE VISIT (OUTPATIENT)
Dept: FAMILY MEDICINE | Facility: OTHER | Age: 68
End: 2023-09-05
Attending: PHYSICIAN ASSISTANT
Payer: COMMERCIAL

## 2023-09-05 VITALS
DIASTOLIC BLOOD PRESSURE: 72 MMHG | HEART RATE: 49 BPM | SYSTOLIC BLOOD PRESSURE: 132 MMHG | WEIGHT: 169.5 LBS | OXYGEN SATURATION: 96 % | HEIGHT: 66 IN | BODY MASS INDEX: 27.24 KG/M2 | RESPIRATION RATE: 20 BRPM | TEMPERATURE: 96.8 F

## 2023-09-05 DIAGNOSIS — G89.29 CHEST WALL PAIN, CHRONIC: ICD-10-CM

## 2023-09-05 DIAGNOSIS — Z00.00 ENCOUNTER FOR MEDICAL EXAMINATION TO ESTABLISH CARE: Primary | ICD-10-CM

## 2023-09-05 DIAGNOSIS — G89.4 CHRONIC PAIN DISORDER: ICD-10-CM

## 2023-09-05 DIAGNOSIS — F41.9 CHRONIC ANXIETY: ICD-10-CM

## 2023-09-05 DIAGNOSIS — R07.89 CHEST WALL PAIN, CHRONIC: ICD-10-CM

## 2023-09-05 DIAGNOSIS — M54.50 CHRONIC BILATERAL LOW BACK PAIN WITHOUT SCIATICA: ICD-10-CM

## 2023-09-05 DIAGNOSIS — Z79.899 CONTROLLED SUBSTANCE AGREEMENT SIGNED: ICD-10-CM

## 2023-09-05 DIAGNOSIS — G89.29 CHRONIC BILATERAL LOW BACK PAIN WITHOUT SCIATICA: ICD-10-CM

## 2023-09-05 LAB — CREAT UR-MCNC: 52 MG/DL

## 2023-09-05 PROCEDURE — G0463 HOSPITAL OUTPT CLINIC VISIT: HCPCS

## 2023-09-05 PROCEDURE — 80353 DRUG SCREENING COCAINE: CPT | Mod: ZL | Performed by: PHYSICIAN ASSISTANT

## 2023-09-05 PROCEDURE — 99214 OFFICE O/P EST MOD 30 MIN: CPT | Performed by: PHYSICIAN ASSISTANT

## 2023-09-05 PROCEDURE — 80357 KETAMINE AND NORKETAMINE: CPT | Mod: ZL | Performed by: PHYSICIAN ASSISTANT

## 2023-09-05 PROCEDURE — 83992 ASSAY FOR PHENCYCLIDINE: CPT | Mod: ZL | Performed by: PHYSICIAN ASSISTANT

## 2023-09-05 RX ORDER — BUSPIRONE HYDROCHLORIDE 15 MG/1
15 TABLET ORAL 2 TIMES DAILY
COMMUNITY
Start: 2023-09-02 | End: 2023-10-03

## 2023-09-05 RX ORDER — HYDROCODONE BITARTRATE AND ACETAMINOPHEN 10; 325 MG/1; MG/1
1-2 TABLET ORAL EVERY 4 HOURS PRN
Qty: 180 TABLET | Refills: 0 | Status: SHIPPED | OUTPATIENT
Start: 2023-10-04 | End: 2023-11-03

## 2023-09-05 RX ORDER — HYDROCODONE BITARTRATE AND ACETAMINOPHEN 10; 325 MG/1; MG/1
1-2 TABLET ORAL EVERY 4 HOURS PRN
Qty: 180 TABLET | Refills: 0 | Status: SHIPPED | OUTPATIENT
Start: 2023-09-05 | End: 2023-10-05

## 2023-09-05 RX ORDER — CLONAZEPAM 1 MG/1
1 TABLET ORAL 3 TIMES DAILY PRN
Qty: 90 TABLET | Refills: 1 | Status: SHIPPED | OUTPATIENT
Start: 2023-09-05 | End: 2023-10-30

## 2023-09-05 ASSESSMENT — PATIENT HEALTH QUESTIONNAIRE - PHQ9
10. IF YOU CHECKED OFF ANY PROBLEMS, HOW DIFFICULT HAVE THESE PROBLEMS MADE IT FOR YOU TO DO YOUR WORK, TAKE CARE OF THINGS AT HOME, OR GET ALONG WITH OTHER PEOPLE: EXTREMELY DIFFICULT
SUM OF ALL RESPONSES TO PHQ QUESTIONS 1-9: 7
SUM OF ALL RESPONSES TO PHQ QUESTIONS 1-9: 7

## 2023-09-05 ASSESSMENT — PAIN SCALES - GENERAL: PAINLEVEL: SEVERE PAIN (7)

## 2023-09-05 NOTE — PATIENT INSTRUCTIONS
"Refill on medications today for 2 months - follow up with Sheri on 11/3/23 at 920 AM    We updated your urine drug screen - this is done once a year  We updated your controlled substance agreement - this is done once a year    Narcotic and Controlled Substance Contract information    Today:   -- Goals: help to complete activities of daily living and quality of life   -- Consider taking the class \"Living well with Chronic Pain\" at the [facility]. (http://[email or web address] or [phone])   -- Utilize non-narcotic options including: acetaminophen, physical therapy, home exercise program, counseling, etc.     -- Controlled substance agreement:   [unfilled]     -- [place] Prescription Monitoring Program was reviewed today.    -- Last urine drug screen was acceptable.      -- A prescription for the following medications was provided today:  Drug Quantity/Month Months Rx today   Hydrocodone  Clonazepam 180  90 2  2      -- Total Daily MME (morphine equivalents):  MME/Day    -- Follow-up in 2 months (approximately 12/3/23) for office visit dedicated to Schedule II medications.    Patient Education     Facts:  Every day, 44 people die in the [place] from overdose of prescription painkillers. (1)  Prescription drug overdose kills more people than car crashes each year.(2, 3)  Deaths from prescription painkillers among women is up 400%,and up 265% for men. (4)  Long-term use of prescription painkillers increases your risk for heart attack, broken bones, impotence (5)  Your family members are more likely to overdose on narcotics if they are prescribed to you (6)    By signing a medication contract, you accept these risks and side effects.    Be aware that the use of such medicine has certain risks associated with it, including, but not limited to:  Sleepiness or drowsiness, constipation, nausea, itching, vomiting, lightheadedness, dizziness, confusion, allergic reaction, slowing of breathing rate, slowing of reflexes or reaction " time, kidney or liver disease, sexual dysfunction, physical dependence, tolerance to analgesia, addiction, withdrawal and the possibility that the medicine will not provide complete relief.  Any narcotic usage is associate with a significantly increased risk of falls and other unintended injuries, respiratory depression and increased risk for death.    I recommend against operating motor vehicles or heavy machinery when using this medication.  Store your medication in a secured, locked container, such as a safe or lockbox.  If any medication needs to be disposed, bring to the [facility] outpatient pharmacy and place in the drop box for secure disposal.  This medication will be strictly monitored and all of my medications should be filled at the same pharmacy.  It is your responsibility to share that you are on a narcotic contract with your other health care providers, including your dentist.  If you come into clinic for a dose adjustment, always bring your remaining pills and remaining hard copies of prescriptions.    References  1. CDC. http://www.cdc.gov/drugoverdose/epidemic/public.html  2. CDC. http://www.cdc.gov/drugoverdose/data/overdose.html  3. [place].http://strib.mn/5g9kctv  4. [facility]. http://www.cdc.gov/vitalsigns/PrescriptionPainkillerOverdoses/index.html  5. Annals of Internal Medicine  Vol. 162 No. 4  17 February 2015  6. [employer]. https://jamanetwork.com/journals/jamainternalmedicine/fullarticle/9408263. 6/24/2019      Controlled Substances:    You have been prescribed a medication that is a controlled substance.  These medications must be closely monitored under Federal Law.     Controlled substances may cause you to become dependent or addicted to them.  The longer you take them, the more likely it is that you will suffer withdrawal symptoms when you stop the medication. This may also cause you to become tolerant to the medication, which means that it will become less effective.    It is important  to notify your doctor of any side effects from the medication you are taking.  Common side effects are constipation, drowsiness, dizziness, itching, and nausea and vomiting.      The medication may impair your thinking.  Do not take it prior to driving or using machinery.  Do not take it in combination with alcohol or other sedating substances.  It is important that you follow the instructions on the prescription bottle.  Do not increase the dosage unless instructed by the clinic.    We do not refill lost, stolen or damaged prescriptions for controlled substances.     We do not refill controlled substance prescriptions after normal clinic hours or on weekends.

## 2023-09-05 NOTE — PROGRESS NOTES
Assessment & Plan     1. Chronic anxiety  2. Controlled substance agreement signed  - clonazePAM (KLONOPIN) 1 MG tablet; Take 1 tablet (1 mg) by mouth 3 times daily as needed for anxiety Max 3 per day  Dispense: 90 tablet; Refill: 1  - clonazePAM (KLONOPIN) 1 MG tablet; Take 1 tablet (1 mg) by mouth 3 times daily as needed for anxiety Max 3 per day  Dispense: 90 tablet; Refill: 1  - CSA and UDS updated today.   - Patient has a long history of anxiety of severe severity requiring medication for control.  Recent symptoms include: insomnia, fatigue, anxiety. Anxiety course: waxing and waning.    - Stable on current dosing, Klonopin 1 mg TID dosing. Low risk for diversion. CSA and UDS due for update today (as at 1 year malachi and transitioning of PCP), these were updated today.   - Due for follow up in 2 months.   - Patient is in agreement and understanding of the above treatment plan. All questions and concerns were addressed and answered to patient's satisfaction. AVS reviewed with patient.     2. Controlled substance agreement signed  3. Chronic pain disorder  4. Chest wall pain, chronic  5. Chronic bilateral low back pain without sciatica  - HYDROcodone-acetaminophen (NORCO)  MG per tablet; Take 1-2 tablets by mouth every 4 hours as needed for severe pain Max 6 per day  Dispense: 180 tablet; Refill: 0  - HYDROcodone-acetaminophen (NORCO)  MG per tablet; Take 1-2 tablets by mouth every 4 hours as needed for severe pain Max 6 per day  Dispense: 180 tablet; Refill: 0  - Drug Confirmation Panel Urine with Creatinine  Verbal consent obtained (And Chronic Pain Agreement Signed) from patient for chronic pain management (CPM): Yes   - Follow-up appointments for chronic pain management may vary from 1 month to 3+ months - depending on problems/medications being managed.     Maintenance of person-centered care plan - goals, personal strengths, clinical needs, desired outcomes: Ongoing.   How is patient  functioning/improving/not improving with the current pain protocol / RX:   - What is patient unable to do or has difficulty completing because of pain: walking, standing, lifting, cleaning house  - How has the current pain protocol / RX helped: allowed to perform more activities  Adequate pain management helps to improve quality of life and performing ADLs independently.   - Encouraged regular stretching, walking, exercise. Healthy meals and diet.     Facilitation and coordination of any necessary behavorial health treatment: Ongoing.   - Encouraged counseling and behavorial health management to help with chronic pain, and if any variable anxiety / depression symptoms develop.     Communication and care coordination between relevant practitioners furnishing care (PT/OT, complementary and integrative approaches, community-based care): Ongoing.     PDMP Review         Value Time User    State PDMP site checked  Yes 9/5/2023  9:28 AM Sheri Cordon PA-C          Last CSA Agreement:   CSA -- Patient Level:     [Media Unavailable] Controlled Substance Agreement - Opioid - Scan on 9/5/2023  9:45 AM   [Media Unavailable] Controlled Substance Agreement - Opioid - Scan on 9/7/2022 11:36 AM   [Media Unavailable] Controlled Substance Agreement - Opioid - Scan on 9/29/2021  2:26 PM       Last UDS: 10/13/2022    - Due for follow up in 2 months.   - Patient is in agreement and understanding of the above treatment plan. All questions and concerns were addressed and answered to patient's satisfaction. AVS reviewed with patient.     6. Encounter for medical examination to establish care  - Updated in chart today    See Patient Instructions    Return in about 8 weeks (around 10/31/2023) for Medication follow up.    Sheri Cordon PA-C  [facility]    Subjective   Malina is a 68 year old, presenting for the following health issues:  Medication Follow-up (Medication management, establish care with Sehri KASPER  Neris HIDALGO )        8/2/2023     9:18 AM   Additional Questions   Roomed by Morelia boggs       Pain History:  When did you first notice your pain? - Chronic pain: Persistent or recurrent pain lasting longer than 3 months.   Have you seen this provider for your pain in the past? Yes   Where in your body do you have pain? Knees, hips  Are you seeing anyone else for your pain? No        9/5/2023     8:45 AM   Last PHQ-9   1.  Little interest or pleasure in doing things 1   2.  Feeling down, depressed, or hopeless 0   3.  Trouble falling or staying asleep, or sleeping too much 0   4.  Feeling tired or having little energy 2   5.  Poor appetite or overeating 3   6.  Feeling bad about yourself 0   7.  Trouble concentrating 1   8.  Moving slowly or restless 0   Q9: Thoughts of better off dead/self-harm past 2 weeks 0   PHQ-9 Total Score 7         9/3/2023     1:24 PM   YAYA-7    1. Feeling nervous, anxious, or on edge 2   2. Not being able to stop or control worrying 1   3. Worrying too much about different things 1   4. Trouble relaxing 1   5. Being so restless that it is hard to sit still 1   6. Becoming easily annoyed or irritable 1   7. Feeling afraid, as if something awful might happen 1   YAYA-7 Total Score 8   If you checked any problems, how difficult have they made it for you to do your work, take care of things at home, or get along with other people? Very difficult         11/9/2022    10:11 AM 7/7/2023    10:23 AM 9/5/2023     9:25 AM   PEG Score   PEG Total Score 7.33 7.33 6.67      Chronic Pain Follow Up:    Location of pain: neck, low back, bilateral knees and shoulders  Analgesia/pain control:    - Recent changes:  worse with cold weather    - Overall control: Tolerable with discomfort    - Current treatments: Norco, Tylenol, Voltaren gel   Adherence:     - Do you ever take more pain medicine than prescribed? No    - When did you take your last dose of pain medicine? Today   Adverse effects: No     Depression  and Anxiety Follow-Up  How are you doing with your depression since your last visit? Waxing and waning  How are you doing with your anxiety since your last visit?  Worsened  Are you having other symptoms that might be associated with depression or anxiety? No  Have you had a significant life event? No   Do you have any concerns with your use of alcohol or other drugs? No  BuSpar decreased from 30 mg twice daily to 15 mg twice daily - starting today - managed by medication provider    Social History     Tobacco Use    Smoking status: Former     Packs/day: 1.00     Years: 35.00     Pack years: 35.00     Types: Cigarettes     Quit date: 2/15/2017     Years since quittin.5     Passive exposure: Past    Smokeless tobacco: Never   Vaping Use    Vaping Use: Never used   Substance Use Topics    Alcohol use: Not Currently     Alcohol/week: 0.0 standard drinks of alcohol    Drug use: No         2023     9:23 AM 2023     3:31 PM 2023     8:45 AM   PHQ   PHQ-9 Total Score 11 11 7   Q9: Thoughts of better off dead/self-harm past 2 weeks Not at all Not at all Not at all         2023     9:11 AM 2023     3:38 PM 9/3/2023     1:24 PM   YAYA-7 SCORE   Total Score 10 (moderate anxiety) 14 (moderate anxiety) 8 (mild anxiety)   Total Score 10 14 8         2023     8:45 AM   Last PHQ-9   1.  Little interest or pleasure in doing things 1   2.  Feeling down, depressed, or hopeless 0   3.  Trouble falling or staying asleep, or sleeping too much 0   4.  Feeling tired or having little energy 2   5.  Poor appetite or overeating 3   6.  Feeling bad about yourself 0   7.  Trouble concentrating 1   8.  Moving slowly or restless 0   Q9: Thoughts of better off dead/self-harm past 2 weeks 0   PHQ-9 Total Score 7         9/3/2023     1:24 PM   YAYA-7    1. Feeling nervous, anxious, or on edge 2   2. Not being able to stop or control worrying 1   3. Worrying too much about different things 1   4. Trouble relaxing 1   5.  "Being so restless that it is hard to sit still 1   6. Becoming easily annoyed or irritable 1   7. Feeling afraid, as if something awful might happen 1   YAYA-7 Total Score 8   If you checked any problems, how difficult have they made it for you to do your work, take care of things at home, or get along with other people? Very difficult     Review of Systems   Constitutional, HEENT, cardiovascular, pulmonary, GI, , musculoskeletal, neuro, skin, endocrine and psych systems are negative, except as otherwise noted.      Objective    /72 (BP Location: Right arm, Patient Position: Sitting, Cuff Size: Adult Regular)   Pulse (!) 49   Temp 96.8  F (36  C) (Tympanic)   Resp 20   Ht 1.664 m (5' 5.5\")   Wt 76.9 kg (169 lb 8 oz)   LMP 04/29/1978 (Approximate)   SpO2 96%   BMI 27.78 kg/m    Body mass index is 27.78 kg/m .  Physical Exam   GENERAL: healthy, alert and no distress  EYES: Eyes grossly normal to inspection, PERRL and conjunctivae and sclerae normal  NECK: no adenopathy, no asymmetry, masses, or scars and thyroid normal to palpation  RESP: lungs clear to auscultation - no rales, rhonchi or wheezes  CV: regular rate and rhythm, normal S1 S2, no S3 or S4, no murmur, click or rub, no peripheral edema and peripheral pulses strong  SKIN: no suspicious lesions or rashes  NEURO: Normal strength and tone, mentation intact and speech normal  PSYCH: mentation appears normal, affect normal/bright    Results for orders placed or performed in visit on 09/05/23   Drug Confirmation Panel Urine with Creatinine     Status: None ()    Narrative    The following orders were created for panel order Drug Confirmation Panel Urine with Creatinine.  Procedure                               Abnormality         Status                     ---------                               -----------         ------                     Urine Drug Confirmation ...[[id number]]                                                 Urine Creatinine for " Mary.[[id number]]                                                   Please view results for these tests on the individual orders.         Answers submitted by the patient for this visit:  Patient Health Questionnaire (Submitted on 9/5/2023)  If you checked off any problems, how difficult have these problems made it for you to do your work, take care of things at home, or get along with other people?: Extremely difficult  PHQ9 TOTAL SCORE: 7  YAYA-7 (Submitted on 9/3/2023)  YAYA 7 TOTAL SCORE: 8  Depression / Anxiety Questionnaire (Submitted on 9/3/2023)  Chief Complaint: Chronic problems general questions HPI Form  Depression/Anxiety: Depression & Anxiety  Depression & Anxiety (Submitted on 9/3/2023)  Chief Complaint: Chronic problems general questions HPI Form  Status since last visit:: medium  Anxiety since last: : worse  Other associated symptoms of depression:: Yes  Other associated symotome: : No  Significant life event: : health concerns  Anxious:: Yes  Current substance use:: No  Back Pain Visit Questionnaire (Submitted on 9/3/2023)  Your back pain is: recurring  Chronic or Recurring Back Pain Visit Questionnaire (Submitted on 9/3/2023)  Where is your back pain located? : other  How would you describe your back pain? : fullness, stabbing  Where does your back pain spread? : right knee, left knee  Since you noticed your back pain, how has it changed? : always present, but gets better and worse  Does your back pain interfere with your job?: Yes  General Questionnaire (Submitted on 9/3/2023)  Chief Complaint: Chronic problems general questions HPI Form  What is the reason for your visit today? : Med. Check, Back, IBS?, Nausea  How many servings of fruits and vegetables do you eat daily?: 0-1  On average, how many sweetened beverages do you drink each day (Examples: soda, juice, sweet tea, etc.  Do NOT count diet or artificially sweetened beverages)?: 0  How many minutes a day do you exercise enough to make your  heart beat faster?: 9 or less  How many days a week do you exercise enough to make your heart beat faster?: 7  How many days per week do you miss taking your medication?: 0

## 2023-09-05 NOTE — NURSING NOTE
Patient presents to clinic for medication management and to establish care appointment with Sheri Cordon PA-C.  She is experiencing all over back and bilateral knee pain rated at 7.  Medication Reconciliation: Complete    Kathleen Macedo LPN  9/5/2023 9:01 AM

## 2023-09-05 NOTE — LETTER
Opioid / Opioid Plus Controlled Substance Agreement    This is an agreement between you and your provider about the safe and appropriate use of controlled substance/opioids prescribed by your care team. Controlled substances are medicines that can cause physical and mental dependence (abuse).    There are strict laws about having and using these medicines. We here at [facility] are committing to working with you in your efforts to get better. To support you in this work, we ll help you schedule regular office appointments for medicine refills. If we must cancel or change your appointment for any reason, we ll make sure you have enough medicine to last until your next appointment.     As a Provider, I will:  Listen carefully to your concerns and treat you with respect.   Recommend a treatment plan that I believe is in your best interest. This plan may involve therapies other than opioid pain medication.   Talk with you often about the possible benefits, and the risk of harm of any medicine that we prescribe for you.   Provide a plan on how to taper (discontinue or go off) using this medicine if the decision is made to stop its use.    As a Patient, I understand that opioid(s):   Are a controlled substance prescribed by my care team to help me function or work and manage my condition(s).   Are strong medicines and can cause serious side effects such as:  Drowsiness, which can seriously affect my driving ability  A lower breathing rate, enough to cause death  Harm to my thinking ability   Depression   Abuse of and addiction to this medicine  Need to be taken exactly as prescribed. Combining opioids with certain medicines or chemicals (such as illegal drugs, sedatives, sleeping pills, and benzodiazepines) can be dangerous or even fatal. If I stop opioids suddenly, I may have severe withdrawal symptoms.  Do not work for all types of pain nor for all patients. If they re not helpful, I may be asked to stop  them.    I am also being prescribed a benzodiazepine (tranquilizer) controlled substance: I understand this type of medicine is sedating and can increase the risk of death when taken together with opioids. I have talked to my care team about having prescription Narcan available for reversing the opioid medicine and have been instructed in its use. I will be very careful to take my medicines only as directed    The risks, benefits and side effects of these medicine(s) were explained to me. I agree that:  I will take part in other treatments as advised by my care team. This may be psychiatry or counseling, physical therapy, behavioral therapy, group treatment or a referral to a specialist.     I will keep all my appointments. I understand that this is part of the monitoring of opioids. My care team may require an office visit for EVERY opioid/controlled substance refill. If I miss appointments or don t follow instructions, my care team may stop my medicine.    I will take my medicines as prescribed. I will not change the dose or schedule unless my care team tells me to. There will be no refills if I run out early.     I may be asked to come to the clinic and complete a urine drug test or complete a pill count at any time. If I don t give a urine sample or participate in a pill count, the care team may stop my medicine.    I will only receive prescriptions from this clinic for chronic pain. If I am treated by another provider for acute pain issues, I will tell them that I am taking opioid pain medication for chronic pain and that I have a treatment agreement with this provider. I will inform my [facility] care team within one business day if I am given a prescription for any pain medication by another healthcare provider. My [facility] care team can contact other providers and pharmacists about my use of any medicines.    It is up to me to make sure that I don t run out of my medicines on weekends or  holidays. If my care team is willing to refill my opioid prescription without a visit, I must request refills only during office hours. Refills may take up to 3 business days to process. I will use one pharmacy to fill all my opioid and other controlled substance prescriptions. I will notify the clinic about any changes to my insurance or medication availability.    I am responsible for my prescriptions. If the medicine/prescription is lost, stolen or destroyed, it will not be replaced. I also agree not to share controlled substance medicines with anyone.    I am aware I should not use any illegal or recreational drugs. I agree not to drink alcohol unless my care team says I can.       If I enroll in the [place] [facility] program, I will tell my care team prior to my next refill.     I will tell my care team right away if I become pregnant, have a new medical problem treated outside of my regular clinic, or have a change in my medications.    I understand that this medicine can affect my thinking, judgment and reaction time. Alcohol and drugs affect the brain and body, which can affect the safety of my driving. Being under the influence of alcohol or drugs can affect my decision-making, behaviors, personal safety, and the safety of others. Driving while impaired (DWI) can occur if a person is driving, operating, or in physical control of a car, motorcycle, boat, snowmobile, ATV, motorbike, off-road vehicle, or any other motor vehicle ([place] Statute 169A.20). I understand the risk if I choose to drive or operate any vehicle or machinery.    I understand that if I do not follow any of the conditions above, my prescriptions or treatment may be stopped or changed.          Opioids  What You Need to Know    What are opioids?   Opioids are pain medicines that must be prescribed by a doctor. They are also known as narcotics.     Examples are:   morphine (MS Contin, Felicia)  oxycodone (Oxycontin)  oxycodone and  acetaminophen (Percocet)  hydrocodone and acetaminophen (Vicodin, Norco)   fentanyl patch (Duragesic)   hydromorphone (Dilaudid)   methadone  codeine (Tylenol #3)     What do opioids do well?   Opioids are best for severe short-term pain such as after a surgery or injury. They may work well for cancer pain. They may help some people with long-lasting (chronic) pain.     What do opioids NOT do well?   Opioids never get rid of pain entirely, and they don t work well for most patients with chronic pain. Opioids don t reduce swelling, one of the causes of pain.                                    Other ways to manage chronic pain and improve function include:     Treat the health problem that may be causing pain  Anti-inflammation medicines, which reduce swelling and tenderness, such as ibuprofen (Advil, Motrin) or naproxen (Aleve)  Acetaminophen (Tylenol)  Antidepressants and anti-seizure medicines, especially for nerve pain  Topical treatments such as patches or creams  Injections or nerve blocks  Chiropractic or osteopathic treatment  Acupuncture, massage, deep breathing, meditation, visual imagery, aromatherapy  Use heat or ice at the pain site  Physical therapy   Exercise  Stop smoking  Take part in therapy       Risks and side effects     Talk to your doctor before you start or decide to keep taking opioids. Possible side effects include:    Lowering your breathing rate enough to cause death  Overdose, including death, especially if taking higher than prescribed doses  Worse depression symptoms; less pleasure in things you usually enjoy  Feeling tired or sluggish  Slower thoughts or cloudy thinking  Being more sensitive to pain over time; pain is harder to control  Trouble sleeping or restless sleep  Changes in hormone levels (for example, less testosterone)  Changes in sex drive or ability to have sex  Constipation  Unsafe driving  Itching and sweating  Dizziness  Nausea, throwing up and dry mouth    What else  should I know about opioids?    Opioids may lead to dependence, tolerance, or addiction.    Dependence means that if you stop or reduce the medicine too quickly, you will have withdrawal symptoms. These include loose poop (diarrhea), jitters, flu-like symptoms, nervousness and tremors. Dependence is not the same as addiction.                     Tolerance means needing higher doses over time to get the same effect. This may increase the chance of serious side effects.    Addiction is when people improperly use a substance that harms their body, their mind or their relations with others. Use of opiates can cause a relapse of addiction if you have a history of drug or alcohol abuse.    People who have used opioids for a long time may have a lower quality of life, worse depression, higher levels of pain and more visits to doctors.    You can overdose on opioids. Take these steps to lower your risk of overdose:    Recognize the signs:  Signs of overdose include decrease or loss of consciousness (blackout), slowed breathing, trouble waking up and blue lips. If someone is worried about overdose, they should call 911.    Talk to your doctor about Narcan (naloxone).   If you are at risk for overdose, you may be given a prescription for Narcan. This medicine very quickly reverses the effects of opioids.   If you overdose, a friend or family member can give you Narcan while waiting for the ambulance. They need to know the signs of overdose and how to give Narcan.     Don't use alcohol or street drugs.   Taking them with opioids can cause death.    Do not take any of these medicines unless your doctor says it s OK. Taking these with opioids can cause death:  Benzodiazepines, such as lorazepam (Ativan), alprazolam (Xanax) or diazepam (Valium)  Muscle relaxers, such as cyclobenzaprine (Flexeril)  Sleeping pills like zolpidem (Ambien)   Other opioids      How to keep you and other people safe while taking opioids:    Never share  your opioids with others.  Opioid medicines are regulated by the Drug Enforcement Agency (RAVI). Selling or sharing medications is a criminal act.    2. Be sure to store opioids in a secure place, locked up if possible. Young children can easily swallow them and overdose.    3. When you are traveling with your medicines, keep them in the original bottles. If you use a pill box, be sure you also carry a copy of your medicine list from your clinic or pharmacy.    4. Safe disposal of opioids    Most pharmacies have places to get rid of medicine, called disposal kiosks. Medicine disposal options are also available in every [facility]. Search your [facility] and  medication disposal  to find more options. You can find more details at:  https://[email or web address].[facility].[email or web address]/living-green/managing-unwanted-medications     I agree that my provider, clinic care team, and pharmacy may work with any city, state or federal law enforcement agency that investigates the misuse, sale, or other diversion of my controlled medicine. I will allow my provider to discuss my care with, or share a copy of, this agreement with any other treating provider, pharmacy or emergency room where I receive care.    I have read this agreement and have asked questions about anything I did not understand.    _______________________________________________________  Patient Signature - Ankita Day _____________________                   Date     _______________________________________________________  Provider Signature - Sheri Cordon PA-C   _____________________                   Date     _______________________________________________________  Witness Signature (required if provider not present while patient signing)   _____________________                   Date

## 2023-09-05 NOTE — PROGRESS NOTES
"  {PROVIDER CHARTING PREFERENCE:619956}    Subjective   Malina is a 68 year old, presenting for the following health issues:  Medication Follow-up (Medication management, establish care with Sheri Cordon PA-C/ )  {(!) Visit Details have not yet been documented.  Please enter Visit Details and then use this list to pull in documentation. (Optional):850420}    History of Present Illness       Back Pain:  She presents for follow up of back pain. Patient's back pain is a recurring problem.  Location of back pain:  Other  Description of back pain: fullness and stabbing  Back pain spreads: right knee and left knee    Since patient first noticed back pain, pain is: always present, but gets better and worse  Does back pain interfere with her job:  Yes       Mental Health Follow-up:  Patient presents to follow-up on Depression & Anxiety.Patient's depression since last visit has been:  Medium  The patient is having other symptoms associated with depression.  Patient's anxiety since last visit has been:  Worse  The patient is not having other symptoms associated with anxiety.  Any significant life events: health concerns  Patient is feeling anxious or having panic attacks.  Patient has no concerns about alcohol or drug use.    Reason for visit:  Med. Check, Back, IBS?, Nausea    She eats 0-1 servings of fruits and vegetables daily.She consumes 0 sweetened beverage(s) daily.She exercises with enough effort to increase her heart rate 9 or less minutes per day.  She exercises with enough effort to increase her heart rate 7 days per week.   She is taking medications regularly.       {SUPERLIST (Optional):868507}  {additonal problems for provider to add (Optional):970011}      Review of Systems   {ROS COMP (Optional):070422}      Objective    /72 (BP Location: Right arm, Patient Position: Sitting, Cuff Size: Adult Regular)   Pulse (!) 49   Temp 96.8  F (36  C) (Tympanic)   Resp 20   Ht 1.664 m (5' 5.5\")   Wt 76.9 kg " (169 lb 8 oz)   LMP 04/29/1978 (Approximate)   SpO2 96%   BMI 27.78 kg/m    Body mass index is 27.78 kg/m .  Physical Exam   {Exam List (Optional):986000}    {Diagnostic Test Results (Optional):988142}    {AMBULATORY ATTESTATION (Optional):879893}

## 2023-09-07 LAB
7AMINOCLONAZEPAM UR QL CFM: PRESENT
DHC UR CFM-MCNC: 670 NG/ML
DHC/CREAT UR: 1288 NG/MG {CREAT}
HYDROCODONE UR CFM-MCNC: 2620 NG/ML
HYDROCODONE/CREAT UR: 5038 NG/MG {CREAT}
HYDROMORPHONE UR CFM-MCNC: 2400 NG/ML
HYDROMORPHONE/CREAT UR: 4615 NG/MG {CREAT}

## 2023-10-03 ENCOUNTER — OFFICE VISIT (OUTPATIENT)
Dept: CARDIOLOGY | Facility: OTHER | Age: 68
End: 2023-10-03
Attending: INTERNAL MEDICINE
Payer: COMMERCIAL

## 2023-10-03 VITALS
OXYGEN SATURATION: 94 % | DIASTOLIC BLOOD PRESSURE: 60 MMHG | SYSTOLIC BLOOD PRESSURE: 120 MMHG | RESPIRATION RATE: 18 BRPM | TEMPERATURE: 98.2 F | HEIGHT: 63 IN | BODY MASS INDEX: 29.95 KG/M2 | WEIGHT: 169 LBS | HEART RATE: 54 BPM

## 2023-10-03 DIAGNOSIS — I47.10 PAROXYSMAL SUPRAVENTRICULAR TACHYCARDIA (H): ICD-10-CM

## 2023-10-03 DIAGNOSIS — I25.9 CHRONIC ISCHEMIC HEART DISEASE: ICD-10-CM

## 2023-10-03 DIAGNOSIS — I10 ESSENTIAL HYPERTENSION: ICD-10-CM

## 2023-10-03 DIAGNOSIS — I25.10 ASCVD (ARTERIOSCLEROTIC CARDIOVASCULAR DISEASE): ICD-10-CM

## 2023-10-03 DIAGNOSIS — F41.9 CHRONIC ANXIETY: ICD-10-CM

## 2023-10-03 DIAGNOSIS — R00.2 PALPITATIONS: ICD-10-CM

## 2023-10-03 DIAGNOSIS — E78.2 MIXED HYPERLIPIDEMIA: ICD-10-CM

## 2023-10-03 DIAGNOSIS — F41.0 PANIC ATTACK: Primary | ICD-10-CM

## 2023-10-03 PROCEDURE — G0463 HOSPITAL OUTPT CLINIC VISIT: HCPCS

## 2023-10-03 PROCEDURE — 99215 OFFICE O/P EST HI 40 MIN: CPT | Performed by: INTERNAL MEDICINE

## 2023-10-03 RX ORDER — BUSPIRONE HYDROCHLORIDE 30 MG/1
30 TABLET ORAL 2 TIMES DAILY
Qty: 180 TABLET | Refills: 3 | Status: SHIPPED | OUTPATIENT
Start: 2023-10-03

## 2023-10-03 ASSESSMENT — PAIN SCALES - GENERAL: PAINLEVEL: NO PAIN (0)

## 2023-10-03 NOTE — PROGRESS NOTES
[facility] HEART CARE   CARDIOLOGY PROGRESS NOTE     Chief Complaint   Patient presents with    Follow Up     test          Diagnosis:  1. Cath on 9/25/17, [facility], stable dz.   -LIMA open but occluded RAFI/SVG.    -LM irregularities, ramus 40%, LAD 30%, LCx 30%, and RCA 40%.  2. CORTEZ.  3. CABG x3-2/12/2003.  LIMA to LAD, RAFI to RCA, and SVG to OM.    4. Palpitations.  SVE/VE on Zio from 3/27/2023.  5. PE on 1/2/20.  RLL, MICHELLE and LLL.  6. COPD-Normal on 1/11/2022.  7. HTN-controlled  8. Lightheadedness-episodic.  9.  Cardiac cath on 9/25/2017-[facility].  10. Iron deficiency anemia-resolved.  11.  CVA-Mild chronic ischemic cerebral disease on 8/20/2021.  12. Left-sided subclavian steal syndrome. Stenting with patency as noted on 9/15/17.  13.  Tobacco abuse, quitting 8/23/17.   14.  H/O alcohol abuse.  15.  Hyperlipidemia-uncontrolled.  16.  Bradycardia with the slowest heart rate of 50 bpm on 7/30/2021.  17.  Dizziness/lightheadedness secondary to dehydration.  18.  B12 deficiency 313 on 3/18/2020.  19.  Vitamin D deficiency.  20.  CKD-3.       Assessment/Plan:    1.  Patient has been describing dyspnea exertion, palpitations, weakness, and other symptoms.  She has a rather significant history of anxiety.  She describes a history of PTSD.  Was concerned her symptoms are related to anxiety.  She has done better on Klonopin 3-4 times. BuSpar 15 mg twice a day was increased to 30 mg twice a day at her last visit.  With this medication change, her symptoms improved substantially.  Her dose was dropped back to 50 mg twice a day.  She has had increasing symptoms as described above.  We will plan to increase BuSpar back to 30 mg twice a day as she was doing better on it.    2.  Reviewed her echocardiogram.  Echocardiogram shows diastolic dysfunction which potentially explains her shortness of breath.  3.  CHF: Likely diastolic in nature.  Indeterminate on echocardiogram 7/31/2023.  Findings discussed.  Increase activity, watch  "salt consumption, and fluid consumption.  Not having significant swelling concerns for fluid retention.  4.  CAD: History of bypass in 2003 with cardiac catheterization in 2017 at the [facility].  Not having chest pain or chest tightness.  Patient overall is doing well.  Continue medical management.  5.  CORTEZ: Is dyspneic on exertion.  I suspect it is related to diastolic dysfunction.  Discussed salt restriction, fluid restriction, and increasing her activity.  Could consider Entresto/SGLT2 inhibitor in the future.  6.  Follow-up in 6 months or sooner with issues.          Interval history:  See above.    HPI:    Ms. Day is being seen by cardiology in follow-up.  Patient has a history of chronic stable angina, known ischemic heart disease, hypertension, hyperlipidemia, history of pulmonary embolism, left subclavian artery stenosis, anxiety, collagenous colitis, depression, osteoarthritis, history of tobacco use, central sleep apnea for which she is not compliant with CPAP use, iron deficiency anemia, CKD, myofascial pain, vitamin D deficiency, lumbar facet arthropathy, history of gastric ulcer with hemorrhage, COPD and peptic ulcer disease.    Malina continues to endorse dizziness.  She states when she gets up she starts to feel dizzy like being off balance, will have a headache, her heart will pound and pulse increase.  She will be short of breath.  At times she has chest discomfort.  This has been going on for 1 to 2 years.      She was seen in the ER and admitted for a day on 7/16/2021.  She was felt to have symptomatic bradycardia/hypotension.  She felt \"woozy\" with a blood pressure of 77/52 at home.  She reports getting fluids and feeling much better, resolution of symptoms.  She felt her symptoms had gotten worse over the last 2 to 3 weeks.  Heart rates were noted to be between 49-52 in the ER.  Troponin negative and EKG showed heart rates in the 50's.    She was seen back in the ED on 7/22/2021 for " lightheadedness once again.  She actually fell and was having concerning pain in her lungs.  She has a history of a PE and is on Xarelto.  No identifiable cause was found.  There was concern that her symptoms may be anxiety related.    Today, she reports continuing to feel these symptoms of dizziness.  She has a hard time describing them.  She feels her heart pounding, she has a headache, she is dizzy, she gets chest pain.  She also continues to be dyspneic on exertion.  She has been using nitro regularly with some relief.  She has a history of asthma and suspected COPD.  She has seen relief with nitro as well as the albuterol.  She was on Symbicort in the past with benefit but was discontinued for unknown reasons.  She drinks 2 glasses of water and 3 cups of coffee a day.  I believe she is chronically dehydrated which plays a part in her symptoms.  She has tried to increase her fluid intake.  She is to double and preferably triple her fluid intake.  She should cut back/discontinue coffee intake.  With symptoms of chest pain, shortness of breath, and history of blood clot, will plan for VQ scan.  We will also get a chest x-ray.  She describes regular palpitations and acceleration in her heart rates.  We will plan for Zio patch.  She did have a MCOT on 7/30/2021.  Both asymptomatic and symptomatic events include sinus bradycardia, sinus rhythm, sinus tachycardia, occasional PVC's and PAC's.  No other significant rhythms were identified.  We discussed Eliquis versus Xarelto.  It was decided we switched to Eliquis as I seen less bleeding on this medication compared to Xarelto.  Related to shortness of breath, history of asthma, and presumptive COPD, referral was placed to pulmonary.  Had intended to prescribe Spiriva but she is allergic and this was not prescribed.  Patient also describes weakness.  She has been using nitro with resolution of her chest pain.  She does have a history of bypass but had a cath in 2017  "with only mild disease followed.  I am concerned at this point.  I am concerned that her chest discomfort is more of a lung issue than a heart issue.     Patient has a known history of ischemic heart disease, she underwent  coronary angiogram and bypass angiogram on 9/15/2017 which was described as having stable disease. Mild to moderate 3 vessel CAD involving RCA, LAD and LCX with <50% stenosis at the greatest.  Occluded RAFI and SVG.  Patent LIMA.  No new obstructive lesions were identified and normal left heart cath.       She has a history of CABG on 2/12/03 x 3, history of multiple stent placements, patient reported 17 total.       At previous visit patient reported occasional recurrence of chest pain, not as severe as last ED visit on 10/25/19. She reported \"my breathing has been bad\", describes worsening CORTEZ. She described activity intolerance and little to no energy. Recommended repeat stress testing. NM Lexiscan stress test was performed on 12/16/19 which was negative for inducible myocardial ischemia or infarction.  Left ventricular function was normal.     Carotid US on 12/12/19 without significant stenosis, mild atherosclerotic disease present.  Abdominal ultrasound on 12/12/2018 with no abdominal aortic aneurysm identified.     Patient returned to the ED with dyspnea in early January 2020. She was identified to have small segmental and subsegmental emboli bilaterally. Repeat imaging on 1/6 with decreasing volume of pulmonary emboli compared to scan on 1/2/2020. She was initially treated with Lovenox in the ED and discharged on Xarelto oral anticoagulation which has been going well for her, no bleeding complication. She also remains on Plavix with her significant ischemic heart disease history.      She presented to the ED on 3/16/2021 with reports of chest pain, chronic stable angina.  No ACS.  EKG stable and no elevation in troponin's. Patient admits that her BP was low and it was suspected she was " dehydrated, she felt better following IV fluids and reports that this likely brought on her angina.  She is currently working on maintaining good hydration.  She denies any recurrence of acute chest pain or pressure today.  No use of nitroglycerin since her recent ED visit.      Relevant testing:  Echocardiogram on 7/31/2023:  Global and regional left ventricular function is normal with an EF of 55-60%.  Left ventricular diastolic function is indeterminate.  Mild concentric wall thickening consistent with left ventricular hypertrophy  is present.  Global right ventricular function is normal.  No significant valvular abnormalities present.  IVC diameter <2.1 cm collapsing >50% with sniff suggests a normal RA pressure of 3 mmHg.  No pericardial effusion is present.  No significant changes noted.    Zio patch on 3/27/2023:  Patient's monitor was in place for 13 days and 16 hours.  Minimum heart rate 42 bpm, average heart rate 62 bpm, maximum heart rate 167 bpm. Rhythm/s present include sinus, SVT. There were rare ventricular ectopic beats accounting for <1% of all beats. There were 0  ventricular triplets and rare couplets. There were  rare supraventricular ectopic beats.  There were 12 triggered events and 14 diary entries during which time the noted rhythms were sinus, SVE, VE.  There were 7 episodes of SVT with the fastest lasting 4 beats with a max rate of 167 bpm.  Review of actual tracings showed no other significant abnormality.    V/Q scan on 11/23/21:  No evidence of pulmonary emboli.    CXR 11/23/21:  Probable air trapping.    PFT 12/8/21:  No obstructive or restrictive disease is noted, moderate diffusion defect is noted consistent with pulmonary vascular disease     Stress test on 9/15/2021:    The nuclear stress test is negative for inducible myocardial ischemia or infarction.     Left ventricular function is normal.     The left ventricular ejection fraction at rest is 64%.  The left   ventricular  ejection fraction at stress is 66%.     A prior study was conducted on 12/16/2019.     Echo on 9/15/2021:  Global and regional left ventricular function is normal with an EF of 55-60%.  Right ventricular function, chamber size, wall motion, and thickness are normal.  Pulmonary artery systolic pressure is normal.  The inferior vena cava is normal.  No pericardial effusion is present.  No significant changes noted.    MRI brain on 8/20/2021:  A few small nonspecific white matter signal abnormalities  are present, likely sequela of mild chronic small vessel ischemic disease. The exam is otherwise unremarkable.    MCOT from 7/30/21:  Findings: Patient's monitor was in place for 9 days and 7 hours.  Minimum heart rate 50 bpm, average heart rate 63 bpm, maximum heart rate 120 bpm. Rhythm/s present include sinus rhythm.  There were 37 symptomatic events during which time the noted rhythms were sinus rhythm, sinus bradycardia and sinus tachycardia.  There were six asymptomatic events during which time the noted rhythms were sinus rhythm.  There was rare atrial ectopy and rare ventricular ectopy.  Review of actual tracings showed no other abnormality.    ARYA on 7/17/20:  The right ankle-brachial index is 1.17.  Left ankle-brachial index is 0.98.    Stress test on 12/16/19:    The nuclear stress test is negative for inducible myocardial ischemia   or infarction.     A small amount of soft tissue artifact is present, more pronounced at   rest.     Left ventricular function is normal.     The left ventricular ejection fraction at rest is 79%.  The left   ventricular ejection fraction at stress is 72%.     A prior study was conducted on 6/30/2015.     US carotids on 12/12/19:  No ultrasound evidence of hemo-dynamically significant stenosis.  Mild atherosclerotic disease.    US abdominal aorta on 12/12/19:  No abdominal aortic aneurysm.      ECHO on 5/13/19:  Global and regional left ventricular function is normal with an EF of  60-65%.  Mild concentric wall thickening consistent with left ventricular hypertrophy  is present.  Right ventricular function, chamber size, wall motion, and thickness are  normal.  No significant valvular abnormalities were noted.  Previous study not available for comparison.    Cardiac cath on 3/30/13:  LEFT MAIN: Widely patent stent.   LEFT ANTERIOR DESCENDING: Widely patent proximal stent. There is an 85% to  90% mid lesion after the second diagonal and prior to the LIMA insertion as before.   CIRCUMFLEX: There is diffuse 60% to 70% disease throughout, the ostium is 70   to 75, and the OM2 is a 70, but it is very diffuse disease and basically   unchanged from previous.   RCA: Widely patent stents with only mild irregularities.   LEFT VENTRICULOGRAM: Normal left ventricular ejection fraction, LVEF 60%,   with no focal wall motion abnormality. LV pressure 146/29.   FLORENTINO to LAD: Widely patent, although there is less flow than before, and, in   fact, the retrograde filling of the LIMA from the antegrade vessel. There is   a severe subclavian stenosis that a pressure gradient revealed in the aorta   was 153/78, and in the right subclavian was 100 to 106/73, for a nearly 50 mm   pressure gradient. The stenosis was 75% to 80%.   RAFI to RCA: 100%.   SVG to OM: 100%.                     ICD-10-CM    1. Panic attack  F41.0 busPIRone (BUSPAR) 30 MG tablet      2. Chronic anxiety  F41.9 busPIRone (BUSPAR) 30 MG tablet      3. Palpitations  R00.2       4. Paroxysmal supraventricular tachycardia  I47.10       5. Chronic ischemic heart disease  I25.9       6. ASCVD (arteriosclerotic cardiovascular disease)  I25.10       7. Essential hypertension  I10       8. Mixed hyperlipidemia  E78.2             Past Medical History:   Diagnosis Date    Acute ischemic heart disease (H)     06/15/2007,with PTCA and stenting of 90% osteo circumflex lesion and patent LAD graft, patent left main stent.    Acute myocardial infarction (H)      3/30/2013    Anxiety disorder     No Comments Provided    Atherosclerotic heart disease of native coronary artery without angina pectoris     -angio revealed 3 vessel dz  -4 stents placed; 2 overlapping stents placed in mLAD, 1 stent pRCA, 1 stent pCirc 1 s 9/02 -non-ST elevation MI 1/03  -angio revealed restenosis of Circ.    -PTCA and brachytherapy of pCirc -repeat angio 2/03 -no intervension -CABG x3 12/03 - Dr Sinclair  -LIMA-LAD, RAFI-RCA, SVG-OM -PCI 7/04 stent to L -CTangio 9/05   -patentent LIMA-LAD. RAFI-RCA occluded; RCA ostio/p*    Bilateral carpal tunnel syndrome     No Comments Provided    Cervicalgia     No Comments Provided    Chest pain     12/29/2014    Chronic gastric ulcer without hemorrhage or perforation     10/03/2011,hx of GI bleed (2003)    Chronic ischemic heart disease     06/27/2012    Chronic obstructive pulmonary disease (H)     06/15/2007,low DLCO, normal spirometry    Chronic or unspecified gastric ulcer with hemorrhage     10/2003    Chronic pain syndrome     12/01/2010,chest wall, back    Constipation     11/29/2011    Coronary angioplasty status     09/12/2002,with triple stenting    Coronary angioplasty status     01/10/2003,repeat angioplasty    Coronary angioplasty status     No Comments Provided    Dorsalgia     05/31/2011    Encounter for other administrative examinations     1/28/2014    Encounter for screening for cardiovascular disorders     10/2004,Cardiol[facility] (2004, 2005, 2006 and 2011)    Enterocolitis due to Clostridium difficile     8/19/2016    Essential (primary) hypertension     No Comments Provided    Hyperlipidemia     No Comments Provided    Major depressive disorder, single episode     Severe, hx of suicide attempt/hospitalization    Migraine without status migrainosus, not intractable     No Comments Provided    Nodular corneal degeneration     09/26/2011    Noninfective gastroenteritis and colitis     06/15/2007,history of    Noninfective gastroenteritis and  colitis     Microcytic    Osteoarthritis     No Comments Provided    Other chest pain     10/13/2009,chronic    Pain in knee     05/31/2011    Pain in right shoulder     No Comments Provided    Panic disorder without agoraphobia     No Comments Provided    Peptic ulcer without hemorrhage or perforation     6/15/2007    Peripheral vascular disease (H24)     No Comments Provided    Personal history of diseases of the blood and blood-forming organs and certain disorders involving the immune mechanism (CODE)     No Comments Provided    Personal history of nicotine dependence     No Comments Provided    Personal history of other medical treatment (CODE)     10/14/2013    Presence of aortocoronary bypass graft     2/12/2003    Primary central sleep apnea     10/14/2013    Sepsis due to Escherichia coli (E. coli) (H)     7/14/16,[facility]    Stricture of artery (H24)     3/31/2013,S/p prox left SCA stent 4/1/2013    Thoracic, thoracolumbar and lumbosacral intervertebral disc disorder     No Comments Provided    Uncomplicated opioid abuse (H)     history of    Vitamin D deficiency     5/6/2013       Past Surgical History:   Procedure Laterality Date    ANGIOPLASTY      9/12/02,with triple stenting    APPENDECTOMY OPEN      No Comments Provided    ARTHROSCOPY KNEE      left    ARTHROSCOPY SHOULDER Right 05/12/2017    labral tear, rotator cuff tear and some subacromial decompression     BYPASS GRAFT ARTERY CORONARY      12/13/02,Triple bypass, left internal mammary  to LAD, right internal mammary to right coronary artery, saphenous to obtuse marginal of the left circumflex.    COLONOSCOPY      2011,Dr Bowman benign polyps    COLONOSCOPY  10/03/2011    2011,benign polyps, Dr. Bowman    COLONOSCOPY  08/08/2016 8/8/16,normal, Dr Bowman    ELBOW SURGERY      baby,birth malachi removed from right arm    EMBOLECTOMY UPPER EXTREMITY  04/02/2013    brachial artery pseudoaneurysm after stenting    ESOPHAGOSCOPY, GASTROSCOPY, DUODENOSCOPY  (EGD), COMBINED      2011,EGD Dr Bowman with pyloric ulcer    ESOPHAGOSCOPY, GASTROSCOPY, DUODENOSCOPY (EGD), COMBINED      2011,pyloric ulcer, Dr. Bowman    ESOPHAGOSCOPY, GASTROSCOPY, DUODENOSCOPY (EGD), COMBINED      8/8/16,mild gastritis, Dr Bowman    ESOPHAGOSCOPY, GASTROSCOPY, DUODENOSCOPY (EGD), COMBINED      11/27/2017,Dr Bowman. Antral ulcer    ESOPHAGOSCOPY, GASTROSCOPY, DUODENOSCOPY (EGD), COMBINED  02/02/2018    Dr Bowman, healed ulcer    HYSTERECTOMY TOTAL ABDOMINAL      age 21    LAPAROSCOPIC CHOLECYSTECTOMY      2006    OSTEOTOMY FEMUR DISTAL      x3, right knee    OSTEOTOMY FEMUR DISTAL      2000,left knee  ligament surgery    OTHER SURGICAL HISTORY      1/10/2003,,PTCA    OTHER SURGICAL HISTORY      09/20/2012,,PTCA,DELONTE in LAD and left main    OTHER SURGICAL HISTORY      4/1/2013,,PTCA,L subclavian stenosis    RELEASE TRIGGER FINGER Left 12/2/2022    Procedure: Left thumb Trigger  release;  Surgeon: Cristhian Wilkinson MD;  Location: [facility] OR    SALPINGO-OOPHORECTOMY BILATERAL      age 27,Bilateral salpingo-oophorectomy    TONSILLECTOMY, ADENOIDECTOMY, COMBINED      childhood       Allergies   Allergen Reactions    Atorvastatin Muscle Pain (Myalgia)    Tiotropium Bromide [Tiotropium] Rash    Advil [Ibuprofen] Other (See Comments)     Does not recall but feel like it interacted with blood thinners and HX of GI BLEED    Ezetimibe Muscle Pain (Myalgia)    Latex Rash    Niacin      Other reaction(s): Flushing    No Clinical Screening - See Comments Itching, Rash and Blisters     Metals and plastics      Tape [Adhesive Tape] Rash       Current Outpatient Medications   Medication Sig Dispense Refill    amLODIPine (NORVASC) 10 MG tablet TAKE 1 TABLET (10 MG) BY MOUTH DAILY DX. CODE: I10. 90 tablet 3    busPIRone (BUSPAR) 30 MG tablet Take 1 tablet (30 mg) by mouth 2 times daily 180 tablet 3    clonazePAM (KLONOPIN) 1 MG tablet Take 1 tablet (1 mg) by mouth 3 times daily as needed for anxiety Max 3 per  day 90 tablet 1    clopidogrel (PLAVIX) 75 MG tablet Take 1 tablet (75 mg) by mouth daily 90 tablet 3    diclofenac (VOLTAREN) 1 % topical gel Apply 2 grams to each hand or 4 grams to each knee up to 4 times daily as needed for arthritis pain 350 g 4    HYDROcodone-acetaminophen (NORCO)  MG per tablet Take 1-2 tablets by mouth every 4 hours as needed for severe pain Max 6 per day 180 tablet 0    [START ON 10/4/2023] HYDROcodone-acetaminophen (NORCO)  MG per tablet Take 1-2 tablets by mouth every 4 hours as needed for severe pain Max 6 per day 180 tablet 0    lisinopril (ZESTRIL) 10 MG tablet Take 1 tablet (10 mg) by mouth daily 90 tablet 4    naloxone (NARCAN) 4 MG/0.1ML nasal spray Spray into one nostril for opioid reversal if unresponsive. May repeat every 2-3 minutes until patient responsive or EMS arrives 1 each 1    nitroGLYcerin (NITROLINGUAL) 0.4 MG/SPRAY spray For chest pain spray 1 spray under tongue every 5 minutes for 3 doses. If symptoms persist 5 minutes after 1st dose call 911. 4.9 g 3    ondansetron (ZOFRAN) 4 MG tablet TAKE 1 TABLET BY MOUTH EVERY 6 HOURS AS NEEDED FOR NAUSEA 90 tablet 3    pantoprazole (PROTONIX) 40 MG EC tablet Take 1 tablet (40 mg) by mouth daily 90 tablet 3    propranolol (INDERAL) 40 MG tablet Take 1 tablet (40 mg) by mouth 2 times daily 180 tablet 3    RESTASIS 0.05 % ophthalmic emulsion INSTILL 1 DROP INTO BOTH EYES TWICE A DAY      rimegepant (NURTEC) 75 MG ODT tablet Place 1 tablet (75 mg) under the tongue daily as needed for migraine Maximum of 1 tablet every 48 hours and 2 per week. 24 tablet 4    rivaroxaban ANTICOAGULANT (XARELTO ANTICOAGULANT) 20 MG TABS tablet Take 1 tablet (20 mg) by mouth daily (with dinner) 90 tablet 3    rosuvastatin (CRESTOR) 20 MG tablet Take 1 tablet (20 mg) by mouth daily 90 tablet 3    sucralfate (CARAFATE) 1 GM tablet Take 1 tablet (1 g) by mouth 4 times daily as needed for nausea (or dark stools) 360 tablet 1    TRINTELLIX 20 MG  tablet TAKE 1 TABLET BY MOUTH EVERY DAY 90 tablet 4    VITAMIN D, CHOLECALCIFEROL, PO Take 5,000 Units by mouth daily         Social History     Socioeconomic History    Marital status:      Spouse name: Not on file    Number of children: Not on file    Years of education: Not on file    Highest education level: Not on file   Occupational History    Not on file   Tobacco Use    Smoking status: Former     Packs/day: 1.00     Years: 35.00     Pack years: 35.00     Types: Cigarettes     Quit date: 2/15/2017     Years since quittin.6     Passive exposure: Past    Smokeless tobacco: Never   Vaping Use    Vaping Use: Never used   Substance and Sexual Activity    Alcohol use: Not Currently     Alcohol/week: 0.0 standard drinks of alcohol    Drug use: No    Sexual activity: Yes     Partners: Male     Birth control/protection: None   Other Topics Concern    Parent/sibling w/ CABG, MI or angioplasty before 65F 55M? Not Asked   Social History Narrative    ,  Steven.  Currently not working outside the home. Lives [place] self [place] met with . Tobacco abuse, quit , restarted. Quit  and has since quit, no alcohol.     Social Determinants of Health     Financial Resource Strain: Not on file   Food Insecurity: Not on file   Transportation Needs: Not on file   Physical Activity: Not on file   Stress: Not on file   Social Connections: Not on file   Interpersonal Safety: Low Risk  (10/3/2023)    Interpersonal Safety     Do you feel physically and emotionally safe where you currently live?: Yes     Within the past 12 months, have you been hit, slapped, kicked or otherwise physically hurt by someone?: No     Within the past 12 months, have you been humiliated or emotionally abused in other ways by your partner or ex-partner?: No   Housing Stability: Not on file       LAB RESULTS:   Office Visit on 2021   Component Date Value Ref Range Status    Color Urine 2021 Yellow  Colorless,  Straw, Light Yellow, Yellow Final    Appearance Urine 08/06/2021 Slightly Cloudy* Clear Final    Glucose Urine 08/06/2021 Negative  Negative mg/dL Final    Bilirubin Urine 08/06/2021 Negative  Negative Final    Ketones Urine 08/06/2021 Negative  Negative mg/dL Final    Specific Gravity Urine 08/06/2021 1.018  1.000 - 1.030 Final    Blood Urine 08/06/2021 Small* Negative Final    pH Urine 08/06/2021 6.0  5.0 - 9.0 Final    Protein Albumin Urine 08/06/2021 20 * Negative mg/dL Final    Urobilinogen Urine 08/06/2021 Normal  Normal, 2.0 mg/dL Final    Nitrite Urine 08/06/2021 Positive* Negative Final    Leukocyte Esterase Urine 08/06/2021 Large* Negative Final    Bacteria Urine 08/06/2021 Many* None Seen /HPF Final    Mucus Urine 08/06/2021 Present* None Seen /LPF Final    RBC Urine 08/06/2021 6* <=2 /HPF Final    WBC Urine 08/06/2021 >182* <=5 /HPF Final    Culture 08/06/2021 >100,000 CFU/mL Escherichia coli*  Final   Office Visit on 07/30/2021   Component Date Value Ref Range Status    Sodium 07/30/2021 139  134 - 144 mmol/L Final    Potassium 07/30/2021 4.4  3.5 - 5.1 mmol/L Final    Chloride 07/30/2021 105  98 - 107 mmol/L Final    Carbon Dioxide (CO2) 07/30/2021 24  21 - 31 mmol/L Final    Anion Gap 07/30/2021 10  3 - 14 mmol/L Final    Urea Nitrogen 07/30/2021 19  7 - 25 mg/dL Final    Creatinine 07/30/2021 1.08  0.60 - 1.20 mg/dL Final    Calcium 07/30/2021 9.9  8.6 - 10.3 mg/dL Final    Glucose 07/30/2021 77  70 - 105 mg/dL Final    GFR Estimate 07/30/2021 53* >60 mL/min/1.73m2 Final    Magnesium 07/30/2021 2.1  1.9 - 2.7 mg/dL Final    WBC Count 07/30/2021 7.0  4.0 - 11.0 10e3/uL Final    RBC Count 07/30/2021 4.39  3.80 - 5.20 10e6/uL Final    Hemoglobin 07/30/2021 12.9  11.7 - 15.7 g/dL Final    Hematocrit 07/30/2021 38.6  35.0 - 47.0 % Final    MCV 07/30/2021 88  78 - 100 fL Final    MCH 07/30/2021 29.4  26.5 - 33.0 pg Final    MCHC 07/30/2021 33.4  31.5 - 36.5 g/dL Final    RDW 07/30/2021 13.5  10.0 - 15.0 %  "Final    Platelet Count 07/30/2021 273  150 - 450 10e3/uL Final    % Neutrophils 07/30/2021 57  % Final    % Lymphocytes 07/30/2021 30  % Final    % Monocytes 07/30/2021 10  % Final    % Eosinophils 07/30/2021 2  % Final    % Basophils 07/30/2021 1  % Final    % Immature Granulocytes 07/30/2021 0  % Final    NRBCs per 100 WBC 07/30/2021 0  <1 /100 Final    Absolute Neutrophils 07/30/2021 4.0  1.6 - 8.3 10e3/uL Final    Absolute Lymphocytes 07/30/2021 2.1  0.8 - 5.3 10e3/uL Final    Absolute Monocytes 07/30/2021 0.7  0.0 - 1.3 10e3/uL Final    Absolute Eosinophils 07/30/2021 0.1  0.0 - 0.7 10e3/uL Final    Absolute Basophils 07/30/2021 0.1  0.0 - 0.2 10e3/uL Final    Absolute Immature Granulocytes 07/30/2021 0.0  <=0.0 10e3/uL Final    Absolute NRBCs 07/30/2021 0.0  10e3/uL Final        Review of systems: Negative except that which was noted in the HPI.    Physical examination:  /60 (BP Location: Right arm, Patient Position: Sitting, Cuff Size: Adult Regular)   Pulse 54   Temp 98.2  F (36.8  C) (Temporal)   Resp 18   Ht 1.6 m (5' 2.99\")   Wt 76.7 kg (169 lb)   LMP 04/29/1978 (Approximate)   SpO2 94%   BMI 29.94 kg/m      GENERAL APPEARANCE: healthy, alert and no distress.  CHEST: lungs clear to auscultation - no rales, rhonchi or wheezes, no use of accessory muscles, no retractions, respirations are unlabored, normal respiratory rate.  Reduced air movement but clear.  CARDIOVASCULAR: regular rhythm, normal S1 with physiologic split S2, no S3 or S4 and no murmur, click or rub  EXTREMITIES: no clubbing, cyanosis but with mild peripheral edema      Total time spent on day of visit, including review of tests, obtaining/reviewing separately obtained history, ordering medications/tests/procedures, communicating with PCP/consultants, and documenting in electronic medical record: 40 minutes.       Thank you for allowing me to participate in the care of your patient. Please do not hesitate to contact me if you " have any questions.     Paul Gramajo, DO

## 2023-10-03 NOTE — PATIENT INSTRUCTIONS
You were seen by  Dr. Gramajo     1. Your Buspar was changed to 30 mg by mouth twice daily    2. Follow up with Dr. Gramajo in 6 months in Cardiology.       You will follow up with [facility]iology in 6 months, sooner if needed.       Please call the cardiology office with problems, questions, or concerns at [phone].    If you experience chest pain, chest pressure, chest tightness, shortness of breath, fainting, lightheadedness, nausea, vomiting, or other concerning symptoms, please report to the Emergency Department or call 911. These symptoms may be emergent, and best treated in the Emergency Department.     Cardiology Nurses  VIOLET Askew, NIKOLAIN  Taylor, NIKOLAIN  [facility] Cardiology (Unit 3C)  [phone]

## 2023-10-03 NOTE — NURSING NOTE
"Patient comes in for follow up after tests.    Chief Complaint   Patient presents with    Follow Up     test       Initial /60 (BP Location: Right arm, Patient Position: Sitting, Cuff Size: Adult Regular)   Pulse 54   Temp 98.2  F (36.8  C) (Temporal)   Resp 18   Ht 1.6 m (5' 2.99\")   Wt 76.7 kg (169 lb)   LMP 04/29/1978 (Approximate)   SpO2 94%   BMI 29.94 kg/m   Estimated body mass index is 29.94 kg/m  as calculated from the following:    Height as of this encounter: 1.6 m (5' 2.99\").    Weight as of this encounter: 76.7 kg (169 lb).  Meds Reconciled: complete  Pt is not on Aspirin  Pt is on a Statin  PHQ and/or YAYA reviewed. Pt referred to PCP/MH Provider as appropriate.    Татьяна Bonilla LPN      "

## 2023-10-10 NOTE — OR NURSING
Drinking at this time    SSKI Counseling:  I discussed with the patient the risks of SSKI including but not limited to thyroid abnormalities, metallic taste, GI upset, fever, headache, acne, arthralgias, paraesthesias, lymphadenopathy, easy bleeding, arrhythmias, and allergic reaction.

## 2023-10-12 ENCOUNTER — ALLIED HEALTH/NURSE VISIT (OUTPATIENT)
Dept: FAMILY MEDICINE | Facility: OTHER | Age: 68
End: 2023-10-12
Attending: PHYSICIAN ASSISTANT
Payer: COMMERCIAL

## 2023-10-12 DIAGNOSIS — Z23 HIGH PRIORITY FOR 2019-NCOV VACCINE: Primary | ICD-10-CM

## 2023-10-12 PROCEDURE — 91320 SARSCV2 VAC 30MCG TRS-SUC IM: CPT

## 2023-10-12 NOTE — PROGRESS NOTES
We are scheduling all people aged 6 months and older for updated [id number] COVID-19 vaccines.  [facility] will stock Pfizer COVID-19 vaccines in the clinics.  Patients who are up to date with COVID-19 vaccine will only need a single dose of the updated [id number] vaccine, at least 8 weeks after last dose. Unvaccinated patients or those not up to date may have a different dosing schedule.  To schedule a COVID-19 vaccine appointment, please log in to [employer] using this link to see when and where we have openings (to schedule a child s vaccine, you will need to log into their [employer] account).     [facility] retail pharmacies also administer Pfizer COVID Vaccines to patients 5 years and older. [facility] offer Novavax primary COVID-19 vaccine.  To schedule at a [facility] pharmacy please go to [email or web address].    To learn more about COVID-19 vaccines in general, please visit the CDC website: https://www.cdc.gov/coronavirus/2019-ncov/vaccines/index.html     If you have technical difficulty using [employer], call [phone], option 1 for assistance.    More information about vaccine effectiveness at reducing spread of disease, hospitalizations, and death as well as vaccine safety and answers to other questions can be found on our website: [email or web address].    Melodie Chaidez LPN on 10/12/2023 at 9:58 AM

## 2023-10-28 DIAGNOSIS — F41.9 CHRONIC ANXIETY: ICD-10-CM

## 2023-10-28 DIAGNOSIS — R11.0 NAUSEA: ICD-10-CM

## 2023-10-29 PROBLEM — F11.20 CONTINUOUS OPIOID DEPENDENCE (H): Status: ACTIVE | Noted: 2023-10-29

## 2023-10-29 PROBLEM — F11.20 EPISODIC OPIOID DEPENDENCE (H): Status: RESOLVED | Noted: 2023-10-29 | Resolved: 2023-10-29

## 2023-10-29 PROBLEM — F11.90 CHRONIC, CONTINUOUS USE OF OPIOIDS: Status: ACTIVE | Noted: 2023-10-29

## 2023-10-29 PROBLEM — F11.20 EPISODIC OPIOID DEPENDENCE (H): Status: ACTIVE | Noted: 2023-10-29

## 2023-10-30 ENCOUNTER — OFFICE VISIT (OUTPATIENT)
Dept: FAMILY MEDICINE | Facility: OTHER | Age: 68
End: 2023-10-30
Attending: PHYSICIAN ASSISTANT
Payer: COMMERCIAL

## 2023-10-30 VITALS
HEIGHT: 66 IN | OXYGEN SATURATION: 92 % | WEIGHT: 169.4 LBS | RESPIRATION RATE: 18 BRPM | BODY MASS INDEX: 27.23 KG/M2 | HEART RATE: 55 BPM | TEMPERATURE: 97.1 F | DIASTOLIC BLOOD PRESSURE: 72 MMHG | SYSTOLIC BLOOD PRESSURE: 138 MMHG

## 2023-10-30 DIAGNOSIS — F41.9 CHRONIC ANXIETY: ICD-10-CM

## 2023-10-30 DIAGNOSIS — R07.2 PRECORDIAL PAIN: Primary | ICD-10-CM

## 2023-10-30 DIAGNOSIS — G89.29 CHRONIC BILATERAL LOW BACK PAIN WITHOUT SCIATICA: ICD-10-CM

## 2023-10-30 DIAGNOSIS — Z79.899 CONTROLLED SUBSTANCE AGREEMENT SIGNED: ICD-10-CM

## 2023-10-30 DIAGNOSIS — F11.20 CONTINUOUS OPIOID DEPENDENCE (H): ICD-10-CM

## 2023-10-30 DIAGNOSIS — G89.4 CHRONIC PAIN DISORDER: ICD-10-CM

## 2023-10-30 DIAGNOSIS — F11.90 CHRONIC, CONTINUOUS USE OF OPIOIDS: ICD-10-CM

## 2023-10-30 DIAGNOSIS — M54.50 CHRONIC BILATERAL LOW BACK PAIN WITHOUT SCIATICA: ICD-10-CM

## 2023-10-30 PROBLEM — Z93.3 COLOSTOMY STATUS (H): Status: RESOLVED | Noted: 2023-10-30 | Resolved: 2023-10-30

## 2023-10-30 PROBLEM — Z93.3 COLOSTOMY STATUS (H): Status: ACTIVE | Noted: 2023-10-30

## 2023-10-30 LAB
ATRIAL RATE - MUSE: 48 BPM
DIASTOLIC BLOOD PRESSURE - MUSE: NORMAL MMHG
INTERPRETATION ECG - MUSE: NORMAL
P AXIS - MUSE: 27 DEGREES
PR INTERVAL - MUSE: 176 MS
QRS DURATION - MUSE: 82 MS
QT - MUSE: 478 MS
QTC - MUSE: 427 MS
R AXIS - MUSE: 41 DEGREES
SYSTOLIC BLOOD PRESSURE - MUSE: NORMAL MMHG
T AXIS - MUSE: 57 DEGREES
VENTRICULAR RATE- MUSE: 48 BPM

## 2023-10-30 PROCEDURE — 93005 ELECTROCARDIOGRAM TRACING: CPT

## 2023-10-30 PROCEDURE — 99214 OFFICE O/P EST MOD 30 MIN: CPT | Performed by: PHYSICIAN ASSISTANT

## 2023-10-30 PROCEDURE — G0463 HOSPITAL OUTPT CLINIC VISIT: HCPCS

## 2023-10-30 PROCEDURE — 93010 ELECTROCARDIOGRAM REPORT: CPT | Performed by: INTERNAL MEDICINE

## 2023-10-30 RX ORDER — CLONAZEPAM 1 MG/1
1 TABLET ORAL 3 TIMES DAILY PRN
Qty: 90 TABLET | Refills: 1 | Status: SHIPPED | OUTPATIENT
Start: 2023-10-30 | End: 2023-12-20

## 2023-10-30 RX ORDER — HYDROCODONE BITARTRATE AND ACETAMINOPHEN 10; 325 MG/1; MG/1
1 TABLET ORAL EVERY 4 HOURS PRN
Qty: 180 TABLET | Refills: 0 | Status: SHIPPED | OUTPATIENT
Start: 2023-10-30 | End: 2023-11-29

## 2023-10-30 RX ORDER — ONDANSETRON 4 MG/1
TABLET, FILM COATED ORAL
Qty: 90 TABLET | Refills: 3 | Status: SHIPPED | OUTPATIENT
Start: 2023-10-30 | End: 2024-05-22

## 2023-10-30 RX ORDER — HYDROCODONE BITARTRATE AND ACETAMINOPHEN 10; 325 MG/1; MG/1
1 TABLET ORAL EVERY 4 HOURS PRN
Qty: 180 TABLET | Refills: 0 | Status: SHIPPED | OUTPATIENT
Start: 2023-11-29 | End: 2023-12-29

## 2023-10-30 ASSESSMENT — PAIN SCALES - GENERAL: PAINLEVEL: NO PAIN (0)

## 2023-10-30 ASSESSMENT — ANXIETY QUESTIONNAIRES
3. WORRYING TOO MUCH ABOUT DIFFERENT THINGS: NOT AT ALL
4. TROUBLE RELAXING: NOT AT ALL
2. NOT BEING ABLE TO STOP OR CONTROL WORRYING: NOT AT ALL
GAD7 TOTAL SCORE: 2
7. FEELING AFRAID AS IF SOMETHING AWFUL MIGHT HAPPEN: NOT AT ALL
1. FEELING NERVOUS, ANXIOUS, OR ON EDGE: SEVERAL DAYS
5. BEING SO RESTLESS THAT IT IS HARD TO SIT STILL: NOT AT ALL
IF YOU CHECKED OFF ANY PROBLEMS ON THIS QUESTIONNAIRE, HOW DIFFICULT HAVE THESE PROBLEMS MADE IT FOR YOU TO DO YOUR WORK, TAKE CARE OF THINGS AT HOME, OR GET ALONG WITH OTHER PEOPLE: SOMEWHAT DIFFICULT
6. BECOMING EASILY ANNOYED OR IRRITABLE: SEVERAL DAYS
GAD7 TOTAL SCORE: 2

## 2023-10-30 NOTE — PATIENT INSTRUCTIONS
"Narcotic and Controlled Substance Contract information    Today:   -- Goals: tolerable pain   -- Consider taking the class \"Living well with Chronic Pain\" at the [facility]. ([email or web address] or [phone])   -- Utilize non-narcotic options including: acetaminophen, physical therapy, home exercise program, counseling, etc.     -- Controlled substance agreement:   [unfilled]     -- [place] Prescription Monitoring Program was reviewed today.    -- Last urine drug screen was acceptable.   Pain Drug SCR UR W RPTD Meds   Date Value Ref Range Status   10/09/2020 FINAL  Final     Comment:     (Note)  ====================================================================  [facility] COMP DRUG ANALYSIS,UR  ====================================================================  Test                             Result       Flag       Units        Drug Present   7-aminoclonazepam              618                     ng/mg creat    7-aminoclonazepam is an expected metabolite of clonazepam. Source    of clonazepam is a scheduled prescription medication.   Hydrocodone                    5430                    ng/mg creat   Hydromorphone                  2921                    ng/mg creat   Dihydrocodeine                 730                     ng/mg creat   Norhydrocodone                 1991                    ng/mg creat    Sources of hydrocodone include scheduled prescription    medications. Hydromorphone, dihydrocodeine and norhydrocodone are    expected metabolites of hydrocodone. Hydromorphone and    dihydrocodeine are also available as scheduled prescription    medications.   Pregabalin                     PRESENT                               Acetaminophen                  PRESENT                               Naproxen                       PRESENT                               Lidocaine                      PRESENT                              ====================================================================  Test      "                 Result    Flag   Units      Ref Range        Creatinine              33               mg/dL      >=20            ====================================================================  Declared Medications:  Medication list was not provided.  ====================================================================  For clinical consultation, please call [phone].  ====================================================================  Analysis performed by [facility], [address] [place] [address]          -- A prescription for the following medications was provided today:  Drug Quantity/Month Months Rx today   Hydrocodone 180 2      -- Total Daily MME (morphine equivalents):  MME/Day    -- Follow-up in 3 months (approximately 1/28/24) for office visit dedicated to Schedule II medications.    Patient Education     Facts:  Every day, 44 people die in the [place] from overdose of prescription painkillers. (1)  Prescription drug overdose kills more people than car crashes each year.(2, 3)  Deaths from prescription painkillers among women is up 400%,and up 265% for men. (4)  Long-term use of prescription painkillers increases your risk for heart attack, broken bones, impotence (5)  Your family members are more likely to overdose on narcotics if they are prescribed to you (6)    By signing a medication contract, you accept these risks and side effects.    Be aware that the use of such medicine has certain risks associated with it, including, but not limited to:  Sleepiness or drowsiness, constipation, nausea, itching, vomiting, lightheadedness, dizziness, confusion, allergic reaction, slowing of breathing rate, slowing of reflexes or reaction time, kidney or liver disease, sexual dysfunction, physical dependence, tolerance to analgesia, addiction, withdrawal and the possibility that the medicine will not provide complete relief.  Any narcotic usage is associate with a significantly increased risk of  falls and other unintended injuries, respiratory depression and increased risk for death.    I recommend against operating motor vehicles or heavy machinery when using this medication.  Store your medication in a secured, locked container, such as a safe or lockbox.  If any medication needs to be disposed, bring to the [facility] outpatient pharmacy and place in the drop box for secure disposal.  This medication will be strictly monitored and all of my medications should be filled at the same pharmacy.  It is your responsibility to share that you are on a narcotic contract with your other health care providers, including your dentist.  If you come into clinic for a dose adjustment, always bring your remaining pills and remaining hard copies of prescriptions.    References  1. CDC. http://www.cdc.gov/drugoverdose/epidemic/public.html  2. CDC. http://www.cdc.gov/drugoverdose/data/overdose.html  3. [place].http://strib.mn/0f7woyh  4. [facility]. http://www.cdc.gov/vitalsigns/PrescriptionPainkillerOverdoses/index.html  5. Annals of Internal Medicine  Vol. 162 No. 4  17 February 2015  6. [employer]. https://jamanetwork.com/journals/jamainternalmedicine/fullarticle/5297042. 6/24/2019      Controlled Substances:    You have been prescribed a medication that is a controlled substance.  These medications must be closely monitored under Federal Law.     Controlled substances may cause you to become dependent or addicted to them.  The longer you take them, the more likely it is that you will suffer withdrawal symptoms when you stop the medication. This may also cause you to become tolerant to the medication, which means that it will become less effective.    It is important to notify your doctor of any side effects from the medication you are taking.  Common side effects are constipation, drowsiness, dizziness, itching, and nausea and vomiting.      The medication may impair your thinking.  Do not take it prior to driving or using  machinery.  Do not take it in combination with alcohol or other sedating substances.  It is important that you follow the instructions on the prescription bottle.  Do not increase the dosage unless instructed by the clinic.    We do not refill lost, stolen or damaged prescriptions for controlled substances.     We do not refill controlled substance prescriptions after normal clinic hours or on weekends.

## 2023-10-30 NOTE — NURSING NOTE
"Chief Complaint   Patient presents with    Recheck Medication       Initial BP (!) 142/70   Pulse 55   Temp 97.1  F (36.2  C) (Tympanic)   Resp 18   Ht 1.664 m (5' 5.5\")   Wt 76.8 kg (169 lb 6.4 oz)   LMP 04/29/1978 (Approximate)   SpO2 92%   BMI 27.76 kg/m   Estimated body mass index is 27.76 kg/m  as calculated from the following:    Height as of this encounter: 1.664 m (5' 5.5\").    Weight as of this encounter: 76.8 kg (169 lb 6.4 oz).  Medication Reconciliation: complete    Zenaiad Hernandez LPN on 10/30/2023 at 9:21 AM     "

## 2023-10-30 NOTE — TELEPHONE ENCOUNTER
"Requested Prescriptions   Pending Prescriptions Disp Refills    ondansetron (ZOFRAN) 4 MG tablet [Pharmacy Med Name: ONDANSETRON HCL 4 MG TABLET] 90 tablet 3     Sig: TAKE 1 TABLET BY MOUTH EVERY 6 HOURS AS NEEDED FOR NAUSEA        Antivertigo/Antiemetic Agents Passed - 10/28/2023 12:01 PM        Passed - Recent (12 mo) or future (30 days) visit within the authorizing provider's specialty     Patient has had an office visit with the authorizing provider or a provider within the authorizing providers department within the previous 12 mos or has a future within next 30 days. See \"Patient Info\" tab in inbasket, or \"Choose Columns\" in Meds & Orders section of the refill encounter.          Passed - Medication is active on med list        Passed - Patient is 18 years of age or older     Last Written Prescription Date:  11/22/22  Last Fill Quantity: 90,   # refills: 3  Last Office Visit: 10/3/23  Future Office visit:   none    Routing refill request to provider for review/approval because:  Want Dr. Gramajo to review if this should be continued and if it should, should it be addressed by patient's PCP    Unable to complete prescription refill per RN Medication Refill Policy.   Jamilah Ray RN ....................  10/30/2023   9:59 AM    "

## 2023-10-30 NOTE — PROGRESS NOTES
Assessment & Plan       ICD-10-CM    1. Precordial pain  R07.2 EKG 12-lead, tracing only (Same Day)      2. Chronic anxiety  F41.9 clonazePAM (KLONOPIN) 1 MG tablet      3. F11.2 - Continuous opioid dependence (H)  F11.20 HYDROcodone-acetaminophen (NORCO)  MG per tablet     HYDROcodone-acetaminophen (NORCO)  MG per tablet      4. F11.9 - Chronic, continuous use of opioids  F11.90 HYDROcodone-acetaminophen (NORCO)  MG per tablet     HYDROcodone-acetaminophen (NORCO)  MG per tablet      5. Controlled substance agreement signed  Z79.899 clonazePAM (KLONOPIN) 1 MG tablet     HYDROcodone-acetaminophen (NORCO)  MG per tablet     HYDROcodone-acetaminophen (NORCO)  MG per tablet      6. Chronic pain disorder  G89.4 HYDROcodone-acetaminophen (NORCO)  MG per tablet     HYDROcodone-acetaminophen (NORCO)  MG per tablet      7. Chronic bilateral low back pain without sciatica  M54.50 HYDROcodone-acetaminophen (NORCO)  MG per tablet    G89.29 HYDROcodone-acetaminophen (NORCO)  MG per tablet        Chest pain, non reproducible on examination. EKG with sinus bradycardia, 48 rate. No ischemic changes. Discussed Ziopatch and lab work, she declines both options. Last ECHO 07/2023 - normal LVEF 55-60%, LVH. Normal RV function, mild mitral and tricuspid insufficiency. She is agreeable to monitor symptoms, we reviewed red flag symptoms today, return precautions reviewed.  Anxiety is stable, she is much happier on Buspar 30 mg BID dosing, we are both comfortable keeping the Buspar at this dose. Refill Clonazepam, PDMP reviewed, last filled 10/22/23. Discussed risks, benefits and side effects of medication at length with patient including risks of comorbid use of opioids for pain.  Refilled x2 months.   Pain, waxing and waning. Intermittent flare ups with cold weather, happy on current dosing regimen. PDMP reviewed.     Maintenance of person-centered care plan - goals, personal  strengths, clinical needs, desired outcomes: Ongoing.   How is patient functioning/improving/not improving with the current pain protocol / RX:   - What is patient unable to do or has difficulty completing because of pain:   - How has the current pain protocol / RX helped: allowed to participate in walking, cleaning, painting and household chores.   Adequate pain management helps to improve quality of life and performing ADLs independently.   - Encouraged regular stretching, walking, exercise. Healthy meals and diet.     Facilitation and coordination of any necessary behavorial health treatment: Ongoing.   - Encouraged counseling and behavorial health management to help with chronic pain, and if any variable anxiety / depression symptoms develop.     Communication and care coordination between relevant practitioners furnishing care (PT/OT, complementary and integrative approaches, community-based care): Ongoing.     PDMP Review         Value Time User    State PDMP site checked  Yes 10/30/2023  9:40 AM Sheri Cordon PA-C          Last CSA Agreement:   CSA -- Patient Level:     [Media Unavailable] Controlled Substance Agreement - Opioid - Scan on 9/5/2023  9:45 AM   [Media Unavailable] Controlled Substance Agreement - Opioid - Scan on 9/7/2022 11:36 AM   [Media Unavailable] Controlled Substance Agreement - Opioid - Scan on 9/29/2021  2:26 PM       Last UDS: 9/7/2023    See Patient Instructions    Return in about 8 weeks (around 12/25/2023) for Medication follow up.    Sheri Cordon PA-C  [facility] AND [facility]    Subjective   Malina is a 68 year old, presenting for the following health issues:  Recheck Medication        10/30/2023     9:17 AM   Additional Questions   Roomed by Zenaida BECKFORD LPN   Accompanied by Self       HPI     Pain History:  When did you first notice your pain? Years ago  Have you seen this provider for your pain in the past? Yes   Where in your body do you have pain? Neck, shoulders,  low back   Are you seeing anyone else for your pain? No        8/1/2023     3:31 PM 9/5/2023     8:45 AM 10/30/2023     8:55 AM   PHQ-9 SCORE   PHQ-9 Total Score MyChart 11 (Moderate depression) 7 (Mild depression) 11 (Moderate depression)   PHQ-9 Total Score 11 7 11           8/1/2023     3:38 PM 9/3/2023     1:24 PM 10/30/2023     8:56 AM   YAYA-7 SCORE   Total Score 14 (moderate anxiety) 8 (mild anxiety) 2 (minimal anxiety)   Total Score 14 8 2         7/7/2023    10:23 AM 9/5/2023     9:25 AM 10/30/2023     9:31 AM   PEG Score   PEG Total Score 7.33 6.67 5.67     Chronic Pain Follow Up:    Location of pain: neck, shoulders, low back  Analgesia/pain control:    - Recent changes:  none    - Overall control: Tolerable with discomfort    - Current treatments: Hydrocodone   Adherence:     - Do you ever take more pain medicine than prescribed? No    - When did you take your last dose of pain medicine?  10/31/23   Adverse effects: No   PDMP Review         Value Time User    State PDMP site checked  Yes 10/30/2023  9:40 AM Sheri Cordon PA-C          Last CSA Agreement:   CSA -- Patient Level:     [Media Unavailable] Controlled Substance Agreement - Opioid - Scan on 9/5/2023  9:45 AM   [Media Unavailable] Controlled Substance Agreement - Opioid - Scan on 9/7/2022 11:36 AM   [Media Unavailable] Controlled Substance Agreement - Opioid - Scan on 9/29/2021  2:26 PM       Last UDS: 9/7/2023    Anxiety Follow-Up  How are you doing with your anxiety since your last visit? No change  Are you having other symptoms that might be associated with anxiety? No  Have you had a significant life event? No   Are you feeling depressed? No  Do you have any concerns with your use of alcohol or other drugs? No  Refilled Clonazepam on 10/22/23, stable on current dosing.   Increased Buspar back up to 30 mg BID, happier on this dose.     Social History     Tobacco Use    Smoking status: Former     Packs/day: 1.00     Years: 35.00      "Additional pack years: 0.00     Total pack years: 35.00     Types: Cigarettes     Quit date: 2/15/2017     Years since quittin.7     Passive exposure: Past    Smokeless tobacco: Never   Vaping Use    Vaping Use: Never used   Substance Use Topics    Alcohol use: Not Currently     Alcohol/week: 0.0 standard drinks of alcohol    Drug use: No         2023     3:38 PM 9/3/2023     1:24 PM 10/30/2023     8:56 AM   YAYA-7 SCORE   Total Score 14 (moderate anxiety) 8 (mild anxiety) 2 (minimal anxiety)   Total Score 14 8 2         2023     3:31 PM 2023     8:45 AM 10/30/2023     8:55 AM   PHQ   PHQ-9 Total Score 11 7 11   Q9: Thoughts of better off dead/self-harm past 2 weeks Not at all Not at all Not at all     Chest pain - intermittent x3 weeks duration, took nitroglycerin twice last week whic resolved pain. Primarily over anterior left chest and radiates to right side of anterior chest. + \"heavy/pounding heart beats\" during episodes. Started after having the flu last month, symptoms ongoing x3 weeks currently. No shortness of breath, dizziness, centralized or peripheral edema. No radiation of pain into arms or neck. No falls, injuries or trauma. History of STEMI x8 per patient, x7 were \"silent killer heart attacks\".     Review of Systems   Constitutional, HEENT, cardiovascular, pulmonary, GI, , musculoskeletal, neuro, skin, endocrine and psych systems are negative, except as otherwise noted.      Objective    /72   Pulse 55   Temp 97.1  F (36.2  C) (Tympanic)   Resp 18   Ht 1.664 m (5' 5.5\")   Wt 76.8 kg (169 lb 6.4 oz)   LMP 1978 (Approximate)   SpO2 92%   BMI 27.76 kg/m    Body mass index is 27.76 kg/m .  Physical Exam   GENERAL: healthy, alert and no distress  EYES: Eyes grossly normal to inspection, PERRL and conjunctivae and sclerae normal  HENT: ear canals and TM's normal, nose and mouth without ulcers or lesions  NECK: no adenopathy, no asymmetry, masses, or scars and thyroid " normal to palpation  RESP: lungs clear to auscultation - no rales, rhonchi or wheezes  CV: regular rate and rhythm, normal S1 S2, no S3 or S4, no murmur, click or rub, no peripheral edema and peripheral pulses strong  ABDOMEN: soft, nontender, no hepatosplenomegaly, no masses and bowel sounds normal  MS: no gross musculoskeletal defects noted, no edema  SKIN: no suspicious lesions or rashes  PSYCH: mentation appears normal, affect normal/bright  LYMPH: normal ant/post cervical, supraclavicular nodes    Results for orders placed or performed in visit on 10/30/23   EKG 12-lead, tracing only (Same Day)     Status: None (Preliminary result)   Result Value Ref Range    Systolic Blood Pressure  mmHg    Diastolic Blood Pressure  mmHg    Ventricular Rate 48 BPM    Atrial Rate 48 BPM    UT Interval 176 ms    QRS Duration 82 ms     ms    QTc 427 ms    P Axis 27 degrees    R AXIS 41 degrees    T Axis 57 degrees    Interpretation ECG       Sinus bradycardia  Otherwise normal ECG  When compared with ECG of 13-JUL-2023 15:03,  No significant change was found

## 2023-11-02 RX ORDER — BUSPIRONE HYDROCHLORIDE 15 MG/1
15 TABLET ORAL 2 TIMES DAILY
Qty: 180 TABLET | Refills: 4 | OUTPATIENT
Start: 2023-11-02

## 2023-11-02 NOTE — TELEPHONE ENCOUNTER
[id number] [employer] [facility] [place] sent Rx request for the following:      busPIRone (BUSPAR) 15 MG tablet (Discontinued)   9/2/2023 10/3/2023 --   Sig - Route: Take 15 mg by mouth 2 times daily - Oral   Class: Historical     ]\Last Prescription Date:     busPIRone (BUSPAR) 30 MG tablet 180 tablet 3 10/3/2023  No   Sig - Route: Take 1 tablet (30 mg) by mouth 2 times daily - Oral     Pharmacy notified. Kathleen Puckett RN .............. 11/2/2023  3:24 PM

## 2023-11-07 ENCOUNTER — TELEPHONE (OUTPATIENT)
Dept: FAMILY MEDICINE | Facility: OTHER | Age: 68
End: 2023-11-07
Payer: COMMERCIAL

## 2023-11-07 NOTE — TELEPHONE ENCOUNTER
Patient was here with her  and wanted to schedule an appointment for Hematology.  States she was seen by Dr Murillo in the past.  Upon review, she was seen in April of 2020 for an acute PE.  Current complaints are on and off blood in her urine, abdominal discomfort, and family history of melanoma.  Patient advised to be seen in [facility] as needed, have a check up with her primary care provider, and have her skin check done by dermatology.  It is not appropriate for her to come to hematology/oncology without a work-up done first.  Mackenzie Max CMA ([facility])

## 2023-12-01 ENCOUNTER — NURSE TRIAGE (OUTPATIENT)
Dept: FAMILY MEDICINE | Facility: OTHER | Age: 68
End: 2023-12-01
Payer: COMMERCIAL

## 2023-12-01 ENCOUNTER — APPOINTMENT (OUTPATIENT)
Dept: CT IMAGING | Facility: OTHER | Age: 68
End: 2023-12-01
Attending: FAMILY MEDICINE
Payer: COMMERCIAL

## 2023-12-01 ENCOUNTER — HOSPITAL ENCOUNTER (EMERGENCY)
Facility: OTHER | Age: 68
Discharge: HOME | End: 2023-12-01
Attending: FAMILY MEDICINE | Admitting: FAMILY MEDICINE
Payer: COMMERCIAL

## 2023-12-01 ENCOUNTER — APPOINTMENT (OUTPATIENT)
Dept: GENERAL RADIOLOGY | Facility: OTHER | Age: 68
End: 2023-12-01
Attending: FAMILY MEDICINE
Payer: COMMERCIAL

## 2023-12-01 VITALS
SYSTOLIC BLOOD PRESSURE: 129 MMHG | DIASTOLIC BLOOD PRESSURE: 71 MMHG | RESPIRATION RATE: 13 BRPM | OXYGEN SATURATION: 96 % | WEIGHT: 169 LBS | BODY MASS INDEX: 27.7 KG/M2 | HEART RATE: 52 BPM | TEMPERATURE: 97.6 F

## 2023-12-01 DIAGNOSIS — R07.89 ATYPICAL CHEST PAIN: ICD-10-CM

## 2023-12-01 LAB
ANION GAP SERPL CALCULATED.3IONS-SCNC: 11 MMOL/L (ref 7–15)
BASOPHILS # BLD AUTO: 0 10E3/UL (ref 0–0.2)
BASOPHILS NFR BLD AUTO: 1 %
BUN SERPL-MCNC: 14.7 MG/DL (ref 8–23)
CALCIUM SERPL-MCNC: 9.8 MG/DL (ref 8.8–10.2)
CHLORIDE SERPL-SCNC: 105 MMOL/L (ref 98–107)
CREAT SERPL-MCNC: 0.8 MG/DL (ref 0.51–0.95)
DEPRECATED HCO3 PLAS-SCNC: 26 MMOL/L (ref 22–29)
EGFRCR SERPLBLD CKD-EPI 2021: 79 ML/MIN/1.73M2
EOSINOPHIL # BLD AUTO: 0.2 10E3/UL (ref 0–0.7)
EOSINOPHIL NFR BLD AUTO: 3 %
ERYTHROCYTE [DISTWIDTH] IN BLOOD BY AUTOMATED COUNT: 13.5 % (ref 10–15)
FLUAV RNA SPEC QL NAA+PROBE: NEGATIVE
FLUBV RNA RESP QL NAA+PROBE: NEGATIVE
GLUCOSE SERPL-MCNC: 64 MG/DL (ref 70–99)
HCT VFR BLD AUTO: 37.8 % (ref 35–47)
HGB BLD-MCNC: 12.7 G/DL (ref 11.7–15.7)
HOLD SPECIMEN: NORMAL
IMM GRANULOCYTES # BLD: 0 10E3/UL
IMM GRANULOCYTES NFR BLD: 0 %
INR PPP: 1.85 (ref 0.85–1.15)
LACTATE SERPL-SCNC: 0.9 MMOL/L (ref 0.7–2)
LYMPHOCYTES # BLD AUTO: 2 10E3/UL (ref 0.8–5.3)
LYMPHOCYTES NFR BLD AUTO: 36 %
MCH RBC QN AUTO: 29.3 PG (ref 26.5–33)
MCHC RBC AUTO-ENTMCNC: 33.6 G/DL (ref 31.5–36.5)
MCV RBC AUTO: 87 FL (ref 78–100)
MONOCYTES # BLD AUTO: 0.6 10E3/UL (ref 0–1.3)
MONOCYTES NFR BLD AUTO: 11 %
NEUTROPHILS # BLD AUTO: 2.7 10E3/UL (ref 1.6–8.3)
NEUTROPHILS NFR BLD AUTO: 49 %
NRBC # BLD AUTO: 0 10E3/UL
NRBC BLD AUTO-RTO: 0 /100
PLATELET # BLD AUTO: 224 10E3/UL (ref 150–450)
POTASSIUM SERPL-SCNC: 4.3 MMOL/L (ref 3.4–5.3)
RBC # BLD AUTO: 4.33 10E6/UL (ref 3.8–5.2)
RSV RNA SPEC NAA+PROBE: NEGATIVE
SARS-COV-2 RNA RESP QL NAA+PROBE: NEGATIVE
SODIUM SERPL-SCNC: 142 MMOL/L (ref 135–145)
TROPONIN T SERPL HS-MCNC: 9 NG/L
WBC # BLD AUTO: 5.6 10E3/UL (ref 4–11)

## 2023-12-01 PROCEDURE — 93010 ELECTROCARDIOGRAM REPORT: CPT | Performed by: INTERNAL MEDICINE

## 2023-12-01 PROCEDURE — 250N000011 HC RX IP 250 OP 636: Performed by: FAMILY MEDICINE

## 2023-12-01 PROCEDURE — 250N000013 HC RX MED GY IP 250 OP 250 PS 637: Performed by: FAMILY MEDICINE

## 2023-12-01 PROCEDURE — 87637 SARSCOV2&INF A&B&RSV AMP PRB: CPT | Performed by: FAMILY MEDICINE

## 2023-12-01 PROCEDURE — 36415 COLL VENOUS BLD VENIPUNCTURE: CPT | Performed by: FAMILY MEDICINE

## 2023-12-01 PROCEDURE — 99284 EMERGENCY DEPT VISIT MOD MDM: CPT | Performed by: FAMILY MEDICINE

## 2023-12-01 PROCEDURE — 85025 COMPLETE CBC W/AUTO DIFF WBC: CPT | Performed by: FAMILY MEDICINE

## 2023-12-01 PROCEDURE — 93005 ELECTROCARDIOGRAM TRACING: CPT | Performed by: FAMILY MEDICINE

## 2023-12-01 PROCEDURE — 99285 EMERGENCY DEPT VISIT HI MDM: CPT | Mod: 25 | Performed by: FAMILY MEDICINE

## 2023-12-01 PROCEDURE — 71275 CT ANGIOGRAPHY CHEST: CPT

## 2023-12-01 PROCEDURE — 258N000003 HC RX IP 258 OP 636: Performed by: FAMILY MEDICINE

## 2023-12-01 PROCEDURE — 84484 ASSAY OF TROPONIN QUANT: CPT | Performed by: FAMILY MEDICINE

## 2023-12-01 PROCEDURE — 85610 PROTHROMBIN TIME: CPT | Performed by: FAMILY MEDICINE

## 2023-12-01 PROCEDURE — 80048 BASIC METABOLIC PNL TOTAL CA: CPT | Performed by: FAMILY MEDICINE

## 2023-12-01 PROCEDURE — 71045 X-RAY EXAM CHEST 1 VIEW: CPT

## 2023-12-01 PROCEDURE — 83605 ASSAY OF LACTIC ACID: CPT | Performed by: FAMILY MEDICINE

## 2023-12-01 RX ORDER — IOPAMIDOL 755 MG/ML
75 INJECTION, SOLUTION INTRAVASCULAR ONCE
Status: COMPLETED | OUTPATIENT
Start: 2023-12-01 | End: 2023-12-01

## 2023-12-01 RX ORDER — HYDROCODONE BITARTRATE AND ACETAMINOPHEN 5; 325 MG/1; MG/1
1 TABLET ORAL ONCE
Status: COMPLETED | OUTPATIENT
Start: 2023-12-01 | End: 2023-12-01

## 2023-12-01 RX ORDER — ASPIRIN 81 MG/1
324 TABLET, CHEWABLE ORAL ONCE
Status: COMPLETED | OUTPATIENT
Start: 2023-12-01 | End: 2023-12-01

## 2023-12-01 RX ORDER — SODIUM CHLORIDE 9 MG/ML
INJECTION, SOLUTION INTRAVENOUS CONTINUOUS
Status: DISCONTINUED | OUTPATIENT
Start: 2023-12-01 | End: 2023-12-01 | Stop reason: HOSPADM

## 2023-12-01 RX ADMIN — HYDROCODONE BITARTRATE AND ACETAMINOPHEN 1 TABLET: 5; 325 TABLET ORAL at 10:01

## 2023-12-01 RX ADMIN — ASPIRIN 81 MG CHEWABLE TABLET 324 MG: 81 TABLET CHEWABLE at 09:51

## 2023-12-01 RX ADMIN — SODIUM CHLORIDE: 900 INJECTION, SOLUTION INTRAVENOUS at 09:52

## 2023-12-01 RX ADMIN — IOPAMIDOL 75 ML: 755 INJECTION, SOLUTION INTRAVENOUS at 11:16

## 2023-12-01 ASSESSMENT — ENCOUNTER SYMPTOMS
BACK PAIN: 1
SHORTNESS OF BREATH: 1
HEADACHES: 1

## 2023-12-01 ASSESSMENT — ACTIVITIES OF DAILY LIVING (ADL): ADLS_ACUITY_SCORE: 35

## 2023-12-01 NOTE — ED TRIAGE NOTES
"ED Nursing Triage Note (General)   ________________________________    Ankita Day is a 68 year old Female that presents to triage via private vehicle with complaints of palpitations over the past 3-4 weeks.  Patient states she is also experiencing headaches, SOB, back pain, as well as jaw pain.  Patient states SOB episodes come on spontaneously.  Patient states on the 23rd she believes she had a \"mild heart attack\" and states she woke in the night with chest pain.  Patient states she took 3 nitroglycerin at that time and believes she passed out, however, states when she woke in the AM she no longer had chest pain.  Patient states since this episode she has had increased fatigue. Patient states hx of multiple MI's, PE's, and stents.   Significant symptoms had onset 3 week(s) ago.  LMP 04/29/1978 (Approximate)       PRE HOSPITAL PRIOR LIVING SITUATION Spouse         "

## 2023-12-01 NOTE — TELEPHONE ENCOUNTER
Reason for Disposition   Heart beating < 50 beats per minute OR > 140 beats per minute    Protocols used: Chest Pain-A-OH  S-(situation): Patient is transferred from scheduling, thinks she might have had #9 heart attack on 11/23/23.    B-(background): Patient states she has had 8-9 heart attacks, 17 stents, triple bypass, and PE. After 3 nitroglycerin sprays on 11/23/23 she still isn't feeling well.     A-(assessment): Pulse 45, chest pain ongoing, heart palpitations.    R-(recommendations): Call EMS or have her  take her to ED immediately.    Eleonora Bailey RN on 12/1/2023 at 8:51 AM

## 2023-12-01 NOTE — DISCHARGE INSTRUCTIONS
Thank you for choosing our Emergency Department for your care.     You may receive a phone call or letter for a survey about your care in our ED.  Please complete this as this is how we improve care for our patients.     If you have any questions after leaving the ED you can call or text me on my cell phone at [phone].  This does not mean that I am on call, but I will get back to you.  If you are not doing well please return to the ED.     Sincerely,    Dr Arnel Ray M.D.

## 2023-12-01 NOTE — ED PROVIDER NOTES
History     Chief Complaint   Patient presents with    Palpitations     The history is provided by the patient.     Ankita Day is a 68 year old female with chest pain for three weeks, SOB, headache and back pain.  She called clinic today for an appointment and was told to come to the ED. She reports fever and chills as well.      She is V/B against COVID.  She has a significant heart history including kayley heart attacks, 17 stents, 3V CABG and history of PE (on Xarelto).    Allergies:  Allergies   Allergen Reactions    Atorvastatin Muscle Pain (Myalgia)    Tiotropium Bromide [Tiotropium] Rash    Advil [Ibuprofen] Other (See Comments)     Does not recall but feel like it interacted with blood thinners and HX of GI BLEED    Ezetimibe Muscle Pain (Myalgia)    Latex Rash    Niacin      Other reaction(s): Flushing    No Clinical Screening - See Comments Itching, Rash and Blisters     Metals and plastics      Tape [Adhesive Tape] Rash       Problem List:    Patient Active Problem List    Diagnosis Date Noted    F11.2 - Continuous opioid dependence (H) 10/29/2023     Priority: Medium    F11.9 - Chronic, continuous use of opioids 10/29/2023     Priority: Medium    Paroxysmal supraventricular tachycardia 07/13/2023     Priority: Medium    Localized edema 07/13/2023     Priority: Medium    History of CVA-mild chronic ischemic disease on 8/20/2021. 01/11/2022     Priority: Medium    ASCVD (arteriosclerotic cardiovascular disease) 01/11/2022     Priority: Medium    Nausea 01/11/2022     Priority: Medium    Nonintractable episodic headache, unspecified headache type 01/11/2022     Priority: Medium    Gastroesophageal reflux disease with esophagitis without hemorrhage 01/11/2022     Priority: Medium    CORTEZ (dyspnea on exertion) 11/16/2021     Priority: Medium    Palpitations 11/16/2021     Priority: Medium    Chest pain, unspecified type 11/16/2021     Priority: Medium    Vitamin B12 deficiency (non anemic) 11/16/2021      Priority: Medium    Symptomatic bradycardia 07/16/2021     Priority: Medium    Chronic ischemic heart disease 02/05/2020     Priority: Medium    History of pulmonary embolism on 1/2/2020. (-) VQ scan on 11/23/2021 01/02/2020     Priority: Medium    Stage 3a chronic kidney disease (H) 06/19/2019     Priority: Medium    Chronic anxiety 01/22/2018     Priority: Medium    Collagenous colitis 01/22/2018     Priority: Medium     Overview:   Possible Dx 2007      Major depressive disorder, recurrent episode, severe (H) 01/22/2018     Priority: Medium    Essential hypertension 01/22/2018     Priority: Medium    Mixed hyperlipidemia 01/22/2018     Priority: Medium    Osteoarthritis 01/22/2018     Priority: Medium    Panic attack 01/22/2018     Priority: Medium    Status post coronary angiogram on 9/25/2017 [facility]-stable disease 09/15/2017     Priority: Medium    History of tobacco abuse-quitting 8/23/2017 08/10/2017     Priority: Medium    Presence of stent in coronary artery in patient with coronary artery disease 08/10/2017     Priority: Medium    History of coronary artery bypass graft x 3 on 2/12/2003 08/10/2017     Priority: Medium    Noncompliance with CPAP treatment 10/21/2016     Priority: Medium    Intractable chronic common migraine without aura 10/21/2016     Priority: Medium    H/O multiple concussions 10/21/2016     Priority: Medium    History of Clostridium difficile 08/19/2016     Priority: Medium    Iron deficiency anemia 07/26/2016     Priority: Medium    Controlled substance agreement updated 9-7-22 01/28/2014     Priority: Medium     Overview:       Pain medication agreement 01/28/2014     Priority: Medium     Formatting of this note might be different from the original.      Central sleep apnea 10/14/2013     Priority: Medium    Myofascial pain 10/14/2013     Priority: Medium    Vitamin D deficiency 05/06/2013     Priority: Medium    Subclavian artery stenosis, left (H24) 03/31/2013     Priority:  Medium     Overview:   S/p prox left SCA stent 4/1/2013      Lumbar facet arthropathy 01/02/2013     Priority: Medium    Nonspecific abnormal results of pulmonary system function study 12/21/2011     Priority: Medium    Slow transit constipation 11/29/2011     Priority: Medium    Gastric ulcer with hemorrhage 10/03/2011     Priority: Medium    Family history of malignant neoplasm of gastrointestinal tract 09/26/2011     Priority: Medium    Nodular degeneration of cornea 09/26/2011     Priority: Medium    Bilateral low back pain without sciatica 05/31/2011     Priority: Medium    COPD (chronic obstructive pulmonary disease) (H) 06/15/2007     Priority: Medium     Overview:   Low DLCO, normal spirometry on 12/15/11      Peptic ulcer 06/15/2007     Priority: Medium    Atherosclerosis of coronary artery bypass graft of native heart with stable angina pectoris (H24) 09/12/2002     Priority: Medium     Overview:   Multiple Angigrams prior to 2007. Also:  6/5/2007: Angiogram: TAXUS DELONTE to ostial circumflux, instent restenosis  5/23/2008: Left Main 30% diseased, LAD stents patent, Left circ stent patent, Chronic occluded right internal mammary artery graft to distal RCA, native RCA has 30% stenosis; NO intevention  9/19/2012 CT Angiogram: Patent LIMA to LAD, patent LAD stents, moderate proximal circumflex disease, patent RCA , occluded right internal mammary artery graft to distal RCA.  9/20/2012: Angiogram: Stent to Left Main, ostial LAD, and PTCA of ostial left circumflex          Past Medical History:    Past Medical History:   Diagnosis Date    Acute ischemic heart disease (H)     Acute myocardial infarction (H)     Anxiety disorder     Atherosclerotic heart disease of native coronary artery without angina pectoris     Bilateral carpal tunnel syndrome     Cervicalgia     Chest pain     Chronic gastric ulcer without hemorrhage or perforation     Chronic ischemic heart disease     Chronic obstructive pulmonary disease (H)      Chronic or unspecified gastric ulcer with hemorrhage     Chronic pain syndrome     Colostomy status (H)     Constipation     Coronary angioplasty status     Coronary angioplasty status     Coronary angioplasty status     Dorsalgia     Encounter for other administrative examinations     Encounter for screening for cardiovascular disorders     Enterocolitis due to Clostridium difficile     Essential (primary) hypertension     Hyperlipidemia     Major depressive disorder, single episode     Migraine without status migrainosus, not intractable     Nodular corneal degeneration     Noninfective gastroenteritis and colitis     Noninfective gastroenteritis and colitis     Osteoarthritis     Other chest pain     Pain in knee     Pain in right shoulder     Panic disorder without agoraphobia     Peptic ulcer without hemorrhage or perforation     Peripheral vascular disease (H24)     Personal history of diseases of the blood and blood-forming organs and certain disorders involving the immune mechanism (CODE)     Personal history of nicotine dependence     Personal history of other medical treatment (CODE)     Presence of aortocoronary bypass graft     Primary central sleep apnea     Sepsis due to Escherichia coli (E. coli) (H)     Stricture of artery (H24)     Thoracic, thoracolumbar and lumbosacral intervertebral disc disorder     Uncomplicated opioid abuse (H)     Vitamin D deficiency        Past Surgical History:    Past Surgical History:   Procedure Laterality Date    ANGIOPLASTY      9/12/02,with triple stenting    APPENDECTOMY OPEN      No Comments Provided    ARTHROSCOPY KNEE      left    ARTHROSCOPY SHOULDER Right 05/12/2017    labral tear, rotator cuff tear and some subacromial decompression     BYPASS GRAFT ARTERY CORONARY      12/13/02,Triple bypass, left internal mammary  to LAD, right internal mammary to right coronary artery, saphenous to obtuse marginal of the left circumflex.    COLONOSCOPY      2011,  Gracie benign polyps    COLONOSCOPY  10/03/2011    2011,benign polyps, Dr. Bowman    COLONOSCOPY  2016,normal, Dr Bowman    ELBOW SURGERY      baby,birth malachi removed from right arm    EMBOLECTOMY UPPER EXTREMITY  2013    brachial artery pseudoaneurysm after stenting    ESOPHAGOSCOPY, GASTROSCOPY, DUODENOSCOPY (EGD), COMBINED      ,EGD Dr Bowman with pyloric ulcer    ESOPHAGOSCOPY, GASTROSCOPY, DUODENOSCOPY (EGD), COMBINED      ,pyloric ulcer, Dr. Bowman    ESOPHAGOSCOPY, GASTROSCOPY, DUODENOSCOPY (EGD), COMBINED      16,mild gastritis, Dr Bowman    ESOPHAGOSCOPY, GASTROSCOPY, DUODENOSCOPY (EGD), COMBINED      2017,Dr Bowman. Antral ulcer    ESOPHAGOSCOPY, GASTROSCOPY, DUODENOSCOPY (EGD), COMBINED  2018    Dr Bowman, healed ulcer    HYSTERECTOMY TOTAL ABDOMINAL      age 21    LAPAROSCOPIC CHOLECYSTECTOMY      2006    OSTEOTOMY FEMUR DISTAL      x3, right knee    OSTEOTOMY FEMUR DISTAL      2000,left knee  ligament surgery    OTHER SURGICAL HISTORY      1/10/2003,,PTCA    OTHER SURGICAL HISTORY      2012,,PTCA,DELONTE in LAD and left main    OTHER SURGICAL HISTORY      2013,,PTCA,L subclavian stenosis    RELEASE TRIGGER FINGER Left 2022    Procedure: Left thumb Trigger  release;  Surgeon: Cristhian Wilkinson MD;  Location: [facility] OR    SALPINGO-OOPHORECTOMY BILATERAL      age 27,Bilateral salpingo-oophorectomy    TONSILLECTOMY, ADENOIDECTOMY, COMBINED      childhood       Family History:    Family History   Problem Relation Age of Onset    Heart Disease Father         Heart Disease,Heart condition/Significant for atherosclerotic cardiovascular disease, but non premature.    Colon Cancer Father         Cancer-colon, of colon cancer    Cancer Father         Cancer,mets to liver, secondary to colon cancer    Heart Disease Mother         Heart Disease    Cancer Other         Cancer,Multiple Myeloma    Heart Disease Other         Heart Disease,Ischemic Heart  Disease    Colon Cancer Other         Cancer-colon,Malignant neoplasms    Cancer Sister         Cancer,multiple myeloma    Other - See Comments Son         gallstones       Social History:  Marital Status:   [2]  Social History     Tobacco Use    Smoking status: Former     Packs/day: 1.00     Years: 35.00     Additional pack years: 0.00     Total pack years: 35.00     Types: Cigarettes     Quit date: 2/15/2017     Years since quittin.7     Passive exposure: Past    Smokeless tobacco: Never   Vaping Use    Vaping Use: Never used   Substance Use Topics    Alcohol use: Not Currently     Alcohol/week: 0.0 standard drinks of alcohol    Drug use: No        Medications:    amLODIPine (NORVASC) 10 MG tablet  busPIRone (BUSPAR) 30 MG tablet  clonazePAM (KLONOPIN) 1 MG tablet  clopidogrel (PLAVIX) 75 MG tablet  diclofenac (VOLTAREN) 1 % topical gel  HYDROcodone-acetaminophen (NORCO)  MG per tablet  lisinopril (ZESTRIL) 10 MG tablet  naloxone (NARCAN) 4 MG/0.1ML nasal spray  nitroGLYcerin (NITROLINGUAL) 0.4 MG/SPRAY spray  ondansetron (ZOFRAN) 4 MG tablet  pantoprazole (PROTONIX) 40 MG EC tablet  propranolol (INDERAL) 40 MG tablet  RESTASIS 0.05 % ophthalmic emulsion  rimegepant (NURTEC) 75 MG ODT tablet  rivaroxaban ANTICOAGULANT (XARELTO ANTICOAGULANT) 20 MG TABS tablet  rosuvastatin (CRESTOR) 20 MG tablet  sucralfate (CARAFATE) 1 GM tablet  TRINTELLIX 20 MG tablet  VITAMIN D, CHOLECALCIFEROL, PO          Review of Systems   Respiratory:  Positive for shortness of breath.    Cardiovascular:  Positive for chest pain.   Musculoskeletal:  Positive for back pain.   Neurological:  Positive for headaches.       Physical Exam   BP: (!) 174/85  Pulse: 53  Temp: 97.6  F (36.4  C)  Resp: 20  Weight: 76.7 kg (169 lb)  SpO2: 97 %      Physical Exam  Vitals and nursing note reviewed.   Constitutional:       General: She is not in acute distress.     Appearance: Normal appearance. She is not ill-appearing, toxic-appearing  or diaphoretic.   Cardiovascular:      Rate and Rhythm: Regular rhythm. Bradycardia present.      Pulses: Normal pulses.      Heart sounds: Normal heart sounds.   Pulmonary:      Effort: Pulmonary effort is normal. No respiratory distress.      Breath sounds: Normal breath sounds.   Abdominal:      General: There is no distension.      Palpations: Abdomen is soft.   Musculoskeletal:      Right lower leg: No edema.      Left lower leg: No edema.   Skin:     General: Skin is warm and dry.   Neurological:      General: No focal deficit present.      Mental Status: She is alert and oriented to person, place, and time.         EKG: sinus bradycardia, rate 51, normal axis    Results for orders placed or performed during the hospital encounter of 12/01/23 (from the past 24 hour(s))   CBC with platelets differential    Narrative    The following orders were created for panel order CBC with platelets differential.  Procedure                               Abnormality         Status                     ---------                               -----------         ------                     CBC with platelets and d...[[id number]]                      Final result                 Please view results for these tests on the individual orders.   INR   Result Value Ref Range    INR 1.85 (H) 0.85 - 1.15   Basic metabolic panel   Result Value Ref Range    Sodium 142 135 - 145 mmol/L    Potassium 4.3 3.4 - 5.3 mmol/L    Chloride 105 98 - 107 mmol/L    Carbon Dioxide (CO2) 26 22 - 29 mmol/L    Anion Gap 11 7 - 15 mmol/L    Urea Nitrogen 14.7 8.0 - 23.0 mg/dL    Creatinine 0.80 0.51 - 0.95 mg/dL    GFR Estimate 79 >60 mL/min/1.73m2    Calcium 9.8 8.8 - 10.2 mg/dL    Glucose 64 (L) 70 - 99 mg/dL   Troponin T, High Sensitivity   Result Value Ref Range    Troponin T, High Sensitivity 9 <=14 ng/L   Lactic acid whole blood   Result Value Ref Range    Lactic Acid 0.9 0.7 - 2.0 mmol/L   CBC with platelets and differential   Result Value Ref Range     WBC Count 5.6 4.0 - 11.0 10e3/uL    RBC Count 4.33 3.80 - 5.20 10e6/uL    Hemoglobin 12.7 11.7 - 15.7 g/dL    Hematocrit 37.8 35.0 - 47.0 %    MCV 87 78 - 100 fL    MCH 29.3 26.5 - 33.0 pg    MCHC 33.6 31.5 - 36.5 g/dL    RDW 13.5 10.0 - 15.0 %    Platelet Count 224 150 - 450 10e3/uL    % Neutrophils 49 %    % Lymphocytes 36 %    % Monocytes 11 %    % Eosinophils 3 %    % Basophils 1 %    % Immature Granulocytes 0 %    NRBCs per 100 WBC 0 <1 /100    Absolute Neutrophils 2.7 1.6 - 8.3 10e3/uL    Absolute Lymphocytes 2.0 0.8 - 5.3 10e3/uL    Absolute Monocytes 0.6 0.0 - 1.3 10e3/uL    Absolute Eosinophils 0.2 0.0 - 0.7 10e3/uL    Absolute Basophils 0.0 0.0 - 0.2 10e3/uL    Absolute Immature Granulocytes 0.0 <=0.4 10e3/uL    Absolute NRBCs 0.0 10e3/uL   Symptomatic Influenza A/B, RSV, & SARS-CoV2 PCR (COVID-19) Nose    Specimen: Nose; Swab   Result Value Ref Range    Influenza A PCR Negative Negative    Influenza B PCR Negative Negative    RSV PCR Negative Negative    SARS CoV2 PCR Negative Negative    Narrative    Testing was performed using the Xpert Xpress CoV2/Flu/RSV Assay on the [employer] GeneXpert Instrument. This test should be ordered for the detection of SARS-CoV-2, influenza, and RSV viruses in individuals who meet clinical and/or epidemiological criteria. Test performance is unknown in asymptomatic patients. This test is for in vitro diagnostic use under the FDA EUA for laboratories certified under CLIA to perform high or moderate complexity testing. This test has not been FDA cleared or approved. A negative result does not rule out the presence of PCR inhibitors in the specimen or target RNA in concentration below the limit of detection for the assay. If only one viral target is positive but coinfection with multiple targets is suspected, the sample should be re-tested with another FDA cleared, approved, or authorized test, if coinfection would change clinical management. This test was validated by the [facility]  [facility]. These laboratories are certified under the Clinical Laboratory Improvement Amendments of 1988 (CLIA-88) as qualified to perform high complexity laboratory testing.   Extra Tube    Narrative    The following orders were created for panel order Extra Tube.  Procedure                               Abnormality         Status                     ---------                               -----------         ------                     Extra Red Top Tube[[id number]]                               Final result                 Please view results for these tests on the individual orders.   Extra Red Top Tube   Result Value Ref Range    Hold Specimen JIC    XR Chest Port 1 View    Narrative    PROCEDURE:  XR CHEST PORT 1 VIEW    HISTORY:  chest pain.     COMPARISON:  7/13/2023    FINDINGS:   The cardiac silhouette is normal in size. Postoperative changes are  seen in the mediastinum. The pulmonary vasculature is normal.  The  lungs are clear. No pleural effusion or pneumothorax.      Impression    IMPRESSION:  No acute cardiopulmonary disease.      KHALIF SWIFT MD         SYSTEM ID:  [id number]   CT Chest Pulmonary Embolism w Contrast    Narrative    PROCEDURE:  CT CHEST PULMONARY EMBOLISM W CONTRAST.    HISTORY:  Evaluate for pulmonary embolism.  chest pain, SOB, history  of PE on Xarelto    TECHNIQUE:  Initial pre-contrast  and localizer images were  obtained.  Contrast enhanced helical CT pulmonary angiography was then  performed.  Routine transaxial and post-processed (multiplanar and/or  MIP) reformations were obtained. This CT exam was performed using one  or more the following dose reduction techniques: automated exposure  control, adjustment of the mA and/or kV according to patient size,  and/or iterative reconstruction technique.    COMPARISON:  7/3/2022    MEDS/CONTRAST: Isovue 370, 75mL    PULMONARY CTA FINDINGS:  This is a diagnostic quality helical CT  pulmonary angiogram.  There  is no acute pulmonary embolism to the  first subsegmental pulmonary artery level.    OTHER FINDINGS:      The neck base is grossly symmetric. The heart is enlarged and there  are atherosclerotic calcifications of the coronary arteries.  There is  no pericardial or pleural effusion. The thoracic aorta and main  pulmonary artery are within normal limits in size. No abnormal  thoracic adenopathy is identified.    Limited views of the upper abdomen reveal no adrenal mass or acute  process.     The pleura is without thickening or effusions. The central airways are  unremarkable. No focal consolidation is identified. Multiple  subpleural nodules in the right upper lobe measuring up to 4 mm are  unchanged. Mild subpleural atelectasis likely reflects the phase of  respiration.    No suspicious osseous lesion is identified.      Impression    IMPRESSION:    No acute pulmonary emboli to the subsegmental level.    Cardiomegaly. Calcific coronary artery disease.    CARRIE OLVERA MD         SYSTEM ID:  [id number]     *Note: Due to a large number of results and/or encounters for the requested time period, some results have not been displayed. A complete set of results can be found in Results Review.       Medications   sodium chloride 0.9 % infusion ( Intravenous $New Bag 12/1/23 0951)   aspirin (ASA) chewable tablet 324 mg (324 mg Oral $Given 12/1/23 0951)   HYDROcodone-acetaminophen (NORCO) 5-325 MG per tablet 1 tablet (1 tablet Oral $Given 12/1/23 1001)   iopamidol (ISOVUE-370) solution 75 mL (75 mLs Intravenous $Given 12/1/23 1116)       Assessments & Plan (with Medical Decision Making)  Ankita Day is a 68 year old female with chest pain for three weeks, SOB, headache and back pain.  She called clinic today for an appointment and was told to come to the ED. She reports fever and chills as well.    She is V/B against COVID.  She has a significant heart history including kayley heart attacks, 17 stents, 3V CABG and  history of PE (on Xarelto).  VS in the ED /69   Pulse (!) 49   Temp 97.6  F (36.4  C) (Tympanic)   Resp 15   Wt 76.7 kg (169 lb)   LMP 04/29/1978 (Approximate)   SpO2 97%   BMI 27.70 kg/m    Exam shows no focal findings.  We gave aspirin.   EKG stable.   Labs show CBC normal, BMP with glucose 64, troponin 9, INR 1.85, lactic acid 0.9, 4 Plex negative.   Chest xray stable.  CT PE study negative.  I think this is atypical chest pain and should improve with time.      I have reviewed the nursing notes.    I have reviewed the findings, diagnosis, plan and need for follow up with the patient.  Medical Decision Making  The patient's presentation was of moderate complexity (an acute illness with systemic symptoms).    The patient's evaluation involved:  an assessment requiring an independent historian (see separate area of note for details)  ordering and/or review of 3+ test(s) in this encounter (see separate area of note for details)    The patient's management necessitated only low risk treatment.        Final diagnoses:   Atypical chest pain       12/1/2023   [facility] MD  12/01/23 1132

## 2023-12-19 ASSESSMENT — ANXIETY QUESTIONNAIRES
1. FEELING NERVOUS, ANXIOUS, OR ON EDGE: MORE THAN HALF THE DAYS
5. BEING SO RESTLESS THAT IT IS HARD TO SIT STILL: SEVERAL DAYS
4. TROUBLE RELAXING: SEVERAL DAYS
GAD7 TOTAL SCORE: 10
3. WORRYING TOO MUCH ABOUT DIFFERENT THINGS: SEVERAL DAYS
6. BECOMING EASILY ANNOYED OR IRRITABLE: SEVERAL DAYS
2. NOT BEING ABLE TO STOP OR CONTROL WORRYING: SEVERAL DAYS
7. FEELING AFRAID AS IF SOMETHING AWFUL MIGHT HAPPEN: NEARLY EVERY DAY
IF YOU CHECKED OFF ANY PROBLEMS ON THIS QUESTIONNAIRE, HOW DIFFICULT HAVE THESE PROBLEMS MADE IT FOR YOU TO DO YOUR WORK, TAKE CARE OF THINGS AT HOME, OR GET ALONG WITH OTHER PEOPLE: SOMEWHAT DIFFICULT

## 2023-12-19 ASSESSMENT — PATIENT HEALTH QUESTIONNAIRE - PHQ9: SUM OF ALL RESPONSES TO PHQ QUESTIONS 1-9: 9

## 2023-12-20 ENCOUNTER — OFFICE VISIT (OUTPATIENT)
Dept: FAMILY MEDICINE | Facility: OTHER | Age: 68
End: 2023-12-20
Attending: PHYSICIAN ASSISTANT
Payer: COMMERCIAL

## 2023-12-20 VITALS
SYSTOLIC BLOOD PRESSURE: 118 MMHG | WEIGHT: 170 LBS | TEMPERATURE: 96.4 F | BODY MASS INDEX: 27.32 KG/M2 | OXYGEN SATURATION: 98 % | HEART RATE: 56 BPM | DIASTOLIC BLOOD PRESSURE: 68 MMHG | RESPIRATION RATE: 20 BRPM | HEIGHT: 66 IN

## 2023-12-20 DIAGNOSIS — Z79.899 CONTROLLED SUBSTANCE AGREEMENT SIGNED: ICD-10-CM

## 2023-12-20 DIAGNOSIS — G89.4 CHRONIC PAIN DISORDER: ICD-10-CM

## 2023-12-20 DIAGNOSIS — F11.20 CONTINUOUS OPIOID DEPENDENCE (H): ICD-10-CM

## 2023-12-20 DIAGNOSIS — G89.29 CHRONIC BILATERAL LOW BACK PAIN WITHOUT SCIATICA: ICD-10-CM

## 2023-12-20 DIAGNOSIS — R06.09 DOE (DYSPNEA ON EXERTION): ICD-10-CM

## 2023-12-20 DIAGNOSIS — F11.90 CHRONIC, CONTINUOUS USE OF OPIOIDS: ICD-10-CM

## 2023-12-20 DIAGNOSIS — M54.50 CHRONIC BILATERAL LOW BACK PAIN WITHOUT SCIATICA: ICD-10-CM

## 2023-12-20 DIAGNOSIS — R07.89 CHEST WALL PAIN: ICD-10-CM

## 2023-12-20 DIAGNOSIS — F41.9 CHRONIC ANXIETY: Primary | ICD-10-CM

## 2023-12-20 PROCEDURE — G0463 HOSPITAL OUTPT CLINIC VISIT: HCPCS

## 2023-12-20 PROCEDURE — 99214 OFFICE O/P EST MOD 30 MIN: CPT | Performed by: PHYSICIAN ASSISTANT

## 2023-12-20 RX ORDER — HYDROCODONE BITARTRATE AND ACETAMINOPHEN 10; 325 MG/1; MG/1
1 TABLET ORAL EVERY 4 HOURS PRN
Qty: 180 TABLET | Refills: 0 | Status: SHIPPED | OUTPATIENT
Start: 2024-01-28 | End: 2024-01-24

## 2023-12-20 RX ORDER — CLONAZEPAM 1 MG/1
1 TABLET ORAL 3 TIMES DAILY PRN
Qty: 90 TABLET | Refills: 1 | Status: SHIPPED | OUTPATIENT
Start: 2023-12-20 | End: 2024-02-07

## 2023-12-20 RX ORDER — HYDROCODONE BITARTRATE AND ACETAMINOPHEN 10; 325 MG/1; MG/1
1 TABLET ORAL EVERY 4 HOURS PRN
Qty: 180 TABLET | Refills: 0 | Status: SHIPPED | OUTPATIENT
Start: 2023-12-29 | End: 2024-01-28

## 2023-12-20 ASSESSMENT — PATIENT HEALTH QUESTIONNAIRE - PHQ9
10. IF YOU CHECKED OFF ANY PROBLEMS, HOW DIFFICULT HAVE THESE PROBLEMS MADE IT FOR YOU TO DO YOUR WORK, TAKE CARE OF THINGS AT HOME, OR GET ALONG WITH OTHER PEOPLE: SOMEWHAT DIFFICULT
SUM OF ALL RESPONSES TO PHQ QUESTIONS 1-9: 10
SUM OF ALL RESPONSES TO PHQ QUESTIONS 1-9: 10

## 2023-12-20 ASSESSMENT — ANXIETY QUESTIONNAIRES
6. BECOMING EASILY ANNOYED OR IRRITABLE: SEVERAL DAYS
GAD7 TOTAL SCORE: 10
1. FEELING NERVOUS, ANXIOUS, OR ON EDGE: MORE THAN HALF THE DAYS
5. BEING SO RESTLESS THAT IT IS HARD TO SIT STILL: SEVERAL DAYS
GAD7 TOTAL SCORE: 10
2. NOT BEING ABLE TO STOP OR CONTROL WORRYING: SEVERAL DAYS
3. WORRYING TOO MUCH ABOUT DIFFERENT THINGS: SEVERAL DAYS
IF YOU CHECKED OFF ANY PROBLEMS ON THIS QUESTIONNAIRE, HOW DIFFICULT HAVE THESE PROBLEMS MADE IT FOR YOU TO DO YOUR WORK, TAKE CARE OF THINGS AT HOME, OR GET ALONG WITH OTHER PEOPLE: SOMEWHAT DIFFICULT
7. FEELING AFRAID AS IF SOMETHING AWFUL MIGHT HAPPEN: NEARLY EVERY DAY
4. TROUBLE RELAXING: SEVERAL DAYS

## 2023-12-20 ASSESSMENT — PAIN SCALES - GENERAL: PAINLEVEL: SEVERE PAIN (7)

## 2023-12-20 NOTE — Clinical Note
Donald Galvez,   Mainly routing as [facility], I saw Malina in the clinic today. She continues to have chronic chest well pain, we discussed treatment modalities (Tylenol, heat, ice) for musculoskeletal related pain. She noted she was previously on Methadone for chest wall pain (looks like she stopped a few years back), I am not comfortable with this and declined.   She mentioned CORTEZ - I saw in your October note you had mentioned Entresto/SGLT2 options, she is on the fence, but would like to further discuss at your next visit. Discussed low salt diet, blood pressure management and other lifestyle modifications as well.   Thanks!   Sheri

## 2023-12-20 NOTE — NURSING NOTE
"Patient presents to the clinic for medication management.  Hematuria on and off for months.    FOOD SECURITY SCREENING QUESTIONS:    The next two questions are to help us understand your food security.  If you are feeling you need any assistance in this area, we have resources available to support you today.    Hunger Vital Signs:  Within the past 12 months we worried whether our food would run out before we got money to buy more. Never  Within the past 12 months the food we bought just didn't last and we didn't have money to get more. Never    Advance Care Directive on file? yes  Advance Care Directive provided to patient? N/a    Chief Complaint   Patient presents with    Medication Request       Initial /68 (BP Location: Left arm, Patient Position: Sitting, Cuff Size: Adult Large)   Pulse 56   Temp (!) 96.4  F (35.8  C) (Tympanic)   Resp 20   Ht 1.664 m (5' 5.5\")   Wt 77.1 kg (170 lb)   LMP 04/29/1978 (Approximate)   SpO2 98%   BMI 27.86 kg/m   Estimated body mass index is 27.86 kg/m  as calculated from the following:    Height as of this encounter: 1.664 m (5' 5.5\").    Weight as of this encounter: 77.1 kg (170 lb).  Medication Reconciliation: complete        [place] LPN     "

## 2023-12-20 NOTE — PROGRESS NOTES
Assessment & Plan       ICD-10-CM    1. Chronic anxiety  F41.9 clonazePAM (KLONOPIN) 1 MG tablet      2. Controlled substance agreement signed  Z79.899 clonazePAM (KLONOPIN) 1 MG tablet     HYDROcodone-acetaminophen (NORCO)  MG per tablet     HYDROcodone-acetaminophen (NORCO)  MG per tablet      3. F11.2 - Continuous opioid dependence (H)  F11.20 HYDROcodone-acetaminophen (NORCO)  MG per tablet     HYDROcodone-acetaminophen (NORCO)  MG per tablet      4. F11.9 - Chronic, continuous use of opioids  F11.90 HYDROcodone-acetaminophen (NORCO)  MG per tablet     HYDROcodone-acetaminophen (NORCO)  MG per tablet     naloxone (NARCAN) 4 MG/0.1ML nasal spray      5. Chronic pain disorder  G89.4 HYDROcodone-acetaminophen (NORCO)  MG per tablet     HYDROcodone-acetaminophen (NORCO)  MG per tablet      6. Chronic bilateral low back pain without sciatica  M54.50 HYDROcodone-acetaminophen (NORCO)  MG per tablet    G89.29 HYDROcodone-acetaminophen (NORCO)  MG per tablet      7. Chest wall pain  R07.89       8. CORTEZ (dyspnea on exertion)  R06.09         Chronic anxiety, progression due to worsening chronic pain. Discussed that I am not comfortable elevating Klonopin dose any further at this point. I would recommend to focus on coping techniques and pain management to address source of anxiety. She is agreeable. Will keep Klonopin at 1 mg TID dosing, refills x2 months. She is aware of risk of comorbid opiate and benzodiazepine use. Discussed taper, not interested. PDMP reviewed. Continue Buspar 30 mg BID.   Controlled substance agreement signed for chronic back pain. Currently on Norco , 1 tablet every 4 hours for total of 180 tablets/month. Low risk for diversion, taking as prescribed. Reviewed dosing of medication, I am not comfortable further increasing due to comorbid anxiety and benzodiazepine use. Reviewed injections, PT and other symptomatic remedies of care.  Avoid NSAIDS due to anticoagulation status. PEG score 9 today, worse recently, no exacerbating factors to cause worsening pain such as fall, injury, trauma, etc. Does not feel to be related to weather. Pain management regimen has allowed her to be able to participate in activities of daily living. PDMP reviewed, UDS and CSA up to date - 9/5/23.   See #2.   See #2.   See #2.   See #2.   Chest wall pain, non reproducible on examination, no exacerbations recently. She states that Methadone was previously helpful, I am not comfortable with prescribing this for patient, she would like me to reach out to cardiology in regards to their opinion on chest wall pain.   CORTEZ - worsening, discussed ECHO from 7/31/23 showing diastolic dysfunction. Discussed Entresto/SGLT2 as mentioned by cardiology in October 2023 visit, she is unsure if she would like to further explore these options, will reach out to cardiology.      See Patient Instructions    Return in about 8 weeks (around 2/14/2024) for Medication follow up.    Sheri Cordon PA-C  [facility]    Subjective   Malina is a 68 year old, presenting for the following health issues:  Medication Request        12/20/2023     9:24 AM   Additional Questions   Roomed by andrey contreras lpn   Accompanied by none         12/20/2023     9:24 AM   Patient Reported Additional Medications   Patient reports taking the following new medications none     Malina presents to the clinic in follow-up from her office visit in October 2023.  She states since seen last that her chronic pain in her lower back has progressed.  Pain is typically a 9 out of 10 on a good day and a 10 out of 10 on bad days.  She is no longer able to participate in activities of daily living without substantial pain such as washing dishes, brushing her teeth and other activities of daily living.  She last filled her hydrocodone on 11/29/2023, she will run out on 12/29/2023 and will be due for refill.  She declines  "taking any more prescription than prescribed.  She needs an up-to-date prescription for Narcan.  She rarely utilizes Advil for pain relief (she is aware she should not be doing this that she is on anticoagulation).  She has trialed topical such as Voltaren, IcyHot and Bengay.  Previous injections and physical therapy have not been helpful for pain relief. (They verbally asked her PEG scores during our office visit and imported later).     Anxiety, continues on Klonopin 1 mg by mouth 3 times daily, she has been on this chronically.  She is due for refill \"today or tomorrow\".  She filled this last on 11/20/2023.  Anxiety has been progressive due to pain.  She declines any recent life changes or stressors otherwise.  She does not feel her mental health is very stable.  Declines any suicidal or homicidal ideation.    Chest wall pain, she states this has been progressive, she has followed with cardiology for this.  She has a substantial cardiac history including hypertension, CABG x 3 in 2003, cardiac cath in 2017, CVA in 2021, left-sided subclavian steal, stenting with patency noted in 2017.  Previous alcohol use.  Uncontrolled hyperlipidemia, chronic kidney disease, CHF (likely diastolic in nature).  She was most recently seen in the ER on 12/1/2023 for chest pain, she states her workup was \"normal and I knew I was not having a heart attack\".  She continues to have shortness of breath with activity in particular, this is better with rest.  She declines any centralized or peripheral edema, headaches or vision changes.  She states she is taking her medications as prescribed.  Her recent echocardiogram from 7/31/2023 shows diastolic dysfunction.  She states she was previously on methadone for chest wall pain and would like to restart this.    History of Present Illness       Back Pain:  She presents for follow up of back pain. Patient's back pain is a chronic problem.  Location of back pain:  Right lower back, left lower " "back, right middle of back, left middle of back, right upper back, left upper back, right shoulder, left shoulder and other  Description of back pain: sharp  Back pain spreads: right side of neck and left side of neck    Since patient first noticed back pain, pain is: gradually worsening  Does back pain interfere with her job:  Yes       Mental Health Follow-up:  Patient presents to follow-up on Depression & Anxiety.Patient's depression since last visit has been:  Medium  The patient is not having other symptoms associated with depression.  Patient's anxiety since last visit has been:  Medium  The patient is not having other symptoms associated with anxiety.  Any significant life events: health concerns  Patient is feeling anxious or having panic attacks.  Patient has no concerns about alcohol or drug use.    Heart Failure:  She presents for follow up of heart failure. She is experiencing shortness of breath with rest and activity, which is much worse. She is not experiencing any lower extremity edema.   She denies orthopenea and is not coughing at night. Patient is not checking weight daily. She has recently had a weight decrease.  She has no side effects from medications.  She has has a medical visit for heart failure 1 time since the last visit.    Headaches:   Since the patient's last clinic visit, headaches are: worsened  The patient is getting headaches:  Everyday  She is not able to do normal daily activities when she has a migraine.  The patient is taking the following rescue/relief medications:  Other   Patient states \"The relief is inconsistent\" from the rescue/relief medications.   The patient is taking the following medications to prevent migraines:  Other  In the past 4 weeks, the patient has gone to an Urgent Care or Emergency Room 0 times times due to headaches.    Vascular Disease:  She presents for follow up of vascular disease.    She takes nitroglycerin occasionally.  She is not taking daily " "aspirin.    Reason for visit:  Med Check and PAIN    She eats 0-1 servings of fruits and vegetables daily.She consumes 2 sweetened beverage(s) daily.She exercises with enough effort to increase her heart rate 10 to 19 minutes per day.  She exercises with enough effort to increase her heart rate 3 or less days per week.   She is taking medications regularly.     Last Echo: Echo result w/o MOPS: Interpretation SummaryGlobal and regional left ventricular function is normal with an EF of 55-60%.Mild concentric wall thickening consistent with left ventricular hypertrophyis present.Left ventricular diastolic function is indeterminate.Global right ventricular function is normal.No significant valvular abnormalities present.IVC diameter <2.1 cm collapsing >50% with sniff suggests a normal RA pressureof 3 mmHg.No pericardial effusion is present.No significant changes noted.    Review of Systems   Constitutional, HEENT, cardiovascular, pulmonary, GI, , musculoskeletal, neuro, skin, endocrine and psych systems are negative, except as otherwise noted.      Objective    /68 (BP Location: Left arm, Patient Position: Sitting, Cuff Size: Adult Large)   Pulse 56   Temp (!) 96.4  F (35.8  C) (Tympanic)   Resp 20   Ht 1.664 m (5' 5.5\")   Wt 77.1 kg (170 lb)   LMP 04/29/1978 (Approximate)   SpO2 98%   BMI 27.86 kg/m    Body mass index is 27.86 kg/m .  Physical Exam   GENERAL: healthy, alert and no distress  EYES: Eyes grossly normal to inspection, PERRL and conjunctivae and sclerae normal  NECK: no adenopathy, no asymmetry, masses, or scars and thyroid normal to palpation  RESP: lungs clear to auscultation - no rales, rhonchi or wheezes  CV: regular rate and rhythm, normal S1 S2, no S3 or S4, no murmur, click or rub, no peripheral edema and peripheral pulses strong  ABDOMEN: soft, nontender, no hepatosplenomegaly, no masses and bowel sounds normal  MS: no gross musculoskeletal defects noted, no edema  SKIN: no " suspicious lesions or rashes  PSYCH: mentation appears normal, affect normal/bright

## 2023-12-20 NOTE — PATIENT INSTRUCTIONS
"Narcotic and Controlled Substance Contract information    Today:   -- Goals: tolerable pain   -- Consider taking the class \"Living well with Chronic Pain\" at the [facility]. ([email or web address]/ or [phone])   -- Utilize non-narcotic options including: acetaminophen, physical therapy, home exercise program, counseling, etc.     -- Controlled substance agreement:   [unfilled]     -- [place] Prescription Monitoring Program was reviewed today.    -- Last urine drug screen was acceptable.   Pain Drug SCR UR W RPTD Meds   Date Value Ref Range Status   10/09/2020 FINAL  Final     Comment:     (Note)  ====================================================================  [facility] COMP DRUG ANALYSIS,UR  ====================================================================  Test                             Result       Flag       Units        Drug Present   7-aminoclonazepam              618                     ng/mg creat    7-aminoclonazepam is an expected metabolite of clonazepam. Source    of clonazepam is a scheduled prescription medication.   Hydrocodone                    5430                    ng/mg creat   Hydromorphone                  2921                    ng/mg creat   Dihydrocodeine                 730                     ng/mg creat   Norhydrocodone                 1991                    ng/mg creat    Sources of hydrocodone include scheduled prescription    medications. Hydromorphone, dihydrocodeine and norhydrocodone are    expected metabolites of hydrocodone. Hydromorphone and    dihydrocodeine are also available as scheduled prescription    medications.   Pregabalin                     PRESENT                               Acetaminophen                  PRESENT                               Naproxen                       PRESENT                               Lidocaine                      PRESENT                              ====================================================================  Test      "                 Result    Flag   Units      Ref Range        Creatinine              33               mg/dL      >=20            ====================================================================  Declared Medications:  Medication list was not provided.  ====================================================================  For clinical consultation, please call [phone].  ====================================================================  Analysis performed by [facility] [employer] [facility], [address] [place] [address]          -- A prescription for the following medications was provided today:  Drug Quantity/Month Months Rx today   Hydrocodone and Klonopin 180 Hydrocodone and 90 Klonopin 3      -- Follow-up in 3 months (approximately 3/19/24) for office visit dedicated to Schedule II medications.    Patient Education     Facts:  Every day, 44 people die in the [place] from overdose of prescription painkillers. (1)  Prescription drug overdose kills more people than car crashes each year.(2, 3)  Deaths from prescription painkillers among women is up 400%,and up 265% for men. (4)  Long-term use of prescription painkillers increases your risk for heart attack, broken bones, impotence (5)  Your family members are more likely to overdose on narcotics if they are prescribed to you (6)    By signing a medication contract, you accept these risks and side effects.    Be aware that the use of such medicine has certain risks associated with it, including, but not limited to:  Sleepiness or drowsiness, constipation, nausea, itching, vomiting, lightheadedness, dizziness, confusion, allergic reaction, slowing of breathing rate, slowing of reflexes or reaction time, kidney or liver disease, sexual dysfunction, physical dependence, tolerance to analgesia, addiction, withdrawal and the possibility that the medicine will not provide complete relief.  Any narcotic usage is associate with a significantly increased risk of falls and other  unintended injuries, respiratory depression and increased risk for death.    I recommend against operating motor vehicles or heavy machinery when using this medication.  Store your medication in a secured, locked container, such as a safe or lockbox.  If any medication needs to be disposed, bring to the [facility] outpatient pharmacy and place in the drop box for secure disposal.  This medication will be strictly monitored and all of my medications should be filled at the same pharmacy.  It is your responsibility to share that you are on a narcotic contract with your other health care providers, including your dentist.  If you come into clinic for a dose adjustment, always bring your remaining pills and remaining hard copies of prescriptions.    References  1. CDC. http://www.cdc.gov/drugoverdose/epidemic/public.html  2. CDC. http://www.cdc.gov/drugoverdose/data/overdose.html  3. [place].http://spring.mn/0b5ivrb  4. [facility]. http://www.cdc.gov/vitalsigns/PrescriptionPainkillerOverdoses/index.html  5. Annals of Internal Medicine  Vol. 162 No. 4  17 February 2015  6. [employer]. https://jamanetwork.com/journals/jamainternalmedicine/fullarticle/3013416. 6/24/2019      Controlled Substances:    You have been prescribed a medication that is a controlled substance.  These medications must be closely monitored under Federal Law.     Controlled substances may cause you to become dependent or addicted to them.  The longer you take them, the more likely it is that you will suffer withdrawal symptoms when you stop the medication. This may also cause you to become tolerant to the medication, which means that it will become less effective.    It is important to notify your doctor of any side effects from the medication you are taking.  Common side effects are constipation, drowsiness, dizziness, itching, and nausea and vomiting.      The medication may impair your thinking.  Do not take it prior to driving or using machinery.  Do  not take it in combination with alcohol or other sedating substances.  It is important that you follow the instructions on the prescription bottle.  Do not increase the dosage unless instructed by the clinic.    We do not refill lost, stolen or damaged prescriptions for controlled substances.     We do not refill controlled substance prescriptions after normal clinic hours or on weekends.

## 2023-12-21 ASSESSMENT — PATIENT HEALTH QUESTIONNAIRE - PHQ9: SUM OF ALL RESPONSES TO PHQ QUESTIONS 1-9: 10

## 2024-01-12 ENCOUNTER — OFFICE VISIT (OUTPATIENT)
Dept: FAMILY MEDICINE | Facility: OTHER | Age: 69
End: 2024-01-12
Attending: PHYSICIAN ASSISTANT
Payer: COMMERCIAL

## 2024-01-12 VITALS
DIASTOLIC BLOOD PRESSURE: 78 MMHG | RESPIRATION RATE: 16 BRPM | HEIGHT: 66 IN | BODY MASS INDEX: 28.28 KG/M2 | HEART RATE: 92 BPM | TEMPERATURE: 98.2 F | WEIGHT: 176 LBS | SYSTOLIC BLOOD PRESSURE: 136 MMHG | OXYGEN SATURATION: 96 %

## 2024-01-12 DIAGNOSIS — F33.2 SEVERE EPISODE OF RECURRENT MAJOR DEPRESSIVE DISORDER, WITHOUT PSYCHOTIC FEATURES (H): ICD-10-CM

## 2024-01-12 DIAGNOSIS — I11.0 HYPERTENSIVE HEART DISEASE WITH HEART FAILURE (H): ICD-10-CM

## 2024-01-12 DIAGNOSIS — I77.1 SUBCLAVIAN ARTERY STENOSIS, LEFT (H): ICD-10-CM

## 2024-01-12 DIAGNOSIS — N18.31 STAGE 3A CHRONIC KIDNEY DISEASE (H): ICD-10-CM

## 2024-01-12 DIAGNOSIS — L82.1 SEBORRHEIC KERATOSES: ICD-10-CM

## 2024-01-12 DIAGNOSIS — I25.708 ATHEROSCLEROSIS OF CORONARY ARTERY BYPASS GRAFT OF NATIVE HEART WITH STABLE ANGINA PECTORIS (H): ICD-10-CM

## 2024-01-12 DIAGNOSIS — F11.20 CONTINUOUS OPIOID DEPENDENCE (H): ICD-10-CM

## 2024-01-12 DIAGNOSIS — L30.9 ECZEMA, UNSPECIFIED TYPE: Primary | ICD-10-CM

## 2024-01-12 DIAGNOSIS — I47.10 SVT (SUPRAVENTRICULAR TACHYCARDIA) (H): ICD-10-CM

## 2024-01-12 DIAGNOSIS — J44.9 CHRONIC OBSTRUCTIVE PULMONARY DISEASE, UNSPECIFIED COPD TYPE (H): ICD-10-CM

## 2024-01-12 PROBLEM — L20.84 INTRINSIC ECZEMA: Status: ACTIVE | Noted: 2024-01-12

## 2024-01-12 PROCEDURE — 17110 DESTRUCTION B9 LES UP TO 14: CPT | Performed by: PHYSICIAN ASSISTANT

## 2024-01-12 PROCEDURE — G0463 HOSPITAL OUTPT CLINIC VISIT: HCPCS | Mod: 25

## 2024-01-12 PROCEDURE — 17000 DESTRUCT PREMALG LESION: CPT | Performed by: PHYSICIAN ASSISTANT

## 2024-01-12 PROCEDURE — 17003 DESTRUCT PREMALG LES 2-14: CPT | Performed by: PHYSICIAN ASSISTANT

## 2024-01-12 PROCEDURE — 99214 OFFICE O/P EST MOD 30 MIN: CPT | Mod: 25 | Performed by: PHYSICIAN ASSISTANT

## 2024-01-12 PROCEDURE — G0463 HOSPITAL OUTPT CLINIC VISIT: HCPCS

## 2024-01-12 RX ORDER — TRIAMCINOLONE ACETONIDE 1 MG/ML
LOTION TOPICAL 3 TIMES DAILY
Qty: 60 ML | Refills: 3 | Status: SHIPPED | OUTPATIENT
Start: 2024-01-12

## 2024-01-12 ASSESSMENT — PAIN SCALES - GENERAL: PAINLEVEL: NO PAIN (0)

## 2024-01-12 NOTE — PROGRESS NOTES
Assessment & Plan       ICD-10-CM    1. Eczema, unspecified type  L30.9 triamcinolone (KENALOG) 0.1 % external lotion      2. Seborrheic keratoses  L82.1 DESTRUC BENIGN/PREMAL,2-14 LESIONS [11061]     DESTRUC BENIGN/PREMAL,1ST LESION [34952]      3. Hypertensive heart disease with heart failure (H)  I11.0       4. Continuous opioid dependence (H)  F11.20       5. Chronic obstructive pulmonary disease, unspecified COPD type (H)  J44.9       6. Severe episode of recurrent major depressive disorder, without psychotic features (H)  F33.2       7. Subclavian artery stenosis, left (H24)  I77.1       8. 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 Stage 3a chronic kidney disease (H)  N18.31       10. SVT (supraventricular tachycardia)  I47.10         Eczematous patch of skin to the left olecranon process of the elbow, recommend Kenalog cream to be applied 2-3 times daily, she may also utilize CeraVe, Cetaphil, Aquaphor or other topical emollient.  Recommend to keep area lubricated, strict return precautions reviewed, follow-up in 4 weeks.  Multiple inflamed seborrheic keratoses to the thoracic and lumbar back, I froze 5 lesions with liquid nitrogen, with a series of 3 freeze thaw cycles performed to each lesion.  We will recheck these in 4 weeks.  Continue with skin examinations at home on a regular basis, return precautions reviewed.  Hypertensive heart disease with heart failure, she has an upcoming visit with cardiology in February 2024.  She continues on her antihypertensive regimen and anticoagulation regimen without any lapses in care.  Reviewed to avoid nephrotoxic agents and monitor weights closely.  Continued opioid dependence, she will follow-up with me in 4 weeks in regards to ongoing medication management.  She has chronic knee and back pain for which hydrocodone has been beneficial to allow her to present in activities of daily  "living.  COPD, she feels stable at this time, has recent pulmonary function testing from 1/11/2022 was normal.  She has not required any inhaler regimen at this time, return precautions reviewed.  Major depressive disorder, counseling as first-line therapy, not interested at this time.  She continues on Trintellix 20 mg daily along with Klonopin with good management of symptoms, she will continue to follow with me every 2 to 3 months in regards to her Klonopin prescription.  Strict return precautions reviewed.  Subclavian artery stenosis, ongoing follow-up with cardiology, she is anticoagulated with both Xarelto and Plavix, appreciate cardiology coordination of care.  Atherosclerosis of coronary artery bypass graft, this is chronic in nature, she continues on anticoagulation as well as lipid control with Crestor 20 mg nightly, she is tolerating this well.  Reviewed lifestyle modifications including heart healthy diet (increased lean proteins, fruits and vegetables as well as goal exercise).  Stage IIIa kidney disease, continue close monitoring of blood pressures/hypertensive management, low-salt diet and avoidance of nephrotoxic agents reviewed.  SVT, she continues on propranolol 80 mg daily in divided doses, she is tolerating this well with minimal episodes of SVT.  She will continue to follow closely with cardiology, she has a visit in April 2024 to further evaluate.  Strict return precautions reviewed in the interim.     BMI:   Estimated body mass index is 28.84 kg/m  as calculated from the following:    Height as of this encounter: 1.664 m (5' 5.5\").    Weight as of this encounter: 79.8 kg (176 lb).   Weight management plan: Discussed healthy diet and exercise guidelines    See Patient Instructions    Return in about 4 weeks (around 2/9/2024) for Chronic pain.    Sheri Cordon PA-C  [facility] AND [facility]    Subjective   Malina is a 69 year old, presenting for the following health issues:  Lesion       "  1/12/2024    10:08 AM   Additional Questions   Roomed by MAULIK Yu     Malina presents to the clinic today to review the following:  Inflamed spots on her back, these have been present for a few months, they wax and wane in size and are quite itchy and occasionally painful.  She declines any coloration changes or friability.  No new exposures to dyes, perfumes or detergents.  Her sister had melanoma in the past and she is concerned about this.  Spot on left elbow, she has a patch of dry skin to the back of her elbow, she attributes this to resting her elbow on firm surfaces.  She has been using Desitin.  The area is mildly itchy and red.  No falls, injuries and or trauma.  No drainage.  No new exposures to dyes, perfumes or detergents.  Hypertensive heart disease, continues on amlodipine, lisinopril and propranolol.  She declines any chest pain, shortness of breath, centralized or peripheral edema.  No vision changes, headaches or other symptoms to report today.  She is an upcoming visit with cardiology next month.  Chronic pain, no changes since seen last overall, she does report that she is worried her pain will be worse over the next couple days due to the subzero temperatures.  She is taking her hydrocodone as prescribed, she has an upcoming visit in February.  COPD, she is not currently on any inhaler regimen.  She declines any current chest pain, shortness of breath, wheezing, fevers and or chills.  No recent urgent care or ER visits due to COPD.  Major depression, this waxes and wanes depending on the season.  With the colder weather her depression is often worse that she is less mobile.  She declines any other recent life changes or stressors.  She declines any suicidal or homicidal ideation.  Continues on Trintellix 20 mg daily, as well as BuSpar.  Coronary artery disease, has upcoming follow-up with cardiology.  She is up-to-date on her prescription of Xarelto and Plavix as well as  "Crestor.  She states she is taking his medications as prescribed.  She declines any new symptomatology.  SVT, currently on propranolol 40 mg twice daily, she states this has been well-controlled.  She has no concerns.  Kidney disease, she continues to try to limit NSAID intake and tries to follow a low-salt diet.  She declines any new changes    Review of Systems   Constitutional, HEENT, cardiovascular, pulmonary, GI, , musculoskeletal, neuro, skin, endocrine and psych systems are negative, except as otherwise noted.      Objective    /78   Pulse 92   Temp 98.2  F (36.8  C)   Resp 16   Ht 1.664 m (5' 5.5\")   Wt 79.8 kg (176 lb)   LMP 04/29/1978 (Approximate)   SpO2 96%   Breastfeeding No   BMI 28.84 kg/m    Body mass index is 28.84 kg/m .  Physical Exam   GENERAL: healthy, alert and no distress  EYES: Eyes grossly normal to inspection, PERRL and conjunctivae and sclerae normal  NECK: no adenopathy, no asymmetry, masses, or scars and thyroid normal to palpation  RESP: lungs clear to auscultation - no rales, rhonchi or wheezes  CV: regular rate and rhythm, normal S1 S2, no S3 or S4, no murmur, click or rub, no peripheral edema and peripheral pulses strong  MS: no gross musculoskeletal defects noted, no edema  SKIN: Multiple inflamed seborrheic keratoses to the thoracolumbar back, these are deep brown in coloration and measures anywhere between 2 mm and 2 cm in size.  To the extensor surface of the left elbow, there is an eczematous, scaly, mildly erythematous patch of skin overlying the olecranon process.  PSYCH: mentation appears normal, affect normal/bright        "

## 2024-01-12 NOTE — NURSING NOTE
"Chief Complaint   Patient presents with    Lesion       Initial /78   Pulse 92   Temp 98.2  F (36.8  C)   Resp 16   Ht 1.664 m (5' 5.5\")   Wt 79.8 kg (176 lb)   LMP 04/29/1978 (Approximate)   SpO2 96%   Breastfeeding No   BMI 28.84 kg/m   Estimated body mass index is 28.84 kg/m  as calculated from the following:    Height as of this encounter: 1.664 m (5' 5.5\").    Weight as of this encounter: 79.8 kg (176 lb).  Medication Review: complete    The next two questions are to help us understand your food security.  If you are feeling you need any assistance in this area, we have resources available to support you today.          12/20/2023   SDOH- Food Insecurity   Within the past 12 months, did you worry that your food would run out before you got money to buy more? N   Within the past 12 months, did the food you bought just not last and you didn t have money to get more? N         Health Care Directive:  Patient has a Health Care Directive on file      Cecilia Wallis LPN      "

## 2024-01-19 ENCOUNTER — TELEPHONE (OUTPATIENT)
Dept: FAMILY MEDICINE | Facility: OTHER | Age: 69
End: 2024-01-19
Payer: COMMERCIAL

## 2024-01-19 DIAGNOSIS — F41.9 CHRONIC ANXIETY: ICD-10-CM

## 2024-01-19 NOTE — TELEPHONE ENCOUNTER
Patient is wondering if she could get her hydrocodone filled on the 24th instead of the 26th. She would like [facility]'s nurse to call her.    Natalia Santiago on 1/19/2024 at 2:14 PM

## 2024-01-23 RX ORDER — BUSPIRONE HYDROCHLORIDE 15 MG/1
15 TABLET ORAL 2 TIMES DAILY
Qty: 180 TABLET | Refills: 4 | OUTPATIENT
Start: 2024-01-23

## 2024-01-23 NOTE — TELEPHONE ENCOUNTER
[id number] [employer] [facility] [place] [facility] Rx request for the following:      busPIRone (BUSPAR) 15 MG tablet (Discontinued) -- -- 9/2/2023 10/3/2023 --   Sig - Route: Take 15 mg by mouth 2 times daily - Oral   Class: Historical       busPIRone (BUSPAR) 30 MG tablet 180 tablet 3 10/3/2023 -- No   Sig - Route: Take 1 tablet (30 mg) by mouth 2 times daily - Oral   Sent to pharmacy as: busPIRone HCl 30 MG Oral Tablet (BUSPAR)   Class: E-Prescribe   Order: [id number]   E-Prescribing Status: Receipt confirmed by pharmacy (10/3/2023 10:11 AM CDT)     [facility] [id number] [facility] - [place], [place] - [address]     Pharmacy notified. Kathleen Puckett RN .............. 1/23/2024  4:20 PM

## 2024-01-24 DIAGNOSIS — M54.50 CHRONIC BILATERAL LOW BACK PAIN WITHOUT SCIATICA: ICD-10-CM

## 2024-01-24 DIAGNOSIS — Z79.899 CONTROLLED SUBSTANCE AGREEMENT SIGNED: ICD-10-CM

## 2024-01-24 DIAGNOSIS — F11.90 CHRONIC, CONTINUOUS USE OF OPIOIDS: ICD-10-CM

## 2024-01-24 DIAGNOSIS — G89.4 CHRONIC PAIN DISORDER: ICD-10-CM

## 2024-01-24 DIAGNOSIS — F11.20 CONTINUOUS OPIOID DEPENDENCE (H): ICD-10-CM

## 2024-01-24 DIAGNOSIS — G89.29 CHRONIC BILATERAL LOW BACK PAIN WITHOUT SCIATICA: ICD-10-CM

## 2024-01-24 RX ORDER — HYDROCODONE BITARTRATE AND ACETAMINOPHEN 10; 325 MG/1; MG/1
1 TABLET ORAL EVERY 4 HOURS PRN
Qty: 180 TABLET | Refills: 0 | Status: SHIPPED | OUTPATIENT
Start: 2024-01-28 | End: 2024-05-15

## 2024-01-24 NOTE — TELEPHONE ENCOUNTER
This isn't a request to fill early. It was last sent to [facility] and they don't have it. Only [facility] has it. Please resend to [facility]. Thank you, Kathleen Puckett RN .............. 1/24/2024  11:46 AM

## 2024-01-24 NOTE — TELEPHONE ENCOUNTER
Reason for call: Medication or medication refill    Name of medication requested: Hydrocodone    How many days of medication do you have left? 1 days    What pharmacy do you use? [facility] - they are the only pharmacy that has the quantity needed at this time    Preferred method for responding to this message: Telephone Call    Phone number patient can be reached at: Home number on file [phone] (home)    If we cannot reach you directly, may we leave a detailed response at the number you provided? Yes    Natalia Santiago on 1/24/2024 at 11:17 AM

## 2024-01-24 NOTE — TELEPHONE ENCOUNTER
Last prescriptions:  HYDROcodone-acetaminophen (NORCO)  MG per tablet 180 tablet 0 12/29/2023 1/28/2024 No   Sig - Route: Take 1 tablet by mouth every 4 hours as needed for severe pain - Oral     HYDROcodone-acetaminophen (NORCO)  MG per tablet 180 tablet 0 1/28/2024 2/27/2024 No   Sig - Route: Take 1 tablet by mouth every 4 hours as needed for severe pain - Oral   Sent to pharmacy as: HYDROcodone-Acetaminophen  MG Oral Tablet (NORCO)   Class: E-Prescribe   Earliest Fill Date: 1/26/2024     [facility] [id number] [facility] - [place], [place] - [address]     [facility]. Kathleen Puckett RN .............. 1/24/2024  11:30 AM

## 2024-01-24 NOTE — TELEPHONE ENCOUNTER
Noted, [facility] as [facility]. New script sent to [facility].     Sheri Cordon PA-C  1/24/2024  11:48 AM

## 2024-01-24 NOTE — TELEPHONE ENCOUNTER
Last fill date 12/27/2023.  She has an active prescription on file that is available in 1/28/2024.  Okay to early fill today.    Sheri Cordon PA-C  1/24/2024  11:42 AM

## 2024-01-25 ENCOUNTER — TELEPHONE (OUTPATIENT)
Dept: FAMILY MEDICINE | Facility: OTHER | Age: 69
End: 2024-01-25
Payer: COMMERCIAL

## 2024-01-25 NOTE — TELEPHONE ENCOUNTER
Called and notified pharmacy that patient provider is out of clinic today and will have to wait until tomorrow for her refill. Kimberly Bernardo LPN .......................1/25/2024  12:47 PM

## 2024-01-25 NOTE — TELEPHONE ENCOUNTER
Patient is requesting that her Norco be filled today rather than tomorrow. Please advise.    Natalia Santiago on 1/25/2024 at 10:08 AM

## 2024-02-06 ASSESSMENT — PATIENT HEALTH QUESTIONNAIRE - PHQ9
10. IF YOU CHECKED OFF ANY PROBLEMS, HOW DIFFICULT HAVE THESE PROBLEMS MADE IT FOR YOU TO DO YOUR WORK, TAKE CARE OF THINGS AT HOME, OR GET ALONG WITH OTHER PEOPLE: NOT DIFFICULT AT ALL
SUM OF ALL RESPONSES TO PHQ QUESTIONS 1-9: 6
SUM OF ALL RESPONSES TO PHQ QUESTIONS 1-9: 6

## 2024-02-07 ENCOUNTER — OFFICE VISIT (OUTPATIENT)
Dept: FAMILY MEDICINE | Facility: OTHER | Age: 69
End: 2024-02-07
Attending: PHYSICIAN ASSISTANT
Payer: COMMERCIAL

## 2024-02-07 VITALS
DIASTOLIC BLOOD PRESSURE: 70 MMHG | SYSTOLIC BLOOD PRESSURE: 136 MMHG | HEART RATE: 60 BPM | HEIGHT: 65 IN | OXYGEN SATURATION: 97 % | BODY MASS INDEX: 28.32 KG/M2 | TEMPERATURE: 96.5 F | WEIGHT: 170 LBS | RESPIRATION RATE: 16 BRPM

## 2024-02-07 DIAGNOSIS — F11.20 CONTINUOUS OPIOID DEPENDENCE (H): ICD-10-CM

## 2024-02-07 DIAGNOSIS — Z79.899 CONTROLLED SUBSTANCE AGREEMENT SIGNED: ICD-10-CM

## 2024-02-07 DIAGNOSIS — G89.29 CHRONIC BILATERAL LOW BACK PAIN WITHOUT SCIATICA: ICD-10-CM

## 2024-02-07 DIAGNOSIS — M54.50 CHRONIC BILATERAL LOW BACK PAIN WITHOUT SCIATICA: ICD-10-CM

## 2024-02-07 DIAGNOSIS — F41.9 CHRONIC ANXIETY: Primary | ICD-10-CM

## 2024-02-07 PROCEDURE — G0463 HOSPITAL OUTPT CLINIC VISIT: HCPCS

## 2024-02-07 PROCEDURE — G0463 HOSPITAL OUTPT CLINIC VISIT: HCPCS | Mod: 25

## 2024-02-07 PROCEDURE — 99214 OFFICE O/P EST MOD 30 MIN: CPT | Performed by: PHYSICIAN ASSISTANT

## 2024-02-07 RX ORDER — HYDROCODONE BITARTRATE AND ACETAMINOPHEN 10; 325 MG/1; MG/1
1 TABLET ORAL EVERY 4 HOURS PRN
Qty: 180 TABLET | Refills: 0 | Status: SHIPPED | OUTPATIENT
Start: 2024-02-07 | End: 2024-05-22

## 2024-02-07 RX ORDER — HYDROCODONE BITARTRATE AND ACETAMINOPHEN 10; 325 MG/1; MG/1
1 TABLET ORAL EVERY 4 HOURS PRN
Qty: 180 TABLET | Refills: 0 | Status: SHIPPED | OUTPATIENT
Start: 2024-04-07 | End: 2024-02-21

## 2024-02-07 RX ORDER — CLONAZEPAM 1 MG/1
1 TABLET ORAL 3 TIMES DAILY PRN
Qty: 270 TABLET | Refills: 0 | Status: SHIPPED | OUTPATIENT
Start: 2024-02-07 | End: 2024-02-21

## 2024-02-07 RX ORDER — HYDROCODONE BITARTRATE AND ACETAMINOPHEN 10; 325 MG/1; MG/1
1 TABLET ORAL EVERY 4 HOURS PRN
Qty: 180 TABLET | Refills: 0 | Status: SHIPPED | OUTPATIENT
Start: 2024-03-08 | End: 2024-02-21

## 2024-02-07 ASSESSMENT — ANXIETY QUESTIONNAIRES
1. FEELING NERVOUS, ANXIOUS, OR ON EDGE: SEVERAL DAYS
5. BEING SO RESTLESS THAT IT IS HARD TO SIT STILL: SEVERAL DAYS
6. BECOMING EASILY ANNOYED OR IRRITABLE: SEVERAL DAYS
2. NOT BEING ABLE TO STOP OR CONTROL WORRYING: SEVERAL DAYS
GAD7 TOTAL SCORE: 9
7. FEELING AFRAID AS IF SOMETHING AWFUL MIGHT HAPPEN: NEARLY EVERY DAY
IF YOU CHECKED OFF ANY PROBLEMS ON THIS QUESTIONNAIRE, HOW DIFFICULT HAVE THESE PROBLEMS MADE IT FOR YOU TO DO YOUR WORK, TAKE CARE OF THINGS AT HOME, OR GET ALONG WITH OTHER PEOPLE: SOMEWHAT DIFFICULT
3. WORRYING TOO MUCH ABOUT DIFFERENT THINGS: SEVERAL DAYS
GAD7 TOTAL SCORE: 9
4. TROUBLE RELAXING: SEVERAL DAYS

## 2024-02-07 ASSESSMENT — PAIN SCALES - GENERAL: PAINLEVEL: MODERATE PAIN (5)

## 2024-02-07 NOTE — PROGRESS NOTES
Assessment & Plan       ICD-10-CM    1. Chronic anxiety  F41.9 clonazePAM (KLONOPIN) 1 MG tablet      2. Controlled substance agreement signed  Z79.899 clonazePAM (KLONOPIN) 1 MG tablet      3. Chronic bilateral low back pain without sciatica  M54.50 HYDROcodone-acetaminophen (NORCO)  MG per tablet    G89.29 HYDROcodone-acetaminophen (NORCO)  MG per tablet     HYDROcodone-acetaminophen (NORCO)  MG per tablet      4. Continuous opioid dependence (H)  F11.20         Chronic anxiety, will send Klonopin as #270 versus #90 + 2 refills. PDMP reviewed, stable. Low risk for diversion. CSA and UDS up to date. Aware of risk of comorbid benzodiazepine and narcotic/opiate use. Recheck 3 months. Recommend therapy as well as 1st line treatment.   CSA up to date for controlled substances - Klonopin and Norco. CSA and UDS completed 09/2023.  Chronic low back pain, interferes with ADLs, better than last visit due to warmer weather. Continue with ADLs as tolerated. Stretching, PT recommended. No red flag symptoms.  Ongoing chronic low back pain. Refill Norco x 3 months. Maintenance of person-centered care plan - goals, personal strengths, clinical needs, desired outcomes: Ongoing.  How is patient functioning/improving/not improving with the current pain protocol / RX:   - What is patient unable to do or has difficulty completing because of pain: cooking dinner, cleaning rooms  - How has the current pain protocol / RX helped: allowed to spend time with family and complete household tasks.   Adequate pain management helps to improve quality of life and performing ADLs independently.   - Encouraged regular stretching, walking, exercise. Healthy meals and diet.     Facilitation and coordination of any necessary behavorial health treatment: Ongoing.   - Encouraged counseling and behavorial health management to help with chronic pain, and if any variable anxiety / depression symptoms develop.     Communication and care  coordination between relevant practitioners furnishing care (PT/OT, complementary and integrative approaches, community-based care): Ongoing.     PDMP Review         Value Time User    State PDMP site checked  Yes 2/7/2024 10:37 AM Sheri Cordon PA-C          Last CSA Agreement:   CSA -- Patient Level:     [Media Unavailable] Controlled Substance Agreement - Opioid - Scan on 9/5/2023  9:45 AM   [Media Unavailable] Controlled Substance Agreement - Opioid - Scan on 9/7/2022 11:36 AM   [Media Unavailable] Controlled Substance Agreement - Opioid - Scan on 9/29/2021  2:26 PM       Last UDS: 9/7/2023    See Patient Instructions    Return in about 3 months (around 5/7/2024) for Medication follow up - Klonopin and Hydrocodone.    Subjective   Malina is a 69 year old, presenting for the following health issues:  Clinic Care Coordination - Follow-up (Multiple concerns)        2/7/2024     9:50 AM   Additional Questions   Roomed by andrey contreras lpn   Accompanied by spouse     History of Present Illness       Back Pain:  She presents for follow up of back pain. Patient's back pain is a chronic problem.  Location of back pain:  Other  Description of back pain: cramping  Back pain spreads: right knee, left knee, right side of neck and left side of neck    Since patient first noticed back pain, pain is: gradually worsening  Does back pain interfere with her job:  Yes       Heart Failure:  She presents for follow up of heart failure. She is experiencing shortness of breath with rest and activity, which is slightly worse. She is not experiencing any lower extremity edema.   She denies orthopenea and is not coughing at night. Patient is not checking weight daily. She has recently had a weight decrease.  She has no side effects from medications.  She has had no other medical visits for heart failure since the last visit.    Hyperlipidemia:  She presents for follow up of hyperlipidemia.   She is taking medication to lower cholesterol. She is  "having myalgia or other side effects to statin medications.    Headaches:   Since the patient's last clinic visit, headaches are: worsened  The patient is getting headaches:  EVERYDAY  She is not able to do normal daily activities when she has a migraine.  The patient is taking the following rescue/relief medications:  Other   Patient states \"The relief is inconsistent\" from the rescue/relief medications.   The patient is taking the following medications to prevent migraines:  Other  In the past 4 weeks, the patient has gone to an Urgent Care or Emergency Room 0 times times due to headaches.    Vascular Disease:  She presents for follow up of vascular disease.    She takes nitroglycerin occasionally.  She is not taking daily aspirin.    She eats 2-3 servings of fruits and vegetables daily.She consumes 0 sweetened beverage(s) daily.She exercises with enough effort to increase her heart rate 10 to 19 minutes per day.  She exercises with enough effort to increase her heart rate 3 or less days per week.   She is taking medications regularly.     Last Echo: Echo result w/o MOPS: Interpretation SummaryGlobal and regional left ventricular function is normal with an EF of 55-60%.Mild concentric wall thickening consistent with left ventricular hypertrophyis present.Left ventricular diastolic function is indeterminate.Global right ventricular function is normal.No significant valvular abnormalities present.IVC diameter <2.1 cm collapsing >50% with sniff suggests a normal RA pressureof 3 mmHg.No pericardial effusion is present.No significant changes noted.    Pain History:  When did you first notice your pain? - Chronic pain: Persistent or recurrent pain lasting longer than 3 months.   Have you seen this provider for your pain in the past? Yes   Where in your body do you have pain? Back, knees  Are you seeing anyone else for your pain? No        2/6/2024     8:55 AM   Last PHQ-9   1.  Little interest or pleasure in doing " "things 1   2.  Feeling down, depressed, or hopeless 1   3.  Trouble falling or staying asleep, or sleeping too much 0   4.  Feeling tired or having little energy 1   5.  Poor appetite or overeating 3   6.  Feeling bad about yourself 0   7.  Trouble concentrating 0   8.  Moving slowly or restless 0   Q9: Thoughts of better off dead/self-harm past 2 weeks 0   PHQ-9 Total Score 6         2/7/2024    10:39 AM   YAYA-7    1. Feeling nervous, anxious, or on edge 1   2. Not being able to stop or control worrying 1   3. Worrying too much about different things 1   4. Trouble relaxing 1   5. Being so restless that it is hard to sit still 1   6. Becoming easily annoyed or irritable 1   7. Feeling afraid, as if something awful might happen 3   YAYA-7 Total Score 9   If you checked any problems, how difficult have they made it for you to do your work, take care of things at home, or get along with other people? Somewhat difficult         10/30/2023     9:31 AM 12/20/2023    12:57 PM 2/7/2024    10:39 AM   PEG Score   PEG Total Score 5.67 9.33 6        Chronic Pain Follow Up:    Location of pain: low back pain  Analgesia/pain control:    - Recent changes:  none    - Overall control: Tolerable with discomfort    - Current treatments: Norco   Adherence:     - Do you ever take more pain medicine than prescribed? No    - When did you take your last dose of pain medicine?  Just filled on 1/26/24  Adverse effects: No     Review of Systems  Constitutional, HEENT, cardiovascular, pulmonary, GI, , musculoskeletal, neuro, skin, endocrine and psych systems are negative, except as otherwise noted.        Objective    /70 (BP Location: Right arm, Patient Position: Sitting, Cuff Size: Adult Large)   Pulse 60   Temp (!) 96.5  F (35.8  C) (Tympanic)   Resp 16   Ht 1.651 m (5' 5\")   Wt 77.1 kg (170 lb)   LMP 04/29/1978 (Approximate)   SpO2 97%   BMI 28.29 kg/m    Body mass index is 28.29 kg/m .  Physical Exam   GENERAL: alert and " no distress  EYES: Eyes grossly normal to inspection, PERRL and conjunctivae and sclerae normal  RESP: lungs clear to auscultation - no rales, rhonchi or wheezes  CV: regular rate and rhythm, normal S1 S2, no S3 or S4, no murmur, click or rub, no peripheral edema  MS: no gross musculoskeletal defects noted, no edema  PSYCH: mentation appears normal, affect normal/bright    Signed Electronically by: Sheri Cordon PA-C

## 2024-02-07 NOTE — NURSING NOTE
"Patient presents to the clinic for follow up with several concerns.    FOOD SECURITY SCREENING QUESTIONS:    The next two questions are to help us understand your food security.  If you are feeling you need any assistance in this area, we have resources available to support you today.    Hunger Vital Signs:  Within the past 12 months we worried whether our food would run out before we got money to buy more. Never  Within the past 12 months the food we bought just didn't last and we didn't have money to get more. Never    Advance Care Directive on file? yes  Advance Care Directive provided to patient? Declined.      Chief Complaint   Patient presents with    Clinic Care Coordination - Follow-up     Multiple concerns       Initial /70 (BP Location: Right arm, Patient Position: Sitting, Cuff Size: Adult Large)   Pulse 60   Temp (!) 96.5  F (35.8  C) (Tympanic)   Resp 16   Ht 1.651 m (5' 5\")   Wt 77.1 kg (170 lb)   LMP 04/29/1978 (Approximate)   SpO2 97%   BMI 28.29 kg/m   Estimated body mass index is 28.29 kg/m  as calculated from the following:    Height as of this encounter: 1.651 m (5' 5\").    Weight as of this encounter: 77.1 kg (170 lb).  Medication Reconciliation: complete        Taylor [place] LPN     "

## 2024-02-16 DIAGNOSIS — G43.719 INTRACTABLE CHRONIC COMMON MIGRAINE WITHOUT AURA: ICD-10-CM

## 2024-02-20 ENCOUNTER — TELEPHONE (OUTPATIENT)
Dept: FAMILY MEDICINE | Facility: OTHER | Age: 69
End: 2024-02-20
Payer: COMMERCIAL

## 2024-02-20 DIAGNOSIS — F41.9 CHRONIC ANXIETY: ICD-10-CM

## 2024-02-20 DIAGNOSIS — G43.719 INTRACTABLE CHRONIC COMMON MIGRAINE WITHOUT AURA: ICD-10-CM

## 2024-02-20 DIAGNOSIS — G89.29 CHRONIC BILATERAL LOW BACK PAIN WITHOUT SCIATICA: ICD-10-CM

## 2024-02-20 DIAGNOSIS — Z79.899 CONTROLLED SUBSTANCE AGREEMENT SIGNED: ICD-10-CM

## 2024-02-20 DIAGNOSIS — M54.50 CHRONIC BILATERAL LOW BACK PAIN WITHOUT SCIATICA: ICD-10-CM

## 2024-02-20 NOTE — TELEPHONE ENCOUNTER
Patient states she has a new pharmacy and needs [facility] to fax over prescriptions for klonopin, nurtec, and hydrocodone to [facility] pharmacy.  Patient is requesting a return phone call.     Sheri Rahman on 2/20/2024 at 11:20 AM

## 2024-02-20 NOTE — TELEPHONE ENCOUNTER
[facility] for the following:      Requested Prescriptions   Pending Prescriptions Disp Refills    NURTEC 75 MG ODT tablet [Pharmacy Med Name: NURTEC ODT 75 MG TABLET] 8 tablet 14     Sig: PLACE 1 TAB UNDER TONGUE DAILY AS NEEDED FOR MIGRAINE. MAX OF 1 TAB EVERY 48 HRS & 2 PER WEEK       There is no refill protocol information for this order          Last Prescription Date:   2/3/23  Last Fill Qty/Refills:         24 tab, R-4    Last Office Visit:              2/7/24 [facility]   Future Office visit:           5/15/24 [facility]    Unable to complete prescription refill per RN Medication Refill Policy.     Ninoska Land RN on 2/20/2024 at 11:09 AM

## 2024-02-21 RX ORDER — HYDROCODONE BITARTRATE AND ACETAMINOPHEN 10; 325 MG/1; MG/1
1 TABLET ORAL EVERY 4 HOURS PRN
Qty: 180 TABLET | Refills: 0 | Status: SHIPPED | OUTPATIENT
Start: 2024-03-08 | End: 2024-03-18

## 2024-02-21 RX ORDER — HYDROCODONE BITARTRATE AND ACETAMINOPHEN 10; 325 MG/1; MG/1
1 TABLET ORAL EVERY 4 HOURS PRN
Qty: 180 TABLET | Refills: 0 | Status: SHIPPED | OUTPATIENT
Start: 2024-04-07 | End: 2024-05-22

## 2024-02-21 RX ORDER — CLONAZEPAM 1 MG/1
1 TABLET ORAL 3 TIMES DAILY PRN
Qty: 270 TABLET | Refills: 0 | Status: SHIPPED | OUTPATIENT
Start: 2024-02-21 | End: 2024-05-15

## 2024-02-21 RX ORDER — RIMEGEPANT SULFATE 75 MG/75MG
TABLET, ORALLY DISINTEGRATING ORAL
Qty: 8 TABLET | Refills: 14 | Status: SHIPPED | OUTPATIENT
Start: 2024-02-21 | End: 2024-02-21

## 2024-02-21 NOTE — TELEPHONE ENCOUNTER
Please make sure all scripts are cancelled at [facility]. New scripts sent to [facility].     Sheri Cordon PA-C  2/21/2024  6:31 AM    PDMP reviewed last fills:  - Klonopin - 1/20/24  - Norco - 1/26/24

## 2024-02-28 DIAGNOSIS — M15.0 PRIMARY OSTEOARTHRITIS INVOLVING MULTIPLE JOINTS: ICD-10-CM

## 2024-03-05 ENCOUNTER — OFFICE VISIT (OUTPATIENT)
Dept: CARDIOLOGY | Facility: OTHER | Age: 69
End: 2024-03-05
Attending: INTERNAL MEDICINE
Payer: COMMERCIAL

## 2024-03-05 VITALS
HEART RATE: 61 BPM | BODY MASS INDEX: 28.76 KG/M2 | SYSTOLIC BLOOD PRESSURE: 122 MMHG | TEMPERATURE: 96.5 F | DIASTOLIC BLOOD PRESSURE: 70 MMHG | WEIGHT: 172.6 LBS | RESPIRATION RATE: 18 BRPM | OXYGEN SATURATION: 96 % | HEIGHT: 65 IN

## 2024-03-05 DIAGNOSIS — I25.10 ASCVD (ARTERIOSCLEROTIC CARDIOVASCULAR DISEASE): ICD-10-CM

## 2024-03-05 DIAGNOSIS — R06.09 DOE (DYSPNEA ON EXERTION): ICD-10-CM

## 2024-03-05 DIAGNOSIS — R31.9 URINARY TRACT INFECTION WITH HEMATURIA, SITE UNSPECIFIED: ICD-10-CM

## 2024-03-05 DIAGNOSIS — R07.9 CHEST PAIN, UNSPECIFIED TYPE: Primary | ICD-10-CM

## 2024-03-05 DIAGNOSIS — N39.0 URINARY TRACT INFECTION WITH HEMATURIA, SITE UNSPECIFIED: ICD-10-CM

## 2024-03-05 DIAGNOSIS — I10 ESSENTIAL HYPERTENSION: ICD-10-CM

## 2024-03-05 DIAGNOSIS — I25.708 ATHEROSCLEROSIS OF CORONARY ARTERY BYPASS GRAFT OF NATIVE HEART WITH STABLE ANGINA PECTORIS (H): ICD-10-CM

## 2024-03-05 DIAGNOSIS — I25.10 PRESENCE OF STENT IN CORONARY ARTERY IN PATIENT WITH CORONARY ARTERY DISEASE: ICD-10-CM

## 2024-03-05 DIAGNOSIS — I25.9 CHRONIC ISCHEMIC HEART DISEASE: ICD-10-CM

## 2024-03-05 DIAGNOSIS — Z95.5 PRESENCE OF STENT IN CORONARY ARTERY IN PATIENT WITH CORONARY ARTERY DISEASE: ICD-10-CM

## 2024-03-05 DIAGNOSIS — Z95.1 HISTORY OF CORONARY ARTERY BYPASS GRAFT X 3: ICD-10-CM

## 2024-03-05 DIAGNOSIS — L98.9 SKIN LESION: ICD-10-CM

## 2024-03-05 DIAGNOSIS — R00.2 PALPITATIONS: ICD-10-CM

## 2024-03-05 DIAGNOSIS — R31.9 HEMATURIA, UNSPECIFIED TYPE: ICD-10-CM

## 2024-03-05 DIAGNOSIS — I47.10 PAROXYSMAL SUPRAVENTRICULAR TACHYCARDIA (H): ICD-10-CM

## 2024-03-05 DIAGNOSIS — Z98.890 STATUS POST CORONARY ANGIOGRAM: ICD-10-CM

## 2024-03-05 LAB
ALBUMIN UR-MCNC: NEGATIVE MG/DL
APPEARANCE UR: CLEAR
BACTERIA #/AREA URNS HPF: ABNORMAL /HPF
BILIRUB UR QL STRIP: NEGATIVE
COLOR UR AUTO: ABNORMAL
GLUCOSE UR STRIP-MCNC: NEGATIVE MG/DL
HGB UR QL STRIP: NEGATIVE
KETONES UR STRIP-MCNC: NEGATIVE MG/DL
LEUKOCYTE ESTERASE UR QL STRIP: ABNORMAL
NITRATE UR QL: POSITIVE
PH UR STRIP: 6.5 [PH] (ref 5–9)
RBC URINE: 3 /HPF
SP GR UR STRIP: 1.01 (ref 1–1.03)
UROBILINOGEN UR STRIP-MCNC: NORMAL MG/DL
WBC URINE: 46 /HPF
YEAST #/AREA URNS HPF: ABNORMAL /HPF

## 2024-03-05 PROCEDURE — G0463 HOSPITAL OUTPT CLINIC VISIT: HCPCS

## 2024-03-05 PROCEDURE — 87086 URINE CULTURE/COLONY COUNT: CPT | Mod: ZL | Performed by: INTERNAL MEDICINE

## 2024-03-05 PROCEDURE — 99215 OFFICE O/P EST HI 40 MIN: CPT | Performed by: INTERNAL MEDICINE

## 2024-03-05 PROCEDURE — 81001 URINALYSIS AUTO W/SCOPE: CPT | Mod: ZL | Performed by: INTERNAL MEDICINE

## 2024-03-05 PROCEDURE — 87186 SC STD MICRODIL/AGAR DIL: CPT | Mod: ZL | Performed by: INTERNAL MEDICINE

## 2024-03-05 RX ORDER — ROSUVASTATIN CALCIUM 40 MG/1
40 TABLET, COATED ORAL DAILY
Qty: 90 TABLET | Refills: 3 | Status: SHIPPED | OUTPATIENT
Start: 2024-03-05

## 2024-03-05 RX ORDER — LISINOPRIL 10 MG/1
10 TABLET ORAL DAILY
Qty: 90 TABLET | Refills: 3 | Status: SHIPPED | OUTPATIENT
Start: 2024-03-05

## 2024-03-05 RX ORDER — AMLODIPINE BESYLATE 10 MG/1
10 TABLET ORAL DAILY
Qty: 90 TABLET | Refills: 3 | Status: SHIPPED | OUTPATIENT
Start: 2024-03-05

## 2024-03-05 RX ORDER — PROPRANOLOL HYDROCHLORIDE 20 MG/1
20 TABLET ORAL 2 TIMES DAILY
Qty: 180 TABLET | Refills: 3 | Status: SHIPPED | OUTPATIENT
Start: 2024-03-05 | End: 2024-06-06

## 2024-03-05 RX ORDER — SULFAMETHOXAZOLE/TRIMETHOPRIM 800-160 MG
1 TABLET ORAL 2 TIMES DAILY
Qty: 10 TABLET | Refills: 0 | Status: SHIPPED | OUTPATIENT
Start: 2024-03-05 | End: 2024-05-07

## 2024-03-05 ASSESSMENT — PAIN SCALES - GENERAL: PAINLEVEL: SEVERE PAIN (7)

## 2024-03-05 NOTE — PROGRESS NOTES
[facility] HEART CARE   CARDIOLOGY PROGRESS NOTE     Chief Complaint   Patient presents with    Follow Up     6 month           Diagnosis:  1. Cath on 9/25/17, [facility], stable dz.   -LIMA open but occluded RAFI/SVG.    -LM irregularities, ramus 40%, LAD 30%, LCx 30%, and RCA 40%.  2. CORTEZ.  3. CABG x3-2/12/2003.    -LIMA to LAD, RAFI to RCA, and SVG to OM.    4. Palpitations.    -SVE/VE on Zio from 3/27/2023.  5. PE on 1/2/20.     -Xarelto.  -RLL, MICHELLE and LLL.  6. COPD.  -Normal on 1/11/2022.  7. HTN-controlled  8. Lightheadedness-episodic.  9.  Cardiac cath on 9/25/2017-[facility].   -No significant dz on 12/12/19.  10. Iron deficiency anemia.  -Resolved.  11.  CVA.  -Mild chronic ischemic cerebral disease on 8/20/2021.  12. Left-sided subclavian steal syndrome.   -Stenting with patency as noted on 9/15/17.  13.  Tobacco abuse.  -Quitting 8/23/17.   14.  H/O alcohol abuse.  15.  Hyperlipidemia.  -Uncontrolled.  16.  Bradycardia with the slowest heart rate of 50 bpm on 7/30/2021.  17.  Dizziness/lightheadedness secondary to dehydration.  18.  B12 deficiency.    -313 on 3/18/2020.  19.  Vitamin D deficiency.  20.  CKD-2/3.       Assessment/Plan:    1.  Refill lisinopril and amlodipine.  2.  Chest discomfort.  She has a pressure as well as sharp pain to her chest.  Seems to be exacerbated with walking upstairs and making her bed.  She has this on a daily basis.  It does last a few minutes.  She has taken nitro occasionally with improvement.  Symptoms are similar to symptoms she had previously that resulted in bypass and stenting.  She had a history of many stents previously.  With that, she has been increasingly short of breath, nauseous, feels like she is going to vomit, and dizzy.  Symptoms have been going on for almost a year.  I suggested a stress test with possible angiogram if abnormal.  I believe she is high risk for coronary artery disease.  Will increase Crestor from 20 mg once daily to 40 mg once daily.  She has a  prescription for SL nitro as needed for chest pain.  Will continue Plavix 75 mg once daily.  3.  Bradycardia: Describes heart rates frequently between 50-56.  At times, heart rates can be in the 40s.  She believes she is symptomatic with heart rates in the 40s.  Suggested we decrease propranolol from 40 mg twice a day to 20 mg twice a day.  Patient was reassured.  4.  Dehydration: Drinks x2 cups of coffee and 1x-16 ounce glass of water.  Does urinate frequently.  She has headaches.  Appears to be chronically dehydrated.  Suggested she increase her water consumption to 6 to 8 glasses of water a day.  Stressed heavily today.  5.  Hematuria: Patient has frequency.  Did discuss with primary.  Patient concerned she has a UTI.  Will plan for UA to look for blood and possible infection.  Will stop Xarelto as mentioned below.  6.  History of PE: No DVT on 1/2/2020.  But had evidence for PE with small segmental and subsegmental pulmonary arteries.  No large PE present.  Has been on Xarelto since 1/2/2020.  Describes anemia and occasional gross hematuria.  Will stop Xarelto.  Continue Plavix 75 mg once daily.  Patient understands that if she is not a blood clot in the future, will be Xarelto for lifetime.  7.  Concern with skin lesion/skin cancer: Refer to dermatology.  No history but has family member who had melanoma and patient concerned.  8.  Follow-up in 3 months or sooner if there is abnormality in testing.  Amlodipine and lisinopril refilled. Decreased from 40 mg twice a day to 20 mg twice a day.  Increase rosuvastatin from 20 mg to 40 mg daily.  UA and stress test ordered today.  Referral to dermatology for concerns for skin cancer/skin lesions.          Interval history:  See above.    HPI:    Ms. Day is being seen by cardiology in follow-up.  Patient has a history of chronic stable angina, known ischemic heart disease, hypertension, hyperlipidemia, history of pulmonary embolism, left subclavian artery stenosis,  "anxiety, collagenous colitis, depression, osteoarthritis, history of tobacco use, central sleep apnea for which she is not compliant with CPAP use, iron deficiency anemia, CKD, myofascial pain, vitamin D deficiency, lumbar facet arthropathy, history of gastric ulcer with hemorrhage, COPD and peptic ulcer disease.    Malina continues to endorse dizziness.  She states when she gets up she starts to feel dizzy like being off balance, will have a headache, her heart will pound and pulse increase.  She will be short of breath.  At times she has chest discomfort.  This has been going on for 1 to 2 years.      She was seen in the ER and admitted for a day on 7/16/2021.  She was felt to have symptomatic bradycardia/hypotension.  She felt \"woozy\" with a blood pressure of 77/52 at home.  She reports getting fluids and feeling much better, resolution of symptoms.  She felt her symptoms had gotten worse over the last 2 to 3 weeks.  Heart rates were noted to be between 49-52 in the ER.  Troponin negative and EKG showed heart rates in the 50's.    She was seen back in the ED on 7/22/2021 for lightheadedness once again.  She actually fell and was having concerning pain in her lungs.  She has a history of a PE and is on Xarelto.  No identifiable cause was found.  There was concern that her symptoms may be anxiety related.    Today, she reports continuing to feel these symptoms of dizziness.  She has a hard time describing them.  She feels her heart pounding, she has a headache, she is dizzy, she gets chest pain.  She also continues to be dyspneic on exertion.  She has been using nitro regularly with some relief.  She has a history of asthma and suspected COPD.  She has seen relief with nitro as well as the albuterol.  She was on Symbicort in the past with benefit but was discontinued for unknown reasons.  She drinks 2 glasses of water and 3 cups of coffee a day.  I believe she is chronically dehydrated which plays a part in her " "symptoms.  She has tried to increase her fluid intake.  She is to double and preferably triple her fluid intake.  She should cut back/discontinue coffee intake.  With symptoms of chest pain, shortness of breath, and history of blood clot, will plan for VQ scan.  We will also get a chest x-ray.  She describes regular palpitations and acceleration in her heart rates.  We will plan for Zio patch.  She did have a MCOT on 7/30/2021.  Both asymptomatic and symptomatic events include sinus bradycardia, sinus rhythm, sinus tachycardia, occasional PVC's and PAC's.  No other significant rhythms were identified.  We discussed Eliquis versus Xarelto.  It was decided we switched to Eliquis as I seen less bleeding on this medication compared to Xarelto.  Related to shortness of breath, history of asthma, and presumptive COPD, referral was placed to pulmonary.  Had intended to prescribe Spiriva but she is allergic and this was not prescribed.  Patient also describes weakness.  She has been using nitro with resolution of her chest pain.  She does have a history of bypass but had a cath in 2017 with only mild disease followed.  I am concerned at this point.  I am concerned that her chest discomfort is more of a lung issue than a heart issue.     Patient has a known history of ischemic heart disease, she underwent  coronary angiogram and bypass angiogram on 9/15/2017 which was described as having stable disease. Mild to moderate 3 vessel CAD involving RCA, LAD and LCX with <50% stenosis at the greatest.  Occluded RAFI and SVG.  Patent LIMA.  No new obstructive lesions were identified and normal left heart cath.       She has a history of CABG on 2/12/03 x 3, history of multiple stent placements, patient reported 17 total.       At previous visit patient reported occasional recurrence of chest pain, not as severe as last ED visit on 10/25/19. She reported \"my breathing has been bad\", describes worsening CORTEZ. She described activity " intolerance and little to no energy. Recommended repeat stress testing. NM Lexiscan stress test was performed on 12/16/19 which was negative for inducible myocardial ischemia or infarction.  Left ventricular function was normal.     Carotid US on 12/12/19 without significant stenosis, mild atherosclerotic disease present.  Abdominal ultrasound on 12/12/2018 with no abdominal aortic aneurysm identified.     Patient returned to the ED with dyspnea in early January 2020. She was identified to have small segmental and subsegmental emboli bilaterally. Repeat imaging on 1/6 with decreasing volume of pulmonary emboli compared to scan on 1/2/2020. She was initially treated with Lovenox in the ED and discharged on Xarelto oral anticoagulation which has been going well for her, no bleeding complication. She also remains on Plavix with her significant ischemic heart disease history.      She presented to the ED on 3/16/2021 with reports of chest pain, chronic stable angina.  No ACS.  EKG stable and no elevation in troponin's. Patient admits that her BP was low and it was suspected she was dehydrated, she felt better following IV fluids and reports that this likely brought on her angina.  She is currently working on maintaining good hydration.  She denies any recurrence of acute chest pain or pressure today.  No use of nitroglycerin since her recent ED visit.      Relevant testing:  Echocardiogram on 7/31/2023:  Global and regional left ventricular function is normal with an EF of 55-60%.  Left ventricular diastolic function is indeterminate.  Mild concentric wall thickening consistent with left ventricular hypertrophy  is present.  Global right ventricular function is normal.  No significant valvular abnormalities present.  IVC diameter <2.1 cm collapsing >50% with sniff suggests a normal RA pressure of 3 mmHg.  No pericardial effusion is present.  No significant changes noted.    Zio patch on 3/27/2023:  Patient's monitor was  in place for 13 days and 16 hours.  Minimum heart rate 42 bpm, average heart rate 62 bpm, maximum heart rate 167 bpm. Rhythm/s present include sinus, SVT. There were rare ventricular ectopic beats accounting for <1% of all beats. There were 0  ventricular triplets and rare couplets. There were  rare supraventricular ectopic beats.  There were 12 triggered events and 14 diary entries during which time the noted rhythms were sinus, SVE, VE.  There were 7 episodes of SVT with the fastest lasting 4 beats with a max rate of 167 bpm.  Review of actual tracings showed no other significant abnormality.    V/Q scan on 11/23/21:  No evidence of pulmonary emboli.    CXR 11/23/21:  Probable air trapping.    PFT 12/8/21:  No obstructive or restrictive disease is noted, moderate diffusion defect is noted consistent with pulmonary vascular disease     Stress test on 9/15/2021:    The nuclear stress test is negative for inducible myocardial ischemia or infarction.     Left ventricular function is normal.     The left ventricular ejection fraction at rest is 64%.  The left   ventricular ejection fraction at stress is 66%.     A prior study was conducted on 12/16/2019.     Echo on 9/15/2021:  Global and regional left ventricular function is normal with an EF of 55-60%.  Right ventricular function, chamber size, wall motion, and thickness are normal.  Pulmonary artery systolic pressure is normal.  The inferior vena cava is normal.  No pericardial effusion is present.  No significant changes noted.    MRI brain on 8/20/2021:  A few small nonspecific white matter signal abnormalities  are present, likely sequela of mild chronic small vessel ischemic disease. The exam is otherwise unremarkable.    MCOT from 7/30/21:  Findings: Patient's monitor was in place for 9 days and 7 hours.  Minimum heart rate 50 bpm, average heart rate 63 bpm, maximum heart rate 120 bpm. Rhythm/s present include sinus rhythm.  There were 37 symptomatic events  during which time the noted rhythms were sinus rhythm, sinus bradycardia and sinus tachycardia.  There were six asymptomatic events during which time the noted rhythms were sinus rhythm.  There was rare atrial ectopy and rare ventricular ectopy.  Review of actual tracings showed no other abnormality.    ARYA on 7/17/20:  The right ankle-brachial index is 1.17.  Left ankle-brachial index is 0.98.    Stress test on 12/16/19:    The nuclear stress test is negative for inducible myocardial ischemia   or infarction.     A small amount of soft tissue artifact is present, more pronounced at   rest.     Left ventricular function is normal.     The left ventricular ejection fraction at rest is 79%.  The left   ventricular ejection fraction at stress is 72%.     A prior study was conducted on 6/30/2015.     US carotids on 12/12/19:  No ultrasound evidence of hemo-dynamically significant stenosis.  Mild atherosclerotic disease.    US abdominal aorta on 12/12/19:  No abdominal aortic aneurysm.      ECHO on 5/13/19:  Global and regional left ventricular function is normal with an EF of 60-65%.  Mild concentric wall thickening consistent with left ventricular hypertrophy  is present.  Right ventricular function, chamber size, wall motion, and thickness are  normal.  No significant valvular abnormalities were noted.  Previous study not available for comparison.    Cardiac cath on 3/30/13:  LEFT MAIN: Widely patent stent.   LEFT ANTERIOR DESCENDING: Widely patent proximal stent. There is an 85% to  90% mid lesion after the second diagonal and prior to the LIMA insertion as before.   CIRCUMFLEX: There is diffuse 60% to 70% disease throughout, the ostium is 70   to 75, and the OM2 is a 70, but it is very diffuse disease and basically   unchanged from previous.   RCA: Widely patent stents with only mild irregularities.   LEFT VENTRICULOGRAM: Normal left ventricular ejection fraction, LVEF 60%,   with no focal wall motion abnormality.  LV pressure 146/29.   FLORENTINO to LAD: Widely patent, although there is less flow than before, and, in   fact, the retrograde filling of the LIMA from the antegrade vessel. There is   a severe subclavian stenosis that a pressure gradient revealed in the aorta   was 153/78, and in the right subclavian was 100 to 106/73, for a nearly 50 mm   pressure gradient. The stenosis was 75% to 80%.   RAFI to RCA: 100%.   SVG to OM: 100%.                     ICD-10-CM    1. Chest pain, unspecified type  R07.9 NM MPI with Lexiscan     rosuvastatin (CRESTOR) 40 MG tablet      2. Atherosclerosis of coronary artery bypass graft of native heart with stable angina pectoris (H24)  I25.708 NM MPI with Lexiscan     rosuvastatin (CRESTOR) 40 MG tablet      3. CORTEZ (dyspnea on exertion)  R06.09 lisinopril (ZESTRIL) 10 MG tablet      4. ASCVD (arteriosclerotic cardiovascular disease)  I25.10 NM MPI with Lexiscan     rosuvastatin (CRESTOR) 40 MG tablet      5. Chronic ischemic heart disease  I25.9 lisinopril (ZESTRIL) 10 MG tablet      6. History of coronary artery bypass graft x 3 on 2/12/2003  Z95.1 NM MPI with Lexiscan     rosuvastatin (CRESTOR) 40 MG tablet      7. Status post coronary angiogram on 9/25/2017 at the [facility]-stable disease  Z98.890 NM MPI with Lexiscan     rosuvastatin (CRESTOR) 40 MG tablet      8. Essential hypertension  I10 amLODIPine (NORVASC) 10 MG tablet     lisinopril (ZESTRIL) 10 MG tablet     propranolol (INDERAL) 20 MG tablet      9. Presence of stent in coronary artery in patient with coronary artery disease  I25.10 NM MPI with Lexiscan    Z95.5 rosuvastatin (CRESTOR) 40 MG tablet      10. Paroxysmal supraventricular tachycardia  I47.10 propranolol (INDERAL) 20 MG tablet      11. Palpitations  R00.2 propranolol (INDERAL) 20 MG tablet      12. Hematuria, unspecified type  R31.9 Routine UA with micro reflex to culture     Routine UA with micro reflex to culture      13. Skin lesion  L98.9 Adult Dermatology   Referral              Past Medical History:   Diagnosis Date    Acute ischemic heart disease (H)     06/15/2007,with PTCA and stenting of 90% osteo circumflex lesion and patent LAD graft, patent left main stent.    Acute myocardial infarction (H)     3/30/2013    Anxiety disorder     No Comments Provided    Atherosclerotic heart disease of native coronary artery without angina pectoris     -angio revealed 3 vessel dz  -4 stents placed; 2 overlapping stents placed in mLAD, 1 stent pRCA, 1 stent pCirc 1 s 9/02 -non-ST elevation MI 1/03  -angio revealed restenosis of Circ.    -PTCA and brachytherapy of pCirc -repeat angio 2/03 -no intervension -CABG x3 12/03 - Dr Sinclair  -LIMA-LAD, RAFI-RCA, SVG-OM -PCI 7/04 stent to L -CTangio 9/05   -patentent LIMA-LAD. RAFI-RCA occluded; RCA ostio/p*    Bilateral carpal tunnel syndrome     No Comments Provided    Cervicalgia     No Comments Provided    Chest pain     12/29/2014    Chronic gastric ulcer without hemorrhage or perforation     10/03/2011,hx of GI bleed (2003)    Chronic ischemic heart disease     06/27/2012    Chronic obstructive pulmonary disease (H)     06/15/2007,low DLCO, normal spirometry    Chronic or unspecified gastric ulcer with hemorrhage     10/2003    Chronic pain syndrome     12/01/2010,chest wall, back    Colostomy status (H)     Constipation     11/29/2011    Coronary angioplasty status     09/12/2002,with triple stenting    Coronary angioplasty status     01/10/2003,repeat angioplasty    Coronary angioplasty status     No Comments Provided    Dorsalgia     05/31/2011    Encounter for other administrative examinations     1/28/2014    Encounter for screening for cardiovascular disorders     10/2004,Cardiolite (2004, 2005, 2006 and 2011)    Enterocolitis due to Clostridium difficile     8/19/2016    Essential (primary) hypertension     No Comments Provided    Hyperlipidemia     No Comments Provided    Major depressive disorder, single episode     Severe, hx  of suicide attempt/hospitalization    Migraine without status migrainosus, not intractable     No Comments Provided    Nodular corneal degeneration     09/26/2011    Noninfective gastroenteritis and colitis     06/15/2007,history of    Noninfective gastroenteritis and colitis     Microcytic    Osteoarthritis     No Comments Provided    Other chest pain     10/13/2009,chronic    Pain in knee     05/31/2011    Pain in right shoulder     No Comments Provided    Panic disorder without agoraphobia     No Comments Provided    Peptic ulcer without hemorrhage or perforation     6/15/2007    Peripheral vascular disease (H24)     No Comments Provided    Personal history of diseases of the blood and blood-forming organs and certain disorders involving the immune mechanism (CODE)     No Comments Provided    Personal history of nicotine dependence     No Comments Provided    Personal history of other medical treatment (CODE)     10/14/2013    Presence of aortocoronary bypass graft     2/12/2003    Primary central sleep apnea     10/14/2013    Sepsis due to Escherichia coli (E. coli) (H)     7/14/16,[facility]    Stricture of artery (H24)     3/31/2013,S/p prox left SCA stent 4/1/2013    Thoracic, thoracolumbar and lumbosacral intervertebral disc disorder     No Comments Provided    Uncomplicated opioid abuse (H)     history of    Vitamin D deficiency     5/6/2013       Past Surgical History:   Procedure Laterality Date    ANGIOPLASTY      9/12/02,with triple stenting    APPENDECTOMY OPEN      No Comments Provided    ARTHROSCOPY KNEE      left    ARTHROSCOPY SHOULDER Right 05/12/2017    labral tear, rotator cuff tear and some subacromial decompression     BYPASS GRAFT ARTERY CORONARY      12/13/02,Triple bypass, left internal mammary  to LAD, right internal mammary to right coronary artery, saphenous to obtuse marginal of the left circumflex.    COLONOSCOPY      2011,Dr Bowman benign polyps    COLONOSCOPY  10/03/2011    2011,benign  polyps, Dr. Bowman    COLONOSCOPY  08/08/2016 8/8/16,normal, Dr Bowman    ELBOW SURGERY      baby,birth malachi removed from right arm    EMBOLECTOMY UPPER EXTREMITY  04/02/2013    brachial artery pseudoaneurysm after stenting    ESOPHAGOSCOPY, GASTROSCOPY, DUODENOSCOPY (EGD), COMBINED      2011,EGD Dr Bowman with pyloric ulcer    ESOPHAGOSCOPY, GASTROSCOPY, DUODENOSCOPY (EGD), COMBINED      2011,pyloric ulcer, Dr. Bowman    ESOPHAGOSCOPY, GASTROSCOPY, DUODENOSCOPY (EGD), COMBINED      8/8/16,mild gastritis, Dr Bowman    ESOPHAGOSCOPY, GASTROSCOPY, DUODENOSCOPY (EGD), COMBINED      11/27/2017,Dr Bowman. Antral ulcer    ESOPHAGOSCOPY, GASTROSCOPY, DUODENOSCOPY (EGD), COMBINED  02/02/2018    Dr Bowman, healed ulcer    HYSTERECTOMY TOTAL ABDOMINAL      age 21    LAPAROSCOPIC CHOLECYSTECTOMY      2006    OSTEOTOMY FEMUR DISTAL      x3, right knee    OSTEOTOMY FEMUR DISTAL      2000,left knee  ligament surgery    OTHER SURGICAL HISTORY      1/10/2003,,PTCA    OTHER SURGICAL HISTORY      09/20/2012,,PTCA,DELONTE in LAD and left main    OTHER SURGICAL HISTORY      4/1/2013,,PTCA,L subclavian stenosis    RELEASE TRIGGER FINGER Left 12/2/2022    Procedure: Left thumb Trigger  release;  Surgeon: Cristhian Wilkinson MD;  Location: [facility] OR    SALPINGO-OOPHORECTOMY BILATERAL      age 27,Bilateral salpingo-oophorectomy    TONSILLECTOMY, ADENOIDECTOMY, COMBINED      childhood       Allergies   Allergen Reactions    Atorvastatin Muscle Pain (Myalgia)    Tiotropium Bromide [Tiotropium] Rash    Advil [Ibuprofen] Other (See Comments)     Does not recall but feel like it interacted with blood thinners and HX of GI BLEED    Ezetimibe Muscle Pain (Myalgia)    Latex Rash    Niacin      Other reaction(s): Flushing    No Clinical Screening - See Comments Itching, Rash and Blisters     Metals and plastics      Tape [Adhesive Tape] Rash       Current Outpatient Medications   Medication Sig Dispense Refill    amLODIPine (NORVASC) 10 MG tablet  Take 1 tablet (10 mg) by mouth daily Dx. Code: I10. 90 tablet 3    busPIRone (BUSPAR) 30 MG tablet Take 1 tablet (30 mg) by mouth 2 times daily 180 tablet 3    clonazePAM (KLONOPIN) 1 MG tablet Take 1 tablet (1 mg) by mouth 3 times daily as needed for anxiety Max 3 per day 270 tablet 0    clopidogrel (PLAVIX) 75 MG tablet Take 1 tablet (75 mg) by mouth daily 90 tablet 3    diclofenac (VOLTAREN) 1 % topical gel Apply 2 grams to each hand or 4 grams to each knee up to 4 times daily as needed for arthritis pain 350 g 4    [START ON 4/7/2024] HYDROcodone-acetaminophen (NORCO)  MG per tablet Take 1 tablet by mouth every 4 hours as needed for severe pain 180 tablet 0    [START ON 3/8/2024] HYDROcodone-acetaminophen (NORCO)  MG per tablet Take 1 tablet by mouth every 4 hours as needed for severe pain 180 tablet 0    HYDROcodone-acetaminophen (NORCO)  MG per tablet Take 1 tablet by mouth every 4 hours as needed for severe pain 180 tablet 0    HYDROcodone-acetaminophen (NORCO)  MG per tablet Take 1 tablet by mouth every 4 hours as needed for severe pain 180 tablet 0    lisinopril (ZESTRIL) 10 MG tablet Take 1 tablet (10 mg) by mouth daily 90 tablet 3    naloxone (NARCAN) 4 MG/0.1ML nasal spray Spray into one nostril for opioid reversal if unresponsive. May repeat every 2-3 minutes until patient responsive or EMS arrives 1 each 1    nitroGLYcerin (NITROLINGUAL) 0.4 MG/SPRAY spray For chest pain spray 1 spray under tongue every 5 minutes for 3 doses. If symptoms persist 5 minutes after 1st dose call 911. 4.9 g 3    ondansetron (ZOFRAN) 4 MG tablet TAKE 1 TABLET BY MOUTH EVERY 6 HOURS AS NEEDED FOR NAUSEA 90 tablet 3    pantoprazole (PROTONIX) 40 MG EC tablet Take 1 tablet (40 mg) by mouth daily 90 tablet 3    propranolol (INDERAL) 20 MG tablet Take 1 tablet (20 mg) by mouth 2 times daily 180 tablet 3    RESTASIS 0.05 % ophthalmic emulsion INSTILL 1 DROP INTO BOTH EYES TWICE A DAY      rimegepant  (NURTEC) 75 MG ODT tablet PLACE 1 TAB UNDER TONGUE DAILY AS NEEDED FOR MIGRAINE. MAX OF 1 TAB EVERY 48 HRS & 2 PER WEEK 8 tablet 14    rosuvastatin (CRESTOR) 40 MG tablet Take 1 tablet (40 mg) by mouth daily 90 tablet 3    sucralfate (CARAFATE) 1 GM tablet Take 1 tablet (1 g) by mouth 4 times daily as needed for nausea (or dark stools) 360 tablet 1    triamcinolone (KENALOG) 0.1 % external lotion Apply topically 3 times daily 60 mL 3    TRINTELLIX 20 MG tablet TAKE 1 TABLET BY MOUTH EVERY DAY 90 tablet 4    VITAMIN D, CHOLECALCIFEROL, PO Take 5,000 Units by mouth daily         Social History     Socioeconomic History    Marital status:      Spouse name: Not on file    Number of children: Not on file    Years of education: Not on file    Highest education level: Not on file   Occupational History    Not on file   Tobacco Use    Smoking status: Former     Packs/day: 1.00     Years: 35.00     Additional pack years: 0.00     Total pack years: 35.00     Types: Cigarettes     Quit date: 2/15/2017     Years since quittin.0     Passive exposure: Past    Smokeless tobacco: Never   Vaping Use    Vaping Use: Never used   Substance and Sexual Activity    Alcohol use: Not Currently     Alcohol/week: 0.0 standard drinks of alcohol    Drug use: No    Sexual activity: Yes     Partners: Male     Birth control/protection: None   Other Topics Concern    Parent/sibling w/ CABG, MI or angioplasty before 65F 55M? Not Asked   Social History Narrative    ,  Steven.  Currently not working outside the home. Lives three-mile self Bibi met with . Tobacco abuse, quit , restarted. Quit  and has since quit, no alcohol.     Social Determinants of Health     Financial Resource Strain: Low Risk  (2023)    Financial Resource Strain     Within the past 12 months, have you or your family members you live with been unable to get utilities (heat, electricity) when it was really needed?: No   Food Insecurity:  Low Risk  (12/20/2023)    Food Insecurity     Within the past 12 months, did you worry that your food would run out before you got money to buy more?: No     Within the past 12 months, did the food you bought just not last and you didn t have money to get more?: No   Transportation Needs: Low Risk  (12/20/2023)    Transportation Needs     Within the past 12 months, has lack of transportation kept you from medical appointments, getting your medicines, non-medical meetings or appointments, work, or from getting things that you need?: No   Physical Activity: Not on file   Stress: Not on file   Social Connections: Not on file   Interpersonal Safety: Low Risk  (3/5/2024)    Interpersonal Safety     Do you feel physically and emotionally safe where you currently live?: Yes     Within the past 12 months, have you been hit, slapped, kicked or otherwise physically hurt by someone?: No     Within the past 12 months, have you been humiliated or emotionally abused in other ways by your partner or ex-partner?: No   Housing Stability: Low Risk  (12/20/2023)    Housing Stability     Do you have housing? : Yes     Are you worried about losing your housing?: No       LAB RESULTS:   Office Visit on 08/06/2021   Component Date Value Ref Range Status    Color Urine 08/06/2021 Yellow  Colorless, Straw, Light Yellow, Yellow Final    Appearance Urine 08/06/2021 Slightly Cloudy* Clear Final    Glucose Urine 08/06/2021 Negative  Negative mg/dL Final    Bilirubin Urine 08/06/2021 Negative  Negative Final    Ketones Urine 08/06/2021 Negative  Negative mg/dL Final    Specific Gravity Urine 08/06/2021 1.018  1.000 - 1.030 Final    Blood Urine 08/06/2021 Small* Negative Final    pH Urine 08/06/2021 6.0  5.0 - 9.0 Final    Protein Albumin Urine 08/06/2021 20 * Negative mg/dL Final    Urobilinogen Urine 08/06/2021 Normal  Normal, 2.0 mg/dL Final    Nitrite Urine 08/06/2021 Positive* Negative Final    Leukocyte Esterase Urine 08/06/2021 Large*  Negative Final    Bacteria Urine 08/06/2021 Many* None Seen /HPF Final    Mucus Urine 08/06/2021 Present* None Seen /LPF Final    RBC Urine 08/06/2021 6* <=2 /HPF Final    WBC Urine 08/06/2021 >182* <=5 /HPF Final    Culture 08/06/2021 >100,000 CFU/mL Escherichia coli*  Final   Office Visit on 07/30/2021   Component Date Value Ref Range Status    Sodium 07/30/2021 139  134 - 144 mmol/L Final    Potassium 07/30/2021 4.4  3.5 - 5.1 mmol/L Final    Chloride 07/30/2021 105  98 - 107 mmol/L Final    Carbon Dioxide (CO2) 07/30/2021 24  21 - 31 mmol/L Final    Anion Gap 07/30/2021 10  3 - 14 mmol/L Final    Urea Nitrogen 07/30/2021 19  7 - 25 mg/dL Final    Creatinine 07/30/2021 1.08  0.60 - 1.20 mg/dL Final    Calcium 07/30/2021 9.9  8.6 - 10.3 mg/dL Final    Glucose 07/30/2021 77  70 - 105 mg/dL Final    GFR Estimate 07/30/2021 53* >60 mL/min/1.73m2 Final    Magnesium 07/30/2021 2.1  1.9 - 2.7 mg/dL Final    WBC Count 07/30/2021 7.0  4.0 - 11.0 10e3/uL Final    RBC Count 07/30/2021 4.39  3.80 - 5.20 10e6/uL Final    Hemoglobin 07/30/2021 12.9  11.7 - 15.7 g/dL Final    Hematocrit 07/30/2021 38.6  35.0 - 47.0 % Final    MCV 07/30/2021 88  78 - 100 fL Final    MCH 07/30/2021 29.4  26.5 - 33.0 pg Final    MCHC 07/30/2021 33.4  31.5 - 36.5 g/dL Final    RDW 07/30/2021 13.5  10.0 - 15.0 % Final    Platelet Count 07/30/2021 273  150 - 450 10e3/uL Final    % Neutrophils 07/30/2021 57  % Final    % Lymphocytes 07/30/2021 30  % Final    % Monocytes 07/30/2021 10  % Final    % Eosinophils 07/30/2021 2  % Final    % Basophils 07/30/2021 1  % Final    % Immature Granulocytes 07/30/2021 0  % Final    NRBCs per 100 WBC 07/30/2021 0  <1 /100 Final    Absolute Neutrophils 07/30/2021 4.0  1.6 - 8.3 10e3/uL Final    Absolute Lymphocytes 07/30/2021 2.1  0.8 - 5.3 10e3/uL Final    Absolute Monocytes 07/30/2021 0.7  0.0 - 1.3 10e3/uL Final    Absolute Eosinophils 07/30/2021 0.1  0.0 - 0.7 10e3/uL Final    Absolute Basophils 07/30/2021 0.1   "0.0 - 0.2 10e3/uL Final    Absolute Immature Granulocytes 07/30/2021 0.0  <=0.0 10e3/uL Final    Absolute NRBCs 07/30/2021 0.0  10e3/uL Final        Review of systems: Negative except that which was noted in the HPI.    Physical examination:  /70   Pulse 61   Temp (!) 96.5  F (35.8  C) (Temporal)   Resp 18   Ht 1.65 m (5' 4.96\")   Wt 78.3 kg (172 lb 9.6 oz)   LMP 04/29/1978 (Approximate)   SpO2 96%   Breastfeeding No   BMI 28.76 kg/m      GENERAL APPEARANCE: healthy, alert and no distress.  CHEST: lungs clear to auscultation - no rales, rhonchi or wheezes, no use of accessory muscles, no retractions, respirations are unlabored, normal respiratory rate.  Reduced air movement but clear.  CARDIOVASCULAR: regular rhythm, normal S1 with physiologic split S2, no S3 or S4 and no murmur, click or rub  EXTREMITIES: no clubbing, cyanosis but with mild peripheral edema      Total time spent on day of visit, including review of tests, obtaining/reviewing separately obtained history, ordering medications/tests/procedures, communicating with PCP/consultants, and documenting in electronic medical record: 43 minutes.       Thank you for allowing me to participate in the care of your patient. Please do not hesitate to contact me if you have any questions.     Paul Gramajo, DO        "

## 2024-03-05 NOTE — NURSING NOTE
Chief Complaint   Patient presents with    Follow Up     6 month          Medication Reconciliation: complete    Breana Tracey, LPN

## 2024-03-06 ENCOUNTER — TELEPHONE (OUTPATIENT)
Dept: INTERNAL MEDICINE | Facility: OTHER | Age: 69
End: 2024-03-06
Payer: COMMERCIAL

## 2024-03-06 NOTE — TELEPHONE ENCOUNTER
Patient requests a call back regarding whether or not [facility] can make an exception to his closed practice so the patient can establish care with [facility]. Patient stated she would like to have [facility] as her primary provider as she felt comfortable with [facility] and [facility] is her spouses PCP.       Okay to leave detailed message.                Althea Mayer on 3/6/2024 at 9:25 AM

## 2024-03-06 NOTE — TELEPHONE ENCOUNTER
Please advise if you would be willing to open up your practice for additional pt's.  See pt request.    Cecilia Wallis LPN on 3/6/2024 at 11:38 AM

## 2024-03-07 LAB — BACTERIA UR CULT: ABNORMAL

## 2024-03-07 NOTE — TELEPHONE ENCOUNTER
Attempted to olu pt.  No answer.  I left a message asking her to return my call and ask for a nurse in [address].  Ext [phone]    Cecilia Wallis LPN on 3/7/2024 at 12:47 PM

## 2024-03-07 NOTE — TELEPHONE ENCOUNTER
Pt returned my call.  The message was relayed to her concerning Dr Hall's practice being closed at this time.  She was advised to call the scheduling desk and make an appt with another provider however as she is overdue for her Annual Exam.  Cecilia Wallis LPN on 3/7/2024 at 1:01 PM

## 2024-03-07 NOTE — TELEPHONE ENCOUNTER
I do not currently have the ability to see additional patients at this point as it is a 3+ month wait to get into see me currently.  I may reopen my practice again in the future.    Jackson Hall MD on 3/7/2024 at 11:01 AM

## 2024-03-18 ENCOUNTER — TELEPHONE (OUTPATIENT)
Dept: CARDIOLOGY | Facility: OTHER | Age: 69
End: 2024-03-18
Payer: COMMERCIAL

## 2024-03-18 DIAGNOSIS — M54.50 CHRONIC BILATERAL LOW BACK PAIN WITHOUT SCIATICA: ICD-10-CM

## 2024-03-18 DIAGNOSIS — G89.29 CHRONIC BILATERAL LOW BACK PAIN WITHOUT SCIATICA: ICD-10-CM

## 2024-03-18 RX ORDER — HYDROCODONE BITARTRATE AND ACETAMINOPHEN 10; 325 MG/1; MG/1
1 TABLET ORAL EVERY 4 HOURS PRN
Qty: 180 TABLET | Refills: 0 | Status: SHIPPED | OUTPATIENT
Start: 2024-03-20 | End: 2024-07-19

## 2024-03-18 NOTE — TELEPHONE ENCOUNTER
Reason for call: Medication or medication refill    Name of medication requested: Hydrocodone 10 mg    How many days of medication do you have left? unknown    What pharmacy do you use? [facility] does not have enough, but [facility] will have enough by Wednesday    Preferred method for responding to this message: Telephone Call    Phone number patient can be reached at: Home number on file [phone] (home)    If we cannot reach you directly, may we leave a detailed response at the number you provided? Yes    Natalia Santiago on 3/18/2024 at 1:29 PM

## 2024-03-18 NOTE — TELEPHONE ENCOUNTER
Attempted reaching Pt, for more information. Line busy.    Kathleen Puckett RN .............. 3/18/2024  1:38 PM

## 2024-03-18 NOTE — TELEPHONE ENCOUNTER
Called and spoke to Patient after verifying last name and date of birth. She states [facility] is having trouble getting this medication in stock. She called [facility] and they should have enough Wednesday. Pt states her bottles says last filled 2/21/24. Pt would like new prescription sent to [facility] with 3/20 as an earliest fill date. Pt also says the pharmacist at [facility] also recommended she get the hydrocodone without the acetaminophen as it is easier to get into their store. She will discuss this at the next visit. They also said the 5 mg are easier to get in than 10 mg, but she doesn't want to have to take 12 pills per day.    Last prescription:  HYDROcodone-acetaminophen (NORCO)  MG per tablet 180 tablet 0 3/8/2024 -- No   Sig - Route: Take 1 tablet by mouth every 4 hours as needed for severe pain - Oral   Sent to pharmacy as: HYDROcodone-Acetaminophen  MG Oral Tablet (NORCO)   Class: E-Prescribe   Earliest Fill Date: 3/8/2024     [id number] - [address]     Unable to complete prescription refill per RN Medication Refill Policy.     Kathleen Puckett RN .............. 3/18/2024  2:28 PM

## 2024-03-18 NOTE — TELEPHONE ENCOUNTER
After verification, I spoke with patient and said that order for stress test was in and she should get a call soon.  Call the end of week if she has not heard.  Татьяна Bonilla LPN ....................3/18/2024   4:26 PM

## 2024-04-16 ENCOUNTER — TELEPHONE (OUTPATIENT)
Dept: CARDIOLOGY | Facility: OTHER | Age: 69
End: 2024-04-16
Payer: COMMERCIAL

## 2024-04-16 NOTE — TELEPHONE ENCOUNTER
Patient verified .  Reminder call for stress test with instructions given.  Patient verbalized understanding.    Emphasis on no caffeine for 12 hours prior to testing  Patient states she understands and agrees

## 2024-04-18 ENCOUNTER — HOSPITAL ENCOUNTER (OUTPATIENT)
Dept: NUCLEAR MEDICINE | Facility: OTHER | Age: 69
Discharge: HOME | End: 2024-04-18
Attending: INTERNAL MEDICINE
Payer: COMMERCIAL

## 2024-04-18 VITALS — WEIGHT: 172 LBS | OXYGEN SATURATION: 94 % | BODY MASS INDEX: 29.37 KG/M2 | HEIGHT: 64 IN

## 2024-04-18 DIAGNOSIS — I25.708 ATHEROSCLEROSIS OF CORONARY ARTERY BYPASS GRAFT OF NATIVE HEART WITH STABLE ANGINA PECTORIS (H): ICD-10-CM

## 2024-04-18 DIAGNOSIS — R07.9 CHEST PAIN, UNSPECIFIED TYPE: ICD-10-CM

## 2024-04-18 DIAGNOSIS — Z95.5 PRESENCE OF STENT IN CORONARY ARTERY IN PATIENT WITH CORONARY ARTERY DISEASE: ICD-10-CM

## 2024-04-18 DIAGNOSIS — Z95.1 HISTORY OF CORONARY ARTERY BYPASS GRAFT X 3: ICD-10-CM

## 2024-04-18 DIAGNOSIS — Z98.890 STATUS POST CORONARY ANGIOGRAM: ICD-10-CM

## 2024-04-18 DIAGNOSIS — I25.10 PRESENCE OF STENT IN CORONARY ARTERY IN PATIENT WITH CORONARY ARTERY DISEASE: ICD-10-CM

## 2024-04-18 DIAGNOSIS — I25.10 ASCVD (ARTERIOSCLEROTIC CARDIOVASCULAR DISEASE): ICD-10-CM

## 2024-04-18 LAB
CV BLOOD PRESSURE: 66 MMHG
CV STRESS MAX HR HE: 81
NUC STRESS EJECTION FRACTION: 64 %
RATE PRESSURE PRODUCT: NORMAL
STRESS ECHO BASELINE DIASTOLIC HE: 80
STRESS ECHO BASELINE HR: 48 BPM
STRESS ECHO BASELINE SYSTOLIC BP: 137
STRESS ECHO CALCULATED PERCENT HR: 54 %
STRESS ECHO LAST STRESS DIASTOLIC BP: 81
STRESS ECHO LAST STRESS SYSTOLIC BP: 152
STRESS ECHO POST ESTIMATED WORKLOAD: 1 METS
STRESS ECHO TARGET HR: 150

## 2024-04-18 PROCEDURE — 93016 CV STRESS TEST SUPVJ ONLY: CPT | Performed by: INTERNAL MEDICINE

## 2024-04-18 PROCEDURE — 78452 HT MUSCLE IMAGE SPECT MULT: CPT

## 2024-04-18 PROCEDURE — A9500 TC99M SESTAMIBI: HCPCS | Performed by: INTERNAL MEDICINE

## 2024-04-18 PROCEDURE — 93017 CV STRESS TEST TRACING ONLY: CPT

## 2024-04-18 PROCEDURE — 250N000011 HC RX IP 250 OP 636: Mod: JZ | Performed by: INTERNAL MEDICINE

## 2024-04-18 PROCEDURE — 343N000001 HC RX 343: Performed by: INTERNAL MEDICINE

## 2024-04-18 PROCEDURE — 93018 CV STRESS TEST I&R ONLY: CPT | Performed by: INTERNAL MEDICINE

## 2024-04-18 RX ORDER — REGADENOSON 0.08 MG/ML
0.4 INJECTION, SOLUTION INTRAVENOUS ONCE
Status: COMPLETED | OUTPATIENT
Start: 2024-04-18 | End: 2024-04-18

## 2024-04-18 RX ADMIN — REGADENOSON 0.4 MG: 0.08 INJECTION, SOLUTION INTRAVENOUS at 09:50

## 2024-04-18 RX ADMIN — KIT FOR THE PREPARATION OF TECHNETIUM TC99M SESTAMIBI 8.7 MILLICURIE: 1 INJECTION, POWDER, LYOPHILIZED, FOR SOLUTION PARENTERAL at 08:00

## 2024-04-18 RX ADMIN — KIT FOR THE PREPARATION OF TECHNETIUM TC99M SESTAMIBI 33.2 MILLICURIE: 1 INJECTION, POWDER, LYOPHILIZED, FOR SOLUTION PARENTERAL at 09:50

## 2024-04-18 NOTE — PROGRESS NOTES
0745 The patient arrived for a Lexiscan Cardiolite stress test.  The procedure, risks, and benefits were discussed with the patient ,and the consent was signed.  A saline lock was started,and the Cardiolite was injected by Nuclear Medicine.  The patient was taken to the waiting area, to await resting images at 0815.  0925The patient returned from Nuclear Medicine and was prepped for the stress test.   Dr. Hooper arrived, and the patient was administered the Lexiscan per procedure.  The patient tolerated the procedure.  She was given a snack and taken to Nuclear Medicine in stable condition for stress images.  The saline lock will be removed by Nuclear Medicine for proper disposal.  The patient was instructed that the ordering MD will call with results in one to two days.  Please see the chart for the complete test results.

## 2024-05-06 ASSESSMENT — ANXIETY QUESTIONNAIRES
GAD7 TOTAL SCORE: 4
7. FEELING AFRAID AS IF SOMETHING AWFUL MIGHT HAPPEN: NOT AT ALL
6. BECOMING EASILY ANNOYED OR IRRITABLE: SEVERAL DAYS
2. NOT BEING ABLE TO STOP OR CONTROL WORRYING: NOT AT ALL
3. WORRYING TOO MUCH ABOUT DIFFERENT THINGS: SEVERAL DAYS
4. TROUBLE RELAXING: SEVERAL DAYS
8. IF YOU CHECKED OFF ANY PROBLEMS, HOW DIFFICULT HAVE THESE MADE IT FOR YOU TO DO YOUR WORK, TAKE CARE OF THINGS AT HOME, OR GET ALONG WITH OTHER PEOPLE?: NOT DIFFICULT AT ALL
7. FEELING AFRAID AS IF SOMETHING AWFUL MIGHT HAPPEN: NOT AT ALL
5. BEING SO RESTLESS THAT IT IS HARD TO SIT STILL: NOT AT ALL
IF YOU CHECKED OFF ANY PROBLEMS ON THIS QUESTIONNAIRE, HOW DIFFICULT HAVE THESE PROBLEMS MADE IT FOR YOU TO DO YOUR WORK, TAKE CARE OF THINGS AT HOME, OR GET ALONG WITH OTHER PEOPLE: NOT DIFFICULT AT ALL
1. FEELING NERVOUS, ANXIOUS, OR ON EDGE: SEVERAL DAYS
GAD7 TOTAL SCORE: 4
GAD7 TOTAL SCORE: 4

## 2024-05-07 ENCOUNTER — OFFICE VISIT (OUTPATIENT)
Dept: FAMILY MEDICINE | Facility: OTHER | Age: 69
End: 2024-05-07
Attending: FAMILY MEDICINE
Payer: COMMERCIAL

## 2024-05-07 VITALS
OXYGEN SATURATION: 97 % | HEART RATE: 85 BPM | HEIGHT: 64 IN | WEIGHT: 179.4 LBS | RESPIRATION RATE: 18 BRPM | TEMPERATURE: 98 F | BODY MASS INDEX: 30.63 KG/M2 | SYSTOLIC BLOOD PRESSURE: 132 MMHG | DIASTOLIC BLOOD PRESSURE: 82 MMHG

## 2024-05-07 DIAGNOSIS — R31.9 HEMATURIA, UNSPECIFIED TYPE: Primary | ICD-10-CM

## 2024-05-07 DIAGNOSIS — N18.31 STAGE 3A CHRONIC KIDNEY DISEASE (H): ICD-10-CM

## 2024-05-07 DIAGNOSIS — F11.20 CONTINUOUS OPIOID DEPENDENCE (H): ICD-10-CM

## 2024-05-07 DIAGNOSIS — Z11.59 ENCOUNTER FOR HEPATITIS C SCREENING TEST FOR LOW RISK PATIENT: ICD-10-CM

## 2024-05-07 LAB
ALBUMIN UR-MCNC: NEGATIVE MG/DL
ANION GAP SERPL CALCULATED.3IONS-SCNC: 15 MMOL/L (ref 7–15)
APPEARANCE UR: CLEAR
BILIRUB UR QL STRIP: NEGATIVE
BUN SERPL-MCNC: 17.6 MG/DL (ref 8–23)
CALCIUM SERPL-MCNC: 10.3 MG/DL (ref 8.8–10.2)
CHLORIDE SERPL-SCNC: 99 MMOL/L (ref 98–107)
COLOR UR AUTO: ABNORMAL
CREAT SERPL-MCNC: 0.97 MG/DL (ref 0.51–0.95)
CREAT UR-MCNC: 73.4 MG/DL
DEPRECATED HCO3 PLAS-SCNC: 24 MMOL/L (ref 22–29)
EGFRCR SERPLBLD CKD-EPI 2021: 63 ML/MIN/1.73M2
GLUCOSE SERPL-MCNC: 101 MG/DL (ref 70–99)
GLUCOSE UR STRIP-MCNC: NEGATIVE MG/DL
HGB UR QL STRIP: NEGATIVE
HYALINE CASTS: 6 /LPF
KETONES UR STRIP-MCNC: NEGATIVE MG/DL
LEUKOCYTE ESTERASE UR QL STRIP: ABNORMAL
MICROALBUMIN UR-MCNC: <12 MG/L
MICROALBUMIN/CREAT UR: NORMAL MG/G{CREAT}
MUCOUS THREADS #/AREA URNS LPF: PRESENT /LPF
NITRATE UR QL: NEGATIVE
PH UR STRIP: 7 [PH] (ref 5–9)
POTASSIUM SERPL-SCNC: 4.4 MMOL/L (ref 3.4–5.3)
RBC URINE: 1 /HPF
SODIUM SERPL-SCNC: 138 MMOL/L (ref 135–145)
SP GR UR STRIP: 1.01 (ref 1–1.03)
SQUAMOUS EPITHELIAL: 1 /HPF
UROBILINOGEN UR STRIP-MCNC: NORMAL MG/DL
WBC URINE: 2 /HPF

## 2024-05-07 PROCEDURE — 80048 BASIC METABOLIC PNL TOTAL CA: CPT | Mod: ZL | Performed by: FAMILY MEDICINE

## 2024-05-07 PROCEDURE — 86803 HEPATITIS C AB TEST: CPT | Mod: ZL | Performed by: FAMILY MEDICINE

## 2024-05-07 PROCEDURE — 82043 UR ALBUMIN QUANTITATIVE: CPT | Mod: ZL | Performed by: FAMILY MEDICINE

## 2024-05-07 PROCEDURE — 36415 COLL VENOUS BLD VENIPUNCTURE: CPT | Mod: ZL | Performed by: FAMILY MEDICINE

## 2024-05-07 PROCEDURE — 87086 URINE CULTURE/COLONY COUNT: CPT | Mod: ZL | Performed by: FAMILY MEDICINE

## 2024-05-07 PROCEDURE — G0463 HOSPITAL OUTPT CLINIC VISIT: HCPCS

## 2024-05-07 PROCEDURE — 81001 URINALYSIS AUTO W/SCOPE: CPT | Mod: ZL | Performed by: FAMILY MEDICINE

## 2024-05-07 PROCEDURE — 99214 OFFICE O/P EST MOD 30 MIN: CPT | Performed by: FAMILY MEDICINE

## 2024-05-07 ASSESSMENT — PAIN SCALES - GENERAL: PAINLEVEL: MODERATE PAIN (5)

## 2024-05-07 NOTE — PROGRESS NOTES
Assessment & Plan     Hematuria, unspecified type  Repeat urinalysis today was negative for blood.  This is reassuring.  No need for further workup.  ic and Culture; Future  - UA Macroscopic with reflex to Microscopic and Culture  - Urine Culture    Stage 3a chronic kidney disease (H)  Kidney function stable.  Avoid NSAIDs.  Aggressive control of hypertension.  - Basic Metabolic Panel; Future  - Albumin Random Urine Quantitative with Creat Ratio; Future  - Basic Metabolic Panel  - Albumin Random Urine Quantitative with Creat Ratio    Continuous opioid dependence (H)  Patient has been on chronic opioids for many years.  She has been seen by Sheri Cordon and has a follow-up appointment later this month for refill of medications.  She is on very high dose of short acting narcotics.  Discussed with the patient that of I assumed her care for pain management would need to consider transitioning to long-acting.  She will contemplate this and get back to me.    Encounter for hepatitis C screening test for low risk patient    - Hepatitis C Screen Reflex to HCV RNA Quant and Genotype; Future  - Hepatitis C Screen Reflex to HCV RNA Quant and Genotype              Patient Instructions   Check labs for kidney function and repeat urine test  See Neris for pain medication refills, after that visit, I will take over prescribing if that is what you desire    No follow-ups on file.    Subjective   Malina is a 69 year old, presenting for the following health issues:  Establish Care and Follow Up (Palpitations )      68 yo female presents to establish care, previously seen by Dr Cano, started seeing Neris, but was told to establish with a physician.    Dr Gramajo is her cardiologist, notes reviewed. Still complains of SOB, PFTs normal in 2022 and seen by Pulmonology    Recent UTI, treated with Bactrim, hematuria resolved, she also discontinued xarelto and is on plavix    Diffuse MK pain, bilateral chronic back and knee pain. Has  "been on hydrocodone 10/325 1-2 tabs, max 6 per day. Previously on methadone. Patient reports being on this dosage for 5 years.     History of Present Illness       Back Pain:  She presents for follow up of back pain.   Location of back pain:  Right lower back, left lower back, right middle of back, left middle of back, right upper back, left upper back, right shoulder, left shoulder and right hip  Description of back pain: stabbing  Back pain spreads: right knee, left knee, right shoulder and left shoulder    Since patient first noticed back pain, pain is: always present, but gets better and worse  Does back pain interfere with her job:  Yes       Hypertension: She presents for follow up of hypertension.  She does not check blood pressure  regularly outside of the clinic. Outpatient blood pressures have not been over 140/90. She follows a low salt diet.                   Review of Systems  Constitutional, HEENT, cardiovascular, pulmonary, gi and gu systems are negative, except as otherwise noted.      Objective    /82   Pulse 85   Temp 98  F (36.7  C) (Tympanic)   Resp 18   Ht 1.626 m (5' 4\")   Wt 81.4 kg (179 lb 6.4 oz)   LMP 04/29/1978 (Approximate)   SpO2 97%   Breastfeeding No   BMI 30.79 kg/m    Body mass index is 30.79 kg/m .  Physical Exam   GENERAL: alert and no distress  NECK: no adenopathy, no asymmetry, masses, or scars  RESP: lungs clear to auscultation - no rales, rhonchi or wheezes  CV: regular rate and rhythm, normal S1 S2, no S3 or S4, no murmur, click or rub, no peripheral edema  PSYCH: mentation appears normal, affect normal/bright    Results for orders placed or performed in visit on 05/07/24   UA Macroscopic with reflex to Microscopic and Culture     Status: Abnormal    Specimen: Urine, Clean Catch   Result Value Ref Range    Color Urine Light Yellow Colorless, Straw, Light Yellow, Yellow    Appearance Urine Clear Clear    Glucose Urine Negative Negative mg/dL    Bilirubin Urine " Negative Negative    Ketones Urine Negative Negative mg/dL    Specific Gravity Urine 1.012 1.000 - 1.030    Blood Urine Negative Negative    pH Urine 7.0 5.0 - 9.0    Protein Albumin Urine Negative Negative mg/dL    Urobilinogen Urine Normal Normal, 2.0 mg/dL    Nitrite Urine Negative Negative    Leukocyte Esterase Urine Moderate (A) Negative    Mucus Urine Present (A) None Seen /LPF    RBC Urine 1 <=2 /HPF    WBC Urine 2 <=5 /HPF    Squamous Epithelials Urine 1 <=1 /HPF    Hyaline Casts Urine 6 (H) <=2 /LPF    Narrative    Urine Culture ordered based on laboratory criteria   Basic Metabolic Panel     Status: Abnormal   Result Value Ref Range    Sodium 138 135 - 145 mmol/L    Potassium 4.4 3.4 - 5.3 mmol/L    Chloride 99 98 - 107 mmol/L    Carbon Dioxide (CO2) 24 22 - 29 mmol/L    Anion Gap 15 7 - 15 mmol/L    Urea Nitrogen 17.6 8.0 - 23.0 mg/dL    Creatinine 0.97 (H) 0.51 - 0.95 mg/dL    GFR Estimate 63 >60 mL/min/1.73m2    Calcium 10.3 (H) 8.8 - 10.2 mg/dL    Glucose 101 (H) 70 - 99 mg/dL   Hepatitis C Screen Reflex to HCV RNA Quant and Genotype     Status: Normal   Result Value Ref Range    Hepatitis C Antibody Nonreactive Nonreactive   Albumin Random Urine Quantitative with Creat Ratio     Status: None   Result Value Ref Range    Creatinine Urine mg/dL 73.4 mg/dL    Albumin Urine mg/L <12.0 mg/L    Albumin Urine mg/g Cr     Urine Culture     Status: None    Specimen: Urine, Clean Catch   Result Value Ref Range    Culture No Growth            Signed Electronically by: Natalia Ellis MD

## 2024-05-07 NOTE — NURSING NOTE
Patient is here to establish care and follow up. States she feels weak.     Patient's last menstrual period was 04/29/1978 (approximate).  Medication Reconciliation: complete    Morelia Park LPN 5/7/2024 9:45 AM       Advance care directive on file? yes  Advance care directive provided to patient? na       Morelia Park LPN

## 2024-05-07 NOTE — PATIENT INSTRUCTIONS
Check labs for kidney function and repeat urine test  See Neris for pain medication refills, after that visit, I will take over prescribing if that is what you desire

## 2024-05-08 LAB — HCV AB SERPL QL IA: NONREACTIVE

## 2024-05-09 LAB — BACTERIA UR CULT: NO GROWTH

## 2024-05-14 ENCOUNTER — TELEPHONE (OUTPATIENT)
Dept: FAMILY MEDICINE | Facility: OTHER | Age: 69
End: 2024-05-14
Payer: COMMERCIAL

## 2024-05-14 DIAGNOSIS — G89.4 CHRONIC PAIN DISORDER: ICD-10-CM

## 2024-05-14 DIAGNOSIS — F11.90 CHRONIC, CONTINUOUS USE OF OPIOIDS: ICD-10-CM

## 2024-05-14 DIAGNOSIS — Z79.899 CONTROLLED SUBSTANCE AGREEMENT SIGNED: ICD-10-CM

## 2024-05-14 DIAGNOSIS — F41.9 CHRONIC ANXIETY: ICD-10-CM

## 2024-05-14 DIAGNOSIS — G89.29 CHRONIC BILATERAL LOW BACK PAIN WITHOUT SCIATICA: ICD-10-CM

## 2024-05-14 DIAGNOSIS — F11.20 CONTINUOUS OPIOID DEPENDENCE (H): ICD-10-CM

## 2024-05-14 DIAGNOSIS — M54.50 CHRONIC BILATERAL LOW BACK PAIN WITHOUT SCIATICA: ICD-10-CM

## 2024-05-14 NOTE — TELEPHONE ENCOUNTER
Per Dr. CH note on 5/7/24 - Malina was going to think about transition to long acting narcotics and report back to Dr. Ellis in regards to this and change over to long acting.     Per the appt desk - her appointment with me that was initially scheduled for tomorrow was cancelled by [facility] team as she was establishing with Dr. Ellis.     Ideally, we do not have multiple providers filling controlled substance medications.     Per Dr. CH note:      Sheri Cordon PA-C  5/14/2024  3:04 PM

## 2024-05-14 NOTE — TELEPHONE ENCOUNTER
Patient established with [facility] on 5/7/24. Patient is confused as to who will refill her hydrocodone. Patient wants to speak to CLL or her nurse.       Virgie Dumont on 5/14/2024 at 10:41 AM

## 2024-05-14 NOTE — TELEPHONE ENCOUNTER
"Called patient. Tells me appt tomorrow with CLL cancelled \"due to CLL request\"-as patient has established with Dr. Ellis recently (need to update PCP if so). Patient tells me this is due to her cardiologist recommending that she establish with a physician due to her health issues and medications she is taking. Per Dr. Ellis it was recommended that this patient try long acting vs short acting narcotics. Nothing filled at that time per patient and she is stating that she was told that in the meantime she can have her short acting narcotics refilled by CLL as she only has 3 days left of these, and is also wanting her Klonopin refilled and states \"I only have a couple of days left of this medication\". She will be seeing Dr. Ellis again but is not scheduled with her for 2 more months appx. Please advise on medications, the plan going forward, etc. Patient expressed multiple times to this writer that she feels terrible about the whole thing and wishes she could still see CLL but states that she can not at this time. I did not update PCP in chart yet please advise on this as well.   Veronica Kelley LPN...................5/14/2024   1:13 PM   "

## 2024-05-15 DIAGNOSIS — G89.29 CHRONIC BILATERAL LOW BACK PAIN WITHOUT SCIATICA: ICD-10-CM

## 2024-05-15 DIAGNOSIS — F11.90 CHRONIC, CONTINUOUS USE OF OPIOIDS: ICD-10-CM

## 2024-05-15 DIAGNOSIS — G89.4 CHRONIC PAIN DISORDER: ICD-10-CM

## 2024-05-15 DIAGNOSIS — M54.50 CHRONIC BILATERAL LOW BACK PAIN WITHOUT SCIATICA: ICD-10-CM

## 2024-05-15 DIAGNOSIS — F11.20 CONTINUOUS OPIOID DEPENDENCE (H): ICD-10-CM

## 2024-05-15 DIAGNOSIS — Z79.899 CONTROLLED SUBSTANCE AGREEMENT SIGNED: ICD-10-CM

## 2024-05-15 RX ORDER — HYDROCODONE BITARTRATE AND ACETAMINOPHEN 10; 325 MG/1; MG/1
1 TABLET ORAL EVERY 4 HOURS PRN
Qty: 42 TABLET | Refills: 0 | Status: SHIPPED | OUTPATIENT
Start: 2024-05-15 | End: 2024-05-22

## 2024-05-15 RX ORDER — HYDROCODONE BITARTRATE AND ACETAMINOPHEN 10; 325 MG/1; MG/1
1 TABLET ORAL EVERY 4 HOURS PRN
Qty: 180 TABLET | Refills: 0 | OUTPATIENT
Start: 2024-05-15

## 2024-05-15 RX ORDER — CLONAZEPAM 1 MG/1
1 TABLET ORAL 3 TIMES DAILY PRN
Qty: 21 TABLET | Refills: 0 | Status: SHIPPED | OUTPATIENT
Start: 2024-05-15 | End: 2024-05-22

## 2024-05-15 NOTE — TELEPHONE ENCOUNTER
I discussed with the patient in length that she was to keep her appointment with Neris while she contemplated my recommendation to switch to long acting. She left appointment with a printed AVS stating that clearly. If she wants to transition to long acting, she can schedule next available appointment with me.   Clif Ellis MD

## 2024-05-15 NOTE — TELEPHONE ENCOUNTER
Duplicate request, see telephone encounter addressing this.     Sheri Cordon PA-C  5/15/2024  5:23 PM

## 2024-05-15 NOTE — TELEPHONE ENCOUNTER
CLL-patient called 05.15.24 asking why there was no call back concerning narcotic fill    Please call and advise    Thank You    Lori Mcmahon on 5/15/2024 at 10:43 AM

## 2024-05-15 NOTE — TELEPHONE ENCOUNTER
Patient notified. States she was going to keep appt with madeleine Cordon, but it was cancelled. Patient is wondering if she can get a script called in, or if she can get an appt with her for the medication refill. States she didn't realize the appt was going to get cancelled.   Patient was transferred to schedule appt for now with CLL or any provider while waiting to see about medication refill.   Morelia Park LPN .............5/15/2024     4:26 PM

## 2024-05-15 NOTE — TELEPHONE ENCOUNTER
Patient was calling about prescriptions and wondering why they have not yet been filled. One is the NORCO and the other is the Clonazepam. Please call.    Josie Tsang on 5/15/2024 at 3:45 PM

## 2024-05-15 NOTE — TELEPHONE ENCOUNTER
I am gone the rest of this week. I will bridge her until she sees Breana Morrison NP next week to further discuss options.    Nursing, FYI I am out of the office tomorrow and Friday.     Sheri Cordon PA-C  5/15/2024  5:00 PM

## 2024-05-16 NOTE — TELEPHONE ENCOUNTER
Patient was notified of orders and to keep appointment for next week.    Nisha Peres LPN on 5/16/2024 at 12:36 PM

## 2024-05-21 ASSESSMENT — ANXIETY QUESTIONNAIRES
IF YOU CHECKED OFF ANY PROBLEMS ON THIS QUESTIONNAIRE, HOW DIFFICULT HAVE THESE PROBLEMS MADE IT FOR YOU TO DO YOUR WORK, TAKE CARE OF THINGS AT HOME, OR GET ALONG WITH OTHER PEOPLE: SOMEWHAT DIFFICULT
GAD7 TOTAL SCORE: 9
8. IF YOU CHECKED OFF ANY PROBLEMS, HOW DIFFICULT HAVE THESE MADE IT FOR YOU TO DO YOUR WORK, TAKE CARE OF THINGS AT HOME, OR GET ALONG WITH OTHER PEOPLE?: SOMEWHAT DIFFICULT
GAD7 TOTAL SCORE: 9
7. FEELING AFRAID AS IF SOMETHING AWFUL MIGHT HAPPEN: SEVERAL DAYS
2. NOT BEING ABLE TO STOP OR CONTROL WORRYING: SEVERAL DAYS
4. TROUBLE RELAXING: MORE THAN HALF THE DAYS
3. WORRYING TOO MUCH ABOUT DIFFERENT THINGS: SEVERAL DAYS
7. FEELING AFRAID AS IF SOMETHING AWFUL MIGHT HAPPEN: SEVERAL DAYS
5. BEING SO RESTLESS THAT IT IS HARD TO SIT STILL: SEVERAL DAYS
1. FEELING NERVOUS, ANXIOUS, OR ON EDGE: MORE THAN HALF THE DAYS
GAD7 TOTAL SCORE: 9
6. BECOMING EASILY ANNOYED OR IRRITABLE: SEVERAL DAYS

## 2024-05-22 ENCOUNTER — OFFICE VISIT (OUTPATIENT)
Dept: FAMILY MEDICINE | Facility: OTHER | Age: 69
End: 2024-05-22
Attending: NURSE PRACTITIONER
Payer: COMMERCIAL

## 2024-05-22 VITALS
DIASTOLIC BLOOD PRESSURE: 78 MMHG | BODY MASS INDEX: 30.77 KG/M2 | OXYGEN SATURATION: 97 % | HEIGHT: 64 IN | HEART RATE: 66 BPM | RESPIRATION RATE: 16 BRPM | TEMPERATURE: 97.8 F | SYSTOLIC BLOOD PRESSURE: 132 MMHG | WEIGHT: 180.2 LBS

## 2024-05-22 DIAGNOSIS — R11.0 NAUSEA: ICD-10-CM

## 2024-05-22 DIAGNOSIS — M54.50 CHRONIC BILATERAL LOW BACK PAIN WITHOUT SCIATICA: ICD-10-CM

## 2024-05-22 DIAGNOSIS — F41.9 CHRONIC ANXIETY: ICD-10-CM

## 2024-05-22 DIAGNOSIS — Z79.899 CONTROLLED SUBSTANCE AGREEMENT SIGNED: Primary | ICD-10-CM

## 2024-05-22 DIAGNOSIS — G89.29 CHRONIC BILATERAL LOW BACK PAIN WITHOUT SCIATICA: ICD-10-CM

## 2024-05-22 DIAGNOSIS — F41.0 PANIC ATTACK: ICD-10-CM

## 2024-05-22 PROCEDURE — G0463 HOSPITAL OUTPT CLINIC VISIT: HCPCS

## 2024-05-22 PROCEDURE — 99214 OFFICE O/P EST MOD 30 MIN: CPT | Performed by: NURSE PRACTITIONER

## 2024-05-22 RX ORDER — CLONAZEPAM 1 MG/1
1 TABLET ORAL 3 TIMES DAILY PRN
Qty: 90 TABLET | Refills: 0 | Status: SHIPPED | OUTPATIENT
Start: 2024-05-22 | End: 2024-06-19

## 2024-05-22 RX ORDER — ONDANSETRON 4 MG/1
4 TABLET, FILM COATED ORAL EVERY 6 HOURS PRN
Qty: 90 TABLET | Refills: 3 | Status: SHIPPED | OUTPATIENT
Start: 2024-05-22 | End: 2024-09-10

## 2024-05-22 RX ORDER — HYDROCODONE BITARTRATE AND ACETAMINOPHEN 10; 325 MG/1; MG/1
1 TABLET ORAL EVERY 4 HOURS PRN
Qty: 180 TABLET | Refills: 0 | Status: SHIPPED | OUTPATIENT
Start: 2024-06-21

## 2024-05-22 RX ORDER — HYDROCODONE BITARTRATE AND ACETAMINOPHEN 10; 325 MG/1; MG/1
1 TABLET ORAL EVERY 4 HOURS PRN
Qty: 180 TABLET | Refills: 0 | Status: SHIPPED | OUTPATIENT
Start: 2024-05-22 | End: 2024-09-10

## 2024-05-22 ASSESSMENT — PAIN SCALES - GENERAL: PAINLEVEL: EXTREME PAIN (8)

## 2024-05-22 NOTE — PROGRESS NOTES
Assessment & Plan   Problem List Items Addressed This Visit       Bilateral low back pain without sciatica    Relevant Medications    HYDROcodone-acetaminophen (NORCO)  MG per tablet    HYDROcodone-acetaminophen (NORCO)  MG per tablet (Start on 6/21/2024)    Chronic anxiety    Relevant Medications    clonazePAM (KLONOPIN) 1 MG tablet    Controlled substance agreement updated 9-7-22 - Primary    Relevant Medications    clonazePAM (KLONOPIN) 1 MG tablet    HYDROcodone-acetaminophen (NORCO)  MG per tablet (Start on 6/21/2024)    Panic attack    Relevant Medications    clonazePAM (KLONOPIN) 1 MG tablet    Nausea    Relevant Medications    ondansetron (ZOFRAN) 4 MG tablet      1. Controlled substance agreement signed  - clonazePAM (KLONOPIN) 1 MG tablet; Take 1 tablet (1 mg) by mouth 3 times daily as needed for anxiety  Dispense: 90 tablet; Refill: 0  - HYDROcodone-acetaminophen (NORCO)  MG per tablet; Take 1 tablet by mouth every 4 hours as needed for severe pain  Dispense: 180 tablet; Refill: 0  - HYDROcodone-acetaminophen (NORCO)  MG per tablet; Take 1 tablet by mouth every 4 hours as needed for severe pain  Dispense: 180 tablet; Refill: 0   was reviewed prior to prescribing the above controlled substances. Enough was filled for 2 month supply to get her through until upcoming appointment with PCP which is scheduled for 7/16. She has been on these medications for quite some time now. There was some confusion recently regarding switching PCPS due to other medical issues (cardiac history) and therefore she does not have enough medication to make it to her next appointment. See below for more information.     2. Chronic anxiety  3. Panic attack  - clonazePAM (KLONOPIN) 1 MG tablet; Take 1 tablet (1 mg) by mouth 3 times daily as needed for anxiety  Dispense: 90 tablet; Refill: 0    4. Chronic bilateral low back pain without sciatica  - HYDROcodone-acetaminophen (NORCO)  MG per  tablet; Take 1 tablet by mouth every 4 hours as needed for severe pain  Dispense: 180 tablet; Refill: 0  - HYDROcodone-acetaminophen (NORCO)  MG per tablet; Take 1 tablet by mouth every 4 hours as needed for severe pain  Dispense: 180 tablet; Refill: 0    5. Nausea  - ondansetron (ZOFRAN) 4 MG tablet; Take 1 tablet (4 mg) by mouth every 6 hours as needed for nausea  Dispense: 90 tablet; Refill: 3             No follow-ups on file.      Subjective   Malina is a 69 year old, presenting for the following health issues:  Recheck Medication (Hydrocodone and Klonopin and Zofran )        5/22/2024    10:50 AM   Additional Questions   Roomed by LIEN Toussaint   Accompanied by Self     History of Present Illness       Back Pain:  She presents for follow up of back pain. Patient's back pain is a recurring problem.  Location of back pain:  Right lower back, left lower back, right middle of back, left middle of back, right upper back, left upper back, right shoulder, left shoulder, right hip and left hip  Description of back pain: cramping  Back pain spreads: nowhere    Since patient first noticed back pain, pain is: always present, but gets better and worse  Does back pain interfere with her job:  Yes       Reason for visit:  MED'SShe consumes 0 sweetened beverage(s) daily.   She is taking medications regularly.     Malina presents today feeling somewhat upset about the situation as described below:  She tells me that the cardiologist had recommended that she establish with an MD specifically rather than a PA or NP related to her past medical history - extensive cardiac history. Patient felt important to follow these recommendations although she was very fond of her previous PCP who is a PA here at [facility]. Due to this switch, previous PCP had declined refilling Ankita's scripts for hydrocodone and klonopin (per patient statement). She will be seeing her new PCP Dr. Ellis 7/16 who she tells me plans to take over her controlled  "agreement. It sounds like she will probably be considering some medication changes such as with long acting pain medication rather than what she is currently on which patient understands. She has been stable for a while on current medications and is hoping I will provide her with refills today. She does not use these medications any more than they are prescribed for chronic pain and panic attacks. Last panic attack was last Wednesday, 1 week ago. Symptoms can be quite severe.       Review of Systems  Constitutional, HEENT, cardiovascular, pulmonary, GI, , musculoskeletal, neuro, skin, endocrine and psych systems are negative, except as otherwise noted.      Objective    /78   Pulse 66   Temp 97.8  F (36.6  C) (Tympanic)   Resp 16   Ht 1.626 m (5' 4\")   Wt 81.7 kg (180 lb 3.2 oz)   LMP 04/29/1978 (Approximate)   SpO2 97%   Breastfeeding No   BMI 30.93 kg/m    Body mass index is 30.93 kg/m .  Physical Exam  Vitals and nursing note reviewed.   Constitutional:       Appearance: Normal appearance.   Cardiovascular:      Rate and Rhythm: Normal rate and regular rhythm.      Heart sounds: Normal heart sounds.   Pulmonary:      Effort: Pulmonary effort is normal.      Breath sounds: Normal breath sounds.   Neurological:      General: No focal deficit present.      Mental Status: She is alert and oriented to person, place, and time.   Psychiatric:         Mood and Affect: Mood normal.         Behavior: Behavior normal.               Signed Electronically by: Breana Morrison NP    "

## 2024-05-22 NOTE — NURSING NOTE
"Chief Complaint   Patient presents with    Recheck Medication     Hydrocodone and Klonopin and Zofran        Initial /78   Pulse 66   Temp 97.8  F (36.6  C) (Tympanic)   Resp 16   Ht 1.626 m (5' 4\")   Wt 81.7 kg (180 lb 3.2 oz)   LMP 04/29/1978 (Approximate)   SpO2 97%   Breastfeeding No   BMI 30.93 kg/m   Estimated body mass index is 30.93 kg/m  as calculated from the following:    Height as of this encounter: 1.626 m (5' 4\").    Weight as of this encounter: 81.7 kg (180 lb 3.2 oz).  Medication Review: complete    The next two questions are to help us understand your food security.  If you are feeling you need any assistance in this area, we have resources available to support you today.          12/20/2023   SDOH- Food Insecurity   Within the past 12 months, did you worry that your food would run out before you got money to buy more? N   Within the past 12 months, did the food you bought just not last and you didn t have money to get more? N         Health Care Directive:  Patient has a Health Care Directive on file      Breana Ramirez CMA      "

## 2024-06-06 ENCOUNTER — OFFICE VISIT (OUTPATIENT)
Dept: CARDIOLOGY | Facility: OTHER | Age: 69
End: 2024-06-06
Attending: INTERNAL MEDICINE
Payer: COMMERCIAL

## 2024-06-06 VITALS
HEART RATE: 52 BPM | WEIGHT: 181 LBS | DIASTOLIC BLOOD PRESSURE: 80 MMHG | TEMPERATURE: 97.3 F | BODY MASS INDEX: 30.9 KG/M2 | SYSTOLIC BLOOD PRESSURE: 160 MMHG | RESPIRATION RATE: 16 BRPM | HEIGHT: 64 IN | OXYGEN SATURATION: 98 %

## 2024-06-06 DIAGNOSIS — R00.1 SYMPTOMATIC BRADYCARDIA: ICD-10-CM

## 2024-06-06 DIAGNOSIS — I10 ESSENTIAL HYPERTENSION: ICD-10-CM

## 2024-06-06 DIAGNOSIS — Z95.5 PRESENCE OF STENT IN CORONARY ARTERY IN PATIENT WITH CORONARY ARTERY DISEASE: ICD-10-CM

## 2024-06-06 DIAGNOSIS — R11.0 NAUSEA: ICD-10-CM

## 2024-06-06 DIAGNOSIS — I11.0 HYPERTENSIVE HEART DISEASE WITH HEART FAILURE (H): ICD-10-CM

## 2024-06-06 DIAGNOSIS — I25.708 ATHEROSCLEROSIS OF CORONARY ARTERY BYPASS GRAFT OF NATIVE HEART WITH STABLE ANGINA PECTORIS (H): ICD-10-CM

## 2024-06-06 DIAGNOSIS — Z87.891 HISTORY OF TOBACCO ABUSE: ICD-10-CM

## 2024-06-06 DIAGNOSIS — Z86.73 HISTORY OF CVA (CEREBROVASCULAR ACCIDENT): ICD-10-CM

## 2024-06-06 DIAGNOSIS — I25.10 ASCVD (ARTERIOSCLEROTIC CARDIOVASCULAR DISEASE): ICD-10-CM

## 2024-06-06 DIAGNOSIS — Z86.711 HISTORY OF PULMONARY EMBOLISM: ICD-10-CM

## 2024-06-06 DIAGNOSIS — I77.1 SUBCLAVIAN ARTERY STENOSIS, LEFT (H): ICD-10-CM

## 2024-06-06 DIAGNOSIS — R06.09 DOE (DYSPNEA ON EXERTION): ICD-10-CM

## 2024-06-06 DIAGNOSIS — I47.10 SVT (SUPRAVENTRICULAR TACHYCARDIA) (H): ICD-10-CM

## 2024-06-06 DIAGNOSIS — Z95.1 HISTORY OF CORONARY ARTERY BYPASS GRAFT X 3: ICD-10-CM

## 2024-06-06 DIAGNOSIS — R60.0 LOCALIZED EDEMA: ICD-10-CM

## 2024-06-06 DIAGNOSIS — N18.31 STAGE 3A CHRONIC KIDNEY DISEASE (H): ICD-10-CM

## 2024-06-06 DIAGNOSIS — E78.2 MIXED HYPERLIPIDEMIA: ICD-10-CM

## 2024-06-06 DIAGNOSIS — K21.00 GASTROESOPHAGEAL REFLUX DISEASE WITH ESOPHAGITIS WITHOUT HEMORRHAGE: ICD-10-CM

## 2024-06-06 DIAGNOSIS — R00.2 PALPITATIONS: ICD-10-CM

## 2024-06-06 DIAGNOSIS — R07.9 CHEST PAIN, UNSPECIFIED TYPE: Primary | ICD-10-CM

## 2024-06-06 DIAGNOSIS — I25.10 PRESENCE OF STENT IN CORONARY ARTERY IN PATIENT WITH CORONARY ARTERY DISEASE: ICD-10-CM

## 2024-06-06 DIAGNOSIS — I25.9 CHRONIC ISCHEMIC HEART DISEASE: ICD-10-CM

## 2024-06-06 LAB
ATRIAL RATE - MUSE: 49 BPM
DIASTOLIC BLOOD PRESSURE - MUSE: NORMAL MMHG
INTERPRETATION ECG - MUSE: NORMAL
P AXIS - MUSE: 47 DEGREES
PR INTERVAL - MUSE: 180 MS
QRS DURATION - MUSE: 86 MS
QT - MUSE: 468 MS
QTC - MUSE: 422 MS
R AXIS - MUSE: 25 DEGREES
SYSTOLIC BLOOD PRESSURE - MUSE: NORMAL MMHG
T AXIS - MUSE: 48 DEGREES
VENTRICULAR RATE- MUSE: 49 BPM

## 2024-06-06 PROCEDURE — 93010 ELECTROCARDIOGRAM REPORT: CPT | Performed by: INTERNAL MEDICINE

## 2024-06-06 PROCEDURE — 99215 OFFICE O/P EST HI 40 MIN: CPT | Performed by: INTERNAL MEDICINE

## 2024-06-06 PROCEDURE — 93005 ELECTROCARDIOGRAM TRACING: CPT | Performed by: INTERNAL MEDICINE

## 2024-06-06 PROCEDURE — G0463 HOSPITAL OUTPT CLINIC VISIT: HCPCS

## 2024-06-06 RX ORDER — POTASSIUM CHLORIDE 1500 MG/1
20 TABLET, EXTENDED RELEASE ORAL
Status: CANCELLED | OUTPATIENT
Start: 2024-06-06

## 2024-06-06 RX ORDER — NITROGLYCERIN 400 UG/1
SPRAY ORAL
Qty: 4.9 G | Refills: 3 | Status: SHIPPED | OUTPATIENT
Start: 2024-06-06 | End: 2024-07-11

## 2024-06-06 RX ORDER — POTASSIUM CHLORIDE 1500 MG/1
40 TABLET, EXTENDED RELEASE ORAL
Status: CANCELLED | OUTPATIENT
Start: 2024-06-06

## 2024-06-06 RX ORDER — SODIUM CHLORIDE 9 MG/ML
INJECTION, SOLUTION INTRAVENOUS CONTINUOUS
Status: CANCELLED | OUTPATIENT
Start: 2024-06-06

## 2024-06-06 RX ORDER — LIDOCAINE 40 MG/G
CREAM TOPICAL
Status: CANCELLED | OUTPATIENT
Start: 2024-06-06

## 2024-06-06 RX ORDER — CLOPIDOGREL BISULFATE 75 MG/1
75 TABLET ORAL DAILY
Qty: 90 TABLET | Refills: 3 | Status: SHIPPED | OUTPATIENT
Start: 2024-06-06

## 2024-06-06 RX ORDER — PANTOPRAZOLE SODIUM 40 MG/1
40 TABLET, DELAYED RELEASE ORAL DAILY
Qty: 90 TABLET | Refills: 3 | Status: SHIPPED | OUTPATIENT
Start: 2024-06-06

## 2024-06-06 ASSESSMENT — PAIN SCALES - GENERAL: PAINLEVEL: MODERATE PAIN (4)

## 2024-06-06 NOTE — PROGRESS NOTES
[facility] HEART CARE   CARDIOLOGY PROGRESS NOTE     Chief Complaint   Patient presents with    Follow Up     3 month-tests          Diagnosis:  1. Cath on 9/25/17, [facility], stable dz.   -LIMA open but occluded RAFI/SVG.    -LM irregularities, ramus 40%, LAD 30%, LCx 30%, and RCA 40%.  2. CORTEZ.  3. CABG x3-2/12/2003.    -LIMA to LAD, RAFI to RCA, and SVG to OM.    4. Palpitations.    -SVE/VE on Zio from 3/27/2023.  5. PE on 1/2/20.     -Xarelto.  -RLL, MICHELLE and LLL.  6. COPD.  -Normal on 1/11/2022.  7. HTN-controlled  8. Lightheadedness-episodic.  9.  Cardiac cath on 9/25/2017-[facility].   -No significant dz on 12/12/19.  10. Iron deficiency anemia.  -Resolved.  11.  CVA.  -Mild chronic ischemic cerebral disease on 8/20/2021.  12. Left-sided subclavian steal syndrome.   -Stenting with patency as noted on 9/15/17.  13.  Tobacco abuse.  -Quitting 8/23/17.   14.  H/O alcohol abuse.  15.  Hyperlipidemia.  -Uncontrolled.  16.  Bradycardia with the slowest heart rate of 50 bpm on 7/30/2021.  17.  Dizziness/lightheadedness secondary to dehydration.  18.  B12 deficiency.    -313 on 3/18/2020.  19.  Vitamin D deficiency.  20.  CKD-2/3.       Assessment/Plan:    1.  Chest discomfort/CAD.  She describes a heaviness to her sternal area with dyspnea on exertion.  I did set her up with a stress test.  Stress test was normal.  However, I am still concerned her symptoms are coming from coronary artery disease.  She describes previously having 17 stents with bypass.  Previous to stenting, she feels now much like she did then.  She feels dyspneic on exertion and is fatigued.  She gets a heaviness to her chest.  She has been taking nitro which has relieved her symptoms.  Even though her stress test was normal, I believe she is high risk for severe coronary artery disease.  I suggested both an angiogram and right heart cath.  She is not having significant edema.  I have evaluated her for shortness of breath looking at other options.  I have not  found anything else to explain her symptoms.  Patient believes there is a good chance that her shortness of breath is coming from coronary arteries.  At this point, we will plan for coronary angiogram and right heart cath.  She is currently on Plavix, rosuvastatin, and nitro spray.  No changes to medications.  She was told if she has significant chest heaviness or pressure, she should go to the ER.  Orders have been placed for the procedure.   2.  Bradycardia: EKG today shows a heart rate of 49 bpm.  She is in sinus rhythm.  She is currently on propranolol 20 mg twice a day.  Previously, she was on 40 mg twice a day.  She is on this for palpitations and blood pressure.  Will stop propranolol completely.  When she tries exercise, heart rate does not increase.  May need to do an exercise stress test for chronotropic incompetence in the future.   3.  History of PE: No DVT on 1/2/2020.  But had evidence for PE with small segmental and subsegmental pulmonary arteries.  No large PE present.  Patient was on Xarelto since 1/2/2020.  Describes anemia and occasional gross hematuria.  Xarelto was stopped on 3/5/2024.  Continue Plavix 75 mg once daily.  Patient understands that if she has a blood clot in the future, will be Xarelto for lifetime.  4.  Refill Plavix, SL nitro, and Protonix.  5.  Follow-up after right/cardiac angiogram at the [facility].      Interval history:  See above.    HPI:    Ms. Day is being seen by cardiology in follow-up.  Patient has a history of chronic stable angina, known ischemic heart disease, hypertension, hyperlipidemia, history of pulmonary embolism, left subclavian artery stenosis, anxiety, collagenous colitis, depression, osteoarthritis, history of tobacco use, central sleep apnea for which she is not compliant with CPAP use, iron deficiency anemia, CKD, myofascial pain, vitamin D deficiency, lumbar facet arthropathy, history of gastric ulcer with hemorrhage, COPD and peptic ulcer  "disease.    Malina continues to endorse dizziness.  She states when she gets up she starts to feel dizzy like being off balance, will have a headache, her heart will pound and pulse increase.  She will be short of breath.  At times she has chest discomfort.  This has been going on for 1 to 2 years.      She was seen in the ER and admitted for a day on 7/16/2021.  She was felt to have symptomatic bradycardia/hypotension.  She felt \"woozy\" with a blood pressure of 77/52 at home.  She reports getting fluids and feeling much better, resolution of symptoms.  She felt her symptoms had gotten worse over the last 2 to 3 weeks.  Heart rates were noted to be between 49-52 in the ER.  Troponin negative and EKG showed heart rates in the 50's.    She was seen back in the ED on 7/22/2021 for lightheadedness once again.  She actually fell and was having concerning pain in her lungs.  She has a history of a PE and is on Xarelto.  No identifiable cause was found.  There was concern that her symptoms may be anxiety related.    Today, she reports continuing to feel these symptoms of dizziness.  She has a hard time describing them.  She feels her heart pounding, she has a headache, she is dizzy, she gets chest pain.  She also continues to be dyspneic on exertion.  She has been using nitro regularly with some relief.  She has a history of asthma and suspected COPD.  She has seen relief with nitro as well as the albuterol.  She was on Symbicort in the past with benefit but was discontinued for unknown reasons.  She drinks 2 glasses of water and 3 cups of coffee a day.  I believe she is chronically dehydrated which plays a part in her symptoms.  She has tried to increase her fluid intake.  She is to double and preferably triple her fluid intake.  She should cut back/discontinue coffee intake.  With symptoms of chest pain, shortness of breath, and history of blood clot, will plan for VQ scan.  We will also get a chest x-ray.  She describes " "regular palpitations and acceleration in her heart rates.  We will plan for Zio patch.  She did have a MCOT on 7/30/2021.  Both asymptomatic and symptomatic events include sinus bradycardia, sinus rhythm, sinus tachycardia, occasional PVC's and PAC's.  No other significant rhythms were identified.  We discussed Eliquis versus Xarelto.  It was decided we switched to Eliquis as I seen less bleeding on this medication compared to Xarelto.  Related to shortness of breath, history of asthma, and presumptive COPD, referral was placed to pulmonary.  Had intended to prescribe Spiriva but she is allergic and this was not prescribed.  Patient also describes weakness.  She has been using nitro with resolution of her chest pain.  She does have a history of bypass but had a cath in 2017 with only mild disease followed.  I am concerned at this point.  I am concerned that her chest discomfort is more of a lung issue than a heart issue.     Patient has a known history of ischemic heart disease, she underwent  coronary angiogram and bypass angiogram on 9/15/2017 which was described as having stable disease. Mild to moderate 3 vessel CAD involving RCA, LAD and LCX with <50% stenosis at the greatest.  Occluded RAFI and SVG.  Patent LIMA.  No new obstructive lesions were identified and normal left heart cath.       She has a history of CABG on 2/12/03 x 3, history of multiple stent placements, patient reported 17 total.       At previous visit patient reported occasional recurrence of chest pain, not as severe as last ED visit on 10/25/19. She reported \"my breathing has been bad\", describes worsening CORTEZ. She described activity intolerance and little to no energy. Recommended repeat stress testing. NM Lexiscan stress test was performed on 12/16/19 which was negative for inducible myocardial ischemia or infarction.  Left ventricular function was normal.     Carotid US on 12/12/19 without significant stenosis, mild atherosclerotic " disease present.  Abdominal ultrasound on 12/12/2018 with no abdominal aortic aneurysm identified.     Patient returned to the ED with dyspnea in early January 2020. She was identified to have small segmental and subsegmental emboli bilaterally. Repeat imaging on 1/6 with decreasing volume of pulmonary emboli compared to scan on 1/2/2020. She was initially treated with Lovenox in the ED and discharged on Xarelto oral anticoagulation which has been going well for her, no bleeding complication. She also remains on Plavix with her significant ischemic heart disease history.      She presented to the ED on 3/16/2021 with reports of chest pain, chronic stable angina.  No ACS.  EKG stable and no elevation in troponin's. Patient admits that her BP was low and it was suspected she was dehydrated, she felt better following IV fluids and reports that this likely brought on her angina.  She is currently working on maintaining good hydration.  She denies any recurrence of acute chest pain or pressure today.  No use of nitroglycerin since her recent ED visit.      Relevant testing:  Stress test on 4/18/24:    The nuclear stress test is negative for inducible myocardial ischemia or infarction.     Left ventricular function is normal.     The left ventricular ejection fraction at rest is 66%.  The left   ventricular ejection fraction at stress is 64%.     A prior study was conducted on 9/15/2021.     Echocardiogram on 7/31/2023:  Global and regional left ventricular function is normal with an EF of 55-60%.  Left ventricular diastolic function is indeterminate.  Mild concentric wall thickening consistent with left ventricular hypertrophy  is present.  Global right ventricular function is normal.  No significant valvular abnormalities present.  IVC diameter <2.1 cm collapsing >50% with sniff suggests a normal RA pressure of 3 mmHg.  No pericardial effusion is present.  No significant changes noted.    Zio patch on  3/27/2023:  Patient's monitor was in place for 13 days and 16 hours.  Minimum heart rate 42 bpm, average heart rate 62 bpm, maximum heart rate 167 bpm. Rhythm/s present include sinus, SVT. There were rare ventricular ectopic beats accounting for <1% of all beats. There were 0  ventricular triplets and rare couplets. There were  rare supraventricular ectopic beats.  There were 12 triggered events and 14 diary entries during which time the noted rhythms were sinus, SVE, VE.  There were 7 episodes of SVT with the fastest lasting 4 beats with a max rate of 167 bpm.  Review of actual tracings showed no other significant abnormality.    V/Q scan on 11/23/21:  No evidence of pulmonary emboli.    CXR 11/23/21:  Probable air trapping.    PFT 12/8/21:  No obstructive or restrictive disease is noted, moderate diffusion defect is noted consistent with pulmonary vascular disease     Stress test on 9/15/2021:    The nuclear stress test is negative for inducible myocardial ischemia or infarction.     Left ventricular function is normal.     The left ventricular ejection fraction at rest is 64%.  The left   ventricular ejection fraction at stress is 66%.     A prior study was conducted on 12/16/2019.     Echo on 9/15/2021:  Global and regional left ventricular function is normal with an EF of 55-60%.  Right ventricular function, chamber size, wall motion, and thickness are normal.  Pulmonary artery systolic pressure is normal.  The inferior vena cava is normal.  No pericardial effusion is present.  No significant changes noted.    MRI brain on 8/20/2021:  A few small nonspecific white matter signal abnormalities  are present, likely sequela of mild chronic small vessel ischemic disease. The exam is otherwise unremarkable.    MCOT from 7/30/21:  Findings: Patient's monitor was in place for 9 days and 7 hours.  Minimum heart rate 50 bpm, average heart rate 63 bpm, maximum heart rate 120 bpm. Rhythm/s present include sinus rhythm.   There were 37 symptomatic events during which time the noted rhythms were sinus rhythm, sinus bradycardia and sinus tachycardia.  There were six asymptomatic events during which time the noted rhythms were sinus rhythm.  There was rare atrial ectopy and rare ventricular ectopy.  Review of actual tracings showed no other abnormality.    ARYA on 7/17/20:  The right ankle-brachial index is 1.17.  Left ankle-brachial index is 0.98.    Stress test on 12/16/19:    The nuclear stress test is negative for inducible myocardial ischemia   or infarction.     A small amount of soft tissue artifact is present, more pronounced at   rest.     Left ventricular function is normal.     The left ventricular ejection fraction at rest is 79%.  The left   ventricular ejection fraction at stress is 72%.     A prior study was conducted on 6/30/2015.     US carotids on 12/12/19:  No ultrasound evidence of hemo-dynamically significant stenosis.  Mild atherosclerotic disease.    US abdominal aorta on 12/12/19:  No abdominal aortic aneurysm.      ECHO on 5/13/19:  Global and regional left ventricular function is normal with an EF of 60-65%.  Mild concentric wall thickening consistent with left ventricular hypertrophy  is present.  Right ventricular function, chamber size, wall motion, and thickness are  normal.  No significant valvular abnormalities were noted.  Previous study not available for comparison.    Cardiac cath on 3/30/13:  LEFT MAIN: Widely patent stent.   LEFT ANTERIOR DESCENDING: Widely patent proximal stent. There is an 85% to  90% mid lesion after the second diagonal and prior to the LIMA insertion as before.   CIRCUMFLEX: There is diffuse 60% to 70% disease throughout, the ostium is 70   to 75, and the OM2 is a 70, but it is very diffuse disease and basically   unchanged from previous.   RCA: Widely patent stents with only mild irregularities.   LEFT VENTRICULOGRAM: Normal left ventricular ejection fraction, LVEF 60%,   with no  focal wall motion abnormality. LV pressure 146/29.   FLORENTINO to LAD: Widely patent, although there is less flow than before, and, in   fact, the retrograde filling of the LIMA from the antegrade vessel. There is   a severe subclavian stenosis that a pressure gradient revealed in the aorta   was 153/78, and in the right subclavian was 100 to 106/73, for a nearly 50 mm   pressure gradient. The stenosis was 75% to 80%.   RAFI to RCA: 100%.   SVG to OM: 100%.                     ICD-10-CM    1. Chest pain, unspecified type  R07.9 EKG 12-lead, tracing only     clopidogrel (PLAVIX) 75 MG tablet     nitroGLYcerin (NITROLINGUAL) 0.4 MG/SPRAY spray     Case Request Cath Lab: Coronary Angiogram, Right Heart Catheterization     lidocaine 1 % 0.1-1 mL     lidocaine (LMX4) cream     sodium chloride (PF) 0.9% PF flush 3 mL     sodium chloride (PF) 0.9% PF flush 3 mL     sodium chloride (PF) 0.9% PF flush 3 mL     sodium chloride 0.9 % infusion     potassium chloride penelope ER (KLOR-CON M20) CR tablet 20 mEq     potassium chloride penelope ER (KLOR-CON M20) CR tablet 40 mEq     Patient is NOT allergic to contrast dye      2. CORTEZ (dyspnea on exertion)  R06.09 Case Request Cath Lab: Coronary Angiogram, Right Heart Catheterization     lidocaine 1 % 0.1-1 mL     lidocaine (LMX4) cream     sodium chloride (PF) 0.9% PF flush 3 mL     sodium chloride (PF) 0.9% PF flush 3 mL     sodium chloride (PF) 0.9% PF flush 3 mL     sodium chloride 0.9 % infusion     potassium chloride penelope ER (KLOR-CON M20) CR tablet 20 mEq     potassium chloride penelope ER (KLOR-CON M20) CR tablet 40 mEq     Patient is NOT allergic to contrast dye      3. Presence of stent in coronary artery in patient with coronary artery disease  I25.10 clopidogrel (PLAVIX) 75 MG tablet    Z95.5 nitroGLYcerin (NITROLINGUAL) 0.4 MG/SPRAY spray     Case Request Cath Lab: Coronary Angiogram, Right Heart Catheterization     lidocaine 1 % 0.1-1 mL     lidocaine (LMX4) cream     sodium chloride  (PF) 0.9% PF flush 3 mL     sodium chloride (PF) 0.9% PF flush 3 mL     sodium chloride (PF) 0.9% PF flush 3 mL     sodium chloride 0.9 % infusion     potassium chloride penelope ER (KLOR-CON M20) CR tablet 20 mEq     potassium chloride penelope ER (KLOR-CON M20) CR tablet 40 mEq     Patient is NOT allergic to contrast dye      4. Atherosclerosis of coronary artery bypass graft of native heart with stable angina pectoris (H24)  I25.708 clopidogrel (PLAVIX) 75 MG tablet     nitroGLYcerin (NITROLINGUAL) 0.4 MG/SPRAY spray     Case Request Cath Lab: Coronary Angiogram, Right Heart Catheterization     lidocaine 1 % 0.1-1 mL     lidocaine (LMX4) cream     sodium chloride (PF) 0.9% PF flush 3 mL     sodium chloride (PF) 0.9% PF flush 3 mL     sodium chloride (PF) 0.9% PF flush 3 mL     sodium chloride 0.9 % infusion     potassium chloride penelope ER (KLOR-CON M20) CR tablet 20 mEq     potassium chloride penelope ER (KLOR-CON M20) CR tablet 40 mEq     Patient is NOT allergic to contrast dye      5. ASCVD (arteriosclerotic cardiovascular disease)  I25.10 clopidogrel (PLAVIX) 75 MG tablet     nitroGLYcerin (NITROLINGUAL) 0.4 MG/SPRAY spray     Case Request Cath Lab: Coronary Angiogram, Right Heart Catheterization     lidocaine 1 % 0.1-1 mL     lidocaine (LMX4) cream     sodium chloride (PF) 0.9% PF flush 3 mL     sodium chloride (PF) 0.9% PF flush 3 mL     sodium chloride (PF) 0.9% PF flush 3 mL     sodium chloride 0.9 % infusion     potassium chloride penelope ER (KLOR-CON M20) CR tablet 20 mEq     potassium chloride penelope ER (KLOR-CON M20) CR tablet 40 mEq     Patient is NOT allergic to contrast dye      6. History of coronary artery bypass graft x 3  Z95.1 clopidogrel (PLAVIX) 75 MG tablet     nitroGLYcerin (NITROLINGUAL) 0.4 MG/SPRAY spray     Case Request Cath Lab: Coronary Angiogram, Right Heart Catheterization     lidocaine 1 % 0.1-1 mL     lidocaine (LMX4) cream     sodium chloride (PF) 0.9% PF flush 3 mL     sodium chloride (PF) 0.9%  PF flush 3 mL     sodium chloride (PF) 0.9% PF flush 3 mL     sodium chloride 0.9 % infusion     potassium chloride penelope ER (KLOR-CON M20) CR tablet 20 mEq     potassium chloride penelope ER (KLOR-CON M20) CR tablet 40 mEq     Patient is NOT allergic to contrast dye      7. Nausea  R11.0 pantoprazole (PROTONIX) 40 MG EC tablet      8. Gastroesophageal reflux disease with esophagitis without hemorrhage  K21.00 pantoprazole (PROTONIX) 40 MG EC tablet      9. Mixed hyperlipidemia  E78.2       10. Symptomatic bradycardia  R00.1       11. SVT (supraventricular tachycardia) (H24)  I47.10       12. Subclavian artery stenosis, left (H24)  I77.1       13. Palpitations  R00.2       14. Hypertensive heart disease with heart failure (H)  I11.0       15. Essential hypertension  I10       16. Chronic ischemic heart disease  I25.9 Case Request Cath Lab: Coronary Angiogram, Right Heart Catheterization      17. Stage 3a chronic kidney disease (H)  N18.31       18. History of tobacco abuse-quitting 8/23/2017  Z87.891       19. History of CVA-mild chronic ischemic disease on 8/20/2021.  Z86.73       20. History of pulmonary embolism on 1/2/2020. (-) VQ scan on 11/23/2021  Z86.711       21. Localized edema  R60.0 Case Request Cath Lab: Coronary Angiogram, Right Heart Catheterization                Past Medical History:   Diagnosis Date    Acute ischemic heart disease (H)     06/15/2007,with PTCA and stenting of 90% osteo circumflex lesion and patent LAD graft, patent left main stent.    Acute myocardial infarction (H)     3/30/2013    Anxiety disorder     No Comments Provided    Atherosclerotic heart disease of native coronary artery without angina pectoris     -angio revealed 3 vessel dz  -4 stents placed; 2 overlapping stents placed in mLAD, 1 stent pRCA, 1 stent pCirc 1 s 9/02 -non-ST elevation MI 1/03  -angio revealed restenosis of Circ.    -PTCA and brachytherapy of pCirc -repeat angio 2/03 -no intervension -CABG x3 12/03 - Dr Sinclair   -LIMA-LAD, RAFI-RCA, SVG-OM -PCI 7/04 stent to L -CTangio 9/05   -patentent LIMA-LAD. RAFI-RCA occluded; RCA ostio/p*    Bilateral carpal tunnel syndrome     No Comments Provided    Cervicalgia     No Comments Provided    Chest pain     12/29/2014    Chronic gastric ulcer without hemorrhage or perforation     10/03/2011,hx of GI bleed (2003)    Chronic ischemic heart disease     06/27/2012    Chronic obstructive pulmonary disease (H)     06/15/2007,low DLCO, normal spirometry    Chronic or unspecified gastric ulcer with hemorrhage     10/2003    Chronic pain syndrome     12/01/2010,chest wall, back    Colostomy status (H)     Constipation     11/29/2011    Coronary angioplasty status     09/12/2002,with triple stenting    Coronary angioplasty status     01/10/2003,repeat angioplasty    Coronary angioplasty status     No Comments Provided    Dorsalgia     05/31/2011    Encounter for other administrative examinations     1/28/2014    Encounter for screening for cardiovascular disorders     10/2004,Cardiolite (2004, 2005, 2006 and 2011)    Enterocolitis due to Clostridium difficile     8/19/2016    Essential (primary) hypertension     No Comments Provided    Hyperlipidemia     No Comments Provided    Major depressive disorder, single episode     Severe, hx of suicide attempt/hospitalization    Migraine without status migrainosus, not intractable     No Comments Provided    Nodular corneal degeneration     09/26/2011    Noninfective gastroenteritis and colitis     06/15/2007,history of    Noninfective gastroenteritis and colitis     Microcytic    Osteoarthritis     No Comments Provided    Other chest pain     10/13/2009,chronic    Pain in knee     05/31/2011    Pain in right shoulder     No Comments Provided    Panic disorder without agoraphobia     No Comments Provided    Peptic ulcer without hemorrhage or perforation     6/15/2007    Peripheral vascular disease (H24)     No Comments Provided    Personal history of  diseases of the blood and blood-forming organs and certain disorders involving the immune mechanism (CODE)     No Comments Provided    Personal history of nicotine dependence     No Comments Provided    Personal history of other medical treatment (CODE)     10/14/2013    Presence of aortocoronary bypass graft     2/12/2003    Primary central sleep apnea     10/14/2013    Sepsis due to Escherichia coli (E. coli) (H)     7/14/16,[facility]    Stricture of artery (H24)     3/31/2013,S/p prox left SCA stent 4/1/2013    Thoracic, thoracolumbar and lumbosacral intervertebral disc disorder     No Comments Provided    Uncomplicated opioid abuse (H)     history of    Vitamin D deficiency     5/6/2013       Past Surgical History:   Procedure Laterality Date    ANGIOPLASTY      9/12/02,with triple stenting    APPENDECTOMY OPEN      No Comments Provided    ARTHROSCOPY KNEE      left    ARTHROSCOPY SHOULDER Right 05/12/2017    labral tear, rotator cuff tear and some subacromial decompression     BYPASS GRAFT ARTERY CORONARY      12/13/02,Triple bypass, left internal mammary  to LAD, right internal mammary to right coronary artery, saphenous to obtuse marginal of the left circumflex.    COLONOSCOPY      2011,Dr Bowman benign polyps    COLONOSCOPY  10/03/2011    2011,benign polyps, Dr. Bowman    COLONOSCOPY  08/08/2016 8/8/16,normal, Dr Bowman    ELBOW SURGERY      baby,birth malachi removed from right arm    EMBOLECTOMY UPPER EXTREMITY  04/02/2013    brachial artery pseudoaneurysm after stenting    ESOPHAGOSCOPY, GASTROSCOPY, DUODENOSCOPY (EGD), COMBINED      2011,EGD Dr Bowman with pyloric ulcer    ESOPHAGOSCOPY, GASTROSCOPY, DUODENOSCOPY (EGD), COMBINED      2011,pyloric ulcer, Dr. Bowman    ESOPHAGOSCOPY, GASTROSCOPY, DUODENOSCOPY (EGD), COMBINED      8/8/16,mild gastritis, Dr Bowman    ESOPHAGOSCOPY, GASTROSCOPY, DUODENOSCOPY (EGD), COMBINED      11/27/2017,Dr Bowman. Antral ulcer    ESOPHAGOSCOPY, GASTROSCOPY, DUODENOSCOPY (EGD),  COMBINED  02/02/2018    Dr Bowman, healed ulcer    HYSTERECTOMY TOTAL ABDOMINAL      age 21    LAPAROSCOPIC CHOLECYSTECTOMY      2006    OSTEOTOMY FEMUR DISTAL      x3, right knee    OSTEOTOMY FEMUR DISTAL      2000,left knee  ligament surgery    OTHER SURGICAL HISTORY      1/10/2003,,PTCA    OTHER SURGICAL HISTORY      09/20/2012,,PTCA,DELONTE in LAD and left main    OTHER SURGICAL HISTORY      4/1/2013,,PTCA,L subclavian stenosis    RELEASE TRIGGER FINGER Left 12/2/2022    Procedure: Left thumb Trigger  release;  Surgeon: Cristhian Wilkinson MD;  Location: [facility]    SALPINGO-OOPHORECTOMY BILATERAL      age 27,Bilateral salpingo-oophorectomy    TONSILLECTOMY, ADENOIDECTOMY, COMBINED      childhood       Allergies   Allergen Reactions    Atorvastatin Muscle Pain (Myalgia)    Tiotropium Bromide [Tiotropium] Rash    Advil [Ibuprofen] Other (See Comments)     Does not recall but feel like it interacted with blood thinners and HX of GI BLEED    Ezetimibe Muscle Pain (Myalgia)    Latex Rash    Niacin      Other reaction(s): Flushing    No Clinical Screening - See Comments Itching, Rash and Blisters     Metals and plastics      Tape [Adhesive Tape] Rash       Current Outpatient Medications   Medication Sig Dispense Refill    clopidogrel (PLAVIX) 75 MG tablet Take 1 tablet (75 mg) by mouth daily 90 tablet 3    nitroGLYcerin (NITROLINGUAL) 0.4 MG/SPRAY spray For chest pain spray 1 spray under tongue every 5 minutes for 3 doses. If symptoms persist 5 minutes after 1st dose call 911. 4.9 g 3    pantoprazole (PROTONIX) 40 MG EC tablet Take 1 tablet (40 mg) by mouth daily 90 tablet 3    amLODIPine (NORVASC) 10 MG tablet Take 1 tablet (10 mg) by mouth daily Dx. Code: I10. 90 tablet 3    busPIRone (BUSPAR) 30 MG tablet Take 1 tablet (30 mg) by mouth 2 times daily 180 tablet 3    clonazePAM (KLONOPIN) 1 MG tablet Take 1 tablet (1 mg) by mouth 3 times daily as needed for anxiety 90 tablet 0    diclofenac (VOLTAREN) 1 %  topical gel Apply 2 grams to each hand or 4 grams to each knee up to 4 times daily as needed for arthritis pain 350 g 4    HYDROcodone-acetaminophen (NORCO)  MG per tablet Take 1 tablet by mouth every 4 hours as needed for severe pain 180 tablet 0    [START ON 6/21/2024] HYDROcodone-acetaminophen (NORCO)  MG per tablet Take 1 tablet by mouth every 4 hours as needed for severe pain 180 tablet 0    HYDROcodone-acetaminophen (NORCO)  MG per tablet Take 1 tablet by mouth every 4 hours as needed for severe pain 180 tablet 0    lisinopril (ZESTRIL) 10 MG tablet Take 1 tablet (10 mg) by mouth daily 90 tablet 3    naloxone (NARCAN) 4 MG/0.1ML nasal spray Spray into one nostril for opioid reversal if unresponsive. May repeat every 2-3 minutes until patient responsive or EMS arrives (Patient not taking: Reported on 5/22/2024) 1 each 1    ondansetron (ZOFRAN) 4 MG tablet Take 1 tablet (4 mg) by mouth every 6 hours as needed for nausea 90 tablet 3    RESTASIS 0.05 % ophthalmic emulsion INSTILL 1 DROP INTO BOTH EYES TWICE A DAY      rimegepant (NURTEC) 75 MG ODT tablet PLACE 1 TAB UNDER TONGUE DAILY AS NEEDED FOR MIGRAINE. MAX OF 1 TAB EVERY 48 HRS & 2 PER WEEK 8 tablet 14    rosuvastatin (CRESTOR) 40 MG tablet Take 1 tablet (40 mg) by mouth daily 90 tablet 3    sucralfate (CARAFATE) 1 GM tablet Take 1 tablet (1 g) by mouth 4 times daily as needed for nausea (or dark stools) 360 tablet 1    triamcinolone (KENALOG) 0.1 % external lotion Apply topically 3 times daily 60 mL 3    TRINTELLIX 20 MG tablet TAKE 1 TABLET BY MOUTH EVERY DAY 90 tablet 4    VITAMIN D, CHOLECALCIFEROL, PO Take 5,000 Units by mouth daily         Social History     Socioeconomic History    Marital status:      Spouse name: Not on file    Number of children: Not on file    Years of education: Not on file    Highest education level: Not on file   Occupational History    Not on file   Tobacco Use    Smoking status: Former     Current  packs/day: 0.00     Average packs/day: 1 pack/day for 35.0 years (35.0 ttl pk-yrs)     Types: Cigarettes     Start date: 2/15/1982     Quit date: 2/15/2017     Years since quittin.3     Passive exposure: Past    Smokeless tobacco: Never   Vaping Use    Vaping status: Never Used   Substance and Sexual Activity    Alcohol use: Not Currently     Alcohol/week: 0.0 standard drinks of alcohol    Drug use: No    Sexual activity: Yes     Partners: Male     Birth control/protection: None, Female Surgical   Other Topics Concern    Parent/sibling w/ CABG, MI or angioplasty before 65F 55M? Not Asked   Social History Narrative    ,  Steven.  Currently not working outside the home. Lives three-mile self [place] met with . Tobacco abuse, quit , restarted. Quit  and has since quit, no alcohol.     Social Determinants of Health     Financial Resource Strain: Low Risk  (2023)    Financial Resource Strain     Within the past 12 months, have you or your family members you live with been unable to get utilities (heat, electricity) when it was really needed?: No   Food Insecurity: Low Risk  (2023)    Food Insecurity     Within the past 12 months, did you worry that your food would run out before you got money to buy more?: No     Within the past 12 months, did the food you bought just not last and you didn t have money to get more?: No   Transportation Needs: Low Risk  (2023)    Transportation Needs     Within the past 12 months, has lack of transportation kept you from medical appointments, getting your medicines, non-medical meetings or appointments, work, or from getting things that you need?: No   Physical Activity: Not on file   Stress: Not on file   Social Connections: Not on file   Interpersonal Safety: Low Risk  (2024)    Interpersonal Safety     Do you feel physically and emotionally safe where you currently live?: Yes     Within the past 12 months, have you been hit, slapped,  kicked or otherwise physically hurt by someone?: No     Within the past 12 months, have you been humiliated or emotionally abused in other ways by your partner or ex-partner?: No   Housing Stability: Low Risk  (12/20/2023)    Housing Stability     Do you have housing? : Yes     Are you worried about losing your housing?: No       LAB RESULTS:   Office Visit on 08/06/2021   Component Date Value Ref Range Status    Color Urine 08/06/2021 Yellow  Colorless, Straw, Light Yellow, Yellow Final    Appearance Urine 08/06/2021 Slightly Cloudy* Clear Final    Glucose Urine 08/06/2021 Negative  Negative mg/dL Final    Bilirubin Urine 08/06/2021 Negative  Negative Final    Ketones Urine 08/06/2021 Negative  Negative mg/dL Final    Specific Gravity Urine 08/06/2021 1.018  1.000 - 1.030 Final    Blood Urine 08/06/2021 Small* Negative Final    pH Urine 08/06/2021 6.0  5.0 - 9.0 Final    Protein Albumin Urine 08/06/2021 20 * Negative mg/dL Final    Urobilinogen Urine 08/06/2021 Normal  Normal, 2.0 mg/dL Final    Nitrite Urine 08/06/2021 Positive* Negative Final    Leukocyte Esterase Urine 08/06/2021 Large* Negative Final    Bacteria Urine 08/06/2021 Many* None Seen /HPF Final    Mucus Urine 08/06/2021 Present* None Seen /LPF Final    RBC Urine 08/06/2021 6* <=2 /HPF Final    WBC Urine 08/06/2021 >182* <=5 /HPF Final    Culture 08/06/2021 >100,000 CFU/mL Escherichia coli*  Final   Office Visit on 07/30/2021   Component Date Value Ref Range Status    Sodium 07/30/2021 139  134 - 144 mmol/L Final    Potassium 07/30/2021 4.4  3.5 - 5.1 mmol/L Final    Chloride 07/30/2021 105  98 - 107 mmol/L Final    Carbon Dioxide (CO2) 07/30/2021 24  21 - 31 mmol/L Final    Anion Gap 07/30/2021 10  3 - 14 mmol/L Final    Urea Nitrogen 07/30/2021 19  7 - 25 mg/dL Final    Creatinine 07/30/2021 1.08  0.60 - 1.20 mg/dL Final    Calcium 07/30/2021 9.9  8.6 - 10.3 mg/dL Final    Glucose 07/30/2021 77  70 - 105 mg/dL Final    GFR Estimate 07/30/2021 53*  ">60 mL/min/1.73m2 Final    Magnesium 07/30/2021 2.1  1.9 - 2.7 mg/dL Final    WBC Count 07/30/2021 7.0  4.0 - 11.0 10e3/uL Final    RBC Count 07/30/2021 4.39  3.80 - 5.20 10e6/uL Final    Hemoglobin 07/30/2021 12.9  11.7 - 15.7 g/dL Final    Hematocrit 07/30/2021 38.6  35.0 - 47.0 % Final    MCV 07/30/2021 88  78 - 100 fL Final    MCH 07/30/2021 29.4  26.5 - 33.0 pg Final    MCHC 07/30/2021 33.4  31.5 - 36.5 g/dL Final    RDW 07/30/2021 13.5  10.0 - 15.0 % Final    Platelet Count 07/30/2021 273  150 - 450 10e3/uL Final    % Neutrophils 07/30/2021 57  % Final    % Lymphocytes 07/30/2021 30  % Final    % Monocytes 07/30/2021 10  % Final    % Eosinophils 07/30/2021 2  % Final    % Basophils 07/30/2021 1  % Final    % Immature Granulocytes 07/30/2021 0  % Final    NRBCs per 100 WBC 07/30/2021 0  <1 /100 Final    Absolute Neutrophils 07/30/2021 4.0  1.6 - 8.3 10e3/uL Final    Absolute Lymphocytes 07/30/2021 2.1  0.8 - 5.3 10e3/uL Final    Absolute Monocytes 07/30/2021 0.7  0.0 - 1.3 10e3/uL Final    Absolute Eosinophils 07/30/2021 0.1  0.0 - 0.7 10e3/uL Final    Absolute Basophils 07/30/2021 0.1  0.0 - 0.2 10e3/uL Final    Absolute Immature Granulocytes 07/30/2021 0.0  <=0.0 10e3/uL Final    Absolute NRBCs 07/30/2021 0.0  10e3/uL Final        Review of systems: Negative except that which was noted in the HPI.    Physical examination:  BP (!) 160/80 (BP Location: Right arm, Patient Position: Sitting, Cuff Size: Adult Large)   Pulse 52   Temp 97.3  F (36.3  C) (Temporal)   Resp 16   Ht 1.62 m (5' 3.78\")   Wt 82.1 kg (181 lb)   LMP 04/29/1978 (Approximate)   SpO2 98%   BMI 31.28 kg/m      GENERAL APPEARANCE: healthy, alert and no distress.  CHEST: lungs clear to auscultation - no rales, rhonchi or wheezes, no use of accessory muscles, no retractions, respirations are unlabored, normal respiratory rate.  Reduced air movement but clear.  CARDIOVASCULAR: regular rhythm, normal S1 with physiologic split S2, no S3 or S4 " and no murmur, click or rub  EXTREMITIES: no clubbing, cyanosis but with mild peripheral edema      Total time spent on day of visit, including review of tests, obtaining/reviewing separately obtained history, ordering medications/tests/procedures, communicating with PCP/consultants, and documenting in electronic medical record: 40 minutes.       Thank you for allowing me to participate in the care of your patient. Please do not hesitate to contact me if you have any questions.     Paul Gramajo, DO

## 2024-06-06 NOTE — NURSING NOTE
Per Paul Gramajo DO, pt to have COR Angiogram and right heart cath at [facility]. Reviewed allergies and medications.     -Per Paul Gramajo DO patient will continue on plavix once daily even on the morning of procedure.    -Per Paul Gramajo DO patient will HOLD vitamin D starting the day prior.    Angiogram teaching done using the instructions provided by the [facility] and modifications by Paul Gramajo DO.  Instructions reviewed, questions answered.  Patient verbalizes understanding. Now scheduled for 7/1/24 with arrival at 8AM.  Patient is agreeable to plan.  Jamilah Ray RN......June 6, 2024...11:48 AM

## 2024-06-06 NOTE — NURSING NOTE
"Patient comes in for 3 month follow up.  Chief Complaint   Patient presents with    Follow Up     3 month-tests       Initial BP (!) 142/80 (BP Location: Right arm, Patient Position: Sitting, Cuff Size: Adult Large)   Pulse 52   Temp 97.3  F (36.3  C) (Temporal)   Resp 16   Ht 1.62 m (5' 3.78\")   Wt 82.1 kg (181 lb)   LMP 04/29/1978 (Approximate)   SpO2 98%   BMI 31.28 kg/m   Estimated body mass index is 31.28 kg/m  as calculated from the following:    Height as of this encounter: 1.62 m (5' 3.78\").    Weight as of this encounter: 82.1 kg (181 lb).  Meds Reconciled: complete  Pt is not on Aspirin  Pt is on a Statin  PHQ and/or YAYA reviewed. Pt referred to PCP/MH Provider as appropriate.    Татьяна Bonilla LPN        "

## 2024-06-06 NOTE — PATIENT INSTRUCTIONS
Pre-procedure instructions - Coronary Angiogram  Right Heart Cath and/or Biopsy and/or Pulmonary Angiogram  Patient Education    Your arrival time is 7/1/24 at 8:00AM.  Location is [address] Waiting Room  Please plan on being at the hospital all day.  At any time, emergencies and/or urgent cases may come up which could delay the start of your procedure.    Pre-procedure instructions - Coronary Angiogram  Shower in the evening before or the morning of the procedure  No solid food for 8 hours prior and nothing to drink 2 hours prior to arrival time  You can take your morning medications (except for diabetic and blood thinners) with sips of water.  Continue your plavix without interruption.  HOLD your vitamin D starting the day prior to procedure  You will need to arrange a ride to drop you off and , as you will be unable to drive home. Prior to discharge you may be required to lay flat for approximately 2-6 hours in the recovery unit to ensure proper clotting of the artery. Please note: You cannot take an Uber/Taxi/etc unless you are accompanied by someone.              Diabetic Medication Instructions  Hold oral diabetic medication in morning of your procedure and for 48 hours after IV contrast is given  Typical instructions for insulin diabetic medication holding are below. However, please reach out to your Primary Care Provider or Endocrinologist for specific instructions  DO NOT take any oral diabetic medication, short-acting diabetes medications/insulin, humalog or regular insulin the morning of your test  Take   dose of long-acting insulin (Lantus, Levemir) the day of your test  Remember to bring your glucometer and insulin with you to take after your test if needed  GLP-1 Agonists Instructions  DO NOT take injectable GLP-1 agonists semaglutide (Ozempic, Wegovy), dulaglutide (Trulicity), exenatide ER (Bydureon),  tirzepatide (Mounjaro), or oral semaglutide (Rybelsus) for 7 days prior your procedure  Hold once daily injectable GLP-1 agonists exenatide (Byetta), liraglutide (Saxenda, Victoza), lixisenatide (Soligua) the day before and day of your procedure                  Anticoagulation Medication Instructions   NA  Write N/A if not currently taking    You will need to follow up with one of our cardiology APPs 1-2 weeks after your procedure. If you need help scheduling or rescheduling your appointment, please call [phone]

## 2024-06-17 ENCOUNTER — TELEPHONE (OUTPATIENT)
Dept: FAMILY MEDICINE | Facility: OTHER | Age: 69
End: 2024-06-17
Payer: COMMERCIAL

## 2024-06-17 DIAGNOSIS — F41.0 PANIC ATTACK: ICD-10-CM

## 2024-06-17 DIAGNOSIS — Z79.899 CONTROLLED SUBSTANCE AGREEMENT SIGNED: ICD-10-CM

## 2024-06-17 DIAGNOSIS — F41.9 CHRONIC ANXIETY: ICD-10-CM

## 2024-06-17 NOTE — TELEPHONE ENCOUNTER
Patient called and is inquiring about a couple of prescriptions. Stated the pharmacy may need new scripts send over on them      Tomy Velez on 6/17/2024 at 9:22 AM

## 2024-06-17 NOTE — TELEPHONE ENCOUNTER
Tried calling patient with a busy signal. Tried x 2.  Nereida Esteves LPN  6/17/2024  9:26 AM

## 2024-06-18 NOTE — TELEPHONE ENCOUNTER
"Patient states there was a mixup on her medications. The date she can refill her Hydrocodone shows 6/21/24 and she states she is out. She also states she needs a refill on her Clonazepam. Spoke with pharmacy and they state patient last filled her Hydrocodone on 5/22/24 and she received 180 tablets. She also received 90 tablets of Clonazepam on 5/22/24. Writer informed patient she needs to be seen in clinic prior to refills as these are controlled medications. Patient got upset and stated, \"why didn't they give me any refills on it. These fucking doctors.\" Patient then hung up on writer.     Breana Ramirez CMA on 6/18/2024 at 12:07 PM          "

## 2024-06-19 ENCOUNTER — NURSE TRIAGE (OUTPATIENT)
Dept: NURSING | Facility: CLINIC | Age: 69
End: 2024-06-19
Payer: COMMERCIAL

## 2024-06-19 ENCOUNTER — HOSPITAL ENCOUNTER (EMERGENCY)
Facility: OTHER | Age: 69
Discharge: HOME | End: 2024-06-19
Attending: FAMILY MEDICINE | Admitting: FAMILY MEDICINE
Payer: COMMERCIAL

## 2024-06-19 ENCOUNTER — APPOINTMENT (OUTPATIENT)
Dept: GENERAL RADIOLOGY | Facility: OTHER | Age: 69
End: 2024-06-19
Attending: FAMILY MEDICINE
Payer: COMMERCIAL

## 2024-06-19 VITALS
WEIGHT: 181 LBS | BODY MASS INDEX: 32.07 KG/M2 | HEIGHT: 63 IN | OXYGEN SATURATION: 98 % | RESPIRATION RATE: 13 BRPM | HEART RATE: 56 BPM | TEMPERATURE: 97.8 F | SYSTOLIC BLOOD PRESSURE: 146 MMHG | DIASTOLIC BLOOD PRESSURE: 78 MMHG

## 2024-06-19 DIAGNOSIS — Z86.711 HISTORY OF PULMONARY EMBOLISM: ICD-10-CM

## 2024-06-19 DIAGNOSIS — K27.9 PEPTIC ULCER: ICD-10-CM

## 2024-06-19 DIAGNOSIS — J44.9 CHRONIC OBSTRUCTIVE PULMONARY DISEASE, UNSPECIFIED COPD TYPE (H): ICD-10-CM

## 2024-06-19 DIAGNOSIS — Z98.890 STATUS POST CORONARY ANGIOGRAM: ICD-10-CM

## 2024-06-19 DIAGNOSIS — F41.9 CHRONIC ANXIETY: ICD-10-CM

## 2024-06-19 LAB
ALBUMIN SERPL BCG-MCNC: 4.6 G/DL (ref 3.5–5.2)
ALP SERPL-CCNC: 73 U/L (ref 40–150)
ALT SERPL W P-5'-P-CCNC: 16 U/L (ref 0–50)
ANION GAP SERPL CALCULATED.3IONS-SCNC: 10 MMOL/L (ref 7–15)
AST SERPL W P-5'-P-CCNC: 22 U/L (ref 0–45)
BASOPHILS # BLD AUTO: 0.1 10E3/UL (ref 0–0.2)
BASOPHILS NFR BLD AUTO: 1 %
BILIRUB SERPL-MCNC: 0.3 MG/DL
BUN SERPL-MCNC: 11.7 MG/DL (ref 8–23)
CALCIUM SERPL-MCNC: 9.7 MG/DL (ref 8.8–10.2)
CHLORIDE SERPL-SCNC: 107 MMOL/L (ref 98–107)
CREAT SERPL-MCNC: 0.8 MG/DL (ref 0.51–0.95)
D DIMER PPP FEU-MCNC: 0.34 UG/ML FEU (ref 0–0.5)
DEPRECATED HCO3 PLAS-SCNC: 24 MMOL/L (ref 22–29)
EGFRCR SERPLBLD CKD-EPI 2021: 79 ML/MIN/1.73M2
EOSINOPHIL # BLD AUTO: 0.1 10E3/UL (ref 0–0.7)
EOSINOPHIL NFR BLD AUTO: 2 %
ERYTHROCYTE [DISTWIDTH] IN BLOOD BY AUTOMATED COUNT: 12.4 % (ref 10–15)
GLUCOSE SERPL-MCNC: 105 MG/DL (ref 70–99)
HCT VFR BLD AUTO: 37.9 % (ref 35–47)
HGB BLD-MCNC: 12.7 G/DL (ref 11.7–15.7)
IMM GRANULOCYTES # BLD: 0 10E3/UL
IMM GRANULOCYTES NFR BLD: 0 %
LYMPHOCYTES # BLD AUTO: 2 10E3/UL (ref 0.8–5.3)
LYMPHOCYTES NFR BLD AUTO: 35 %
MAGNESIUM SERPL-MCNC: 2.3 MG/DL (ref 1.7–2.3)
MCH RBC QN AUTO: 30 PG (ref 26.5–33)
MCHC RBC AUTO-ENTMCNC: 33.5 G/DL (ref 31.5–36.5)
MCV RBC AUTO: 89 FL (ref 78–100)
MONOCYTES # BLD AUTO: 0.5 10E3/UL (ref 0–1.3)
MONOCYTES NFR BLD AUTO: 10 %
NEUTROPHILS # BLD AUTO: 2.8 10E3/UL (ref 1.6–8.3)
NEUTROPHILS NFR BLD AUTO: 51 %
NRBC # BLD AUTO: 0 10E3/UL
NRBC BLD AUTO-RTO: 0 /100
PLATELET # BLD AUTO: 245 10E3/UL (ref 150–450)
POTASSIUM SERPL-SCNC: 4.3 MMOL/L (ref 3.4–5.3)
PROT SERPL-MCNC: 7.5 G/DL (ref 6.4–8.3)
RBC # BLD AUTO: 4.24 10E6/UL (ref 3.8–5.2)
SODIUM SERPL-SCNC: 141 MMOL/L (ref 135–145)
TROPONIN T SERPL HS-MCNC: 8 NG/L
TROPONIN T SERPL HS-MCNC: 9 NG/L
WBC # BLD AUTO: 5.5 10E3/UL (ref 4–11)

## 2024-06-19 PROCEDURE — 96374 THER/PROPH/DIAG INJ IV PUSH: CPT | Performed by: FAMILY MEDICINE

## 2024-06-19 PROCEDURE — 99284 EMERGENCY DEPT VISIT MOD MDM: CPT | Performed by: FAMILY MEDICINE

## 2024-06-19 PROCEDURE — 83735 ASSAY OF MAGNESIUM: CPT | Performed by: FAMILY MEDICINE

## 2024-06-19 PROCEDURE — 36415 COLL VENOUS BLD VENIPUNCTURE: CPT | Performed by: FAMILY MEDICINE

## 2024-06-19 PROCEDURE — 85379 FIBRIN DEGRADATION QUANT: CPT | Performed by: FAMILY MEDICINE

## 2024-06-19 PROCEDURE — 93005 ELECTROCARDIOGRAM TRACING: CPT | Performed by: FAMILY MEDICINE

## 2024-06-19 PROCEDURE — 71045 X-RAY EXAM CHEST 1 VIEW: CPT

## 2024-06-19 PROCEDURE — 250N000011 HC RX IP 250 OP 636: Mod: JZ | Performed by: FAMILY MEDICINE

## 2024-06-19 PROCEDURE — 99291 CRITICAL CARE FIRST HOUR: CPT | Mod: 25 | Performed by: FAMILY MEDICINE

## 2024-06-19 PROCEDURE — 85049 AUTOMATED PLATELET COUNT: CPT | Performed by: FAMILY MEDICINE

## 2024-06-19 PROCEDURE — 93010 ELECTROCARDIOGRAM REPORT: CPT | Performed by: INTERNAL MEDICINE

## 2024-06-19 PROCEDURE — 84484 ASSAY OF TROPONIN QUANT: CPT | Mod: 91 | Performed by: FAMILY MEDICINE

## 2024-06-19 PROCEDURE — 96361 HYDRATE IV INFUSION ADD-ON: CPT | Performed by: FAMILY MEDICINE

## 2024-06-19 PROCEDURE — 84155 ASSAY OF PROTEIN SERUM: CPT | Performed by: FAMILY MEDICINE

## 2024-06-19 PROCEDURE — 258N000003 HC RX IP 258 OP 636: Mod: JZ | Performed by: FAMILY MEDICINE

## 2024-06-19 PROCEDURE — 96375 TX/PRO/DX INJ NEW DRUG ADDON: CPT | Performed by: FAMILY MEDICINE

## 2024-06-19 RX ORDER — CLONAZEPAM 1 MG/1
1 TABLET ORAL 3 TIMES DAILY PRN
Qty: 90 TABLET | Refills: 0 | Status: SHIPPED | OUTPATIENT
Start: 2024-06-19 | End: 2024-07-19

## 2024-06-19 RX ORDER — LORAZEPAM 2 MG/ML
0.5 INJECTION INTRAMUSCULAR ONCE
Status: COMPLETED | OUTPATIENT
Start: 2024-06-19 | End: 2024-06-19

## 2024-06-19 RX ADMIN — SODIUM CHLORIDE 250 ML: 9 INJECTION, SOLUTION INTRAVENOUS at 08:34

## 2024-06-19 RX ADMIN — LORAZEPAM 0.5 MG: 2 INJECTION INTRAMUSCULAR; INTRAVENOUS at 08:40

## 2024-06-19 RX ADMIN — FAMOTIDINE 20 MG: 10 INJECTION, SOLUTION INTRAVENOUS at 08:35

## 2024-06-19 ASSESSMENT — ENCOUNTER SYMPTOMS
SHORTNESS OF BREATH: 1
CHEST TIGHTNESS: 1
AGITATION: 1

## 2024-06-19 ASSESSMENT — ACTIVITIES OF DAILY LIVING (ADL)
ADLS_ACUITY_SCORE: 35

## 2024-06-19 ASSESSMENT — COLUMBIA-SUICIDE SEVERITY RATING SCALE - C-SSRS
2. HAVE YOU ACTUALLY HAD ANY THOUGHTS OF KILLING YOURSELF IN THE PAST MONTH?: NO
1. IN THE PAST MONTH, HAVE YOU WISHED YOU WERE DEAD OR WISHED YOU COULD GO TO SLEEP AND NOT WAKE UP?: NO
6. HAVE YOU EVER DONE ANYTHING, STARTED TO DO ANYTHING, OR PREPARED TO DO ANYTHING TO END YOUR LIFE?: NO

## 2024-06-19 NOTE — TELEPHONE ENCOUNTER
[facility] staff states this urgent.    [id number] of [place] sent Rx request for the following:      Requested Prescriptions   Pending Prescriptions Disp Refills    clonazePAM (KLONOPIN) 1 MG tablet [Pharmacy Med Name: clonazePAM 1 MG Oral Tablet] 90 tablet 0     Sig: TAKE 1 TABLET BY MOUTH THREE TIMES DAILY AS NEEDED FOR ANXIETY       There is no refill protocol information for this order        Last Prescription Date:   5/22/24  Last Fill Qty/Refills:         90, R-0    Last Office Visit:              5/22/24 (anxiety and panic attack discussed)  Future Office visit:           7/16/24    Unable to complete prescription refill per RN Medication Refill Policy. Kathleen Puckett RN .............. 6/19/2024  3:13 PM

## 2024-06-19 NOTE — ED PROVIDER NOTES
History     Chief Complaint   Patient presents with    Chest Pain    Shortness of Breath     HPI  Ankita Day is a 69 year old female with extensive past medical history including PE, COPD, coronary artery disease, anxiety, hyperlipidemia, hypertension, tobacco abuse who presents emergency department with increase symptoms.  Took nitro at home, did seem to help some.  Patient is out of her Klonopin however.  She does have an upcoming angiogram as well.  She is anxious about that.  No suicidal ideation, no recent fevers or chills, no increase in shortness of breath, no sputum change.  No headache no visual changes no strokelike symptoms.    Reviewed nurses notes below, similar history is related to me.  Ankita Day is a 69 year old Female that presents to triage via private vehicle with complaints of chest pain and SOB.  Patient states she has had sx over the past 3 days and states these are associated with panic attacks and rapid heart rate.  Patient states hx of 8 prior heart attacks.  Nitroglycerin taken at 0300 and 0430 which helped with pain.  Klonopin and hydrocodone around 0300. Angiogram is scheduled for July 1st. Patient states she only has 2 days left of Klonopin and attempted to make an apt, however, is not able to get in until July.  Patient states this sent her into a panic.   Significant symptoms had onset 3 day(s) ago.  Allergies:  Allergies   Allergen Reactions    Atorvastatin Muscle Pain (Myalgia)    Tiotropium Bromide [Tiotropium] Rash    Advil [Ibuprofen] Other (See Comments)     Does not recall but feel like it interacted with blood thinners and HX of GI BLEED    Ezetimibe Muscle Pain (Myalgia)    Latex Rash    Niacin      Other reaction(s): Flushing    No Clinical Screening - See Comments Itching, Rash and Blisters     Metals and plastics      Tape [Adhesive Tape] Rash       Problem List:    Patient Active Problem List    Diagnosis Date Noted    Hypertensive heart disease with heart  failure (H) 01/12/2024     Priority: Medium    Seborrheic keratoses 01/12/2024     Priority: Medium    Intrinsic eczema 01/12/2024     Priority: Medium    Chronic pain disorder 12/20/2023     Priority: Medium    F11.2 - Continuous opioid dependence (H) 10/29/2023     Priority: Medium    F11.9 - Chronic, continuous use of opioids 10/29/2023     Priority: Medium    SVT (supraventricular tachycardia) (H24) 07/13/2023     Priority: Medium    Localized edema 07/13/2023     Priority: Medium    History of CVA-mild chronic ischemic disease on 8/20/2021. 01/11/2022     Priority: Medium    ASCVD (arteriosclerotic cardiovascular disease) 01/11/2022     Priority: Medium    Nausea 01/11/2022     Priority: Medium    Nonintractable episodic headache, unspecified headache type 01/11/2022     Priority: Medium    Gastroesophageal reflux disease with esophagitis without hemorrhage 01/11/2022     Priority: Medium    CORTEZ (dyspnea on exertion) 11/16/2021     Priority: Medium    Palpitations 11/16/2021     Priority: Medium    Chest pain, unspecified type 11/16/2021     Priority: Medium    Vitamin B12 deficiency (non anemic) 11/16/2021     Priority: Medium    Symptomatic bradycardia 07/16/2021     Priority: Medium    Chronic ischemic heart disease 02/05/2020     Priority: Medium    History of pulmonary embolism on 1/2/2020. (-) VQ scan on 11/23/2021 01/02/2020     Priority: Medium    Stage 3a chronic kidney disease (H) 06/19/2019     Priority: Medium    Chronic anxiety 01/22/2018     Priority: Medium    Collagenous colitis 01/22/2018     Priority: Medium     Overview:   Possible Dx 2007      Major depressive disorder, recurrent episode, severe (H) 01/22/2018     Priority: Medium    Essential hypertension 01/22/2018     Priority: Medium    Mixed hyperlipidemia 01/22/2018     Priority: Medium    Osteoarthritis 01/22/2018     Priority: Medium    Panic attack 01/22/2018     Priority: Medium    Status post coronary angiogram on 9/25/2017 at the  [facility]-stable disease 09/15/2017     Priority: Medium    History of tobacco abuse-quitting 8/23/2017 08/10/2017     Priority: Medium    Presence of stent in coronary artery in patient with coronary artery disease 08/10/2017     Priority: Medium    History of coronary artery bypass graft x 3 on 2/12/2003 08/10/2017     Priority: Medium    Noncompliance with CPAP treatment 10/21/2016     Priority: Medium    Intractable chronic common migraine without aura 10/21/2016     Priority: Medium    H/O multiple concussions 10/21/2016     Priority: Medium    History of Clostridium difficile 08/19/2016     Priority: Medium    Iron deficiency anemia 07/26/2016     Priority: Medium    Controlled substance agreement updated 9-7-22 01/28/2014     Priority: Medium     Overview:       Pain medication agreement 01/28/2014     Priority: Medium     Formatting of this note might be different from the original.      Central sleep apnea 10/14/2013     Priority: Medium    Myofascial pain 10/14/2013     Priority: Medium    Vitamin D deficiency 05/06/2013     Priority: Medium    Subclavian artery stenosis, left (H24) 03/31/2013     Priority: Medium     Overview:   S/p prox left SCA stent 4/1/2013      Lumbar facet arthropathy 01/02/2013     Priority: Medium    Nonspecific abnormal results of pulmonary system function study 12/21/2011     Priority: Medium    Slow transit constipation 11/29/2011     Priority: Medium    Gastric ulcer with hemorrhage 10/03/2011     Priority: Medium    Family history of malignant neoplasm of gastrointestinal tract 09/26/2011     Priority: Medium    Nodular degeneration of cornea 09/26/2011     Priority: Medium    Bilateral low back pain without sciatica 05/31/2011     Priority: Medium    COPD (chronic obstructive pulmonary disease) (H) 06/15/2007     Priority: Medium     Overview:   Low DLCO, normal spirometry on 12/15/11      Peptic ulcer 06/15/2007     Priority: Medium    Atherosclerosis of coronary artery bypass  graft of native heart with stable angina pectoris (H24) 09/12/2002     Priority: Medium     Overview:   Multiple Angigrams prior to 2007. Also:  6/5/2007: Angiogram: TAXUS DELONTE to ostial circumflux, instent restenosis  5/23/2008: Left Main 30% diseased, LAD stents patent, Left circ stent patent, Chronic occluded right internal mammary artery graft to distal RCA, native RCA has 30% stenosis; NO intevention  9/19/2012 CT Angiogram: Patent LIMA to LAD, patent LAD stents, moderate proximal circumflex disease, patent RCA , occluded right internal mammary artery graft to distal RCA.  9/20/2012: Angiogram: Stent to Left Main, ostial LAD, and PTCA of ostial left circumflex          Past Medical History:    Past Medical History:   Diagnosis Date    Acute ischemic heart disease (H)     Acute myocardial infarction (H)     Anxiety disorder     Atherosclerotic heart disease of native coronary artery without angina pectoris     Bilateral carpal tunnel syndrome     Cervicalgia     Chest pain     Chronic gastric ulcer without hemorrhage or perforation     Chronic ischemic heart disease     Chronic obstructive pulmonary disease (H)     Chronic or unspecified gastric ulcer with hemorrhage     Chronic pain syndrome     Colostomy status (H)     Constipation     Coronary angioplasty status     Coronary angioplasty status     Coronary angioplasty status     Dorsalgia     Encounter for other administrative examinations     Encounter for screening for cardiovascular disorders     Enterocolitis due to Clostridium difficile     Essential (primary) hypertension     Hyperlipidemia     Major depressive disorder, single episode     Migraine without status migrainosus, not intractable     Nodular corneal degeneration     Noninfective gastroenteritis and colitis     Noninfective gastroenteritis and colitis     Osteoarthritis     Other chest pain     Pain in knee     Pain in right shoulder     Panic disorder without agoraphobia     Peptic ulcer  without hemorrhage or perforation     Peripheral vascular disease (H24)     Personal history of diseases of the blood and blood-forming organs and certain disorders involving the immune mechanism (CODE)     Personal history of nicotine dependence     Personal history of other medical treatment (CODE)     Presence of aortocoronary bypass graft     Primary central sleep apnea     Sepsis due to Escherichia coli (E. coli) (H)     Stricture of artery (H24)     Thoracic, thoracolumbar and lumbosacral intervertebral disc disorder     Uncomplicated opioid abuse (H)     Vitamin D deficiency        Past Surgical History:    Past Surgical History:   Procedure Laterality Date    ANGIOPLASTY      9/12/02,with triple stenting    APPENDECTOMY OPEN      No Comments Provided    ARTHROSCOPY KNEE      left    ARTHROSCOPY SHOULDER Right 05/12/2017    labral tear, rotator cuff tear and some subacromial decompression     BYPASS GRAFT ARTERY CORONARY      12/13/02,Triple bypass, left internal mammary  to LAD, right internal mammary to right coronary artery, saphenous to obtuse marginal of the left circumflex.    COLONOSCOPY      2011,Dr Bowman benign polyps    COLONOSCOPY  10/03/2011    2011,benign polyps, Dr. Bowman    COLONOSCOPY  08/08/2016 8/8/16,normal, Dr Bowman    ELBOW SURGERY      baby,birth malachi removed from right arm    EMBOLECTOMY UPPER EXTREMITY  04/02/2013    brachial artery pseudoaneurysm after stenting    ESOPHAGOSCOPY, GASTROSCOPY, DUODENOSCOPY (EGD), COMBINED      2011,EGD Dr Bowman with pyloric ulcer    ESOPHAGOSCOPY, GASTROSCOPY, DUODENOSCOPY (EGD), COMBINED      2011,pyloric ulcer, Dr. Bowman    ESOPHAGOSCOPY, GASTROSCOPY, DUODENOSCOPY (EGD), COMBINED      8/8/16,mild gastritis, Dr Bowman    ESOPHAGOSCOPY, GASTROSCOPY, DUODENOSCOPY (EGD), COMBINED      11/27/2017,Dr Bowman. Antral ulcer    ESOPHAGOSCOPY, GASTROSCOPY, DUODENOSCOPY (EGD), COMBINED  02/02/2018    Dr Bowman, healed ulcer    HYSTERECTOMY TOTAL ABDOMINAL       age 21    LAPAROSCOPIC CHOLECYSTECTOMY      2006    OSTEOTOMY FEMUR DISTAL      x3, right knee    OSTEOTOMY FEMUR DISTAL      2000,left knee  ligament surgery    OTHER SURGICAL HISTORY      1/10/2003,,PTCA    OTHER SURGICAL HISTORY      2012,,PTCA,DELONTE in LAD and left main    OTHER SURGICAL HISTORY      2013,,PTCA,L subclavian stenosis    RELEASE TRIGGER FINGER Left 2022    Procedure: Left thumb Trigger  release;  Surgeon: Cristhian Wilkinson MD;  Location: [facility] OR    SALPINGO-OOPHORECTOMY BILATERAL      age 27,Bilateral salpingo-oophorectomy    TONSILLECTOMY, ADENOIDECTOMY, COMBINED      childhood       Family History:    Family History   Problem Relation Age of Onset    Heart Disease Father         Heart Disease,Heart condition/Significant for atherosclerotic cardiovascular disease, but non premature.    Colon Cancer Father         Cancer-colon, of colon cancer    Cancer Father         Cancer,mets to liver, secondary to colon cancer    Heart Disease Mother         Heart Disease    Cancer Other         Cancer,Multiple Myeloma    Heart Disease Other         Heart Disease,Ischemic Heart Disease    Colon Cancer Other         Cancer-colon,Malignant neoplasms    Cancer Sister         Cancer,multiple myeloma    Other - See Comments Son         gallstones       Social History:  Marital Status:   [2]  Social History     Tobacco Use    Smoking status: Former     Current packs/day: 0.00     Average packs/day: 1 pack/day for 35.0 years (35.0 ttl pk-yrs)     Types: Cigarettes     Start date: 2/15/1982     Quit date: 2/15/2017     Years since quittin.3     Passive exposure: Past    Smokeless tobacco: Never   Vaping Use    Vaping status: Never Used   Substance Use Topics    Alcohol use: Not Currently     Alcohol/week: 0.0 standard drinks of alcohol    Drug use: No        Medications:    amLODIPine (NORVASC) 10 MG tablet  busPIRone (BUSPAR) 30 MG tablet  clonazePAM (KLONOPIN) 1 MG  "tablet  clopidogrel (PLAVIX) 75 MG tablet  diclofenac (VOLTAREN) 1 % topical gel  HYDROcodone-acetaminophen (NORCO)  MG per tablet  [START ON 6/21/2024] HYDROcodone-acetaminophen (NORCO)  MG per tablet  HYDROcodone-acetaminophen (NORCO)  MG per tablet  lisinopril (ZESTRIL) 10 MG tablet  naloxone (NARCAN) 4 MG/0.1ML nasal spray  nitroGLYcerin (NITROLINGUAL) 0.4 MG/SPRAY spray  ondansetron (ZOFRAN) 4 MG tablet  pantoprazole (PROTONIX) 40 MG EC tablet  RESTASIS 0.05 % ophthalmic emulsion  rimegepant (NURTEC) 75 MG ODT tablet  rosuvastatin (CRESTOR) 40 MG tablet  sucralfate (CARAFATE) 1 GM tablet  triamcinolone (KENALOG) 0.1 % external lotion  TRINTELLIX 20 MG tablet  VITAMIN D, CHOLECALCIFEROL, PO          Review of Systems   Respiratory:  Positive for chest tightness and shortness of breath.    Psychiatric/Behavioral:  Positive for agitation.        Physical Exam   BP: (!) 164/77  Pulse: 59  Temp: 97.8  F (36.6  C)  Resp: 20  Height: 160 cm (5' 3\")  Weight: 82.1 kg (181 lb)  SpO2: 98 %      Physical Exam  Vitals and nursing note reviewed.   Constitutional:       General: She is not in acute distress.     Appearance: She is well-developed. She is not ill-appearing, toxic-appearing or diaphoretic.   Cardiovascular:      Rate and Rhythm: Normal rate and regular rhythm.   Pulmonary:      Effort: Pulmonary effort is normal. No respiratory distress.      Breath sounds: No decreased breath sounds.   Musculoskeletal:      Right lower leg: No edema.      Left lower leg: No edema.   Skin:     Capillary Refill: Capillary refill takes less than 2 seconds.   Neurological:      Mental Status: She is alert.       EKG: Sinus bradycardia, rate 55, QTc 401 ms, no significant ST-T abnormalities.    Results for orders placed or performed during the hospital encounter of 06/19/24 (from the past 24 hour(s))   CBC with platelets differential    Narrative    The following orders were created for panel order CBC with " platelets differential.  Procedure                               Abnormality         Status                     ---------                               -----------         ------                     CBC with platelets and d...[[id number]]                      Final result                 Please view results for these tests on the individual orders.   Comprehensive metabolic panel   Result Value Ref Range    Sodium 141 135 - 145 mmol/L    Potassium 4.3 3.4 - 5.3 mmol/L    Carbon Dioxide (CO2) 24 22 - 29 mmol/L    Anion Gap 10 7 - 15 mmol/L    Urea Nitrogen 11.7 8.0 - 23.0 mg/dL    Creatinine 0.80 0.51 - 0.95 mg/dL    GFR Estimate 79 >60 mL/min/1.73m2    Calcium 9.7 8.8 - 10.2 mg/dL    Chloride 107 98 - 107 mmol/L    Glucose 105 (H) 70 - 99 mg/dL    Alkaline Phosphatase 73 40 - 150 U/L    AST 22 0 - 45 U/L    ALT 16 0 - 50 U/L    Protein Total 7.5 6.4 - 8.3 g/dL    Albumin 4.6 3.5 - 5.2 g/dL    Bilirubin Total 0.3 <=1.2 mg/dL   Troponin T, High Sensitivity   Result Value Ref Range    Troponin T, High Sensitivity 9 <=14 ng/L   Magnesium   Result Value Ref Range    Magnesium 2.3 1.7 - 2.3 mg/dL   CBC with platelets and differential   Result Value Ref Range    WBC Count 5.5 4.0 - 11.0 10e3/uL    RBC Count 4.24 3.80 - 5.20 10e6/uL    Hemoglobin 12.7 11.7 - 15.7 g/dL    Hematocrit 37.9 35.0 - 47.0 %    MCV 89 78 - 100 fL    MCH 30.0 26.5 - 33.0 pg    MCHC 33.5 31.5 - 36.5 g/dL    RDW 12.4 10.0 - 15.0 %    Platelet Count 245 150 - 450 10e3/uL    % Neutrophils 51 %    % Lymphocytes 35 %    % Monocytes 10 %    % Eosinophils 2 %    % Basophils 1 %    % Immature Granulocytes 0 %    NRBCs per 100 WBC 0 <1 /100    Absolute Neutrophils 2.8 1.6 - 8.3 10e3/uL    Absolute Lymphocytes 2.0 0.8 - 5.3 10e3/uL    Absolute Monocytes 0.5 0.0 - 1.3 10e3/uL    Absolute Eosinophils 0.1 0.0 - 0.7 10e3/uL    Absolute Basophils 0.1 0.0 - 0.2 10e3/uL    Absolute Immature Granulocytes 0.0 <=0.4 10e3/uL    Absolute NRBCs 0.0 10e3/uL   XR Chest Port 1  View    Narrative    Exam:  XR CHEST PORT 1 VIEW    HISTORY: cp.    COMPARISON:  12/1/2023, 7/13/2023    FINDINGS:     The cardiomediastinal contours are normal.      No focal consolidation, effusion, or pneumothorax.      No acute osseous abnormality.       Impression    IMPRESSION:      No acute cardiopulmonary process.      LISA FELIPE MD         SYSTEM ID:  [id number]   D dimer quantitative   Result Value Ref Range    D-Dimer Quantitative 0.34 0.00 - 0.50 ug/mL FEU    Narrative    This D-dimer assay is intended for use in conjunction with a clinical pretest probability assessment model to exclude pulmonary embolism (PE) and deep venous thrombosis (DVT) in outpatients suspected of PE or DVT. The cut-off value is 0.50 ug/mL FEU.    For patients 50 years of age or older, the application of age-adjusted cut-off values for D-Dimer may increase the specificity without significant effect on sensitivity. The literature suggested calculation age adjusted cut-off in ug/L = age in years x 10 ug/L. The results in this laboratory are reported as ug/mL rather than ug/L. The calculation for age adjusted cut off in ug/mL= age in years x 0.01 ug/mL. For example, the cut off for a 76 year old male is 75 x 0.01 ug/mL = 0.76 ug/mL (760 ug/L).    M Joe et al. Age adjusted D-dimer cut-off levels to rule out pulmonary embolism: The ADJUST-PE Study. JAGDEEP 2014;311:9052-2814.; HJ Chidi et al. Diagnostic accuracy of conventional or age adjusted D-dimer cutoff values in older patients with suspected venous thromboembolism. Systemic review and meta-analysis. BMJ 2013:346:f2492.     *Note: Due to a large number of results and/or encounters for the requested time period, some results have not been displayed. A complete set of results can be found in Results Review.       Medications   sodium chloride 0.9% BOLUS 250 mL (250 mLs Intravenous $New Bag 6/19/24 0834)   famotidine (PEPCID) injection 20 mg (20 mg Intravenous $Given 6/19/24  0801)   LORazepam (ATIVAN) injection 0.5 mg (0.5 mg Intravenous $Given 6/19/24 7520)     8:30 AM--I just checked on patient again, patient states she feels some tightness but she thinks it is just her anxiety.  She still thinks her last nitro was helping.  Does not want any more nitro at this time.    10:14 AM--patient stable, waiting for repeat troponin.  D-dimer reassuring.  Patient feeling back to baseline, feels like she can go home.    Assessments & Plan (with Medical Decision Making)     I have reviewed the nursing notes.    I have reviewed the findings, diagnosis, plan and need for follow up with the patient.       HEART Score  Background  Calculates the overall risk of adverse event in patient's presenting with chest pain.  Based on 5 criteria (each assigned 0-2 points) including suspiciousness of history, EKG, age, risk factors and troponin.    Data  69 year old female  has Status post coronary [facility] on 9[facility]-stable disease; Bilateral low back pain without sciatica; Central sleep apnea; Chronic anxiety; Collagenous colitis; COPD (chronic obstructive pulmonary disease) (H); Atherosclerosis of coronary artery bypass graft of native heart with stable angina pectoris (H24); Major depressive disorder, recurrent episode, severe (H); Essential hypertension; Lumbar facet arthropathy; Gastric ulcer with hemorrhage; History of Clostridium difficile; History of tobacco abuse-quitting 8/23/2017; Peptic ulcer; Iron deficiency anemia; Subclavian artery stenosis, left (H24); Mixed hyperlipidemia; Myofascial pain; Family history of malignant neoplasm of gastrointestinal tract; Nodular degeneration of cornea; Osteoarthritis; Controlled substance agreement updated 9-7-22; Panic attack; Presence of stent in coronary artery in patient with coronary artery disease; Nonspecific abnormal results of pulmonary system function study; History of coronary artery bypass graft x 3 on 2/12/2003; Slow transit  constipation; Vitamin D deficiency; Noncompliance with CPAP treatment; Intractable chronic common migraine without aura; H/O multiple concussions; Stage 3a chronic kidney disease (H); History of pulmonary embolism on 1/2/2020. (-) VQ scan on 11/23/2021; Chronic ischemic heart disease; Pain medication agreement; Symptomatic bradycardia; CORTEZ (dyspnea on exertion); Palpitations; Chest pain, unspecified type; Vitamin B12 deficiency (non anemic); History of CVA-mild chronic ischemic disease on 8/20/2021.; ASCVD (arteriosclerotic cardiovascular disease); Nausea; Nonintractable episodic headache, unspecified headache type; Gastroesophageal reflux disease with esophagitis without hemorrhage; SVT (supraventricular tachycardia) (H24); Localized edema; F11.2 - Continuous opioid dependence (H); F11.9 - Chronic, continuous use of opioids; Chronic pain disorder; Hypertensive heart disease with heart failure (H); Seborrheic keratoses; and Intrinsic eczema on their problem list.   reports that she quit smoking about 7 years ago. Her smoking use included cigarettes. She started smoking about 42 years ago. She has a 35 pack-year smoking history. She has been exposed to tobacco smoke. She has never used smokeless tobacco.  family history includes Cancer in her father, sister, and another family member; Colon Cancer in her father and another family member; Heart Disease in her father, mother, and another family member; Other - See Comments in her son.  Lab Results   Component Value Date    TROPI <0.030 05/13/2019     Criteria   0-2 points for each of 5 items (maximum of 10 points):  Score 0- History slightly suspicious for coronary syndrome  Score 1- EKG with Non-specific repolarization disturbance  Score 2- Age 65 years or older  Score 2- Three or more risk factors for or history of atherosclerotic disease  Score 0- Within normal limits for troponin levels  Interpretation  Risk of adverse outcome  Heart Score: 5  Total Score 4-6-  Adverse Outcome Risk 20.3% - Supports admission with standard rule-out management -serial troponins and stress testing      Medical Decision Making  The patient's presentation was of moderate complexity (a chronic illness mild to moderate exacerbation, progression, or side effect of treatment).    The patient's evaluation involved:  review of 3+ test result(s) ordered prior to this encounter (see separate area of note for details)  ordering and/or review of 3+ test(s) in this encounter (see separate area of note for details)    The patient's management necessitated high risk (a parenteral controlled substance).    11:46 AM--patient feeling better and back to baseline, patient feels like she can go.  Patient did ask for a refill of her Klonopin, she does have a refill already at [facility] waiting for her.  Patient was not aware of that.  Cardiopulmonary exam was repeated, it is reassuring.    Patient is to follow-up with cardiology as planned, return to the ED sooner with worsening symptoms.  Patient verbalized understanding of plan and is in agreement left ED with her .    Patient did ask for more Klonopin, I did try to order a small quantity of Klonopin but the EMR states that there is a pended order from [facility] for quantity 90.  Thusly I did not complete that order nor did it seem like the EMR would allow me to.  Recommend calling [facility] clinic.  New Prescriptions    No medications on file       Final diagnoses:   Status post coronary angiogram on 9/25/2017 at the [facility]-stable disease   Chronic obstructive pulmonary disease, unspecified COPD type (H)   Peptic ulcer   History of pulmonary embolism on 1/2/2020. (-) VQ scan on 11/23/2021   Chronic anxiety       6/19/2024   [facility] MD  06/19/24 [id number]

## 2024-06-19 NOTE — ED TRIAGE NOTES
"ED Nursing Triage Note (General)   ________________________________    Ankita Day is a 69 year old Female that presents to triage via private vehicle with complaints of chest pain and SOB.  Patient states she has had sx over the past 3 days and states these are associated with panic attacks and rapid heart rate.  Patient states hx of 8 prior heart attacks.  Nitroglycerin taken at 0300 and 0430 which helped with pain.  Klonopin and hydrocodone around 0300. Angiogram is scheduled for July 1st. Patient states she only has 2 days left of Klonopin and attempted to make an apt, however, is not able to get in until July.  Patient states this sent her into a panic.   Significant symptoms had onset 3 day(s) ago.  Vital signs:  Temp: 97.8  F (36.6  C) Temp src: Tympanic BP: (!) 164/77 Pulse: 59   Resp: 20 SpO2: 98 %     Height: 160 cm (5' 3\") Weight: 82.1 kg (181 lb)  Estimated body mass index is 32.06 kg/m  as calculated from the following:    Height as of this encounter: 1.6 m (5' 3\").    Weight as of this encounter: 82.1 kg (181 lb).  PRE HOSPITAL PRIOR LIVING SITUATION Spouse      Triage Assessment (Adult)       Row Name 06/19/24 0818          Triage Assessment    Airway WDL WDL        Respiratory WDL    Respiratory WDL WDL        Skin Circulation/Temperature WDL    Skin Circulation/Temperature WDL WDL        Cardiac WDL    Cardiac WDL X;chest pain     Cardiac Rhythm NSR        [place] Coma Scale    Best Eye Response 4-->(E4) spontaneous     Best Motor Response 6-->(M6) obeys commands     Best Verbal Response 5-->(V5) oriented     [place] Coma Scale Score 15                     "

## 2024-06-19 NOTE — TELEPHONE ENCOUNTER
"Pt calls to reports elevated blood pressures, headache, chest pain, difficulty breathing.  \"Have been up all night.\"  Heavy feeling in the chest.  \"Hard time breathing.\"  \"Major headaches.\"    BP readings have been:  150-to-155 / 95-to-97    Took nitroglycerin which worked for 1.5 hours.  Then \"Heaviness in the chest came right back.\"  \"Can't breathe well again.\"  \"It's got me scared.\"  History of multiple cardiac procedures.    Advised calling 911.  Pt chooses to have  drive her to ED.  Discussed calling 911 enroute if needed.  Pt verbalizes clear understanding.    Patience Mo RN  [facility] Nurse Advisor     Reason for Disposition   Chest pain lasting longer than 5 minutes and ANY of the following:         Pain is crushing, pressure-like, or heavy         Over 44 years old          Over 30 years old and one cardiac risk factor (e.g diabetes, high blood pressure, high cholesterol, smoker, or family history of heart disease)         History of heart disease (e.g. angina, heart attack, heart failure, bypass surgery, takes nitroglycerin)    Additional Information   Negative: SEVERE difficulty breathing (e.g., struggling for each breath, speaks in single words)   Negative: Difficult to awaken or acting confused (e.g., disoriented, slurred speech)   Negative: Shock suspected (e.g., cold/pale/clammy skin, too weak to stand, low BP, rapid pulse)   Negative: Passed out (i.e., lost consciousness, collapsed and was not responding)    Protocols used: Chest Pain-A-OH    "

## 2024-06-19 NOTE — TELEPHONE ENCOUNTER
I signed this but this will not be re-filled again without a visit.     Breana Morrison NP on 6/19/2024 at 4:03 PM

## 2024-06-22 LAB
ATRIAL RATE - MUSE: 55 BPM
DIASTOLIC BLOOD PRESSURE - MUSE: NORMAL MMHG
INTERPRETATION ECG - MUSE: NORMAL
P AXIS - MUSE: 22 DEGREES
PR INTERVAL - MUSE: 172 MS
QRS DURATION - MUSE: 86 MS
QT - MUSE: 420 MS
QTC - MUSE: 401 MS
R AXIS - MUSE: 63 DEGREES
SYSTOLIC BLOOD PRESSURE - MUSE: NORMAL MMHG
T AXIS - MUSE: 61 DEGREES
VENTRICULAR RATE- MUSE: 55 BPM

## 2024-07-01 ENCOUNTER — APPOINTMENT (OUTPATIENT)
Dept: LAB | Facility: CLINIC | Age: 69
End: 2024-07-01
Attending: INTERNAL MEDICINE
Payer: COMMERCIAL

## 2024-07-01 ENCOUNTER — APPOINTMENT (OUTPATIENT)
Dept: MEDSURG UNIT | Facility: CLINIC | Age: 69
End: 2024-07-01
Attending: INTERNAL MEDICINE
Payer: COMMERCIAL

## 2024-07-01 ENCOUNTER — HOSPITAL ENCOUNTER (OUTPATIENT)
Facility: CLINIC | Age: 69
Discharge: HOME | End: 2024-07-01
Attending: INTERNAL MEDICINE | Admitting: INTERNAL MEDICINE
Payer: COMMERCIAL

## 2024-07-01 VITALS
OXYGEN SATURATION: 97 % | SYSTOLIC BLOOD PRESSURE: 107 MMHG | HEART RATE: 62 BPM | RESPIRATION RATE: 16 BRPM | TEMPERATURE: 97.9 F | WEIGHT: 177.91 LBS | BODY MASS INDEX: 31.52 KG/M2 | DIASTOLIC BLOOD PRESSURE: 63 MMHG

## 2024-07-01 DIAGNOSIS — R06.09 DOE (DYSPNEA ON EXERTION): ICD-10-CM

## 2024-07-01 DIAGNOSIS — I25.10 PRESENCE OF STENT IN CORONARY ARTERY IN PATIENT WITH CORONARY ARTERY DISEASE: ICD-10-CM

## 2024-07-01 DIAGNOSIS — Z95.1 HISTORY OF CORONARY ARTERY BYPASS GRAFT X 3: ICD-10-CM

## 2024-07-01 DIAGNOSIS — I25.708 ATHEROSCLEROSIS OF CORONARY ARTERY BYPASS GRAFT OF NATIVE HEART WITH STABLE ANGINA PECTORIS (H): ICD-10-CM

## 2024-07-01 DIAGNOSIS — I25.10 ASCVD (ARTERIOSCLEROTIC CARDIOVASCULAR DISEASE): ICD-10-CM

## 2024-07-01 DIAGNOSIS — Z95.5 PRESENCE OF STENT IN CORONARY ARTERY IN PATIENT WITH CORONARY ARTERY DISEASE: ICD-10-CM

## 2024-07-01 DIAGNOSIS — R60.0 LOCALIZED EDEMA: ICD-10-CM

## 2024-07-01 DIAGNOSIS — R07.9 CHEST PAIN, UNSPECIFIED TYPE: ICD-10-CM

## 2024-07-01 DIAGNOSIS — I25.9 CHRONIC ISCHEMIC HEART DISEASE: ICD-10-CM

## 2024-07-01 LAB
ANION GAP SERPL CALCULATED.3IONS-SCNC: 13 MMOL/L (ref 7–15)
BUN SERPL-MCNC: 22.1 MG/DL (ref 8–23)
CALCIUM SERPL-MCNC: 9.8 MG/DL (ref 8.8–10.2)
CHLORIDE SERPL-SCNC: 103 MMOL/L (ref 98–107)
CREAT SERPL-MCNC: 1.03 MG/DL (ref 0.51–0.95)
DEPRECATED HCO3 PLAS-SCNC: 23 MMOL/L (ref 22–29)
EGFRCR SERPLBLD CKD-EPI 2021: 58 ML/MIN/1.73M2
ERYTHROCYTE [DISTWIDTH] IN BLOOD BY AUTOMATED COUNT: 12.3 % (ref 10–15)
GLUCOSE SERPL-MCNC: 103 MG/DL (ref 70–99)
HCT VFR BLD AUTO: 37.5 % (ref 35–47)
HGB BLD-MCNC: 12.4 G/DL (ref 11.7–15.7)
MCH RBC QN AUTO: 30.2 PG (ref 26.5–33)
MCHC RBC AUTO-ENTMCNC: 33.1 G/DL (ref 31.5–36.5)
MCV RBC AUTO: 91 FL (ref 78–100)
PLATELET # BLD AUTO: 226 10E3/UL (ref 150–450)
POTASSIUM SERPL-SCNC: 4.1 MMOL/L (ref 3.4–5.3)
RBC # BLD AUTO: 4.11 10E6/UL (ref 3.8–5.2)
SODIUM SERPL-SCNC: 139 MMOL/L (ref 135–145)
WBC # BLD AUTO: 6.7 10E3/UL (ref 4–11)

## 2024-07-01 PROCEDURE — 250N000011 HC RX IP 250 OP 636: Performed by: INTERNAL MEDICINE

## 2024-07-01 PROCEDURE — 99152 MOD SED SAME PHYS/QHP 5/>YRS: CPT | Performed by: INTERNAL MEDICINE

## 2024-07-01 PROCEDURE — 272N000001 HC OR GENERAL SUPPLY STERILE: Performed by: INTERNAL MEDICINE

## 2024-07-01 PROCEDURE — 85027 COMPLETE CBC AUTOMATED: CPT | Performed by: INTERNAL MEDICINE

## 2024-07-01 PROCEDURE — 250N000013 HC RX MED GY IP 250 OP 250 PS 637: Performed by: INTERNAL MEDICINE

## 2024-07-01 PROCEDURE — 250N000009 HC RX 250: Performed by: INTERNAL MEDICINE

## 2024-07-01 PROCEDURE — 999N000134 HC STATISTIC PP CARE STAGE 3

## 2024-07-01 PROCEDURE — C1751 CATH, INF, PER/CENT/MIDLINE: HCPCS | Performed by: INTERNAL MEDICINE

## 2024-07-01 PROCEDURE — 93005 ELECTROCARDIOGRAM TRACING: CPT

## 2024-07-01 PROCEDURE — 93457 R HRT ART/GRFT ANGIO: CPT | Performed by: INTERNAL MEDICINE

## 2024-07-01 PROCEDURE — 258N000003 HC RX IP 258 OP 636: Performed by: INTERNAL MEDICINE

## 2024-07-01 PROCEDURE — 999N000142 HC STATISTIC PROCEDURE PREP ONLY

## 2024-07-01 PROCEDURE — 36415 COLL VENOUS BLD VENIPUNCTURE: CPT | Performed by: INTERNAL MEDICINE

## 2024-07-01 PROCEDURE — 82374 ASSAY BLOOD CARBON DIOXIDE: CPT | Performed by: INTERNAL MEDICINE

## 2024-07-01 PROCEDURE — 999N000127 HC STATISTIC PERIPHERAL IV START W US GUIDANCE

## 2024-07-01 PROCEDURE — C1726 CATH, BAL DIL, NON-VASCULAR: HCPCS | Performed by: INTERNAL MEDICINE

## 2024-07-01 PROCEDURE — C1894 INTRO/SHEATH, NON-LASER: HCPCS | Performed by: INTERNAL MEDICINE

## 2024-07-01 PROCEDURE — 250N000013 HC RX MED GY IP 250 OP 250 PS 637: Performed by: STUDENT IN AN ORGANIZED HEALTH CARE EDUCATION/TRAINING PROGRAM

## 2024-07-01 PROCEDURE — 99153 MOD SED SAME PHYS/QHP EA: CPT | Performed by: INTERNAL MEDICINE

## 2024-07-01 PROCEDURE — 93457 R HRT ART/GRFT ANGIO: CPT | Mod: 26 | Performed by: INTERNAL MEDICINE

## 2024-07-01 PROCEDURE — 999N000054 HC STATISTIC EKG NON-CHARGEABLE

## 2024-07-01 RX ORDER — LIDOCAINE 40 MG/G
CREAM TOPICAL
Status: DISCONTINUED | OUTPATIENT
Start: 2024-07-01 | End: 2024-07-01 | Stop reason: HOSPADM

## 2024-07-01 RX ORDER — FENTANYL CITRATE 50 UG/ML
25 INJECTION, SOLUTION INTRAMUSCULAR; INTRAVENOUS
Status: DISCONTINUED | OUTPATIENT
Start: 2024-07-01 | End: 2024-07-01 | Stop reason: HOSPADM

## 2024-07-01 RX ORDER — POTASSIUM CHLORIDE 750 MG/1
20 TABLET, EXTENDED RELEASE ORAL
Status: DISCONTINUED | OUTPATIENT
Start: 2024-07-01 | End: 2024-07-01 | Stop reason: HOSPADM

## 2024-07-01 RX ORDER — FENTANYL CITRATE 50 UG/ML
INJECTION, SOLUTION INTRAMUSCULAR; INTRAVENOUS
Status: DISCONTINUED | OUTPATIENT
Start: 2024-07-01 | End: 2024-07-01 | Stop reason: HOSPADM

## 2024-07-01 RX ORDER — NALOXONE HYDROCHLORIDE 0.4 MG/ML
0.4 INJECTION, SOLUTION INTRAMUSCULAR; INTRAVENOUS; SUBCUTANEOUS
Status: DISCONTINUED | OUTPATIENT
Start: 2024-07-01 | End: 2024-07-01 | Stop reason: HOSPADM

## 2024-07-01 RX ORDER — ASPIRIN 325 MG
325 TABLET ORAL DAILY
Status: DISCONTINUED | OUTPATIENT
Start: 2024-07-01 | End: 2024-07-01 | Stop reason: HOSPADM

## 2024-07-01 RX ORDER — SODIUM CHLORIDE 9 MG/ML
INJECTION, SOLUTION INTRAVENOUS CONTINUOUS
Status: DISCONTINUED | OUTPATIENT
Start: 2024-07-01 | End: 2024-07-01 | Stop reason: HOSPADM

## 2024-07-01 RX ORDER — ACETAMINOPHEN 325 MG/1
650 TABLET ORAL EVERY 4 HOURS PRN
Status: DISCONTINUED | OUTPATIENT
Start: 2024-07-01 | End: 2024-07-01 | Stop reason: HOSPADM

## 2024-07-01 RX ORDER — FLUMAZENIL 0.1 MG/ML
0.2 INJECTION, SOLUTION INTRAVENOUS
Status: DISCONTINUED | OUTPATIENT
Start: 2024-07-01 | End: 2024-07-01 | Stop reason: HOSPADM

## 2024-07-01 RX ORDER — NALOXONE HYDROCHLORIDE 0.4 MG/ML
0.2 INJECTION, SOLUTION INTRAMUSCULAR; INTRAVENOUS; SUBCUTANEOUS
Status: DISCONTINUED | OUTPATIENT
Start: 2024-07-01 | End: 2024-07-01 | Stop reason: HOSPADM

## 2024-07-01 RX ORDER — OXYCODONE HYDROCHLORIDE 10 MG/1
10 TABLET ORAL EVERY 4 HOURS PRN
Status: DISCONTINUED | OUTPATIENT
Start: 2024-07-01 | End: 2024-07-01 | Stop reason: HOSPADM

## 2024-07-01 RX ORDER — ATROPINE SULFATE 0.1 MG/ML
0.5 INJECTION INTRAVENOUS
Status: DISCONTINUED | OUTPATIENT
Start: 2024-07-01 | End: 2024-07-01 | Stop reason: HOSPADM

## 2024-07-01 RX ORDER — HEPARIN SODIUM 1000 [USP'U]/ML
INJECTION, SOLUTION INTRAVENOUS; SUBCUTANEOUS
Status: DISCONTINUED | OUTPATIENT
Start: 2024-07-01 | End: 2024-07-01 | Stop reason: HOSPADM

## 2024-07-01 RX ORDER — NICARDIPINE HYDROCHLORIDE 2.5 MG/ML
INJECTION INTRAVENOUS
Status: DISCONTINUED | OUTPATIENT
Start: 2024-07-01 | End: 2024-07-01 | Stop reason: HOSPADM

## 2024-07-01 RX ORDER — POTASSIUM CHLORIDE 750 MG/1
40 TABLET, EXTENDED RELEASE ORAL
Status: DISCONTINUED | OUTPATIENT
Start: 2024-07-01 | End: 2024-07-01 | Stop reason: HOSPADM

## 2024-07-01 RX ORDER — OXYCODONE HYDROCHLORIDE 5 MG/1
5 TABLET ORAL EVERY 4 HOURS PRN
Status: DISCONTINUED | OUTPATIENT
Start: 2024-07-01 | End: 2024-07-01 | Stop reason: HOSPADM

## 2024-07-01 RX ORDER — NITROGLYCERIN 5 MG/ML
VIAL (ML) INTRAVENOUS
Status: DISCONTINUED | OUTPATIENT
Start: 2024-07-01 | End: 2024-07-01 | Stop reason: HOSPADM

## 2024-07-01 RX ORDER — IOPAMIDOL 755 MG/ML
INJECTION, SOLUTION INTRAVASCULAR
Status: DISCONTINUED | OUTPATIENT
Start: 2024-07-01 | End: 2024-07-01 | Stop reason: HOSPADM

## 2024-07-01 RX ADMIN — LIDOCAINE: 40 CREAM TOPICAL at 09:54

## 2024-07-01 RX ADMIN — ACETAMINOPHEN 650 MG: 325 TABLET, FILM COATED ORAL at 12:07

## 2024-07-01 RX ADMIN — SODIUM CHLORIDE: 0.9 INJECTION, SOLUTION INTRAVENOUS at 10:15

## 2024-07-01 RX ADMIN — OXYCODONE HYDROCHLORIDE 5 MG: 5 TABLET ORAL at 12:08

## 2024-07-01 RX ADMIN — ASPIRIN 325 MG ORAL TABLET 325 MG: 325 PILL ORAL at 10:00

## 2024-07-01 ASSESSMENT — ACTIVITIES OF DAILY LIVING (ADL)
ADLS_ACUITY_SCORE: 35

## 2024-07-01 NOTE — PRE-PROCEDURE
GENERAL PRE-PROCEDURE:   Procedure:  Coronary angiogram, percutaneous coronary intervention, right heart catheterization  Date/Time:  7/1/2024 9:46 AM    Written consent obtained?: Yes    Risks and benefits: Risks, benefits and alternatives were discussed    DC Plan: Appropriate discharge home plan in place for patients who are going home after procedure   Consent given by:  Patient  Patient states understanding of procedure being performed: Yes    Patient's understanding of procedure matches consent: Yes    Procedure consent matches procedure scheduled: Yes    Expected level of sedation:  Moderate  Appropriately NPO:  Yes  ASA Class:  4  Mallampati  :  Grade 2- soft palate, base of uvula, tonsillar pillars, and portion of posterior pharyngeal wall visible  Lungs:  Lungs clear with good breath sounds bilaterally  Heart:  Normal heart sounds and rate  History & Physical reviewed:  History and physical reviewed and updates made (see comment)  H&P Comments:  Clinically Significant Risk Factors Present on Admission    Cardiovascular : Cardiomyopathy    Fluid & Electrolyte Disorders : Not present on admission    Gastroenterology : Not present on admission    Hematology/Oncology : Not present on admission    Nephrology : Not present on admission    Neurology : Not present on admission    Pulmonology : Not present on admission    Systemic : Not present on admission    [unfilled]    Statement of review:  I have reviewed the lab findings, diagnostic data, medications, and the plan for sedation

## 2024-07-01 NOTE — DISCHARGE INSTRUCTIONS
Going Home after an Angioplasty or Stent Placement (Cardiac)        After you go home:  Have an adult stay with you for 24 hours.  Drink plenty of fluids.  You may eat your normal diet, unless your doctor tells you otherwise.  For 24 hours:  - Relax and take it easy.  - Do NOT smoke.  - Do NOT make any important or legal decisions.  - Do NOT drive or operate machines at home or at work.  - Do NOT drink alcohol.    Remove the Band-Aid after 24 hours. If there is minor oozing, apply another Band-aid and remove it after 12 hours.  For 2 days, do NOT have sex or do any heavy exercise.  Do NOT take a bath, or use a hot tub or pool for at least 3 days. You may shower.    Care of wrist or arm site  It is normal to have soreness at the puncture site and mild tingling in your hand for up to 3 days.  For 2 days, do not use your hand or arm to support your weight (such as rising from a chair) or bend your wrist (such as lifting a garage door).  For 2 days, do not lift more than 5 pounds or exercise your arm (tennis, golf or bowling).      If you start bleeding from the site in your arm:  Sit down and press firmly on the site with your fingers for 10 minutes. Call your doctor as soon as you can.  If the bleeding stops, sit still and keep your wrist straight for 2 hours.  Medicines  If you have begun Plavix or Effient (with a stent), do not stop taking it until you talk to your heart doctor (cardiologist).  If you are on metformin (Glucophage), do not restart it until you have blood tests (within 2 to 3 days after discharge). When your doctor tells you it is safe, you may restart the metformin.  Call your doctor if:  You have a large or growing hard lump around the site.    The site is red, swollen, hot or tender.  Blood or fluid is draining from the site.  You have chills or a fever greater than 101 F (38 C).  Your leg or arm turns bluish, feels numb or cool.  You have hives, a rash or unusual itching.  Call 911 right away if  you have:   Bleeding that does not stop.   Heavy bleeding.  [facility] [place]:  [phone] (7 days a week)                  [facility]                        Interventional Cardiology  Discharge Instructions   Post Right Heart Cath       AFTER YOU GO HOME:  DO drink plenty of fluids  DO resume your regular diet and medications unless otherwise instructed by your Primary Physician  Do Not scrub the procedure site vigorously  No lotion or powder to the puncture site for 3 days    CALL YOUR PRIMARY PHYSICIAN IF: You may resume all normal activity.  Monitor neck site for bleeding, swelling, or voice changes. If you notice bleeding or swelling immediately apply pressure to the site and call number below to speak with Cardiology Fellow.  If you experience any changes in your breathing you should call your doctor immediately or come to the closest Emergency Department.  Do not drive yourself.    ADDITIONAL INSTRUCTIONS: Medications: You are to resume all home medications including anticoagulation therapy unless otherwise advised by your primary cardiologist or nurse coordinator.    Follow Up: Per your primary cardiology team    If you have any questions or concerns regarding your procedure site please call [phone] at anytime and ask for Cardiology Fellow on call.  They are available 24 hours a day.  You may also contact the Cardiology Clinic after hours number at [phone].                                                       Telephone Numbers [phone] Monday-Friday 8:00 am to 4:30 pm    [phone] After 4:30 pm Monday-Friday, Weekends & Holidays  Ask for Interventional Cardiologist on call. Someone is on call 24 hours/day   [facility] toll free number [phone] Monday-Friday 8:00 am to 4:30 pm   [facility] Emergency Dept [phone]

## 2024-07-01 NOTE — Clinical Note
Called to [facility]ZO on 3C. All questions answered. All belongings sent with patient. Patient transported to 3C via stretcher with CCL RN.

## 2024-07-01 NOTE — Clinical Note
Prepped: groin and left radial. Prepped with: ChloraPrep. The patient was draped. .Pre-procedure site marking:N/ADistal radial

## 2024-07-01 NOTE — PROGRESS NOTES
Pt arrived to unit 3C post CORS/RHC at 1145.  VSS.  C/O a headache and takes hydrocodone at home.  Pt tolerating food and fluids.  Pt left TR site CMS intact.  Family at bedside.      1415- Pt up to bathroom and tolerated well.  TR band removed and wrist site flat and dry.  CMS to left wrist hand intact.  Right neck site dry and intact.  PIV removed.  Discharge instructions went over with patient.  Pt walked to front door with staff and will ride home with .

## 2024-07-01 NOTE — Clinical Note
dry, intact, no bleeding and no hematoma. 6 Fr sheath removed from the left distal radial artery. TR Band placed until hemostasis is obtained. See flowsheet for details.

## 2024-07-01 NOTE — Clinical Note
Conscious sedation is planned for this procedure.  Pre precedure assessment and plan completed by physician. See note.

## 2024-07-01 NOTE — PROGRESS NOTES
Pt arrived for RHC and CORs. VSS on RA, denies pain. Awaiting for vascular for IV access. Groin area prepped. Pedal pulses marked. Consent signed. H&P current. 325 mg of ASA given. Spouse Efren at the bedside.

## 2024-07-03 LAB
ATRIAL RATE - MUSE: 60 BPM
DIASTOLIC BLOOD PRESSURE - MUSE: NORMAL MMHG
INTERPRETATION ECG - MUSE: NORMAL
P AXIS - MUSE: 68 DEGREES
PR INTERVAL - MUSE: 184 MS
QRS DURATION - MUSE: 82 MS
QT - MUSE: 442 MS
QTC - MUSE: 442 MS
R AXIS - MUSE: 25 DEGREES
SYSTOLIC BLOOD PRESSURE - MUSE: NORMAL MMHG
T AXIS - MUSE: 47 DEGREES
VENTRICULAR RATE- MUSE: 60 BPM

## 2024-07-10 NOTE — PROGRESS NOTES
[facility] HEART [facility]   CARDIOLOGY PROGRESS NOTE     Chief Complaint   Patient presents with    Follow Up     Angio-7/1/24          Diagnosis:  1. Cath.     -7/1/2024.  No stent.  RAFI/SVG to %.  LIMA to LAD 0%.  75% to pLCx, nondominant.  -9/25/17, [facility], stable dz.   -LIMA open but occluded RAFI/SVG.    -LM irregularities, ramus 40%, LAD 30%, LCx 30%, and RCA 40%.  2. CORTEZ.  3. CABG x3-2/12/2003.    -LIMA to LAD, RAFI to RCA, and SVG to OM.    4. Palpitations.    -SVE/VE on Zio from 3/27/2023.  5. PE on 1/2/20.     -Xarelto.  -RLL, MICHELLE and LLL.  6. COPD.  -Normal on 1/11/2022.  7. HTN-controlled  8. Lightheadedness-episodic.  9.  Cardiac cath on 9/25/2017-[facility].   -No significant dz on 12/12/19.  10. Iron deficiency anemia.  -Resolved.  11.  CVA.  -Mild chronic ischemic cerebral disease on 8/20/2021.  12. Left-sided subclavian steal syndrome.   -Stenting with patency as noted on 9/15/17.  13.  Tobacco abuse.  -Quitting 8/23/17.   14.  H/O alcohol abuse.  15.  Hyperlipidemia.  -Uncontrolled.  16.  Bradycardia with the slowest heart rate of 50 bpm on 7/30/2021.  17.  Dizziness/lightheadedness secondary to dehydration.  18.  B12 deficiency.    -313 on 3/18/2020.  19.  Vitamin D deficiency.  20.  CKD-2/3.       Assessment/Plan:   1.  Chest discomfort/CAD.  We reviewed her recent cardiac cath on 7/1/2024.  She was found of a 75% lesion to her LCx.  2/3 grafts are occluded.  LIMA to LAD is open.  RAFI and SVG were occluded.  She is having considerable symptoms.  She describes chest heaviness with minimal activities.  She gives example of walking up stairs.  She also is dyspneic on exertion and fatigued.  Her symptoms sound real.  I did send a note to the interventionalists to see if there would be something that could be done for the patient sooner.  Right now, she is planned for drug coated balloon in September when available.  I did start her on Imdur 30 mg once daily.  She is using nitro spray periodically.  To  "help.  She prefers a spray over the pill.  I believe she has less headaches with spray and the medication last longer.  I did review her symptoms.  She is having a chest heaviness to her sternal area with dyspnea on exertion.  She also is fatigued and short of breath.  Previously, she had a normal  stress test with symptoms.  I am concerned her symptoms were from her heart.  She describes previously having 17 stents with bypass.  Now like she did then. She did undergo cardiac angiogram on 7/1/2024.  She is currently on Plavix, rosuvastatin, and nitro spray.  She was told if she has increasing or worsening chest heaviness, she should go to the ER.  2.  Started on Imdur 30 mg daily and her nitro spray.  3.  Follow-up after cardiac intervention.  I did send a message to the interventionalists but other possibilities of management or ongoing symptoms.      Interval history:  See above.    HPI:    Ms. Day is being seen by cardiology in follow-up.  Patient has a history of chronic stable angina, known ischemic heart disease, hypertension, hyperlipidemia, history of pulmonary embolism, left subclavian artery stenosis, anxiety, collagenous colitis, depression, osteoarthritis, history of tobacco use, central sleep apnea for which she is not compliant with CPAP use, iron deficiency anemia, CKD, myofascial pain, vitamin D deficiency, lumbar facet arthropathy, history of gastric ulcer with hemorrhage, COPD and peptic ulcer disease.    Malina continues to endorse dizziness.  She states when she gets up she starts to feel dizzy like being off balance, will have a headache, her heart will pound and pulse increase.  She will be short of breath.  At times she has chest discomfort.  This has been going on for 1 to 2 years.      She was seen in the ER and admitted for a day on 7/16/2021.  She was felt to have symptomatic bradycardia/hypotension.  She felt \"woozy\" with a blood pressure of 77/52 at home.  She reports getting fluids " and feeling much better, resolution of symptoms.  She felt her symptoms had gotten worse over the last 2 to 3 weeks.  Heart rates were noted to be between 49-52 in the ER.  Troponin negative and EKG showed heart rates in the 50's.    She was seen back in the ED on 7/22/2021 for lightheadedness once again.  She actually fell and was having concerning pain in her lungs.  She has a history of a PE and is on Xarelto.  No identifiable cause was found.  There was concern that her symptoms may be anxiety related.    Today, she reports continuing to feel these symptoms of dizziness.  She has a hard time describing them.  She feels her heart pounding, she has a headache, she is dizzy, she gets chest pain.  She also continues to be dyspneic on exertion.  She has been using nitro regularly with some relief.  She has a history of asthma and suspected COPD.  She has seen relief with nitro as well as the albuterol.  She was on Symbicort in the past with benefit but was discontinued for unknown reasons.  She drinks 2 glasses of water and 3 cups of coffee a day.  I believe she is chronically dehydrated which plays a part in her symptoms.  She has tried to increase her fluid intake.  She is to double and preferably triple her fluid intake.  She should cut back/discontinue coffee intake.  With symptoms of chest pain, shortness of breath, and history of blood clot, will plan for VQ scan.  We will also get a chest x-ray.  She describes regular palpitations and acceleration in her heart rates.  We will plan for Zio patch.  She did have a MCOT on 7/30/2021.  Both asymptomatic and symptomatic events include sinus bradycardia, sinus rhythm, sinus tachycardia, occasional PVC's and PAC's.  No other significant rhythms were identified.  We discussed Eliquis versus Xarelto.  It was decided we switched to Eliquis as I seen less bleeding on this medication compared to Xarelto.  Related to shortness of breath, history of asthma, and presumptive  "COPD, referral was placed to pulmonary.  Had intended to prescribe Spiriva but she is allergic and this was not prescribed.  Patient also describes weakness.  She has been using nitro with resolution of her chest pain.  She does have a history of bypass but had a cath in 2017 with only mild disease followed.  I am concerned at this point.  I am concerned that her chest discomfort is more of a lung issue than a heart issue.     Patient has a known history of ischemic heart disease, she underwent  coronary angiogram and bypass angiogram on 9/15/2017 which was described as having stable disease. Mild to moderate 3 vessel CAD involving RCA, LAD and LCX with <50% stenosis at the greatest.  Occluded RAFI and SVG.  Patent LIMA.  No new obstructive lesions were identified and normal left heart cath.       She has a history of CABG on 2/12/03 x 3, history of multiple stent placements, patient reported 17 total.       At previous visit patient reported occasional recurrence of chest pain, not as severe as last ED visit on 10/25/19. She reported \"my breathing has been bad\", describes worsening CORTEZ. She described activity intolerance and little to no energy. Recommended repeat stress testing. NM Lexiscan stress test was performed on 12/16/19 which was negative for inducible myocardial ischemia or infarction.  Left ventricular function was normal.     Carotid US on 12/12/19 without significant stenosis, mild atherosclerotic disease present.  Abdominal ultrasound on 12/12/2018 with no abdominal aortic aneurysm identified.     Patient returned to the ED with dyspnea in early January 2020. She was identified to have small segmental and subsegmental emboli bilaterally. Repeat imaging on 1/6 with decreasing volume of pulmonary emboli compared to scan on 1/2/2020. She was initially treated with Lovenox in the ED and discharged on Xarelto oral anticoagulation which has been going well for her, no bleeding complication. She also remains " on Plavix with her significant ischemic heart disease history.      She presented to the ED on 3/16/2021 with reports of chest pain, chronic stable angina.  No ACS.  EKG stable and no elevation in troponin's. Patient admits that her BP was low and it was suspected she was dehydrated, she felt better following IV fluids and reports that this likely brought on her angina.  She is currently working on maintaining good hydration.  She denies any recurrence of acute chest pain or pressure today.  No use of nitroglycerin since her recent ED visit.      Relevant testing:  Cardiac cath at the [facility] on 7/1/2024:    Right sided filling pressures are normal.    Left sided filling pressures are normal.    Normal PA pressures.    Normal cardiac output level.    Hemodynamic data has been modified in Epic per physician review.     Severe two vessel CAD with prior CABG LIMA to LAD, RAFI to RCA, SVG to OM and prior PCI to the left main, LAD, and LCx.  Patent LIMA to LAD.  Occluded RAFI and SVG.  Patent stents.  Left main stent with minimal ISR, proximal LAD stent with moderate ISR, mid LAD stent with severe ISR, and ostial LCx stent with severe ISR.    Stress test on 4/18/24:    The nuclear stress test is negative for inducible myocardial ischemia or infarction.     Left ventricular function is normal.     The left ventricular ejection fraction at rest is 66%.  The left   ventricular ejection fraction at stress is 64%.     A prior study was conducted on 9/15/2021.     Echocardiogram on 7/31/2023:  Global and regional left ventricular function is normal with an EF of 55-60%.  Left ventricular diastolic function is indeterminate.  Mild concentric wall thickening consistent with left ventricular hypertrophy  is present.  Global right ventricular function is normal.  No significant valvular abnormalities present.  IVC diameter <2.1 cm collapsing >50% with sniff suggests a normal RA pressure of 3 mmHg.  No pericardial effusion is  present.  No significant changes noted.    Zio patch on 3/27/2023:  Patient's monitor was in place for 13 days and 16 hours.  Minimum heart rate 42 bpm, average heart rate 62 bpm, maximum heart rate 167 bpm. Rhythm/s present include sinus, SVT. There were rare ventricular ectopic beats accounting for <1% of all beats. There were 0  ventricular triplets and rare couplets. There were  rare supraventricular ectopic beats.  There were 12 triggered events and 14 diary entries during which time the noted rhythms were sinus, SVE, VE.  There were 7 episodes of SVT with the fastest lasting 4 beats with a max rate of 167 bpm.  Review of actual tracings showed no other significant abnormality.    V/Q scan on 11/23/21:  No evidence of pulmonary emboli.    CXR 11/23/21:  Probable air trapping.    PFT 12/8/21:  No obstructive or restrictive disease is noted, moderate diffusion defect is noted consistent with pulmonary vascular disease     Stress test on 9/15/2021:    The nuclear stress test is negative for inducible myocardial ischemia or infarction.     Left ventricular function is normal.     The left ventricular ejection fraction at rest is 64%.  The left   ventricular ejection fraction at stress is 66%.     A prior study was conducted on 12/16/2019.     Echo on 9/15/2021:  Global and regional left ventricular function is normal with an EF of 55-60%.  Right ventricular function, chamber size, wall motion, and thickness are normal.  Pulmonary artery systolic pressure is normal.  The inferior vena cava is normal.  No pericardial effusion is present.  No significant changes noted.    MRI brain on 8/20/2021:  A few small nonspecific white matter signal abnormalities  are present, likely sequela of mild chronic small vessel ischemic disease. The exam is otherwise unremarkable.    MCOT from 7/30/21:  Findings: Patient's monitor was in place for 9 days and 7 hours.  Minimum heart rate 50 bpm, average heart rate 63 bpm, maximum heart  rate 120 bpm. Rhythm/s present include sinus rhythm.  There were 37 symptomatic events during which time the noted rhythms were sinus rhythm, sinus bradycardia and sinus tachycardia.  There were six asymptomatic events during which time the noted rhythms were sinus rhythm.  There was rare atrial ectopy and rare ventricular ectopy.  Review of actual tracings showed no other abnormality.    ARYA on 7/17/20:  The right ankle-brachial index is 1.17.  Left ankle-brachial index is 0.98.    Stress test on 12/16/19:    The nuclear stress test is negative for inducible myocardial ischemia   or infarction.     A small amount of soft tissue artifact is present, more pronounced at   rest.     Left ventricular function is normal.     The left ventricular ejection fraction at rest is 79%.  The left   ventricular ejection fraction at stress is 72%.     A prior study was conducted on 6/30/2015.     US carotids on 12/12/19:  No ultrasound evidence of hemo-dynamically significant stenosis.  Mild atherosclerotic disease.    US abdominal aorta on 12/12/19:  No abdominal aortic aneurysm.      ECHO on 5/13/19:  Global and regional left ventricular function is normal with an EF of 60-65%.  Mild concentric wall thickening consistent with left ventricular hypertrophy  is present.  Right ventricular function, chamber size, wall motion, and thickness are  normal.  No significant valvular abnormalities were noted.  Previous study not available for comparison.    Cardiac cath on 3/30/13:  LEFT MAIN: Widely patent stent.   LEFT ANTERIOR DESCENDING: Widely patent proximal stent. There is an 85% to  90% mid lesion after the second diagonal and prior to the LIMA insertion as before.   CIRCUMFLEX: There is diffuse 60% to 70% disease throughout, the ostium is 70   to 75, and the OM2 is a 70, but it is very diffuse disease and basically   unchanged from previous.   RCA: Widely patent stents with only mild irregularities.   LEFT VENTRICULOGRAM: Normal  left ventricular ejection fraction, LVEF 60%,   with no focal wall motion abnormality. LV pressure 146/29.   FLORENTINO to LAD: Widely patent, although there is less flow than before, and, in   fact, the retrograde filling of the LIMA from the antegrade vessel. There is   a severe subclavian stenosis that a pressure gradient revealed in the aorta   was 153/78, and in the right subclavian was 100 to 106/73, for a nearly 50 mm   pressure gradient. The stenosis was 75% to 80%.   RAFI to RCA: 100%.   SVG to OM: 100%.                     ICD-10-CM    1. CORTEZ (dyspnea on exertion)  R06.09       2. History of coronary artery bypass graft x 3 on 2/12/2003  Z95.1 isosorbide mononitrate (IMDUR) 30 MG 24 hr tablet     nitroGLYcerin (NITROLINGUAL) 0.4 MG/SPRAY spray      [facility]. [facility]  Z98.890 isosorbide mononitrate (IMDUR) 30 MG 24 hr tablet     nitroGLYcerin (NITROLINGUAL) 0.4 MG/SPRAY spray      4. History of tobacco abuse-quitting 8/23/2017  Z87.891       5. Chest pain, unspecified type  R07.9 isosorbide mononitrate (IMDUR) 30 MG 24 hr tablet     nitroGLYcerin (NITROLINGUAL) 0.4 MG/SPRAY spray      6. Presence of stent in coronary artery in patient with coronary artery disease  I25.10 isosorbide mononitrate (IMDUR) 30 MG 24 hr tablet    Z95.5 nitroGLYcerin (NITROLINGUAL) 0.4 MG/SPRAY spray      7. Atherosclerosis of coronary artery bypass graft of native heart with stable angina pectoris (H24)  I25.708 isosorbide mononitrate (IMDUR) 30 MG 24 hr tablet     nitroGLYcerin (NITROLINGUAL) 0.4 MG/SPRAY spray      8. ASCVD (arteriosclerotic cardiovascular disease)  I25.10 isosorbide mononitrate (IMDUR) 30 MG 24 hr tablet     nitroGLYcerin (NITROLINGUAL) 0.4 MG/SPRAY spray      9. Mixed hyperlipidemia  E78.2       10. Subclavian artery stenosis, left (H24)  I77.1       11. Palpitations  R00.2       12. Hypertensive heart disease with heart failure (H)  I11.0       13. Essential  hypertension  I10       14. Chronic ischemic heart disease  I25.9       15. Stage 3a chronic kidney disease (H)  N18.31       16. History of pulmonary embolism on 1/2/2020. (-) VQ scan on 11/23/2021  Z86.711       17. History of CVA-mild chronic ischemic disease on 8/20/2021.  Z86.73       18. Central sleep apnea  G47.31                   Past Medical History:   Diagnosis Date    Acute ischemic heart disease (H)     06/15/2007,with PTCA and stenting of 90% osteo circumflex lesion and patent LAD graft, patent left main stent.    Acute myocardial infarction (H)     3/30/2013    Anxiety disorder     No Comments Provided    Atherosclerotic heart disease of native coronary artery without angina pectoris     -angio revealed 3 vessel dz  -4 stents placed; 2 overlapping stents placed in mLAD, 1 stent pRCA, 1 stent pCirc 1 s 9/02 -non-ST elevation MI 1/03  -angio revealed restenosis of Circ.    -PTCA and brachytherapy of pCirc -repeat angio 2/03 -no intervension -CABG x3 12/03 - Dr Sinclair  -LIMA-LAD, RAFI-RCA, SVG-OM -PCI 7/04 stent to L -CTangio 9/05   -patentent LIMA-LAD. RAFI-RCA occluded; RCA ostio/p*    Bilateral carpal tunnel syndrome     No Comments Provided    Cervicalgia     No Comments Provided    Chest pain     12/29/2014    Chronic gastric ulcer without hemorrhage or perforation     10/03/2011,hx of GI bleed (2003)    Chronic ischemic heart disease     06/27/2012    Chronic obstructive pulmonary disease (H)     06/15/2007,low DLCO, normal spirometry    Chronic or unspecified gastric ulcer with hemorrhage     10/2003    Chronic pain syndrome     12/01/2010,chest wall, back    Colostomy status (H)     Constipation     11/29/2011    Coronary angioplasty status     09/12/2002,with triple stenting    Coronary angioplasty status     01/10/2003,repeat angioplasty    Coronary angioplasty status     No Comments Provided    Dorsalgia     05/31/2011    Encounter for other administrative examinations     1/28/2014     Encounter for screening for cardiovascular disorders     10/2004,Cardiolite (2004, 2005, 2006 and 2011)    Enterocolitis due to Clostridium difficile     8/19/2016    Essential (primary) hypertension     No Comments Provided    Hyperlipidemia     No Comments Provided    Major depressive disorder, single episode     Severe, hx of suicide attempt/hospitalization    Migraine without status migrainosus, not intractable     No Comments Provided    Nodular corneal degeneration     09/26/2011    Noninfective gastroenteritis and colitis     06/15/2007,history of    Noninfective gastroenteritis and colitis     Microcytic    Osteoarthritis     No Comments Provided    Other chest pain     10/13/2009,chronic    Pain in knee     05/31/2011    Pain in right shoulder     No Comments Provided    Panic disorder without agoraphobia     No Comments Provided    Peptic ulcer without hemorrhage or perforation     6/15/2007    Peripheral vascular disease (H24)     No Comments Provided    Personal history of diseases of the blood and blood-forming organs and certain disorders involving the immune mechanism (CODE)     No Comments Provided    Personal history of nicotine dependence     No Comments Provided    Personal history of other medical treatment (CODE)     10/14/2013    Presence of aortocoronary bypass graft     2/12/2003    Primary central sleep apnea     10/14/2013    Sepsis due to Escherichia coli (E. coli) (H)     7/14/16,[facility]    Stricture of artery (H24)     3/31/2013,S/p prox left SCA stent 4/1/2013    Thoracic, thoracolumbar and lumbosacral intervertebral disc disorder     No Comments Provided    Uncomplicated opioid abuse (H)     history of    Vitamin D deficiency     5/6/2013       Past Surgical History:   Procedure Laterality Date    ANGIOPLASTY      9/12/02,with triple stenting    APPENDECTOMY OPEN      No Comments Provided    ARTHROSCOPY KNEE      left    ARTHROSCOPY SHOULDER Right 05/12/2017    labral tear, rotator  cuff tear and some subacromial decompression     BYPASS GRAFT ARTERY CORONARY      12/13/02,Triple bypass, left internal mammary  to LAD, right internal mammary to right coronary artery, saphenous to obtuse marginal of the left circumflex.    BYPASS GRAFT ARTERY CORONARY N/A 7/1/2024    Procedure: Bypass graft artery coronary;  Surgeon: Greg Webb MD;  [facility]    COLONOSCOPY      2011,Dr Bowman benign polyps    COLONOSCOPY  10/03/2011    2011,benign polyps, Dr. Bowman    COLONOSCOPY  08/08/2016 8/8/16,normal, Dr Bowman    CV ANGIOGRAM CORONARY GRAFT N/A 7/1/2024    Procedure: Coronary Angiogram Graft;  Surgeon: Greg Webb MD;  [facility]    CV CORONARY ANGIOGRAM N/A 7/1/2024    Procedure: Coronary Angiogram;  Surgeon: Greg Webb MD;  Location: [facility]    CV RIGHT HEART CATH MEASUREMENTS RECORDED N/A 7/1/2024    Procedure: Right Heart Catheterization;  Surgeon: Greg Webb MD;  Location: [facility]    ELBOW SURGERY      baby,birth malachi removed from right arm    EMBOLECTOMY UPPER EXTREMITY  04/02/2013    brachial artery pseudoaneurysm after stenting    ESOPHAGOSCOPY, GASTROSCOPY, DUODENOSCOPY (EGD), COMBINED      2011,EGD Dr Bowman with pyloric ulcer    ESOPHAGOSCOPY, GASTROSCOPY, DUODENOSCOPY (EGD), COMBINED      2011,pyloric ulcer, Dr. Bowman    ESOPHAGOSCOPY, GASTROSCOPY, DUODENOSCOPY (EGD), COMBINED      8/8/16,mild gastritis, Dr Bowman    ESOPHAGOSCOPY, GASTROSCOPY, DUODENOSCOPY (EGD), COMBINED      11/27/2017,Dr Bowman. Antral ulcer    ESOPHAGOSCOPY, GASTROSCOPY, DUODENOSCOPY (EGD), COMBINED  02/02/2018    Dr Bowman, healed ulcer    HYSTERECTOMY TOTAL ABDOMINAL      age 21    LAPAROSCOPIC CHOLECYSTECTOMY      2006    OSTEOTOMY FEMUR DISTAL      x3, right knee    OSTEOTOMY FEMUR DISTAL      2000,left knee  ligament surgery    OTHER SURGICAL HISTORY       1/10/2003,,PTCA    OTHER SURGICAL HISTORY      09/20/2012,,PTCA,DELONTE in LAD and left main    OTHER SURGICAL HISTORY      4/1/2013,,PTCA,L subclavian stenosis    RELEASE TRIGGER FINGER Left 12/2/2022    Procedure: Left thumb Trigger  release;  Surgeon: Cristhian Wilkinson MD;  Location: [facility] OR    SALPINGO-OOPHORECTOMY BILATERAL      age 27,Bilateral salpingo-oophorectomy    TONSILLECTOMY, ADENOIDECTOMY, COMBINED      childhood       Allergies   Allergen Reactions    Atorvastatin Muscle Pain (Myalgia)    Tiotropium Bromide [Tiotropium] Rash    Ezetimibe Muscle Pain (Myalgia)    Latex Rash    Niacin      Other reaction(s): Flushing    No Clinical Screening - See Comments Itching, Rash and Blisters     Metals and plastics      Tape [Adhesive Tape] Rash       Current Outpatient Medications   Medication Sig Dispense Refill    amLODIPine (NORVASC) 10 MG tablet Take 1 tablet (10 mg) by mouth daily Dx. Code: I10. 90 tablet 3    busPIRone (BUSPAR) 30 MG tablet Take 1 tablet (30 mg) by mouth 2 times daily 180 tablet 3    clonazePAM (KLONOPIN) 1 MG tablet TAKE 1 TABLET BY MOUTH THREE TIMES DAILY AS NEEDED FOR ANXIETY 90 tablet 0    clopidogrel (PLAVIX) 75 MG tablet Take 1 tablet (75 mg) by mouth daily 90 tablet 3    diclofenac (VOLTAREN) 1 % topical gel Apply 2 grams to each hand or 4 grams to each knee up to 4 times daily as needed for arthritis pain 350 g 4    HYDROcodone-acetaminophen (NORCO)  MG per tablet Take 1 tablet by mouth every 4 hours as needed for severe pain 180 tablet 0    HYDROcodone-acetaminophen (NORCO)  MG per tablet Take 1 tablet by mouth every 4 hours as needed for severe pain 180 tablet 0    HYDROcodone-acetaminophen (NORCO)  MG per tablet Take 1 tablet by mouth every 4 hours as needed for severe pain 180 tablet 0    isosorbide mononitrate (IMDUR) 30 MG 24 hr tablet Take 1 tablet (30 mg) by mouth daily 30 tablet 11    lisinopril (ZESTRIL) 10 MG tablet Take 1 tablet (10 mg) by  mouth daily 90 tablet 3    naloxone (NARCAN) 4 MG/0.1ML nasal spray Spray into one nostril for opioid reversal if unresponsive. May repeat every 2-3 minutes until patient responsive or EMS arrives 1 each 1    nitroGLYcerin (NITROLINGUAL) 0.4 MG/SPRAY spray For chest pain spray 1 spray under tongue every 5 minutes for 3 doses. If symptoms persist 5 minutes after 1st dose call 911. 9.8 g 3    ondansetron (ZOFRAN) 4 MG tablet Take 1 tablet (4 mg) by mouth every 6 hours as needed for nausea 90 tablet 3    pantoprazole (PROTONIX) 40 MG EC tablet Take 1 tablet (40 mg) by mouth daily 90 tablet 3    RESTASIS 0.05 % ophthalmic emulsion INSTILL 1 DROP INTO BOTH EYES TWICE A DAY      rimegepant (NURTEC) 75 MG ODT tablet PLACE 1 TAB UNDER TONGUE DAILY AS NEEDED FOR MIGRAINE. MAX OF 1 TAB EVERY 48 HRS & 2 PER WEEK 8 tablet 14    rosuvastatin (CRESTOR) 40 MG tablet Take 1 tablet (40 mg) by mouth daily 90 tablet 3    sucralfate (CARAFATE) 1 GM tablet Take 1 tablet (1 g) by mouth 4 times daily as needed for nausea (or dark stools) 360 tablet 1    triamcinolone (KENALOG) 0.1 % external lotion Apply topically 3 times daily 60 mL 3    TRINTELLIX 20 MG tablet TAKE 1 TABLET BY MOUTH EVERY DAY 90 tablet 4    VITAMIN D, CHOLECALCIFEROL, PO Take 5,000 Units by mouth daily         Social History     Socioeconomic History    Marital status:      Spouse name: Not on file    Number of children: Not on file    Years of education: Not on file    Highest education level: Not on file   Occupational History    Not on file   Tobacco Use    Smoking status: Former     Current packs/day: 0.00     Average packs/day: 1 pack/day for 35.0 years (35.0 ttl pk-yrs)     Types: Cigarettes     Start date: 2/15/1982     Quit date: 2/15/2017     Years since quittin.4     Passive exposure: Past    Smokeless tobacco: Never   Vaping Use    Vaping status: Never Used   Substance and Sexual Activity    Alcohol use: Not Currently     Alcohol/week: 0.0 standard  drinks of alcohol    Drug use: No    Sexual activity: Yes     Partners: Male     Birth control/protection: None, Female Surgical   Other Topics Concern    Parent/sibling w/ CABG, MI or angioplasty before 65F 55M? Not Asked   Social History Narrative    ,  Steven.  Currently not working outside the home. Lives three-mile self [place] met with . Tobacco abuse, quit 2001, restarted. Quit 2012 and has since quit, no alcohol.     Social Determinants of Health     Financial Resource Strain: Low Risk  (12/20/2023)    Financial Resource Strain     Within the past 12 months, have you or your family members you live with been unable to get utilities (heat, electricity) when it was really needed?: No   Food Insecurity: Low Risk  (12/20/2023)    Food Insecurity     Within the past 12 months, did you worry that your food would run out before you got money to buy more?: No     Within the past 12 months, did the food you bought just not last and you didn t have money to get more?: No   Transportation Needs: Low Risk  (12/20/2023)    Transportation Needs     Within the past 12 months, has lack of transportation kept you from medical appointments, getting your medicines, non-medical meetings or appointments, work, or from getting things that you need?: No   Physical Activity: Not on file   Stress: Not on file   Social Connections: Not on file   Interpersonal Safety: Low Risk  (6/6/2024)    Interpersonal Safety     Do you feel physically and emotionally safe where you currently live?: Yes     Within the past 12 months, have you been hit, slapped, kicked or otherwise physically hurt by someone?: No     Within the past 12 months, have you been humiliated or emotionally abused in other ways by your partner or ex-partner?: No   Housing Stability: Low Risk  (12/20/2023)    Housing Stability     Do you have housing? : Yes     Are you worried about losing your housing?: No       LAB RESULTS:   Office Visit on 08/06/2021    Component Date Value Ref Range Status    Color Urine 08/06/2021 Yellow  Colorless, Straw, Light Yellow, Yellow Final    Appearance Urine 08/06/2021 Slightly Cloudy* Clear Final    Glucose Urine 08/06/2021 Negative  Negative mg/dL Final    Bilirubin Urine 08/06/2021 Negative  Negative Final    Ketones Urine 08/06/2021 Negative  Negative mg/dL Final    Specific Gravity Urine 08/06/2021 1.018  1.000 - 1.030 Final    Blood Urine 08/06/2021 Small* Negative Final    pH Urine 08/06/2021 6.0  5.0 - 9.0 Final    Protein Albumin Urine 08/06/2021 20 * Negative mg/dL Final    Urobilinogen Urine 08/06/2021 Normal  Normal, 2.0 mg/dL Final    Nitrite Urine 08/06/2021 Positive* Negative Final    Leukocyte Esterase Urine 08/06/2021 Large* Negative Final    Bacteria Urine 08/06/2021 Many* None Seen /HPF Final    Mucus Urine 08/06/2021 Present* None Seen /LPF Final    RBC Urine 08/06/2021 6* <=2 /HPF Final    WBC Urine 08/06/2021 >182* <=5 /HPF Final    Culture 08/06/2021 >100,000 CFU/mL Escherichia coli*  Final   Office Visit on 07/30/2021   Component Date Value Ref Range Status    Sodium 07/30/2021 139  134 - 144 mmol/L Final    Potassium 07/30/2021 4.4  3.5 - 5.1 mmol/L Final    Chloride 07/30/2021 105  98 - 107 mmol/L Final    Carbon Dioxide (CO2) 07/30/2021 24  21 - 31 mmol/L Final    Anion Gap 07/30/2021 10  3 - 14 mmol/L Final    Urea Nitrogen 07/30/2021 19  7 - 25 mg/dL Final    Creatinine 07/30/2021 1.08  0.60 - 1.20 mg/dL Final    Calcium 07/30/2021 9.9  8.6 - 10.3 mg/dL Final    Glucose 07/30/2021 77  70 - 105 mg/dL Final    GFR Estimate 07/30/2021 53* >60 mL/min/1.73m2 Final    Magnesium 07/30/2021 2.1  1.9 - 2.7 mg/dL Final    WBC Count 07/30/2021 7.0  4.0 - 11.0 10e3/uL Final    RBC Count 07/30/2021 4.39  3.80 - 5.20 10e6/uL Final    Hemoglobin 07/30/2021 12.9  11.7 - 15.7 g/dL Final    Hematocrit 07/30/2021 38.6  35.0 - 47.0 % Final    MCV 07/30/2021 88  78 - 100 fL Final    MCH 07/30/2021 29.4  26.5 - 33.0 pg Final  "   MCHC 07/30/2021 33.4  31.5 - 36.5 g/dL Final    RDW 07/30/2021 13.5  10.0 - 15.0 % Final    Platelet Count 07/30/2021 273  150 - 450 10e3/uL Final    % Neutrophils 07/30/2021 57  % Final    % Lymphocytes 07/30/2021 30  % Final    % Monocytes 07/30/2021 10  % Final    % Eosinophils 07/30/2021 2  % Final    % Basophils 07/30/2021 1  % Final    % Immature Granulocytes 07/30/2021 0  % Final    NRBCs per 100 WBC 07/30/2021 0  <1 /100 Final    Absolute Neutrophils 07/30/2021 4.0  1.6 - 8.3 10e3/uL Final    Absolute Lymphocytes 07/30/2021 2.1  0.8 - 5.3 10e3/uL Final    Absolute Monocytes 07/30/2021 0.7  0.0 - 1.3 10e3/uL Final    Absolute Eosinophils 07/30/2021 0.1  0.0 - 0.7 10e3/uL Final    Absolute Basophils 07/30/2021 0.1  0.0 - 0.2 10e3/uL Final    Absolute Immature Granulocytes 07/30/2021 0.0  <=0.0 10e3/uL Final    Absolute NRBCs 07/30/2021 0.0  10e3/uL Final        Review of systems: Negative except that which was noted in the HPI.    Physical examination:  /66 (BP Location: Right arm, Patient Position: Sitting, Cuff Size: Adult Large)   Pulse 61   Temp 97  F (36.1  C) (Temporal)   Resp 16   Ht 1.62 m (5' 3.78\")   Wt 81.6 kg (180 lb)   LMP 04/29/1978 (Approximate)   SpO2 97%   BMI 31.11 kg/m      GENERAL APPEARANCE: healthy, alert and no distress.  CHEST: lungs clear to auscultation - no rales, rhonchi or wheezes, no use of accessory muscles, no retractions, respirations are unlabored, normal respiratory rate.  Reduced air movement but clear.  CARDIOVASCULAR: regular rhythm, normal S1 with physiologic split S2, no S3 or S4 and no murmur, click or rub  EXTREMITIES: no clubbing, cyanosis but with mild peripheral edema      Total time spent on day of visit, including review of tests, obtaining/reviewing separately obtained history, ordering medications/tests/procedures, communicating with PCP/consultants, and documenting in electronic medical record: 21 minutes.       Thank you for allowing me to " participate in the care of your patient. Please do not hesitate to contact me if you have any questions.     Paul Gramajo, DO

## 2024-07-11 ENCOUNTER — OFFICE VISIT (OUTPATIENT)
Dept: CARDIOLOGY | Facility: OTHER | Age: 69
End: 2024-07-11
Attending: INTERNAL MEDICINE
Payer: COMMERCIAL

## 2024-07-11 VITALS
HEART RATE: 61 BPM | HEIGHT: 64 IN | DIASTOLIC BLOOD PRESSURE: 66 MMHG | SYSTOLIC BLOOD PRESSURE: 130 MMHG | RESPIRATION RATE: 16 BRPM | BODY MASS INDEX: 30.73 KG/M2 | TEMPERATURE: 97 F | WEIGHT: 180 LBS | OXYGEN SATURATION: 97 %

## 2024-07-11 DIAGNOSIS — Z87.891 HISTORY OF TOBACCO ABUSE: ICD-10-CM

## 2024-07-11 DIAGNOSIS — Z98.890 STATUS POST CORONARY ANGIOGRAM: ICD-10-CM

## 2024-07-11 DIAGNOSIS — I11.0 HYPERTENSIVE HEART DISEASE WITH HEART FAILURE (H): ICD-10-CM

## 2024-07-11 DIAGNOSIS — Z86.73 HISTORY OF CVA (CEREBROVASCULAR ACCIDENT): ICD-10-CM

## 2024-07-11 DIAGNOSIS — I25.10 ASCVD (ARTERIOSCLEROTIC CARDIOVASCULAR DISEASE): ICD-10-CM

## 2024-07-11 DIAGNOSIS — I25.708 ATHEROSCLEROSIS OF CORONARY ARTERY BYPASS GRAFT OF NATIVE HEART WITH STABLE ANGINA PECTORIS (H): ICD-10-CM

## 2024-07-11 DIAGNOSIS — E78.2 MIXED HYPERLIPIDEMIA: ICD-10-CM

## 2024-07-11 DIAGNOSIS — I25.9 CHRONIC ISCHEMIC HEART DISEASE: ICD-10-CM

## 2024-07-11 DIAGNOSIS — R07.9 CHEST PAIN, UNSPECIFIED TYPE: ICD-10-CM

## 2024-07-11 DIAGNOSIS — Z95.5 PRESENCE OF STENT IN CORONARY ARTERY IN PATIENT WITH CORONARY ARTERY DISEASE: ICD-10-CM

## 2024-07-11 DIAGNOSIS — Z95.1 HISTORY OF CORONARY ARTERY BYPASS GRAFT X 3: ICD-10-CM

## 2024-07-11 DIAGNOSIS — G47.31 CENTRAL SLEEP APNEA: ICD-10-CM

## 2024-07-11 DIAGNOSIS — I25.10 PRESENCE OF STENT IN CORONARY ARTERY IN PATIENT WITH CORONARY ARTERY DISEASE: ICD-10-CM

## 2024-07-11 DIAGNOSIS — R00.2 PALPITATIONS: ICD-10-CM

## 2024-07-11 DIAGNOSIS — Z86.711 HISTORY OF PULMONARY EMBOLISM: ICD-10-CM

## 2024-07-11 DIAGNOSIS — I10 ESSENTIAL HYPERTENSION: ICD-10-CM

## 2024-07-11 DIAGNOSIS — I77.1 SUBCLAVIAN ARTERY STENOSIS, LEFT (H): ICD-10-CM

## 2024-07-11 DIAGNOSIS — N18.31 STAGE 3A CHRONIC KIDNEY DISEASE (H): ICD-10-CM

## 2024-07-11 DIAGNOSIS — R06.09 DOE (DYSPNEA ON EXERTION): Primary | ICD-10-CM

## 2024-07-11 PROCEDURE — G0463 HOSPITAL OUTPT CLINIC VISIT: HCPCS

## 2024-07-11 PROCEDURE — 99214 OFFICE O/P EST MOD 30 MIN: CPT | Performed by: INTERNAL MEDICINE

## 2024-07-11 RX ORDER — ISOSORBIDE MONONITRATE 30 MG/1
30 TABLET, EXTENDED RELEASE ORAL DAILY
Qty: 30 TABLET | Refills: 11 | Status: SHIPPED | OUTPATIENT
Start: 2024-07-11

## 2024-07-11 RX ORDER — NITROGLYCERIN 400 UG/1
SPRAY ORAL
Qty: 9.8 G | Refills: 3 | Status: SHIPPED | OUTPATIENT
Start: 2024-07-11

## 2024-07-11 ASSESSMENT — PAIN SCALES - GENERAL: PAINLEVEL: SEVERE PAIN (6)

## 2024-07-11 NOTE — NURSING NOTE
"Patient comes in for follow up after angiogram on 7/1/24.    Chief Complaint   Patient presents with    Follow Up     Angio-7/1/24       Initial /66 (BP Location: Right arm, Patient Position: Sitting, Cuff Size: Adult Large)   Pulse 61   Temp 97  F (36.1  C) (Temporal)   Resp 16   Ht 1.62 m (5' 3.78\")   Wt 81.6 kg (180 lb)   LMP 04/29/1978 (Approximate)   SpO2 97%   BMI 31.11 kg/m   Estimated body mass index is 31.11 kg/m  as calculated from the following:    Height as of this encounter: 1.62 m (5' 3.78\").    Weight as of this encounter: 81.6 kg (180 lb).  Meds Reconciled: complete  Pt is not on Aspirin  Pt is on a Statin  PHQ and/or YAYA reviewed. Pt referred to PCP/MH Provider as appropriate.    Татьяна Bonilla LPN      "

## 2024-07-12 ENCOUNTER — TELEPHONE (OUTPATIENT)
Dept: CARDIOLOGY | Facility: OTHER | Age: 69
End: 2024-07-12

## 2024-07-15 DIAGNOSIS — Z95.1 HISTORY OF CORONARY ARTERY BYPASS GRAFT X 3: ICD-10-CM

## 2024-07-15 DIAGNOSIS — R06.09 DOE (DYSPNEA ON EXERTION): ICD-10-CM

## 2024-07-15 DIAGNOSIS — I25.9 CHRONIC ISCHEMIC HEART DISEASE: ICD-10-CM

## 2024-07-15 DIAGNOSIS — I25.708 ATHEROSCLEROSIS OF CORONARY ARTERY BYPASS GRAFT OF NATIVE HEART WITH STABLE ANGINA PECTORIS (H): ICD-10-CM

## 2024-07-15 DIAGNOSIS — Z98.890 STATUS POST CORONARY ANGIOGRAM: ICD-10-CM

## 2024-07-15 DIAGNOSIS — Z95.5 PRESENCE OF STENT IN CORONARY ARTERY IN PATIENT WITH CORONARY ARTERY DISEASE: ICD-10-CM

## 2024-07-15 DIAGNOSIS — I25.10 ASCVD (ARTERIOSCLEROTIC CARDIOVASCULAR DISEASE): ICD-10-CM

## 2024-07-15 DIAGNOSIS — R07.9 CHEST PAIN, UNSPECIFIED TYPE: Primary | ICD-10-CM

## 2024-07-15 DIAGNOSIS — I25.10 PRESENCE OF STENT IN CORONARY ARTERY IN PATIENT WITH CORONARY ARTERY DISEASE: ICD-10-CM

## 2024-07-15 RX ORDER — POTASSIUM CHLORIDE 1500 MG/1
40 TABLET, EXTENDED RELEASE ORAL
Status: CANCELLED | OUTPATIENT
Start: 2024-07-15

## 2024-07-15 RX ORDER — SODIUM CHLORIDE 9 MG/ML
INJECTION, SOLUTION INTRAVENOUS CONTINUOUS
Status: CANCELLED | OUTPATIENT
Start: 2024-07-15

## 2024-07-15 RX ORDER — LIDOCAINE 40 MG/G
CREAM TOPICAL
Status: CANCELLED | OUTPATIENT
Start: 2024-07-15

## 2024-07-15 RX ORDER — POTASSIUM CHLORIDE 1500 MG/1
20 TABLET, EXTENDED RELEASE ORAL
Status: CANCELLED | OUTPATIENT
Start: 2024-07-15

## 2024-07-15 NOTE — TELEPHONE ENCOUNTER
"Per Paul Gramajo DO: \"This patient went down to the [facility] for cardiac catheterization.  She did have a cardiac cath on 7/1/2024.  She was found to have a 75% lesion to the LCx.  She is symptomatic with chest tightness and shortness of breath.  I saw her back in the clinic.  The original plan was to have her come back in September, 2024 when a new device which is called a drug-eluting balloon was available.  However, I did reach out to  Greg Pizarro M who is an interventionalists at the  and did the procedure.  I let him know of the symptoms she is having.  He mentioned he could balloon this narrowing but the ballooning would only last 3 to 6 months.  When symptoms return, she could be have this drug coated balloon placed.  Based on the information I have received, she wants to go back to [facility] for ballooning of her LCx.  Can we set her up for cardiac cath to balloon her LCx?  It would have to be with Greg Pizarro M.  Thanks in advance.  Orders have been placed.     Dr. Gramajo\"    Patient notified of this and she was thinking Paul Gramajo DO said she could have stents or the drug coated balloon.  She after being explained the above would like to wait and just do the drug coated balloon in September.  To Paul Gramajo DO to review and advise.  Patient only wants a call back if this is a problem or Paul Gramajo DO recommends differently as she really appreciates his input.  Jamilah Ray RN......July 15, 2024...11:58 AM   "

## 2024-07-15 NOTE — TELEPHONE ENCOUNTER
"Per Paul Gramajo, DO: \"At this point, after talking to the interventionalists, he was not planning on putting stents.  He was planning on ballooning this vessel.  By ballooning the vessel, that would alleviate her symptoms and give her time.  This additional time, hopefully, without symptoms until the drug covered balloon would be available.     Dr. Gramajo\"    Patient had said she felt she would be fine until September to have the drug covered balloon.  Jamilah Ray RN......July 15, 2024...4:12 PM   "

## 2024-07-18 ASSESSMENT — ANXIETY QUESTIONNAIRES
5. BEING SO RESTLESS THAT IT IS HARD TO SIT STILL: SEVERAL DAYS
4. TROUBLE RELAXING: SEVERAL DAYS
6. BECOMING EASILY ANNOYED OR IRRITABLE: SEVERAL DAYS
GAD7 TOTAL SCORE: 9
7. FEELING AFRAID AS IF SOMETHING AWFUL MIGHT HAPPEN: MORE THAN HALF THE DAYS
7. FEELING AFRAID AS IF SOMETHING AWFUL MIGHT HAPPEN: MORE THAN HALF THE DAYS
GAD7 TOTAL SCORE: 9
3. WORRYING TOO MUCH ABOUT DIFFERENT THINGS: SEVERAL DAYS
1. FEELING NERVOUS, ANXIOUS, OR ON EDGE: MORE THAN HALF THE DAYS
2. NOT BEING ABLE TO STOP OR CONTROL WORRYING: SEVERAL DAYS
GAD7 TOTAL SCORE: 9

## 2024-07-18 ASSESSMENT — PATIENT HEALTH QUESTIONNAIRE - PHQ9
SUM OF ALL RESPONSES TO PHQ QUESTIONS 1-9: 10
10. IF YOU CHECKED OFF ANY PROBLEMS, HOW DIFFICULT HAVE THESE PROBLEMS MADE IT FOR YOU TO DO YOUR WORK, TAKE CARE OF THINGS AT HOME, OR GET ALONG WITH OTHER PEOPLE: VERY DIFFICULT
SUM OF ALL RESPONSES TO PHQ QUESTIONS 1-9: 10

## 2024-07-19 ENCOUNTER — TELEPHONE (OUTPATIENT)
Dept: FAMILY MEDICINE | Facility: OTHER | Age: 69
End: 2024-07-19

## 2024-07-19 ENCOUNTER — OFFICE VISIT (OUTPATIENT)
Dept: FAMILY MEDICINE | Facility: OTHER | Age: 69
End: 2024-07-19
Attending: NURSE PRACTITIONER
Payer: COMMERCIAL

## 2024-07-19 VITALS
DIASTOLIC BLOOD PRESSURE: 70 MMHG | SYSTOLIC BLOOD PRESSURE: 108 MMHG | WEIGHT: 178 LBS | HEART RATE: 73 BPM | BODY MASS INDEX: 30.76 KG/M2 | TEMPERATURE: 97 F | OXYGEN SATURATION: 96 %

## 2024-07-19 DIAGNOSIS — M54.50 CHRONIC BILATERAL LOW BACK PAIN WITHOUT SCIATICA: ICD-10-CM

## 2024-07-19 DIAGNOSIS — G89.29 CHRONIC BILATERAL LOW BACK PAIN WITHOUT SCIATICA: ICD-10-CM

## 2024-07-19 DIAGNOSIS — Z79.899 CONTROLLED SUBSTANCE AGREEMENT SIGNED: ICD-10-CM

## 2024-07-19 DIAGNOSIS — F41.9 CHRONIC ANXIETY: ICD-10-CM

## 2024-07-19 DIAGNOSIS — F41.0 PANIC ATTACK: ICD-10-CM

## 2024-07-19 PROCEDURE — G2211 COMPLEX E/M VISIT ADD ON: HCPCS | Performed by: NURSE PRACTITIONER

## 2024-07-19 PROCEDURE — G0463 HOSPITAL OUTPT CLINIC VISIT: HCPCS

## 2024-07-19 PROCEDURE — 99214 OFFICE O/P EST MOD 30 MIN: CPT | Performed by: NURSE PRACTITIONER

## 2024-07-19 RX ORDER — HYDROCODONE BITARTRATE AND ACETAMINOPHEN 10; 325 MG/1; MG/1
1 TABLET ORAL EVERY 4 HOURS PRN
Qty: 180 TABLET | Refills: 0 | Status: SHIPPED | OUTPATIENT
Start: 2024-07-19

## 2024-07-19 RX ORDER — CLONAZEPAM 1 MG/1
1 TABLET ORAL 3 TIMES DAILY PRN
Qty: 90 TABLET | Refills: 0 | Status: SHIPPED | OUTPATIENT
Start: 2024-07-19 | End: 2024-09-10

## 2024-07-19 RX ORDER — CLONAZEPAM 1 MG/1
1 TABLET ORAL 3 TIMES DAILY PRN
Qty: 90 TABLET | Refills: 0 | Status: SHIPPED | OUTPATIENT
Start: 2024-08-19

## 2024-07-19 RX ORDER — HYDROCODONE BITARTRATE AND ACETAMINOPHEN 10; 325 MG/1; MG/1
1 TABLET ORAL EVERY 4 HOURS PRN
Qty: 180 TABLET | Refills: 0 | Status: SHIPPED | OUTPATIENT
Start: 2024-08-19

## 2024-07-19 ASSESSMENT — PAIN SCALES - PAIN ENJOYMENT GENERAL ACTIVITY SCALE (PEG)
INTERFERED_GENERAL_ACTIVITY: 10 - COMPLETELY INTERFERES
AVG_PAIN_PASTWEEK: 7
INTERFERED_GENERAL_ACTIVITY: 10
INTERFERED_ENJOYMENT_LIFE: 8
PEG_TOTALSCORE: 8.33
AVG_PAIN_PASTWEEK: 7
PEG_TOTALSCORE: 8.33
INTERFERED_ENJOYMENT_LIFE: 8

## 2024-07-19 ASSESSMENT — PAIN SCALES - GENERAL: PAINLEVEL: MILD PAIN (3)

## 2024-07-19 NOTE — TELEPHONE ENCOUNTER
Called and spoke with Jacqui at [facility]. Informed her that one prescription has a start date of 7/19/24 and the other has a start date of 8/19/24.     Alisha Tabares RN on 7/19/2024 at 4:39 PM

## 2024-07-19 NOTE — TELEPHONE ENCOUNTER
Jacqui with [facility] pharmacy requests a call back regarding Clonazepam. Jacqui stated they received two prescription that are both dated for today and do not have a start date. Jacqui wonders if one is a duplicate or if one intended for next month.    Please ask for Jacqui.            Althea Mayer on 7/19/2024 at 4:23 PM

## 2024-07-19 NOTE — PROGRESS NOTES
Assessment & Plan   Problem List Items Addressed This Visit       Bilateral low back pain without sciatica    Relevant Medications    HYDROcodone-acetaminophen (NORCO)  MG per tablet    HYDROcodone-acetaminophen (NORCO)  MG per tablet (Start on 8/19/2024)    Chronic anxiety    Relevant Medications    clonazePAM (KLONOPIN) 1 MG tablet    clonazePAM (KLONOPIN) 1 MG tablet (Start on 8/19/2024)    Controlled substance agreement updated 9-7-22    Relevant Medications    clonazePAM (KLONOPIN) 1 MG tablet    clonazePAM (KLONOPIN) 1 MG tablet (Start on 8/19/2024)    Panic attack    Relevant Medications    clonazePAM (KLONOPIN) 1 MG tablet    clonazePAM (KLONOPIN) 1 MG tablet (Start on 8/19/2024)      The longitudinal plan of care for the diagnosis(es)/condition(s) as documented were addressed during this visit. Due to the added complexity in care, I will continue to support Malina in the subsequent management and with ongoing continuity of care.    1. Chronic bilateral low back pain without sciatica  - HYDROcodone-acetaminophen (NORCO)  MG per tablet; Take 1 tablet by mouth every 4 hours as needed for severe pain  Dispense: 180 tablet; Refill: 0  - HYDROcodone-acetaminophen (NORCO)  MG per tablet; Take 1 tablet by mouth every 4 hours as needed for severe pain  Dispense: 180 tablet; Refill: 0  Stable. See below -     2. Controlled substance agreement signed  3. Chronic anxiety  4. Panic attack  - clonazePAM (KLONOPIN) 1 MG tablet; Take 1 tablet (1 mg) by mouth 3 times daily as needed for anxiety  Dispense: 90 tablet; Refill: 0  - clonazePAM (KLONOPIN) 1 MG tablet; Take 1 tablet (1 mg) by mouth 3 times daily as needed for anxiety  Dispense: 90 tablet; Refill: 0  A one month supply plus 1 refill (2 total months) of both her klonopin and her norco were filled today which will get her 1 week beyond her appointment to establish care with Dr. Hall on 9/10/2024. She understands this. Pain contract will need  "to be updated at that time as well, along with urine drug screen.        BMI  Estimated body mass index is 30.76 kg/m  as calculated from the following:    Height as of 7/11/24: 1.62 m (5' 3.78\").    Weight as of this encounter: 80.7 kg (178 lb).       Work on weight loss  Regular exercise  See Patient Instructions    No follow-ups on file.      Subjective   Malina is a 69 year old, presenting for the following health issues:  Medication Follow-up        7/19/2024     9:56 AM   Additional Questions   Roomed by Lizbeth MICHELE CMA     History of Present Illness       Reason for visit:  Med Check    She eats 0-1 servings of fruits and vegetables daily.She consumes 0 sweetened beverage(s) daily.She exercises with enough effort to increase her heart rate 9 or less minutes per day.  She exercises with enough effort to increase her heart rate 3 or less days per week. She is missing 1 dose(s) of medications per week.  She is not taking prescribed medications regularly due to remembering to take.       Malina presents today for medication refills. It was recommended to by a specialist MD that she should establish with an MD, but unfortunately scheduling issues have caused new PCP to have to reschedule her upcoming appointments. Upcoming appointment with Dr. Hall on 9/10/2024 now.     She was found to have some blockage found on angiogram recently on 7/1/2024. She has seen Dr. Gramajo since with plans for intervention sometime after September. Per Dr. Gramajo's note it was found that she has a 75% lesion on her Lcx. She has been having some ongoing palpitations and chest wall discomfort which Dr. Gramajo is aware of those symptoms and there has not been a chance since their visit.     She presents to the clinic today to see me for hydrocodone and clonazepam refills. She is not out completely, has 1 day left. She has been on chronic pain medicine for years. Takes norco every 4 hours as needed, takes 6 tablets daily since 2003. She also takes " clonazepam 3 times daily. There are times she does not take it if she really does not need it so she has a few left over of these.        Pain History:  When did you first notice your pain? Years ago   Have you seen this provider for your pain in the past? Yes   Where in your body do you have pain? Back, shoulder, chest wall  Are you seeing anyone else for your pain? No        5/6/2024     4:20 PM 5/21/2024     9:39 AM 7/18/2024    10:36 AM   PHQ-9 SCORE   PHQ-9 Total Score MyChart 7 (Mild depression) 6 (Mild depression) 10 (Moderate depression)   PHQ-9 Total Score 7 6 10           5/6/2024     4:22 PM 5/21/2024     9:40 AM 7/18/2024    10:37 AM   YAYA-7 SCORE   Total Score 4 (minimal anxiety) 9 (mild anxiety) 9 (mild anxiety)   Total Score 4 9 9       sherin        5/6/2024     4:20 PM 5/21/2024     9:39 AM 7/18/2024    10:36 AM   PHQ-9 SCORE   PHQ-9 Total Score MyChart 7 (Mild depression) 6 (Mild depression) 10 (Moderate depression)   PHQ-9 Total Score 7 6 10           5/6/2024     4:22 PM 5/21/2024     9:40 AM 7/18/2024    10:37 AM   YAYA-7 SCORE   Total Score 4 (minimal anxiety) 9 (mild anxiety) 9 (mild anxiety)   Total Score 4 9 9           12/20/2023    12:57 PM 2/7/2024    10:39 AM 5/7/2024     9:44 AM   PEG Score   PEG Total Score 9.33 6 7.67       Chronic Pain Follow Up:    Location of pain: back, shoulders, chest wall  Analgesia/pain control:    - Recent changes:  heart issues limiting activity    - Overall control: Tolerable with discomfort    - Current treatments: pain meds   Adherence:     - Do you ever take more pain medicine than prescribed? No    - When did you take your last dose of pain medicine?  0900 today   Adverse effects: No     PDMP Review         Value Time User    State PDMP site checked  Yes 7/19/2024 10:25 AM Breana Morrison NP          Last CSA Agreement:   CSA -- Patient Level:     [Media Unavailable] Controlled Substance Agreement - Opioid - Scan on 9/5/2023  9:45 AM   [Media Unavailable]  Controlled Substance Agreement - Opioid - Scan on 9/7/2022 11:36 AM   [Media Unavailable] Controlled Substance Agreement - Opioid - Scan on 9/29/2021  2:26 PM       Last UDS: 9/7/2023          Review of Systems  Constitutional, HEENT, cardiovascular, pulmonary, GI, , musculoskeletal, neuro, skin, endocrine and psych systems are negative, except as otherwise noted.      Objective    /70   Pulse 73   Temp 97  F (36.1  C) (Tympanic)   Wt 80.7 kg (178 lb)   LMP 04/29/1978 (Approximate)   SpO2 96%   BMI 30.76 kg/m    Body mass index is 30.76 kg/m .  Physical Exam  Vitals and nursing note reviewed.   Constitutional:       General: She is not in acute distress.     Appearance: Normal appearance. She is not ill-appearing.   Cardiovascular:      Rate and Rhythm: Normal rate and regular rhythm.      Heart sounds: Normal heart sounds. No murmur heard.  Pulmonary:      Effort: Pulmonary effort is normal. No respiratory distress.      Breath sounds: Normal breath sounds. No wheezing.   Musculoskeletal:         General: Normal range of motion.      Cervical back: Normal range of motion and neck supple.   Skin:     General: Skin is warm and dry.   Neurological:      General: No focal deficit present.      Mental Status: She is alert and oriented to person, place, and time.   Psychiatric:         Mood and Affect: Mood normal.         Behavior: Behavior normal.          Signed Electronically by: Breana Morrison NP

## 2024-07-19 NOTE — NURSING NOTE
"Chief Complaint   Patient presents with    Medication Follow-up     Patient here for medication refills of Hydrocodone and Klonopin. She has artery blockages she just found out about, so her activity is limited.  Initial /70   Pulse 73   Temp 97  F (36.1  C) (Tympanic)   Wt 80.7 kg (178 lb)   LMP 04/29/1978 (Approximate)   SpO2 96%   BMI 30.76 kg/m   Estimated body mass index is 30.76 kg/m  as calculated from the following:    Height as of 7/11/24: 1.62 m (5' 3.78\").    Weight as of this encounter: 80.7 kg (178 lb).  Medication Review: complete    The next two questions are to help us understand your food security.  If you are feeling you need any assistance in this area, we have resources available to support you today.          12/20/2023   SDOH- Food Insecurity   Within the past 12 months, did you worry that your food would run out before you got money to buy more? N   Within the past 12 months, did the food you bought just not last and you didn t have money to get more? N            Health Care Directive:  Patient has a Health Care Directive on file      Lizbeth Dimas MA      "

## 2024-07-26 NOTE — TELEPHONE ENCOUNTER
TRINTELLIX 20 MG tablet 90 tablet 4 5/24/2023 -- No   Sig: TAKE 1 TABLET BY MOUTH EVERY DAY   Sent to pharmacy as: Trintellix 20 MG Oral Tablet (vortioxetine)   Class: E-Prescribe   Order: [id number]   E-Prescribing Status: Receipt confirmed by pharmacy (5/24/2023  2:49 PM CDT)     [id number] - [place], [place] - [address]     Refused, with note to pharmacy to transfer prescriptions. Kathleen Puckett RN .............. 7/26/2024  10:40 AM

## 2024-08-28 NOTE — TELEPHONE ENCOUNTER
"Per Paul Gramajo, DO: \"He/interventional is at [facility] had discussed doing this in sometime in September.  I would have her check back with us in a few weeks/mid September.  I do not believe it is available yet.\"    Patient notified of this and verbalized understanding.  Jamilah Ray RN......August 28, 2024...12:54 PM   "

## 2024-08-28 NOTE — TELEPHONE ENCOUNTER
Patient states she is not doing well with her symptoms that she has had from before and is wanting to get the drug coated balloon procedure done as soon as she can.  She says she hasn't heard from anyone about this.  To Paul Gramajo, DO to review and advise.  Jamilah Ray RN......August 28, 2024...12:12 PM

## 2024-09-09 ASSESSMENT — ANXIETY QUESTIONNAIRES
GAD7 TOTAL SCORE: 9
GAD7 TOTAL SCORE: 9
8. IF YOU CHECKED OFF ANY PROBLEMS, HOW DIFFICULT HAVE THESE MADE IT FOR YOU TO DO YOUR WORK, TAKE CARE OF THINGS AT HOME, OR GET ALONG WITH OTHER PEOPLE?: SOMEWHAT DIFFICULT
GAD7 TOTAL SCORE: 9
7. FEELING AFRAID AS IF SOMETHING AWFUL MIGHT HAPPEN: SEVERAL DAYS

## 2024-09-10 ENCOUNTER — OFFICE VISIT (OUTPATIENT)
Dept: INTERNAL MEDICINE | Facility: OTHER | Age: 69
End: 2024-09-10
Attending: STUDENT IN AN ORGANIZED HEALTH CARE EDUCATION/TRAINING PROGRAM
Payer: COMMERCIAL

## 2024-09-10 VITALS
HEIGHT: 64 IN | HEART RATE: 89 BPM | SYSTOLIC BLOOD PRESSURE: 132 MMHG | WEIGHT: 179 LBS | BODY MASS INDEX: 30.56 KG/M2 | OXYGEN SATURATION: 95 % | TEMPERATURE: 97.7 F | DIASTOLIC BLOOD PRESSURE: 70 MMHG | RESPIRATION RATE: 20 BRPM

## 2024-09-10 DIAGNOSIS — F41.0 PANIC ATTACK: ICD-10-CM

## 2024-09-10 DIAGNOSIS — M54.50 CHRONIC BILATERAL LOW BACK PAIN WITHOUT SCIATICA: ICD-10-CM

## 2024-09-10 DIAGNOSIS — F41.9 CHRONIC ANXIETY: ICD-10-CM

## 2024-09-10 DIAGNOSIS — K59.09 CHRONIC CONSTIPATION: Primary | ICD-10-CM

## 2024-09-10 DIAGNOSIS — R11.0 NAUSEA: ICD-10-CM

## 2024-09-10 DIAGNOSIS — G89.29 CHRONIC BILATERAL LOW BACK PAIN WITHOUT SCIATICA: ICD-10-CM

## 2024-09-10 DIAGNOSIS — Z79.899 CONTROLLED SUBSTANCE AGREEMENT SIGNED: ICD-10-CM

## 2024-09-10 PROCEDURE — G0463 HOSPITAL OUTPT CLINIC VISIT: HCPCS | Mod: 25

## 2024-09-10 PROCEDURE — G2211 COMPLEX E/M VISIT ADD ON: HCPCS | Performed by: STUDENT IN AN ORGANIZED HEALTH CARE EDUCATION/TRAINING PROGRAM

## 2024-09-10 PROCEDURE — 99214 OFFICE O/P EST MOD 30 MIN: CPT | Performed by: STUDENT IN AN ORGANIZED HEALTH CARE EDUCATION/TRAINING PROGRAM

## 2024-09-10 PROCEDURE — 90662 IIV NO PRSV INCREASED AG IM: CPT

## 2024-09-10 RX ORDER — ONDANSETRON 4 MG/1
4 TABLET, FILM COATED ORAL EVERY 6 HOURS PRN
Qty: 90 TABLET | Refills: 3 | Status: SHIPPED | OUTPATIENT
Start: 2024-09-10

## 2024-09-10 RX ORDER — HYDROCODONE BITARTRATE AND ACETAMINOPHEN 10; 325 MG/1; MG/1
1 TABLET ORAL EVERY 4 HOURS PRN
Qty: 180 TABLET | Refills: 0 | Status: SHIPPED | OUTPATIENT
Start: 2024-10-17

## 2024-09-10 RX ORDER — CLONAZEPAM 1 MG/1
1 TABLET ORAL 3 TIMES DAILY PRN
Qty: 90 TABLET | Refills: 2 | Status: SHIPPED | OUTPATIENT
Start: 2024-09-18

## 2024-09-10 RX ORDER — HYDROCODONE BITARTRATE AND ACETAMINOPHEN 10; 325 MG/1; MG/1
1 TABLET ORAL EVERY 4 HOURS PRN
Qty: 180 TABLET | Refills: 0 | Status: SHIPPED | OUTPATIENT
Start: 2024-11-16

## 2024-09-10 RX ORDER — HYDROCODONE BITARTRATE AND ACETAMINOPHEN 10; 325 MG/1; MG/1
1 TABLET ORAL EVERY 4 HOURS PRN
Qty: 180 TABLET | Refills: 0 | Status: SHIPPED | OUTPATIENT
Start: 2024-09-18

## 2024-09-10 ASSESSMENT — PAIN SCALES - GENERAL: PAINLEVEL: NO PAIN (0)

## 2024-09-10 NOTE — NURSING NOTE
"Chief Complaint   Patient presents with    Follow Up     Palpitations        Initial /70   Pulse 89   Temp 97.7  F (36.5  C) (Temporal)   Resp 20   Ht 1.62 m (5' 3.78\")   Wt 81.2 kg (179 lb)   LMP 04/29/1978 (Approximate)   SpO2 95%   Breastfeeding No   BMI 30.94 kg/m   Estimated body mass index is 30.94 kg/m  as calculated from the following:    Height as of this encounter: 1.62 m (5' 3.78\").    Weight as of this encounter: 81.2 kg (179 lb).  Medication Review: complete    The next two questions are to help us understand your food security.  If you are feeling you need any assistance in this area, we have resources available to support you today.          12/20/2023   SDOH- Food Insecurity   Within the past 12 months, did you worry that your food would run out before you got money to buy more? N   Within the past 12 months, did the food you bought just not last and you didn t have money to get more? N            Health Care Directive:  Patient has a Health Care Directive on file      Melodie Chaidez LPN      "

## 2024-09-10 NOTE — PROGRESS NOTES
Assessment & Plan         ICD-10-CM    1. Chronic constipation  K59.09       2. Nausea  R11.0 ondansetron (ZOFRAN) 4 MG tablet      3. Controlled substance agreement signed  Z79.899 clonazePAM (KLONOPIN) 1 MG tablet      4. Chronic anxiety  F41.9 clonazePAM (KLONOPIN) 1 MG tablet      5. Panic attack  F41.0 clonazePAM (KLONOPIN) 1 MG tablet      6. Chronic bilateral low back pain without sciatica  M54.50 HYDROcodone-acetaminophen (NORCO)  MG per tablet    G89.29 HYDROcodone-acetaminophen (NORCO)  MG per tablet     HYDROcodone-acetaminophen (NORCO)  MG per tablet        Back pain, controlled substance agreement: She is requesting to increase her pain medication.  I told her that I do not increase Nadia pain medicine on the first time meeting them and that I also am not taking new patients and she will need to establish with a partner as I will be getting a new job in the hospital.  We discussed the dangers of benzodiazepines and opiates at length today and encouraged her to continue trying to taper off her benzodiazepine.  She would like to try to switch to Xanax.  I told her that she can discuss this with her new primary care doctor.  CMP reviewed and appropriate.  Discussed dangers of opiates and benzos at length.  She will follow-up with myself or new primary care physician in 3 months for further medication refills.  Will need a new pain contract at that time.    Constipation: Likely due to her opiates, chronic constipation all her life as well as frequent use of ondansetron.  Encouraged her to use a fiber supplement daily as well as MiraLAX as needed for constipation.    The longitudinal plan of care for the diagnosis(es)/condition(s) as documented were addressed during this visit. Due to the added complexity in care, I will continue to support Malina in the subsequent management and with ongoing continuity of care.        No follow-ups on file.    Jackson Hall MD  [facility] AND  [facility]      Subjective   Malina is a 69 year old, presenting for the following health issues:  Follow Up (Palpitations )        9/10/2024     3:35 PM   Additional Questions   Roomed by Jessica Chaidez LPN     History of Present Illness       Back Pain:  She presents for follow up of back pain. Patient's back pain is a recurring problem.  Location of back pain:  Left lower back, right middle of back, left middle of back, right upper back, left upper back, right shoulder, right hip and left hip  Description of back pain: stabbing  Back pain spreads: right knee and left knee    Since patient first noticed back pain, pain is: gradually worsening  Does back pain interfere with her job:  Yes       CKD: She uses over the counter pain medication, including Doans, a few times a week.    Mental Health Follow-up:  Patient presents to follow-up on Depression & Anxiety.Patient's depression since last visit has been:  Medium  The patient is not having other symptoms associated with depression.  Patient's anxiety since last visit has been:  Worse  The patient is having other symptoms associated with anxiety.  Any significant life events: health concerns  Patient is feeling anxious or having panic attacks.  Patient has no concerns about alcohol or drug use.    Heart Failure:  She presents for follow up of heart failure. She is experiencing shortness of breath with rest and activity, which is much worse. She is not experiencing any lower extremity edema.   She denies orthopenea and is not coughing at night. Patient is not checking weight daily. She has recently had a weight decrease.  She has no side effects from medications.  She has had no other medical visits for heart failure since the last visit.    Hyperlipidemia:  She presents for follow up of hyperlipidemia.   She is taking medication to lower cholesterol. She is not having myalgia or other side effects to statin medications.    Vascular Disease:  She presents for follow up of  "vascular disease.    She takes nitroglycerin a few times monthly.  She is not taking daily aspirin.    She eats 0-1 servings of fruits and vegetables daily.She consumes 0 sweetened beverage(s) daily.She exercises with enough effort to increase her heart rate 9 or less minutes per day.  She exercises with enough effort to increase her heart rate 3 or less days per week.   She is taking medications regularly.     Last Echo:   Echo result w/o MOPS: Interpretation SummaryGlobal and regional left ventricular function is normal with an EF of 55-60%.Mild concentric wall thickening consistent with left ventricular hypertrophyis present.Left ventricular diastolic function is indeterminate.Global right ventricular function is normal.No significant valvular abnormalities present.IVC diameter <2.1 cm collapsing >50% with sniff suggests a normal RA pressureof 3 mmHg.No pericardial effusion is present.No significant changes noted.      She will be going to [facility] for heart procedure. Angiogram. A procedure that was only legal as of 9/1/24. Unsure why.     Takes hydrocodone 6 per day.   Takes ondansetron a few times per week.     Has a bowel movement every 2-4 days, sometimes longer.                   Objective    /70   Pulse 89   Temp 97.7  F (36.5  C) (Temporal)   Resp 20   Ht 1.62 m (5' 3.78\")   Wt 81.2 kg (179 lb)   LMP 04/29/1978 (Approximate)   SpO2 95%   Breastfeeding No   BMI 30.94 kg/m    Body mass index is 30.94 kg/m .  Physical Exam               Signed Electronically by: Jackson Hall MD    "

## 2024-09-11 NOTE — Clinical Note
Catheter removed over wire. 78M with hx of HTN, HLD, Grade II diastolic dysfunction, carotid disease, recently uncontrolled diabetes, moderately dilated left atrium. Patient comes to the office for a follow up visit after being found to have New Onset Atrial Fibrillation at his PCP's office in July(Dr. Avila). He was at his PCP's office regarding a clearance for an upcoming (MILD) lumbar spinal stenosis procedure tentatively scheduled for .  He was recommended to follow up with Cardiology prior to being given clearance for surgery. Patient admits to feeling more fatigued these last few days, however, he denies associated symptoms such as exertional chest pain, no palpitations, no CROWLEY, no PND, no orthopnea, no leg edema, no claudication, no syncope.   Outpatient HM confirmed PAF with intermittent RVR and periods of sinus rhythm (AF burden and Avg HR in AF are unclear). Subsequently found to be in AFlutter by PCP and sent to ED.  ?  ?  Cardiology Summary          EC23: NSR  23: NSR  3/28/24: NSR  24: New Onset Atrial Fibrillation with RVR    Echo: TTE 10/9/23: 1. Normal biventricular systolic function. 2. There is moderate (grade 2) left ventricular diastolic dysfunction, with elevated filling pressure. 3. The left atrium is moderately dilated in size.4. The right atrium is dilated in size. 5. Mild mitral regurgitation.  CONCLUSIONS:    1. Left ventricular systolic function is normal with an ejection fraction visually estimated at 65 to 70 %. There are no regional wall motion abnormalities seen.  2. Elevated left ventricular filling pressure. Analysis of left ventricular diastolic function and filling pressure is made challenging by the presence of atrial fibrillation.  3. Mild left ventricular hypertrophy.  4. Normal right ventricular cavity size and normal right ventricular systolic function.  5. Left atrium is moderately dilated.  6. Mild tricuspid regurgitation.  7. No prior echocardiogram is available for comparison. 78M with hx of former smoker (quit 35 years ago; smoked 5 years 1PPD; HTN, HLD, DM, Grade II diastolic dysfunction, carotid disease, recently uncontrolled diabetes, moderately dilated left atrium, right hip replacement (); right shoulder surgery; b/l cataract surgery. Patient comes to the office for a follow up visit after being found to have New Onset Atrial Fibrillation at his PCP's office in July(Dr. Avila). He was at his PCP's office regarding a clearance for an upcoming (MILD) lumbar spinal stenosis procedure tentatively scheduled for .  Patient was recommended to be seen by cardiologist. He followed with Dr. Danielle and his metoprolol was increased to 100mg BID and started on Eliquis.  Outpatient HM confirmed PAF with intermittent RVR and periods of sinus rhythm (AF burden and Avg HR in AF are unclear).   Patient returned to Dr. Avila's office in 2024 for regular routine physical and was found to be in Aflutter RVR 150s and was sent to ER. Patient received IV metoprolol x1 in ER and he converted to SR. Patient was seen by EP team during hospitalization. Considered trial of Multaq, however, carries increased cost ($800) and will require prior authorization before considering. Patient remains on metoprolol succinate 100mg BID and Eliquis 5mg BID. TTE performed 24 LVEF 65-70%; no RWMA;  Elevated left ventricular filling pressure;  Normal right ventricular cavity size and normal right ventricular systolic function; mild TR. Patient has not had an ischemic work up for over 10 years. Patient is pending NST tomorrow on 24. Patient remains asymptomatic with no current complaints of chest pain, chest pressure, shortness of breath, palpitations, dizziness, passing out or swelling in the legs. Patient is compliant with his eliquis. He reports no hemataemesis, hematochezia, BRBPR, or melena. He bruises easily. He presents to PST today for scheduled CLARISA/DCCV on 24.   ?  Cardiology Summary   ECG 24 Afib rate 103  24 TTE:  LVEF 65-70%; no RWMA;  Elevated left ventricular filling pressure;  Normal right ventricular cavity size and normal right ventricular systolic function; mild TR  EC23: NSR  23: NSR  3/28/24: NSR  24: New Onset Atrial Fibrillation with RVR    Echo: TTE 10/9/23: 1. Normal biventricular systolic function. 2. There is moderate (grade 2) left ventricular diastolic dysfunction, with elevated filling pressure. 3. The left atrium is moderately dilated in size.4. The right atrium is dilated in size. 5. Mild mitral regurgitation.    LABS: 78M with hx of former smoker (quit 35 years ago; smoked 5 years 1PPD; HTN, HLD, DM, Grade II diastolic dysfunction, carotid disease, recently uncontrolled diabetes, moderately dilated left atrium, right hip replacement (); right shoulder surgery; b/l cataract surgery. Patient comes to the office for a follow up visit after being found to have New Onset Atrial Fibrillation at his PCP's office in July(Dr. Avila). He was at his PCP's office regarding a clearance for an upcoming (MILD) lumbar spinal stenosis procedure tentatively scheduled for .  Patient was recommended to be seen by cardiologist. He followed with Dr. Danielle and his metoprolol was increased to 100mg BID and started on Eliquis.  Outpatient 24 hour HM confirmed PAF with intermittent RVR and periods of sinus rhythm (AF burden and Avg HR in AF are unclear).   Patient returned to Dr. Avila's office in 2024 for regular routine physical and was found to be in Aflutter RVR 150s and was sent to ER. Patient received IV metoprolol x1 in ER and he converted to SR. Patient was seen by EP team during hospitalization. Considered trial of Multaq, however, carries increased cost ($800) and will require prior authorization before considering. Patient remains on metoprolol succinate 100mg BID and Eliquis 5mg BID. TTE performed 24 LVEF 65-70%; no RWMA;  Elevated left ventricular filling pressure;  Normal right ventricular cavity size and normal right ventricular systolic function; mild TR. Patient has not had an ischemic work up for over 10 years. Patient is pending NST tomorrow on 24. Patient remains asymptomatic with no current complaints of chest pain, chest pressure, shortness of breath, palpitations, dizziness, passing out or swelling in the legs. Patient is compliant with his eliquis. He reports no hematemesis, hematochezia, BRBPR, or melena. He bruises easily. He presents to PST today for scheduled CLAIRSA/DCCV on 24.   ?  Cardiology Summary   ECG 24 Afib rate 103  24 TTE:  LVEF 65-70%; no RWMA;  Elevated left ventricular filling pressure;  Normal right ventricular cavity size and normal right ventricular systolic function; mild TR  EC23: NSR  23: NSR  3/28/24: NSR  24: New Onset Atrial Fibrillation with RVR    Echo: TTE 10/9/23: 1. Normal biventricular systolic function. 2. There is moderate (grade 2) left ventricular diastolic dysfunction, with elevated filling pressure. 3. The left atrium is moderately dilated in size.4. The right atrium is dilated in size. 5. Mild mitral regurgitation.    LABS:   78M with hx of former smoker (quit 35 years ago; smoked 5 years 1PPD; HTN, HLD, DM, Grade II diastolic dysfunction, carotid disease, recently uncontrolled diabetes, moderately dilated left atrium, right hip replacement (); right shoulder surgery; b/l cataract surgery. Patient comes to the office for a follow up visit after being found to have New Onset Atrial Fibrillation at his PCP's office in July(Dr. Avila). He was at his PCP's office regarding a clearance for an upcoming (MILD) lumbar spinal stenosis procedure tentatively scheduled for .  Patient was recommended to be seen by cardiologist. He followed with Dr. Danielle and his metoprolol was increased to 100mg BID and started on Eliquis.  Outpatient 24 hour HM confirmed PAF with intermittent RVR and periods of sinus rhythm (AF burden and Avg HR in AF are unclear).   Patient returned to Dr. Avila's office in 2024 for regular routine physical and was found to be in Aflutter RVR 150s and was sent to ER. Patient received IV metoprolol x1 in ER and he converted to SR. Patient was seen by EP team during hospitalization. Considered trial of Multaq, however, carries increased cost ($800) and will require prior authorization before considering. Patient remains on metoprolol succinate 100mg BID and Eliquis 5mg BID. TTE performed 24 LVEF 65-70%; no RWMA;  Elevated left ventricular filling pressure;  Normal right ventricular cavity size and normal right ventricular systolic function; mild TR. Patient has not had an ischemic work up for over 10 years. Patient is pending NST tomorrow on 24. Patient remains asymptomatic with no current complaints of chest pain, chest pressure, shortness of breath, palpitations, dizziness, passing out or swelling in the legs. Patient is compliant with his eliquis. He reports no hematemesis, hematochezia, BRBPR, or melena. He bruises easily. He presents to PST today for scheduled CLARISA/DCCV on 24.   ?  Cardiology Summary   ECG 24 Afib rate 103  24 TTE:  LVEF 65-70%; no RWMA;  Elevated left ventricular filling pressure;  Normal right ventricular cavity size and normal right ventricular systolic function; mild TR  EC23: NSR  23: NSR  3/28/24: NSR  24: New Onset Atrial Fibrillation with RVR    Echo: TTE 10/9/23: 1. Normal biventricular systolic function. 2. There is moderate (grade 2) left ventricular diastolic dysfunction, with elevated filling pressure. 3. The left atrium is moderately dilated in size.4. The right atrium is dilated in size. 5. Mild mitral regurgitation.    LABS:                        14.7   6.25  )-----------( 271      ( 11 Sep 2024 18:45 )             42.6         142  |  103  |  27.2<H>  ----------------------------<  190<H>  3.7   |  23.0  |  0.88    Ca    10.1      11 Sep 2024 18:45  Mg     1.8         TPro  7.0  /  Alb  4.2  /  TBili  0.4  /  DBili  x   /  AST  14  /  ALT  19  /  AlkPhos  54  -11    PT/INR - ( 11 Sep 2024 18:45 )   PT: 13.2 sec;   INR: 1.20 ratio         PTT - ( 11 Sep 2024 18:45 )  PTT:33.3 sec

## 2024-09-13 ENCOUNTER — TELEPHONE (OUTPATIENT)
Dept: CARDIOLOGY | Facility: CLINIC | Age: 69
End: 2024-09-13
Payer: COMMERCIAL

## 2024-09-13 NOTE — TELEPHONE ENCOUNTER
"Patient calls in regard to getting scheduled for an upcoming procedure she has previously discussed with Dr. Gramajo. Currently we are waiting to hear back from the [facility] to schedule as that is where the procedure will be done. Patient concerned about her ongoing symptoms. Palpitations, chest pain, and CORTEZ. Patient states she is not having any chest pain today but states she has had to take her nitro 2x last week and 1x this week, resulting in relief of chest pain each time. Patent states there is \"no use in coming in if the nitroglycerin works.\" Patient educated on calling 911 if symptoms worsen or are not relived with nitroglycerin. Patient states she also called the [facility] today and is awaiting call back to see when her procedure will be scheduled. Patient states if she does not get a call back from the [facility] today she will call back to [facility] on Monday.   Cordelia Adame RN on 9/13/2024 at 9:53 AM'  "

## 2024-09-13 NOTE — TELEPHONE ENCOUNTER
Health Call Center    Phone Message    May a detailed message be left on voicemail: yes     Reason for Call: Other: Patient called requesting to speak with a member of her care team. Patient states she was supposed to hear back in regards to a new procedure that has recently been legalized. Please call back to further discuss.     Action Taken: Message routed to:  Other: Cardiology    Travel Screening: Not Applicable     Thank you!  [facility]

## 2024-09-16 ENCOUNTER — TELEPHONE (OUTPATIENT)
Dept: CARDIOLOGY | Facility: OTHER | Age: 69
End: 2024-09-16
Payer: COMMERCIAL

## 2024-09-16 NOTE — TELEPHONE ENCOUNTER
See phone notes from 7/12/24 and 9/13/24.  To Paul Gramajo, DO to review and advise.  Jamilah Ray RN......September 16, 2024...2:39 PM

## 2024-09-16 NOTE — TELEPHONE ENCOUNTER
Calling about surgery.  She has not heard anything.  Please call.    Corky Flores on 9/16/2024 at 8:33 AM

## 2024-09-17 NOTE — TELEPHONE ENCOUNTER
"Per Paul Gramajo, DO: \"I will reach out to Dr. Mcclelland who previously did a cardiac catheterization on 7/1/2024 to see if the drug-coated balloons are available.  I believe they were supposed to be available for use sometime in September.  I did send a message to this interventional cardiologist to see if it something that she could be set up for now.  Will let you know when I hear something back.     Dr. Gramajo\"    Patient notified of this and verbalized understanding.  She states she is \"not doing good.\"  Each week she says she is getting worse but the nitroglycerin is working.  She notes that she gets up to let her cat out and goes to sit back down and is winded.  Jamilah Ray RN......September 17, 2024...10:55 AM   "

## 2024-09-18 ENCOUNTER — TELEPHONE (OUTPATIENT)
Dept: CARDIOLOGY | Facility: CLINIC | Age: 69
End: 2024-09-18
Payer: COMMERCIAL

## 2024-09-18 DIAGNOSIS — Z98.890 STATUS POST CORONARY ANGIOGRAM: ICD-10-CM

## 2024-09-18 DIAGNOSIS — I25.10 PRESENCE OF STENT IN CORONARY ARTERY IN PATIENT WITH CORONARY ARTERY DISEASE: ICD-10-CM

## 2024-09-18 DIAGNOSIS — R07.9 CHEST PAIN, UNSPECIFIED TYPE: Primary | ICD-10-CM

## 2024-09-18 DIAGNOSIS — I25.10 ASCVD (ARTERIOSCLEROTIC CARDIOVASCULAR DISEASE): ICD-10-CM

## 2024-09-18 DIAGNOSIS — R06.09 DOE (DYSPNEA ON EXERTION): ICD-10-CM

## 2024-09-18 DIAGNOSIS — I25.708 ATHEROSCLEROSIS OF CORONARY ARTERY BYPASS GRAFT OF NATIVE HEART WITH STABLE ANGINA PECTORIS (H): ICD-10-CM

## 2024-09-18 DIAGNOSIS — Z95.5 PRESENCE OF STENT IN CORONARY ARTERY IN PATIENT WITH CORONARY ARTERY DISEASE: ICD-10-CM

## 2024-09-18 DIAGNOSIS — I25.9 CHRONIC ISCHEMIC HEART DISEASE: ICD-10-CM

## 2024-09-18 DIAGNOSIS — Z95.1 HISTORY OF CORONARY ARTERY BYPASS GRAFT X 3: ICD-10-CM

## 2024-09-18 RX ORDER — POTASSIUM CHLORIDE 1500 MG/1
40 TABLET, EXTENDED RELEASE ORAL
Status: CANCELLED | OUTPATIENT
Start: 2024-09-18

## 2024-09-18 RX ORDER — POTASSIUM CHLORIDE 1500 MG/1
20 TABLET, EXTENDED RELEASE ORAL
Status: CANCELLED | OUTPATIENT
Start: 2024-09-18

## 2024-09-18 RX ORDER — LIDOCAINE 40 MG/G
CREAM TOPICAL
Status: CANCELLED | OUTPATIENT
Start: 2024-09-18

## 2024-09-18 RX ORDER — SODIUM CHLORIDE 9 MG/ML
INJECTION, SOLUTION INTRAVENOUS CONTINUOUS
Status: CANCELLED | OUTPATIENT
Start: 2024-09-18

## 2024-09-18 NOTE — CONFIDENTIAL NOTE
Pre-procedure instructions - Coronary Angiogram  Patient Education    Your arrival time is 9 am.  Location is [address] Waiting Room  Please plan on being at the hospital all day.  At any time, emergencies and/or urgent cases may come up which could delay the start of your procedure.    Pre-procedure instructions - Coronary Angiogram  Shower in the evening before or the morning of the procedure  No solid food for 8 hours prior and nothing to drink 2 hours prior to arrival time  You can take your morning medications (except for diabetic and blood thinners) with sips of water.  Take 325 mg of Aspirin the night before and the morning of your procedure.  You will need to arrange a ride to drop you off and , as you will be unable to drive home. Prior to discharge you may be required to lay flat for approximately 2-6 hours in the recovery unit to ensure proper clotting of the artery. Please note: You cannot take an Uber/Taxi/etc unless you are accompanied by someone.You will need a responsible adult to stay with you for 24 hours post-procedure.              Diabetic Medication Instructions  Hold oral diabetic medication in morning of your procedure and for 48 hours after IV contrast is given  Typical instructions for insulin diabetic medication holding are below. However, please reach out to your Primary Care Provider or Endocrinologist for specific instructions  DO NOT take any oral diabetic medication, short-acting diabetes medications/insulin, humalog or regular insulin the morning of your test  Take   dose of long-acting insulin (Lantus, Levemir) the day of your test  Remember to bring your glucometer and insulin with you to take after your test if needed  GLP-1 Agonists Instructions  DO NOT take injectable GLP-1 agonists semaglutide (Ozempic, Wegovy), dulaglutide (Trulicity), exenatide ER (Bydureon), tirzepatide  (Mounjaro), or oral semaglutide (Rybelsus) for 7 days prior your procedure  Hold once daily injectable GLP-1 agonists exenatide (Byetta), liraglutide (Saxenda, Victoza), lixisenatide (Soligua) the day before and day of your procedure                  Anticoagulation Medication Instructions   NA  Write N/A if not currently taking    You will need to follow up with one of our cardiology APPs 1-2 weeks after your procedure. If you need help scheduling or rescheduling your appointment, please call [phone]     Patient states that she understands and agrees to the instructions and plans.

## 2024-09-18 NOTE — TELEPHONE ENCOUNTER
Spoke to Ngoc at [facility] lab scheduling and she got patient on the schedule for next Tuesday the 24th.  Jamilah Ray RN......September 18, 2024...9:23 AM

## 2024-09-23 DIAGNOSIS — I25.708 ATHEROSCLEROSIS OF CORONARY ARTERY BYPASS GRAFT OF NATIVE HEART WITH STABLE ANGINA PECTORIS (H): ICD-10-CM

## 2024-09-23 DIAGNOSIS — I25.10 ASCVD (ARTERIOSCLEROTIC CARDIOVASCULAR DISEASE): ICD-10-CM

## 2024-09-23 DIAGNOSIS — Z86.73 HISTORY OF CVA (CEREBROVASCULAR ACCIDENT): ICD-10-CM

## 2024-09-23 DIAGNOSIS — Z95.1 HISTORY OF CORONARY ARTERY BYPASS GRAFT X 3: ICD-10-CM

## 2024-09-23 DIAGNOSIS — R07.9 CHEST PAIN, UNSPECIFIED TYPE: Primary | ICD-10-CM

## 2024-09-23 DIAGNOSIS — Z98.890 STATUS POST CORONARY ANGIOGRAM: ICD-10-CM

## 2024-09-23 DIAGNOSIS — Z87.891 HISTORY OF TOBACCO ABUSE: ICD-10-CM

## 2024-09-23 DIAGNOSIS — I25.10 PRESENCE OF STENT IN CORONARY ARTERY IN PATIENT WITH CORONARY ARTERY DISEASE: ICD-10-CM

## 2024-09-23 DIAGNOSIS — Z95.5 PRESENCE OF STENT IN CORONARY ARTERY IN PATIENT WITH CORONARY ARTERY DISEASE: ICD-10-CM

## 2024-09-23 DIAGNOSIS — I25.9 CHRONIC ISCHEMIC HEART DISEASE: ICD-10-CM

## 2024-09-23 DIAGNOSIS — R06.09 DOE (DYSPNEA ON EXERTION): ICD-10-CM

## 2024-09-23 RX ORDER — POTASSIUM CHLORIDE 1500 MG/1
20 TABLET, EXTENDED RELEASE ORAL
Status: CANCELLED | OUTPATIENT
Start: 2024-09-23

## 2024-09-23 RX ORDER — LIDOCAINE 40 MG/G
CREAM TOPICAL
Status: CANCELLED | OUTPATIENT
Start: 2024-09-23

## 2024-09-23 RX ORDER — POTASSIUM CHLORIDE 1500 MG/1
40 TABLET, EXTENDED RELEASE ORAL
Status: CANCELLED | OUTPATIENT
Start: 2024-09-23

## 2024-09-23 RX ORDER — SODIUM CHLORIDE 9 MG/ML
INJECTION, SOLUTION INTRAVENOUS CONTINUOUS
Status: CANCELLED | OUTPATIENT
Start: 2024-09-23

## 2024-09-24 ENCOUNTER — APPOINTMENT (OUTPATIENT)
Dept: MEDSURG UNIT | Facility: CLINIC | Age: 69
End: 2024-09-24
Attending: INTERNAL MEDICINE
Payer: COMMERCIAL

## 2024-09-24 ENCOUNTER — APPOINTMENT (OUTPATIENT)
Dept: LAB | Facility: CLINIC | Age: 69
End: 2024-09-24
Attending: INTERNAL MEDICINE
Payer: COMMERCIAL

## 2024-09-24 ENCOUNTER — HOSPITAL ENCOUNTER (OUTPATIENT)
Facility: CLINIC | Age: 69
Discharge: HOME | End: 2024-09-24
Attending: INTERNAL MEDICINE | Admitting: INTERNAL MEDICINE
Payer: COMMERCIAL

## 2024-09-24 VITALS
TEMPERATURE: 97.7 F | SYSTOLIC BLOOD PRESSURE: 97 MMHG | RESPIRATION RATE: 18 BRPM | OXYGEN SATURATION: 90 % | DIASTOLIC BLOOD PRESSURE: 62 MMHG | BODY MASS INDEX: 30.18 KG/M2 | WEIGHT: 174.6 LBS | HEART RATE: 58 BPM

## 2024-09-24 DIAGNOSIS — Z98.890 S/P CORONARY ANGIOGRAM: Primary | ICD-10-CM

## 2024-09-24 DIAGNOSIS — Z87.891 HISTORY OF TOBACCO ABUSE: ICD-10-CM

## 2024-09-24 DIAGNOSIS — R07.9 CHEST PAIN, UNSPECIFIED TYPE: ICD-10-CM

## 2024-09-24 DIAGNOSIS — Z98.890 STATUS POST CORONARY ANGIOGRAM: ICD-10-CM

## 2024-09-24 DIAGNOSIS — Z95.5 PRESENCE OF STENT IN CORONARY ARTERY IN PATIENT WITH CORONARY ARTERY DISEASE: ICD-10-CM

## 2024-09-24 DIAGNOSIS — I25.9 CHRONIC ISCHEMIC HEART DISEASE: ICD-10-CM

## 2024-09-24 DIAGNOSIS — Z95.1 HISTORY OF CORONARY ARTERY BYPASS GRAFT X 3: ICD-10-CM

## 2024-09-24 DIAGNOSIS — R06.09 DOE (DYSPNEA ON EXERTION): ICD-10-CM

## 2024-09-24 DIAGNOSIS — I25.10 PRESENCE OF STENT IN CORONARY ARTERY IN PATIENT WITH CORONARY ARTERY DISEASE: ICD-10-CM

## 2024-09-24 DIAGNOSIS — I25.708 ATHEROSCLEROSIS OF CORONARY ARTERY BYPASS GRAFT OF NATIVE HEART WITH STABLE ANGINA PECTORIS (H): ICD-10-CM

## 2024-09-24 DIAGNOSIS — Z86.73 HISTORY OF CVA (CEREBROVASCULAR ACCIDENT): ICD-10-CM

## 2024-09-24 DIAGNOSIS — I25.10 ASCVD (ARTERIOSCLEROTIC CARDIOVASCULAR DISEASE): ICD-10-CM

## 2024-09-24 LAB
ACT BLD: 291 SECONDS (ref 74–150)
ACT BLD: 295 SECONDS (ref 74–150)
ANION GAP SERPL CALCULATED.3IONS-SCNC: 12 MMOL/L (ref 7–15)
BUN SERPL-MCNC: 21.3 MG/DL (ref 8–23)
CALCIUM SERPL-MCNC: 9.5 MG/DL (ref 8.8–10.4)
CHLORIDE SERPL-SCNC: 105 MMOL/L (ref 98–107)
CREAT SERPL-MCNC: 1.12 MG/DL (ref 0.51–0.95)
EGFRCR SERPLBLD CKD-EPI 2021: 53 ML/MIN/1.73M2
ERYTHROCYTE [DISTWIDTH] IN BLOOD BY AUTOMATED COUNT: 13.1 % (ref 10–15)
GLUCOSE SERPL-MCNC: 111 MG/DL (ref 70–99)
HCO3 SERPL-SCNC: 20 MMOL/L (ref 22–29)
HCT VFR BLD AUTO: 40.3 % (ref 35–47)
HGB BLD-MCNC: 13.2 G/DL (ref 11.7–15.7)
INR PPP: 1.04 (ref 0.85–1.15)
MCH RBC QN AUTO: 29.4 PG (ref 26.5–33)
MCHC RBC AUTO-ENTMCNC: 32.8 G/DL (ref 31.5–36.5)
MCV RBC AUTO: 90 FL (ref 78–100)
PLATELET # BLD AUTO: 237 10E3/UL (ref 150–450)
POTASSIUM SERPL-SCNC: 4.3 MMOL/L (ref 3.4–5.3)
RBC # BLD AUTO: 4.49 10E6/UL (ref 3.8–5.2)
SODIUM SERPL-SCNC: 137 MMOL/L (ref 135–145)
WBC # BLD AUTO: 6.2 10E3/UL (ref 4–11)

## 2024-09-24 PROCEDURE — 93010 ELECTROCARDIOGRAM REPORT: CPT | Mod: 76 | Performed by: INTERNAL MEDICINE

## 2024-09-24 PROCEDURE — C1887 CATHETER, GUIDING: HCPCS | Performed by: INTERNAL MEDICINE

## 2024-09-24 PROCEDURE — 93005 ELECTROCARDIOGRAM TRACING: CPT

## 2024-09-24 PROCEDURE — 999N000134 HC STATISTIC PP CARE STAGE 3

## 2024-09-24 PROCEDURE — 36415 COLL VENOUS BLD VENIPUNCTURE: CPT | Performed by: INTERNAL MEDICINE

## 2024-09-24 PROCEDURE — 92978 ENDOLUMINL IVUS OCT C 1ST: CPT | Mod: LD | Performed by: INTERNAL MEDICINE

## 2024-09-24 PROCEDURE — 93455 CORONARY ART/GRFT ANGIO S&I: CPT | Mod: XU | Performed by: INTERNAL MEDICINE

## 2024-09-24 PROCEDURE — 93571 IV DOP VEL&/PRESS C FLO 1ST: CPT | Mod: 26 | Performed by: INTERNAL MEDICINE

## 2024-09-24 PROCEDURE — 99152 MOD SED SAME PHYS/QHP 5/>YRS: CPT | Mod: GC | Performed by: INTERNAL MEDICINE

## 2024-09-24 PROCEDURE — C1725 CATH, TRANSLUMIN NON-LASER: HCPCS | Performed by: INTERNAL MEDICINE

## 2024-09-24 PROCEDURE — 250N000011 HC RX IP 250 OP 636: Performed by: INTERNAL MEDICINE

## 2024-09-24 PROCEDURE — 258N000003 HC RX IP 258 OP 636: Performed by: INTERNAL MEDICINE

## 2024-09-24 PROCEDURE — C9600 PERC DRUG-EL COR STENT SING: HCPCS | Performed by: INTERNAL MEDICINE

## 2024-09-24 PROCEDURE — 85610 PROTHROMBIN TIME: CPT | Performed by: INTERNAL MEDICINE

## 2024-09-24 PROCEDURE — C1769 GUIDE WIRE: HCPCS | Performed by: INTERNAL MEDICINE

## 2024-09-24 PROCEDURE — 250N000009 HC RX 250: Performed by: INTERNAL MEDICINE

## 2024-09-24 PROCEDURE — 250N000013 HC RX MED GY IP 250 OP 250 PS 637: Performed by: INTERNAL MEDICINE

## 2024-09-24 PROCEDURE — 92978 ENDOLUMINL IVUS OCT C 1ST: CPT | Mod: 26 | Performed by: INTERNAL MEDICINE

## 2024-09-24 PROCEDURE — 92928 PRQ TCAT PLMT NTRAC ST 1 LES: CPT | Mod: 26 | Performed by: INTERNAL MEDICINE

## 2024-09-24 PROCEDURE — C1726 CATH, BAL DIL, NON-VASCULAR: HCPCS | Performed by: INTERNAL MEDICINE

## 2024-09-24 PROCEDURE — 85018 HEMOGLOBIN: CPT | Performed by: INTERNAL MEDICINE

## 2024-09-24 PROCEDURE — C1894 INTRO/SHEATH, NON-LASER: HCPCS | Performed by: INTERNAL MEDICINE

## 2024-09-24 PROCEDURE — 80048 BASIC METABOLIC PNL TOTAL CA: CPT | Performed by: INTERNAL MEDICINE

## 2024-09-24 PROCEDURE — 999N000054 HC STATISTIC EKG NON-CHARGEABLE

## 2024-09-24 PROCEDURE — 93799 UNLISTED CV SVC/PROCEDURE: CPT | Performed by: INTERNAL MEDICINE

## 2024-09-24 PROCEDURE — C1874 STENT, COATED/COV W/DEL SYS: HCPCS | Performed by: INTERNAL MEDICINE

## 2024-09-24 PROCEDURE — 272N000001 HC OR GENERAL SUPPLY STERILE: Performed by: INTERNAL MEDICINE

## 2024-09-24 PROCEDURE — 99153 MOD SED SAME PHYS/QHP EA: CPT | Performed by: INTERNAL MEDICINE

## 2024-09-24 PROCEDURE — 85347 COAGULATION TIME ACTIVATED: CPT

## 2024-09-24 PROCEDURE — 99152 MOD SED SAME PHYS/QHP 5/>YRS: CPT | Performed by: INTERNAL MEDICINE

## 2024-09-24 PROCEDURE — 999N000142 HC STATISTIC PROCEDURE PREP ONLY

## 2024-09-24 PROCEDURE — C1753 CATH, INTRAVAS ULTRASOUND: HCPCS | Performed by: INTERNAL MEDICINE

## 2024-09-24 DEVICE — STENT CORONARY DES SYNERGY XD MR US 3.00X16MM H7493941816300: Type: IMPLANTABLE DEVICE | Status: FUNCTIONAL

## 2024-09-24 RX ORDER — ASPIRIN 81 MG/1
81 TABLET ORAL DAILY
Status: DISCONTINUED | OUTPATIENT
Start: 2024-09-25 | End: 2024-09-24 | Stop reason: HOSPADM

## 2024-09-24 RX ORDER — ACETAMINOPHEN 325 MG/1
650 TABLET ORAL EVERY 4 HOURS PRN
Status: DISCONTINUED | OUTPATIENT
Start: 2024-09-24 | End: 2024-09-24 | Stop reason: HOSPADM

## 2024-09-24 RX ORDER — POTASSIUM CHLORIDE 750 MG/1
40 TABLET, EXTENDED RELEASE ORAL
Status: DISCONTINUED | OUTPATIENT
Start: 2024-09-24 | End: 2024-09-24 | Stop reason: HOSPADM

## 2024-09-24 RX ORDER — NALOXONE HYDROCHLORIDE 0.4 MG/ML
0.4 INJECTION, SOLUTION INTRAMUSCULAR; INTRAVENOUS; SUBCUTANEOUS
Status: DISCONTINUED | OUTPATIENT
Start: 2024-09-24 | End: 2024-09-24 | Stop reason: HOSPADM

## 2024-09-24 RX ORDER — ONDANSETRON 2 MG/ML
4 INJECTION INTRAMUSCULAR; INTRAVENOUS EVERY 6 HOURS PRN
Status: DISCONTINUED | OUTPATIENT
Start: 2024-09-24 | End: 2024-09-24 | Stop reason: HOSPADM

## 2024-09-24 RX ORDER — NALOXONE HYDROCHLORIDE 0.4 MG/ML
0.2 INJECTION, SOLUTION INTRAMUSCULAR; INTRAVENOUS; SUBCUTANEOUS
Status: DISCONTINUED | OUTPATIENT
Start: 2024-09-24 | End: 2024-09-24 | Stop reason: HOSPADM

## 2024-09-24 RX ORDER — SODIUM CHLORIDE 9 MG/ML
INJECTION, SOLUTION INTRAVENOUS CONTINUOUS
Status: DISCONTINUED | OUTPATIENT
Start: 2024-09-24 | End: 2024-09-24 | Stop reason: HOSPADM

## 2024-09-24 RX ORDER — ASPIRIN 325 MG
325 TABLET ORAL DAILY
Status: COMPLETED | OUTPATIENT
Start: 2024-09-24 | End: 2024-09-24

## 2024-09-24 RX ORDER — CLOPIDOGREL BISULFATE 75 MG/1
75 TABLET ORAL DAILY
Status: DISCONTINUED | OUTPATIENT
Start: 2024-09-25 | End: 2024-09-24 | Stop reason: HOSPADM

## 2024-09-24 RX ORDER — HEPARIN SODIUM 1000 [USP'U]/ML
INJECTION, SOLUTION INTRAVENOUS; SUBCUTANEOUS
Status: DISCONTINUED | OUTPATIENT
Start: 2024-09-24 | End: 2024-09-24 | Stop reason: HOSPADM

## 2024-09-24 RX ORDER — ONDANSETRON 4 MG/1
4 TABLET, ORALLY DISINTEGRATING ORAL EVERY 6 HOURS PRN
Status: DISCONTINUED | OUTPATIENT
Start: 2024-09-24 | End: 2024-09-24 | Stop reason: HOSPADM

## 2024-09-24 RX ORDER — FLUMAZENIL 0.1 MG/ML
0.2 INJECTION, SOLUTION INTRAVENOUS
Status: DISCONTINUED | OUTPATIENT
Start: 2024-09-24 | End: 2024-09-24 | Stop reason: HOSPADM

## 2024-09-24 RX ORDER — METOPROLOL TARTRATE 1 MG/ML
5 INJECTION, SOLUTION INTRAVENOUS
Status: DISCONTINUED | OUTPATIENT
Start: 2024-09-24 | End: 2024-09-24 | Stop reason: HOSPADM

## 2024-09-24 RX ORDER — ASPIRIN 81 MG/1
81 TABLET, CHEWABLE ORAL DAILY
Qty: 30 TABLET | Refills: 3 | Status: SHIPPED | OUTPATIENT
Start: 2024-09-24

## 2024-09-24 RX ORDER — ATROPINE SULFATE 0.1 MG/ML
0.5 INJECTION INTRAVENOUS
Status: DISCONTINUED | OUTPATIENT
Start: 2024-09-24 | End: 2024-09-24 | Stop reason: HOSPADM

## 2024-09-24 RX ORDER — OXYCODONE HYDROCHLORIDE 10 MG/1
10 TABLET ORAL EVERY 4 HOURS PRN
Status: DISCONTINUED | OUTPATIENT
Start: 2024-09-24 | End: 2024-09-24 | Stop reason: HOSPADM

## 2024-09-24 RX ORDER — NITROGLYCERIN 0.4 MG/1
0.4 TABLET SUBLINGUAL EVERY 5 MIN PRN
Status: DISCONTINUED | OUTPATIENT
Start: 2024-09-24 | End: 2024-09-24 | Stop reason: HOSPADM

## 2024-09-24 RX ORDER — LIDOCAINE 40 MG/G
CREAM TOPICAL
Status: DISCONTINUED | OUTPATIENT
Start: 2024-09-24 | End: 2024-09-24 | Stop reason: HOSPADM

## 2024-09-24 RX ORDER — CLOPIDOGREL BISULFATE 75 MG/1
TABLET ORAL
Status: DISCONTINUED | OUTPATIENT
Start: 2024-09-24 | End: 2024-09-24 | Stop reason: HOSPADM

## 2024-09-24 RX ORDER — FENTANYL CITRATE 50 UG/ML
INJECTION, SOLUTION INTRAMUSCULAR; INTRAVENOUS
Status: DISCONTINUED | OUTPATIENT
Start: 2024-09-24 | End: 2024-09-24 | Stop reason: HOSPADM

## 2024-09-24 RX ORDER — NICARDIPINE HYDROCHLORIDE 2.5 MG/ML
INJECTION INTRAVENOUS
Status: DISCONTINUED | OUTPATIENT
Start: 2024-09-24 | End: 2024-09-24 | Stop reason: HOSPADM

## 2024-09-24 RX ORDER — ASPIRIN 81 MG/1
81 TABLET, CHEWABLE ORAL ONCE
Status: DISCONTINUED | OUTPATIENT
Start: 2024-09-24 | End: 2024-09-24 | Stop reason: HOSPADM

## 2024-09-24 RX ORDER — FENTANYL CITRATE 50 UG/ML
25 INJECTION, SOLUTION INTRAMUSCULAR; INTRAVENOUS
Status: DISCONTINUED | OUTPATIENT
Start: 2024-09-24 | End: 2024-09-24 | Stop reason: HOSPADM

## 2024-09-24 RX ORDER — NITROGLYCERIN 5 MG/ML
VIAL (ML) INTRAVENOUS
Status: DISCONTINUED | OUTPATIENT
Start: 2024-09-24 | End: 2024-09-24 | Stop reason: HOSPADM

## 2024-09-24 RX ORDER — CLOPIDOGREL BISULFATE 75 MG/1
75 TABLET ORAL DAILY
Qty: 90 TABLET | Refills: 3 | Status: SHIPPED | OUTPATIENT
Start: 2024-09-25

## 2024-09-24 RX ORDER — POTASSIUM CHLORIDE 750 MG/1
20 TABLET, EXTENDED RELEASE ORAL
Status: DISCONTINUED | OUTPATIENT
Start: 2024-09-24 | End: 2024-09-24 | Stop reason: HOSPADM

## 2024-09-24 RX ORDER — HYDRALAZINE HYDROCHLORIDE 20 MG/ML
10 INJECTION INTRAMUSCULAR; INTRAVENOUS EVERY 4 HOURS PRN
Status: DISCONTINUED | OUTPATIENT
Start: 2024-09-24 | End: 2024-09-24 | Stop reason: HOSPADM

## 2024-09-24 RX ORDER — OXYCODONE HYDROCHLORIDE 5 MG/1
5 TABLET ORAL EVERY 4 HOURS PRN
Status: DISCONTINUED | OUTPATIENT
Start: 2024-09-24 | End: 2024-09-24 | Stop reason: HOSPADM

## 2024-09-24 RX ADMIN — ASPIRIN 325 MG ORAL TABLET 325 MG: 325 PILL ORAL at 10:01

## 2024-09-24 RX ADMIN — SODIUM CHLORIDE: 9 INJECTION, SOLUTION INTRAVENOUS at 09:59

## 2024-09-24 RX ADMIN — OXYCODONE HYDROCHLORIDE 10 MG: 10 TABLET ORAL at 13:59

## 2024-09-24 ASSESSMENT — ACTIVITIES OF DAILY LIVING (ADL)
ADLS_ACUITY_SCORE: 35

## 2024-09-24 NOTE — DISCHARGE INSTRUCTIONS
Going Home after an Angioplasty or Stent Placement (Cardiac)      [facility] [place]:  [phone] (7 days a week)    After you go home:  Have an adult stay with you for 24 hours.  Drink plenty of fluids.  You may eat your normal diet, unless your doctor tells you otherwise.  For 24 hours:  - Relax and take it easy.  - Do NOT smoke.  - Do NOT make any important or legal decisions.  - Do NOT drive or operate machines at home or at work.  - Do NOT drink alcohol.    Remove the Band-Aid after 24 hours. If there is minor oozing, apply another Band-aid and remove it after 12 hours.  For 2 days, do NOT have sex or do any heavy exercise.  Do NOT take a bath, or use a hot tub or pool for at least 3 days. You may shower.    Care of wrist or arm site  It is normal to have soreness at the puncture site and mild tingling in your hand for up to 3 days.  For 2 days, do not use your hand or arm to support your weight (such as rising from a chair) or bend your wrist (such as lifting a garage door).  For 2 days, do not lift more than 5 pounds or exercise your arm (tennis, golf or bowling).      If you start bleeding from the site in your arm:  Sit down and press firmly on the site with your fingers for 10 minutes. Call your doctor as soon as you can.  If the bleeding stops, sit still and keep your wrist straight for 2 hours.  Medicines  If you have begun Plavix or Effient (with a stent), do not stop taking it until you talk to your heart doctor (cardiologist).  Call your doctor if:  You have a large or growing hard lump around the site.    The site is red, swollen, hot or tender.  Blood or fluid is draining from the site.  You have chills or a fever greater than 101 F (38 C).  Your leg or arm turns bluish, feels numb or cool.  You have hives, a rash or unusual itching.  Call 911 right away if you have:   Bleeding that does not stop.   Heavy bleeding.

## 2024-09-24 NOTE — PROGRESS NOTES
D/I/A: Pt roomed on 2A in bay 11.  Arrived via litter and accompanied by CCL RN and  Steven On/Off: Off monitor.  VSSA.  Rhythm upon arrival SR on monitor.  Denies pain or sob.  Reviewed activity restrictions and when to notify RN, ie-changes to breathing or increased chest pressure or chest pain.  CCL access:  Right [id number] band in place with 10 ml of air, start deflation at 1350.  P: Continue to monitor status.  Discharge to home once meeting criteria.

## 2024-09-24 NOTE — Clinical Note
Stent deployed in the proximal left anterior descending. Max pressure = 12 yenifer. Total duration = 10 seconds.

## 2024-09-24 NOTE — PROGRESS NOTES
Prep complete for cors-possible pci or dcb. VSS. Consent signed.  Steven at bedside. Patient given 325 aspirin pre-procedure.

## 2024-09-24 NOTE — Clinical Note
The first balloon was inserted into the left anterior descending and proximal left anterior descending.Max pressure = 12 yenifer. Total duration = 10 seconds.     Max pressure = 12 yenifer. Total duration = 10 seconds.    Balloon reinflated a second time: Max pressure = 12 yenifer. Total duration = 10 seconds.  Balloon reinflated a third time: Max pressure = 12 yenifer. Total duration = 10 seconds.  Balloon reinflated a fourth time: Max pressure = 12 a tm. Total duration = 10 seconds.  Balloon reinflated a fourth time: Max pressure = 12 yenifer. Total duration = 10 seconds.

## 2024-09-24 NOTE — Clinical Note
The first balloon was inserted into the left anterior descending and proximal left anterior descending.Max pressure = 12 yenifer. Total duration = 10 seconds.     Max pressure = 12 yenifer. Total duration = 10 seconds.    Balloon reinflated a second time: Max pressure = 12 yenifer. Total duration = 10 seconds.  Balloon reinflated a third time: Max pressure = 12 yenifer. Total duration = 10 seconds.  Balloon reinflated a fourth time: Max pressure = 12 a tm. Total duration = 10 seconds.

## 2024-09-24 NOTE — PROGRESS NOTES
BP 97/62   Pulse 58   Temp 97.7  F (36.5  C) (Oral)   Resp 18   Wt 79.2 kg (174 lb 9.7 oz)   LMP 04/29/1978 (Approximate)   SpO2 90%   BMI 30.18 kg/m    Condition is stable s/p cors-angioplasty. Vital Signs are stable/WNL. Right distal radial site clean, dry and intact, cms intact.  Discharge instructions reviewed with patient and questions answered. Patient verbalizes understanding. IV removed. Pain under control. Patient is ambulating and voiding spontaneously. Patient is tolerating regular diet and denies any N/V. Patient to be discharged to home/hotel via . Patient has all belongings.

## 2024-09-25 LAB
ATRIAL RATE - MUSE: 58 BPM
ATRIAL RATE - MUSE: 66 BPM
DIASTOLIC BLOOD PRESSURE - MUSE: NORMAL MMHG
DIASTOLIC BLOOD PRESSURE - MUSE: NORMAL MMHG
INTERPRETATION ECG - MUSE: NORMAL
INTERPRETATION ECG - MUSE: NORMAL
P AXIS - MUSE: 30 DEGREES
P AXIS - MUSE: 72 DEGREES
PR INTERVAL - MUSE: 168 MS
PR INTERVAL - MUSE: 186 MS
QRS DURATION - MUSE: 82 MS
QRS DURATION - MUSE: 82 MS
QT - MUSE: 432 MS
QT - MUSE: 456 MS
QTC - MUSE: 447 MS
QTC - MUSE: 452 MS
R AXIS - MUSE: 11 DEGREES
R AXIS - MUSE: 20 DEGREES
SYSTOLIC BLOOD PRESSURE - MUSE: NORMAL MMHG
SYSTOLIC BLOOD PRESSURE - MUSE: NORMAL MMHG
T AXIS - MUSE: 40 DEGREES
T AXIS - MUSE: 93 DEGREES
VENTRICULAR RATE- MUSE: 58 BPM
VENTRICULAR RATE- MUSE: 66 BPM

## 2024-10-14 DIAGNOSIS — Z95.5 S/P CORONARY ARTERY STENT PLACEMENT: Primary | ICD-10-CM

## 2024-10-17 ENCOUNTER — HOSPITAL ENCOUNTER (OUTPATIENT)
Dept: CARDIAC REHAB | Facility: OTHER | Age: 69
Discharge: HOME | End: 2024-10-17
Payer: COMMERCIAL

## 2024-10-17 PROCEDURE — 93798 PHYS/QHP OP CAR RHAB W/ECG: CPT

## 2024-10-17 ASSESSMENT — PATIENT HEALTH QUESTIONNAIRE - PHQ9: SUM OF ALL RESPONSES TO PHQ QUESTIONS 1-9: 7

## 2024-10-23 ENCOUNTER — HOSPITAL ENCOUNTER (OUTPATIENT)
Dept: CARDIAC REHAB | Facility: OTHER | Age: 69
Discharge: HOME | End: 2024-10-23
Attending: FAMILY MEDICINE
Payer: COMMERCIAL

## 2024-10-23 PROCEDURE — 93798 PHYS/QHP OP CAR RHAB W/ECG: CPT

## 2024-10-23 NOTE — PROGRESS NOTES
[facility] HEART [facility]   CARDIOLOGY PROGRESS NOTE     Chief Complaint   Patient presents with    Follow Up      New Angio on 9/24/24          Diagnosis:  1. Cath.     -Absorbing stent to LAD on 9/24/2024, [facility].   -7/1/2024.  No stent.  RAFI/SVG to %.  LIMA to LAD 0%.  75% to pLCx, nondominant.  -9/25/17, [facility], stable dz.   -LIMA open but occluded RAFI/SVG.    -LM irregularities, ramus 40%, LAD 30%, LCx 30%, and RCA 40%.  2. CORTEZ.  3. CABG x3-2/12/2003.    -LIMA to LAD, RAFI to RCA, and SVG to OM.    4. Palpitations.    -SVE/VE on Zio from 3/27/2023.  5. PE on 1/2/20.     -Xarelto.  -RLL, MICHELLE and LLL.  6. COPD.  -Normal on 1/11/2022.  7. HTN-controlled  8. Lightheadedness-episodic.  9.  Cardiac cath on 9/25/2017-[facility].   -No significant dz on 12/12/19.  10. Iron deficiency anemia.  -Resolved.  11.  CVA.  -Mild chronic ischemic cerebral disease on 8/20/2021.  12. Left-sided subclavian steal syndrome.   -Stenting with patency as noted on 9/15/17.  13.  Tobacco abuse.  -Quitting 8/23/17.   14.  H/O alcohol abuse.  15.  Hyperlipidemia.  -Uncontrolled.  16.  Bradycardia with the slowest heart rate of 50 bpm on 7/30/2021.  17.  Dizziness/lightheadedness secondary to dehydration.  18.  B12 deficiency.    -313 on 3/18/2020.  19.  Vitamin D deficiency.  20.  CKD-2/3.       Assessment/Plan:   1.  Chest discomfort/CAD.  [facility].  Previously, she was found to have in-stent stenosis of her LAD.  She previously had a cardiac catheterization on 7/1/2024.  She was found of a 75% lesion to her LCx.  2/3 grafts are occluded.  LIMA to LAD was open.  RAFI and SVG were occluded.  She had been having considerable symptoms.  She described chest heaviness with minimal activities.  She gave an example of walking up stairs.  Also, she had been dyspneic on exertion and fatigued.  It was suggested she have a drug coated balloon.  This type of device was not available until  "September, 2024.  Because of her symptoms, she was started on Imdur 30 mg once daily.  This medication will be discontinued today after stent placement.  I believe has been causing headaches.  She had been using nitro spray periodically.  She preferred spray over the pill.  I believe she has less headaches with spray and the medication last longer.  Continue medical therapy with Plavix, aspirin, SL nitro, and rosuvastatin.  Chest discomfort was substernal with dyspnea on exertion.  She describes having 17 stents with bypass.  She was told if she has increasing or worsening chest heaviness, she should go to the ER.  2.  DiscontinueImdur 30 mg once daily.  3.  Follow-up in 1 year or sooner with issues.  Has started cardiac rehab.  Stopping Imdur today.    Interval history:  See above.    HPI:    Ms. Day is being seen by cardiology in follow-up.  Patient has a history of chronic stable angina, known ischemic heart disease, hypertension, hyperlipidemia, history of pulmonary embolism, left subclavian artery stenosis, anxiety, collagenous colitis, depression, osteoarthritis, history of tobacco use, central sleep apnea for which she is not compliant with CPAP use, iron deficiency anemia, CKD, myofascial pain, vitamin D deficiency, lumbar facet arthropathy, history of gastric ulcer with hemorrhage, COPD and peptic ulcer disease.    Malina continues to endorse dizziness.  She states when she gets up she starts to feel dizzy like being off balance, will have a headache, her heart will pound and pulse increase.  She will be short of breath.  At times she has chest discomfort.  This has been going on for 1 to 2 years.      She was seen in the ER and admitted for a day on 7/16/2021.  She was felt to have symptomatic bradycardia/hypotension.  She felt \"woozy\" with a blood pressure of 77/52 at home.  She reports getting fluids and feeling much better, resolution of symptoms.  She felt her symptoms had gotten worse over the last " 2 to 3 weeks.  Heart rates were noted to be between 49-52 in the ER.  Troponin negative and EKG showed heart rates in the 50's.    She was seen back in the ED on 7/22/2021 for lightheadedness once again.  She actually fell and was having concerning pain in her lungs.  She has a history of a PE and is on Xarelto.  No identifiable cause was found.  There was concern that her symptoms may be anxiety related.    Today, she reports continuing to feel these symptoms of dizziness.  She has a hard time describing them.  She feels her heart pounding, she has a headache, she is dizzy, she gets chest pain.  She also continues to be dyspneic on exertion.  She has been using nitro regularly with some relief.  She has a history of asthma and suspected COPD.  She has seen relief with nitro as well as the albuterol.  She was on Symbicort in the past with benefit but was discontinued for unknown reasons.  She drinks 2 glasses of water and 3 cups of coffee a day.  I believe she is chronically dehydrated which plays a part in her symptoms.  She has tried to increase her fluid intake.  She is to double and preferably triple her fluid intake.  She should cut back/discontinue coffee intake.  With symptoms of chest pain, shortness of breath, and history of blood clot, will plan for VQ scan.  We will also get a chest x-ray.  She describes regular palpitations and acceleration in her heart rates.  We will plan for Zio patch.  She did have a MCOT on 7/30/2021.  Both asymptomatic and symptomatic events include sinus bradycardia, sinus rhythm, sinus tachycardia, occasional PVC's and PAC's.  No other significant rhythms were identified.  We discussed Eliquis versus Xarelto.  It was decided we switched to Eliquis as I seen less bleeding on this medication compared to Xarelto.  Related to shortness of breath, history of asthma, and presumptive COPD, referral was placed to pulmonary.  Had intended to prescribe Spiriva but she is allergic and this  "was not prescribed.  Patient also describes weakness.  She has been using nitro with resolution of her chest pain.  She does have a history of bypass but had a cath in 2017 with only mild disease followed.  I am concerned at this point.  I am concerned that her chest discomfort is more of a lung issue than a heart issue.     Patient has a known history of ischemic heart disease, she underwent  coronary angiogram and bypass angiogram on 9/15/2017 which was described as having stable disease. Mild to moderate 3 vessel CAD involving RCA, LAD and LCX with <50% stenosis at the greatest.  Occluded RAFI and SVG.  Patent LIMA.  No new obstructive lesions were identified and normal left heart cath.       She has a history of CABG on 2/12/03 x 3, history of multiple stent placements, patient reported 17 total.       At previous visit patient reported occasional recurrence of chest pain, not as severe as last ED visit on 10/25/19. She reported \"my breathing has been bad\", describes worsening CORTEZ. She described activity intolerance and little to no energy. Recommended repeat stress testing. NM Lexiscan stress test was performed on 12/16/19 which was negative for inducible myocardial ischemia or infarction.  Left ventricular function was normal.     Carotid US on 12/12/19 without significant stenosis, mild atherosclerotic disease present.  Abdominal ultrasound on 12/12/2018 with no abdominal aortic aneurysm identified.     Patient returned to the ED with dyspnea in early January 2020. She was identified to have small segmental and subsegmental emboli bilaterally. Repeat imaging on 1/6 with decreasing volume of pulmonary emboli compared to scan on 1/2/2020. She was initially treated with Lovenox in the ED and discharged on Xarelto oral anticoagulation which has been going well for her, no bleeding complication. She also remains on Plavix with her significant ischemic heart disease history.      She presented to the ED on " 3/16/2021 with reports of chest pain, chronic stable angina.  No ACS.  EKG stable and no elevation in troponin's. Patient admits that her BP was low and it was suspected she was dehydrated, she felt better following IV fluids and reports that this likely brought on her angina.  She is currently working on maintaining good hydration.  She denies any recurrence of acute chest pain or pressure today.  No use of nitroglycerin since her recent ED visit.      Relevant testing:  Cardiac cath on 9/24/2024:  Severe multivessel obstructive CAD  LIMA-LAD patent  Hemodynamically insignificant ostial Lcx moderate disease with dPR 0.98  Severe LAD disease proximal to the LIMA-LAD touchdown with severe ISR with successful IVUS guided PCI to the LAD with a 3.0 x 16mm Synergy XD stent successfully post dilated to 3.5 in the proximal segment. The more distant severe stenosis was left in order to protect the integrity of the LIMA from significant competitive flow.  Successful uncomplicated R radial access with hemostasis obtained with TR band placement.     Cardiac [facility] at the [facility] on 7/1/2024:    Right sided filling pressures are normal.    Left sided filling pressures are normal.    Normal PA pressures.    Normal cardiac output level.    Hemodynamic data has been modified in Epic per physician review.     Severe two vessel CAD with prior CABG LIMA to LAD, RAFI to RCA, SVG to OM and prior PCI to the left main, LAD, and LCx.  Patent LIMA to LAD.  Occluded RAFI and SVG.  Patent stents.  Left main stent with minimal ISR, proximal LAD stent with moderate ISR, mid LAD stent with severe ISR, and ostial LCx stent with severe ISR.    Stress test on 4/18/24:    The nuclear stress test is negative for inducible myocardial ischemia or infarction.     Left ventricular function is normal.     The left ventricular ejection fraction at rest is 66%.  The left   ventricular ejection fraction at stress is 64%.     A prior study was conducted on  9/15/2021.     Echocardiogram on 7/31/2023:  Global and regional left ventricular function is normal with an EF of 55-60%.  Left ventricular diastolic function is indeterminate.  Mild concentric wall thickening consistent with left ventricular hypertrophy  is present.  Global right ventricular function is normal.  No significant valvular abnormalities present.  IVC diameter <2.1 cm collapsing >50% with sniff suggests a normal RA pressure of 3 mmHg.  No pericardial effusion is present.  No significant changes noted.    Zio patch on 3/27/2023:  Patient's monitor was in place for 13 days and 16 hours.  Minimum heart rate 42 bpm, average heart rate 62 bpm, maximum heart rate 167 bpm. Rhythm/s present include sinus, SVT. There were rare ventricular ectopic beats accounting for <1% of all beats. There were 0  ventricular triplets and rare couplets. There were  rare supraventricular ectopic beats.  There were 12 triggered events and 14 diary entries during which time the noted rhythms were sinus, SVE, VE.  There were 7 episodes of SVT with the fastest lasting 4 beats with a max rate of 167 bpm.  Review of actual tracings showed no other significant abnormality.    V/Q scan on 11/23/21:  No evidence of pulmonary emboli.    CXR 11/23/21:  Probable air trapping.    PFT 12/8/21:  No obstructive or restrictive disease is noted, moderate diffusion defect is noted consistent with pulmonary vascular disease     Stress test on 9/15/2021:    The nuclear stress test is negative for inducible myocardial ischemia or infarction.     Left ventricular function is normal.     The left ventricular ejection fraction at rest is 64%.  The left   ventricular ejection fraction at stress is 66%.     A prior study was conducted on 12/16/2019.     Echo on 9/15/2021:  Global and regional left ventricular function is normal with an EF of 55-60%.  Right ventricular function, chamber size, wall motion, and thickness are normal.  Pulmonary artery  systolic pressure is normal.  The inferior vena cava is normal.  No pericardial effusion is present.  No significant changes noted.    MRI brain on 8/20/2021:  A few small nonspecific white matter signal abnormalities  are present, likely sequela of mild chronic small vessel ischemic disease. The exam is otherwise unremarkable.    MCOT from 7/30/21:  Findings: Patient's monitor was in place for 9 days and 7 hours.  Minimum heart rate 50 bpm, average heart rate 63 bpm, maximum heart rate 120 bpm. Rhythm/s present include sinus rhythm.  There were 37 symptomatic events during which time the noted rhythms were sinus rhythm, sinus bradycardia and sinus tachycardia.  There were six asymptomatic events during which time the noted rhythms were sinus rhythm.  There was rare atrial ectopy and rare ventricular ectopy.  Review of actual tracings showed no other abnormality.    ARYA on 7/17/20:  The right ankle-brachial index is 1.17.  Left ankle-brachial index is 0.98.    Stress test on 12/16/19:    The nuclear stress test is negative for inducible myocardial ischemia   or infarction.     A small amount of soft tissue artifact is present, more pronounced at   rest.     Left ventricular function is normal.     The left ventricular ejection fraction at rest is 79%.  The left   ventricular ejection fraction at stress is 72%.     A prior study was conducted on 6/30/2015.     US carotids on 12/12/19:  No ultrasound evidence of hemo-dynamically significant stenosis.  Mild atherosclerotic disease.    US abdominal aorta on 12/12/19:  No abdominal aortic aneurysm.      ECHO on 5/13/19:  Global and regional left ventricular function is normal with an EF of 60-65%.  Mild concentric wall thickening consistent with left ventricular hypertrophy  is present.  Right ventricular function, chamber size, wall motion, and thickness are  normal.  No significant valvular abnormalities were noted.  Previous study not available for  comparison.    Cardiac cath on 3/30/13:  LEFT MAIN: Widely patent stent.   LEFT ANTERIOR DESCENDING: Widely patent proximal stent. There is an 85% to  90% mid lesion after the second diagonal and prior to the LIMA insertion as before.   CIRCUMFLEX: There is diffuse 60% to 70% disease throughout, the ostium is 70   to 75, and the OM2 is a 70, but it is very diffuse disease and basically   unchanged from previous.   RCA: Widely patent stents with only mild irregularities.   LEFT VENTRICULOGRAM: Normal left ventricular ejection fraction, LVEF 60%,   with no focal wall motion abnormality. LV pressure 146/29.   FLORENTINO to LAD: Widely patent, although there is less flow than before, and, in   fact, the retrograde filling of the LIMA from the antegrade vessel. There is   a severe subclavian stenosis that a pressure gradient revealed in the aorta   was 153/78, and in the right subclavian was 100 to 106/73, for a nearly 50 mm   pressure gradient. The stenosis was 75% to 80%.   RAFI to RCA: 100%.   SVG to OM: 100%.                     ICD-10-CM    1. Chest pain, unspecified type  R07.9       2. CORTEZ (dyspnea on exertion)  R06.09       3. Mixed hyperlipidemia  E78.2       4. Subclavian artery stenosis, left (H)  I77.1       5. Presence of stent in coronary artery in patient with coronary artery disease  I25.10     Z95.5       6. Palpitations  R00.2       7. Hypertensive heart disease with heart failure (H)  I11.0       8. Essential hypertension  I10       9. Chronic ischemic heart disease  I25.9       10. ASCVD (arteriosclerotic cardiovascular disease)  I25.10       11. Stage 3a chronic kidney disease (H)  N18.31       12. History of tobacco abuse-quitting 8/23/2017  Z87.891       13. Status [facility] -[facility] disease  Z98.890       14. History of pulmonary embolism on 1/2/2020. (-) VQ scan on 11/23/2021  Z86.711       15. History of CVA-mild chronic ischemic disease on 8/20/2021.  Z86.73        16. History of coronary artery bypass graft x 3 on 2/12/2003  Z95.1       17. Central sleep apnea  G47.31                     Past Medical History:   Diagnosis Date    Acute ischemic heart disease (H)     06/15/2007,with PTCA and stenting of 90% osteo circumflex lesion and patent LAD graft, patent left main stent.    Acute myocardial infarction (H)     3/30/2013    Anxiety disorder     No Comments Provided    Atherosclerotic heart disease of native coronary artery without angina pectoris     -angio revealed 3 vessel dz  -4 stents placed; 2 overlapping stents placed in mLAD, 1 stent pRCA, 1 stent pCirc 1 s 9/02 -non-ST elevation MI 1/03  -angio revealed restenosis of Circ.    -PTCA and brachytherapy of pCirc -repeat angio 2/03 -no intervension -CABG x3 12/03 - Dr Sinclair  -LIMA-LAD, RAFI-RCA, SVG-OM -PCI 7/04 stent to L -CTangio 9/05   -patentent LIMA-LAD. RAFI-RCA occluded; RCA ostio/p*    Bilateral carpal tunnel syndrome     No Comments Provided    Cervicalgia     No Comments Provided    Chest pain     12/29/2014    Chronic gastric ulcer without hemorrhage or perforation     10/03/2011,hx of GI bleed (2003)    Chronic ischemic heart disease     06/27/2012    Chronic obstructive pulmonary disease (H)     06/15/2007,low DLCO, normal spirometry    Chronic or unspecified gastric ulcer with hemorrhage     10/2003    Chronic pain syndrome     12/01/2010,chest wall, back    Colostomy status (H)     Constipation     11/29/2011    Coronary angioplasty status     09/12/2002,with triple stenting    Coronary angioplasty status     01/10/2003,repeat angioplasty    Coronary angioplasty status     No Comments Provided    Dorsalgia     05/31/2011    Encounter for other administrative examinations     1/28/2014    Encounter for screening for cardiovascular disorders     10/2004,Cardiolite (2004, 2005, 2006 and 2011)    Enterocolitis due to Clostridium difficile     8/19/2016    Essential (primary) hypertension     No Comments  Provided    Hyperlipidemia     No Comments Provided    Major depressive disorder, single episode     Severe, hx of suicide attempt/hospitalization    Migraine without status migrainosus, not intractable     No Comments Provided    Nodular corneal degeneration     09/26/2011    Noninfective gastroenteritis and colitis     06/15/2007,history of    Noninfective gastroenteritis and colitis     Microcytic    Osteoarthritis     No Comments Provided    Other chest pain     10/13/2009,chronic    Pain in knee     05/31/2011    Pain in right shoulder     No Comments Provided    Panic disorder without agoraphobia     No Comments Provided    Peptic ulcer without hemorrhage or perforation     6/15/2007    Peripheral vascular disease (H)     No Comments Provided    Personal history of diseases of the blood and blood-forming organs and certain disorders involving the immune mechanism (CODE)     No Comments Provided    Personal history of nicotine dependence     No Comments Provided    Personal history of other medical treatment (CODE)     10/14/2013    Presence of aortocoronary bypass graft     2/12/2003    Primary central sleep apnea     10/14/2013    Sepsis due to Escherichia coli (E. coli) (H)     7/14/16,[facility]    Stricture of artery (H)     3/31/2013,S/p prox left SCA stent 4/1/2013    Thoracic, thoracolumbar and lumbosacral intervertebral disc disorder     No Comments Provided    Uncomplicated opioid abuse (H)     history of    Vitamin D deficiency     5/6/2013       Past Surgical History:   Procedure Laterality Date    ANGIOPLASTY      9/12/02,with triple stenting    APPENDECTOMY OPEN      No Comments Provided    ARTHROSCOPY KNEE      left    ARTHROSCOPY SHOULDER Right 05/12/2017    labral tear, rotator cuff tear and some subacromial decompression     BYPASS GRAFT ARTERY CORONARY      12/13/02,Triple bypass, left internal mammary  to LAD, right internal mammary to right coronary artery, saphenous to obtuse marginal of  the left circumflex.    BYPASS GRAFT ARTERY CORONARY N/A 7/1/2024    Procedure: Bypass graft artery coronary;  Surgeon: Greg Webb MD;  [facility]    COLONOSCOPY      2011,Dr Bowman benign polyps    COLONOSCOPY  10/03/2011    2011,benign polyps, Dr. Bowman    COLONOSCOPY  08/08/2016 8/8/16,normal, Dr Bowman    CV ANGIOGRAM CORONARY GRAFT N/A 7/1/2024    Procedure: Coronary Angiogram Graft;  Surgeon: Greg Webb MD;  [facility]    CV CORONARY ANGIOGRAM N/A 7/1/2024    Procedure: Coronary Angiogram;  Surgeon: Greg Webb MD;  [facility]    CV PCI N/A 9/24/2024    Procedure: Percutaneous Coronary Intervention;  Surgeon: Greg Webb MD;  [facility]    CV RIGHT HEART CATH MEASUREMENTS RECORDED N/A 7/1/2024    Procedure: Right Heart Catheterization;  Surgeon: Greg Webb MD;  [facility]    ELBOW SURGERY      baby,birth malachi removed from right arm    EMBOLECTOMY UPPER EXTREMITY  04/02/2013    brachial artery pseudoaneurysm after stenting    ESOPHAGOSCOPY, GASTROSCOPY, DUODENOSCOPY (EGD), COMBINED      2011,EGD Dr Bowman with pyloric ulcer    ESOPHAGOSCOPY, GASTROSCOPY, DUODENOSCOPY (EGD), COMBINED      2011,pyloric ulcer, Dr. Bowman    ESOPHAGOSCOPY, GASTROSCOPY, DUODENOSCOPY (EGD), COMBINED      8/8/16,mild gastritis, Dr Bowman    ESOPHAGOSCOPY, GASTROSCOPY, DUODENOSCOPY (EGD), COMBINED      11/27/2017,Dr Bowman. Antral ulcer    ESOPHAGOSCOPY, GASTROSCOPY, DUODENOSCOPY (EGD), COMBINED  02/02/2018    Dr Bowman, healed ulcer    HYSTERECTOMY TOTAL ABDOMINAL      age 21    LAPAROSCOPIC CHOLECYSTECTOMY      2006    OSTEOTOMY FEMUR DISTAL      x3, right knee    OSTEOTOMY FEMUR DISTAL      2000,left knee  ligament surgery    OTHER SURGICAL HISTORY      1/10/2003,,PTCA    OTHER SURGICAL HISTORY       09/20/2012,,PTCA,DELONTE in LAD and left main    OTHER SURGICAL HISTORY      4/1/2013,,PTCA,L subclavian stenosis    RELEASE TRIGGER FINGER Left 12/2/2022    Procedure: Left thumb Trigger  release;  Surgeon: Cristhian Wilkinson MD;  Location: [facility]    SALPINGO-OOPHORECTOMY BILATERAL      age 27,Bilateral salpingo-oophorectomy    TONSILLECTOMY, ADENOIDECTOMY, COMBINED      childhood       Allergies   Allergen Reactions    Atorvastatin Muscle Pain (Myalgia)    Tiotropium Bromide [Tiotropium] Rash    Ezetimibe Muscle Pain (Myalgia)    Latex Rash    Niacin      Other reaction(s): Flushing    No Clinical Screening - See Comments Itching, Rash and Blisters     Metals and plastics      Tape [Adhesive Tape] Rash       Current Outpatient Medications   Medication Sig Dispense Refill    amLODIPine (NORVASC) 10 MG tablet Take 1 tablet (10 mg) by mouth daily Dx. Code: I10. 90 tablet 3    aspirin (ASA) 81 MG chewable tablet Take 1 tablet (81 mg) by mouth daily. Starting tomorrow. 30 tablet 3    busPIRone (BUSPAR) 30 MG tablet Take 1 tablet (30 mg) by mouth 2 times daily 180 tablet 3    clonazePAM (KLONOPIN) 1 MG tablet Take 1 tablet (1 mg) by mouth 3 times daily as needed for anxiety. 90 tablet 2    clonazePAM (KLONOPIN) 1 MG tablet Take 1 tablet (1 mg) by mouth 3 times daily as needed for anxiety 90 tablet 0    clopidogrel (PLAVIX) 75 MG tablet Take 1 tablet (75 mg) by mouth daily. 90 tablet 3    diclofenac (VOLTAREN) 1 % topical gel Apply 2 grams to each hand or 4 grams to each knee up to 4 times daily as needed for arthritis pain 350 g 4    HYDROcodone-acetaminophen (NORCO)  MG per tablet Take 1 tablet by mouth every 4 hours as needed for severe pain. 180 tablet 0    HYDROcodone-acetaminophen (NORCO)  MG per tablet Take 1 tablet by mouth every 4 hours as needed for severe pain. 180 tablet 0    [START ON 11/16/2024] HYDROcodone-acetaminophen (NORCO)  MG per tablet Take 1 tablet by mouth every 4 hours as  needed for severe pain. 180 tablet 0    HYDROcodone-acetaminophen (NORCO)  MG per tablet Take 1 tablet by mouth every 4 hours as needed for severe pain 180 tablet 0    HYDROcodone-acetaminophen (NORCO)  MG per tablet Take 1 tablet by mouth every 4 hours as needed for severe pain 180 tablet 0    HYDROcodone-acetaminophen (NORCO)  MG per tablet Take 1 tablet by mouth every 4 hours as needed for severe pain 180 tablet 0    lisinopril (ZESTRIL) 10 MG tablet Take 1 tablet (10 mg) by mouth daily 90 tablet 3    naloxone (NARCAN) 4 MG/0.1ML nasal spray Spray into one nostril for opioid reversal if unresponsive. May repeat every 2-3 minutes until patient responsive or EMS arrives 1 each 1    nitroGLYcerin (NITROLINGUAL) 0.4 MG/SPRAY spray For chest pain spray 1 spray under tongue every 5 minutes for 3 doses. If symptoms persist 5 minutes after 1st dose call 911. 9.8 g 3    ondansetron (ZOFRAN) 4 MG tablet Take 1 tablet (4 mg) by mouth every 6 hours as needed for nausea. 90 tablet 3    pantoprazole (PROTONIX) 40 MG EC tablet Take 1 tablet (40 mg) by mouth daily 90 tablet 3    RESTASIS 0.05 % ophthalmic emulsion INSTILL 1 DROP INTO BOTH EYES TWICE A DAY      rimegepant (NURTEC) 75 MG ODT tablet PLACE 1 TAB UNDER TONGUE DAILY AS NEEDED FOR MIGRAINE. MAX OF 1 TAB EVERY 48 HRS & 2 PER WEEK 8 tablet 14    rosuvastatin (CRESTOR) 40 MG tablet Take 1 tablet (40 mg) by mouth daily 90 tablet 3    sucralfate (CARAFATE) 1 GM tablet Take 1 tablet (1 g) by mouth 4 times daily as needed for nausea (or dark stools) 360 tablet 1    triamcinolone (KENALOG) 0.1 % external lotion Apply topically 3 times daily 60 mL 3    VITAMIN D, CHOLECALCIFEROL, PO Take 5,000 Units by mouth daily      vortioxetine (TRINTELLIX) 20 MG tablet Take 1 tablet (20 mg) by mouth daily 90 tablet 2       Social History     Socioeconomic History    Marital status:      Spouse name: Not on file    Number of children: Not on file    Years of  education: Not on file    Highest education level: Not on file   Occupational History    Not on file   Tobacco Use    Smoking status: Former     Current packs/day: 0.00     Average packs/day: 1 pack/day for 35.0 years (35.0 ttl pk-yrs)     Types: Cigarettes     Start date: 2/15/1982     Quit date: 2/15/2017     Years since quittin.6     Passive exposure: Past    Smokeless tobacco: Never   Vaping Use    Vaping status: Never Used   Substance and Sexual Activity    Alcohol use: Not Currently     Alcohol/week: 0.0 standard drinks of alcohol    Drug use: No    Sexual activity: Yes     Partners: Male     Birth control/protection: None, Female Surgical   Other Topics Concern    Parent/sibling w/ CABG, MI or angioplasty before 65F 55M? Not Asked   Social History Narrative    ,  Steven.  Currently not working outside the home. Lives [place]-mile self [place] met with . Tobacco abuse, quit , restarted. Quit  and has since quit, no alcohol.     Social Drivers of Health     Financial Resource Strain: Low Risk  (2023)    Financial Resource Strain     Within the past 12 months, have you or your family members you live with been unable to get utilities (heat, electricity) when it was really needed?: No   Food Insecurity: Low Risk  (2023)    Food Insecurity     Within the past 12 months, did you worry that your food would run out before you got money to buy more?: No     Within the past 12 months, did the food you bought just not last and you didn t have money to get more?: No   Transportation Needs: Low Risk  (2023)    Transportation Needs     Within the past 12 months, has lack of transportation kept you from medical appointments, getting your medicines, non-medical meetings or appointments, work, or from getting things that you need?: No   Physical Activity: Not on file   Stress: Not on file   Social Connections: Not on file   Interpersonal Safety: Low Risk  (2024)     Interpersonal Safety     Do you feel physically and emotionally safe where you currently live?: Yes     Within the past 12 months, have you been hit, slapped, kicked or otherwise physically hurt by someone?: No     Within the past 12 months, have you been humiliated or emotionally abused in other ways by your partner or ex-partner?: No   Housing Stability: Low Risk  (12/20/2023)    Housing Stability     Do you have housing? : Yes     Are you worried about losing your housing?: No       LAB RESULTS:   Office Visit on 08/06/2021   Component Date Value Ref Range Status    Color Urine 08/06/2021 Yellow  Colorless, Straw, Light Yellow, Yellow Final    Appearance Urine 08/06/2021 Slightly Cloudy* Clear Final    Glucose Urine 08/06/2021 Negative  Negative mg/dL Final    Bilirubin Urine 08/06/2021 Negative  Negative Final    Ketones Urine 08/06/2021 Negative  Negative mg/dL Final    Specific Gravity Urine 08/06/2021 1.018  1.000 - 1.030 Final    Blood Urine 08/06/2021 Small* Negative Final    pH Urine 08/06/2021 6.0  5.0 - 9.0 Final    Protein Albumin Urine 08/06/2021 20 * Negative mg/dL Final    Urobilinogen Urine 08/06/2021 Normal  Normal, 2.0 mg/dL Final    Nitrite Urine 08/06/2021 Positive* Negative Final    Leukocyte Esterase Urine 08/06/2021 Large* Negative Final    Bacteria Urine 08/06/2021 Many* None Seen /HPF Final    Mucus Urine 08/06/2021 Present* None Seen /LPF Final    RBC Urine 08/06/2021 6* <=2 /HPF Final    WBC Urine 08/06/2021 >182* <=5 /HPF Final    Culture 08/06/2021 >100,000 CFU/mL Escherichia coli*  Final   Office Visit on 07/30/2021   Component Date Value Ref Range Status    Sodium 07/30/2021 139  134 - 144 mmol/L Final    Potassium 07/30/2021 4.4  3.5 - 5.1 mmol/L Final    Chloride 07/30/2021 105  98 - 107 mmol/L Final    Carbon Dioxide (CO2) 07/30/2021 24  21 - 31 mmol/L Final    Anion Gap 07/30/2021 10  3 - 14 mmol/L Final    Urea Nitrogen 07/30/2021 19  7 - 25 mg/dL Final    Creatinine 07/30/2021  "1.08  0.60 - 1.20 mg/dL Final    Calcium 07/30/2021 9.9  8.6 - 10.3 mg/dL Final    Glucose 07/30/2021 77  70 - 105 mg/dL Final    GFR Estimate 07/30/2021 53* >60 mL/min/1.73m2 Final    Magnesium 07/30/2021 2.1  1.9 - 2.7 mg/dL Final    WBC Count 07/30/2021 7.0  4.0 - 11.0 10e3/uL Final    RBC Count 07/30/2021 4.39  3.80 - 5.20 10e6/uL Final    Hemoglobin 07/30/2021 12.9  11.7 - 15.7 g/dL Final    Hematocrit 07/30/2021 38.6  35.0 - 47.0 % Final    MCV 07/30/2021 88  78 - 100 fL Final    MCH 07/30/2021 29.4  26.5 - 33.0 pg Final    MCHC 07/30/2021 33.4  31.5 - 36.5 g/dL Final    RDW 07/30/2021 13.5  10.0 - 15.0 % Final    Platelet Count 07/30/2021 273  150 - 450 10e3/uL Final    % Neutrophils 07/30/2021 57  % Final    % Lymphocytes 07/30/2021 30  % Final    % Monocytes 07/30/2021 10  % Final    % Eosinophils 07/30/2021 2  % Final    % Basophils 07/30/2021 1  % Final    % Immature Granulocytes 07/30/2021 0  % Final    NRBCs per 100 WBC 07/30/2021 0  <1 /100 Final    Absolute Neutrophils 07/30/2021 4.0  1.6 - 8.3 10e3/uL Final    Absolute Lymphocytes 07/30/2021 2.1  0.8 - 5.3 10e3/uL Final    Absolute Monocytes 07/30/2021 0.7  0.0 - 1.3 10e3/uL Final    Absolute Eosinophils 07/30/2021 0.1  0.0 - 0.7 10e3/uL Final    Absolute Basophils 07/30/2021 0.1  0.0 - 0.2 10e3/uL Final    Absolute Immature Granulocytes 07/30/2021 0.0  <=0.0 10e3/uL Final    Absolute NRBCs 07/30/2021 0.0  10e3/uL Final        Review of systems: Negative except that which was noted in the HPI.    Physical examination:  /70 (BP Location: Right arm, Patient Position: Sitting, Cuff Size: Adult Large)   Temp 97  F (36.1  C) (Temporal)   Resp 16   Ht 1.62 m (5' 3.78\")   Wt 81.6 kg (180 lb)   LMP 04/29/1978 (Approximate)   SpO2 97%   BMI 31.11 kg/m      GENERAL APPEARANCE: healthy, alert and no distress.  CHEST: lungs clear to auscultation.  CARDIOVASCULAR: regular rhythm, normal S1 with physiologic split S2, no S3 or S4 and no murmur, click " or rub  EXTREMITIES: no clubbing, cyanosis but with mild peripheral edema      Total time spent on day of visit, including review of tests, obtaining/reviewing separately obtained history, ordering medications/tests/procedures, communicating with PCP/consultants, and documenting in electronic medical record: 25 minutes.       Thank you for allowing me to participate in the care of your patient. Please do not hesitate to contact me if you have any questions.     Paul Gramajo, DO

## 2024-10-24 ENCOUNTER — OFFICE VISIT (OUTPATIENT)
Dept: CARDIOLOGY | Facility: OTHER | Age: 69
End: 2024-10-24
Attending: INTERNAL MEDICINE
Payer: COMMERCIAL

## 2024-10-24 VITALS
TEMPERATURE: 97 F | RESPIRATION RATE: 16 BRPM | WEIGHT: 180 LBS | OXYGEN SATURATION: 97 % | DIASTOLIC BLOOD PRESSURE: 70 MMHG | BODY MASS INDEX: 30.73 KG/M2 | SYSTOLIC BLOOD PRESSURE: 118 MMHG | HEIGHT: 64 IN

## 2024-10-24 DIAGNOSIS — Z95.5 PRESENCE OF STENT IN CORONARY ARTERY IN PATIENT WITH CORONARY ARTERY DISEASE: ICD-10-CM

## 2024-10-24 DIAGNOSIS — Z86.73 HISTORY OF CVA (CEREBROVASCULAR ACCIDENT): ICD-10-CM

## 2024-10-24 DIAGNOSIS — I10 ESSENTIAL HYPERTENSION: ICD-10-CM

## 2024-10-24 DIAGNOSIS — R00.2 PALPITATIONS: ICD-10-CM

## 2024-10-24 DIAGNOSIS — N18.31 STAGE 3A CHRONIC KIDNEY DISEASE (H): ICD-10-CM

## 2024-10-24 DIAGNOSIS — I25.9 CHRONIC ISCHEMIC HEART DISEASE: ICD-10-CM

## 2024-10-24 DIAGNOSIS — I77.1 SUBCLAVIAN ARTERY STENOSIS, LEFT (H): ICD-10-CM

## 2024-10-24 DIAGNOSIS — G47.31 CENTRAL SLEEP APNEA: ICD-10-CM

## 2024-10-24 DIAGNOSIS — I11.0 HYPERTENSIVE HEART DISEASE WITH HEART FAILURE (H): ICD-10-CM

## 2024-10-24 DIAGNOSIS — I25.10 ASCVD (ARTERIOSCLEROTIC CARDIOVASCULAR DISEASE): ICD-10-CM

## 2024-10-24 DIAGNOSIS — E78.2 MIXED HYPERLIPIDEMIA: ICD-10-CM

## 2024-10-24 DIAGNOSIS — Z98.890 STATUS POST CORONARY ANGIOGRAM: ICD-10-CM

## 2024-10-24 DIAGNOSIS — Z87.891 HISTORY OF TOBACCO ABUSE: ICD-10-CM

## 2024-10-24 DIAGNOSIS — R07.9 CHEST PAIN, UNSPECIFIED TYPE: Primary | ICD-10-CM

## 2024-10-24 DIAGNOSIS — R06.09 DOE (DYSPNEA ON EXERTION): ICD-10-CM

## 2024-10-24 DIAGNOSIS — Z95.1 HISTORY OF CORONARY ARTERY BYPASS GRAFT X 3: ICD-10-CM

## 2024-10-24 DIAGNOSIS — Z86.711 HISTORY OF PULMONARY EMBOLISM: ICD-10-CM

## 2024-10-24 DIAGNOSIS — I25.10 PRESENCE OF STENT IN CORONARY ARTERY IN PATIENT WITH CORONARY ARTERY DISEASE: ICD-10-CM

## 2024-10-24 PROCEDURE — G0463 HOSPITAL OUTPT CLINIC VISIT: HCPCS

## 2024-10-24 PROCEDURE — 99213 OFFICE O/P EST LOW 20 MIN: CPT | Performed by: INTERNAL MEDICINE

## 2024-10-24 ASSESSMENT — PAIN SCALES - GENERAL: PAINLEVEL_OUTOF10: NO PAIN (0)

## 2024-10-24 NOTE — NURSING NOTE
"Chief Complaint   Patient presents with    Follow Up      New Angio on 9/24/24       Initial /70 (BP Location: Right arm, Patient Position: Sitting, Cuff Size: Adult Large)   Temp 97  F (36.1  C) (Temporal)   Resp 16   Ht 1.62 m (5' 3.78\")   Wt 81.6 kg (180 lb)   LMP 04/29/1978 (Approximate)   SpO2 97%   BMI 31.11 kg/m   Estimated body mass index is 31.11 kg/m  as calculated from the following:    Height as of this encounter: 1.62 m (5' 3.78\").    Weight as of this encounter: 81.6 kg (180 lb).  Meds Reconciled: complete  Pt is on Aspirin  Pt is on a Statin  PHQ and/or YAYA reviewed. Pt referred to PCP/MH Provider as appropriate.    Татьяна Bonilla LPN      "

## 2024-10-25 ENCOUNTER — HOSPITAL ENCOUNTER (OUTPATIENT)
Dept: CARDIAC REHAB | Facility: OTHER | Age: 69
Discharge: HOME | End: 2024-10-25
Attending: FAMILY MEDICINE
Payer: COMMERCIAL

## 2024-10-25 PROCEDURE — 93798 PHYS/QHP OP CAR RHAB W/ECG: CPT

## 2024-10-30 ENCOUNTER — HOSPITAL ENCOUNTER (OUTPATIENT)
Dept: CARDIAC REHAB | Facility: OTHER | Age: 69
Discharge: HOME | End: 2024-10-30
Attending: FAMILY MEDICINE
Payer: COMMERCIAL

## 2024-10-30 PROCEDURE — 93798 PHYS/QHP OP CAR RHAB W/ECG: CPT

## 2024-11-01 ENCOUNTER — HOSPITAL ENCOUNTER (OUTPATIENT)
Dept: CARDIAC REHAB | Facility: OTHER | Age: 69
Discharge: HOME | End: 2024-11-01
Attending: FAMILY MEDICINE
Payer: COMMERCIAL

## 2024-11-01 ENCOUNTER — TELEPHONE (OUTPATIENT)
Dept: CARDIAC REHAB | Facility: OTHER | Age: 69
End: 2024-11-01
Payer: COMMERCIAL

## 2024-11-01 PROCEDURE — 93798 PHYS/QHP OP CAR RHAB W/ECG: CPT

## 2024-11-01 NOTE — TELEPHONE ENCOUNTER
Patient's heart rate elevated at rest and with exercise on the last 2 cardiac rehab sessions.  On 11/1/24 on arrival heart rate was 130.  It did decrease after 5 minutes of rest to 100.  HR increased to 141 bpm on treadmill at 2 mph for a met level of 2.6.    HR decreased to 104 in recovery.    Pt is not on a beta blocker.  Per medication list it was shown as discontinued in 2018.  Pt is not sure why it was discontinued.     Please advise for any changes.  We will continue to monitor.

## 2024-11-04 DIAGNOSIS — R07.9 CHEST PAIN, UNSPECIFIED TYPE: Primary | ICD-10-CM

## 2024-11-04 DIAGNOSIS — I11.0 HYPERTENSIVE HEART DISEASE WITH HEART FAILURE (H): ICD-10-CM

## 2024-11-04 DIAGNOSIS — I25.9 CHRONIC ISCHEMIC HEART DISEASE: ICD-10-CM

## 2024-11-04 DIAGNOSIS — Z95.1 HISTORY OF CORONARY ARTERY BYPASS GRAFT X 3: ICD-10-CM

## 2024-11-04 DIAGNOSIS — I25.708 ATHEROSCLEROSIS OF CORONARY ARTERY BYPASS GRAFT OF NATIVE HEART WITH STABLE ANGINA PECTORIS (H): ICD-10-CM

## 2024-11-04 DIAGNOSIS — I25.10 PRESENCE OF STENT IN CORONARY ARTERY IN PATIENT WITH CORONARY ARTERY DISEASE: ICD-10-CM

## 2024-11-04 DIAGNOSIS — I47.10 SVT (SUPRAVENTRICULAR TACHYCARDIA) (H): ICD-10-CM

## 2024-11-04 DIAGNOSIS — Z95.5 PRESENCE OF STENT IN CORONARY ARTERY IN PATIENT WITH CORONARY ARTERY DISEASE: ICD-10-CM

## 2024-11-04 RX ORDER — METOPROLOL SUCCINATE 25 MG/1
25 TABLET, EXTENDED RELEASE ORAL DAILY
Qty: 90 TABLET | Refills: 3 | Status: SHIPPED | OUTPATIENT
Start: 2024-11-04

## 2024-11-05 NOTE — PATIENT INSTRUCTIONS
Constipation - Encouraged increase of water and apple, pear and prune juice to decrease symptoms and discomfort.  Use age appropriate over the counter medications such as stool softeners or miralax for constipation relief.  Encouraged soft stools. Return to clinic with change/worsening of symptoms.       Constipation (Adult)  Constipation means that you have bowel movements that are less frequent than usual. Stools often become very hard and difficult to pass.  Constipation is very common. At some point in life it affects almost everyone. Since everyone's bowel habits are different, what is constipation to one person may not be to another. Your healthcare provider may do tests to diagnose constipation. It depends on what he or she finds when evaluating you.    Symptoms of constipation include:    Abdominal pain    Bloating    Vomiting    Painful bowel movements    Itching, swelling, bleeding, or pain around the anus  Causes  Constipation can have many causes. These include:    Diet low in fiber    Too much dairy    Not drinking enough liquids    Lack of exercise or physical activity. This is especially true for older adults.    Changes in lifestyle or daily routine, including pregnancy, aging, work, and travel    Frequent use or misuse of laxatives    Ignoring the urge to have a bowel movement or delaying it until later    Medicines, such as certain prescription pain medicines, iron supplements, antacids, certain antidepressants, and calcium supplements    Diseases like irritable bowel syndrome, bowel obstructions, stroke, diabetes, thyroid disease, Parkinson disease, hemorrhoids, and colon cancer  Complications  Potential complications of constipation can include:    Hemorrhoids    Rectal bleeding from hemorrhoids or anal fissures (skin tears)    Hernias    Dependency on laxatives    Chronic constipation    Fecal impaction    Bowel obstruction or perforation  Home care  All treatment should be done after talking  with your healthcare provider. This is especially true if you have another medical problems, are taking prescription medicines, or are an older adult. Treatment most often involves lifestyle changes. You may also need medicines. Your healthcare provider will tell you which will work best for you. Follow the advice below to help avoid this problem in the future.  Lifestyle changes  These lifestyle changes can help prevent constipation:    Diet. Eat a high-fiber diet, with fresh fruit and vegetables, and reduce dairy intake, meats, and processed foods    Fluids. It's important to get enough fluids each day. Drink plenty of water when you eat more fiber. If you are on diet that limits the amount of fluid you can have, talk about this with your healthcare provider.    Regular exercise. Check with your healthcare provider first.  Medicines  Take any medicines as directed. Some laxatives are safe to use only every now and then. Others can be taken on a regular basis. Talk with your doctor or pharmacist if you have questions.  Prescription pain medicines can cause constipation. If you are taking this kind of medicine, ask your healthcare provider if you should also take a stool softener.  Medicines you may take to treat constipation include:    Fiber supplements    Stool softeners    Laxatives    Enemas    Rectal suppositories  Follow-up care  Follow up with your healthcare provider if symptoms don't get better in the next few days. You may need to have more tests or see a specialist.  Call 911  Call 911 if any of these occur:    Trouble breathing    Stiff, rigid abdomen that is severely painful to touch    Confusion    Fainting or loss of consciousness    Rapid heart rate    Chest pain  When to seek medical advice  Call your healthcare provider right away if any of these occur:    Fever of 100.4 F (38 C) or higher, or as directed by your healthcare provider    Failure to resume normal bowel movements    Pain in your  abdomen or back gets worse    Nausea or vomiting    Swelling in your abdomen    Blood in the stool    Black, tarry stool    Involuntary weight loss    Weakness  Date Last Reviewed: 12/30/2015 2000-2017 [employer]. [address] [place] [address], [place] [address]. All rights reserved. This information is not intended as a substitute for professional medical care. Always follow your healthcare professional's instructions.        Treating Constipation    Constipation is a common and often uncomfortable problem. Constipation means you have bowel movements fewer than 3 times per week, or strain to pass hard, dry stool. It can last a short time. Or it can be a problem that never seems to go away. The good news is that it can often be treated and controlled.  Eat more fiber  One of the best ways to help treat constipation is to increase your fiber intake. You can do this either through diet or by using fiber supplements. Fiber (in whole grains, fruits, and vegetables) adds bulk and absorbs water to soften the stool. This helps the stool pass through the colon more easily. When you increase your fiber intake, do it slowly to avoid side effects such as bloating. Also increase the amount of water that you drink. Eating more of the following foods can add fiber to your diet.    High-fiber cereals    Whole grains, bran, and brown rice    Vegetables such as carrots, broccoli, and greens    Fresh fruits (especially apples, pears, and dried fruits like raisins and apricots)    Nuts and legumes (especially beans such as lentils, kidney beans, and lima beans)  Get physically active  Exercise helps improve the working of your colon which helps ease constipation. Try to get some physical activity every day. If you haven t been active for a while, talk to your healthcare provider before starting again.  Laxatives  Your healthcare provider may suggest an over-the-counter product to help ease your constipation. He or she may  suggest the use of bulk-forming agents or laxatives. The use of laxatives, if used as directed, is common and safe. Follow directions carefully when using them. See your healthcare provider for new-onset constipation, or long-term constipation, to rule out other causes such as medicines or thyroid disease.  Date Last Reviewed: 7/1/2016 2000-2017 [employer]. [address] [place] [address], [place] [address]. All rights reserved. This information is not intended as a substitute for professional medical care. Always follow your healthcare professional's instructions.         No

## 2024-11-07 ASSESSMENT — PATIENT HEALTH QUESTIONNAIRE - PHQ9
10. IF YOU CHECKED OFF ANY PROBLEMS, HOW DIFFICULT HAVE THESE PROBLEMS MADE IT FOR YOU TO DO YOUR WORK, TAKE CARE OF THINGS AT HOME, OR GET ALONG WITH OTHER PEOPLE: SOMEWHAT DIFFICULT
SUM OF ALL RESPONSES TO PHQ QUESTIONS 1-9: 6
SUM OF ALL RESPONSES TO PHQ QUESTIONS 1-9: 6

## 2024-11-07 ASSESSMENT — ANXIETY QUESTIONNAIRES
7. FEELING AFRAID AS IF SOMETHING AWFUL MIGHT HAPPEN: MORE THAN HALF THE DAYS
5. BEING SO RESTLESS THAT IT IS HARD TO SIT STILL: NOT AT ALL
2. NOT BEING ABLE TO STOP OR CONTROL WORRYING: SEVERAL DAYS
GAD7 TOTAL SCORE: 7
3. WORRYING TOO MUCH ABOUT DIFFERENT THINGS: SEVERAL DAYS
4. TROUBLE RELAXING: SEVERAL DAYS
6. BECOMING EASILY ANNOYED OR IRRITABLE: NOT AT ALL
GAD7 TOTAL SCORE: 7
1. FEELING NERVOUS, ANXIOUS, OR ON EDGE: MORE THAN HALF THE DAYS
8. IF YOU CHECKED OFF ANY PROBLEMS, HOW DIFFICULT HAVE THESE MADE IT FOR YOU TO DO YOUR WORK, TAKE CARE OF THINGS AT HOME, OR GET ALONG WITH OTHER PEOPLE?: SOMEWHAT DIFFICULT
GAD7 TOTAL SCORE: 7
IF YOU CHECKED OFF ANY PROBLEMS ON THIS QUESTIONNAIRE, HOW DIFFICULT HAVE THESE PROBLEMS MADE IT FOR YOU TO DO YOUR WORK, TAKE CARE OF THINGS AT HOME, OR GET ALONG WITH OTHER PEOPLE: SOMEWHAT DIFFICULT
7. FEELING AFRAID AS IF SOMETHING AWFUL MIGHT HAPPEN: MORE THAN HALF THE DAYS

## 2024-11-08 ENCOUNTER — OFFICE VISIT (OUTPATIENT)
Dept: FAMILY MEDICINE | Facility: OTHER | Age: 69
End: 2024-11-08
Attending: STUDENT IN AN ORGANIZED HEALTH CARE EDUCATION/TRAINING PROGRAM
Payer: COMMERCIAL

## 2024-11-08 VITALS
OXYGEN SATURATION: 96 % | HEART RATE: 68 BPM | HEIGHT: 65 IN | DIASTOLIC BLOOD PRESSURE: 70 MMHG | BODY MASS INDEX: 29.85 KG/M2 | SYSTOLIC BLOOD PRESSURE: 122 MMHG | TEMPERATURE: 97.2 F | WEIGHT: 179.2 LBS | RESPIRATION RATE: 12 BRPM

## 2024-11-08 DIAGNOSIS — I47.10 SVT (SUPRAVENTRICULAR TACHYCARDIA) (H): ICD-10-CM

## 2024-11-08 DIAGNOSIS — I10 ESSENTIAL HYPERTENSION: ICD-10-CM

## 2024-11-08 DIAGNOSIS — I25.10 PRESENCE OF STENT IN CORONARY ARTERY IN PATIENT WITH CORONARY ARTERY DISEASE: ICD-10-CM

## 2024-11-08 DIAGNOSIS — I25.708 ATHEROSCLEROSIS OF CORONARY ARTERY BYPASS GRAFT OF NATIVE HEART WITH STABLE ANGINA PECTORIS (H): ICD-10-CM

## 2024-11-08 DIAGNOSIS — J44.9 CHRONIC OBSTRUCTIVE PULMONARY DISEASE, UNSPECIFIED COPD TYPE (H): ICD-10-CM

## 2024-11-08 DIAGNOSIS — G89.29 CHRONIC BILATERAL LOW BACK PAIN WITHOUT SCIATICA: ICD-10-CM

## 2024-11-08 DIAGNOSIS — M54.50 CHRONIC BILATERAL LOW BACK PAIN WITHOUT SCIATICA: ICD-10-CM

## 2024-11-08 DIAGNOSIS — Z76.89 ENCOUNTER TO ESTABLISH CARE WITH NEW DOCTOR: Primary | ICD-10-CM

## 2024-11-08 DIAGNOSIS — G43.719 INTRACTABLE CHRONIC COMMON MIGRAINE WITHOUT AURA: ICD-10-CM

## 2024-11-08 DIAGNOSIS — F17.211 CIGARETTE NICOTINE DEPENDENCE IN REMISSION: ICD-10-CM

## 2024-11-08 DIAGNOSIS — M25.511 ACUTE PAIN OF RIGHT SHOULDER: ICD-10-CM

## 2024-11-08 DIAGNOSIS — N18.31 STAGE 3A CHRONIC KIDNEY DISEASE (H): ICD-10-CM

## 2024-11-08 DIAGNOSIS — F11.20 CONTINUOUS OPIOID DEPENDENCE (H): ICD-10-CM

## 2024-11-08 DIAGNOSIS — Z86.711 HISTORY OF PULMONARY EMBOLISM: ICD-10-CM

## 2024-11-08 DIAGNOSIS — Z79.899 CONTROLLED SUBSTANCE AGREEMENT SIGNED: ICD-10-CM

## 2024-11-08 DIAGNOSIS — F41.0 PANIC ATTACK: ICD-10-CM

## 2024-11-08 DIAGNOSIS — G89.4 CHRONIC PAIN DISORDER: ICD-10-CM

## 2024-11-08 DIAGNOSIS — F33.1 MODERATE EPISODE OF RECURRENT MAJOR DEPRESSIVE DISORDER (H): ICD-10-CM

## 2024-11-08 DIAGNOSIS — G47.31 CENTRAL SLEEP APNEA: ICD-10-CM

## 2024-11-08 DIAGNOSIS — Z87.891 HISTORY OF TOBACCO ABUSE: ICD-10-CM

## 2024-11-08 DIAGNOSIS — Z95.5 PRESENCE OF STENT IN CORONARY ARTERY IN PATIENT WITH CORONARY ARTERY DISEASE: ICD-10-CM

## 2024-11-08 DIAGNOSIS — M15.0 PRIMARY OSTEOARTHRITIS INVOLVING MULTIPLE JOINTS: ICD-10-CM

## 2024-11-08 DIAGNOSIS — Z02.89 PAIN MEDICATION AGREEMENT: ICD-10-CM

## 2024-11-08 DIAGNOSIS — I77.1 SUBCLAVIAN ARTERY STENOSIS, LEFT (H): ICD-10-CM

## 2024-11-08 DIAGNOSIS — Z23 ENCOUNTER FOR IMMUNIZATION: ICD-10-CM

## 2024-11-08 DIAGNOSIS — I11.0 HYPERTENSIVE HEART DISEASE WITH HEART FAILURE (H): ICD-10-CM

## 2024-11-08 DIAGNOSIS — Z91.199 NONCOMPLIANCE WITH CPAP TREATMENT: ICD-10-CM

## 2024-11-08 DIAGNOSIS — F41.9 CHRONIC ANXIETY: ICD-10-CM

## 2024-11-08 DIAGNOSIS — Z23 ENCOUNTER FOR IMMUNIZATION: Primary | ICD-10-CM

## 2024-11-08 DIAGNOSIS — E78.2 MIXED HYPERLIPIDEMIA: ICD-10-CM

## 2024-11-08 DIAGNOSIS — M79.18 MYOFASCIAL PAIN: ICD-10-CM

## 2024-11-08 PROCEDURE — 90480 ADMN SARSCOV2 VAC 1/ONLY CMP: CPT

## 2024-11-08 PROCEDURE — G0463 HOSPITAL OUTPT CLINIC VISIT: HCPCS | Mod: 25

## 2024-11-08 PROCEDURE — 99215 OFFICE O/P EST HI 40 MIN: CPT | Mod: 25 | Performed by: STUDENT IN AN ORGANIZED HEALTH CARE EDUCATION/TRAINING PROGRAM

## 2024-11-08 PROCEDURE — 250N000009 HC RX 250: Performed by: STUDENT IN AN ORGANIZED HEALTH CARE EDUCATION/TRAINING PROGRAM

## 2024-11-08 PROCEDURE — 250N000011 HC RX IP 250 OP 636: Mod: JZ | Performed by: STUDENT IN AN ORGANIZED HEALTH CARE EDUCATION/TRAINING PROGRAM

## 2024-11-08 PROCEDURE — 20610 DRAIN/INJ JOINT/BURSA W/O US: CPT | Performed by: STUDENT IN AN ORGANIZED HEALTH CARE EDUCATION/TRAINING PROGRAM

## 2024-11-08 RX ORDER — CYCLOBENZAPRINE HCL 10 MG
10 TABLET ORAL 3 TIMES DAILY PRN
Qty: 270 TABLET | Refills: 0 | Status: SHIPPED | OUTPATIENT
Start: 2024-11-08

## 2024-11-08 RX ORDER — LIDOCAINE HYDROCHLORIDE 10 MG/ML
5 INJECTION, SOLUTION EPIDURAL; INFILTRATION; INTRACAUDAL; PERINEURAL ONCE
Status: COMPLETED | OUTPATIENT
Start: 2024-11-08 | End: 2024-11-08

## 2024-11-08 RX ORDER — TRIAMCINOLONE ACETONIDE 40 MG/ML
80 INJECTION, SUSPENSION INTRA-ARTICULAR; INTRAMUSCULAR ONCE
Status: COMPLETED | OUTPATIENT
Start: 2024-11-08 | End: 2024-11-08

## 2024-11-08 RX ADMIN — LIDOCAINE HYDROCHLORIDE 5 ML: 10 INJECTION, SOLUTION INFILTRATION; PERINEURAL at 16:25

## 2024-11-08 RX ADMIN — TRIAMCINOLONE ACETONIDE 80 MG: 40 INJECTION, SUSPENSION INTRA-ARTICULAR; INTRAMUSCULAR at 16:26

## 2024-11-08 ASSESSMENT — PAIN SCALES - GENERAL: PAINLEVEL_OUTOF10: SEVERE PAIN (7)

## 2024-11-08 NOTE — NURSING NOTE
"Patient presents to clinic today to establish care with Dr. Lawrecne. She would like to discuss her chronic pain in her low back and knees. Malina also has cardiac concerns - she would like to be able to exercise again. She is currently following with cardiology at [facility], last visit 10/24/24.  Patient would like to discuss starting flexeril again - this helped her back pain.    In addition, patient would like to update her covid immunization.    Chief Complaint   Patient presents with    Establish Care     Chronic Pain       FOOD SECURITY SCREENING QUESTIONS  Hunger Vital Signs:  Within the past 12 months we worried whether our food would run out before we got money to buy more. Never  Within the past 12 months the food we bought just didn't last and we didn't have money to get more. Never  Kathleen Collazo 11/8/2024 2:02 PM      Initial /70 (BP Location: Right arm, Patient Position: Sitting, Cuff Size: Adult Regular)   Pulse 68   Temp 97.2  F (36.2  C) (Temporal)   Resp 12   Ht 1.657 m (5' 5.25\")   Wt 81.3 kg (179 lb 3.2 oz)   LMP 04/29/1978 (Approximate)   SpO2 96%   BMI 29.59 kg/m   Estimated body mass index is 29.59 kg/m  as calculated from the following:    Height as of this encounter: 1.657 m (5' 5.25\").    Weight as of this encounter: 81.3 kg (179 lb 3.2 oz).  Medication Reconciliation: complete    Kathleencarlos Collazo    "

## 2024-11-08 NOTE — PROGRESS NOTES
Assessment & Plan     (Z23) Encounter for immunization  (primary encounter diagnosis)  Comment: ***  Plan: COVID-19 12+ (PFIZER)        ***    (G89.4) Chronic pain disorder  Comment: ***  Plan: cyclobenzaprine (FLEXERIL) 10 MG tablet        ***    (F33.1) Moderate episode of recurrent major depressive disorder (H)  Comment: ***  Plan: ***    (M54.50,  G89.29) Chronic bilateral low back pain without sciatica  Comment: ***  Plan: cyclobenzaprine (FLEXERIL) 10 MG tablet        ***    (Z79.899) Controlled substance agreement signed  Comment: ***  Plan: ***    (F41.9) Chronic anxiety  Comment: ***  Plan: ***    (F41.0) Panic attack  Comment: ***  Plan: ***    (F11.20) F11.2 - Continuous opioid dependence (H)  Comment: ***  Plan: ***    (N18.31) Stage 3a chronic kidney disease (H)  Comment: ***  Plan: ***    (M15.0) Primary osteoarthritis involving multiple joints  Comment: ***  Plan: cyclobenzaprine (FLEXERIL) 10 MG tablet,         DRAIN/INJECT LARGE JOINT/BURSA, triamcinolone         (KENALOG-40) injection 80 mg, lidocaine (PF)         (XYLOCAINE) 1 % injection 5 mL        ***    (G43.719) Intractable chronic common migraine without aura  Comment: ***  Plan: ***    (I11.0) Hypertensive heart disease with heart failure (H)  Comment: ***  Plan: ***    (J44.9) Chronic obstructive pulmonary disease, unspecified COPD type (H)  Comment: ***  Plan: ***    (I47.10) SVT (supraventricular tachycardia) (H)  Comment: ***  Plan: ***    (I10) Essential hypertension  Comment: ***  Plan: ***    (I25.708) 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    Subjective   Malina is a 69 year old, presenting for the following health issues:  Establish Care (Chronic Pain)      11/8/2024     1:53 PM   Additional Questions   Roomed by Kathleen KNAPP LPN     HPI       Reason for visit:  Need Primary DRJaleel, Back pain, Stress,Covid Shot, Arthritis   She is taking medications regularly.        Review of Systems  Constitutional, HEENT, cardiovascular, pulmonary, GI, , musculoskeletal, neuro, skin, endocrine and psych systems are negative, except as otherwise noted.      Objective    /70 (BP Location: Right arm, Patient Position: Sitting, Cuff Size: Adult Regular)   Pulse 68   Temp 97.2  F (36.2  C) (Temporal)   Resp 12   Ht 1.657 m (5' 5.25\")   Wt 81.3 kg (179 lb 3.2 oz)   LMP 04/29/1978 (Approximate)   SpO2 96%   BMI 29.59 kg/m    Body mass index is 29.59 kg/m .  Physical Exam   {Exam List (Optional):825723}    {Diagnostic Test Results (Optional):067610}        {[facility] 2021 Documentation (Optional):792051}  I spent a total of 66 minutes on the day of the visit.  I spent 44 minutes face-to-face with patient.  Total time spent by me today doing chart review, history and exam, documentation and further activities per the note on day of encounter.    The longitudinal plan of care for the diagnosis(es)/condition(s) as documented were addressed during this visit. Due to the added complexity in care, I will continue to support Malina in the subsequent management and with ongoing continuity of care.    Signed Electronically by: Damon Lawrence DO  {Email feedback regarding this note to [email or web address]   :[id number]}  " Norco 10mg-325mg q4Hprn but found works best if takes 2 pills when wakes at 4am then 1 pill at ~1130am, 3-4pm, and 7-8pm. Not taking any additional tylenol. Has narcan available. No refills needed at this time. Also uses voltaren gel.  Has a bowel movement every 2-4 days, sometimes longer. Likely due to her opiates, chronic constipation all her life as well as frequent use of ondansetron. Encouraged her to use a fiber supplement daily as well as MiraLAX as needed for constipation.   Takes Nurtec for migraines.  Takes protonix and carafate for peptic ulcer disease. Takes ondansetron a few times per week.     Requesting Covid vaccine today.    Stress in addition to depression and anxiety/panic attacks. Taking Trintellex and Klonopin. Also takes Buspar - usually once a day if anxiety not controlled by klonopin. Worried about her  as she does not believe she will live much longer with her heart getting worse.    Hx of CABG in 12/2002 and 07/2024. Reports 18 stents placed as well. Has been going to Cardiac rehab after recent heart cath and absorbable stent placement 9/24/24.    dizziness.  She states when she gets up she starts to feel dizzy like being off balance, will have a headache, her heart will pound and pulse increase.  She will be short of breath.  At times she has chest discomfort.  This has been going on for 1 to 2 years... Improved since Imdur was discontinued.   Chest discomfort and palpitations 3 days ago but took nitroglycerin which gave her relief.  Takes lisinopril 10mg, metoprolol XL 25mg, amlodipine 10mg, aspirin 81mg, plavix 75mg, and rosuvastatin 40mg. BP currently controlled. LDL not quite at goal. She is not having myalgia or other side effects to statin medications.          Back Pain:  She presents for follow up of back pain. Patient's back pain is a recurring problem.  Location of back pain:  Left lower back, right middle of back, left middle of back, right upper back, left upper back,  "right shoulder, right hip and left hip  Description of back pain: stabbing  Back pain spreads: right knee and left knee     Since patient first noticed back pain, pain is: gradually worsening  Does back pain interfere with her job:  Yes      CKD: She uses over the counter pain medication, including Doans, a few times a week.     Mental Health Follow-up:  Patient presents to follow-up on Depression & Anxiety.Patient's depression since last visit has been:  Medium  The patient is not having other symptoms associated with depression.  Patient's anxiety since last visit has been:  Worse  The patient is having other symptoms associated with anxiety.  Any significant life events: health concerns  Patient is feeling anxious or having panic attacks.  Patient has no concerns about alcohol or drug use.     Heart Failure:  She presents for follow up of heart failure. She is experiencing shortness of breath with rest and activity. She is not experiencing worsening lower extremity edema.   She denies orthopenea and is not coughing at night. Patient is not checking weight daily. She has recently had a weight decrease.  She has no new side effects from medications.       Vascular Disease:  She presents for follow up of vascular disease.    She takes nitroglycerin a few times monthly.  She is not taking daily aspirin but is taking plavix. Recent stent placement and Cardiac Rehab.       She is taking medications regularly.        Review of Systems  Constitutional, HEENT, cardiovascular, pulmonary, GI, , musculoskeletal, neuro, skin, endocrine and psych systems are negative, except as otherwise noted.      Objective    /70 (BP Location: Right arm, Patient Position: Sitting, Cuff Size: Adult Regular)   Pulse 68   Temp 97.2  F (36.2  C) (Temporal)   Resp 12   Ht 1.657 m (5' 5.25\")   Wt 81.3 kg (179 lb 3.2 oz)   LMP 04/29/1978 (Approximate)   SpO2 96%   BMI 29.59 kg/m    Body mass index is 29.59 kg/m .  Physical " Exam  Vitals and nursing note reviewed.   Constitutional:       General: She is not in acute distress.  HENT:      Head: Normocephalic.   Cardiovascular:      Rate and Rhythm: Normal rate and regular rhythm.      Heart sounds: No murmur heard.     No friction rub. No gallop.   Pulmonary:      Effort: Pulmonary effort is normal.      Breath sounds: No wheezing, rhonchi or rales.   Abdominal:      General: Bowel sounds are normal.      Tenderness: There is no abdominal tenderness.   Musculoskeletal:         General: Tenderness present. No deformity.      Right lower leg: Edema present.      Left lower leg: Edema present.   Skin:     General: Skin is warm.      Capillary Refill: Capillary refill takes 2 to 3 seconds.   Neurological:      Mental Status: She is alert.      Sensory: No sensory deficit.   Psychiatric:         Mood and Affect: Mood is depressed.         Behavior: Behavior is cooperative.              Recent Results (from the past 52 weeks)   Routine UA with micro reflex to culture    Collection Time: 03/05/24  9:56 AM    Specimen: Urine, Midstream   Result Value Ref Range    Color Urine Light Yellow Colorless, Straw, Light Yellow, Yellow    Appearance Urine Clear Clear    Glucose Urine Negative Negative mg/dL    Bilirubin Urine Negative Negative    Ketones Urine Negative Negative mg/dL    Specific Gravity Urine 1.010 1.000 - 1.030    Blood Urine Negative Negative    pH Urine 6.5 5.0 - 9.0    Protein Albumin Urine Negative Negative mg/dL    Urobilinogen Urine Normal Normal, 2.0 mg/dL    Nitrite Urine Positive (A) Negative    Leukocyte Esterase Urine Large (A) Negative    Bacteria Urine Many (A) None Seen /HPF    Budding Yeast Urine Few (A) None Seen /HPF    RBC Urine 3 (H) <=2 /HPF    WBC Urine 46 (H) <=5 /HPF   Urine Culture    Collection Time: 03/05/24  9:56 AM    Specimen: Urine, Midstream   Result Value Ref Range    Culture >100,000 CFU/mL Escherichia coli (A)        Susceptibility    Escherichia coli -  LIOR     Amoxicillin/Clavulanate 16 Intermediate      Ampicillin >16 Resistant      Ampicillin/ Sulbactam >16 Resistant      Aztreonam <=4 Susceptible      Cefazolin >16 Resistant      Cefepime <=2 Susceptible      Ceftazidime <=1 Susceptible      Ceftriaxone <=1 Susceptible      Cefoxitin <=8 Susceptible      Ciprofloxacin >2 Resistant      Ertapenem <=0.5 Susceptible      Gentamicin <=4 Susceptible      Imipenem <=1 Susceptible      Levofloxacin >4 Resistant      Nitrofurantoin <=32 Susceptible      Piperacillin/Tazobactam*         * Piperacillin/Tazobactam not resistant.  Due to test limitations, lab cannot provide LIOR to determine susceptibility.     Tetracycline <=4 Susceptible      Tobramycin <=4 Susceptible      Trimethoprim/Sulfamethoxazole <=2/38 Susceptible      Cefpodoxime <=2 Susceptible      Cefuroxime <=4 Susceptible      Trimethoprim <=8 Susceptible    NM MPI with Lexiscan    Collection Time: 04/18/24 10:55 AM   Result Value Ref Range    Target      Baseline Systolic      Baseline Diastolic BP 80     Last Stress Systolic      Last Stress Diastolic BP 81     Baseline HR 48 bpm    Max HR  81     Max Predicted HR  54 %    Estimated workload 1.0 METS    Rate Pressure Product 12,312.0     BP 66 mmHg    Left Ventricular EF 64 %   UA Macroscopic with reflex to Microscopic and Culture    Collection Time: 05/07/24 10:53 AM    Specimen: Urine, Clean Catch   Result Value Ref Range    Color Urine Light Yellow Colorless, Straw, Light Yellow, Yellow    Appearance Urine Clear Clear    Glucose Urine Negative Negative mg/dL    Bilirubin Urine Negative Negative    Ketones Urine Negative Negative mg/dL    Specific Gravity Urine 1.012 1.000 - 1.030    Blood Urine Negative Negative    pH Urine 7.0 5.0 - 9.0    Protein Albumin Urine Negative Negative mg/dL    Urobilinogen Urine Normal Normal, 2.0 mg/dL    Nitrite Urine Negative Negative    Leukocyte Esterase Urine Moderate (A) Negative    Mucus Urine  Present (A) None Seen /LPF    RBC Urine 1 <=2 /HPF    WBC Urine 2 <=5 /HPF    Squamous Epithelials Urine 1 <=1 /HPF    Hyaline Casts Urine 6 (H) <=2 /LPF   Basic Metabolic Panel    Collection Time: 05/07/24 10:53 AM   Result Value Ref Range    Sodium 138 135 - 145 mmol/L    Potassium 4.4 3.4 - 5.3 mmol/L    Chloride 99 98 - 107 mmol/L    Carbon Dioxide (CO2) 24 22 - 29 mmol/L    Anion Gap 15 7 - 15 mmol/L    Urea Nitrogen 17.6 8.0 - 23.0 mg/dL    Creatinine 0.97 (H) 0.51 - 0.95 mg/dL    GFR Estimate 63 >60 mL/min/1.73m2    Calcium 10.3 (H) 8.8 - 10.2 mg/dL    Glucose 101 (H) 70 - 99 mg/dL   Hepatitis C Screen Reflex to HCV RNA Quant and Genotype    Collection Time: 05/07/24 10:53 AM   Result Value Ref Range    Hepatitis C Antibody Nonreactive Nonreactive   Albumin Random Urine Quantitative with Creat Ratio    Collection Time: 05/07/24 10:53 AM   Result Value Ref Range    Creatinine Urine mg/dL 73.4 mg/dL    Albumin Urine mg/L <12.0 mg/L    Albumin Urine mg/g Cr     Urine Culture    Collection Time: 05/07/24 10:53 AM    Specimen: Urine, Clean Catch   Result Value Ref Range    Culture No Growth    EKG 12-lead, tracing only    Collection Time: 06/06/24 10:48 AM   Result Value Ref Range    Systolic Blood Pressure  mmHg    Diastolic Blood Pressure  mmHg    Ventricular Rate 49 BPM    Atrial Rate 49 BPM    GA Interval 180 ms    QRS Duration 86 ms     ms    QTc 422 ms    P Axis 47 degrees    R AXIS 25 degrees    T Axis 48 degrees    Interpretation ECG       Sinus bradycardia  Otherwise normal ECG  When compared with ECG of 18-APR-2024 09:30, (unconfirmed)  No significant change was found  Confirmed by MD ANNE, LOU ([id number]) on 6/6/2024 7:55:33 PM     EKG 12-lead, tracing only    Collection Time: 06/19/24  8:17 AM   Result Value Ref Range    Systolic Blood Pressure  mmHg    Diastolic Blood Pressure  mmHg    Ventricular Rate 55 BPM    Atrial Rate 55 BPM    GA Interval 172 ms    QRS Duration 86 ms     ms    QTc  401 ms    P Axis 22 degrees    R AXIS 63 degrees    T Axis 61 degrees    Interpretation ECG       Sinus bradycardia  Otherwise normal ECG  When compared with ECG of 06-JUN-2024 10:48,  No significant change was found  Confirmed by DO CHRISTIANSEN STACY ([id number]) on 6/22/2024 6:21:39 PM     Comprehensive metabolic panel    Collection Time: 06/19/24  8:24 AM   Result Value Ref Range    Sodium 141 135 - 145 mmol/L    Potassium 4.3 3.4 - 5.3 mmol/L    Carbon Dioxide (CO2) 24 22 - 29 mmol/L    Anion Gap 10 7 - 15 mmol/L    Urea Nitrogen 11.7 8.0 - 23.0 mg/dL    Creatinine 0.80 0.51 - 0.95 mg/dL    GFR Estimate 79 >60 mL/min/1.73m2    Calcium 9.7 8.8 - 10.2 mg/dL    Chloride 107 98 - 107 mmol/L    Glucose 105 (H) 70 - 99 mg/dL    Alkaline Phosphatase 73 40 - 150 U/L    AST 22 0 - 45 U/L    ALT 16 0 - 50 U/L    Protein Total 7.5 6.4 - 8.3 g/dL    Albumin 4.6 3.5 - 5.2 g/dL    Bilirubin Total 0.3 <=1.2 mg/dL   Troponin T, High Sensitivity    Collection Time: 06/19/24  8:24 AM   Result Value Ref Range    Troponin T, High Sensitivity 9 <=14 ng/L   Magnesium    Collection Time: 06/19/24  8:24 AM   Result Value Ref Range    Magnesium 2.3 1.7 - 2.3 mg/dL   CBC with platelets and differential    Collection Time: 06/19/24  8:24 AM   Result Value Ref Range    WBC Count 5.5 4.0 - 11.0 10e3/uL    RBC Count 4.24 3.80 - 5.20 10e6/uL    Hemoglobin 12.7 11.7 - 15.7 g/dL    Hematocrit 37.9 35.0 - 47.0 %    MCV 89 78 - 100 fL    MCH 30.0 26.5 - 33.0 pg    MCHC 33.5 31.5 - 36.5 g/dL    RDW 12.4 10.0 - 15.0 %    Platelet Count 245 150 - 450 10e3/uL    % Neutrophils 51 %    % Lymphocytes 35 %    % Monocytes 10 %    % Eosinophils 2 %    % Basophils 1 %    % Immature Granulocytes 0 %    NRBCs per 100 WBC 0 <1 /100    Absolute Neutrophils 2.8 1.6 - 8.3 10e3/uL    Absolute Lymphocytes 2.0 0.8 - 5.3 10e3/uL    Absolute Monocytes 0.5 0.0 - 1.3 10e3/uL    Absolute Eosinophils 0.1 0.0 - 0.7 10e3/uL    Absolute Basophils 0.1 0.0 - 0.2 10e3/uL    Absolute  Immature Granulocytes 0.0 <=0.4 10e3/uL    Absolute NRBCs 0.0 10e3/uL   Troponin T, High Sensitivity    Collection Time: 06/19/24  9:50 AM   Result Value Ref Range    Troponin T, High Sensitivity 8 <=14 ng/L   D dimer quantitative    Collection Time: 06/19/24  9:50 AM   Result Value Ref Range    D-Dimer Quantitative 0.34 0.00 - 0.50 ug/mL FEU   CBC with platelets    Collection Time: 07/01/24  9:01 AM   Result Value Ref Range    WBC Count 6.7 4.0 - 11.0 10e3/uL    RBC Count 4.11 3.80 - 5.20 10e6/uL    Hemoglobin 12.4 11.7 - 15.7 g/dL    Hematocrit 37.5 35.0 - 47.0 %    MCV 91 78 - 100 fL    MCH 30.2 26.5 - 33.0 pg    MCHC 33.1 31.5 - 36.5 g/dL    RDW 12.3 10.0 - 15.0 %    Platelet Count 226 150 - 450 10e3/uL   Basic metabolic panel    Collection Time: 07/01/24  9:01 AM   Result Value Ref Range    Sodium 139 135 - 145 mmol/L    Potassium 4.1 3.4 - 5.3 mmol/L    Chloride 103 98 - 107 mmol/L    Carbon Dioxide (CO2) 23 22 - 29 mmol/L    Anion Gap 13 7 - 15 mmol/L    Urea Nitrogen 22.1 8.0 - 23.0 mg/dL    Creatinine 1.03 (H) 0.51 - 0.95 mg/dL    GFR Estimate 58 (L) >60 mL/min/1.73m2    Calcium 9.8 8.8 - 10.2 mg/dL    Glucose 103 (H) 70 - 99 mg/dL   EKG 12-lead, tracing only - Hospital locations:  [facility] [place]    Collection Time: 07/01/24  9:34 AM   Result Value Ref Range    Systolic Blood Pressure  mmHg    Diastolic Blood Pressure  mmHg    Ventricular Rate 60 BPM    Atrial Rate 60 BPM    WA Interval 184 ms    QRS Duration 82 ms     ms    QTc 442 ms    P Axis 68 degrees    R AXIS 25 degrees    T Axis 47 degrees    Interpretation ECG       Sinus rhythm  Normal ECG  When compared with ECG of 19-JUN-2024 08:17,  T wave inversion now evident in Anterior leads  Confirmed by MD OLEG, DWAYNE ([id number]) on 7/3/2024 1:38:25 PM     Basic metabolic panel    Collection Time: 09/24/24  9:05 AM   Result Value Ref Range    Sodium 137 135 - 145 mmol/L    Potassium 4.3 3.4 - 5.3 mmol/L    Chloride 105 98 - 107 mmol/L    Carbon  Dioxide (CO2) 20 (L) 22 - 29 mmol/L    Anion Gap 12 7 - 15 mmol/L    Urea Nitrogen 21.3 8.0 - 23.0 mg/dL    Creatinine 1.12 (H) 0.51 - 0.95 mg/dL    GFR Estimate 53 (L) >60 mL/min/1.73m2    Calcium 9.5 8.8 - 10.4 mg/dL    Glucose 111 (H) 70 - 99 mg/dL   CBC with platelets    Collection Time: 09/24/24  9:05 AM   Result Value Ref Range    WBC Count 6.2 4.0 - 11.0 10e3/uL    RBC Count 4.49 3.80 - 5.20 10e6/uL    Hemoglobin 13.2 11.7 - 15.7 g/dL    Hematocrit 40.3 35.0 - 47.0 %    MCV 90 78 - 100 fL    MCH 29.4 26.5 - 33.0 pg    MCHC 32.8 31.5 - 36.5 g/dL    RDW 13.1 10.0 - 15.0 %    Platelet Count 237 150 - 450 10e3/uL   INR    Collection Time: 09/24/24  9:05 AM   Result Value Ref Range    INR 1.04 0.85 - 1.15   EKG 12-lead, tracing only    Collection Time: 09/24/24  9:30 AM   Result Value Ref Range    Systolic Blood Pressure  mmHg    Diastolic Blood Pressure  mmHg    Ventricular Rate 66 BPM    Atrial Rate 66 BPM    VT Interval 168 ms    QRS Duration 82 ms     ms    QTc 452 ms    P Axis 72 degrees    R AXIS 20 degrees    T Axis 93 degrees    Interpretation ECG       Sinus rhythm  Nonspecific T wave abnormality  Abnormal ECG  When compared with ECG of 01-Jul-2024 09:34,  No significant change was found  Confirmed by fellow Kristine Light ([id number]) on 9/25/2024 10:35:20 AM  Confirmed by MD LEWIS, MARCUS [id number]) on 9/25/2024 2:56:32 PM     Activated clotting time celite, POCT    Collection Time: 09/24/24 11:06 AM   Result Value Ref Range    Activated Clotting Time (Celite) POCT 291 (H) 74 - 150 seconds   Activated clotting time celite, POCT    Collection Time: 09/24/24 11:37 AM   Result Value Ref Range    Activated Clotting Time (Celite) POCT 295 (H) 74 - 150 seconds   EKG 12-lead, tracing only    Collection Time: 09/24/24 12:25 PM   Result Value Ref Range    Systolic Blood Pressure  mmHg    Diastolic Blood Pressure  mmHg    Ventricular Rate 58 BPM    Atrial Rate 58 BPM    VT Interval 186 ms    QRS Duration 82 ms    QT  456 ms    QTc 447 ms    P Axis 30 degrees    R AXIS 11 degrees    T Axis 40 degrees    Interpretation ECG       Sinus bradycardia  Nonspecific T wave abnormality  Abnormal ECG  When compared with ECG of 24-Sep-2024 09:30, (unconfirmed)  T wave inversion no longer evident in Lateral leads  Confirmed by MD LEWIS, MARCUS [id number]) on 9/25/2024 2:47:18 PM     Urine Creatinine for Drug Screen Panel    Collection Time: 12/16/24 11:33 AM   Result Value Ref Range    Creatinine Urine for Drug Screen 53 mg/dL      Cardiac cath on 9/24/2024:  1. Severe multivessel obstructive CAD  a. LIMA-LAD patent  b. Hemodynamically insignificant ostial Lcx moderate disease with dPR 0.98  c. Severe LAD disease proximal to the LIMA-LAD touchdown with severe ISR with successful IVUS guided PCI to the LAD with a 3.0 x 16mm Synergy XD stent successfully post dilated to 3.5 in the proximal segment. The more distant severe stenosis was left in order to protect the integrity of the LIMA from significant competitive flow.  2. Successful uncomplicated R radial access with hemostasis obtained with TR band placement.      Card[facility] at [facility] on 7/1/2024:    Right sided filling pressures are normal.    Left sided filling pressures are normal.    Normal PA pressures.    Normal cardiac output level.    Hemodynamic data has been modified in Epic per physician review.     Severe two vessel CAD with prior CABG LIMA to LAD, RAFI to RCA, SVG to OM and prior PCI to the left main, LAD, and LCx.  Patent LIMA to LAD.  Occluded RAFI and SVG.  Patent stents.  Left main stent with minimal ISR, proximal LAD stent with moderate ISR, mid LAD stent with severe ISR, and ostial LCx stent with severe ISR.     Stress test on 4/18/24:    The nuclear stress test is negative for inducible myocardial ischemia or infarction.     Left ventricular function is normal.     The left ventricular ejection fraction at rest is 66%.  The left   ventricular ejection fraction at stress is  64%.     A prior study was conducted on 9/15/2021.      Echocardiogram on 7/31/2023:  Global and regional left ventricular function is normal with an EF of 55-60%.  Left ventricular diastolic function is indeterminate.  Mild concentric wall thickening consistent with left ventricular hypertrophy  is present.  Global right ventricular function is normal.  No significant valvular abnormalities present.  IVC diameter <2.1 cm collapsing >50% with sniff suggests a normal RA pressure of 3 mmHg.  No pericardial effusion is present.  No significant changes noted.     Zio patch on 3/27/2023:  Patient's monitor was in place for 13 days and 16 hours.  Minimum heart rate 42 bpm, average heart rate 62 bpm, maximum heart rate 167 bpm. Rhythm/s present include sinus, SVT. There were rare ventricular ectopic beats accounting for <1% of all beats. There were 0  ventricular triplets and rare couplets. There were  rare supraventricular ectopic beats.  There were 12 triggered events and 14 diary entries during which time the noted rhythms were sinus, SVE, VE.  There were 7 episodes of SVT with the fastest lasting 4 beats with a max rate of 167 bpm.  Review of actual tracings showed no other significant abnormality.     V/Q scan on 11/23/21:  No evidence of pulmonary emboli.     CXR 11/23/21:  Probable air trapping.     PFT 12/8/21:  No obstructive or restrictive disease is noted, moderate diffusion defect is noted consistent with pulmonary vascular disease      Stress test on 9/15/2021:    The nuclear stress test is negative for inducible myocardial ischemia or infarction.     Left ventricular function is normal.     The left ventricular ejection fraction at rest is 64%.  The left   ventricular ejection fraction at stress is 66%.     A prior study was conducted on 12/16/2019.      Echo on 9/15/2021:  Global and regional left ventricular function is normal with an EF of 55-60%.  Right ventricular function, chamber size, wall motion, and  thickness are normal.  Pulmonary artery systolic pressure is normal.  The inferior vena cava is normal.  No pericardial effusion is present.  No significant changes noted.     MRI brain on 8/20/2021:  A few small nonspecific white matter signal abnormalities  are present, likely sequela of mild chronic small vessel ischemic disease. The exam is otherwise unremarkable.     MCOT from 7/30/21:  Findings: Patient's monitor was in place for 9 days and 7 hours.  Minimum heart rate 50 bpm, average heart rate 63 bpm, maximum heart rate 120 bpm. Rhythm/s present include sinus rhythm.  There were 37 symptomatic events during which time the noted rhythms were sinus rhythm, sinus bradycardia and sinus tachycardia.  There were six asymptomatic events during which time the noted rhythms were sinus rhythm.  There was rare atrial ectopy and rare ventricular ectopy.  Review of actual tracings showed no other abnormality.     ARYA on 7/17/20:  The right ankle-brachial index is 1.17.  Left ankle-brachial index is 0.98.     Stress test on 12/16/19:    The nuclear stress test is negative for inducible myocardial ischemia   or infarction.     A small amount of soft tissue artifact is present, more pronounced at   rest.     Left ventricular function is normal.     The left ventricular ejection fraction at rest is 79%.  The left   ventricular ejection fraction at stress is 72%.     A prior study was conducted on 6/30/2015.      US carotids on 12/12/19:  No ultrasound evidence of hemo-dynamically significant stenosis.  Mild atherosclerotic disease.     US abdominal aorta on 12/12/19:  No abdominal aortic aneurysm.        ECHO on 5/13/19:  Global and regional left ventricular function is normal with an EF of 60-65%.  Mild concentric wall thickening consistent with left ventricular hypertrophy  is present.  Right ventricular function, chamber size, wall motion, and thickness are  normal.  No significant valvular abnormalities were  noted.  Previous study not available for comparison.     Cardiac cath on 3/30/13:  LEFT MAIN: Widely patent stent.   LEFT ANTERIOR DESCENDING: Widely patent proximal stent. There is an 85% to  90% mid lesion after the second diagonal and prior to the LIMA insertion as before.   CIRCUMFLEX: There is diffuse 60% to 70% disease throughout, the ostium is 70   to 75, and the OM2 is a 70, but it is very diffuse disease and basically   unchanged from previous.   RCA: Widely patent stents with only mild irregularities.   LEFT VENTRICULOGRAM: Normal left ventricular ejection fraction, LVEF 60%,   with no focal wall motion abnormality. LV pressure 146/29.   FLORENTINO to LAD: Widely patent, although there is less flow than before, and, in   fact, the retrograde filling of the LIMA from the antegrade vessel. There is   a severe subclavian stenosis that a pressure gradient revealed in the aorta   was 153/78, and in the right subclavian was 100 to 106/73, for a nearly 50 mm   pressure gradient. The stenosis was 75% to 80%.   RAFI to RCA: 100%.   SVG to OM: 100%.           Intra-articular Injection PROCEDURE NOTE:  After consent was obtained, using sterile technique the right shoulder was prepped and the posterior approach was used to enter the joint. Kenalog 80mg and 5cc plain Lidocaine was then injected and the needle withdrawn.  The procedure was well tolerated.  The patient is asked to continue to rest the shoulder for a few more days before resuming regular activities.  It may be more painful for the first 1-2 days.  Watch for fever, or increased swelling or persistent pain in shoulder. Call or return to clinic prn if such symptoms occur or the knee fails to improve as anticipated.              Moderate medical decision making with number of acute and chronic conditions to be considered, review of medical records (internal and external), interpretation of test ordered by the physician which I reviewed with patient, performed  procedure, and ordered medications.  I spent a total of 66 minutes on the day of the visit.  I spent 44 minutes face-to-face with patient.  Total time spent by me today doing chart review, history and exam, documentation and further activities per the note on day of encounter.    The longitudinal plan of care for the diagnosis(es)/condition(s) as documented were addressed during this visit. Due to the added complexity in care, I will continue to support Malina in the subsequent management and with ongoing continuity of care.    Signed Electronically by: Damon Lawrence DO

## 2024-11-13 NOTE — PROGRESS NOTES
Cardiac Rehab Discharge Summary    Reason for discharge:    Patient/family request discontinuation of services.    Progress towards goals:  Goals partially met.  Barriers to achieving goals: Patient feels she has to much pain to continue with rehab. She states the days she comes to rehab she is wiped out the rest of the day.     Recommendation(s):    Continued therapy is recommended.  Rationale/Recommendations:  More benefits could have been achieved with further visits.

## 2024-12-15 ASSESSMENT — PATIENT HEALTH QUESTIONNAIRE - PHQ9
SUM OF ALL RESPONSES TO PHQ QUESTIONS 1-9: 5
10. IF YOU CHECKED OFF ANY PROBLEMS, HOW DIFFICULT HAVE THESE PROBLEMS MADE IT FOR YOU TO DO YOUR WORK, TAKE CARE OF THINGS AT HOME, OR GET ALONG WITH OTHER PEOPLE: SOMEWHAT DIFFICULT
SUM OF ALL RESPONSES TO PHQ QUESTIONS 1-9: 5

## 2024-12-15 ASSESSMENT — ANXIETY QUESTIONNAIRES
1. FEELING NERVOUS, ANXIOUS, OR ON EDGE: SEVERAL DAYS
2. NOT BEING ABLE TO STOP OR CONTROL WORRYING: NOT AT ALL
5. BEING SO RESTLESS THAT IT IS HARD TO SIT STILL: NOT AT ALL
GAD7 TOTAL SCORE: 3
6. BECOMING EASILY ANNOYED OR IRRITABLE: NOT AT ALL
3. WORRYING TOO MUCH ABOUT DIFFERENT THINGS: NOT AT ALL
7. FEELING AFRAID AS IF SOMETHING AWFUL MIGHT HAPPEN: SEVERAL DAYS
IF YOU CHECKED OFF ANY PROBLEMS ON THIS QUESTIONNAIRE, HOW DIFFICULT HAVE THESE PROBLEMS MADE IT FOR YOU TO DO YOUR WORK, TAKE CARE OF THINGS AT HOME, OR GET ALONG WITH OTHER PEOPLE: VERY DIFFICULT
GAD7 TOTAL SCORE: 3
GAD7 TOTAL SCORE: 3
8. IF YOU CHECKED OFF ANY PROBLEMS, HOW DIFFICULT HAVE THESE MADE IT FOR YOU TO DO YOUR WORK, TAKE CARE OF THINGS AT HOME, OR GET ALONG WITH OTHER PEOPLE?: VERY DIFFICULT
7. FEELING AFRAID AS IF SOMETHING AWFUL MIGHT HAPPEN: SEVERAL DAYS
4. TROUBLE RELAXING: SEVERAL DAYS

## 2024-12-16 ENCOUNTER — OFFICE VISIT (OUTPATIENT)
Dept: FAMILY MEDICINE | Facility: OTHER | Age: 69
End: 2024-12-16
Attending: STUDENT IN AN ORGANIZED HEALTH CARE EDUCATION/TRAINING PROGRAM
Payer: COMMERCIAL

## 2024-12-16 ENCOUNTER — TELEPHONE (OUTPATIENT)
Dept: FAMILY MEDICINE | Facility: OTHER | Age: 69
End: 2024-12-16
Payer: COMMERCIAL

## 2024-12-16 VITALS
WEIGHT: 186.8 LBS | RESPIRATION RATE: 20 BRPM | SYSTOLIC BLOOD PRESSURE: 134 MMHG | OXYGEN SATURATION: 96 % | TEMPERATURE: 96.9 F | BODY MASS INDEX: 31.12 KG/M2 | HEIGHT: 65 IN | DIASTOLIC BLOOD PRESSURE: 82 MMHG | HEART RATE: 92 BPM

## 2024-12-16 DIAGNOSIS — G89.29 CHRONIC BILATERAL LOW BACK PAIN WITHOUT SCIATICA: ICD-10-CM

## 2024-12-16 DIAGNOSIS — Z79.899 CONTROLLED SUBSTANCE AGREEMENT SIGNED: ICD-10-CM

## 2024-12-16 DIAGNOSIS — F11.20 CONTINUOUS OPIOID DEPENDENCE (H): ICD-10-CM

## 2024-12-16 DIAGNOSIS — I11.0 HYPERTENSIVE HEART DISEASE WITH HEART FAILURE (H): ICD-10-CM

## 2024-12-16 DIAGNOSIS — Z87.891 HISTORY OF TOBACCO ABUSE: ICD-10-CM

## 2024-12-16 DIAGNOSIS — I25.10 ASCVD (ARTERIOSCLEROTIC CARDIOVASCULAR DISEASE): ICD-10-CM

## 2024-12-16 DIAGNOSIS — Z02.89 PAIN MEDICATION AGREEMENT: ICD-10-CM

## 2024-12-16 DIAGNOSIS — I25.708 ATHEROSCLEROSIS OF CORONARY ARTERY BYPASS GRAFT OF NATIVE HEART WITH STABLE ANGINA PECTORIS (H): ICD-10-CM

## 2024-12-16 DIAGNOSIS — M15.0 PRIMARY OSTEOARTHRITIS INVOLVING MULTIPLE JOINTS: ICD-10-CM

## 2024-12-16 DIAGNOSIS — Z95.1 HISTORY OF CORONARY ARTERY BYPASS GRAFT X 3: ICD-10-CM

## 2024-12-16 DIAGNOSIS — F41.0 PANIC ATTACK: ICD-10-CM

## 2024-12-16 DIAGNOSIS — L20.84 INTRINSIC ECZEMA: ICD-10-CM

## 2024-12-16 DIAGNOSIS — E53.8 VITAMIN B12 DEFICIENCY (NON ANEMIC): ICD-10-CM

## 2024-12-16 DIAGNOSIS — K27.9 PEPTIC ULCER DISEASE: ICD-10-CM

## 2024-12-16 DIAGNOSIS — E78.2 MIXED HYPERLIPIDEMIA: ICD-10-CM

## 2024-12-16 DIAGNOSIS — F39 MOOD DISORDER (H): ICD-10-CM

## 2024-12-16 DIAGNOSIS — I10 ESSENTIAL HYPERTENSION: ICD-10-CM

## 2024-12-16 DIAGNOSIS — I25.10 PRESENCE OF STENT IN CORONARY ARTERY IN PATIENT WITH CORONARY ARTERY DISEASE: ICD-10-CM

## 2024-12-16 DIAGNOSIS — I47.10 SVT (SUPRAVENTRICULAR TACHYCARDIA) (H): ICD-10-CM

## 2024-12-16 DIAGNOSIS — K21.00 GASTROESOPHAGEAL REFLUX DISEASE WITH ESOPHAGITIS WITHOUT HEMORRHAGE: ICD-10-CM

## 2024-12-16 DIAGNOSIS — Z95.5 PRESENCE OF STENT IN CORONARY ARTERY IN PATIENT WITH CORONARY ARTERY DISEASE: ICD-10-CM

## 2024-12-16 DIAGNOSIS — I25.9 CHRONIC ISCHEMIC HEART DISEASE: ICD-10-CM

## 2024-12-16 DIAGNOSIS — Z91.199 NONCOMPLIANCE WITH CPAP TREATMENT: ICD-10-CM

## 2024-12-16 DIAGNOSIS — E55.9 VITAMIN D DEFICIENCY: ICD-10-CM

## 2024-12-16 DIAGNOSIS — R06.09 DYSPNEA ON EXERTION: ICD-10-CM

## 2024-12-16 DIAGNOSIS — G89.4 CHRONIC PAIN DISORDER: Primary | ICD-10-CM

## 2024-12-16 DIAGNOSIS — M79.18 MYOFASCIAL PAIN: ICD-10-CM

## 2024-12-16 DIAGNOSIS — F41.9 CHRONIC ANXIETY: ICD-10-CM

## 2024-12-16 DIAGNOSIS — G47.31 CENTRAL SLEEP APNEA: ICD-10-CM

## 2024-12-16 DIAGNOSIS — J44.9 CHRONIC OBSTRUCTIVE PULMONARY DISEASE, UNSPECIFIED COPD TYPE (H): ICD-10-CM

## 2024-12-16 DIAGNOSIS — L30.9 ECZEMA, UNSPECIFIED TYPE: ICD-10-CM

## 2024-12-16 DIAGNOSIS — M47.816 LUMBAR FACET ARTHROPATHY: ICD-10-CM

## 2024-12-16 DIAGNOSIS — N18.31 STAGE 3A CHRONIC KIDNEY DISEASE (H): ICD-10-CM

## 2024-12-16 DIAGNOSIS — M54.50 CHRONIC BILATERAL LOW BACK PAIN WITHOUT SCIATICA: ICD-10-CM

## 2024-12-16 PROBLEM — R00.1 SYMPTOMATIC BRADYCARDIA: Status: RESOLVED | Noted: 2021-07-16 | Resolved: 2024-12-16

## 2024-12-16 LAB — CREAT UR-MCNC: 53 MG/DL

## 2024-12-16 PROCEDURE — 80356 HEROIN METABOLITE: CPT | Mod: ZL | Performed by: STUDENT IN AN ORGANIZED HEALTH CARE EDUCATION/TRAINING PROGRAM

## 2024-12-16 PROCEDURE — 80353 DRUG SCREENING COCAINE: CPT | Mod: ZL | Performed by: STUDENT IN AN ORGANIZED HEALTH CARE EDUCATION/TRAINING PROGRAM

## 2024-12-16 PROCEDURE — 80346 BENZODIAZEPINES1-12: CPT | Mod: ZL | Performed by: STUDENT IN AN ORGANIZED HEALTH CARE EDUCATION/TRAINING PROGRAM

## 2024-12-16 RX ORDER — TRIAMCINOLONE ACETONIDE 1 MG/ML
LOTION TOPICAL 2 TIMES DAILY
Qty: 60 ML | Refills: 3 | OUTPATIENT
Start: 2024-12-16

## 2024-12-16 RX ORDER — SUCRALFATE ORAL 1 G/10ML
2 SUSPENSION ORAL 4 TIMES DAILY
Qty: 1892 ML | Refills: 2 | Status: SHIPPED | OUTPATIENT
Start: 2024-12-16

## 2024-12-16 RX ORDER — CLONAZEPAM 1 MG/1
1 TABLET ORAL 3 TIMES DAILY PRN
Qty: 90 TABLET | Refills: 0 | OUTPATIENT
Start: 2025-02-07

## 2024-12-16 RX ORDER — CLONAZEPAM 1 MG/1
1 TABLET ORAL 3 TIMES DAILY PRN
Qty: 90 TABLET | Refills: 0 | OUTPATIENT
Start: 2025-01-10

## 2024-12-16 RX ORDER — OXYCODONE HYDROCHLORIDE 10 MG/1
10 TABLET ORAL EVERY 6 HOURS PRN
Qty: 120 TABLET | Refills: 0 | Status: SHIPPED | OUTPATIENT
Start: 2024-12-16

## 2024-12-16 RX ORDER — METOPROLOL SUCCINATE 50 MG/1
50 TABLET, EXTENDED RELEASE ORAL DAILY
Qty: 90 TABLET | Refills: 0 | Status: SHIPPED | OUTPATIENT
Start: 2024-12-16

## 2024-12-16 RX ORDER — CLONAZEPAM 1 MG/1
1 TABLET ORAL 3 TIMES DAILY PRN
Qty: 90 TABLET | Refills: 0 | Status: SHIPPED | OUTPATIENT
Start: 2024-12-16

## 2024-12-16 ASSESSMENT — PAIN SCALES - GENERAL: PAINLEVEL_OUTOF10: SEVERE PAIN (7)

## 2024-12-16 NOTE — LETTER

## 2024-12-16 NOTE — TELEPHONE ENCOUNTER
Pharmacy would like a call back regarding the dosage of one of the medications they received for patient as she states it is higher than they normally see. Please ask for Jacqui or Kristi when calling back to clarify.     Marilee Herring on 12/16/2024 at 1:27 PM

## 2024-12-16 NOTE — PROGRESS NOTES
{PROVIDER CHARTING PREFERENCE:252125}    Subjective   Malina is a 69 year old, presenting for the following health issues:  Follow Up (Chronic Pain Management follow up)        12/16/2024    10:02 AM   Additional Questions   Roomed by THALIA Nieves   Accompanied by n/a         12/16/2024    10:02 AM   Patient Reported Additional Medications   Patient reports taking the following new medications n/a     History of Present Illness       Back Pain:  She presents for follow up of back pain. Patient's back pain is a recurring problem.  Location of back pain:  Right lower back, left lower back, right middle of back, left middle of back, right hip and left hip  Description of back pain: cramping  Back pain spreads: right knee and left knee    Since patient first noticed back pain, pain is: gradually worsening  Does back pain interfere with her job:  Yes       Heart Failure:  She presents for follow up of heart failure. She is experiencing shortness of breath with rest and activity, which is much worse. She is experiencing lower extremity edema which is same as usual.   She denies orthopenea and is not coughing at night. Patient is not checking weight daily. She has recently had a weight increase.  She has side effects from medications including other.  She has had no other medical visits for heart failure since the last visit.    Vascular Disease:  She presents for follow up of vascular disease.    She takes nitroglycerin occasionally.  She takes daily aspirin.    She eats 2-3 servings of fruits and vegetables daily.She consumes 0 sweetened beverage(s) daily.She exercises with enough effort to increase her heart rate 9 or less minutes per day.  She exercises with enough effort to increase her heart rate 3 or less days per week.   She is taking medications regularly.     Last Echo:   Echo result w/o MOPS: Interpretation SummaryGlobal and regional left ventricular function is normal with an EF of 55-60%.Mild concentric wall  thickening consistent with left ventricular hypertrophyis present.Left ventricular diastolic function is indeterminate.Global right ventricular function is normal.No significant valvular abnormalities present.IVC diameter <2.1 cm collapsing >50% with sniff suggests a normal RA pressureof 3 mmHg.No pericardial effusion is present.No significant changes noted.    {Provider  Link to Heart Failure SmartSet :480130}  {MA/LPN/RN Pre-Provider Visit Orders- hCG/UA/Strep (Optional):878194}  Chronic/Recurring Back Pain Follow Up    Where is your back pain located? (Select all that apply) low back bilateral  How would you describe your back pain?  Throbbing  Where does your back pain spread? nowhere  Since your last clinic visit for back pain, how has your pain changed? unchanged  Does your back pain interfere with your job? YES, No  Since your last visit, have you tried any new treatment? No  How many servings of fruits and vegetables do you eat daily?  2-3  On average, how many sweetened beverages do you drink each day (Examples: soda, juice, sweet tea, etc.  Do NOT count diet or artificially sweetened beverages)?   0  How many days per week do you exercise enough to make your heart beat faster? 3 or less  How many minutes a day do you exercise enough to make your heart beat faster? 9 or less  How many days per week do you miss taking your medication? 0  Heart Failure Follow-up {Provider  Link to Heart Failure SmartSet :855870}  Are you experiencing any shortness of breath? Yes, with activity only     How would you describe your shortness of breath?  Much worse  Are you experiencing any swelling in your legs or feet?  No  Are you using more pillows than usual? No  Do you cough at night?  No  Do you check your weight daily?  No  Have you had a weight change recently?  Weight increase  Are you having any of the following side effects from your medications? (Select all that apply)  Dizziness, Fatigue, and Other:  Rapid heart  "beat, chest pain  Since your last visit, how many times have you gone to the cardiologist, urgent care, emergency room, or hospital because of your heart failure?   None  Last Echo:   Echo result w/o MOPS: Interpretation SummaryGlobal and regional left ventricular function is normal with an EF of 55-60%.Mild concentric wall thickening consistent with left ventricular hypertrophyis present.Left ventricular diastolic function is indeterminate.Global right ventricular function is normal.No significant valvular abnormalities present.IVC diameter <2.1 cm collapsing >50% with sniff suggests a normal RA pressureof 3 mmHg.No pericardial effusion is present.No significant changes noted.  How many servings of fruits and vegetables do you eat daily?  2-3  On average, how many sweetened beverages do you drink each day (Examples: soda, juice, sweet tea, etc.  Do NOT count diet or artificially sweetened beverages)?   0  How many days per week do you exercise enough to make your heart beat faster? 3 or less  How many minutes a day do you exercise enough to make your heart beat faster? 9 or less  How many days per week do you miss taking your medication? 0    {ROS Picklists (Optional):136014}      Objective    /82 (BP Location: Right arm, Patient Position: Sitting, Cuff Size: Adult Large)   Pulse 92   Temp 96.9  F (36.1  C) (Temporal)   Resp 20   Ht 1.651 m (5' 5\")   Wt 84.7 kg (186 lb 12.8 oz)   LMP 04/29/1978 (Approximate)   SpO2 96%   BMI 31.09 kg/m    Body mass index is 31.09 kg/m .  Physical Exam   {Exam List (Optional):726760}    {Diagnostic Test Results (Optional):947776}        Signed Electronically by: Damon Lawrence DO  {Email feedback regarding this note to [email or web address]   :682422}  "

## 2024-12-16 NOTE — NURSING NOTE
"Chief Complaint   Patient presents with    Follow Up     Chronic Pain Management follow up   Patient presents today for chronic pain follow up. Also complains of worsening tachycardia and heart palpitations/ chest pain. This has been occurring more often this past month and has been keeping her up at night.     Initial /82 (BP Location: Right arm, Patient Position: Sitting, Cuff Size: Adult Large)   Pulse 92   Temp 96.9  F (36.1  C) (Temporal)   Resp 20   Ht 1.651 m (5' 5\")   Wt 84.7 kg (186 lb 12.8 oz)   LMP 04/29/1978 (Approximate)   SpO2 96%   BMI 31.09 kg/m   Estimated body mass index is 31.09 kg/m  as calculated from the following:    Height as of this encounter: 1.651 m (5' 5\").    Weight as of this encounter: 84.7 kg (186 lb 12.8 oz).  Medication Review: complete    The next two questions are to help us understand your food security.  If you are feeling you need any assistance in this area, we have resources available to support you today.          12/16/2024   SDOH- Food Insecurity   Within the past 12 months, did you worry that your food would run out before you got money to buy more? N   Within the past 12 months, did the food you bought just not last and you didn t have money to get more? N              Health Care Directive:  Patient has a Health Care Directive on file      Yolanda Can      "

## 2024-12-17 NOTE — TELEPHONE ENCOUNTER
Pharmacist, Jacqui, aware of this and confirmed verbal over the phone. With dosage change, the day supply is now at 47 days. Dr. Lawrence aware of day supply.    Kathleen Collazo on 12/17/2024 at 2:38 PM

## 2024-12-17 NOTE — TELEPHONE ENCOUNTER
Called [facility] and spoke with Jacqui.    Dr. Lawrence you wrote Carafate script out for 20mL(2 g) 4x daily.    Per pharmacist, it is typically 10 mL(1 g) 4x daily. Jacqui would please like this high dosage clarified or changed.    Kathleen Collazo on 12/17/2024 at 9:37 AM

## 2024-12-19 LAB
7AMINOCLONAZEPAM UR QL CFM: PRESENT
DHC UR CFM-MCNC: 1740 NG/ML
DHC/CREAT UR: 3283 NG/MG {CREAT}
HYDROCODONE UR CFM-MCNC: 5960 NG/ML
HYDROCODONE/CREAT UR: ABNORMAL NG/MG {CREAT}
HYDROMORPHONE UR CFM-MCNC: >3000 NG/ML
HYDROMORPHONE/CREAT UR: ABNORMAL

## 2025-01-20 DIAGNOSIS — F11.20 CONTINUOUS OPIOID DEPENDENCE (H): ICD-10-CM

## 2025-01-20 DIAGNOSIS — F41.0 PANIC ATTACK: ICD-10-CM

## 2025-01-20 DIAGNOSIS — Z79.899 CONTROLLED SUBSTANCE AGREEMENT SIGNED: ICD-10-CM

## 2025-01-20 DIAGNOSIS — F39 MOOD DISORDER: ICD-10-CM

## 2025-01-20 DIAGNOSIS — F41.9 CHRONIC ANXIETY: ICD-10-CM

## 2025-01-20 NOTE — TELEPHONE ENCOUNTER
Last Office Visit:              1/3/25 [facility]   Future Office visit:           4/1/25 [facility]    Requested Prescriptions   Pending Prescriptions Disp Refills    clonazePAM (KLONOPIN) 1 MG tablet [Pharmacy Med Name: clonazePAM 1 MG Oral Tablet] 90 tablet 0     Sig: TAKE 1 TABLET BY MOUTH THREE TIMES DAILY AS NEEDED FOR ANXIETY, MUSCLE SPASM, OR SLEEP       There is no refill protocol information for this order        Last Prescription Date:   12/16/24  Last Fill Qty/Refills:         90  /  0

## 2025-01-21 RX ORDER — CLONAZEPAM 1 MG/1
TABLET ORAL
Qty: 90 TABLET | Refills: 5 | OUTPATIENT
Start: 2025-01-21

## 2025-01-22 ENCOUNTER — TELEPHONE (OUTPATIENT)
Dept: FAMILY MEDICINE | Facility: OTHER | Age: 70
End: 2025-01-22
Payer: COMMERCIAL

## 2025-01-22 RX ORDER — BUSPIRONE HYDROCHLORIDE 30 MG/1
30 TABLET ORAL 2 TIMES DAILY
Qty: 180 TABLET | Refills: 1 | Status: SHIPPED | OUTPATIENT
Start: 2025-01-22

## 2025-01-22 NOTE — TELEPHONE ENCOUNTER
Called and spoke with pharmacist at [facility]. Informed them of start dates listed on prescriptions in patient's chart: 1/21/25, 2/12/25, 3/12/25.     Alisha Tabares RN on 1/22/2025 at 12:37 PM     
Received 3 prescriptions for Clonazepam with no dates on them.  Please call for clarification.      Corky Flores on 1/22/2025 at 12:34 PM    
3 = A little assistance

## 2025-01-22 NOTE — TELEPHONE ENCOUNTER
Requested Prescriptions   Pending Prescriptions Disp Refills    busPIRone HCl (BUSPAR) 30 MG tablet [Pharmacy Med Name: busPIRone HCl 30 MG Oral Tablet] 180 tablet 0     Sig: Take 1 tablet by mouth twice daily       Atypical Antidepressants Protocol Passed - 1/22/2025  9:09 AM        Passed - Medication active on med list        Passed - YAYA-7 score of less than 5 in past 6 months.     Please review last YAYA-7 score.         Passed - Recent (12 mo) or future (90 days) visit within the authorizing provider's specialty     The patient must have completed an in-person or virtual visit within the past 12 months or has a future visit scheduled within the next 90 days with the authorizing provider s specialty.  Urgent care and e-visits do not qualify as an office visit for this protocol.        Passed - Medication indicated for associated diagnosis     Medication is associated with one or more of the following diagnoses:     Anxiety   Depression   Panic disorder        Passed - Patient is age 18 or older        Passed - No active pregnancy on record        Passed - No positive pregnancy test in past 12 mos     Last Written Prescription Date:  10/3/23  Last Fill Quantity: 180,   # refills: 3    Last Office Visit: 10/24/24  Future Office visit:    Next 5 appointments (look out 90 days)      Jan 28, 2025 1:45 PM  (Arrive by 1:30 PM)  Return Visit with Paul Gramajo, [facility] [address]  [place] [address]  [phone]     Apr 01, 2025 9:00 AM  (Arrive by 8:45 AM)  Provider Visit with Damon Lawrence, [facility] ([facility]) [address]  [place] [address]  [phone]     Routing refill request to provider for review/approval because:  Not mentioned at last office visit or any visit in the last year    Unable to complete prescription refill  per RN Medication Refill Policy.   Jamilah Ray RN ....................  1/22/2025   9:10 AM

## 2025-01-27 NOTE — PROGRESS NOTES
[facility] HEART CARE   CARDIOLOGY PROGRESS NOTE     Chief Complaint   Patient presents with    Follow Up     Medications- heart racing          Diagnosis:  1. Cath.     -Absorbing stent to LAD on 9/24/2024, [facility].   -7/1/2024.  No stent.  RAFI/SVG to %.  LIMA to LAD 0%.  75% to pLCx, nondominant.  -9/25/17, [facility], stable dz.   -LIMA open but occluded RAFI/SVG.    -LM irregularities, ramus 40%, LAD 30%, LCx 30%, and RCA 40%.  2. CORTEZ.  3. CABG x3-2/12/2003.    -LIMA to LAD, RAFI to RCA, and SVG to OM.    4. Palpitations.    -SVE/VE on Zio from 3/27/2023.  5. PE on 1/2/20.     -Xarelto.  -RLL, MICHELLE and LLL.  6. COPD.  -Normal on 1/11/2022.  7. HTN-controlled  8. Lightheadedness-episodic.  9.  Cardiac cath on 9/25/2017-[facility].   -No significant dz on 12/12/19.  10. Iron deficiency anemia.  -Resolved.  11.  CVA.  -Mild chronic ischemic cerebral disease on 8/20/2021.  12. Left-sided subclavian steal syndrome.   -Stenting with patency as noted on 9/15/17.  13.  Tobacco abuse.  -Quitting 8/23/17.   14.  H/O alcohol abuse.  15.  Hyperlipidemia.  -Uncontrolled.  16.  Bradycardia with the slowest heart rate of 50 bpm on 7/30/2021.  17.  Dizziness/lightheadedness secondary to dehydration.  18.  B12 deficiency.    -313 on 3/18/2020.  19.  Vitamin D deficiency.  20.  CKD-2/3.       Assessment/Plan:   1.  Palpitations: Anytime she gets up and walks, she describes 2 different sensation of heartbeats.  One like hammering and the other like a sledgehammer.  It makes it difficult for her to walk.  She is increased her fluid consumption with less constipation.  This has not seemed to improve the symptoms.  She has a hard time walking from the couch to the bathroom to her kitchen.  EKG today shows sinus rhythm without dysrhythmia.  Plan for Zio patch.  2.  Low back pain/generalized weakness: I am concerned she has significant stenosis in her lumbar spine causing her leg weakness and difficulty with ambulation.  She states she  "falls.  She has fallen twice since seen by her primary on 1/3/2025.  She clearly denies syncope, near syncope, or tripping.  She states her legs are weak and give out.  She cannot explain why she falls.  I am concerned it is related to severe lumbar stenosis.  She has to physically lift her left leg to cross her legs while sitting.  She does not have any significant bladder or bowel issues other than constipation which is improved with hydration.  I am concerned the symptoms are related to stenosis of her back.  Suggest that she follow-up with her primary.  In the interim, we will plan for MRI of her lumbar spine with referral to sports medicine/orthopedics.  I am concerned this is causing her significant low back discomfort and reasons for narcotics.  Is also causing her lower extremity weakness which she describes as \"dizziness\".  I do not believe she is truly having dizziness but having significant back issues resulting in mobility issues.  Will refer to physical therapy, sports medicine, and orthopedics.  Have encouraged her to increase her activity and work on strengthening back muscles.  3.  Refill Crestor, lisinopril, amlodipine, Protonix and an SL nitro.  4.  Follow-up after Zio patch.  Today, referred to orthopedics, sports medicine, and physical therapy for lower extremity weakness/concern for lumbar stenosis.  Plan for an MRI of her back.    Interval history:  See above.    HPI:    Ms. Day is being seen by cardiology in follow-up.  Patient has a history of chronic stable angina, known ischemic heart disease, hypertension, hyperlipidemia, history of pulmonary embolism, left subclavian artery stenosis, anxiety, collagenous colitis, depression, osteoarthritis, history of tobacco use, central sleep apnea for which she is not compliant with CPAP use, iron deficiency anemia, CKD, myofascial pain, vitamin D deficiency, lumbar facet arthropathy, history of gastric ulcer with hemorrhage, COPD and peptic ulcer " "disease.    Malina continues to endorse dizziness.  She states when she gets up she starts to feel dizzy like being off balance, will have a headache, her heart will pound and pulse increase.  She will be short of breath.  At times she has chest discomfort.  This has been going on for 1 to 2 years.      She was seen in the ER and admitted for a day on 7/16/2021.  She was felt to have symptomatic bradycardia/hypotension.  She felt \"woozy\" with a blood pressure of 77/52 at home.  She reports getting fluids and feeling much better, resolution of symptoms.  She felt her symptoms had gotten worse over the last 2 to 3 weeks.  Heart rates were noted to be between 49-52 in the ER.  Troponin negative and EKG showed heart rates in the 50's.    She was seen back in the ED on 7/22/2021 for lightheadedness once again.  She actually fell and was having concerning pain in her lungs.  She has a history of a PE and is on Xarelto.  No identifiable cause was found.  There was concern that her symptoms may be anxiety related.    Today, she reports continuing to feel these symptoms of dizziness.  She has a hard time describing them.  She feels her heart pounding, she has a headache, she is dizzy, she gets chest pain.  She also continues to be dyspneic on exertion.  She has been using nitro regularly with some relief.  She has a history of asthma and suspected COPD.  She has seen relief with nitro as well as the albuterol.  She was on Symbicort in the past with benefit but was discontinued for unknown reasons.  She drinks 2 glasses of water and 3 cups of coffee a day.  I believe she is chronically dehydrated which plays a part in her symptoms.  She has tried to increase her fluid intake.  She is to double and preferably triple her fluid intake.  She should cut back/discontinue coffee intake.  With symptoms of chest pain, shortness of breath, and history of blood clot, will plan for VQ scan.  We will also get a chest x-ray.  She describes " "regular palpitations and acceleration in her heart rates.  We will plan for Zio patch.  She did have a MCOT on 7/30/2021.  Both asymptomatic and symptomatic events include sinus bradycardia, sinus rhythm, sinus tachycardia, occasional PVC's and PAC's.  No other significant rhythms were identified.  We discussed Eliquis versus Xarelto.  It was decided we switched to Eliquis as I seen less bleeding on this medication compared to Xarelto.  Related to shortness of breath, history of asthma, and presumptive COPD, referral was placed to pulmonary.  Had intended to prescribe Spiriva but she is allergic and this was not prescribed.  Patient also describes weakness.  She has been using nitro with resolution of her chest pain.  She does have a history of bypass but had a cath in 2017 with only mild disease followed.  I am concerned at this point.  I am concerned that her chest discomfort is more of a lung issue than a heart issue.     Patient has a known history of ischemic heart disease, she underwent  coronary angiogram and bypass angiogram on 9/15/2017 which was described as having stable disease. Mild to moderate 3 vessel CAD involving RCA, LAD and LCX with <50% stenosis at the greatest.  Occluded RAFI and SVG.  Patent LIMA.  No new obstructive lesions were identified and normal left heart cath.       She has a history of CABG on 2/12/03 x 3, history of multiple stent placements, patient reported 17 total.       At previous visit patient reported occasional recurrence of chest pain, not as severe as last ED visit on 10/25/19. She reported \"my breathing has been bad\", describes worsening CORTEZ. She described activity intolerance and little to no energy. Recommended repeat stress testing. NM Lexiscan stress test was performed on 12/16/19 which was negative for inducible myocardial ischemia or infarction.  Left ventricular function was normal.     Carotid US on 12/12/19 without significant stenosis, mild atherosclerotic " disease present.  Abdominal ultrasound on 12/12/2018 with no abdominal aortic aneurysm identified.     Patient returned to the ED with dyspnea in early January 2020. She was identified to have small segmental and subsegmental emboli bilaterally. Repeat imaging on 1/6 with decreasing volume of pulmonary emboli compared to scan on 1/2/2020. She was initially treated with Lovenox in the ED and discharged on Xarelto oral anticoagulation which has been going well for her, no bleeding complication. She also remains on Plavix with her significant ischemic heart disease history.      She presented to the ED on 3/16/2021 with reports of chest pain, chronic stable angina.  No ACS.  EKG stable and no elevation in troponin's. Patient admits that her BP was low and it was suspected she was dehydrated, she felt better following IV fluids and reports that this likely brought on her angina.  She is currently working on maintaining good hydration.  She denies any recurrence of acute chest pain or pressure today.  No use of nitroglycerin since her recent ED visit.      Relevant testing:  Cardiac cath on 9/24/2024:  Severe multivessel obstructive CAD  LIMA-LAD patent  Hemodynamically insignificant ostial Lcx moderate disease with dPR 0.98  Severe LAD disease proximal to the LIMA-LAD touchdown with severe ISR with successful IVUS guided PCI to the LAD with a 3.0 x 16mm Synergy XD stent successfully post dilated to 3.5 in the proximal segment. The more distant severe stenosis was left in order to protect the integrity of the LIMA from significant competitive flow.  Successful uncomplicated R radial access with hemostasis obtained with TR band placement.     [facility] on 7[facility]1/2024:    Right sided filling pressures are normal.    Left sided filling pressures are normal.    Normal PA pressures.    Normal cardiac output level.    Hemodynamic data has been modified in Epic per physician review.     Severe two vessel CAD with  prior CABG LIMA to LAD, RAFI to RCA, SVG to OM and prior PCI to the left main, LAD, and LCx.  Patent LIMA to LAD.  Occluded RAFI and SVG.  Patent stents.  Left main stent with minimal ISR, proximal LAD stent with moderate ISR, mid LAD stent with severe ISR, and ostial LCx stent with severe ISR.    Stress test on 4/18/24:    The nuclear stress test is negative for inducible myocardial ischemia or infarction.     Left ventricular function is normal.     The left ventricular ejection fraction at rest is 66%.  The left   ventricular ejection fraction at stress is 64%.     A prior study was conducted on 9/15/2021.     Echocardiogram on 7/31/2023:  Global and regional left ventricular function is normal with an EF of 55-60%.  Left ventricular diastolic function is indeterminate.  Mild concentric wall thickening consistent with left ventricular hypertrophy  is present.  Global right ventricular function is normal.  No significant valvular abnormalities present.  IVC diameter <2.1 cm collapsing >50% with sniff suggests a normal RA pressure of 3 mmHg.  No pericardial effusion is present.  No significant changes noted.    Zio patch on 3/27/2023:  Patient's monitor was in place for 13 days and 16 hours.  Minimum heart rate 42 bpm, average heart rate 62 bpm, maximum heart rate 167 bpm. Rhythm/s present include sinus, SVT. There were rare ventricular ectopic beats accounting for <1% of all beats. There were 0  ventricular triplets and rare couplets. There were  rare supraventricular ectopic beats.  There were 12 triggered events and 14 diary entries during which time the noted rhythms were sinus, SVE, VE.  There were 7 episodes of SVT with the fastest lasting 4 beats with a max rate of 167 bpm.  Review of actual tracings showed no other significant abnormality.    V/Q scan on 11/23/21:  No evidence of pulmonary emboli.    CXR 11/23/21:  Probable air trapping.    PFT 12/8/21:  No obstructive or restrictive disease is noted,  moderate diffusion defect is noted consistent with pulmonary vascular disease     Stress test on 9/15/2021:    The nuclear stress test is negative for inducible myocardial ischemia or infarction.     Left ventricular function is normal.     The left ventricular ejection fraction at rest is 64%.  The left   ventricular ejection fraction at stress is 66%.     A prior study was conducted on 12/16/2019.     Echo on 9/15/2021:  Global and regional left ventricular function is normal with an EF of 55-60%.  Right ventricular function, chamber size, wall motion, and thickness are normal.  Pulmonary artery systolic pressure is normal.  The inferior vena cava is normal.  No pericardial effusion is present.  No significant changes noted.    MRI brain on 8/20/2021:  A few small nonspecific white matter signal abnormalities  are present, likely sequela of mild chronic small vessel ischemic disease. The exam is otherwise unremarkable.    MCOT from 7/30/21:  Findings: Patient's monitor was in place for 9 days and 7 hours.  Minimum heart rate 50 bpm, average heart rate 63 bpm, maximum heart rate 120 bpm. Rhythm/s present include sinus rhythm.  There were 37 symptomatic events during which time the noted rhythms were sinus rhythm, sinus bradycardia and sinus tachycardia.  There were six asymptomatic events during which time the noted rhythms were sinus rhythm.  There was rare atrial ectopy and rare ventricular ectopy.  Review of actual tracings showed no other abnormality.    ARYA on 7/17/20:  The right ankle-brachial index is 1.17.  Left ankle-brachial index is 0.98.    Stress test on 12/16/19:    The nuclear stress test is negative for inducible myocardial ischemia   or infarction.     A small amount of soft tissue artifact is present, more pronounced at   rest.     Left ventricular function is normal.     The left ventricular ejection fraction at rest is 79%.  The left   ventricular ejection fraction at stress is 72%.     A prior  study was conducted on 6/30/2015.     US carotids on 12/12/19:  No ultrasound evidence of hemo-dynamically significant stenosis.  Mild atherosclerotic disease.    US abdominal aorta on 12/12/19:  No abdominal aortic aneurysm.      ECHO on 5/13/19:  Global and regional left ventricular function is normal with an EF of 60-65%.  Mild concentric wall thickening consistent with left ventricular hypertrophy  is present.  Right ventricular function, chamber size, wall motion, and thickness are  normal.  No significant valvular abnormalities were noted.  Previous study not available for comparison.    Cardiac cath on 3/30/13:  LEFT MAIN: Widely patent stent.   LEFT ANTERIOR DESCENDING: Widely patent proximal stent. There is an 85% to  90% mid lesion after the second diagonal and prior to the LIMA insertion as before.   CIRCUMFLEX: There is diffuse 60% to 70% disease throughout, the ostium is 70   to 75, and the OM2 is a 70, but it is very diffuse disease and basically   unchanged from previous.   RCA: Widely patent stents with only mild irregularities.   LEFT VENTRICULOGRAM: Normal left ventricular ejection fraction, LVEF 60%,   with no focal wall motion abnormality. LV pressure 146/29.   FLORENTINO to LAD: Widely patent, although there is less flow than before, and, in   fact, the retrograde filling of the LIMA from the antegrade vessel. There is   a severe subclavian stenosis that a pressure gradient revealed in the aorta   was 153/78, and in the right subclavian was 100 to 106/73, for a nearly 50 mm   pressure gradient. The stenosis was 75% to 80%.   RAFI to RCA: 100%.   SVG to OM: 100%.                     ICD-10-CM    1. Palpitations  R00.2 EKG 12-lead, tracing only     [facility] ZIO PATCH 8-14 DAYS APPLICATION     ZIO PATCH 8-14 DAYS (additional cost to patient)      2. Chronic midline low back pain with bilateral sciatica  M54.41     M54.42     G89.29       3. Generalized muscle weakness  M62.81 Physical Therapy   Referral     MR Lumbar Spine w/o Contrast     Orthopedic  Referral     Adult E-Consult to Sports Medicine/Non-Surgical Orthopedics (Outpt Provider to Specialist Written Question & Response)      4. Chronic bilateral low back pain without sciatica  M54.50 MR Lumbar Spine w/o Contrast    G89.29 Orthopedic  Referral     Adult E-Consult to Sports Medicine/Non-Surgical Orthopedics (Outpt Provider to Specialist Written Question & Response)      5. Chronic pain disorder  G89.4       6. F11.9 - Chronic, continuous use of opioids  F11.90       7. Chest pain, unspecified type  R07.9 rosuvastatin (CRESTOR) 40 MG tablet     nitroGLYcerin (NITROLINGUAL) 0.4 MG/SPRAY spray      8. Atherosclerosis of coronary artery bypass graft of native heart with stable angina pectoris  I25.708 rosuvastatin (CRESTOR) 40 MG tablet     nitroGLYcerin (NITROLINGUAL) 0.4 MG/SPRAY spray      9. ASCVD (arteriosclerotic cardiovascular disease)  I25.10 rosuvastatin (CRESTOR) 40 MG tablet     nitroGLYcerin (NITROLINGUAL) 0.4 MG/SPRAY spray      10. History of coronary artery bypass graft x 3 on 2/12/2003  Z95.1 rosuvastatin (CRESTOR) 40 MG tablet     nitroGLYcerin (NITROLINGUAL) 0.4 MG/SPRAY spray      11. Status post coronary angiogram on 9/25/2017 at the [facility]-stable disease  Z98.890 rosuvastatin (CRESTOR) 40 MG tablet     nitroGLYcerin (NITROLINGUAL) 0.4 MG/SPRAY spray      12. Presence of stent in coronary artery in patient with coronary artery disease  I25.10 rosuvastatin (CRESTOR) 40 MG tablet    Z95.5 nitroGLYcerin (NITROLINGUAL) 0.4 MG/SPRAY spray      13. CORTEZ (dyspnea on exertion)  R06.09 lisinopril (ZESTRIL) 10 MG tablet      14. Chronic ischemic heart disease  I25.9 lisinopril (ZESTRIL) 10 MG tablet      15. Essential hypertension  I10 lisinopril (ZESTRIL) 10 MG tablet     amLODIPine (NORVASC) 10 MG tablet      16. Nausea  R11.0 pantoprazole (PROTONIX) 40 MG EC tablet      17. Gastroesophageal reflux disease with esophagitis  without hemorrhage  K21.00 pantoprazole (PROTONIX) 40 MG EC tablet                      Past Medical History:   Diagnosis Date    Acute ischemic heart disease (H)     06/15/2007,with PTCA and stenting of 90% osteo circumflex lesion and patent LAD graft, patent left main stent.    Acute myocardial infarction (H)     3/30/2013    Anxiety disorder     No Comments Provided    Atherosclerotic heart disease of native coronary artery without angina pectoris     -angio revealed 3 vessel dz  -4 stents placed; 2 overlapping stents placed in mLAD, 1 stent pRCA, 1 stent pCirc 1 s 9/02 -non-ST elevation MI 1/03  -angio revealed restenosis of Circ.    -PTCA and brachytherapy of pCirc -repeat angio 2/03 -no intervension -CABG x3 12/03 - Dr Sinclair  -LIMA-LAD, RAFI-RCA, SVG-OM -PCI 7/04 stent to L -CTangio 9/05   -patentent LIMA-LAD. RAFI-RCA occluded; RCA ostio/p*    Bilateral carpal tunnel syndrome     No Comments Provided    Cervicalgia     No Comments Provided    Chest pain     12/29/2014    Chronic gastric ulcer without hemorrhage or perforation     10/03/2011,hx of GI bleed (2003)    Chronic ischemic heart disease     06/27/2012    Chronic obstructive pulmonary disease (H)     06/15/2007,low DLCO, normal spirometry    Chronic or unspecified gastric ulcer with hemorrhage     10/2003    Chronic pain syndrome     12/01/2010,chest wall, back    Colostomy status (H)     Constipation     11/29/2011    Coronary angioplasty status     09/12/2002,with triple stenting    Coronary angioplasty status     01/10/2003,repeat angioplasty    Coronary angioplasty status     No Comments Provided    Dorsalgia     05/31/2011    Encounter for other administrative examinations     1/28/2014    Encounter for screening for cardiovascular disorders     10/2004,Cardiolite (2004, 2005, 2006 and 2011)    Enterocolitis due to Clostridium difficile     8/19/2016    Essential (primary) hypertension     No Comments Provided    Hyperlipidemia     No Comments  Provided    Major depressive disorder, single episode     Severe, hx of suicide attempt/hospitalization    Migraine without status migrainosus, not intractable     No Comments Provided    Nodular corneal degeneration     09/26/2011    Noninfective gastroenteritis and colitis     06/15/2007,history of    Noninfective gastroenteritis and colitis     Microcytic    Osteoarthritis     No Comments Provided    Other chest pain     10/13/2009,chronic    Pain in knee     05/31/2011    Pain in right shoulder     No Comments Provided    Panic disorder without agoraphobia     No Comments Provided    Peptic ulcer without hemorrhage or perforation     6/15/2007    Peripheral vascular disease     No Comments Provided    Personal history of diseases of the blood and blood-forming organs and certain disorders involving the immune mechanism (CODE)     No Comments Provided    Personal history of nicotine dependence     No Comments Provided    Personal history of other medical treatment (CODE)     10/14/2013    Presence of aortocoronary bypass graft     2/12/2003    Primary central sleep apnea     10/14/2013    Sepsis due to Escherichia coli (E. coli) (H)     7/14/16,[facility]    Stricture of artery     3/31/2013,S/p prox left SCA stent 4/1/2013    Symptomatic bradycardia 07/16/2021    Thoracic, thoracolumbar and lumbosacral intervertebral disc disorder     No Comments Provided    Uncomplicated opioid abuse (H)     history of    Vitamin D deficiency     5/6/2013       Past Surgical History:   Procedure Laterality Date    ANGIOPLASTY      9/12/02,with triple stenting    APPENDECTOMY OPEN      No Comments Provided    ARTHROSCOPY KNEE      left    ARTHROSCOPY SHOULDER Right 05/12/2017    labral tear, rotator cuff tear and some subacromial decompression     BYPASS GRAFT ARTERY CORONARY      12/13/02,Triple bypass, left internal mammary  to LAD, right internal mammary to right coronary artery, saphenous to obtuse marginal of the left  circumflex.    BYPASS GRAFT ARTERY CORONARY N/A 7/1/2024    Procedure: Bypass graft artery coronary;  Surgeon: Greg Webb MD;  [facility]    COLONOSCOPY      2011,Dr Bowman benign polyps    COLONOSCOPY  10/03/2011    2011,benign polyps, Dr. Bowman    COLONOSCOPY  08/08/2016 8/8/16,normal, Dr Bowman    CV ANGIOGRAM CORONARY GRAFT N/A 7/1/2024    Procedure: Coronary Angiogram Graft;  Surgeon: Greg Webb MD;  [facility]    CV CORONARY ANGIOGRAM N/A 7/1/2024    Procedure: Coronary Angiogram;  Surgeon: Greg Webb MD;  [facility]    CV PCI N/A 9/24/2024    Procedure: Percutaneous Coronary Intervention;  Surgeon: Greg Webb MD;  Location: [facility]    CV RIGHT HEART CATH MEASUREMENTS RECORDED N/A 7/1/2024    Procedure: Right Heart Catheterization;  Surgeon: Greg Webb MD;  Location: [facility]    ELBOW SURGERY      baby,birth malachi removed from right arm    EMBOLECTOMY UPPER EXTREMITY  04/02/2013    brachial artery pseudoaneurysm after stenting    ESOPHAGOSCOPY, GASTROSCOPY, DUODENOSCOPY (EGD), COMBINED      2011,EGD Dr Bowman with pyloric ulcer    ESOPHAGOSCOPY, GASTROSCOPY, DUODENOSCOPY (EGD), COMBINED      2011,pyloric ulcer, Dr. Bowmna    ESOPHAGOSCOPY, GASTROSCOPY, DUODENOSCOPY (EGD), COMBINED      8/8/16,mild gastritis, Dr Bowman    ESOPHAGOSCOPY, GASTROSCOPY, DUODENOSCOPY (EGD), COMBINED      11/27/2017,Dr Bowman. Antral ulcer    ESOPHAGOSCOPY, GASTROSCOPY, DUODENOSCOPY (EGD), COMBINED  02/02/2018    Dr Bowman, healed ulcer    HYSTERECTOMY TOTAL ABDOMINAL      age 21    LAPAROSCOPIC CHOLECYSTECTOMY      2006    OSTEOTOMY FEMUR DISTAL      x3, right knee    OSTEOTOMY FEMUR DISTAL      2000,left knee  ligament surgery    OTHER SURGICAL HISTORY      1/10/2003,,PTCA    OTHER SURGICAL HISTORY       09/20/2012,,PTCA,DELONTE in LAD and left main    OTHER SURGICAL HISTORY      4/1/2013,,PTCA,L subclavian stenosis    RELEASE TRIGGER FINGER Left 12/2/2022    Procedure: Left thumb Trigger  release;  Surgeon: Cristhian Wilkinson MD;  Location: [facility]    SALPINGO-OOPHORECTOMY BILATERAL      age 27,Bilateral salpingo-oophorectomy    TONSILLECTOMY, ADENOIDECTOMY, COMBINED      childhood       Allergies   Allergen Reactions    Atorvastatin Muscle Pain (Myalgia)    Tiotropium Bromide [Tiotropium] Rash    Ezetimibe Muscle Pain (Myalgia)    Latex Rash    Niacin      Other reaction(s): Flushing    No Clinical Screening - See Comments Itching, Rash and Blisters     [employer]      Tape [Adhesive Tape] Rash       Current Outpatient Medications   Medication Sig Dispense Refill    amLODIPine (NORVASC) 10 MG tablet Take 1 tablet (10 mg) by mouth daily. Dx. Code: I10. 90 tablet 3    lisinopril (ZESTRIL) 10 MG tablet Take 1 tablet (10 mg) by mouth daily. 90 tablet 3    nitroGLYcerin (NITROLINGUAL) 0.4 MG/SPRAY spray For chest pain spray 1 spray under tongue every 5 minutes for 3 doses. If symptoms persist 5 minutes after 1st dose call 911. 9.8 g 3    pantoprazole (PROTONIX) 40 MG EC tablet Take 1 tablet (40 mg) by mouth daily. 90 tablet 3    rosuvastatin (CRESTOR) 40 MG tablet Take 1 tablet (40 mg) by mouth daily. 90 tablet 3    amitriptyline (ELAVIL) 25 MG tablet Take 1 tablet (25 mg) by mouth at bedtime for 7 days, THEN 2 tablets (50 mg) at bedtime for 7 days, THEN 3 tablets (75 mg) at bedtime for 7 days, THEN 4 tablets (100 mg) at bedtime. 318 tablet 0    aspirin (ASA) 81 MG chewable tablet Take 1 tablet (81 mg) by mouth daily. Starting tomorrow. 30 tablet 3    busPIRone HCl (BUSPAR) 30 MG tablet Take 1 tablet by mouth twice daily 180 tablet 1    [START ON 2/12/2025] clonazePAM (KLONOPIN) 1 MG tablet Take 1 tablet (1 mg) by mouth 3 times daily as needed for anxiety, muscle spasms or sleep. 90 tablet 0    clonazePAM  (KLONOPIN) 1 MG tablet Take 1 tablet (1 mg) by mouth 3 times daily as needed for anxiety, muscle spasms or sleep. 90 tablet 0    [START ON 3/12/2025] clonazePAM (KLONOPIN) 1 MG tablet Take 1 tablet (1 mg) by mouth 3 times daily as needed for anxiety, muscle spasms or sleep. 48 tablet 0    clonazePAM (KLONOPIN) 1 MG tablet Take 1 tablet (1 mg) by mouth 3 times daily as needed for anxiety. 90 tablet 2    clonazePAM (KLONOPIN) 1 MG tablet Take 1 tablet (1 mg) by mouth 3 times daily as needed for anxiety 90 tablet 0    clopidogrel (PLAVIX) 75 MG tablet Take 1 tablet (75 mg) by mouth daily. 90 tablet 3    diclofenac (VOLTAREN) 1 % topical gel Apply 2 grams to each hand or 4 grams to each knee up to 4 times daily as needed for arthritis pain 350 g 4    [START ON 2/24/2025] HYDROcodone-acetaminophen (NORCO)  MG per tablet Take 1 tablet by mouth every 4 hours as needed for severe pain. 180 tablet 0    HYDROcodone-acetaminophen (NORCO)  MG per tablet Take 1 tablet by mouth every 4 hours as needed for severe pain. 180 tablet 0    HYDROcodone-acetaminophen (NORCO)  MG per tablet Take 1 tablet by mouth every 4 hours as needed for severe pain. 180 tablet 0    naloxone (NARCAN) 4 MG/0.1ML nasal spray Spray into one nostril for opioid reversal if unresponsive. May repeat every 2-3 minutes until patient responsive or EMS arrives 1 each 1    ondansetron (ZOFRAN) 4 MG tablet Take 1 tablet (4 mg) by mouth every 6 hours as needed for nausea. 90 tablet 3    RESTASIS 0.05 % ophthalmic emulsion INSTILL 1 DROP INTO BOTH EYES TWICE A DAY      rimegepant (NURTEC) 75 MG ODT tablet PLACE 1 TAB UNDER TONGUE DAILY AS NEEDED FOR MIGRAINE. MAX OF 1 TAB EVERY 48 HRS & 2 PER WEEK 8 tablet 14    sucralfate (CARAFATE) 1 GM/10ML suspension Take 20 mLs (2 g) by mouth 4 times daily. 1892 mL 2    triamcinolone (KENALOG) 0.1 % external lotion Apply topically 2 times daily. 60 mL 3    VITAMIN D, CHOLECALCIFEROL, PO Take 5,000 Units by  mouth daily      vortioxetine (TRINTELLIX) 20 MG tablet Take 1 tablet (20 mg) by mouth daily 90 tablet 2       Social History     Socioeconomic History    Marital status:      Spouse name: Not on file    Number of children: Not on file    Years of education: Not on file    Highest education level: Not on file   Occupational History    Not on file   Tobacco Use    Smoking status: Former     Current packs/day: 0.00     Average packs/day: 1 pack/day for 35.0 years (35.0 ttl pk-yrs)     Types: Cigarettes     Start date: 2/15/1982     Quit date: 2/15/2017     Years since quittin.9     Passive exposure: Past    Smokeless tobacco: Never   Vaping Use    Vaping status: Never Used   Substance and Sexual Activity    Alcohol use: Not Currently     Alcohol/week: 0.0 standard drinks of alcohol    Drug use: No    Sexual activity: Not Currently     Partners: Male     Birth control/protection: None, Female Surgical   Other Topics Concern    Parent/sibling w/ CABG, MI or angioplasty before 65F 55M? Not Asked   Social History Narrative    ,  Steven.  Currently not working outside the home. Lives three-mile self [place] met with . Tobacco abuse, quit , restarted. Quit  and has since quit, no alcohol.     Social Drivers of Health     Financial Resource Strain: Low Risk  (2023)    Financial Resource Strain     Within the past 12 months, have you or your family members you live with been unable to get utilities (heat, electricity) when it was really needed?: No   Food Insecurity: Low Risk  (2024)    Food Insecurity     Within the past 12 months, did you worry that your food would run out before you got money to buy more?: No     Within the past 12 months, did the food you bought just not last and you didn t have money to get more?: No   Transportation Needs: Low Risk  (2023)    Transportation Needs     Within the past 12 months, has lack of transportation kept you from medical  appointments, getting your medicines, non-medical meetings or appointments, work, or from getting things that you need?: No   Physical Activity: Not on file   Stress: Not on file   Social Connections: Not on file   Interpersonal Safety: Low Risk  (1/28/2025)    Interpersonal Safety     Do you feel physically and emotionally safe where you currently live?: Yes     Within the past 12 months, have you been hit, slapped, kicked or otherwise physically hurt by someone?: No     Within the past 12 months, have you been humiliated or emotionally abused in other ways by your partner or ex-partner?: No   Housing Stability: Low Risk  (12/20/2023)    Housing Stability     Do you have housing? : Yes     Are you worried about losing your housing?: No       LAB RESULTS:   Office Visit on 08/06/2021   Component Date Value Ref Range Status    Color Urine 08/06/2021 Yellow  Colorless, Straw, Light Yellow, Yellow Final    Appearance Urine 08/06/2021 Slightly Cloudy* Clear Final    Glucose Urine 08/06/2021 Negative  Negative mg/dL Final    Bilirubin Urine 08/06/2021 Negative  Negative Final    Ketones Urine 08/06/2021 Negative  Negative mg/dL Final    Specific Gravity Urine 08/06/2021 1.018  1.000 - 1.030 Final    Blood Urine 08/06/2021 Small* Negative Final    pH Urine 08/06/2021 6.0  5.0 - 9.0 Final    Protein Albumin Urine 08/06/2021 20 * Negative mg/dL Final    Urobilinogen Urine 08/06/2021 Normal  Normal, 2.0 mg/dL Final    Nitrite Urine 08/06/2021 Positive* Negative Final    Leukocyte Esterase Urine 08/06/2021 Large* Negative Final    Bacteria Urine 08/06/2021 Many* None Seen /HPF Final    Mucus Urine 08/06/2021 Present* None Seen /LPF Final    RBC Urine 08/06/2021 6* <=2 /HPF Final    WBC Urine 08/06/2021 >182* <=5 /HPF Final    Culture 08/06/2021 >100,000 CFU/mL Escherichia coli*  Final   Office Visit on 07/30/2021   Component Date Value Ref Range Status    Sodium 07/30/2021 139  134 - 144 mmol/L Final    Potassium 07/30/2021  "4.4  3.5 - 5.1 mmol/L Final    Chloride 07/30/2021 105  98 - 107 mmol/L Final    Carbon Dioxide (CO2) 07/30/2021 24  21 - 31 mmol/L Final    Anion Gap 07/30/2021 10  3 - 14 mmol/L Final    Urea Nitrogen 07/30/2021 19  7 - 25 mg/dL Final    Creatinine 07/30/2021 1.08  0.60 - 1.20 mg/dL Final    Calcium 07/30/2021 9.9  8.6 - 10.3 mg/dL Final    Glucose 07/30/2021 77  70 - 105 mg/dL Final    GFR Estimate 07/30/2021 53* >60 mL/min/1.73m2 Final    Magnesium 07/30/2021 2.1  1.9 - 2.7 mg/dL Final    WBC Count 07/30/2021 7.0  4.0 - 11.0 10e3/uL Final    RBC Count 07/30/2021 4.39  3.80 - 5.20 10e6/uL Final    Hemoglobin 07/30/2021 12.9  11.7 - 15.7 g/dL Final    Hematocrit 07/30/2021 38.6  35.0 - 47.0 % Final    MCV 07/30/2021 88  78 - 100 fL Final    MCH 07/30/2021 29.4  26.5 - 33.0 pg Final    MCHC 07/30/2021 33.4  31.5 - 36.5 g/dL Final    RDW 07/30/2021 13.5  10.0 - 15.0 % Final    Platelet Count 07/30/2021 273  150 - 450 10e3/uL Final    % Neutrophils 07/30/2021 57  % Final    % Lymphocytes 07/30/2021 30  % Final    % Monocytes 07/30/2021 10  % Final    % Eosinophils 07/30/2021 2  % Final    % Basophils 07/30/2021 1  % Final    % Immature Granulocytes 07/30/2021 0  % Final    NRBCs per 100 WBC 07/30/2021 0  <1 /100 Final    Absolute Neutrophils 07/30/2021 4.0  1.6 - 8.3 10e3/uL Final    Absolute Lymphocytes 07/30/2021 2.1  0.8 - 5.3 10e3/uL Final    Absolute Monocytes 07/30/2021 0.7  0.0 - 1.3 10e3/uL Final    Absolute Eosinophils 07/30/2021 0.1  0.0 - 0.7 10e3/uL Final    Absolute Basophils 07/30/2021 0.1  0.0 - 0.2 10e3/uL Final    Absolute Immature Granulocytes 07/30/2021 0.0  <=0.0 10e3/uL Final    Absolute NRBCs 07/30/2021 0.0  10e3/uL Final        Review of systems: Negative except that which was noted in the HPI.    Physical examination:  /72 (BP Location: Right arm, Patient Position: Sitting, Cuff Size: Adult Regular)   Pulse 82   Temp 97.3  F (36.3  C) (Temporal)   Resp 16   Ht 1.66 m (5' 5.35\")   Wt " 85.7 kg (189 lb)   LMP 04/29/1978 (Approximate)   SpO2 95%   BMI 31.11 kg/m      GENERAL APPEARANCE: healthy, alert and no distress.  CHEST: lungs clear to auscultation.  CARDIOVASCULAR: regular rhythm, normal S1 with physiologic split S2, no S3 or S4 and no murmur, click or rub  EXTREMITIES: no clubbing, cyanosis but with mild peripheral edema.    Total time spent on day of visit, including review of tests, obtaining/reviewing separately obtained history, ordering medications/tests/procedures, communicating with PCP/consultants, and documenting in electronic medical record: 40 minutes.          Thank you for allowing me to participate in the care of your patient. Please do not hesitate to contact me if you have any questions.     Paul Gramajo, DO

## 2025-01-28 ENCOUNTER — OFFICE VISIT (OUTPATIENT)
Dept: CARDIOLOGY | Facility: OTHER | Age: 70
End: 2025-01-28
Attending: INTERNAL MEDICINE
Payer: COMMERCIAL

## 2025-01-28 ENCOUNTER — E-CONSULT (OUTPATIENT)
Dept: ORTHOPEDICS | Facility: CLINIC | Age: 70
End: 2025-01-28

## 2025-01-28 VITALS
HEIGHT: 65 IN | SYSTOLIC BLOOD PRESSURE: 120 MMHG | WEIGHT: 189 LBS | TEMPERATURE: 97.3 F | BODY MASS INDEX: 31.49 KG/M2 | RESPIRATION RATE: 16 BRPM | HEART RATE: 82 BPM | DIASTOLIC BLOOD PRESSURE: 72 MMHG | OXYGEN SATURATION: 95 %

## 2025-01-28 DIAGNOSIS — I25.708 ATHEROSCLEROSIS OF CORONARY ARTERY BYPASS GRAFT OF NATIVE HEART WITH STABLE ANGINA PECTORIS: ICD-10-CM

## 2025-01-28 DIAGNOSIS — M54.50 CHRONIC BILATERAL LOW BACK PAIN WITHOUT SCIATICA: ICD-10-CM

## 2025-01-28 DIAGNOSIS — M54.42 CHRONIC MIDLINE LOW BACK PAIN WITH BILATERAL SCIATICA: ICD-10-CM

## 2025-01-28 DIAGNOSIS — K21.00 GASTROESOPHAGEAL REFLUX DISEASE WITH ESOPHAGITIS WITHOUT HEMORRHAGE: ICD-10-CM

## 2025-01-28 DIAGNOSIS — Z95.5 PRESENCE OF STENT IN CORONARY ARTERY IN PATIENT WITH CORONARY ARTERY DISEASE: ICD-10-CM

## 2025-01-28 DIAGNOSIS — F11.90 CHRONIC, CONTINUOUS USE OF OPIOIDS: ICD-10-CM

## 2025-01-28 DIAGNOSIS — R07.9 CHEST PAIN, UNSPECIFIED TYPE: ICD-10-CM

## 2025-01-28 DIAGNOSIS — G89.4 CHRONIC PAIN DISORDER: ICD-10-CM

## 2025-01-28 DIAGNOSIS — I25.9 CHRONIC ISCHEMIC HEART DISEASE: ICD-10-CM

## 2025-01-28 DIAGNOSIS — R11.0 NAUSEA: ICD-10-CM

## 2025-01-28 DIAGNOSIS — I10 ESSENTIAL HYPERTENSION: ICD-10-CM

## 2025-01-28 DIAGNOSIS — Z98.890 STATUS POST CORONARY ANGIOGRAM: ICD-10-CM

## 2025-01-28 DIAGNOSIS — I25.10 PRESENCE OF STENT IN CORONARY ARTERY IN PATIENT WITH CORONARY ARTERY DISEASE: ICD-10-CM

## 2025-01-28 DIAGNOSIS — G89.29 CHRONIC BILATERAL LOW BACK PAIN WITHOUT SCIATICA: ICD-10-CM

## 2025-01-28 DIAGNOSIS — I25.10 ASCVD (ARTERIOSCLEROTIC CARDIOVASCULAR DISEASE): ICD-10-CM

## 2025-01-28 DIAGNOSIS — M62.81 GENERALIZED MUSCLE WEAKNESS: ICD-10-CM

## 2025-01-28 DIAGNOSIS — R00.2 PALPITATIONS: Primary | ICD-10-CM

## 2025-01-28 DIAGNOSIS — Z95.1 HISTORY OF CORONARY ARTERY BYPASS GRAFT X 3: ICD-10-CM

## 2025-01-28 DIAGNOSIS — R06.09 DOE (DYSPNEA ON EXERTION): ICD-10-CM

## 2025-01-28 DIAGNOSIS — M54.41 CHRONIC MIDLINE LOW BACK PAIN WITH BILATERAL SCIATICA: ICD-10-CM

## 2025-01-28 DIAGNOSIS — G89.29 CHRONIC MIDLINE LOW BACK PAIN WITH BILATERAL SCIATICA: ICD-10-CM

## 2025-01-28 LAB
ATRIAL RATE - MUSE: 67 BPM
DIASTOLIC BLOOD PRESSURE - MUSE: NORMAL MMHG
INTERPRETATION ECG - MUSE: NORMAL
P AXIS - MUSE: 31 DEGREES
PR INTERVAL - MUSE: 200 MS
QRS DURATION - MUSE: 82 MS
QT - MUSE: 428 MS
QTC - MUSE: 452 MS
R AXIS - MUSE: 15 DEGREES
SYSTOLIC BLOOD PRESSURE - MUSE: NORMAL MMHG
T AXIS - MUSE: 60 DEGREES
VENTRICULAR RATE- MUSE: 67 BPM

## 2025-01-28 PROCEDURE — G0463 HOSPITAL OUTPT CLINIC VISIT: HCPCS

## 2025-01-28 PROCEDURE — 93005 ELECTROCARDIOGRAM TRACING: CPT | Performed by: INTERNAL MEDICINE

## 2025-01-28 RX ORDER — ROSUVASTATIN CALCIUM 40 MG/1
40 TABLET, COATED ORAL DAILY
Qty: 90 TABLET | Refills: 3 | Status: SHIPPED | OUTPATIENT
Start: 2025-01-28

## 2025-01-28 RX ORDER — PANTOPRAZOLE SODIUM 40 MG/1
40 TABLET, DELAYED RELEASE ORAL DAILY
Qty: 90 TABLET | Refills: 3 | Status: SHIPPED | OUTPATIENT
Start: 2025-01-28

## 2025-01-28 RX ORDER — AMLODIPINE BESYLATE 10 MG/1
10 TABLET ORAL DAILY
Qty: 90 TABLET | Refills: 3 | Status: SHIPPED | OUTPATIENT
Start: 2025-01-28

## 2025-01-28 RX ORDER — NITROGLYCERIN 400 UG/1
SPRAY ORAL
Qty: 9.8 G | Refills: 3 | Status: SHIPPED | OUTPATIENT
Start: 2025-01-28

## 2025-01-28 RX ORDER — LISINOPRIL 10 MG/1
10 TABLET ORAL DAILY
Qty: 90 TABLET | Refills: 3 | Status: SHIPPED | OUTPATIENT
Start: 2025-01-28

## 2025-01-28 ASSESSMENT — PAIN SCALES - GENERAL: PAINLEVEL_OUTOF10: NO PAIN (0)

## 2025-01-28 NOTE — PROGRESS NOTES
1/28/2025     E-Consult has been denied due to: Doesn't meet criteria for E-Consult - Did not include a specific clinical question, or no question provided.    Interprofessional consultation requested by:  Paul Gramajo DO      Clinical Question/Purpose: MY CLINICAL QUESTION IS: low back pain    Patient assessment and information reviewed:   No recent imaging  Lumbar MRI 6/11/21    Recommendations: Please place a formal consult using orthopedic adult referral.  E-Consult would not be appropriate.      The recommendations provided in this E-Consult are based on a review of clinical data pertinent to the clinical question presented, without a review of the patient's complete medical record or, the benefit of a comprehensive in-person or virtual patient evaluation. This consultation should not replace the clinical judgement and evaluation of the provider ordering this E-Consult. Any new clinical issues, or changes in patient status since the filing of this E-Consult will need to be taken into account when assessing these recommendations. Please contact me if you have further questions.    My total time spent reviewing clinical information and formulating assessment was 4 minutes.    Nilesh Parada DO CAQSM    Primary Care Sports Medicine  [facility]

## 2025-01-28 NOTE — NURSING NOTE
"Chief Complaint   Patient presents with    Follow Up     Medications- heart racing       Initial /72 (BP Location: Right arm, Patient Position: Sitting, Cuff Size: Adult Regular)   Pulse 82   Temp 97.3  F (36.3  C) (Temporal)   Resp 16   Ht 1.66 m (5' 5.35\")   Wt 85.7 kg (189 lb)   LMP 04/29/1978 (Approximate)   SpO2 95%   BMI 31.11 kg/m   Estimated body mass index is 31.11 kg/m  as calculated from the following:    Height as of this encounter: 1.66 m (5' 5.35\").    Weight as of this encounter: 85.7 kg (189 lb).  Meds Reconciled: complete  Pt is on Aspirin  Pt is on a Statin  PHQ and/or YAYA reviewed. Pt referred to PCP/MH Provider as appropriate.    Татьяна Bonilla LPN      "

## 2025-02-10 ENCOUNTER — OFFICE VISIT (OUTPATIENT)
Dept: ORTHOPEDICS | Facility: OTHER | Age: 70
End: 2025-02-10
Attending: INTERNAL MEDICINE
Payer: COMMERCIAL

## 2025-02-10 ENCOUNTER — HOSPITAL ENCOUNTER (OUTPATIENT)
Dept: GENERAL RADIOLOGY | Facility: OTHER | Age: 70
Discharge: HOME | End: 2025-02-10
Attending: STUDENT IN AN ORGANIZED HEALTH CARE EDUCATION/TRAINING PROGRAM
Payer: COMMERCIAL

## 2025-02-10 DIAGNOSIS — M54.42 CHRONIC MIDLINE LOW BACK PAIN WITH BILATERAL SCIATICA: ICD-10-CM

## 2025-02-10 DIAGNOSIS — M48.061 SPINAL STENOSIS OF LUMBAR REGION, UNSPECIFIED WHETHER NEUROGENIC CLAUDICATION PRESENT: ICD-10-CM

## 2025-02-10 DIAGNOSIS — I73.9 CLAUDICATION IN PERIPHERAL VASCULAR DISEASE: Primary | ICD-10-CM

## 2025-02-10 DIAGNOSIS — M62.81 GENERALIZED MUSCLE WEAKNESS: ICD-10-CM

## 2025-02-10 DIAGNOSIS — G89.29 CHRONIC MIDLINE LOW BACK PAIN WITH BILATERAL SCIATICA: ICD-10-CM

## 2025-02-10 DIAGNOSIS — M54.50 CHRONIC BILATERAL LOW BACK PAIN WITHOUT SCIATICA: ICD-10-CM

## 2025-02-10 DIAGNOSIS — M54.41 CHRONIC MIDLINE LOW BACK PAIN WITH BILATERAL SCIATICA: ICD-10-CM

## 2025-02-10 DIAGNOSIS — G89.29 CHRONIC BILATERAL LOW BACK PAIN WITHOUT SCIATICA: ICD-10-CM

## 2025-02-10 PROCEDURE — 99213 OFFICE O/P EST LOW 20 MIN: CPT

## 2025-02-10 PROCEDURE — 72110 X-RAY EXAM L-2 SPINE 4/>VWS: CPT

## 2025-02-10 PROCEDURE — G0463 HOSPITAL OUTPT CLINIC VISIT: HCPCS

## 2025-02-10 PROCEDURE — G0463 HOSPITAL OUTPT CLINIC VISIT: HCPCS | Mod: 25

## 2025-02-10 NOTE — PROGRESS NOTES
HPI: Malina Day is a very pleasant 70-year-old female who presents for evaluation in the orthopedic spine clinic today.  She reports a history of low back pain, lower extremity pain, and multiple falls.  She states that she has pain in the bilateral anterior lateral legs when walking.  She states that her legs get weak the further she walks and eventually they give out and she falls.  She has had 3 recent falls.  Her pain is better with sitting and worse with walking and standing.  She rates her back pain 8 out of 10 and leg pain 7 out of 10 with functionality at 20% and normal.    Past medical history is significant for several MIs, stroke, PE, CAD, SVT, triple bypass surgery, 18 stent placements.    No significant past social history  Medications including Norco, Klonopin, aspirin    She has a latex allergy as well as adhesive allergy    Former smoker    Physical exam:  General: Alert and oriented no distress  Back: Range of motion mildly limited with flexion and extension, subjective pain  Motor 5/5 bilateral hip flexion, knee extension, dorsiflexion, plantarflexion.  Neuro: No focal deficits    Imagin view lumbar x-ray today reveals significant spondylosis ranging from L3-S1.  Lumbar alignment is maintained and disc height is relatively well-preserved    Previous lumbar MRI completed in  reveals moderate L4-5 spinal canal stenosis due to facet hypertrophy.     Assessment: Malina presents with lower extremity symptoms that could be consistent with neurogenic claudication or vascular claudication.  She will need a lumbar MRI to evaluate her level of spinal stenosis as well as an updated  ARYA to investigate vascular claudication.    Plan: Lumbar MRI without contrast, ARYA  Follow-up in clinic when complete to discuss the results of her studies

## 2025-02-24 DIAGNOSIS — I73.9 CLAUDICATION IN PERIPHERAL VASCULAR DISEASE: Primary | ICD-10-CM

## 2025-02-25 DIAGNOSIS — M54.41 CHRONIC MIDLINE LOW BACK PAIN WITH BILATERAL SCIATICA: Primary | ICD-10-CM

## 2025-02-25 DIAGNOSIS — M54.42 CHRONIC MIDLINE LOW BACK PAIN WITH BILATERAL SCIATICA: Primary | ICD-10-CM

## 2025-02-25 DIAGNOSIS — G89.29 CHRONIC MIDLINE LOW BACK PAIN WITH BILATERAL SCIATICA: Primary | ICD-10-CM

## 2025-02-25 RX ORDER — CYCLOBENZAPRINE HCL 10 MG
10 TABLET ORAL 3 TIMES DAILY PRN
Qty: 270 TABLET | Refills: 0 | OUTPATIENT
Start: 2025-02-25

## 2025-02-25 NOTE — TELEPHONE ENCOUNTER
"Note from pharmacy:   \"Dr. Lawrence is no longer there. Patient has appt scheduled, but will need medication before she able to see Dr. Duran. Patient will be in town Friday, and would like to  that day. Thank you.\"    cyclobenzaprine (FLEXERIL) 10 MG tablet (Discontinued) 270 tablet 0 11/8/2024 1/3/2025 No   Sig - Route: Take 1 tablet (10 mg) by mouth 3 times daily as needed for muscle spasms. - Oral   Sent to pharmacy as: Cyclobenzaprine HCl 10 MG Oral Tablet (FLEXERIL)   Class: E-Prescribe   Reason for Discontinue: Alternate therapy       There is no refill protocol information for this order  Not on active medication list.     Last Office Visit:              1/3/25  Future Office visit:             Apr 01, 2025 10:00 AM  (Arrive by 9:45 AM)  Provider Visit with Kenia Duran MD  [facility] ([facility]) [address]  [place] [id number] [phone]     Per LOV note:  Comment: depression and anxiety worsened recently but likely d/t increased pain. Just refilled Trintellix so no wish to change med or dose at this time. Switch flexiril to amitriptyline. Encouraged to try to taper down on klonopin with addition of amitriptyline.     amitriptyline (ELAVIL) 25 MG tablet 318 tablet 0 1/3/2025 4/3/2025 No   Sig - Route: Take 1 tablet (25 mg) by mouth at bedtime for 7 days, THEN 2 tablets (50 mg) at bedtime for 7 days, THEN 3 tablets (75 mg) at bedtime for 7 days, THEN 4 tablets (100 mg) at bedtime. - Oral     Called and spoke to Patient after verifying last name and date of birth. Pt states she never took the amitriptyline, and wants to be on the Flexeril and is wanting a 90 day supply, to limit trips to town. She states the Flexeril works best for her back. She is scheduled for upcoming MRI and ultrasound.     Unable to complete prescription refill per RN Medication Refill Policy. Kathleen Puckett RN .............. 2/25/2025  4:10 PM    "

## 2025-02-26 NOTE — TELEPHONE ENCOUNTER
Refused, with note to pharmacy; needs appointment. Kathleen Puckett RN .............. 2/26/2025  4:49 PM

## 2025-02-28 ENCOUNTER — HOSPITAL ENCOUNTER (OUTPATIENT)
Dept: MRI IMAGING | Facility: OTHER | Age: 70
Discharge: HOME | End: 2025-02-28
Attending: INTERNAL MEDICINE
Payer: COMMERCIAL

## 2025-02-28 DIAGNOSIS — M62.81 GENERALIZED MUSCLE WEAKNESS: ICD-10-CM

## 2025-02-28 DIAGNOSIS — G89.29 CHRONIC BILATERAL LOW BACK PAIN WITHOUT SCIATICA: ICD-10-CM

## 2025-02-28 DIAGNOSIS — M54.50 CHRONIC BILATERAL LOW BACK PAIN WITHOUT SCIATICA: ICD-10-CM

## 2025-02-28 PROCEDURE — 72148 MRI LUMBAR SPINE W/O DYE: CPT

## 2025-03-10 DIAGNOSIS — G43.719 INTRACTABLE CHRONIC COMMON MIGRAINE WITHOUT AURA: ICD-10-CM

## 2025-03-10 NOTE — TELEPHONE ENCOUNTER
[id number] of [place] sent Rx request for the following:      Requested Prescriptions   Pending Prescriptions Disp Refills    rimegepant (NURTEC) 75 MG ODT tablet 8 tablet 14     Sig: PLACE 1 TAB UNDER TONGUE DAILY AS NEEDED FOR MIGRAINE. MAX OF 1 TAB EVERY 48 HRS & 2 PER WEEK       There is no refill protocol information for this order        Last Prescription Date:   2/21/24  Last Fill Qty/Refills:         8, R-14    Intractable chronic common migraine without aura [G43.719]        Last Office Visit:              1/3/25 (Migraine discussed)  Future Office visit:           3/18/25    Per LOV note:    Return for move follow up on 3/17 back by 2 weeks.    Unable to complete prescription refill per RN Medication Refill Policy. Kathleen Puckett RN .............. 3/10/2025  2:37 PM

## 2025-03-11 ENCOUNTER — TELEPHONE (OUTPATIENT)
Dept: FAMILY MEDICINE | Facility: OTHER | Age: 70
End: 2025-03-11
Payer: COMMERCIAL

## 2025-03-11 DIAGNOSIS — M54.50 CHRONIC MIDLINE LOW BACK PAIN, UNSPECIFIED WHETHER SCIATICA PRESENT: Primary | ICD-10-CM

## 2025-03-11 DIAGNOSIS — G89.29 CHRONIC MIDLINE LOW BACK PAIN, UNSPECIFIED WHETHER SCIATICA PRESENT: Primary | ICD-10-CM

## 2025-03-11 NOTE — TELEPHONE ENCOUNTER
Discussed with the pt.  She will present to the ER or [facility] if she needs further assistance before her appt with Dr Duran.  Cecilia Wallis LPN on 3/11/2025 at 3:14 PM

## 2025-03-11 NOTE — TELEPHONE ENCOUNTER
It looks as though Dr. Lawrence discontinued her Flexeril on 1/3/25. Would recommend OV as scheduled to discuss treatment options further.   JOSELYN GRAHAM CNP on 3/11/2025 at 2:57 PM

## 2025-03-11 NOTE — TELEPHONE ENCOUNTER
Patient PCP no longer at [facility]. She establishes care with Dr. Duran next week, 3/18/25. Patient requesting refill of flexeril until she can be seen by new PCP. Patient uses [facility] in [place]. Please advise.    Kathleen Collazo on 3/11/2025 at 2:46 PM

## 2025-03-14 ENCOUNTER — HOSPITAL ENCOUNTER (OUTPATIENT)
Dept: ULTRASOUND IMAGING | Facility: OTHER | Age: 70
Discharge: HOME | End: 2025-03-14
Payer: COMMERCIAL

## 2025-03-14 DIAGNOSIS — I73.9 CLAUDICATION IN PERIPHERAL VASCULAR DISEASE: ICD-10-CM

## 2025-03-14 PROCEDURE — 93922 UPR/L XTREMITY ART 2 LEVELS: CPT

## 2025-03-17 ASSESSMENT — ANXIETY QUESTIONNAIRES
GAD7 TOTAL SCORE: 6
3. WORRYING TOO MUCH ABOUT DIFFERENT THINGS: SEVERAL DAYS
GAD7 TOTAL SCORE: 6
GAD7 TOTAL SCORE: 6
IF YOU CHECKED OFF ANY PROBLEMS ON THIS QUESTIONNAIRE, HOW DIFFICULT HAVE THESE PROBLEMS MADE IT FOR YOU TO DO YOUR WORK, TAKE CARE OF THINGS AT HOME, OR GET ALONG WITH OTHER PEOPLE: SOMEWHAT DIFFICULT
4. TROUBLE RELAXING: SEVERAL DAYS
1. FEELING NERVOUS, ANXIOUS, OR ON EDGE: MORE THAN HALF THE DAYS
8. IF YOU CHECKED OFF ANY PROBLEMS, HOW DIFFICULT HAVE THESE MADE IT FOR YOU TO DO YOUR WORK, TAKE CARE OF THINGS AT HOME, OR GET ALONG WITH OTHER PEOPLE?: SOMEWHAT DIFFICULT
2. NOT BEING ABLE TO STOP OR CONTROL WORRYING: SEVERAL DAYS
6. BECOMING EASILY ANNOYED OR IRRITABLE: NOT AT ALL
7. FEELING AFRAID AS IF SOMETHING AWFUL MIGHT HAPPEN: SEVERAL DAYS
7. FEELING AFRAID AS IF SOMETHING AWFUL MIGHT HAPPEN: SEVERAL DAYS
5. BEING SO RESTLESS THAT IT IS HARD TO SIT STILL: NOT AT ALL

## 2025-03-17 ASSESSMENT — PAIN SCALES - PAIN ENJOYMENT GENERAL ACTIVITY SCALE (PEG)
INTERFERED_GENERAL_ACTIVITY: 9
PEG_TOTALSCORE: 8.67
AVG_PAIN_PASTWEEK: 8
INTERFERED_ENJOYMENT_LIFE: 9
PEG_TOTALSCORE: 8.67
INTERFERED_ENJOYMENT_LIFE: 9
INTERFERED_GENERAL_ACTIVITY: 9
AVG_PAIN_PASTWEEK: 8

## 2025-03-17 ASSESSMENT — PATIENT HEALTH QUESTIONNAIRE - PHQ9
SUM OF ALL RESPONSES TO PHQ QUESTIONS 1-9: 6
SUM OF ALL RESPONSES TO PHQ QUESTIONS 1-9: 6
10. IF YOU CHECKED OFF ANY PROBLEMS, HOW DIFFICULT HAVE THESE PROBLEMS MADE IT FOR YOU TO DO YOUR WORK, TAKE CARE OF THINGS AT HOME, OR GET ALONG WITH OTHER PEOPLE: EXTREMELY DIFFICULT

## 2025-03-18 ENCOUNTER — TELEPHONE (OUTPATIENT)
Dept: FAMILY MEDICINE | Facility: OTHER | Age: 70
End: 2025-03-18

## 2025-03-18 ENCOUNTER — OFFICE VISIT (OUTPATIENT)
Dept: FAMILY MEDICINE | Facility: OTHER | Age: 70
End: 2025-03-18
Attending: FAMILY MEDICINE
Payer: COMMERCIAL

## 2025-03-18 VITALS
HEIGHT: 65 IN | HEART RATE: 104 BPM | RESPIRATION RATE: 18 BRPM | OXYGEN SATURATION: 95 % | DIASTOLIC BLOOD PRESSURE: 76 MMHG | SYSTOLIC BLOOD PRESSURE: 122 MMHG | TEMPERATURE: 96.2 F | BODY MASS INDEX: 31.25 KG/M2 | WEIGHT: 187.6 LBS

## 2025-03-18 DIAGNOSIS — Z79.899 CONTROLLED SUBSTANCE AGREEMENT SIGNED: ICD-10-CM

## 2025-03-18 DIAGNOSIS — G89.29 CHRONIC BILATERAL LOW BACK PAIN, UNSPECIFIED WHETHER SCIATICA PRESENT: ICD-10-CM

## 2025-03-18 DIAGNOSIS — I25.10 ASCVD (ARTERIOSCLEROTIC CARDIOVASCULAR DISEASE): ICD-10-CM

## 2025-03-18 DIAGNOSIS — I25.708 ATHEROSCLEROSIS OF CORONARY ARTERY BYPASS GRAFT OF NATIVE HEART WITH STABLE ANGINA PECTORIS: ICD-10-CM

## 2025-03-18 DIAGNOSIS — E03.8 SUBCLINICAL HYPOTHYROIDISM: ICD-10-CM

## 2025-03-18 DIAGNOSIS — M47.816 LUMBAR FACET ARTHROPATHY: ICD-10-CM

## 2025-03-18 DIAGNOSIS — K21.00 GASTROESOPHAGEAL REFLUX DISEASE WITH ESOPHAGITIS WITHOUT HEMORRHAGE: ICD-10-CM

## 2025-03-18 DIAGNOSIS — G89.4 CHRONIC PAIN DISORDER: ICD-10-CM

## 2025-03-18 DIAGNOSIS — G43.709 CHRONIC MIGRAINE WITHOUT AURA WITHOUT STATUS MIGRAINOSUS, NOT INTRACTABLE: ICD-10-CM

## 2025-03-18 DIAGNOSIS — M79.18 MYOFASCIAL PAIN: ICD-10-CM

## 2025-03-18 DIAGNOSIS — I10 PRIMARY HYPERTENSION: ICD-10-CM

## 2025-03-18 DIAGNOSIS — Z95.5 PRESENCE OF STENT IN CORONARY ARTERY IN PATIENT WITH CORONARY ARTERY DISEASE: ICD-10-CM

## 2025-03-18 DIAGNOSIS — F41.0 PANIC ATTACK: ICD-10-CM

## 2025-03-18 DIAGNOSIS — M62.838 MUSCLE SPASM: ICD-10-CM

## 2025-03-18 DIAGNOSIS — Z91.199 NONCOMPLIANCE WITH CPAP TREATMENT: ICD-10-CM

## 2025-03-18 DIAGNOSIS — F41.9 CHRONIC ANXIETY: ICD-10-CM

## 2025-03-18 DIAGNOSIS — K25.4 CHRONIC GASTRIC ULCER WITH HEMORRHAGE: ICD-10-CM

## 2025-03-18 DIAGNOSIS — I25.9 CHRONIC ISCHEMIC HEART DISEASE: ICD-10-CM

## 2025-03-18 DIAGNOSIS — E78.2 MIXED HYPERLIPIDEMIA: ICD-10-CM

## 2025-03-18 DIAGNOSIS — N18.31 STAGE 3A CHRONIC KIDNEY DISEASE (H): ICD-10-CM

## 2025-03-18 DIAGNOSIS — Z86.711 HISTORY OF PULMONARY EMBOLISM: ICD-10-CM

## 2025-03-18 DIAGNOSIS — F11.20 CONTINUOUS OPIOID DEPENDENCE (H): ICD-10-CM

## 2025-03-18 DIAGNOSIS — Z78.0 POSTMENOPAUSAL STATUS: ICD-10-CM

## 2025-03-18 DIAGNOSIS — R73.03 PREDIABETES: ICD-10-CM

## 2025-03-18 DIAGNOSIS — Z87.891 PERSONAL HISTORY OF TOBACCO USE: ICD-10-CM

## 2025-03-18 DIAGNOSIS — M15.0 PRIMARY OSTEOARTHRITIS INVOLVING MULTIPLE JOINTS: ICD-10-CM

## 2025-03-18 DIAGNOSIS — I25.10 PRESENCE OF STENT IN CORONARY ARTERY IN PATIENT WITH CORONARY ARTERY DISEASE: ICD-10-CM

## 2025-03-18 DIAGNOSIS — Z00.00 ENCOUNTER FOR MEDICARE ANNUAL WELLNESS EXAM: Primary | ICD-10-CM

## 2025-03-18 DIAGNOSIS — M54.50 CHRONIC BILATERAL LOW BACK PAIN, UNSPECIFIED WHETHER SCIATICA PRESENT: ICD-10-CM

## 2025-03-18 DIAGNOSIS — I77.1 SUBCLAVIAN ARTERY STENOSIS, LEFT: ICD-10-CM

## 2025-03-18 DIAGNOSIS — R00.2 PALPITATIONS: ICD-10-CM

## 2025-03-18 DIAGNOSIS — R06.09 DOE (DYSPNEA ON EXERTION): ICD-10-CM

## 2025-03-18 DIAGNOSIS — Z12.31 ENCOUNTER FOR SCREENING MAMMOGRAM FOR BREAST CANCER: ICD-10-CM

## 2025-03-18 DIAGNOSIS — M85.89 OSTEOPENIA OF MULTIPLE SITES: ICD-10-CM

## 2025-03-18 DIAGNOSIS — F39 MOOD DISORDER: ICD-10-CM

## 2025-03-18 DIAGNOSIS — Z80.0 FAMILY HISTORY OF MALIGNANT NEOPLASM OF GASTROINTESTINAL TRACT: ICD-10-CM

## 2025-03-18 DIAGNOSIS — Z95.1 HISTORY OF CORONARY ARTERY BYPASS GRAFT X 3: ICD-10-CM

## 2025-03-18 DIAGNOSIS — R79.89 ELEVATED TSH: ICD-10-CM

## 2025-03-18 DIAGNOSIS — F33.2 SEVERE EPISODE OF RECURRENT MAJOR DEPRESSIVE DISORDER, WITHOUT PSYCHOTIC FEATURES (H): ICD-10-CM

## 2025-03-18 DIAGNOSIS — Z86.73 HISTORY OF CVA (CEREBROVASCULAR ACCIDENT): ICD-10-CM

## 2025-03-18 DIAGNOSIS — M48.062 SPINAL STENOSIS OF LUMBAR REGION WITH NEUROGENIC CLAUDICATION: ICD-10-CM

## 2025-03-18 DIAGNOSIS — G47.31 CENTRAL SLEEP APNEA: ICD-10-CM

## 2025-03-18 PROBLEM — R07.9 CHEST PAIN, UNSPECIFIED TYPE: Status: RESOLVED | Noted: 2021-11-16 | Resolved: 2025-03-18

## 2025-03-18 PROBLEM — I11.0 HYPERTENSIVE HEART DISEASE WITH HEART FAILURE (H): Status: RESOLVED | Noted: 2024-01-12 | Resolved: 2025-03-18

## 2025-03-18 PROBLEM — F11.90 CHRONIC, CONTINUOUS USE OF OPIOIDS: Status: RESOLVED | Noted: 2023-10-29 | Resolved: 2025-03-18

## 2025-03-18 PROBLEM — R60.0 LOCALIZED EDEMA: Status: RESOLVED | Noted: 2023-07-13 | Resolved: 2025-03-18

## 2025-03-18 LAB
ALBUMIN SERPL BCG-MCNC: 4.8 G/DL (ref 3.5–5.2)
ALP SERPL-CCNC: 91 U/L (ref 40–150)
ALT SERPL W P-5'-P-CCNC: 26 U/L (ref 0–50)
ANION GAP SERPL CALCULATED.3IONS-SCNC: 16 MMOL/L (ref 7–15)
AST SERPL W P-5'-P-CCNC: 38 U/L (ref 0–45)
BILIRUB SERPL-MCNC: 0.4 MG/DL
BUN SERPL-MCNC: 14.4 MG/DL (ref 8–23)
CALCIUM SERPL-MCNC: 9.8 MG/DL (ref 8.8–10.4)
CHLORIDE SERPL-SCNC: 99 MMOL/L (ref 98–107)
CHOLEST SERPL-MCNC: 173 MG/DL
CREAT SERPL-MCNC: 0.98 MG/DL (ref 0.51–0.95)
CREAT UR-MCNC: 105.7 MG/DL
EGFRCR SERPLBLD CKD-EPI 2021: 61 ML/MIN/1.73M2
FASTING STATUS PATIENT QL REPORTED: ABNORMAL
FASTING STATUS PATIENT QL REPORTED: ABNORMAL
GLUCOSE SERPL-MCNC: 113 MG/DL (ref 70–99)
HCO3 SERPL-SCNC: 23 MMOL/L (ref 22–29)
HDLC SERPL-MCNC: 60 MG/DL
LDLC SERPL CALC-MCNC: 75 MG/DL
MICROALBUMIN UR-MCNC: 15.5 MG/L
MICROALBUMIN/CREAT UR: 14.66 MG/G CR (ref 0–25)
NONHDLC SERPL-MCNC: 113 MG/DL
POTASSIUM SERPL-SCNC: 3.9 MMOL/L (ref 3.4–5.3)
PROT SERPL-MCNC: 8 G/DL (ref 6.4–8.3)
SODIUM SERPL-SCNC: 138 MMOL/L (ref 135–145)
TRIGL SERPL-MCNC: 190 MG/DL
TSH SERPL DL<=0.005 MIU/L-ACNC: 2.57 UIU/ML (ref 0.3–4.2)

## 2025-03-18 PROCEDURE — G0463 HOSPITAL OUTPT CLINIC VISIT: HCPCS | Mod: 25

## 2025-03-18 PROCEDURE — 36415 COLL VENOUS BLD VENIPUNCTURE: CPT | Mod: ZL | Performed by: FAMILY MEDICINE

## 2025-03-18 PROCEDURE — 82310 ASSAY OF CALCIUM: CPT | Mod: ZL | Performed by: FAMILY MEDICINE

## 2025-03-18 PROCEDURE — 84443 ASSAY THYROID STIM HORMONE: CPT | Mod: ZL | Performed by: FAMILY MEDICINE

## 2025-03-18 PROCEDURE — G0296 VISIT TO DETERM LDCT ELIG: HCPCS | Performed by: FAMILY MEDICINE

## 2025-03-18 PROCEDURE — 82043 UR ALBUMIN QUANTITATIVE: CPT | Mod: ZL | Performed by: FAMILY MEDICINE

## 2025-03-18 PROCEDURE — 82570 ASSAY OF URINE CREATININE: CPT | Mod: ZL | Performed by: FAMILY MEDICINE

## 2025-03-18 PROCEDURE — 80061 LIPID PANEL: CPT | Mod: ZL | Performed by: FAMILY MEDICINE

## 2025-03-18 PROCEDURE — 82040 ASSAY OF SERUM ALBUMIN: CPT | Mod: ZL | Performed by: FAMILY MEDICINE

## 2025-03-18 RX ORDER — HYDROCODONE BITARTRATE AND ACETAMINOPHEN 10; 325 MG/1; MG/1
1 TABLET ORAL EVERY 4 HOURS PRN
Qty: 180 TABLET | Refills: 0 | Status: SHIPPED | OUTPATIENT
Start: 2025-03-28

## 2025-03-18 RX ORDER — CLONAZEPAM 1 MG/1
1 TABLET ORAL 2 TIMES DAILY PRN
Qty: 60 TABLET | Refills: 0 | Status: SHIPPED | OUTPATIENT
Start: 2025-03-18

## 2025-03-18 RX ORDER — SUCRALFATE 1 G/1
1 TABLET ORAL 4 TIMES DAILY PRN
Qty: 120 TABLET | Refills: 5 | Status: SHIPPED | OUTPATIENT
Start: 2025-03-18

## 2025-03-18 RX ORDER — AMITRIPTYLINE HYDROCHLORIDE 100 MG/1
100 TABLET ORAL AT BEDTIME
Qty: 90 TABLET | Refills: 3 | Status: SHIPPED | OUTPATIENT
Start: 2025-03-18

## 2025-03-18 RX ORDER — CYCLOBENZAPRINE HCL 10 MG
10 TABLET ORAL 3 TIMES DAILY PRN
Qty: 30 TABLET | Refills: 2 | Status: SHIPPED | OUTPATIENT
Start: 2025-03-18

## 2025-03-18 RX ORDER — DULOXETIN HYDROCHLORIDE 30 MG/1
30 CAPSULE, DELAYED RELEASE ORAL DAILY
Qty: 90 CAPSULE | Refills: 1 | Status: SHIPPED | OUTPATIENT
Start: 2025-03-18

## 2025-03-18 SDOH — HEALTH STABILITY: PHYSICAL HEALTH: ON AVERAGE, HOW MANY MINUTES DO YOU ENGAGE IN EXERCISE AT THIS LEVEL?: 0 MIN

## 2025-03-18 SDOH — HEALTH STABILITY: PHYSICAL HEALTH: ON AVERAGE, HOW MANY DAYS PER WEEK DO YOU ENGAGE IN MODERATE TO STRENUOUS EXERCISE (LIKE A BRISK WALK)?: 0 DAYS

## 2025-03-18 ASSESSMENT — SOCIAL DETERMINANTS OF HEALTH (SDOH): HOW OFTEN DO YOU GET TOGETHER WITH FRIENDS OR RELATIVES?: ONCE A WEEK

## 2025-03-18 ASSESSMENT — PAIN SCALES - GENERAL: PAINLEVEL_OUTOF10: SEVERE PAIN (8)

## 2025-03-18 NOTE — PROGRESS NOTES
Preventive Care Visit  [facility] AND [facility]  Kenia Duran MD, Family Medicine  Mar 18, 2025  {Provider  Link to [facility] :[id number]}    Assessment & Plan     (Z00.00) Encounter for Medicare annual wellness exam  (primary encounter diagnosis)  Comment: Routine health maintenance including verbal recommendation to have regular dental and eye exams, attaining/maintaining healthy weight, balanced diet, regular exercise    (Z79.899) Controlled substance agreement updated 12/16/24  (G89.4) Chronic pain disorder  (F11.20) Continuous opioid dependence (H)  (M54.50,  G89.29) Chronic bilateral low back pain, unspecified whether sciatica present  (M48.062) Spinal stenosis of lumbar region with neurogenic claudication  (M47.816) Lumbar facet arthropathy  (M62.838) Muscle spasm  (M15.0) Primary osteoarthritis involving multiple joints  (M79.18) Myofascial pain  (F39) Mood disorder  (F41.9) Chronic anxiety  (F41.0) Panic attack  (F33.2) Severe episode of recurrent major depressive disorder, without psychotic features (H)  (G43.709) Chronic migraine without aura without status migrainosus, not intractable  Comment: Lengthy discussion regarding risk with combined benzodiazepine and chronic opioid therapy, especially with reasonably high doses as she is currently prescribed.  Discussed that I do not prescribe long-term benzodiazepine and opioid medications in combination.  Recommend weaning 1 or the other, though I do suspect that Klonopin is more likely to be weaned successfully considering the duration of chronic opioid therapy, though I do anticipate that this may require a slow wean with addition of other medications that will help with management of symptoms.    -- She will initially wean to no more than an average of 2 daily doses of 1 mg Klonopin at this time, though I will likely reduce the dose of her tablet subsequently to allow a slower wean going forward.  -- Recommend eventually weaning hydrocodone to no  more than 40 morphine equivalents daily, 4 of the 10 mg tablets and this was discussed today.  She currently takes 6 tablets daily.  Anticipate deferral of this until Klonopin is completely weaned.  -- Restart Cymbalta which will likely help with improving anxiety and depression overall, but is also likely to help some with chronic pain.  Anticipate increasing this to at least 60 mg daily but will start with 30 mg daily.  May consider reducing Trintellix dose to allow at least moderate to high dose Cymbalta, especially if this is improving pain control and mood symptoms are adequately controlled.  -- Continue amitriptyline which has clearly been beneficial for sleep, mood, headaches, and chronic pain  -- May continue Flexeril for now, though I did encourage her to limit this to only when she feels that she really needs it for significant symptoms and she is agreeable  -- Consider adding carbamazepine or Topamax in the future which also will likely improve chronic pain control and migraines  -- Consider trial of mirtazapine or olanzapine at bedtime for mood symptoms  -- Follow-up in 2 weeks  Plan: HYDROcodone-acetaminophen (NORCO)  MG per        tablet, clonazePAM (KLONOPIN) 1 MG tablet, amitriptyline (ELAVIL) 100 MG tablet,         DULoxetine (CYMBALTA) 30 MG capsule, cyclobenzaprine (FLEXERIL) 10 MG tablet    (K25.4) Chronic gastric ulcer with hemorrhage  (K21.00) Gastroesophageal reflux disease with esophagitis without hemorrhage  Comment: Continue Protonix daily and Carafate as needed averaging 2 doses per day  Plan: sucralfate (CARAFATE) 1 GM tablet    (N18.31) Stage 3a chronic kidney disease (H)  Comment: Stable, encouraged good hydration and avoidance of nephrotoxic medication  Plan: Albumin Random Urine Quantitative with Creat         Ratio, Comprehensive Metabolic Panel    (R73.03) Prediabetes  Comment: A1c next visit as this order was inadvertently missed.  Plan: Comprehensive Metabolic Panel    (I10)  Primary hypertension  Comment: Well-controlled on amlodipine and lisinopril    (E78.2) Mixed hyperlipidemia  Comment: Well-controlled on rosuvastatin  Plan: Comprehensive Metabolic Panel, Lipid Panel    (I25.708) Atherosclerosis of coronary artery bypass graft of native heart with stable angina pectoris  (Z95.1) History of coronary artery bypass graft x 3 on 2/12/2003  (I25.10,  Z95.5) Presence of stent in coronary artery in patient with coronary artery disease  (I25.10) ASCVD (arteriosclerotic cardiovascular disease)  (I25.9) Chronic ischemic heart disease  (I77.1) Subclavian artery stenosis, left  (R00.2) Palpitations  (R06.09) CORTEZ (dyspnea on exertion)  (Z86.73) History of CVA-mild chronic ischemic disease on 8/20/2021.  Comment: Managed by cardiology and stable    (Z86.711) History of pulmonary embolism on 1/2/2020. (-) VQ scan on 11/23/2021  Comment: No recurrent emboli, off anticoagulation since March 2024    (G47.31) Central sleep apnea  (Z91.199) Noncompliance with CPAP treatment  Comment:     (R79.89) Elevated TSH  (E03.8) Subclinical hypothyroidism  Comment: TSH is normal on labs today  Plan: TSH Reflex GH    (Z87.891) Personal history of tobacco use  Comment: Recommend annual low-dose lung CT which she is agreeable to  Plan: Prof fee: Shared Decision Making for Lung         Cancer Screening, CT Chest Lung Cancer Scrn Low        Dose wo    (Z78.0) Postmenopausal status  (M85.89) Osteopenia of multiple sites  Comment: Recommend calcium supplement or adequate dietary calcium, vitamin D, regular weightbearing exercise.  DEXA every 2 years.  Plan: DX Bone Density    (Z12.31) Encounter for screening mammogram for breast cancer  Comment:   Plan: MA Screen Bilateral w/Reinier    (Z80.0) Family history of malignant neoplasm of gastrointestinal tract  Comment: Overdue for colonoscopy, will discuss at upcoming visit.    BMI  Estimated body mass index is 31.46 kg/m  as calculated from the following:    Height as of this  "encounter: 1.645 m (5' 4.75\").    Weight as of this encounter: 85.1 kg (187 lb 9.6 oz).   Weight management plan: Discussed healthy diet and exercise guidelines      {FOLLOW UP PLANS (Optional) Includes COVID19 Treatment Plan:372052}    Return in about 2 weeks (around 4/1/2025), or if symptoms worsen or fail to improve, for Establish care, Chronic Disease Management.    Mars Radfodr is a 70 year old, presenting for the following:  Medicare Visit (annual)        3/18/2025     9:47 AM   Additional Questions   Roomed by Heather Del Rosario LPN   {ROOMER positive Fall Risk- Gait Speed Test is required click here to document the Gait Speed Test and then refresh the note to pull in results  :541819}  {ROOMER if patient is in their first year of Medicare a vision screen is required click here to document the Vison screen and then refresh the note to pull in results  :717959}      History of Present Illness       Back Pain:  She presents for follow up of back pain. Patient's back pain is a chronic problem.  Location of back pain:  Right lower back, left lower back, right middle of back, left middle of back, right shoulder, right buttock, left buttock, right hip and left hip  Description of back pain: stabbing  Back pain spreads: right buttocks, left buttocks, right thigh, left thigh, right knee and left knee    Since patient first noticed back pain, pain is: rapidly worsening  Does back pain interfere with her job:  Yes       Heart Failure:  She presents for follow up of heart failure. She is experiencing shortness of breath with activity only, which is much worse. She is not experiencing any lower extremity edema.   She denies orthopenea and is not coughing at night. Patient is not checking weight daily. She has recently had a weight increase.  She has no side effects from medications.  She has has a medical visit for heart failure 1 time since the last visit.    She eats 2-3 servings of fruits and vegetables daily.She " consumes 0 sweetened beverage(s) daily.She exercises with enough effort to increase her heart rate 9 or less minutes per day.  She exercises with enough effort to increase her heart rate 3 or less days per week.   She is taking medications regularly.    Very pleasant 70-year-old female presents to clinic for annual wellness visit and chronic disease management.  She would like to establish care as well.  She does have another visit scheduled for that in 2 weeks.  Regarding preventative health maintenance, her last mammogram was in  and she is agreeable to updating that.  Her last DEXA scan was in  with osteopenia.  She is agreeable to updating that as well.  Her last colonoscopy was in  and normal.  In review of her medical record, her father  of colon cancer with metastases to the liver and therefore she should be getting colonoscopy every 5 years and is overdue.  This was not discussed at the time of the visit as her care gaps were updated to reflect every 10 years for a colonoscopy.  We will discuss this at her next visit in 2 weeks.  She does have a 35-pack-year smoking history, quit in .  After discussion today, she is agreeable to proceeding with screening chest CT.  She is encouraged to get Shingrix vaccine at the pharmacy where it is covered by insurance.  She is not sure if she wants to do this as her  got shingles after getting the vaccine.  RSV and hepatitis A vaccines can also be completed at the pharmacy.    She has chronic pain that is multifactorial with relatively generalized osteoarthritis.  She mostly complains of chronic bilateral low back pain.  She had significant pain after CABG in .  She reports that she was initially started on Xanax, methadone, and OxyContin after her CABG.  She was clearly mentally altered and endorsed high feeling from some of these medications.  She reports that her family became concerned.  Ultimately these medications were adjusted.  She  was subsequently on Valium and later switched to Klonopin for a benzodiazepine.  Methadone and OxyContin were transitioned to hydrocodone which does not give her any of that high feeling and controls pain better than oxycodone.  Over time her dosages have decreased at least some with regard to the Klonopin.  She was previously on as much as 1 mg 4 times daily, reduced to 3 times daily, and more recently perhaps an average of twice daily.  She feels that sometimes she can go a whole day without using any Klonopin but sometimes she uses all 3 doses consistently for several days in a row depending on degree of her anxiety.  She has a history of panic attacks.  She feels that a lot of her anxiety is triggered by her  side of the family and she has significantly less contact with them now which has overall improved her degree of anxiety.  She is unsure which other medications she has been on for anxiety in the past.  She is on Trintellix which was primarily prescribed for persistent depression and she does feel like that helps more for depression than what she had been on in the past.  When I mention Cymbalta she believes that she has been on that in the past as well and is unsure why it was discontinued or changed.  She does not recall having undesired side effects from that.  She is also on BuSpar 30 mg twice daily though she does not feel like this has ever been helpful for her anxiety symptoms.  That dose has been gradually increased over time without obvious benefit in her opinion.  She would be interested in potentially weaning down and off of this in the future because of lack of obvious benefit.  A few months ago she was started on amitriptyline to help with insomnia, mood symptoms, headaches, and pain.  She does feel like it is helping with all of those things to some degree.  She had also been taking melatonin 15 mg nightly and has been able to reduce that to 10 mg nightly since starting amitriptyline.   She titrated amitriptyline to 100 mg daily without undesired side effects.  Her last PCP suggested that amitriptyline should replace Flexeril, though she has continued to take it as needed.  She does feel like this helped significantly with muscle spasms, back pain and leg pain.  Sometimes she does not take this for more than a day and other times she will use it up to 3 times in a day consistently.  She also uses Voltaren gel as needed and feels like this helped significantly especially with knee and shoulder pain.  She does feel like her last right shoulder injection has worn off and may be interested in repeating that in the near future.  It did seem to help for about 3 months.  Her last injection was on November 8, 2024 by Dr. Lawrence.  She reports that it has just been in the past several months that she has been getting debilitating claudication with severe leg pain and weakness with minimal activity/walking.  It was felt that this was most likely neurogenic in nature.  She does have significant vascular disease.  She did have ABIs completed recently which are borderline but did not show apparent severe peripheral vascular disease.  An MRI of the lumbar spine showed advanced facet arthritis with moderate spinal stenosis at L4-5 as well as some foraminal stenosis that is not severe.  She did not really get improvement with injections in the past.  She has an appointment scheduled with Dr. Navas next week.  She has not had back surgery previously.  She is quite aware of the risks of combined benzodiazepine and opioid therapy.  Her last PCP strongly encouraged elimination of the benzodiazepine as well.  As discussed above, she has been gradually weaning this and using it only as needed.  She is open to trial of medication adjustment and gradual weaning of the clonazepam though does seem somewhat hesitant about completely discontinuing it.    Migraines have improved with amitriptyline.  She does use Nurtec as  needed.    She has a history of chronic gastric ulcer with history of hemorrhage.  She takes Protonix daily.  She does get intermittent epigastric pain for which she uses Carafate as needed, generally averaging about twice per day.  She prefers tablets rather than the suspension and requests a new prescription.    She has stage IIIa chronic kidney disease that has been mild and stable.  She also has a history of prediabetes with last A1c at 5.8 which she was not aware of.  She is agreeable to rechecking A1c today.  Hypertension has been well-controlled on lisinopril and amlodipine.  Hyperlipidemia is controlled on rosuvastatin.  She has a history of significant cardiovascular disease including multiple MIs, history of CABG with recurrent atherosclerosis of bypass graft, stenting, chronic ischemic heart disease with preserved ejection fraction and no evidence of diastolic dysfunction reported on last echo, subclavian artery stenosis, and history of CVA with mild chronic ischemic disease on imaging in 2021.  There is no mention of CHF in her cardiology notes recently.  She is not on diuretic therapy.  She has some chronic dyspnea on exertion that is stable.  She takes an 81 mg aspirin and Plavix daily.  She has a history of PE that was treated with Xarelto.  This was discontinued by cardiology in March 2024 as she did not have any other history of DVT or PE and had chronic anemia with intermittent gross hematuria.  She does have central sleep apnea and declines CPAP.    In 2023 she had an elevated TSH with normal T4.  She is agreeable to rechecking that today.         {MA/LPN/RN Pre-Provider Visit Orders- hCG/UA/Strep (Optional):051898}  {SUPERLIST (Optional):809037}  {additonal problems for provider to add (Optional):718341}  Advance Care Planning  Patient has a Health Care Directive on file  Advance care planning document is on file and is current.       No data to display                   No data to display                    No data to display                  2023   Social Factors   Worry food won't last until get money to buy more No   Food not last or not have enough money for food? No   Do you have housing? (Housing is defined as stable permanent housing and does not include staying ouside in a car, in a tent, in an abandoned building, in an overnight shelter, or couch-surfing.) Yes   Are you worried about losing your housing? No   Lack of transportation? No   Unable to get utilities (heat,electricity)? No          No data to display                   No data to display                   No data to display                     No data to display                  Today's PHQ-9 Score:       3/17/2025    11:53 AM   PHQ-9 SCORE   PHQ-9 Total Score MyChart 6 (Mild depression)   PHQ-9 Total Score 6        Patient-reported          No data to display              Social History     Tobacco Use    Smoking status: Former     Current packs/day: 0.00     Average packs/day: 1 pack/day for 35.0 years (35.0 ttl pk-yrs)     Types: Cigarettes     Start date: 2/15/1982     Quit date: 2/15/2017     Years since quittin.0     Passive exposure: Past    Smokeless tobacco: Never   Vaping Use    Vaping status: Never Used   Substance Use Topics    Alcohol use: Not Currently     Alcohol/week: 0.0 standard drinks of alcohol    Drug use: No     {Provider  If there are gaps in the social history shown above, please follow the link to update and then refresh the note Link to Social and Substance History :072391}     Mammogram Screening - Mammogram every 1-2 years updated in Health Maintenance based on mutual decision making    ASCVD Risk   The 10-year ASCVD risk score (Natasha SHEPHERD, et al., 2019) is: 12.2%    Values used to calculate the score:      Age: 70 years      Sex: Female      Is Non- : No      Diabetic: No      Tobacco smoker: No      Systolic Blood Pressure: 122 mmHg      Is BP treated: Yes      HDL  Cholesterol: 60 mg/dL      Total Cholesterol: 173 mg/dL    {Link to Fracture Risk Assessment Tool (Optional):556588}    {Provider  REQUIRED FOR AWV Use the storyboard to review patient history, after sections have been marked as reviewed, refresh note to capture documentation:631818}  {Provider   REQUIRED AWV use this link to review and update sexual activity history  after section has been marked as reviewed, refresh note to capture documentation:145380}  Reviewed and updated as needed this visit by Provider     Meds  Problems  Med Hx  Surg Hx    Sexual Activity          Past Medical History:   Diagnosis Date    Acute ischemic heart disease (H)     06/15/2007,with PTCA and stenting of 90% osteo circumflex lesion and patent LAD graft, patent left main stent.    Acute myocardial infarction (H)     3/30/2013    Anxiety disorder     No Comments Provided    Atherosclerotic heart disease of native coronary artery without angina pectoris     -angio revealed 3 vessel dz  -4 stents placed; 2 overlapping stents placed in mLAD, 1 stent pRCA, 1 stent pCirc 1 s 9/02 -non-ST elevation MI 1/03  -angio revealed restenosis of Circ.    -PTCA and brachytherapy of pCirc -repeat angio 2/03 -no intervension -CABG x3 12/03 - Dr Sinclair  -LIMA-LAD, RAFI-RCA, SVG-OM -PCI 7/04 stent to L -CTangio 9/05   -patentent LIMA-LAD. RAFI-RCA occluded; RCA ostio/p*    Bilateral carpal tunnel syndrome     No Comments Provided    Cervicalgia     No Comments Provided    Chest pain     12/29/2014    Chronic gastric ulcer without hemorrhage or perforation     10/03/2011,hx of GI bleed (2003)    Chronic ischemic heart disease     06/27/2012    Chronic obstructive pulmonary disease (H)     06/15/2007,low DLCO, normal spirometry    Chronic or unspecified gastric ulcer with hemorrhage     10/2003    Chronic pain syndrome     12/01/2010,chest wall, back    Colostomy status (H)     Constipation     11/29/2011    Coronary angioplasty status      09/12/2002,with triple stenting    Coronary angioplasty status     01/10/2003,repeat angioplasty    Coronary angioplasty status     No Comments Provided    Dorsalgia     05/31/2011    Encounter for other administrative examinations     1/28/2014    Encounter for screening for cardiovascular disorders     10/2004,Cardiolite (2004, 2005, 2006 and 2011)    Enterocolitis due to Clostridium difficile     8/19/2016    Essential (primary) hypertension     No Comments Provided    Hyperlipidemia     No Comments Provided    Major depressive disorder, single episode     Severe, hx of suicide attempt/hospitalization    Migraine without status migrainosus, not intractable     No Comments Provided    Nodular corneal degeneration     09/26/2011    Noninfective gastroenteritis and colitis     06/15/2007,history of    Noninfective gastroenteritis and colitis     Microcytic    Osteoarthritis     No Comments Provided    Other chest pain     10/13/2009,chronic    Pain in knee     05/31/2011    Pain in right shoulder     No Comments Provided    Panic disorder without agoraphobia     No Comments Provided    Peptic ulcer without hemorrhage or perforation     6/15/2007    Peripheral vascular disease     No Comments Provided    Personal history of diseases of the blood and blood-forming organs and certain disorders involving the immune mechanism (CODE)     No Comments Provided    Personal history of nicotine dependence     No Comments Provided    Personal history of other medical treatment (CODE)     10/14/2013    Presence of aortocoronary bypass graft     2/12/2003    Primary central sleep apnea     10/14/2013    Sepsis due to Escherichia coli (E. coli) (H)     7/14/16,[facility]    Stricture of artery     3/31/2013,S/p prox left SCA stent 4/1/2013    Symptomatic bradycardia 07/16/2021    Thoracic, thoracolumbar and lumbosacral intervertebral disc disorder     No Comments Provided    Uncomplicated opioid abuse (H)     history of    Vitamin  D deficiency     5/6/2013     Past Surgical History:   Procedure Laterality Date    ANGIOPLASTY      9/12/02,with triple stenting    APPENDECTOMY OPEN      No Comments Provided    ARTHROSCOPY KNEE      left    ARTHROSCOPY SHOULDER Right 05/12/2017    labral tear, rotator cuff tear and some subacromial decompression     BYPASS GRAFT ARTERY CORONARY      12/13/02,Triple bypass, left internal mammary  to LAD, right internal mammary to right coronary artery, saphenous to obtuse marginal of the left circumflex.    BYPASS GRAFT ARTERY CORONARY N/A 7/1/2024    Procedure: Bypass graft artery coronary;  Surgeon: Greg Webb MD;  Location: [facility] CARDIAC CATH LAB    COLONOSCOPY      2011,Dr Bowman benign polyps    COLONOSCOPY  10/03/2011    2011,benign polyps, Dr. Bowman    COLONOSCOPY  08/08/2016 8/8/16,normal, Dr Bowman    CV ANGIOGRAM CORONARY GRAFT N/A 7/1/2024    Procedure: Coronary Angiogram Graft;  Surgeon: Greg Webb MD;  [facility]    CV CORONARY ANGIOGRAM N/A 7/1/2024    Procedure: Coronary Angiogram;  Surgeon: Greg Webb MD;  [facility]    CV PCI N/A 9/24/2024    Procedure: Percutaneous Coronary Intervention;  Surgeon: Greg Webb MD;  Location:  [facility] CARD[facility] CATH LAB    CV RIGHT HEART CATH MEASUREMENTS RECORDED N/A 7/1/2024    Procedure: Right Heart Catheterization;  Surgeon: Greg Webb MD;  Location:  [facility] CARDIAC CATH LAB    ELBOW SURGERY      baby,birth malachi removed from right arm    EMBOLECTOMY UPPER EXTREMITY  04/02/2013    brachial artery pseudoaneurysm after stenting    ESOPHAGOSCOPY, GASTROSCOPY, DUODENOSCOPY (EGD), COMBINED      2011,EGD Dr Bowman with pyloric ulcer    ESOPHAGOSCOPY, GASTROSCOPY, DUODENOSCOPY (EGD), COMBINED      2011,pyloric ulcer, Dr. Bowman    ESOPHAGOSCOPY, GASTROSCOPY, DUODENOSCOPY (EGD), COMBINED      8/8/16,mild gastritis,   Gracie    ESOPHAGOSCOPY, GASTROSCOPY, DUODENOSCOPY (EGD), COMBINED      11/27/2017,Dr Bowman. Antral ulcer    ESOPHAGOSCOPY, GASTROSCOPY, DUODENOSCOPY (EGD), COMBINED  02/02/2018    Dr Bowman, healed ulcer    HYSTERECTOMY TOTAL ABDOMINAL      age 21    LAPAROSCOPIC CHOLECYSTECTOMY      2006    OSTEOTOMY FEMUR DISTAL      x3, right knee    OSTEOTOMY FEMUR DISTAL      2000,left knee  ligament surgery    OTHER SURGICAL HISTORY      1/10/2003,,PTCA    OTHER SURGICAL HISTORY      09/20/2012,,PTCA,DELONTE in LAD and left main    OTHER SURGICAL HISTORY      4/1/2013,,PTCA,L subclavian stenosis    RELEASE TRIGGER FINGER Left 12/2/2022    Procedure: Left thumb Trigger  release;  Surgeon: Cristhian Wilkinson MD;  Location: [facility] OR    SALPINGO-OOPHORECTOMY BILATERAL      age 27,Bilateral salpingo-oophorectomy    TONSILLECTOMY, ADENOIDECTOMY, COMBINED      childhood     Patient Active Problem List   Diagnosis    Status post coronary angiogram on 9/25/2017 at the [facility]-stable disease    Central sleep apnea    Chronic anxiety    Collagenous colitis    Atherosclerosis of coronary artery bypass graft of native heart with stable angina pectoris    Major depressive disorder, recurrent episode, severe (H)    Primary hypertension    Lumbar facet arthropathy    Gastric ulcer with hemorrhage    History of Clostridium difficile    History of tobacco abuse-quitting 8/23/2017    Iron deficiency anemia    Subclavian artery stenosis, left    Mixed hyperlipidemia    Myofascial pain    Family history of malignant neoplasm of gastrointestinal tract    Nodular degeneration of cornea    Osteoarthritis    Controlled substance agreement updated 9-5-23    Panic attack    Presence of stent in coronary artery in patient with coronary artery disease    History of coronary artery bypass graft x 3 on 2/12/2003    Slow transit constipation    Vitamin D deficiency    Noncompliance with CPAP treatment    H/O multiple concussions    Stage 3a chronic  kidney disease (H)    History of pulmonary embolism on 1/2/2020. (-) VQ scan on 11/23/2021    Chronic ischemic heart disease    CORTEZ (dyspnea on exertion)    Palpitations    Vitamin B12 deficiency (non anemic)    History of CVA-mild chronic ischemic disease on 8/20/2021.    ASCVD (arteriosclerotic cardiovascular disease)    Nausea    Gastroesophageal reflux disease with esophagitis without hemorrhage    SVT (supraventricular tachycardia)    F11.2 - Continuous opioid dependence (H)    Chronic pain disorder    Seborrheic keratoses    Intrinsic eczema    Generalized muscle weakness    Chronic bilateral low back pain, unspecified whether sciatica present    Prediabetes    Subclinical hypothyroidism    Osteopenia of multiple sites    Chronic migraine without aura without status migrainosus, not intractable    Mood disorder    Muscle spasm    Spinal stenosis of lumbar region with neurogenic claudication     Past Surgical History:   Procedure Laterality Date    ANGIOPLASTY      9/12/02,with triple stenting    APPENDECTOMY OPEN      No Comments Provided    ARTHROSCOPY KNEE      left    ARTHROSCOPY SHOULDER Right 05/12/2017    labral tear, rotator cuff tear and some subacromial decompression     BYPASS GRAFT ARTERY CORONARY      12/13/02,Triple bypass, left internal mammary  to LAD, right internal mammary to right coronary artery, saphenous to obtuse marginal of the left circumflex.    BYPASS GRAFT ARTERY CORONARY N/A 7/1/2024    Procedure: Bypass graft artery coronary;  Surgeon: Greg Webb MD;  Location: [facility] CARDIAC CATH LAB    COLONOSCOPY      2011,Dr Bowman benign polyps    COLONOSCOPY  10/03/2011    2011,benign polyps, Dr. Bowman    COLONOSCOPY  08/08/2016 8/8/16,normal, Dr Bowman    CV ANGIOGRAM CORONARY GRAFT N/A 7/1/2024    Procedure: Coronary Angiogram Graft;  Surgeon: Greg Webb MD;  Location: [facility] CARDIAC CATH LAB    CV CORONARY ANGIOGRAM N/A 7/1/2024    Procedure:  Coronary Angiogram;  Surgeon: Greg Webb MD;  Location:  HEART CARDIAC CATH LAB    CV PCI N/A 9/24/2024    Procedure: Percutaneous Coronary Intervention;  Surgeon: Greg Webb MD;  Location:  HEART CARDIAC CATH LAB    CV RIGHT HEART CATH MEASUREMENTS RECORDED N/A 7/1/2024    Procedure: Right Heart Catheterization;  Surgeon: Greg Webb MD;  Location:  HEART CARDIAC CATH LAB    ELBOW SURGERY      baby,birth malachi removed from right arm    EMBOLECTOMY UPPER EXTREMITY  04/02/2013    brachial artery pseudoaneurysm after stenting    ESOPHAGOSCOPY, GASTROSCOPY, DUODENOSCOPY (EGD), COMBINED      2011,EGD Dr Bowman with pyloric ulcer    ESOPHAGOSCOPY, GASTROSCOPY, DUODENOSCOPY (EGD), COMBINED      2011,pyloric ulcer, Dr. Bowman    ESOPHAGOSCOPY, GASTROSCOPY, DUODENOSCOPY (EGD), COMBINED      8/8/16,mild gastritis, Dr Bowman    ESOPHAGOSCOPY, GASTROSCOPY, DUODENOSCOPY (EGD), COMBINED      11/27/2017,Dr Bowman. Antral ulcer    ESOPHAGOSCOPY, GASTROSCOPY, DUODENOSCOPY (EGD), COMBINED  02/02/2018    Dr Bowman, healed ulcer    HYSTERECTOMY TOTAL ABDOMINAL      age 21    LAPAROSCOPIC CHOLECYSTECTOMY      2006    OSTEOTOMY FEMUR DISTAL      x3, right knee    OSTEOTOMY FEMUR DISTAL      2000,left knee  ligament surgery    OTHER SURGICAL HISTORY      1/10/2003,,PTCA    OTHER SURGICAL HISTORY      09/20/2012,,PTCA,DELONTE in LAD and left main    OTHER SURGICAL HISTORY      4/1/2013,,PTCA,L subclavian stenosis    RELEASE TRIGGER FINGER Left 12/2/2022    Procedure: Left thumb Trigger  release;  Surgeon: Cristhian Wilkinson MD;  Location: [facility] OR    SALPINGO-OOPHORECTOMY BILATERAL      age 27,Bilateral salpingo-oophorectomy    TONSILLECTOMY, ADENOIDECTOMY, COMBINED      childhood       Social History     Tobacco Use    Smoking status: Former     Current packs/day: 0.00     Average packs/day: 1 pack/day for 35.0 years (35.0 ttl pk-yrs)     Types: Cigarettes     Start date:  2/15/1982     Quit date: 2/15/2017     Years since quittin.0     Passive exposure: Past    Smokeless tobacco: Never   Substance Use Topics    Alcohol use: Not Currently     Alcohol/week: 0.0 standard drinks of alcohol     Family History   Problem Relation Age of Onset    Heart Disease Father         Heart Disease,Heart condition/Significant for atherosclerotic cardiovascular disease, but non premature.    Colon Cancer Father         Cancer-colon, of colon cancer    Cancer Father         Cancer,mets to liver, secondary to colon cancer    Heart Disease Mother         Heart Disease    Cancer Other         Cancer,Multiple Myeloma    Heart Disease Other         Heart Disease,Ischemic Heart Disease    Colon Cancer Other         Cancer-colon,Malignant neoplasms    Cancer Sister         Cancer,multiple myeloma    Other - See Comments Son         gallstones         Current Outpatient Medications   Medication Sig Dispense Refill    amitriptyline (ELAVIL) 100 MG tablet Take 1 tablet (100 mg) by mouth at bedtime. 90 tablet 3    amLODIPine (NORVASC) 10 MG tablet Take 1 tablet (10 mg) by mouth daily. Dx. Code: I10. 90 tablet 3    aspirin (ASA) 81 MG chewable tablet Take 1 tablet (81 mg) by mouth daily. Starting tomorrow. 30 tablet 3    busPIRone HCl (BUSPAR) 30 MG tablet Take 1 tablet by mouth twice daily 180 tablet 1    clonazePAM (KLONOPIN) 1 MG tablet Take 1 tablet (1 mg) by mouth 2 times daily as needed for anxiety, muscle spasms or sleep. 60 tablet 0    clopidogrel (PLAVIX) 75 MG tablet Take 1 tablet (75 mg) by mouth daily. 90 tablet 3    cyclobenzaprine (FLEXERIL) 10 MG tablet Take 1 tablet (10 mg) by mouth 3 times daily as needed for muscle spasms. 30 tablet 2    diclofenac (VOLTAREN) 1 % topical gel Apply 2 grams to each hand or 4 grams to each knee up to 4 times daily as needed for arthritis pain 350 g 4    DULoxetine (CYMBALTA) 30 MG capsule Take 1 capsule (30 mg) by mouth daily. 90 capsule 1    [START ON  3/28/2025] HYDROcodone-acetaminophen (NORCO)  MG per tablet Take 1 tablet by mouth every 4 hours as needed for severe pain. 180 tablet 0    HYDROcodone-acetaminophen (NORCO)  MG per tablet Take 1 tablet by mouth every 4 hours as needed for severe pain. 180 tablet 0    HYDROcodone-acetaminophen (NORCO)  MG per tablet Take 1 tablet by mouth every 4 hours as needed for severe pain. 180 tablet 0    lisinopril (ZESTRIL) 10 MG tablet Take 1 tablet (10 mg) by mouth daily. 90 tablet 3    naloxone (NARCAN) 4 MG/0.1ML nasal spray Spray into one nostril for opioid reversal if unresponsive. May repeat every 2-3 minutes until patient responsive or EMS arrives 1 each 1    nitroGLYcerin (NITROLINGUAL) 0.4 MG/SPRAY spray For chest pain spray 1 spray under tongue every 5 minutes for 3 doses. If symptoms persist 5 minutes after 1st dose call 911. 9.8 g 3    ondansetron (ZOFRAN) 4 MG tablet Take 1 tablet (4 mg) by mouth every 6 hours as needed for nausea. 90 tablet 3    pantoprazole (PROTONIX) 40 MG EC tablet Take 1 tablet (40 mg) by mouth daily. 90 tablet 3    RESTASIS 0.05 % ophthalmic emulsion INSTILL 1 DROP INTO BOTH EYES TWICE A DAY      rimegepant (NURTEC) 75 MG ODT tablet PLACE 1 TAB UNDER TONGUE DAILY AS NEEDED FOR MIGRAINE. MAX OF 1 TAB EVERY 48 HRS & 2 PER WEEK 8 tablet 0    rosuvastatin (CRESTOR) 40 MG tablet Take 1 tablet (40 mg) by mouth daily. 90 tablet 3    sucralfate (CARAFATE) 1 GM tablet Take 1 tablet (1 g) by mouth 4 times daily as needed for nausea (epigastric pain). 120 tablet 5    triamcinolone (KENALOG) 0.1 % external lotion Apply topically 2 times daily. 60 mL 3    VITAMIN D, CHOLECALCIFEROL, PO Take 5,000 Units by mouth daily      vortioxetine (TRINTELLIX) 20 MG tablet Take 1 tablet (20 mg) by mouth daily 90 tablet 2     Allergies   Allergen Reactions    Atorvastatin Muscle Pain (Myalgia)    Tiotropium Bromide [Tiotropium] Rash    Ezetimibe Muscle Pain (Myalgia)    Latex Rash    Niacin       Other reaction(s): Flushing    No Clinical Screening - See Comments Itching, Rash and Blisters     Metals and plastics      Tape [Adhesive Tape] Rash     Current providers sharing in care for this patient include:  Patient Care Team:  No Ref-Primary, Physician as PCP - General  Cristhian Wilkinson MD as MD (Orthopaedic Surgery)  Cristhian Wilkinson MD as MD (Orthopaedic Surgery)  Cristhian Wilkinson MD as MD (Orthopaedic Surgery)  Paul Gramajo DO as Assigned Heart and Vascular Provider  Sheri Marrero PA-C as Assigned Pain Medication Provider  Sheri Marrero PA-C as Assigned PCP  Renny Wylie PA-C as Assigned Musculoskeletal Provider    The following health maintenance items are reviewed in Epic and correct as of today:  Health Maintenance   Topic Date Due    HF ACTION PLAN  Never done    COPD ACTION PLAN  Never done    HEPATITIS A IMMUNIZATION (1 of 2 - Risk 2-dose series) Never done    ZOSTER IMMUNIZATION (1 of 2) Never done    RSV VACCINE (1 - Risk 60-74 years 1-dose series) Never done    COLORECTAL CANCER SCREENING  08/08/2021    MAMMO SCREENING  09/28/2022    LUNG CANCER SCREENING  12/01/2024    COVID-19 Vaccine (8 - 2024-25 season) 05/08/2025    BMP  09/18/2025    CBC  09/24/2025    HEMOGLOBIN  09/24/2025    URINE DRUG SCREEN  12/16/2025    CONTROLLED SUBSTANCE AGREEMENT FOR CHRONIC PAIN MANAGEMENT  01/06/2026    MEDICARE ANNUAL WELLNESS VISIT  03/18/2026    ALT  03/18/2026    LIPID  03/18/2026    MICROALBUMIN  03/18/2026    YAYA ASSESSMENT  03/18/2026    FALL RISK ASSESSMENT  03/18/2026    PHQ-9  03/18/2026    DTAP/TDAP/TD IMMUNIZATION (3 - Td or Tdap) 08/28/2026    GLUCOSE  03/18/2028    ADVANCE CARE PLANNING  03/18/2030    DEXA  01/11/2037    TSH W/FREE T4 REFLEX  Completed    SPIROMETRY  Completed    HEPATITIS C SCREENING  Completed    DEPRESSION ACTION PLAN  Completed    INFLUENZA VACCINE  Completed    Pneumococcal Vaccine: 50+ Years  Completed    URINALYSIS  Completed    HPV IMMUNIZATION  Aged Out  "   MENINGITIS IMMUNIZATION  Aged Out       {ROS Picklists (Optional):389960}  Review of Systems  Pertinent positives and negatives as per HPI, otherwise negative.       Objective    Exam  /76   Pulse 104   Temp (!) 96.2  F (35.7  C) (Temporal)   Resp 18   Ht 1.645 m (5' 4.75\")   Wt 85.1 kg (187 lb 9.6 oz)   LMP 04/29/1978 (Approximate)   SpO2 95%   Breastfeeding No   BMI 31.46 kg/m     Estimated body mass index is 31.46 kg/m  as calculated from the following:    Height as of this encounter: 1.645 m (5' 4.75\").    Weight as of this encounter: 85.1 kg (187 lb 9.6 oz).    Physical Exam  {Exam Choices (Optional):894997}  General: alert and oriented x 3, no acute distress, pleasant, conversant  Head: normocephalic, atraumatic  Eyes: pupils equal, round, and reactive to light, conjunctiva pink, sclera white  Oropharynx: pink without tonsillar enlargement nor exudate  Neck: supple without lymphadenopathy, thyroid not enlarged, no JVD or carotid bruits  Lungs: clear to auscultation, no wheezes, rhonchi or rales, no increased work of breathing  Heart: regular rate and rhythm, no murmurs auscultated  Abdomen: soft, nontender, nondistended, normoactive bowel sounds, no palpable masses or organomegaly  Skin: no abnormal lesions or rash  Musculoskeletal/Extremities: good ROM throughout, trace ankle edema  Psych: mood euthymic, does not appear anxious but endorses anxiety intermittently, denies SI    Colonoscopy overdue due to family history in first degree relative  Pap Smear not indicated for screening purposes  Mammogram overdue and ordered  Dexa scan recommended every 2 years, due and ordered  Labs pending  Influenza recommended annually  PCV done  Pneumovax done  Shingrix recommended at the pharmacy where it would be covered by insurance  Tdap/Td due 2026  COVID up-to-date  RSV recommended at the pharmacy           3/18/2025   Mini Cog   Clock Draw Score 2 Normal   3 Item Recall 3 objects recalled   Mini Cog " Total Score 5     {A Mini-Cog total score of 0-2 suggests the possibility of dementia, score of 3-5 suggests no dementia:295001}         I spent a total of 116 minutes on the patient's care today including preparing for the visit, the visit, documentation, and follow-up care.  Well over 60 minutes were spent addressing new concerns and chronic medical problems.  This does not include any procedure time.    The longitudinal plan of care for the diagnosis(es)/condition(s) as documented were addressed during this visit. Due to the added complexity in care, I will continue to support Malina in the subsequent management and with ongoing continuity of care.    Signed Electronically by: Kenia Druan MD  {Email feedback regarding this note to [email or web address]   :[id number]}    Lung Cancer Screening Shared Decision Making Visit     Ankita Day, a 70 year old female, is eligible for lung cancer screening    History   Smoking Status    Former    Types: Cigarettes   Smokeless Tobacco    Never   {TIP  Follow this link to update the tobacco history if needed :824273}    I have discussed with patient the risks and benefits of screening for lung cancer with low-dose CT.     The risks include:    radiation exposure: one low dose chest CT has as much ionizing radiation as about 15 chest x-rays, or 6 months of background radiation living in [place]      false positives: most findings/nodules are NOT cancer, but some might still require additional diagnostic evaluation, including biopsy    over-diagnosis: some slow growing cancers that might never have been clinically significant will be detected and treated unnecessarily     The benefit of early detection of lung cancer is contingent upon adherence to annual screening or more frequent follow up if indicated.     Furthermore, to benefit from screening, Ankita must be willing and able to undergo diagnostic procedures, if indicated. Although no specific  guide is available for determining severity of comorbidities, it is reasonable to withhold screening in patients who have greater mortality risk from other diseases.     We did discuss that the best way to prevent lung cancer is to not smoke.    Some patients may value a numeric estimation of lung cancer risk when evaluating if lung cancer screening is right for them, here is one calculator:    ShouldIScreen

## 2025-03-18 NOTE — TELEPHONE ENCOUNTER
Her cardiology notes do not suggest that she has heart failure and her last echocardiogram shows no evidence of heart failure.  She has other heart disease but not apparently heart failure.  They can fill this medication as prescribed.  MARK ANTHONY

## 2025-03-18 NOTE — NURSING NOTE
"Chief Complaint   Patient presents with    Medicare Visit     annual       Initial /76   Pulse 104   Temp (!) 96.2  F (35.7  C) (Temporal)   Resp 18   Ht 1.645 m (5' 4.75\")   Wt 85.1 kg (187 lb 9.6 oz)   LMP 04/29/1978 (Approximate)   SpO2 95%   Breastfeeding No   BMI 31.46 kg/m   Estimated body mass index is 31.46 kg/m  as calculated from the following:    Height as of this encounter: 1.645 m (5' 4.75\").    Weight as of this encounter: 85.1 kg (187 lb 9.6 oz).  Medication Review: complete    The next two questions are to help us understand your food security.  If you are feeling you need any assistance in this area, we have resources available to support you today.          12/16/2024   SDOH- Food Insecurity   Within the past 12 months, did you worry that your food would run out before you got money to buy more? N   Within the past 12 months, did the food you bought just not last and you didn t have money to get more? N         Health Care Directive:  Patient has a Health Care Directive on file      Heather Del Rosario LPN      "

## 2025-03-18 NOTE — PATIENT INSTRUCTIONS
Lung Cancer Screening   Frequently Asked Questions  If you are at high-risk for lung cancer, getting screened with low-dose computed tomography (LDCT) every year can help save your life. This handout offers answers to some of the most common questions about lung cancer screening. If you have other questions, please call [phone] ([phone]).     What is it?  Lung cancer screening uses special X-ray technology to create an image of your lung tissue. The exam is quick and easy and takes less than 10 seconds. We don t give you any medicine or use any needles. You can eat before and after the exam. You don t need to change your clothes as long as the clothing on your chest doesn t contain metal. But, you do need to be able to hold your breath for at least 6 seconds during the exam.    What is the goal of lung cancer screening?  The goal of lung cancer screening is to save lives. Many times, lung cancer is not found until a person starts having physical symptoms. Lung cancer screening can help detect lung cancer in the earliest stages when it may be easier to treat.    Who should be screened for lung cancer?  We suggest lung cancer screening for anyone who is at high-risk for lung cancer. You are in the high-risk group if you:      are between the ages of 55 and 79, and    have smoked at least 1 pack of cigarettes a day for 20 or more years, and    still smoke or have quit within the past 15 years.    However, if you have a new cough or shortness of breath, you should talk to your doctor before being screened.    Why does it matter if I have symptoms?  Certain symptoms can be a sign that you have a condition in your lungs that should be checked and treated by your doctor. These symptoms include fever, chest pain, a new or changing cough, shortness of breath that you have never felt before, coughing up blood or unexplained weight loss. Having any of these symptoms can greatly affect the results of lung  cancer screening.       Should all smokers get an LDCT lung cancer screening exam?  It depends. Lung cancer screening is for a very specific group of men and women who have a history of heavy smoking over a long period of time (see  Who should be screened for lung cancer  above).  I am in the high-risk group, but have been diagnosed with cancer in the past. Is LDCT lung cancer screening right for me?  In some cases, you should not have LDCT lung screening, such as when your doctor is already following your cancer with CT scan studies. Your doctor will help you decide if LDCT lung screening is right for you.  Do I need to have a screening exam every year?  Yes. If you are in the high-risk group described earlier, you should get an LDCT lung cancer screening exam every year until you are 79, or are no longer willing or able to undergo screening and possible procedures to diagnose and treat lung cancer.  How effective is LDCT at preventing death from lung cancer?  Studies have shown that LDCT lung cancer screening can lower the risk of death from lung cancer by 20 percent in people who are at high-risk.  What are the risks?  There are some risks and limitations of LDCT lung cancer screening. We want to make sure you understand the risks and benefits, so please let us know if you have any questions. Your doctor may want to talk with you more about these risks.    Radiation exposure: As with any exam that uses radiation, there is a very small increased risk of cancer. The amount of radiation in LDCT is small--about the same amount a person would get from a mammogram. Your doctor orders the exam when he or she feels the potential benefits outweigh the risks.    False negatives: No test is perfect, including LDCT. It is possible that you may have a medical condition, including lung cancer, that is not found during your exam. This is called a false negative result.    False positives and more testing: LDCT very often finds  something in the lung that could be cancer, but in fact is not. This is called a false positive result. False positive tests often cause anxiety. To make sure these findings are not cancer, you may need to have more tests. These tests will be done only if you give us permission. Sometimes patients need a treatment that can have side effects, such as a biopsy. For more information on false positives, see  What can I expect from the results?     Findings not related to lung cancer: Your LDCT exam also takes pictures of areas of your body next to your lungs. In a very small number of cases, the CT scan will show an abnormal finding in one of these areas, such as your kidneys, adrenal glands, liver or thyroid. This finding may not be serious, but you may need more tests. Your doctor can help you decide what other tests you may need, if any.  What can I expect from the results?  About 1 out of 4 LDCT exams will find something that may need more tests. Most of the time, these findings are lung nodules. Lung nodules are very small collections of tissue in the lung. These nodules are very common, and the vast majority--more than 97 percent--are not cancer (benign). Most are normal lymph nodes or small areas of scarring from past infections.  But, if a small lung nodule is found to be cancer, the cancer can be cured more than 90 percent of the time. To know if the nodule is cancer, we may need to get more images before your next yearly screening exam. If the nodule has suspicious features (for example, it is large, has an odd shape or grows over time), we will refer you to a specialist for further testing.  Will my doctor also get the results?  Yes. Your doctor will get a copy of your results.  Is it okay to keep smoking now that there s a cancer screening exam?  No. Tobacco is one of the strongest cancer-causing agents. It causes not only lung cancer, but other cancers and cardiovascular (heart) diseases as well. The damage  caused by smoking builds over time. This means that the longer you smoke, the higher your risk of disease. While it is never too late to quit, the sooner you quit, the better.  Where can I find help to quit smoking?  The best way to prevent lung cancer is to stop smoking. If you have already quit smoking, congratulations and keep it up! For help on quitting smoking, please call [employer] at 1-583-QUITNOW ([phone]) or the American [employer] at 1-901.382.3217 to find local resources near you.  One-on-one health coaching:  If you d prefer to work individually with a health care provider on tobacco cessation, we offer:      Medication Therapy Management:  Our specially trained pharmacists work closely with you and your doctor to help you quit smoking.  Call [phone] or [phone] (toll free).    Patient Education   Preventive Care Advice   This is general advice given by our system to help you stay healthy. However, your care team may have specific advice just for you. Please talk to your care team about your preventive care needs.  Nutrition  Eat 5 or more servings of fruits and vegetables each day.  Try wheat bread, brown rice and whole grain pasta (instead of white bread, rice, and pasta).  Get enough calcium and vitamin D. Check the label on foods and aim for 100% of the RDA (recommended daily allowance).  Lifestyle  Exercise at least 150 minutes each week  (30 minutes a day, 5 days a week).  Do muscle strengthening activities 2 days a week. These help control your weight and prevent disease.  No smoking.  Wear sunscreen to prevent skin cancer.  Have a dental exam and cleaning every 6 months.  Yearly exams  See your health care team every year to talk about:  Any changes in your health.  Any medicines your care team has prescribed.  Preventive care, family planning, and ways to prevent chronic diseases.  Shots (vaccines)   HPV shots (up to age 26), if you've never had them before.  Hepatitis B  shots (up to age 59), if you've never had them before.  COVID-19 shot: Get this shot when it's due.  Flu shot: Get a flu shot every year.  Tetanus shot: Get a tetanus shot every 10 years.  Pneumococcal, hepatitis A, and RSV shots: Ask your care team if you need these based on your risk.  Shingles shot (for age 50 and up)  General health tests  Diabetes screening:  Starting at age 35, Get screened for diabetes at least every 3 years.  If you are younger than age 35, ask your care team if you should be screened for diabetes.  Cholesterol test: At age 39, start having a cholesterol test every 5 years, or more often if advised.  Bone density scan (DEXA): At age 50, ask your care team if you should have this scan for osteoporosis (brittle bones).  Hepatitis C: Get tested at least once in your life.  STIs (sexually transmitted infections)  Before age 24: Ask your care team if you should be screened for STIs.  After age 24: Get screened for STIs if you're at risk. You are at risk for STIs (including HIV) if:  You are sexually active with more than one person.  You don't use condoms every time.  You or a partner was diagnosed with a sexually transmitted infection.  If you are at risk for HIV, ask about PrEP medicine to prevent HIV.  Get tested for HIV at least once in your life, whether you are at risk for HIV or not.  Cancer screening tests  Cervical cancer screening: If you have a cervix, begin getting regular cervical cancer screening tests starting at age 21.  Breast cancer scan (mammogram): If you've ever had breasts, begin having regular mammograms starting at age 40. This is a scan to check for breast cancer.  Colon cancer screening: It is important to start screening for colon cancer at age 45.  Have a colonoscopy test every 10 years (or more often if you're at risk) Or, ask your provider about stool tests like a FIT test every year or Cologuard test every 3 years.  To learn more about your testing options, visit:    .  For help making a decision, visit:   https://bit.ly/xc39587.  Prostate cancer screening test: If you have a prostate, ask your care team if a prostate cancer screening test (PSA) at age 55 is right for you.  Lung cancer screening: If you are a current or former smoker ages 50 to 80, ask your care team if ongoing lung cancer screenings are right for you.  For informational purposes only. Not to replace the advice of your health care provider. Copyright   2023 [facility]. All rights reserved. Clinically reviewed by the  [employer] [facility]. [employer] 731950 - REV 01/24.  Learning About Depression Screening  What is depression screening?  Depression screening is a way to see if you have depression symptoms. It may be done by a doctor or counselor. It's often part of a routine checkup. That's because your mental health is just as important as your physical health.  Depression is a mental health condition that affects how you feel, think, and act. You may:  Have less energy.  Lose interest in your daily activities.  Feel sad and grouchy for a long time.  Depression is very common. It affects people of all ages.  Many things can lead to depression. Some people become depressed after they have a stroke or find out they have a major illness like cancer or heart disease. The death of a loved one or a breakup may lead to depression. It can run in families. Most experts believe that a combination of inherited genes and stressful life events can cause it.  What happens during screening?  You may be asked to fill out a form about your depression symptoms. You and the doctor will discuss your answers. The doctor may ask you more questions to learn more about how you think, act, and feel.  What happens after screening?  If you have symptoms of depression, your doctor will talk to you about your options.  Doctors usually treat depression with medicines or counseling. Often, combining the two  "works best. Many people don't get help because they think that they'll get over the depression on their own. But people with depression may not get better unless they get treatment.  The cause of depression is not well understood. There may be many factors involved. But if you have depression, it's not your fault.  A serious symptom of depression is thinking about death or suicide. If you or someone you care about talks about this or about feeling hopeless, get help right away.  It's important to know that depression can be treated. Medicine, counseling, and self-care may help.  Where can you learn more?  Go to https://www.[employer].net/patiented  Enter T185 in the search box to learn more about \"Learning About Depression Screening.\"  Current as of: July 31, 2024  Content Version: 14.4    6889-5455 [employer].   Care instructions adapted under license by your healthcare professional. If you have questions about a medical condition or this instruction, always ask your healthcare professional. [employer] disclaims any warranty or liability for your use of this information.       "

## 2025-03-18 NOTE — TELEPHONE ENCOUNTER
Pharmacy calling in regarding patient's prescription for cyclobenzaprine. Patient has a diagnosis of heart failure and this medication is contraindicated for heart failure. Is there a different medication you would like to prescribe?    Sheri Rahman on 3/18/2025 at 12:02 PM

## 2025-03-18 NOTE — TELEPHONE ENCOUNTER
Steven the pharmacist at [facility] was told the below information.  Heather Del Rosario LPN..................3/18/2025   1:40 PM

## 2025-03-18 NOTE — PROGRESS NOTES
Preventive Care Visit  [facility] AND [facility]  Kenia Duran MD, Family Medicine  Mar 18, 2025      Assessment & Plan   (Z00.00) Encounter for Medicare annual wellness exam  (primary encounter diagnosis)  Comment: Routine health maintenance including verbal recommendation to have regular dental and eye exams, attaining/maintaining healthy weight, balanced diet, regular exercise     (Z79.899) Controlled substance agreement updated 12/16/24  (G89.4) Chronic pain disorder  (F11.20) Continuous opioid dependence (H)  (M54.50,  G89.29) Chronic bilateral low back pain, unspecified whether sciatica present  (M48.062) Spinal stenosis of lumbar region with neurogenic claudication  (M47.816) Lumbar facet arthropathy  (M62.838) Muscle spasm  (M15.0) Primary osteoarthritis involving multiple joints  (M79.18) Myofascial pain  (F39) Mood disorder  (F41.9) Chronic anxiety  (F41.0) Panic attack  (F33.2) Severe episode of recurrent major depressive disorder, without psychotic features (H)  (G43.709) Chronic migraine without aura without status migrainosus, not intractable  Comment: Lengthy discussion regarding risk with combined benzodiazepine and chronic opioid therapy, especially with reasonably high doses as she is currently prescribed.  Discussed that I do not prescribe long-term benzodiazepine and opioid medications in combination.  Recommend weaning 1 or the other, though I do suspect that Klonopin is more likely to be weaned successfully considering the duration of chronic opioid therapy, though I do anticipate that this may require a slow wean with addition of other medications that will help with management of symptoms.    -- She will initially wean to no more than an average of 2 daily doses of 1 mg Klonopin at this time, though I will likely reduce the dose of her tablet subsequently to allow a slower wean going forward.  -- Recommend eventually weaning hydrocodone to no more than 40 morphine equivalents daily,  4 of the 10 mg tablets and this was discussed today.  She currently takes 6 tablets daily.  Anticipate deferral of this until Klonopin is completely weaned.  -- Restart Cymbalta which will likely help with improving anxiety and depression overall, but is also likely to help some with chronic pain.  Anticipate increasing this to at least 60 mg daily but will start with 30 mg daily.  May consider reducing Trintellix dose to allow at least moderate to high dose Cymbalta, especially if this is improving pain control and mood symptoms are adequately controlled.  -- Continue amitriptyline which has clearly been beneficial for sleep, mood, headaches, and chronic pain  -- May continue Flexeril for now, though I did encourage her to limit this to only when she feels that she really needs it for significant symptoms and she is agreeable  -- Consider adding carbamazepine or Topamax in the future which also will likely improve chronic pain control and migraines  -- Consider trial of mirtazapine or olanzapine at bedtime for mood symptoms  -- Follow-up in 2 weeks  Plan: HYDROcodone-acetaminophen (NORCO)  MG per        tablet, clonazePAM (KLONOPIN) 1 MG tablet, amitriptyline (ELAVIL) 100 MG tablet,         DULoxetine (CYMBALTA) 30 MG capsule, cyclobenzaprine (FLEXERIL) 10 MG tablet     (K25.4) Chronic gastric ulcer with hemorrhage  (K21.00) Gastroesophageal reflux disease with esophagitis without hemorrhage  Comment: Continue Protonix daily and Carafate as needed averaging 2 doses per day  Plan: sucralfate (CARAFATE) 1 GM tablet     (N18.31) Stage 3a chronic kidney disease (H)  Comment: Stable, encouraged good hydration and avoidance of nephrotoxic medication  Plan: Albumin Random Urine Quantitative with Creat         Ratio, Comprehensive Metabolic Panel     (R73.03) Prediabetes  Comment: A1c next visit as this order was inadvertently missed.  Plan: Comprehensive Metabolic Panel     (I10) Primary hypertension  Comment:  Well-controlled on amlodipine and lisinopril     (E78.2) Mixed hyperlipidemia  Comment: Well-controlled on rosuvastatin  Plan: Comprehensive Metabolic Panel, Lipid Panel     (I25.708) Atherosclerosis of coronary artery bypass graft of native heart with stable angina pectoris  (Z95.1) History of coronary artery bypass graft x 3 on 2/12/2003  (I25.10,  Z95.5) Presence of stent in coronary artery in patient with coronary artery disease  (I25.10) ASCVD (arteriosclerotic cardiovascular disease)  (I25.9) Chronic ischemic heart disease  (I77.1) Subclavian artery stenosis, left  (R00.2) Palpitations  (R06.09) CORTEZ (dyspnea on exertion)  (Z86.73) History of CVA-mild chronic ischemic disease on 8/20/2021.  Comment: Managed by cardiology and stable     (Z86.711) History of pulmonary embolism on 1/2/2020. (-) VQ scan on 11/23/2021  Comment: No recurrent emboli, off anticoagulation since March 2024     (G47.31) Central sleep apnea  (Z91.199) Noncompliance with CPAP treatment  Comment:      (R79.89) Elevated TSH  (E03.8) Subclinical hypothyroidism  Comment: TSH is normal on labs today  Plan: TSH Reflex GH     (Z87.891) Personal history of tobacco use  Comment: Recommend annual low-dose lung CT which she is agreeable to  Plan: Prof fee: Shared Decision Making for Lung         Cancer Screening, CT Chest Lung Cancer Scrn Low        Dose wo     (Z78.0) Postmenopausal status  (M85.89) Osteopenia of multiple sites  Comment: Recommend calcium supplement or adequate dietary calcium, vitamin D, regular weightbearing exercise.  DEXA every 2 years.  Plan: DX Bone Density     (Z12.31) Encounter for screening mammogram for breast cancer  Comment:   Plan: MA Screen Bilateral w/Reinier     (Z80.0) Family history of malignant neoplasm of gastrointestinal tract  Comment: Overdue for colonoscopy, will discuss at upcoming visit.     BMI  Estimated body mass index is 31.46 kg/m  as calculated from the following:    Height as of this encounter: 1.645 m  "(5' 4.75\").    Weight as of this encounter: 85.1 kg (187 lb 9.6 oz).   Weight management plan: Discussed healthy diet and exercise guidelines        Counseling  Appropriate preventive services were addressed with this patient via screening, questionnaire, or discussion as appropriate for fall prevention, nutrition, physical activity, Tobacco-use cessation, social engagement, weight loss and cognition.  Checklist reviewing preventive services available has been given to the patient.  Reviewed patient's diet, addressing concerns and/or questions.   The patient was instructed to see the dentist every 6 months.   The patient's PHQ-9 score is consistent with mild depression. She was provided with information regarding depression.   I have reviewed Opioid Use Disorder and Substance Use Disorder risk factors and made any needed referrals.           Return in about 2 weeks (around 4/1/2025), or if symptoms worsen or fail to improve, for Establish care, Chronic Disease Management.    Mars Radford is a 70 year old, presenting for the following:  Medicare Visit (annual)        3/18/2025     9:47 AM   Additional Questions   Roomed by Heather Del Rosario LPN           HPI  Back Pain:  She presents for follow up of back pain. Patient's back pain is a chronic problem.  Location of back pain:  Right lower back, left lower back, right middle of back, left middle of back, right shoulder, right buttock, left buttock, right hip and left hip  Description of back pain: stabbing  Back pain spreads: right buttocks, left buttocks, right thigh, left thigh, right knee and left knee     Since patient first noticed back pain, pain is: rapidly worsening  Does back pain interfere with her job:  Yes         Heart Failure:  She presents for follow up of heart failure. She is experiencing shortness of breath with activity only, which is much worse. She is not experiencing any lower extremity edema.   She denies orthopenea and is not coughing at night. " Patient is not checking weight daily. She has recently had a weight increase.  She has no side effects from medications.  She has has a medical visit for heart failure 1 time since the last visit.     She eats 2-3 servings of fruits and vegetables daily.She consumes 0 sweetened beverage(s) daily.She exercises with enough effort to increase her heart rate 9 or less minutes per day.  She exercises with enough effort to increase her heart rate 3 or less days per week.   She is taking medications regularly.    Very pleasant 70-year-old female presents to clinic for annual wellness visit and chronic disease management.  She would like to establish care as well.  She does have another visit scheduled for that in 2 weeks.  Regarding preventative health maintenance, her last mammogram was in  and she is agreeable to updating that.  Her last DEXA scan was in  with osteopenia.  She is agreeable to updating that as well.  Her last colonoscopy was in  and normal.  In review of her medical record, her father  of colon cancer with metastases to the liver and therefore she should be getting colonoscopy every 5 years and is overdue.  This was not discussed at the time of the visit as her care gaps were updated to reflect every 10 years for a colonoscopy.  We will discuss this at her next visit in 2 weeks.  She does have a 35-pack-year smoking history, quit in 2017.  After discussion today, she is agreeable to proceeding with screening chest CT.  She is encouraged to get Shingrix vaccine at the pharmacy where it is covered by insurance.  She is not sure if she wants to do this as her  got shingles after getting the vaccine.  RSV and hepatitis A vaccines can also be completed at the pharmacy.     She has chronic pain that is multifactorial with relatively generalized osteoarthritis.  She mostly complains of chronic bilateral low back pain.  She had significant pain after CABG in .  She reports that she was  initially started on Xanax, methadone, and OxyContin after her CABG.  She was clearly mentally altered and endorsed high feeling from some of these medications.  She reports that her family became concerned.  Ultimately these medications were adjusted.  She was subsequently on Valium and later switched to Klonopin for a benzodiazepine.  Methadone and OxyContin were transitioned to hydrocodone which does not give her any of that high feeling and controls pain better than oxycodone.  Over time her dosages have decreased at least some with regard to the Klonopin.  She was previously on as much as 1 mg 4 times daily, reduced to 3 times daily, and more recently perhaps an average of twice daily.  She feels that sometimes she can go a whole day without using any Klonopin but sometimes she uses all 3 doses consistently for several days in a row depending on degree of her anxiety.  She has a history of panic attacks.  She feels that a lot of her anxiety is triggered by her  side of the family and she has significantly less contact with them now which has overall improved her degree of anxiety.  She is unsure which other medications she has been on for anxiety in the past.  She is on Trintellix which was primarily prescribed for persistent depression and she does feel like that helps more for depression than what she had been on in the past.  When I mention Cymbalta she believes that she has been on that in the past as well and is unsure why it was discontinued or changed.  She does not recall having undesired side effects from that.  She is also on BuSpar 30 mg twice daily though she does not feel like this has ever been helpful for her anxiety symptoms.  That dose has been gradually increased over time without obvious benefit in her opinion.  She would be interested in potentially weaning down and off of this in the future because of lack of obvious benefit.  A few months ago she was started on amitriptyline to help  with insomnia, mood symptoms, headaches, and pain.  She does feel like it is helping with all of those things to some degree.  She had also been taking melatonin 15 mg nightly and has been able to reduce that to 10 mg nightly since starting amitriptyline.  She titrated amitriptyline to 100 mg daily without undesired side effects.  Her last PCP suggested that amitriptyline should replace Flexeril, though she has continued to take it as needed.  She does feel like this helped significantly with muscle spasms, back pain and leg pain.  Sometimes she does not take this for more than a day and other times she will use it up to 3 times in a day consistently.  She also uses Voltaren gel as needed and feels like this helped significantly especially with knee and shoulder pain.  She does feel like her last right shoulder injection has worn off and may be interested in repeating that in the near future.  It did seem to help for about 3 months.  Her last injection was on November 8, 2024 by Dr. Lawrence.  She reports that it has just been in the past several months that she has been getting debilitating claudication with severe leg pain and weakness with minimal activity/walking.  It was felt that this was most likely neurogenic in nature.  She does have significant vascular disease.  She did have ABIs completed recently which are borderline but did not show apparent severe peripheral vascular disease.  An MRI of the lumbar spine showed advanced facet arthritis with moderate spinal stenosis at L4-5 as well as some foraminal stenosis that is not severe.  She did not really get improvement with injections in the past.  She has an appointment scheduled with Dr. Navas next week.  She has not had back surgery previously.  She is quite aware of the risks of combined benzodiazepine and opioid therapy.  Her last PCP strongly encouraged elimination of the benzodiazepine as well.  As discussed above, she has been gradually weaning this  and using it only as needed.  She is open to trial of medication adjustment and gradual weaning of the clonazepam though does seem somewhat hesitant about completely discontinuing it.     Migraines have improved with amitriptyline.  She does use Nurtec as needed.     She has a history of chronic gastric ulcer with history of hemorrhage.  She takes Protonix daily.  She does get intermittent epigastric pain for which she uses Carafate as needed, generally averaging about twice per day.  She prefers tablets rather than the suspension and requests a new prescription.     She has stage IIIa chronic kidney disease that has been mild and stable.  She also has a history of prediabetes with last A1c at 5.8 which she was not aware of.  She is agreeable to rechecking A1c today.  Hypertension has been well-controlled on lisinopril and amlodipine.  Hyperlipidemia is controlled on rosuvastatin.  She has a history of significant cardiovascular disease including multiple MIs, history of CABG with recurrent atherosclerosis of bypass graft, stenting, chronic ischemic heart disease with preserved ejection fraction and no evidence of diastolic dysfunction reported on last echo, subclavian artery stenosis, and history of CVA with mild chronic ischemic disease on imaging in 2021.  There is no mention of CHF in her cardiology notes recently.  She is not on diuretic therapy.  She has some chronic dyspnea on exertion that is stable.  She takes an 81 mg aspirin and Plavix daily.  She has a history of PE that was treated with Xarelto.  This was discontinued by cardiology in March 2024 as she did not have any other history of DVT or PE and had chronic anemia with intermittent gross hematuria.  She does have central sleep apnea and declines CPAP.     In 2023 she had an elevated TSH with normal T4.  She is agreeable to rechecking that today.             Advance Care Planning  Patient has a Health Care Directive on file  Advance care planning  document is on file and is current.      3/18/2025   General Health   How would you rate your overall physical health? (!) POOR   Feel stress (tense, anxious, or unable to sleep) Not at all         3/18/2025   Nutrition   Diet: Low salt    Carbohydrate counting       Multiple values from one day are sorted in reverse-chronological order         3/18/2025   Exercise   Days per week of moderate/strenous exercise 0 days   Average minutes spent exercising at this level 0 min   (!) EXERCISE CONCERN      3/18/2025   Social Factors   Frequency of gathering with friends or relatives Once a week   Worry food won't last until get money to buy more No   Food not last or not have enough money for food? No   Do you have housing? (Housing is defined as stable permanent housing and does not include staying ouside in a car, in a tent, in an abandoned building, in an overnight shelter, or couch-surfing.) Yes   Are you worried about losing your housing? No   Lack of transportation? No   Unable to get utilities (heat,electricity)? No         3/18/2025   Fall Risk   Fallen 2 or more times in the past year? Yes    Trouble with walking or balance? Yes        Proxy-reported           3/18/2025   Activities of Daily Living- Home Safety   Needs help with the following daily activites None of the above   Safety concerns in the home None of the above         3/18/2025   Dental   Dentist two times every year? (!) NO         3/18/2025   Hearing Screening   Hearing concerns? None of the above         3/18/2025   Driving Risk Screening   Patient/family members have concerns about driving No         3/18/2025   General Alertness/Fatigue Screening   Have you been more tired than usual lately? No         3/18/2025   Urinary Incontinence Screening   Bothered by leaking urine in past 6 months No         Today's PHQ-9 Score:       3/17/2025    11:53 AM   PHQ-9 SCORE   PHQ-9 Total Score MyChart 6 (Mild depression)   PHQ-9 Total Score 6         Patient-reported         3/18/2025   Substance Use   Alcohol more than 3/day or more than 7/wk Not Applicable   Do you have a current opioid prescription? (!) YES   How severe/bad is pain from 1 to 10? 8/10   Do you use any other substances recreationally? No         3/18/2025     3:41 PM   OPIOID RISK TOOL TOTAL SCORE   Total Score 2     Low Risk (0-3)  Moderate Risk (4-7)  High Risk (>8)  Social History     Tobacco Use    Smoking status: Former     Current packs/day: 0.00     Average packs/day: 1 pack/day for 35.0 years (35.0 ttl pk-yrs)     Types: Cigarettes     Start date: 2/15/1982     Quit date: 2/15/2017     Years since quittin.0     Passive exposure: Past    Smokeless tobacco: Never   Vaping Use    Vaping status: Never Used   Substance Use Topics    Alcohol use: Not Currently     Alcohol/week: 0.0 standard drinks of alcohol    Drug use: No          Mammogram Screening - Mammogram every 1-2 years updated in Health Maintenance based on mutual decision making    ASCVD Risk   The 10-year ASCVD risk score (Natasha SHEPHERD, et al., 2019) is: 12.2%    Values used to calculate the score:      Age: 70 years      Sex: Female      Is Non- : No      Diabetic: No      Tobacco smoker: No      Systolic Blood Pressure: 122 mmHg      Is BP treated: Yes      HDL Cholesterol: 60 mg/dL      Total Cholesterol: 173 mg/dL            Reviewed and updated as needed this visit by Provider     Meds  Problems  Med Hx  Surg Hx    Sexual Activity          Past Medical History:   Diagnosis Date    Acute ischemic heart disease (H)     06/15/2007,with PTCA and stenting of 90% osteo circumflex lesion and patent LAD graft, patent left main stent.    Acute myocardial infarction (H)     3/30/2013    Anxiety disorder     No Comments Provided    Atherosclerotic heart disease of native coronary artery without angina pectoris     -angio revealed 3 vessel dz  -4 stents placed; 2 overlapping stents placed in mLAD,  1 stent pRCA, 1 stent pCirc 1 s 9/02 -non-ST elevation MI 1/03  -angio revealed restenosis of Circ.    -PTCA and brachytherapy of pCirc -repeat angio 2/03 -no intervension -CABG x3 12/03 - Dr Sinclair  -LIMA-LAD, RAFI-RCA, SVG-OM -PCI 7/04 stent to L -CTangio 9/05   -patentent LIMA-LAD. RAFI-RCA occluded; RCA ostio/p*    Bilateral carpal tunnel syndrome     No Comments Provided    Cervicalgia     No Comments Provided    Chest pain     12/29/2014    Chronic gastric ulcer without hemorrhage or perforation     10/03/2011,hx of GI bleed (2003)    Chronic ischemic heart disease     06/27/2012    Chronic obstructive pulmonary disease (H)     06/15/2007,low DLCO, normal spirometry    Chronic or unspecified gastric ulcer with hemorrhage     10/2003    Chronic pain syndrome     12/01/2010,chest wall, back    Colostomy status (H)     Constipation     11/29/2011    Coronary angioplasty status     09/12/2002,with triple stenting    Coronary angioplasty status     01/10/2003,repeat angioplasty    Coronary angioplasty status     No Comments Provided    Dorsalgia     05/31/2011    Encounter for other administrative examinations     1/28/2014    Encounter for screening for cardiovascular disorders     10/2004,Cardiolite (2004, 2005, 2006 and 2011)    Enterocolitis due to Clostridium difficile     8/19/2016    Essential (primary) hypertension     No Comments Provided    Hyperlipidemia     No Comments Provided    Major depressive disorder, single episode     Severe, hx of suicide attempt/hospitalization    Migraine without status migrainosus, not intractable     No Comments Provided    Nodular corneal degeneration     09/26/2011    Noninfective gastroenteritis and colitis     06/15/2007,history of    Noninfective gastroenteritis and colitis     Microcytic    Osteoarthritis     No Comments Provided    Other chest pain     10/13/2009,chronic    Pain in knee     05/31/2011    Pain in right shoulder     No Comments Provided    Panic  disorder without agoraphobia     No Comments Provided    Peptic ulcer without hemorrhage or perforation     6/15/2007    Peripheral vascular disease     No Comments Provided    Personal history of diseases of the blood and blood-forming organs and certain disorders involving the immune mechanism (CODE)     No Comments Provided    Personal history of nicotine dependence     No Comments Provided    Personal history of other medical treatment (CODE)     10/14/2013    Presence of aortocoronary bypass graft     2/12/2003    Primary central sleep apnea     10/14/2013    Sepsis due to Escherichia coli (E. coli) (H)     7/14/16,[facility]    Stricture of artery     3/31/2013,S/p prox left SCA stent 4/1/2013    Symptomatic bradycardia 07/16/2021    Thoracic, thoracolumbar and lumbosacral intervertebral disc disorder     No Comments Provided    Uncomplicated opioid abuse (H)     history of    Vitamin D deficiency     5/6/2013     Past Surgical History:   Procedure Laterality Date    ANGIOPLASTY      9/12/02,with triple stenting    APPENDECTOMY OPEN      No Comments Provided    ARTHROSCOPY KNEE      left    ARTHROSCOPY SHOULDER Right 05/12/2017    labral tear, rotator cuff tear and some subacromial decompression     BYPASS GRAFT ARTERY CORONARY      12/13/02,Triple bypass, left internal mammary  to LAD, right internal mammary to right coronary artery, saphenous to obtuse marginal of the left circumflex.    BYPASS GRAFT ARTERY CORONARY N/A 7/1/2024    Procedure: Bypass graft artery coronary;  Surgeon: Greg Webb MD;  Location: [facility] CARDIAC CATH LAB    COLONOSCOPY      2011,Dr Bowman benign polyps    COLONOSCOPY  10/03/2011    2011,benign polyps, Dr. Bowman    COLONOSCOPY  08/08/2016 8/8/16,normal, Dr Bowman    CV ANGIOGRAM CORONARY GRAFT N/A 7/1/2024    Procedure: Coronary Angiogram Graft;  Surgeon: Greg Webb MD;  Location:  HEART CARDIAC CATH LAB    CV CORONARY ANGIOGRAM N/A  7/1/2024    Procedure: Coronary Angiogram;  Surgeon: Greg Webb MD;  Location:  HEART CARDIAC CATH LAB    CV PCI N/A 9/24/2024    Procedure: Percutaneous Coronary Intervention;  Surgeon: Greg Webb MD;  Location:  HEART CARDIAC CATH LAB    CV RIGHT HEART CATH MEASUREMENTS RECORDED N/A 7/1/2024    Procedure: Right Heart Catheterization;  Surgeon: Greg Webb MD;  Location:  HEART CARDIAC CATH LAB    ELBOW SURGERY      baby,birth malachi removed from right arm    EMBOLECTOMY UPPER EXTREMITY  04/02/2013    brachial artery pseudoaneurysm after stenting    ESOPHAGOSCOPY, GASTROSCOPY, DUODENOSCOPY (EGD), COMBINED      2011,EGD Dr Bowman with pyloric ulcer    ESOPHAGOSCOPY, GASTROSCOPY, DUODENOSCOPY (EGD), COMBINED      2011,pyloric ulcer, Dr. Bowman    ESOPHAGOSCOPY, GASTROSCOPY, DUODENOSCOPY (EGD), COMBINED      8/8/16,mild gastritis, Dr Bowman    ESOPHAGOSCOPY, GASTROSCOPY, DUODENOSCOPY (EGD), COMBINED      11/27/2017,Dr Bowman. Antral ulcer    ESOPHAGOSCOPY, GASTROSCOPY, DUODENOSCOPY (EGD), COMBINED  02/02/2018    Dr Bowman, healed ulcer    HYSTERECTOMY TOTAL ABDOMINAL      age 21    LAPAROSCOPIC CHOLECYSTECTOMY      2006    OSTEOTOMY FEMUR DISTAL      x3, right knee    OSTEOTOMY FEMUR DISTAL      2000,left knee  ligament surgery    OTHER SURGICAL HISTORY      1/10/2003,,PTCA    OTHER SURGICAL HISTORY      09/20/2012,,PTCA,DELONTE in LAD and left main    OTHER SURGICAL HISTORY      4/1/2013,,PTCA,L subclavian stenosis    RELEASE TRIGGER FINGER Left 12/2/2022    Procedure: Left thumb Trigger  release;  Surgeon: Cristhian Wilkinson MD;  Location: [facility]    SALPINGO-OOPHORECTOMY BILATERAL      age 27,Bilateral salpingo-oophorectomy    TONSILLECTOMY, ADENOIDECTOMY, COMBINED      childhood     Patient Active Problem List   Diagnosis    Status post coronary angiogram on 9/25/2017 at the [facility]-stable disease    Central sleep apnea    Chronic anxiety     Collagenous colitis    Atherosclerosis of coronary artery bypass graft of native heart with stable angina pectoris    Major depressive disorder, recurrent episode, severe (H)    Primary hypertension    Lumbar facet arthropathy    Gastric ulcer with hemorrhage    History of Clostridium difficile    History of tobacco abuse-quitting 8/23/2017    Iron deficiency anemia    Subclavian artery stenosis, left    Mixed hyperlipidemia    Myofascial pain    Family history of malignant neoplasm of gastrointestinal tract    Nodular degeneration of cornea    Osteoarthritis    Controlled substance agreement updated 9-5-23    Panic attack    Presence of stent in coronary artery in patient with coronary artery disease    History of coronary artery bypass graft x 3 on 2/12/2003    Slow transit constipation    Vitamin D deficiency    Noncompliance with CPAP treatment    H/O multiple concussions    Stage 3a chronic kidney disease (H)    History of pulmonary embolism on 1/2/2020. (-) VQ scan on 11/23/2021    Chronic ischemic heart disease    CORTEZ (dyspnea on exertion)    Palpitations    Vitamin B12 deficiency (non anemic)    History of CVA-mild chronic ischemic disease on 8/20/2021.    ASCVD (arteriosclerotic cardiovascular disease)    Nausea    Gastroesophageal reflux disease with esophagitis without hemorrhage    SVT (supraventricular tachycardia)    F11.2 - Continuous opioid dependence (H)    Chronic pain disorder    Seborrheic keratoses    Intrinsic eczema    Generalized muscle weakness    Chronic bilateral low back pain, unspecified whether sciatica present    Prediabetes    Subclinical hypothyroidism    Osteopenia of multiple sites    Chronic migraine without aura without status migrainosus, not intractable    Mood disorder    Muscle spasm    Spinal stenosis of lumbar region with neurogenic claudication     Past Surgical History:   Procedure Laterality Date    ANGIOPLASTY      9/12/02,with triple stenting    APPENDECTOMY OPEN       No Comments Provided    ARTHROSCOPY KNEE      left    ARTHROSCOPY SHOULDER Right 05/12/2017    labral tear, rotator cuff tear and some subacromial decompression     BYPASS GRAFT ARTERY CORONARY      12/13/02,Triple bypass, left internal mammary  to LAD, right internal mammary to right coronary artery, saphenous to obtuse marginal of the left circumflex.    BYPASS GRAFT ARTERY CORONARY N/A 7/1/2024    Procedure: Bypass graft artery coronary;  Surgeon: Greg Webb MD;  Location:  [facility]    COLONOSCOPY      2011,Dr Bowman benign polyps    COLONOSCOPY  10/03/2011    2011,benign polyps, Dr. Bowman    COLONOSCOPY  08/08/2016 8/8/16,normal, Dr Bowman    CV ANGIOGRAM CORONARY GRAFT N/A 7/1/2024    Procedure: Coronary Angiogram Graft;  Surgeon: Greg Webb MD;  [facility]    CV CORONARY ANGIOGRAM N/A 7/1/2024    Procedure: Coronary Angiogram;  Surgeon: Greg Webb MD;  [facility]    CV PCI N/A 9/24/2024    Procedure: Percutaneous Coronary Intervention;  Surgeon: Greg Webb MD;  Location: [facility]    CV RIGHT HEART CATH MEASUREMENTS RECORDED N/A 7/1/2024    Procedure: Right Heart Catheterization;  Surgeon: Greg Webb MD;  [facility]    ELBOW SURGERY      baby,birth malachi removed from right arm    EMBOLECTOMY UPPER EXTREMITY  04/02/2013    brachial artery pseudoaneurysm after stenting    ESOPHAGOSCOPY, GASTROSCOPY, DUODENOSCOPY (EGD), COMBINED      2011,EGD Dr Bowman with pyloric ulcer    ESOPHAGOSCOPY, GASTROSCOPY, DUODENOSCOPY (EGD), COMBINED      2011,pyloric ulcer, Dr. Bowman    ESOPHAGOSCOPY, GASTROSCOPY, DUODENOSCOPY (EGD), COMBINED      8/8/16,mild gastritis, Dr Bowman    ESOPHAGOSCOPY, GASTROSCOPY, DUODENOSCOPY (EGD), COMBINED      11/27/2017,Dr Bowman. Antral ulcer    ESOPHAGOSCOPY, GASTROSCOPY, DUODENOSCOPY (EGD),  COMBINED  2018    Dr Bowman, healed ulcer    HYSTERECTOMY TOTAL ABDOMINAL      age 21    LAPAROSCOPIC CHOLECYSTECTOMY      2006    OSTEOTOMY FEMUR DISTAL      x3, right knee    OSTEOTOMY FEMUR DISTAL      2000,left knee  ligament surgery    OTHER SURGICAL HISTORY      1/10/2003,,PTCA    OTHER SURGICAL HISTORY      2012,,PTCA,DELONTE in LAD and left main    OTHER SURGICAL HISTORY      2013,,PTCA,L subclavian stenosis    RELEASE TRIGGER FINGER Left 2022    Procedure: Left thumb Trigger  release;  Surgeon: Cristhian Wilkinson MD;  Location: [facility]    SALPINGO-OOPHORECTOMY BILATERAL      age 27,Bilateral salpingo-oophorectomy    TONSILLECTOMY, ADENOIDECTOMY, COMBINED      childhood       Social History     Tobacco Use    Smoking status: Former     Current packs/day: 0.00     Average packs/day: 1 pack/day for 35.0 years (35.0 ttl pk-yrs)     Types: Cigarettes     Start date: 2/15/1982     Quit date: 2/15/2017     Years since quittin.0     Passive exposure: Past    Smokeless tobacco: Never   Substance Use Topics    Alcohol use: Not Currently     Alcohol/week: 0.0 standard drinks of alcohol     Family History   Problem Relation Age of Onset    Heart Disease Father         Heart Disease,Heart condition/Significant for atherosclerotic cardiovascular disease, but non premature.    Colon Cancer Father         Cancer-colon, of colon cancer    Cancer Father         Cancer,mets to liver, secondary to colon cancer    Heart Disease Mother         Heart Disease    Cancer Other         Cancer,Multiple Myeloma    Heart Disease Other         Heart Disease,Ischemic Heart Disease    Colon Cancer Other         Cancer-colon,Malignant neoplasms    Cancer Sister         Cancer,multiple myeloma    Other - See Comments Son         gallstones         Current Outpatient Medications   Medication Sig Dispense Refill    amitriptyline (ELAVIL) 100 MG tablet Take 1 tablet (100 mg) by mouth at bedtime. 90 tablet 3     amLODIPine (NORVASC) 10 MG tablet Take 1 tablet (10 mg) by mouth daily. Dx. Code: I10. 90 tablet 3    aspirin (ASA) 81 MG chewable tablet Take 1 tablet (81 mg) by mouth daily. Starting tomorrow. 30 tablet 3    busPIRone HCl (BUSPAR) 30 MG tablet Take 1 tablet by mouth twice daily 180 tablet 1    clonazePAM (KLONOPIN) 1 MG tablet Take 1 tablet (1 mg) by mouth 2 times daily as needed for anxiety, muscle spasms or sleep. 60 tablet 0    clopidogrel (PLAVIX) 75 MG tablet Take 1 tablet (75 mg) by mouth daily. 90 tablet 3    cyclobenzaprine (FLEXERIL) 10 MG tablet Take 1 tablet (10 mg) by mouth 3 times daily as needed for muscle spasms. 30 tablet 2    diclofenac (VOLTAREN) 1 % topical gel Apply 2 grams to each hand or 4 grams to each knee up to 4 times daily as needed for arthritis pain 350 g 4    DULoxetine (CYMBALTA) 30 MG capsule Take 1 capsule (30 mg) by mouth daily. 90 capsule 1    [START ON 3/28/2025] HYDROcodone-acetaminophen (NORCO)  MG per tablet Take 1 tablet by mouth every 4 hours as needed for severe pain. 180 tablet 0    HYDROcodone-acetaminophen (NORCO)  MG per tablet Take 1 tablet by mouth every 4 hours as needed for severe pain. 180 tablet 0    HYDROcodone-acetaminophen (NORCO)  MG per tablet Take 1 tablet by mouth every 4 hours as needed for severe pain. 180 tablet 0    lisinopril (ZESTRIL) 10 MG tablet Take 1 tablet (10 mg) by mouth daily. 90 tablet 3    naloxone (NARCAN) 4 MG/0.1ML nasal spray Spray into one nostril for opioid reversal if unresponsive. May repeat every 2-3 minutes until patient responsive or EMS arrives 1 each 1    nitroGLYcerin (NITROLINGUAL) 0.4 MG/SPRAY spray For chest pain spray 1 spray under tongue every 5 minutes for 3 doses. If symptoms persist 5 minutes after 1st dose call 911. 9.8 g 3    ondansetron (ZOFRAN) 4 MG tablet Take 1 tablet (4 mg) by mouth every 6 hours as needed for nausea. 90 tablet 3    pantoprazole (PROTONIX) 40 MG EC tablet Take 1 tablet (40  mg) by mouth daily. 90 tablet 3    RESTASIS 0.05 % ophthalmic emulsion INSTILL 1 DROP INTO BOTH EYES TWICE A DAY      rimegepant (NURTEC) 75 MG ODT tablet PLACE 1 TAB UNDER TONGUE DAILY AS NEEDED FOR MIGRAINE. MAX OF 1 TAB EVERY 48 HRS & 2 PER WEEK 8 tablet 0    rosuvastatin (CRESTOR) 40 MG tablet Take 1 tablet (40 mg) by mouth daily. 90 tablet 3    sucralfate (CARAFATE) 1 GM tablet Take 1 tablet (1 g) by mouth 4 times daily as needed for nausea (epigastric pain). 120 tablet 5    triamcinolone (KENALOG) 0.1 % external lotion Apply topically 2 times daily. 60 mL 3    VITAMIN D, CHOLECALCIFEROL, PO Take 5,000 Units by mouth daily      vortioxetine (TRINTELLIX) 20 MG tablet Take 1 tablet (20 mg) by mouth daily 90 tablet 2     Allergies   Allergen Reactions    Atorvastatin Muscle Pain (Myalgia)    Tiotropium Bromide [Tiotropium] Rash    Ezetimibe Muscle Pain (Myalgia)    Latex Rash    Niacin      Other reaction(s): Flushing    No Clinical Screening - See Comments Itching, Rash and Blisters     Metals and plastics      Tape [Adhesive Tape] Rash     Current providers sharing in care for this patient include:  Patient Care Team:  No Ref-Primary, Physician as PCP - General  Cristhian Wilkinson MD as MD (Orthopaedic Surgery)  Cristhian Wilkinosn MD as MD (Orthopaedic Surgery)  Cristhian Wilkinson MD as MD (Orthopaedic Surgery)  Paul Gramajo DO as Assigned Heart and Vascular Provider  Sheri Marrero PA-C as Assigned Pain Medication Provider  Sheri Marrero PA-C as Assigned PCP  Renny Wylie PA-C as Assigned Musculoskeletal Provider    The following health maintenance items are reviewed in Epic and correct as of today:  Health Maintenance   Topic Date Due    HF ACTION PLAN  Never done    COPD ACTION PLAN  Never done    HEPATITIS A IMMUNIZATION (1 of 2 - Risk 2-dose series) Never done    ZOSTER IMMUNIZATION (1 of 2) Never done    RSV VACCINE (1 - Risk 60-74 years 1-dose series) Never done    COLORECTAL CANCER SCREENING   "08/08/2021    MAMMO SCREENING  09/28/2022    LUNG CANCER SCREENING  12/01/2024    COVID-19 Vaccine (8 - 2024-25 season) 05/08/2025    BMP  09/18/2025    CBC  09/24/2025    HEMOGLOBIN  09/24/2025    URINE DRUG SCREEN  12/16/2025    CONTROLLED SUBSTANCE AGREEMENT FOR CHRONIC PAIN MANAGEMENT  01/06/2026    MEDICARE ANNUAL WELLNESS VISIT  03/18/2026    ALT  03/18/2026    LIPID  03/18/2026    MICROALBUMIN  03/18/2026    YAYA ASSESSMENT  03/18/2026    FALL RISK ASSESSMENT  03/18/2026    PHQ-9  03/18/2026    DTAP/TDAP/TD IMMUNIZATION (3 - Td or Tdap) 08/28/2026    GLUCOSE  03/18/2028    ADVANCE CARE PLANNING  03/18/2030    DEXA  01/11/2037    TSH W/FREE T4 REFLEX  Completed    SPIROMETRY  Completed    HEPATITIS C SCREENING  Completed    DEPRESSION ACTION PLAN  Completed    INFLUENZA VACCINE  Completed    Pneumococcal Vaccine: 50+ Years  Completed    URINALYSIS  Completed    HPV IMMUNIZATION  Aged Out    MENINGITIS IMMUNIZATION  Aged Out         Review of Systems  Pertinent positives and negatives as per HPI, otherwise negative.       Objective    Exam  /76   Pulse 104   Temp (!) 96.2  F (35.7  C) (Temporal)   Resp 18   Ht 1.645 m (5' 4.75\")   Wt 85.1 kg (187 lb 9.6 oz)   LMP 04/29/1978 (Approximate)   SpO2 95%   Breastfeeding No   BMI 31.46 kg/m     Estimated body mass index is 31.46 kg/m  as calculated from the following:    Height as of this encounter: 1.645 m (5' 4.75\").    Weight as of this encounter: 85.1 kg (187 lb 9.6 oz).    Physical Exam    General: alert and oriented x 3, no acute distress, pleasant, conversant  Head: normocephalic, atraumatic  Eyes: pupils equal, round, and reactive to light, conjunctiva pink, sclera white  Oropharynx: pink without tonsillar enlargement nor exudate  Neck: supple without lymphadenopathy, thyroid not enlarged, no JVD or carotid bruits  Lungs: clear to auscultation, no wheezes, rhonchi or rales, no increased work of breathing  Heart: regular rate and rhythm, no " murmurs auscultated  Abdomen: soft, nontender, nondistended, normoactive bowel sounds, no palpable masses or organomegaly  Skin: no abnormal lesions or rash  Musculoskeletal/Extremities: good ROM throughout, trace ankle edema  Psych: mood euthymic, does not appear anxious but endorses anxiety intermittently, denies SI     Colonoscopy overdue due to family history in first degree relative  Pap Smear not indicated for screening purposes  Mammogram overdue and ordered  Dexa scan recommended every 2 years, due and ordered  Labs pending  Influenza recommended annually  PCV done  Pneumovax done  Shingrix recommended at the pharmacy where it would be covered by insurance  Tdap/Td due 2026  COVID up-to-date  RSV recommended at the pharmacy        3/18/2025   Mini Cog   Clock Draw Score 2 Normal   3 Item Recall 3 objects recalled   Mini Cog Total Score 5              I spent a total of 116 minutes on the patient's care today including preparing for the visit, the visit, documentation, and follow-up care.  Well over 60 minutes were spent addressing new concerns and chronic medical problems.  This does not include any procedure time.     The longitudinal plan of care for the diagnosis(es)/condition(s) as documented were addressed during this visit. Due to the added complexity in care, I will continue to support Malina in the subsequent management and with ongoing continuity of care.     Lung Cancer Screening Shared Decision Making Visit      Ankita Day, a 70 year old female, is eligible for lung cancer screening          History   Smoking Status    Former    Types: Cigarettes   Smokeless Tobacco    Never        I have discussed with patient the risks and benefits of screening for lung cancer with low-dose CT.      The risks include:     radiation exposure: one low dose chest CT has as much ionizing radiation as about 15 chest x-rays, or 6 months of background radiation living in [place]       false positives: most  findings/nodules are NOT cancer, but some might still require additional diagnostic evaluation, including biopsy     over-diagnosis: some slow growing cancers that might never have been clinically significant will be detected and treated unnecessarily      The benefit of early detection of lung cancer is contingent upon adherence to annual screening or more frequent follow up if indicated.      Furthermore, to benefit from screening, Ankita must be willing and able to undergo diagnostic procedures, if indicated. Although no specific guide is available for determining severity of comorbidities, it is reasonable to withhold screening in patients who have greater mortality risk from other diseases.      We did discuss that the best way to prevent lung cancer is to not smoke.     Some patients may value a numeric estimation of lung cancer risk when evaluating if lung cancer screening is right for them, here is one calculator:     ShouldIScreen       Signed Electronically by: Kenia Duran MD

## 2025-03-24 ENCOUNTER — OFFICE VISIT (OUTPATIENT)
Dept: ORTHOPEDICS | Facility: OTHER | Age: 70
End: 2025-03-24
Attending: INTERNAL MEDICINE
Payer: COMMERCIAL

## 2025-03-24 DIAGNOSIS — I73.9 VASCULAR CLAUDICATION: Primary | ICD-10-CM

## 2025-03-24 DIAGNOSIS — G89.29 CHRONIC MIDLINE LOW BACK PAIN, UNSPECIFIED WHETHER SCIATICA PRESENT: ICD-10-CM

## 2025-03-24 DIAGNOSIS — M48.062 NEUROGENIC CLAUDICATION DUE TO LUMBAR SPINAL STENOSIS: ICD-10-CM

## 2025-03-24 DIAGNOSIS — M54.50 CHRONIC MIDLINE LOW BACK PAIN, UNSPECIFIED WHETHER SCIATICA PRESENT: ICD-10-CM

## 2025-03-24 PROCEDURE — 99214 OFFICE O/P EST MOD 30 MIN: CPT

## 2025-03-24 PROCEDURE — G0463 HOSPITAL OUTPT CLINIC VISIT: HCPCS

## 2025-03-24 NOTE — PROGRESS NOTES
HPI: Malina Day is a very pleasant 70-year-old female who presents for re-evaluation in the orthopedic spine clinic today.  She was seen in this clinic for a history of low back pain, lower extremity pain, and multiple falls.  She states that she has pain in the bilateral anterior lateral legs when walking.  She states that her legs get weak the further she walks and eventually they give out and she falls. Leg pain with walking is worse than her back pain.     Past medical history is significant for several MIs, stroke, PE, CAD, SVT, triple bypass surgery, 18 stent placements.    No significant past social history  Medications including Norco, Klonopin, aspirin    She has a latex allergy as well as adhesive allergy    Former smoker    Physical exam:  General: Alert and oriented no distress  Back: Range of motion mildly limited with flexion and extension, subjective pain  Motor 4+/5 bilateral hip flexion, knee extension, dorsiflexion, plantarflexion.  Neuro: No focal deficits    Imagin view lumbar x-ray today reveals significant spondylosis ranging from L3-S1.  Lumbar alignment is maintained and disc height is relatively well-preserved     lumbar MRI completed reveals moderate L4-5 spinal canal stenosis and lateral recess due to facet hypertrophy.     Diagnostics [facility]  Narrative & Impression   EXAM: US [facility]YA DOPPLER NO EXERCISE, 1-2 LEVELS, BILAT, 3/14/2025 1:52 PM     HISTORY:  Claudication in peripheral vascular disease     COMPARISON: [facility] 2020     FINDINGS: The following measurements in mmHg were obtained:     Right:  Brachial artery: 137 mmHg  Posterior tibial artery: 133 mmHg  Index PT: 0.91  Dorsalis pedis artery: 124 mmHg  Index DP: 0.85  ARYA: 0.91     Left:  Brachial artery: 146 mmHg  Posterior tibial artery: 132 mmHg  Index PT: 0.90  Dorsalis pedis artery: 125 mmHg  Index DP: 0.86  ARYA: 0.90     Pulse volume recordings: Quick systolic upstroke and fast diastolic  runoff are noted throughout both  lower extremities.                                                                      IMPRESSION:   Right lower extremity borderline ARYA of 0.91.     Left lower extremity abnormal ARYA of 0.90.     =========================  Guideline criteria published in Circulation 2011; 124: 7430-4576.     New [facility] Diagnostic Criteria:    > 1.4: Non compressible    1.00 - 1.40: Normal    0.91 - 0.99: Borderline    At or below 0.90: Abnormal     Traditional [facility] Diagnostic Criteria:    >/=1.3 - non compressible vessels    1.00  -1.29 - Normal    0.91 - 0.99 - Borderline    0.41 - 0.90 - Mild to moderate PAD    0.00 - 0.40 - Severe PAD     DUGLAS QUINTERO MD        Assessment/plan: Presents today for reevaluation for neurogenic claudication versus vascular claudication.  ARYA did result with left-sided abnormal results and right-sided borderline results for vascular claudication.  We discussed that her lumbar MRI shows moderate narrowing at L4-5 and the spinal canal and lateral recess.  Her symptoms seem to be more severe than the associated compression at L4-5.  We discussed treatment options including epidural steroid injection versus surgical decompression.  At this time I think it is best to diagnostically and therapeutically placed an injection at L4-5 in the interlaminar space to obtain further information.  If she has significant pain relief with this injection and points towards neurogenic claudication as opposed to vascular claudication.  If she has no improvement of her symptoms is more likely that she has vascular claudication as a contributor to the pain she experiences in her legs.  Referral will also be placed to vascular surgery for evaluation.  She will follow-up in 2 weeks after epidural steroid injection for reevaluation.

## 2025-03-27 ENCOUNTER — TRANSFERRED RECORDS (OUTPATIENT)
Dept: HEALTH INFORMATION MANAGEMENT | Facility: OTHER | Age: 70
End: 2025-03-27
Payer: COMMERCIAL

## 2025-03-31 ASSESSMENT — PATIENT HEALTH QUESTIONNAIRE - PHQ9
SUM OF ALL RESPONSES TO PHQ QUESTIONS 1-9: 4
SUM OF ALL RESPONSES TO PHQ QUESTIONS 1-9: 4
10. IF YOU CHECKED OFF ANY PROBLEMS, HOW DIFFICULT HAVE THESE PROBLEMS MADE IT FOR YOU TO DO YOUR WORK, TAKE CARE OF THINGS AT HOME, OR GET ALONG WITH OTHER PEOPLE: VERY DIFFICULT

## 2025-04-01 ENCOUNTER — OFFICE VISIT (OUTPATIENT)
Dept: FAMILY MEDICINE | Facility: OTHER | Age: 70
End: 2025-04-01
Attending: FAMILY MEDICINE
Payer: COMMERCIAL

## 2025-04-01 ENCOUNTER — HOSPITAL ENCOUNTER (OUTPATIENT)
Dept: GENERAL RADIOLOGY | Facility: OTHER | Age: 70
Discharge: HOME | End: 2025-04-01
Payer: COMMERCIAL

## 2025-04-01 VITALS
TEMPERATURE: 97.2 F | WEIGHT: 187.6 LBS | BODY MASS INDEX: 31.46 KG/M2 | HEART RATE: 88 BPM | OXYGEN SATURATION: 94 % | SYSTOLIC BLOOD PRESSURE: 132 MMHG | RESPIRATION RATE: 20 BRPM | DIASTOLIC BLOOD PRESSURE: 84 MMHG

## 2025-04-01 DIAGNOSIS — G89.29 CHRONIC MIDLINE LOW BACK PAIN, UNSPECIFIED WHETHER SCIATICA PRESENT: ICD-10-CM

## 2025-04-01 DIAGNOSIS — M48.062 NEUROGENIC CLAUDICATION DUE TO LUMBAR SPINAL STENOSIS: ICD-10-CM

## 2025-04-01 DIAGNOSIS — Z79.899 CONTROLLED SUBSTANCE AGREEMENT SIGNED: Primary | ICD-10-CM

## 2025-04-01 DIAGNOSIS — F41.0 PANIC ATTACK: ICD-10-CM

## 2025-04-01 DIAGNOSIS — F39 MOOD DISORDER: ICD-10-CM

## 2025-04-01 DIAGNOSIS — M54.50 CHRONIC MIDLINE LOW BACK PAIN, UNSPECIFIED WHETHER SCIATICA PRESENT: ICD-10-CM

## 2025-04-01 DIAGNOSIS — G43.709 CHRONIC MIGRAINE WITHOUT AURA WITHOUT STATUS MIGRAINOSUS, NOT INTRACTABLE: ICD-10-CM

## 2025-04-01 DIAGNOSIS — I73.9 CLAUDICATION: ICD-10-CM

## 2025-04-01 DIAGNOSIS — G89.4 CHRONIC PAIN DISORDER: ICD-10-CM

## 2025-04-01 DIAGNOSIS — F11.20 CONTINUOUS OPIOID DEPENDENCE (H): ICD-10-CM

## 2025-04-01 DIAGNOSIS — F41.9 CHRONIC ANXIETY: ICD-10-CM

## 2025-04-01 DIAGNOSIS — M79.18 MYOFASCIAL PAIN: ICD-10-CM

## 2025-04-01 DIAGNOSIS — M15.0 PRIMARY OSTEOARTHRITIS INVOLVING MULTIPLE JOINTS: ICD-10-CM

## 2025-04-01 PROCEDURE — 96372 THER/PROPH/DIAG INJ SC/IM: CPT | Performed by: RADIOLOGY

## 2025-04-01 PROCEDURE — 255N000002 HC RX 255 OP 636: Performed by: RADIOLOGY

## 2025-04-01 PROCEDURE — 250N000011 HC RX IP 250 OP 636: Performed by: RADIOLOGY

## 2025-04-01 PROCEDURE — G0463 HOSPITAL OUTPT CLINIC VISIT: HCPCS | Mod: 25

## 2025-04-01 PROCEDURE — 62323 NJX INTERLAMINAR LMBR/SAC: CPT

## 2025-04-01 PROCEDURE — 250N000009 HC RX 250: Performed by: RADIOLOGY

## 2025-04-01 RX ORDER — HYDROCODONE BITARTRATE AND ACETAMINOPHEN 10; 325 MG/1; MG/1
1 TABLET ORAL EVERY 4 HOURS PRN
Qty: 180 TABLET | Refills: 0 | Status: SHIPPED | OUTPATIENT
Start: 2025-04-27

## 2025-04-01 RX ORDER — DEXAMETHASONE SODIUM PHOSPHATE 10 MG/ML
10 INJECTION, SOLUTION INTRAMUSCULAR; INTRAVENOUS ONCE
Status: COMPLETED | OUTPATIENT
Start: 2025-04-01 | End: 2025-04-01

## 2025-04-01 RX ORDER — LIDOCAINE HYDROCHLORIDE 10 MG/ML
2 INJECTION, SOLUTION EPIDURAL; INFILTRATION; INTRACAUDAL; PERINEURAL ONCE
Status: COMPLETED | OUTPATIENT
Start: 2025-04-01 | End: 2025-04-01

## 2025-04-01 RX ORDER — DULOXETIN HYDROCHLORIDE 60 MG/1
60 CAPSULE, DELAYED RELEASE ORAL DAILY
Qty: 90 CAPSULE | Refills: 3 | Status: SHIPPED | OUTPATIENT
Start: 2025-04-01

## 2025-04-01 RX ORDER — LIDOCAINE HYDROCHLORIDE 10 MG/ML
20 INJECTION, SOLUTION INFILTRATION; PERINEURAL ONCE
Status: COMPLETED | OUTPATIENT
Start: 2025-04-01 | End: 2025-04-01

## 2025-04-01 RX ORDER — CLONAZEPAM 0.5 MG/1
0.5 TABLET ORAL 3 TIMES DAILY PRN
Qty: 90 TABLET | Refills: 1 | Status: SHIPPED | OUTPATIENT
Start: 2025-04-01

## 2025-04-01 RX ADMIN — LIDOCAINE HYDROCHLORIDE 2 ML: 10 INJECTION, SOLUTION EPIDURAL; INFILTRATION; INTRACAUDAL; PERINEURAL at 12:02

## 2025-04-01 RX ADMIN — LIDOCAINE HYDROCHLORIDE 2 ML: 10 INJECTION, SOLUTION INFILTRATION; PERINEURAL at 12:01

## 2025-04-01 RX ADMIN — IOHEXOL 1 ML: 240 INJECTION, SOLUTION INTRATHECAL; INTRAVASCULAR; INTRAVENOUS; ORAL at 12:02

## 2025-04-01 RX ADMIN — DEXAMETHASONE SODIUM PHOSPHATE 10 MG: 10 INJECTION, SOLUTION INTRAMUSCULAR; INTRAVENOUS at 12:02

## 2025-04-01 ASSESSMENT — PAIN SCALES - GENERAL: PAINLEVEL_OUTOF10: MODERATE PAIN (5)

## 2025-04-01 NOTE — TELEPHONE ENCOUNTER
It is being addressed, see phone note from 6/15  
Patient called regarding her hydrocodone. She filled her Rx today but she states she will not have enough to make it to the next time she sees [facility]. She states she will be short four days. Please call.    Natalia Santiago on 6/16/2022 at 11:44 AM    
Patient notified of providers note is other phone note.      Taylor Hill LPN 6/17/2022 3:07 PM      
no

## 2025-04-01 NOTE — NURSING NOTE
"Chief Complaint   Patient presents with    Follow Up       Initial /84   Pulse 88   Temp 97.2  F (36.2  C) (Temporal)   Resp 20   Wt 85.1 kg (187 lb 9.6 oz)   LMP 04/29/1978 (Approximate)   SpO2 94%   BMI 31.46 kg/m   Estimated body mass index is 31.46 kg/m  as calculated from the following:    Height as of 3/18/25: 1.645 m (5' 4.75\").    Weight as of this encounter: 85.1 kg (187 lb 9.6 oz).  Medication Review: complete    The next two questions are to help us understand your food security.  If you are feeling you need any assistance in this area, we have resources available to support you today.          3/18/2025   SDOH- Food Insecurity   Within the past 12 months, did you worry that your food would run out before you got money to buy more? N    Within the past 12 months, did the food you bought just not last and you didn t have money to get more? N        Proxy-reported         Health Care Directive:  Patient has a Health Care Directive on file      Coty Holt LPN      "

## 2025-04-01 NOTE — PATIENT INSTRUCTIONS
Increase cymbalta (duloxetine) to 60 mg daily.  You can take 2 of the 30 mg capsules you have to use them up but next prescription will be a 60 mg capsule.    Decrease klonopin to 0.5 mg at a time (break the 1 mg in half) up to 3 times per day as needed for one month.  Starting on May 1 you should reduce this to no more than 2 doses per day.  Your next refill will be a 0.5 mg tablet so you will not need to break them.      We can even consider eventually breaking the 0.5 mg klonopin tablet in half so you get 0.25 mg at a time and continue a slow wean but we will discuss this at your next visit.    Try to take the hydrocodone only as needed.  You can still use up to 6 in a day if needed but the ultimate goal is to get to no more than an average of 4 per day.  We will continue to work on this slowly and gradually.    Work on gradually increasing exercise/activity as tolerated including walking and using your stationary pedals.    Follow-up in 2 months.

## 2025-04-01 NOTE — PROGRESS NOTES
Assessment & Plan     (Z79.899) Controlled substance agreement updated 12/16/24  (primary encounter diagnosis)  (F11.20) Continuous opioid dependence (H)  (G89.4) Chronic pain disorder  (M15.0) Primary osteoarthritis involving multiple joints  (M79.18) Myofascial pain  (F39) Mood disorder  (F41.9) Chronic anxiety  (F41.0) Panic attack  (G43.709) Chronic migraine without aura without status migrainosus, not intractable  Comment: Lengthy discussion again today regarding risk with combined benzodiazepine and chronic opioid therapy, especially with reasonably high doses as she has been prescribed.  Discussed that I do not prescribe long-term benzodiazepine and opioid medications in combination.  Recommend weaning off at least one of them and recommend weaning klonopin first which she is agreeable to and has been working on.  -- Decrease Klonopin to 0.5 mg per dose up to 3 doses per day for the next month, then reduce to no more than 2 doses per day until follow-up in 2 months.  Discussed that we may eventually wean this to 0.25 mg per dose to allow a continued slow wean.  I am both hopeful and optimistic that she will tolerate this better with the addition of and titration of Cymbalta.  It does seem that anxiety has already improved, some of which may be situational but I do suspect that the addition of Cymbalta has been somewhat helpful already.  -- Increase Cymbalta to 60 mg daily.  Anticipate recommendation to reduce Trintellix by half at her next visit depending on her mood symptoms.  Anticipate attempt to wean BuSpar in the future as she has never noticed significant improvement in anxiety with this, though this is deferred at least until Klonopin is eliminated.  I am hopeful that this will improve mood symptoms and chronic pain symptoms.  -- Recommend eventually weaning hydrocodone to no more than 40 morphine equivalents daily, 4 of the 10 mg tablets and this was discussed again today.  She currently takes up to  6 tablets daily.  Anticipate deferral of this until Klonopin is completely weaned, though she is more intentionally taking this only 1 tablet at a time when she feels like she absolutely needs it with goal of gradually reducing this as tolerated, especially if she notices improvement with increasing Cymbalta.  -- Continue amitriptyline which has clearly been beneficial for sleep, mood, headaches, and chronic pain  -- May continue Flexeril for now, though I did encourage her to limit this to only when she feels that she really needs it for significant symptoms and she is agreeable  -- Consider adding carbamazepine or Topamax in the future which also will likely improve chronic pain control and migraines  -- Consider trial of mirtazapine or olanzapine at bedtime for mood symptoms if needed  -- Follow-up in 2 months  Plan: clonazePAM (KLONOPIN) 0.5 MG tablet,         HYDROcodone-acetaminophen (NORCO)  MG per        Tablet, DULoxetine (CYMBALTA) 60 MG capsule,     (I73.9) Claudication  Comment: This may be multifactorial with both neurogenic and vascular etiologies contributing.  MRI does not show obvious pathology that is likely to cause neurogenic claudication.  It does show good reason for chronic back pain with advanced facet arthritis but there is no severe stenoses.  She has known significant vascular disease and I do suspect that vascular claudication is at least contributing if not the most significant cause of symptoms.  She has vascular surgery consultation scheduled in May.  Discussed that she will likely need additional evaluation including more imaging to determine degree of vascular stenoses and intervention plan.  She is strongly encouraged to work on a graduated exercise program as tolerated which she is agreeable to.    Patient Instructions   Increase cymbalta (duloxetine) to 60 mg daily.  You can take 2 of the 30 mg capsules you have to use them up but next prescription will be a 60 mg  capsule.    Decrease klonopin to 0.5 mg at a time (break the 1 mg in half) up to 3 times per day as needed for one month.  Starting on May 1 you should reduce this to no more than 2 doses per day.  Your next refill will be a 0.5 mg tablet so you will not need to break them.      We can even consider eventually breaking the 0.5 mg klonopin tablet in half so you get 0.25 mg at a time and continue a slow wean but we will discuss this at your next visit.    Try to take the hydrocodone only as needed.  You can still use up to 6 in a day if needed but the ultimate goal is to get to no more than an average of 4 per day.  We will continue to work on this slowly and gradually.    Work on gradually increasing exercise/activity as tolerated including walking and using your stationary pedals.    Follow-up in 2 months.    Return in about 2 months (around 6/1/2025), or if symptoms worsen or fail to improve, for Medication Check/Recheck.    Subjective   Malina is a 70 year old, presenting for the following health issues:  Follow Up      4/1/2025     9:39 AM   Additional Questions   Roomed by Coty FELTON LPN     History of Present Illness       Back Pain:  She presents for follow up of back pain. Patient's back pain is a recurring problem.  Location of back pain:  Right lower back, left lower back, right shoulder, right hip, left hip and other  Description of back pain: cramping and sharp  Back pain spreads: right buttocks, left buttocks, right thigh, left thigh, right knee and left knee    Since patient first noticed back pain, pain is: rapidly worsening  Does back pain interfere with her job:  Yes       Heart Failure:  She presents for follow up of heart failure. She is experiencing shortness of breath with activity only, which is much worse. She is not experiencing any lower extremity edema.   She denies orthopenea and is not coughing at night. Patient is not checking weight daily. She has recently had a None.  She has no side effects  from medications.  She has has a medical visit for heart failure 1 time since the last visit.    Vascular Disease:  She presents for follow up of vascular disease.    She takes nitroglycerin occasionally.  She takes daily aspirin.    She eats 2-3 servings of fruits and vegetables daily.She consumes 0 sweetened beverage(s) daily.She exercises with enough effort to increase her heart rate 9 or less minutes per day.  She exercises with enough effort to increase her heart rate 7 days per week.   She is taking medications regularly.      Last Echo:   Echo result w/o MOPS: Interpretation SummaryGlobal and regional left ventricular function is normal with an EF of 55-60%.Mild concentric wall thickening consistent with left ventricular hypertrophyis present.Left ventricular diastolic function is indeterminate.Global right ventricular function is normal.No significant valvular abnormalities present.IVC diameter <2.1 cm collapsing >50% with sniff suggests a normal RA pressureof 3 mmHg.No pericardial effusion is present. No significant changes noted.    Very pleasant 70-year-old female presents to clinic to formally establish care with myself as her PCP.  She had her first visit with me 2 weeks ago.  She has chronic pain that is multifactorial with relatively generalized osteoarthritis, myofascial pain, and claudication.  She mostly complains of chronic bilateral low back pain.  She had significant pain after CABG in 2003.  She reports that she was initially started on Xanax, methadone, and OxyContin after her CABG.  She was clearly mentally altered and endorsed high feeling from some of these medications.  She reports that her family became concerned.  Ultimately these medications were adjusted.  She was subsequently on Valium and later switched to Klonopin for a benzodiazepine.  Methadone and OxyContin were transitioned to hydrocodone which does not give her any of that high feeling and controls pain better than oxycodone.   Over time her dosages have decreased at least some with regard to the Klonopin.  She was previously on as much as 1 mg 4 times daily, reduced to 3 times daily, and more recently perhaps an average of twice daily.  She reported that sometimes she can go a whole day without using any Klonopin but sometimes she uses all 3 doses consistently for several days in a row depending on degree of her anxiety.  She has a history of panic attacks.  She feels that a lot of her anxiety is triggered by her 's side of the family and she has significantly less contact with them now which has overall improved her degree of anxiety.  She was also having a lot of increased stress and anxiety regarding heart problems and not having an established primary care provider after her last provider relatively suddenly left the practice.  That stress has reduced dramatically in the last couple of weeks.  She is on Trintellix which was primarily prescribed for persistent depression and she does feel like that helps more for depression than what she had been on in the past.  She is also on BuSpar 30 mg twice daily though at her last visit she reported that she does not feel like this has ever been very beneficial for anxiety and she would ultimately like to reduce this.  This is deferred until symptoms are overall better controlled since she tolerates it well.  She did think she had been on Cymbalta in the past but could not recall whether it was beneficial or why it was stopped.  She was agreeable to adding that both for control of mood symptoms and chronic pain.  She started 30 mg daily at her last visit 2 weeks ago and has been tolerating that well.  She has not noticed any significant improvement in pain but anxiety has decreased, unclear if this is a result of adding Cymbalta or decreased stress now that she has a primary care provider or a little bit of both.  Trintellix dosing has not yet been adjusted.  We had planned to try  titrating Cymbalta to at least 60 mg with hope that it would improve her chronic pain and she is agreeable to trial of increasing that now.  Amitriptyline was started in January 2025 with definite improvement in insomnia, mood symptoms, headaches, and some with pain.  She subsequently reduced melatonin from 15 mg nightly down to 10 mg nightly and she has continued to sleep well.  At her last visit we discussed the risk of chronic benzodiazepine and opioid medications in combination and that I will not continue to prescribe both chronically.  She is open to working on a plan to gradually adjust these with goal of adequate symptom control and a safer combination of medications overall.  We are focusing on weaning the benzodiazepine first, though she has changed her hydrocodone dosing some in the last 2 weeks.  She was previously always taking 2 tablets in the morning and spreading the other 4 tablets throughout the day consistently.  Since her last visit she has eliminated the automatic 2 tablet dose in the morning and instead is taking 1 tablet at a time when she feels like she needs it, paying close attention to the severity of her symptoms and whether or not she feels like she needs to take it.  She has been able to reduce her overall dose to 5 tablets in a day at least some of the day since I saw her last.  She is very motivated to continue taking the hydrocodone in this way rather than on a scheduled routine basis.  She has also reduced her Klonopin to 1 mg no more than twice daily consistently.  She is agreeable to weaning this to his 0.5 mg dose, initially up to 3 doses per day with plan for continued gradual reduction over the next several months.  She will continue to utilize this only when she feels like she needs it.  She does continue to use Flexeril only as needed.  She uses Voltaren gel as needed with some benefit, especially for knee and shoulder pain.  She has gotten benefit from a shoulder injection  in the past, last was in November 2024.  Migraines have improved with amitriptyline.    In the last several months she has developed debilitating claudication with severe leg pain and weakness with minimal activity or walking.  Initially it was felt that it was most likely neurogenic in nature though she does have known significant vascular disease.  She had an MRI of the lumbar spine showing advanced facet arthritis with only moderate spinal stenosis at L4-5 and nonsevere scattered foraminal stenosis.  She has not gotten significant improvement with injections in the past.  She had follow-up with Dr. Navas who continues to consider neurogenic versus vascular etiology.  He recommended trial of an epidural injection to see if it improves symptoms at all which should help to discern whether neurogenic claudication is contributing.  She was also referred to vascular surgery.  She had ABIs completed which were abnormal but borderline, though we discussed that sometimes these underestimate the severity of peripheral vascular disease.  She really does not want to have spine surgery.  She is very agreeable to pursuing vascular workup and is scheduled for a consultation in May.  She wonders if she should be walking or exercising.  She has a stationary set of pedals that sit on the floor which she had been using in the past and is agreeable to resuming.  She is also interested in walking some as she can tolerate it if it is felt safe for her to do that.  She does ask about metoprolol.  Last year at some point this was increased from 25 mg to 50 mg daily.  At a January cardiology appointment this was discontinued from her medication list though there is no discussion in the note specifically recommending discontinuation of this or explanation for why.  She continued to take it until she ran out about 2 weeks ago.  At the time of that visit she had been having some episodes of dizziness which cardiology felt was likely  largely due to inadequate hydration.  She was not drinking more than 2 cups of water daily then.  Since then she has increased to at least 3 of the larger yeti mugs of water daily and has not really been having those symptoms anymore.  She has not noticed a change in symptoms since stopping metoprolol 2 weeks ago.  Blood pressure is perhaps mildly above goal but reasonable today.  Sometimes she gets an increase in her heart rate with exertion though that has not changed since discontinuing metoprolol and she is not getting palpitations or irregularity in this.  Overall, she has been feeling fairly well with regard to heart issues since she had stents placed in September.  She has a history of significant cardiovascular disease including multiple MIs, history of CABG with recurrent atherosclerosis of bypass graft, stenting, chronic ischemic heart disease with preserved ejection fraction and no evidence of diastolic dysfunction reported on last echo though diastolic function was difficult to determine, subclavian artery stenosis, and history of CVA with mild chronic ischemic disease on imaging in 2021.  There is no mention of CHF in her cardiology notes recently.  She is not on diuretic therapy.  She has some chronic dyspnea on exertion that is stable.  She has not used nitroglycerin in the last month and recalls only 1 use in the month prior.  She had been using it much more frequently before the stents were placed in September.  She takes an 81 mg aspirin and Plavix daily.  She has a history of PE that was treated with Xarelto.  This was discontinued by cardiology in March 2024 as she did not have any other history of DVT or PE and had chronic anemia with intermittent gross hematuria and there has been no evidence of recurrent DVT or PE.  She does have central sleep apnea and declines CPAP.     She has a history of chronic gastric ulcer with history of hemorrhage.  She takes Protonix daily.  She does get  intermittent epigastric pain for which she uses Carafate as needed, generally averaging about twice per day.  She prefers tablets rather than the suspension.     She has stage IIIa chronic kidney disease that has been mild and stable.  She also has a history of prediabetes with last A1c at 5.8.  Hypertension has been well-controlled on lisinopril, amlodipine, and metoprolol which she was taking until 2 weeks ago.  Hyperlipidemia is controlled on rosuvastatin.                Review of Systems  Pertinent positives and negatives as per HPI, otherwise negative.        Objective    /84   Pulse 88   Temp 97.2  F (36.2  C) (Temporal)   Resp 20   Wt 85.1 kg (187 lb 9.6 oz)   LMP 04/29/1978 (Approximate)   SpO2 94%   BMI 31.46 kg/m    Body mass index is 31.46 kg/m .  Physical Exam     General: alert and oriented x 3, no acute distress, pleasant, conversant  Head: normocephalic, atraumatic  Musculoskeletal/Extremities: good ROM throughout, trace ankle edema  Psych: mood good, anxiety seems noticeably reduced compared to 2 weeks ago though she does still report intermittent anxiety, denies SI            I spent a total of 91 minutes on the patient's care today including preparing for the visit, the visit, documentation, and follow-up care. This does not include any procedure time.    The longitudinal plan of care for the diagnosis(es)/condition(s) as documented were addressed during this visit. Due to the added complexity in care, I will continue to support Malina in the subsequent management and with ongoing continuity of care.    Signed Electronically by: Kenia Duran MD

## 2025-04-14 ENCOUNTER — OFFICE VISIT (OUTPATIENT)
Dept: ORTHOPEDICS | Facility: OTHER | Age: 70
End: 2025-04-14
Attending: STUDENT IN AN ORGANIZED HEALTH CARE EDUCATION/TRAINING PROGRAM
Payer: COMMERCIAL

## 2025-04-14 DIAGNOSIS — M48.061 SPINAL STENOSIS OF LUMBAR REGION, UNSPECIFIED WHETHER NEUROGENIC CLAUDICATION PRESENT: ICD-10-CM

## 2025-04-14 DIAGNOSIS — M62.838 MUSCLE SPASM: Primary | ICD-10-CM

## 2025-04-14 PROCEDURE — G0463 HOSPITAL OUTPT CLINIC VISIT: HCPCS

## 2025-04-14 PROCEDURE — 99213 OFFICE O/P EST LOW 20 MIN: CPT

## 2025-04-14 NOTE — PROGRESS NOTES
HPI: Malina Day is a very pleasant 70-year-old female who presents for re-evaluation in the orthopedic spine clinic today.  She was seen in this clinic for a history of low back pain, lower extremity pain, and multiple falls.  Continues to have claudication type pain in the lower extremities on walking.  She is due to see vascular surgery in 1 month.  She underwent an epidural steroid injection which did not provide her any relief at all.  She noticed a little relief of her back pain with the lidocaine, but had no change in her symptoms as a pertains to the legs.    Past medical history is significant for several MIs, stroke, PE, CAD, SVT, triple bypass surgery, 18 stent placements.    No significant past social history  Medications including Norco, Klonopin, aspirin    She has a latex allergy as well as adhesive allergy    Former smoker    Physical exam:  General: Alert and oriented no distress  Back: Range of motion mildly limited with flexion and extension, subjective pain  Motor 4+/5 bilateral hip flexion, knee extension, dorsiflexion, plantarflexion.  Neuro: No focal deficits    Imagin view lumbar x-ray today reveals significant spondylosis ranging from L3-S1.  Lumbar alignment is maintained and disc height is relatively well-preserved     lumbar MRI completed reveals moderate L4-5 spinal canal stenosis and lateral recess due to facet hypertrophy.     Diagnostics [facility]  Narrative & Impression   EXAM: US [facility]YA DOPPLER NO EXERCISE, 1-2 LEVELS, BILAT, 3/14/2025 1:52 PM     HISTORY:  Claudication in peripheral vascular disease     COMPARISON: [facility] 2020     FINDINGS: The following measurements in mmHg were obtained:     Right:  Brachial artery: 137 mmHg  Posterior tibial artery: 133 mmHg  Index PT: 0.91  Dorsalis pedis artery: 124 mmHg  Index DP: 0.85  ARYA: 0.91     Left:  Brachial artery: 146 mmHg  Posterior tibial artery: 132 mmHg  Index PT: 0.90  Dorsalis pedis artery: 125 mmHg  Index DP: 0.86  ARYA: 0.90      Pulse volume recordings: Quick systolic upstroke and fast diastolic  runoff are noted throughout both lower extremities.                                                                      IMPRESSION:   Right lower extremity borderline ARYA of 0.91.     Left lower extremity abnormal ARYA of 0.90.     =========================  Guideline criteria published in Circulation 2011; 124: 0599-2660.     New [facility] Diagnostic Criteria:    > 1.4: Non compressible    1.00 - 1.40: Normal    0.91 - 0.99: Borderline    At or below 0.90: Abnormal     Traditional [facility] Diagnostic Criteria:    >/=1.3 - non compressible vessels    1.00  -1.29 - Normal    0.91 - 0.99 - Borderline    0.41 - 0.90 - Mild to moderate PAD    0.00 - 0.40 - Severe PAD     DUGLAS QUINTERO MD        Assessment/plan: Presents today for reevaluation for neurogenic claudication versus vascular claudication.  She returns after completing epidural steroid injection approximately 2 weeks ago.  She had absolutely no relief of pain in her lower extremities.  As her MRI reveals moderate narrowing in the spinal canal at L4-5 I would anticipate that the epidural injection would help some with her pain.  We discussed this is somewhat of an negative diagnostic injection.  We would like to see what her vascular consult results with.  I encouraged her to follow-up after she has seen the vascular doctor to discuss lower extremity claudication.  We discussed surgery would only be considered if all the interventions fail.  We discussed that surgery would be considered a coin flip as to whether her symptoms would improve.

## 2025-04-15 ENCOUNTER — TELEPHONE (OUTPATIENT)
Dept: GENERAL RADIOLOGY | Facility: OTHER | Age: 70
End: 2025-04-15
Payer: COMMERCIAL

## 2025-04-15 NOTE — TELEPHONE ENCOUNTER
2 week follow up after lumbar inj pt states that inj did not help pain and she has followed up with Dr Navas in relation to leg and back pain  Irma Vargas, ARRT on 4/15/2025 at 9:00 AM

## 2025-04-28 ENCOUNTER — HOSPITAL ENCOUNTER (OUTPATIENT)
Dept: BONE DENSITY | Facility: OTHER | Age: 70
Discharge: HOME | End: 2025-04-28
Attending: FAMILY MEDICINE
Payer: COMMERCIAL

## 2025-04-28 ENCOUNTER — HOSPITAL ENCOUNTER (OUTPATIENT)
Dept: MAMMOGRAPHY | Facility: OTHER | Age: 70
Discharge: HOME | End: 2025-04-28
Attending: FAMILY MEDICINE
Payer: COMMERCIAL

## 2025-04-28 ENCOUNTER — HOSPITAL ENCOUNTER (OUTPATIENT)
Dept: CT IMAGING | Facility: OTHER | Age: 70
Discharge: HOME | End: 2025-04-28
Attending: FAMILY MEDICINE
Payer: COMMERCIAL

## 2025-04-28 DIAGNOSIS — Z78.0 POSTMENOPAUSAL STATUS: ICD-10-CM

## 2025-04-28 DIAGNOSIS — Z12.31 ENCOUNTER FOR SCREENING MAMMOGRAM FOR BREAST CANCER: ICD-10-CM

## 2025-04-28 DIAGNOSIS — Z87.891 PERSONAL HISTORY OF TOBACCO USE: ICD-10-CM

## 2025-04-28 DIAGNOSIS — M85.89 OSTEOPENIA OF MULTIPLE SITES: ICD-10-CM

## 2025-04-28 PROCEDURE — 77067 SCR MAMMO BI INCL CAD: CPT | Mod: 26 | Performed by: RADIOLOGY

## 2025-04-28 PROCEDURE — 77063 BREAST TOMOSYNTHESIS BI: CPT

## 2025-04-28 PROCEDURE — 71271 CT THORAX LUNG CANCER SCR C-: CPT | Mod: 26 | Performed by: STUDENT IN AN ORGANIZED HEALTH CARE EDUCATION/TRAINING PROGRAM

## 2025-04-28 PROCEDURE — 77063 BREAST TOMOSYNTHESIS BI: CPT | Mod: 26 | Performed by: RADIOLOGY

## 2025-04-28 PROCEDURE — 71271 CT THORAX LUNG CANCER SCR C-: CPT

## 2025-04-28 PROCEDURE — 77080 DXA BONE DENSITY AXIAL: CPT

## 2025-04-28 PROCEDURE — 77080 DXA BONE DENSITY AXIAL: CPT | Mod: 26 | Performed by: STUDENT IN AN ORGANIZED HEALTH CARE EDUCATION/TRAINING PROGRAM

## 2025-05-13 DIAGNOSIS — F11.20 CONTINUOUS OPIOID DEPENDENCE (H): ICD-10-CM

## 2025-05-13 DIAGNOSIS — G89.4 CHRONIC PAIN DISORDER: ICD-10-CM

## 2025-05-13 DIAGNOSIS — M15.0 PRIMARY OSTEOARTHRITIS INVOLVING MULTIPLE JOINTS: ICD-10-CM

## 2025-05-13 DIAGNOSIS — M79.18 MYOFASCIAL PAIN: ICD-10-CM

## 2025-05-13 DIAGNOSIS — M62.838 MUSCLE SPASM: ICD-10-CM

## 2025-05-13 DIAGNOSIS — Z79.899 CONTROLLED SUBSTANCE AGREEMENT SIGNED: ICD-10-CM

## 2025-05-13 RX ORDER — HYDROCODONE BITARTRATE AND ACETAMINOPHEN 10; 325 MG/1; MG/1
1 TABLET ORAL EVERY 4 HOURS PRN
Qty: 180 TABLET | Refills: 0 | Status: CANCELLED | OUTPATIENT
Start: 2025-05-13

## 2025-05-13 NOTE — TELEPHONE ENCOUNTER
Called and left message for patient to return call.    Called [facility] and patient last filled Norco on 4/27  #180 tablets and refilled Rx cyclobenzaprine all 3 refills sent on 3/18/25.  Jeimy Whyte RN on 5/13/2025 at 11:31 AM

## 2025-05-14 ENCOUNTER — OFFICE VISIT (OUTPATIENT)
Dept: FAMILY MEDICINE | Facility: OTHER | Age: 70
End: 2025-05-14
Attending: NURSE PRACTITIONER
Payer: COMMERCIAL

## 2025-05-14 VITALS
BODY MASS INDEX: 30.75 KG/M2 | SYSTOLIC BLOOD PRESSURE: 138 MMHG | HEART RATE: 96 BPM | TEMPERATURE: 97.9 F | WEIGHT: 184.6 LBS | OXYGEN SATURATION: 97 % | RESPIRATION RATE: 20 BRPM | HEIGHT: 65 IN | DIASTOLIC BLOOD PRESSURE: 82 MMHG

## 2025-05-14 DIAGNOSIS — R35.0 URINARY FREQUENCY: ICD-10-CM

## 2025-05-14 DIAGNOSIS — N18.31 STAGE 3A CHRONIC KIDNEY DISEASE (H): ICD-10-CM

## 2025-05-14 DIAGNOSIS — N30.00 ACUTE CYSTITIS WITHOUT HEMATURIA: Primary | ICD-10-CM

## 2025-05-14 LAB
ALBUMIN UR-MCNC: NEGATIVE MG/DL
APPEARANCE UR: ABNORMAL
BACTERIA #/AREA URNS HPF: ABNORMAL /HPF
BILIRUB UR QL STRIP: NEGATIVE
COLOR UR AUTO: YELLOW
GLUCOSE UR STRIP-MCNC: NEGATIVE MG/DL
HGB UR QL STRIP: NEGATIVE
KETONES UR STRIP-MCNC: NEGATIVE MG/DL
LEUKOCYTE ESTERASE UR QL STRIP: ABNORMAL
MUCOUS THREADS #/AREA URNS LPF: PRESENT /LPF
NITRATE UR QL: POSITIVE
PH UR STRIP: 6 [PH] (ref 5–9)
RBC URINE: 2 /HPF
SP GR UR STRIP: 1.01 (ref 1–1.03)
SQUAMOUS EPITHELIAL: 1 /HPF
UROBILINOGEN UR STRIP-MCNC: NORMAL MG/DL
WBC URINE: 40 /HPF

## 2025-05-14 PROCEDURE — 81001 URINALYSIS AUTO W/SCOPE: CPT | Mod: ZL | Performed by: NURSE PRACTITIONER

## 2025-05-14 PROCEDURE — G0463 HOSPITAL OUTPT CLINIC VISIT: HCPCS

## 2025-05-14 RX ORDER — CYCLOBENZAPRINE HCL 10 MG
10 TABLET ORAL 3 TIMES DAILY PRN
Qty: 30 TABLET | Refills: 2 | Status: SHIPPED | OUTPATIENT
Start: 2025-05-14

## 2025-05-14 RX ORDER — NITROFURANTOIN 25; 75 MG/1; MG/1
100 CAPSULE ORAL 2 TIMES DAILY
Qty: 14 CAPSULE | Refills: 0 | Status: SHIPPED | OUTPATIENT
Start: 2025-05-14 | End: 2025-05-21

## 2025-05-14 RX ORDER — NITROFURANTOIN 25; 75 MG/1; MG/1
100 CAPSULE ORAL 2 TIMES DAILY
Qty: 14 CAPSULE | Refills: 0 | Status: CANCELLED | OUTPATIENT
Start: 2025-05-14 | End: 2025-05-21

## 2025-05-14 NOTE — PROGRESS NOTES
Assessment & Plan   Problem List Items Addressed This Visit          Urinary    Stage 3a chronic kidney disease (H)     Other Visit Diagnoses         Acute cystitis without hematuria    -  Primary    Relevant Medications    nitroFURantoin macrocrystal-monohydrate (MACROBID) 100 MG capsule      Urinary frequency        Relevant Orders    UA Macroscopic with reflex to Microscopic and Culture (Completed)    Urine Culture           Urinalysis positive for infection.  She does not have chronic kidney disease stage III, kidney disease has been stable.  Treated with Macrobid twice daily for 7 days, urine culture pending.  Close follow-up if she develops any fevers, vomiting or other concerns.         Subjective   Malina is a 70 year old, presenting for the following health issues:  Urinary Problem    History of Present Illness       Reason for visit:  Bladder  Symptom onset:  1-3 days ago  Symptoms include:  Very bad pain in the bladder  Symptom intensity:  Severe  Symptom progression:  Staying the same  Had these symptoms before:  No  What makes it worse:  Worse-after I go to the bathroom  What makes it better:  It lets up for awhile then comes back Very Strong   She is taking medications regularly.          Genitourinary - Female  Onset/Duration: 3 weeks  Description:   Painful urination (Dysuria): YES           Frequency: YES  Blood in urine (Hematuria): No  Delay in urine (Hesitency): YES  Intensity: severe  Progression of Symptoms:  worsening  Accompanying Signs & Symptoms:  Fever/chills: No  Flank pain: No  Nausea and vomiting: No  Vaginal symptoms: none  Abdominal/Pelvic Pain: No  History:   History of frequent UTI s: YES  History of kidney stones: No  Sexually Active: No  Possibility of pregnancy: No  Precipitating or alleviating factors: None  Therapies tried and outcome:  none     Having darker color of urine, worsening pain            Objective    /82   Pulse 96   Temp 97.9  F (36.6  C)   Resp 20   Ht  "1.645 m (5' 4.75\")   Wt 83.7 kg (184 lb 9.6 oz)   LMP 04/29/1978 (Approximate)   SpO2 97%   BMI 30.96 kg/m    Body mass index is 30.96 kg/m .  Physical Exam   GENERAL: alert and no distress  EYES: Eyes grossly normal to inspection  RESP: lungs clear to auscultation - no rales, rhonchi or wheezes  CV: regular rate and rhythm, normal S1 S2  ABDOMEN: soft, suprapubic tenderness, no CVAT  NEURO: Normal strength and tone, mentation intact and speech normal  PSYCH: mentation appears normal, affect normal/bright    Results for orders placed or performed in visit on 05/14/25   UA Macroscopic with reflex to Microscopic and Culture     Status: Abnormal    Specimen: Urine, Midstream   Result Value Ref Range    Color Urine Yellow Colorless, Straw, Light Yellow, Yellow    Appearance Urine Slightly Cloudy (A) Clear    Glucose Urine Negative Negative mg/dL    Bilirubin Urine Negative Negative    Ketones Urine Negative Negative mg/dL    Specific Gravity Urine 1.013 1.000 - 1.030    Blood Urine Negative Negative    pH Urine 6.0 5.0 - 9.0    Protein Albumin Urine Negative Negative mg/dL    Urobilinogen Urine Normal Normal mg/dL    Nitrite Urine Positive (A) Negative    Leukocyte Esterase Urine Large (A) Negative    Bacteria Urine Few (A) None Seen /HPF    Mucus Urine Present (A) None Seen /LPF    RBC Urine 2 <=2 /HPF    WBC Urine 40 (H) <=5 /HPF    Squamous Epithelials Urine 1 <=1 /HPF    Narrative    Urine Culture ordered based on laboratory criteria             Signed Electronically by: JOSELYN Delatorre CNP    "

## 2025-05-14 NOTE — TELEPHONE ENCOUNTER
Called and left voice message for patient to return call x [phone]. Calling in regards to let pt know PCP approved script and it is at Counts include [facility] beds at the [facility].     Jessee Chamberlain RN on 5/14/2025 at 1:19 PM

## 2025-05-14 NOTE — TELEPHONE ENCOUNTER
Spoke with patient .  Patient states that she does not need Norco refilled yet , she had refilled on 4/27, but does need refill on Cyclobenzaprine.patient states that she was informed the pain is coming from her back and will need surgery.    Patient states she was not able to get in for an appt until 6/2 with Dr. Duran and will be out of Norco by then.  Looks like Dr. Duran has opening prior to that date patient transferred to scheduling.  Patient is scheduled to see Dr. Duran on 5/27/25.

## 2025-05-14 NOTE — TELEPHONE ENCOUNTER
Incoming call from patient. Writer spoke to patient after verifying last name and . Patient states she already picked up her prescription. States she was in this morning and had an antibiotic prescribed and she picked up her Flexerfil at the same time. Pt wanted to thank Dr. Duran for prescribing her that until her appt. Patient had no other questions or concerns.     Jessee Chamberlain RN on 2025 at 2:54 PM

## 2025-05-15 LAB — BACTERIA UR CULT: ABNORMAL

## 2025-05-19 ENCOUNTER — RESULTS FOLLOW-UP (OUTPATIENT)
Dept: FAMILY MEDICINE | Facility: OTHER | Age: 70
End: 2025-05-19

## 2025-05-26 ASSESSMENT — ANXIETY QUESTIONNAIRES
GAD7 TOTAL SCORE: 1
GAD7 TOTAL SCORE: 1
8. IF YOU CHECKED OFF ANY PROBLEMS, HOW DIFFICULT HAVE THESE MADE IT FOR YOU TO DO YOUR WORK, TAKE CARE OF THINGS AT HOME, OR GET ALONG WITH OTHER PEOPLE?: SOMEWHAT DIFFICULT
2. NOT BEING ABLE TO STOP OR CONTROL WORRYING: NOT AT ALL
3. WORRYING TOO MUCH ABOUT DIFFERENT THINGS: NOT AT ALL
6. BECOMING EASILY ANNOYED OR IRRITABLE: NOT AT ALL
4. TROUBLE RELAXING: NOT AT ALL
IF YOU CHECKED OFF ANY PROBLEMS ON THIS QUESTIONNAIRE, HOW DIFFICULT HAVE THESE PROBLEMS MADE IT FOR YOU TO DO YOUR WORK, TAKE CARE OF THINGS AT HOME, OR GET ALONG WITH OTHER PEOPLE: SOMEWHAT DIFFICULT
7. FEELING AFRAID AS IF SOMETHING AWFUL MIGHT HAPPEN: NOT AT ALL
5. BEING SO RESTLESS THAT IT IS HARD TO SIT STILL: NOT AT ALL
GAD7 TOTAL SCORE: 1
1. FEELING NERVOUS, ANXIOUS, OR ON EDGE: SEVERAL DAYS
7. FEELING AFRAID AS IF SOMETHING AWFUL MIGHT HAPPEN: NOT AT ALL

## 2025-05-26 ASSESSMENT — PAIN SCALES - PAIN ENJOYMENT GENERAL ACTIVITY SCALE (PEG)
INTERFERED_ENJOYMENT_LIFE: 9
INTERFERED_GENERAL_ACTIVITY: 9
AVG_PAIN_PASTWEEK: 9
PEG_TOTALSCORE: 9
INTERFERED_GENERAL_ACTIVITY: 9
PEG_TOTALSCORE: 9
INTERFERED_ENJOYMENT_LIFE: 9
AVG_PAIN_PASTWEEK: 9

## 2025-05-26 ASSESSMENT — PATIENT HEALTH QUESTIONNAIRE - PHQ9
10. IF YOU CHECKED OFF ANY PROBLEMS, HOW DIFFICULT HAVE THESE PROBLEMS MADE IT FOR YOU TO DO YOUR WORK, TAKE CARE OF THINGS AT HOME, OR GET ALONG WITH OTHER PEOPLE: EXTREMELY DIFFICULT
SUM OF ALL RESPONSES TO PHQ QUESTIONS 1-9: 3
SUM OF ALL RESPONSES TO PHQ QUESTIONS 1-9: 3

## 2025-05-27 ENCOUNTER — OFFICE VISIT (OUTPATIENT)
Dept: FAMILY MEDICINE | Facility: OTHER | Age: 70
End: 2025-05-27
Attending: FAMILY MEDICINE
Payer: COMMERCIAL

## 2025-05-27 VITALS
WEIGHT: 186.6 LBS | SYSTOLIC BLOOD PRESSURE: 158 MMHG | OXYGEN SATURATION: 97 % | HEART RATE: 94 BPM | DIASTOLIC BLOOD PRESSURE: 98 MMHG | BODY MASS INDEX: 29.99 KG/M2 | HEIGHT: 66 IN | RESPIRATION RATE: 14 BRPM

## 2025-05-27 DIAGNOSIS — G89.4 CHRONIC PAIN DISORDER: ICD-10-CM

## 2025-05-27 DIAGNOSIS — I10 PRIMARY HYPERTENSION: ICD-10-CM

## 2025-05-27 DIAGNOSIS — F41.9 CHRONIC ANXIETY: ICD-10-CM

## 2025-05-27 DIAGNOSIS — M79.18 MYOFASCIAL PAIN: ICD-10-CM

## 2025-05-27 DIAGNOSIS — M47.816 LUMBAR FACET ARTHROPATHY: ICD-10-CM

## 2025-05-27 DIAGNOSIS — G43.709 CHRONIC MIGRAINE WITHOUT AURA WITHOUT STATUS MIGRAINOSUS, NOT INTRACTABLE: ICD-10-CM

## 2025-05-27 DIAGNOSIS — F33.41 RECURRENT MAJOR DEPRESSIVE DISORDER, IN PARTIAL REMISSION: ICD-10-CM

## 2025-05-27 DIAGNOSIS — I47.10 SVT (SUPRAVENTRICULAR TACHYCARDIA): ICD-10-CM

## 2025-05-27 DIAGNOSIS — F39 MOOD DISORDER: ICD-10-CM

## 2025-05-27 DIAGNOSIS — M15.0 PRIMARY OSTEOARTHRITIS INVOLVING MULTIPLE JOINTS: ICD-10-CM

## 2025-05-27 DIAGNOSIS — M62.838 MUSCLE SPASM: ICD-10-CM

## 2025-05-27 DIAGNOSIS — M48.062 SPINAL STENOSIS OF LUMBAR REGION WITH NEUROGENIC CLAUDICATION: ICD-10-CM

## 2025-05-27 DIAGNOSIS — Z79.899 CONTROLLED SUBSTANCE AGREEMENT SIGNED: Primary | ICD-10-CM

## 2025-05-27 DIAGNOSIS — F11.20 CONTINUOUS OPIOID DEPENDENCE (H): ICD-10-CM

## 2025-05-27 PROCEDURE — G0463 HOSPITAL OUTPT CLINIC VISIT: HCPCS

## 2025-05-27 RX ORDER — METOPROLOL TARTRATE 25 MG/1
50 TABLET, FILM COATED ORAL 2 TIMES DAILY
Qty: 90 TABLET | Refills: 4 | Status: CANCELLED | OUTPATIENT
Start: 2025-05-27

## 2025-05-27 RX ORDER — HYDROCODONE BITARTRATE AND ACETAMINOPHEN 10; 325 MG/1; MG/1
1 TABLET ORAL EVERY 4 HOURS PRN
Qty: 180 TABLET | Refills: 0 | Status: SHIPPED | OUTPATIENT
Start: 2025-06-26

## 2025-05-27 RX ORDER — METOPROLOL SUCCINATE 25 MG/1
25 TABLET, EXTENDED RELEASE ORAL DAILY
Qty: 90 TABLET | Refills: 3 | Status: SHIPPED | OUTPATIENT
Start: 2025-05-27

## 2025-05-27 RX ORDER — DULOXETIN HYDROCHLORIDE 30 MG/1
30 CAPSULE, DELAYED RELEASE ORAL AT BEDTIME
Qty: 90 CAPSULE | Refills: 3 | Status: SHIPPED | OUTPATIENT
Start: 2025-05-27

## 2025-05-27 RX ORDER — HYDROCODONE BITARTRATE AND ACETAMINOPHEN 10; 325 MG/1; MG/1
1 TABLET ORAL EVERY 4 HOURS PRN
Qty: 180 TABLET | Refills: 0 | Status: SHIPPED | OUTPATIENT
Start: 2025-05-27

## 2025-05-27 RX ORDER — HYDROCODONE BITARTRATE AND ACETAMINOPHEN 10; 325 MG/1; MG/1
1 TABLET ORAL EVERY 4 HOURS PRN
Qty: 180 TABLET | Refills: 0 | Status: SHIPPED | OUTPATIENT
Start: 2025-07-26

## 2025-05-27 ASSESSMENT — PAIN SCALES - GENERAL: PAINLEVEL_OUTOF10: MILD PAIN (3)

## 2025-05-27 NOTE — PROGRESS NOTES
Assessment & Plan     (Z79.899) Controlled substance agreement updated 12/16/24  (primary encounter diagnosis)  (G89.4) Chronic pain disorder  (F11.20) Continuous opioid dependence (H)  (M48.062) Spinal stenosis of lumbar region with neurogenic claudication  (M47.816) Lumbar facet arthropathy  (M62.838) Muscle spasm  (M15.0) Primary osteoarthritis involving multiple joints  (M79.18) Myofascial pain  (G43.709) Chronic migraine without aura without status migrainosus, not intractable  (F39) Mood disorder  (F33.41) Recurrent major depressive disorder, in partial remission  (F41.9) Chronic anxiety  Comment:   -- Discussion again today regarding risk with combined benzodiazepine and chronic opioid therapy, especially with reasonably high doses as she has been prescribed historically.  Discussed that I do not prescribe long-term benzodiazepine and opioid medications in combination.  Recommend weaning off at least one of them and recommend weaning klonopin first which she is agreeable to and has been working on.  -- Decrease Klonopin to 0.5 mg per dose up to 2 doses per day for the next month, then reduce to no more than 1 dose per day until follow-up in 2 months.  Discussed that we may eventually wean this to 0.25 mg per dose to allow a continued slow wean.  I am both hopeful and optimistic that she will tolerate this better with the continued titration of Cymbalta.  It does seem that anxiety has improved significantly with the addition of Cymbalta.  -- Increase Cymbalta to 90 mg daily with 60 mg in the morning and 30 mg at night.  She already self discontinued Trintellix with no worsening in symptoms and actually has had improvement with Cymbalta titration.  Anticipate continued weaning of BuSpar since it has not provided noticeable symptom improvement in the past, though I will defer dose reduction until she is off Klonopin with reasonable symptom control.  Continue BuSpar 30 mg daily in the morning only for now.  --  Recommend eventually weaning hydrocodone to no more than 40 morphine equivalents daily, 4 of the 10 mg tablets and this was discussed again today.  She currently takes up to 6 tablets daily.  Anticipate deferral of this until Klonopin is completely weaned, though she is more intentionally taking this only 1 tablet at a time when she feels like she absolutely needs it with goal of gradually reducing this as tolerated, especially if she notices improvement with increasing Cymbalta.  I suspect she will pursue lumbar spine surgery in the near future as she has failed all other conservative management options.  -- Continue amitriptyline which has clearly been beneficial for sleep, mood, headaches, and chronic pain  -- May continue Flexeril for now, though I did encourage her to limit this to only when she feels that she really needs it for significant symptoms and she is agreeable  -- Consider adding carbamazepine or Topamax in the future which also will likely improve chronic pain control and migraines  -- Consider trial of mirtazapine or olanzapine at bedtime for mood symptoms if needed, though currently her mood symptoms are better controlled than they have been in a long time and these are deferred for now  -- Follow-up in 2 months  Plan: DULoxetine (CYMBALTA) 30 MG capsule,         HYDROcodone-acetaminophen (NORCO)  MG per        tablet, HYDROcodone-acetaminophen (NORCO)          MG per tablet, HYDROcodone-acetaminophen        (NORCO)  MG per tablet    (I10) Primary hypertension  (I47.10) SVT (supraventricular tachycardia)  Comment: Blood pressure is uncontrolled with intermittent symptomatic tachycardia since discontinuing metoprolol with no clear explanation for why this was removed from her medication list.  Recommend restarting metoprolol 25 mg daily consistently with plan to consider further increasing this to 50 mg daily as she was on in the past if needed to achieve good blood pressure  control and/for symptom control.  She is agreeable to this plan.  Follow-up with cardiology as well.  She is aware that she will need cardiology clearance to proceed with back surgery if that is offered.  Plan: metoprolol succinate ER (TOPROL XL) 25 MG 24 hr        tablet     Follow-up   Return in about 2 months (around 7/27/2025), or if symptoms worsen or fail to improve, for Chronic Disease Management, Medication Check/Recheck.        Subjective   Malina is a 70 year old, presenting for the following health issues:  RECHECK (Test Results, Meds)        5/27/2025     8:40 AM   Additional Questions   Roomed by MAULIK Munoz     History of Present Illness       Back Pain:  She presents for follow up of back pain. Patient's back pain is a chronic problem.  Location of back pain:  Right lower back, left lower back, right hip, left hip and other  Description of back pain: sharp  Back pain spreads: right thigh, left thigh, right knee, left knee, right foot and left foot    Since patient first noticed back pain, pain is: rapidly worsening  Does back pain interfere with her job:  Yes       Heart Failure:  She presents for follow up of heart failure. She is experiencing shortness of breath with rest and activity, which is much worse. She is experiencing lower extremity edema which is same as usual.   She denies orthopenea and is not coughing at night. Patient is not checking weight daily. She has recently had a weight decrease.  She has no side effects from medications.  She has had no other medical visits for heart failure since the last visit.    She eats 2-3 servings of fruits and vegetables daily.She consumes 0 sweetened beverage(s) daily.She exercises with enough effort to increase her heart rate 9 or less minutes per day.  She exercises with enough effort to increase her heart rate 7 days per week.   She is taking medications regularly.      Last Echo:   Echo result w/o MOPS: Interpretation SummaryGlobal and regional left  ventricular function is normal with an EF of 55-60%.Mild concentric wall thickening consistent with left ventricular hypertrophyis present.Left ventricular diastolic function is indeterminate.Global right ventricular function is normal.No significant valvular abnormalities present.IVC diameter <2.1 cm collapsing >50% with sniff suggests a normal RA pressureof 3 mmHg.No pericardial effusion is present.No significant changes noted.    Very pleasant 70-year-old female presents to clinic for chronic disease management and medication follow-up.  She has chronic pain that is multifactorial with relatively generalized osteoarthritis, myofascial pain, and claudication.  She mostly complains of chronic bilateral low back pain.  She had significant pain after CABG in 2003.  She reports that she was initially started on Xanax, methadone, and OxyContin after her CABG.  She was clearly mentally altered and endorsed a high feeling from some of these medications.  She reports that her family became concerned.  Ultimately these medications were adjusted.  She was subsequently on Valium and later switched to Klonopin.  Methadone and OxyContin were transitioned to hydrocodone which does not give her any of that high feeling and controls pain better than oxycodone.  Over time her dosages have decreased at least some with regard to the Klonopin.  She was previously on as much as 1 mg 4 times daily, reduced to 3 times daily, then an average of twice daily, and at her last visit this was reduced to a 0.5 mg tablet with planned continued gradual wean.  Some days she feels that she is fairly easily able to go without any Klonopin.  She essentially never takes it at bedtime anymore.  She is generally only using it if she feels like she is having a panic attack.  Sometimes she needs to take 2 of the 0.5 mg tablets with a panic attack but a majority of the time one of the tablets will adequately control it.  Since her last visit with me in  April she has almost never taken 3 tablets of the 0.5 mg dose in a single day, perhaps once or twice.  Her overall average is definitely less than 2 tablets/day currently.  She feels that a lot of her anxiety is triggered by her 's side of the family and she has significantly less contact with them now which has overall improved her degree of anxiety.  She was also having a lot of increased stress and anxiety regarding heart problems and not having an established primary care provider after her last provider relatively suddenly left the practice and this is also no longer an issue.  She is prescribed BuSpar 30 mg twice daily but reports that she stopped taking the evening dose 5 to 6 months ago.  She never felt that this medication helped much and she did not notice worsening in her anxiety with reducing this to once daily.  We have previously considered weaning this further but had been waiting until she is off Klonopin in case it was having some impact on her anxiety.  She was previously on Trintellix due to persistent depression that was not adequately controlled with trial of multiple other medications previously.  We started Cymbalta initially in conjunction with Trintellix with hope that it would improve chronic pain in addition to controlling mood symptoms better.  Plan had been to ultimately start weaning Trintellix as Cymbalta became therapeutic, though after her last visit she stopped the Trintellix on her own as she thought she was supposed to do that.  In April Cymbalta was increased to 60 mg daily and she has tolerated that well.  She actually feels that her depression is better controlled on Cymbalta alone.  She has not noticed significant improvement in chronic pain with the addition of Cymbalta at least yet.  She would be interested in further titrating this.  It does seem like this has probably improved her anxiety control overall as well and she has been able to wean Klonopin as recommended  as this has become therapeutic.  Amitriptyline was started in January 2025 with definite improvement in insomnia, mood symptoms, headaches, and some with pain.  She subsequently reduced melatonin from 15 mg nightly down to 10 mg nightly and she has continued to sleep well.  I have discussed with her at each visit the risk of chronic benzodiazepine and opioid medications in combination and that I will not continue to prescribe both chronically.  She has been open to working on a plan to gradually adjust these with goal of adequate symptom control and a safer combination of medications overall.  We are focusing on weaning the benzodiazepine first, though she has changed her hydrocodone dosing some.  She was previously always taking 2 tablets in the morning and spreading the other 4 tablets throughout the day consistently.  Now she is taking 1 tablet at a time only when she feels like she needs it with slight reduction in her overall dose.  She continues to use Flexeril as needed for muscle spasm.  She uses Voltaren gel as needed with some benefit, especially for knee and shoulder pain.  She has gotten benefit from a shoulder injection in the past, last was in November 2024.  Migraines have improved with amitriptyline.    In the last 6-12 months she has developed debilitating claudication with severe leg pain and weakness with minimal activity or walking including associated falls.  Initially it was felt that it was most likely neurogenic in nature though she does have known significant vascular disease.  She had an MRI of the lumbar spine showing advanced facet arthritis with only moderate spinal stenosis at L4-5 and nonsevere scattered foraminal stenosis.  She has not gotten significant improvement with injections.  She had follow-up with Dr. Navas who continued to consider neurogenic versus vascular etiology.  He recommended trial of an epidural injection to see if it improves symptoms at all which should help to  discern whether neurogenic claudication is contributing.  She had perhaps very mild pain reduction from the lidocaine briefly but no lasting benefit from the steroid.  She subsequently had vascular surgery consultation who did not feel that her claudication was vascular in nature.  She had normal ABIs recently.  She has follow-up scheduled with neurosurgery in about 2 weeks.  She is hopeful to pursue lumbar spine surgery as it seems she has exhausted all other conservative options at this point and symptoms are becoming more debilitating over time.  She is aware that she will need cardiac clearance from cardiology to proceed with this.    She does request a refill of metoprolol.  I could not find the details with regard to why this medication was discontinued from her medication list.  In 2024 it was increased from 25 mg daily to 50 mg daily.  In a January cardiology appointment this was discontinued from her medication list though there is no discussion in the note specifically recommending discontinuation of this or explanation for why.  She continued to take it until she ran out in March.  Subsequently she felt like she was having more episodes of rapid heartbeats.  A recent Zio patch showed paroxysmal SVT with no atrial fibrillation nor any other concerning arrhythmia.  Her blood pressure is more elevated today as well.  She did recently find an old bottle of the 25 mg tablets and started taking them intermittently if she was having faster heart rates with some benefit.  She would be agreeable to restarting this daily.  There is some documentation that she was having some episodes of dizziness which cardiology felt was due to inadequate hydration.  She has increased her hydration with complete resolution of those symptoms.  Overall, she has been feeling fairly well with regard to heart issues otherwise since she had stents placed in September 2024 and she had not been having frequent episodes of racing heart  "when she was taking the metoprolol consistently.  She has a history of significant cardiovascular disease including multiple MIs, history of CABG with recurrent atherosclerosis of bypass graft, stenting, chronic ischemic heart disease with preserved ejection fraction and no evidence of diastolic dysfunction reported on last echo though diastolic function was difficult to determine, subclavian artery stenosis, and history of CVA with mild chronic ischemic disease on imaging in 2021.  There is no mention of CHF in her cardiology notes recently.  She is not on diuretic therapy.  She has some chronic dyspnea on exertion that is stable.  She does feel sometimes like there is pain when she tries to take a deep breath since the metoprolol was discontinued.  She has not been needing nitroglycerin recently.  She had been using it much more frequently before the stents were placed in September.  She takes an 81 mg aspirin and Plavix daily.  She has a history of PE that was treated with Xarelto.  This was discontinued by cardiology in March 2024 as she did not have any other history of DVT or PE and had chronic anemia with intermittent gross hematuria and there has been no evidence of recurrent DVT or PE.  She does have central sleep apnea and declines CPAP.    She was also recently diagnosed with macular degeneration and is getting monthly injections in her left eye in [place].                Review of Systems  Pertinent positives and negatives as per HPI, otherwise negative.        Objective    BP (!) 158/98   Pulse 94   Resp 14   Ht 1.664 m (5' 5.5\")   Wt 84.6 kg (186 lb 9.6 oz)   LMP 04/29/1978 (Approximate)   SpO2 97%   Breastfeeding No   BMI 30.58 kg/m    Body mass index is 30.58 kg/m .  Physical Exam     General: alert and oriented x 3, no acute distress, pleasant, conversant  Head: normocephalic, atraumatic  Musculoskeletal/Extremities: rises independently from the chair with obvious discomfort, holds onto the " counter or exam table to ambulate across the room  Psych: mood good, anxiety seems noticeably improved with reported intermittent anxiety/panic symptoms, denies SI            I spent a total of 69 minutes on the patient's care today including preparing for the visit, the visit, documentation, and follow-up care. This does not include any procedure time.    The longitudinal plan of care for the diagnosis(es)/condition(s) as documented were addressed during this visit. Due to the added complexity in care, I will continue to support Malina in the subsequent management and with ongoing continuity of care.    Signed Electronically by: Kenia Duran MD

## 2025-05-27 NOTE — PATIENT INSTRUCTIONS
Try using no more than 1 tablet of the klonopin at a time with panic attack and second dose only if it is not improving.  No more than 2 per day for the next month, then no more than 1 per day for the next month.  Follow-up with me in 2 months again.    Continue cymbalta (duloxetine) 60 mg in the morning, add another 30 mg capsule at night, new prescription sent for that.  We can consider increasing the night dose to 60 mg at your next visit.    Restart metoprolol 25 mg daily.  We can consider increasing this to 50 mg at your next visit if you are still having episodes of racing heart and/or if your BP remains elevated.

## 2025-05-27 NOTE — NURSING NOTE
"Chief Complaint   Patient presents with    RECHECK     Test Results, Meds       Initial BP (!) 158/98   Pulse 94   Resp 14   Ht 1.664 m (5' 5.5\")   Wt 84.6 kg (186 lb 9.6 oz)   LMP 04/29/1978 (Approximate)   SpO2 97%   Breastfeeding No   BMI 30.58 kg/m   Estimated body mass index is 30.58 kg/m  as calculated from the following:    Height as of this encounter: 1.664 m (5' 5.5\").    Weight as of this encounter: 84.6 kg (186 lb 9.6 oz).  Medication Review: complete    The next two questions are to help us understand your food security.  If you are feeling you need any assistance in this area, we have resources available to support you today.          4/1/2025   SDOH- Food Insecurity   Within the past 12 months, did you worry that your food would run out before you got money to buy more? N   Within the past 12 months, did the food you bought just not last and you didn t have money to get more? N         Health Care Directive:  Patient has a Health Care Directive on file      Cecilia Wallis LPN      "

## 2025-06-09 ENCOUNTER — OFFICE VISIT (OUTPATIENT)
Dept: ORTHOPEDICS | Facility: OTHER | Age: 70
End: 2025-06-09
Attending: STUDENT IN AN ORGANIZED HEALTH CARE EDUCATION/TRAINING PROGRAM
Payer: COMMERCIAL

## 2025-06-09 DIAGNOSIS — M48.061 SPINAL STENOSIS OF LUMBAR REGION, UNSPECIFIED WHETHER NEUROGENIC CLAUDICATION PRESENT: Primary | ICD-10-CM

## 2025-06-09 PROCEDURE — G0463 HOSPITAL OUTPT CLINIC VISIT: HCPCS

## 2025-06-09 NOTE — PROGRESS NOTES
HPI    Pleasure seeing Malina and her  in the [facility] today.    As you know she is a wonderful 70-year-old female who we have seen several times regarding her back and bilateral lower extremities.  We previously seen her on 414 for follow-up after associated epidural steroid injection.  She really only received 20 to 30 minutes of relief with that associated injection which time the pain completely came back.  We sent her to see her vascular doctor to ensure this was not a vascular claudication type scenario given her underlying vasculopathic pathology and they are convinced that it does not.  The patient is really at the end of her rope at this point and is exceedingly interested in pursuing some kind of intervention to address her pathology given the fact that her level of function is only 10% and the associated leg pain is a 10 out of 10 at times and really inhibiting her quality of life.  This is all occurring and progressing in the setting of extensive alternative care in the form of anti-inflammatories, injections, PT, host of other over-the-counter prescription remedies to the again to the point that she really has no quality of life and that she would rather not be here then get the associated surgery.    Physical exam is really unchanged from prior.  84 kg.  Neutral sagittal coronal alignment no long track signs full strength in upper and lower extremity myotomes.  Full sensation upper and lower extremity dermatomes with the exception of bilateral legs where she has some dysesthesia in her seems to be an L4-5 distribution.  She does have a positive tension sign on the right and on the left at about 30 degrees.  She has a claudicatory sense of weakness to have her stand for the duration of the examination.  She has a significantly antalgic gait.    MRI    MRI lumbar spine was available for review.  This MRI delineates approximately 4 to 5 mm spondylolisthesis at the L4-5 level  with associated facet arthropathy inducing moderate bilateral foraminal stenosis and associated lateral recess stenosis concordant with her associated symptomatology.    Assessment    Malina is a pleasant 70-year-old female presenting with severe and functional debilitating back and bilateral lower extremity pain secondary to concordant pathology and imaging.  We an extensive discussion very natural history of her associate condition and response to prognostic value with conservative alternative therapy.  I think at this point given the fact that we have ruled out vascular claudication as the source of her pathology the patient is interested in pursuing posterior lumbar decompression instrumented fusion to address her underlying pathology.    The patient is presenting with severe and functional debilitating back and leg pain/weakness/heaviness due to nerve root compression and spinal instability at the L4-5 Level  that has progressed despite extensive conservative therapy and objective concordant pathology on imaging delineating a source of the patient s life altering symptoms.  Therefore, given the patient's persistent and debilitating symptoms, surgery was offered as an alternative to more conservative therapy and the associated risks, benefits, and alternatives of a posterior lumbar decompression and fusion was discussed at length.  We talked about the time of surgery duration and the likely destination of the patient postoperatively in the hospital but potentially home.  We talked about the possible need for an ICU stay.  We talked about possibly needing a drain postoperatively. We talked about the high likelihood of relief of leg pain and possible return to normal activities; we also discussed that while surgery may help relieve back pain, the surgery is designed to treat leg pain not back pain. Additionally, we discussed that when the nerve root is decompressed, the patient will have the ability to regain  strength, but that it may takes six months or more to regain strength, and furthermore, the amount of strength regained is predicated based on the severity of the weakness pre-operatively, the length of time the weakness has been present, and the effort put forth by the patient in postoperative rehabilitation. I also discussed with the patient that resolution of numbness is unpredictable, and this should not be a major reason why the patient elects to have surgery. We talked about the small risks of neurologic injury, including a risk of injury to the nerve root or roots. We talked about the small risks of neurologic injury, including a risk of injury to the nerve root, or even possibly an injury to multiple nerve roots.  We discussed that the level of this surgery is below the spinal cord.   We discussed at length the risk of a possible foot drop, which means temporary or permanent weakness in the muscles required to dorsiflex the foot.  We also discussed the risk of failure of fusion and the fact that some patients do not develop symptoms from this, but (the possibility of requiring a subsequent surgery if the fusion does not heal and symptoms recur). We talked about the potential for developing adjacent segment disease at other levels as well as the likelihood of soreness and difficulty with swallowing following surgery and the risks of anesthesia. We talked about the other risks of the procedure beside neurologic injury which include spinal fluid leak, infection and or meningitis, wound breakdown, excessive bleeding, blindness, sexual dysfunction, injury to neighboring organs, need for further surgery, bowel and bladder dysfunction, instability, and autonomic nervous system dysfunction. The patient was also counseled further as to the possible adverse consequences of or relating to posterior lumbar surgery including the above mentioned swallowing difficulties secondary to the breathing tube with the possible  need for tube feeding, voice changes and hoarseness, postoperative hematoma, vascular injury, and the possibility of medical complications from surgery including but not limited to a stroke, a heart attack, blood clot in the leg or lungs, pneumonia, aspiration, and even death. We discussed the use of posterior lumbar screws, rods, and occasionally interbody cages and the FDA status of instrumentation.      BONE GRAFT  We also discussed the different graft options. We discussed the use of both allograft bone and local bone for the fusion. We discussed that the fusion rate is slightly higher with the use of iliac crest autograft, but due to the increase in morbidity we often elect  to proceed with allograft and local autograph but there is the possibility of using autograft from a separate fascial incision including the iliac crest. We discussed the risk of disease transmission from using allograft bone. We discussed the complications of iliac crest bone graft harvest including significant initial pain, and possible persistent pain at the graft harvest site, as well as infection, hematoma, pelvis fracture, damage to the surrounding nerves and vessels, and the need for revision surgery at the graft harvest site    ANTI-INFLAMMATORIES   The patient was instructed to stop all NSAIDS, or anti-inflammatory medications, 5 days prior to surgery, and remain off of them for 8 weeks after surgery unless specifically discussed with your surgeon and prescribing physician.  These medications include Aleve, Motrin, Advil, Celebrex, Naprosyn, Mobic, )    ANTICOAGULATION  The patient was instructed that if they were to start or continue taking any  blood thinning medications prescribed by another physician, it is the expectation that this physician will continue to manage this medication before and after surgery within the following guidelines.  Discontinue all blood thinning medications like Xarelto/Rivaroxaban, Eliquis/Apixaban,  Coumadin/warfarin, Lovenox, Heparin, Clopidogrel/Plavix, Aspirin, Pradaxa/Dabigatran, or Arixtra/ Fondaparinux 5 days prior to surgery and resume them 5 days after surgery unless otherwise discussed directly with your surgeon or managing physician.    At this point the patient would like to move forward with the surgery offered above.  A packet clearly outlining the pre and postoperative pathway associated with surgery was provided to the patient that also contained extensive information pertaining to the patient s disease process, surgical intervention, and the associated risks involved.  All their questions were answered.    Plan    L4-5 PLDF

## 2025-06-18 DIAGNOSIS — F41.0 PANIC ATTACK: ICD-10-CM

## 2025-06-18 DIAGNOSIS — F41.9 CHRONIC ANXIETY: ICD-10-CM

## 2025-06-18 DIAGNOSIS — Z79.899 CONTROLLED SUBSTANCE AGREEMENT SIGNED: ICD-10-CM

## 2025-06-18 DIAGNOSIS — F39 MOOD DISORDER: ICD-10-CM

## 2025-06-18 RX ORDER — CLONAZEPAM 0.5 MG/1
0.5 TABLET ORAL 3 TIMES DAILY PRN
Qty: 90 TABLET | Refills: 1 | Status: CANCELLED | OUTPATIENT
Start: 2025-06-18

## 2025-07-21 DIAGNOSIS — G43.719 INTRACTABLE CHRONIC COMMON MIGRAINE WITHOUT AURA: ICD-10-CM

## 2025-07-23 NOTE — TELEPHONE ENCOUNTER
[id number] sent Rx request for the following:      Requested Prescriptions   Pending Prescriptions Disp Refills    rimegepant (NURTEC) 75 MG ODT tablet 8 tablet 2     Sig: PLACE 1 TAB UNDER TONGUE DAILY AS NEEDED FOR MIGRAINE. MAX OF 1 TAB EVERY 48 HRS & 2 PER WEEK       There is no refill protocol information for this order        Last Prescription Date:   5/5/25  Last Fill Qty/Refills:         8, R-2    Intractable chronic common migraine without aura [G43.719]     Last Office Visit:              5/27/25   Future Office visit:           7/28/25    Per LOV note:  Follow-up   Return in about 2 months (around 7/27/2025), or if symptoms worsen or fail to improve, for Chronic Disease Management, Medication Check/Recheck.    Unable to complete prescription refill per RN Medication Refill Policy. Kathleen Puckett, Refill RN .............. 7/23/2025  8:46 AM

## 2025-07-24 ENCOUNTER — OFFICE VISIT (OUTPATIENT)
Dept: CARDIOLOGY | Facility: OTHER | Age: 70
End: 2025-07-24
Attending: INTERNAL MEDICINE
Payer: COMMERCIAL

## 2025-07-24 VITALS
BODY MASS INDEX: 31.24 KG/M2 | WEIGHT: 190.6 LBS | OXYGEN SATURATION: 96 % | SYSTOLIC BLOOD PRESSURE: 124 MMHG | TEMPERATURE: 97.9 F | RESPIRATION RATE: 24 BRPM | DIASTOLIC BLOOD PRESSURE: 82 MMHG | HEART RATE: 89 BPM

## 2025-07-24 DIAGNOSIS — Z91.199 NONCOMPLIANCE WITH CPAP TREATMENT: ICD-10-CM

## 2025-07-24 DIAGNOSIS — I77.1 SUBCLAVIAN ARTERY STENOSIS, LEFT: ICD-10-CM

## 2025-07-24 DIAGNOSIS — R06.09 DOE (DYSPNEA ON EXERTION): ICD-10-CM

## 2025-07-24 DIAGNOSIS — F41.9 CHRONIC ANXIETY: ICD-10-CM

## 2025-07-24 DIAGNOSIS — Z98.890 STATUS POST CORONARY ANGIOGRAM: Primary | ICD-10-CM

## 2025-07-24 DIAGNOSIS — I10 PRIMARY HYPERTENSION: ICD-10-CM

## 2025-07-24 DIAGNOSIS — Z95.5 PRESENCE OF STENT IN CORONARY ARTERY IN PATIENT WITH CORONARY ARTERY DISEASE: ICD-10-CM

## 2025-07-24 DIAGNOSIS — Z86.73 HISTORY OF CVA (CEREBROVASCULAR ACCIDENT): ICD-10-CM

## 2025-07-24 DIAGNOSIS — N18.31 STAGE 3A CHRONIC KIDNEY DISEASE (H): ICD-10-CM

## 2025-07-24 DIAGNOSIS — E78.2 MIXED HYPERLIPIDEMIA: ICD-10-CM

## 2025-07-24 DIAGNOSIS — R73.03 PREDIABETES: ICD-10-CM

## 2025-07-24 DIAGNOSIS — R00.2 PALPITATIONS: ICD-10-CM

## 2025-07-24 DIAGNOSIS — Z87.891 HISTORY OF TOBACCO ABUSE: ICD-10-CM

## 2025-07-24 DIAGNOSIS — Z86.711 HISTORY OF PULMONARY EMBOLISM: ICD-10-CM

## 2025-07-24 DIAGNOSIS — Z95.1 HISTORY OF CORONARY ARTERY BYPASS GRAFT X 3: ICD-10-CM

## 2025-07-24 DIAGNOSIS — I25.10 ASCVD (ARTERIOSCLEROTIC CARDIOVASCULAR DISEASE): ICD-10-CM

## 2025-07-24 DIAGNOSIS — I25.708 ATHEROSCLEROSIS OF CORONARY ARTERY BYPASS GRAFT OF NATIVE HEART WITH STABLE ANGINA PECTORIS: ICD-10-CM

## 2025-07-24 DIAGNOSIS — I25.10 PRESENCE OF STENT IN CORONARY ARTERY IN PATIENT WITH CORONARY ARTERY DISEASE: ICD-10-CM

## 2025-07-24 PROCEDURE — G0463 HOSPITAL OUTPT CLINIC VISIT: HCPCS

## 2025-07-24 ASSESSMENT — PAIN SCALES - GENERAL: PAINLEVEL_OUTOF10: SEVERE PAIN (8)

## 2025-07-24 NOTE — NURSING NOTE
"Chief Complaint   Patient presents with    FU test   Patient presents to the Clinic today for a follow-up on testing. Patient reports having chest pain and a racing heart.    Initial /82 (BP Location: Right arm, Patient Position: Sitting, Cuff Size: Adult Regular)   Pulse 89   Temp 97.9  F (36.6  C) (Temporal)   Resp 24   Wt 86.5 kg (190 lb 9.6 oz)   LMP 04/29/1978 (Approximate)   SpO2 96%   BMI 31.24 kg/m   Estimated body mass index is 31.24 kg/m  as calculated from the following:    Height as of 5/27/25: 1.664 m (5' 5.5\").    Weight as of this encounter: 86.5 kg (190 lb 9.6 oz).  Medication Review: complete    The next two questions are to help us understand your food security.  If you are feeling you need any assistance in this area, we have resources available to support you today.          4/1/2025   SDOH- Food Insecurity   Within the past 12 months, did you worry that your food would run out before you got money to buy more? N   Within the past 12 months, did the food you bought just not last and you didn t have money to get more? N         Health Care Directive:  Patient has a Health Care Directive on file      Andie Collazo      "

## 2025-07-24 NOTE — PROGRESS NOTES
[facility] HEART [facility]   CARDIOLOGY PROGRESS NOTE     Chief Complaint   Patient presents with    FU test          Diagnosis:  1. Cath.     -x19 stents.    -Absorbing stent to LAD on 9/24/2024, [facility].   -7/1/2024.  No stent.  RAFI/SVG to %.  LIMA to LAD 0%.  75% to pLCx, nondominant.  -9/25/17, [facility], stable dz.   -LIMA open but occluded RAFI/SVG.    -LM irregularities, ramus 40%, LAD 30%, LCx 30%, and RCA 40%.  2. CORTEZ.  3. CABG x3-2/12/2003.    -LIMA to LAD, RAFI to RCA, and SVG to OM.    4. Palpitations.    -SVE/VE on Zio from 3/27/2023.  5. PE on 1/2/20.     -Xarelto.  -RLL, MICHELLE and LLL.  6. COPD.  -Normal on 1/11/2022.  7. HTN-controlled  8. Lightheadedness-episodic.  9.  Cardiac cath on 9/25/2017-[facility].   -No significant dz on 12/12/19.  10. Iron deficiency anemia.  -Resolved.  11.  CVA.  -Mild chronic ischemic cerebral disease on 8/20/2021.  12. Left-sided subclavian steal syndrome.   -Stenting with patency as noted on 9/15/17.  13.  Tobacco abuse.  -Quitting 8/23/17.   14.  H/O alcohol abuse.  15.  Hyperlipidemia.  -Uncontrolled.  16.  Bradycardia with the slowest heart rate of 50 bpm on 7/30/2021.  17.  Dizziness/lightheadedness secondary to dehydration.  18.  B12 deficiency.    -313 on 3/18/2020.  19.  Vitamin D deficiency.  20.  CKD-2/3.       Assessment/Plan:   1.  Palpitations: Here in follow-up to her Zio patch completed on 2/20/2025. No concerning findings identified. She had x9 runs of SVT up to 14 beats.  She had less than 1% of SVE and VE.  Symptoms corresponded to VE and sinus rhythm.  Previously, she was seen on 1/28/2025.  She had mentioned that anytime she would get up and walk, she would have 2 different sensation of heartbeats.  One like hammering and the other like a sledgehammer.  It makes it difficult for her to walk.  She has increased her fluid consumption with less constipation. She has a hard time walking from the couch to the bathroom or to her kitchen.  Her EKG on 1/20/2025 showed  "sinus rhythm.  The results of her monitor were discussed with the patient.  Patient believes her symptoms are from her stress/anxiety.  She has cut back on her antianxiety medications and had a challenging upbringing.  She has been increasingly anxious.  Findings of the monitor were discussed  2.  Surgical restratification: Patient okay to proceed with lower back surgery as planned.  No additional testing planned.  Denies any anginal symptoms.  3.  CAD: History of stenting and bypass.  Patient believes she has had 19 stents.  Denying chest discomfort or angina.  No changes.  Continue medical management.  4.  Follow-up in the 1 year or sooner with issues.    Interval history:  See above.    HPI:    Ms. Day is being seen by cardiology in follow-up.  Patient has a history of chronic stable angina, known ischemic heart disease, hypertension, hyperlipidemia, history of pulmonary embolism, left subclavian artery stenosis, anxiety, collagenous colitis, depression, osteoarthritis, history of tobacco use, central sleep apnea for which she is not compliant with CPAP use, iron deficiency anemia, CKD, myofascial pain, vitamin D deficiency, lumbar facet arthropathy, history of gastric ulcer with hemorrhage, COPD and peptic ulcer disease.    Malina continues to endorse dizziness.  She states when she gets up she starts to feel dizzy like being off balance, will have a headache, her heart will pound and pulse increase.  She will be short of breath.  At times she has chest discomfort.  This has been going on for 1 to 2 years.      She was seen in the ER and admitted for a day on 7/16/2021.  She was felt to have symptomatic bradycardia/hypotension.  She felt \"woozy\" with a blood pressure of 77/52 at home.  She reports getting fluids and feeling much better, resolution of symptoms.  She felt her symptoms had gotten worse over the last 2 to 3 weeks.  Heart rates were noted to be between 49-52 in the ER.  Troponin negative and EKG " showed heart rates in the 50's.    She was seen back in the ED on 7/22/2021 for lightheadedness once again.  She actually fell and was having concerning pain in her lungs.  She has a history of a PE and is on Xarelto.  No identifiable cause was found.  There was concern that her symptoms may be anxiety related.    Today, she reports continuing to feel these symptoms of dizziness.  She has a hard time describing them.  She feels her heart pounding, she has a headache, she is dizzy, she gets chest pain.  She also continues to be dyspneic on exertion.  She has been using nitro regularly with some relief.  She has a history of asthma and suspected COPD.  She has seen relief with nitro as well as the albuterol.  She was on Symbicort in the past with benefit but was discontinued for unknown reasons.  She drinks 2 glasses of water and 3 cups of coffee a day.  I believe she is chronically dehydrated which plays a part in her symptoms.  She has tried to increase her fluid intake.  She is to double and preferably triple her fluid intake.  She should cut back/discontinue coffee intake.  With symptoms of chest pain, shortness of breath, and history of blood clot, will plan for VQ scan.  We will also get a chest x-ray.  She describes regular palpitations and acceleration in her heart rates.  We will plan for Zio patch.  She did have a MCOT on 7/30/2021.  Both asymptomatic and symptomatic events include sinus bradycardia, sinus rhythm, sinus tachycardia, occasional PVC's and PAC's.  No other significant rhythms were identified.  We discussed Eliquis versus Xarelto.  It was decided we switched to Eliquis as I seen less bleeding on this medication compared to Xarelto.  Related to shortness of breath, history of asthma, and presumptive COPD, referral was placed to pulmonary.  Had intended to prescribe Spiriva but she is allergic and this was not prescribed.  Patient also describes weakness.  She has been using nitro with resolution  "of her chest pain.  She does have a history of bypass but had a cath in 2017 with only mild disease followed.  I am concerned at this point.  I am concerned that her chest discomfort is more of a lung issue than a heart issue.     Patient has a known history of ischemic heart disease, she underwent  coronary angiogram and bypass angiogram on 9/15/2017 which was described as having stable disease. Mild to moderate 3 vessel CAD involving RCA, LAD and LCX with <50% stenosis at the greatest.  Occluded RAFI and SVG.  Patent LIMA.  No new obstructive lesions were identified and normal left heart cath.       She has a history of CABG on 2/12/03 x 3, history of multiple stent placements, patient reported 17 total.       At previous visit patient reported occasional recurrence of chest pain, not as severe as last ED visit on 10/25/19. She reported \"my breathing has been bad\", describes worsening CORTEZ. She described activity intolerance and little to no energy. Recommended repeat stress testing. NM Lexiscan stress test was performed on 12/16/19 which was negative for inducible myocardial ischemia or infarction.  Left ventricular function was normal.     Carotid US on 12/12/19 without significant stenosis, mild atherosclerotic disease present.  Abdominal ultrasound on 12/12/2018 with no abdominal aortic aneurysm identified.     Patient returned to the ED with dyspnea in early January 2020. She was identified to have small segmental and subsegmental emboli bilaterally. Repeat imaging on 1/6 with decreasing volume of pulmonary emboli compared to scan on 1/2/2020. She was initially treated with Lovenox in the ED and discharged on Xarelto oral anticoagulation which has been going well for her, no bleeding complication. She also remains on Plavix with her significant ischemic heart disease history.      She presented to the ED on 3/16/2021 with reports of chest pain, chronic stable angina.  No ACS.  EKG stable and no elevation in " troponin's. Patient admits that her BP was low and it was suspected she was dehydrated, she felt better following IV fluids and reports that this likely brought on her angina.  She is currently working on maintaining good hydration.  She denies any recurrence of acute chest pain or pressure today.  No use of nitroglycerin since her recent ED visit.      Relevant testing:  ABIs 3/14/2025:  Right lower extremity borderline ARYA of 0.91.  Left lower extremity abnormal ARYA of 0.90.     Zio patch on 2/28/25:  Worn for 13 days and 0 hr's.  After removing artifact, total time was 12 days and 23 hr's. Placed on 2/28/2025 at 2:44 PM and completed on 3/13/2025 at 4:06 PM.  Underlying rhythm was sinus.  Hrt rate ranged from 53 bpm, maximum heart rate of 158 bmp, averaging 74 bmp.  No significant bradycardia, pauses, Mobitz type II or 3rd degree heart block.  No atrial fibrillation on this study.  x13 triggered events and x11 diary entries.  These corresponded to VE and sinus rhythm.  x0 runs of VT.    x9 runs of SVT lasting up to 14 beats with a maximum heart rate of 158 bmp.  Rare, <1% of PAC's, atrial couplets, atrial triplets, and PVC's.  + episodes of ventricular bigeminy lasting up to 3.8 sec's.  0 episodes of ventricular trigeminy.      Cardiac cath on 9/24/2024:  Severe multivessel obstructive CAD  LIMA-LAD patent  Hemodynamically insignificant ostial Lcx moderate disease with dPR 0.98  Severe LAD disease proximal to the LIMA-LAD touchdown with severe ISR with successful IVUS guided PCI to the LAD with a 3.0 x 16mm Synergy XD stent successfully post dilated to 3.5 in the proximal segment. The more distant severe stenosis was left in order to protect the integrity of the LIMA from significant competitive flow.  Successful uncomplicated R radial access with hemostasis obtained with TR band placement.     [facility] on 7/1/2024:    Right sided filling pressures are normal.    Left sided filling pressures are  normal.    Normal PA pressures.    Normal cardiac output level.    Hemodynamic data has been modified in Epic per physician review.     Severe two vessel CAD with prior CABG LIMA to LAD, RAFI to RCA, SVG to OM and prior PCI to the left main, LAD, and LCx.  Patent LIMA to LAD.  Occluded RAFI and SVG.  Patent stents.  Left main stent with minimal ISR, proximal LAD stent with moderate ISR, mid LAD stent with severe ISR, and ostial LCx stent with severe ISR.    Stress test on 4/18/24:    The nuclear stress test is negative for inducible myocardial ischemia or infarction.     Left ventricular function is normal.     The left ventricular ejection fraction at rest is 66%.  The left   ventricular ejection fraction at stress is 64%.     A prior study was conducted on 9/15/2021.     Echocardiogram on 7/31/2023:  Global and regional left ventricular function is normal with an EF of 55-60%.  Left ventricular diastolic function is indeterminate.  Mild concentric wall thickening consistent with left ventricular hypertrophy  is present.  Global right ventricular function is normal.  No significant valvular abnormalities present.  IVC diameter <2.1 cm collapsing >50% with sniff suggests a normal RA pressure of 3 mmHg.  No pericardial effusion is present.  No significant changes noted.    Zio patch on 3/27/2023:  Patient's monitor was in place for 13 days and 16 hours.  Minimum heart rate 42 bpm, average heart rate 62 bpm, maximum heart rate 167 bpm. Rhythm/s present include sinus, SVT. There were rare ventricular ectopic beats accounting for <1% of all beats. There were 0  ventricular triplets and rare couplets. There were  rare supraventricular ectopic beats.  There were 12 triggered events and 14 diary entries during which time the noted rhythms were sinus, SVE, VE.  There were 7 episodes of SVT with the fastest lasting 4 beats with a max rate of 167 bpm.  Review of actual tracings showed no other significant abnormality.    V/Q  scan on 11/23/21:  No evidence of pulmonary emboli.    CXR 11/23/21:  Probable air trapping.    PFT 12/8/21:  No obstructive or restrictive disease is noted, moderate diffusion defect is noted consistent with pulmonary vascular disease     Stress test on 9/15/2021:    The nuclear stress test is negative for inducible myocardial ischemia or infarction.     Left ventricular function is normal.     The left ventricular ejection fraction at rest is 64%.  The left   ventricular ejection fraction at stress is 66%.     A prior study was conducted on 12/16/2019.     Echo on 9/15/2021:  Global and regional left ventricular function is normal with an EF of 55-60%.  Right ventricular function, chamber size, wall motion, and thickness are normal.  Pulmonary artery systolic pressure is normal.  The inferior vena cava is normal.  No pericardial effusion is present.  No significant changes noted.    MRI brain on 8/20/2021:  A few small nonspecific white matter signal abnormalities  are present, likely sequela of mild chronic small vessel ischemic disease. The exam is otherwise unremarkable.    MCOT from 7/30/21:  Findings: Patient's monitor was in place for 9 days and 7 hours.  Minimum heart rate 50 bpm, average heart rate 63 bpm, maximum heart rate 120 bpm. Rhythm/s present include sinus rhythm.  There were 37 symptomatic events during which time the noted rhythms were sinus rhythm, sinus bradycardia and sinus tachycardia.  There were six asymptomatic events during which time the noted rhythms were sinus rhythm.  There was rare atrial ectopy and rare ventricular ectopy.  Review of actual tracings showed no other abnormality.    ARYA on 7/17/20:  The right ankle-brachial index is 1.17.  Left ankle-brachial index is 0.98.    Stress test on 12/16/19:    The nuclear stress test is negative for inducible myocardial ischemia   or infarction.     A small amount of soft tissue artifact is present, more pronounced at   rest.     Left  ventricular function is normal.     The left ventricular ejection fraction at rest is 79%.  The left   ventricular ejection fraction at stress is 72%.     A prior study was conducted on 6/30/2015.     US carotids on 12/12/19:  No ultrasound evidence of hemo-dynamically significant stenosis.  Mild atherosclerotic disease.    US abdominal aorta on 12/12/19:  No abdominal aortic aneurysm.      ECHO on 5/13/19:  Global and regional left ventricular function is normal with an EF of 60-65%.  Mild concentric wall thickening consistent with left ventricular hypertrophy  is present.  Right ventricular function, chamber size, wall motion, and thickness are  normal.  No significant valvular abnormalities were noted.  Previous study not available for comparison.    Cardiac cath on 3/30/13:  LEFT MAIN: Widely patent stent.   LEFT ANTERIOR DESCENDING: Widely patent proximal stent. There is an 85% to  90% mid lesion after the second diagonal and prior to the LIMA insertion as before.   CIRCUMFLEX: There is diffuse 60% to 70% disease throughout, the ostium is 70   to 75, and the OM2 is a 70, but it is very diffuse disease and basically   unchanged from previous.   RCA: Widely patent stents with only mild irregularities.   LEFT VENTRICULOGRAM: Normal left ventricular ejection fraction, LVEF 60%,   with no focal wall motion abnormality. LV pressure 146/29.   FLORENTINO to LAD: Widely patent, although there is less flow than before, and, in   fact, the retrograde filling of the LIMA from the antegrade vessel. There is   a severe subclavian stenosis that a pressure gradient revealed in the aorta   was 153/78, and in the right subclavian was 100 to 106/73, for a nearly 50 mm   pressure gradient. The stenosis was 75% to 80%.   RAFI to RCA: 100%.   SVG to OM: 100%.                   No diagnosis found.                  Past Medical History:   Diagnosis Date    Acute ischemic heart disease (H)     06/15/2007,with PTCA and stenting of 90% osteo  circumflex lesion and patent LAD graft, patent left main stent.    Acute myocardial infarction (H)     3/30/2013    Anxiety disorder     No Comments Provided    Atherosclerotic heart disease of native coronary artery without angina pectoris     -angio revealed 3 vessel dz  -4 stents placed; 2 overlapping stents placed in mLAD, 1 stent pRCA, 1 stent pCirc 1 s 9/02 -non-ST elevation MI 1/03  -angio revealed restenosis of Circ.    -PTCA and brachytherapy of pCirc -repeat angio 2/03 -no intervension -CABG x3 12/03 - Dr Sinclair  -LIMA-LAD, RAFI-RCA, SVG-OM -PCI 7/04 stent to L -CTangio 9/05   -patentent LIMA-LAD. RAFI-RCA occluded; RCA ostio/p*    Bilateral carpal tunnel syndrome     No Comments Provided    Cervicalgia     No Comments Provided    Chest pain     12/29/2014    Chronic gastric ulcer without hemorrhage or perforation     10/03/2011,hx of GI bleed (2003)    Chronic ischemic heart disease     06/27/2012    Chronic obstructive pulmonary disease (H)     06/15/2007,low DLCO, normal spirometry    Chronic or unspecified gastric ulcer with hemorrhage     10/2003    Chronic pain syndrome     12/01/2010,chest wall, back    Colostomy status (H) 10/30/2023    Constipation     11/29/2011    Coronary angioplasty status     09/12/2002,with triple stenting    Coronary angioplasty status     01/10/2003,repeat angioplasty    Coronary angioplasty status     No Comments Provided    Dorsalgia     05/31/2011    Encounter for other administrative examinations     1/28/2014    Encounter for screening for cardiovascular disorders     10/2004,Cardiolite (2004, 2005, 2006 and 2011)    Enterocolitis due to Clostridium difficile     8/19/2016    Essential (primary) hypertension     No Comments Provided    Hyperlipidemia     No Comments Provided    Major depressive disorder, single episode     Severe, hx of suicide attempt/hospitalization    Migraine without status migrainosus, not intractable     No Comments Provided    Nodular corneal  degeneration     09/26/2011    Noninfective gastroenteritis and colitis     06/15/2007,history of    Noninfective gastroenteritis and colitis     Microcytic    Osteoarthritis     No Comments Provided    Other chest pain     10/13/2009,chronic    Pain in knee     05/31/2011    Pain in right shoulder     No Comments Provided    Panic disorder without agoraphobia     No Comments Provided    Peptic ulcer without hemorrhage or perforation     6/15/2007    Peripheral vascular disease     No Comments Provided    Personal history of diseases of the blood and blood-forming organs and certain disorders involving the immune mechanism (CODE)     No Comments Provided    Personal history of nicotine dependence     No Comments Provided    Personal history of other medical treatment (CODE)     10/14/2013    Presence of aortocoronary bypass graft     2/12/2003    Primary central sleep apnea     10/14/2013    Sepsis due to Escherichia coli (E. coli) (H)     7/14/16,[facility]    Stricture of artery     3/31/2013,S/p prox left SCA stent 4/1/2013    Symptomatic bradycardia 07/16/2021    Thoracic, thoracolumbar and lumbosacral intervertebral disc disorder     No Comments Provided    Uncomplicated opioid abuse (H)     history of    Vitamin D deficiency     5/6/2013       Past Surgical History:   Procedure Laterality Date    ANGIOPLASTY      9/12/02,with triple stenting    APPENDECTOMY OPEN      No Comments Provided    ARTHROSCOPY KNEE      left    ARTHROSCOPY SHOULDER Right 05/12/2017    labral tear, rotator cuff tear and some subacromial decompression     BYPASS GRAFT ARTERY CORONARY      12/13/02,Triple bypass, left internal mammary  to LAD, right internal mammary to right coronary artery, saphenous to obtuse marginal of the left circumflex.    BYPASS GRAFT ARTERY CORONARY N/A 7/1/2024    Procedure: Bypass graft artery coronary;  Surgeon: Greg Webb MD;  Location:  [facility] CARDIAC CATH LAB    COLONOSCOPY      2011,  Gracie benign polyps    COLONOSCOPY  10/03/2011    2011,benign polyps, Dr. Bowman    COLONOSCOPY  08/08/2016 8/8/16,normal, Dr Bowman    CV ANGIOGRAM CORONARY GRAFT N/A 7/1/2024    Procedure: Coronary Angiogram Graft;  Surgeon: Greg Webb MD;  [facility]    CV CORONARY ANGIOGRAM N/A 7/1/2024    Procedure: Coronary Angiogram;  Surgeon: Greg Webb MD;  [facility]    CV PCI N/A 9/24/2024    Procedure: Percutaneous Coronary Intervention;  Surgeon: Greg Webb MD;  [facility]    CV RIGHT HEART CATH MEASUREMENTS RECORDED N/A 7/1/2024    Procedure: Right Heart Catheterization;  Surgeon: Greg Webb MD;  [facility]    ELBOW SURGERY      baby,birth malachi removed from right arm    EMBOLECTOMY UPPER EXTREMITY  04/02/2013    brachial artery pseudoaneurysm after stenting    ESOPHAGOSCOPY, GASTROSCOPY, DUODENOSCOPY (EGD), COMBINED      2011,EGD Dr Bowman with pyloric ulcer    ESOPHAGOSCOPY, GASTROSCOPY, DUODENOSCOPY (EGD), COMBINED      2011,pyloric ulcer, Dr. Bowman    ESOPHAGOSCOPY, GASTROSCOPY, DUODENOSCOPY (EGD), COMBINED      8/8/16,mild gastritis, Dr Bowman    ESOPHAGOSCOPY, GASTROSCOPY, DUODENOSCOPY (EGD), COMBINED      11/27/2017,Dr Bowman. Antral ulcer    ESOPHAGOSCOPY, GASTROSCOPY, DUODENOSCOPY (EGD), COMBINED  02/02/2018    Dr Bowman, healed ulcer    HYSTERECTOMY TOTAL ABDOMINAL      age 21    LAPAROSCOPIC CHOLECYSTECTOMY      2006    OSTEOTOMY FEMUR DISTAL      x3, right knee    OSTEOTOMY FEMUR DISTAL      2000,left knee  ligament surgery    OTHER SURGICAL HISTORY      1/10/2003,,PTCA    OTHER SURGICAL HISTORY      09/20/2012,,PTCA,DELONTE in LAD and left main    OTHER SURGICAL HISTORY      4/1/2013,,PTCA,L subclavian stenosis    RELEASE TRIGGER FINGER Left 12/2/2022    Procedure: Left thumb Trigger  release;  Surgeon: Cristhian Wilkinson  MD;  Location: [facility] OR    SALPINGO-OOPHORECTOMY BILATERAL      age 27,Bilateral salpingo-oophorectomy    TONSILLECTOMY, ADENOIDECTOMY, COMBINED      childhood       Allergies   Allergen Reactions    Atorvastatin Muscle Pain (Myalgia)    Tiotropium Bromide [Tiotropium] Rash    Ezetimibe Muscle Pain (Myalgia)    Latex Rash    Niacin      Other reaction(s): Flushing    No Clinical Screening - See Comments Itching, Rash and Blisters     Metals and plastics      Tape [Adhesive Tape] Rash       Current Outpatient Medications   Medication Sig Dispense Refill    amitriptyline (ELAVIL) 100 MG tablet Take 1 tablet (100 mg) by mouth at bedtime. 90 tablet 3    amLODIPine (NORVASC) 10 MG tablet Take 1 tablet (10 mg) by mouth daily. Dx. Code: I10. 90 tablet 3    aspirin (ASA) 81 MG chewable tablet Take 1 tablet (81 mg) by mouth daily. Starting tomorrow. 30 tablet 3    busPIRone HCl (BUSPAR) 30 MG tablet Take 1 tablet by mouth twice daily 180 tablet 1    clonazePAM (KLONOPIN) 0.5 MG tablet Take 1 tablet (0.5 mg) by mouth 2 times daily as needed for anxiety, muscle spasms or sleep. 60 tablet 1    clopidogrel (PLAVIX) 75 MG tablet Take 1 tablet (75 mg) by mouth daily. 90 tablet 3    cyclobenzaprine (FLEXERIL) 10 MG tablet Take 1 tablet (10 mg) by mouth 3 times daily as needed for muscle spasms. 30 tablet 2    diclofenac (VOLTAREN) 1 % topical gel Apply 2 grams to each hand or 4 grams to each knee up to 4 times daily as needed for arthritis pain 350 g 4    [START ON 7/26/2025] HYDROcodone-acetaminophen (NORCO)  MG per tablet Take 1 tablet by mouth every 4 hours as needed for severe pain. 180 tablet 0    HYDROcodone-acetaminophen (NORCO)  MG per tablet Take 1 tablet by mouth every 4 hours as needed for severe pain. 180 tablet 0    HYDROcodone-acetaminophen (NORCO)  MG per tablet Take 1 tablet by mouth every 4 hours as needed for severe pain. 180 tablet 0    lisinopril (ZESTRIL) 10 MG tablet Take 1 tablet (10 mg) by  mouth daily. 90 tablet 3    metoprolol succinate ER (TOPROL XL) 25 MG 24 hr tablet Take 1 tablet (25 mg) by mouth daily. 90 tablet 3    naloxone (NARCAN) 4 MG/0.1ML nasal spray Spray into one nostril for opioid reversal if unresponsive. May repeat every 2-3 minutes until patient responsive or EMS arrives 1 each 1    nitroGLYcerin (NITROLINGUAL) 0.4 MG/SPRAY spray For chest pain spray 1 spray under tongue every 5 minutes for 3 doses. If symptoms persist 5 minutes after 1st dose call 911. 9.8 g 3    ondansetron (ZOFRAN) 4 MG tablet Take 1 tablet (4 mg) by mouth every 6 hours as needed for nausea. 90 tablet 3    pantoprazole (PROTONIX) 40 MG EC tablet Take 1 tablet (40 mg) by mouth daily. 90 tablet 3    rimegepant (NURTEC) 75 MG ODT tablet PLACE 1 TAB UNDER TONGUE DAILY AS NEEDED FOR MIGRAINE. MAX OF 1 TAB EVERY 48 HRS & 2 PER WEEK 8 tablet 2    rosuvastatin (CRESTOR) 40 MG tablet Take 1 tablet (40 mg) by mouth daily. 90 tablet 3    sucralfate (CARAFATE) 1 GM tablet Take 1 tablet (1 g) by mouth 4 times daily as needed for nausea (epigastric pain). 120 tablet 5    triamcinolone (KENALOG) 0.1 % external lotion Apply topically 2 times daily. 60 mL 3    VITAMIN D, CHOLECALCIFEROL, PO Take 5,000 Units by mouth daily      DULoxetine (CYMBALTA) 30 MG capsule Take 1 capsule (30 mg) by mouth at bedtime. In addition to 60 mg every morning (Patient not taking: Reported on 7/24/2025) 90 capsule 3    DULoxetine (CYMBALTA) 60 MG capsule Take 1 capsule (60 mg) by mouth daily. (Patient not taking: Reported on 7/24/2025) 90 capsule 3       Social History     Socioeconomic History    Marital status:      Spouse name: Not on file    Number of children: Not on file    Years of education: Not on file    Highest education level: Not on file   Occupational History    Not on file   Tobacco Use    Smoking status: Former     Current packs/day: 0.00     Average packs/day: 1 pack/day for 35.0 years (35.0 ttl pk-yrs)     Types: Cigarettes      Start date: 2/15/1982     Quit date: 2/15/2017     Years since quittin.4     Passive exposure: Past    Smokeless tobacco: Never   Vaping Use    Vaping status: Never Used   Substance and Sexual Activity    Alcohol use: Not Currently     Alcohol/week: 0.0 standard drinks of alcohol    Drug use: No    Sexual activity: Not Currently     Partners: Male     Birth control/protection: None, Female Surgical   Other Topics Concern    Parent/sibling w/ CABG, MI or angioplasty before 65F 55M? Not Asked   Social History Narrative    ,  Steven.  Currently not working outside the home. Lives [place] self [place] met with . Tobacco abuse, quit , restarted. Quit  and has since quit, no alcohol.     Social Drivers of Health     Financial Resource Strain: Low Risk  (3/18/2025)    Financial Resource Strain     Within the past 12 months, have you or your family members you live with been unable to get utilities (heat, electricity) when it was really needed?: No   Food Insecurity: Low Risk  (2025)    Food Insecurity     Within the past 12 months, did you worry that your food would run out before you got money to buy more?: No     Within the past 12 months, did the food you bought just not last and you didn t have money to get more?: No   Transportation Needs: Low Risk  (3/18/2025)    Transportation Needs     Within the past 12 months, has lack of transportation kept you from medical appointments, getting your medicines, non-medical meetings or appointments, work, or from getting things that you need?: No   Physical Activity: Inactive (3/18/2025)    Exercise Vital Sign     Days of Exercise per Week: 0 days     Minutes of Exercise per Session: 0 min   Stress: No Stress Concern Present (3/18/2025)    [employer] [place] [employer] - Occupational Stress Questionnaire     Feeling of Stress : Not at all   Social Connections: Unknown (3/18/2025)    Social Connection and Isolation Panel [NHANES]      Frequency of Communication with Friends and Family: Not on file     Frequency of Social Gatherings with Friends and Family: Once a week     Attends Religion Services: Not on file     Active Member of Clubs or Organizations: Not on file     Attends Club or Organization Meetings: Not on file     Marital Status: Not on file   Interpersonal Safety: Low Risk  (5/27/2025)    Interpersonal Safety     Do you feel physically and emotionally safe where you currently live?: Yes     Within the past 12 months, have you been hit, slapped, kicked or otherwise physically hurt by someone?: No     Within the past 12 months, have you been humiliated or emotionally abused in other ways by your partner or ex-partner?: No   Housing Stability: Low Risk  (3/18/2025)    Housing Stability     Do you have housing? : Yes     Are you worried about losing your housing?: No       LAB RESULTS:   Office Visit on 08/06/2021   Component Date Value Ref Range Status    Color Urine 08/06/2021 Yellow  Colorless, Straw, Light Yellow, Yellow Final    Appearance Urine 08/06/2021 Slightly Cloudy* Clear Final    Glucose Urine 08/06/2021 Negative  Negative mg/dL Final    Bilirubin Urine 08/06/2021 Negative  Negative Final    Ketones Urine 08/06/2021 Negative  Negative mg/dL Final    Specific Gravity Urine 08/06/2021 1.018  1.000 - 1.030 Final    Blood Urine 08/06/2021 Small* Negative Final    pH Urine 08/06/2021 6.0  5.0 - 9.0 Final    Protein Albumin Urine 08/06/2021 20 * Negative mg/dL Final    Urobilinogen Urine 08/06/2021 Normal  Normal, 2.0 mg/dL Final    Nitrite Urine 08/06/2021 Positive* Negative Final    Leukocyte Esterase Urine 08/06/2021 Large* Negative Final    Bacteria Urine 08/06/2021 Many* None Seen /HPF Final    Mucus Urine 08/06/2021 Present* None Seen /LPF Final    RBC Urine 08/06/2021 6* <=2 /HPF Final    WBC Urine 08/06/2021 >182* <=5 /HPF Final    Culture 08/06/2021 >100,000 CFU/mL Escherichia coli*  Final   Office Visit on 07/30/2021    Component Date Value Ref Range Status    Sodium 07/30/2021 139  134 - 144 mmol/L Final    Potassium 07/30/2021 4.4  3.5 - 5.1 mmol/L Final    Chloride 07/30/2021 105  98 - 107 mmol/L Final    Carbon Dioxide (CO2) 07/30/2021 24  21 - 31 mmol/L Final    Anion Gap 07/30/2021 10  3 - 14 mmol/L Final    Urea Nitrogen 07/30/2021 19  7 - 25 mg/dL Final    Creatinine 07/30/2021 1.08  0.60 - 1.20 mg/dL Final    Calcium 07/30/2021 9.9  8.6 - 10.3 mg/dL Final    Glucose 07/30/2021 77  70 - 105 mg/dL Final    GFR Estimate 07/30/2021 53* >60 mL/min/1.73m2 Final    Magnesium 07/30/2021 2.1  1.9 - 2.7 mg/dL Final    WBC Count 07/30/2021 7.0  4.0 - 11.0 10e3/uL Final    RBC Count 07/30/2021 4.39  3.80 - 5.20 10e6/uL Final    Hemoglobin 07/30/2021 12.9  11.7 - 15.7 g/dL Final    Hematocrit 07/30/2021 38.6  35.0 - 47.0 % Final    MCV 07/30/2021 88  78 - 100 fL Final    MCH 07/30/2021 29.4  26.5 - 33.0 pg Final    MCHC 07/30/2021 33.4  31.5 - 36.5 g/dL Final    RDW 07/30/2021 13.5  10.0 - 15.0 % Final    Platelet Count 07/30/2021 273  150 - 450 10e3/uL Final    % Neutrophils 07/30/2021 57  % Final    % Lymphocytes 07/30/2021 30  % Final    % Monocytes 07/30/2021 10  % Final    % Eosinophils 07/30/2021 2  % Final    % Basophils 07/30/2021 1  % Final    % Immature Granulocytes 07/30/2021 0  % Final    NRBCs per 100 WBC 07/30/2021 0  <1 /100 Final    Absolute Neutrophils 07/30/2021 4.0  1.6 - 8.3 10e3/uL Final    Absolute Lymphocytes 07/30/2021 2.1  0.8 - 5.3 10e3/uL Final    Absolute Monocytes 07/30/2021 0.7  0.0 - 1.3 10e3/uL Final    Absolute Eosinophils 07/30/2021 0.1  0.0 - 0.7 10e3/uL Final    Absolute Basophils 07/30/2021 0.1  0.0 - 0.2 10e3/uL Final    Absolute Immature Granulocytes 07/30/2021 0.0  <=0.0 10e3/uL Final    Absolute NRBCs 07/30/2021 0.0  10e3/uL Final        Review of systems: Negative except that which was noted in the HPI.    Physical examination:  /82 (BP Location: Right arm, Patient Position: Sitting,  Cuff Size: Adult Regular)   Pulse 89   Temp 97.9  F (36.6  C) (Temporal)   Resp 24   Wt 86.5 kg (190 lb 9.6 oz)   LMP 04/29/1978 (Approximate)   SpO2 96%   BMI 31.24 kg/m      GENERAL APPEARANCE: healthy, alert and no distress.  CHEST: lungs clear to auscultation.  CARDIOVASCULAR: regular rhythm, normal S1 with physiologic split S2, no S3 or S4 and no murmur, click or rub  EXTREMITIES: no clubbing, cyanosis but with mild peripheral edema.    Total time spent on day of visit, including review of tests, obtaining/reviewing separately obtained history, ordering medications/tests/procedures, communicating with PCP/consultants, and documenting in electronic medical record: 40 minutes.            Thank you for allowing me to participate in the care of your patient. Please do not hesitate to contact me if you have any questions.     Paul Gramajo, DO

## 2025-07-28 ENCOUNTER — OFFICE VISIT (OUTPATIENT)
Dept: FAMILY MEDICINE | Facility: OTHER | Age: 70
End: 2025-07-28
Attending: FAMILY MEDICINE
Payer: COMMERCIAL

## 2025-07-28 VITALS
SYSTOLIC BLOOD PRESSURE: 144 MMHG | OXYGEN SATURATION: 97 % | DIASTOLIC BLOOD PRESSURE: 82 MMHG | TEMPERATURE: 97.1 F | WEIGHT: 186.8 LBS | RESPIRATION RATE: 18 BRPM | HEIGHT: 66 IN | HEART RATE: 96 BPM | BODY MASS INDEX: 30.02 KG/M2

## 2025-07-28 DIAGNOSIS — M15.0 PRIMARY OSTEOARTHRITIS INVOLVING MULTIPLE JOINTS: ICD-10-CM

## 2025-07-28 DIAGNOSIS — Z79.899 CONTROLLED SUBSTANCE AGREEMENT SIGNED: ICD-10-CM

## 2025-07-28 DIAGNOSIS — I47.10 SVT (SUPRAVENTRICULAR TACHYCARDIA): ICD-10-CM

## 2025-07-28 DIAGNOSIS — Z95.1 HISTORY OF CORONARY ARTERY BYPASS GRAFT X 3: ICD-10-CM

## 2025-07-28 DIAGNOSIS — I10 PRIMARY HYPERTENSION: ICD-10-CM

## 2025-07-28 DIAGNOSIS — G47.31 CENTRAL SLEEP APNEA: ICD-10-CM

## 2025-07-28 DIAGNOSIS — M48.062 SPINAL STENOSIS OF LUMBAR REGION WITH NEUROGENIC CLAUDICATION: ICD-10-CM

## 2025-07-28 DIAGNOSIS — Z91.199 NONCOMPLIANCE WITH CPAP TREATMENT: ICD-10-CM

## 2025-07-28 DIAGNOSIS — F33.41 RECURRENT MAJOR DEPRESSIVE DISORDER, IN PARTIAL REMISSION: ICD-10-CM

## 2025-07-28 DIAGNOSIS — Z95.5 PRESENCE OF STENT IN CORONARY ARTERY IN PATIENT WITH CORONARY ARTERY DISEASE: ICD-10-CM

## 2025-07-28 DIAGNOSIS — F11.20 CONTINUOUS OPIOID DEPENDENCE (H): ICD-10-CM

## 2025-07-28 DIAGNOSIS — F41.0 PANIC ATTACK: ICD-10-CM

## 2025-07-28 DIAGNOSIS — G89.4 CHRONIC PAIN DISORDER: ICD-10-CM

## 2025-07-28 DIAGNOSIS — M79.18 MYOFASCIAL PAIN: ICD-10-CM

## 2025-07-28 DIAGNOSIS — Z01.818 PRE-OP EXAM: Primary | ICD-10-CM

## 2025-07-28 DIAGNOSIS — N18.31 STAGE 3A CHRONIC KIDNEY DISEASE (H): ICD-10-CM

## 2025-07-28 DIAGNOSIS — F41.9 CHRONIC ANXIETY: ICD-10-CM

## 2025-07-28 DIAGNOSIS — M62.838 MUSCLE SPASM: ICD-10-CM

## 2025-07-28 DIAGNOSIS — Z87.891 HISTORY OF TOBACCO ABUSE: ICD-10-CM

## 2025-07-28 DIAGNOSIS — I25.10 PRESENCE OF STENT IN CORONARY ARTERY IN PATIENT WITH CORONARY ARTERY DISEASE: ICD-10-CM

## 2025-07-28 DIAGNOSIS — M47.816 LUMBAR FACET ARTHROPATHY: ICD-10-CM

## 2025-07-28 DIAGNOSIS — R73.03 PREDIABETES: ICD-10-CM

## 2025-07-28 DIAGNOSIS — G43.709 CHRONIC MIGRAINE WITHOUT AURA WITHOUT STATUS MIGRAINOSUS, NOT INTRACTABLE: ICD-10-CM

## 2025-07-28 DIAGNOSIS — I25.810 ATHEROSCLEROSIS OF CORONARY ARTERY BYPASS GRAFT OF NATIVE HEART WITHOUT ANGINA PECTORIS: ICD-10-CM

## 2025-07-28 DIAGNOSIS — F39 MOOD DISORDER: ICD-10-CM

## 2025-07-28 DIAGNOSIS — K21.00 GASTROESOPHAGEAL REFLUX DISEASE WITH ESOPHAGITIS WITHOUT HEMORRHAGE: ICD-10-CM

## 2025-07-28 LAB
ALBUMIN SERPL BCG-MCNC: 4.5 G/DL (ref 3.5–5.2)
ALP SERPL-CCNC: 82 U/L (ref 40–150)
ALT SERPL W P-5'-P-CCNC: 19 U/L (ref 0–50)
ANION GAP SERPL CALCULATED.3IONS-SCNC: 14 MMOL/L (ref 7–15)
AST SERPL W P-5'-P-CCNC: 29 U/L (ref 0–45)
BASOPHILS # BLD AUTO: 0.1 10E3/UL (ref 0–0.2)
BASOPHILS NFR BLD AUTO: 1 %
BILIRUB SERPL-MCNC: 0.3 MG/DL
BUN SERPL-MCNC: 13.1 MG/DL (ref 8–23)
CALCIUM SERPL-MCNC: 9.6 MG/DL (ref 8.8–10.4)
CHLORIDE SERPL-SCNC: 105 MMOL/L (ref 98–107)
CREAT SERPL-MCNC: 0.76 MG/DL (ref 0.51–0.95)
EGFRCR SERPLBLD CKD-EPI 2021: 83 ML/MIN/1.73M2
EOSINOPHIL # BLD AUTO: 0.1 10E3/UL (ref 0–0.7)
EOSINOPHIL NFR BLD AUTO: 1 %
ERYTHROCYTE [DISTWIDTH] IN BLOOD BY AUTOMATED COUNT: 13.2 % (ref 10–15)
EST. AVERAGE GLUCOSE BLD GHB EST-MCNC: 114 MG/DL
GLUCOSE SERPL-MCNC: 108 MG/DL (ref 70–99)
HBA1C MFR BLD: 5.6 %
HCO3 SERPL-SCNC: 24 MMOL/L (ref 22–29)
HCT VFR BLD AUTO: 38.1 % (ref 35–47)
HGB BLD-MCNC: 12.6 G/DL (ref 11.7–15.7)
IMM GRANULOCYTES # BLD: 0 10E3/UL
IMM GRANULOCYTES NFR BLD: 0 %
LYMPHOCYTES # BLD AUTO: 1.7 10E3/UL (ref 0.8–5.3)
LYMPHOCYTES NFR BLD AUTO: 20 %
MCH RBC QN AUTO: 29.9 PG (ref 26.5–33)
MCHC RBC AUTO-ENTMCNC: 33.1 G/DL (ref 31.5–36.5)
MCV RBC AUTO: 91 FL (ref 78–100)
MONOCYTES # BLD AUTO: 0.5 10E3/UL (ref 0–1.3)
MONOCYTES NFR BLD AUTO: 6 %
NEUTROPHILS # BLD AUTO: 6 10E3/UL (ref 1.6–8.3)
NEUTROPHILS NFR BLD AUTO: 72 %
NRBC # BLD AUTO: 0 10E3/UL
NRBC BLD AUTO-RTO: 0 /100
PLATELET # BLD AUTO: 295 10E3/UL (ref 150–450)
POTASSIUM SERPL-SCNC: 4.1 MMOL/L (ref 3.4–5.3)
PROT SERPL-MCNC: 7.5 G/DL (ref 6.4–8.3)
RBC # BLD AUTO: 4.21 10E6/UL (ref 3.8–5.2)
SODIUM SERPL-SCNC: 143 MMOL/L (ref 135–145)
WBC # BLD AUTO: 8.3 10E3/UL (ref 4–11)

## 2025-07-28 PROCEDURE — 83036 HEMOGLOBIN GLYCOSYLATED A1C: CPT | Mod: ZL | Performed by: FAMILY MEDICINE

## 2025-07-28 PROCEDURE — 36415 COLL VENOUS BLD VENIPUNCTURE: CPT | Mod: ZL | Performed by: FAMILY MEDICINE

## 2025-07-28 PROCEDURE — G2211 COMPLEX E/M VISIT ADD ON: HCPCS | Performed by: FAMILY MEDICINE

## 2025-07-28 PROCEDURE — 99417 PROLNG OP E/M EACH 15 MIN: CPT | Performed by: FAMILY MEDICINE

## 2025-07-28 PROCEDURE — 82947 ASSAY GLUCOSE BLOOD QUANT: CPT | Mod: ZL | Performed by: FAMILY MEDICINE

## 2025-07-28 PROCEDURE — 85004 AUTOMATED DIFF WBC COUNT: CPT | Mod: ZL | Performed by: FAMILY MEDICINE

## 2025-07-28 PROCEDURE — 3044F HG A1C LEVEL LT 7.0%: CPT | Performed by: FAMILY MEDICINE

## 2025-07-28 PROCEDURE — 3077F SYST BP >= 140 MM HG: CPT | Performed by: FAMILY MEDICINE

## 2025-07-28 PROCEDURE — 99215 OFFICE O/P EST HI 40 MIN: CPT | Performed by: FAMILY MEDICINE

## 2025-07-28 PROCEDURE — 3079F DIAST BP 80-89 MM HG: CPT | Performed by: FAMILY MEDICINE

## 2025-07-28 PROCEDURE — G0463 HOSPITAL OUTPT CLINIC VISIT: HCPCS

## 2025-07-28 PROCEDURE — 1125F AMNT PAIN NOTED PAIN PRSNT: CPT | Performed by: FAMILY MEDICINE

## 2025-07-28 RX ORDER — CYCLOBENZAPRINE HCL 10 MG
10 TABLET ORAL 3 TIMES DAILY PRN
Qty: 30 TABLET | Refills: 2 | Status: SHIPPED | OUTPATIENT
Start: 2025-07-28

## 2025-07-28 RX ORDER — DULOXETIN HYDROCHLORIDE 60 MG/1
60 CAPSULE, DELAYED RELEASE ORAL DAILY
Qty: 90 CAPSULE | Refills: 3 | Status: SHIPPED | OUTPATIENT
Start: 2025-07-28

## 2025-07-28 RX ORDER — HYDROCODONE BITARTRATE AND ACETAMINOPHEN 10; 325 MG/1; MG/1
1 TABLET ORAL EVERY 4 HOURS PRN
Qty: 180 TABLET | Refills: 0 | Status: SHIPPED | OUTPATIENT
Start: 2025-08-25

## 2025-07-28 RX ORDER — HYDROCODONE BITARTRATE AND ACETAMINOPHEN 10; 325 MG/1; MG/1
1 TABLET ORAL EVERY 4 HOURS PRN
Qty: 180 TABLET | Refills: 0 | Status: CANCELLED | OUTPATIENT
Start: 2025-08-25

## 2025-07-28 RX ORDER — HYDROCODONE BITARTRATE AND ACETAMINOPHEN 10; 325 MG/1; MG/1
1 TABLET ORAL EVERY 4 HOURS PRN
Qty: 180 TABLET | Refills: 0 | Status: SHIPPED | OUTPATIENT
Start: 2025-09-25

## 2025-07-28 RX ORDER — METOPROLOL SUCCINATE 50 MG/1
50 TABLET, EXTENDED RELEASE ORAL DAILY
Qty: 90 TABLET | Refills: 3 | Status: SHIPPED | OUTPATIENT
Start: 2025-07-28

## 2025-07-28 RX ORDER — CLONAZEPAM 0.5 MG/1
0.5 TABLET ORAL 2 TIMES DAILY PRN
Qty: 60 TABLET | Refills: 1 | Status: SHIPPED | OUTPATIENT
Start: 2025-07-28

## 2025-07-28 ASSESSMENT — PAIN SCALES - GENERAL: PAINLEVEL_OUTOF10: SEVERE PAIN (7)

## 2025-07-28 NOTE — PATIENT INSTRUCTIONS
Stop trintellix.    Restart Cymbalta (duloxetine) 60 mg daily.    No aleve, ibuprofen, or any other NSAID for 7 days prior to surgery and 2 MONTHS after surgery.    Stop aspirin and plavix 5 days before surgery, restart after surgery as soon as it is okay with your surgeon to do that.    Limit klonopin to 2 pills per day for 1 month, then try to reduce to no more than one per day.  We will probably reduce this to a half pill (0.25 mg dose) at your next visit.    Pain should be managed by your surgeon after surgery until you are back to no more than your current hydrocodone dose.  Ultimate goal is still to wean this to no more than 40 mg per day (4 pills) but we will continue to wean the klonopin down and off first.    You can use up to 4000 mg tylenol daily in total (hydrocodone 6 tablets = 2000 mg), so no more than 2000 mg in total of additional tylenol (look at the strength of your tablets at home).    Hold lisinopril the day before surgery and the day of surgery.    Increase metoprolol back to 50 mg daily.  How to Take Your Medication Before Surgery  Preoperative Medication Instructions   Antiplatelet or Anticoagulation Medication Instructions   - aspirin: hold 5 day before surgery, resume after surgery as soon as your surgeon says you can.    Additional Medication Instructions  Take all scheduled medications on the day of surgery EXCEPT for modifications listed below:  Hold plavix 5 day before surgery, resume after surgery as soon as your surgeon says you can. No NSAIDs for including aleve and ibuprofen for 7 days prior to surgery and at least 2 months after surgery.  Hold lisinopril the day before and the day of surgery.       Patient Education   Preparing for Your Surgery  For Adults  Getting started  In most cases, a nurse will call to review your health history and instructions. They will give you an arrival time based on your scheduled surgery time. Please be ready to share:  Your doctor's clinic name and  phone number  Your medical, surgical, and anesthesia history  A list of allergies and sensitivities  A list of medicines, including herbal treatments and over-the-counter drugs  Whether the patient has a legal guardian (ask how to send us the papers in advance)  Note: You may not receive a call if you were seen at our PAC (Preoperative Assessment Center).  Please tell us if you're pregnant--or if there's any chance you might be pregnant. Some surgeries may injure a fetus (unborn baby), so they require a pregnancy test. Surgeries that are safe for a fetus don't always need a test, and you can choose whether to have one.   Preparing for surgery  Within 10 to 30 days of surgery: Have a pre-op exam (sometimes called an H&P, or History and Physical). This can be done at a clinic or pre-operative center.  If you're having a , you may not need this exam. Talk to your care team.  At your pre-op exam, talk to your care team about all medicines you take. (This includes CBD oil and any drugs, such as THC, marijuana, and other forms of cannabis.) If you need to stop any medicine before surgery, ask when to start taking it again.  This is for your safety. Many medicines and drugs can make you bleed too much during surgery. Some change how well surgery (anesthesia) drugs work.  Call your insurance company to let them know you're having surgery. (If you don't have insurance, call [phone].)  Call your clinic if there's any change in your health. This includes a scrape or scratch near the surgery site, or any signs of a cold (sore throat, runny nose, cough, rash, fever).  Eating and drinking guidelines  For your safety: Unless your surgeon tells you otherwise, follow the guidelines below.  Eat and drink as normal until 8 hours before you arrive for surgery. After that, no food or milk. You can spit out gum when you arrive.  Drink clear liquids until 2 hours before you arrive. These are liquids you can see through, like  water, Gatorade, and Propel Water. They also include plain black coffee and tea (no cream or milk).  No alcohol for 24 hours before you arrive. The night before surgery, stop any drinks that contain THC.  If your care team tells you to take medicine on the morning of surgery, it's okay to take it with a sip of water. No other medicines or drugs are allowed (including CBD oil)--follow your care team's instructions.  If you have questions the day of surgery, call your hospital or surgery center.   Preventing infection  Shower or bathe the night before and the morning of surgery. Follow the instructions your clinic gave you. (If no instructions, use regular soap.)  Don't shave or clip hair near your surgery site. We'll remove the hair if needed.  Don't smoke or vape the morning of surgery. No chewing tobacco for 6 hours before you arrive. A nicotine patch is okay. You may spit out nicotine gum when you arrive.  For some surgeries, the surgeon will tell you to fully quit smoking and nicotine.  We will make every effort to keep you safe from infection. We will:  Clean our hands often with soap and water (or an alcohol-based hand rub).  Clean the skin at your surgery site with a special soap that kills germs.  Give you a special gown to keep you warm. (Cold raises the risk of infection.)  Wear hair covers, masks, gowns, and gloves during surgery.  Give antibiotic medicine, if prescribed. Not all surgeries need this medicine.  What to bring on the day of surgery  Photo ID and insurance card  Copy of your health care directive, if you have one  Glasses and hearing aids (bring cases)  You can't wear contacts during surgery  Inhaler and eye drops, if you use them (tell us about these when you arrive)  CPAP machine or breathing device, if you use them  A few personal items, if spending the night  If you have . . .  A pacemaker, ICD (cardiac defibrillator), or other implant: Bring the ID card.  An implanted stimulator: Bring  the remote control.  A legal guardian: Bring a copy of the certified (court-stamped) guardianship papers.  Please remove any jewelry, including body piercings. Leave jewelry and other valuables at home.  If you're going home the day of surgery  You must have a support person drive you home. They should stay with you overnight, and they may need to help with your self-care.  If you don't have a support person, please tells us as soon as possible. We can help.  After surgery  If it's hard to control your pain or you need more pain medicine, please call your surgeon's office.  Questions?   If you have any questions for your care team, list them here:   ____________________________________________________________________________________________________________________________________________________________________________________________________________________________________________________________  For informational purposes only. Not to replace the advice of your health care provider. Copyright   2003, 2019 [employer]. All rights reserved. Clinically reviewed by Giacomo Arenas MD. [employer] 079890 - REV 02/25.

## 2025-07-28 NOTE — NURSING NOTE
Patient is here for a pre-op exam and medication check. Is scheduled for back surgery with Dr Navas. She is needing to have refills for hydrocodone, flexeril, and klonopin.     Patient's last menstrual period was 04/29/1978 (approximate).  Medication Reconciliation: complete    Morelia Park LPN 7/28/2025 9:39 AM       Advance care directive on file? yes  Advance care directive provided to patient? na       Morelia Park LPN

## 2025-07-28 NOTE — PROGRESS NOTES
Preoperative Evaluation  [facility]  [address]  [place] [address]  Phone: [phone]  Fax: [phone]  Primary Provider: Kenia Duran MD  Pre-op Performing Provider: Kenia Duran MD  Jul 28, 2025 7/23/2025   Surgical Information   What procedure is being done? Back Surgery   [facility]   [facility]  [place]   Date of surgery / procedure: UNKNOWN   Time of surgery / procedure: Unknown   Where do you plan to recover after surgery? at home with family     Fax number for surgical facility: [phone]    Assessment & Plan     The proposed surgical procedure is considered INTERMEDIATE risk.    (Z01.818) Pre-op exam  (primary encounter diagnosis)  (M48.062) Spinal stenosis of lumbar region with neurogenic claudication  (M47.816) Lumbar facet arthropathy  Comment: Medically optimized to proceed with planned surgery and anesthesia.  See cardiology note additionally in which they also report that she is optimized to proceed.  Discussed that postoperative pain management should be managed by the surgeon at least until she is down to her current baseline chronic opioid use.  --The surgeon's clinic note suggest that aspirin and Plavix should be stopped 5 days before surgery and held until 5 days after surgery.  Okay to hold both for 5 days before surgery and if absolutely imperative this can be held for 5 days postoperatively, though since her last stent was placed less than 12 months ago it would be ideal to resume at least 1 or both of these postoperatively as soon as the surgeon feels that she can safely do this.  Plan: HYDROcodone-acetaminophen (NORCO)  MG per        tablet, HYDROcodone-acetaminophen (NORCO)          MG per tablet, CBC and Differential    (Z79.899) Controlled substance agreement updated 12/16/24  (G89.4) Chronic pain  disorder  (F11.20) Continuous opioid dependence (H)  (M15.0) Primary osteoarthritis involving multiple joints  (M79.18) Myofascial pain  (M62.838) Muscle spasm  (F39) Mood disorder  (F33.41) Recurrent major depressive disorder, in partial remission  (F41.9) Chronic anxiety  (F41.0) Panic attack  (G43.709) Chronic migraine without aura without status migrainosus, not intractable  Comment:   -- Discussion again today regarding risk with combined benzodiazepine and chronic opioid therapy, especially with reasonably high doses as she has been prescribed historically.  Discussed that I do not prescribe long-term benzodiazepine and opioid medications in combination.  Recommend weaning off at least one of them and recommend weaning klonopin first which she is agreeable to and has been working on.  -- Continue Klonopin 0.5 mg per dose up to 2 doses per day for the next month, then reduce to no more than 1 dose per day until follow-up in 2 months.  Discussed that we may eventually wean this to 0.25 mg per dose to allow a continued slow wean.  This was the plan we had at her last visit 2 months ago, though she did have significant worsening in mood symptoms subsequently which was almost certainly due to medication changes.  -- Restart Cymbalta 60 mg daily as she was clearly reporting good tolerance and marked improvement in both anxiety and depression on this along without Trintellix.  Stop Trintellix which I suspect has contributed to worsening in mood symptoms and cognitive side effects.  I consider the possibility that the 90 mg Cymbalta dose may have also been a contributing factor, though I would consider trial of this again in the future without Trintellix to see if she tolerates this.  -- Anticipate continued weaning of BuSpar since it has not provided noticeable symptom improvement in the past, though I will defer dose reduction until she is off Klonopin with reasonable symptom control.  Continue BuSpar 30 mg daily in  the morning only for now.  -- Recommend eventually weaning hydrocodone to no more than 40 morphine equivalents daily, 4 of the 10 mg tablets and this was discussed again today.  She currently takes up to 6 tablets daily.  Anticipate deferral of this until Klonopin is completely weaned, though she is more intentionally taking this only 1 tablet at a time when she feels like she absolutely needs it with goal of gradually reducing this as tolerated, especially if she notices improvement postoperatively after her back surgery.  -- Continue amitriptyline which has clearly been beneficial for sleep, mood, headaches, and chronic pain  -- May continue Flexeril for now, though I did encourage her to limit this to only when she feels that she really needs it for significant symptoms and she is agreeable  -- Consider adding carbamazepine or Topamax in the future which also will likely improve chronic pain control and migraines  -- Consider trial of mirtazapine or olanzapine at bedtime for mood symptoms if needed, though currently her mood symptoms are better controlled than they have been in a long time and these are deferred for now  -- Follow-up in 2 months  Plan: HYDROcodone-acetaminophen (NORCO)  MG per        tablet, HYDROcodone-acetaminophen (NORCO)          MG per tablet, clonazePAM (KLONOPIN) 0.5        MG tablet, DULoxetine (CYMBALTA) 60 MG capsule,        cyclobenzaprine (FLEXERIL) 10 MG tablet    (I10) Primary hypertension  (I47.10) SVT (supraventricular tachycardia)  (I25.810) Atherosclerosis of coronary artery bypass graft of native heart without angina pectoris  (I25.10,  Z95.5) Presence of stent in coronary artery in patient with coronary artery disease  (Z95.1) History of coronary artery bypass graft x 3 on 2/12/2003  Comment: Blood pressure and palpitations have improved compared to last visit with restarting metoprolol at 25 mg daily, though BP remains mildly above goal.  Recommend increasing  metoprolol back to 50 mg daily which was her prior effective and well-tolerated dose.  Plan: metoprolol succinate ER (TOPROL XL) 50 MG 24 hr        tablet, Comprehensive Metabolic Panel    (G47.31) Central sleep apnea  (Z91.199) Noncompliance with CPAP treatment  Comment: Declines CPAP    (R73.03) Prediabetes  Comment: Stable with A1c 5.6 today  Plan: Hemoglobin A1c    (N18.31) Stage 3a chronic kidney disease (H)  Comment: Stable with GFR consistently above 60 since increasing hydration    (K21.00) Gastroesophageal reflux disease with esophagitis without hemorrhage  Comment: Well-controlled on Protonix    (Z87.891) History of tobacco abuse-quitting 8/23/2017  Comment:           - No identified additional risk factors other than previously addressed    Preoperative Medication Instructions  Antiplatelet or Anticoagulation Medication Instructions   - aspirin: hold 5 day before surgery, resume after surgery as soon as your surgeon says you can.    Additional Medication Instructions  Take all scheduled medications on the day of surgery EXCEPT for modifications listed below:  Hold plavix 5 day before surgery, resume after surgery as soon as your surgeon says you can. No NSAIDs for including aleve and ibuprofen for 7 days prior to surgery and at least 2 months after surgery.  Hold lisinopril the day before and the day of surgery.    Recommendation  Approval given to proceed with proposed procedure, without further diagnostic evaluation.    Follow-up   Return in about 2 months (around 9/28/2025), or if symptoms worsen or fail to improve, for Medication Check/Recheck.        Subjective   Malina is a 70 year old, presenting for the following:  Pre-Op Exam and Recheck Medication          7/28/2025     9:32 AM   Additional Questions   Roomed by Morelia boggs     HPI:   70-year-old female presents to clinic for preoperative exam and chronic disease management and medication follow-up.  She was initially scheduled for L4-5 posterior  decompression and fusion with Dr. Navas in [place] on August 7, 2025 though apparently they are now planning surgery at [facility] in [place] due to her cardiovascular disease history.  She saw cardiology last week and was given clearance to proceed with planned surgery with no need for any additional cardiovascular workup or evaluation.  Reference that note for full details.  She has a history of significant cardiovascular disease including multiple MIs, history of CABG with recurrent atherosclerosis of bypass graft, multiple stents, most recently September 24, 2024, chronic ischemic heart disease with preserved ejection fraction and no evidence of diastolic dysfunction reported on last echo though diastolic function was difficult to determine, subclavian artery stenosis, and history of CVA with mild chronic ischemic disease on imaging in 2021.  There is no mention of CHF in her cardiology notes recently.  She is not on diuretic therapy.  She has some chronic dyspnea on exertion that is stable.  She does have intermittent paroxysmal SVT with some symptomatic palpitations, though extensive evaluation of this including Zio patch did not reveal any other concerning arrhythmias.  Symptoms improved markedly with metoprolol and improving hydration.  She denies angina since her last stent was placed.  It seems that metoprolol was inadvertently discontinued in January with no discussion in the note regarding recommendation to do this specifically but it was removed from her medication list at that time.  She continued it until she ran out in March.  Symptoms worsened when she was off this medication.  She did find an old bottle of the 25 mg tablets and started taking them intermittently with symptoms with benefit.  Her dose had been increased from 25 mg daily to 50 mg daily in 2024.  At her last visit with me at the end of May, we restarted metoprolol 25 mg daily.  Symptoms have improved with this but she does still have  some palpitations intermittently, also exacerbated by anxiety.  Her blood pressure remains mildly above goal and she would be agreeable to increasing this back to her prior dose of 50 mg daily.  For blood pressure she also takes amlodipine 10 mg daily and lisinopril 10 mg daily.  Lipids are controlled on rosuvastatin 40 mg daily.  She was having intermittent dizziness that cardiology previously felt was due to inadequate hydration and this resolved completely with increased hydration, even when she was still taking the metoprolol 50 mg daily.  She takes both aspirin and Plavix and her last stent was placed less than 12 months ago.  She has a history of PE that was treated with Xarelto previously.  This was discontinued by cardiology in March 2024 as she did not have any other history of DVT or PE and had chronic anemia with intermittent gross hematuria and there has been no evidence of recurrent DVT or PE.  She does have central sleep apnea and declines CPAP.    She has chronic pain that is multifactorial with relatively generalized osteoarthritis, myofascial pain, and claudication.  She mostly complains of chronic bilateral low back pain.  She had significant pain after CABG in 2003.  She reported that she was initially started on Xanax, methadone, and OxyContin after her CABG.  She was clearly mentally altered and endorsed a high feeling from some of these medications.  She reports that her family became concerned.  Ultimately these medications were adjusted.  She was subsequently on Valium and later switched to Klonopin.  Methadone and OxyContin were transitioned to hydrocodone which does not give her any of that high feeling and controls pain better than oxycodone.  Over time her dosages have decreased at least some with regard to the Klonopin.  She was previously on as much as 1 mg 4 times daily, reduced to 3 times daily, then an average of twice daily, and since establishing care with me this was reduced to a  "0.5 mg tablet with planned continued gradual wean with goal of completely eliminating this.  Some days she feels that she is fairly easily able to go without any Klonopin.  She is generally only using it if she feels like she is having a panic attack.  Sometimes she needs to take 2 of the 0.5 mg tablets with a panic attack but a majority of the time one of the tablets will adequately control it.  At her last visit at the end of May 2025 she actually reported significant improvement in both depression and anxiety symptoms with the medication adjustments we had made despite weaning Klonopin at the same time.  Unfortunately her symptoms have worsened since then, likely multifactorial.  In May 2025 she had been on Cymbalta 60 mg daily and had been off Trintellix for almost 2 months.  She reported that her mood symptoms were better controlled than they had been in a long time.  We elected to try increasing Cymbalta to 90 mg daily.  At some point shortly after her last visit she became concerned that she was no longer taking an antidepressant as she did not realize that Cymbalta is also an antidepressant and she restarted Trintellix which she still had left at home and continued the higher dose of Cymbalta.  Subsequently she started to feel like she was having more depression and also developed significant cognitive side effects.  She describes this as feeling \"like I am walking around like I have dementia\".  She describes this as being very forgetful and foggy headed.  She quit taking the Cymbalta 1 to 1.5 weeks ago but has continued the Trintellix.  She does feel that some of the cognitive side effects have improved but they have not yet resolved completely and her anxiety has increased significantly over the last month or 2.  In review of the timeline of when medications were started and discontinued and the way she had described her mood symptoms as being so much better controlled at her last visit, she would be " agreeable to stopping Trintellix and switching back to the Cymbalta at 60 mg daily.  She previously reported that a lot of her anxiety is triggered by her 's side of the family and she has significantly less contact with them now which has overall improved her degree of anxiety.  She was also having a lot of increased stress and anxiety regarding heart problems and not having an established primary care provider after her last provider relatively suddenly left the practice and this is also no longer an issue.  She is prescribed BuSpar 30 mg twice daily but reports that she stopped taking the evening dose more than 6 months ago with no increase in anxiety.  She never felt that this medication helped much and we have discussed a plan to wean this in the future when she is completely off Klonopin.  She was previously on Trintellix due to persistent depression that was not adequately controlled with trial of multiple other medications previously.  She reports that she recalls having similar side effects with Cymbalta in the past, potentially dose-dependent or due to using it in combination with other agents in the past as well.  She has not noticed significant pain benefits with the addition and titration of Cymbalta, though mood was clearly better controlled on this than Trintellix.  Amitriptyline was started in January 2025 with definite improvement in insomnia, mood symptoms, headaches, and some with pain.  She subsequently reduced melatonin from 15 mg nightly down to 10 mg nightly and continued to sleep well.  I have discussed with her at each visit the risk of chronic benzodiazepine and opioid medications in combination and that I will not continue to prescribe both chronically.  She has been open to working on a plan to gradually adjust these with goal of adequate symptom control and a safer combination of medications overall.  We are focusing on weaning the benzodiazepine first.  She was previously always  taking 2 of the hydrocodone tablets in the morning and spreading the other 4 tablets throughout the day consistently.  Now she is taking 1 tablet at a time only when she feels like she needs it with slight reduction in her overall dose.  She infrequently uses Aleve when symptoms are much more severe and not improving with the hydrocodone alone.  She wonders if she can use additional Tylenol, especially now that she will not be able to use NSAIDs for 2 months after her spinal fusion surgery.  She continues to use Flexeril as needed for muscle spasm, 0 to 2 tablets/day generally though she does typically use at least 1 tablet a majority of the days per month.  She uses Voltaren gel as needed with some benefit, especially for knee and shoulder pain.  She has gotten benefit from a shoulder injection in the past, last was in November 2024.  Migraines have improved with amitriptyline.     In the last year she has developed debilitating claudication with severe leg pain and weakness with minimal activity or walking including associated falls.  Initially it was felt that it was most likely neurogenic in nature though she does have known significant vascular disease.  She had an MRI of the lumbar spine showing advanced facet arthritis with moderate spinal stenosis at L4-5 and nonsevere scattered foraminal stenosis.  She has not gotten significant improvement with injections.  She had follow-up with Dr. Navas who continued to consider neurogenic versus vascular etiology.  He recommended trial of an epidural injection to see if it improves symptoms at all which should help to discern whether neurogenic claudication is contributing.  She had perhaps very mild pain reduction from the lidocaine briefly but no lasting benefit from the steroid.  She subsequently had vascular surgery consultation who did not feel that her claudication was vascular in nature.  She had normal ABIs recently.  She had follow-up with neurosurgery in  June with plan to proceed with surgery as above.  She has clearly exhausted all other conservative options at this point and symptoms are becoming more debilitating over time.     Prediabetes has been diet controlled.  Stage IIIa chronic kidney disease has been stable.  GERD is well-controlled on Protonix.  She quit smoking in 2017.              7/23/2025   Pre-Op Questionnaire   Have you ever had a heart attack or stroke? (!) YES    Have you ever had surgery on your heart or blood vessels, such as a stent placement, a coronary artery bypass, or surgery on an artery in your head, neck, heart, or legs? (!) YES    Do you have chest pain with activity? (!) YES - in the past, not currently   Do you have a history of heart failure? (!) YES    Do you currently have a cold, bronchitis or symptoms of other infection? No   Do you have a cough, shortness of breath, or wheezing? No   Do you or anyone in your family have previous history of blood clots? (!) YES    Do you or does anyone in your family have a serious bleeding problem such as prolonged bleeding following surgeries or cuts? (!) YES    Have you ever had problems with anemia or been told to take iron pills? No   Have you had any abnormal blood loss such as black, tarry or bloody stools, or abnormal vaginal bleeding? (!) YES - as above, only when on anticoagulation in the past   Have you ever had a blood transfusion? No   Are you willing to have a blood transfusion if it is medically needed before, during, or after your surgery? Yes   Have you or any of your relatives ever had problems with anesthesia? No   Do you have sleep apnea, excessive snoring or daytime drowsiness? No   Do you have any artifical heart valves or other implanted medical devices like a pacemaker, defibrillator, or continuous glucose monitor? No   Do you have artificial joints? No   Are you allergic to latex? No     Advance Care Planning    Document on file is a Health Care Directive or  POLST.    Preoperative Review of    reviewed - controlled substances reflected in medication list.          Patient Active Problem List    Diagnosis Date Noted    Claudication 04/01/2025     Priority: Medium    Prediabetes 03/18/2025     Priority: Medium    Subclinical hypothyroidism 03/18/2025     Priority: Medium    Osteopenia of multiple sites 03/18/2025     Priority: Medium    Chronic migraine without aura without status migrainosus, not intractable 03/18/2025     Priority: Medium    Mood disorder 03/18/2025     Priority: Medium    Muscle spasm 03/18/2025     Priority: Medium    Spinal stenosis of lumbar region with neurogenic claudication 03/18/2025     Priority: Medium    Chronic bilateral low back pain, unspecified whether sciatica present 03/11/2025     Priority: Medium    Generalized muscle weakness 01/28/2025     Priority: Medium    Seborrheic keratoses 01/12/2024     Priority: Medium    Intrinsic eczema 01/12/2024     Priority: Medium    Chronic pain disorder 12/20/2023     Priority: Medium    F11.2 - Continuous opioid dependence (H) 10/29/2023     Priority: Medium    SVT (supraventricular tachycardia) 07/13/2023     Priority: Medium    History of CVA-mild chronic ischemic disease on 8/20/2021. 01/11/2022     Priority: Medium    ASCVD (arteriosclerotic cardiovascular disease) 01/11/2022     Priority: Medium    Nausea 01/11/2022     Priority: Medium    Gastroesophageal reflux disease with esophagitis without hemorrhage 01/11/2022     Priority: Medium    CORTEZ (dyspnea on exertion) 11/16/2021     Priority: Medium    Palpitations 11/16/2021     Priority: Medium    Vitamin B12 deficiency (non anemic) 11/16/2021     Priority: Medium    Chronic ischemic heart disease 02/05/2020     Priority: Medium    History of pulmonary embolism on 1/2/2020. (-) VQ scan on 11/23/2021 01/02/2020     Priority: Medium    Stage 3a chronic kidney disease (H) 06/19/2019     Priority: Medium    Chronic anxiety 01/22/2018      Priority: Medium    Collagenous colitis 01/22/2018     Priority: Medium     Overview:   Possible Dx 2007      Recurrent major depressive disorder, in partial remission 01/22/2018     Priority: Medium    Primary hypertension 01/22/2018     Priority: Medium    Mixed hyperlipidemia 01/22/2018     Priority: Medium    Osteoarthritis 01/22/2018     Priority: Medium    Panic attack 01/22/2018     Priority: Medium    Status post coronary angiogram on 9/25/2017 at the [facility]-stable disease 09/15/2017     Priority: Medium    History of tobacco abuse-quitting 8/23/2017 08/10/2017     Priority: Medium    Presence of stent in coronary artery in patient with coronary artery disease 08/10/2017     Priority: Medium    History of coronary artery bypass graft x 3 on 2/12/2003 08/10/2017     Priority: Medium    Noncompliance with CPAP treatment 10/21/2016     Priority: Medium    H/O multiple concussions 10/21/2016     Priority: Medium    History of Clostridium difficile 08/19/2016     Priority: Medium    Iron deficiency anemia 07/26/2016     Priority: Medium    Controlled substance agreement updated 9-5-23 01/28/2014     Priority: Medium     Overview:       Central sleep apnea 10/14/2013     Priority: Medium    Myofascial pain 10/14/2013     Priority: Medium    Vitamin D deficiency 05/06/2013     Priority: Medium    Subclavian artery stenosis, left 03/31/2013     Priority: Medium     Overview:   S/p prox left SCA stent 4/1/2013      Lumbar facet arthropathy 01/02/2013     Priority: Medium    Slow transit constipation 11/29/2011     Priority: Medium    Gastric ulcer with hemorrhage 10/03/2011     Priority: Medium    Family history of malignant neoplasm of gastrointestinal tract 09/26/2011     Priority: Medium    Nodular degeneration of cornea 09/26/2011     Priority: Medium    Atherosclerosis of coronary artery bypass graft of native heart with stable angina pectoris 09/12/2002     Priority: Medium     Overview:   Multiple Angigrams  prior to 2007. Also:  6/5/2007: Angiogram: TAXUS DELONTE to ostial circumflux, instent restenosis  5/23/2008: Left Main 30% diseased, LAD stents patent, Left circ stent patent, Chronic occluded right internal mammary artery graft to distal RCA, native RCA has 30% stenosis; NO intevention  9/19/2012 CT Angiogram: Patent LIMA to LAD, patent LAD stents, moderate proximal circumflex disease, patent RCA , occluded right internal mammary artery graft to distal RCA.  9/20/2012: Angiogram: Stent to Left Main, ostial LAD, and PTCA of ostial left circumflex        Past Medical History:   Diagnosis Date    Acute ischemic heart disease (H)     06/15/2007,with PTCA and stenting of 90% osteo circumflex lesion and patent LAD graft, patent left main stent.    Acute myocardial infarction (H)     3/30/2013    Anxiety disorder     No Comments Provided    Atherosclerotic heart disease of native coronary artery without angina pectoris     -angio revealed 3 vessel dz  -4 stents placed; 2 overlapping stents placed in mLAD, 1 stent pRCA, 1 stent pCirc 1 s 9/02 -non-ST elevation MI 1/03  -angio revealed restenosis of Circ.    -PTCA and brachytherapy of pCirc -repeat angio 2/03 -no intervension -CABG x3 12/03 - Dr Sinclair  -LIMA-LAD, RAFI-RCA, SVG-OM -PCI 7/04 stent to L -CTangio 9/05   -patentent LIMA-LAD. RAFI-RCA occluded; RCA ostio/p*    Bilateral carpal tunnel syndrome     No Comments Provided    Cervicalgia     No Comments Provided    Chest pain     12/29/2014    Chronic gastric ulcer without hemorrhage or perforation     10/03/2011,hx of GI bleed (2003)    Chronic ischemic heart disease     06/27/2012    Chronic obstructive pulmonary disease (H)     06/15/2007,low DLCO, normal spirometry    Chronic or unspecified gastric ulcer with hemorrhage     10/2003    Chronic pain syndrome     12/01/2010,chest wall, back    Colostomy status (H) 10/30/2023    Constipation     11/29/2011    Coronary angioplasty status     09/12/2002,with triple stenting     Coronary angioplasty status     01/10/2003,repeat angioplasty    Coronary angioplasty status     No Comments Provided    Dorsalgia     05/31/2011    Encounter for other administrative examinations     1/28/2014    Encounter for screening for cardiovascular disorders     10/2004,[facility]iol[facility] (2004, 2005, 2006 and 2011)    Enterocolitis due to Clostridium difficile     8/19/2016    Essential (primary) hypertension     No Comments Provided    Hyperlipidemia     No Comments Provided    Major depressive disorder, single episode     Severe, hx of suicide attempt/hospitalization    Migraine without status migrainosus, not intractable     No Comments Provided    Nodular corneal degeneration     09/26/2011    Noninfective gastroenteritis and colitis     06/15/2007,history of    Noninfective gastroenteritis and colitis     Microcytic    Osteoarthritis     No Comments Provided    Other chest pain     10/13/2009,chronic    Pain in knee     05/31/2011    Pain in right shoulder     No Comments Provided    Panic disorder without agoraphobia     No Comments Provided    Peptic ulcer without hemorrhage or perforation     6/15/2007    Peripheral vascular disease     No Comments Provided    Personal history of diseases of the blood and blood-forming organs and certain disorders involving the immune mechanism (CODE)     No Comments Provided    Personal history of nicotine dependence     No Comments Provided    Personal history of other medical treatment (CODE)     10/14/2013    Presence of aortocoronary bypass graft     2/12/2003    Primary central sleep apnea     10/14/2013    Sepsis due to Escherichia coli (E. coli) (H)     7/14/16,[facility]    Stricture of artery     3/31/2013,S/p prox left SCA stent 4/1/2013    Symptomatic bradycardia 07/16/2021    Thoracic, thoracolumbar and lumbosacral intervertebral disc disorder     No Comments Provided    Uncomplicated opioid abuse (H)     history of    Vitamin D deficiency     5/6/2013      Past Surgical History:   Procedure Laterality Date    ANGIOPLASTY      9/12/02,with triple stenting    APPENDECTOMY OPEN      No Comments Provided    ARTHROSCOPY KNEE      left    ARTHROSCOPY SHOULDER Right 05/12/2017    labral tear, rotator cuff tear and some subacromial decompression     BYPASS GRAFT ARTERY CORONARY      12/13/02,Triple bypass, left internal mammary  to LAD, right internal mammary to right coronary artery, saphenous to obtuse marginal of the left circumflex.    BYPASS GRAFT ARTERY CORONARY N/A 7/1/2024    Procedure: Bypass graft artery coronary;  Surgeon: Greg Webb MD;  Location:  [facility]    COLONOSCOPY      2011,Dr Bowman benign polyps    COLONOSCOPY  10/03/2011    2011,benign polyps, Dr. Bowman    COLONOSCOPY  08/08/2016 8/8/16,normal, Dr Bowman    CV ANGIOGRAM CORONARY GRAFT N/A 7/1/2024    Procedure: Coronary Angiogram Graft;  Surgeon: Greg Webb MD;  Location: [facility]    CV CORONARY ANGIOGRAM N/A 7/1/2024    Procedure: Coronary Angiogram;  Surgeon: Greg eWbb MD;  Location: [facility]    CV PCI N/A 9/24/2024    Procedure: Percutaneous Coronary Intervention;  Surgeon: Greg Webb MD;  Location:  [facility]    CV RIGHT HEART CATH MEASUREMENTS RECORDED N/A 7/1/2024    Procedure: Right Heart Catheterization;  Surgeon: Greg Webb MD;  Location:  [facility]    ELBOW SURGERY      baby,birth malachi removed from right arm    EMBOLECTOMY UPPER EXTREMITY  04/02/2013    brachial artery pseudoaneurysm after stenting    ESOPHAGOSCOPY, GASTROSCOPY, DUODENOSCOPY (EGD), COMBINED      2011,EGD Dr Bowman with pyloric ulcer    ESOPHAGOSCOPY, GASTROSCOPY, DUODENOSCOPY (EGD), COMBINED      2011,pyloric ulcer, Dr. Bowman    ESOPHAGOSCOPY, GASTROSCOPY, DUODENOSCOPY (EGD), COMBINED      8/8/16,mild gastritis, Dr Bowman    ESOPHAGCARTER,  GASTROSCOPY, DUODENOSCOPY (EGD), COMBINED      11/27/2017,Dr Bowman. Antral ulcer    ESOPHAGOSCOPY, GASTROSCOPY, DUODENOSCOPY (EGD), COMBINED  02/02/2018    Dr Bowman, healed ulcer    HYSTERECTOMY TOTAL ABDOMINAL      age 21    LAPAROSCOPIC CHOLECYSTECTOMY      2006    OSTEOTOMY FEMUR DISTAL      x3, right knee    OSTEOTOMY FEMUR DISTAL      2000,left knee  ligament surgery    OTHER SURGICAL HISTORY      1/10/2003,,PTCA    OTHER SURGICAL HISTORY      09/20/2012,,PTCA,DELONTE in LAD and left main    OTHER SURGICAL HISTORY      4/1/2013,,PTCA,L subclavian stenosis    RELEASE TRIGGER FINGER Left 12/2/2022    Procedure: Left thumb Trigger  release;  Surgeon: Cristhian Wilkinson MD;  Location: [facility]    SALPINGO-OOPHORECTOMY BILATERAL      age 27,Bilateral salpingo-oophorectomy    TONSILLECTOMY, ADENOIDECTOMY, COMBINED      childhood     Current Outpatient Medications   Medication Sig Dispense Refill    amLODIPine (NORVASC) 10 MG tablet Take 1 tablet (10 mg) by mouth daily. Dx. Code: I10. 90 tablet 3    aspirin (ASA) 81 MG chewable tablet Take 1 tablet (81 mg) by mouth daily. Starting tomorrow. 30 tablet 3    busPIRone HCl (BUSPAR) 30 MG tablet Take 1 tablet by mouth twice daily 180 tablet 1    clonazePAM (KLONOPIN) 0.5 MG tablet Take 1 tablet (0.5 mg) by mouth 2 times daily as needed for anxiety, muscle spasms or sleep. 60 tablet 1    clopidogrel (PLAVIX) 75 MG tablet Take 1 tablet (75 mg) by mouth daily. 90 tablet 3    cyclobenzaprine (FLEXERIL) 10 MG tablet Take 1 tablet (10 mg) by mouth 3 times daily as needed for muscle spasms. 30 tablet 2    diclofenac (VOLTAREN) 1 % topical gel Apply 2 grams to each hand or 4 grams to each knee up to 4 times daily as needed for arthritis pain 350 g 4    HYDROcodone-acetaminophen (NORCO)  MG per tablet Take 1 tablet by mouth every 4 hours as needed for severe pain. 180 tablet 0    HYDROcodone-acetaminophen (NORCO)  MG per tablet Take 1 tablet by mouth every 4  hours as needed for severe pain. 180 tablet 0    HYDROcodone-acetaminophen (NORCO)  MG per tablet Take 1 tablet by mouth every 4 hours as needed for severe pain. 180 tablet 0    lisinopril (ZESTRIL) 10 MG tablet Take 1 tablet (10 mg) by mouth daily. 90 tablet 3    metoprolol succinate ER (TOPROL XL) 25 MG 24 hr tablet Take 1 tablet (25 mg) by mouth daily. 90 tablet 3    naloxone (NARCAN) 4 MG/0.1ML nasal spray Spray into one nostril for opioid reversal if unresponsive. May repeat every 2-3 minutes until patient responsive or EMS arrives 1 each 1    nitroGLYcerin (NITROLINGUAL) 0.4 MG/SPRAY spray For chest pain spray 1 spray under tongue every 5 minutes for 3 doses. If symptoms persist 5 minutes after 1st dose call 911. 9.8 g 3    ondansetron (ZOFRAN) 4 MG tablet Take 1 tablet (4 mg) by mouth every 6 hours as needed for nausea. 90 tablet 3    pantoprazole (PROTONIX) 40 MG EC tablet Take 1 tablet (40 mg) by mouth daily. 90 tablet 3    rimegepant (NURTEC) 75 MG ODT tablet PLACE 1 TAB UNDER TONGUE DAILY AS NEEDED FOR MIGRAINE. MAX OF 1 TAB EVERY 48 HRS & 2 PER WEEK 8 tablet 2    rosuvastatin (CRESTOR) 40 MG tablet Take 1 tablet (40 mg) by mouth daily. 90 tablet 3    sucralfate (CARAFATE) 1 GM tablet Take 1 tablet (1 g) by mouth 4 times daily as needed for nausea (epigastric pain). 120 tablet 5    triamcinolone (KENALOG) 0.1 % external lotion Apply topically 2 times daily. 60 mL 3    VITAMIN D, CHOLECALCIFEROL, PO Take 5,000 Units by mouth daily      amitriptyline (ELAVIL) 100 MG tablet Take 1 tablet (100 mg) by mouth at bedtime. 90 tablet 3    DULoxetine (CYMBALTA) 30 MG capsule Take 1 capsule (30 mg) by mouth at bedtime. In addition to 60 mg every morning (Patient not taking: Reported on 7/28/2025) 90 capsule 3    DULoxetine (CYMBALTA) 60 MG capsule Take 1 capsule (60 mg) by mouth daily. (Patient not taking: Reported on 7/28/2025) 90 capsule 3       Allergies   Allergen Reactions    Atorvastatin Muscle Pain  "(Myalgia)    Tiotropium Bromide [Tiotropium] Rash    Ezetimibe Muscle Pain (Myalgia)    Latex Rash    Niacin      Other reaction(s): Flushing    No Clinical Screening - See Comments Itching, Rash and Blisters     Metals and plastics      Tape [Adhesive Tape] Rash        Social History     Tobacco Use    Smoking status: Former     Current packs/day: 0.00     Average packs/day: 1 pack/day for 35.0 years (35.0 ttl pk-yrs)     Types: Cigarettes     Start date: 2/15/1982     Quit date: 2/15/2017     Years since quittin.4     Passive exposure: Past    Smokeless tobacco: Never   Substance Use Topics    Alcohol use: Not Currently     Alcohol/week: 0.0 standard drinks of alcohol       History   Drug Use No             Review of Systems  Pertinent positives and negatives as per HPI, complete review otherwise negative.      Objective    BP (!) 144/82   Pulse 96   Temp 97.1  F (36.2  C) (Tympanic)   Resp 18   Ht 1.685 m (5' 6.34\")   Wt 84.7 kg (186 lb 12.8 oz)   LMP 1978 (Approximate)   SpO2 97%   Breastfeeding No   BMI 29.84 kg/m     Estimated body mass index is 29.84 kg/m  as calculated from the following:    Height as of this encounter: 1.685 m (5' 6.34\").    Weight as of this encounter: 84.7 kg (186 lb 12.8 oz).  Physical Exam  General: alert and oriented x 3, no acute distress, pleasant, conversant  Head: normocephalic, atraumatic  Eyes: pupils equal, round, and reactive to light, conjunctiva pink, sclera white  Oropharynx: pink without tonsillar enlargement nor exudate  Neck: supple without lymphadenopathy, thyroid not enlarged, no JVD or carotid bruits  Lungs: clear to auscultation, no wheezes, rhonchi or rales, no increased work of breathing  Heart: regular rate and rhythm, no murmurs auscultated  Abdomen: soft, nontender, nondistended, normoactive bowel sounds, no palpable masses or organomegaly  Skin: no abnormal lesions or rash  Musculoskeletal/Extremities: good ROM throughout, no peripheral " edema  Neuro: no gross focal deficits  Psych: mood depressed, affect anxious, denies SI    Recent Labs   Lab Test 03/18/25  1141 09/24/24  0905   HGB  --  13.2   PLT  --  237   INR  --  1.04    137   POTASSIUM 3.9 4.3   CR 0.98* 1.12*        Diagnostics  Recent Results (from the past 24 hours)   Hemoglobin A1c    Collection Time: 07/28/25 11:08 AM   Result Value Ref Range    Estimated Average Glucose 114 <117 mg/dL    Hemoglobin A1C 5.6 <5.7 %   Comprehensive Metabolic Panel    Collection Time: 07/28/25 11:08 AM   Result Value Ref Range    Sodium 143 135 - 145 mmol/L    Potassium 4.1 3.4 - 5.3 mmol/L    Carbon Dioxide (CO2) 24 22 - 29 mmol/L    Anion Gap 14 7 - 15 mmol/L    Urea Nitrogen 13.1 8.0 - 23.0 mg/dL    Creatinine 0.76 0.51 - 0.95 mg/dL    GFR Estimate 83 >60 mL/min/1.73m2    Calcium 9.6 8.8 - 10.4 mg/dL    Chloride 105 98 - 107 mmol/L    Glucose 108 (H) 70 - 99 mg/dL    Alkaline Phosphatase 82 40 - 150 U/L    AST 29 0 - 45 U/L    ALT 19 0 - 50 U/L    Protein Total 7.5 6.4 - 8.3 g/dL    Albumin 4.5 3.5 - 5.2 g/dL    Bilirubin Total 0.3 <=1.2 mg/dL   CBC with platelets and differential    Collection Time: 07/28/25 11:08 AM   Result Value Ref Range    WBC Count 8.3 4.0 - 11.0 10e3/uL    RBC Count 4.21 3.80 - 5.20 10e6/uL    Hemoglobin 12.6 11.7 - 15.7 g/dL    Hematocrit 38.1 35.0 - 47.0 %    MCV 91 78 - 100 fL    MCH 29.9 26.5 - 33.0 pg    MCHC 33.1 31.5 - 36.5 g/dL    RDW 13.2 10.0 - 15.0 %    Platelet Count 295 150 - 450 10e3/uL    % Neutrophils 72 %    % Lymphocytes 20 %    % Monocytes 6 %    % Eosinophils 1 %    % Basophils 1 %    % Immature Granulocytes 0 %    NRBCs per 100 WBC 0 <1 /100    Absolute Neutrophils 6.0 1.6 - 8.3 10e3/uL    Absolute Lymphocytes 1.7 0.8 - 5.3 10e3/uL    Absolute Monocytes 0.5 0.0 - 1.3 10e3/uL    Absolute Eosinophils 0.1 0.0 - 0.7 10e3/uL    Absolute Basophils 0.1 0.0 - 0.2 10e3/uL    Absolute Immature Granulocytes 0.0 <=0.4 10e3/uL    Absolute NRBCs 0.0 10e3/uL         Revised Cardiac Risk Index (RCRI)  The patient has the following serious cardiovascular risks for perioperative complications:   - Coronary Artery Disease (MI, positive stress test, angina, Qs on EKG) = 1 point   - Cerebrovascular Disease (TIA or CVA) = 1 point     RCRI Interpretation: 2 points: Class III (moderate risk - 6.6% complication rate)     Estimated Functional Capacity: Performs 4 METS exercise without symptoms (e.g., light housework, stairs, 4 mph walk, 7 mph bike, slow step dance) - without cardiac symptoms, currently limited by neurogenic claudication.           I spent a total of 99 minutes on the patient's care today including preparing for the visit, the visit, documentation, and follow-up care. This does not include any procedure time.    The longitudinal plan of care for the diagnosis(es)/condition(s) as documented were addressed during this visit. Due to the added complexity in care, I will continue to support Malina in the subsequent management and with ongoing continuity of care.    Signed Electronically by: Kenia Duran MD  A copy of this evaluation report is provided to the requesting physician.        Answers submitted by the patient for this visit:  General Questionnaire (Submitted on 7/23/2025)  Chief Complaint: Chronic problems general questions HPI Form  What is the reason for your visit today? : Meds and pre-op  How many days per week do you miss taking your medication?: 0  Questionnaire about: Chronic problems general questions HPI Form (Submitted on 7/23/2025)  Chief Complaint: Chronic problems general questions HPI Form

## 2025-08-25 ENCOUNTER — HOSPITAL ENCOUNTER (OUTPATIENT)
Dept: GENERAL RADIOLOGY | Facility: OTHER | Age: 70
Discharge: HOME | End: 2025-08-25
Attending: STUDENT IN AN ORGANIZED HEALTH CARE EDUCATION/TRAINING PROGRAM
Payer: COMMERCIAL

## 2025-08-25 ENCOUNTER — OFFICE VISIT (OUTPATIENT)
Dept: ORTHOPEDICS | Facility: OTHER | Age: 70
End: 2025-08-25
Attending: STUDENT IN AN ORGANIZED HEALTH CARE EDUCATION/TRAINING PROGRAM
Payer: COMMERCIAL

## 2025-08-25 DIAGNOSIS — Z98.1 S/P LUMBAR FUSION: Primary | ICD-10-CM

## 2025-08-25 DIAGNOSIS — Z98.1 S/P LUMBAR FUSION: ICD-10-CM

## 2025-08-25 PROCEDURE — 72100 X-RAY EXAM L-S SPINE 2/3 VWS: CPT | Mod: 26 | Performed by: RADIOLOGY

## 2025-08-25 PROCEDURE — 72100 X-RAY EXAM L-S SPINE 2/3 VWS: CPT

## (undated) DEVICE — DRSG XEROFORM 1X8"

## (undated) DEVICE — GLOVE BIOGEL INDICATOR 7.5 LF 41675

## (undated) DEVICE — GUIDEWIRE OPTOWIRE 3 W/O GAUGE FACTOR CONNECTOR F1032

## (undated) DEVICE — CATH ANGIO SUPERTORQUE PLUS JL4 6FRX100CM 533620

## (undated) DEVICE — PACK MAJOR EXTREMITY SOP15MEFCA

## (undated) DEVICE — DRSG GAUZE 4X4" 3033

## (undated) DEVICE — SLEEVE TR BAND RADIAL COMPRESSION DEVICE 29CM XX-RF06L

## (undated) DEVICE — BNDG ELASTIC 2"X5YDS UNSTERILE 6611-20

## (undated) DEVICE — SOL WATER 1500ML

## (undated) DEVICE — CATH ANGIO SUPERTORQUE PLUS JR4 6FRX100CM 533621

## (undated) DEVICE — CUTTING BALLOON WOLVERINE 3X10MM

## (undated) DEVICE — MANIFOLD KIT ANGIO AUTOMATED 014613

## (undated) DEVICE — SU ETHILON 2-0 FS 18" 664G

## (undated) DEVICE — Device

## (undated) DEVICE — INTRO SHEATH 7FRX10CM PINNACLE RSS702

## (undated) DEVICE — CATH GUIDING BLUE YELLOW PTFE XB3 6FRX100CM 67005200

## (undated) DEVICE — GW VASC .035IN DIA 260CML 7CML 3 MM RADIUS J CURVE 502455

## (undated) DEVICE — PREP CHLORAPREP 26ML TINTED ORANGE  260815

## (undated) DEVICE — PACK HEART LEFT CUSTOM

## (undated) DEVICE — GLOVE PROTEXIS PI ORTHO PF 7.5 2D73HT75

## (undated) DEVICE — TUBING PRESSURE 30"

## (undated) DEVICE — ESU GROUND PAD ADULT W/CORD E7507

## (undated) DEVICE — TOURNIQUET SGL BLADDER 18"X4" RED 5921-218-135

## (undated) DEVICE — CATH BALLOON NC EMERGE 3.50X8MM H7493926708350

## (undated) DEVICE — GUIDEWIRE RUNTHROUGH IZANAI PTCA .014 X 180CM 25-5211

## (undated) DEVICE — CATH BALLOON NC EMERGE 3.00X8MM H7493926708300

## (undated) DEVICE — FASTENER CATH BALLOON CLAMPX2 STATLOCK 0684-00-493

## (undated) DEVICE — VALVE HEMOSTASIS .096" COPILOT MECH 1003331

## (undated) DEVICE — GLOVE BIOGEL PI INDICATOR 8.0 LF 41680

## (undated) DEVICE — CAST PADDING 2" COTTON WEBRIL UNSTERILE 9082

## (undated) DEVICE — COVER LIGHT HANDLE LT-F02

## (undated) DEVICE — INTRO GLIDESHEATH SLENDER 6FR 10X45CM 60-1060

## (undated) DEVICE — CATH US OD 5FR OD SEC 2.9FR EAGLE EYE PLATINUM 0.014 85900P

## (undated) DEVICE — DRAPE STERI TOWEL LG 1010

## (undated) DEVICE — KIT HAND CONTROL ACIST 014644 AR-P54

## (undated) DEVICE — CATH ANGIO SUPERTORQUE IM 6FRX100CM 533660

## (undated) DEVICE — KIT DVC ANGIO IBASIXCOMPAK 13INX20ML 3WAY IN4430

## (undated) RX ORDER — METHYLPREDNISOLONE ACETATE 80 MG/ML
INJECTION, SUSPENSION INTRA-ARTICULAR; INTRALESIONAL; INTRAMUSCULAR; SOFT TISSUE
Status: DISPENSED
Start: 2019-04-29

## (undated) RX ORDER — HYDROMORPHONE HYDROCHLORIDE 1 MG/ML
INJECTION, SOLUTION INTRAMUSCULAR; INTRAVENOUS; SUBCUTANEOUS
Status: DISPENSED
Start: 2020-01-02

## (undated) RX ORDER — NITROGLYCERIN 10 MG/100ML
INJECTION INTRAVENOUS
Status: DISPENSED
Start: 2019-10-02

## (undated) RX ORDER — ASPIRIN 81 MG/1
TABLET, CHEWABLE ORAL
Status: DISPENSED
Start: 2020-01-02

## (undated) RX ORDER — NICARDIPINE HCL-0.9% SOD CHLOR 1 MG/10 ML
SYRINGE (ML) INTRAVENOUS
Status: DISPENSED
Start: 2024-07-01

## (undated) RX ORDER — FENTANYL CITRATE 50 UG/ML
INJECTION, SOLUTION INTRAMUSCULAR; INTRAVENOUS
Status: DISPENSED
Start: 2020-01-06

## (undated) RX ORDER — SODIUM CHLORIDE 9 MG/ML
INJECTION, SOLUTION INTRAVENOUS
Status: DISPENSED
Start: 2021-07-16

## (undated) RX ORDER — SODIUM CHLORIDE 9 MG/ML
INJECTION, SOLUTION INTRAVENOUS
Status: DISPENSED
Start: 2021-07-23

## (undated) RX ORDER — NITROGLYCERIN 10 MG/100ML
INJECTION INTRAVENOUS
Status: DISPENSED
Start: 2020-04-11

## (undated) RX ORDER — SODIUM CHLORIDE 9 MG/ML
INJECTION, SOLUTION INTRAVENOUS
Status: DISPENSED
Start: 2021-03-16

## (undated) RX ORDER — BUPIVACAINE HYDROCHLORIDE 5 MG/ML
INJECTION, SOLUTION EPIDURAL; INTRACAUDAL
Status: DISPENSED
Start: 2019-08-14

## (undated) RX ORDER — ACETAMINOPHEN 325 MG/1
TABLET ORAL
Status: DISPENSED
Start: 2024-07-01

## (undated) RX ORDER — LIDOCAINE HYDROCHLORIDE 10 MG/ML
INJECTION, SOLUTION INFILTRATION; PERINEURAL
Status: DISPENSED
Start: 2022-12-02

## (undated) RX ORDER — FENTANYL CITRATE 50 UG/ML
INJECTION, SOLUTION INTRAMUSCULAR; INTRAVENOUS
Status: DISPENSED
Start: 2022-10-03

## (undated) RX ORDER — HYDROCODONE BITARTRATE AND ACETAMINOPHEN 5; 325 MG/1; MG/1
TABLET ORAL
Status: DISPENSED
Start: 2020-10-20

## (undated) RX ORDER — NITROGLYCERIN 5 MG/ML
VIAL (ML) INTRAVENOUS
Status: DISPENSED
Start: 2017-09-15

## (undated) RX ORDER — SODIUM CHLORIDE 9 MG/ML
INJECTION, SOLUTION INTRAVENOUS
Status: DISPENSED
Start: 2022-01-02

## (undated) RX ORDER — BUPIVACAINE HYDROCHLORIDE 5 MG/ML
INJECTION, SOLUTION EPIDURAL; INTRACAUDAL
Status: DISPENSED
Start: 2019-07-25

## (undated) RX ORDER — KETOROLAC TROMETHAMINE 30 MG/ML
INJECTION, SOLUTION INTRAMUSCULAR; INTRAVENOUS
Status: DISPENSED
Start: 2021-08-06

## (undated) RX ORDER — FENTANYL CITRATE 50 UG/ML
INJECTION, SOLUTION INTRAMUSCULAR; INTRAVENOUS
Status: DISPENSED
Start: 2020-04-11

## (undated) RX ORDER — ALUMINA, MAGNESIA, AND SIMETHICONE 2400; 2400; 240 MG/30ML; MG/30ML; MG/30ML
SUSPENSION ORAL
Status: DISPENSED
Start: 2019-10-25

## (undated) RX ORDER — SUCRALFATE 1 G/1
TABLET ORAL
Status: DISPENSED
Start: 2019-10-25

## (undated) RX ORDER — SODIUM CHLORIDE 9 MG/ML
INJECTION, SOLUTION INTRAVENOUS
Status: DISPENSED
Start: 2019-05-13

## (undated) RX ORDER — FENTANYL CITRATE 50 UG/ML
INJECTION, SOLUTION INTRAMUSCULAR; INTRAVENOUS
Status: DISPENSED
Start: 2017-09-15

## (undated) RX ORDER — LIDOCAINE HYDROCHLORIDE 10 MG/ML
INJECTION, SOLUTION INFILTRATION; PERINEURAL
Status: DISPENSED
Start: 2025-04-01

## (undated) RX ORDER — REGADENOSON 0.08 MG/ML
INJECTION, SOLUTION INTRAVENOUS
Status: DISPENSED
Start: 2024-04-18

## (undated) RX ORDER — SODIUM CHLORIDE 9 MG/ML
INJECTION, SOLUTION INTRAVENOUS
Status: DISPENSED
Start: 2019-10-02

## (undated) RX ORDER — ATROPINE SULFATE 0.1 MG/ML
INJECTION INTRAVENOUS
Status: DISPENSED
Start: 2017-09-15

## (undated) RX ORDER — SODIUM CHLORIDE, SODIUM LACTATE, POTASSIUM CHLORIDE, CALCIUM CHLORIDE 600; 310; 30; 20 MG/100ML; MG/100ML; MG/100ML; MG/100ML
INJECTION, SOLUTION INTRAVENOUS
Status: DISPENSED
Start: 2019-10-25

## (undated) RX ORDER — SODIUM CHLORIDE 9 MG/ML
INJECTION, SOLUTION INTRAVENOUS
Status: DISPENSED
Start: 2020-04-11

## (undated) RX ORDER — ASPIRIN 81 MG/1
TABLET, CHEWABLE ORAL
Status: DISPENSED
Start: 2019-10-25

## (undated) RX ORDER — RIZATRIPTAN BENZOATE 10 MG/1
TABLET, ORALLY DISINTEGRATING ORAL
Status: DISPENSED
Start: 2019-05-13

## (undated) RX ORDER — LORAZEPAM 2 MG/ML
INJECTION INTRAMUSCULAR
Status: DISPENSED
Start: 2024-06-19

## (undated) RX ORDER — HYDROCODONE BITARTRATE AND ACETAMINOPHEN 5; 325 MG/1; MG/1
TABLET ORAL
Status: DISPENSED
Start: 2022-05-05

## (undated) RX ORDER — ASPIRIN 325 MG
TABLET ORAL
Status: DISPENSED
Start: 2024-09-24

## (undated) RX ORDER — SODIUM CHLORIDE 9 MG/ML
INJECTION, SOLUTION INTRAVENOUS
Status: DISPENSED
Start: 2017-09-15

## (undated) RX ORDER — ASPIRIN 81 MG/1
TABLET, CHEWABLE ORAL
Status: DISPENSED
Start: 2023-12-01

## (undated) RX ORDER — METOCLOPRAMIDE HYDROCHLORIDE 5 MG/ML
INJECTION INTRAMUSCULAR; INTRAVENOUS
Status: DISPENSED
Start: 2021-07-23

## (undated) RX ORDER — LIDOCAINE HYDROCHLORIDE 10 MG/ML
INJECTION, SOLUTION INFILTRATION; PERINEURAL
Status: DISPENSED
Start: 2019-12-04

## (undated) RX ORDER — DIPHENHYDRAMINE HYDROCHLORIDE 50 MG/ML
INJECTION INTRAMUSCULAR; INTRAVENOUS
Status: DISPENSED
Start: 2017-09-15

## (undated) RX ORDER — HEPARIN SODIUM 1000 [USP'U]/ML
INJECTION, SOLUTION INTRAVENOUS; SUBCUTANEOUS
Status: DISPENSED
Start: 2024-09-24

## (undated) RX ORDER — MORPHINE SULFATE 2 MG/ML
INJECTION, SOLUTION INTRAMUSCULAR; INTRAVENOUS
Status: DISPENSED
Start: 2019-10-02

## (undated) RX ORDER — REGADENOSON 0.08 MG/ML
INJECTION, SOLUTION INTRAVENOUS
Status: DISPENSED
Start: 2019-12-16

## (undated) RX ORDER — MORPHINE SULFATE 2 MG/ML
INJECTION, SOLUTION INTRAMUSCULAR; INTRAVENOUS
Status: DISPENSED
Start: 2021-07-16

## (undated) RX ORDER — SODIUM CHLORIDE 9 MG/ML
INJECTION, SOLUTION INTRAVENOUS
Status: DISPENSED
Start: 2020-01-06

## (undated) RX ORDER — FENTANYL CITRATE 50 UG/ML
INJECTION, SOLUTION INTRAMUSCULAR; INTRAVENOUS
Status: DISPENSED
Start: 2024-07-01

## (undated) RX ORDER — LIDOCAINE HYDROCHLORIDE 10 MG/ML
INJECTION, SOLUTION EPIDURAL; INFILTRATION; INTRACAUDAL; PERINEURAL
Status: DISPENSED
Start: 2019-02-26

## (undated) RX ORDER — METHYLPREDNISOLONE SODIUM SUCCINATE 125 MG/2ML
INJECTION, POWDER, LYOPHILIZED, FOR SOLUTION INTRAMUSCULAR; INTRAVENOUS
Status: DISPENSED
Start: 2019-05-13

## (undated) RX ORDER — CYANOCOBALAMIN 1000 UG/ML
INJECTION, SOLUTION INTRAMUSCULAR; SUBCUTANEOUS
Status: DISPENSED
Start: 2020-06-15

## (undated) RX ORDER — DIPHENHYDRAMINE HYDROCHLORIDE 50 MG/ML
INJECTION INTRAMUSCULAR; INTRAVENOUS
Status: DISPENSED
Start: 2019-05-13

## (undated) RX ORDER — CEFTRIAXONE SODIUM 2 G/50ML
INJECTION, SOLUTION INTRAVENOUS
Status: DISPENSED
Start: 2022-10-03

## (undated) RX ORDER — LIDOCAINE HYDROCHLORIDE 10 MG/ML
INJECTION, SOLUTION INFILTRATION; PERINEURAL
Status: DISPENSED
Start: 2019-04-29

## (undated) RX ORDER — METHYLPREDNISOLONE ACETATE 80 MG/ML
INJECTION, SUSPENSION INTRA-ARTICULAR; INTRALESIONAL; INTRAMUSCULAR; SOFT TISSUE
Status: DISPENSED
Start: 2019-02-26

## (undated) RX ORDER — OXYCODONE HYDROCHLORIDE 10 MG/1
TABLET ORAL
Status: DISPENSED
Start: 2024-09-24

## (undated) RX ORDER — RIZATRIPTAN BENZOATE 10 MG/1
TABLET, ORALLY DISINTEGRATING ORAL
Status: DISPENSED
Start: 2020-10-20

## (undated) RX ORDER — CEFAZOLIN SODIUM/WATER 2 G/20 ML
SYRINGE (ML) INTRAVENOUS
Status: DISPENSED
Start: 2022-12-02

## (undated) RX ORDER — NITROGLYCERIN 5 MG/ML
VIAL (ML) INTRAVENOUS
Status: DISPENSED
Start: 2024-07-01

## (undated) RX ORDER — DEXAMETHASONE SODIUM PHOSPHATE 10 MG/ML
INJECTION, SOLUTION INTRAMUSCULAR; INTRAVENOUS
Status: DISPENSED
Start: 2025-04-01

## (undated) RX ORDER — PROPOFOL 10 MG/ML
INJECTION, EMULSION INTRAVENOUS
Status: DISPENSED
Start: 2022-12-02

## (undated) RX ORDER — NITROGLYCERIN 0.4 MG/1
TABLET SUBLINGUAL
Status: DISPENSED
Start: 2020-01-02

## (undated) RX ORDER — HEPARIN SODIUM 5000 [USP'U]/.5ML
INJECTION, SOLUTION INTRAVENOUS; SUBCUTANEOUS
Status: DISPENSED
Start: 2019-10-02

## (undated) RX ORDER — LIDOCAINE HYDROCHLORIDE 20 MG/ML
SOLUTION OROPHARYNGEAL
Status: DISPENSED
Start: 2019-10-25

## (undated) RX ORDER — LIDOCAINE HYDROCHLORIDE 10 MG/ML
INJECTION, SOLUTION EPIDURAL; INFILTRATION; INTRACAUDAL; PERINEURAL
Status: DISPENSED
Start: 2025-04-01

## (undated) RX ORDER — METOCLOPRAMIDE HYDROCHLORIDE 5 MG/ML
INJECTION INTRAMUSCULAR; INTRAVENOUS
Status: DISPENSED
Start: 2022-01-02

## (undated) RX ORDER — HYDROCODONE BITARTRATE AND ACETAMINOPHEN 10; 325 MG/1; MG/1
TABLET ORAL
Status: DISPENSED
Start: 2022-07-03

## (undated) RX ORDER — LIDOCAINE HYDROCHLORIDE 10 MG/ML
INJECTION, SOLUTION EPIDURAL; INFILTRATION; INTRACAUDAL; PERINEURAL
Status: DISPENSED
Start: 2024-09-24

## (undated) RX ORDER — CEFTRIAXONE SODIUM 1 G/50ML
INJECTION, SOLUTION INTRAVENOUS
Status: DISPENSED
Start: 2022-10-03

## (undated) RX ORDER — DOXYCYCLINE 100 MG/1
CAPSULE ORAL
Status: DISPENSED
Start: 2019-05-13

## (undated) RX ORDER — TRIAMCINOLONE ACETONIDE 40 MG/ML
INJECTION, SUSPENSION INTRA-ARTICULAR; INTRAMUSCULAR
Status: DISPENSED
Start: 2019-07-25

## (undated) RX ORDER — HYDROCODONE BITARTRATE AND ACETAMINOPHEN 5; 325 MG/1; MG/1
TABLET ORAL
Status: DISPENSED
Start: 2023-12-01

## (undated) RX ORDER — REGADENOSON 0.08 MG/ML
INJECTION, SOLUTION INTRAVENOUS
Status: DISPENSED
Start: 2021-09-15

## (undated) RX ORDER — FENTANYL CITRATE 50 UG/ML
INJECTION, SOLUTION INTRAMUSCULAR; INTRAVENOUS
Status: DISPENSED
Start: 2024-09-24

## (undated) RX ORDER — HEPARIN SODIUM 1000 [USP'U]/ML
INJECTION, SOLUTION INTRAVENOUS; SUBCUTANEOUS
Status: DISPENSED
Start: 2024-07-01

## (undated) RX ORDER — CLOPIDOGREL BISULFATE 75 MG/1
TABLET ORAL
Status: DISPENSED
Start: 2024-09-24

## (undated) RX ORDER — METHYLPREDNISOLONE ACETATE 80 MG/ML
INJECTION, SUSPENSION INTRA-ARTICULAR; INTRALESIONAL; INTRAMUSCULAR; SOFT TISSUE
Status: DISPENSED
Start: 2019-08-14

## (undated) RX ORDER — KETOROLAC TROMETHAMINE 15 MG/ML
INJECTION, SOLUTION INTRAMUSCULAR; INTRAVENOUS
Status: DISPENSED
Start: 2021-07-23

## (undated) RX ORDER — ALUMINA, MAGNESIA, AND SIMETHICONE 2400; 2400; 240 MG/30ML; MG/30ML; MG/30ML
SUSPENSION ORAL
Status: DISPENSED
Start: 2020-04-11

## (undated) RX ORDER — MORPHINE SULFATE 2 MG/ML
INJECTION, SOLUTION INTRAMUSCULAR; INTRAVENOUS
Status: DISPENSED
Start: 2019-10-25

## (undated) RX ORDER — SODIUM CHLORIDE 9 MG/ML
INJECTION, SOLUTION INTRAVENOUS
Status: DISPENSED
Start: 2020-01-02

## (undated) RX ORDER — ASPIRIN 325 MG
TABLET ORAL
Status: DISPENSED
Start: 2024-07-01

## (undated) RX ORDER — ACETAMINOPHEN 500 MG
TABLET ORAL
Status: DISPENSED
Start: 2019-05-13

## (undated) RX ORDER — LIDOCAINE HYDROCHLORIDE 10 MG/ML
INJECTION, SOLUTION INFILTRATION; PERINEURAL
Status: DISPENSED
Start: 2019-08-14

## (undated) RX ORDER — HEPARIN SODIUM 10000 [USP'U]/100ML
INJECTION, SOLUTION INTRAVENOUS
Status: DISPENSED
Start: 2019-10-02

## (undated) RX ORDER — MORPHINE SULFATE 2 MG/ML
INJECTION, SOLUTION INTRAMUSCULAR; INTRAVENOUS
Status: DISPENSED
Start: 2021-03-16

## (undated) RX ORDER — HEPARIN SODIUM 1000 [USP'U]/ML
INJECTION, SOLUTION INTRAVENOUS; SUBCUTANEOUS
Status: DISPENSED
Start: 2017-09-15

## (undated) RX ORDER — OXYCODONE HYDROCHLORIDE 5 MG/1
TABLET ORAL
Status: DISPENSED
Start: 2024-07-01

## (undated) RX ORDER — LIDOCAINE HYDROCHLORIDE 10 MG/ML
INJECTION, SOLUTION INFILTRATION; PERINEURAL
Status: DISPENSED
Start: 2019-02-26

## (undated) RX ORDER — SODIUM CHLORIDE 9 MG/ML
INJECTION, SOLUTION INTRAVENOUS
Status: DISPENSED
Start: 2024-07-01

## (undated) RX ORDER — LIDOCAINE HYDROCHLORIDE 20 MG/ML
SOLUTION OROPHARYNGEAL
Status: DISPENSED
Start: 2020-04-11

## (undated) RX ORDER — ASPIRIN 81 MG/1
TABLET, CHEWABLE ORAL
Status: DISPENSED
Start: 2021-07-23

## (undated) RX ORDER — ASPIRIN 81 MG/1
TABLET, CHEWABLE ORAL
Status: DISPENSED
Start: 2020-04-11

## (undated) RX ORDER — LIDOCAINE 40 MG/G
CREAM TOPICAL
Status: DISPENSED
Start: 2024-07-01

## (undated) RX ORDER — FENTANYL CITRATE 50 UG/ML
INJECTION, SOLUTION INTRAMUSCULAR; INTRAVENOUS
Status: DISPENSED
Start: 2019-05-13

## (undated) RX ORDER — BUPIVACAINE HYDROCHLORIDE 2.5 MG/ML
INJECTION, SOLUTION EPIDURAL; INFILTRATION; INTRACAUDAL
Status: DISPENSED
Start: 2022-12-02

## (undated) RX ORDER — LIDOCAINE HYDROCHLORIDE 10 MG/ML
INJECTION, SOLUTION INFILTRATION; PERINEURAL
Status: DISPENSED
Start: 2024-11-08

## (undated) RX ORDER — LIDOCAINE HYDROCHLORIDE 10 MG/ML
INJECTION, SOLUTION EPIDURAL; INFILTRATION; INTRACAUDAL; PERINEURAL
Status: DISPENSED
Start: 2019-04-29

## (undated) RX ORDER — HYDROCODONE BITARTRATE AND ACETAMINOPHEN 5; 325 MG/1; MG/1
TABLET ORAL
Status: DISPENSED
Start: 2023-04-15

## (undated) RX ORDER — ACETAMINOPHEN 325 MG/1
TABLET ORAL
Status: DISPENSED
Start: 2022-07-03

## (undated) RX ORDER — ONDANSETRON 2 MG/ML
INJECTION INTRAMUSCULAR; INTRAVENOUS
Status: DISPENSED
Start: 2019-10-02

## (undated) RX ORDER — SODIUM CHLORIDE 9 MG/ML
INJECTION, SOLUTION INTRAVENOUS
Status: DISPENSED
Start: 2024-09-24

## (undated) RX ORDER — LIDOCAINE HYDROCHLORIDE 10 MG/ML
INJECTION, SOLUTION INFILTRATION; PERINEURAL
Status: DISPENSED
Start: 2019-07-25

## (undated) RX ORDER — DIPHENHYDRAMINE HYDROCHLORIDE 50 MG/ML
INJECTION INTRAMUSCULAR; INTRAVENOUS
Status: DISPENSED
Start: 2021-07-23

## (undated) RX ORDER — NITROGLYCERIN 0.4 MG/1
TABLET SUBLINGUAL
Status: DISPENSED
Start: 2021-03-16

## (undated) RX ORDER — ACETAMINOPHEN 325 MG/1
TABLET ORAL
Status: DISPENSED
Start: 2022-01-02

## (undated) RX ORDER — TRIAMCINOLONE ACETONIDE 40 MG/ML
INJECTION, SUSPENSION INTRA-ARTICULAR; INTRAMUSCULAR
Status: DISPENSED
Start: 2024-11-08

## (undated) RX ORDER — ALBUTEROL SULFATE 0.83 MG/ML
SOLUTION RESPIRATORY (INHALATION)
Status: DISPENSED
Start: 2020-01-06

## (undated) RX ORDER — PANTOPRAZOLE SODIUM 40 MG/10ML
INJECTION, POWDER, LYOPHILIZED, FOR SOLUTION INTRAVENOUS
Status: DISPENSED
Start: 2019-10-25